# Patient Record
Sex: FEMALE | Race: WHITE | NOT HISPANIC OR LATINO | Employment: FULL TIME
[De-identification: names, ages, dates, MRNs, and addresses within clinical notes are randomized per-mention and may not be internally consistent; named-entity substitution may affect disease eponyms.]

---

## 2017-07-15 ENCOUNTER — HEALTH MAINTENANCE LETTER (OUTPATIENT)
Age: 37
End: 2017-07-15

## 2022-08-19 ENCOUNTER — HOSPITAL ENCOUNTER (INPATIENT)
Facility: CLINIC | Age: 42
LOS: 26 days | Discharge: HOME | End: 2022-09-14
Attending: INTERNAL MEDICINE | Admitting: STUDENT IN AN ORGANIZED HEALTH CARE EDUCATION/TRAINING PROGRAM
Payer: COMMERCIAL

## 2022-08-19 DIAGNOSIS — C91.00 ACUTE LYMPHOBLASTIC LEUKEMIA (ALL) NOT HAVING ACHIEVED REMISSION (H): Primary | ICD-10-CM

## 2022-08-19 PROBLEM — C95.00 ACUTE LEUKEMIA (H): Status: ACTIVE | Noted: 2022-08-19

## 2022-08-19 LAB
ALBUMIN SERPL BCG-MCNC: 4.2 G/DL (ref 3.5–5.2)
ALP SERPL-CCNC: 131 U/L (ref 35–104)
ALT SERPL W P-5'-P-CCNC: 25 U/L (ref 10–35)
ANION GAP SERPL CALCULATED.3IONS-SCNC: 13 MMOL/L (ref 7–15)
APTT PPP: 31 SECONDS (ref 22–38)
AST SERPL W P-5'-P-CCNC: 73 U/L (ref 10–35)
BILIRUB SERPL-MCNC: 0.4 MG/DL
BUN SERPL-MCNC: 10.9 MG/DL (ref 6–20)
CALCIUM SERPL-MCNC: 9.1 MG/DL (ref 8.6–10)
CHLORIDE SERPL-SCNC: 102 MMOL/L (ref 98–107)
CREAT SERPL-MCNC: 0.67 MG/DL (ref 0.51–0.95)
DEPRECATED HCO3 PLAS-SCNC: 22 MMOL/L (ref 22–29)
ERYTHROCYTE [DISTWIDTH] IN BLOOD BY AUTOMATED COUNT: 14.4 % (ref 10–15)
FIBRINOGEN PPP-MCNC: 486 MG/DL (ref 170–490)
GFR SERPL CREATININE-BSD FRML MDRD: >90 ML/MIN/1.73M2
GLUCOSE SERPL-MCNC: 106 MG/DL (ref 70–99)
HCT VFR BLD AUTO: 39.9 % (ref 35–47)
HGB BLD-MCNC: 13.2 G/DL (ref 11.7–15.7)
INR PPP: 1.19 (ref 0.85–1.15)
LDH SERPL L TO P-CCNC: >2500 U/L (ref 0–250)
MAGNESIUM SERPL-MCNC: 2.1 MG/DL (ref 1.7–2.3)
MCH RBC QN AUTO: 28.9 PG (ref 26.5–33)
MCHC RBC AUTO-ENTMCNC: 33.1 G/DL (ref 31.5–36.5)
MCV RBC AUTO: 88 FL (ref 78–100)
PHOSPHATE SERPL-MCNC: 3.9 MG/DL (ref 2.5–4.5)
PLATELET # BLD AUTO: 73 10E3/UL (ref 150–450)
POTASSIUM SERPL-SCNC: 3.7 MMOL/L (ref 3.4–5.3)
PROT SERPL-MCNC: 7.6 G/DL (ref 6.4–8.3)
RBC # BLD AUTO: 4.56 10E6/UL (ref 3.8–5.2)
SARS-COV-2 RNA RESP QL NAA+PROBE: NEGATIVE
SODIUM SERPL-SCNC: 137 MMOL/L (ref 136–145)
URATE SERPL-MCNC: 5.9 MG/DL (ref 2.4–5.7)
WBC # BLD AUTO: 109 10E3/UL (ref 4–11)

## 2022-08-19 PROCEDURE — 85007 BL SMEAR W/DIFF WBC COUNT: CPT | Performed by: PHYSICIAN ASSISTANT

## 2022-08-19 PROCEDURE — 81378 HLA I & II TYPING HR: CPT | Performed by: PHYSICIAN ASSISTANT

## 2022-08-19 PROCEDURE — 250N000011 HC RX IP 250 OP 636: Performed by: PHYSICIAN ASSISTANT

## 2022-08-19 PROCEDURE — 83735 ASSAY OF MAGNESIUM: CPT | Performed by: PHYSICIAN ASSISTANT

## 2022-08-19 PROCEDURE — 99223 1ST HOSP IP/OBS HIGH 75: CPT | Performed by: STUDENT IN AN ORGANIZED HEALTH CARE EDUCATION/TRAINING PROGRAM

## 2022-08-19 PROCEDURE — 85027 COMPLETE CBC AUTOMATED: CPT | Performed by: PHYSICIAN ASSISTANT

## 2022-08-19 PROCEDURE — 87340 HEPATITIS B SURFACE AG IA: CPT | Performed by: PHYSICIAN ASSISTANT

## 2022-08-19 PROCEDURE — 86706 HEP B SURFACE ANTIBODY: CPT | Performed by: PHYSICIAN ASSISTANT

## 2022-08-19 PROCEDURE — 86644 CMV ANTIBODY: CPT | Performed by: PHYSICIAN ASSISTANT

## 2022-08-19 PROCEDURE — 84100 ASSAY OF PHOSPHORUS: CPT | Performed by: PHYSICIAN ASSISTANT

## 2022-08-19 PROCEDURE — 80053 COMPREHEN METABOLIC PANEL: CPT | Performed by: PHYSICIAN ASSISTANT

## 2022-08-19 PROCEDURE — 86696 HERPES SIMPLEX TYPE 2 TEST: CPT | Performed by: PHYSICIAN ASSISTANT

## 2022-08-19 PROCEDURE — 250N000013 HC RX MED GY IP 250 OP 250 PS 637: Performed by: PHYSICIAN ASSISTANT

## 2022-08-19 PROCEDURE — 85610 PROTHROMBIN TIME: CPT | Performed by: PHYSICIAN ASSISTANT

## 2022-08-19 PROCEDURE — 86803 HEPATITIS C AB TEST: CPT | Performed by: PHYSICIAN ASSISTANT

## 2022-08-19 PROCEDURE — 87389 HIV-1 AG W/HIV-1&-2 AB AG IA: CPT | Performed by: PHYSICIAN ASSISTANT

## 2022-08-19 PROCEDURE — 120N000002 HC R&B MED SURG/OB UMMC

## 2022-08-19 PROCEDURE — 83615 LACTATE (LD) (LDH) ENZYME: CPT | Performed by: PHYSICIAN ASSISTANT

## 2022-08-19 PROCEDURE — 84550 ASSAY OF BLOOD/URIC ACID: CPT | Performed by: PHYSICIAN ASSISTANT

## 2022-08-19 PROCEDURE — 258N000003 HC RX IP 258 OP 636: Performed by: STUDENT IN AN ORGANIZED HEALTH CARE EDUCATION/TRAINING PROGRAM

## 2022-08-19 PROCEDURE — 36415 COLL VENOUS BLD VENIPUNCTURE: CPT | Performed by: PHYSICIAN ASSISTANT

## 2022-08-19 PROCEDURE — 86704 HEP B CORE ANTIBODY TOTAL: CPT | Performed by: PHYSICIAN ASSISTANT

## 2022-08-19 PROCEDURE — 85730 THROMBOPLASTIN TIME PARTIAL: CPT | Performed by: PHYSICIAN ASSISTANT

## 2022-08-19 PROCEDURE — 86665 EPSTEIN-BARR CAPSID VCA: CPT | Performed by: PHYSICIAN ASSISTANT

## 2022-08-19 PROCEDURE — U0005 INFEC AGEN DETEC AMPLI PROBE: HCPCS | Performed by: STUDENT IN AN ORGANIZED HEALTH CARE EDUCATION/TRAINING PROGRAM

## 2022-08-19 PROCEDURE — 85384 FIBRINOGEN ACTIVITY: CPT | Performed by: PHYSICIAN ASSISTANT

## 2022-08-19 RX ORDER — ACETAMINOPHEN 325 MG/1
650 TABLET ORAL EVERY 4 HOURS PRN
Status: DISCONTINUED | OUTPATIENT
Start: 2022-08-19 | End: 2022-08-25

## 2022-08-19 RX ORDER — SODIUM CHLORIDE 9 MG/ML
INJECTION, SOLUTION INTRAVENOUS CONTINUOUS
Status: DISCONTINUED | OUTPATIENT
Start: 2022-08-19 | End: 2022-08-25

## 2022-08-19 RX ORDER — COVID-19 ANTIGEN TEST
220 KIT MISCELLANEOUS 2 TIMES DAILY WITH MEALS
Status: ON HOLD | COMMUNITY
End: 2022-09-13

## 2022-08-19 RX ORDER — ALLOPURINOL 300 MG/1
300 TABLET ORAL DAILY
Status: DISCONTINUED | OUTPATIENT
Start: 2022-08-19 | End: 2022-09-05

## 2022-08-19 RX ORDER — LANOLIN ALCOHOL/MO/W.PET/CERES
3-6 CREAM (GRAM) TOPICAL
Status: DISCONTINUED | OUTPATIENT
Start: 2022-08-19 | End: 2022-09-14 | Stop reason: HOSPADM

## 2022-08-19 RX ORDER — NALOXONE HYDROCHLORIDE 0.4 MG/ML
0.2 INJECTION, SOLUTION INTRAMUSCULAR; INTRAVENOUS; SUBCUTANEOUS
Status: DISCONTINUED | OUTPATIENT
Start: 2022-08-19 | End: 2022-09-14 | Stop reason: HOSPADM

## 2022-08-19 RX ORDER — NALOXONE HYDROCHLORIDE 0.4 MG/ML
0.4 INJECTION, SOLUTION INTRAMUSCULAR; INTRAVENOUS; SUBCUTANEOUS
Status: DISCONTINUED | OUTPATIENT
Start: 2022-08-19 | End: 2022-09-14 | Stop reason: HOSPADM

## 2022-08-19 RX ORDER — ONDANSETRON 4 MG/1
4-8 TABLET, FILM COATED ORAL EVERY 8 HOURS PRN
Status: DISCONTINUED | OUTPATIENT
Start: 2022-08-19 | End: 2022-09-14 | Stop reason: HOSPADM

## 2022-08-19 RX ORDER — ONDANSETRON 2 MG/ML
4-8 INJECTION INTRAMUSCULAR; INTRAVENOUS EVERY 8 HOURS PRN
Status: DISCONTINUED | OUTPATIENT
Start: 2022-08-19 | End: 2022-09-14 | Stop reason: HOSPADM

## 2022-08-19 RX ORDER — ONDANSETRON 4 MG/1
4-8 TABLET, ORALLY DISINTEGRATING ORAL EVERY 8 HOURS PRN
Status: DISCONTINUED | OUTPATIENT
Start: 2022-08-19 | End: 2022-09-14 | Stop reason: HOSPADM

## 2022-08-19 RX ORDER — OXYCODONE HYDROCHLORIDE 5 MG/1
5-10 TABLET ORAL EVERY 4 HOURS PRN
Status: DISCONTINUED | OUTPATIENT
Start: 2022-08-19 | End: 2022-09-14 | Stop reason: HOSPADM

## 2022-08-19 RX ORDER — HYDROXYZINE HYDROCHLORIDE 25 MG/1
50 TABLET, FILM COATED ORAL EVERY 6 HOURS PRN
Status: DISCONTINUED | OUTPATIENT
Start: 2022-08-19 | End: 2022-09-14 | Stop reason: HOSPADM

## 2022-08-19 RX ORDER — POLYETHYLENE GLYCOL 3350 17 G/17G
17 POWDER, FOR SOLUTION ORAL DAILY PRN
Status: DISCONTINUED | OUTPATIENT
Start: 2022-08-19 | End: 2022-09-14 | Stop reason: HOSPADM

## 2022-08-19 RX ORDER — AMOXICILLIN 250 MG
1-2 CAPSULE ORAL 2 TIMES DAILY PRN
Status: DISCONTINUED | OUTPATIENT
Start: 2022-08-19 | End: 2022-08-24

## 2022-08-19 RX ORDER — DIPHENHYDRAMINE HCL 25 MG
25-50 CAPSULE ORAL EVERY 6 HOURS PRN
Status: DISCONTINUED | OUTPATIENT
Start: 2022-08-19 | End: 2022-08-25

## 2022-08-19 RX ORDER — HYDROXYZINE HYDROCHLORIDE 25 MG/1
25 TABLET, FILM COATED ORAL EVERY 6 HOURS PRN
Status: DISCONTINUED | OUTPATIENT
Start: 2022-08-19 | End: 2022-09-14 | Stop reason: HOSPADM

## 2022-08-19 RX ORDER — PROCHLORPERAZINE MALEATE 5 MG
5-10 TABLET ORAL EVERY 6 HOURS PRN
Status: DISCONTINUED | OUTPATIENT
Start: 2022-08-19 | End: 2022-09-14 | Stop reason: HOSPADM

## 2022-08-19 RX ORDER — ACYCLOVIR 400 MG/1
400 TABLET ORAL 2 TIMES DAILY
Status: DISCONTINUED | OUTPATIENT
Start: 2022-08-19 | End: 2022-09-14 | Stop reason: HOSPADM

## 2022-08-19 RX ADMIN — ACETAMINOPHEN 650 MG: 325 TABLET, FILM COATED ORAL at 19:00

## 2022-08-19 RX ADMIN — ONDANSETRON 4 MG: 4 TABLET, ORALLY DISINTEGRATING ORAL at 17:56

## 2022-08-19 RX ADMIN — HYDROXYZINE HYDROCHLORIDE 25 MG: 25 TABLET ORAL at 21:07

## 2022-08-19 RX ADMIN — SODIUM CHLORIDE, PRESERVATIVE FREE: 5 INJECTION INTRAVENOUS at 21:04

## 2022-08-19 RX ADMIN — ALLOPURINOL 300 MG: 300 TABLET ORAL at 17:55

## 2022-08-19 RX ADMIN — ACYCLOVIR 400 MG: 400 TABLET ORAL at 19:14

## 2022-08-19 ASSESSMENT — ACTIVITIES OF DAILY LIVING (ADL)
ADLS_ACUITY_SCORE: 22
WEAR_GLASSES_OR_BLIND: YES
CHANGE_IN_FUNCTIONAL_STATUS_SINCE_ONSET_OF_CURRENT_ILLNESS/INJURY: NO
WALKING_OR_CLIMBING_STAIRS_DIFFICULTY: NO
DOING_ERRANDS_INDEPENDENTLY_DIFFICULTY: YES
DRESSING/BATHING_DIFFICULTY: NO
VISION_MANAGEMENT: CONTACTS
ADLS_ACUITY_SCORE: 22
FALL_HISTORY_WITHIN_LAST_SIX_MONTHS: NO
ADLS_ACUITY_SCORE: 35
CONCENTRATING,_REMEMBERING_OR_MAKING_DECISIONS_DIFFICULTY: NO
TOILETING_ISSUES: NO
DIFFICULTY_COMMUNICATING: NO
DIFFICULTY_EATING/SWALLOWING: NO
ADLS_ACUITY_SCORE: 22

## 2022-08-19 NOTE — H&P
[facility]  Internal Medicine/Pediatrics  History and Physical    Date of service: 2022   Patient: Vashti Villegas      : 1980      MRN: [id number]            Assessment/Plan:   Vashti Villegas is a 42 year old female with no significant past medical history who presented from outside hospital with right upper quadrant pain and headache, subsequently found to have a markedly elevated white blood cell count concerning for acute leukemia.    #Concern for Acute Leukemia  #Hyperuricemia   #Elevated alkaline phosphatase, INR  Patient presented to outside hospital with right upper quadrant pain and debilitating headache, and was subsequently found to have a markedly elevated white blood cell count, concerning for acute leukemia.  Labs upon presentation showed a uric acid level of 5.9, alkaline phosphatase of 131, AST 73, INR 1.19. Elevated uric acid may be secondary to tumor lysis, while elevated liver enzymes may be secondary to hepatomegaly.   -- Admit to malignant hematology service  -- Acyclovir 400 mg twice daily for herpes simplex prophylaxis  -- Allopurinol 300 mg p.o. daily  -- P.o. Tylenol for pain, Zofran for nausea/vomiting  -- No NSAIDs or IM injections  -- Bone marrow biopsy and FISH ordered  -- Acute lymphoblastic leukemia NGS panel  -- BCR ABL1 Major and Minor labs  -- Blood cultures in the setting of fever  -- CBC, CMP, fibrinogen, uric acid, INR daily  -- LDH, phosphorus in a.m.  -- Transfusion goals: Platelets 10,000 or less, hemoglobin less than 7  -- Echocardiogram and EKG in anticipation of potential chemotherapy  -- Electrolyte replacement protocols ordered  -- Normal saline given elevated uric acid level    F/E/N:  - IV Fluids: normal saline  - Electrolytes: Replete electrolytes as needed per protocol  - Diet: NPO for procedure    VTE ppx: SCDs  Lines/Devices/Access: IV   Code Status: FULL   Disposition: pending further work-up and care       Macario Cook MD  Internal  Medicine-Pediatrics Hospitalist Team  [facility]            Chief complaint:   RUQ abdominal pain, headache          History of Present Illness:   Vashti Villegas is a 42 year old female with and presents with RUQ abdominal pain and headache     3 days prior to admission, patient noted that she started having chills.  If these chills are resolved.  She started to also have fatigue at this time.  The fatigue persisted for the next few days.  On the day prior to admission, the patient noted a headache that was overpowering.  She also noted some right upper quadrant pain that was unrelenting.  At that time, she presented to [facility].  Basic lab work was significant for a markedly elevated white blood cell count.  Given this, there was concern for new diagnosis of acute leukemia.  She was transferred to the [facility] for further work-up and care.    She has had a gradual weight loss over the past 1.5 years of about 50 pounds, however this has been intentional.  The patient has been playing tennis and working out multiple days per week.  She describes a unilateral headache occurring once per month in the morning on her right side, requiring her to call out of work.  She has been waking up with arm numbness for the past few months, however, this goes away throughout the day.  She does note some vague complaints over the past few months, leg pain in her wrist or swollen glands, however these tended to be isolated and would go away, so she did not seek medical care for these instances.    She denies vision changes, hearing loss, heart palpitations, difficulty breathing, diarrhea, constipation, bloody stool, urinary frequency, urinary urgency, issues with balance, issues with strength.            Review of Systems:   10-point ROS reviewed & found negative w/ exceptions noted in the HPI.         Allergies:   None          Medications:   None          Past Medical  History:   None          Past Surgical History:   2 prior  sections  Tubal ligation         Family History:   Father has a history of COPD and congestive heart failure secondary to lifestyle  Maternal cousin has a history of leukemia  Maternal grandmother has a history of breast cancer          Social History:   Patient lives with her 3 teenage children and her .  Everyone at home is healthy, however her  does take medication for gastroesophageal reflux.  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          Physical Exam:   BP (!) 166/95 (BP Location: Left arm)   Pulse 75   Temp 97.4  F (36.3  C) (Temporal)   Resp 20   Wt 113.5 kg (250 lb 4.8 oz)   SpO2 97%   No data recorded.    Wt Readings from Last 2 Encounters:   22 113.5 kg (250 lb 4.8 oz)     General Appearance: Well developed, well nourished, in no acute distress.   Skin: No rashes, ulcerations, or petechiae.  HEENT: PERRLA, EOMI intact, anicteric sclera, clear conjunctiva, normal external ear anatomy, normal nose anatomy, no lesions, scars, or masses. Normal mucosa lips, teeth, and gums.   Neck: Supple and symmetric, no thyromegaly, no tenderness, no masses.  Cardiovascular: RRR, no murmurs, rubs, or gallops.  Lungs: clear to ascultation bilaterally, no rales, wheezes or rhonchi.   Abdomen: TTP in RUQ and LUQ, non-distended abdomen, normal bowel sounds. Non-palpable spleen. No inguinal or umbilical hernias, no ascites.  Musculoskeletal: Muscle strength and tone were normal.  Extremities: No cyanosis or clubbing  Neurologic: Alert and oriented x 3. Normal affect. CX II-XII grossly intact. Normal sensation.           Data:     Results for orders placed or performed during the hospital encounter of 22 (from the past 24 hour(s))   CBC with platelets differential    Narrative    The following orders were created for panel order CBC with platelets differential.  Procedure                                Abnormality         Status                     ---------                               -----------         ------                     CBC with platelets and d...[[id number]]                      In process                   Please view results for these tests on the individual orders.   Comprehensive metabolic panel   Result Value Ref Range    Sodium 137 136 - 145 mmol/L    Potassium 3.7 3.4 - 5.3 mmol/L    Creatinine 0.67 0.51 - 0.95 mg/dL    Urea Nitrogen 10.9 6.0 - 20.0 mg/dL    Chloride 102 98 - 107 mmol/L    Carbon Dioxide (CO2) 22 22 - 29 mmol/L    Anion Gap 13 7 - 15 mmol/L    Glucose 106 (H) 70 - 99 mg/dL    Calcium 9.1 8.6 - 10.0 mg/dL    Protein Total 7.6 6.4 - 8.3 g/dL    Albumin 4.2 3.5 - 5.2 g/dL    Bilirubin Total 0.4 <=1.2 mg/dL    Alkaline Phosphatase 131 (H) 35 - 104 U/L    AST 73 (H) 10 - 35 U/L    ALT 25 10 - 35 U/L    GFR Estimate >90 >60 mL/min/1.73m2   Magnesium   Result Value Ref Range    Magnesium 2.1 1.7 - 2.3 mg/dL   Phosphorus   Result Value Ref Range    Phosphorus 3.9 2.5 - 4.5 mg/dL   Uric acid   Result Value Ref Range    Uric Acid 5.9 (H) 2.4 - 5.7 mg/dL   HLA Complete Typing BMT Recipient    Narrative    The following orders were created for panel order HLA Complete Typing BMT Recipient.  Procedure                               Abnormality         Status                     ---------                               -----------         ------                     HLA Complete Typing BMT ...[[id number]]                      In process                   Please view results for these tests on the individual orders.   INR   Result Value Ref Range    INR 1.19 (H) 0.85 - 1.15   Fibrinogen activity   Result Value Ref Range    Fibrinogen Activity 486 170 - 490 mg/dL   Partial thromboplastin time   Result Value Ref Range    aPTT 31 22 - 38 Seconds

## 2022-08-20 ENCOUNTER — APPOINTMENT (OUTPATIENT)
Dept: CARDIOLOGY | Facility: CLINIC | Age: 42
End: 2022-08-20
Attending: PHYSICIAN ASSISTANT
Payer: COMMERCIAL

## 2022-08-20 LAB
ALBUMIN SERPL BCG-MCNC: 3.9 G/DL (ref 3.5–5.2)
ALP SERPL-CCNC: 111 U/L (ref 35–104)
ALT SERPL W P-5'-P-CCNC: 15 U/L (ref 10–35)
ANION GAP SERPL CALCULATED.3IONS-SCNC: 10 MMOL/L (ref 7–15)
ANION GAP SERPL CALCULATED.3IONS-SCNC: 9 MMOL/L (ref 7–15)
ANION GAP SERPL CALCULATED.3IONS-SCNC: 9 MMOL/L (ref 7–15)
AST SERPL W P-5'-P-CCNC: 62 U/L (ref 10–35)
BILIRUB SERPL-MCNC: 0.5 MG/DL
BUN SERPL-MCNC: 10.5 MG/DL (ref 6–20)
BUN SERPL-MCNC: 11.6 MG/DL (ref 6–20)
BUN SERPL-MCNC: 8.1 MG/DL (ref 6–20)
CALCIUM SERPL-MCNC: 8.8 MG/DL (ref 8.6–10)
CALCIUM SERPL-MCNC: 8.8 MG/DL (ref 8.6–10)
CALCIUM SERPL-MCNC: 8.9 MG/DL (ref 8.6–10)
CHLORIDE SERPL-SCNC: 104 MMOL/L (ref 98–107)
CREAT SERPL-MCNC: 0.69 MG/DL (ref 0.51–0.95)
CREAT SERPL-MCNC: 0.72 MG/DL (ref 0.51–0.95)
CREAT SERPL-MCNC: 0.73 MG/DL (ref 0.51–0.95)
DEPRECATED HCO3 PLAS-SCNC: 24 MMOL/L (ref 22–29)
DEPRECATED HCO3 PLAS-SCNC: 26 MMOL/L (ref 22–29)
DEPRECATED HCO3 PLAS-SCNC: 26 MMOL/L (ref 22–29)
FIBRINOGEN PPP-MCNC: 490 MG/DL (ref 170–490)
FIBRINOGEN PPP-MCNC: 491 MG/DL (ref 170–490)
FIBRINOGEN PPP-MCNC: 529 MG/DL (ref 170–490)
GFR SERPL CREATININE-BSD FRML MDRD: >90 ML/MIN/1.73M2
GLUCOSE SERPL-MCNC: 106 MG/DL (ref 70–99)
GLUCOSE SERPL-MCNC: 90 MG/DL (ref 70–99)
GLUCOSE SERPL-MCNC: 92 MG/DL (ref 70–99)
HBV CORE AB SERPL QL IA: NONREACTIVE
HBV SURFACE AB SERPL IA-ACNC: 0 M[IU]/ML
HBV SURFACE AB SERPL IA-ACNC: NONREACTIVE M[IU]/ML
HBV SURFACE AG SERPL QL IA: NONREACTIVE
HCV AB SERPL QL IA: NONREACTIVE
HIV 1+2 AB+HIV1 P24 AG SERPL QL IA: NONREACTIVE
INR PPP: 1.24 (ref 0.85–1.15)
INR PPP: 1.26 (ref 0.85–1.15)
INR PPP: 1.27 (ref 0.85–1.15)
LDH SERPL L TO P-CCNC: 2219 U/L (ref 0–250)
LVEF ECHO: NORMAL
PATH REPORT.COMMENTS IMP SPEC: ABNORMAL
PATH REPORT.COMMENTS IMP SPEC: YES
PATH REPORT.FINAL DX SPEC: ABNORMAL
PATH REPORT.MICROSCOPIC SPEC OTHER STN: ABNORMAL
PATH REPORT.RELEVANT HX SPEC: ABNORMAL
PHOSPHATE SERPL-MCNC: 2.8 MG/DL (ref 2.5–4.5)
PHOSPHATE SERPL-MCNC: 3.4 MG/DL (ref 2.5–4.5)
PHOSPHATE SERPL-MCNC: 3.8 MG/DL (ref 2.5–4.5)
POTASSIUM SERPL-SCNC: 3.5 MMOL/L (ref 3.4–5.3)
POTASSIUM SERPL-SCNC: 3.5 MMOL/L (ref 3.4–5.3)
POTASSIUM SERPL-SCNC: 3.7 MMOL/L (ref 3.4–5.3)
POTASSIUM SERPL-SCNC: ABNORMAL MMOL/L
PROT SERPL-MCNC: 7.2 G/DL (ref 6.4–8.3)
SODIUM SERPL-SCNC: 138 MMOL/L (ref 136–145)
SODIUM SERPL-SCNC: 139 MMOL/L (ref 136–145)
SODIUM SERPL-SCNC: 139 MMOL/L (ref 136–145)
URATE SERPL-MCNC: 3.8 MG/DL (ref 2.4–5.7)
URATE SERPL-MCNC: 4 MG/DL (ref 2.4–5.7)
URATE SERPL-MCNC: 4.8 MG/DL (ref 2.4–5.7)

## 2022-08-20 PROCEDURE — 84550 ASSAY OF BLOOD/URIC ACID: CPT | Performed by: PHYSICIAN ASSISTANT

## 2022-08-20 PROCEDURE — 93306 TTE W/DOPPLER COMPLETE: CPT

## 2022-08-20 PROCEDURE — 85007 BL SMEAR W/DIFF WBC COUNT: CPT | Performed by: PHYSICIAN ASSISTANT

## 2022-08-20 PROCEDURE — 88342 IMHCHEM/IMCYTCHM 1ST ANTB: CPT | Mod: 26 | Performed by: PATHOLOGY

## 2022-08-20 PROCEDURE — 88305 TISSUE EXAM BY PATHOLOGIST: CPT | Mod: 26 | Performed by: PATHOLOGY

## 2022-08-20 PROCEDURE — 84450 TRANSFERASE (AST) (SGOT): CPT | Performed by: PHYSICIAN ASSISTANT

## 2022-08-20 PROCEDURE — 88188 FLOWCYTOMETRY/READ 9-15: CPT | Mod: GC | Performed by: STUDENT IN AN ORGANIZED HEALTH CARE EDUCATION/TRAINING PROGRAM

## 2022-08-20 PROCEDURE — 88189 FLOWCYTOMETRY/READ 16 & >: CPT | Mod: GC | Performed by: STUDENT IN AN ORGANIZED HEALTH CARE EDUCATION/TRAINING PROGRAM

## 2022-08-20 PROCEDURE — 272N000103 HC INTRODUCER MICRO SET

## 2022-08-20 PROCEDURE — 07DR3ZX EXTRACTION OF ILIAC BONE MARROW, PERCUTANEOUS APPROACH, DIAGNOSTIC: ICD-10-PCS | Performed by: PHYSICIAN ASSISTANT

## 2022-08-20 PROCEDURE — 258N000003 HC RX IP 258 OP 636: Performed by: PHYSICIAN ASSISTANT

## 2022-08-20 PROCEDURE — 85610 PROTHROMBIN TIME: CPT | Performed by: PHYSICIAN ASSISTANT

## 2022-08-20 PROCEDURE — 88275 CYTOGENETICS 100-300: CPT | Performed by: INTERNAL MEDICINE

## 2022-08-20 PROCEDURE — 38222 DX BONE MARROW BX & ASPIR: CPT | Performed by: STUDENT IN AN ORGANIZED HEALTH CARE EDUCATION/TRAINING PROGRAM

## 2022-08-20 PROCEDURE — 83615 LACTATE (LD) (LDH) ENZYME: CPT | Performed by: PHYSICIAN ASSISTANT

## 2022-08-20 PROCEDURE — 88264 CHROMOSOME ANALYSIS 20-25: CPT | Performed by: PHYSICIAN ASSISTANT

## 2022-08-20 PROCEDURE — 93005 ELECTROCARDIOGRAM TRACING: CPT

## 2022-08-20 PROCEDURE — 99223 1ST HOSP IP/OBS HIGH 75: CPT | Mod: FS | Performed by: PHYSICIAN ASSISTANT

## 2022-08-20 PROCEDURE — 272N000451 HC KIT SHRLOCK 5FR POWER PICC DOUBLE LUMEN

## 2022-08-20 PROCEDURE — 88185 FLOWCYTOMETRY/TC ADD-ON: CPT | Performed by: PHYSICIAN ASSISTANT

## 2022-08-20 PROCEDURE — 250N000013 HC RX MED GY IP 250 OP 250 PS 637: Performed by: PHYSICIAN ASSISTANT

## 2022-08-20 PROCEDURE — 120N000002 HC R&B MED SURG/OB UMMC

## 2022-08-20 PROCEDURE — 88341 IMHCHEM/IMCYTCHM EA ADD ANTB: CPT | Mod: 26 | Performed by: PATHOLOGY

## 2022-08-20 PROCEDURE — 80048 BASIC METABOLIC PNL TOTAL CA: CPT | Performed by: PHYSICIAN ASSISTANT

## 2022-08-20 PROCEDURE — 93010 ELECTROCARDIOGRAM REPORT: CPT | Performed by: INTERNAL MEDICINE

## 2022-08-20 PROCEDURE — 88184 FLOWCYTOMETRY/ TC 1 MARKER: CPT | Performed by: STUDENT IN AN ORGANIZED HEALTH CARE EDUCATION/TRAINING PROGRAM

## 2022-08-20 PROCEDURE — 88311 DECALCIFY TISSUE: CPT | Mod: 26 | Performed by: PATHOLOGY

## 2022-08-20 PROCEDURE — 250N000011 HC RX IP 250 OP 636: Performed by: PHYSICIAN ASSISTANT

## 2022-08-20 PROCEDURE — 85384 FIBRINOGEN ACTIVITY: CPT | Performed by: PHYSICIAN ASSISTANT

## 2022-08-20 PROCEDURE — 36415 COLL VENOUS BLD VENIPUNCTURE: CPT | Performed by: PHYSICIAN ASSISTANT

## 2022-08-20 PROCEDURE — 93306 TTE W/DOPPLER COMPLETE: CPT | Mod: 26 | Performed by: STUDENT IN AN ORGANIZED HEALTH CARE EDUCATION/TRAINING PROGRAM

## 2022-08-20 PROCEDURE — 84100 ASSAY OF PHOSPHORUS: CPT | Performed by: PHYSICIAN ASSISTANT

## 2022-08-20 PROCEDURE — 88291 CYTO/MOLECULAR REPORT: CPT | Mod: XE | Performed by: MEDICAL GENETICS

## 2022-08-20 PROCEDURE — 88305 TISSUE EXAM BY PATHOLOGIST: CPT | Mod: TC | Performed by: PHYSICIAN ASSISTANT

## 2022-08-20 PROCEDURE — 88313 SPECIAL STAINS GROUP 2: CPT | Mod: 26 | Performed by: PATHOLOGY

## 2022-08-20 PROCEDURE — 85027 COMPLETE CBC AUTOMATED: CPT | Performed by: PHYSICIAN ASSISTANT

## 2022-08-20 PROCEDURE — G0452 MOLECULAR PATHOLOGY INTERPR: HCPCS | Performed by: MEDICAL GENETICS

## 2022-08-20 PROCEDURE — 250N000013 HC RX MED GY IP 250 OP 250 PS 637: Performed by: STUDENT IN AN ORGANIZED HEALTH CARE EDUCATION/TRAINING PROGRAM

## 2022-08-20 PROCEDURE — 84132 ASSAY OF SERUM POTASSIUM: CPT | Performed by: INTERNAL MEDICINE

## 2022-08-20 PROCEDURE — 88368 INSITU HYBRIDIZATION MANUAL: CPT | Mod: 26 | Performed by: MEDICAL GENETICS

## 2022-08-20 PROCEDURE — 81277 CYTOGENOMIC NEO MICRORA ALYS: CPT | Performed by: INTERNAL MEDICINE

## 2022-08-20 PROCEDURE — 85060 BLOOD SMEAR INTERPRETATION: CPT | Mod: GC | Performed by: PATHOLOGY

## 2022-08-20 PROCEDURE — 36592 COLLECT BLOOD FROM PICC: CPT | Performed by: PHYSICIAN ASSISTANT

## 2022-08-20 PROCEDURE — 82310 ASSAY OF CALCIUM: CPT | Performed by: PHYSICIAN ASSISTANT

## 2022-08-20 PROCEDURE — 272N000019 HC KIT OPEN ENDED DOUBLE LUMEN

## 2022-08-20 PROCEDURE — 88184 FLOWCYTOMETRY/ TC 1 MARKER: CPT | Performed by: PHYSICIAN ASSISTANT

## 2022-08-20 PROCEDURE — 36569 INSJ PICC 5 YR+ W/O IMAGING: CPT

## 2022-08-20 PROCEDURE — 88311 DECALCIFY TISSUE: CPT | Mod: TC | Performed by: PHYSICIAN ASSISTANT

## 2022-08-20 RX ORDER — PANTOPRAZOLE SODIUM 40 MG/1
40 TABLET, DELAYED RELEASE ORAL
Status: DISCONTINUED | OUTPATIENT
Start: 2022-08-21 | End: 2022-09-14 | Stop reason: HOSPADM

## 2022-08-20 RX ORDER — HYDROXYUREA 500 MG/1
1000 CAPSULE ORAL 3 TIMES DAILY
Status: DISCONTINUED | OUTPATIENT
Start: 2022-08-20 | End: 2022-08-23

## 2022-08-20 RX ORDER — HYDRALAZINE HYDROCHLORIDE 20 MG/ML
10-20 INJECTION INTRAMUSCULAR; INTRAVENOUS EVERY 6 HOURS PRN
Status: DISCONTINUED | OUTPATIENT
Start: 2022-08-20 | End: 2022-09-14 | Stop reason: HOSPADM

## 2022-08-20 RX ORDER — LIDOCAINE 40 MG/G
CREAM TOPICAL
Status: ACTIVE | OUTPATIENT
Start: 2022-08-20 | End: 2022-08-23

## 2022-08-20 RX ADMIN — ACYCLOVIR 400 MG: 400 TABLET ORAL at 07:47

## 2022-08-20 RX ADMIN — SODIUM CHLORIDE, PRESERVATIVE FREE: 5 INJECTION INTRAVENOUS at 09:26

## 2022-08-20 RX ADMIN — OXYCODONE HYDROCHLORIDE 5 MG: 5 TABLET ORAL at 07:47

## 2022-08-20 RX ADMIN — HYDROXYUREA 1000 MG: 500 CAPSULE ORAL at 10:53

## 2022-08-20 RX ADMIN — ACYCLOVIR 400 MG: 400 TABLET ORAL at 19:52

## 2022-08-20 RX ADMIN — HYDROXYUREA 1000 MG: 500 CAPSULE ORAL at 19:52

## 2022-08-20 RX ADMIN — ALLOPURINOL 300 MG: 300 TABLET ORAL at 07:47

## 2022-08-20 RX ADMIN — ONDANSETRON 8 MG: 4 TABLET, ORALLY DISINTEGRATING ORAL at 23:37

## 2022-08-20 RX ADMIN — HYDRALAZINE HYDROCHLORIDE 20 MG: 20 INJECTION, SOLUTION INTRAMUSCULAR; INTRAVENOUS at 22:59

## 2022-08-20 RX ADMIN — HYDROXYUREA 1000 MG: 500 CAPSULE ORAL at 14:06

## 2022-08-20 RX ADMIN — MIDAZOLAM HYDROCHLORIDE 2 MG: 1 INJECTION, SOLUTION INTRAMUSCULAR; INTRAVENOUS at 09:16

## 2022-08-20 RX ADMIN — SODIUM CHLORIDE, PRESERVATIVE FREE: 5 INJECTION INTRAVENOUS at 19:47

## 2022-08-20 RX ADMIN — OXYCODONE HYDROCHLORIDE 5 MG: 5 TABLET ORAL at 20:06

## 2022-08-20 ASSESSMENT — ACTIVITIES OF DAILY LIVING (ADL)
ADLS_ACUITY_SCORE: 22

## 2022-08-20 NOTE — PROVIDER NOTIFICATION
"Heme cross cover paged    \"Pt still has /88. Gave atarax for anxiety with little change in BP. Thanks!\"   "

## 2022-08-20 NOTE — PLAN OF CARE
Nursing Focus: Admission  D: Arrived at 7D from [place] via car. Patient accompanied by , Crescencio. Admitted for Leukemia workup. Complains of headache, fatigue, RUQ abdominal pain, and nausea..      I: Admission process began.  Patient oriented to room, enviroment, call light.  Md. notified of patients arrival on unit.     A: Vital signs stable, afebrile.  Patient stable at this time.  Complaining of headache, nausea, RUQ pain.      P: Implement plan of care when available. Continue to monitor patient. Nursing interventions as appropriate. Notify md with changes in pt status.

## 2022-08-20 NOTE — PROCEDURES
[facility]    Double Lumen PICC Placement    Date/Time: 8/20/2022 1:22 PM  Performed by: Aniket Macedo RN  Authorized by: Steve Hull MD   Indications: vascular access      UNIVERSAL PROTOCOL   Site Marked: Yes  Prior Images Obtained and Reviewed:  Yes  Required items: Required blood products, implants, devices and special equipment available    Patient identity confirmed:  Verbally with patient and arm band  NA - No sedation, light sedation, or local anesthesia  Confirmation Checklist:  Patient's identity using two indicators, relevant allergies, procedure was appropriate and matched the consent or emergent situation and correct equipment/implants were available  Time out: Immediately prior to the procedure a time out was called    Universal Protocol: the Joint Commission Universal Protocol was followed    Preparation: Patient was prepped and draped in usual sterile fashion       ANESTHESIA    Anesthesia: Local infiltration  Local Anesthetic:  Lidocaine 1% without epinephrine  Anesthetic Total (mL):  5      SEDATION    Patient Sedated: No        Preparation: skin prepped with ChloraPrep  Skin prep agent: skin prep agent completely dried prior to procedure  Sterile barriers: maximum sterile barriers were used: cap, mask, sterile gown, sterile gloves, and large sterile sheet  Hand hygiene: hand hygiene performed prior to central venous catheter insertion  Type of line used: PICC and Power PICC  Catheter type: double lumen  Lumen type: non-valved  Catheter size: 5 Fr  Brand: Bard  Lot number: [id number]  Placement method: MST, venipuncture, ultrasound and tip navigation system  Number of attempts: 1  Difficulty threading catheter: no  Successful placement: yes  Orientation: left    Location: cephalic vein (vd 0.61 cm)  Tip Location: superior vena cava  Arm circumference: adults 10 cm  Extremity circumference: 36  Visible catheter length: 2  Total  catheter length: 44  Dressing and securement: adhesive securement device, alcohol impregnated caps, blood cleaned with CHG, chlorhexidine disc applied, dressing applied, glue, line secured, securement device, site cleansed, sterile dressing applied, statlock, transparent securement dressing, transparent dressing and tissue adhesive  Post procedure assessment: blood return through all ports, placement verified by 3CG technology and free fluid flow  PROCEDURE   Patient Tolerance:  Patient tolerated the procedure well with no immediate complicationsDescribe Procedure: PICC ok to use

## 2022-08-20 NOTE — PROCEDURES
Bone Marrow Biopsy Procedure Note  Patient's Name:  April Bakari  Date of Procedure: 8/20/22     PROCEDURE:  Unilateral bone marrow biopsy and unilateral aspirate      INDICATION:  Concern for new acute leukemia       PERFORMED BY:  Clau Castro PA-C     CONSENT:  Informed consent was obtained from the patient. The risks and benefits of the procedure were explained. The patient agreed to undergo the procedure. The consent form was signed and placed in the paper chart.     Additionally, the patient signed the [facility] consent.     PREMEDICATION:  Pt had received Oxycodone 5mg (0747)  Premed Versed 2mg IV x once (0916)     PROCEDURE SUMMARY:  The patient's identification was positively verified by ID band. Patient was laid in the prone position. Premedication with Versed was administered. The  LEFT posterior iliac crest (LPIC) was prepped and draped in a sterile manner. The skin, deeper tissues, and periosteum of the LPIC were anesthetized with approximately 25 mL 1% lidocaine. Following this, a 3mm incision was made. The trephine needle was advanced into the LPIC bone cavity. This provider attempted to aspirate without any bone marrow aspirate return. Thus, the needle was advanced and a 17 mm core biopsy was obtained. The trephine needle was repositioned and advanced into the LPIC bone cavity. Again, collection of aspirate was attempted but not obtained due to dry tap. Thus, a second 13mm core biopsy was obtained.     Following the procedure, a sterile pressure dressing was applied to the bone marrow biopsy site.     Core samples were prioritized for morphology and flow. However, all additional tests were ordered on peripheral blood given that patient has a high WBC burden with high percentage of circulating blasts.      COMPLICATIONS:  None. The procedure took approximately 50 minutes due to extra time spent ensuring adequate lidocaine administration and attempts for aspirate without return (dry tap) necessitating  additional attempts to collect core biopsy which was made challenging by the patient's hard bones. However, the patient tolerated the procedure very well with mild discomfort (some intermittent pain with lidocaine administration but primarily muscle tightening/pain with collection of the cores).      RECOMMENDATIONS:  The patient was placed in the supine position to maintain pressure on the biopsy site.   The patient was instructed to lie flat for 60 minutes and not to remove the dressing or get it wet for 24 hours post-procedure.      TESTS ORDERED:  Morphology  Flow cytometry  Chromosomes  FISH   ALL NGS panel  BCR-abl major and minor   DNA Process and Hold     Clau Castro PA-C  Hematology/Oncology  Pager: [phone]

## 2022-08-20 NOTE — PLAN OF CARE
[phone]    Hypertensive, provider notified. OVSS on RA. Per patient she has no known hx of HTN. Suspect acute anxiety related to new diagnosis and admission playing a role, gave pt Atarax 25mg before bed. Zofran given x1 with relief. Pt had emesis on the way to the hospital. Little appetite. Tylenol given for headache and RUQ abdominal pain. Oxy available PRN if needed. Skin intact.  Crescencio at bedside, both patients and  tearful and anxious.     Pt afraid of needles and is worried about bmbx tomorrow. MIVF running in R PIV at 75 ml/hr. Continue with POC.

## 2022-08-20 NOTE — PLAN OF CARE
Goal Outcome Evaluation:    Hypertensive BP 140s/80s. OVSS. Denies N/V, pain and SOB. Bmbx completed today. ECHO completed. PICC placed. Starting hydrea and possibly decadron. Waiting for bx results for plan. Continue to monitor.

## 2022-08-20 NOTE — PLAN OF CARE
Alert and oriented X 4. Hypertensive. Denies headache, visual changes, dizziness, chest pain or palpitations. No s/sx of bleeding. Ambulating to and from bathroom independently. Voiding spontaneously. Urine cam-coloured. Sleeping in between cares.

## 2022-08-20 NOTE — PROGRESS NOTES
[facility]  Hematology / Oncology Progress Note    Date of Admission: 8/19/2022  Date of Service (when I saw the patient): 08/20/2022     Assessment & Plan   Vashti Villegas is a 42 year old female who was admitted on 8/19/2022 with 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     HEME  # Leukocytosis, Anemia, Thrombocytopenia  # Concern for acute leukemia  Patient presented to outside hospital with c/o Right upper quadrant pain and was subsequently found to have a markedly elevated white blood cell count concerning for acute leukemia. While at OSH, she had CT PE protocol as well as CT A/P. These identified no PE, no acute or localized intraabdominal inflammatory process, mild splenomegaly, and few inconspicous low-density structures in the liver. She was transferred to [facility] for further workup and management. Labs upon presentation showed WBC 109K (ANC 15.3, 85% other cells), Hgb 13.2, Plt 73.  A uric acid level of 5.9, alkaline phosphatase of 131, AST 73, INR 1.19. Elevated uric acid may be secondary to tumor lysis, while elevated liver enzymes may be secondary to leukemic involvement.    - BMBx today. 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 Pt reports testing postiive for COVID approx 1 month ago via home test. She never had it confirmed. COVID testing on admission was negative.     FEN  - IVFs with NS @ 100 ml/hr  - lyte repletion per protocol, monitor closely given risk for TLS  - regular diet  "as tolerated    PPx  - VTE: Defer due to TCP  - GI: PPI    Dispo: Anticipate >2 night LOS for workup of new acute leukemia. LOS will then be based on anticipated treatment/management.     Discussed and seen for portion of visit with Dr. Hull.     I spent 80 minutes in the care of this patient today, which included time necessary for review of interval events, obtaining history and physical exam, ordering medication(s)/test(s) as medically indicated, discussion with interdisciplinary/consult team(s), and documentation time. Over 50% of time was spent face-to-face and/or coordinating care.      Clau Castro PA-C  Heme/Onc  [phone]       Interval History   Admitted last night with concern for acute leukemia. This AM, she is handling the news ok and understands that we need to gather more information before we can have more detailed discussions about what exactly we're dealing with and how to treat it. Anxious about BMBx. She confirms recent history with things happening very acutely for her. She has been feeling more fatigued, particularly since Tuesday -- though up until then she was playing tennis 2-4 times per week as well as lifting weights 2 times per week. After tennis on Tuesday she felt very fatigued and that her legs were like \"jelly\". Then, Thursday she developed acute RUQ pain that would radiate up her back. This is what prompted her to present to the hospital for workup. Today, she can feel the RUQ pain but it's not bad and not particularly reproducible on exam. There was some report of HA also prompting her presentation but she does not endorse persistent/debilitating HA. She will occasionally get these once a month or so; they sit over her R eye/temple but they have not been persistent. She does not currently have a HA. Denies any sinus pain/pressure, feeling of swollen gums, nose or gum bleeding, mouth pain/sores, SOB, cough, nausea, or change in bowels. Denies extremity edema. Denies recent rash or " lesions on skin. Denies recent swollen lymph nodes. She endorsed that Tues-Wed last week, she did feel chilled but did not take her temperature assuming her body was just fighting something off. She then had drenching night sweats but they did not recur.       Physical Exam   Temp: (!) 95.5  F (35.3  C) Temp src: Oral BP: (!) 142/83 Pulse: 71   Resp: 20 SpO2: 96 % O2 Device: None (Room air)    Vitals:    08/19/22 1633 08/20/22 0802   Weight: 113.5 kg (250 lb 4.8 oz) 113.2 kg (249 lb 8 oz)     Vital Signs with Ranges  Temp:  [95.5  F (35.3  C)-98.6  F (37  C)] 95.5  F (35.3  C)  Pulse:  [57-77] 71  Resp:  [18-20] 20  BP: (142-181)/(67-95) 142/83  SpO2:  [95 %-97 %] 96 %  I/O last 3 completed shifts:  In: 743.75 [I.V.:743.75]  Out: 500 [Urine:500]  General Appearance: Well developed, well nourished, in no acute distress.   Skin: No rashes, ulcerations, or petechiae.  HEENT: NC/AT.  EOMI intact, anicteric sclera, clear conjunctiva, OP pink and moist, no lesions or thrush.   Neck: Supple and symmetric.  Cardiovascular: RRR, no murmur appreciated.  Lungs: clear to ascultation bilaterally, no rales, wheezes or rhonchi.   Abdomen: Normal BS. Abd soft, ND. Mildly TTP in RUQ. Difficult to appreciate any organomegaly due to body habitus.   Musculoskeletal: Muscle strength and tone were normal.  Extremities: Grossly normal in appearance. Able to move self in bed.   Neurologic: Alert and oriented x 3. Normal affect. CX II-XII grossly intact.       Medications     - MEDICATION INSTRUCTIONS -       sodium chloride 100 mL/hr at 08/20/22 1459       acyclovir  400 mg Oral BID     allopurinol  300 mg Oral Daily     hydroxyurea  1,000 mg Oral TID     [START ON 8/21/2022] pantoprazole  40 mg Oral QAM AC       Data   Results for orders placed or performed during the hospital encounter of 08/19/22 (from the past 24 hour(s))   CBC with platelets differential *Canceled*    Narrative    The following orders were created for panel order CBC  with platelets differential.  Procedure                               Abnormality         Status                     ---------                               -----------         ------                       Please view results for these tests on the individual orders.   CBC with platelets differential    Narrative    The following orders were created for panel order CBC with platelets differential.  Procedure                               Abnormality         Status                     ---------                               -----------         ------                     CBC with platelets and d...[[id number]]  Abnormal            Final result               Manual Differential[[id number]]          Abnormal            Preliminary result           Please view results for these tests on the individual orders.   Comprehensive metabolic panel   Result Value Ref Range    Sodium 137 136 - 145 mmol/L    Potassium 3.7 3.4 - 5.3 mmol/L    Creatinine 0.67 0.51 - 0.95 mg/dL    Urea Nitrogen 10.9 6.0 - 20.0 mg/dL    Chloride 102 98 - 107 mmol/L    Carbon Dioxide (CO2) 22 22 - 29 mmol/L    Anion Gap 13 7 - 15 mmol/L    Glucose 106 (H) 70 - 99 mg/dL    Calcium 9.1 8.6 - 10.0 mg/dL    Protein Total 7.6 6.4 - 8.3 g/dL    Albumin 4.2 3.5 - 5.2 g/dL    Bilirubin Total 0.4 <=1.2 mg/dL    Alkaline Phosphatase 131 (H) 35 - 104 U/L    AST 73 (H) 10 - 35 U/L    ALT 25 10 - 35 U/L    GFR Estimate >90 >60 mL/min/1.73m2   Magnesium   Result Value Ref Range    Magnesium 2.1 1.7 - 2.3 mg/dL   Phosphorus   Result Value Ref Range    Phosphorus 3.9 2.5 - 4.5 mg/dL   Uric acid   Result Value Ref Range    Uric Acid 5.9 (H) 2.4 - 5.7 mg/dL   Lactate Dehydrogenase   Result Value Ref Range    Lactate Dehydrogenase >2,500 (H) 0 - 250 U/L   HLA Complete Typing BMT Recipient    Narrative    The following orders were created for panel order HLA Complete Typing BMT Recipient.  Procedure                               Abnormality         Status                      ---------                               -----------         ------                     HLA Complete Typing BMT ...[[id number]]                      In process                   Please view results for these tests on the individual orders.   HIV Antigen Antibody Combo   Result Value Ref Range    HIV Antigen Antibody Combo Nonreactive Nonreactive   Hepatitis B Surface Antibody   Result Value Ref Range    Hepatitis B Surface Antibody Instrument Value 0.00 <8.00 m[IU]/mL    Hepatitis B Surface Antibody Nonreactive    Hepatitis B surface antigen   Result Value Ref Range    Hepatitis B Surface Antigen Nonreactive Nonreactive   Hepatitis B core antibody   Result Value Ref Range    Hepatitis B Core Antibody Total Nonreactive Nonreactive   Hepatitis C antibody   Result Value Ref Range    Hepatitis C Antibody Nonreactive Nonreactive    Narrative    Assay performance characteristics have not been established for newborns, infants, and children.   INR   Result Value Ref Range    INR 1.19 (H) 0.85 - 1.15   Fibrinogen activity   Result Value Ref Range    Fibrinogen Activity 486 170 - 490 mg/dL   Partial thromboplastin time   Result Value Ref Range    aPTT 31 22 - 38 Seconds   CBC with platelets and differential   Result Value Ref Range    WBC Count 109.0 (HH) 4.0 - 11.0 10e3/uL    RBC Count 4.56 3.80 - 5.20 10e6/uL    Hemoglobin 13.2 11.7 - 15.7 g/dL    Hematocrit 39.9 35.0 - 47.0 %    MCV 88 78 - 100 fL    MCH 28.9 26.5 - 33.0 pg    MCHC 33.1 31.5 - 36.5 g/dL    RDW 14.4 10.0 - 15.0 %    Platelet Count 73 (L) 150 - 450 10e3/uL   Manual Differential   Result Value Ref Range    % Neutrophils 14 %    % Lymphocytes 7 %    % Monocytes 1 %    % Eosinophils 0 %    % Basophils 0 %    % Other Cells 78 %    Absolute Neutrophils 15.3 (H) 1.6 - 8.3 10e3/uL    Absolute Lymphocytes 7.6 (H) 0.8 - 5.3 10e3/uL    Absolute Monocytes 1.1 0.0 - 1.3 10e3/uL    Absolute Eosinophils 0.0 0.0 - 0.7 10e3/uL    Absolute Basophils 0.0 0.0 - 0.2 10e3/uL     Absolute Other Cells 85.0 (H) <=0.0 10e3/uL    RBC Morphology Confirmed RBC Indices     Platelet Assessment  Automated Count Confirmed. Platelet morphology is normal.     Automated Count Confirmed. Platelet morphology is normal.    Polychromasia Slight (A) None Seen    Pathologist Review Comments      Narrative    Sent for Review by Pathologist. Review comments will be entered. Results will be updated after review as applicable.     Asymptomatic COVID-19 Virus (Coronavirus) by PCR Nasopharyngeal    Specimen: Nasopharyngeal; Swab   Result Value Ref Range    SARS CoV2 PCR Negative Negative    Narrative    Testing was performed using the Xpert Xpress SARS-CoV-2 Assay on the  Cepheid Gene-Xpert Instrument Systems. Additional information about  this Emergency Use Authorization (EUA) assay can be found via the Lab  Guide. This test should be ordered for the detection of SARS-CoV-2 in  individuals who meet SARS-CoV-2 clinical and/or epidemiological  criteria. Test performance is unknown in asymptomatic patients. This  test is for in vitro diagnostic use under the FDA EUA for  laboratories certified under CLIA to perform high complexity testing.  This test has not been FDA cleared or approved. A negative result  does not rule out the presence of PCR inhibitors in the specimen or  target RNA in concentration below the limit of detection for the  assay. The possibility of a false negative should be considered if  the patient's recent exposure or clinical presentation suggests  COVID-19. This test was validated by the [facility]. This laboratory is certified under  the Clinical Laboratory Improvement Amendments of 1988 (CLIA-88) as  qualified to perform high complexity laboratory testing.     CBC with platelets differential    Narrative    The following orders were created for panel order CBC with platelets differential.  Procedure                               Abnormality          Status                     ---------                               -----------         ------                     CBC with platelets and d...[[id number]]  Abnormal            Preliminary result           Please view results for these tests on the individual orders.   Comprehensive metabolic panel   Result Value Ref Range    Sodium 138 136 - 145 mmol/L    Potassium      Creatinine 0.69 0.51 - 0.95 mg/dL    Urea Nitrogen 8.1 6.0 - 20.0 mg/dL    Chloride 104 98 - 107 mmol/L    Carbon Dioxide (CO2) 24 22 - 29 mmol/L    Anion Gap 10 7 - 15 mmol/L    Glucose 90 70 - 99 mg/dL    Calcium 8.8 8.6 - 10.0 mg/dL    Protein Total 7.2 6.4 - 8.3 g/dL    Albumin 3.9 3.5 - 5.2 g/dL    Bilirubin Total 0.5 <=1.2 mg/dL    Alkaline Phosphatase 111 (H) 35 - 104 U/L    AST 62 (H) 10 - 35 U/L    ALT 15 10 - 35 U/L    GFR Estimate >90 >60 mL/min/1.73m2   INR   Result Value Ref Range    INR 1.24 (H) 0.85 - 1.15   Fibrinogen activity   Result Value Ref Range    Fibrinogen Activity 529 (H) 170 - 490 mg/dL   Uric acid   Result Value Ref Range    Uric Acid 4.8 2.4 - 5.7 mg/dL   Phosphorus   Result Value Ref Range    Phosphorus 3.4 2.5 - 4.5 mg/dL   Lactate Dehydrogenase   Result Value Ref Range    Lactate Dehydrogenase 2,219 (H) 0 - 250 U/L   CBC with platelets and differential   Result Value Ref Range    WBC Count 100.7 (HH) 4.0 - 11.0 10e3/uL    RBC Count 4.41 3.80 - 5.20 10e6/uL    Hemoglobin 12.7 11.7 - 15.7 g/dL    Hematocrit 38.4 35.0 - 47.0 %    MCV 87 78 - 100 fL    MCH 28.8 26.5 - 33.0 pg    MCHC 33.1 31.5 - 36.5 g/dL    RDW 14.5 10.0 - 15.0 %    Platelet Count 59 (L) 150 - 450 10e3/uL    NRBCs per 100 WBC 0 <1 /100    Absolute NRBCs 0.1 10e3/uL   EKG 12-lead, tracing only   Result Value Ref Range    Systolic Blood Pressure  mmHg    Diastolic Blood Pressure  mmHg    Ventricular Rate 70 BPM    Atrial Rate 70 BPM    TX Interval 158 ms    QRS Duration 84 ms     ms    QTc 440 ms    P Axis 41 degrees    R AXIS -22 degrees    T Axis  42 degrees    Interpretation ECG       Sinus rhythm  Normal ECG  No previous ECGs available     Echocardiogram Complete   Result Value Ref Range    LVEF  65-70%     Narrative    [id number]  [id number]  [id number]  [id number]^SHER^CHINO^RUIZ     [facility],[place]  Echocardiography Laboratory  [address] [place] [address]     Name: JESUS MARTINEZ  MRN: [id number]  : 1980  Study Date: 2022 10:34 AM  Age: 42 yrs  Gender: Female  Patient Location: [facility]  Reason For Study: Chemo  Ordering Physician: CHINO OLIVAREZ  Referring Physician: DINAH BATEMAN FELLOWS  Performed By: Rosa Ragsdale     BSA: 2.2 m2  Height: 64 in  Weight: 250 lb  ______________________________________________________________________________  Procedure  Complete Portable Echo Adult.  ______________________________________________________________________________  Interpretation Summary  Global and regional left ventricular function is hyperkinetic with an EF of  65-70%. Global peak LV longitudinal strain is averaged at -23%. This is within  reported normal limits (normal <-18%).  Global right ventricular function is normal. The right ventricle is normal  size.  No significant valvular abnormalities.  IVC diameter <2.1 cm collapsing >50% with sniff suggests a normal RA pressure  of 3 mmHg.  There is no prior study for direct comparison.  ______________________________________________________________________________  Left Ventricle  Global and regional left ventricular function is hyperkinetic with an EF of  65-70%. Left ventricular wall thickness is normal. Left ventricular size is  normal. Left ventricular diastolic function is indeterminate. Global peak LV  longitudinal strain is averaged at -23%. This is within reported normal limits  (normal <-18%).     Right Ventricle  Global right ventricular function is normal. The right ventricle is normal  size.     Atria  Both atria appear normal.     Mitral  Valve  The mitral valve is normal. Trace mitral insufficiency is present.     Aortic Valve  The aortic valve is tricuspid. On Doppler interrogation, there is no  significant stenosis or regurgitation.     Tricuspid Valve  The tricuspid valve is normal. Trace tricuspid insufficiency is present.  Pulmonary artery systolic pressure cannot be assessed.     Pulmonic Valve  The valve leaflets are not well visualized. Trace pulmonic insufficiency is  present.     Vessels  Sinuses of Valsalva 3.3 cm. Ascending aorta 3.4 cm. IVC diameter <2.1 cm  collapsing >50% with sniff suggests a normal RA pressure of 3 mmHg.     Pericardium  No pericardial effusion is present.     Compared to Previous Study  There is no prior study for direct comparison.  ______________________________________________________________________________  MMode/2D Measurements & Calculations  IVSd: 0.97 cm  LVIDd: 4.9 cm  LVIDs: 2.9 cm  LVPWd: 1.1 cm  FS: 42.1 %  LV mass(C)d: 181.1 grams  LV mass(C)dI: 84.2 grams/m2  Ao root diam: 3.3 cm  asc Aorta Diam: 3.4 cm  LVOT diam: 2.0 cm  LVOT area: 3.1 cm2  LA Volume (BP): 64.7 ml  LA Volume Index (BP): 30.1 ml/m2     RWT: 0.43     Doppler Measurements & Calculations  MV E max nubia: 83.9 cm/sec  MV A max nubia: 79.1 cm/sec  MV E/A: 1.1  MV dec slope: 406.0 cm/sec2  E/E' av.7  Lateral E/e': 12.1  Medial E/e': 15.4     ______________________________________________________________________________  Report approved by: Mp Rangel 2022 12:30 PM         Double Lumen PICC Placement    Narrative    Aniket Macedo RN     2022  1:23 PM  [facility]    Double Lumen PICC Placement    Date/Time: 2022 1:22 PM  Performed by: Aniket Macedo RN  Authorized by: Steve Hull MD   Indications: vascular access      UNIVERSAL PROTOCOL   Site Marked: Yes  Prior Images Obtained and Reviewed:  Yes  Required items: Required blood  products, implants, devices and special   equipment available    Patient identity confirmed:  Verbally with patient and arm band  NA - No sedation, light sedation, or local anesthesia  Confirmation Checklist:  Patient's identity using two indicators, relevant   allergies, procedure was appropriate and matched the consent or emergent   situation and correct equipment/implants were available  Time out: Immediately prior to the procedure a time out was called    Universal Protocol: the Joint Commission Universal Protocol was followed    Preparation: Patient was prepped and draped in usual sterile fashion       ANESTHESIA    Anesthesia: Local infiltration  Local Anesthetic:  Lidocaine 1% without epinephrine  Anesthetic Total (mL):  5      SEDATION    Patient Sedated: No        Preparation: skin prepped with ChloraPrep  Skin prep agent: skin prep agent completely dried prior to procedure  Sterile barriers: maximum sterile barriers were used: cap, mask, sterile   gown, sterile gloves, and large sterile sheet  Hand hygiene: hand hygiene performed prior to central venous catheter   insertion  Type of line used: PICC and Power PICC  Catheter type: double lumen  Lumen type: non-valved  Catheter size: 5 Fr  Brand: Bard  Lot number: [id number]  Placement method: MST, venipuncture, ultrasound and tip navigation system  Number of attempts: 1  Difficulty threading catheter: no  Successful placement: yes  Orientation: left    Location: cephalic vein (vd 0.61 cm)  Tip Location: superior vena cava  Arm circumference: adults 10 cm  Extremity circumference: 36  Visible catheter length: 2  Total catheter length: 44  Dressing and securement: adhesive securement device, alcohol impregnated   caps, blood cleaned with CHG, chlorhexidine disc applied, dressing   applied, glue, line secured, securement device, site cleansed, sterile   dressing applied, statlock, transparent securement dressing, transparent   dressing and tissue adhesive  Post  procedure assessment: blood return through all ports, placement   verified by 3CG technology and free fluid flow  PROCEDURE   Patient Tolerance:  Patient tolerated the procedure well with no immediate   complicationsDescribe Procedure: PICC ok to use

## 2022-08-20 NOTE — PROVIDER NOTIFICATION
08/20/22 0127   Vitals   BP (!) 175/82     Pt denies headache, visual changes, dizziness, chest pain or palpitations. Physician updated.

## 2022-08-21 ENCOUNTER — APPOINTMENT (OUTPATIENT)
Dept: MRI IMAGING | Facility: CLINIC | Age: 42
End: 2022-08-21
Attending: INTERNAL MEDICINE
Payer: COMMERCIAL

## 2022-08-21 LAB
ALBUMIN SERPL BCG-MCNC: 3.8 G/DL (ref 3.5–5.2)
ALP SERPL-CCNC: 101 U/L (ref 35–104)
ALT SERPL W P-5'-P-CCNC: 20 U/L (ref 10–35)
ANION GAP SERPL CALCULATED.3IONS-SCNC: 11 MMOL/L (ref 7–15)
ANION GAP SERPL CALCULATED.3IONS-SCNC: 11 MMOL/L (ref 7–15)
ANION GAP SERPL CALCULATED.3IONS-SCNC: 9 MMOL/L (ref 7–15)
AST SERPL W P-5'-P-CCNC: 55 U/L (ref 10–35)
BILIRUB DIRECT SERPL-MCNC: <0.2 MG/DL (ref 0–0.3)
BILIRUB SERPL-MCNC: 0.4 MG/DL
BUN SERPL-MCNC: 10.4 MG/DL (ref 6–20)
BUN SERPL-MCNC: 10.8 MG/DL (ref 6–20)
BUN SERPL-MCNC: 9.2 MG/DL (ref 6–20)
CALCIUM SERPL-MCNC: 8.5 MG/DL (ref 8.6–10)
CALCIUM SERPL-MCNC: 8.8 MG/DL (ref 8.6–10)
CALCIUM SERPL-MCNC: 9.2 MG/DL (ref 8.6–10)
CHLORIDE SERPL-SCNC: 105 MMOL/L (ref 98–107)
CREAT SERPL-MCNC: 0.59 MG/DL (ref 0.51–0.95)
CREAT SERPL-MCNC: 0.65 MG/DL (ref 0.51–0.95)
CREAT SERPL-MCNC: 0.67 MG/DL (ref 0.51–0.95)
DEPRECATED HCO3 PLAS-SCNC: 21 MMOL/L (ref 22–29)
DEPRECATED HCO3 PLAS-SCNC: 22 MMOL/L (ref 22–29)
DEPRECATED HCO3 PLAS-SCNC: 24 MMOL/L (ref 22–29)
ERYTHROCYTE [DISTWIDTH] IN BLOOD BY AUTOMATED COUNT: 14.5 % (ref 10–15)
ERYTHROCYTE [DISTWIDTH] IN BLOOD BY AUTOMATED COUNT: 14.5 % (ref 10–15)
ERYTHROCYTE [DISTWIDTH] IN BLOOD BY AUTOMATED COUNT: 14.6 % (ref 10–15)
FIBRINOGEN PPP-MCNC: 475 MG/DL (ref 170–490)
FIBRINOGEN PPP-MCNC: 480 MG/DL (ref 170–490)
FIBRINOGEN PPP-MCNC: 502 MG/DL (ref 170–490)
GFR SERPL CREATININE-BSD FRML MDRD: >90 ML/MIN/1.73M2
GLUCOSE SERPL-MCNC: 123 MG/DL (ref 70–99)
GLUCOSE SERPL-MCNC: 85 MG/DL (ref 70–99)
GLUCOSE SERPL-MCNC: 91 MG/DL (ref 70–99)
HCT VFR BLD AUTO: 36.3 % (ref 35–47)
HCT VFR BLD AUTO: 36.7 % (ref 35–47)
HCT VFR BLD AUTO: 39.2 % (ref 35–47)
HGB BLD-MCNC: 11.8 G/DL (ref 11.7–15.7)
HGB BLD-MCNC: 11.9 G/DL (ref 11.7–15.7)
HGB BLD-MCNC: 12.9 G/DL (ref 11.7–15.7)
INR PPP: 1.25 (ref 0.85–1.15)
INR PPP: 1.27 (ref 0.85–1.15)
INR PPP: 1.35 (ref 0.85–1.15)
LDH SERPL L TO P-CCNC: 1943 U/L (ref 0–250)
MAGNESIUM SERPL-MCNC: 1.9 MG/DL (ref 1.7–2.3)
MCH RBC QN AUTO: 28.4 PG (ref 26.5–33)
MCH RBC QN AUTO: 28.6 PG (ref 26.5–33)
MCH RBC QN AUTO: 28.9 PG (ref 26.5–33)
MCHC RBC AUTO-ENTMCNC: 32.2 G/DL (ref 31.5–36.5)
MCHC RBC AUTO-ENTMCNC: 32.8 G/DL (ref 31.5–36.5)
MCHC RBC AUTO-ENTMCNC: 32.9 G/DL (ref 31.5–36.5)
MCV RBC AUTO: 87 FL (ref 78–100)
MCV RBC AUTO: 88 FL (ref 78–100)
MCV RBC AUTO: 88 FL (ref 78–100)
PHOSPHATE SERPL-MCNC: 2.7 MG/DL (ref 2.5–4.5)
PHOSPHATE SERPL-MCNC: 3.3 MG/DL (ref 2.5–4.5)
PHOSPHATE SERPL-MCNC: 4.1 MG/DL (ref 2.5–4.5)
PLATELET # BLD AUTO: 53 10E3/UL (ref 150–450)
PLATELET # BLD AUTO: 54 10E3/UL (ref 150–450)
PLATELET # BLD AUTO: 57 10E3/UL (ref 150–450)
POTASSIUM SERPL-SCNC: 3.6 MMOL/L (ref 3.4–5.3)
POTASSIUM SERPL-SCNC: 3.7 MMOL/L (ref 3.4–5.3)
POTASSIUM SERPL-SCNC: 3.9 MMOL/L (ref 3.4–5.3)
PROT SERPL-MCNC: 6.5 G/DL (ref 6.4–8.3)
RBC # BLD AUTO: 4.16 10E6/UL (ref 3.8–5.2)
RBC # BLD AUTO: 4.16 10E6/UL (ref 3.8–5.2)
RBC # BLD AUTO: 4.47 10E6/UL (ref 3.8–5.2)
SODIUM SERPL-SCNC: 137 MMOL/L (ref 136–145)
SODIUM SERPL-SCNC: 138 MMOL/L (ref 136–145)
SODIUM SERPL-SCNC: 138 MMOL/L (ref 136–145)
URATE SERPL-MCNC: 2.8 MG/DL (ref 2.4–5.7)
URATE SERPL-MCNC: 3.1 MG/DL (ref 2.4–5.7)
URATE SERPL-MCNC: 3.7 MG/DL (ref 2.4–5.7)
WBC # BLD AUTO: 100.4 10E3/UL (ref 4–11)
WBC # BLD AUTO: 106.4 10E3/UL (ref 4–11)
WBC # BLD AUTO: 99.3 10E3/UL (ref 4–11)

## 2022-08-21 PROCEDURE — 70553 MRI BRAIN STEM W/O & W/DYE: CPT

## 2022-08-21 PROCEDURE — 70553 MRI BRAIN STEM W/O & W/DYE: CPT | Mod: 26 | Performed by: STUDENT IN AN ORGANIZED HEALTH CARE EDUCATION/TRAINING PROGRAM

## 2022-08-21 PROCEDURE — 36592 COLLECT BLOOD FROM PICC: CPT | Performed by: PHYSICIAN ASSISTANT

## 2022-08-21 PROCEDURE — 255N000002 HC RX 255 OP 636: Performed by: INTERNAL MEDICINE

## 2022-08-21 PROCEDURE — 85384 FIBRINOGEN ACTIVITY: CPT | Performed by: PHYSICIAN ASSISTANT

## 2022-08-21 PROCEDURE — 250N000013 HC RX MED GY IP 250 OP 250 PS 637: Performed by: PHYSICIAN ASSISTANT

## 2022-08-21 PROCEDURE — 85610 PROTHROMBIN TIME: CPT | Performed by: PHYSICIAN ASSISTANT

## 2022-08-21 PROCEDURE — 85007 BL SMEAR W/DIFF WBC COUNT: CPT | Performed by: PHYSICIAN ASSISTANT

## 2022-08-21 PROCEDURE — 84550 ASSAY OF BLOOD/URIC ACID: CPT | Performed by: PHYSICIAN ASSISTANT

## 2022-08-21 PROCEDURE — 82248 BILIRUBIN DIRECT: CPT | Performed by: PHYSICIAN ASSISTANT

## 2022-08-21 PROCEDURE — 85027 COMPLETE CBC AUTOMATED: CPT | Performed by: PHYSICIAN ASSISTANT

## 2022-08-21 PROCEDURE — 258N000003 HC RX IP 258 OP 636: Performed by: STUDENT IN AN ORGANIZED HEALTH CARE EDUCATION/TRAINING PROGRAM

## 2022-08-21 PROCEDURE — A9585 GADOBUTROL INJECTION: HCPCS | Performed by: INTERNAL MEDICINE

## 2022-08-21 PROCEDURE — 120N000002 HC R&B MED SURG/OB UMMC

## 2022-08-21 PROCEDURE — 84100 ASSAY OF PHOSPHORUS: CPT | Performed by: PHYSICIAN ASSISTANT

## 2022-08-21 PROCEDURE — 250N000013 HC RX MED GY IP 250 OP 250 PS 637: Performed by: STUDENT IN AN ORGANIZED HEALTH CARE EDUCATION/TRAINING PROGRAM

## 2022-08-21 PROCEDURE — 99233 SBSQ HOSP IP/OBS HIGH 50: CPT | Mod: GC | Performed by: INTERNAL MEDICINE

## 2022-08-21 PROCEDURE — 250N000011 HC RX IP 250 OP 636: Performed by: STUDENT IN AN ORGANIZED HEALTH CARE EDUCATION/TRAINING PROGRAM

## 2022-08-21 PROCEDURE — 83735 ASSAY OF MAGNESIUM: CPT | Performed by: INTERNAL MEDICINE

## 2022-08-21 PROCEDURE — 80048 BASIC METABOLIC PNL TOTAL CA: CPT | Performed by: PHYSICIAN ASSISTANT

## 2022-08-21 PROCEDURE — 83615 LACTATE (LD) (LDH) ENZYME: CPT | Performed by: PHYSICIAN ASSISTANT

## 2022-08-21 PROCEDURE — 258N000003 HC RX IP 258 OP 636: Performed by: PHYSICIAN ASSISTANT

## 2022-08-21 PROCEDURE — 250N000012 HC RX MED GY IP 250 OP 636 PS 637: Performed by: STUDENT IN AN ORGANIZED HEALTH CARE EDUCATION/TRAINING PROGRAM

## 2022-08-21 PROCEDURE — 80053 COMPREHEN METABOLIC PANEL: CPT | Performed by: PHYSICIAN ASSISTANT

## 2022-08-21 RX ORDER — LEVOFLOXACIN 250 MG/1
250 TABLET, FILM COATED ORAL EVERY 24 HOURS
Status: DISCONTINUED | OUTPATIENT
Start: 2022-08-21 | End: 2022-09-13

## 2022-08-21 RX ORDER — GADOBUTROL 604.72 MG/ML
0.1 INJECTION INTRAVENOUS ONCE
Status: COMPLETED | OUTPATIENT
Start: 2022-08-21 | End: 2022-08-21

## 2022-08-21 RX ORDER — AMLODIPINE BESYLATE 5 MG/1
5 TABLET ORAL DAILY
Status: DISCONTINUED | OUTPATIENT
Start: 2022-08-21 | End: 2022-08-23

## 2022-08-21 RX ADMIN — ACETAMINOPHEN 650 MG: 325 TABLET, FILM COATED ORAL at 23:32

## 2022-08-21 RX ADMIN — LEVOFLOXACIN 250 MG: 250 TABLET, FILM COATED ORAL at 11:53

## 2022-08-21 RX ADMIN — SENNOSIDES AND DOCUSATE SODIUM 1 TABLET: 50; 8.6 TABLET ORAL at 16:39

## 2022-08-21 RX ADMIN — SODIUM CHLORIDE, PRESERVATIVE FREE: 5 INJECTION INTRAVENOUS at 05:30

## 2022-08-21 RX ADMIN — SENNOSIDES AND DOCUSATE SODIUM 1 TABLET: 50; 8.6 TABLET ORAL at 12:27

## 2022-08-21 RX ADMIN — SODIUM CHLORIDE, PRESERVATIVE FREE: 5 INJECTION INTRAVENOUS at 16:39

## 2022-08-21 RX ADMIN — ACETAMINOPHEN 650 MG: 325 TABLET, FILM COATED ORAL at 09:25

## 2022-08-21 RX ADMIN — ACETAMINOPHEN 650 MG: 325 TABLET, FILM COATED ORAL at 05:52

## 2022-08-21 RX ADMIN — HYDROXYUREA 1000 MG: 500 CAPSULE ORAL at 19:44

## 2022-08-21 RX ADMIN — MICAFUNGIN 50 MG: 10 INJECTION, POWDER, LYOPHILIZED, FOR SOLUTION INTRAVENOUS at 12:27

## 2022-08-21 RX ADMIN — OXYCODONE HYDROCHLORIDE 5 MG: 5 TABLET ORAL at 02:24

## 2022-08-21 RX ADMIN — ALLOPURINOL 300 MG: 300 TABLET ORAL at 09:21

## 2022-08-21 RX ADMIN — PREDNISONE 60 MG: 50 TABLET ORAL at 12:27

## 2022-08-21 RX ADMIN — HYDROXYUREA 1000 MG: 500 CAPSULE ORAL at 09:20

## 2022-08-21 RX ADMIN — AMLODIPINE BESYLATE 5 MG: 5 TABLET ORAL at 12:27

## 2022-08-21 RX ADMIN — GADOBUTROL 10 ML: 604.72 INJECTION INTRAVENOUS at 14:23

## 2022-08-21 RX ADMIN — PANTOPRAZOLE SODIUM 40 MG: 40 TABLET, DELAYED RELEASE ORAL at 09:21

## 2022-08-21 RX ADMIN — HYDROXYUREA 1000 MG: 500 CAPSULE ORAL at 14:04

## 2022-08-21 RX ADMIN — ACYCLOVIR 400 MG: 400 TABLET ORAL at 09:20

## 2022-08-21 RX ADMIN — ACYCLOVIR 400 MG: 400 TABLET ORAL at 19:45

## 2022-08-21 ASSESSMENT — ACTIVITIES OF DAILY LIVING (ADL)
ADLS_ACUITY_SCORE: 22

## 2022-08-21 NOTE — PLAN OF CARE
BP 140s/70s. OVSS. Denies SOB. C/o pain in RLQ of abd and at BMBx site earlier during the night. Oxycodone given with relief. This morning she c/o occipital headache. Denies nausea, visual changes or dizziness. Acetaminophen given with relief. Voiding spontaneously. Adequate urine output. Sleeping in between cares.

## 2022-08-21 NOTE — PLAN OF CARE
"Goal Outcome Evaluation:    BP (!) 166/86 (BP Location: Right arm)   Pulse 72   Temp 96.8  F (36  C) (Oral)   Resp 18   Ht 1.626 m (5' 4\")   Wt 113.2 kg (249 lb 8 oz)   SpO2 96%   BMI 42.83 kg/m      [id number]    Patient hypertensive gave 1 x hydralizine, all other vitals within normal limits, on RA, A & O x 4, denies nausea, reported pain x 1 oxy given, independent in room, 100 mL NS    .Soheila Arauz, RN on 8/20/2022 at 9:44 PM                        "

## 2022-08-21 NOTE — PLAN OF CARE
Goal Outcome Evaluation:    A&Ox4. Mildly hypertensive BP 140s/80. Norvasc added. 650 mg Tylenol given x1 for headache. Slightly improved after intake with caffeine. No BM since 8/17. Senna given x1. Denies SOB. Head MRI ordered and completed. NS infusing 100mL/hr via PICC. Continue to monitor.

## 2022-08-21 NOTE — PROGRESS NOTES
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 WBC remains 100k, -160 systolic even while sleeping  Discussed with family ( and son) about diagnosis and possible treatment plans. 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 Risks discussed with patient including, infection, diarrhea, vomitting, nausea, and toxity of chemo iteself, they wish to proceed with treatment when diagnosis finalized.     Today's Plan  -Start prednisone 60mg po daily today  -Brain MRI with contrast as patient having headaches, r/o intracranial pathology or CNS involvement  -Will start Norvasc 5mg po daily as HTN may be contributing to headaches  -Await final path results  -Discussed with patient about LP sometime in upcomming week    HEME  # Leukocytosis, Anemia, Thrombocytopenia  # Concern for acute leukemia  Patient presented to outside hospital with c/o Right upper quadrant pain and was subsequently found to have a markedly elevated white blood cell count concerning for acute leukemia. While at OSH, she had CT PE protocol as well as CT A/P. These identified no PE, no acute or localized intraabdominal inflammatory process, mild splenomegaly, and few inconspicous low-density structures in the liver. She was transferred to [facility] for further workup and management. Labs upon presentation showed WBC 109K (ANC 15.3, 85% other cells), Hgb 13.2, Plt 73.  A uric acid level of 5.9, alkaline phosphatase of 131, AST 73, INR 1.19. Elevated uric acid may be secondary to tumor lysis, while elevated liver enzymes may be secondary to leukemic  involvement.    - BMBx yesterday. Dry tap, so will also send workup studies on peripheral blood given high WBC burden  - pt did sign HMTB so will collect PB for this on Monday   - continue Hydrea 1g TID  - CBC/TLS/DIC labs three times daily  - NS @ 100 ml/hr  - EKG shows nl sinus rhythm, QTc 440  - ECHO with EF 65-70%  - PICC line placed   - Brain MRI Pending    ID  # [id number]  - viral serologies sent  - ACV 400mg BID  - will start micafungin and levaquin today for proph    # COVID19   Not vaccinated (due to fear of needles per her report). Pt reports testing postiive for COVID approx 1 month ago via home test. She never had it confirmed. COVID testing on admission was negative.     FEN  - IVFs with NS @ 100 ml/hr  - lyte repletion per protocol, monitor closely given risk for TLS  - regular diet as tolerated    PPx  - VTE: Defer due to TCP  - GI: PPI    Dispo: Anticipate >2 night LOS for workup of new acute leukemia. LOS will then be based on anticipated treatment/management.     Discussed and seen for portion of visit with Dr. Hull.     I spent 80 minutes in the care of this patient today, which included time necessary for review of interval events, obtaining history and physical exam, ordering medication(s)/test(s) as medically indicated, discussion with interdisciplinary/consult team(s), and documentation time. Over 50% of time was spent face-to-face and/or coordinating care.      Kiel Jefferson MD  Hematology/Oncology/BMT Fellow  Pager: [phone]      Interval History   Admitted last night with concern for acute leukemia. This AM, she is handling the news ok and understands that we need to gather more information before we can have more detailed discussions about what exactly we're dealing with and how to treat it. Anxious about BMBx. She confirms recent history with things happening very acutely for her. She has been feeling more fatigued, particularly since Tuesday -- though up until then she was playing  "tennis 2-4 times per week as well as lifting weights 2 times per week. After tennis on Tuesday she felt very fatigued and that her legs were like \"jelly\". Then, Thursday she developed acute RUQ pain that would radiate up her back. This is what prompted her to present to the hospital for workup. Today, she can feel the RUQ pain but it's not bad and not particularly reproducible on exam. There was some report of HA also prompting her presentation but she does not endorse persistent/debilitating HA. She will occasionally get these once a month or so; they sit over her R eye/temple but they have not been persistent. She does not currently have a HA. Denies any sinus pain/pressure, feeling of swollen gums, nose or gum bleeding, mouth pain/sores, SOB, cough, nausea, or change in bowels. Denies extremity edema. Denies recent rash or lesions on skin. Denies recent swollen lymph nodes. She endorsed that Tues-Wed last week, she did feel chilled but did not take her temperature assuming her body was just fighting something off. She then had drenching night sweats but they did not recur.       Physical Exam   Temp: (!) 96.3  F (35.7  C) Temp src: Oral BP: (!) 161/87 Pulse: 85   Resp: 18 SpO2: 97 % O2 Device: None (Room air)    Vitals:    08/19/22 1633 08/20/22 0802 08/21/22 0905   Weight: 113.5 kg (250 lb 4.8 oz) 113.2 kg (249 lb 8 oz) 114.1 kg (251 lb 9.6 oz)     Vital Signs with Ranges  Temp:  [95.5  F (35.3  C)-99.3  F (37.4  C)] 96.3  F (35.7  C)  Pulse:  [57-92] 85  Resp:  [14-20] 18  BP: (112-166)/(58-87) 161/87  SpO2:  [95 %-97 %] 97 %  I/O last 3 completed shifts:  In: 4086 [P.O.:1720; I.V.:2366]  Out: 1950 [Urine:1950]  General Appearance: Well developed, well nourished, in no acute distress.   Skin: No rashes, ulcerations, or petechiae.  HEENT: NC/AT.  EOMI intact, anicteric sclera, clear conjunctiva, OP pink and moist, no lesions or thrush.   Neck: Supple and symmetric.  Cardiovascular: RRR, no murmur " appreciated.  Lungs: clear to ascultation bilaterally, no rales, wheezes or rhonchi.   Abdomen: Normal BS. Abd soft, ND. Mildly TTP in RUQ. Difficult to appreciate any organomegaly due to body habitus.   Musculoskeletal: Muscle strength and tone were normal.  Extremities: Grossly normal in appearance. Able to move self in bed.   Neurologic: Alert and oriented x 3. Normal affect. CX II-XII grossly intact.       Medications     - MEDICATION INSTRUCTIONS -       sodium chloride 100 mL/hr at 08/21/22 0530       acyclovir  400 mg Oral BID     allopurinol  300 mg Oral Daily     hydroxyurea  1,000 mg Oral TID     pantoprazole  40 mg Oral QAM AC       Data   Results for orders placed or performed during the hospital encounter of 08/19/22 (from the past 24 hour(s))   Flow Cytometry Blood    Specimen: Peripheral Blood   Result Value Ref Range    Case Report       Flow Cytometry Report                             Case: [id number]                                  Authorizing Provider:  Clau Castro           Collected:           08/20/2022 10:28 AM                                 GRANT Jackson                                                              Ordering Location:     [facility]     Received:            08/20/2022 10:46 AM                                 [address] [place] [address]                                                            Pathologist:           Cici Mcduffie MD                                                     Specimen:    Peripheral Blood                                                                           Flow Interpretation       A. Peripheral Blood:  - Abnormal B lymphoblast population (84%)  - See comment      Comment       These immunophenotypic findings are consistent with B-lymphoblastic leukemia/lymphoma. Please note a blast count by flow cytometry may differ from a morphologic blast count for various reasons including lysis of erythroid precursors, the presence of other  cells in the blast gate, and sampling differences.  For classification and clinical decision making purposes, the morphologic blast count should be used. Final interpretation requires correlation with results of other ancillary studies, morphologic, and clinical features.   The results were communicated with Dr. tSeve Hull over the phone at 4:08 PM.       Flow Phenotypic Data       Unless otherwise indicated, percentages reported below are based on the total number of CD45 positive viable leukocytes. If applicable, percentage of plasma cells is from total viable nucleated cells.    84% blasts with the following immunophenotype:  - Positive for CD10 (bright), CD19, CD20 (partial, dim, predominantly negative), CD22 (partial), CD34 (partial), CD38,  CD45 (dim), CD56 (partial, very dim), CD58 (partial, very dim), cytoplasmic CD79a, HLA-DR, nuclear terminal deoxynucleotidyl transferase (TdT).   - Negative for CD2, surface and cytoplasmic CD3, CD4, CD5, CD7, CD8,CD11b,  CD13, CD14, CD15, CD16, CD33, CD64, , cytoplasmic myeloperoxidase (MPO).     52% CD34 positive blasts    A resident/fellow in an ACGME accredited training program was involved in the initial review, preparation, and/or interpretation of this case.  I, as the senior physician, attest that I: (i) reviewed patient clinical records if indicated; (ii) reviewed relevant lab test results; (iii) examined the relevant preparation(s) for the specimen(s); and (iv) agree with the report, diagnosis(es), and interpretation as documented by the resident/fellow and edited/confirmed by me. Reporting resident/fellow: Dr. Sheryl Cary DO, MLS(ASCP)      Flow Processing Information       Multi-color flow analysis is performed for the following markers: CD2, surface and cytoplasmic CD3, CD4, CD5, CD7, CD8, CD10, CD11b, CD13, CD14, CD15, CD16, CD19, CD20, CD22, CD33, CD34, CD36, CD38, CD45, CD56, CD58, CD61, CD64, , glycophorin A, HLA-DR, cytoplasmic CD79a, cytoplasmic  myeloperoxidase, and nuclear terminal deoxynucleotidyl transferase (TdT). Cells are gated to isolate populations (CD45 versus side scatter and forward scatter versus side scatter), to exclude debris (forward scatter versus side scatter) and to exclude cell doublets (forward scatter height versus forward scatter width and side scatter height versus side scatter width). Forward scatter varies with cell size. Side scatter varies with the amount of cytoplasmic granules. Intensity for CD45 usually increases as hematolymphoid cells mature. The analytic sensitivity of this assay to detect abnormal B-lymphoblasts as rare flow events is 0.01%.      Clinical Information       42 yrs old female with increased WBC      FDA Disclaimer       This test was developed and its performance characteristics determined by the [facility]. It has not been cleared or approved by the US Food and Drug Administration.  FDA does not require this test to go through premarket FDA review. This test is used for clinical purposes and should not be regarded as investigational or for research. This laboratory is certified under the Clinical Laboratory Improvement Amendments (CLIA) as qualified to perform high complexity clinical laboratory testing.      MCRS Yes (A) N/A    Performing Labs       The technical component of this testing was completed at [facility]     Echocardiogram Complete   Result Value Ref Range    LVEF  65-70%     Narrative    [id number]  [id number]  [id number]  [id number]^SHER^CHINO^RUIZ     [facility]  Echocardiography Laboratory  [address] [place] [id number]     Name: JESUS MARTINEZ  MRN: [id number]  : 1980  Study Date: 2022 10:34 AM  Age: 42 yrs  Gender: Female  Patient Location: [facility]  Reason For Study: Chemo  Ordering Physician: CHINO OLIVAREZ  RUIZ  Referring Physician: DINAH BATEMAN FELLOWS  Performed By: Rosa Ragsdale     BSA: 2.2 m2  Height: 64 in  Weight: 250 lb  ______________________________________________________________________________  Procedure  Complete Portable Echo Adult.  ______________________________________________________________________________  Interpretation Summary  Global and regional left ventricular function is hyperkinetic with an EF of  65-70%. Global peak LV longitudinal strain is averaged at -23%. This is within  reported normal limits (normal <-18%).  Global right ventricular function is normal. The right ventricle is normal  size.  No significant valvular abnormalities.  IVC diameter <2.1 cm collapsing >50% with sniff suggests a normal RA pressure  of 3 mmHg.  There is no prior study for direct comparison.  ______________________________________________________________________________  Left Ventricle  Global and regional left ventricular function is hyperkinetic with an EF of  65-70%. Left ventricular wall thickness is normal. Left ventricular size is  normal. Left ventricular diastolic function is indeterminate. Global peak LV  longitudinal strain is averaged at -23%. This is within reported normal limits  (normal <-18%).     Right Ventricle  Global right ventricular function is normal. The right ventricle is normal  size.     Atria  Both atria appear normal.     Mitral Valve  The mitral valve is normal. Trace mitral insufficiency is present.     Aortic Valve  The aortic valve is tricuspid. On Doppler interrogation, there is no  significant stenosis or regurgitation.     Tricuspid Valve  The tricuspid valve is normal. Trace tricuspid insufficiency is present.  Pulmonary artery systolic pressure cannot be assessed.     Pulmonic Valve  The valve leaflets are not well visualized. Trace pulmonic insufficiency is  present.     Vessels  Sinuses of Valsalva 3.3 cm. Ascending aorta 3.4 cm. IVC diameter <2.1 cm  collapsing >50% with  sniff suggests a normal RA pressure of 3 mmHg.     Pericardium  No pericardial effusion is present.     Compared to Previous Study  There is no prior study for direct comparison.  ______________________________________________________________________________  MMode/2D Measurements & Calculations  IVSd: 0.97 cm  LVIDd: 4.9 cm  LVIDs: 2.9 cm  LVPWd: 1.1 cm  FS: 42.1 %  LV mass(C)d: 181.1 grams  LV mass(C)dI: 84.2 grams/m2  Ao root diam: 3.3 cm  asc Aorta Diam: 3.4 cm  LVOT diam: 2.0 cm  LVOT area: 3.1 cm2  LA Volume (BP): 64.7 ml  LA Volume Index (BP): 30.1 ml/m2     RWT: 0.43     Doppler Measurements & Calculations  MV E max nubia: 83.9 cm/sec  MV A max nubia: 79.1 cm/sec  MV E/A: 1.1  MV dec slope: 406.0 cm/sec2  E/E' av.7  Lateral E/e': 12.1  Medial E/e': 15.4     ______________________________________________________________________________  Report approved by: Mp Rangel 2022 12:30 PM         Double Lumen PICC Placement    Narrative    Aniket Macedo RN     2022  1:23 PM  [facility]    Double Lumen PICC Placement    Date/Time: 2022 1:22 PM  Performed by: Aniket Macedo RN  Authorized by: Steve Hull MD   Indications: vascular access      UNIVERSAL PROTOCOL   Site Marked: Yes  Prior Images Obtained and Reviewed:  Yes  Required items: Required blood products, implants, devices and special   equipment available    Patient identity confirmed:  Verbally with patient and arm band  NA - No sedation, light sedation, or local anesthesia  Confirmation Checklist:  Patient's identity using two indicators, relevant   allergies, procedure was appropriate and matched the consent or emergent   situation and correct equipment/implants were available  Time out: Immediately prior to the procedure a time out was called    Universal Protocol: the Joint Commission Universal Protocol was followed    Preparation: Patient was  prepped and draped in usual sterile fashion       ANESTHESIA    Anesthesia: Local infiltration  Local Anesthetic:  Lidocaine 1% without epinephrine  Anesthetic Total (mL):  5      SEDATION    Patient Sedated: No        Preparation: skin prepped with ChloraPrep  Skin prep agent: skin prep agent completely dried prior to procedure  Sterile barriers: maximum sterile barriers were used: cap, mask, sterile   gown, sterile gloves, and large sterile sheet  Hand hygiene: hand hygiene performed prior to central venous catheter   insertion  Type of line used: PICC and Power PICC  Catheter type: double lumen  Lumen type: non-valved  Catheter size: 5 Fr  Brand: Bard  Lot number: [id number]  Placement method: MST, venipuncture, ultrasound and tip navigation system  Number of attempts: 1  Difficulty threading catheter: no  Successful placement: yes  Orientation: left    Location: cephalic vein (vd 0.61 cm)  Tip Location: superior vena cava  Arm circumference: adults 10 cm  Extremity circumference: 36  Visible catheter length: 2  Total catheter length: 44  Dressing and securement: adhesive securement device, alcohol impregnated   caps, blood cleaned with CHG, chlorhexidine disc applied, dressing   applied, glue, line secured, securement device, site cleansed, sterile   dressing applied, statlock, transparent securement dressing, transparent   dressing and tissue adhesive  Post procedure assessment: blood return through all ports, placement   verified by 3CG technology and free fluid flow  PROCEDURE   Patient Tolerance:  Patient tolerated the procedure well with no immediate   complicationsDescribe Procedure: PICC ok to use   Potassium   Result Value Ref Range    Potassium 3.5 3.4 - 5.3 mmol/L   CBC with platelets differential    Narrative    The following orders were created for panel order CBC with platelets differential.  Procedure                               Abnormality         Status                     ---------                "                -----------         ------                     CBC with platelets and d...[[id number]]  Abnormal            Final result               Manual Differential[[id number]]          Abnormal            Preliminary result           Please view results for these tests on the individual orders.   Fibrinogen activity   Result Value Ref Range    Fibrinogen Activity 490 170 - 490 mg/dL   INR   Result Value Ref Range    INR 1.27 (H) 0.85 - 1.15   Phosphorus   Result Value Ref Range    Phosphorus 2.8 2.5 - 4.5 mg/dL   Uric acid   Result Value Ref Range    Uric Acid 4.0 2.4 - 5.7 mg/dL   Basic metabolic panel   Result Value Ref Range    Creatinine 0.72 0.51 - 0.95 mg/dL    Sodium 139 136 - 145 mmol/L    Potassium 3.5 3.4 - 5.3 mmol/L    Urea Nitrogen 10.5 6.0 - 20.0 mg/dL    Chloride 104 98 - 107 mmol/L    Carbon Dioxide (CO2) 26 22 - 29 mmol/L    Anion Gap 9 7 - 15 mmol/L    Glucose 106 (H) 70 - 99 mg/dL    GFR Estimate >90 >60 mL/min/1.73m2    Calcium 8.8 8.6 - 10.0 mg/dL   CBC with platelets and differential   Result Value Ref Range    WBC Count 99.3 (HH) 4.0 - 11.0 10e3/uL    RBC Count 4.16 3.80 - 5.20 10e6/uL    Hemoglobin 11.9 11.7 - 15.7 g/dL    Hematocrit 36.3 35.0 - 47.0 %    MCV 87 78 - 100 fL    MCH 28.6 26.5 - 33.0 pg    MCHC 32.8 31.5 - 36.5 g/dL    RDW 14.5 10.0 - 15.0 %    Platelet Count 54 (L) 150 - 450 10e3/uL    Narrative    Previously reported component [ NRBCs ] is no longer reported.\"  Previously reported component [ NRBCs Absolute ] is no longer reported.\"   Manual Differential   Result Value Ref Range    % Neutrophils 7 %    % Lymphocytes 12 %    % Monocytes 1 %    % Eosinophils 2 %    % Basophils 0 %    % Myelocytes 1 %    % Other Cells 77 %    Absolute Neutrophils 7.0 1.6 - 8.3 10e3/uL    Absolute Lymphocytes 11.9 (H) 0.8 - 5.3 10e3/uL    Absolute Monocytes 1.0 0.0 - 1.3 10e3/uL    Absolute Eosinophils 2.0 (H) 0.0 - 0.7 10e3/uL    Absolute Basophils 0.0 0.0 - 0.2 10e3/uL    Absolute " Myelocytes 1.0 (H) <=0.0 10e3/uL    Absolute Other Cells 76.5 (H) <=0.0 10e3/uL    RBC Morphology Confirmed RBC Indices     Platelet Assessment  Automated Count Confirmed. Platelet morphology is normal.     Automated Count Confirmed. Platelet morphology is normal.    Pathologist Review Comments     CBC with platelets differential    Narrative    The following orders were created for panel order CBC with platelets differential.  Procedure                               Abnormality         Status                     ---------                               -----------         ------                     CBC with platelets and d...[[id number]]  Abnormal            Final result               Manual Differential[[id number]]          Abnormal            Final result                 Please view results for these tests on the individual orders.   Fibrinogen activity   Result Value Ref Range    Fibrinogen Activity 491 (H) 170 - 490 mg/dL   INR   Result Value Ref Range    INR 1.26 (H) 0.85 - 1.15   Phosphorus   Result Value Ref Range    Phosphorus 3.8 2.5 - 4.5 mg/dL   Uric acid   Result Value Ref Range    Uric Acid 3.8 2.4 - 5.7 mg/dL   Basic metabolic panel   Result Value Ref Range    Creatinine 0.73 0.51 - 0.95 mg/dL    Sodium 139 136 - 145 mmol/L    Potassium 3.7 3.4 - 5.3 mmol/L    Urea Nitrogen 11.6 6.0 - 20.0 mg/dL    Chloride 104 98 - 107 mmol/L    Carbon Dioxide (CO2) 26 22 - 29 mmol/L    Anion Gap 9 7 - 15 mmol/L    Glucose 92 70 - 99 mg/dL    GFR Estimate >90 >60 mL/min/1.73m2    Calcium 8.9 8.6 - 10.0 mg/dL   CBC with platelets and differential   Result Value Ref Range    WBC Count 106.4 (HH) 4.0 - 11.0 10e3/uL    RBC Count 4.16 3.80 - 5.20 10e6/uL    Hemoglobin 11.8 11.7 - 15.7 g/dL    Hematocrit 36.7 35.0 - 47.0 %    MCV 88 78 - 100 fL    MCH 28.4 26.5 - 33.0 pg    MCHC 32.2 31.5 - 36.5 g/dL    RDW 14.5 10.0 - 15.0 %    Platelet Count 53 (L) 150 - 450 10e3/uL   Manual Differential   Result Value Ref Range    %  Neutrophils 9 %    % Lymphocytes 11 %    % Monocytes 1 %    % Eosinophils 0 %    % Basophils 0 %    % Other Cells 79 %    Absolute Neutrophils 9.6 (H) 1.6 - 8.3 10e3/uL    Absolute Lymphocytes 11.7 (H) 0.8 - 5.3 10e3/uL    Absolute Monocytes 1.1 0.0 - 1.3 10e3/uL    Absolute Eosinophils 0.0 0.0 - 0.7 10e3/uL    Absolute Basophils 0.0 0.0 - 0.2 10e3/uL    Absolute Other Cells 84.1 (H) <=0.0 10e3/uL    RBC Morphology Confirmed RBC Indices     Platelet Assessment  Automated Count Confirmed. Platelet morphology is normal.     Automated Count Confirmed. Platelet morphology is normal.    Pathologist Review Comments     CBC with platelets differential    Narrative    The following orders were created for panel order CBC with platelets differential.  Procedure                               Abnormality         Status                     ---------                               -----------         ------                     CBC with platelets and d...[[id number]]  Abnormal            Preliminary result           Please view results for these tests on the individual orders.   Lactate Dehydrogenase   Result Value Ref Range    Lactate Dehydrogenase 1,943 (H) 0 - 250 U/L   Magnesium   Result Value Ref Range    Magnesium 1.9 1.7 - 2.3 mg/dL   Fibrinogen activity   Result Value Ref Range    Fibrinogen Activity 480 170 - 490 mg/dL   INR   Result Value Ref Range    INR 1.25 (H) 0.85 - 1.15   Phosphorus   Result Value Ref Range    Phosphorus 4.1 2.5 - 4.5 mg/dL   Uric acid   Result Value Ref Range    Uric Acid 3.7 2.4 - 5.7 mg/dL   Basic metabolic panel   Result Value Ref Range    Creatinine 0.67 0.51 - 0.95 mg/dL    Sodium 138 136 - 145 mmol/L    Potassium 3.6 3.4 - 5.3 mmol/L    Urea Nitrogen 10.8 6.0 - 20.0 mg/dL    Chloride 105 98 - 107 mmol/L    Carbon Dioxide (CO2) 24 22 - 29 mmol/L    Anion Gap 9 7 - 15 mmol/L    Glucose 91 70 - 99 mg/dL    GFR Estimate >90 >60 mL/min/1.73m2    Calcium 8.5 (L) 8.6 - 10.0 mg/dL   Hepatic panel    Result Value Ref Range    Protein Total 6.5 6.4 - 8.3 g/dL    Albumin 3.8 3.5 - 5.2 g/dL    Bilirubin Total 0.4 <=1.2 mg/dL    Alkaline Phosphatase 101 35 - 104 U/L    AST 55 (H) 10 - 35 U/L    ALT 20 10 - 35 U/L    Bilirubin Direct <0.20 0.00 - 0.30 mg/dL   CBC with platelets and differential   Result Value Ref Range    WBC Count 104.5 (HH) 4.0 - 11.0 10e3/uL    RBC Count 4.08 3.80 - 5.20 10e6/uL    Hemoglobin 11.8 11.7 - 15.7 g/dL    Hematocrit 36.1 35.0 - 47.0 %    MCV 89 78 - 100 fL    MCH 28.9 26.5 - 33.0 pg    MCHC 32.7 31.5 - 36.5 g/dL    RDW 14.6 10.0 - 15.0 %    Platelet Count 65 (L) 150 - 450 10e3/uL    NRBCs per 100 WBC 0 <1 /100    Absolute NRBCs 0.1 10e3/uL

## 2022-08-22 ENCOUNTER — APPOINTMENT (OUTPATIENT)
Dept: CT IMAGING | Facility: CLINIC | Age: 42
End: 2022-08-22
Attending: PHYSICIAN ASSISTANT
Payer: COMMERCIAL

## 2022-08-22 LAB
ABO/RH(D): NORMAL
ALBUMIN SERPL BCG-MCNC: 3.9 G/DL (ref 3.5–5.2)
ALBUMIN UR-MCNC: NEGATIVE MG/DL
ALP SERPL-CCNC: 110 U/L (ref 35–104)
ALT SERPL W P-5'-P-CCNC: 25 U/L (ref 10–35)
ANION GAP SERPL CALCULATED.3IONS-SCNC: 10 MMOL/L (ref 7–15)
ANTIBODY SCREEN: NEGATIVE
APPEARANCE UR: CLEAR
AST SERPL W P-5'-P-CCNC: 78 U/L (ref 10–35)
ATRIAL RATE - MUSE: 70 BPM
BASOPHILS # BLD MANUAL: 0 10E3/UL (ref 0–0.2)
BASOPHILS # BLD MANUAL: 1 10E3/UL (ref 0–0.2)
BASOPHILS NFR BLD MANUAL: 0 %
BASOPHILS NFR BLD MANUAL: 1 %
BILIRUB DIRECT SERPL-MCNC: <0.2 MG/DL (ref 0–0.3)
BILIRUB SERPL-MCNC: 0.5 MG/DL
BILIRUB UR QL STRIP: NEGATIVE
BLASTS # BLD MANUAL: 35.3 10E3/UL
BLASTS # BLD MANUAL: 45.7 10E3/UL
BLASTS # BLD MANUAL: 64 10E3/UL
BLASTS # BLD MANUAL: 76.5 10E3/UL
BLASTS # BLD MANUAL: 85.6 10E3/UL
BLASTS # BLD MANUAL: 93 10E3/UL
BLASTS # BLD MANUAL: 94.1 10E3/UL
BLASTS NFR BLD MANUAL: 62 %
BLASTS NFR BLD MANUAL: 65 %
BLASTS NFR BLD MANUAL: 75 %
BLASTS NFR BLD MANUAL: 77 %
BLASTS NFR BLD MANUAL: 85 %
BLASTS NFR BLD MANUAL: 86 %
BLASTS NFR BLD MANUAL: 89 %
BUN SERPL-MCNC: 12.5 MG/DL (ref 6–20)
BUN SERPL-MCNC: 8.6 MG/DL (ref 6–20)
BUN SERPL-MCNC: 9.7 MG/DL (ref 6–20)
CALCIUM SERPL-MCNC: 8.8 MG/DL (ref 8.6–10)
CALCIUM SERPL-MCNC: 9 MG/DL (ref 8.6–10)
CALCIUM SERPL-MCNC: 9.1 MG/DL (ref 8.6–10)
CHLORIDE SERPL-SCNC: 103 MMOL/L (ref 98–107)
CHLORIDE SERPL-SCNC: 103 MMOL/L (ref 98–107)
CHLORIDE SERPL-SCNC: 106 MMOL/L (ref 98–107)
CMV IGG SERPL IA-ACNC: <0.2 U/ML
CMV IGG SERPL IA-ACNC: NORMAL
COLOR UR AUTO: NORMAL
CREAT SERPL-MCNC: 0.59 MG/DL (ref 0.51–0.95)
CREAT SERPL-MCNC: 0.59 MG/DL (ref 0.51–0.95)
CREAT SERPL-MCNC: 0.62 MG/DL (ref 0.51–0.95)
CULTURE HARVEST COMPLETE DATE: NORMAL
CULTURE HARVEST COMPLETE DATE: NORMAL
DEPRECATED HCO3 PLAS-SCNC: 24 MMOL/L (ref 22–29)
DEPRECATED HCO3 PLAS-SCNC: 25 MMOL/L (ref 22–29)
DEPRECATED HCO3 PLAS-SCNC: 25 MMOL/L (ref 22–29)
DIASTOLIC BLOOD PRESSURE - MUSE: NORMAL MMHG
EBV VCA IGG SER IA-ACNC: 649 U/ML
EBV VCA IGG SER IA-ACNC: POSITIVE
EOSINOPHIL # BLD MANUAL: 0 10E3/UL (ref 0–0.7)
EOSINOPHIL # BLD MANUAL: 0.9 10E3/UL (ref 0–0.7)
EOSINOPHIL # BLD MANUAL: 1 10E3/UL (ref 0–0.7)
EOSINOPHIL # BLD MANUAL: 1.1 10E3/UL (ref 0–0.7)
EOSINOPHIL # BLD MANUAL: 2 10E3/UL (ref 0–0.7)
EOSINOPHIL # BLD MANUAL: 2.2 10E3/UL (ref 0–0.7)
EOSINOPHIL NFR BLD MANUAL: 0 %
EOSINOPHIL NFR BLD MANUAL: 1 %
EOSINOPHIL NFR BLD MANUAL: 1 %
EOSINOPHIL NFR BLD MANUAL: 2 %
ERYTHROCYTE [DISTWIDTH] IN BLOOD BY AUTOMATED COUNT: 14.5 % (ref 10–15)
ERYTHROCYTE [DISTWIDTH] IN BLOOD BY AUTOMATED COUNT: 14.6 % (ref 10–15)
ERYTHROCYTE [DISTWIDTH] IN BLOOD BY AUTOMATED COUNT: 14.7 % (ref 10–15)
ERYTHROCYTE [DISTWIDTH] IN BLOOD BY AUTOMATED COUNT: 14.7 % (ref 10–15)
FIBRINOGEN PPP-MCNC: 437 MG/DL (ref 170–490)
FIBRINOGEN PPP-MCNC: 465 MG/DL (ref 170–490)
FIBRINOGEN PPP-MCNC: 465 MG/DL (ref 170–490)
GFR SERPL CREATININE-BSD FRML MDRD: >90 ML/MIN/1.73M2
GLUCOSE SERPL-MCNC: 187 MG/DL (ref 70–99)
GLUCOSE SERPL-MCNC: 190 MG/DL (ref 70–99)
GLUCOSE SERPL-MCNC: 88 MG/DL (ref 70–99)
GLUCOSE UR STRIP-MCNC: NEGATIVE MG/DL
HCT VFR BLD AUTO: 36.1 % (ref 35–47)
HCT VFR BLD AUTO: 37.5 % (ref 35–47)
HCT VFR BLD AUTO: 37.8 % (ref 35–47)
HCT VFR BLD AUTO: 38.4 % (ref 35–47)
HCT VFR BLD AUTO: 38.7 % (ref 35–47)
HCT VFR BLD AUTO: 38.9 % (ref 35–47)
HGB BLD-MCNC: 11.8 G/DL (ref 11.7–15.7)
HGB BLD-MCNC: 12.4 G/DL (ref 11.7–15.7)
HGB BLD-MCNC: 12.5 G/DL (ref 11.7–15.7)
HGB BLD-MCNC: 12.6 G/DL (ref 11.7–15.7)
HGB BLD-MCNC: 12.7 G/DL (ref 11.7–15.7)
HGB BLD-MCNC: 12.7 G/DL (ref 11.7–15.7)
HGB UR QL STRIP: NEGATIVE
HSV1 IGG SERPL QL IA: 15 INDEX
HSV1 IGG SERPL QL IA: ABNORMAL
HSV2 IGG SERPL QL IA: 0.11 INDEX
HSV2 IGG SERPL QL IA: ABNORMAL
INR PPP: 1.2 (ref 0.85–1.15)
INR PPP: 1.2 (ref 0.85–1.15)
INR PPP: 1.25 (ref 0.85–1.15)
INTERPRETATION ECG - MUSE: NORMAL
INTERPRETATION: NORMAL
KETONES UR STRIP-MCNC: NEGATIVE MG/DL
LACTATE SERPL-SCNC: 0.9 MMOL/L (ref 0.7–2)
LDH SERPL L TO P-CCNC: 2458 U/L (ref 0–250)
LEUKOCYTE ESTERASE UR QL STRIP: NEGATIVE
LYMPHOCYTES # BLD MANUAL: 10.3 10E3/UL (ref 0.8–5.3)
LYMPHOCYTES # BLD MANUAL: 11.7 10E3/UL (ref 0.8–5.3)
LYMPHOCYTES # BLD MANUAL: 11.9 10E3/UL (ref 0.8–5.3)
LYMPHOCYTES # BLD MANUAL: 15.5 10E3/UL (ref 0.8–5.3)
LYMPHOCYTES # BLD MANUAL: 3.3 10E3/UL (ref 0.8–5.3)
LYMPHOCYTES # BLD MANUAL: 4.2 10E3/UL (ref 0.8–5.3)
LYMPHOCYTES # BLD MANUAL: 4.3 10E3/UL (ref 0.8–5.3)
LYMPHOCYTES # BLD MANUAL: 5 10E3/UL (ref 0.8–5.3)
LYMPHOCYTES # BLD MANUAL: 7 10E3/UL (ref 0.8–5.3)
LYMPHOCYTES # BLD MANUAL: 7.6 10E3/UL (ref 0.8–5.3)
LYMPHOCYTES NFR BLD MANUAL: 11 %
LYMPHOCYTES NFR BLD MANUAL: 12 %
LYMPHOCYTES NFR BLD MANUAL: 19 %
LYMPHOCYTES NFR BLD MANUAL: 21 %
LYMPHOCYTES NFR BLD MANUAL: 3 %
LYMPHOCYTES NFR BLD MANUAL: 4 %
LYMPHOCYTES NFR BLD MANUAL: 5 %
LYMPHOCYTES NFR BLD MANUAL: 5 %
LYMPHOCYTES NFR BLD MANUAL: 7 %
LYMPHOCYTES NFR BLD MANUAL: 7 %
Lab: NORMAL
MAGNESIUM SERPL-MCNC: 1.9 MG/DL (ref 1.7–2.3)
MCH RBC QN AUTO: 28.6 PG (ref 26.5–33)
MCH RBC QN AUTO: 28.8 PG (ref 26.5–33)
MCH RBC QN AUTO: 28.8 PG (ref 26.5–33)
MCH RBC QN AUTO: 28.9 PG (ref 26.5–33)
MCH RBC QN AUTO: 28.9 PG (ref 26.5–33)
MCH RBC QN AUTO: 29.3 PG (ref 26.5–33)
MCHC RBC AUTO-ENTMCNC: 32.6 G/DL (ref 31.5–36.5)
MCHC RBC AUTO-ENTMCNC: 32.6 G/DL (ref 31.5–36.5)
MCHC RBC AUTO-ENTMCNC: 32.7 G/DL (ref 31.5–36.5)
MCHC RBC AUTO-ENTMCNC: 32.8 G/DL (ref 31.5–36.5)
MCHC RBC AUTO-ENTMCNC: 33.1 G/DL (ref 31.5–36.5)
MCHC RBC AUTO-ENTMCNC: 33.3 G/DL (ref 31.5–36.5)
MCV RBC AUTO: 87 FL (ref 78–100)
MCV RBC AUTO: 87 FL (ref 78–100)
MCV RBC AUTO: 88 FL (ref 78–100)
MCV RBC AUTO: 88 FL (ref 78–100)
MCV RBC AUTO: 89 FL (ref 78–100)
MCV RBC AUTO: 89 FL (ref 78–100)
METAMYELOCYTES # BLD MANUAL: 0.7 10E3/UL
METAMYELOCYTES NFR BLD MANUAL: 1 %
MONOCYTES # BLD MANUAL: 0 10E3/UL (ref 0–1.3)
MONOCYTES # BLD MANUAL: 1 10E3/UL (ref 0–1.3)
MONOCYTES # BLD MANUAL: 1.1 10E3/UL (ref 0–1.3)
MONOCYTES # BLD MANUAL: 2 10E3/UL (ref 0–1.3)
MONOCYTES # BLD MANUAL: 2.2 10E3/UL (ref 0–1.3)
MONOCYTES NFR BLD MANUAL: 0 %
MONOCYTES NFR BLD MANUAL: 1 %
MONOCYTES NFR BLD MANUAL: 2 %
MONOCYTES NFR BLD MANUAL: 2 %
MONOCYTES NFR BLD MANUAL: 3 %
MYELOCYTES # BLD MANUAL: 1 10E3/UL
MYELOCYTES NFR BLD MANUAL: 1 %
NEUTROPHILS # BLD MANUAL: 15.3 10E3/UL (ref 1.6–8.3)
NEUTROPHILS # BLD MANUAL: 16.2 10E3/UL (ref 1.6–8.3)
NEUTROPHILS # BLD MANUAL: 17.1 10E3/UL (ref 1.6–8.3)
NEUTROPHILS # BLD MANUAL: 6.3 10E3/UL (ref 1.6–8.3)
NEUTROPHILS # BLD MANUAL: 6.5 10E3/UL (ref 1.6–8.3)
NEUTROPHILS # BLD MANUAL: 7 10E3/UL (ref 1.6–8.3)
NEUTROPHILS # BLD MANUAL: 7 10E3/UL (ref 1.6–8.3)
NEUTROPHILS # BLD MANUAL: 8.8 10E3/UL (ref 1.6–8.3)
NEUTROPHILS # BLD MANUAL: 8.8 10E3/UL (ref 1.6–8.3)
NEUTROPHILS # BLD MANUAL: 9.6 10E3/UL (ref 1.6–8.3)
NEUTROPHILS NFR BLD MANUAL: 12 %
NEUTROPHILS NFR BLD MANUAL: 12 %
NEUTROPHILS NFR BLD MANUAL: 14 %
NEUTROPHILS NFR BLD MANUAL: 17 %
NEUTROPHILS NFR BLD MANUAL: 19 %
NEUTROPHILS NFR BLD MANUAL: 6 %
NEUTROPHILS NFR BLD MANUAL: 7 %
NEUTROPHILS NFR BLD MANUAL: 7 %
NEUTROPHILS NFR BLD MANUAL: 8 %
NEUTROPHILS NFR BLD MANUAL: 9 %
NITRATE UR QL: NEGATIVE
NRBC # BLD AUTO: 0.5 10E3/UL
NRBC BLD MANUAL-RTO: 1 %
OTHER CELLS # BLD MANUAL: 76.3 10E3/UL
OTHER CELLS # BLD MANUAL: 84.1 10E3/UL
OTHER CELLS # BLD MANUAL: 85 10E3/UL
OTHER CELLS # BLD MANUAL: ABNORMAL 10*3/UL
OTHER CELLS NFR BLD MANUAL: 76 %
OTHER CELLS NFR BLD MANUAL: 78 %
OTHER CELLS NFR BLD MANUAL: 79 %
OTHER CELLS NFR BLD MANUAL: ABNORMAL %
P AXIS - MUSE: 41 DEGREES
PATH REPORT.COMMENTS IMP SPEC: ABNORMAL
PATH REPORT.COMMENTS IMP SPEC: YES
PATH REPORT.FINAL DX SPEC: ABNORMAL
PATH REPORT.MICROSCOPIC SPEC OTHER STN: ABNORMAL
PATH REPORT.RELEVANT HX SPEC: ABNORMAL
PATH REV: ABNORMAL
PERFORMING LABORATORY: NORMAL
PH UR STRIP: 5.5 [PH] (ref 5–7)
PHOSPHATE SERPL-MCNC: 2.2 MG/DL (ref 2.5–4.5)
PHOSPHATE SERPL-MCNC: 3.2 MG/DL (ref 2.5–4.5)
PHOSPHATE SERPL-MCNC: 3.5 MG/DL (ref 2.5–4.5)
PLAT MORPH BLD: ABNORMAL
PLATELET # BLD AUTO: 40 10E3/UL (ref 150–450)
PLATELET # BLD AUTO: 45 10E3/UL (ref 150–450)
PLATELET # BLD AUTO: 47 10E3/UL (ref 150–450)
PLATELET # BLD AUTO: 54 10E3/UL (ref 150–450)
PLATELET # BLD AUTO: 59 10E3/UL (ref 150–450)
PLATELET # BLD AUTO: 65 10E3/UL (ref 150–450)
POLYCHROMASIA BLD QL SMEAR: SLIGHT
POLYCHROMASIA BLD QL SMEAR: SLIGHT
POTASSIUM SERPL-SCNC: 3.4 MMOL/L (ref 3.4–5.3)
POTASSIUM SERPL-SCNC: 3.6 MMOL/L (ref 3.4–5.3)
POTASSIUM SERPL-SCNC: 3.9 MMOL/L (ref 3.4–5.3)
PR INTERVAL - MUSE: 158 MS
PROMYELOCYTES # BLD MANUAL: 0.7 10E3/UL
PROMYELOCYTES NFR BLD MANUAL: 1 %
PROT SERPL-MCNC: 7.2 G/DL (ref 6.4–8.3)
QRS DURATION - MUSE: 84 MS
QT - MUSE: 408 MS
QTC - MUSE: 440 MS
R AXIS - MUSE: -22 DEGREES
RBC # BLD AUTO: 4.08 10E6/UL (ref 3.8–5.2)
RBC # BLD AUTO: 4.27 10E6/UL (ref 3.8–5.2)
RBC # BLD AUTO: 4.33 10E6/UL (ref 3.8–5.2)
RBC # BLD AUTO: 4.36 10E6/UL (ref 3.8–5.2)
RBC # BLD AUTO: 4.41 10E6/UL (ref 3.8–5.2)
RBC # BLD AUTO: 4.41 10E6/UL (ref 3.8–5.2)
RBC MORPH BLD: ABNORMAL
SODIUM SERPL-SCNC: 137 MMOL/L (ref 136–145)
SODIUM SERPL-SCNC: 138 MMOL/L (ref 136–145)
SODIUM SERPL-SCNC: 141 MMOL/L (ref 136–145)
SP GR UR STRIP: 1.02 (ref 1–1.03)
SPECIMEN EXPIRATION DATE: NORMAL
SPECIMEN STATUS: NORMAL
SYSTOLIC BLOOD PRESSURE - MUSE: NORMAL MMHG
T AXIS - MUSE: 42 DEGREES
TEST NAME: NORMAL
URATE SERPL-MCNC: 2.7 MG/DL (ref 2.4–5.7)
URATE SERPL-MCNC: 2.8 MG/DL (ref 2.4–5.7)
URATE SERPL-MCNC: 3.5 MG/DL (ref 2.4–5.7)
UROBILINOGEN UR STRIP-MCNC: NORMAL MG/DL
VENTRICULAR RATE- MUSE: 70 BPM
WBC # BLD AUTO: 100.7 10E3/UL (ref 4–11)
WBC # BLD AUTO: 104.5 10E3/UL (ref 4–11)
WBC # BLD AUTO: 109.4 10E3/UL (ref 4–11)
WBC # BLD AUTO: 54.3 10E3/UL (ref 4–11)
WBC # BLD AUTO: 73.7 10E3/UL (ref 4–11)
WBC # BLD AUTO: 85.3 10E3/UL (ref 4–11)

## 2022-08-22 PROCEDURE — 84550 ASSAY OF BLOOD/URIC ACID: CPT | Performed by: PHYSICIAN ASSISTANT

## 2022-08-22 PROCEDURE — 250N000013 HC RX MED GY IP 250 OP 250 PS 637: Performed by: STUDENT IN AN ORGANIZED HEALTH CARE EDUCATION/TRAINING PROGRAM

## 2022-08-22 PROCEDURE — 258N000003 HC RX IP 258 OP 636: Performed by: PHYSICIAN ASSISTANT

## 2022-08-22 PROCEDURE — 86850 RBC ANTIBODY SCREEN: CPT | Performed by: PHYSICIAN ASSISTANT

## 2022-08-22 PROCEDURE — 85610 PROTHROMBIN TIME: CPT | Performed by: PHYSICIAN ASSISTANT

## 2022-08-22 PROCEDURE — 250N000011 HC RX IP 250 OP 636: Performed by: INTERNAL MEDICINE

## 2022-08-22 PROCEDURE — 85007 BL SMEAR W/DIFF WBC COUNT: CPT | Performed by: PHYSICIAN ASSISTANT

## 2022-08-22 PROCEDURE — 83605 ASSAY OF LACTIC ACID: CPT | Performed by: INTERNAL MEDICINE

## 2022-08-22 PROCEDURE — 250N000013 HC RX MED GY IP 250 OP 250 PS 637: Performed by: PHYSICIAN ASSISTANT

## 2022-08-22 PROCEDURE — 36592 COLLECT BLOOD FROM PICC: CPT | Performed by: PHYSICIAN ASSISTANT

## 2022-08-22 PROCEDURE — 83735 ASSAY OF MAGNESIUM: CPT | Performed by: INTERNAL MEDICINE

## 2022-08-22 PROCEDURE — 84100 ASSAY OF PHOSPHORUS: CPT | Performed by: PHYSICIAN ASSISTANT

## 2022-08-22 PROCEDURE — 70491 CT SOFT TISSUE NECK W/DYE: CPT

## 2022-08-22 PROCEDURE — 86901 BLOOD TYPING SEROLOGIC RH(D): CPT | Performed by: PHYSICIAN ASSISTANT

## 2022-08-22 PROCEDURE — 36592 COLLECT BLOOD FROM PICC: CPT | Performed by: INTERNAL MEDICINE

## 2022-08-22 PROCEDURE — 83615 LACTATE (LD) (LDH) ENZYME: CPT | Performed by: PHYSICIAN ASSISTANT

## 2022-08-22 PROCEDURE — 85384 FIBRINOGEN ACTIVITY: CPT | Performed by: PHYSICIAN ASSISTANT

## 2022-08-22 PROCEDURE — 81003 URINALYSIS AUTO W/O SCOPE: CPT | Performed by: PHYSICIAN ASSISTANT

## 2022-08-22 PROCEDURE — 99356 PR PROLONGED SERV,INPATIENT,1ST HR: CPT | Mod: FS | Performed by: PHYSICIAN ASSISTANT

## 2022-08-22 PROCEDURE — 85027 COMPLETE CBC AUTOMATED: CPT | Performed by: PHYSICIAN ASSISTANT

## 2022-08-22 PROCEDURE — 99233 SBSQ HOSP IP/OBS HIGH 50: CPT | Mod: FS | Performed by: PHYSICIAN ASSISTANT

## 2022-08-22 PROCEDURE — 120N000002 HC R&B MED SURG/OB UMMC

## 2022-08-22 PROCEDURE — 99357 PR PROLONGED SERV,INPATIENT,EA ADDL 30 MIN: CPT | Mod: FS | Performed by: PHYSICIAN ASSISTANT

## 2022-08-22 PROCEDURE — 70491 CT SOFT TISSUE NECK W/DYE: CPT | Mod: 26 | Performed by: RADIOLOGY

## 2022-08-22 PROCEDURE — 82248 BILIRUBIN DIRECT: CPT | Performed by: PHYSICIAN ASSISTANT

## 2022-08-22 PROCEDURE — 250N000012 HC RX MED GY IP 250 OP 636 PS 637: Performed by: PHYSICIAN ASSISTANT

## 2022-08-22 PROCEDURE — 258N000003 HC RX IP 258 OP 636: Performed by: STUDENT IN AN ORGANIZED HEALTH CARE EDUCATION/TRAINING PROGRAM

## 2022-08-22 PROCEDURE — 250N000011 HC RX IP 250 OP 636: Performed by: STUDENT IN AN ORGANIZED HEALTH CARE EDUCATION/TRAINING PROGRAM

## 2022-08-22 PROCEDURE — 80048 BASIC METABOLIC PNL TOTAL CA: CPT | Performed by: PHYSICIAN ASSISTANT

## 2022-08-22 RX ORDER — IOPAMIDOL 755 MG/ML
100 INJECTION, SOLUTION INTRAVASCULAR ONCE
Status: COMPLETED | OUTPATIENT
Start: 2022-08-22 | End: 2022-08-22

## 2022-08-22 RX ORDER — HYDRALAZINE HYDROCHLORIDE 20 MG/ML
10 INJECTION INTRAMUSCULAR; INTRAVENOUS EVERY 6 HOURS PRN
Status: DISCONTINUED | OUTPATIENT
Start: 2022-08-22 | End: 2022-08-22

## 2022-08-22 RX ADMIN — POLYETHYLENE GLYCOL 3350 17 G: 17 POWDER, FOR SOLUTION ORAL at 08:23

## 2022-08-22 RX ADMIN — SODIUM CHLORIDE, PRESERVATIVE FREE 100 ML: 5 INJECTION INTRAVENOUS at 10:26

## 2022-08-22 RX ADMIN — IOPAMIDOL 100 ML: 755 INJECTION, SOLUTION INTRAVENOUS at 10:20

## 2022-08-22 RX ADMIN — HYDROXYUREA 1000 MG: 500 CAPSULE ORAL at 08:23

## 2022-08-22 RX ADMIN — ALLOPURINOL 300 MG: 300 TABLET ORAL at 08:23

## 2022-08-22 RX ADMIN — AMLODIPINE BESYLATE 5 MG: 5 TABLET ORAL at 08:23

## 2022-08-22 RX ADMIN — HYDROXYUREA 1000 MG: 500 CAPSULE ORAL at 13:50

## 2022-08-22 RX ADMIN — SODIUM CHLORIDE, PRESERVATIVE FREE: 5 INJECTION INTRAVENOUS at 13:56

## 2022-08-22 RX ADMIN — ACYCLOVIR 400 MG: 400 TABLET ORAL at 08:23

## 2022-08-22 RX ADMIN — SODIUM CHLORIDE, PRESERVATIVE FREE: 5 INJECTION INTRAVENOUS at 03:01

## 2022-08-22 RX ADMIN — HYDROXYUREA 1000 MG: 500 CAPSULE ORAL at 21:20

## 2022-08-22 RX ADMIN — PREDNISONE 140 MG: 20 TABLET ORAL at 11:49

## 2022-08-22 RX ADMIN — MICAFUNGIN 50 MG: 10 INJECTION, POWDER, LYOPHILIZED, FOR SOLUTION INTRAVENOUS at 11:49

## 2022-08-22 RX ADMIN — PANTOPRAZOLE SODIUM 40 MG: 40 TABLET, DELAYED RELEASE ORAL at 08:23

## 2022-08-22 RX ADMIN — LEVOFLOXACIN 250 MG: 250 TABLET, FILM COATED ORAL at 11:49

## 2022-08-22 RX ADMIN — ACYCLOVIR 400 MG: 400 TABLET ORAL at 21:20

## 2022-08-22 ASSESSMENT — ACTIVITIES OF DAILY LIVING (ADL)
ADLS_ACUITY_SCORE: 22

## 2022-08-22 NOTE — PLAN OF CARE
Time 6465-2822    Vitals: HTN, OVSS on RA  Activity: Up ad stephanie  Pain: Denies  Neuro: A&OX4, Denies N/T    Cardiac:  WDL  Respiratory:  Denies cough/SOB  GI/: Voids spontaneously, LBM 8/18  Diet: Regular  Lines: R PIV SL, L DL PICC SL/infusing NS @ 100ml/hr  Skin/Wounds: BMBx site, otherwise CDI  Labs/imaging: Reviewed, Lactic Acid 0.9        New changes this shift:  Triggered Sepsis BPA - Lactic Acid 0.9, Stable overnight    Plan: Continue IV fluids and monitor WBC. Awaiting final pathology results.     Continue to monitor and follow POC     Goal Outcome Evaluation:    Plan of Care Reviewed With: patient     Overall Patient Progress: no change    Outcome Evaluation: Continue IV Fluids, Triggered Sepsis BPA

## 2022-08-22 NOTE — PHARMACY-ADMISSION MEDICATION HISTORY
Admission Medication History Completed by Pharmacy    See [facility] Admission Navigator for allergy information, preferred outpatient pharmacy, prior to admission medications and immunization status.     Medication History Sources:     Patient    Surescripts    Changes made to PTA medication list (reason):    Added: None    Deleted: None    Changed: None    Additional Information:    Patient confirmed her only PTA medication was naproxen, taken PRN a couple times per week.     Prior to Admission medications    Medication Sig Last Dose Taking? Auth Provider Long Term End Date   naproxen sodium 220 MG capsule Take 220 mg by mouth 2 times daily (with meals) Past Week at Unknown time Yes Reported, Patient         Date completed: 08/22/22    Medication history completed by: Emmett Garcia, PharmD

## 2022-08-22 NOTE — PLAN OF CARE
"Goal Outcome Evaluation:    BP (!) 168/97 (BP Location: Right arm)   Pulse 84   Temp (!) 96.7  F (35.9  C) (Oral)   Resp 18   Ht 1.626 m (5' 4\")   Wt 114.1 kg (251 lb 9.6 oz)   SpO2 94%   BMI 43.19 kg/m      [id number]    Patient hypertensive all other vitals within normal limits, A & O x 4, denies pain, denies nausea, no BM x 1 senna given, independent in room, 100 mL/hr NS.    .Soheila Arauz, RN on 8/21/2022 at 10:22 PM                        "

## 2022-08-22 NOTE — PLAN OF CARE
9428-9871 hours: Afebrile, vital signs stable. On room air with adequate oxygenation. Patient reporting right sided flank pain this morning and does have some dark colored sediment in urine this afternoon. Sri Poole PA-C notified. CT soft tissue neck obtained this morning-results pending. TLS/DIC labs q8h. No replacements at this time. Eating and drinking a fair amount. Ambulating in room and hallway independently.

## 2022-08-23 ENCOUNTER — APPOINTMENT (OUTPATIENT)
Dept: PHYSICAL THERAPY | Facility: CLINIC | Age: 42
End: 2022-08-23
Attending: PHYSICIAN ASSISTANT
Payer: COMMERCIAL

## 2022-08-23 PROBLEM — C91.00 ACUTE LYMPHOBLASTIC LEUKEMIA (ALL) (H): Status: ACTIVE | Noted: 2022-08-23

## 2022-08-23 LAB
ALBUMIN SERPL BCG-MCNC: 3.9 G/DL (ref 3.5–5.2)
ALP SERPL-CCNC: 89 U/L (ref 35–104)
ALT SERPL W P-5'-P-CCNC: 26 U/L (ref 10–35)
ANION GAP SERPL CALCULATED.3IONS-SCNC: 10 MMOL/L (ref 7–15)
ANION GAP SERPL CALCULATED.3IONS-SCNC: 8 MMOL/L (ref 7–15)
ANION GAP SERPL CALCULATED.3IONS-SCNC: 9 MMOL/L (ref 7–15)
AST SERPL W P-5'-P-CCNC: 61 U/L (ref 10–35)
BASOPHILS # BLD MANUAL: 0 10E3/UL (ref 0–0.2)
BASOPHILS # BLD MANUAL: 0.3 10E3/UL (ref 0–0.2)
BASOPHILS # BLD MANUAL: 0.6 10E3/UL (ref 0–0.2)
BASOPHILS NFR BLD MANUAL: 0 %
BASOPHILS NFR BLD MANUAL: 1 %
BASOPHILS NFR BLD MANUAL: 1 %
BILIRUB DIRECT SERPL-MCNC: <0.2 MG/DL (ref 0–0.3)
BILIRUB SERPL-MCNC: 0.4 MG/DL
BLASTS # BLD MANUAL: 12.1 10E3/UL
BLASTS # BLD MANUAL: 17.2 10E3/UL
BLASTS # BLD MANUAL: 41.2 10E3/UL
BLASTS NFR BLD MANUAL: 47 %
BLASTS NFR BLD MANUAL: 60 %
BLASTS NFR BLD MANUAL: 73 %
BUN SERPL-MCNC: 11.6 MG/DL (ref 6–20)
BUN SERPL-MCNC: 13.3 MG/DL (ref 6–20)
BUN SERPL-MCNC: 14.8 MG/DL (ref 6–20)
BURR CELLS BLD QL SMEAR: SLIGHT
CALCIUM SERPL-MCNC: 8.8 MG/DL (ref 8.6–10)
CALCIUM SERPL-MCNC: 8.9 MG/DL (ref 8.6–10)
CALCIUM SERPL-MCNC: 9 MG/DL (ref 8.6–10)
CHLORIDE SERPL-SCNC: 102 MMOL/L (ref 98–107)
CHLORIDE SERPL-SCNC: 103 MMOL/L (ref 98–107)
CHLORIDE SERPL-SCNC: 105 MMOL/L (ref 98–107)
CREAT SERPL-MCNC: 0.56 MG/DL (ref 0.51–0.95)
CREAT SERPL-MCNC: 0.68 MG/DL (ref 0.51–0.95)
CREAT SERPL-MCNC: 0.71 MG/DL (ref 0.51–0.95)
DEPRECATED HCO3 PLAS-SCNC: 24 MMOL/L (ref 22–29)
DEPRECATED HCO3 PLAS-SCNC: 25 MMOL/L (ref 22–29)
DEPRECATED HCO3 PLAS-SCNC: 27 MMOL/L (ref 22–29)
EOSINOPHIL # BLD MANUAL: 0 10E3/UL (ref 0–0.7)
EOSINOPHIL # BLD MANUAL: 0 10E3/UL (ref 0–0.7)
EOSINOPHIL # BLD MANUAL: 0.8 10E3/UL (ref 0–0.7)
EOSINOPHIL NFR BLD MANUAL: 0 %
EOSINOPHIL NFR BLD MANUAL: 0 %
EOSINOPHIL NFR BLD MANUAL: 3 %
ERYTHROCYTE [DISTWIDTH] IN BLOOD BY AUTOMATED COUNT: 14.5 % (ref 10–15)
ERYTHROCYTE [DISTWIDTH] IN BLOOD BY AUTOMATED COUNT: 14.6 % (ref 10–15)
ERYTHROCYTE [DISTWIDTH] IN BLOOD BY AUTOMATED COUNT: 14.6 % (ref 10–15)
FIBRINOGEN PPP-MCNC: 372 MG/DL (ref 170–490)
FIBRINOGEN PPP-MCNC: 402 MG/DL (ref 170–490)
FIBRINOGEN PPP-MCNC: 406 MG/DL (ref 170–490)
GFR SERPL CREATININE-BSD FRML MDRD: >90 ML/MIN/1.73M2
GLUCOSE SERPL-MCNC: 182 MG/DL (ref 70–99)
GLUCOSE SERPL-MCNC: 213 MG/DL (ref 70–99)
GLUCOSE SERPL-MCNC: 91 MG/DL (ref 70–99)
HCT VFR BLD AUTO: 36.8 % (ref 35–47)
HCT VFR BLD AUTO: 38.7 % (ref 35–47)
HCT VFR BLD AUTO: 39.6 % (ref 35–47)
HGB BLD-MCNC: 12.1 G/DL (ref 11.7–15.7)
HGB BLD-MCNC: 12.9 G/DL (ref 11.7–15.7)
HGB BLD-MCNC: 13.3 G/DL (ref 11.7–15.7)
INR PPP: 1.08 (ref 0.85–1.15)
INR PPP: 1.14 (ref 0.85–1.15)
INR PPP: 1.17 (ref 0.85–1.15)
LDH SERPL L TO P-CCNC: 2007 U/L (ref 0–250)
LYMPHOCYTES # BLD MANUAL: 2 10E3/UL (ref 0.8–5.3)
LYMPHOCYTES # BLD MANUAL: 5.9 10E3/UL (ref 0.8–5.3)
LYMPHOCYTES # BLD MANUAL: 7.9 10E3/UL (ref 0.8–5.3)
LYMPHOCYTES NFR BLD MANUAL: 14 %
LYMPHOCYTES NFR BLD MANUAL: 23 %
LYMPHOCYTES NFR BLD MANUAL: 7 %
MCH RBC QN AUTO: 28.6 PG (ref 26.5–33)
MCH RBC QN AUTO: 29 PG (ref 26.5–33)
MCH RBC QN AUTO: 29.3 PG (ref 26.5–33)
MCHC RBC AUTO-ENTMCNC: 32.9 G/DL (ref 31.5–36.5)
MCHC RBC AUTO-ENTMCNC: 33.3 G/DL (ref 31.5–36.5)
MCHC RBC AUTO-ENTMCNC: 33.6 G/DL (ref 31.5–36.5)
MCV RBC AUTO: 87 FL (ref 78–100)
MONOCYTES # BLD MANUAL: 0.5 10E3/UL (ref 0–1.3)
MONOCYTES # BLD MANUAL: 0.9 10E3/UL (ref 0–1.3)
MONOCYTES # BLD MANUAL: 1.1 10E3/UL (ref 0–1.3)
MONOCYTES NFR BLD MANUAL: 2 %
MONOCYTES NFR BLD MANUAL: 2 %
MONOCYTES NFR BLD MANUAL: 3 %
NEUTROPHILS # BLD MANUAL: 5.6 10E3/UL (ref 1.6–8.3)
NEUTROPHILS # BLD MANUAL: 6.5 10E3/UL (ref 1.6–8.3)
NEUTROPHILS # BLD MANUAL: 8.3 10E3/UL (ref 1.6–8.3)
NEUTROPHILS NFR BLD MANUAL: 10 %
NEUTROPHILS NFR BLD MANUAL: 25 %
NEUTROPHILS NFR BLD MANUAL: 29 %
NRBC # BLD AUTO: 0.3 10E3/UL
NRBC BLD MANUAL-RTO: 1 %
PATH REPORT.COMMENTS IMP SPEC: ABNORMAL
PATH REPORT.COMMENTS IMP SPEC: YES
PATH REPORT.FINAL DX SPEC: ABNORMAL
PATH REPORT.GROSS SPEC: ABNORMAL
PATH REPORT.MICROSCOPIC SPEC OTHER STN: ABNORMAL
PATH REPORT.MICROSCOPIC SPEC OTHER STN: ABNORMAL
PATH REPORT.RELEVANT HX SPEC: ABNORMAL
PHOSPHATE SERPL-MCNC: 3.2 MG/DL (ref 2.5–4.5)
PHOSPHATE SERPL-MCNC: 3.8 MG/DL (ref 2.5–4.5)
PHOSPHATE SERPL-MCNC: 4.2 MG/DL (ref 2.5–4.5)
PLAT MORPH BLD: ABNORMAL
PLATELET # BLD AUTO: 36 10E3/UL (ref 150–450)
PLATELET # BLD AUTO: 40 10E3/UL (ref 150–450)
PLATELET # BLD AUTO: 44 10E3/UL (ref 150–450)
POTASSIUM SERPL-SCNC: 3.9 MMOL/L (ref 3.4–5.3)
POTASSIUM SERPL-SCNC: 4 MMOL/L (ref 3.4–5.3)
POTASSIUM SERPL-SCNC: 4.1 MMOL/L (ref 3.4–5.3)
PROT SERPL-MCNC: 6.9 G/DL (ref 6.4–8.3)
RBC # BLD AUTO: 4.23 10E6/UL (ref 3.8–5.2)
RBC # BLD AUTO: 4.45 10E6/UL (ref 3.8–5.2)
RBC # BLD AUTO: 4.54 10E6/UL (ref 3.8–5.2)
RBC MORPH BLD: ABNORMAL
SODIUM SERPL-SCNC: 135 MMOL/L (ref 136–145)
SODIUM SERPL-SCNC: 138 MMOL/L (ref 136–145)
SODIUM SERPL-SCNC: 140 MMOL/L (ref 136–145)
URATE SERPL-MCNC: 2.5 MG/DL (ref 2.4–5.7)
URATE SERPL-MCNC: 2.6 MG/DL (ref 2.4–5.7)
URATE SERPL-MCNC: 3.2 MG/DL (ref 2.4–5.7)
WBC # BLD AUTO: 25.8 10E3/UL (ref 4–11)
WBC # BLD AUTO: 28.7 10E3/UL (ref 4–11)
WBC # BLD AUTO: 56.4 10E3/UL (ref 4–11)

## 2022-08-23 PROCEDURE — 83615 LACTATE (LD) (LDH) ENZYME: CPT | Performed by: PHYSICIAN ASSISTANT

## 2022-08-23 PROCEDURE — 250N000013 HC RX MED GY IP 250 OP 250 PS 637: Performed by: PHYSICIAN ASSISTANT

## 2022-08-23 PROCEDURE — 258N000003 HC RX IP 258 OP 636: Performed by: PHYSICIAN ASSISTANT

## 2022-08-23 PROCEDURE — 80048 BASIC METABOLIC PNL TOTAL CA: CPT | Performed by: PHYSICIAN ASSISTANT

## 2022-08-23 PROCEDURE — 97161 PT EVAL LOW COMPLEX 20 MIN: CPT | Mod: GP

## 2022-08-23 PROCEDURE — 36592 COLLECT BLOOD FROM PICC: CPT | Performed by: PHYSICIAN ASSISTANT

## 2022-08-23 PROCEDURE — 85610 PROTHROMBIN TIME: CPT | Performed by: PHYSICIAN ASSISTANT

## 2022-08-23 PROCEDURE — 84550 ASSAY OF BLOOD/URIC ACID: CPT | Performed by: PHYSICIAN ASSISTANT

## 2022-08-23 PROCEDURE — 84100 ASSAY OF PHOSPHORUS: CPT | Performed by: PHYSICIAN ASSISTANT

## 2022-08-23 PROCEDURE — 120N000002 HC R&B MED SURG/OB UMMC

## 2022-08-23 PROCEDURE — 250N000011 HC RX IP 250 OP 636: Performed by: STUDENT IN AN ORGANIZED HEALTH CARE EDUCATION/TRAINING PROGRAM

## 2022-08-23 PROCEDURE — 250N000013 HC RX MED GY IP 250 OP 250 PS 637: Performed by: STUDENT IN AN ORGANIZED HEALTH CARE EDUCATION/TRAINING PROGRAM

## 2022-08-23 PROCEDURE — 99233 SBSQ HOSP IP/OBS HIGH 50: CPT | Mod: FS | Performed by: PHYSICIAN ASSISTANT

## 2022-08-23 PROCEDURE — 97530 THERAPEUTIC ACTIVITIES: CPT | Mod: GP

## 2022-08-23 PROCEDURE — 85384 FIBRINOGEN ACTIVITY: CPT | Performed by: PHYSICIAN ASSISTANT

## 2022-08-23 PROCEDURE — 85007 BL SMEAR W/DIFF WBC COUNT: CPT | Performed by: PHYSICIAN ASSISTANT

## 2022-08-23 PROCEDURE — 258N000003 HC RX IP 258 OP 636: Performed by: STUDENT IN AN ORGANIZED HEALTH CARE EDUCATION/TRAINING PROGRAM

## 2022-08-23 PROCEDURE — 97116 GAIT TRAINING THERAPY: CPT | Mod: GP

## 2022-08-23 PROCEDURE — 250N000012 HC RX MED GY IP 250 OP 636 PS 637: Performed by: PHYSICIAN ASSISTANT

## 2022-08-23 PROCEDURE — 85027 COMPLETE CBC AUTOMATED: CPT | Performed by: PHYSICIAN ASSISTANT

## 2022-08-23 PROCEDURE — 80053 COMPREHEN METABOLIC PANEL: CPT | Performed by: PHYSICIAN ASSISTANT

## 2022-08-23 PROCEDURE — 82248 BILIRUBIN DIRECT: CPT | Performed by: PHYSICIAN ASSISTANT

## 2022-08-23 RX ORDER — HEPARIN SODIUM,PORCINE 10 UNIT/ML
5 VIAL (ML) INTRAVENOUS
Status: CANCELLED | OUTPATIENT
Start: 2022-09-07

## 2022-08-23 RX ORDER — AMLODIPINE BESYLATE 10 MG/1
10 TABLET ORAL DAILY
Status: DISCONTINUED | OUTPATIENT
Start: 2022-08-23 | End: 2022-09-14 | Stop reason: HOSPADM

## 2022-08-23 RX ORDER — HEPARIN SODIUM,PORCINE 10 UNIT/ML
5 VIAL (ML) INTRAVENOUS
Status: CANCELLED | OUTPATIENT
Start: 2022-08-24

## 2022-08-23 RX ORDER — HEPARIN SODIUM,PORCINE 10 UNIT/ML
5 VIAL (ML) INTRAVENOUS
Status: CANCELLED | OUTPATIENT
Start: 2022-09-14

## 2022-08-23 RX ORDER — HEPARIN SODIUM (PORCINE) LOCK FLUSH IV SOLN 100 UNIT/ML 100 UNIT/ML
5 SOLUTION INTRAVENOUS
Status: CANCELLED | OUTPATIENT
Start: 2022-09-07

## 2022-08-23 RX ORDER — HEPARIN SODIUM (PORCINE) LOCK FLUSH IV SOLN 100 UNIT/ML 100 UNIT/ML
5 SOLUTION INTRAVENOUS
Status: CANCELLED | OUTPATIENT
Start: 2022-09-14

## 2022-08-23 RX ORDER — HEPARIN SODIUM (PORCINE) LOCK FLUSH IV SOLN 100 UNIT/ML 100 UNIT/ML
5 SOLUTION INTRAVENOUS
Status: CANCELLED | OUTPATIENT
Start: 2022-08-24

## 2022-08-23 RX ADMIN — Medication 6 MG: at 22:40

## 2022-08-23 RX ADMIN — ALLOPURINOL 300 MG: 300 TABLET ORAL at 08:23

## 2022-08-23 RX ADMIN — ACYCLOVIR 400 MG: 400 TABLET ORAL at 08:23

## 2022-08-23 RX ADMIN — SODIUM CHLORIDE, PRESERVATIVE FREE: 5 INJECTION INTRAVENOUS at 18:20

## 2022-08-23 RX ADMIN — LEVOFLOXACIN 250 MG: 250 TABLET, FILM COATED ORAL at 11:48

## 2022-08-23 RX ADMIN — AMLODIPINE BESYLATE 10 MG: 10 TABLET ORAL at 08:23

## 2022-08-23 RX ADMIN — MICAFUNGIN 50 MG: 10 INJECTION, POWDER, LYOPHILIZED, FOR SOLUTION INTRAVENOUS at 11:48

## 2022-08-23 RX ADMIN — HYDROXYUREA 1000 MG: 500 CAPSULE ORAL at 08:24

## 2022-08-23 RX ADMIN — ACYCLOVIR 400 MG: 400 TABLET ORAL at 19:52

## 2022-08-23 RX ADMIN — PREDNISONE 140 MG: 20 TABLET ORAL at 08:24

## 2022-08-23 RX ADMIN — PANTOPRAZOLE SODIUM 40 MG: 40 TABLET, DELAYED RELEASE ORAL at 08:23

## 2022-08-23 RX ADMIN — SODIUM CHLORIDE, PRESERVATIVE FREE: 5 INJECTION INTRAVENOUS at 00:04

## 2022-08-23 ASSESSMENT — ACTIVITIES OF DAILY LIVING (ADL)
ADLS_ACUITY_SCORE: 22

## 2022-08-23 NOTE — PLAN OF CARE
[id number]:  Goal Outcome Evaluation:  Pt A&Ox4, BPMAX 160/89, no PRN hydralazine given x1, OVSS on RA. 100mL/hr NS continued with PICC. Sleep/rest promoted overnight. Good UOP with sediment noted. Continue to monitor and with POC.

## 2022-08-23 NOTE — PROGRESS NOTES
"   08/23/22 1331   Quick Adds   Type of Visit Initial PT Evaluation   Living Environment   People in Home spouse  (teenage kids, 3)   Current Living Arrangements house   Transportation Anticipated family or friend will provide   Living Environment Comments Split level home, office upstairs   Self-Care   Usual Activity Tolerance good   Current Activity Tolerance good   Regular Exercise Yes   Activity/Exercise Type strength training;team sports   Exercise Amount/Frequency 3-5 times/wk   Equipment Currently Used at Home none   Fall history within last six months no   Activity/Exercise/Self-Care Comment Plays tennis 3x/wk, strength training 2x/wk, IND with all mobility and ADLs   General Information   Onset of Illness/Injury or Date of Surgery 08/19/22   Referring Physician Sri Poole PA-C   Patient/Family Therapy Goals Statement (PT) Maintain strength   Pertinent History of Current Problem (include personal factors and/or comorbidities that impact the POC) Per EMR: \" admitted on 8/19/2022 with hyperleukocytosis (WBC 109K), abdominal pain and 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 Pre-phase steroids initiated x8/21 and WBC is trending down slowly\"   Existing Precautions/Restrictions immunosuppressed   Cognition   Orientation Status (Cognition) oriented x 4   Cognitive Status Comments Follows all commands appropriately   Pain Assessment   Patient Currently in Pain No   Integumentary/Edema   Integumentary/Edema no deficits were identifed   Posture    Posture Protracted shoulders   Range of Motion (ROM)   Range of Motion ROM is WFL   Strength (Manual Muscle Testing)   Strength (Manual Muscle Testing) strength is WFL   Transfers   Transfers no deficits identified   Gait/Stairs (Locomotion)   [place] Level (Gait) independent   Assistive Device (Gait)   (IV pole)   Distance in Feet (Required for LE Total Joints) 10   Balance   Balance no deficits were identified "   Sensory Examination   Sensory Perception patient reports no sensory changes   Clinical Impression   Criteria for Skilled Therapeutic Intervention Yes, treatment indicated   PT Diagnosis (PT) Risk of decline in function in context of prolonged hosptialzation and chemotherapy treatment   Clinical Presentation (PT Evaluation Complexity) Stable/Uncomplicated   Clinical Presentation Rationale clinical judgement   Clinical Decision Making (Complexity) low complexity   Planned Therapy Interventions (PT) strengthening;stair training;gait training   Risk & Benefits of therapy have been explained evaluation/treatment results reviewed;care plan/treatment goals reviewed;risks/benefits reviewed;current/potential barriers reviewed;participants voiced agreement with care plan;participants included;patient   PT Discharge Planning   PT Discharge Recommendation (DC Rec) home   PT Rationale for DC Rec Pt IND with mobility, at functional baseline   PT Brief overview of current status IND   Plan of Care Review   Plan of Care Reviewed With patient   Total Evaluation Time   Total Evaluation Time (Minutes) 8   Physical Therapy Goals   PT Frequency 2x/week   PT Predicted Duration/Target Date for Goal Attainment 09/30/22   PT Goals Stairs;Aerobic Activity;PT Goal 1   PT: Stairs Independent   PT: Perform aerobic activity with stable cardiovascular response continuous activity;20 minutes   PT: Goal 1 Pt will demonstrate independence in HEP

## 2022-08-23 NOTE — PROGRESS NOTES
[facility]    Hematology / Oncology Progress Note    Date of Service (when I saw the patient): 08/23/2022     Assessment & Plan   Vashti Villegas is a 42 year old female who was admitted on 8/19/2022 with hyperleukocytosis (WBC 109K), abdominal pain and 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 Pre-phase steroids initiated x8/21 and WBC is trending down slowly.     TODAY:   - WBC trending down slowly (85KK ?46K); discontinue Hydrea today  - Continue daily pre-phase Prednisone to 60 mg/m2 (140 mg)  - BMBx (8/20) confirms B-ALL with 85% blasts. 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   - TLS/DIC labs q8h; continue IVF and Allopurinol   - Increase Amlodipine to 10 mg daily for HTN, prn Hydralazine parameters in place  - Collect peripheral blood for Clonoseq ID testing  - Message sent to BMT intake team   - Plan to discuss case at Heme malignancy conference tonight. Likely to start treatment tomorrow.       HEME  # Ph+ B-ALL  # Leukocytosis  Patient presented to outside hospital with c/o Right upper quadrant pain and was subsequently found to have a markedly elevated white blood cell count concerning for acute leukemia. While at OSH, she had CT PE protocol as well as CT A/P. These identified no PE, no acute or localized intraabdominal inflammatory process, mild splenomegaly, and few inconspicous low-density structures in the liver. She was transferred to [facility] for further workup and management. Labs upon presentation showed WBC 109K (ANC 15.3, 85% other cells), Hgb 13.2, Plt 73.  A uric acid level of 5.9, alkaline phosphatase of 131, AST 73, INR 1.19. Elevated uric acid may be secondary to tumor lysis, while elevated liver enzymes may be secondary to leukemic involvement.    - Hydrea initiated 1g TID (8/21-8/23); now discontinued - WBC trending down slowly (100K ?85K ? 56K) as of 8/23.   - EKG shows nl sinus rhythm, QTc 440. ECHO with EF 65-70%.   - PICC  line placed, plan to remove at discharge  - CT CAP completed at OSH. CT neck 8/22 showed multiple, b/l suspicious-appearing LN (R level Ia, L level III, R level II-III) - not enlarged though they exhibit abnormal shapes.   - MR brain (8/21) - no acute IC pathology, no e/o intracranial CNS involvement.  - BMBx completed 8/20 - demonstrates B-ALL with 85% blasts and hypercellular marrow. IHC shows CD10+, CD34+. 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 Chromosomes - pending.   - Pt consented to HMTB; BMBx at dx was dry tap d/t WBC burden. Will send peripheral blood samples on 8/22.   - CBC/TLS/DIC labs three times daily  - Initiated pre-phase steroids with Prednisone 60 mg x8/21; increased to 60 mg/m2 x8/22 (140 mg)  - Message sent to BMT intake team on 8/23.   - Plan to arrange for Clonoseq ID testing to help estimate MRD in future assessments. Collect clonal testing on 8/23. Would reflexively send for MRD testing onto post-therapy BMBx if MRD+ at that time.   - Plan to discuss case at Heme malignancy conference tonight. Likely to start treatment tomorrow. Considering GRAALL-2005 + Imatinib vs clinical trial.    - Plan for LP with IT chemo later this week (Th,F) following further decrease in WBC.     # Risk of TLS/DIC  Uric acid was mildly elevated to 5.9 and LDH 1,943 on admission. Otherwise TLS labs wnl including Cr ~0.6-0.7. INR mildly elevated to 1.2, Fibrinogen 400-500.   - Started Allopurinol 300 mg daily x8/19  - IVF at 100 ml/h (8/21-8/23) ? decrease to 50 ml/h x8/23, can increase as appropriate once chemo is initiated   - Monitor TLS/DIC labs q8h    # Pancytopenia  2/2 to underlying disease  - Transfuse to maintain Hgb >7, Plt >10K     ID  # PPx  - Viral serologies sent - HepB/C, HIV, CMV, HSV2 negative. HSV1+, EBV IgG+ (649).   - ACV 400mg BID  - Started Levaquin 250 mg daily x8/21  - Started Maude 50 mg daily x8/21 in anticipation of upcoming chemo; will need to look  into coverage for Posa/Vori in coming days.      # H/o COVID19   Not vaccinated (due to fear of needles per her report). Pt reports testing postiive for COVID approx 1 month ago via home test. She never had it confirmed.   - COVID testing on admission was negative.     CV  # Hypertension  Fairly persistent elevated BP since admission to 150-160/80-90s. She was not on anti-hypertensive medication PTA.   - Start Amlodipine 5 mg daily (8/21-8/23); increased to 10 mg daily x8/23  - prn Hydralazine 10-20 mg available q6h for SBP>160     NEURO  # Headaches  Pt endorses intermittent HA since admission. Denies vision changes or other neurological sx. HTN as above.   - MRI brain completed 8/21 was negative for IC pathology or tumor involvement.   - Tylenol prn   - Consider LP/ppx IT chemo end-of week as WBC trends down      MISC  # Intermittent hypophosphatemia  - Replete prn standing protocol     FEN  - IVFs with NS @ 75 ml/hr  - lyte repletion per protocol, monitor closely given risk for TLS  - regular diet as tolerated     Lines/Drains: PICC placed, remove on discharge  DVT ppx: defer d/t TCP  GI ppx: PPI  Consults: PT  CODE: FULL  DISPO: Anticipate prolonged 4-6 week hospitalization for management of new leukemia.   Follow-up/Referrals: TBD. Dr. Hull was on service when pt was admitted.   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 Hull.    Sri Poole PA-C  Hematology/Oncology  Pager: [phone]    >60 minutes spent on the date of the encounter. Over 50% of time was spent counseling the patient and/or coordinating care.     Interval History   Continues with HTN overnight, received prn Hydralazine x1. Denies HA or other sx. TLS/DIC labs stable. WBC trending down slowly.     This morning  April reports feeling well and denies specific complaints including HA or abdominal pain. Friend is present with her at bedside. She is anxious to hear about next steps. We discussed plan for meeting this afternoon to discuss treatment further. We additionally discussed plan for LP later this week which she is nervous about though ultimately agreeable. Denies any urinary complaints, UA yesterday was normal. Continues with steroids and IVF. All questions answered.     A comprehensive review of systems was obtained and is negative other than noted here or in the HPI.     Physical Exam   Temp: 98  F (36.7  C) Temp src: Oral BP: (!) 158/80 Pulse: 65   Resp: 18 SpO2: 98 % O2 Device: None (Room air)    Vitals:    08/21/22 0905 08/22/22 0837 08/23/22 0822   Weight: 114.1 kg (251 lb 9.6 oz) 114 kg (251 lb 4.8 oz) 113 kg (249 lb 3.2 oz)     Vital Signs with Ranges  Temp:  [97.3  F (36.3  C)-99  F (37.2  C)] 98  F (36.7  C)  Pulse:  [65-82] 65  Resp:  [16-20] 18  BP: (142-169)/(70-94) 158/80  SpO2:  [95 %-98 %] 98 %  I/O last 3 completed shifts:  In: 2400 [I.V.:2400]  Out: 4450 [Urine:4450]    General: Awake and interactive. No acute distress. Pleasant female seen sitting upright comfortably in bed.    Skin: Warm, dry, and intact without rashes or lesions.   Head: Normocephalic and atraumatic.   HEENT: PERRLA. Sclera non-icteric. EOM intact. Oral mucosa is pink and moist with no lesions, thrush, or exudates.   Lymph: Neck supple.   Cardiac: Regular rate and rhythm. Normal S1 and S2. No murmurs, rubs, or gallops.   Respiratory: No signs of respiratory distress or accessory muscle use. Lungs CTAB. No wheezes or crackles.   Abd: Soft, symmetric, and non-tender without distension. BS present.   Extremities: Trace b/l LE edema.   Neuro: A&Ox3 with normal speech. Memory and thought process preserved. No focal deficits. Moving extremities equally in bed, good ROM.   Psych: Appropriate mood and affect.     Medications     -  MEDICATION INSTRUCTIONS -       sodium chloride 100 mL/hr at 08/23/22 0004       acyclovir  400 mg Oral BID     allopurinol  300 mg Oral Daily     amLODIPine  10 mg Oral Daily     hydroxyurea  1,000 mg Oral TID     levofloxacin  250 mg Oral Q24H     micafungin  50 mg Intravenous Q24H     pantoprazole  40 mg Oral QAM AC     predniSONE  60 mg/m2 Oral Daily     Data   Results for orders placed or performed during the hospital encounter of 08/19/22 (from the past 24 hour(s))   ABO/Rh type and screen    Narrative    The following orders were created for panel order ABO/Rh type and screen.  Procedure                               Abnormality         Status                     ---------                               -----------         ------                     Adult Type and Screen[[id number]]                            Final result                 Please view results for these tests on the individual orders.   CBC with platelets differential    Narrative    The following orders were created for panel order CBC with platelets differential.  Procedure                               Abnormality         Status                     ---------                               -----------         ------                     CBC with platelets and d...[[id number]]  Abnormal            Final result               Manual Differential[[id number]]          Abnormal            Final result                 Please view results for these tests on the individual orders.   Lactate Dehydrogenase   Result Value Ref Range    Lactate Dehydrogenase 2,458 (H) 0 - 250 U/L   Fibrinogen activity   Result Value Ref Range    Fibrinogen Activity 465 170 - 490 mg/dL   INR   Result Value Ref Range    INR 1.20 (H) 0.85 - 1.15   Phosphorus   Result Value Ref Range    Phosphorus 3.2 2.5 - 4.5 mg/dL   Uric acid   Result Value Ref Range    Uric Acid 3.5 2.4 - 5.7 mg/dL   Basic metabolic panel   Result Value Ref Range    Creatinine 0.62 0.51 - 0.95 mg/dL    Sodium 141  "136 - 145 mmol/L    Potassium 3.4 3.4 - 5.3 mmol/L    Urea Nitrogen 9.7 6.0 - 20.0 mg/dL    Chloride 106 98 - 107 mmol/L    Carbon Dioxide (CO2) 25 22 - 29 mmol/L    Anion Gap 10 7 - 15 mmol/L    Glucose 88 70 - 99 mg/dL    GFR Estimate >90 >60 mL/min/1.73m2    Calcium 9.0 8.6 - 10.0 mg/dL   Hepatic panel   Result Value Ref Range    Protein Total 7.2 6.4 - 8.3 g/dL    Albumin 3.9 3.5 - 5.2 g/dL    Bilirubin Total 0.5 <=1.2 mg/dL    Alkaline Phosphatase 110 (H) 35 - 104 U/L    AST 78 (H) 10 - 35 U/L    ALT 25 10 - 35 U/L    Bilirubin Direct <0.20 0.00 - 0.30 mg/dL   Magnesium   Result Value Ref Range    Magnesium 1.9 1.7 - 2.3 mg/dL   Other Laboratory; TTL; Please draw 5mL in each of 3 green sodium heparin tubes for Hematologic Malignancy [facility]. (Laboratory Miscellaneous Order)   Result Value Ref Range    See Scanned Result See Scanned Result     Performing Laboratory TTL     Test Name       Please draw 5mL in each of 3 green sodium heparin tubes for Hematologic Malignancy [facility].    Test Code TTL    Adult Type and Screen   Result Value Ref Range    ABO/RH(D) O POS     Antibody Screen Negative Negative    SPECIMEN EXPIRATION DATE [id number]    CBC with platelets and differential   Result Value Ref Range    WBC Count 85.3 (HH) 4.0 - 11.0 10e3/uL    RBC Count 4.33 3.80 - 5.20 10e6/uL    Hemoglobin 12.4 11.7 - 15.7 g/dL    Hematocrit 37.8 35.0 - 47.0 %    MCV 87 78 - 100 fL    MCH 28.6 26.5 - 33.0 pg    MCHC 32.8 31.5 - 36.5 g/dL    RDW 14.7 10.0 - 15.0 %    Platelet Count 47 (LL) 150 - 450 10e3/uL    Narrative    Previously reported component [ NRBCs ] is no longer reported.\"  Previously reported component [ NRBCs Absolute ] is no longer reported.\"   Manual Differential   Result Value Ref Range    % Neutrophils 19 %    % Lymphocytes 5 %    % Monocytes 0 %    % Eosinophils 1 %    % Basophils 0 %    % Blasts 75 %    Absolute Neutrophils 16.2 (H) 1.6 - 8.3 10e3/uL    Absolute Lymphocytes 4.3 0.8 - 5.3 10e3/uL "    Absolute Monocytes 0.0 0.0 - 1.3 10e3/uL    Absolute Eosinophils 0.9 (H) 0.0 - 0.7 10e3/uL    Absolute Basophils 0.0 0.0 - 0.2 10e3/uL    Absolute Blasts 64.0 (H) <=0.0 10e3/uL    RBC Morphology Confirmed RBC Indices     Platelet Assessment  Automated Count Confirmed. Platelet morphology is normal.     Automated Count Confirmed. Platelet morphology is normal.   CT Soft Tissue Neck w Contrast    Narrative    EXAM: CT SOFT TISSUE NECK W CONTRAST  8/22/2022 10:31 AM     HISTORY:  Pt with newly diagnosed B-ALL, lymphadenopathy seen on MRI  brain. Eval involvement.         COMPARISON:  MR brain, dated 8/21/2022.     TECHNIQUE: Following intravenous administration of nonionic iodinated  contrast medium, thin section helical CT images were obtained from the  skull base down to the level of the aortic arch.  Axial, coronal and  sagittal reformations were performed with 2-3 mm slice thickness  reconstruction. Images were reviewed in soft tissue, lung and bone  windows.    CONTRAST: iopamidol (ISOVUE-370) solution 100 mL    FINDINGS:     No nasopharyngeal, oropharyngeal, hypopharyngeal, or glottic mucosal  space abnormality. Normal tongue base. Salivary glands are within  normal limits.    Multiple atypical appearing lymph nodes throughout the bilateral  cervical chain, with loss of normal architecture and fatty hilum. Most  prominent of these are a mildly homogeneously enhancing rounded left  level III lymph node measuring 9 mm in short axis (image 49 series 3);  two right-sided rounded, level Ia lymph nodes (both visible on image  28 series 3) the larger measuring 9 mm in short axis; and 4-5 rounded,  right level II-III lymph nodes, with the largest measuring 7 mm in  short axis. The right jugulodigastric node asymmetrically larger than  left, but normal architecture appears preserved.     Normal thyroid gland.    Patent cervical vasculature; no high grade arterial stenosis.    No suspicious osseus lesion. Grossly patent  cervical spinal canal.  Stable straightening of the normal cervical lordosis. Multilevel mild  to moderate disc space height loss and facet arthropathy.    Isolated mucosal thickening of the inferior portion of the right  maxillary sinus, likely mucous retention cyst. No periapical dental  lucencies. The imaged skull base, intracranial and orbital structures  are within normal limits.    No suspicious finding in the visualized superior mediastinum/thorax.  Left approach PICC line in place, with tip terminating off the  field-of-view. Clear lung apices.      Impression    IMPRESSION:  1. Multiple, bilateral suspicious appearing lymph nodes, most  prominent at right level Ia, left level III, and right level II-III.  While several of these nodes are not enlarged, they do exhibit  abnormal shapes.  2. Multilevel degenerative change throughout the cervical spine with  disc space height loss and facet arthropathy.    I have personally reviewed the examination and initial interpretation  and I agree with the findings.    BRANDIE SMILEY MD         SYSTEM ID:  [id number]   CBC with platelets differential    Narrative    The following orders were created for panel order CBC with platelets differential.  Procedure                               Abnormality         Status                     ---------                               -----------         ------                     CBC with platelets and d...[[id number]]  Abnormal            Final result               Manual Differential[[id number]]          Abnormal            Final result                 Please view results for these tests on the individual orders.   Fibrinogen activity   Result Value Ref Range    Fibrinogen Activity 465 170 - 490 mg/dL   INR   Result Value Ref Range    INR 1.25 (H) 0.85 - 1.15   Phosphorus   Result Value Ref Range    Phosphorus 2.2 (L) 2.5 - 4.5 mg/dL   Uric acid   Result Value Ref Range    Uric Acid 2.8 2.4 - 5.7 mg/dL   Basic metabolic panel  "  Result Value Ref Range    Creatinine 0.59 0.51 - 0.95 mg/dL    Sodium 138 136 - 145 mmol/L    Potassium 3.6 3.4 - 5.3 mmol/L    Urea Nitrogen 8.6 6.0 - 20.0 mg/dL    Chloride 103 98 - 107 mmol/L    Carbon Dioxide (CO2) 25 22 - 29 mmol/L    Anion Gap 10 7 - 15 mmol/L    Glucose 187 (H) 70 - 99 mg/dL    GFR Estimate >90 >60 mL/min/1.73m2    Calcium 8.8 8.6 - 10.0 mg/dL   CBC with platelets and differential   Result Value Ref Range    WBC Count 73.7 (HH) 4.0 - 11.0 10e3/uL    RBC Count 4.36 3.80 - 5.20 10e6/uL    Hemoglobin 12.6 11.7 - 15.7 g/dL    Hematocrit 38.7 35.0 - 47.0 %    MCV 89 78 - 100 fL    MCH 28.9 26.5 - 33.0 pg    MCHC 32.6 31.5 - 36.5 g/dL    RDW 14.6 10.0 - 15.0 %    Platelet Count 40 (LL) 150 - 450 10e3/uL    Narrative    Previously reported component [ NRBCs ] is no longer reported.\"  Previously reported component [ NRBCs Absolute ] is no longer reported.\"   Manual Differential   Result Value Ref Range    % Neutrophils 12 %    % Lymphocytes 21 %    % Monocytes 3 %    % Eosinophils 0 %    % Basophils 0 %    % Metamyelocytes 1 %    % Promyelocytes 1 %    % Blasts 62 %    Absolute Neutrophils 8.8 (H) 1.6 - 8.3 10e3/uL    Absolute Lymphocytes 15.5 (H) 0.8 - 5.3 10e3/uL    Absolute Monocytes 2.2 (H) 0.0 - 1.3 10e3/uL    Absolute Eosinophils 0.0 0.0 - 0.7 10e3/uL    Absolute Basophils 0.0 0.0 - 0.2 10e3/uL    Absolute Metamyelocytes 0.7 (H) <=0.0 10e3/uL    Absolute Promyelocytes 0.7 (H) <=0.0 10e3/uL    Absolute Blasts 45.7 (H) <=0.0 10e3/uL    RBC Morphology Confirmed RBC Indices     Platelet Assessment  Automated Count Confirmed. Platelet morphology is normal.     Automated Count Confirmed. Platelet morphology is normal.   UA reflex to Microscopic and Culture    Specimen: Urine, Midstream   Result Value Ref Range    Color Urine Straw Colorless, Straw, Light Yellow, Yellow    Appearance Urine Clear Clear    Glucose Urine Negative Negative mg/dL    Bilirubin Urine Negative Negative    Ketones Urine " Negative Negative mg/dL    Specific Gravity Urine 1.019 1.003 - 1.035    Blood Urine Negative Negative    pH Urine 5.5 5.0 - 7.0    Protein Albumin Urine Negative Negative mg/dL    Urobilinogen Urine Normal Normal, 2.0 mg/dL    Nitrite Urine Negative Negative    Leukocyte Esterase Urine Negative Negative    Narrative    Microscopic not indicated   CBC with platelets differential    Narrative    The following orders were created for panel order CBC with platelets differential.  Procedure                               Abnormality         Status                     ---------                               -----------         ------                     CBC with platelets and d...[[id number]]  Abnormal            Final result               Manual Differential[[id number]]          Abnormal            Final result                 Please view results for these tests on the individual orders.   Fibrinogen activity   Result Value Ref Range    Fibrinogen Activity 437 170 - 490 mg/dL   INR   Result Value Ref Range    INR 1.20 (H) 0.85 - 1.15   Phosphorus   Result Value Ref Range    Phosphorus 3.5 2.5 - 4.5 mg/dL   Uric acid   Result Value Ref Range    Uric Acid 2.7 2.4 - 5.7 mg/dL   Basic metabolic panel   Result Value Ref Range    Creatinine 0.59 0.51 - 0.95 mg/dL    Sodium 137 136 - 145 mmol/L    Potassium 3.9 3.4 - 5.3 mmol/L    Urea Nitrogen 12.5 6.0 - 20.0 mg/dL    Chloride 103 98 - 107 mmol/L    Carbon Dioxide (CO2) 24 22 - 29 mmol/L    Anion Gap 10 7 - 15 mmol/L    Glucose 190 (H) 70 - 99 mg/dL    GFR Estimate >90 >60 mL/min/1.73m2    Calcium 9.1 8.6 - 10.0 mg/dL   CBC with platelets and differential   Result Value Ref Range    WBC Count 54.3 (HH) 4.0 - 11.0 10e3/uL    RBC Count 4.27 3.80 - 5.20 10e6/uL    Hemoglobin 12.5 11.7 - 15.7 g/dL    Hematocrit 37.5 35.0 - 47.0 %    MCV 88 78 - 100 fL    MCH 29.3 26.5 - 33.0 pg    MCHC 33.3 31.5 - 36.5 g/dL    RDW 14.7 10.0 - 15.0 %    Platelet Count 45 (LL) 150 - 450 10e3/uL     "Narrative    Previously reported component [ NRBCs ] is no longer reported.\"  Previously reported component [ NRBCs Absolute ] is no longer reported.\"   Manual Differential   Result Value Ref Range    % Neutrophils 12 %    % Lymphocytes 19 %    % Monocytes 2 %    % Eosinophils 2 %    % Basophils 0 %    % Blasts 65 %    NRBCs per 100 WBC 1 (H) <=0 %    Absolute Neutrophils 6.5 1.6 - 8.3 10e3/uL    Absolute Lymphocytes 10.3 (H) 0.8 - 5.3 10e3/uL    Absolute Monocytes 1.1 0.0 - 1.3 10e3/uL    Absolute Eosinophils 1.1 (H) 0.0 - 0.7 10e3/uL    Absolute Basophils 0.0 0.0 - 0.2 10e3/uL    Absolute Blasts 35.3 (H) <=0.0 10e3/uL    Absolute NRBCs 0.5 (H) <=0.0 10e3/uL    RBC Morphology Confirmed RBC Indices     Platelet Assessment  Automated Count Confirmed. Platelet morphology is normal.     Automated Count Confirmed. Platelet morphology is normal.   CBC with platelets differential    Narrative    The following orders were created for panel order CBC with platelets differential.  Procedure                               Abnormality         Status                     ---------                               -----------         ------                     CBC with platelets and d...[[id number]]  Abnormal            Final result               Manual Differential[[id number]]          Abnormal            Final result                 Please view results for these tests on the individual orders.   Lactate Dehydrogenase   Result Value Ref Range    Lactate Dehydrogenase 2,007 (H) 0 - 250 U/L   Fibrinogen activity   Result Value Ref Range    Fibrinogen Activity 406 170 - 490 mg/dL   INR   Result Value Ref Range    INR 1.14 0.85 - 1.15   Phosphorus   Result Value Ref Range    Phosphorus 4.2 2.5 - 4.5 mg/dL   Uric acid   Result Value Ref Range    Uric Acid 3.2 2.4 - 5.7 mg/dL   Basic metabolic panel   Result Value Ref Range    Creatinine 0.56 0.51 - 0.95 mg/dL    Sodium 140 136 - 145 mmol/L    Potassium 3.9 3.4 - 5.3 mmol/L    Urea Nitrogen " "11.6 6.0 - 20.0 mg/dL    Chloride 105 98 - 107 mmol/L    Carbon Dioxide (CO2) 27 22 - 29 mmol/L    Anion Gap 8 7 - 15 mmol/L    Glucose 91 70 - 99 mg/dL    GFR Estimate >90 >60 mL/min/1.73m2    Calcium 8.9 8.6 - 10.0 mg/dL   Hepatic panel   Result Value Ref Range    Protein Total 6.9 6.4 - 8.3 g/dL    Albumin 3.9 3.5 - 5.2 g/dL    Bilirubin Total 0.4 <=1.2 mg/dL    Alkaline Phosphatase 89 35 - 104 U/L    AST 61 (H) 10 - 35 U/L    ALT 26 10 - 35 U/L    Bilirubin Direct <0.20 0.00 - 0.30 mg/dL   CBC with platelets and differential   Result Value Ref Range    WBC Count 56.4 (HH) 4.0 - 11.0 10e3/uL    RBC Count 4.23 3.80 - 5.20 10e6/uL    Hemoglobin 12.1 11.7 - 15.7 g/dL    Hematocrit 36.8 35.0 - 47.0 %    MCV 87 78 - 100 fL    MCH 28.6 26.5 - 33.0 pg    MCHC 32.9 31.5 - 36.5 g/dL    RDW 14.6 10.0 - 15.0 %    Platelet Count 44 (LL) 150 - 450 10e3/uL    Narrative    Previously reported component [ NRBCs ] is no longer reported.\"  Previously reported component [ NRBCs Absolute ] is no longer reported.\"   Manual Differential   Result Value Ref Range    % Neutrophils 10 %    % Lymphocytes 14 %    % Monocytes 2 %    % Eosinophils 0 %    % Basophils 1 %    % Blasts 73 %    Absolute Neutrophils 5.6 1.6 - 8.3 10e3/uL    Absolute Lymphocytes 7.9 (H) 0.8 - 5.3 10e3/uL    Absolute Monocytes 1.1 0.0 - 1.3 10e3/uL    Absolute Eosinophils 0.0 0.0 - 0.7 10e3/uL    Absolute Basophils 0.6 (H) 0.0 - 0.2 10e3/uL    Absolute Blasts 41.2 (H) <=0.0 10e3/uL    RBC Morphology Confirmed RBC Indices     Platelet Assessment  Automated Count Confirmed. Platelet morphology is normal.     Automated Count Confirmed. Platelet morphology is normal.     "

## 2022-08-23 NOTE — PLAN OF CARE
Goal Outcome Evaluation:      Patient feeling well today; no complaints of pain or nausea.  Patient has a fair appetite.  Good family & friend support.  Hydrea discontinued; WBC 56.4K today.  Patient to be discussed at Heme Malignancy Conference later today to discuss chemo plan (GRAAL+ Imatinib vs clinical trial).  Chemo likely tomorrow.

## 2022-08-24 ENCOUNTER — TELEPHONE (OUTPATIENT)
Dept: TRANSPLANT | Facility: CLINIC | Age: 42
End: 2022-08-24

## 2022-08-24 DIAGNOSIS — C91.00 ACUTE LYMPHOBLASTIC LEUKEMIA (ALL) NOT HAVING ACHIEVED REMISSION (H): Primary | ICD-10-CM

## 2022-08-24 LAB
ALBUMIN SERPL BCG-MCNC: 3.8 G/DL (ref 3.5–5.2)
ALP SERPL-CCNC: 81 U/L (ref 35–104)
ALT SERPL W P-5'-P-CCNC: 36 U/L (ref 10–35)
ANION GAP SERPL CALCULATED.3IONS-SCNC: 12 MMOL/L (ref 7–15)
ANION GAP SERPL CALCULATED.3IONS-SCNC: 9 MMOL/L (ref 7–15)
ANION GAP SERPL CALCULATED.3IONS-SCNC: 9 MMOL/L (ref 7–15)
AST SERPL W P-5'-P-CCNC: 43 U/L (ref 10–35)
BASOPHILS # BLD MANUAL: 0 10E3/UL (ref 0–0.2)
BASOPHILS NFR BLD MANUAL: 0 %
BILIRUB DIRECT SERPL-MCNC: <0.2 MG/DL (ref 0–0.3)
BILIRUB SERPL-MCNC: 0.4 MG/DL
BLASTS # BLD MANUAL: 20.5 10E3/UL
BLASTS NFR BLD MANUAL: 66 %
BLD PROD TYP BPU: NORMAL
BLOOD COMPONENT TYPE: NORMAL
BUN SERPL-MCNC: 16.4 MG/DL (ref 6–20)
BUN SERPL-MCNC: 16.5 MG/DL (ref 6–20)
BUN SERPL-MCNC: 16.7 MG/DL (ref 6–20)
CALCIUM SERPL-MCNC: 8.7 MG/DL (ref 8.6–10)
CALCIUM SERPL-MCNC: 8.9 MG/DL (ref 8.6–10)
CALCIUM SERPL-MCNC: 9 MG/DL (ref 8.6–10)
CHLORIDE SERPL-SCNC: 102 MMOL/L (ref 98–107)
CHLORIDE SERPL-SCNC: 103 MMOL/L (ref 98–107)
CHLORIDE SERPL-SCNC: 103 MMOL/L (ref 98–107)
CODING SYSTEM: NORMAL
CREAT SERPL-MCNC: 0.62 MG/DL (ref 0.51–0.95)
CREAT SERPL-MCNC: 0.63 MG/DL (ref 0.51–0.95)
CREAT SERPL-MCNC: 0.67 MG/DL (ref 0.51–0.95)
DEPRECATED HCO3 PLAS-SCNC: 23 MMOL/L (ref 22–29)
DEPRECATED HCO3 PLAS-SCNC: 25 MMOL/L (ref 22–29)
DEPRECATED HCO3 PLAS-SCNC: 26 MMOL/L (ref 22–29)
EOSINOPHIL # BLD MANUAL: 0.3 10E3/UL (ref 0–0.7)
EOSINOPHIL NFR BLD MANUAL: 1 %
ERYTHROCYTE [DISTWIDTH] IN BLOOD BY AUTOMATED COUNT: 14.4 % (ref 10–15)
ERYTHROCYTE [DISTWIDTH] IN BLOOD BY AUTOMATED COUNT: 14.6 % (ref 10–15)
FIBRINOGEN PPP-MCNC: 356 MG/DL (ref 170–490)
FIBRINOGEN PPP-MCNC: 356 MG/DL (ref 170–490)
FIBRINOGEN PPP-MCNC: 405 MG/DL (ref 170–490)
GFR SERPL CREATININE-BSD FRML MDRD: >90 ML/MIN/1.73M2
GLUCOSE SERPL-MCNC: 128 MG/DL (ref 70–99)
GLUCOSE SERPL-MCNC: 162 MG/DL (ref 70–99)
GLUCOSE SERPL-MCNC: 94 MG/DL (ref 70–99)
HCT VFR BLD AUTO: 36.7 % (ref 35–47)
HCT VFR BLD AUTO: 40.6 % (ref 35–47)
HGB BLD-MCNC: 12 G/DL (ref 11.7–15.7)
HGB BLD-MCNC: 13.5 G/DL (ref 11.7–15.7)
INR PPP: 1.07 (ref 0.85–1.15)
INR PPP: 1.13 (ref 0.85–1.15)
INR PPP: 1.13 (ref 0.85–1.15)
ISSUE DATE AND TIME: NORMAL
LDH SERPL L TO P-CCNC: 1439 U/L (ref 0–250)
LYMPHOCYTES # BLD MANUAL: 5.6 10E3/UL (ref 0.8–5.3)
LYMPHOCYTES NFR BLD MANUAL: 18 %
MCH RBC QN AUTO: 28.6 PG (ref 26.5–33)
MCH RBC QN AUTO: 28.8 PG (ref 26.5–33)
MCHC RBC AUTO-ENTMCNC: 32.7 G/DL (ref 31.5–36.5)
MCHC RBC AUTO-ENTMCNC: 33.3 G/DL (ref 31.5–36.5)
MCV RBC AUTO: 87 FL (ref 78–100)
MCV RBC AUTO: 87 FL (ref 78–100)
MONOCYTES # BLD MANUAL: 0 10E3/UL (ref 0–1.3)
MONOCYTES NFR BLD MANUAL: 0 %
NEUTROPHILS # BLD MANUAL: 4.7 10E3/UL (ref 1.6–8.3)
NEUTROPHILS NFR BLD MANUAL: 15 %
NRBC # BLD AUTO: 0.1 10E3/UL
NRBC # BLD AUTO: 0.1 10E3/UL
NRBC BLD AUTO-RTO: 0 /100
NRBC BLD AUTO-RTO: 0 /100
PHOSPHATE SERPL-MCNC: 3.5 MG/DL (ref 2.5–4.5)
PHOSPHATE SERPL-MCNC: 4.1 MG/DL (ref 2.5–4.5)
PHOSPHATE SERPL-MCNC: 4.2 MG/DL (ref 2.5–4.5)
PLAT MORPH BLD: ABNORMAL
PLAT MORPH BLD: NORMAL
PLATELET # BLD AUTO: 31 10E3/UL (ref 150–450)
PLATELET # BLD AUTO: 38 10E3/UL (ref 150–450)
POTASSIUM SERPL-SCNC: 3.9 MMOL/L (ref 3.4–5.3)
POTASSIUM SERPL-SCNC: 4.2 MMOL/L (ref 3.4–5.3)
POTASSIUM SERPL-SCNC: 4.2 MMOL/L (ref 3.4–5.3)
PROT SERPL-MCNC: 6.7 G/DL (ref 6.4–8.3)
RBC # BLD AUTO: 4.2 10E6/UL (ref 3.8–5.2)
RBC # BLD AUTO: 4.68 10E6/UL (ref 3.8–5.2)
RBC MORPH BLD: ABNORMAL
RBC MORPH BLD: NORMAL
SODIUM SERPL-SCNC: 137 MMOL/L (ref 136–145)
SODIUM SERPL-SCNC: 137 MMOL/L (ref 136–145)
SODIUM SERPL-SCNC: 138 MMOL/L (ref 136–145)
UNIT ABO/RH: NORMAL
UNIT NUMBER: NORMAL
UNIT STATUS: NORMAL
UNIT TYPE ISBT: 5100
URATE SERPL-MCNC: 2.6 MG/DL (ref 2.4–5.7)
URATE SERPL-MCNC: 2.6 MG/DL (ref 2.4–5.7)
URATE SERPL-MCNC: 3.2 MG/DL (ref 2.4–5.7)
WBC # BLD AUTO: 15.1 10E3/UL (ref 4–11)
WBC # BLD AUTO: 31.1 10E3/UL (ref 4–11)

## 2022-08-24 PROCEDURE — 99233 SBSQ HOSP IP/OBS HIGH 50: CPT | Mod: FS | Performed by: STUDENT IN AN ORGANIZED HEALTH CARE EDUCATION/TRAINING PROGRAM

## 2022-08-24 PROCEDURE — 258N000003 HC RX IP 258 OP 636: Performed by: PHYSICIAN ASSISTANT

## 2022-08-24 PROCEDURE — P9037 PLATE PHERES LEUKOREDU IRRAD: HCPCS | Performed by: PHYSICIAN ASSISTANT

## 2022-08-24 PROCEDURE — 250N000013 HC RX MED GY IP 250 OP 250 PS 637: Performed by: PHYSICIAN ASSISTANT

## 2022-08-24 PROCEDURE — 258N000003 HC RX IP 258 OP 636: Performed by: STUDENT IN AN ORGANIZED HEALTH CARE EDUCATION/TRAINING PROGRAM

## 2022-08-24 PROCEDURE — 250N000013 HC RX MED GY IP 250 OP 250 PS 637: Performed by: INTERNAL MEDICINE

## 2022-08-24 PROCEDURE — 83615 LACTATE (LD) (LDH) ENZYME: CPT | Performed by: PHYSICIAN ASSISTANT

## 2022-08-24 PROCEDURE — 84550 ASSAY OF BLOOD/URIC ACID: CPT | Performed by: PHYSICIAN ASSISTANT

## 2022-08-24 PROCEDURE — 80053 COMPREHEN METABOLIC PANEL: CPT | Performed by: PHYSICIAN ASSISTANT

## 2022-08-24 PROCEDURE — 250N000011 HC RX IP 250 OP 636: Performed by: STUDENT IN AN ORGANIZED HEALTH CARE EDUCATION/TRAINING PROGRAM

## 2022-08-24 PROCEDURE — 80048 BASIC METABOLIC PNL TOTAL CA: CPT | Performed by: PHYSICIAN ASSISTANT

## 2022-08-24 PROCEDURE — 85027 COMPLETE CBC AUTOMATED: CPT | Performed by: PHYSICIAN ASSISTANT

## 2022-08-24 PROCEDURE — 82248 BILIRUBIN DIRECT: CPT | Performed by: PHYSICIAN ASSISTANT

## 2022-08-24 PROCEDURE — 250N000013 HC RX MED GY IP 250 OP 250 PS 637: Performed by: STUDENT IN AN ORGANIZED HEALTH CARE EDUCATION/TRAINING PROGRAM

## 2022-08-24 PROCEDURE — 85384 FIBRINOGEN ACTIVITY: CPT | Performed by: PHYSICIAN ASSISTANT

## 2022-08-24 PROCEDURE — 250N000011 HC RX IP 250 OP 636: Performed by: INTERNAL MEDICINE

## 2022-08-24 PROCEDURE — 84100 ASSAY OF PHOSPHORUS: CPT | Performed by: PHYSICIAN ASSISTANT

## 2022-08-24 PROCEDURE — 85610 PROTHROMBIN TIME: CPT | Performed by: PHYSICIAN ASSISTANT

## 2022-08-24 PROCEDURE — 99356 PR PROLONGED SERV,INPATIENT,1ST HR: CPT | Mod: FS | Performed by: STUDENT IN AN ORGANIZED HEALTH CARE EDUCATION/TRAINING PROGRAM

## 2022-08-24 PROCEDURE — 85007 BL SMEAR W/DIFF WBC COUNT: CPT | Performed by: PHYSICIAN ASSISTANT

## 2022-08-24 PROCEDURE — 120N000002 HC R&B MED SURG/OB UMMC

## 2022-08-24 PROCEDURE — 36592 COLLECT BLOOD FROM PICC: CPT | Performed by: PHYSICIAN ASSISTANT

## 2022-08-24 PROCEDURE — 258N000003 HC RX IP 258 OP 636: Performed by: INTERNAL MEDICINE

## 2022-08-24 PROCEDURE — 250N000012 HC RX MED GY IP 250 OP 636 PS 637: Performed by: PHYSICIAN ASSISTANT

## 2022-08-24 RX ORDER — IMATINIB MESYLATE 400 MG/1
400 TABLET, FILM COATED ORAL 2 TIMES DAILY
Status: DISCONTINUED | OUTPATIENT
Start: 2022-08-24 | End: 2022-09-14 | Stop reason: HOSPADM

## 2022-08-24 RX ORDER — METHYLPREDNISOLONE SODIUM SUCCINATE 125 MG/2ML
125 INJECTION, POWDER, LYOPHILIZED, FOR SOLUTION INTRAMUSCULAR; INTRAVENOUS
Status: DISCONTINUED | OUTPATIENT
Start: 2022-08-24 | End: 2022-09-12 | Stop reason: CLARIF

## 2022-08-24 RX ORDER — LORAZEPAM 0.5 MG/1
.5-1 TABLET ORAL EVERY 6 HOURS PRN
Status: DISCONTINUED | OUTPATIENT
Start: 2022-08-24 | End: 2022-08-26

## 2022-08-24 RX ORDER — HEPARIN SODIUM,PORCINE 10 UNIT/ML
5-20 VIAL (ML) INTRAVENOUS EVERY 24 HOURS
Status: DISCONTINUED | OUTPATIENT
Start: 2022-08-24 | End: 2022-09-10 | Stop reason: CLARIF

## 2022-08-24 RX ORDER — ALBUTEROL SULFATE 90 UG/1
1-2 AEROSOL, METERED RESPIRATORY (INHALATION)
Status: DISCONTINUED | OUTPATIENT
Start: 2022-08-24 | End: 2022-09-12 | Stop reason: CLARIF

## 2022-08-24 RX ORDER — MEPERIDINE HYDROCHLORIDE 25 MG/ML
25 INJECTION INTRAMUSCULAR; INTRAVENOUS; SUBCUTANEOUS EVERY 30 MIN PRN
Status: DISCONTINUED | OUTPATIENT
Start: 2022-08-24 | End: 2022-08-31

## 2022-08-24 RX ORDER — LORAZEPAM 2 MG/ML
.5-1 INJECTION INTRAMUSCULAR EVERY 6 HOURS PRN
Status: DISCONTINUED | OUTPATIENT
Start: 2022-08-24 | End: 2022-08-24 | Stop reason: RX

## 2022-08-24 RX ORDER — EPINEPHRINE 1 MG/ML
0.3 INJECTION, SOLUTION, CONCENTRATE INTRAVENOUS EVERY 5 MIN PRN
Status: DISCONTINUED | OUTPATIENT
Start: 2022-08-24 | End: 2022-09-12 | Stop reason: CLARIF

## 2022-08-24 RX ORDER — DIPHENHYDRAMINE HYDROCHLORIDE 50 MG/ML
50 INJECTION INTRAMUSCULAR; INTRAVENOUS
Status: DISCONTINUED | OUTPATIENT
Start: 2022-08-24 | End: 2022-08-25

## 2022-08-24 RX ORDER — DEXAMETHASONE 4 MG/1
40 TABLET ORAL DAILY
Status: DISPENSED | OUTPATIENT
Start: 2022-08-24 | End: 2022-08-26

## 2022-08-24 RX ORDER — ALBUTEROL SULFATE 0.83 MG/ML
2.5 SOLUTION RESPIRATORY (INHALATION)
Status: DISCONTINUED | OUTPATIENT
Start: 2022-08-24 | End: 2022-09-12 | Stop reason: CLARIF

## 2022-08-24 RX ORDER — HEPARIN SODIUM,PORCINE 10 UNIT/ML
5-20 VIAL (ML) INTRAVENOUS
Status: DISCONTINUED | OUTPATIENT
Start: 2022-08-24 | End: 2022-09-14 | Stop reason: HOSPADM

## 2022-08-24 RX ORDER — AMOXICILLIN 250 MG
1-2 CAPSULE ORAL 2 TIMES DAILY
Status: DISCONTINUED | OUTPATIENT
Start: 2022-08-24 | End: 2022-09-14 | Stop reason: HOSPADM

## 2022-08-24 RX ORDER — PROCHLORPERAZINE MALEATE 10 MG
10 TABLET ORAL EVERY 6 HOURS PRN
Status: DISCONTINUED | OUTPATIENT
Start: 2022-08-24 | End: 2022-08-24

## 2022-08-24 RX ADMIN — DIPHENHYDRAMINE HYDROCHLORIDE 50 MG: 25 CAPSULE ORAL at 23:46

## 2022-08-24 RX ADMIN — LEVOFLOXACIN 250 MG: 250 TABLET, FILM COATED ORAL at 11:51

## 2022-08-24 RX ADMIN — SENNOSIDES AND DOCUSATE SODIUM 2 TABLET: 8.6; 5 TABLET ORAL at 21:05

## 2022-08-24 RX ADMIN — VINCRISTINE SULFATE 2 MG: 1 INJECTION, SOLUTION INTRAVENOUS at 16:58

## 2022-08-24 RX ADMIN — ALLOPURINOL 300 MG: 300 TABLET ORAL at 07:53

## 2022-08-24 RX ADMIN — MICAFUNGIN 50 MG: 10 INJECTION, POWDER, LYOPHILIZED, FOR SOLUTION INTRAVENOUS at 11:52

## 2022-08-24 RX ADMIN — IMATINIB MESYLATE 400 MG: 400 TABLET, FILM COATED ORAL at 21:05

## 2022-08-24 RX ADMIN — Medication 5 ML: at 11:55

## 2022-08-24 RX ADMIN — ACYCLOVIR 400 MG: 400 TABLET ORAL at 21:05

## 2022-08-24 RX ADMIN — SODIUM CHLORIDE, PRESERVATIVE FREE: 5 INJECTION INTRAVENOUS at 13:27

## 2022-08-24 RX ADMIN — AMLODIPINE BESYLATE 10 MG: 10 TABLET ORAL at 07:53

## 2022-08-24 RX ADMIN — PREDNISONE 140 MG: 20 TABLET ORAL at 07:53

## 2022-08-24 RX ADMIN — PANTOPRAZOLE SODIUM 40 MG: 40 TABLET, DELAYED RELEASE ORAL at 07:53

## 2022-08-24 RX ADMIN — ACYCLOVIR 400 MG: 400 TABLET ORAL at 07:53

## 2022-08-24 ASSESSMENT — ACTIVITIES OF DAILY LIVING (ADL)
ADLS_ACUITY_SCORE: 22

## 2022-08-24 NOTE — PROGRESS NOTES
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 Pre-phase steroids initiated (8/21-8/24) and WBC is trending down slowly. Plan to initiate GRAALL + Imatinib on 8/24.     TODAY:   - Start GRAALL + Imatinib today   - s/p final dose of pre-phase Prednisone 140 mg daily. Start protocol-directed Dex tomorrow.   - WBC trending down slowly (56K ?31K); steroids and chemo as above  - TLS/DIC labs q8h; continue IVF and Allopurinol   - Plan for LP with IT chemo tomorrow 8/25. Will consult CAPS team.   - Recheck platelet count tonight and in AM, increase Plt threshold to maintain Plt >50K.   - BM yesterday though increase Senna to 1-2 tabs BID with starting chemotherapy       HEME  # Ph+ B-ALL  # Leukocytosis  Patient presented to outside hospital with c/o Right upper quadrant pain and was subsequently found to have a markedly elevated white blood cell count concerning for acute leukemia. While at OSH, she had CT PE protocol as well as CT A/P. These identified no PE, no acute or localized intraabdominal inflammatory process, mild splenomegaly, and few inconspicous low-density structures in the liver. She was transferred to [facility] for further workup and management. Labs upon presentation showed WBC 109K (ANC 15.3, 85% other cells), Hgb 13.2, Plt 73.  A uric acid level of 5.9, alkaline phosphatase of 131, AST 73, INR 1.19. Elevated uric acid may be secondary to tumor lysis, while elevated liver enzymes may be secondary to leukemic involvement.    - Hydrea initiated 1g TID (8/21-8/23); now discontinued - WBC trending down slowly (100K ?85K ? 56K ? 31K) as of 8/24.   - EKG shows nl sinus rhythm, QTc 440.  ECHO with EF 65-70%.   - PICC line placed, plan to remove at discharge  - CT CAP completed at OSH. CT neck 8/22 showed multiple, b/l suspicious-appearing LN (R level Ia, L level III, R level II-III) - not enlarged though they exhibit abnormal shapes.   - MR brain (8/21) - no acute IC pathology, no e/o intracranial CNS involvement.  - BMBx completed 8/20 - demonstrates B-ALL with 85% blasts and hypercellular marrow. IHC shows CD10+, CD34+. 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 Chromosomes - pending.   - Pt consented to HMTB; BMBx at dx was dry tap d/t WBC burden. Will send peripheral blood samples on 8/22.   - CBC/TLS/DIC labs three times daily  - Initiated pre-phase steroids with Prednisone 60 mg x8/21; increased to 60 mg/m2 (140 mg) 8/22-8/24. Transition to protocol-directed Dex on 8/25.   - Message sent to BMT intake team on 8/23.   - Plan to arrange for Clonoseq ID testing to help estimate MRD in future assessments. Clonal testing collected and sent on 8/23. Plan to order Clonoseq MRD testing with post-induction BMBx.   - Case discussed at Heme malignancy conference on 8/23, per consensus plan is to start GRAALL + Imatinib on 8/24. No clear indication for addition of Rituximab.    - Plan for LP with IT chemo later this week (Th,F). Of note, if CNS is detected, Dr. Hull recommends starting Dasatinib 140 mg daily in place of Imatinib.   - Plan to consult CAPS team  - Pt expresses anxiety surrounding procedures - would recommend pre-medication with Ativan +/- Atarax prior.  - Will check Plt evening prior to procedure and recheck in AM; transfuse to maintain Plt >50K starting evening prior to procedure.     Treatment Plan: GRAALL + Imatinib (D1=8/24/22)  - Imatinib 400 mg BID - D1-28  - Vincristine 2 mg IV x1 - D1, 8, 15, 22  - Dexamethasone 40 mg daily - D2 (holding D1, instead given pre-phase Pred as above), 8-9, 15-16, 22-23  - Triple IT chemo (Cytarabine, Solu-Cortef,  MTX); D1, 8, 15  - Neupogen - D15    # Risk of TLS/DIC  Uric acid was mildly elevated to 5.9 and LDH 1,943 on admission. Otherwise TLS labs wnl including Cr ~0.6-0.7. INR mildly elevated to 1.2, Fibrinogen 400-500.   - Started Allopurinol 300 mg daily x8/19  - IVF at 100 ml/h (8/21-8/23) ? decreased to 50 ml/h x8/23, can increase as appropriate once chemo is initiated   - Monitor TLS/DIC labs q8h    # Pancytopenia  2/2 to underlying disease  - Transfuse to maintain Hgb >7, Plt >50K prior to LP procedure     ID  # PPx  - Viral serologies sent - HepB/C, HIV, CMV, HSV2 negative. HSV1+, EBV IgG+ (649).   - ACV 400mg BID  - Started Levaquin 250 mg daily x8/21  - Started Maude 50 mg daily x8/21 in anticipation of upcoming chemo; will need to look into coverage for Posa/Vori in coming days.      # H/o COVID19   Not vaccinated (due to fear of needles per her report). Pt reports testing postiive for COVID approx 1 month ago via home test. She never had it confirmed.   - COVID testing on admission was negative.     CV  # Hypertension  Fairly persistent elevated BP since admission to 150-160/80-90s. She was not on anti-hypertensive medication PTA.   - Start Amlodipine 5 mg daily (8/21-8/23); increased to 10 mg daily x8/23  - prn Hydralazine 10-20 mg available q6h for SBP>160     GI  # Intermittent constipation  - Senna 1-2 tabs BID, Miralax prn     NEURO  # Headaches  Pt endorses intermittent HA since admission. Denies vision changes or other neurological sx. HTN as above.   - MRI brain completed 8/21 was negative for IC pathology or tumor involvement.   - Tylenol prn   - LP per protocol as above     MISC  # Intermittent hypophosphatemia  - Replete prn standing protocol     FEN  - IVFs with NS @ 50 ml/hr  - lyte repletion per protocol, monitor closely given risk for TLS  - regular diet as tolerated     Lines/Drains: PICC placed, remove on discharge  DVT ppx: defer d/t TCP  GI ppx: PPI  Consults: PT  CODE: FULL  DISPO:  Anticipate prolonged 4-6 week hospitalization for management of new leukemia.   Follow-up/Referrals: TBD. Dr. Hull was on service when pt was admitted.   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 Hull.    Sri Poole PA-C  Hematology/Oncology  Pager: [phone]    >50 minutes spent on the date of the encounter. Over 50% of time was spent counseling the patient and/or coordinating care.     Interval History   AVSS. WBC continues trending down slowly. Remains mildly hypertensive overnight though this is improving slowly since the addition of Amlodipine. BM yesterday.     April reports feeling tired this morning otherwise denies specific complaints. No SOB, abdominal pain or swelling. She does notice some petechiae on her ankles. No other bleeding noted. Discussed plan to start chemo today and complete LP procedure either tomorrow or Friday. She is hoping  can be present for that. Has been a little on the constipated side since admission though did have a BM yesterday. Agreeable to escalating Senna today. Fair PO intake.     A comprehensive review of systems was obtained and is negative other than noted here or in the HPI.     Physical Exam   Temp: 96.8  F (36  C) Temp src: Oral BP: (!) 142/82 Pulse: 68   Resp: 18 SpO2: 98 % O2 Device: None (Room air)    Vitals:    08/22/22 0837 08/23/22 0822 08/24/22 0848   Weight: 114 kg (251 lb 4.8 oz) 113 kg (249 lb 3.2 oz) 112.9 kg (248 lb 14.4 oz)     Vital Signs with Ranges  Temp:  [96.6  F (35.9  C)-98  F (36.7  C)] 96.8  F (36  C)  Pulse:  [68-80] 68  Resp:  [16-19] 18  BP: (137-164)/(72-87) 142/82  SpO2:  [95 %-98 %] 98 %  I/O last 3 completed shifts:  In: 1191.67 [I.V.:1191.67]  Out: 1700 [Urine:1700]    General: Awake and interactive.  No acute distress. Pleasant female seen sitting upright comfortably in bed.    Skin: Petechiae to b/l ankles. Skin is otherwise warm, dry, and intact without rashes or lesions.   Head: Normocephalic and atraumatic.   HEENT: PERRLA. Sclera non-icteric. EOM intact. Oral mucosa is pink and moist with no lesions, thrush, or exudates.   Lymph: Neck supple.   Cardiac: Regular rate and rhythm. Normal S1 and S2. No murmurs, rubs, or gallops.   Respiratory: No signs of respiratory distress or accessory muscle use. Lungs CTAB. No wheezes or crackles.   Abd: Soft, symmetric, and non-tender without distension. BS present.   Extremities: Trace b/l LE edema.   Neuro: A&Ox3 with normal speech. Memory and thought process preserved. No focal deficits. Moving extremities equally in bed, good ROM.   Psych: Appropriate mood and affect.     Medications     - MEDICATION INSTRUCTIONS -       sodium chloride 50 mL/hr at 08/23/22 1820       acyclovir  400 mg Oral BID     allopurinol  300 mg Oral Daily     amLODIPine  10 mg Oral Daily     levofloxacin  250 mg Oral Q24H     micafungin  50 mg Intravenous Q24H     pantoprazole  40 mg Oral QAM AC     predniSONE  60 mg/m2 Oral Daily     Data   Results for orders placed or performed during the hospital encounter of 08/19/22 (from the past 24 hour(s))   CBC with platelets differential    Narrative    The following orders were created for panel order CBC with platelets differential.  Procedure                               Abnormality         Status                     ---------                               -----------         ------                     CBC with platelets and d...[[id number]]  Abnormal            Final result               Manual Differential[[id number]]          Abnormal            Final result                 Please view results for these tests on the individual orders.   Fibrinogen activity   Result Value Ref Range    Fibrinogen Activity 402 170 - 490 mg/dL   INR   Result Value  "Ref Range    INR 1.08 0.85 - 1.15   Phosphorus   Result Value Ref Range    Phosphorus 3.2 2.5 - 4.5 mg/dL   Uric acid   Result Value Ref Range    Uric Acid 2.5 2.4 - 5.7 mg/dL   Basic metabolic panel   Result Value Ref Range    Creatinine 0.71 0.51 - 0.95 mg/dL    Sodium 138 136 - 145 mmol/L    Potassium 4.0 3.4 - 5.3 mmol/L    Urea Nitrogen 13.3 6.0 - 20.0 mg/dL    Chloride 103 98 - 107 mmol/L    Carbon Dioxide (CO2) 25 22 - 29 mmol/L    Anion Gap 10 7 - 15 mmol/L    Glucose 213 (H) 70 - 99 mg/dL    GFR Estimate >90 >60 mL/min/1.73m2    Calcium 9.0 8.6 - 10.0 mg/dL   CBC with platelets and differential   Result Value Ref Range    WBC Count 25.8 (H) 4.0 - 11.0 10e3/uL    RBC Count 4.54 3.80 - 5.20 10e6/uL    Hemoglobin 13.3 11.7 - 15.7 g/dL    Hematocrit 39.6 35.0 - 47.0 %    MCV 87 78 - 100 fL    MCH 29.3 26.5 - 33.0 pg    MCHC 33.6 31.5 - 36.5 g/dL    RDW 14.6 10.0 - 15.0 %    Platelet Count 36 (LL) 150 - 450 10e3/uL    Narrative    Previously reported component [ NRBCs ] is no longer reported.\"  Previously reported component [ NRBCs Absolute ] is no longer reported.\"   Manual Differential   Result Value Ref Range    % Neutrophils 25 %    % Lymphocytes 23 %    % Monocytes 2 %    % Eosinophils 3 %    % Basophils 0 %    % Blasts 47 %    Absolute Neutrophils 6.5 1.6 - 8.3 10e3/uL    Absolute Lymphocytes 5.9 (H) 0.8 - 5.3 10e3/uL    Absolute Monocytes 0.5 0.0 - 1.3 10e3/uL    Absolute Eosinophils 0.8 (H) 0.0 - 0.7 10e3/uL    Absolute Basophils 0.0 0.0 - 0.2 10e3/uL    Absolute Blasts 12.1 (H) <=0.0 10e3/uL    RBC Morphology Confirmed RBC Indices     Platelet Assessment  Automated Count Confirmed. Platelet morphology is normal.     Automated Count Confirmed. Platelet morphology is normal.   CBC with platelets differential    Narrative    The following orders were created for panel order CBC with platelets differential.  Procedure                               Abnormality         Status                     ---------     " "                          -----------         ------                     CBC with platelets and d...[[id number]]  Abnormal            Final result               Manual Differential[[id number]]          Abnormal            Final result                 Please view results for these tests on the individual orders.   Fibrinogen activity   Result Value Ref Range    Fibrinogen Activity 372 170 - 490 mg/dL   INR   Result Value Ref Range    INR 1.17 (H) 0.85 - 1.15   Phosphorus   Result Value Ref Range    Phosphorus 3.8 2.5 - 4.5 mg/dL   Uric acid   Result Value Ref Range    Uric Acid 2.6 2.4 - 5.7 mg/dL   Basic metabolic panel   Result Value Ref Range    Creatinine 0.68 0.51 - 0.95 mg/dL    Sodium 135 (L) 136 - 145 mmol/L    Potassium 4.1 3.4 - 5.3 mmol/L    Urea Nitrogen 14.8 6.0 - 20.0 mg/dL    Chloride 102 98 - 107 mmol/L    Carbon Dioxide (CO2) 24 22 - 29 mmol/L    Anion Gap 9 7 - 15 mmol/L    Glucose 182 (H) 70 - 99 mg/dL    GFR Estimate >90 >60 mL/min/1.73m2    Calcium 8.8 8.6 - 10.0 mg/dL   CBC with platelets and differential   Result Value Ref Range    WBC Count 28.7 (H) 4.0 - 11.0 10e3/uL    RBC Count 4.45 3.80 - 5.20 10e6/uL    Hemoglobin 12.9 11.7 - 15.7 g/dL    Hematocrit 38.7 35.0 - 47.0 %    MCV 87 78 - 100 fL    MCH 29.0 26.5 - 33.0 pg    MCHC 33.3 31.5 - 36.5 g/dL    RDW 14.5 10.0 - 15.0 %    Platelet Count 40 (LL) 150 - 450 10e3/uL    Narrative    Previously reported component [ NRBCs ] is no longer reported.\"  Previously reported component [ NRBCs Absolute ] is no longer reported.\"   Manual Differential   Result Value Ref Range    % Neutrophils 29 %    % Lymphocytes 7 %    % Monocytes 3 %    % Eosinophils 0 %    % Basophils 1 %    % Blasts 60 %    NRBCs per 100 WBC 1 (H) <=0 %    Absolute Neutrophils 8.3 1.6 - 8.3 10e3/uL    Absolute Lymphocytes 2.0 0.8 - 5.3 10e3/uL    Absolute Monocytes 0.9 0.0 - 1.3 10e3/uL    Absolute Eosinophils 0.0 0.0 - 0.7 10e3/uL    Absolute Basophils 0.3 (H) 0.0 - 0.2 10e3/uL    " Absolute Blasts 17.2 (H) <=0.0 10e3/uL    Absolute NRBCs 0.3 (H) <=0.0 10e3/uL    RBC Morphology Confirmed RBC Indices     Platelet Assessment  Automated Count Confirmed. Platelet morphology is normal.     Automated Count Confirmed. Platelet morphology is normal.    Bk Cells Slight (A) None Seen   CBC with platelets differential    Narrative    The following orders were created for panel order CBC with platelets differential.  Procedure                               Abnormality         Status                     ---------                               -----------         ------                     CBC with platelets and d...[[id number]]  Abnormal            Preliminary result           Please view results for these tests on the individual orders.   Lactate Dehydrogenase   Result Value Ref Range    Lactate Dehydrogenase 1,439 (H) 0 - 250 U/L   Fibrinogen activity   Result Value Ref Range    Fibrinogen Activity 356 170 - 490 mg/dL   INR   Result Value Ref Range    INR 1.13 0.85 - 1.15   Phosphorus   Result Value Ref Range    Phosphorus 4.1 2.5 - 4.5 mg/dL   Uric acid   Result Value Ref Range    Uric Acid 3.2 2.4 - 5.7 mg/dL   Basic metabolic panel   Result Value Ref Range    Creatinine 0.62 0.51 - 0.95 mg/dL    Sodium 137 136 - 145 mmol/L    Potassium 3.9 3.4 - 5.3 mmol/L    Urea Nitrogen 16.4 6.0 - 20.0 mg/dL    Chloride 103 98 - 107 mmol/L    Carbon Dioxide (CO2) 25 22 - 29 mmol/L    Anion Gap 9 7 - 15 mmol/L    Glucose 94 70 - 99 mg/dL    GFR Estimate >90 >60 mL/min/1.73m2    Calcium 8.7 8.6 - 10.0 mg/dL   Hepatic panel   Result Value Ref Range    Protein Total 6.7 6.4 - 8.3 g/dL    Albumin 3.8 3.5 - 5.2 g/dL    Bilirubin Total 0.4 <=1.2 mg/dL    Alkaline Phosphatase 81 35 - 104 U/L    AST 43 (H) 10 - 35 U/L    ALT 36 (H) 10 - 35 U/L    Bilirubin Direct <0.20 0.00 - 0.30 mg/dL   CBC with platelets and differential   Result Value Ref Range    WBC Count 31.1 (H) 4.0 - 11.0 10e3/uL    RBC Count 4.20 3.80 - 5.20  10e6/uL    Hemoglobin 12.0 11.7 - 15.7 g/dL    Hematocrit 36.7 35.0 - 47.0 %    MCV 87 78 - 100 fL    MCH 28.6 26.5 - 33.0 pg    MCHC 32.7 31.5 - 36.5 g/dL    RDW 14.6 10.0 - 15.0 %    Platelet Count 38 (LL) 150 - 450 10e3/uL    NRBCs per 100 WBC 0 <1 /100    Absolute NRBCs 0.1 10e3/uL

## 2022-08-24 NOTE — PLAN OF CARE
Goal Outcome Evaluation:  April has been afe and other vvs.  + Petrohit GRIFFIN with plt count of 38K  Plan for IT Chemo tomorrow and Sri REZA will add plt transfusion orders for this evening to bump plt count to 50K.  Plan to start D1 C1 GRAALL this evening.  MD have started chemo teaching and provided pt with written material.  No s/s of TLS with Prednisone and labs checked every 8 hr  PICC site CDI.

## 2022-08-24 NOTE — PLAN OF CARE
Pt A&Ox4, calls appropriately. Pt is on RA, no PRNs given overnight, pt denies pain, N/V/D. Independent in room overnight.    Problem: Plan of Care - These are the overarching goals to be used throughout the patient stay.    Goal: Plan of Care Review/Shift Note  Description: The Plan of Care Review/Shift note should be completed every shift.  The Outcome Evaluation is a brief statement about your assessment that the patient is improving, declining, or no change.  This information will be displayed automatically on your shift note.  Outcome: Ongoing, Progressing  Flowsheets (Taken 8/24/2022 0205)  Plan of Care Reviewed With: patient  Outcome Evaluation: Pt denies pain overnight, able to rest comfortably.  Overall Patient Progress: no change   Goal Outcome Evaluation:    Plan of Care Reviewed With: patient     Overall Patient Progress: no change    Outcome Evaluation: Pt denies pain overnight, able to rest comfortably.

## 2022-08-24 NOTE — PROGRESS NOTES
[facility] consult for IT chemo LP; spoke to Shanti slater for 11am Thurs; please note that CAPS team recommends the following goals for Lumbar puncture:  -- INR <1.5  -- Platelets >50k  -- stop prophylactic dose enoxaparin / heparin for 8 to 12 hrs     Please page CAPS or vocera if any questions    Josef Graham MD

## 2022-08-24 NOTE — PLAN OF CARE
[id number]:    Mildly hypertensive within parameters. OVSS on RA. Independent. Denies nausea, SOB and pain. Pt explains how she is doing ok and today was a lot to take in. DL PICC, purple infusing NS @ 50ml/hr and red is heparin locked. BMBx dressing fell off in shower per pt, site is CDI. Voiding spontaneously, LBM 8/20, pt would like to try senna and/or miralax in the morning. PRN melatonin given. Continue with poc.

## 2022-08-25 LAB
A*: NORMAL
A*LOCUS SEROLOGIC EQUIVALENT: 2
A*LOCUS: NORMAL
A*SEROLOGIC EQUIVALENT: 32
ABTEST METHOD: NORMAL
ALBUMIN SERPL BCG-MCNC: 3.8 G/DL (ref 3.5–5.2)
ALP SERPL-CCNC: 71 U/L (ref 35–104)
ALT SERPL W P-5'-P-CCNC: 56 U/L (ref 10–35)
ANION GAP SERPL CALCULATED.3IONS-SCNC: 15 MMOL/L (ref 7–15)
ANION GAP SERPL CALCULATED.3IONS-SCNC: 9 MMOL/L (ref 7–15)
AST SERPL W P-5'-P-CCNC: 46 U/L (ref 10–35)
B*: NORMAL
B*LOCUS SEROLOGIC EQUIVALENT: 62
B*LOCUS: NORMAL
B*SEROLOGIC EQUIVALENT: 3901
BASOPHILS # BLD MANUAL: 0 10E3/UL (ref 0–0.2)
BASOPHILS # BLD MANUAL: 0.2 10E3/UL (ref 0–0.2)
BASOPHILS NFR BLD MANUAL: 0 %
BASOPHILS NFR BLD MANUAL: 1 %
BILIRUB DIRECT SERPL-MCNC: <0.2 MG/DL (ref 0–0.3)
BILIRUB SERPL-MCNC: 0.5 MG/DL
BLASTS # BLD MANUAL: 6.1 10E3/UL
BLASTS # BLD MANUAL: 8 10E3/UL
BLASTS NFR BLD MANUAL: 48 %
BLASTS NFR BLD MANUAL: 50 %
BLD PROD TYP BPU: NORMAL
BLD PROD TYP BPU: NORMAL
BLOOD COMPONENT TYPE: NORMAL
BLOOD COMPONENT TYPE: NORMAL
BUN SERPL-MCNC: 17 MG/DL (ref 6–20)
BUN SERPL-MCNC: 19.1 MG/DL (ref 6–20)
BW-1: NORMAL
C*: NORMAL
C*LOCUS SEROLOGIC EQUIVALENT: 9
C*LOCUS: NORMAL
C*SEROLOGIC EQUIVALENT: 7
CALCIUM SERPL-MCNC: 8.5 MG/DL (ref 8.6–10)
CALCIUM SERPL-MCNC: 9.1 MG/DL (ref 8.6–10)
CHLORIDE SERPL-SCNC: 101 MMOL/L (ref 98–107)
CHLORIDE SERPL-SCNC: 96 MMOL/L (ref 98–107)
CODING SYSTEM: NORMAL
CODING SYSTEM: NORMAL
CREAT SERPL-MCNC: 0.63 MG/DL (ref 0.51–0.95)
CREAT SERPL-MCNC: 0.77 MG/DL (ref 0.51–0.95)
CULTURE HARVEST COMPLETE DATE: NORMAL
DEPRECATED HCO3 PLAS-SCNC: 23 MMOL/L (ref 22–29)
DEPRECATED HCO3 PLAS-SCNC: 25 MMOL/L (ref 22–29)
DPA1*: NORMAL
DPB1*: NORMAL
DQA1*: NORMAL
DQA1*LOCUS: NORMAL
DQB1*: NORMAL
DQB1*LOCUS SEROLOGIC EQUIVALENT: 7
DQB1*LOCUS: NORMAL
DQB1*SEROLOGIC EQUIVALENT: 4
DRB1*: NORMAL
DRB1*LOCUS SEROLOGIC EQUIVALENT: 8
DRB1*LOCUS: NORMAL
DRB1*SEROLOGIC EQUIVALENT: 11
DRB3*LOCUS SEROLOGIC EQUIVALENT: 52
DRB3*LOCUS: NORMAL
DRSSO TEST METHOD: NORMAL
EOSINOPHIL # BLD MANUAL: 0 10E3/UL (ref 0–0.7)
EOSINOPHIL # BLD MANUAL: 0 10E3/UL (ref 0–0.7)
EOSINOPHIL NFR BLD MANUAL: 0 %
EOSINOPHIL NFR BLD MANUAL: 0 %
ERYTHROCYTE [DISTWIDTH] IN BLOOD BY AUTOMATED COUNT: 14.1 % (ref 10–15)
ERYTHROCYTE [DISTWIDTH] IN BLOOD BY AUTOMATED COUNT: 14.1 % (ref 10–15)
FIBRINOGEN PPP-MCNC: 323 MG/DL (ref 170–490)
FIBRINOGEN PPP-MCNC: 354 MG/DL (ref 170–490)
GFR SERPL CREATININE-BSD FRML MDRD: >90 ML/MIN/1.73M2
GFR SERPL CREATININE-BSD FRML MDRD: >90 ML/MIN/1.73M2
GLUCOSE SERPL-MCNC: 308 MG/DL (ref 70–99)
GLUCOSE SERPL-MCNC: 87 MG/DL (ref 70–99)
HCT VFR BLD AUTO: 37.8 % (ref 35–47)
HCT VFR BLD AUTO: 38.4 % (ref 35–47)
HGB BLD-MCNC: 12.5 G/DL (ref 11.7–15.7)
HGB BLD-MCNC: 13 G/DL (ref 11.7–15.7)
INR PPP: 1.07 (ref 0.85–1.15)
INR PPP: 1.09 (ref 0.85–1.15)
ISSUE DATE AND TIME: NORMAL
ISSUE DATE AND TIME: NORMAL
LDH SERPL L TO P-CCNC: 997 U/L (ref 0–250)
LYMPHOCYTES # BLD MANUAL: 3.8 10E3/UL (ref 0.8–5.3)
LYMPHOCYTES # BLD MANUAL: 4.1 10E3/UL (ref 0.8–5.3)
LYMPHOCYTES NFR BLD MANUAL: 26 %
LYMPHOCYTES NFR BLD MANUAL: 30 %
MCH RBC QN AUTO: 28.5 PG (ref 26.5–33)
MCH RBC QN AUTO: 29.1 PG (ref 26.5–33)
MCHC RBC AUTO-ENTMCNC: 33.1 G/DL (ref 31.5–36.5)
MCHC RBC AUTO-ENTMCNC: 33.9 G/DL (ref 31.5–36.5)
MCV RBC AUTO: 86 FL (ref 78–100)
MCV RBC AUTO: 86 FL (ref 78–100)
METAMYELOCYTES # BLD MANUAL: 0.1 10E3/UL
METAMYELOCYTES # BLD MANUAL: 0.2 10E3/UL
METAMYELOCYTES NFR BLD MANUAL: 1 %
METAMYELOCYTES NFR BLD MANUAL: 1 %
MONOCYTES # BLD MANUAL: 0.1 10E3/UL (ref 0–1.3)
MONOCYTES # BLD MANUAL: 0.2 10E3/UL (ref 0–1.3)
MONOCYTES NFR BLD MANUAL: 1 %
MONOCYTES NFR BLD MANUAL: 1 %
NEUTROPHILS # BLD MANUAL: 2.6 10E3/UL (ref 1.6–8.3)
NEUTROPHILS # BLD MANUAL: 3.3 10E3/UL (ref 1.6–8.3)
NEUTROPHILS NFR BLD MANUAL: 20 %
NEUTROPHILS NFR BLD MANUAL: 21 %
PHOSPHATE SERPL-MCNC: 5 MG/DL (ref 2.5–4.5)
PHOSPHATE SERPL-MCNC: 5 MG/DL (ref 2.5–4.5)
PLAT MORPH BLD: ABNORMAL
PLAT MORPH BLD: ABNORMAL
PLATELET # BLD AUTO: 36 10E3/UL (ref 150–450)
PLATELET # BLD AUTO: 40 10E3/UL (ref 150–450)
PLATELET # BLD AUTO: 44 10E3/UL (ref 150–450)
PLATELET # BLD AUTO: 61 10E3/UL (ref 150–450)
POTASSIUM SERPL-SCNC: 4.1 MMOL/L (ref 3.4–5.3)
POTASSIUM SERPL-SCNC: 4.3 MMOL/L (ref 3.4–5.3)
PROT SERPL-MCNC: 6.7 G/DL (ref 6.4–8.3)
RBC # BLD AUTO: 4.38 10E6/UL (ref 3.8–5.2)
RBC # BLD AUTO: 4.46 10E6/UL (ref 3.8–5.2)
RBC MORPH BLD: ABNORMAL
RBC MORPH BLD: ABNORMAL
SODIUM SERPL-SCNC: 134 MMOL/L (ref 136–145)
SODIUM SERPL-SCNC: 135 MMOL/L (ref 136–145)
UNIT ABO/RH: NORMAL
UNIT ABO/RH: NORMAL
UNIT NUMBER: NORMAL
UNIT NUMBER: NORMAL
UNIT STATUS: NORMAL
UNIT STATUS: NORMAL
UNIT TYPE ISBT: 5100
UNIT TYPE ISBT: 7300
URATE SERPL-MCNC: 3.4 MG/DL (ref 2.4–5.7)
URATE SERPL-MCNC: 3.4 MG/DL (ref 2.4–5.7)
WBC # BLD AUTO: 12.8 10E3/UL (ref 4–11)
WBC # BLD AUTO: 15.9 10E3/UL (ref 4–11)

## 2022-08-25 PROCEDURE — 82248 BILIRUBIN DIRECT: CPT | Performed by: PHYSICIAN ASSISTANT

## 2022-08-25 PROCEDURE — 82310 ASSAY OF CALCIUM: CPT | Performed by: PHYSICIAN ASSISTANT

## 2022-08-25 PROCEDURE — 85384 FIBRINOGEN ACTIVITY: CPT | Performed by: PHYSICIAN ASSISTANT

## 2022-08-25 PROCEDURE — 00JU3ZZ INSPECTION OF SPINAL CANAL, PERCUTANEOUS APPROACH: ICD-10-PCS | Performed by: PEDIATRICS

## 2022-08-25 PROCEDURE — 84550 ASSAY OF BLOOD/URIC ACID: CPT | Performed by: PHYSICIAN ASSISTANT

## 2022-08-25 PROCEDURE — 84100 ASSAY OF PHOSPHORUS: CPT | Performed by: PHYSICIAN ASSISTANT

## 2022-08-25 PROCEDURE — 85049 AUTOMATED PLATELET COUNT: CPT | Performed by: STUDENT IN AN ORGANIZED HEALTH CARE EDUCATION/TRAINING PROGRAM

## 2022-08-25 PROCEDURE — 258N000003 HC RX IP 258 OP 636: Performed by: STUDENT IN AN ORGANIZED HEALTH CARE EDUCATION/TRAINING PROGRAM

## 2022-08-25 PROCEDURE — 85610 PROTHROMBIN TIME: CPT | Performed by: PHYSICIAN ASSISTANT

## 2022-08-25 PROCEDURE — 99356 PR PROLONGED SERV,INPATIENT,1ST HR: CPT | Mod: FS | Performed by: STUDENT IN AN ORGANIZED HEALTH CARE EDUCATION/TRAINING PROGRAM

## 2022-08-25 PROCEDURE — 120N000002 HC R&B MED SURG/OB UMMC

## 2022-08-25 PROCEDURE — 85007 BL SMEAR W/DIFF WBC COUNT: CPT | Performed by: PHYSICIAN ASSISTANT

## 2022-08-25 PROCEDURE — 250N000013 HC RX MED GY IP 250 OP 250 PS 637: Performed by: STUDENT IN AN ORGANIZED HEALTH CARE EDUCATION/TRAINING PROGRAM

## 2022-08-25 PROCEDURE — 250N000013 HC RX MED GY IP 250 OP 250 PS 637: Performed by: PHYSICIAN ASSISTANT

## 2022-08-25 PROCEDURE — 36592 COLLECT BLOOD FROM PICC: CPT | Performed by: PHYSICIAN ASSISTANT

## 2022-08-25 PROCEDURE — 85027 COMPLETE CBC AUTOMATED: CPT | Performed by: PHYSICIAN ASSISTANT

## 2022-08-25 PROCEDURE — 80053 COMPREHEN METABOLIC PANEL: CPT | Performed by: PHYSICIAN ASSISTANT

## 2022-08-25 PROCEDURE — 250N000011 HC RX IP 250 OP 636: Performed by: PHYSICIAN ASSISTANT

## 2022-08-25 PROCEDURE — 250N000011 HC RX IP 250 OP 636: Performed by: STUDENT IN AN ORGANIZED HEALTH CARE EDUCATION/TRAINING PROGRAM

## 2022-08-25 PROCEDURE — 250N000013 HC RX MED GY IP 250 OP 250 PS 637: Performed by: INTERNAL MEDICINE

## 2022-08-25 PROCEDURE — 250N000011 HC RX IP 250 OP 636: Performed by: INTERNAL MEDICINE

## 2022-08-25 PROCEDURE — 83615 LACTATE (LD) (LDH) ENZYME: CPT | Performed by: PHYSICIAN ASSISTANT

## 2022-08-25 PROCEDURE — 99233 SBSQ HOSP IP/OBS HIGH 50: CPT | Mod: FS | Performed by: STUDENT IN AN ORGANIZED HEALTH CARE EDUCATION/TRAINING PROGRAM

## 2022-08-25 PROCEDURE — 86078 PHYS BLOOD BANK SERV REACTJ: CPT | Performed by: PATHOLOGY

## 2022-08-25 PROCEDURE — 36592 COLLECT BLOOD FROM PICC: CPT | Performed by: STUDENT IN AN ORGANIZED HEALTH CARE EDUCATION/TRAINING PROGRAM

## 2022-08-25 PROCEDURE — P9037 PLATE PHERES LEUKOREDU IRRAD: HCPCS | Performed by: PHYSICIAN ASSISTANT

## 2022-08-25 RX ORDER — DIPHENHYDRAMINE HCL 25 MG
25-50 CAPSULE ORAL EVERY 4 HOURS PRN
Status: DISCONTINUED | OUTPATIENT
Start: 2022-08-25 | End: 2022-09-14 | Stop reason: HOSPADM

## 2022-08-25 RX ORDER — LIDOCAINE HYDROCHLORIDE 10 MG/ML
10 INJECTION, SOLUTION EPIDURAL; INFILTRATION; INTRACAUDAL; PERINEURAL ONCE
Status: DISCONTINUED | OUTPATIENT
Start: 2022-08-25 | End: 2022-09-12 | Stop reason: CLARIF

## 2022-08-25 RX ORDER — LORAZEPAM 1 MG/1
1 TABLET ORAL
Status: COMPLETED | OUTPATIENT
Start: 2022-08-25 | End: 2022-08-26

## 2022-08-25 RX ORDER — FUROSEMIDE 10 MG/ML
20 INJECTION INTRAMUSCULAR; INTRAVENOUS ONCE
Status: COMPLETED | OUTPATIENT
Start: 2022-08-25 | End: 2022-08-25

## 2022-08-25 RX ORDER — ACETAMINOPHEN 325 MG/1
975 TABLET ORAL EVERY 6 HOURS PRN
Status: DISCONTINUED | OUTPATIENT
Start: 2022-08-25 | End: 2022-09-14 | Stop reason: HOSPADM

## 2022-08-25 RX ORDER — DIPHENHYDRAMINE HCL 25 MG
25-50 CAPSULE ORAL EVERY 6 HOURS PRN
Status: DISCONTINUED | OUTPATIENT
Start: 2022-08-25 | End: 2022-08-25

## 2022-08-25 RX ORDER — HYDROXYZINE HYDROCHLORIDE 25 MG/1
25-50 TABLET, FILM COATED ORAL
Status: DISCONTINUED | OUTPATIENT
Start: 2022-08-25 | End: 2022-09-14 | Stop reason: HOSPADM

## 2022-08-25 RX ORDER — DIPHENHYDRAMINE HYDROCHLORIDE 50 MG/ML
50 INJECTION INTRAMUSCULAR; INTRAVENOUS
Status: DISCONTINUED | OUTPATIENT
Start: 2022-08-25 | End: 2022-09-12 | Stop reason: CLARIF

## 2022-08-25 RX ADMIN — ACETAMINOPHEN 975 MG: 325 TABLET, FILM COATED ORAL at 12:45

## 2022-08-25 RX ADMIN — SENNOSIDES AND DOCUSATE SODIUM 1 TABLET: 8.6; 5 TABLET ORAL at 10:53

## 2022-08-25 RX ADMIN — DEXAMETHASONE 40 MG: 4 TABLET ORAL at 10:53

## 2022-08-25 RX ADMIN — LEVOFLOXACIN 250 MG: 250 TABLET, FILM COATED ORAL at 11:49

## 2022-08-25 RX ADMIN — Medication 5 ML: at 13:48

## 2022-08-25 RX ADMIN — ACYCLOVIR 400 MG: 400 TABLET ORAL at 20:59

## 2022-08-25 RX ADMIN — IMATINIB MESYLATE 400 MG: 400 TABLET, FILM COATED ORAL at 10:54

## 2022-08-25 RX ADMIN — FUROSEMIDE 20 MG: 10 INJECTION, SOLUTION INTRAVENOUS at 17:19

## 2022-08-25 RX ADMIN — SENNOSIDES AND DOCUSATE SODIUM 2 TABLET: 8.6; 5 TABLET ORAL at 20:58

## 2022-08-25 RX ADMIN — ALLOPURINOL 300 MG: 300 TABLET ORAL at 07:43

## 2022-08-25 RX ADMIN — ACYCLOVIR 400 MG: 400 TABLET ORAL at 07:43

## 2022-08-25 RX ADMIN — HYDROXYZINE HYDROCHLORIDE 50 MG: 25 TABLET ORAL at 00:16

## 2022-08-25 RX ADMIN — AMLODIPINE BESYLATE 10 MG: 10 TABLET ORAL at 07:43

## 2022-08-25 RX ADMIN — Medication 5 ML: at 17:20

## 2022-08-25 RX ADMIN — DIPHENHYDRAMINE HYDROCHLORIDE 50 MG: 25 CAPSULE ORAL at 07:10

## 2022-08-25 RX ADMIN — MICAFUNGIN 50 MG: 10 INJECTION, POWDER, LYOPHILIZED, FOR SOLUTION INTRAVENOUS at 11:48

## 2022-08-25 RX ADMIN — PANTOPRAZOLE SODIUM 40 MG: 40 TABLET, DELAYED RELEASE ORAL at 07:43

## 2022-08-25 RX ADMIN — ACETAMINOPHEN 650 MG: 325 TABLET, FILM COATED ORAL at 04:52

## 2022-08-25 RX ADMIN — IMATINIB MESYLATE 400 MG: 400 TABLET, FILM COATED ORAL at 20:59

## 2022-08-25 RX ADMIN — DIPHENHYDRAMINE HYDROCHLORIDE 50 MG: 25 CAPSULE ORAL at 12:07

## 2022-08-25 ASSESSMENT — ACTIVITIES OF DAILY LIVING (ADL)
ADLS_ACUITY_SCORE: 22

## 2022-08-25 NOTE — PROGRESS NOTES
[facility]    Hematology / Oncology Progress Note    Date of Service (when I saw the patient): 2022     Assessment & Plan   Vashti Villegas is a 42 year old female who was admitted on 2022 with hyperleukocytosis (WBC 109K), abdominal pain and 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 Peripheral flow cytometry, BMBx () c/w Ph+ B-ALL. Pre-phase steroids initiated (-) and WBC is trending down slowly. Plan to initiate GRAALL + Imatinib on .     TODAY:   - D2 GRAALL + Imatinib, Dex 40 mg   - Following attempt with CAPS team, unable to perform LP with IT chemo today at bedside. XR consult placed for procedure  as chemo unfortunately  prior to being able to add-on today (d/t need for multiple Plt tx prior).   - 2u Plt prior to procedure, tolerated well with pre-meds  - prn Ativan/Atarax available prior as pre-meds  - Repeat Plt count overnight - ordered for 0200; transfuse to maintain Plt >50K  - Lasix 20 mg IV x1 d/t multiple blood products today and new peripheral edema to hands/feet  - Discontinue continuous IVF, bolus prn   - Phos trending up (5.0) otherwise TLS labs stable, follow BID.Continue IVF. Start Phos if trending up further.  - Will complete insurance paperwork per pt request      HEME  # Ph+ B-ALL  # Leukocytosis, improving  Patient presented to outside hospital with c/o Right upper quadrant pain and was subsequently found to have a markedly elevated white blood cell count concerning for acute leukemia. While at OSH, she had CT PE protocol as well as CT A/P. These identified no PE, no acute or localized intraabdominal inflammatory process, mild splenomegaly, and few inconspicous low-density structures in the liver. She was transferred to [facility] for further workup and management. Labs upon presentation showed WBC 109K (ANC 15.3, 85% other cells), Hgb 13.2, Plt 73.  A uric acid level of 5.9, alkaline phosphatase of 131, AST 73, INR 1.19.  Elevated uric acid may be secondary to tumor lysis, while elevated liver enzymes may be secondary to leukemic involvement.    - s/p Hydrea 1g TID (8/21-8/23) with improvement in WBC (100K ? 12K)  - EKG shows nl sinus rhythm, QTc 440. ECHO with EF 65-70%.   - PICC line placed, plan to remove at discharge  - CT CAP completed at OSH. CT neck 8/22 showed multiple, b/l suspicious-appearing LN (R level Ia, L level III, R level II-III) - not enlarged though they exhibit abnormal shapes.   - MR brain (8/21) - no acute IC pathology, no e/o intracranial CNS involvement.  - BMBx completed 8/20 - demonstrates B-ALL with 85% blasts and hypercellular marrow. IHC shows CD10+, CD34+. 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 Chromosomes - pending.   - Pt consented to HMTB; BMBx at dx was dry tap d/t WBC burden. Will send peripheral blood samples on 8/22.   - CBC/TLS/DIC labs three times daily  - Initiated pre-phase steroids with Prednisone 60 mg x8/21; increased to 60 mg/m2 (140 mg) 8/22-8/24. Transition to protocol-directed Dex 40 mg on 8/25.   - Message sent to BMT intake team on 8/23.   - Plan to arrange for Clonoseq ID testing to help estimate MRD in future assessments. Clonal testing collected and sent on 8/23. Plan to order Clonoseq MRD testing with post-induction BMBx.   - Case discussed at Heme malignancy conference on 8/23, per consensus plan is to start GRAALL + Imatinib on 8/24. No clear indication for addition of Rituximab.    - Unsuccessful attempt at LP with IT chemo on 8/25 with CAPS team at bedside. XR consult placed for 8/26. Will need future LPs scheduled in XR. \  - Of note, if CNS is detected, Dr. Hull recommends starting Dasatinib 140 mg daily in place of Imatinib.   - Pt expresses anxiety surrounding procedures - pre-medication with Ativan +/- Atarax available prior prn.   - s/p 2u Plt prior to procedure     Treatment Plan: GRAALL + Imatinib (D1=8/24/22)  - Imatinib 400  mg BID - D1-28  - Vincristine 2 mg IV x1 - D1, 8, 15, 22  - Dexamethasone 40 mg daily - D2 (holding D1, instead given pre-phase Pred as above), 8-9, 15-16, 22-23  - Triple IT chemo (Cytarabine, Solu-Cortef, MTX); D1, 8, 15  - Neupogen - D15    # Risk of TLS/DIC  # Hyperphosphatemia  Uric acid was mildly elevated to 5.9 and LDH 1,943 on admission. Otherwise TLS labs wnl including Cr ~0.6-0.7. INR mildly elevated to 1.2, Fibrinogen 400-500. Phos mildly trending up after starting chemo ? 5.0 on 8/25.   - Started Allopurinol 300 mg daily x8/19  - IVF at 100 ml/h (8/21-8/23) ? decreased to 50 ml/h x8/23, can increase as appropriate once chemo is initiated   - Monitor TLS/DIC labs q12h   - Consider starting Phos-lo if Phos trends up further >5.0    # Pancytopenia  2/2 to underlying disease  - Transfuse to maintain Hgb >7, Plt >50K prior to LP procedure      ID  # PPx  - Viral serologies sent - HepB/C, HIV, CMV, HSV2 negative. HSV1+, EBV IgG+ (649).   - ACV 400mg BID  - Started Levaquin 250 mg daily x8/21  - Started Maude 50 mg daily x8/21 in anticipation of upcoming chemo; will need to look into coverage for Posa/Vori in coming days.      # H/o COVID19   Not vaccinated (due to fear of needles per her report). Pt reports testing postiive for COVID approx 1 month ago via home test. She never had it confirmed.   - COVID testing on admission was negative.     CV  # Hypertension  Fairly persistent elevated BP since admission to 150-160/80-90s. She was not on anti-hypertensive medication PTA.   - Start Amlodipine 5 mg daily (8/21-8/23); increased to 10 mg daily x8/23  - prn Hydralazine 10-20 mg available q6h for SBP>160     GI  # Intermittent constipation  - Senna 1-2 tabs BID, Miralax prn     NEURO  # Headaches, stable  Pt endorses intermittent HA since admission. Denies vision changes or other neurological sx. HTN as above.   - MRI brain completed 8/21 was negative for IC pathology or tumor involvement.   - Tylenol prn   - LP  per protocol as above     MISC  # Intermittent hypophosphatemia  - Replete prn standing protocol     FEN  - Continuous IVF now discontinued, bolus prn   - lyte repletion per protocol, monitor closely given risk for TLS  - regular diet as tolerated     Lines/Drains: PICC placed, remove on discharge  DVT ppx: defer d/t TCP  GI ppx: PPI  Consults: PT  CODE: FULL  DISPO: Anticipate prolonged 4-6 week hospitalization for management of new leukemia.   Follow-up/Referrals: TBD. Dr. Hull was on service when pt was admitted.   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 Hull.    Sri Poole PA-C  Hematology/Oncology  Pager: [phone]    >50 minutes spent on the date of the encounter. Over 50% of time was spent counseling the patient and/or coordinating care.     Interval History   Nursing notes reviewed. Pt received chemotherapy and tolerated well. Also received platelet transfusion in preparation for LP procedure today for which she developed skin reaction with hives. Transfusion was stopped and she was given Benadryl, Tylenol and Atarax which helped. Reaction work-up was sent.     This morning pt is due for additional Plt transfusion prior to LP. Pre-meds were given and transfusion rate was slowed. She is tolerating well without reaction. She reports feeling very drowsy this morning since getting pre-med Benadryl, otherwise denies specific complaints and is feeling well. Anxious for her LP though  is planning to come in and she is glad she is sleepy this morning for that. No new complaints.     A comprehensive review of systems was obtained and is negative other than noted here or in the HPI.     Physical Exam   Temp: 98  F (36.7  C) Temp src: Oral BP: 128/83 Pulse: 80   Resp: 16  SpO2: 98 % O2 Device: None (Room air)    Vitals:    08/22/22 0837 08/23/22 0822 08/24/22 0848   Weight: 114 kg (251 lb 4.8 oz) 113 kg (249 lb 3.2 oz) 112.9 kg (248 lb 14.4 oz)     Vital Signs with Ranges  Temp:  [95.9  F (35.5  C)-98  F (36.7  C)] 98  F (36.7  C)  Pulse:  [66-87] 80  Resp:  [16-18] 16  BP: (119-160)/(68-94) 128/83  SpO2:  [96 %-98 %] 98 %  I/O last 3 completed shifts:  In: 820 [P.O.:240; I.V.:410]  Out: 2800 [Urine:2800]    General: Awake and interactive though sleepier today after receiving transfusion pre-meds. No acute distress. Pleasant female seen resting comfortably in bed.    Skin: Petechiae to b/l ankles. Skin is otherwise warm, dry, and intact without rashes or lesions.   Head: Normocephalic and atraumatic.   HEENT: PERRLA. Sclera non-icteric. EOM intact. Oral mucosa is pink and moist with no lesions, thrush, or exudates.   Lymph: Neck supple.   Cardiac: Regular rate and rhythm. Normal S1 and S2. No murmurs, rubs, or gallops.   Respiratory: No signs of respiratory distress or accessory muscle use. Lungs CTAB. No wheezes or crackles.   Abd: Soft, symmetric, and non-tender without distension. BS present.   Extremities: Trace b/l LE edema.   Neuro: A&Ox3 with normal speech. Memory and thought process preserved. No focal deficits. Moving extremities equally in bed, good ROM.   Psych: Appropriate mood and affect.     Medications     - MEDICATION INSTRUCTIONS -       sodium chloride 50 mL/hr at 08/25/22 0100       acyclovir  400 mg Oral BID     allopurinol  300 mg Oral Daily     amLODIPine  10 mg Oral Daily     INTRATHECAL - Cytarabine and/or methotrexate and/or Hydrocortisone   Intrathecal Once     dexamethasone  40 mg Oral Daily     heparin lock flush  5-20 mL Intracatheter Q24H     imatinib  400 mg Oral BID     levofloxacin  250 mg Oral Q24H     micafungin  50 mg Intravenous Q24H     pantoprazole  40 mg Oral QAM AC     senna-docusate  1-2 tablet Oral BID     sodium chloride (PF)  10-40 mL  Intracatheter Q8H     Data   Results for orders placed or performed during the hospital encounter of 08/19/22 (from the past 24 hour(s))   CBC with platelets differential    Narrative    The following orders were created for panel order CBC with platelets differential.  Procedure                               Abnormality         Status                     ---------                               -----------         ------                     CBC with platelets and d...[[id number]]  Abnormal            Final result                 Please view results for these tests on the individual orders.   Fibrinogen activity   Result Value Ref Range    Fibrinogen Activity 405 170 - 490 mg/dL   INR   Result Value Ref Range    INR 1.13 0.85 - 1.15   Phosphorus   Result Value Ref Range    Phosphorus 3.5 2.5 - 4.5 mg/dL   Uric acid   Result Value Ref Range    Uric Acid 2.6 2.4 - 5.7 mg/dL   Basic metabolic panel   Result Value Ref Range    Creatinine 0.63 0.51 - 0.95 mg/dL    Sodium 137 136 - 145 mmol/L    Potassium 4.2 3.4 - 5.3 mmol/L    Urea Nitrogen 16.5 6.0 - 20.0 mg/dL    Chloride 102 98 - 107 mmol/L    Carbon Dioxide (CO2) 23 22 - 29 mmol/L    Anion Gap 12 7 - 15 mmol/L    Glucose 162 (H) 70 - 99 mg/dL    GFR Estimate >90 >60 mL/min/1.73m2    Calcium 9.0 8.6 - 10.0 mg/dL   CBC with platelets and differential   Result Value Ref Range    WBC Count 15.1 (H) 4.0 - 11.0 10e3/uL    RBC Count 4.68 3.80 - 5.20 10e6/uL    Hemoglobin 13.5 11.7 - 15.7 g/dL    Hematocrit 40.6 35.0 - 47.0 %    MCV 87 78 - 100 fL    MCH 28.8 26.5 - 33.0 pg    MCHC 33.3 31.5 - 36.5 g/dL    RDW 14.4 10.0 - 15.0 %    Platelet Count 31 (LL) 150 - 450 10e3/uL    NRBCs per 100 WBC 0 <1 /100    Absolute NRBCs 0.1 10e3/uL   CONDITIONAL Prepare pheresed platelets (unit)   Result Value Ref Range    Blood Component Type Platelets     Product Code T9027L56     Unit Status Transfused     Unit Number [id number]     CODING SYSTEM BNSM167     ISSUE DATE AND TIME  "[id number]     UNIT ABO/RH O+     UNIT TYPE ISBT 5100    CBC with platelets differential    Narrative    The following orders were created for panel order CBC with platelets differential.  Procedure                               Abnormality         Status                     ---------                               -----------         ------                     CBC with platelets and d...[[id number]]  Abnormal            Final result               Manual Differential[[id number]]          Abnormal            Final result                 Please view results for these tests on the individual orders.   Fibrinogen activity   Result Value Ref Range    Fibrinogen Activity 356 170 - 490 mg/dL   INR   Result Value Ref Range    INR 1.07 0.85 - 1.15   Phosphorus   Result Value Ref Range    Phosphorus 4.2 2.5 - 4.5 mg/dL   Uric acid   Result Value Ref Range    Uric Acid 2.6 2.4 - 5.7 mg/dL   Basic metabolic panel   Result Value Ref Range    Creatinine 0.67 0.51 - 0.95 mg/dL    Sodium 138 136 - 145 mmol/L    Potassium 4.2 3.4 - 5.3 mmol/L    Urea Nitrogen 16.7 6.0 - 20.0 mg/dL    Chloride 103 98 - 107 mmol/L    Carbon Dioxide (CO2) 26 22 - 29 mmol/L    Anion Gap 9 7 - 15 mmol/L    Glucose 128 (H) 70 - 99 mg/dL    GFR Estimate >90 >60 mL/min/1.73m2    Calcium 8.9 8.6 - 10.0 mg/dL   CBC with platelets and differential   Result Value Ref Range    WBC Count 15.9 (H) 4.0 - 11.0 10e3/uL    RBC Count 4.46 3.80 - 5.20 10e6/uL    Hemoglobin 13.0 11.7 - 15.7 g/dL    Hematocrit 38.4 35.0 - 47.0 %    MCV 86 78 - 100 fL    MCH 29.1 26.5 - 33.0 pg    MCHC 33.9 31.5 - 36.5 g/dL    RDW 14.1 10.0 - 15.0 %    Platelet Count 36 (LL) 150 - 450 10e3/uL    Narrative    Previously reported component [ NRBCs ] is no longer reported.\"  Previously reported component [ NRBCs Absolute ] is no longer reported.\"   Manual Differential   Result Value Ref Range    % Neutrophils 21 %    % Lymphocytes 26 %    % Monocytes 1 %    % Eosinophils 0 %    % Basophils 1 " %    % Metamyelocytes 1 %    % Blasts 50 %    Absolute Neutrophils 3.3 1.6 - 8.3 10e3/uL    Absolute Lymphocytes 4.1 0.8 - 5.3 10e3/uL    Absolute Monocytes 0.2 0.0 - 1.3 10e3/uL    Absolute Eosinophils 0.0 0.0 - 0.7 10e3/uL    Absolute Basophils 0.2 0.0 - 0.2 10e3/uL    Absolute Metamyelocytes 0.2 (H) <=0.0 10e3/uL    Absolute Blasts 8.0 (H) <=0.0 10e3/uL    RBC Morphology Confirmed RBC Indices     Platelet Assessment  Automated Count Confirmed. Platelet morphology is normal.     Automated Count Confirmed. Platelet morphology is normal.   Transfusion Reaction Investigation    Narrative    The following orders were created for panel order Transfusion Reaction Investigation.  Procedure                               Abnormality         Status                     ---------                               -----------         ------                     Transfusion reaction hives[[id number]]                       In process                   Please view results for these tests on the individual orders.   CONDITIONAL Prepare pheresed platelets (unit)   Result Value Ref Range    Blood Component Type Platelets     Product Code H2585P77     Unit Status Transfused     Unit Number [id number]     CODING SYSTEM [id number]     ISSUE DATE AND TIME [id number]     UNIT ABO/RH O+     UNIT TYPE ISBT 5100    CBC with platelets differential    Narrative    The following orders were created for panel order CBC with platelets differential.  Procedure                               Abnormality         Status                     ---------                               -----------         ------                     CBC with platelets and d...[[id number]]  Abnormal            Final result               Manual Differential[[id number]]          Abnormal            Final result                 Please view results for these tests on the individual orders.   Lactate Dehydrogenase   Result Value Ref Range    Lactate Dehydrogenase 997 (H) 0 - 250 U/L  "  Fibrinogen activity   Result Value Ref Range    Fibrinogen Activity 323 170 - 490 mg/dL   INR   Result Value Ref Range    INR 1.07 0.85 - 1.15   Phosphorus   Result Value Ref Range    Phosphorus 5.0 (H) 2.5 - 4.5 mg/dL   Uric acid   Result Value Ref Range    Uric Acid 3.4 2.4 - 5.7 mg/dL   Basic metabolic panel   Result Value Ref Range    Creatinine 0.63 0.51 - 0.95 mg/dL    Sodium 135 (L) 136 - 145 mmol/L    Potassium 4.1 3.4 - 5.3 mmol/L    Urea Nitrogen 17.0 6.0 - 20.0 mg/dL    Chloride 101 98 - 107 mmol/L    Carbon Dioxide (CO2) 25 22 - 29 mmol/L    Anion Gap 9 7 - 15 mmol/L    Glucose 87 70 - 99 mg/dL    GFR Estimate >90 >60 mL/min/1.73m2    Calcium 8.5 (L) 8.6 - 10.0 mg/dL   Hepatic panel   Result Value Ref Range    Protein Total 6.7 6.4 - 8.3 g/dL    Albumin 3.8 3.5 - 5.2 g/dL    Bilirubin Total 0.5 <=1.2 mg/dL    Alkaline Phosphatase 71 35 - 104 U/L    AST 46 (H) 10 - 35 U/L    ALT 56 (H) 10 - 35 U/L    Bilirubin Direct <0.20 0.00 - 0.30 mg/dL   CBC with platelets and differential   Result Value Ref Range    WBC Count 12.8 (H) 4.0 - 11.0 10e3/uL    RBC Count 4.38 3.80 - 5.20 10e6/uL    Hemoglobin 12.5 11.7 - 15.7 g/dL    Hematocrit 37.8 35.0 - 47.0 %    MCV 86 78 - 100 fL    MCH 28.5 26.5 - 33.0 pg    MCHC 33.1 31.5 - 36.5 g/dL    RDW 14.1 10.0 - 15.0 %    Platelet Count 40 (LL) 150 - 450 10e3/uL    Narrative    Previously reported component [ NRBCs ] is no longer reported.\"  Previously reported component [ NRBCs Absolute ] is no longer reported.\"   Manual Differential   Result Value Ref Range    % Neutrophils 20 %    % Lymphocytes 30 %    % Monocytes 1 %    % Eosinophils 0 %    % Basophils 0 %    % Metamyelocytes 1 %    % Blasts 48 %    Absolute Neutrophils 2.6 1.6 - 8.3 10e3/uL    Absolute Lymphocytes 3.8 0.8 - 5.3 10e3/uL    Absolute Monocytes 0.1 0.0 - 1.3 10e3/uL    Absolute Eosinophils 0.0 0.0 - 0.7 10e3/uL    Absolute Basophils 0.0 0.0 - 0.2 10e3/uL    Absolute Metamyelocytes 0.1 (H) <=0.0 10e3/uL "    Absolute Blasts 6.1 (H) <=0.0 10e3/uL    RBC Morphology Confirmed RBC Indices     Platelet Assessment  Automated Count Confirmed. Platelet morphology is normal.     Automated Count Confirmed. Platelet morphology is normal.

## 2022-08-25 NOTE — PROVIDER NOTIFICATION
"Web-based messaging to     \"7D - 9114 - A.K.  Can we add \"blood product pre-meds\" indications to PRN tylenol and benadryl per transfusion reaction earlier?   Josie, [phone]\"  "

## 2022-08-25 NOTE — PLAN OF CARE
Goal Outcome Evaluation:      Patient received 1 unit of platelets; recheck was 44K (up from 40K).  Another platelet order placed & Benadryl order changed to every 4 hrs.  Tmax 99.5 Temporal; Tylenol given & Shanti Poole PA-C notified.  No complaints of pain or nausea today.  Day 2 GRAAL today for B Cell ALL.  Excellent urine output.  Patient to receive 20mg IV Lasix this evening due to excess fluids today (hands feel puffy).

## 2022-08-25 NOTE — PROGRESS NOTES
"BP (!) 146/88   Pulse 67   Temp (!) 96.2  F (35.7  C) (Oral)   Resp 16   Ht 1.626 m (5' 4\")   Wt 112.9 kg (248 lb 14.4 oz)   SpO2 97%   BMI 42.72 kg/m      [id number]  AVSS,Hypertensive, as per pt, not her baseline but rather the stress with  the new dx. Petmatthewia ELIAS with plt count of 36K. IT chemo scheduled for tomorrow at 1100. 1U of plts transfused tonight with a goal of 50 K. Chemo Plan to start GRAAL-2005 chemotherapy including imatinib in the next 24-48 hours. Vincristine given and Imatinib po started  tonight. Prednisone d/cd and labs checked every 8 hrs. PICC site CDI. Infusing w/Platelets and IVFs. BmBx site from 8/20 CDI.Continue w/POC.    "

## 2022-08-25 NOTE — PROCEDURES
[facility]  CAPS PROCEDURE NOTE  Date of Admission:  8/19/2022  Consult Requested by: Heme/onc  Reason for Consult: lumbar puncture for IT chemo    Indication/HPI: IT CHEMO    Pre-Procedure Diagnosis: lymphoma    Post-Procedure Diagnosis: lymphoma    Risk Assessment: Average risk, Technically straightforward; patient's anticoagulation has been held according to guidelines based on the agent and platelets and coags are within guidelines    [facility]    Lumbar puncture    Date/Time: 8/25/2022 4:12 PM  Performed by: Josef Graham MD  Authorized by: Josef Graham MD   Indications: Intrathecal Chemotherapy.  Preparation: Patient was prepped and draped in the usual sterile fashion.      UNIVERSAL PROTOCOL   Site Marked: Yes  Prior Images Obtained and Reviewed:  Yes  Required items: Required blood products, implants, devices and special equipment available    Patient identity confirmed:  Verbally with patient, hospital-assigned identification number and provided demographic data  NA - No sedation, light sedation, or local anesthesia  Confirmation Checklist:  Patient's identity using two indicators, relevant allergies, procedure was appropriate and matched the consent or emergent situation and correct equipment/implants were available  Time out: Immediately prior to the procedure a time out was called    Universal Protocol: the Joint Commission Universal Protocol was followed    Preparation: Patient was prepped and draped in usual sterile fashion    ESBL (mL):  0     ANESTHESIA    Anesthesia: Local infiltration  Local Anesthetic:  Lidocaine 1% without epinephrine  Anesthetic Total (mL):  5      SEDATION    Patient Sedated: No      PROCEDURE DETAILS  Patient's position: left lateral decubitus  Needle gauge: 22  Needle type: spinal needle - Quincke tip  Needle length: 5.0 in  Number of attempts: 5 or more  Fluid appearance:  clear  Tubes of fluid: 4  Total volume: 6 ml  Post-procedure: adhesive bandage applied      PROCEDURE  Describe Procedure: Consult and Procedure Service Note    The risks and benefits of the procedure were explained to patient who expressed understanding and opted to proceed. Consent was obtained and placed in the chart.   A time out was performed.    The patient was placed in the Left lateral decubitus and the L5-S1 innerspace identified, palpated, and marked. Susequently 5 ml of 1% lidocaine was instilled at this site.  A 5 inch 22Gneedle was then inserted and bony resistance met, no fluid obtained on the 1st attempt. POCUS re-assessed and same 5 inch 22Gneedle was then inserted and bony resistance met, no fluid obtained on the 2nd attempt.   Same 5 inch 22Gneedle was then inserted and bony resistance met, no fluid obtained on the 3rd attempt. POCUS re-assessed and   Same 5 inch 22Gneedle was then inserted and bony resistance met, no fluid obtained on the 4th attempt. POCUS re-assessed and   Same 5 inch 22Gneedle was then inserted and bony resistance met, no fluid obtained on the 5th attempt. POCUS re-assessed and   Same 5 inch 22Gneedle was then inserted and bony resistance met, no fluid obtained on the 6th attempt. POCUS re-assessed and   Same 5 inch 22Gneedle was then inserted and bony resistance met, no fluid obtained on the 7th attempt. POCUS re-assessed and     Opening pressure was not performed.   After last attempt, the stylet was replaced and the needle was removed.    A total of 0 ml was removed and a total of 7 attempts were made.       Patient Tolerance:  Patient tolerated the procedure well with no immediate complications  Length of time physician/provider present for 1:1 monitoring during sedation: 0   Thank you for consulting the CAPS team. Please contact the Consult and Procedure Service if any concerns or complications arise.        Comments: Thank you for consulting the CAPS team. Please contact  the Consult and Procedure Service if any concerns or complications arise.                  No results found for this or any previous visit from the past 1 day.         Josef Graham MD  [facility]  Securely message with the Vocera Web Console (learn more here)  Text page via [facility] Paging/Directory   Please see signed in provider for up to date coverage information

## 2022-08-25 NOTE — PROVIDER NOTIFICATION
"Platelet transfusion stopped, patient c/o generalized itchiness, new red bumps scattered.     Thanks,   Josie #[id number]    -----------------------------------------------------------------  8/24/2022  11:45 PM    Cross-cover / Event Note  Internal Medicine - Hospitalist Service    Vashti Villegas  MRI#:  [id number]  Date of admission:  8/19/2022     Subjective:    Communication:  CAROL Galindo @ ~ 11:45 PM  Issue:  Platelet(s) transfusion reaction    Chart reviewed for pertinent details as below:  Past Medical History / Problem Lists and Recent labs    Exam:  BP (!) 160/80   Pulse 76   Temp 97  F (36.1  C) (Oral)   Resp 16   Ht 1.626 m (5' 4\")   Wt 112.9 kg (248 lb 14.4 oz)   SpO2 97%   BMI 42.72 kg/m    Not otherwise examined      Assessment:    Platelet(s) transfusion reaction(s)     Plan:    Transfusion(s) stopped, diphenhydramine (Benadryl) PRN and check platelet(s) with morning labs.    Discussed with CAROL Galindo by phone.    Blood transfusion(s) reaction(s) work up ordered.     Rajendra Chavez MD  Internal Medicine Hospitalist  Pager: [phone] / mobile [phone]  Please see sign-in for further contact information   "

## 2022-08-25 NOTE — PLAN OF CARE
"[id number]    BP (!) 154/85 (BP Location: Right arm)   Pulse 72   Temp (!) 95.9  F (35.5  C) (Oral)   Resp 16   Ht 1.626 m (5' 4\")   Wt 112.9 kg (248 lb 14.4 oz)   SpO2 97%   BMI 42.72 kg/m      Reason for admission: AML  Activity: UAL  Pain: Denies pain  Neuro: A&Ox4, neuros intact, pleasant  Cardiac: Afebrile, denies chest pain, denies dizziness  Respiratory: Denies SOB, denies airway closure, room air  GI/: Voids spontaneously without dfficulty  Diet: Regular, good appetitie  Lines: PICC infusing maintenance fluids and blood products.   Wounds: Developed some hives on arms and legs during platelet transfusion, resolved with Benadryl.  Labs/imaging: Reviewed. See chart.       New changes this shift: Patient was given first bag of blood products ever yesterday. During shift change pt called out and said she was feeling itchy during platelet transfusion, writer and off-going nurse went in and witnessed hives developing. Disconnected from platelets, gave Benadryl, gave atarax, patient reported feeling better. Transfusion reaction workup ordered. Blood bank approved moving forward with platelets.   Attempted to start another bag of platelets, was delayed due to pending approval from blood bank provider.   PRN PO 50mg benadryl given x1 and PRN PO 50mg Atarax given x1 for platelet transfusion reaction.     Plan: LP at 11am, need platelets to be >50K      Continue to follow POC   "

## 2022-08-26 ENCOUNTER — APPOINTMENT (OUTPATIENT)
Dept: GENERAL RADIOLOGY | Facility: CLINIC | Age: 42
End: 2022-08-26
Attending: PHYSICIAN ASSISTANT
Payer: COMMERCIAL

## 2022-08-26 LAB
ALBUMIN SERPL BCG-MCNC: 4.3 G/DL (ref 3.5–5.2)
ALP SERPL-CCNC: 80 U/L (ref 35–104)
ALT SERPL W P-5'-P-CCNC: 49 U/L (ref 10–35)
ANION GAP SERPL CALCULATED.3IONS-SCNC: 12 MMOL/L (ref 7–15)
ANION GAP SERPL CALCULATED.3IONS-SCNC: 9 MMOL/L (ref 7–15)
AST SERPL W P-5'-P-CCNC: 25 U/L (ref 10–35)
BASOPHILS # BLD AUTO: 0 10E3/UL (ref 0–0.2)
BASOPHILS NFR BLD AUTO: 0 %
BILIRUB DIRECT SERPL-MCNC: <0.2 MG/DL (ref 0–0.3)
BILIRUB SERPL-MCNC: 0.6 MG/DL
BLD PROD TYP BPU: NORMAL
BLOOD COMPONENT TYPE: NORMAL
BUN SERPL-MCNC: 25.9 MG/DL (ref 6–20)
BUN SERPL-MCNC: 25.9 MG/DL (ref 6–20)
CALCIUM SERPL-MCNC: 8.8 MG/DL (ref 8.6–10)
CALCIUM SERPL-MCNC: 9.1 MG/DL (ref 8.6–10)
CHLORIDE SERPL-SCNC: 101 MMOL/L (ref 98–107)
CHLORIDE SERPL-SCNC: 103 MMOL/L (ref 98–107)
CODING SYSTEM: NORMAL
CREAT SERPL-MCNC: 0.78 MG/DL (ref 0.51–0.95)
CREAT SERPL-MCNC: 0.85 MG/DL (ref 0.51–0.95)
DEPRECATED HCO3 PLAS-SCNC: 23 MMOL/L (ref 22–29)
DEPRECATED HCO3 PLAS-SCNC: 25 MMOL/L (ref 22–29)
EOSINOPHIL # BLD AUTO: 0 10E3/UL (ref 0–0.7)
EOSINOPHIL NFR BLD AUTO: 0 %
ERYTHROCYTE [DISTWIDTH] IN BLOOD BY AUTOMATED COUNT: 13.7 % (ref 10–15)
FIBRINOGEN PPP-MCNC: 285 MG/DL (ref 170–490)
FIBRINOGEN PPP-MCNC: 325 MG/DL (ref 170–490)
GFR SERPL CREATININE-BSD FRML MDRD: 87 ML/MIN/1.73M2
GFR SERPL CREATININE-BSD FRML MDRD: >90 ML/MIN/1.73M2
GLUCOSE CSF-MCNC: 106 MG/DL (ref 40–70)
GLUCOSE SERPL-MCNC: 151 MG/DL (ref 70–99)
GLUCOSE SERPL-MCNC: 178 MG/DL (ref 70–99)
HCT VFR BLD AUTO: 38.7 % (ref 35–47)
HGB BLD-MCNC: 13 G/DL (ref 11.7–15.7)
HOLD SPECIMEN: NORMAL
HOLD SPECIMEN: NORMAL
IMM GRANULOCYTES # BLD: 0 10E3/UL
IMM GRANULOCYTES NFR BLD: 0 %
INR PPP: 1.14 (ref 0.85–1.15)
INR PPP: 1.16 (ref 0.85–1.15)
INTERPRETATION: NORMAL
ISCN: NORMAL
ISSUE DATE AND TIME: NORMAL
LDH SERPL L TO P-CCNC: 680 U/L (ref 0–250)
LYMPHOCYTES # BLD AUTO: 0.3 10E3/UL (ref 0.8–5.3)
LYMPHOCYTES NFR BLD AUTO: 14 %
MAGNESIUM SERPL-MCNC: 2.7 MG/DL (ref 1.7–2.3)
MCH RBC QN AUTO: 28.7 PG (ref 26.5–33)
MCHC RBC AUTO-ENTMCNC: 33.6 G/DL (ref 31.5–36.5)
MCV RBC AUTO: 85 FL (ref 78–100)
METHODS: NORMAL
MONOCYTES # BLD AUTO: 0.1 10E3/UL (ref 0–1.3)
MONOCYTES NFR BLD AUTO: 3 %
NEUTROPHILS # BLD AUTO: 1.9 10E3/UL (ref 1.6–8.3)
NEUTROPHILS NFR BLD AUTO: 83 %
NRBC # BLD AUTO: 0 10E3/UL
NRBC BLD AUTO-RTO: 0 /100
PHOSPHATE SERPL-MCNC: 5.5 MG/DL (ref 2.5–4.5)
PHOSPHATE SERPL-MCNC: 6.1 MG/DL (ref 2.5–4.5)
PLATELET # BLD AUTO: 40 10E3/UL (ref 150–450)
PLATELET # BLD AUTO: 43 10E3/UL (ref 150–450)
PLATELET # BLD AUTO: 62 10E3/UL (ref 150–450)
POTASSIUM SERPL-SCNC: 4.5 MMOL/L (ref 3.4–5.3)
POTASSIUM SERPL-SCNC: 4.6 MMOL/L (ref 3.4–5.3)
PROT CSF-MCNC: 46.8 MG/DL (ref 15–45)
PROT SERPL-MCNC: 7.4 G/DL (ref 6.4–8.3)
RBC # BLD AUTO: 4.53 10E6/UL (ref 3.8–5.2)
SODIUM SERPL-SCNC: 136 MMOL/L (ref 136–145)
SODIUM SERPL-SCNC: 137 MMOL/L (ref 136–145)
TRANSFUSION REACTION REPORT?: YES
TRANSFUSION REACTION-HIVES: NORMAL
UNIT ABO/RH: NORMAL
UNIT NUMBER: NORMAL
UNIT STATUS: NORMAL
UNIT TYPE ISBT: 6200
UNIT TYPE ISBT: 7300
UNIT TYPE ISBT: 7300
URATE SERPL-MCNC: 4 MG/DL (ref 2.4–5.7)
URATE SERPL-MCNC: 5.1 MG/DL (ref 2.4–5.7)
WBC # BLD AUTO: 2.3 10E3/UL (ref 4–11)

## 2022-08-26 PROCEDURE — P9037 PLATE PHERES LEUKOREDU IRRAD: HCPCS | Performed by: PHYSICIAN ASSISTANT

## 2022-08-26 PROCEDURE — 85384 FIBRINOGEN ACTIVITY: CPT | Performed by: PHYSICIAN ASSISTANT

## 2022-08-26 PROCEDURE — 250N000013 HC RX MED GY IP 250 OP 250 PS 637: Performed by: INTERNAL MEDICINE

## 2022-08-26 PROCEDURE — 84550 ASSAY OF BLOOD/URIC ACID: CPT | Performed by: PHYSICIAN ASSISTANT

## 2022-08-26 PROCEDURE — 99233 SBSQ HOSP IP/OBS HIGH 50: CPT | Mod: FS | Performed by: STUDENT IN AN ORGANIZED HEALTH CARE EDUCATION/TRAINING PROGRAM

## 2022-08-26 PROCEDURE — 250N000009 HC RX 250: Performed by: STUDENT IN AN ORGANIZED HEALTH CARE EDUCATION/TRAINING PROGRAM

## 2022-08-26 PROCEDURE — 85610 PROTHROMBIN TIME: CPT | Performed by: PHYSICIAN ASSISTANT

## 2022-08-26 PROCEDURE — 96450 CHEMOTHERAPY INTO CNS: CPT

## 2022-08-26 PROCEDURE — 36592 COLLECT BLOOD FROM PICC: CPT | Performed by: PHYSICIAN ASSISTANT

## 2022-08-26 PROCEDURE — 250N000013 HC RX MED GY IP 250 OP 250 PS 637: Performed by: PHYSICIAN ASSISTANT

## 2022-08-26 PROCEDURE — 84157 ASSAY OF PROTEIN OTHER: CPT | Performed by: PHYSICIAN ASSISTANT

## 2022-08-26 PROCEDURE — 84100 ASSAY OF PHOSPHORUS: CPT | Performed by: PHYSICIAN ASSISTANT

## 2022-08-26 PROCEDURE — 89050 BODY FLUID CELL COUNT: CPT | Performed by: PHYSICIAN ASSISTANT

## 2022-08-26 PROCEDURE — 83615 LACTATE (LD) (LDH) ENZYME: CPT | Performed by: PHYSICIAN ASSISTANT

## 2022-08-26 PROCEDURE — 99207 PR NO BILLABLE SERVICE THIS VISIT: CPT | Performed by: PHYSICIAN ASSISTANT

## 2022-08-26 PROCEDURE — 82310 ASSAY OF CALCIUM: CPT | Performed by: PHYSICIAN ASSISTANT

## 2022-08-26 PROCEDURE — 88185 FLOWCYTOMETRY/TC ADD-ON: CPT | Performed by: PHYSICIAN ASSISTANT

## 2022-08-26 PROCEDURE — 87070 CULTURE OTHR SPECIMN AEROBIC: CPT | Performed by: PHYSICIAN ASSISTANT

## 2022-08-26 PROCEDURE — 120N000002 HC R&B MED SURG/OB UMMC

## 2022-08-26 PROCEDURE — 250N000011 HC RX IP 250 OP 636: Performed by: STUDENT IN AN ORGANIZED HEALTH CARE EDUCATION/TRAINING PROGRAM

## 2022-08-26 PROCEDURE — 3E0R305 INTRODUCTION OF OTHER ANTINEOPLASTIC INTO SPINAL CANAL, PERCUTANEOUS APPROACH: ICD-10-PCS | Performed by: STUDENT IN AN ORGANIZED HEALTH CARE EDUCATION/TRAINING PROGRAM

## 2022-08-26 PROCEDURE — 82945 GLUCOSE OTHER FLUID: CPT | Performed by: PHYSICIAN ASSISTANT

## 2022-08-26 PROCEDURE — 85049 AUTOMATED PLATELET COUNT: CPT | Performed by: PHYSICIAN ASSISTANT

## 2022-08-26 PROCEDURE — 99356 PR PROLONGED SERV,INPATIENT,1ST HR: CPT | Mod: FS | Performed by: STUDENT IN AN ORGANIZED HEALTH CARE EDUCATION/TRAINING PROGRAM

## 2022-08-26 PROCEDURE — 258N000003 HC RX IP 258 OP 636: Performed by: STUDENT IN AN ORGANIZED HEALTH CARE EDUCATION/TRAINING PROGRAM

## 2022-08-26 PROCEDURE — 88188 FLOWCYTOMETRY/READ 9-15: CPT | Mod: GC | Performed by: PATHOLOGY

## 2022-08-26 PROCEDURE — 83735 ASSAY OF MAGNESIUM: CPT | Performed by: PHYSICIAN ASSISTANT

## 2022-08-26 PROCEDURE — 87205 SMEAR GRAM STAIN: CPT | Performed by: PHYSICIAN ASSISTANT

## 2022-08-26 PROCEDURE — 77003 FLUOROGUIDE FOR SPINE INJECT: CPT | Mod: 26 | Performed by: RADIOLOGY

## 2022-08-26 PROCEDURE — 82248 BILIRUBIN DIRECT: CPT | Performed by: PHYSICIAN ASSISTANT

## 2022-08-26 PROCEDURE — 250N000013 HC RX MED GY IP 250 OP 250 PS 637: Performed by: STUDENT IN AN ORGANIZED HEALTH CARE EDUCATION/TRAINING PROGRAM

## 2022-08-26 PROCEDURE — 250N000011 HC RX IP 250 OP 636: Performed by: INTERNAL MEDICINE

## 2022-08-26 PROCEDURE — 96450 CHEMOTHERAPY INTO CNS: CPT | Mod: GC | Performed by: RADIOLOGY

## 2022-08-26 PROCEDURE — 88184 FLOWCYTOMETRY/ TC 1 MARKER: CPT | Performed by: PHYSICIAN ASSISTANT

## 2022-08-26 RX ORDER — CALCIUM ACETATE 667 MG/1
1334 CAPSULE ORAL
Status: DISCONTINUED | OUTPATIENT
Start: 2022-08-26 | End: 2022-08-31

## 2022-08-26 RX ORDER — ACETAMINOPHEN 325 MG/1
650 TABLET ORAL
Status: COMPLETED | OUTPATIENT
Start: 2022-08-26 | End: 2022-08-26

## 2022-08-26 RX ORDER — LORAZEPAM 0.5 MG/1
.5-1 TABLET ORAL EVERY 6 HOURS PRN
Status: DISCONTINUED | OUTPATIENT
Start: 2022-08-26 | End: 2022-09-14 | Stop reason: HOSPADM

## 2022-08-26 RX ADMIN — PANTOPRAZOLE SODIUM 40 MG: 40 TABLET, DELAYED RELEASE ORAL at 09:00

## 2022-08-26 RX ADMIN — CALCIUM ACETATE 1334 MG: 667 CAPSULE ORAL at 11:15

## 2022-08-26 RX ADMIN — AMLODIPINE BESYLATE 10 MG: 10 TABLET ORAL at 09:01

## 2022-08-26 RX ADMIN — IMATINIB MESYLATE 400 MG: 400 TABLET, FILM COATED ORAL at 11:15

## 2022-08-26 RX ADMIN — Medication 5 ML: at 14:09

## 2022-08-26 RX ADMIN — ACETAMINOPHEN 650 MG: 325 TABLET ORAL at 09:00

## 2022-08-26 RX ADMIN — ACYCLOVIR 400 MG: 400 TABLET ORAL at 21:04

## 2022-08-26 RX ADMIN — LEVOFLOXACIN 250 MG: 250 TABLET, FILM COATED ORAL at 13:24

## 2022-08-26 RX ADMIN — IMATINIB MESYLATE 400 MG: 400 TABLET, FILM COATED ORAL at 21:04

## 2022-08-26 RX ADMIN — ALLOPURINOL 300 MG: 300 TABLET ORAL at 09:00

## 2022-08-26 RX ADMIN — CYTARABINE: 100 INJECTION INTRATHECAL; INTRAVENOUS; SUBCUTANEOUS at 15:20

## 2022-08-26 RX ADMIN — HYDROXYZINE HYDROCHLORIDE 25 MG: 25 TABLET, FILM COATED ORAL at 04:12

## 2022-08-26 RX ADMIN — ACYCLOVIR 400 MG: 400 TABLET ORAL at 09:00

## 2022-08-26 RX ADMIN — SENNOSIDES AND DOCUSATE SODIUM 1 TABLET: 8.6; 5 TABLET ORAL at 09:01

## 2022-08-26 RX ADMIN — Medication 5 ML: at 17:04

## 2022-08-26 RX ADMIN — LORAZEPAM 1 MG: 1 TABLET ORAL at 13:30

## 2022-08-26 RX ADMIN — ACETAMINOPHEN 975 MG: 325 TABLET, FILM COATED ORAL at 21:04

## 2022-08-26 RX ADMIN — DIPHENHYDRAMINE HYDROCHLORIDE 50 MG: 25 CAPSULE ORAL at 09:00

## 2022-08-26 RX ADMIN — Medication 5 ML: at 12:40

## 2022-08-26 RX ADMIN — ACETAMINOPHEN 975 MG: 325 TABLET, FILM COATED ORAL at 04:11

## 2022-08-26 RX ADMIN — Medication 5 ML: at 17:20

## 2022-08-26 RX ADMIN — CALCIUM ACETATE 1334 MG: 667 CAPSULE ORAL at 17:44

## 2022-08-26 RX ADMIN — MICAFUNGIN 50 MG: 10 INJECTION, POWDER, LYOPHILIZED, FOR SOLUTION INTRAVENOUS at 12:37

## 2022-08-26 RX ADMIN — DIPHENHYDRAMINE HYDROCHLORIDE 50 MG: 25 CAPSULE ORAL at 04:11

## 2022-08-26 RX ADMIN — LIDOCAINE HYDROCHLORIDE 10 ML: 10 INJECTION, SOLUTION EPIDURAL; INFILTRATION; INTRACAUDAL; PERINEURAL at 15:09

## 2022-08-26 ASSESSMENT — ACTIVITIES OF DAILY LIVING (ADL)
ADLS_ACUITY_SCORE: 22

## 2022-08-26 NOTE — PROGRESS NOTES
"/76 (BP Location: Right arm)   Pulse 94   Temp 97.6  F (36.4  C) (Oral)   Resp 14   Ht 1.626 m (5' 4\")   Wt 111.2 kg (245 lb 2.4 oz)   SpO2 96%   BMI 42.08 kg/m      [id number]  AVSS. UAL. Denies pain. A&Ox4,pleasantly quiet. Denies SOB, on RA.Voiding in bathroom, not saving, Lasix given tonight. IVFs d/cd. Regular good appetite. PICC HL. LP to be repeated tomorrow 08/26 TBD.Plt at 63K. Recheck scheduled for Platelet count ordered for 2 am on 8/26 prior to LP procedure at Xray. Will need transfusion premeds following sensitivity reaction from 08/24. Blood glucose 308 at 1804, provider notified laura.Continue w/POC.  "

## 2022-08-26 NOTE — PROCEDURES
DIAGNOSIS: New Ph+ B-ALL  PROCEDURE: Intrathecal chemo administration   LOCATION: Radiology  INDICATION: New Ph+ B-ALL  Lumbar puncture performed and CSF samples collected by the General Radiology team under fluoroscopy.    Chemotherapy was double-checked by this writer and Radiology RN. This writer then administered cytarabine (PF) (CYTOSAR) 40 mg, hydrocortisone sodium succinate PF (solu-CORTEF) 40 mg, methotrexate (PF) 15 mg in sodium chloride (PF) 0.9% PF 6 mL intrathecal inj (PF) [495579]  without apparent complication.  Complications: None immediately.   Chemo instillation performed by: GRANT Graham PA-C  Pager: [phone]

## 2022-08-26 NOTE — PLAN OF CARE
"[id number]    /70 (BP Location: Right arm)   Pulse 90   Temp 97.6  F (36.4  C) (Oral)   Resp 16   Ht 1.626 m (5' 4\")   Wt 111.2 kg (245 lb 2.4 oz)   SpO2 96%   BMI 42.08 kg/m      Reason for admission: Acute leukemia - chemo  Activity: UAL  Pain: Denies pain  Neuro: A&Ox4, neuros intact, pleasant  Cardiac: Afebrile, denies chest pain, denies dizziness  Respiratory: Room air, denies SOB  GI/: Voided 500mL overnight. Last BM 8/25. Denies nausea.  Diet: PICC - red lumen cap changed after receiving platelets. Both lumens heparin locked.  Lines: PICC HL  Labs/imaging: Reviewed. See chart.       New changes this shift: Received 1 unit plts, goal >50K for LP this AM. No hives or itchiness. No transfusion reaction.     Plan: LP and IT chemo in XR at 8/26      Continue to follow POC     "

## 2022-08-26 NOTE — PLAN OF CARE
Goal Outcome Evaluation:    Plan of Care Reviewed With: patient     Overall Patient Progress: no change    [id number]:  A&O x 4.  Afebrile.  Hypertensive BP (140s/60-70s), did not meet parameters for PRN hydralazine.  OVSS on room air. Denies pain, nausea, vomiting, chest pain and SOB.  April reports intermittent L hand numbness/tingling, not new per pt.  Phosphorus 6.1 started on phoslo.  Plts 40K.  Given pre-meds and transfused one unit plts for LP, plt recheck count 62K, parameters met.  AUO.  BM x2, denies diarrhea.  Up ad stephanie.  Given one time dose 1 mg PO Ativan prior LP procedure.  Currently in XR for LP with IT chemo.  Continue with POC.

## 2022-08-26 NOTE — PROGRESS NOTES
CLINICAL NUTRITION SERVICES    Reviewed nutrition risk factors due to LOS. Pt is tolerating regular diet, eating well per nursing documentation, % of meals. No nutrition issues identified at this time. Pt is at risk for malnutrition with new dx of ALL and starting chemo. RD will follow via rounds at this time, unless consulted.    Cherie Hutchinson RD, LD  7D pager [phone]

## 2022-08-26 NOTE — PROGRESS NOTES
[facility]    Hematology / Oncology Progress Note    Date of Service (when I saw the patient): 08/26/2022     Assessment & Plan   Vashti Villegas is a 42 year old female who was admitted on 8/19/2022 with hyperleukocytosis (WBC 109K), abdominal pain and 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 Pre-phase steroids initiated (8/21-8/24) and WBC is trending down slowly. Plan to initiate GRAALL + Imatinib on 8/24.     TODAY:   - D3 GRAALL + Imatinib  - LP with IT chemo in XR, follow-up flow. 2u Plt given prior. Atarax available prn as pre-med.   - Phos trending up (6.1), start Phoslo TID. Otherwise TLS labs stable. Follow q12h. Bolus prn.   - Continue with best supportive cares.       HEME  # Ph+ B-ALL  # Leukocytosis, improving  Patient presented to outside hospital with c/o Right upper quadrant pain and was subsequently found to have a markedly elevated white blood cell count concerning for acute leukemia. While at OSH, she had CT PE protocol as well as CT A/P. These identified no PE, no acute or localized intraabdominal inflammatory process, mild splenomegaly, and few inconspicous low-density structures in the liver. She was transferred to [facility] for further workup and management. Labs upon presentation showed WBC 109K (ANC 15.3, 85% other cells), Hgb 13.2, Plt 73.  A uric acid level of 5.9, alkaline phosphatase of 131, AST 73, INR 1.19. Elevated uric acid may be secondary to tumor lysis, while elevated liver enzymes may be secondary to leukemic involvement.    - s/p Hydrea 1g TID (8/21-8/23) with improvement in WBC (100K ? 12K)  - EKG shows nl sinus rhythm, QTc 440. ECHO with EF 65-70%.   - PICC line placed, plan to remove at discharge  - CT CAP completed at OSH. CT neck 8/22 showed multiple, b/l suspicious-appearing LN (R level Ia, L level III, R level II-III) - not enlarged though they exhibit abnormal shapes.   - MR brain (8/21) -  no acute IC pathology, no e/o intracranial CNS involvement.  - BMBx completed 8/20 - demonstrates B-ALL with 85% blasts and hypercellular marrow. IHC shows CD10+, CD34+. 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 Chromosomes - pending.   - Pt consented to HMTB; BMBx at dx was dry tap d/t WBC burden. Will send peripheral blood samples on 8/22.   - CBC/TLS/DIC labs three times daily  - Initiated pre-phase steroids with Prednisone 60 mg x8/21; increased to 60 mg/m2 (140 mg) 8/22-8/24. Transition to protocol-directed Dex 40 mg on 8/25.   - Message sent to BMT intake team on 8/23.   - Plan to arrange for Clonoseq ID testing to help estimate MRD in future assessments. Clonal testing collected and sent on 8/23. Plan to order Clonoseq MRD testing with post-induction BMBx.   - Case discussed at Heme malignancy conference on 8/23, per consensus plan is to start GRAALL + Imatinib on 8/24. No clear indication for addition of Rituximab.    - Unsuccessful attempt at LP with IT chemo on 8/25 with CAPS team at bedside. Completed in XR on 8/26 - flow pending. Will need future LPs scheduled in XR.   - Of note, if CNS is detected, Dr. Hull recommends starting Dasatinib 140 mg daily in place of Imatinib.   - Pt expresses anxiety surrounding procedures - pre-medication with Ativan +/- Atarax available prior prn.   - s/p 2u Plt prior to procedure    Treatment Plan: GRAALL + Imatinib (D1=8/24/22)  - Imatinib 400 mg BID - D1-28  - Vincristine 2 mg IV x1 - D1, 8, 15, 22  - Dexamethasone 40 mg daily - D2 (holding D1, instead given pre-phase Pred as above), 8-9, 15-16, 22-23  - Triple IT chemo (Cytarabine, Solu-Cortef, MTX); D1, 8, 15  - Neupogen - D15    # Risk of TLS/DIC  # Hyperphosphatemia  Uric acid was mildly elevated to 5.9 and LDH 1,943 on admission. Otherwise TLS labs wnl including Cr ~0.6-0.7. INR mildly elevated to 1.2, Fibrinogen 400-500. Phos mildly trending up after starting chemo ? 5.0 on  8/25.   - Started Allopurinol 300 mg daily x8/19  - IVF at 100 ml/h (8/21-8/23) ? decreased to 50 ml/h x8/23, can increase as appropriate once chemo is initiated   - Monitor TLS/DIC labs q12h, consider de-escalating in coming days  - Consider starting Phos-lo if Phos trends up further >5.0  - Phos trending up (6.1) on 8/26, start Phoslo TID    # Pancytopenia  2/2 to underlying disease  - Transfuse to maintain Hgb >7, Plt >10K     ID  # PPx  - Viral serologies sent - HepB/C, HIV, CMV, HSV2 negative. HSV1+, EBV IgG+ (649).   - ACV 400mg BID  - Start Levaquin 250 mg daily x8/21  - Start Maude 50 mg daily x8/21 in anticipation of upcoming chemo; will need to look into coverage for Posa/Vori in coming days.      # H/o COVID19   Not vaccinated (due to fear of needles per her report). Pt reports testing postiive for COVID approx 1 month ago via home test. She never had it confirmed.   - COVID testing on admission was negative.     CV  # Hypertension  Fairly persistent elevated BP since admission to 150-160/80-90s. She was not on anti-hypertensive medication PTA.   - Start Amlodipine 5 mg daily (8/21-8/23); increased to 10 mg daily x8/23  - prn Hydralazine 10-20 mg available q6h for SBP>160     GI  # Intermittent constipation  - Senna 1-2 tabs BID, Miralax prn     NEURO  # Headaches, stable  Pt endorses intermittent HA since admission. Denies vision changes or other neurological sx. HTN as above.   - MRI brain completed 8/21 was negative for IC pathology or tumor involvement.   - Tylenol prn   - LP per protocol as above     MISC  # Intermittent hypophosphatemia  - Replete prn standing protocol     FEN  - Continuous IVF now discontinued, bolus prn   - lyte repletion per protocol, monitor closely given risk for TLS  - regular diet as tolerated     Lines/Drains: PICC placed, remove on discharge  DVT ppx: defer d/t TCP  GI ppx: PPI  Consults: PT  CODE: FULL  DISPO: Anticipate prolonged 4-6 week hospitalization for management of  new leukemia.   Follow-up/Referrals: TBD. Dr. Hull was on service when pt was admitted.   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 Hull.    Sri Poole PA-C  Hematology/Oncology  Pager: [phone]    >45 minutes spent on the date of the encounter. Over 50% of time was spent counseling the patient and/or coordinating care.     Interval History   Nursing notes reviewed. AVSS. 1u Plt received overnight for count <50K prior to procedure today. This morning April reports feeling tired as she did not sleep well last night due to being sleepy much of the day prior with pre-meds. She denies specific complaints. Received Lasix yesterday and had good response with that. Reports improvement to swollen hands/feet. Plan is for repeat attempt at LP with IT chemo in XR today. Will get additional unit of Plt and Ativan as pre-med prior. All questions answered.     A comprehensive review of systems was obtained and is negative other than noted here or in the HPI.     Physical Exam   Temp: 98.3  F (36.8  C) Temp src: Temporal BP: (!) 146/74 Pulse: 83   Resp: 16 SpO2: 98 % O2 Device: None (Room air)    Vitals:    08/24/22 0848 08/25/22 1351 08/26/22 0915   Weight: 112.9 kg (248 lb 14.4 oz) 111.2 kg (245 lb 2.4 oz) 110.1 kg (242 lb 11.2 oz)     Vital Signs with Ranges  Temp:  [96.3  F (35.7  C)-98.5  F (36.9  C)] 98.3  F (36.8  C)  Pulse:  [83-98] 83  Resp:  [14-18] 16  BP: (107-146)/(69-85) 146/74  SpO2:  [95 %-98 %] 98 %  I/O last 3 completed shifts:  In: 365 [I.V.:145]  Out: 2500 [Urine:2500]    General: Awake and interactive though sleepy due to tx pre-med. No acute distress. Pleasant female seen resting comfortably in bed.    Skin: Petechiae to b/l ankles. Skin is otherwise warm, dry, and intact  without rashes or lesions.   Head: Normocephalic and atraumatic.   HEENT: PERRLA. Sclera non-icteric. EOM intact. Oral mucosa is pink and moist with no lesions, thrush, or exudates.   Lymph: Neck supple.   Cardiac: Regular rate and rhythm. Normal S1 and S2. No murmurs, rubs, or gallops.   Respiratory: No signs of respiratory distress or accessory muscle use. Lungs CTAB. No wheezes or crackles.   Abd: Soft, symmetric, and non-tender without distension. BS present.   Extremities: Trace b/l LE edema.   Neuro: A&Ox3 with normal speech. Memory and thought process preserved. No focal deficits. Moving extremities equally in bed, good ROM.   Psych: Appropriate mood and affect.     Medications     - MEDICATION INSTRUCTIONS -         acyclovir  400 mg Oral BID     allopurinol  300 mg Oral Daily     amLODIPine  10 mg Oral Daily     calcium acetate  1,334 mg Oral TID w/meals     heparin lock flush  5-20 mL Intracatheter Q24H     imatinib  400 mg Oral BID     levofloxacin  250 mg Oral Q24H     lidocaine (PF)  10 mL Subcutaneous Once     micafungin  50 mg Intravenous Q24H     pantoprazole  40 mg Oral QAM AC     senna-docusate  1-2 tablet Oral BID     sodium chloride (PF)  10-40 mL Intracatheter Q8H     Data   Results for orders placed or performed during the hospital encounter of 08/19/22 (from the past 24 hour(s))   Lumbar puncture    Narrative    Josef Graham MD     8/25/2022  4:20 PM  [facility]    Lumbar puncture    Date/Time: 8/25/2022 4:12 PM  Performed by: Josef Graham MD  Authorized by: Josef Graham MD   Indications: Intrathecal Chemotherapy.  Preparation: Patient was prepped and draped in the usual sterile fashion.      UNIVERSAL PROTOCOL   Site Marked: Yes  Prior Images Obtained and Reviewed:  Yes  Required items: Required blood products, implants, devices and special   equipment available    Patient identity confirmed:  Verbally with patient, hospital-assigned    identification number and provided demographic data  NA - No sedation, light sedation, or local anesthesia  Confirmation Checklist:  Patient's identity using two indicators, relevant   allergies, procedure was appropriate and matched the consent or emergent   situation and correct equipment/implants were available  Time out: Immediately prior to the procedure a time out was called    Universal Protocol: the Joint Commission Universal Protocol was followed    Preparation: Patient was prepped and draped in usual sterile fashion    ESBL (mL):  0     ANESTHESIA    Anesthesia: Local infiltration  Local Anesthetic:  Lidocaine 1% without epinephrine  Anesthetic Total (mL):  5      SEDATION    Patient Sedated: No      PROCEDURE DETAILS  Patient's position: left lateral decubitus  Needle gauge: 22  Needle type: spinal needle - Quincke tip  Needle length: 5.0 in  Number of attempts: 5 or more  Fluid appearance: clear  Tubes of fluid: 4  Total volume: 6 ml  Post-procedure: adhesive bandage applied      PROCEDURE  Describe Procedure: Consult and Procedure Service Note    The risks and benefits of the procedure were explained to patient who   expressed understanding and opted to proceed. Consent was obtained and   placed in the chart.   A time out was performed.    The patient was placed in the Left lateral decubitus and the L5-S1   innerspace identified, palpated, and marked. Susequently 5 ml of 1%   lidocaine was instilled at this site.  A 5 inch 22Gneedle was then inserted and bony resistance met, no fluid   obtained on the 1st attempt. POCUS re-assessed and same 5 inch 22Gneedle   was then inserted and bony resistance met, no fluid obtained on the 2nd   attempt.   Same 5 inch 22Gneedle was then inserted and bony resistance met, no fluid   obtained on the 3rd attempt. POCUS re-assessed and   Same 5 inch 22Gneedle was then inserted and bony resistance met, no fluid   obtained on the 4th attempt. POCUS re-assessed and   Same  5 inch 22Gneedle was then inserted and bony resistance met, no fluid   obtained on the 5th attempt. POCUS re-assessed and   Same 5 inch 22Gneedle was then inserted and bony resistance met, no fluid   obtained on the 6th attempt. POCUS re-assessed and   Same 5 inch 22Gneedle was then inserted and bony resistance met, no fluid   obtained on the 7th attempt. POCUS re-assessed and     Opening pressure was not performed.   After last attempt, the stylet was replaced and the needle was removed.    A total of 0 ml was removed and a total of 7 attempts were made.       Patient Tolerance:  Patient tolerated the procedure well with no immediate   complications  Length of time physician/provider present for 1:1 monitoring during   sedation: 0   Thank you for consulting the CAPS team. Please contact the Consult and   Procedure Service if any concerns or complications arise.        Comments: Thank you for consulting the CAPS team. Please contact the   Consult and Procedure Service if any concerns or complications arise.             Basic metabolic panel   Result Value Ref Range    Creatinine 0.77 0.51 - 0.95 mg/dL    Sodium 134 (L) 136 - 145 mmol/L    Potassium 4.3 3.4 - 5.3 mmol/L    Urea Nitrogen 19.1 6.0 - 20.0 mg/dL    Chloride 96 (L) 98 - 107 mmol/L    Carbon Dioxide (CO2) 23 22 - 29 mmol/L    Anion Gap 15 7 - 15 mmol/L    Glucose 308 (H) 70 - 99 mg/dL    GFR Estimate >90 >60 mL/min/1.73m2    Calcium 9.1 8.6 - 10.0 mg/dL   Fibrinogen activity   Result Value Ref Range    Fibrinogen Activity 354 170 - 490 mg/dL   INR   Result Value Ref Range    INR 1.09 0.85 - 1.15   Phosphorus   Result Value Ref Range    Phosphorus 5.0 (H) 2.5 - 4.5 mg/dL   Uric acid   Result Value Ref Range    Uric Acid 3.4 2.4 - 5.7 mg/dL   Platelet count   Result Value Ref Range    Platelet Count 43 (LL) 150 - 450 10e3/uL   CONDITIONAL Prepare pheresed platelets (unit)   Result Value Ref Range    Blood Component Type Platelets     Product Code T4333S96      Unit Status Transfused     Unit Number [id number]     CODING SYSTEM JFXU052     ISSUE DATE AND TIME 20476627083900     UNIT ABO/RH A+     UNIT TYPE ISBT 6200    CBC with platelets differential    Narrative    The following orders were created for panel order CBC with platelets differential.  Procedure                               Abnormality         Status                     ---------                               -----------         ------                     CBC with platelets and d...[[id number]]  Abnormal            Final result                 Please view results for these tests on the individual orders.   Lactate Dehydrogenase   Result Value Ref Range    Lactate Dehydrogenase 680 (H) 0 - 250 U/L   Hepatic panel   Result Value Ref Range    Protein Total 7.4 6.4 - 8.3 g/dL    Albumin 4.3 3.5 - 5.2 g/dL    Bilirubin Total 0.6 <=1.2 mg/dL    Alkaline Phosphatase 80 35 - 104 U/L    AST 25 10 - 35 U/L    ALT 49 (H) 10 - 35 U/L    Bilirubin Direct <0.20 0.00 - 0.30 mg/dL   Basic metabolic panel   Result Value Ref Range    Creatinine 0.78 0.51 - 0.95 mg/dL    Sodium 136 136 - 145 mmol/L    Potassium 4.5 3.4 - 5.3 mmol/L    Urea Nitrogen 25.9 (H) 6.0 - 20.0 mg/dL    Chloride 101 98 - 107 mmol/L    Carbon Dioxide (CO2) 23 22 - 29 mmol/L    Anion Gap 12 7 - 15 mmol/L    Glucose 178 (H) 70 - 99 mg/dL    GFR Estimate >90 >60 mL/min/1.73m2    Calcium 8.8 8.6 - 10.0 mg/dL   Fibrinogen activity   Result Value Ref Range    Fibrinogen Activity 325 170 - 490 mg/dL   INR   Result Value Ref Range    INR 1.14 0.85 - 1.15   Phosphorus   Result Value Ref Range    Phosphorus 6.1 (H) 2.5 - 4.5 mg/dL   Uric acid   Result Value Ref Range    Uric Acid 5.1 2.4 - 5.7 mg/dL   Magnesium   Result Value Ref Range    Magnesium 2.7 (H) 1.7 - 2.3 mg/dL   CBC with platelets and differential   Result Value Ref Range    WBC Count 2.3 (L) 4.0 - 11.0 10e3/uL    RBC Count 4.53 3.80 - 5.20 10e6/uL    Hemoglobin 13.0 11.7 - 15.7 g/dL    Hematocrit  38.7 35.0 - 47.0 %    MCV 85 78 - 100 fL    MCH 28.7 26.5 - 33.0 pg    MCHC 33.6 31.5 - 36.5 g/dL    RDW 13.7 10.0 - 15.0 %    Platelet Count 40 (LL) 150 - 450 10e3/uL    % Neutrophils 83 %    % Lymphocytes 14 %    % Monocytes 3 %    % Eosinophils 0 %    % Basophils 0 %    % Immature Granulocytes 0 %    NRBCs per 100 WBC 0 <1 /100    Absolute Neutrophils 1.9 1.6 - 8.3 10e3/uL    Absolute Lymphocytes 0.3 (L) 0.8 - 5.3 10e3/uL    Absolute Monocytes 0.1 0.0 - 1.3 10e3/uL    Absolute Eosinophils 0.0 0.0 - 0.7 10e3/uL    Absolute Basophils 0.0 0.0 - 0.2 10e3/uL    Absolute Immature Granulocytes 0.0 <=0.4 10e3/uL    Absolute NRBCs 0.0 10e3/uL   Prepare pheresed platelets (unit)   Result Value Ref Range    Blood Component Type Platelets     Product Code E3188W87     Unit Status Issued     Unit Number [id number]     CODING SYSTEM YPKY357     ISSUE DATE AND TIME 20220826102200     UNIT ABO/RH B+     UNIT TYPE ISBT 7300    Prepare pheresed platelets (unit)   Result Value Ref Range    ISSUE DATE AND TIME [id number]     Blood Component Type Platelets     Product Code P3137M00     Unit Status Transfused     Unit Number [id number]     UNIT ABO/RH B+     CODING SYSTEM BKPZ468     UNIT TYPE ISBT 7300    Platelet count   Result Value Ref Range    Platelet Count 62 (L) 150 - 450 10e3/uL   Extra Tube    Narrative    The following orders were created for panel order Extra Tube.  Procedure                               Abnormality         Status                     ---------                               -----------         ------                     Extra Green Top (Lithium...[[id number]]                      Final result               Extra Purple Top Tube[[id number]]                            Final result                 Please view results for these tests on the individual orders.   Extra Green Top (Lithium Heparin) Tube   Result Value Ref Range    Hold Specimen [facility]    Extra Purple Top Tube   Result Value Ref Range    Hold  Specimen [facility]    CSF Cell Count with Differential:    Narrative    The following orders were created for panel order CSF Cell Count with Differential:.  Procedure                               Abnormality         Status                     ---------                               -----------         ------                     Cell Count CSF[[id number]]                                   In process                   Please view results for these tests on the individual orders.   XR Lumbar Punc Intrathecal Chemo Admin    Narrative    PROCEDURE: Lumbar Puncture using Fluoroscopy, 8/26/2022 3:37 PM    HISTORY:  ALL    COMPARISON: none    STAFF NEURORADIOLOGIST: Dr. Duane Gonazles    FELLOW PHYSICIAN: Dr. Brent Gardner    TECHNIQUE: Verbal and written consent for lumbar puncture was obtained  from the patient, and benefits and risk of the procedure were  explained, including but not limited to worsening headache,  hemorrhage, infection, lower extremity pain, or nerve root injury. The  patient was sterilely prepped and draped with the patient in the prone  position, over the lower back. Under fluoroscopic guidance, the  interlaminar spaces were noted. 1% lidocaine was administered for  local anesthetic over the right L3-4 interlaminar space, and a 22  gauge 5 inch needle was advanced into the thecal sac under  fluoroscopic guidance.      There was initial show of clear CSF. Approximately 10 cc of CSF were  collected.    Hematology/Oncology then administered intrathecal chemotherapy, dose  and rate as determined by Heme/Onc.    The needle was removed with the stylet in place. There was no  immediate complication associated with the procedure. Samples were  sent for the requested laboratory testing.      FLUORO TIME: 22 seconds low-dose pulsed    DOSE: 6.8 mGy      Impression    IMPRESSION: Successful lumbar puncture and intrathecal chemotherapy  administration without immediate complication.    PLAN: Patient discharged to  inpatient unit in stable condition for  monitoring. Orders placed for 1 hour of bed rest, with patient laying  on the back and the head of the bed flat.

## 2022-08-26 NOTE — PROVIDER NOTIFICATION
Goldie Song [phone]    AK#[id number]    Blood glucose at  1804 was 308.  JOSE    Thanks  Francisca #[id number]

## 2022-08-27 LAB
ALBUMIN SERPL BCG-MCNC: 4.1 G/DL (ref 3.5–5.2)
ALP SERPL-CCNC: 66 U/L (ref 35–104)
ALT SERPL W P-5'-P-CCNC: 38 U/L (ref 10–35)
ANION GAP SERPL CALCULATED.3IONS-SCNC: 8 MMOL/L (ref 7–15)
AST SERPL W P-5'-P-CCNC: 16 U/L (ref 10–35)
BASOPHILS # BLD AUTO: 0 10E3/UL (ref 0–0.2)
BASOPHILS NFR BLD AUTO: 0 %
BILIRUB DIRECT SERPL-MCNC: <0.2 MG/DL (ref 0–0.3)
BILIRUB SERPL-MCNC: 0.8 MG/DL
BUN SERPL-MCNC: 24.5 MG/DL (ref 6–20)
CALCIUM SERPL-MCNC: 8.8 MG/DL (ref 8.6–10)
CHLORIDE SERPL-SCNC: 106 MMOL/L (ref 98–107)
CREAT SERPL-MCNC: 0.81 MG/DL (ref 0.51–0.95)
DEPRECATED HCO3 PLAS-SCNC: 27 MMOL/L (ref 22–29)
EOSINOPHIL # BLD AUTO: 0 10E3/UL (ref 0–0.7)
EOSINOPHIL NFR BLD AUTO: 0 %
ERYTHROCYTE [DISTWIDTH] IN BLOOD BY AUTOMATED COUNT: 13.7 % (ref 10–15)
FIBRINOGEN PPP-MCNC: 242 MG/DL (ref 170–490)
GFR SERPL CREATININE-BSD FRML MDRD: >90 ML/MIN/1.73M2
GLUCOSE SERPL-MCNC: 143 MG/DL (ref 70–99)
HCT VFR BLD AUTO: 37.4 % (ref 35–47)
HGB BLD-MCNC: 12.6 G/DL (ref 11.7–15.7)
IMM GRANULOCYTES # BLD: 0 10E3/UL
IMM GRANULOCYTES NFR BLD: 1 %
INR PPP: 1.17 (ref 0.85–1.15)
LACTATE SERPL-SCNC: 1.5 MMOL/L (ref 0.7–2)
LDH SERPL L TO P-CCNC: 475 U/L (ref 0–250)
LYMPHOCYTES # BLD AUTO: 0.4 10E3/UL (ref 0.8–5.3)
LYMPHOCYTES NFR BLD AUTO: 19 %
MAGNESIUM SERPL-MCNC: 2.7 MG/DL (ref 1.7–2.3)
MCH RBC QN AUTO: 28.8 PG (ref 26.5–33)
MCHC RBC AUTO-ENTMCNC: 33.7 G/DL (ref 31.5–36.5)
MCV RBC AUTO: 86 FL (ref 78–100)
MONOCYTES # BLD AUTO: 0.1 10E3/UL (ref 0–1.3)
MONOCYTES NFR BLD AUTO: 5 %
NEUTROPHILS # BLD AUTO: 1.5 10E3/UL (ref 1.6–8.3)
NEUTROPHILS NFR BLD AUTO: 75 %
NRBC # BLD AUTO: 0 10E3/UL
NRBC BLD AUTO-RTO: 0 /100
PHOSPHATE SERPL-MCNC: 4.6 MG/DL (ref 2.5–4.5)
PLATELET # BLD AUTO: 28 10E3/UL (ref 150–450)
POTASSIUM SERPL-SCNC: 4.5 MMOL/L (ref 3.4–5.3)
PROT SERPL-MCNC: 6.9 G/DL (ref 6.4–8.3)
RBC # BLD AUTO: 4.37 10E6/UL (ref 3.8–5.2)
SODIUM SERPL-SCNC: 141 MMOL/L (ref 136–145)
URATE SERPL-MCNC: 4 MG/DL (ref 2.4–5.7)
WBC # BLD AUTO: 2 10E3/UL (ref 4–11)

## 2022-08-27 PROCEDURE — 36592 COLLECT BLOOD FROM PICC: CPT | Performed by: STUDENT IN AN ORGANIZED HEALTH CARE EDUCATION/TRAINING PROGRAM

## 2022-08-27 PROCEDURE — 85025 COMPLETE CBC W/AUTO DIFF WBC: CPT | Performed by: PHYSICIAN ASSISTANT

## 2022-08-27 PROCEDURE — 99233 SBSQ HOSP IP/OBS HIGH 50: CPT | Mod: FS | Performed by: STUDENT IN AN ORGANIZED HEALTH CARE EDUCATION/TRAINING PROGRAM

## 2022-08-27 PROCEDURE — 250N000011 HC RX IP 250 OP 636: Performed by: STUDENT IN AN ORGANIZED HEALTH CARE EDUCATION/TRAINING PROGRAM

## 2022-08-27 PROCEDURE — 250N000013 HC RX MED GY IP 250 OP 250 PS 637: Performed by: STUDENT IN AN ORGANIZED HEALTH CARE EDUCATION/TRAINING PROGRAM

## 2022-08-27 PROCEDURE — 83615 LACTATE (LD) (LDH) ENZYME: CPT | Performed by: PHYSICIAN ASSISTANT

## 2022-08-27 PROCEDURE — 84100 ASSAY OF PHOSPHORUS: CPT | Performed by: PHYSICIAN ASSISTANT

## 2022-08-27 PROCEDURE — 85384 FIBRINOGEN ACTIVITY: CPT | Performed by: PHYSICIAN ASSISTANT

## 2022-08-27 PROCEDURE — 250N000013 HC RX MED GY IP 250 OP 250 PS 637: Performed by: PHYSICIAN ASSISTANT

## 2022-08-27 PROCEDURE — 999N000044 HC STATISTIC CVC DRESSING CHANGE

## 2022-08-27 PROCEDURE — 83735 ASSAY OF MAGNESIUM: CPT | Performed by: PHYSICIAN ASSISTANT

## 2022-08-27 PROCEDURE — 99356 PR PROLONGED SERV,INPATIENT,1ST HR: CPT | Mod: FS | Performed by: STUDENT IN AN ORGANIZED HEALTH CARE EDUCATION/TRAINING PROGRAM

## 2022-08-27 PROCEDURE — 120N000002 HC R&B MED SURG/OB UMMC

## 2022-08-27 PROCEDURE — 85610 PROTHROMBIN TIME: CPT | Performed by: PHYSICIAN ASSISTANT

## 2022-08-27 PROCEDURE — 258N000003 HC RX IP 258 OP 636: Performed by: STUDENT IN AN ORGANIZED HEALTH CARE EDUCATION/TRAINING PROGRAM

## 2022-08-27 PROCEDURE — 82248 BILIRUBIN DIRECT: CPT | Performed by: PHYSICIAN ASSISTANT

## 2022-08-27 PROCEDURE — 84550 ASSAY OF BLOOD/URIC ACID: CPT | Performed by: PHYSICIAN ASSISTANT

## 2022-08-27 PROCEDURE — 250N000013 HC RX MED GY IP 250 OP 250 PS 637: Performed by: INTERNAL MEDICINE

## 2022-08-27 PROCEDURE — 36592 COLLECT BLOOD FROM PICC: CPT | Performed by: PHYSICIAN ASSISTANT

## 2022-08-27 PROCEDURE — 83605 ASSAY OF LACTIC ACID: CPT | Performed by: STUDENT IN AN ORGANIZED HEALTH CARE EDUCATION/TRAINING PROGRAM

## 2022-08-27 RX ADMIN — SENNOSIDES AND DOCUSATE SODIUM 1 TABLET: 8.6; 5 TABLET ORAL at 08:51

## 2022-08-27 RX ADMIN — IMATINIB MESYLATE 400 MG: 400 TABLET, FILM COATED ORAL at 19:22

## 2022-08-27 RX ADMIN — CALCIUM ACETATE 1334 MG: 667 CAPSULE ORAL at 08:51

## 2022-08-27 RX ADMIN — PANTOPRAZOLE SODIUM 40 MG: 40 TABLET, DELAYED RELEASE ORAL at 08:51

## 2022-08-27 RX ADMIN — Medication 5 ML: at 15:20

## 2022-08-27 RX ADMIN — ACYCLOVIR 400 MG: 400 TABLET ORAL at 19:23

## 2022-08-27 RX ADMIN — CALCIUM ACETATE 1334 MG: 667 CAPSULE ORAL at 19:23

## 2022-08-27 RX ADMIN — ALLOPURINOL 300 MG: 300 TABLET ORAL at 08:51

## 2022-08-27 RX ADMIN — MICAFUNGIN 50 MG: 10 INJECTION, POWDER, LYOPHILIZED, FOR SOLUTION INTRAVENOUS at 12:38

## 2022-08-27 RX ADMIN — Medication 5 ML: at 07:49

## 2022-08-27 RX ADMIN — IMATINIB MESYLATE 400 MG: 400 TABLET, FILM COATED ORAL at 10:29

## 2022-08-27 RX ADMIN — AMLODIPINE BESYLATE 10 MG: 10 TABLET ORAL at 08:51

## 2022-08-27 RX ADMIN — ACYCLOVIR 400 MG: 400 TABLET ORAL at 08:51

## 2022-08-27 RX ADMIN — CALCIUM ACETATE 1334 MG: 667 CAPSULE ORAL at 15:20

## 2022-08-27 RX ADMIN — LEVOFLOXACIN 250 MG: 250 TABLET, FILM COATED ORAL at 15:20

## 2022-08-27 RX ADMIN — Medication 5 ML: at 00:10

## 2022-08-27 ASSESSMENT — ACTIVITIES OF DAILY LIVING (ADL)
ADLS_ACUITY_SCORE: 22

## 2022-08-27 NOTE — PLAN OF CARE
7007-1198 hours: Afebrile, vital signs stable. On room air with adequate oxygenation. Denies pain or nausea. No blood or electrolytes required today. TLS/DIC labs were de-escalated to daily. Eating and drinking a fair amount. Voiding adequately. LBM 8/26. LP site bruised, open to air. No drainage.

## 2022-08-27 NOTE — PROGRESS NOTES
[facility]    Hematology / Oncology Progress Note    Date of Service (when I saw the patient): 08/27/2022     Assessment & Plan   Vashti Villegas is a 42 year old female who was admitted on 8/19/2022 with hyperleukocytosis (WBC 109K), abdominal pain and 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 Pre-phase steroids initiated (8/21-8/24) and WBC is trending down slowly. Initiated GRAALL + Imatinib on 8/24.     TODAY:   - D4 GRAALL + Imatinib. Chemo next due on D8 (8/31)  - Follow-up CSF flow from 8/26 - pending   - Phos trending down (6.1 ? 4.6); continue Phoslo for additional 2-3 days  - TLS labs otherwise stable, de-escalate to following daily   - Continue with best supportive cares.       HEME  # Ph+ B-ALL  # Leukocytosis, improving  Patient presented to outside hospital with c/o Right upper quadrant pain and was subsequently found to have a markedly elevated white blood cell count concerning for acute leukemia. While at OSH, she had CT PE protocol as well as CT A/P. These identified no PE, no acute or localized intraabdominal inflammatory process, mild splenomegaly, and few inconspicous low-density structures in the liver. She was transferred to [facility] for further workup and management. Labs upon presentation showed WBC 109K (ANC 15.3, 85% other cells), Hgb 13.2, Plt 73.  A uric acid level of 5.9, alkaline phosphatase of 131, AST 73, INR 1.19. Elevated uric acid may be secondary to tumor lysis, while elevated liver enzymes may be secondary to leukemic involvement.    - s/p Hydrea 1g TID (8/21-8/23) with improvement in WBC (100K ? 12K)  - EKG shows nl sinus rhythm, QTc 440. ECHO with EF 65-70%.   - PICC line placed, plan to remove at discharge  - CT CAP completed at OSH. CT neck 8/22 showed multiple, b/l suspicious-appearing LN (R level Ia, L level III, R level II-III) - not enlarged though they exhibit abnormal shapes.   - MR brain  (8/21) - no acute IC pathology, no e/o intracranial CNS involvement.  - BMBx completed 8/20 - demonstrates B-ALL with 85% blasts and hypercellular marrow. IHC shows CD10+, CD34+. 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 Chromosomes - pending.   - Pt consented to HMTB; BMBx at dx was dry tap d/t WBC burden. Will send peripheral blood samples on 8/22.   - CBC/TLS/DIC labs three times daily  - Initiated pre-phase steroids with Prednisone 60 mg x8/21; increased to 60 mg/m2 (140 mg) 8/22-8/24. Transition to protocol-directed Dex 40 mg on 8/25.   - Message sent to BMT intake team on 8/23.   - Plan to arrange for Clonoseq ID testing to help estimate MRD in future assessments. Clonal testing collected and sent on 8/23. Plan to order Clonoseq MRD testing with post-induction BMBx.   - Case discussed at Heme malignancy conference on 8/23, per consensus plan is to start GRAALL + Imatinib on 8/24. No clear indication for addition of Rituximab.    - Unsuccessful attempt at LP with IT chemo on 8/25 with CAPS team at bedside. Completed in XR on 8/26 - flow pending. Will need future LPs scheduled in XR.   - Of note, if CNS is detected, Dr. Hull recommends starting Dasatinib 140 mg daily in place of Imatinib.   - Pt expresses anxiety surrounding procedures - pre-medication with Ativan +/- Atarax available prior prn.   - Has required multiple units of Plt prior to procedure    Treatment Plan: GRAALL + Imatinib (D1=8/24/22)  - Imatinib 400 mg BID - D1-28  - Vincristine 2 mg IV x1 - D1, 8, 15, 22  - Dexamethasone 40 mg daily - D2 (holding D1, instead given pre-phase Pred as above), 8-9, 15-16, 22-23  - Triple IT chemo (Cytarabine, Solu-Cortef, MTX); D1, 8, 15  - Neupogen - D15    # Risk of TLS/DIC  # Hyperphosphatemia  Uric acid was mildly elevated to 5.9 and LDH 1,943 on admission. Otherwise TLS labs wnl including Cr ~0.6-0.7. INR mildly elevated to 1.2, Fibrinogen 400-500. Phos mildly trending  up after starting chemo ? 5.0 on 8/25.   - Started Allopurinol 300 mg daily x8/19  - IVF at 100 ml/h (8/21-8/23) ? decreased to 50 ml/h x8/23, can increase as appropriate once chemo is initiated   - TLS labs daily   - Consider starting Phos-lo if Phos trends up further >5.0  - Phoslo TID x8/26    # Pancytopenia  2/2 to underlying disease  - Transfuse to maintain Hgb >7, Plt >10K (>50K prior to LPs)     ID  # PPx  - Viral serologies sent - HepB/C, HIV, CMV, HSV2 negative. HSV1+, EBV IgG+ (649).   - ACV 400mg BID  - Start Levaquin 250 mg daily x8/21  - Start Maude 50 mg daily x8/21 in anticipation of upcoming chemo; will need to look into coverage for Posa/Vori in coming days.      # H/o COVID19   Not vaccinated (due to fear of needles per her report). Pt reports testing postiive for COVID approx 1 month ago via home test. She never had it confirmed.   - COVID testing on admission was negative.     CV  # Hypertension  Fairly persistent elevated BP since admission to 150-160/80-90s. She was not on anti-hypertensive medication PTA.   - Start Amlodipine 5 mg daily (8/21-8/23); increased to 10 mg daily x8/23  - prn Hydralazine 10-20 mg available q6h for SBP>160     GI  # Intermittent constipation  - Senna 1-2 tabs BID, Miralax prn     NEURO  # Headaches, stable  Pt endorses intermittent HA since admission. Denies vision changes or other neurological sx. HTN as above.   - MRI brain completed 8/21 was negative for IC pathology or tumor involvement.   - Tylenol prn   - LP per protocol as above     MISC  # Intermittent hypophosphatemia  - Replete prn standing protocol     FEN  - Continuous IVF now discontinued, bolus prn   - lyte repletion per protocol, monitor closely given risk for TLS  - regular diet as tolerated     Lines/Drains: PICC placed, remove on discharge  DVT ppx: defer d/t TCP  GI ppx: PPI  Consults: PT  CODE: FULL  DISPO: Anticipate prolonged 4-6 week hospitalization for management of new leukemia.    Follow-up/Referrals: TBD. Dr. Hull has agreed to continue to follow pt in clinic.    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 Recinos.    Sri Poole PA-C  Hematology/Oncology  Pager: [phone]    >40 minutes spent on the date of the encounter. Over 50% of time was spent counseling the patient and/or coordinating care.     Interval History   Nursing notes reviewed. JOSHUA HIDALGO. LP with IT chemo completed successfully in XR yesterday. Pt tolerated procedure well. She did endorses mild HA last night which resolved with Tylenol. No new complaints this morning. Denies SOB, abdominal complaints or swelling. No blood products needed. Next chemo due mid-week next week. All questions answered.     A comprehensive review of systems was obtained and is negative other than noted here or in the HPI.     Physical Exam   Temp: 98  F (36.7  C) Temp src: Oral BP: 122/65 Pulse: 71   Resp: 18 SpO2: 97 % O2 Device: None (Room air)    Vitals:    08/25/22 1351 08/26/22 0915 08/27/22 1041   Weight: 111.2 kg (245 lb 2.4 oz) 110.1 kg (242 lb 11.2 oz) 110.9 kg (244 lb 6.4 oz)     Vital Signs with Ranges  Temp:  [96.9  F (36.1  C)-98.3  F (36.8  C)] 98  F (36.7  C)  Pulse:  [] 71  Resp:  [14-20] 18  BP: (122-148)/(61-74) 122/65  SpO2:  [96 %-99 %] 97 %  I/O last 3 completed shifts:  In: 385 [I.V.:165]  Out: 2750 [Urine:2750]    General: Awake and interactive. No acute distress. Pleasant female seen sitting upright comfortably in bed.    Skin: Petechiae to b/l ankles. Skin is otherwise warm, dry, and intact without rashes or lesions.   Head: Normocephalic and atraumatic.   HEENT: PERRLA. Sclera non-icteric. EOM intact. Oral  mucosa is pink and moist with no lesions, thrush, or exudates.   Lymph: Neck supple.   Cardiac: Regular rate and rhythm. Normal S1 and S2. No murmurs, rubs, or gallops.   Respiratory: No signs of respiratory distress or accessory muscle use. Lungs CTAB. No wheezes or crackles.   Abd: Soft, symmetric, and non-tender without distension. BS present.   Extremities: Trace b/l LE edema.   Neuro: A&Ox3 with normal speech. Memory and thought process preserved. No focal deficits. Moving extremities equally in bed, good ROM.   Psych: Appropriate mood and affect.     Medications     - MEDICATION INSTRUCTIONS -         acyclovir  400 mg Oral BID     allopurinol  300 mg Oral Daily     amLODIPine  10 mg Oral Daily     calcium acetate  1,334 mg Oral TID w/meals     heparin lock flush  5-20 mL Intracatheter Q24H     imatinib  400 mg Oral BID     levofloxacin  250 mg Oral Q24H     lidocaine (PF)  10 mL Subcutaneous Once     micafungin  50 mg Intravenous Q24H     pantoprazole  40 mg Oral QAM AC     senna-docusate  1-2 tablet Oral BID     sodium chloride (PF)  10-40 mL Intracatheter Q8H     Data   Results for orders placed or performed during the hospital encounter of 08/19/22 (from the past 24 hour(s))   Glucose CSF:   Result Value Ref Range    Glucose  (H) 40 - 70 mg/dL    Narrative    CSF glucose concentrations are about 60 percent of normal plasma glucose.   Protein total CSF:   Result Value Ref Range    Protein total CSF 46.8 (H) 15.0 - 45.0 mg/dL   CSF Cell Count with Differential:    Narrative    The following orders were created for panel order CSF Cell Count with Differential:.  Procedure                               Abnormality         Status                     ---------                               -----------         ------                     Cell Count CSF[[id number]]                                   Preliminary result           Please view results for these tests on the individual orders.   Cerebrospinal fluid  Aerobic Bacterial Culture Routine with Gram Stain    Specimen: Spinal Cord; Cerebrospinal fluid   Result Value Ref Range    Culture No growth, less than 1 day     Gram Stain Result No organisms seen     Gram Stain Result 1+ WBC seen     Narrative    Gram Stain quantification of host cells and microbiological organisms was done on a cytocentrifuged preparation.   Cell Count CSF   Result Value Ref Range    Tube Number 4     Color Colorless Colorless    Clarity Clear Clear    Total Nucleated Cells 2 0 - 5 /uL    RBC Count 0 0 - 2 /uL    Narrative    Too few cells to do differential.   XR Lumbar Punc Intrathecal Chemo Admin    Narrative    PROCEDURE: Lumbar Puncture using Fluoroscopy, 8/26/2022 3:37 PM    HISTORY:  ALL    COMPARISON: none    STAFF NEURORADIOLOGIST: Dr. Alfredo Euceda I, ALFREDO EUCEDA MD,  attest that I was present for all critical portions of the procedure  and was immediately available to provide guidance and assistance  during the remainder of the procedure.     FELLOW PHYSICIAN: Dr. Brent Gardner    TECHNIQUE: Verbal and written consent for lumbar puncture was obtained  from the patient, and benefits and risk of the procedure were  explained, including but not limited to worsening headache,  hemorrhage, infection, lower extremity pain, or nerve root injury. The  patient was sterilely prepped and draped with the patient in the prone  position, over the lower back. Under fluoroscopic guidance, the  interlaminar spaces were noted. 1% lidocaine was administered for  local anesthetic over the right L3-4 interlaminar space, and a 22  gauge 5 inch needle was advanced into the thecal sac under  fluoroscopic guidance.      There was initial show of clear CSF. Approximately 10 cc of CSF were  collected.    Hematology/Oncology then administered intrathecal chemotherapy, dose  and rate as determined by Heme/Onc.    The needle was removed with the stylet in place. There was no  immediate complication  associated with the procedure. Samples were  sent for the requested laboratory testing.      FLUORO TIME: 22 seconds low-dose pulsed    DOSE: 6.8 mGy      Impression    IMPRESSION: Successful lumbar puncture and intrathecal chemotherapy  administration without immediate complication.    PLAN: Patient discharged to inpatient unit in stable condition for  monitoring. Orders placed for 1 hour of bed rest, with patient laying  on the back and the head of the bed flat.    I have personally reviewed the examination and initial interpretation  and I agree with the findings.    ALFREDO EUCEDA MD         SYSTEM ID:  [id number]   Basic metabolic panel   Result Value Ref Range    Creatinine 0.85 0.51 - 0.95 mg/dL    Sodium 137 136 - 145 mmol/L    Potassium 4.6 3.4 - 5.3 mmol/L    Urea Nitrogen 25.9 (H) 6.0 - 20.0 mg/dL    Chloride 103 98 - 107 mmol/L    Carbon Dioxide (CO2) 25 22 - 29 mmol/L    Anion Gap 9 7 - 15 mmol/L    Glucose 151 (H) 70 - 99 mg/dL    GFR Estimate 87 >60 mL/min/1.73m2    Calcium 9.1 8.6 - 10.0 mg/dL   Fibrinogen activity   Result Value Ref Range    Fibrinogen Activity 285 170 - 490 mg/dL   INR   Result Value Ref Range    INR 1.16 (H) 0.85 - 1.15   Phosphorus   Result Value Ref Range    Phosphorus 5.5 (H) 2.5 - 4.5 mg/dL   Uric acid   Result Value Ref Range    Uric Acid 4.0 2.4 - 5.7 mg/dL   Lactic Acid STAT   Result Value Ref Range    Lactic Acid 1.5 0.7 - 2.0 mmol/L   CBC with platelets differential    Narrative    The following orders were created for panel order CBC with platelets differential.  Procedure                               Abnormality         Status                     ---------                               -----------         ------                     CBC with platelets and d...[[id number]]  Abnormal            Final result                 Please view results for these tests on the individual orders.   Lactate Dehydrogenase   Result Value Ref Range    Lactate Dehydrogenase 475 (H) 0 - 250  U/L   Hepatic panel   Result Value Ref Range    Protein Total 6.9 6.4 - 8.3 g/dL    Albumin 4.1 3.5 - 5.2 g/dL    Bilirubin Total 0.8 <=1.2 mg/dL    Alkaline Phosphatase 66 35 - 104 U/L    AST 16 10 - 35 U/L    ALT 38 (H) 10 - 35 U/L    Bilirubin Direct <0.20 0.00 - 0.30 mg/dL   Basic metabolic panel   Result Value Ref Range    Creatinine 0.81 0.51 - 0.95 mg/dL    Sodium 141 136 - 145 mmol/L    Potassium 4.5 3.4 - 5.3 mmol/L    Urea Nitrogen 24.5 (H) 6.0 - 20.0 mg/dL    Chloride 106 98 - 107 mmol/L    Carbon Dioxide (CO2) 27 22 - 29 mmol/L    Anion Gap 8 7 - 15 mmol/L    Glucose 143 (H) 70 - 99 mg/dL    GFR Estimate >90 >60 mL/min/1.73m2    Calcium 8.8 8.6 - 10.0 mg/dL   Fibrinogen activity   Result Value Ref Range    Fibrinogen Activity 242 170 - 490 mg/dL   INR   Result Value Ref Range    INR 1.17 (H) 0.85 - 1.15   Phosphorus   Result Value Ref Range    Phosphorus 4.6 (H) 2.5 - 4.5 mg/dL   Uric acid   Result Value Ref Range    Uric Acid 4.0 2.4 - 5.7 mg/dL   Magnesium   Result Value Ref Range    Magnesium 2.7 (H) 1.7 - 2.3 mg/dL   CBC with platelets and differential   Result Value Ref Range    WBC Count 2.0 (L) 4.0 - 11.0 10e3/uL    RBC Count 4.37 3.80 - 5.20 10e6/uL    Hemoglobin 12.6 11.7 - 15.7 g/dL    Hematocrit 37.4 35.0 - 47.0 %    MCV 86 78 - 100 fL    MCH 28.8 26.5 - 33.0 pg    MCHC 33.7 31.5 - 36.5 g/dL    RDW 13.7 10.0 - 15.0 %    Platelet Count 28 (LL) 150 - 450 10e3/uL    % Neutrophils 75 %    % Lymphocytes 19 %    % Monocytes 5 %    % Eosinophils 0 %    % Basophils 0 %    % Immature Granulocytes 1 %    NRBCs per 100 WBC 0 <1 /100    Absolute Neutrophils 1.5 (L) 1.6 - 8.3 10e3/uL    Absolute Lymphocytes 0.4 (L) 0.8 - 5.3 10e3/uL    Absolute Monocytes 0.1 0.0 - 1.3 10e3/uL    Absolute Eosinophils 0.0 0.0 - 0.7 10e3/uL    Absolute Basophils 0.0 0.0 - 0.2 10e3/uL    Absolute Immature Granulocytes 0.0 <=0.4 10e3/uL    Absolute NRBCs 0.0 10e3/uL

## 2022-08-27 NOTE — PLAN OF CARE
[id number]    Goal Outcome Evaluation:    VSS on RA, denies pain nausea or shortness of breath. Triggered sepsis, lactic acid @ 1.5. LP site dressing c/d/I. Reports no stools overnight, voiding adequately. Up ad stephanie, sleeping between cares.

## 2022-08-28 LAB
ALBUMIN SERPL BCG-MCNC: 3.8 G/DL (ref 3.5–5.2)
ALP SERPL-CCNC: 60 U/L (ref 35–104)
ALT SERPL W P-5'-P-CCNC: 29 U/L (ref 10–35)
ANION GAP SERPL CALCULATED.3IONS-SCNC: 9 MMOL/L (ref 7–15)
AST SERPL W P-5'-P-CCNC: 17 U/L (ref 10–35)
BASOPHILS # BLD AUTO: 0 10E3/UL (ref 0–0.2)
BASOPHILS NFR BLD AUTO: 0 %
BILIRUB SERPL-MCNC: 1 MG/DL
BUN SERPL-MCNC: 17.5 MG/DL (ref 6–20)
CALCIUM SERPL-MCNC: 8.6 MG/DL (ref 8.6–10)
CHLORIDE SERPL-SCNC: 103 MMOL/L (ref 98–107)
CREAT SERPL-MCNC: 0.76 MG/DL (ref 0.51–0.95)
DEPRECATED HCO3 PLAS-SCNC: 26 MMOL/L (ref 22–29)
EOSINOPHIL # BLD AUTO: 0 10E3/UL (ref 0–0.7)
EOSINOPHIL NFR BLD AUTO: 0 %
ERYTHROCYTE [DISTWIDTH] IN BLOOD BY AUTOMATED COUNT: 13.8 % (ref 10–15)
FIBRINOGEN PPP-MCNC: 206 MG/DL (ref 170–490)
GFR SERPL CREATININE-BSD FRML MDRD: >90 ML/MIN/1.73M2
GLUCOSE SERPL-MCNC: 100 MG/DL (ref 70–99)
HCT VFR BLD AUTO: 35.5 % (ref 35–47)
HGB BLD-MCNC: 11.4 G/DL (ref 11.7–15.7)
IMM GRANULOCYTES # BLD: 0 10E3/UL
IMM GRANULOCYTES NFR BLD: 1 %
INR PPP: 1.1 (ref 0.85–1.15)
INTERPRETATION: NORMAL
LDH SERPL L TO P-CCNC: 377 U/L (ref 0–250)
LYMPHOCYTES # BLD AUTO: 1.3 10E3/UL (ref 0.8–5.3)
LYMPHOCYTES NFR BLD AUTO: 62 %
MAGNESIUM SERPL-MCNC: 2.1 MG/DL (ref 1.7–2.3)
MCH RBC QN AUTO: 28.4 PG (ref 26.5–33)
MCHC RBC AUTO-ENTMCNC: 32.1 G/DL (ref 31.5–36.5)
MCV RBC AUTO: 89 FL (ref 78–100)
MONOCYTES # BLD AUTO: 0.1 10E3/UL (ref 0–1.3)
MONOCYTES NFR BLD AUTO: 4 %
NEUTROPHILS # BLD AUTO: 0.7 10E3/UL (ref 1.6–8.3)
NEUTROPHILS NFR BLD AUTO: 33 %
NRBC # BLD AUTO: 0 10E3/UL
NRBC BLD AUTO-RTO: 0 /100
PHOSPHATE SERPL-MCNC: 3.5 MG/DL (ref 2.5–4.5)
PLATELET # BLD AUTO: 25 10E3/UL (ref 150–450)
POTASSIUM SERPL-SCNC: 4.2 MMOL/L (ref 3.4–5.3)
PROT SERPL-MCNC: 6.3 G/DL (ref 6.4–8.3)
RBC # BLD AUTO: 4.01 10E6/UL (ref 3.8–5.2)
SODIUM SERPL-SCNC: 138 MMOL/L (ref 136–145)
URATE SERPL-MCNC: 2.7 MG/DL (ref 2.4–5.7)
WBC # BLD AUTO: 2.1 10E3/UL (ref 4–11)

## 2022-08-28 PROCEDURE — 85610 PROTHROMBIN TIME: CPT | Performed by: PHYSICIAN ASSISTANT

## 2022-08-28 PROCEDURE — 83615 LACTATE (LD) (LDH) ENZYME: CPT | Performed by: PHYSICIAN ASSISTANT

## 2022-08-28 PROCEDURE — 250N000013 HC RX MED GY IP 250 OP 250 PS 637: Performed by: PHYSICIAN ASSISTANT

## 2022-08-28 PROCEDURE — 250N000011 HC RX IP 250 OP 636: Performed by: STUDENT IN AN ORGANIZED HEALTH CARE EDUCATION/TRAINING PROGRAM

## 2022-08-28 PROCEDURE — 84100 ASSAY OF PHOSPHORUS: CPT | Performed by: PHYSICIAN ASSISTANT

## 2022-08-28 PROCEDURE — 258N000003 HC RX IP 258 OP 636: Performed by: STUDENT IN AN ORGANIZED HEALTH CARE EDUCATION/TRAINING PROGRAM

## 2022-08-28 PROCEDURE — 83735 ASSAY OF MAGNESIUM: CPT | Performed by: PHYSICIAN ASSISTANT

## 2022-08-28 PROCEDURE — 85025 COMPLETE CBC W/AUTO DIFF WBC: CPT | Performed by: PHYSICIAN ASSISTANT

## 2022-08-28 PROCEDURE — 36592 COLLECT BLOOD FROM PICC: CPT | Performed by: PHYSICIAN ASSISTANT

## 2022-08-28 PROCEDURE — 84550 ASSAY OF BLOOD/URIC ACID: CPT | Performed by: PHYSICIAN ASSISTANT

## 2022-08-28 PROCEDURE — 80053 COMPREHEN METABOLIC PANEL: CPT | Performed by: PHYSICIAN ASSISTANT

## 2022-08-28 PROCEDURE — 120N000002 HC R&B MED SURG/OB UMMC

## 2022-08-28 PROCEDURE — 250N000013 HC RX MED GY IP 250 OP 250 PS 637: Performed by: STUDENT IN AN ORGANIZED HEALTH CARE EDUCATION/TRAINING PROGRAM

## 2022-08-28 PROCEDURE — 250N000011 HC RX IP 250 OP 636: Performed by: PHYSICIAN ASSISTANT

## 2022-08-28 PROCEDURE — 99233 SBSQ HOSP IP/OBS HIGH 50: CPT | Mod: FS | Performed by: STUDENT IN AN ORGANIZED HEALTH CARE EDUCATION/TRAINING PROGRAM

## 2022-08-28 PROCEDURE — 99356 PR PROLONGED SERV,INPATIENT,1ST HR: CPT | Mod: FS | Performed by: STUDENT IN AN ORGANIZED HEALTH CARE EDUCATION/TRAINING PROGRAM

## 2022-08-28 PROCEDURE — 85384 FIBRINOGEN ACTIVITY: CPT | Performed by: PHYSICIAN ASSISTANT

## 2022-08-28 PROCEDURE — 250N000013 HC RX MED GY IP 250 OP 250 PS 637: Performed by: INTERNAL MEDICINE

## 2022-08-28 RX ADMIN — AMLODIPINE BESYLATE 10 MG: 10 TABLET ORAL at 09:09

## 2022-08-28 RX ADMIN — ALTEPLASE 2 MG: 2.2 INJECTION, POWDER, LYOPHILIZED, FOR SOLUTION INTRAVENOUS at 14:34

## 2022-08-28 RX ADMIN — ACYCLOVIR 400 MG: 400 TABLET ORAL at 09:09

## 2022-08-28 RX ADMIN — MICAFUNGIN 50 MG: 10 INJECTION, POWDER, LYOPHILIZED, FOR SOLUTION INTRAVENOUS at 11:56

## 2022-08-28 RX ADMIN — Medication 5 ML: at 21:52

## 2022-08-28 RX ADMIN — LEVOFLOXACIN 250 MG: 250 TABLET, FILM COATED ORAL at 15:26

## 2022-08-28 RX ADMIN — ALLOPURINOL 300 MG: 300 TABLET ORAL at 09:10

## 2022-08-28 RX ADMIN — ACYCLOVIR 400 MG: 400 TABLET ORAL at 20:45

## 2022-08-28 RX ADMIN — PANTOPRAZOLE SODIUM 40 MG: 40 TABLET, DELAYED RELEASE ORAL at 09:10

## 2022-08-28 RX ADMIN — Medication 5 ML: at 06:48

## 2022-08-28 RX ADMIN — CALCIUM ACETATE 1334 MG: 667 CAPSULE ORAL at 13:20

## 2022-08-28 RX ADMIN — IMATINIB MESYLATE 400 MG: 400 TABLET, FILM COATED ORAL at 09:10

## 2022-08-28 RX ADMIN — SENNOSIDES AND DOCUSATE SODIUM 1 TABLET: 8.6; 5 TABLET ORAL at 20:45

## 2022-08-28 RX ADMIN — Medication 5 ML: at 13:12

## 2022-08-28 RX ADMIN — SENNOSIDES AND DOCUSATE SODIUM 1 TABLET: 8.6; 5 TABLET ORAL at 09:10

## 2022-08-28 RX ADMIN — CALCIUM ACETATE 1334 MG: 667 CAPSULE ORAL at 20:44

## 2022-08-28 RX ADMIN — IMATINIB MESYLATE 400 MG: 400 TABLET, FILM COATED ORAL at 20:42

## 2022-08-28 RX ADMIN — ONDANSETRON 4 MG: 2 INJECTION INTRAMUSCULAR; INTRAVENOUS at 21:48

## 2022-08-28 RX ADMIN — CALCIUM ACETATE 1334 MG: 667 CAPSULE ORAL at 09:10

## 2022-08-28 ASSESSMENT — ACTIVITIES OF DAILY LIVING (ADL)
ADLS_ACUITY_SCORE: 22

## 2022-08-28 NOTE — PLAN OF CARE
[id number] hours: Afebrile, vital signs stable. On room air with adequate oxygenation. Denies pain or nausea.  Eating and drinking a fair amount. Voiding adequately. LBM 8/26. LP site bruised, open to air. No drainage.

## 2022-08-28 NOTE — PLAN OF CARE
Goal Outcome Evaluation:  [id number]    VSS on RA, denies pain, nausea, and shortness of breath. No headache. Reports no stools overnight, voiding adequately. No acute events.

## 2022-08-28 NOTE — PROGRESS NOTES
[facility]    Hematology / Oncology Progress Note    Date of Service (when I saw the patient): 08/28/2022     Assessment & Plan   Vashti Villegas is a 42 year old female who was admitted on 8/19/2022 with hyperleukocytosis (WBC 109K), abdominal pain and 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 Pre-phase steroids initiated (8/21-8/24) and WBC is trending down slowly. Initiated GRAALL + Imatinib on 8/24.     TODAY:   - D5 GRAALL + Imatinib. Chemo next due on D8 (8/31)  - Follow-up CSF flow from 8/26 - pending   - TLS labs stable, following daily. Consider stopping Phoslo tomorrow.   - Bowel regimen: Senna BID, Miralax prn   - Continue with best supportive cares.       HEME  # Ph+ B-ALL  # Leukocytosis, improving  Patient presented to outside hospital with c/o Right upper quadrant pain and was subsequently found to have a markedly elevated white blood cell count concerning for acute leukemia. While at OSH, she had CT PE protocol as well as CT A/P. These identified no PE, no acute or localized intraabdominal inflammatory process, mild splenomegaly, and few inconspicous low-density structures in the liver. She was transferred to [facility] for further workup and management. Labs upon presentation showed WBC 109K (ANC 15.3, 85% other cells), Hgb 13.2, Plt 73.  A uric acid level of 5.9, alkaline phosphatase of 131, AST 73, INR 1.19. Elevated uric acid may be secondary to tumor lysis, while elevated liver enzymes may be secondary to leukemic involvement.    - s/p Hydrea 1g TID (8/21-8/23) with improvement in WBC (100K ? 12K)  - EKG shows nl sinus rhythm, QTc 440. ECHO with EF 65-70%.   - PICC line placed, plan to remove at discharge  - CT CAP completed at OSH. CT neck 8/22 showed multiple, b/l suspicious-appearing LN (R level Ia, L level III, R level II-III) - not enlarged though they exhibit abnormal shapes.   - MR brain (8/21) - no acute IC  pathology, no e/o intracranial CNS involvement.  - BMBx completed 8/20 - demonstrates B-ALL with 85% blasts and hypercellular marrow. IHC shows CD10+, CD34+. 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 Chromosomes - pending.   - Pt consented to HMTB; BMBx at dx was dry tap d/t WBC burden. Will send peripheral blood samples on 8/22.   - CBC/TLS/DIC labs three times daily  - Initiated pre-phase steroids with Prednisone 60 mg x8/21; increased to 60 mg/m2 (140 mg) 8/22-8/24. Transition to protocol-directed Dex 40 mg on 8/25.   - Message sent to BMT intake team on 8/23.   - Plan to arrange for Clonoseq ID testing to help estimate MRD in future assessments. Clonal testing collected and sent on 8/23. Plan to order Clonoseq MRD testing with post-induction BMBx.   - Case discussed at Heme malignancy conference on 8/23, per consensus plan is to start GRAALL + Imatinib on 8/24. No clear indication for addition of Rituximab.    - Unsuccessful attempt at LP with IT chemo on 8/25 with CAPS team at bedside. Completed in XR on 8/26 - flow pending. Will need future LPs scheduled in XR, next due on 8/31.   - Of note, if CNS is detected, Dr. Hull recommends starting Dasatinib 140 mg daily in place of Imatinib.   - Pt expresses anxiety surrounding procedures - pre-medication with Ativan +/- Atarax available prior prn.   - Has required multiple units of Plt prior to procedure    Treatment Plan: GRAALL + Imatinib (D1=8/24/22)  - Imatinib 400 mg BID - D1-28  - Vincristine 2 mg IV x1 - D1, 8, 15, 22  - Dexamethasone 40 mg daily - D2 (holding D1, instead given pre-phase Pred as above), 8-9, 15-16, 22-23  - Triple IT chemo (Cytarabine, Solu-Cortef, MTX); D1, 8, 15  - Neupogen - D15    # Risk of TLS/DIC  # Hyperphosphatemia, improving  Uric acid was mildly elevated to 5.9 and LDH 1,943 on admission. Otherwise TLS labs wnl including Cr ~0.6-0.7. INR mildly elevated to 1.2, Fibrinogen 400-500. Phos mildly  trending up after starting chemo ? 5.0 on 8/25.   - Started Allopurinol 300 mg daily x8/19  - IVF at 100 ml/h (8/21-8/23) ? decreased to 50 ml/h x8/23, can increase as appropriate once chemo is initiated   - TLS labs daily   - Phoslo TID x8/26, consider stopping 8/29 if Phos remains wnl    # Pancytopenia  2/2 to underlying disease  - Transfuse to maintain Hgb >7, Plt >10K (>50K prior to LPs)     ID  # PPx  - Viral serologies sent - HepB/C, HIV, CMV, HSV2 negative. HSV1+, EBV IgG+ (649).   - ACV 400mg BID  - Start Levaquin 250 mg daily x8/21  - Start Maude 50 mg daily x8/21 in anticipation of upcoming chemo; will need to look into coverage for Posa/Vori      # H/o COVID19   Not vaccinated (due to fear of needles per her report). Pt reports testing postiive for COVID approx 1 month ago via home test. She never had it confirmed.   - COVID testing on admission was negative.     CV  # Hypertension, improving  Fairly persistent elevated BP since admission to 150-160/80-90s. She was not on anti-hypertensive medication PTA.   - Start Amlodipine 5 mg daily (8/21-8/23); increased to 10 mg daily x8/23 - held parameters in place if SBP<110  - prn Hydralazine 10-20 mg available q6h for SBP>160     GI  # Intermittent constipation  - Senna 1-2 tabs BID, Miralax prn     NEURO  # Headaches, stable  Pt endorses intermittent HA since admission. Denies vision changes or other neurological sx. HTN as above.   - MRI brain completed 8/21 was negative for IC pathology or tumor involvement.   - Tylenol prn   - LP per protocol as above     MISC  # Intermittent hypophosphatemia  - Replete prn standing protocol     FEN  - Continuous IVF now discontinued, bolus prn   - lyte repletion per protocol, monitor closely given risk for TLS  - regular diet as tolerated     Lines/Drains: PICC placed, remove on discharge  DVT ppx: defer d/t TCP  GI ppx: PPI  Consults: PT  CODE: FULL  DISPO: Anticipate prolonged 4-6 week hospitalization for management of  new leukemia.   Follow-up/Referrals: TBD. Dr. Hull has agreed to continue to follow pt in clinic.    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 Recinos.    Sri Poole PA-C  Hematology/Oncology  Pager: [phone]    >45 minutes spent on the date of the encounter. Over 50% of time was spent counseling the patient and/or coordinating care.     Interval History   NAEO, no BM for several days. Flow from LP on Friday still pending. This morning April feels well and denies specific complaints. She is starting to get a little bored, discussed that next chemo and procedure will be due on Wednesday. Has been having 1 BM/day on average. Denies abdominal pain, SOB or swelling. Likely able to stop Phoslo in coming days. No blood products needed today. All questions answered.     A comprehensive review of systems was obtained and is negative other than noted here or in the HPI.     Physical Exam   Temp: 98.9  F (37.2  C) Temp src: Temporal BP: 99/63 Pulse: 90   Resp: 16 SpO2: 98 % O2 Device: None (Room air)    Vitals:    08/25/22 1351 08/26/22 0915 08/27/22 1041   Weight: 111.2 kg (245 lb 2.4 oz) 110.1 kg (242 lb 11.2 oz) 110.9 kg (244 lb 6.4 oz)     Vital Signs with Ranges  Temp:  [96.4  F (35.8  C)-98.9  F (37.2  C)] 98.9  F (37.2  C)  Pulse:  [70-93] 90  Resp:  [15-18] 16  BP: ()/(63-71) 99/63  SpO2:  [97 %-98 %] 98 %  I/O last 3 completed shifts:  In: 110 [I.V.:110]  Out: 1075 [Urine:1075]    General: Awake and interactive. No acute distress. Pleasant female seen sitting upright comfortably in bed.    Skin: Petechiae to b/l ankles. Skin is otherwise warm, dry, and intact without rashes or lesions.   Head:  Normocephalic and atraumatic.   HEENT: PERRLA. Sclera non-icteric. EOM intact. Oral mucosa is pink and moist with no lesions, thrush, or exudates.   Lymph: Neck supple.   Cardiac: Regular rate and rhythm. Normal S1 and S2. No murmurs, rubs, or gallops.   Respiratory: No signs of respiratory distress or accessory muscle use. Lungs CTAB. No wheezes or crackles.   Abd: Soft, symmetric, and non-tender without distension. BS present.   Extremities: Trace b/l LE edema.   Neuro: A&Ox3 with normal speech. Memory and thought process preserved. No focal deficits. Moving extremities equally in bed, good ROM.   Psych: Appropriate mood and affect.     Medications     - MEDICATION INSTRUCTIONS -         acyclovir  400 mg Oral BID     allopurinol  300 mg Oral Daily     amLODIPine  10 mg Oral Daily     calcium acetate  1,334 mg Oral TID w/meals     heparin lock flush  5-20 mL Intracatheter Q24H     imatinib  400 mg Oral BID     levofloxacin  250 mg Oral Q24H     lidocaine (PF)  10 mL Subcutaneous Once     micafungin  50 mg Intravenous Q24H     pantoprazole  40 mg Oral QAM AC     senna-docusate  1-2 tablet Oral BID     sodium chloride (PF)  10-40 mL Intracatheter Q8H     Data   Results for orders placed or performed during the hospital encounter of 08/19/22 (from the past 24 hour(s))   CBC with platelets differential    Narrative    The following orders were created for panel order CBC with platelets differential.  Procedure                               Abnormality         Status                     ---------                               -----------         ------                     CBC with platelets and d...[[id number]]                      In process                   Please view results for these tests on the individual orders.   Magnesium   Result Value Ref Range    Magnesium 2.1 1.7 - 2.3 mg/dL   Comprehensive metabolic panel   Result Value Ref Range    Sodium 138 136 - 145 mmol/L    Potassium 4.2 3.4 - 5.3 mmol/L     Creatinine 0.76 0.51 - 0.95 mg/dL    Urea Nitrogen 17.5 6.0 - 20.0 mg/dL    Chloride 103 98 - 107 mmol/L    Carbon Dioxide (CO2) 26 22 - 29 mmol/L    Anion Gap 9 7 - 15 mmol/L    Glucose 100 (H) 70 - 99 mg/dL    Calcium 8.6 8.6 - 10.0 mg/dL    Protein Total 6.3 (L) 6.4 - 8.3 g/dL    Albumin 3.8 3.5 - 5.2 g/dL    Bilirubin Total 1.0 <=1.2 mg/dL    Alkaline Phosphatase 60 35 - 104 U/L    AST 17 10 - 35 U/L    ALT 29 10 - 35 U/L    GFR Estimate >90 >60 mL/min/1.73m2   Fibrinogen activity   Result Value Ref Range    Fibrinogen Activity 206 170 - 490 mg/dL   INR   Result Value Ref Range    INR 1.10 0.85 - 1.15   Phosphorus   Result Value Ref Range    Phosphorus 3.5 2.5 - 4.5 mg/dL   Uric acid   Result Value Ref Range    Uric Acid 2.7 2.4 - 5.7 mg/dL

## 2022-08-28 NOTE — PLAN OF CARE
"3457-5851 hours: Afebrile, vital signs stable. On room air with adequate oxygenation. Murmur noted with auscultation this morning, patient was asymptomatic at the time and murmur noted earlier this week. Complaints of chest palpitation this afternoon, MD was rounding on patient at the time. Murmur that was noted earlier this morning was not noted at the time of symptoms. Symptoms resolved within 20 minutes. Eating a fair amount of food. Drinking fluids with encouragement only. Patient complained of a \"heavy head\" when walking this afternoon. Voiding adequate amounts of clear, cam urine. Last bowel movement was 8/27. Both lumens of PICC without blood return this afternoon. Alteplase administered with brisk blood return via both lumens.  "

## 2022-08-29 LAB
ALBUMIN SERPL BCG-MCNC: 3.7 G/DL (ref 3.5–5.2)
ALP SERPL-CCNC: 60 U/L (ref 35–104)
ALT SERPL W P-5'-P-CCNC: 29 U/L (ref 10–35)
ANION GAP SERPL CALCULATED.3IONS-SCNC: 8 MMOL/L (ref 7–15)
APPEARANCE CSF: CLEAR
AST SERPL W P-5'-P-CCNC: 20 U/L (ref 10–35)
BASOPHILS # BLD AUTO: 0 10E3/UL (ref 0–0.2)
BASOPHILS NFR BLD AUTO: 1 %
BILIRUB SERPL-MCNC: 1.1 MG/DL
BUN SERPL-MCNC: 16.2 MG/DL (ref 6–20)
CALCIUM SERPL-MCNC: 8.8 MG/DL (ref 8.6–10)
CHLORIDE SERPL-SCNC: 102 MMOL/L (ref 98–107)
COLOR CSF: COLORLESS
CREAT SERPL-MCNC: 0.82 MG/DL (ref 0.51–0.95)
DEPRECATED HCO3 PLAS-SCNC: 27 MMOL/L (ref 22–29)
EOSINOPHIL # BLD AUTO: 0 10E3/UL (ref 0–0.7)
EOSINOPHIL NFR BLD AUTO: 1 %
ERYTHROCYTE [DISTWIDTH] IN BLOOD BY AUTOMATED COUNT: 13.7 % (ref 10–15)
FIBRINOGEN PPP-MCNC: 205 MG/DL (ref 170–490)
GFR SERPL CREATININE-BSD FRML MDRD: >90 ML/MIN/1.73M2
GLUCOSE SERPL-MCNC: 106 MG/DL (ref 70–99)
HCT VFR BLD AUTO: 33.3 % (ref 35–47)
HGB BLD-MCNC: 11.1 G/DL (ref 11.7–15.7)
IMM GRANULOCYTES # BLD: 0 10E3/UL
IMM GRANULOCYTES NFR BLD: 1 %
INR PPP: 1.09 (ref 0.85–1.15)
LACTATE SERPL-SCNC: 1.6 MMOL/L (ref 0.7–2)
LDH SERPL L TO P-CCNC: 320 U/L (ref 0–250)
LYMPHOCYTES # BLD AUTO: 1.6 10E3/UL (ref 0.8–5.3)
LYMPHOCYTES NFR BLD AUTO: 70 %
MAGNESIUM SERPL-MCNC: 1.9 MG/DL (ref 1.7–2.3)
MCH RBC QN AUTO: 29.1 PG (ref 26.5–33)
MCHC RBC AUTO-ENTMCNC: 33.3 G/DL (ref 31.5–36.5)
MCV RBC AUTO: 87 FL (ref 78–100)
MONOCYTES # BLD AUTO: 0.1 10E3/UL (ref 0–1.3)
MONOCYTES NFR BLD AUTO: 3 %
NEUTROPHILS # BLD AUTO: 0.5 10E3/UL (ref 1.6–8.3)
NEUTROPHILS NFR BLD AUTO: 24 %
NRBC # BLD AUTO: 0 10E3/UL
NRBC BLD AUTO-RTO: 0 /100
PATH REPORT.COMMENTS IMP SPEC: NORMAL
PATH REPORT.FINAL DX SPEC: NORMAL
PATH REPORT.MICROSCOPIC SPEC OTHER STN: NORMAL
PATH REPORT.RELEVANT HX SPEC: NORMAL
PATH REV: NORMAL
PHOSPHATE SERPL-MCNC: 4.8 MG/DL (ref 2.5–4.5)
PLATELET # BLD AUTO: 12 10E3/UL (ref 150–450)
POTASSIUM SERPL-SCNC: 4.3 MMOL/L (ref 3.4–5.3)
PROT SERPL-MCNC: 6.1 G/DL (ref 6.4–8.3)
RBC # BLD AUTO: 3.81 10E6/UL (ref 3.8–5.2)
RBC # CSF MANUAL: 0 /UL (ref 0–2)
SODIUM SERPL-SCNC: 137 MMOL/L (ref 136–145)
TUBE # CSF: 4
URATE SERPL-MCNC: 2.5 MG/DL (ref 2.4–5.7)
WBC # BLD AUTO: 2.2 10E3/UL (ref 4–11)
WBC # CSF MANUAL: 2 /UL (ref 0–5)

## 2022-08-29 PROCEDURE — 84100 ASSAY OF PHOSPHORUS: CPT | Performed by: PHYSICIAN ASSISTANT

## 2022-08-29 PROCEDURE — 250N000011 HC RX IP 250 OP 636: Performed by: STUDENT IN AN ORGANIZED HEALTH CARE EDUCATION/TRAINING PROGRAM

## 2022-08-29 PROCEDURE — 250N000009 HC RX 250: Performed by: PHYSICIAN ASSISTANT

## 2022-08-29 PROCEDURE — 94640 AIRWAY INHALATION TREATMENT: CPT

## 2022-08-29 PROCEDURE — 99356 PR PROLONGED SERV,INPATIENT,1ST HR: CPT | Performed by: STUDENT IN AN ORGANIZED HEALTH CARE EDUCATION/TRAINING PROGRAM

## 2022-08-29 PROCEDURE — 250N000013 HC RX MED GY IP 250 OP 250 PS 637: Performed by: PHYSICIAN ASSISTANT

## 2022-08-29 PROCEDURE — 83735 ASSAY OF MAGNESIUM: CPT | Performed by: PHYSICIAN ASSISTANT

## 2022-08-29 PROCEDURE — 85384 FIBRINOGEN ACTIVITY: CPT | Performed by: PHYSICIAN ASSISTANT

## 2022-08-29 PROCEDURE — 250N000013 HC RX MED GY IP 250 OP 250 PS 637: Performed by: INTERNAL MEDICINE

## 2022-08-29 PROCEDURE — 99233 SBSQ HOSP IP/OBS HIGH 50: CPT | Mod: FS | Performed by: STUDENT IN AN ORGANIZED HEALTH CARE EDUCATION/TRAINING PROGRAM

## 2022-08-29 PROCEDURE — 999N000157 HC STATISTIC RCP TIME EA 10 MIN

## 2022-08-29 PROCEDURE — 85610 PROTHROMBIN TIME: CPT | Performed by: PHYSICIAN ASSISTANT

## 2022-08-29 PROCEDURE — 80053 COMPREHEN METABOLIC PANEL: CPT | Performed by: PHYSICIAN ASSISTANT

## 2022-08-29 PROCEDURE — 250N000013 HC RX MED GY IP 250 OP 250 PS 637: Performed by: STUDENT IN AN ORGANIZED HEALTH CARE EDUCATION/TRAINING PROGRAM

## 2022-08-29 PROCEDURE — 83605 ASSAY OF LACTIC ACID: CPT | Performed by: STUDENT IN AN ORGANIZED HEALTH CARE EDUCATION/TRAINING PROGRAM

## 2022-08-29 PROCEDURE — 85025 COMPLETE CBC W/AUTO DIFF WBC: CPT | Performed by: PHYSICIAN ASSISTANT

## 2022-08-29 PROCEDURE — 84550 ASSAY OF BLOOD/URIC ACID: CPT | Performed by: PHYSICIAN ASSISTANT

## 2022-08-29 PROCEDURE — 120N000002 HC R&B MED SURG/OB UMMC

## 2022-08-29 PROCEDURE — 258N000003 HC RX IP 258 OP 636: Performed by: STUDENT IN AN ORGANIZED HEALTH CARE EDUCATION/TRAINING PROGRAM

## 2022-08-29 PROCEDURE — 83615 LACTATE (LD) (LDH) ENZYME: CPT | Performed by: PHYSICIAN ASSISTANT

## 2022-08-29 PROCEDURE — 250N000011 HC RX IP 250 OP 636: Performed by: PHYSICIAN ASSISTANT

## 2022-08-29 PROCEDURE — 36592 COLLECT BLOOD FROM PICC: CPT | Performed by: PHYSICIAN ASSISTANT

## 2022-08-29 RX ORDER — PENTAMIDINE ISETHIONATE 300 MG/300MG
300 INHALANT RESPIRATORY (INHALATION)
Status: DISCONTINUED | OUTPATIENT
Start: 2022-08-29 | End: 2022-09-14 | Stop reason: HOSPADM

## 2022-08-29 RX ORDER — ALBUTEROL SULFATE 0.83 MG/ML
2.5 SOLUTION RESPIRATORY (INHALATION)
Status: DISCONTINUED | OUTPATIENT
Start: 2022-08-29 | End: 2022-09-14 | Stop reason: HOSPADM

## 2022-08-29 RX ORDER — FLUORIDE TOOTHPASTE
5-10 TOOTHPASTE DENTAL 4 TIMES DAILY
Status: DISCONTINUED | OUTPATIENT
Start: 2022-08-29 | End: 2022-08-31

## 2022-08-29 RX ADMIN — ACYCLOVIR 400 MG: 400 TABLET ORAL at 20:03

## 2022-08-29 RX ADMIN — MICAFUNGIN 50 MG: 10 INJECTION, POWDER, LYOPHILIZED, FOR SOLUTION INTRAVENOUS at 12:09

## 2022-08-29 RX ADMIN — PANTOPRAZOLE SODIUM 40 MG: 40 TABLET, DELAYED RELEASE ORAL at 09:05

## 2022-08-29 RX ADMIN — ACYCLOVIR 400 MG: 400 TABLET ORAL at 09:05

## 2022-08-29 RX ADMIN — IMATINIB MESYLATE 400 MG: 400 TABLET, FILM COATED ORAL at 20:02

## 2022-08-29 RX ADMIN — PENTAMIDINE ISETHIONATE 300 MG: 300 INHALANT RESPIRATORY (INHALATION) at 14:22

## 2022-08-29 RX ADMIN — ALBUTEROL SULFATE 2.5 MG: 2.5 SOLUTION RESPIRATORY (INHALATION) at 14:22

## 2022-08-29 RX ADMIN — Medication 10 ML: at 12:09

## 2022-08-29 RX ADMIN — IMATINIB MESYLATE 400 MG: 400 TABLET, FILM COATED ORAL at 09:06

## 2022-08-29 RX ADMIN — Medication 10 ML: at 21:26

## 2022-08-29 RX ADMIN — Medication 3 MG: at 02:19

## 2022-08-29 RX ADMIN — Medication 3 MG: at 21:26

## 2022-08-29 RX ADMIN — ALLOPURINOL 300 MG: 300 TABLET ORAL at 09:05

## 2022-08-29 RX ADMIN — LEVOFLOXACIN 250 MG: 250 TABLET, FILM COATED ORAL at 14:17

## 2022-08-29 RX ADMIN — AMLODIPINE BESYLATE 10 MG: 10 TABLET ORAL at 09:05

## 2022-08-29 RX ADMIN — ONDANSETRON 4 MG: 4 TABLET, ORALLY DISINTEGRATING ORAL at 02:19

## 2022-08-29 RX ADMIN — SENNOSIDES AND DOCUSATE SODIUM 2 TABLET: 8.6; 5 TABLET ORAL at 20:03

## 2022-08-29 RX ADMIN — CALCIUM ACETATE 1334 MG: 667 CAPSULE ORAL at 16:46

## 2022-08-29 RX ADMIN — Medication 5 ML: at 13:25

## 2022-08-29 RX ADMIN — CALCIUM ACETATE 1334 MG: 667 CAPSULE ORAL at 14:17

## 2022-08-29 RX ADMIN — SENNOSIDES AND DOCUSATE SODIUM 2 TABLET: 8.6; 5 TABLET ORAL at 09:05

## 2022-08-29 RX ADMIN — CALCIUM ACETATE 1334 MG: 667 CAPSULE ORAL at 09:05

## 2022-08-29 ASSESSMENT — ACTIVITIES OF DAILY LIVING (ADL)
ADLS_ACUITY_SCORE: 22

## 2022-08-29 NOTE — PLAN OF CARE
[id number] AVSS on RA. No c/o pain. C/o sudden onset nausea; had small emesis before writer could get her IV zofran; gave prn IV zofran 4mg. No c/o of head heaviness or chest pain

## 2022-08-29 NOTE — PLAN OF CARE
[id number]    Tachycardic, OVSS, afebrile and on RA. Denies nausea/SOB. No pain reported, however stated she has some irritation/discomfort in her ears, declined pain medications. No numbness in L arm reported. Reports that she is still feeling fatigued from shower earlier today. Fair PO intake this evening, family brought in outside food. Pt having adequate UOP. LBM 8/27, gave scheduled 2 tablets of senna, declined miralax stating she would prefer to try in the morning. Gave 3 mg melatonin at bedtime. Continue to monitor & w/ POC.

## 2022-08-29 NOTE — PLAN OF CARE
"Afebrile, vital signs stable. On room air with adequate oxygenation. Denies pain, numbness of her arm or nausea, but endorses feeling \"run down.\" Constipated. Senna increased to 2 tablets today. Making an effort to eat, but is not hungry or thirsty. Biotene started for a dry mouth today. Voiding spontaneously. No reported bowel movement today.   "

## 2022-08-29 NOTE — PLAN OF CARE
Goal Outcome Evaluation:    [id number]    VSS on RA. Denies pain, headache, dizziness, & SOB. 4mg zofran given for mild nausea. C/o poor sleep, PRN melatonin given. Pt triggered sepsis for . LA 1.6.

## 2022-08-29 NOTE — PROGRESS NOTES
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 Pre-phase steroids initiated (8/21-8/24) and WBC is trending down slowly. Initiated GRAALL + Imatinib on 8/24.     TODAY:   - Day 6 GRAALL + Imatinib. Chemo next due on D8 (8/31)  -- next IT chemo planned for 8/31 at 11 AM in XR. Will need Plts bumped, plan to start early overnight 8/30-8/31  - CSF flow from 8/26 - negative. Returned later afternoon. Attempted to call pt on in-room phone but no answer.   - continue PhosLo today given fluctuating phos  - will need to start ppx Pentamidine with first dose inpatient. Discussed with pt and ordered today (8/29)  - discussed increasing senna up to 2 tabs, has miralax PRN if this does not result in BM however she is cautious not to overdo it   - pt reported Left ear pain to MD team today. Dr. Montalvo examined Left ear with otoscope, no acute findings. Continue to monitor symptoms.      HEME  # Ph+ B-ALL  Patient presented to outside hospital with c/o Right upper quadrant pain and was subsequently found to have a markedly elevated white blood cell count concerning for acute leukemia. While at OSH, she had CT PE protocol as well as CT A/P. These identified no PE, no acute or localized intraabdominal inflammatory process, mild splenomegaly, and few inconspicous low-density structures in the liver. She was transferred to [facility] for further workup and management.  - s/p Hydrea 1g TID (8/21-8/23) with improvement in WBC (100K ? 12K)  - CT CAP completed at OSH. CT neck 8/22 showed multiple, b/l suspicious-appearing LN (R level Ia, L level III, R level II-III) - not enlarged  though they exhibit abnormal shapes.   - MR brain (8/21) - no acute IC pathology, no e/o intracranial CNS involvement.  - BMBx completed 8/20 - demonstrated B-ALL with 85% blasts and hypercellular marrow. IHC shows CD10+, CD34+. 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 Abnormal cytogenetics.  - Pt consented to HMTB; BMBx at dx was dry tap d/t WBC burden. Ordered peripheral blood samples on 8/22.   - Initiated pre-phase steroids with Prednisone 60 mg x8/21; increased to 60 mg/m2 (140 mg) 8/22-8/24. Transition to protocol-directed Dex 40 mg on 8/25.   - Message sent to BMT intake team on 8/23. If no consult arranged by mid-week, will reach out to their team again.  - Plan to arrange for Clonoseq ID testing to help estimate MRD in future assessments. Clonal testing collected and sent on 8/23. Plan to order Clonoseq MRD testing with post-induction BMBx.   - Case discussed at Heme malignancy conference on 8/23, per consensus plan is to start GRAALL + Imatinib on 8/24. No clear indication for addition of Rituximab.    - Unsuccessful attempt at LP with IT chemo on 8/25 with CAPS team at bedside. Completed in XR on 8/26 - flow pending. Will need future LPs scheduled in XR, next due on 8/31. Working to schedule, will need Plt support prior to procedure.  - Of note, if CNS is detected, Dr. Hull recommends starting Dasatinib 140 mg daily in place of Imatinib.   - Pt expresses anxiety surrounding procedures - pre-medication with Ativan +/- Atarax available prior prn.   - Has required multiple units of Plt prior to procedure    Treatment Plan: GRAALL + Imatinib (D1=8/24/22)  - Imatinib 400 mg BID - D1-28  - Vincristine 2 mg IV x1 - D1, 8, 15, 22  - Dexamethasone 40 mg daily - D2 (holding D1, instead given pre-phase Pred as above), 8-9, 15-16, 22-23  - Triple IT chemo (Cytarabine, Solu-Cortef, MTX); D1, 8, 15  - Neupogen - D15    # Risk of TLS/DIC  # Hyperphosphatemia, intermittent  Uric  acid was mildly elevated to 5.9 and LDH 1,943 on admission. Otherwise TLS labs wnl including Cr ~0.6-0.7. INR mildly elevated to 1.2, Fibrinogen 400-500. Phos mildly up-trended after starting chemo.  - Allopurinol 300 mg daily (x8/19)  - s/p IVFs  - TLS labs daily   - Phoslo TID x8/26    # Pancytopenia  2/2 to underlying disease  - Transfuse to maintain Hgb >7, Plt >10K (>50K prior to LPs)     ID  # PPx  - ACV 400mg BID  - Levaquin 250 mg daily   - Maude 50 mg daily  - keep on Micafungin while inpatient given intermittent chemotherapy. Will hold off on pursuing vori/posa at this time. Pending when pt is able to discharge/counts at that time and/or development of any infectious complications, may reconsider. However, hopeful that we could transition to Fluconazole alone if needed until count recovery.      # H/o COVID19   Not vaccinated (due to fear of needles per pt report). Pt reported testing postiive for COVID approx 1 month prior to admission via home test. She never had it confirmed.   - COVID testing on admission was negative.     CV  # Hypertension, improving  Fairly persistent elevated BP since admission to 150-160/80-90s. She was not on anti-hypertensive medication PTA.   - Start Amlodipine 5 mg daily (8/21-8/23); increased to 10 mg daily (x8/23) - held parameters in place if SBP<110  - prn Hydralazine 10-20 mg available q6h for SBP>160     GI  # Intermittent constipation  - Senna 1-2 tabs BID, Miralax prn     NEURO  # Headaches, stable-improved  Pt endorsed intermittent HA since admission. Denies vision changes or other neurological sx. HTN as above.   - MRI brain completed 8/21 was negative for IC pathology or tumor involvement.   - Tylenol prn   - LP per protocol as above    # Intermittent L hand numbness  Pt endorsed to Dr. Recinos on 8/28. Reported as intermittent and not impacting motor skills.   - continue to monitor in context of Vincristine per trt plan     FEN  - bolus PRN  - lyte repletion per  protocol  - regular diet as tolerated     Lines/Drains: PICC in place, remove on discharge  DVT ppx: defer d/t TCP  GI ppx: PPI  Consults: PT  CODE: FULL  DISPO: Anticipate prolonged 4-6 week hospitalization for management of new leukemia.   Follow-up/Referrals: TBD. Dr. Hull has agreed to continue to follow pt in clinic.    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 Recinos.    Clau Castro PA-C  Hematology/Oncology  Pager: [phone]    I spent 40 minutes in the care of this patient today, which included time necessary for review of interval events, obtaining history and physical exam, ordering medication(s)/test(s) as medically indicated, discussion with interdisciplinary/consult team(s), and documentation time. Over 50% of time was spent face-to-face and/or coordinating care.      Interval History   No acute events overnight, afebrile. April is doing ok today. States she is starting to get a little uncomfortable through the hips and she thinks it is due to positioning in bed. No current HA or chest pain. Had spontaneous nausea with small emesis yesterday but this is really the first time she'd experienced this. None this AM. No BM yesterday. We discussed plan to increase her Senna from 1 tab to 2 tabs before adding in other bowel meds; she is cautious about going the other extreme. Denies mouth pain/sores but does endorse some dry mouth. Denies SOB or feeling of edema. Didn't do too much out of bed activity yesterday but states PT has been checking in with her. Appetite is not great but she is eating because she knows she needs to.       Physical Exam   Temp: (!) 95.8  F (35.4  C) (ice chips) Temp src: Oral  BP: 114/63 Pulse: 100   Resp: 20 SpO2: 94 % O2 Device: None (Room air)    Vitals:    08/27/22 1041 08/28/22 0921 08/29/22 0856   Weight: 110.9 kg (244 lb 6.4 oz) 110.5 kg (243 lb 9.7 oz) 109.6 kg (241 lb 9.6 oz)     Vital Signs with Ranges  Temp:  [95.8  F (35.4  C)-99.5  F (37.5  C)] 95.8  F (35.4  C)  Pulse:  [] 100  Resp:  [18-20] 20  BP: (114-127)/(54-68) 114/63  SpO2:  [94 %-99 %] 94 %  I/O last 3 completed shifts:  In: 100 [I.V.:100]  Out: 200 [Urine:200]    General: Awake and interactive. No acute distress. Pleasant female seen sitting upright comfortably in bed.    HEENT: NC/AT. Sclerae non-icteric. EOM intact. MMM.  Cardiac: Regular rate and rhythm. No murmur appreciated today.   Respiratory: No signs of respiratory distress or accessory muscle use. Lungs CTAB. No wheezes or crackles.   Abd: Normal BS. Abd soft, NT/ND.   Extremities: Trace b/l LE edema.   Neuro: A&Ox3 with normal speech. Memory and thought process preserved. No focal deficits. Moving extremities equally in bed.   Psych: Appropriate mood and affect.         Medications     - MEDICATION INSTRUCTIONS -         acyclovir  400 mg Oral BID     allopurinol  300 mg Oral Daily     amLODIPine  10 mg Oral Daily     calcium acetate  1,334 mg Oral TID w/meals     heparin lock flush  5-20 mL Intracatheter Q24H     imatinib  400 mg Oral BID     levofloxacin  250 mg Oral Q24H     lidocaine (PF)  10 mL Subcutaneous Once     micafungin  50 mg Intravenous Q24H     pantoprazole  40 mg Oral QAM AC     senna-docusate  1-2 tablet Oral BID     sodium chloride (PF)  10-40 mL Intracatheter Q8H     Data   Results for orders placed or performed during the hospital encounter of 08/19/22 (from the past 24 hour(s))   CBC with platelets differential    Narrative    The following orders were created for panel order CBC with platelets differential.  Procedure                               Abnormality         Status                     ---------                                -----------         ------                     CBC with platelets and d...[[id number]]  Abnormal            Final result                 Please view results for these tests on the individual orders.   Lactate Dehydrogenase   Result Value Ref Range    Lactate Dehydrogenase 320 (H) 0 - 250 U/L   Magnesium   Result Value Ref Range    Magnesium 1.9 1.7 - 2.3 mg/dL   Comprehensive metabolic panel   Result Value Ref Range    Sodium 137 136 - 145 mmol/L    Potassium 4.3 3.4 - 5.3 mmol/L    Creatinine 0.82 0.51 - 0.95 mg/dL    Urea Nitrogen 16.2 6.0 - 20.0 mg/dL    Chloride 102 98 - 107 mmol/L    Carbon Dioxide (CO2) 27 22 - 29 mmol/L    Anion Gap 8 7 - 15 mmol/L    Glucose 106 (H) 70 - 99 mg/dL    Calcium 8.8 8.6 - 10.0 mg/dL    Protein Total 6.1 (L) 6.4 - 8.3 g/dL    Albumin 3.7 3.5 - 5.2 g/dL    Bilirubin Total 1.1 <=1.2 mg/dL    Alkaline Phosphatase 60 35 - 104 U/L    AST 20 10 - 35 U/L    ALT 29 10 - 35 U/L    GFR Estimate >90 >60 mL/min/1.73m2   Fibrinogen activity   Result Value Ref Range    Fibrinogen Activity 205 170 - 490 mg/dL   INR   Result Value Ref Range    INR 1.09 0.85 - 1.15   Phosphorus   Result Value Ref Range    Phosphorus 4.8 (H) 2.5 - 4.5 mg/dL   Uric acid   Result Value Ref Range    Uric Acid 2.5 2.4 - 5.7 mg/dL   Lactic acid whole blood   Result Value Ref Range    Lactic Acid 1.6 0.7 - 2.0 mmol/L   CBC with platelets and differential   Result Value Ref Range    WBC Count 2.2 (L) 4.0 - 11.0 10e3/uL    RBC Count 3.81 3.80 - 5.20 10e6/uL    Hemoglobin 11.1 (L) 11.7 - 15.7 g/dL    Hematocrit 33.3 (L) 35.0 - 47.0 %    MCV 87 78 - 100 fL    MCH 29.1 26.5 - 33.0 pg    MCHC 33.3 31.5 - 36.5 g/dL    RDW 13.7 10.0 - 15.0 %    Platelet Count 12 (LL) 150 - 450 10e3/uL    % Neutrophils 24 %    % Lymphocytes 70 %    % Monocytes 3 %    % Eosinophils 1 %    % Basophils 1 %    % Immature Granulocytes 1 %    NRBCs per 100 WBC 0 <1 /100    Absolute Neutrophils 0.5 (L) 1.6 - 8.3 10e3/uL    Absolute Lymphocytes 1.6  0.8 - 5.3 10e3/uL    Absolute Monocytes 0.1 0.0 - 1.3 10e3/uL    Absolute Eosinophils 0.0 0.0 - 0.7 10e3/uL    Absolute Basophils 0.0 0.0 - 0.2 10e3/uL    Absolute Immature Granulocytes 0.0 <=0.4 10e3/uL    Absolute NRBCs 0.0 10e3/uL

## 2022-08-30 ENCOUNTER — APPOINTMENT (OUTPATIENT)
Dept: PHYSICAL THERAPY | Facility: CLINIC | Age: 42
End: 2022-08-30
Attending: INTERNAL MEDICINE
Payer: COMMERCIAL

## 2022-08-30 LAB
ANION GAP SERPL CALCULATED.3IONS-SCNC: 9 MMOL/L (ref 7–15)
BASOPHILS # BLD AUTO: 0 10E3/UL (ref 0–0.2)
BASOPHILS NFR BLD AUTO: 0 %
BLD PROD TYP BPU: NORMAL
BLD PROD TYP BPU: NORMAL
BLOOD COMPONENT TYPE: NORMAL
BLOOD COMPONENT TYPE: NORMAL
BUN SERPL-MCNC: 16.7 MG/DL (ref 6–20)
CALCIUM SERPL-MCNC: 8.6 MG/DL (ref 8.6–10)
CHLORIDE SERPL-SCNC: 101 MMOL/L (ref 98–107)
CODING SYSTEM: NORMAL
CODING SYSTEM: NORMAL
CREAT SERPL-MCNC: 0.76 MG/DL (ref 0.51–0.95)
DEPRECATED HCO3 PLAS-SCNC: 26 MMOL/L (ref 22–29)
EOSINOPHIL # BLD AUTO: 0 10E3/UL (ref 0–0.7)
EOSINOPHIL NFR BLD AUTO: 1 %
ERYTHROCYTE [DISTWIDTH] IN BLOOD BY AUTOMATED COUNT: 13.6 % (ref 10–15)
GFR SERPL CREATININE-BSD FRML MDRD: >90 ML/MIN/1.73M2
GLUCOSE SERPL-MCNC: 99 MG/DL (ref 70–99)
HCT VFR BLD AUTO: 31.3 % (ref 35–47)
HGB BLD-MCNC: 10.3 G/DL (ref 11.7–15.7)
IMM GRANULOCYTES # BLD: 0 10E3/UL
IMM GRANULOCYTES NFR BLD: 0 %
ISSUE DATE AND TIME: NORMAL
ISSUE DATE AND TIME: NORMAL
LYMPHOCYTES # BLD AUTO: 1.6 10E3/UL (ref 0.8–5.3)
LYMPHOCYTES NFR BLD AUTO: 68 %
MAGNESIUM SERPL-MCNC: 2.1 MG/DL (ref 1.7–2.3)
MCH RBC QN AUTO: 29 PG (ref 26.5–33)
MCHC RBC AUTO-ENTMCNC: 32.9 G/DL (ref 31.5–36.5)
MCV RBC AUTO: 88 FL (ref 78–100)
MONOCYTES # BLD AUTO: 0.1 10E3/UL (ref 0–1.3)
MONOCYTES NFR BLD AUTO: 3 %
NEUTROPHILS # BLD AUTO: 0.7 10E3/UL (ref 1.6–8.3)
NEUTROPHILS NFR BLD AUTO: 28 %
NRBC # BLD AUTO: 0 10E3/UL
NRBC BLD AUTO-RTO: 0 /100
PHOSPHATE SERPL-MCNC: 3.8 MG/DL (ref 2.5–4.5)
PLATELET # BLD AUTO: 15 10E3/UL (ref 150–450)
POTASSIUM SERPL-SCNC: 4.1 MMOL/L (ref 3.4–5.3)
RBC # BLD AUTO: 3.55 10E6/UL (ref 3.8–5.2)
SODIUM SERPL-SCNC: 136 MMOL/L (ref 136–145)
UNIT ABO/RH: NORMAL
UNIT ABO/RH: NORMAL
UNIT NUMBER: NORMAL
UNIT NUMBER: NORMAL
UNIT STATUS: NORMAL
UNIT STATUS: NORMAL
UNIT TYPE ISBT: 5100
UNIT TYPE ISBT: 6200
WBC # BLD AUTO: 2.4 10E3/UL (ref 4–11)

## 2022-08-30 PROCEDURE — 85025 COMPLETE CBC W/AUTO DIFF WBC: CPT | Performed by: PHYSICIAN ASSISTANT

## 2022-08-30 PROCEDURE — 250N000013 HC RX MED GY IP 250 OP 250 PS 637: Performed by: PHYSICIAN ASSISTANT

## 2022-08-30 PROCEDURE — 250N000013 HC RX MED GY IP 250 OP 250 PS 637: Performed by: STUDENT IN AN ORGANIZED HEALTH CARE EDUCATION/TRAINING PROGRAM

## 2022-08-30 PROCEDURE — 84100 ASSAY OF PHOSPHORUS: CPT | Performed by: PHYSICIAN ASSISTANT

## 2022-08-30 PROCEDURE — 258N000003 HC RX IP 258 OP 636: Performed by: STUDENT IN AN ORGANIZED HEALTH CARE EDUCATION/TRAINING PROGRAM

## 2022-08-30 PROCEDURE — 250N000013 HC RX MED GY IP 250 OP 250 PS 637: Performed by: INTERNAL MEDICINE

## 2022-08-30 PROCEDURE — 250N000011 HC RX IP 250 OP 636: Performed by: STUDENT IN AN ORGANIZED HEALTH CARE EDUCATION/TRAINING PROGRAM

## 2022-08-30 PROCEDURE — 99356 PR PROLONGED SERV,INPATIENT,1ST HR: CPT | Mod: FS | Performed by: STUDENT IN AN ORGANIZED HEALTH CARE EDUCATION/TRAINING PROGRAM

## 2022-08-30 PROCEDURE — 36592 COLLECT BLOOD FROM PICC: CPT | Performed by: PHYSICIAN ASSISTANT

## 2022-08-30 PROCEDURE — 80048 BASIC METABOLIC PNL TOTAL CA: CPT | Performed by: PHYSICIAN ASSISTANT

## 2022-08-30 PROCEDURE — 250N000011 HC RX IP 250 OP 636: Performed by: PHYSICIAN ASSISTANT

## 2022-08-30 PROCEDURE — 120N000002 HC R&B MED SURG/OB UMMC

## 2022-08-30 PROCEDURE — 83735 ASSAY OF MAGNESIUM: CPT | Performed by: STUDENT IN AN ORGANIZED HEALTH CARE EDUCATION/TRAINING PROGRAM

## 2022-08-30 PROCEDURE — 99233 SBSQ HOSP IP/OBS HIGH 50: CPT | Mod: FS | Performed by: STUDENT IN AN ORGANIZED HEALTH CARE EDUCATION/TRAINING PROGRAM

## 2022-08-30 PROCEDURE — 97110 THERAPEUTIC EXERCISES: CPT | Mod: GP

## 2022-08-30 RX ADMIN — IMATINIB MESYLATE 400 MG: 400 TABLET, FILM COATED ORAL at 20:00

## 2022-08-30 RX ADMIN — ACYCLOVIR 400 MG: 400 TABLET ORAL at 08:25

## 2022-08-30 RX ADMIN — POLYETHYLENE GLYCOL 3350 17 G: 17 POWDER, FOR SOLUTION ORAL at 16:20

## 2022-08-30 RX ADMIN — MICAFUNGIN 50 MG: 10 INJECTION, POWDER, LYOPHILIZED, FOR SOLUTION INTRAVENOUS at 12:06

## 2022-08-30 RX ADMIN — AMLODIPINE BESYLATE 10 MG: 10 TABLET ORAL at 08:25

## 2022-08-30 RX ADMIN — CALCIUM ACETATE 1334 MG: 667 CAPSULE ORAL at 18:29

## 2022-08-30 RX ADMIN — ACETAMINOPHEN 975 MG: 325 TABLET, FILM COATED ORAL at 00:54

## 2022-08-30 RX ADMIN — CALCIUM ACETATE 1334 MG: 667 CAPSULE ORAL at 08:25

## 2022-08-30 RX ADMIN — Medication 3 MG: at 20:56

## 2022-08-30 RX ADMIN — CALCIUM ACETATE 1334 MG: 667 CAPSULE ORAL at 12:06

## 2022-08-30 RX ADMIN — Medication 5 ML: at 13:47

## 2022-08-30 RX ADMIN — SENNOSIDES AND DOCUSATE SODIUM 1 TABLET: 8.6; 5 TABLET ORAL at 20:00

## 2022-08-30 RX ADMIN — SENNOSIDES AND DOCUSATE SODIUM 2 TABLET: 8.6; 5 TABLET ORAL at 08:25

## 2022-08-30 RX ADMIN — ALLOPURINOL 300 MG: 300 TABLET ORAL at 08:25

## 2022-08-30 RX ADMIN — ONDANSETRON 8 MG: 4 TABLET, ORALLY DISINTEGRATING ORAL at 00:54

## 2022-08-30 RX ADMIN — IMATINIB MESYLATE 400 MG: 400 TABLET, FILM COATED ORAL at 08:30

## 2022-08-30 RX ADMIN — Medication 5 ML: at 12:06

## 2022-08-30 RX ADMIN — LEVOFLOXACIN 250 MG: 250 TABLET, FILM COATED ORAL at 13:47

## 2022-08-30 RX ADMIN — ACYCLOVIR 400 MG: 400 TABLET ORAL at 20:00

## 2022-08-30 RX ADMIN — Medication 10 ML: at 20:56

## 2022-08-30 RX ADMIN — Medication 5 ML: at 00:54

## 2022-08-30 RX ADMIN — PANTOPRAZOLE SODIUM 40 MG: 40 TABLET, DELAYED RELEASE ORAL at 08:25

## 2022-08-30 ASSESSMENT — ACTIVITIES OF DAILY LIVING (ADL)
ADLS_ACUITY_SCORE: 22

## 2022-08-30 NOTE — PLAN OF CARE
0190-1730: VSS on RA. PRN Tylenol given for 3/10 inner ear discomfort and PRN Zofran given for nausea. Both effective. LBM 8/28. Slept well.

## 2022-08-30 NOTE — PLAN OF CARE
Goal Outcome Evaluation:    Plan of Care Reviewed With: patient     Overall Patient Progress: no change    2302-5257:  A&O x 4.  Afebrile.  VSS on room.  Denies pain, nausea, numbness, tingling, vomiting, chest pain and SOB.  No need for electrolyte and blood products, order parameters not met.  Adequate urine output.  LMB 8/29.  Up ab stephanie. Pt plans to use CHG wipes later today.  No complaints and/or acute events.  LP with IT chemo scheduled tomorrow (8/31).  Begin platelet transfusion at 0200 on 08/31/2022 prior to LP at 1100.  Continue to monitor and with POC.

## 2022-08-30 NOTE — PROGRESS NOTES
[facility] @  [phone] OPTION 1    NEEDS TO APPROVE HOME PHYSICAL THERAPY -- 2 X A WEEK FOR 3 WEEKS [facility]  Hematology / Oncology Progress Note    Date of Admission: 8/19/2022    Date of Service (when I saw the patient): 08/30/2022     Assessment & Plan   Vashti Villegas is a 42 year old female who was admitted on 8/19/2022 with hyperleukocytosis (WBC 109K), abdominal pain and 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 Pre-phase steroids initiated (8/21-8/24) and WBC is trending down slowly. Initiated GRAALL + imatinib on 8/24.     TODAY:   - Day 7 GRAALL + imatinib. D8 chemotherapy due tomorrow, 8/31/22.  - Next IT chemo planned for 8/31 at 11 AM in XR. Required 2u platelets on 8/25 prior to last LP; ordered transfusion of 2 units again on 8/31 early AM (starting at 0200) prior to planned LP. Could give additional 1u in the AM prior to procedure, as needed.  - Continue bowel regimen; titrate senna/Miralax to desired effect. Can add additional agents PRN if ineffective.  - Continue ppx antimicrobials and best supportive cares.    HEME  # Ph+ B-ALL  Patient presented to outside hospital with c/o Right upper quadrant pain and was subsequently found to have a markedly elevated white blood cell count concerning for acute leukemia. While at OSH, she had CT PE protocol as well as CT A/P. These identified no PE, no acute or localized intraabdominal inflammatory process, mild splenomegaly, and few inconspicous low-density structures in the liver. She was transferred to [facility] for further workup and management.  - s/p Hydrea 1g TID (8/21-8/23) with improvement in WBC (100K ? 12K)  - CT CAP completed at OSH. CT neck 8/22 showed multiple, b/l suspicious-appearing LN (R level Ia, L level III, R level II-III) - not enlarged though they exhibit abnormal shapes.   - MR brain (8/21) - no acute IC pathology, no e/o intracranial CNS involvement.  - BMBx completed 8/20 - demonstrated B-ALL with 85% blasts and hypercellular marrow. IHC shows CD10+,  CD34+. 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 Abnormal cytogenetics.  - Pt consented to HMTB; BMBx at dx was dry tap d/t WBC burden. Ordered peripheral blood samples on 8/22.   - Initiated pre-phase steroids with Prednisone 60 mg x8/21; increased to 60 mg/m2 (140 mg) 8/22-8/24. Transitioned to protocol-directed Dex 40 mg on 8/25.   - Message sent to BMT intake team on 8/23. If no consult arranged by mid-week, will reach out to their team again.  - Plan to arrange for Clonoseq ID testing to help estimate MRD in future assessments. Clonal testing collected and sent on 8/23. Plan to order Clonoseq MRD testing with post-induction BMBx.   - Case discussed at Heme malignancy conference on 8/23, per consensus plan is to start GRAALL + Imatinib on 8/24. No clear indication for addition of Rituximab.    - Unsuccessful attempt at LP with IT chemo on 8/25 with CAPS team at bedside. Completed in XR on 8/26 - flow negative. Will require all subsequent LPs under fluoroscopic guidance; next scheduled in XR on 8/31/22 at 11 AM.  - Pt expresses anxiety surrounding procedures - pre-medication with Ativan +/- Atarax available prior PRN  - Has required multiple units of platelets prior to procedure (last received 2u of platelets on 8/25 prior to LP); 2 units ordered to be given prior to LP on 8/31    Treatment Plan: GRAALL + imatinib (D1=8/24/22)  - Imatinib 400 mg BID - D1-28  - Vincristine 2 mg IV x1 - D1, 8, 15, 22  - Dexamethasone 40 mg daily - D2 (held D1, instead given pre-phase pred as above), 8-9, 15-16, 22-23  - Triple IT chemo (Cytarabine, Solu-Cortef, MTX); D1, 8, 15  - Neupogen 5 mcg/kg/day - beginning D15, until count recovery    *if plan is to discharge patient with this, will need to look into a PA - further assess in coming days    # Risk of TLS/DIC  # Hyperphosphatemia, intermittent  Uric acid was mildly elevated to 5.9 and LDH 1,943 on admission. Otherwise TLS labs WNL  including Cr ~0.6-0.7. INR mildly elevated to 1.2, Fibrinogen 400-500. Phos mildly up-trended after starting chemo; however, in the absence of any other evidence of TLS, suspect mainly dietary in etiology.  - Allopurinol 300 mg daily (x8/19)  - S/p IVFs  - TLS labs daily   - Phoslo TID x8/26; can likely discontinue in coming days, pending phos trend    # Pancytopenia  2/2 to underlying disease  - Transfuse to maintain Hgb >7, Plt >10K (>50K prior to LPs)     ID  # PPx  -  mg BID  - Levaquin 250 mg daily   - Maude 50 mg daily  - Keep on micafungin while inpatient given intermittent chemotherapy. Will hold off on pursuing vori/posa at this time. Pending when pt is able to discharge/counts at that time and/or development of any infectious complications, may reconsider. However, hopeful that we could transition to fluconazole alone if needed until count recovery.      # H/o COVID-19 infection   Not vaccinated (due to fear of needles per pt report). Pt reported testing postiive for COVID approx 1 month prior to admission via home test. She never had it confirmed.   - COVID testing on admission was negative.     CV  # Hypertension, improving  Fairly persistent elevated BP since admission to 150-160/80-90s. She was not on anti-hypertensive medication PTA.   - Start Amlodipine 5 mg daily (8/21-8/23); increased to 10 mg daily (x8/23) - held parameters in place if SBP<110  - prn Hydralazine 10-20 mg available q6h for SBP>160     GI  # Intermittent constipation  - Senna 1-2 tabs BID, Miralax prn     NEURO  # Headaches, stable-improved  Pt endorsed intermittent HA since admission. Denies vision changes or other neurological sx. Has h/o HTN as above. CSF flow cytometry (8/25) negative for involvement by ALL.  - MRI brain completed 8/21 was negative for intracranial pathology or tumor involvement.   - Tylenol PRN  - Continue weekly LPs x3 per GRAAL protocol: 8/25 (flow negative), 8/31, due ~9/7 (not yet scheduled)    #  Intermittent left hand numbness  Pt endorsed to Dr. Recinos on 8/28. Reported as intermittent and not impacting motor skills.   - Continue to monitor in context of vincristine per treatment plan. Denied issues as of 8/30.    FEN  - No mIVF: bolus PRN  - Lyte repletion per standing protocol  - Regular diet as tolerated     MISC  - Lines/Drains: PICC in place, remove on discharge  - DVT ppx: no pharmacologic ppx given TCP  - GI ppx: PPI  - Consults: PT  - CODE: FULL  - DISPO: Anticipate prolonged 4-6 week hospitalization for management of new leukemia.   - Follow-up/Referrals: TBD. Dr. Hull has agreed to continue to follow pt in clinic. 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 Recinos.    Bev Haines PA-C  Hematology/Oncology  Pager: #[phone]    I spent 45 minutes in the care of this patient today, which included time necessary for review of interval events, obtaining history and physical exam, ordering medication(s)/test(s) as medically indicated, discussion with interdisciplinary/consult team(s), and documentation time. Over 50% of time was spent face-to-face and/or coordinating care.      Interval History   No acute events overnight, afebrile. Feeling well today overall. A bit tired, but otherwise no acute complaints. Endorsed some ear pain overnight that she states has improved; will let us know if this persists/worsens. No fevers, chills, cough, CP, SOB, abd pain, N/V. Ongoing constipation, small hard stool yesterday, non-bloody. Discussed various laxatives and their effects; encouraged ongoing use.   Discussed plan for LP tomorrow and need to transfuse platelets early tomorrow AM in  preparation. All questions answered.    A comprehensive ROS was performed and was negative except as detailed in the HPI above.    Physical Exam   Temp: 98.1  F (36.7  C) Temp src: Oral BP: 107/58 Pulse: 92   Resp: 16 SpO2: 99 % O2 Device: None (Room air)    Vitals:    08/28/22 0921 08/29/22 0856 08/30/22 0725   Weight: 110.5 kg (243 lb 9.7 oz) 109.6 kg (241 lb 9.6 oz) 110.1 kg (242 lb 12.8 oz)     Vital Signs with Ranges  Temp:  [96.4  F (35.8  C)-98.1  F (36.7  C)] 98.1  F (36.7  C)  Pulse:  [] 92  Resp:  [16] 16  BP: (107-134)/(58-70) 107/58  SpO2:  [97 %-99 %] 99 %  I/O last 3 completed shifts:  In: 340 [P.O.:240; I.V.:100]  Out: 1500 [Urine:1500]    General: Awake and interactive. No acute distress. Pleasant female seen sitting upright comfortably in bed.    HEENT: NC/AT. Sclerae non-icteric. EOM intact. MMM.  Cardiac: Regular rate and rhythm. No murmur appreciated today.   Respiratory: No signs of respiratory distress or accessory muscle use. Lungs CTAB. No wheezes or crackles.   Abd: Normal BS. Abd soft, NT/ND.   Extremities: Trace bilateral LE edema, no gross deformities.  Derm: No rashes or lesions.  Neuro: A&Ox3 with normal speech. Memory and thought process preserved. Moving extremities equally in bed.   Psych: Appropriate mood and affect.     Medications     - MEDICATION INSTRUCTIONS -         acyclovir  400 mg Oral BID     albuterol  2.5 mg Nebulization Q28 Days    And     pentamidine  300 mg Inhalation Q28 Days     allopurinol  300 mg Oral Daily     amLODIPine  10 mg Oral Daily     artificial saliva  5-10 mL Swish & Spit 4x Daily     calcium acetate (phos binder)  1,334 mg Oral TID w/meals     heparin lock flush  5-20 mL Intracatheter Q24H     imatinib  400 mg Oral BID     levofloxacin  250 mg Oral Q24H     lidocaine (PF)  10 mL Subcutaneous Once     micafungin  50 mg Intravenous Q24H     pantoprazole  40 mg Oral QAM AC     senna-docusate  1-2 tablet Oral BID     sodium chloride (PF)  10-40 mL  Intracatheter Q8H     Data   Results for orders placed or performed during the hospital encounter of 08/19/22 (from the past 24 hour(s))   CBC with platelets differential    Narrative    The following orders were created for panel order CBC with platelets differential.  Procedure                               Abnormality         Status                     ---------                               -----------         ------                     CBC with platelets and d...[[id number]]  Abnormal            Final result                 Please view results for these tests on the individual orders.   Phosphorus   Result Value Ref Range    Phosphorus 3.8 2.5 - 4.5 mg/dL   Magnesium   Result Value Ref Range    Magnesium 2.1 1.7 - 2.3 mg/dL   Basic metabolic panel   Result Value Ref Range    Creatinine 0.76 0.51 - 0.95 mg/dL    Sodium 136 136 - 145 mmol/L    Potassium 4.1 3.4 - 5.3 mmol/L    Urea Nitrogen 16.7 6.0 - 20.0 mg/dL    Chloride 101 98 - 107 mmol/L    Carbon Dioxide (CO2) 26 22 - 29 mmol/L    Anion Gap 9 7 - 15 mmol/L    Glucose 99 70 - 99 mg/dL    GFR Estimate >90 >60 mL/min/1.73m2    Calcium 8.6 8.6 - 10.0 mg/dL   CBC with platelets and differential   Result Value Ref Range    WBC Count 2.4 (L) 4.0 - 11.0 10e3/uL    RBC Count 3.55 (L) 3.80 - 5.20 10e6/uL    Hemoglobin 10.3 (L) 11.7 - 15.7 g/dL    Hematocrit 31.3 (L) 35.0 - 47.0 %    MCV 88 78 - 100 fL    MCH 29.0 26.5 - 33.0 pg    MCHC 32.9 31.5 - 36.5 g/dL    RDW 13.6 10.0 - 15.0 %    Platelet Count 15 (LL) 150 - 450 10e3/uL    % Neutrophils 28 %    % Lymphocytes 68 %    % Monocytes 3 %    % Eosinophils 1 %    % Basophils 0 %    % Immature Granulocytes 0 %    NRBCs per 100 WBC 0 <1 /100    Absolute Neutrophils 0.7 (L) 1.6 - 8.3 10e3/uL    Absolute Lymphocytes 1.6 0.8 - 5.3 10e3/uL    Absolute Monocytes 0.1 0.0 - 1.3 10e3/uL    Absolute Eosinophils 0.0 0.0 - 0.7 10e3/uL    Absolute Basophils 0.0 0.0 - 0.2 10e3/uL    Absolute Immature Granulocytes 0.0 <=0.4 10e3/uL     Absolute NRBCs 0.0 10e3/uL

## 2022-08-30 NOTE — PLAN OF CARE
[id number]    VSS, afebrile and on RA. A&Ox4. Denies nausea/SOB/pain. Continues to have some inner ear discomfort, pt believes possibly could be water in ear from shower yesterday. Endorses intermittent L hand tingling, occasionally sharp, but quickly gos away. Good appetite. Pt has good UOP, LBM 8/29. Gave miralax as pt reported BM was firm/hard. Will receive 2 units of plts at 0200 tomorrow morning in preparation for LP at 1100 tomorrow. Gave melatonin at 2100. Continue to monitor & w/ POC.

## 2022-08-31 ENCOUNTER — APPOINTMENT (OUTPATIENT)
Dept: GENERAL RADIOLOGY | Facility: CLINIC | Age: 42
End: 2022-08-31
Attending: PHYSICIAN ASSISTANT
Payer: COMMERCIAL

## 2022-08-31 LAB
ALBUMIN SERPL BCG-MCNC: 3.6 G/DL (ref 3.5–5.2)
ALP SERPL-CCNC: 71 U/L (ref 35–104)
ALT SERPL W P-5'-P-CCNC: 33 U/L (ref 10–35)
ANION GAP SERPL CALCULATED.3IONS-SCNC: 10 MMOL/L (ref 7–15)
AST SERPL W P-5'-P-CCNC: 24 U/L (ref 10–35)
BACTERIA CSF CULT: NO GROWTH
BASOPHILS # BLD AUTO: 0 10E3/UL (ref 0–0.2)
BASOPHILS NFR BLD AUTO: 0 %
BILIRUB DIRECT SERPL-MCNC: <0.2 MG/DL (ref 0–0.3)
BILIRUB SERPL-MCNC: 0.6 MG/DL
BLD PROD TYP BPU: NORMAL
BLOOD COMPONENT TYPE: NORMAL
BUN SERPL-MCNC: 20.1 MG/DL (ref 6–20)
CALCIUM SERPL-MCNC: 8.5 MG/DL (ref 8.6–10)
CHLORIDE SERPL-SCNC: 100 MMOL/L (ref 98–107)
CODING SYSTEM: NORMAL
CREAT SERPL-MCNC: 0.84 MG/DL (ref 0.51–0.95)
DEPRECATED HCO3 PLAS-SCNC: 24 MMOL/L (ref 22–29)
EOSINOPHIL # BLD AUTO: 0 10E3/UL (ref 0–0.7)
EOSINOPHIL NFR BLD AUTO: 2 %
ERYTHROCYTE [DISTWIDTH] IN BLOOD BY AUTOMATED COUNT: 13.6 % (ref 10–15)
FIBRINOGEN PPP-MCNC: 263 MG/DL (ref 170–490)
GFR SERPL CREATININE-BSD FRML MDRD: 88 ML/MIN/1.73M2
GLUCOSE CSF-MCNC: 68 MG/DL (ref 40–70)
GLUCOSE SERPL-MCNC: 105 MG/DL (ref 70–99)
GRAM STAIN RESULT: NORMAL
GRAM STAIN RESULT: NORMAL
HCT VFR BLD AUTO: 27.8 % (ref 35–47)
HGB BLD-MCNC: 8.8 G/DL (ref 11.7–15.7)
HOLD SPECIMEN: NORMAL
IMM GRANULOCYTES # BLD: 0 10E3/UL
IMM GRANULOCYTES NFR BLD: 2 %
INR PPP: 1.06 (ref 0.85–1.15)
INTERPRETATION: NORMAL
ISSUE DATE AND TIME: NORMAL
LAB DIRECTOR COMMENTS: NORMAL
LAB DIRECTOR DISCLAIMER: NORMAL
LAB DIRECTOR INTERPRETATION: NORMAL
LAB DIRECTOR METHODOLOGY: NORMAL
LAB DIRECTOR RESULTS: NORMAL
LACTATE SERPL-SCNC: 1.2 MMOL/L (ref 0.7–2)
LYMPHOCYTES # BLD AUTO: 0.5 10E3/UL (ref 0.8–5.3)
LYMPHOCYTES NFR BLD AUTO: 38 %
MAGNESIUM SERPL-MCNC: 1.9 MG/DL (ref 1.7–2.3)
MCH RBC QN AUTO: 28.9 PG (ref 26.5–33)
MCHC RBC AUTO-ENTMCNC: 31.7 G/DL (ref 31.5–36.5)
MCV RBC AUTO: 91 FL (ref 78–100)
MONOCYTES # BLD AUTO: 0 10E3/UL (ref 0–1.3)
MONOCYTES NFR BLD AUTO: 2 %
NEUTROPHILS # BLD AUTO: 0.7 10E3/UL (ref 1.6–8.3)
NEUTROPHILS NFR BLD AUTO: 56 %
NRBC # BLD AUTO: 0 10E3/UL
NRBC BLD AUTO-RTO: 0 /100
PHOSPHATE SERPL-MCNC: 3.3 MG/DL (ref 2.5–4.5)
PLATELET # BLD AUTO: 16 10E3/UL (ref 150–450)
PLATELET # BLD AUTO: 58 10E3/UL (ref 150–450)
POTASSIUM SERPL-SCNC: 4 MMOL/L (ref 3.4–5.3)
PROT CSF-MCNC: 38.2 MG/DL (ref 15–45)
PROT SERPL-MCNC: 5.8 G/DL (ref 6.4–8.3)
RBC # BLD AUTO: 3.05 10E6/UL (ref 3.8–5.2)
SIGNIFICANT RESULTS: NORMAL
SODIUM SERPL-SCNC: 134 MMOL/L (ref 136–145)
SPECIMEN DESCRIPTION: NORMAL
TEST DETAILS, MDL: NORMAL
UNIT ABO/RH: NORMAL
UNIT NUMBER: NORMAL
UNIT STATUS: NORMAL
UNIT TYPE ISBT: 5100
WBC # BLD AUTO: 1.3 10E3/UL (ref 4–11)

## 2022-08-31 PROCEDURE — 82945 GLUCOSE OTHER FLUID: CPT | Performed by: PHYSICIAN ASSISTANT

## 2022-08-31 PROCEDURE — 81206 BCR/ABL1 GENE MAJOR BP: CPT | Performed by: PHYSICIAN ASSISTANT

## 2022-08-31 PROCEDURE — P9037 PLATE PHERES LEUKOREDU IRRAD: HCPCS | Performed by: PHYSICIAN ASSISTANT

## 2022-08-31 PROCEDURE — 250N000011 HC RX IP 250 OP 636: Performed by: STUDENT IN AN ORGANIZED HEALTH CARE EDUCATION/TRAINING PROGRAM

## 2022-08-31 PROCEDURE — 258N000003 HC RX IP 258 OP 636: Performed by: INTERNAL MEDICINE

## 2022-08-31 PROCEDURE — 36592 COLLECT BLOOD FROM PICC: CPT | Performed by: STUDENT IN AN ORGANIZED HEALTH CARE EDUCATION/TRAINING PROGRAM

## 2022-08-31 PROCEDURE — 250N000013 HC RX MED GY IP 250 OP 250 PS 637: Performed by: PHYSICIAN ASSISTANT

## 2022-08-31 PROCEDURE — 88185 FLOWCYTOMETRY/TC ADD-ON: CPT | Performed by: PHYSICIAN ASSISTANT

## 2022-08-31 PROCEDURE — 120N000002 HC R&B MED SURG/OB UMMC

## 2022-08-31 PROCEDURE — 36592 COLLECT BLOOD FROM PICC: CPT | Performed by: PHYSICIAN ASSISTANT

## 2022-08-31 PROCEDURE — 87205 SMEAR GRAM STAIN: CPT | Performed by: PHYSICIAN ASSISTANT

## 2022-08-31 PROCEDURE — 83605 ASSAY OF LACTIC ACID: CPT | Performed by: STUDENT IN AN ORGANIZED HEALTH CARE EDUCATION/TRAINING PROGRAM

## 2022-08-31 PROCEDURE — 250N000009 HC RX 250: Performed by: RADIOLOGY

## 2022-08-31 PROCEDURE — 88184 FLOWCYTOMETRY/ TC 1 MARKER: CPT | Performed by: PHYSICIAN ASSISTANT

## 2022-08-31 PROCEDURE — 99207 PR NO BILLABLE SERVICE THIS VISIT: CPT | Performed by: PHYSICIAN ASSISTANT

## 2022-08-31 PROCEDURE — G0452 MOLECULAR PATHOLOGY INTERPR: HCPCS | Mod: 26 | Performed by: PATHOLOGY

## 2022-08-31 PROCEDURE — 84100 ASSAY OF PHOSPHORUS: CPT | Performed by: PHYSICIAN ASSISTANT

## 2022-08-31 PROCEDURE — 85610 PROTHROMBIN TIME: CPT | Performed by: PHYSICIAN ASSISTANT

## 2022-08-31 PROCEDURE — 258N000003 HC RX IP 258 OP 636: Performed by: STUDENT IN AN ORGANIZED HEALTH CARE EDUCATION/TRAINING PROGRAM

## 2022-08-31 PROCEDURE — 250N000013 HC RX MED GY IP 250 OP 250 PS 637: Performed by: STUDENT IN AN ORGANIZED HEALTH CARE EDUCATION/TRAINING PROGRAM

## 2022-08-31 PROCEDURE — G0452 MOLECULAR PATHOLOGY INTERPR: HCPCS | Mod: 26 | Performed by: STUDENT IN AN ORGANIZED HEALTH CARE EDUCATION/TRAINING PROGRAM

## 2022-08-31 PROCEDURE — 77003 FLUOROGUIDE FOR SPINE INJECT: CPT | Mod: 26 | Performed by: RADIOLOGY

## 2022-08-31 PROCEDURE — 85384 FIBRINOGEN ACTIVITY: CPT | Performed by: PHYSICIAN ASSISTANT

## 2022-08-31 PROCEDURE — 81207 BCR/ABL1 GENE MINOR BP: CPT | Performed by: PHYSICIAN ASSISTANT

## 2022-08-31 PROCEDURE — 89050 BODY FLUID CELL COUNT: CPT | Performed by: PHYSICIAN ASSISTANT

## 2022-08-31 PROCEDURE — 250N000013 HC RX MED GY IP 250 OP 250 PS 637: Performed by: INTERNAL MEDICINE

## 2022-08-31 PROCEDURE — 85049 AUTOMATED PLATELET COUNT: CPT | Performed by: STUDENT IN AN ORGANIZED HEALTH CARE EDUCATION/TRAINING PROGRAM

## 2022-08-31 PROCEDURE — 96450 CHEMOTHERAPY INTO CNS: CPT

## 2022-08-31 PROCEDURE — 81450 HL NEO GSAP 5-50DNA/DNA&RNA: CPT | Performed by: PHYSICIAN ASSISTANT

## 2022-08-31 PROCEDURE — 84157 ASSAY OF PROTEIN OTHER: CPT | Performed by: PHYSICIAN ASSISTANT

## 2022-08-31 PROCEDURE — 82248 BILIRUBIN DIRECT: CPT | Performed by: PHYSICIAN ASSISTANT

## 2022-08-31 PROCEDURE — 96450 CHEMOTHERAPY INTO CNS: CPT | Mod: GC | Performed by: RADIOLOGY

## 2022-08-31 PROCEDURE — 83735 ASSAY OF MAGNESIUM: CPT | Performed by: STUDENT IN AN ORGANIZED HEALTH CARE EDUCATION/TRAINING PROGRAM

## 2022-08-31 PROCEDURE — 99356 PR PROLONGED SERV,INPATIENT,1ST HR: CPT | Performed by: STUDENT IN AN ORGANIZED HEALTH CARE EDUCATION/TRAINING PROGRAM

## 2022-08-31 PROCEDURE — 80053 COMPREHEN METABOLIC PANEL: CPT | Performed by: PHYSICIAN ASSISTANT

## 2022-08-31 PROCEDURE — 250N000011 HC RX IP 250 OP 636: Performed by: INTERNAL MEDICINE

## 2022-08-31 PROCEDURE — 85014 HEMATOCRIT: CPT | Performed by: PHYSICIAN ASSISTANT

## 2022-08-31 PROCEDURE — 99233 SBSQ HOSP IP/OBS HIGH 50: CPT | Mod: FS | Performed by: STUDENT IN AN ORGANIZED HEALTH CARE EDUCATION/TRAINING PROGRAM

## 2022-08-31 PROCEDURE — 88188 FLOWCYTOMETRY/READ 9-15: CPT | Mod: GC | Performed by: PATHOLOGY

## 2022-08-31 RX ORDER — FLUORIDE TOOTHPASTE
5-10 TOOTHPASTE DENTAL 4 TIMES DAILY PRN
Status: DISCONTINUED | OUTPATIENT
Start: 2022-08-31 | End: 2022-09-14 | Stop reason: HOSPADM

## 2022-08-31 RX ORDER — LIDOCAINE HYDROCHLORIDE 10 MG/ML
5 INJECTION, SOLUTION EPIDURAL; INFILTRATION; INTRACAUDAL; PERINEURAL ONCE
Status: COMPLETED | OUTPATIENT
Start: 2022-08-31 | End: 2022-08-31

## 2022-08-31 RX ORDER — MEPERIDINE HYDROCHLORIDE 25 MG/ML
25 INJECTION INTRAMUSCULAR; INTRAVENOUS; SUBCUTANEOUS EVERY 30 MIN PRN
Status: DISCONTINUED | OUTPATIENT
Start: 2022-08-31 | End: 2022-09-12 | Stop reason: CLARIF

## 2022-08-31 RX ORDER — DEXAMETHASONE 4 MG/1
40 TABLET ORAL DAILY
Status: COMPLETED | OUTPATIENT
Start: 2022-08-31 | End: 2022-09-01

## 2022-08-31 RX ORDER — EPINEPHRINE 1 MG/ML
0.3 INJECTION, SOLUTION, CONCENTRATE INTRAVENOUS EVERY 5 MIN PRN
Status: DISCONTINUED | OUTPATIENT
Start: 2022-08-31 | End: 2022-09-12 | Stop reason: CLARIF

## 2022-08-31 RX ORDER — ALBUTEROL SULFATE 0.83 MG/ML
2.5 SOLUTION RESPIRATORY (INHALATION)
Status: DISCONTINUED | OUTPATIENT
Start: 2022-08-31 | End: 2022-09-12 | Stop reason: CLARIF

## 2022-08-31 RX ORDER — METHYLPREDNISOLONE SODIUM SUCCINATE 125 MG/2ML
125 INJECTION, POWDER, LYOPHILIZED, FOR SOLUTION INTRAMUSCULAR; INTRAVENOUS
Status: DISCONTINUED | OUTPATIENT
Start: 2022-08-31 | End: 2022-09-12 | Stop reason: CLARIF

## 2022-08-31 RX ORDER — HEPARIN SODIUM,PORCINE 10 UNIT/ML
5 VIAL (ML) INTRAVENOUS
Status: DISCONTINUED | OUTPATIENT
Start: 2022-08-31 | End: 2022-09-14 | Stop reason: HOSPADM

## 2022-08-31 RX ORDER — DIPHENHYDRAMINE HYDROCHLORIDE 50 MG/ML
50 INJECTION INTRAMUSCULAR; INTRAVENOUS
Status: DISCONTINUED | OUTPATIENT
Start: 2022-08-31 | End: 2022-09-12 | Stop reason: CLARIF

## 2022-08-31 RX ORDER — HEPARIN SODIUM (PORCINE) LOCK FLUSH IV SOLN 100 UNIT/ML 100 UNIT/ML
5 SOLUTION INTRAVENOUS
Status: DISCONTINUED | OUTPATIENT
Start: 2022-08-31 | End: 2022-09-14 | Stop reason: HOSPADM

## 2022-08-31 RX ORDER — ALBUTEROL SULFATE 90 UG/1
1-2 AEROSOL, METERED RESPIRATORY (INHALATION)
Status: DISCONTINUED | OUTPATIENT
Start: 2022-08-31 | End: 2022-09-12 | Stop reason: CLARIF

## 2022-08-31 RX ADMIN — DEXAMETHASONE 40 MG: 4 TABLET ORAL at 10:53

## 2022-08-31 RX ADMIN — AMLODIPINE BESYLATE 10 MG: 10 TABLET ORAL at 10:40

## 2022-08-31 RX ADMIN — CYTARABINE: 100 INJECTION INTRATHECAL; INTRAVENOUS; SUBCUTANEOUS at 15:58

## 2022-08-31 RX ADMIN — Medication 5 ML: at 05:42

## 2022-08-31 RX ADMIN — MICAFUNGIN 50 MG: 10 INJECTION, POWDER, LYOPHILIZED, FOR SOLUTION INTRAVENOUS at 12:31

## 2022-08-31 RX ADMIN — IMATINIB MESYLATE 400 MG: 400 TABLET, FILM COATED ORAL at 10:40

## 2022-08-31 RX ADMIN — PANTOPRAZOLE SODIUM 40 MG: 40 TABLET, DELAYED RELEASE ORAL at 10:40

## 2022-08-31 RX ADMIN — ACETAMINOPHEN 975 MG: 325 TABLET, FILM COATED ORAL at 01:43

## 2022-08-31 RX ADMIN — SENNOSIDES AND DOCUSATE SODIUM 2 TABLET: 8.6; 5 TABLET ORAL at 20:30

## 2022-08-31 RX ADMIN — LEVOFLOXACIN 250 MG: 250 TABLET, FILM COATED ORAL at 13:45

## 2022-08-31 RX ADMIN — LORAZEPAM 1 MG: 0.5 TABLET ORAL at 14:57

## 2022-08-31 RX ADMIN — ACYCLOVIR 400 MG: 400 TABLET ORAL at 20:30

## 2022-08-31 RX ADMIN — DIPHENHYDRAMINE HYDROCHLORIDE 50 MG: 25 CAPSULE ORAL at 01:43

## 2022-08-31 RX ADMIN — DIPHENHYDRAMINE HYDROCHLORIDE 50 MG: 25 CAPSULE ORAL at 08:51

## 2022-08-31 RX ADMIN — Medication 5 ML: at 04:04

## 2022-08-31 RX ADMIN — IMATINIB MESYLATE 400 MG: 400 TABLET, FILM COATED ORAL at 20:30

## 2022-08-31 RX ADMIN — SODIUM CHLORIDE 250 ML: 9 INJECTION, SOLUTION INTRAVENOUS at 16:59

## 2022-08-31 RX ADMIN — ACETAMINOPHEN 975 MG: 325 TABLET, FILM COATED ORAL at 08:51

## 2022-08-31 RX ADMIN — SENNOSIDES AND DOCUSATE SODIUM 2 TABLET: 8.6; 5 TABLET ORAL at 10:40

## 2022-08-31 RX ADMIN — VINCRISTINE SULFATE 2 MG: 1 INJECTION, SOLUTION INTRAVENOUS at 16:58

## 2022-08-31 RX ADMIN — POLYETHYLENE GLYCOL 3350 17 G: 17 POWDER, FOR SOLUTION ORAL at 17:29

## 2022-08-31 RX ADMIN — ACETAMINOPHEN 975 MG: 325 TABLET, FILM COATED ORAL at 21:02

## 2022-08-31 RX ADMIN — Medication 5 ML: at 07:36

## 2022-08-31 RX ADMIN — Medication 5 ML: at 11:29

## 2022-08-31 RX ADMIN — LIDOCAINE HYDROCHLORIDE 5 ML: 10 INJECTION, SOLUTION EPIDURAL; INFILTRATION; INTRACAUDAL; PERINEURAL at 16:20

## 2022-08-31 RX ADMIN — Medication 5 ML: at 13:45

## 2022-08-31 RX ADMIN — ALLOPURINOL 300 MG: 300 TABLET ORAL at 10:40

## 2022-08-31 RX ADMIN — ACYCLOVIR 400 MG: 400 TABLET ORAL at 10:40

## 2022-08-31 RX ADMIN — Medication 5 ML: at 20:30

## 2022-08-31 RX ADMIN — Medication 5 ML: at 10:53

## 2022-08-31 ASSESSMENT — ACTIVITIES OF DAILY LIVING (ADL)
ADLS_ACUITY_SCORE: 22

## 2022-08-31 NOTE — PROGRESS NOTES
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 Pre-phase steroids initiated (8/21-8/24), then initiated GRAALL + imatinib on 8/24.     TODAY:   - Day 8 GRAALL + imatinib. D8 chemotherapy due today.  - Next IT chemo planned for 8/31 at 11 AM in XR. No bump with 2U plts overnight. Will give additional 1U this AM and recheck post-plt count. May need another additional unit.   - Continue bowel regimen; titrate senna/Miralax to desired effect. Can add additional agents PRN if ineffective.  - Continue ppx antimicrobials and best supportive cares.  - discontinue phoslo today      HEME  # Ph+ B-ALL  Patient presented to outside hospital with c/o Right upper quadrant pain and was subsequently found to have a markedly elevated white blood cell count concerning for acute leukemia. While at OSH, she had CT PE protocol as well as CT A/P. These identified no PE, no acute or localized intraabdominal inflammatory process, mild splenomegaly, and few inconspicous low-density structures in the liver. She was transferred to [facility] for further workup and management.  - s/p Hydrea 1g TID (8/21-8/23) with improvement in WBC (100K ? 12K)  - CT CAP completed at OSH. CT neck 8/22 showed multiple, b/l suspicious-appearing LN (R level Ia, L level III, R level II-III) - not enlarged though they exhibit abnormal shapes.   - MR brain (8/21) - no acute IC pathology, no e/o intracranial CNS involvement.  - BMBx completed 8/20 - demonstrated B-ALL with 85% blasts and hypercellular marrow. IHC shows CD10+, CD34+. Dry tap, so additional studies sent on PB: FISH findings c/w Ph+ ALL with BCR-ABL1  fusion present in 93.5% of round nucelei. Abnormal cytogenetics. For unknown reason, molecular studies were cancelled. Have reordered on PB today.  - Pt consented to HMTB; BMBx at dx was dry tap d/t WBC burden. Ordered peripheral blood samples on 8/22.   - Initiated pre-phase steroids with Prednisone 60 mg x8/21; increased to 60 mg/m2 (140 mg) 8/22-8/24. Transitioned to protocol-directed Dex 40 mg on 8/25.   - Message sent to BMT intake team on 8/23. Will send message to team this week.   - Clonoseq ID testing to help estimate MRD in future assessments. Clonal testing collected and sent on 8/23. Plan to order Clonoseq MRD testing with post-induction BMBx.   - Case discussed at Heme malignancy conference on 8/23, per consensus plan was to start GRAALL + Imatinib on 8/24. No clear indication for addition of Rituximab.    - Unsuccessful attempt at LP with IT chemo on 8/25 with CAPS team at bedside. Completed in XR on 8/26 - flow negative. Will require all subsequent LPs under fluoroscopic guidance; next scheduled in XR on 8/31/22 at 11 AM.  - Pt expresses anxiety surrounding procedures - pre-medication with Ativan +/- Atarax available prior PRN  - Has required multiple units of platelets prior to procedure, working on bumping Plts today    Treatment Plan: GRAALL + imatinib (D1=8/24/22)  - Imatinib 400 mg BID - D1-28  - Vincristine 2 mg IV x1 - D1, 8, 15, 22  - Dexamethasone 40 mg daily - D2 (held D1, instead given pre-phase pred as above), 8-9, 15-16, 22-23  - Triple IT chemo (Cytarabine, Solu-Cortef, MTX); D1, 8, 15  - Neupogen 5 mcg/kg/day - beginning D15, until count recovery    *if plan is to discharge patient with this, will need to look into a PA - further assess in coming days    # Risk of TLS/DIC  # Hyperphosphatemia, intermittent  Uric acid was mildly elevated to 5.9 and LDH 1,943 on admission. Otherwise TLS labs WNL including Cr ~0.6-0.7. INR mildly elevated to 1.2, Fibrinogen 400-500. Phos mildly up-trended  after starting chemo; however, in the absence of any other evidence of TLS, suspect mainly dietary in etiology.  - Allopurinol 300 mg daily (x8/19)  - S/p IVFs  - TLS labs daily   - Phoslo TID x8/26; can discontinue today as phos remains WNL    # Pancytopenia  2/2 to underlying disease  - Transfuse to maintain Hgb >7, Plt >10K (>50K prior to LPs)     ID  # PPx  -  mg BID  - Levaquin 250 mg daily   - Maude 50 mg daily  - Keep on micafungin while inpatient given intermittent chemotherapy. Will hold off on pursuing vori/posa at this time. Pending when pt is able to discharge/counts at that time and/or development of any infectious complications, may reconsider. However, hopeful that we could transition to fluconazole alone if needed until count recovery.      # H/o COVID-19 infection   Not vaccinated (due to fear of needles per pt report). Pt reported testing postiive for COVID approx 1 month prior to admission via home test. She never had it confirmed.   - COVID testing on admission was negative.     CV  # Hypertension, improved  Fairly persistent elevated BP since admission to 150-160/80-90s. She was not on anti-hypertensive medication PTA.   - started Amlodipine 5 mg daily (8/21-8/23); increased to 10 mg daily (x8/23) - hold parameters in place if SBP<110  - prn Hydralazine 10-20 mg available q6h for SBP>160     GI  # Intermittent constipation  - Senna 1-2 tabs BID, Miralax prn   - escalate as needed    NEURO  # Headaches, improved  Pt endorsed intermittent HA since admission. Denies vision changes or other neurological sx. Has h/o HTN as above. CSF flow cytometry (8/25) negative for involvement by ALL.  - MRI brain completed 8/21 was negative for intracranial pathology or tumor involvement.   - Tylenol PRN  - Continue weekly LPs x3 per GRAAL protocol: 8/25 (flow negative), 8/31, due ~9/7 (not yet scheduled)    # Intermittent b/l hand numbness  Intermittent left hand numbness (x 8/28) and right hand numbness  (noted first on 8/30 evening). Not persistent, not affecting function at present.  - Continue to monitor in context of vincristine per treatment plan.     FEN  - No mIVF: bolus PRN  - Lyte repletion per standing protocol  - Regular diet as tolerated     MISC  - Lines/Drains: PICC in place, remove on discharge  - DVT ppx: no pharmacologic ppx given TCP  - GI ppx: PPI  - Consults: PT  - CODE: FULL  - DISPO: Anticipate prolonged 4-6 week hospitalization for management of new leukemia.   - Follow-up/Referrals: TBD. Dr. Hull has agreed to continue to follow pt in clinic. 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 Recinos.    Clau Castro PA-C  Hematology/Oncology  Pager: [phone]    I spent 55 minutes in the care of this patient today, which included time necessary for review of interval events, obtaining history and physical exam, ordering medication(s)/test(s) as medically indicated, discussion with interdisciplinary/consult team(s), and documentation time. Over 50% of time was spent face-to-face and/or coordinating care.      Interval History   No acute events overnight, afebrile. Feeling ok, tired as a result of her benadryl premed before plts. Denies HA. Denies SOB or difficulty breathing. Denies nausea. Still no BM so will try Miralax again today (in addition to the scheduled senna she has been taking). If no result with miralax again today, will escalate regimen further tomorrow. She is passing gas. Denies associated abd pain or cramping. Had some left and right hand numbness last night, now resulted. This has come/gone, not affecting function.     Discussed plan for LP with IT chemo and that we are  still working on getting Plt count up. She declined need for additional anxiolytic premed prior to procedure as she feels relaxed with the benadryl at present.       Physical Exam   Temp: 98  F (36.7  C) Temp src: Oral BP: 116/57 Pulse: 96   Resp: 16 SpO2: 98 % O2 Device: None (Room air)    Vitals:    08/28/22 0921 08/29/22 0856 08/30/22 0725   Weight: 110.5 kg (243 lb 9.7 oz) 109.6 kg (241 lb 9.6 oz) 110.1 kg (242 lb 12.8 oz)     Vital Signs with Ranges  Temp:  [96.4  F (35.8  C)-98.1  F (36.7  C)] 98  F (36.7  C)  Pulse:  [] 96  Resp:  [16-18] 16  BP: ()/(57-69) 116/57  Cuff Mean (mmHg):  [72-80] 72  SpO2:  [92 %-99 %] 98 %  I/O last 3 completed shifts:  In: 1409 [P.O.:800; I.V.:115]  Out: 1275 [Urine:1275]    General: Awake and interactive. No acute distress. Pleasant female seen resting in bed.    HEENT: NC/AT. Sclerae non-icteric. EOM intact. MMM.  Cardiac: RRRR  Respiratory: No signs of respiratory distress or accessory muscle use. No wheezes or cough.   Abd: Normal BS. Abd soft, NT/ND.   Extremities: Grossly normal in appearance.   Derm: No rashes or lesions.  Neuro: A&O, normal speech. Memory and thought process preserved. Moving extremities equally in bed.   Psych: Appropriate mood and affect.     Medications     - MEDICATION INSTRUCTIONS -         sodium chloride 0.9%  250 mL Intravenous Once     acyclovir  400 mg Oral BID     albuterol  2.5 mg Nebulization Q28 Days    And     pentamidine  300 mg Inhalation Q28 Days     allopurinol  300 mg Oral Daily     amLODIPine  10 mg Oral Daily     calcium acetate (phos binder)  1,334 mg Oral TID w/meals     INTRATHECAL - Cytarabine and/or methotrexate and/or Hydrocortisone   Intrathecal Once     dexamethasone  40 mg Oral Daily     heparin lock flush  5-20 mL Intracatheter Q24H     imatinib  400 mg Oral BID     levofloxacin  250 mg Oral Q24H     lidocaine (PF)  10 mL Subcutaneous Once     micafungin  50 mg Intravenous Q24H     pantoprazole  40 mg Oral QAM  AC     senna-docusate  1-2 tablet Oral BID     sodium chloride (PF)  10-40 mL Intracatheter Q8H     vinCRIStine (ONCOVIN) infusion  2 mg Intravenous Once     Data   Results for orders placed or performed during the hospital encounter of 08/19/22 (from the past 24 hour(s))   Prepare pheresed platelets (unit)   Result Value Ref Range    Blood Component Type Platelets     Product Code S2200T05     Unit Status Transfused     Unit Number [id number]     CODING SYSTEM NWLG796     ISSUE DATE AND TIME 78307125994416     UNIT ABO/RH O+     UNIT TYPE ISBT 5100    Prepare pheresed platelets (unit)   Result Value Ref Range    Blood Component Type Platelets     Product Code D6641I42     Unit Status Transfused     Unit Number [id number]     CODING SYSTEM [id number]     ISSUE DATE AND TIME [id number]     UNIT ABO/RH A+     UNIT TYPE ISBT 6200    Phosphorus   Result Value Ref Range    Phosphorus 3.3 2.5 - 4.5 mg/dL   Basic metabolic panel   Result Value Ref Range    Creatinine 0.84 0.51 - 0.95 mg/dL    Sodium 134 (L) 136 - 145 mmol/L    Potassium 4.0 3.4 - 5.3 mmol/L    Urea Nitrogen 20.1 (H) 6.0 - 20.0 mg/dL    Chloride 100 98 - 107 mmol/L    Carbon Dioxide (CO2) 24 22 - 29 mmol/L    Anion Gap 10 7 - 15 mmol/L    Glucose 105 (H) 70 - 99 mg/dL    GFR Estimate 88 >60 mL/min/1.73m2    Calcium 8.5 (L) 8.6 - 10.0 mg/dL   Hepatic panel   Result Value Ref Range    Protein Total 5.8 (L) 6.4 - 8.3 g/dL    Albumin 3.6 3.5 - 5.2 g/dL    Bilirubin Total 0.6 <=1.2 mg/dL    Alkaline Phosphatase 71 35 - 104 U/L    AST 24 10 - 35 U/L    ALT 33 10 - 35 U/L    Bilirubin Direct <0.20 0.00 - 0.30 mg/dL   Magnesium   Result Value Ref Range    Magnesium 1.9 1.7 - 2.3 mg/dL   Lactic Acid STAT   Result Value Ref Range    Lactic Acid 1.2 0.7 - 2.0 mmol/L   Extra Tube *Canceled*    Narrative    The following orders were created for panel order Extra Tube.  Procedure                               Abnormality         Status                      ---------                               -----------         ------                     Extra Purple Top Tube[[id number]]                                                         Please view results for these tests on the individual orders.   CBC with platelets differential *Canceled*    Narrative    The following orders were created for panel order CBC with platelets differential.  Procedure                               Abnormality         Status                     ---------                               -----------         ------                     CBC with platelets and d...[[id number]]                                                   Please view results for these tests on the individual orders.   Fibrinogen activity   Result Value Ref Range    Fibrinogen Activity 263 170 - 490 mg/dL   INR   Result Value Ref Range    INR 1.06 0.85 - 1.15   Extra Tube    Narrative    The following orders were created for panel order Extra Tube.  Procedure                               Abnormality         Status                     ---------                               -----------         ------                     Extra Green Top (Lithium...[[id number]]                      In process                   Please view results for these tests on the individual orders.   CBC with Platelets & Differential    Narrative    The following orders were created for panel order CBC with Platelets & Differential.  Procedure                               Abnormality         Status                     ---------                               -----------         ------                     CBC with platelets and d...[[id number]]  Abnormal            Preliminary result           Please view results for these tests on the individual orders.   CBC with platelets and differential   Result Value Ref Range    WBC Count 1.3 (L) 4.0 - 11.0 10e3/uL    RBC Count 3.05 (L) 3.80 - 5.20 10e6/uL    Hemoglobin 8.8 (L) 11.7 - 15.7 g/dL    Hematocrit 27.8 (L) 35.0 -  47.0 %    MCV 91 78 - 100 fL    MCH 28.9 26.5 - 33.0 pg    MCHC 31.7 31.5 - 36.5 g/dL    RDW 13.6 10.0 - 15.0 %    Platelet Count 16 (LL) 150 - 450 10e3/uL    % Neutrophils 56 %    % Lymphocytes 38 %    % Monocytes 2 %    % Eosinophils 2 %    % Basophils 0 %    % Immature Granulocytes 2 %    NRBCs per 100 WBC 0 <1 /100    Absolute Neutrophils 0.7 (L) 1.6 - 8.3 10e3/uL    Absolute Lymphocytes 0.5 (L) 0.8 - 5.3 10e3/uL    Absolute Monocytes 0.0 0.0 - 1.3 10e3/uL    Absolute Eosinophils 0.0 0.0 - 0.7 10e3/uL    Absolute Basophils 0.0 0.0 - 0.2 10e3/uL    Absolute Immature Granulocytes 0.0 <=0.4 10e3/uL    Absolute NRBCs 0.0 10e3/uL   Extra Tube *Canceled*    Narrative    The following orders were created for panel order Extra Tube.  Procedure                               Abnormality         Status                     ---------                               -----------         ------                     Extra Purple Top Tube[[id number]]                                                         Please view results for these tests on the individual orders.   Prepare pheresed platelets (unit)   Result Value Ref Range    Blood Component Type Platelets     Product Code S6630B08     Unit Status Transfused     Unit Number [id number]     CODING SYSTEM URQH828     ISSUE DATE AND TIME [id number]     UNIT ABO/RH O+     UNIT TYPE ISBT 5100

## 2022-08-31 NOTE — PLAN OF CARE
Goal Outcome Evaluation:    Plan of Care Reviewed With: patient     Overall Patient Progress: no change         [id number]:  A&O x 4.  Afebrile.  VSS on room air.  Denies pain, nausea, vomiting, inner ear pressure, chest pain and SOB.  Platelets 16K with AM labs.  Pre-med administered prior, transfused one unit of platelets without incident for LP.  Platelet recheck count 58K.  Good PO intake.  NUNEZ.  LBM 8/29, active BS and passing flatus.  Took scheduled senna, pt plans to take PRN Mirlax later today after LP.  LP with IT chemo now scheduled at 1500 in XR.  Vincristine scheduled to be given after LP.  Up ad stephanie, refused CHG bath/wipes.  Continue to monitor and with POC.      Addendum:  Given 1 mg PO Ativan x 1 for anxiety/pre-LP.

## 2022-08-31 NOTE — PROCEDURES
DIAGNOSIS: Ph positive B-ALL  PROCEDURE: Intrathecal chemo administration   LOCATION: Radiology  INDICATION:   Lumbar puncture performed and CSF samples collected by the  Radiology team under fluoroscopy.    Chemotherapy was double-checked by this writer and Kelsey Gonzalez PA-C. This writer then administered cytarabine PF 40mg, soluCortef PF 40mg, methotrexate PF 15mg in sodium chloride PF 0.9% 6mL intrathecal chemotherapy without apparent complication.  Complications: None immediately.   Chemo instillation performed by: Clau Castro PA-C  [phone]

## 2022-08-31 NOTE — PLAN OF CARE
"BP 99/60 (BP Location: Right arm)   Pulse 82   Temp 98  F (36.7  C) (Oral)   Resp 16   Ht 1.626 m (5' 4\")   Wt 110.1 kg (242 lb 12.8 oz)   SpO2 97%   BMI 41.68 kg/m      Goal Outcome Evaluation:    Plan of Care Reviewed With: patient     Overall Patient Progress: no change    Neuro: A&Ox4.   Cardiac: Afebrile, VSS. Intermittent tachycardia 100's   Respiratory: RA   GI/: Voiding spontaneously. No BM this shift.   Diet/appetite: Tolerating diet. Denies nausea   Activity: Up independent in room  Pain: . Denies   Skin: No new deficits noted.  Lines: Dbl lumen PICC- + blood return; hep locked bwn use  Transfusions: transfusion of 2 units of platelets completed, after pre meds given- no s/s of reaction.  Replacement: awaiting lab results. Replacement and transfusions protocols in place.    Rested btwn cares. Able to make needs known. Continue to monitor. Notify MD of changes/conditions.    Problem: Plan of Care - These are the overarching goals to be used throughout the patient stay.    Goal: Readiness for Transition of Care  Outcome: Ongoing, Not Progressing            "

## 2022-09-01 LAB
ANION GAP SERPL CALCULATED.3IONS-SCNC: 10 MMOL/L (ref 7–15)
APPEARANCE CSF: CLEAR
BASOPHILS # BLD MANUAL: 0 10E3/UL (ref 0–0.2)
BASOPHILS NFR BLD MANUAL: 0 %
BKR LAB AP TR RXN INTERPRETATION: NORMAL
BKR LAB AP TRAN RXN RECOMMENDATION: NORMAL
BKR LAB AP TRANS SIGNS AND SX: NORMAL
BKR LAB AP TRANSFUSION REACTION: NORMAL
BUN SERPL-MCNC: 13.7 MG/DL (ref 6–20)
CALCIUM SERPL-MCNC: 8.7 MG/DL (ref 8.6–10)
CHLORIDE SERPL-SCNC: 104 MMOL/L (ref 98–107)
COLOR CSF: COLORLESS
CREAT SERPL-MCNC: 0.58 MG/DL (ref 0.51–0.95)
DEPRECATED HCO3 PLAS-SCNC: 22 MMOL/L (ref 22–29)
EOSINOPHIL # BLD MANUAL: 0 10E3/UL (ref 0–0.7)
EOSINOPHIL NFR BLD MANUAL: 0 %
ERYTHROCYTE [DISTWIDTH] IN BLOOD BY AUTOMATED COUNT: 13 % (ref 10–15)
GFR SERPL CREATININE-BSD FRML MDRD: >90 ML/MIN/1.73M2
GLUCOSE SERPL-MCNC: 179 MG/DL (ref 70–99)
HCT VFR BLD AUTO: 27.6 % (ref 35–47)
HGB BLD-MCNC: 9.2 G/DL (ref 11.7–15.7)
LACTATE SERPL-SCNC: 2.8 MMOL/L (ref 0.7–2)
LACTATE SERPL-SCNC: 3 MMOL/L (ref 0.7–2)
LDH SERPL L TO P-CCNC: 260 U/L (ref 0–250)
LYMPHOCYTES # BLD MANUAL: 0.2 10E3/UL (ref 0.8–5.3)
LYMPHOCYTES NFR BLD MANUAL: 22 %
MAGNESIUM SERPL-MCNC: 2.1 MG/DL (ref 1.7–2.3)
MCH RBC QN AUTO: 28.6 PG (ref 26.5–33)
MCHC RBC AUTO-ENTMCNC: 33.3 G/DL (ref 31.5–36.5)
MCV RBC AUTO: 86 FL (ref 78–100)
MONOCYTES # BLD MANUAL: 0 10E3/UL (ref 0–1.3)
MONOCYTES NFR BLD MANUAL: 3 %
NEUTROPHILS # BLD MANUAL: 0.8 10E3/UL (ref 1.6–8.3)
NEUTROPHILS NFR BLD MANUAL: 75 %
PATH REPORT.COMMENTS IMP SPEC: NORMAL
PATH REPORT.COMMENTS IMP SPEC: NORMAL
PATH REV: ABNORMAL
PHOSPHATE SERPL-MCNC: 2.3 MG/DL (ref 2.5–4.5)
PLAT MORPH BLD: ABNORMAL
PLATELET # BLD AUTO: 24 10E3/UL (ref 150–450)
POTASSIUM SERPL-SCNC: 4 MMOL/L (ref 3.4–5.3)
RBC # BLD AUTO: 3.22 10E6/UL (ref 3.8–5.2)
RBC # CSF MANUAL: 9 /UL (ref 0–2)
RBC MORPH BLD: ABNORMAL
SODIUM SERPL-SCNC: 136 MMOL/L (ref 136–145)
TUBE # CSF: 4
URATE SERPL-MCNC: 1.7 MG/DL (ref 2.4–5.7)
WBC # BLD AUTO: 1 10E3/UL (ref 4–11)
WBC # CSF MANUAL: 0 /UL (ref 0–5)

## 2022-09-01 PROCEDURE — 36592 COLLECT BLOOD FROM PICC: CPT

## 2022-09-01 PROCEDURE — 83615 LACTATE (LD) (LDH) ENZYME: CPT | Performed by: PHYSICIAN ASSISTANT

## 2022-09-01 PROCEDURE — 250N000013 HC RX MED GY IP 250 OP 250 PS 637: Performed by: INTERNAL MEDICINE

## 2022-09-01 PROCEDURE — 258N000003 HC RX IP 258 OP 636: Performed by: PHYSICIAN ASSISTANT

## 2022-09-01 PROCEDURE — 84100 ASSAY OF PHOSPHORUS: CPT | Performed by: PHYSICIAN ASSISTANT

## 2022-09-01 PROCEDURE — 258N000003 HC RX IP 258 OP 636: Performed by: STUDENT IN AN ORGANIZED HEALTH CARE EDUCATION/TRAINING PROGRAM

## 2022-09-01 PROCEDURE — 36592 COLLECT BLOOD FROM PICC: CPT | Performed by: STUDENT IN AN ORGANIZED HEALTH CARE EDUCATION/TRAINING PROGRAM

## 2022-09-01 PROCEDURE — 250N000011 HC RX IP 250 OP 636: Performed by: INTERNAL MEDICINE

## 2022-09-01 PROCEDURE — 99233 SBSQ HOSP IP/OBS HIGH 50: CPT | Mod: FS | Performed by: STUDENT IN AN ORGANIZED HEALTH CARE EDUCATION/TRAINING PROGRAM

## 2022-09-01 PROCEDURE — 250N000009 HC RX 250: Performed by: STUDENT IN AN ORGANIZED HEALTH CARE EDUCATION/TRAINING PROGRAM

## 2022-09-01 PROCEDURE — 250N000013 HC RX MED GY IP 250 OP 250 PS 637: Performed by: STUDENT IN AN ORGANIZED HEALTH CARE EDUCATION/TRAINING PROGRAM

## 2022-09-01 PROCEDURE — 83735 ASSAY OF MAGNESIUM: CPT | Performed by: STUDENT IN AN ORGANIZED HEALTH CARE EDUCATION/TRAINING PROGRAM

## 2022-09-01 PROCEDURE — 99356 PR PROLONGED SERV,INPATIENT,1ST HR: CPT | Mod: FS | Performed by: STUDENT IN AN ORGANIZED HEALTH CARE EDUCATION/TRAINING PROGRAM

## 2022-09-01 PROCEDURE — 120N000002 HC R&B MED SURG/OB UMMC

## 2022-09-01 PROCEDURE — 250N000011 HC RX IP 250 OP 636: Performed by: STUDENT IN AN ORGANIZED HEALTH CARE EDUCATION/TRAINING PROGRAM

## 2022-09-01 PROCEDURE — 85027 COMPLETE CBC AUTOMATED: CPT | Performed by: PHYSICIAN ASSISTANT

## 2022-09-01 PROCEDURE — 250N000013 HC RX MED GY IP 250 OP 250 PS 637: Performed by: PHYSICIAN ASSISTANT

## 2022-09-01 PROCEDURE — 80048 BASIC METABOLIC PNL TOTAL CA: CPT | Performed by: PHYSICIAN ASSISTANT

## 2022-09-01 PROCEDURE — 83605 ASSAY OF LACTIC ACID: CPT

## 2022-09-01 PROCEDURE — 85007 BL SMEAR W/DIFF WBC COUNT: CPT | Performed by: PHYSICIAN ASSISTANT

## 2022-09-01 PROCEDURE — 250N000011 HC RX IP 250 OP 636: Performed by: PHYSICIAN ASSISTANT

## 2022-09-01 PROCEDURE — 84550 ASSAY OF BLOOD/URIC ACID: CPT | Performed by: PHYSICIAN ASSISTANT

## 2022-09-01 PROCEDURE — 83605 ASSAY OF LACTIC ACID: CPT | Performed by: STUDENT IN AN ORGANIZED HEALTH CARE EDUCATION/TRAINING PROGRAM

## 2022-09-01 RX ADMIN — SENNOSIDES AND DOCUSATE SODIUM 1 TABLET: 8.6; 5 TABLET ORAL at 20:17

## 2022-09-01 RX ADMIN — DEXAMETHASONE 40 MG: 4 TABLET ORAL at 08:13

## 2022-09-01 RX ADMIN — SODIUM CHLORIDE, POTASSIUM CHLORIDE, SODIUM LACTATE AND CALCIUM CHLORIDE 1000 ML: 600; 310; 30; 20 INJECTION, SOLUTION INTRAVENOUS at 08:14

## 2022-09-01 RX ADMIN — Medication 3 MG: at 20:25

## 2022-09-01 RX ADMIN — IMATINIB MESYLATE 400 MG: 400 TABLET, FILM COATED ORAL at 20:17

## 2022-09-01 RX ADMIN — ALLOPURINOL 300 MG: 300 TABLET ORAL at 08:12

## 2022-09-01 RX ADMIN — PANTOPRAZOLE SODIUM 40 MG: 40 TABLET, DELAYED RELEASE ORAL at 08:12

## 2022-09-01 RX ADMIN — MICAFUNGIN 50 MG: 10 INJECTION, POWDER, LYOPHILIZED, FOR SOLUTION INTRAVENOUS at 13:34

## 2022-09-01 RX ADMIN — LEVOFLOXACIN 250 MG: 250 TABLET, FILM COATED ORAL at 13:34

## 2022-09-01 RX ADMIN — Medication 5 ML: at 14:58

## 2022-09-01 RX ADMIN — POTASSIUM PHOSPHATE, MONOBASIC AND POTASSIUM PHOSPHATE, DIBASIC 15 MMOL: 224; 236 INJECTION, SOLUTION, CONCENTRATE INTRAVENOUS at 09:42

## 2022-09-01 RX ADMIN — SENNOSIDES AND DOCUSATE SODIUM 2 TABLET: 8.6; 5 TABLET ORAL at 08:12

## 2022-09-01 RX ADMIN — AMLODIPINE BESYLATE 10 MG: 10 TABLET ORAL at 08:13

## 2022-09-01 RX ADMIN — ACYCLOVIR 400 MG: 400 TABLET ORAL at 20:16

## 2022-09-01 RX ADMIN — IMATINIB MESYLATE 400 MG: 400 TABLET, FILM COATED ORAL at 08:23

## 2022-09-01 RX ADMIN — Medication 5 ML: at 05:42

## 2022-09-01 RX ADMIN — MAGNESIUM HYDROXIDE 30 ML: 400 SUSPENSION ORAL at 15:07

## 2022-09-01 RX ADMIN — ONDANSETRON 8 MG: 4 TABLET, ORALLY DISINTEGRATING ORAL at 09:09

## 2022-09-01 RX ADMIN — Medication 5 ML: at 14:59

## 2022-09-01 RX ADMIN — ACYCLOVIR 400 MG: 400 TABLET ORAL at 08:12

## 2022-09-01 RX ADMIN — Medication 6 MG: at 01:02

## 2022-09-01 ASSESSMENT — ACTIVITIES OF DAILY LIVING (ADL)
ADLS_ACUITY_SCORE: 22

## 2022-09-01 NOTE — PROGRESS NOTES
[facility]  Hematology / Oncology Progress Note    Date of Admission: 8/19/2022  Date of Service (when I saw the patient): 09/01/2022     Assessment & Plan   Vashti Villegas is a 42 year old female who was admitted on 8/19/2022 with hyperleukocytosis (WBC 109K), abdominal pain and 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 Pre-phase steroids initiated (8/21-8/24), then initiated GRAALL + imatinib on 8/24.     TODAY:   - Day 9 GRAALL + imatinib  - s/p IV and IT chemo on 8/31. Complete D9 Dex today. Follow-up CSF flow.  - Zofran ODT for mild queasiness this AM  - Continue bowel regimen; titrate senna/Miralax to desired effect. Can add additional agents PRN if ineffective.  - Continue ppx antimicrobials and best supportive cares.  - mild tachycardia and elevated lactic acid today. Likely in context of underlying ALL, recent chemotherapy, and less PO intake. Will give 1L LR bolus.      HEME  # Ph+ B-ALL  Patient presented to outside hospital with c/o Right upper quadrant pain and was subsequently found to have a markedly elevated white blood cell count concerning for acute leukemia. While at OSH, she had CT PE protocol as well as CT A/P. These identified no PE, no acute or localized intraabdominal inflammatory process, mild splenomegaly, and few inconspicous low-density structures in the liver. She was transferred to [facility] for further workup and management.  - s/p Hydrea 1g TID (8/21-8/23) with improvement in WBC (100K ? 12K)  - CT CAP completed at OSH. CT neck 8/22 showed multiple, b/l suspicious-appearing LN (R level Ia, L level III, R level II-III) - not enlarged though they exhibit abnormal shapes.   - MR brain (8/21) - no acute IC pathology, no e/o intracranial CNS involvement.  - BMBx completed 8/20 - demonstrated B-ALL with 85% blasts and hypercellular marrow. IHC shows CD10+, CD34+. Dry tap, so additional studies sent on PB: FISH  findings c/w Ph+ ALL with BCR-ABL1 fusion present in 93.5% of round nucelei. Abnormal cytogenetics. For unknown reason, molecular studies were cancelled. Have reordered on PB today.  - Pt consented to HMTB; BMBx at dx was dry tap d/t WBC burden. Ordered peripheral blood samples on 8/22.   - Initiated pre-phase steroids with Prednisone 60 mg x8/21; increased to 60 mg/m2 (140 mg) 8/22-8/24. Transitioned to protocol-directed Dex 40 mg on 8/25.   - Message sent to BMT intake team on 8/23. Will send message to team this week.   - Clonoseq ID testing to help estimate MRD in future assessments. Clonal testing collected and sent on 8/23. Plan to order Clonoseq MRD testing with post-induction BMBx.   - Case discussed at Heme malignancy conference on 8/23, per consensus plan was to start GRAALL + Imatinib on 8/24. No clear indication for addition of Rituximab.    - Unsuccessful attempt at LP with IT chemo on 8/25 with CAPS team at bedside. Completed in XR on 8/26. Will require all subsequent LPs under fluoroscopic guidance.   - Pt expresses anxiety surrounding procedures; pre-medication with Ativan +/- Atarax available prior PRN  - Has required multiple units of platelets prior to procedure. Took 2 overnight without bump 8/31 early AM, then 1 additional Plt unit on 8/31 AM prior to procedure with bump to >50K  - will need to schedule next LP with IT chemo in XR for 9/7    Treatment Plan: GRAALL + imatinib (D1=8/24/22)  - Imatinib 400 mg BID - D1-28  - Vincristine 2 mg IV x1 - D1, 8, 15, 22  - Dexamethasone 40 mg daily - D2 (held D1, instead given pre-phase pred as above), 8-9, 15-16, 22-23  - Triple IT chemo (Cytarabine, Solu-Cortef, MTX); D1, 8, 15  -8/26 CSF flow negative  -8/31 CSF flow pending  -next due 9/7  - Neupogen 5 mcg/kg/day - beginning D15, until count recovery    *if plan is to discharge patient with this, will need to look into a PA - further assess in coming days    # Risk of TLS/DIC  # Hyperphosphatemia,  intermittent  Uric acid was mildly elevated to 5.9 and LDH 1,943 on admission. Otherwise TLS labs WNL including Cr ~0.6-0.7. INR mildly elevated to 1.2, Fibrinogen 400-500. Phos mildly up-trended after starting chemo; however, in the absence of any other evidence of TLS, suspect mainly dietary in etiology.  - Allopurinol 300 mg daily (x8/19)  - S/p IVFs  - TLS labs daily   - s/p Phoslo TID (8/26-8/31)    # Pancytopenia  2/2 to underlying disease  - Transfuse to maintain Hgb >7, Plt >10K (>50K prior to LPs)     ID  # PPx  -  mg BID  - Levaquin 250 mg daily   - Maude 50 mg daily  - Keep on micafungin while inpatient given intermittent chemotherapy. Will hold off on pursuing vori/posa at this time. Pending when pt is able to discharge/counts at that time and/or development of any infectious complications, may reconsider. However, hopeful that we could transition to fluconazole alone if needed until count recovery.      # H/o COVID-19 infection   Not vaccinated (due to fear of needles per pt report). Pt reported testing postiive for COVID approx 1 month prior to admission via home test. She never had it confirmed.   - COVID testing on admission was negative.     CV  # Hypertension, improved  Fairly persistent elevated BP since admission to 150-160/80-90s. She was not on anti-hypertensive medication PTA.   - started Amlodipine 5 mg daily (8/21-8/23); increased to 10 mg daily (x8/23) - hold parameters in place if SBP<110  - prn Hydralazine 10-20 mg available q6h for SBP>160     GI  # Intermittent constipation  - Senna 1-2 tabs BID, Miralax prn   - escalate as needed: MoM PRN, lactulose PRN, bisacodyl PO are all potential options    NEURO  # Headaches, improved  Pt endorsed intermittent HA since admission. Denies vision changes or other neurological sx. Has h/o HTN as above. CSF flow cytometry (8/25) negative for involvement by ALL.  - MRI brain completed 8/21 was negative for intracranial pathology or tumor  involvement.   - Tylenol PRN  - Continue weekly LPs x3 per GRAAL protocol  - pt had slight HA on 8/31 evening, relieved with Tylenol    # Intermittent b/l hand numbness  Intermittent left hand numbness (x 8/28) and right hand numbness (noted first on 8/30 evening). Not persistent, not affecting function at present. Resolved as of 8/31. None currently in feet.  - Continue to monitor in context of vincristine per treatment plan.     HEENT  # Ear pain  Pt initially endorsed some left ear pain on 8/29. Otoscopic exam unrevealing. Has since described less inner ear pain but rather pain at the posterior base of her ears b/l (L>R), sometimes painful to the touch. No overlying edema/palpable node or erythema. Less tender to touch on 9/1. Pt not currently endorsing any mouth pain/sores.   - monitor     FEN  - No mIVF: bolus PRN. Will give 1L LR bolus today in context of mild tachycardia and elevated LA (2.8).   - Lyte repletion per standing protocol  - Regular diet as tolerated     MISC  - Lines/Drains: PICC in place, remove on discharge  - DVT ppx: no pharmacologic ppx given TCP  - GI ppx: PPI  - Consults: PT  - CODE: FULL  - DISPO: Anticipate prolonged 4-6 week hospitalization for management of new leukemia.   - Follow-up/Referrals: TBD. Dr. Hull has agreed to continue to follow pt in clinic. 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 Recinos.    ROBYN FleizC  Hematology/Oncology  Pager: [phone]    I spent 45 minutes in the care of this patient today, which included time necessary for review of interval events, obtaining history and physical exam, ordering medication(s)/test(s) as medically indicated,  discussion with interdisciplinary/consult team(s), and documentation time. Over 50% of time was spent face-to-face and/or coordinating care.      Interval History   No acute events overnight, afebrile. She tolerated LP with IT chemo well yesterday. Reports slight HA last night, relieved with Tylenol. Denies HA this AM. She endorses feeling a little queasy, wondering if this is due to taking pills on an empty stomach this AM. Going to try some shereen crackers to get something in her stomach and will try the Zofran ODT as well. Her son is turning 17 today and is going to come bring her [employer] for breakfast.     She otherwise endorses some pain on the back lower sides of her ears. Was more tender to the touch last night, better this AM. Denies feeling of mouth pain/sores. Small BM/smears of stool yesterday.  Denies current hand numbness, nothing in her feet thus far.        Physical Exam   Temp: (!) 96.4  F (35.8  C) Temp src: Oral BP: 134/64 Pulse: 102   Resp: 18 SpO2: 98 % O2 Device: None (Room air)    Vitals:    08/28/22 0921 08/29/22 0856 08/30/22 0725   Weight: 110.5 kg (243 lb 9.7 oz) 109.6 kg (241 lb 9.6 oz) 110.1 kg (242 lb 12.8 oz)     Vital Signs with Ranges  Temp:  [96.4  F (35.8  C)-99.8  F (37.7  C)] 96.4  F (35.8  C)  Pulse:  [] 102  Resp:  [16-20] 18  BP: (116-146)/(57-78) 134/64  Cuff Mean (mmHg):  [77] 77  SpO2:  [96 %-99 %] 98 %  I/O last 3 completed shifts:  In: 355 [P.O.:240; I.V.:115]  Out: 2550 [Urine:2550]    General: Awake and interactive. No acute distress. Pleasant female seen walking back from kitchenette, settles into bed.  HEENT: NC/AT. Sclerae non-icteric. EOM intact. MMM.  Cardiac: Mildly tachycardic. Regular rhythm. No edema.  Respiratory: No signs of respiratory distress or accessory muscle use. Lungs CTA b/l. No wheezes or cough.   Abd: Normal BS. Abd soft, NT/ND.   Extremities: Grossly normal in appearance.   Derm: No rashes or lesions.  Neuro: A&O, normal speech. Memory and  thought process preserved. Moving extremities equally in bed.   Psych: Appropriate mood and affect.       Medications     - MEDICATION INSTRUCTIONS -         acyclovir  400 mg Oral BID     albuterol  2.5 mg Nebulization Q28 Days    And     pentamidine  300 mg Inhalation Q28 Days     allopurinol  300 mg Oral Daily     amLODIPine  10 mg Oral Daily     dexamethasone  40 mg Oral Daily     heparin lock flush  5-20 mL Intracatheter Q24H     imatinib  400 mg Oral BID     lactated ringers  1,000 mL Intravenous Once     levofloxacin  250 mg Oral Q24H     lidocaine (PF)  10 mL Subcutaneous Once     micafungin  50 mg Intravenous Q24H     pantoprazole  40 mg Oral QAM AC     senna-docusate  1-2 tablet Oral BID     sodium chloride (PF)  10-40 mL Intracatheter Q8H     Data   Results for orders placed or performed during the hospital encounter of 08/19/22 (from the past 24 hour(s))   Extra Tube *Canceled*    Narrative    The following orders were created for panel order Extra Tube.  Procedure                               Abnormality         Status                     ---------                               -----------         ------                     Extra Purple Top Tube[[id number]]                                                         Please view results for these tests on the individual orders.   CBC with platelets differential *Canceled*    Narrative    The following orders were created for panel order CBC with platelets differential.  Procedure                               Abnormality         Status                     ---------                               -----------         ------                     CBC with platelets and d...[[id number]]                                                   Please view results for these tests on the individual orders.   Fibrinogen activity   Result Value Ref Range    Fibrinogen Activity 263 170 - 490 mg/dL   INR   Result Value Ref Range    INR 1.06 0.85 - 1.15   Extra Tube    Narrative     The following orders were created for panel order Extra Tube.  Procedure                               Abnormality         Status                     ---------                               -----------         ------                     Extra Green Top (Lithium...[[id number]]                      Final result                 Please view results for these tests on the individual orders.   Extra Green Top (Lithium Heparin) Tube   Result Value Ref Range    Hold Specimen [facility]    CBC with Platelets & Differential    Narrative    The following orders were created for panel order CBC with Platelets & Differential.  Procedure                               Abnormality         Status                     ---------                               -----------         ------                     CBC with platelets and d...[[id number]]  Abnormal            Final result                 Please view results for these tests on the individual orders.   CBC with platelets and differential   Result Value Ref Range    WBC Count 1.3 (L) 4.0 - 11.0 10e3/uL    RBC Count 3.05 (L) 3.80 - 5.20 10e6/uL    Hemoglobin 8.8 (L) 11.7 - 15.7 g/dL    Hematocrit 27.8 (L) 35.0 - 47.0 %    MCV 91 78 - 100 fL    MCH 28.9 26.5 - 33.0 pg    MCHC 31.7 31.5 - 36.5 g/dL    RDW 13.6 10.0 - 15.0 %    Platelet Count 16 (LL) 150 - 450 10e3/uL    % Neutrophils 56 %    % Lymphocytes 38 %    % Monocytes 2 %    % Eosinophils 2 %    % Basophils 0 %    % Immature Granulocytes 2 %    NRBCs per 100 WBC 0 <1 /100    Absolute Neutrophils 0.7 (L) 1.6 - 8.3 10e3/uL    Absolute Lymphocytes 0.5 (L) 0.8 - 5.3 10e3/uL    Absolute Monocytes 0.0 0.0 - 1.3 10e3/uL    Absolute Eosinophils 0.0 0.0 - 0.7 10e3/uL    Absolute Basophils 0.0 0.0 - 0.2 10e3/uL    Absolute Immature Granulocytes 0.0 <=0.4 10e3/uL    Absolute NRBCs 0.0 10e3/uL   Extra Tube *Canceled*    Narrative    The following orders were created for panel order Extra Tube.  Procedure                               Abnormality          Status                     ---------                               -----------         ------                     Extra Purple Top Tube[[id number]]                                                         Please view results for these tests on the individual orders.   Prepare pheresed platelets (unit)   Result Value Ref Range    Blood Component Type Platelets     Product Code [id number]     Unit Status Transfused     Unit Number [id number]     CODING SYSTEM GXCG258     ISSUE DATE AND TIME [id number]     UNIT ABO/RH O+     UNIT TYPE ISBT 5100    Platelet count   Result Value Ref Range    Platelet Count 58 (L) 150 - 450 10e3/uL   CSF Cell Count with Differential:    Narrative    The following orders were created for panel order CSF Cell Count with Differential:.  Procedure                               Abnormality         Status                     ---------                               -----------         ------                     Cell Count CSF[[id number]]               Abnormal            Preliminary result           Please view results for these tests on the individual orders.   Glucose CSF:   Result Value Ref Range    Glucose CSF 68 40 - 70 mg/dL    Narrative    CSF glucose concentrations are about 60 percent of normal plasma glucose.   Cerebrospinal fluid Aerobic Bacterial Culture Routine    Specimen: Lumbar Puncture; Cerebrospinal fluid   Result Value Ref Range    Gram Stain Result No organisms seen     Gram Stain Result No white blood cells seen     Narrative    Gram Stain quantification of host cells and microbiological organisms was done on a cytocentrifuged preparation.     Protein total CSF:   Result Value Ref Range    Protein total CSF 38.2 15.0 - 45.0 mg/dL   Cell Count CSF   Result Value Ref Range    Tube Number 4     Color Colorless Colorless    Clarity Clear Clear    Total Nucleated Cells 0 0 - 5 /uL    RBC Count 9 (H) 0 - 2 /uL    Narrative    Too few cells to do differential.   XR Lumbar  [facility] Intrathecal Chemo Admin    Narrative    PROCEDURE: Lumbar Puncture using Fluoroscopy, 8/31/2022 4:24 PM    HISTORY:  ALL    COMPARISON: 8/26/2022    STAFF NEURORADIOLOGIST: Dr. Alfredo Euceda I, ALFREDO EUCEDA MD,  attest that I was present for all critical portions of the procedure  and was immediately available to provide guidance and assistance  during the remainder of the procedure.    FELLOW PHYSICIAN: Dr. Brent Gardner    TECHNIQUE: Verbal and written consent for lumbar puncture was obtained  from the patient, and benefits and risk of the procedure were  explained, including but not limited to worsening headache,  hemorrhage, infection, lower extremity pain, or nerve root injury. The  patient was sterilely prepped and draped with the patient in the prone  position, over the lower back. Under fluoroscopic guidance, the  interlaminar spaces were noted. 1% lidocaine was administered for  local anesthetic over the right L3-4 interlaminar space, and a 22  gauge 5 inch needle was advanced into the thecal sac under  fluoroscopic guidance.      There was initial show of clear CSF. Approximately 8 cc of CSF were  collected.    Hematology/Oncology then administered intrathecal chemotherapy, dose  and rate as determined by Heme/Onc.    The needle was removed with the stylet in place. There was no  immediate complication associated with the procedure. Samples were  sent for the requested laboratory testing.      FLUORO TIME: 21 seconds low-dose pulsed    DOSE: 8.3 mGy      Impression    IMPRESSION: Successful lumbar puncture without immediate complication.    PLAN: Patient discharged to patient care unit in stable condition for  monitoring. Orders placed for 1 hour of bed rest, with patient laying  on the back and the head of the bed flat.    I have personally reviewed the examination and initial interpretation  and I agree with the findings.    ALFREDO EUCEDA MD         SYSTEM ID:  [id number]   CBC with platelets  differential    Narrative    The following orders were created for panel order CBC with platelets differential.  Procedure                               Abnormality         Status                     ---------                               -----------         ------                     CBC with platelets and d...[[id number]]  Abnormal            Preliminary result         Manual Differential[[id number]]          Abnormal            Final result                 Please view results for these tests on the individual orders.   Phosphorus   Result Value Ref Range    Phosphorus 2.3 (L) 2.5 - 4.5 mg/dL   Uric acid   Result Value Ref Range    Uric Acid 1.7 (L) 2.4 - 5.7 mg/dL   Lactate Dehydrogenase   Result Value Ref Range    Lactate Dehydrogenase 260 (H) 0 - 250 U/L   Basic metabolic panel   Result Value Ref Range    Creatinine 0.58 0.51 - 0.95 mg/dL    Sodium 136 136 - 145 mmol/L    Potassium 4.0 3.4 - 5.3 mmol/L    Urea Nitrogen 13.7 6.0 - 20.0 mg/dL    Chloride 104 98 - 107 mmol/L    Carbon Dioxide (CO2) 22 22 - 29 mmol/L    Anion Gap 10 7 - 15 mmol/L    Glucose 179 (H) 70 - 99 mg/dL    GFR Estimate >90 >60 mL/min/1.73m2    Calcium 8.7 8.6 - 10.0 mg/dL   Lactic Acid STAT   Result Value Ref Range    Lactic Acid 2.8 (H) 0.7 - 2.0 mmol/L   CBC with platelets and differential   Result Value Ref Range    WBC Count 1.0 (L) 4.0 - 11.0 10e3/uL    RBC Count 3.22 (L) 3.80 - 5.20 10e6/uL    Hemoglobin 9.2 (L) 11.7 - 15.7 g/dL    Hematocrit 27.6 (L) 35.0 - 47.0 %    MCV 86 78 - 100 fL    MCH 28.6 26.5 - 33.0 pg    MCHC 33.3 31.5 - 36.5 g/dL    RDW 13.0 10.0 - 15.0 %    Platelet Count 24 (LL) 150 - 450 10e3/uL    NRBCs per 100 WBC 0 <1 /100    Absolute NRBCs 0.0 10e3/uL   Manual Differential   Result Value Ref Range    % Neutrophils 75 %    % Lymphocytes 22 %    % Monocytes 3 %    % Eosinophils 0 %    % Basophils 0 %    Absolute Neutrophils 0.8 (L) 1.6 - 8.3 10e3/uL    Absolute Lymphocytes 0.2 (L) 0.8 - 5.3 10e3/uL    Absolute  Monocytes 0.0 0.0 - 1.3 10e3/uL    Absolute Eosinophils 0.0 0.0 - 0.7 10e3/uL    Absolute Basophils 0.0 0.0 - 0.2 10e3/uL    RBC Morphology Confirmed RBC Indices     Platelet Assessment  Automated Count Confirmed. Platelet morphology is normal.     Automated Count Confirmed. Platelet morphology is normal.

## 2022-09-01 NOTE — PLAN OF CARE
[id number]    AxOx4. VSS on RA. Denies nausea, SOB, and pain besides some minor ear discomfort and a slight headache. PRN tylenol given. Vincristine infused, pt tolerated well. LP and IT chemo done today as well.

## 2022-09-01 NOTE — PLAN OF CARE
Goal Outcome Evaluation:    Plan of Care Reviewed With: patient     Overall Patient Progress: no change       [id number]:  A&O x 4.  Afebrile.  VSS on room air.  Denies pain, nausea, vomiting, chest pain and SOB.  No acute events and complaints.  Phos 2.2, replaced, recheck with AM (9/2) labs.  1L LR bolus administered over 4 hours, lactic acid recheck during infusion 3.0 (not intentional), PAAngellaC aware.  AUO.  LBM 8/29, passing gas and given PRN Milk of Magnesia for constipation.  CHG not done.  Up ad stephanie, ambulated hallways and went outside.  Spouse and son visited.  Continue to monitor and with POC.

## 2022-09-01 NOTE — CODE/RAPID RESPONSE
Rapid Response Team Note    Assessment   In assessment a rapid response was called on April Bakari due to lactic acidosis. Triggered LA came back 2.8 which is a new elevation. Patient denies new developing symptoms concerning for septic process like fever, new chest pain, cough, dyspnea, abd pain, diarrhea or urinary symptoms. Vitals at baseline with absence of fever, or hypotension but has low baseline tachycardia. OSat stable on RA. Physical exam without change from baseline.     Plan   -  Less suspicion for sepsis at this time. Likely related to underlying malignancy, received chemo yesterday vs volume related hypoperfusion. Seems euvolemic on exam. Planning to monitor without intervention and repeat LA in 3 hours.   -  The Internal Medicine primary team was able to be reached and they are in agreement with the above plan.  -  Disposition: The patient will remain on the current unit. We will continue to monitor this patient closely.  -  Reassessment and plan follow-up will be performed by the primary team      Marianna Ambrocio PA-C  [facility] Job Code Contact #[phone]  [facility] Paging/Directory    Hospital Course   Brief Summary of events leading to rapid response:   Vashti Villegas is a 42 year old female who was admitted on 2022 with hyperleukocytosis (WBC 109K), abdominal pain and 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 Peripheral flow cytometry, BMBx () c/w Ph+ B-ALL. Pre-phase steroids initiated (-), then initiated GRAALL + imatinib on .     Admission Diagnosis:   Acute leukemia (H) [C95.00]    Physical Exam   Temp: (!) 96.4  F (35.8  C) Temp  Min: 96.4  F (35.8  C)  Max: 99.8  F (37.7  C)  Resp: 18 Resp  Min: 16  Max: 20  SpO2: 98 % SpO2  Min: 96 %  Max: 99 %  Pulse: 102 Pulse  Min: 92  Max: 117    No data recorded  BP: 134/64 Systolic (24hrs), Av , Min:116 , Max:146   Diastolic (24hrs), Av, Min:57, Max:78     I/Os: I/O last 3 completed shifts:  In: 849 [P.O.:240; I.V.:115]  Out:  1850 [Urine:1850]     Exam:   General: in no acute distress  Mental Status: AAOx4.  HEENT- Moist mucous membranes  Respiratory- Clear to auscultation bilaterally anteriorly, no crackles or wheezing.  Cardiovascular- Regular rate and rhythm, normal S1 and S2, and no murmur noted.  GI: Soft, non-distended, non-tender, normal bowel sounds.      Significant Results and Procedures   Lactic Acid:   Recent Labs   Lab Test 09/01/22  0548 08/31/22  0405 08/29/22  0330   LACT 2.8* 1.2 1.6     CBC:   Recent Labs   Lab Test 09/01/22  0548 08/31/22  1134 08/31/22  0753 08/30/22  0622   WBC 1.0*  --  1.3* 2.4*   HGB 9.2*  --  8.8* 10.3*   HCT 27.6*  --  27.8* 31.3*   PLT 24* 58* 16* 15*        Sepsis Evaluation   The patient is not known to have an infection.  NO EVIDENCE OF SEPSIS at this time.  Vital sign, physical exam, and lab findings are due to likely type B.

## 2022-09-01 NOTE — PROVIDER NOTIFICATION
09/01/22 0600   Call Information   Date of Call 09/01/22   Time of Call 0606   Name of person requesting the team Fabian   Title of person requesting team RN   RRT Arrival time 0608   Time RRT ended 0627   Reason for call   Type of RRT Adult   Primary reason for call Sepsis suspected   Sepsis Suspected Elevated Lactate level;Heart Rate > 100;WBC <4 or >12   Was patient transferred from the ED, ICU, or PACU within last 24 hours prior to RRT call? No   SBAR   Situation LA = 2.8   Background a 42 year old female with no significant past medical history who presented from outside hospital with right upper quadrant pain and headache, subsequently found to have a markedly elevated white blood cell count concerning for acute leukemia.  per H/P admission note   Notable History/Conditions Cancer   Assessment AOx4, AVSS except slightly tachycardic(not new), denies SOB or pain, feels fine, had chemo yesterday and a LP   Interventions No interventions;Other (describe)  (recheck lactic acid later, encourage drinking fluids)   Patient Outcome   Patient Outcome Stabilized on unit   RRT Team   Attending/Primary/Covering Physician Luis Roblero   Physician(s) LIBIA Cabello   Lead RN Lillie Guerrero   RT na   Post RRT Intervention Assessment   Post RRT Assessment Stable/Improved   Date Follow Up Done 09/01/22   Time Follow Up Done 0830   Comments Remains afebrile with vss.  Easy respirations in RA with 02 sats upper 90's.  Continue to monitor closely for s/s sepsis.

## 2022-09-01 NOTE — PROVIDER NOTIFICATION
Web base messaging to Dr. Brewer    Pt. lactic acid 2.8, code sepsis called.     Daniel RN, #[id number]

## 2022-09-01 NOTE — PLAN OF CARE
Time: 3829-6283    Tmax 99.8, decreased without intervention. Max HR of 117bpm, OVSS on RA. Denies pain/nausea/SOB. Melatonin given per pt request. PICC site with some unchanged bruising. Bruising at LP site, on posterior right thigh, and medial right calf. UAL. Adequate UO. LBM was 8/29, continue with scheduled senna and PRN miralax. Endorses passing gas. Poor appetite, but did finish [employer] for dinner. Triggered sepsis BPA, lactic level of 2.8. Provider paged and code sepsis called. Pt deemed not septic and lactic recheck ordered. Oldest's son's birthday today and he will be visiting for breakfast.

## 2022-09-01 NOTE — PROGRESS NOTES
Nursing Focus: Chemotherapy    D:  Patient received Day 8 vincristine.     I:   Chemotherapy double checked per protocol by two chemotherapy competent RN's prior to administration.  No premeds.  Brisk blood return observed from PICC prior to administration.  Insertion site is clean/dry/intact, dressing intact with no complaints of pain. Vincristine administered to gravity per protocol. Reviewed pt teaching on chemotherapy side effects.    A:  Patient tolerating chemo well thus far. Denies nausea.  Patient denies any further need for teaching at this time.     P: Continue to monitor patient for chemo side effects and intervene as needed. Continue to screen for symptoms of toxicity.  Notify MD with any concerns or changes in patient status.

## 2022-09-01 NOTE — PROGRESS NOTES
"/65 (BP Location: Right arm)   Pulse 108   Temp 97  F (36.1  C) (Oral)   Resp 16   Ht 1.626 m (5' 4\")   Wt 110.1 kg (242 lb 12.8 oz)   SpO2 98%   BMI 41.68 kg/m      Hours of Care: 1500 - 1930    Vitals:  stable   Activity:  Independent  Pain:  denies  Neuro:  A+Ox4    Respiratory:  Room air, no c/o SOB  GI/:  Constipation, voiding well.  Diet:  Regular, good appetite    Lines:  PICC  Skin/Wounds:  Intact   Plan:  TBD      Continue to monitor and follow POC    Les Nash RN on 9/1/2022 at 6:27 PM        "

## 2022-09-02 LAB
ALBUMIN SERPL BCG-MCNC: 3.8 G/DL (ref 3.5–5.2)
ALP SERPL-CCNC: 72 U/L (ref 35–104)
ALT SERPL W P-5'-P-CCNC: 32 U/L (ref 10–35)
ANION GAP SERPL CALCULATED.3IONS-SCNC: 10 MMOL/L (ref 7–15)
AST SERPL W P-5'-P-CCNC: 19 U/L (ref 10–35)
BASOPHILS # BLD MANUAL: 0 10E3/UL (ref 0–0.2)
BASOPHILS NFR BLD MANUAL: 0 %
BILIRUB DIRECT SERPL-MCNC: <0.2 MG/DL (ref 0–0.3)
BILIRUB SERPL-MCNC: 0.8 MG/DL
BUN SERPL-MCNC: 15.2 MG/DL (ref 6–20)
CALCIUM SERPL-MCNC: 8.1 MG/DL (ref 8.6–10)
CHLORIDE SERPL-SCNC: 106 MMOL/L (ref 98–107)
CREAT SERPL-MCNC: 0.59 MG/DL (ref 0.51–0.95)
CREAT SERPL-MCNC: 0.61 MG/DL (ref 0.51–0.95)
DEPRECATED HCO3 PLAS-SCNC: 24 MMOL/L (ref 22–29)
EOSINOPHIL # BLD MANUAL: 0 10E3/UL (ref 0–0.7)
EOSINOPHIL NFR BLD MANUAL: 0 %
ERYTHROCYTE [DISTWIDTH] IN BLOOD BY AUTOMATED COUNT: 13.2 % (ref 10–15)
FIBRINOGEN PPP-MCNC: 222 MG/DL (ref 170–490)
GFR SERPL CREATININE-BSD FRML MDRD: >90 ML/MIN/1.73M2
GFR SERPL CREATININE-BSD FRML MDRD: >90 ML/MIN/1.73M2
GLUCOSE SERPL-MCNC: 206 MG/DL (ref 70–99)
HCT VFR BLD AUTO: 25.1 % (ref 35–47)
HGB BLD-MCNC: 8.4 G/DL (ref 11.7–15.7)
INR PPP: 1.14 (ref 0.85–1.15)
LACTATE SERPL-SCNC: 2.6 MMOL/L (ref 0.7–2)
LACTATE SERPL-SCNC: 3.4 MMOL/L (ref 0.7–2)
LYMPHOCYTES # BLD MANUAL: 0.2 10E3/UL (ref 0.8–5.3)
LYMPHOCYTES NFR BLD MANUAL: 14 %
MAGNESIUM SERPL-MCNC: 2.4 MG/DL (ref 1.7–2.3)
MCH RBC QN AUTO: 29.2 PG (ref 26.5–33)
MCHC RBC AUTO-ENTMCNC: 33.5 G/DL (ref 31.5–36.5)
MCV RBC AUTO: 87 FL (ref 78–100)
MONOCYTES # BLD MANUAL: 0 10E3/UL (ref 0–1.3)
MONOCYTES NFR BLD MANUAL: 2 %
NEUTROPHILS # BLD MANUAL: 0.9 10E3/UL (ref 1.6–8.3)
NEUTROPHILS NFR BLD MANUAL: 84 %
PATH REPORT.COMMENTS IMP SPEC: NORMAL
PATH REPORT.FINAL DX SPEC: NORMAL
PATH REPORT.MICROSCOPIC SPEC OTHER STN: NORMAL
PATH REPORT.RELEVANT HX SPEC: NORMAL
PHOSPHATE SERPL-MCNC: 2.3 MG/DL (ref 2.5–4.5)
PLAT MORPH BLD: ABNORMAL
PLATELET # BLD AUTO: 18 10E3/UL (ref 150–450)
POTASSIUM SERPL-SCNC: 3.5 MMOL/L (ref 3.4–5.3)
PROT SERPL-MCNC: 6 G/DL (ref 6.4–8.3)
RBC # BLD AUTO: 2.88 10E6/UL (ref 3.8–5.2)
RBC MORPH BLD: ABNORMAL
SODIUM SERPL-SCNC: 140 MMOL/L (ref 136–145)
WBC # BLD AUTO: 1.1 10E3/UL (ref 4–11)

## 2022-09-02 PROCEDURE — 36415 COLL VENOUS BLD VENIPUNCTURE: CPT | Performed by: PHYSICIAN ASSISTANT

## 2022-09-02 PROCEDURE — 250N000011 HC RX IP 250 OP 636: Performed by: INTERNAL MEDICINE

## 2022-09-02 PROCEDURE — 87040 BLOOD CULTURE FOR BACTERIA: CPT | Performed by: PHYSICIAN ASSISTANT

## 2022-09-02 PROCEDURE — 250N000013 HC RX MED GY IP 250 OP 250 PS 637: Performed by: PHYSICIAN ASSISTANT

## 2022-09-02 PROCEDURE — 250N000013 HC RX MED GY IP 250 OP 250 PS 637: Performed by: INTERNAL MEDICINE

## 2022-09-02 PROCEDURE — 82565 ASSAY OF CREATININE: CPT | Performed by: PHYSICIAN ASSISTANT

## 2022-09-02 PROCEDURE — 83605 ASSAY OF LACTIC ACID: CPT | Performed by: PHYSICIAN ASSISTANT

## 2022-09-02 PROCEDURE — 250N000009 HC RX 250: Performed by: STUDENT IN AN ORGANIZED HEALTH CARE EDUCATION/TRAINING PROGRAM

## 2022-09-02 PROCEDURE — 250N000011 HC RX IP 250 OP 636: Performed by: STUDENT IN AN ORGANIZED HEALTH CARE EDUCATION/TRAINING PROGRAM

## 2022-09-02 PROCEDURE — 99231 SBSQ HOSP IP/OBS SF/LOW 25: CPT | Performed by: PHYSICIAN ASSISTANT

## 2022-09-02 PROCEDURE — 85384 FIBRINOGEN ACTIVITY: CPT | Performed by: PHYSICIAN ASSISTANT

## 2022-09-02 PROCEDURE — 36592 COLLECT BLOOD FROM PICC: CPT | Performed by: PHYSICIAN ASSISTANT

## 2022-09-02 PROCEDURE — 250N000013 HC RX MED GY IP 250 OP 250 PS 637: Performed by: STUDENT IN AN ORGANIZED HEALTH CARE EDUCATION/TRAINING PROGRAM

## 2022-09-02 PROCEDURE — 80053 COMPREHEN METABOLIC PANEL: CPT | Performed by: PHYSICIAN ASSISTANT

## 2022-09-02 PROCEDURE — 85007 BL SMEAR W/DIFF WBC COUNT: CPT | Performed by: PHYSICIAN ASSISTANT

## 2022-09-02 PROCEDURE — 85610 PROTHROMBIN TIME: CPT | Performed by: PHYSICIAN ASSISTANT

## 2022-09-02 PROCEDURE — 85027 COMPLETE CBC AUTOMATED: CPT | Performed by: PHYSICIAN ASSISTANT

## 2022-09-02 PROCEDURE — 83735 ASSAY OF MAGNESIUM: CPT | Performed by: STUDENT IN AN ORGANIZED HEALTH CARE EDUCATION/TRAINING PROGRAM

## 2022-09-02 PROCEDURE — 120N000002 HC R&B MED SURG/OB UMMC

## 2022-09-02 PROCEDURE — 84100 ASSAY OF PHOSPHORUS: CPT | Performed by: PHYSICIAN ASSISTANT

## 2022-09-02 PROCEDURE — 258N000003 HC RX IP 258 OP 636: Performed by: STUDENT IN AN ORGANIZED HEALTH CARE EDUCATION/TRAINING PROGRAM

## 2022-09-02 PROCEDURE — 258N000003 HC RX IP 258 OP 636: Performed by: PHYSICIAN ASSISTANT

## 2022-09-02 PROCEDURE — 99356 PR PROLONGED SERV,INPATIENT,1ST HR: CPT | Mod: FS | Performed by: STUDENT IN AN ORGANIZED HEALTH CARE EDUCATION/TRAINING PROGRAM

## 2022-09-02 PROCEDURE — 99233 SBSQ HOSP IP/OBS HIGH 50: CPT | Mod: FS | Performed by: STUDENT IN AN ORGANIZED HEALTH CARE EDUCATION/TRAINING PROGRAM

## 2022-09-02 PROCEDURE — 82248 BILIRUBIN DIRECT: CPT | Performed by: PHYSICIAN ASSISTANT

## 2022-09-02 PROCEDURE — 83605 ASSAY OF LACTIC ACID: CPT | Performed by: STUDENT IN AN ORGANIZED HEALTH CARE EDUCATION/TRAINING PROGRAM

## 2022-09-02 RX ORDER — SODIUM CHLORIDE 9 MG/ML
INJECTION, SOLUTION INTRAVENOUS CONTINUOUS
Status: DISCONTINUED | OUTPATIENT
Start: 2022-09-02 | End: 2022-09-02

## 2022-09-02 RX ADMIN — ALLOPURINOL 300 MG: 300 TABLET ORAL at 08:25

## 2022-09-02 RX ADMIN — Medication 3 MG: at 22:00

## 2022-09-02 RX ADMIN — PANTOPRAZOLE SODIUM 40 MG: 40 TABLET, DELAYED RELEASE ORAL at 08:25

## 2022-09-02 RX ADMIN — LEVOFLOXACIN 250 MG: 250 TABLET, FILM COATED ORAL at 13:19

## 2022-09-02 RX ADMIN — Medication 5 ML: at 20:16

## 2022-09-02 RX ADMIN — POTASSIUM PHOSPHATE, MONOBASIC AND POTASSIUM PHOSPHATE, DIBASIC 15 MMOL: 224; 236 INJECTION, SOLUTION, CONCENTRATE INTRAVENOUS at 10:34

## 2022-09-02 RX ADMIN — IMATINIB MESYLATE 400 MG: 400 TABLET, FILM COATED ORAL at 08:25

## 2022-09-02 RX ADMIN — ACYCLOVIR 400 MG: 400 TABLET ORAL at 08:25

## 2022-09-02 RX ADMIN — ACYCLOVIR 400 MG: 400 TABLET ORAL at 19:47

## 2022-09-02 RX ADMIN — SENNOSIDES AND DOCUSATE SODIUM 2 TABLET: 8.6; 5 TABLET ORAL at 19:47

## 2022-09-02 RX ADMIN — AMLODIPINE BESYLATE 10 MG: 10 TABLET ORAL at 08:25

## 2022-09-02 RX ADMIN — SODIUM CHLORIDE 1000 ML: 9 INJECTION, SOLUTION INTRAVENOUS at 10:33

## 2022-09-02 RX ADMIN — MAGNESIUM HYDROXIDE 30 ML: 400 SUSPENSION ORAL at 08:25

## 2022-09-02 RX ADMIN — Medication 5 ML: at 15:56

## 2022-09-02 RX ADMIN — SENNOSIDES AND DOCUSATE SODIUM 2 TABLET: 8.6; 5 TABLET ORAL at 08:25

## 2022-09-02 RX ADMIN — IMATINIB MESYLATE 400 MG: 400 TABLET, FILM COATED ORAL at 19:47

## 2022-09-02 RX ADMIN — MICAFUNGIN 50 MG: 10 INJECTION, POWDER, LYOPHILIZED, FOR SOLUTION INTRAVENOUS at 13:20

## 2022-09-02 RX ADMIN — Medication 5 ML: at 07:37

## 2022-09-02 ASSESSMENT — ACTIVITIES OF DAILY LIVING (ADL)
ADLS_ACUITY_SCORE: 22

## 2022-09-02 NOTE — PLAN OF CARE
Goal Outcome Evaluation:    A&Ox4. Day 10 GRAAL + imatinib regimen. Baseline tachycardic. HR ranging from 100-110. Sepsis protocol triggered. Lactic 2.4. 1L NS bolus ordered. Lactic recheck 2.6. Providers aware. OVSS. Denies N/V and SOB. Abdominal discomfort and cramping present due to constipation. No BM since 8/28. Milk of Mag given x1. Phos 2.3 and replaced IV. Recheck ordered for tomorrow morning.  Up independently, voiding adequately.

## 2022-09-02 NOTE — PROGRESS NOTES
[facility]  Hematology / Oncology Progress Note    Date of Admission: 8/19/2022  Date of Service (when I saw the patient): 09/02/2022     Assessment & Plan   Vashti Villegas is a 42 year old female who was admitted on 8/19/2022 with hyperleukocytosis (WBC 109K), abdominal pain and 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 Pre-phase steroids initiated (8/21-8/24), then initiated GRAALL + imatinib on 8/24.     TODAY:   - Day 10 GRAALL + imatinib  - 8/31 CSF flow negative  - next IV/IT chemo due 9/7  - had BM today after milk of mg today  - lactic acid uptrended today. Low suspicion for infection at this time. Appreciate Sepsis Response team.   - surveillance cultures  - repeat fluid bolus today and repeat LA       HEME  # Ph+ B-ALL  Patient presented to outside hospital with c/o Right upper quadrant pain and was subsequently found to have a markedly elevated white blood cell count concerning for acute leukemia. While at OSH, she had CT PE protocol as well as CT A/P. These identified no PE, no acute or localized intraabdominal inflammatory process, mild splenomegaly, and few inconspicous low-density structures in the liver. She was transferred to [facility] for further workup and management.  - s/p Hydrea 1g TID (8/21-8/23) with improvement in WBC (100K ? 12K)  - CT CAP completed at OSH. CT neck 8/22 showed multiple, b/l suspicious-appearing LN (R level Ia, L level III, R level II-III) - not enlarged though they exhibit abnormal shapes.   - MR brain (8/21) - no acute IC pathology, no e/o intracranial CNS involvement.  - BMBx completed 8/20 - demonstrated B-ALL with 85% blasts and hypercellular marrow. IHC shows CD10+, CD34+. 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 Abnormal cytogenetics. For unknown reason, molecular studies were cancelled. Have reordered on  PB today.  - Pt consented to HMTB; BMBx at dx was dry tap d/t WBC burden. Ordered peripheral blood samples on 8/22.   - Initiated pre-phase steroids with Prednisone 60 mg x8/21; increased to 60 mg/m2 (140 mg) 8/22-8/24. Transitioned to protocol-directed Dex 40 mg on 8/25.   - Message sent to BMT intake team on 8/23. Sent follow-up message on 9/2.  - Clonoseq ID testing to help estimate MRD in future assessments. Clonal testing collected and sent on 8/23. Plan to order Clonoseq MRD testing with post-induction BMBx.   - Case discussed at Heme malignancy conference on 8/23, per consensus plan was to start GRAALL + Imatinib on 8/24. No clear indication for addition of Rituximab.    - Unsuccessful attempt at LP with IT chemo on 8/25 with CAPS team at bedside. Completed in XR on 8/26. Will require all subsequent LPs under fluoroscopic guidance.   - Pt expresses anxiety surrounding procedures; pre-medication with Ativan +/- Atarax available prior PRN  - Has required multiple units of platelets prior to procedure. Took 2 overnight without bump 8/31 early AM, then 1 additional Plt unit on 8/31 AM prior to procedure with bump to >50K  - next LP with IT chemo in XR for 9/7 @ 11 am    Treatment Plan: GRAALL + imatinib (D1=8/24/22)  - Imatinib 400 mg BID - D1-28  - Vincristine 2 mg IV x1 - D1, 8, 15, 22  - Dexamethasone 40 mg daily - D2 (held D1, instead given pre-phase pred as above), 8-9, 15-16, 22-23  - Triple IT chemo (Cytarabine, Solu-Cortef, MTX); D1, 8, 15  -8/26 CSF flow negative  -8/31 CSF flow negative  -next due 9/7  - Neupogen 5 mcg/kg/day - beginning D15, until count recovery    *if plan is to discharge patient with this, will need to look into a PA - further assess in coming days    # Risk of TLS/DIC  # Hyperphosphatemia, intermittent  Uric acid was mildly elevated to 5.9 and LDH 1,943 on admission. Otherwise TLS labs WNL including Cr ~0.6-0.7. INR mildly elevated to 1.2, Fibrinogen 400-500. Phos mildly up-trended  after starting chemo; however, in the absence of any other evidence of TLS, suspect mainly dietary in etiology.  - Allopurinol 300 mg daily (x8/19)  - S/p IVFs  - TLS labs daily   - s/p Phoslo TID (8/26-8/31)    # Pancytopenia  2/2 to underlying disease  - Transfuse to maintain Hgb >7, Plt >10K (>50K prior to LPs)     ID  # PPx  -  mg BID  - Levaquin 250 mg daily   - Maude 50 mg daily  - Keep on micafungin while inpatient given intermittent chemotherapy. Will hold off on pursuing vori/posa at this time. Pending when pt is able to discharge/counts at that time and/or development of any infectious complications, may reconsider. However, hopeful that we could transition to fluconazole alone if needed until count recovery.      # H/o COVID-19 infection   Not vaccinated (due to fear of needles per pt report). Pt reported testing postiive for COVID approx 1 month prior to admission via home test. She never had it confirmed.   - COVID testing on admission was negative.     CV  # Hypertension, improved  Fairly persistent elevated BP since admission to 150-160/80-90s. She was not on anti-hypertensive medication PTA.   - started Amlodipine 5 mg daily (8/21-8/23); increased to 10 mg daily (x8/23) - hold parameters in place if SBP<110  - prn Hydralazine 10-20 mg available q6h for SBP>160     GI  # Intermittent constipation, currently resolved  - Senna 1-2 tabs BID, Miralax prn   - escalate as needed: MoM PRN, lactulose PRN, bisacodyl PO are all potential options    NEURO  # Headaches, improved  Pt endorsed intermittent HA since admission. Denies vision changes or other neurological sx. Has h/o HTN as above. CSF flow cytometry (8/25) negative for involvement by ALL.  - MRI brain completed 8/21 was negative for intracranial pathology or tumor involvement.   - Tylenol PRN  - Continue weekly LPs x3 per GRAAL protocol  - pt had slight HA on 8/31 evening, relieved with Tylenol    # Intermittent b/l hand numbness  Intermittent  left hand numbness (x 8/28) and right hand numbness (noted first on 8/30 evening). Not persistent, not affecting function at present. Resolved as of 8/31. None currently in feet.  - Continue to monitor in context of vincristine per treatment plan.     HEENT  # Ear pain, improved  Pt initially endorsed some left ear pain on 8/29. Otoscopic exam unrevealing. Has since described less inner ear pain but rather pain at the posterior base of her ears b/l (L>R), sometimes painful to the touch. No overlying edema/palpable node or erythema. Less tender to touch on 9/1. Pt not currently endorsing any mouth pain/sores. Further improved on 9/2.  - monitor     FEN  - No mIVF: bolus PRN. S/p 1L NS bolus on 9/1 in context of recent chemo and lactic acidosis. Repeat today given uptrending lactate.   - Lyte repletion per standing protocol  - Regular diet as tolerated     MISC  - Lines/Drains: PICC in place, remove on discharge  - DVT ppx: no pharmacologic ppx given TCP  - GI ppx: PPI  - Consults: PT  - CODE: FULL  - DISPO: Anticipate prolonged 4-6 week hospitalization for management of new leukemia.   - Follow-up/Referrals: TBD. Dr. Hull has agreed to continue to follow pt in clinic. 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 Reviewed by pt and this provider faxed the paperwork on 9/2.     Patient and plan of care was discussed with attending physician Dr. Recinos.    Clau Castro PA-C  Hematology/Oncology  Pager: [phone]    I spent 45 minutes in the care of this patient today, which included time necessary for review of interval events, obtaining history and physical exam, ordering medication(s)/test(s) as medically indicated, discussion with interdisciplinary/consult team(s), and  documentation time. Over 50% of time was spent face-to-face and/or coordinating care.      Interval History   No acute events overnight, afebrile. Doing well today. No further nausea after yesterday AM. Had large, formed BM today after milk of mg. Some residual abdominal crampy feeling but she is unsure if this in relation to the milk of mag and recent BM; will monitor today. Denies HA, mouth pain/sores, SOB, cough, or feeling of extremity edema. States pain behind ears is better today.         Physical Exam   Temp: 97.9  F (36.6  C) Temp src: Oral BP: 115/53 Pulse: 95   Resp: 18 SpO2: 98 % O2 Device: None (Room air)    Vitals:    08/29/22 0856 08/30/22 0725 09/02/22 0739   Weight: 109.6 kg (241 lb 9.6 oz) 110.1 kg (242 lb 12.8 oz) 111.5 kg (245 lb 14.4 oz)     Vital Signs with Ranges  Temp:  [96.5  F (35.8  C)-99.6  F (37.6  C)] 97.9  F (36.6  C)  Pulse:  [] 95  Resp:  [16-18] 18  BP: (104-137)/(53-68) 115/53  SpO2:  [96 %-98 %] 98 %  I/O last 3 completed shifts:  In: 115 [I.V.:115]  Out: 900 [Urine:900]    General: Awake and interactive. No acute distress.   HEENT: NC/AT. Sclerae non-icteric. EOM intact. MMM.  Cardiac: RRR. 2/6 murmur best appreciated at upper sternal border (L>R). No edema.  Respiratory: No signs of respiratory distress or accessory muscle use. Lungs CTA b/l. No wheezes or cough.   Abd: Normal BS. Abd soft, NT/ND.   Extremities: Grossly normal in appearance.   Derm: No rashes or lesions.  Neuro: A&O, normal speech. Memory and thought process preserved. Moving extremities equally in bed.   Psych: Appropriate mood and affect.       Medications     - MEDICATION INSTRUCTIONS -         acyclovir  400 mg Oral BID     albuterol  2.5 mg Nebulization Q28 Days    And     pentamidine  300 mg Inhalation Q28 Days     allopurinol  300 mg Oral Daily     amLODIPine  10 mg Oral Daily     heparin lock flush  5-20 mL Intracatheter Q24H     imatinib  400 mg Oral BID     levofloxacin  250 mg Oral Q24H      lidocaine (PF)  10 mL Subcutaneous Once     micafungin  50 mg Intravenous Q24H     pantoprazole  40 mg Oral QAM AC     sodium phosphate  15 mmol Intravenous Once     senna-docusate  1-2 tablet Oral BID     sodium chloride (PF)  10-40 mL Intracatheter Q8H     Data   Results for orders placed or performed during the hospital encounter of 08/19/22 (from the past 24 hour(s))   CBC with platelets differential    Narrative    The following orders were created for panel order CBC with platelets differential.  Procedure                               Abnormality         Status                     ---------                               -----------         ------                     CBC with platelets and d...[[id number]]  Abnormal            Final result               Manual Differential[[id number]]          Abnormal            Final result                 Please view results for these tests on the individual orders.   Phosphorus   Result Value Ref Range    Phosphorus 2.3 (L) 2.5 - 4.5 mg/dL   Fibrinogen activity   Result Value Ref Range    Fibrinogen Activity 222 170 - 490 mg/dL   INR   Result Value Ref Range    INR 1.14 0.85 - 1.15   Basic metabolic panel   Result Value Ref Range    Creatinine 0.59 0.51 - 0.95 mg/dL    Sodium 140 136 - 145 mmol/L    Potassium 3.5 3.4 - 5.3 mmol/L    Urea Nitrogen 15.2 6.0 - 20.0 mg/dL    Chloride 106 98 - 107 mmol/L    Carbon Dioxide (CO2) 24 22 - 29 mmol/L    Anion Gap 10 7 - 15 mmol/L    Glucose 206 (H) 70 - 99 mg/dL    GFR Estimate >90 >60 mL/min/1.73m2    Calcium 8.1 (L) 8.6 - 10.0 mg/dL   Hepatic panel   Result Value Ref Range    Protein Total 6.0 (L) 6.4 - 8.3 g/dL    Albumin 3.8 3.5 - 5.2 g/dL    Bilirubin Total 0.8 <=1.2 mg/dL    Alkaline Phosphatase 72 35 - 104 U/L    AST 19 10 - 35 U/L    ALT 32 10 - 35 U/L    Bilirubin Direct <0.20 0.00 - 0.30 mg/dL   Magnesium   Result Value Ref Range    Magnesium 2.4 (H) 1.7 - 2.3 mg/dL   Lactic Acid STAT   Result Value Ref Range    Lactic Acid  "3.4 (H) 0.7 - 2.0 mmol/L   CBC with platelets and differential   Result Value Ref Range    WBC Count 1.1 (L) 4.0 - 11.0 10e3/uL    RBC Count 2.88 (L) 3.80 - 5.20 10e6/uL    Hemoglobin 8.4 (L) 11.7 - 15.7 g/dL    Hematocrit 25.1 (L) 35.0 - 47.0 %    MCV 87 78 - 100 fL    MCH 29.2 26.5 - 33.0 pg    MCHC 33.5 31.5 - 36.5 g/dL    RDW 13.2 10.0 - 15.0 %    Platelet Count 18 (LL) 150 - 450 10e3/uL    Narrative    Previously reported component [ NRBCs ] is no longer reported.\"  Previously reported component [ NRBCs Absolute ] is no longer reported.\"   Manual Differential   Result Value Ref Range    % Neutrophils 84 %    % Lymphocytes 14 %    % Monocytes 2 %    % Eosinophils 0 %    % Basophils 0 %    Absolute Neutrophils 0.9 (L) 1.6 - 8.3 10e3/uL    Absolute Lymphocytes 0.2 (L) 0.8 - 5.3 10e3/uL    Absolute Monocytes 0.0 0.0 - 1.3 10e3/uL    Absolute Eosinophils 0.0 0.0 - 0.7 10e3/uL    Absolute Basophils 0.0 0.0 - 0.2 10e3/uL    RBC Morphology Confirmed RBC Indices     Platelet Assessment  Automated Count Confirmed. Platelet morphology is normal.     Automated Count Confirmed. Platelet morphology is normal.     "

## 2022-09-02 NOTE — PROGRESS NOTES
09/02/22 0800   Call Information   Date of Call 09/02/22   Time of Call 0811   Name of person requesting the team Yris   Title of person requesting team RN   RRT Arrival time 0815   Time RRT ended 0833   Reason for call   Type of RRT Adult   Primary reason for call Sepsis suspected   Sepsis Suspected Elevated Lactate level;Heart Rate > 100;BMT/Oncology patient with WBC<0.5 or >12 or ANC <500   Was patient transferred from the ED, ICU, or PACU within last 24 hours prior to RRT call? No   SBAR   Situation LA 3.4   Background per note...a 42 year old female with no significant past medical history who presented from outside hospital with right upper quadrant pain and headache, subsequently found to have a markedly elevated white blood cell count concerning for acute leukemia.  per H/P admission note   Notable History/Conditions Cancer   Assessment pt is alert and oriented.. .  OVSS.   Interventions Fluid bolus;Labs   Patient Outcome   Patient Outcome Stabilized on unit   RRT Team   Attending/Primary/Covering Physician Heme Malignancy   Physician(s) Adriana Severing PA   Lead RN Candace Magana   Post RRT Intervention Assessment   Post RRT Assessment Stable/Improved   Date Follow Up Done 09/02/22   Time Follow Up Done 1400   Comments LA recheck was 2.7.  VSS

## 2022-09-02 NOTE — CODE/RAPID RESPONSE
Rapid Response Team Note    Assessment   In assessment a rapid response was called on  due to SIRS/Sepsis trigger and lactic acidosis. This presentation is likely due to malignancy and chemotherapy.  42 year old female who was admitted on 2022 with hyperleukocytosis (WBC 109K), abdominal pain and 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 Peripheral flow cytometry, BMBx () c/w Ph+ B-ALL. Pre-phase steroids initiated (-), then initiated GRAALL + imatinib on .     Plan   -  Low suspicion of infection/sepsis.  -  Obtain surveillance blood cultures x 2 sets peripheral given uptrend of lactic acidosis.  -  NS 1L bolus over 2 hours.  -  Recheck lactic acid in 4 hours.  -  The Hematology/Oncology primary team was able to be reached and they are in agreement with the above plan.  -  Disposition: The patient will remain on the current unit. We will continue to monitor this patient closely.  -  Reassessment and plan follow-up will be performed by the primary team      VIRGINIA GLEZ PA  [facility] RRT [facility] Job Code Contact #[phone]  [facility] Paging/Directory    Hospital Course   Brief Summary of events leading to rapid response:   RRT called for lactic 3.4, triggered by baseline tachycardia (105) and leukopenia (1.1) in the setting of acute leukemia now day 10 of chemotherapy. Vitals have remained stable. She is feeling well other than some ongoing constipation issues. She has remained afebrile. She has persistent lactic acidosis over the past 2 days; was 2.8 yesterday morning, increased to 3.0 after fluids. Now 3.4 this morning.    Admission Diagnosis:   Acute leukemia (H) [C95.00]    Physical Exam   Temp: 98.1  F (36.7  C) Temp  Min: 96.5  F (35.8  C)  Max: 99.6  F (37.6  C)  Resp: 18 Resp  Min: 16  Max: 18  SpO2: 97 % SpO2  Min: 96 %  Max: 98 %  Pulse: 95 Pulse  Min: 95  Max: 108    No data recorded  BP: 114/58 Systolic (24hrs), Av , Min:104 , Max:137   Diastolic (24hrs), Av, Min:55,  Max:70     I/Os: I/O last 3 completed shifts:  In: 115 [I.V.:115]  Out: 900 [Urine:900]     Exam:   General: in no acute distress  Mental Status: AAOx4.  Resp: No increased WOB noted, breathing comfortably on room air  GI: Soft, nondistended. Mild tenderness with deep palpation. No guarding or rigidity.    Significant Results and Procedures   Lactic Acid:   Recent Labs   Lab Test 09/02/22  0745 09/01/22  1056 09/01/22  0548   LACT 3.4* 3.0* 2.8*     CBC:   Recent Labs   Lab Test 09/02/22  0745 09/01/22  0548 08/31/22  1134 08/31/22  0753   WBC 1.1* 1.0*  --  1.3*   HGB 8.4* 9.2*  --  8.8*   HCT 25.1* 27.6*  --  27.8*   PLT 18* 24* 58* 16*        Sepsis Evaluation   The patient is not known to have an infection.  NO EVIDENCE OF SEPSIS at this time.  Vital sign, physical exam, and lab findings are due to malignancy and chemotherapy.

## 2022-09-02 NOTE — PROGRESS NOTES
"/59 (BP Location: Right arm)   Pulse 105   Temp (!) 96.6  F (35.9  C) (Oral)   Resp 18   Ht 1.626 m (5' 4\")   Wt 110.1 kg (242 lb 12.8 oz)   SpO2 98%   BMI 41.68 kg/m      [id number]     Vitals:  stable   Activity:  Independent  Pain:  C/o abdominal cramping this AM. Stated r/t lack of bowel movement for last \"2-3 days\". Milk of Mag requested from Pharmacy this AM   Neuro:  A+Ox4 , CMS intact   Respiratory:  WDL on RA   GI/:  Constipation, last BM \"2-3 days ago\". Small amts of flatus. Voiding adequately, not saving   Diet:  Regular, good appetite    Lines:  PICC  Skin/Wounds:  No new skin deficits noted    Plan:  TBD      Continue to monitor and follow POC    Jude Alexandre RN on 9/1/2022 at 6:27 PM        "

## 2022-09-03 LAB
ANION GAP SERPL CALCULATED.3IONS-SCNC: 8 MMOL/L (ref 7–15)
BASOPHILS # BLD MANUAL: 0 10E3/UL (ref 0–0.2)
BASOPHILS NFR BLD MANUAL: 0 %
BUN SERPL-MCNC: 13.6 MG/DL (ref 6–20)
CALCIUM SERPL-MCNC: 7.9 MG/DL (ref 8.6–10)
CHLORIDE SERPL-SCNC: 106 MMOL/L (ref 98–107)
CREAT SERPL-MCNC: 0.58 MG/DL (ref 0.51–0.95)
CREAT SERPL-MCNC: 0.58 MG/DL (ref 0.51–0.95)
DEPRECATED HCO3 PLAS-SCNC: 24 MMOL/L (ref 22–29)
EOSINOPHIL # BLD MANUAL: 0 10E3/UL (ref 0–0.7)
EOSINOPHIL NFR BLD MANUAL: 0 %
ERYTHROCYTE [DISTWIDTH] IN BLOOD BY AUTOMATED COUNT: 13.4 % (ref 10–15)
GFR SERPL CREATININE-BSD FRML MDRD: >90 ML/MIN/1.73M2
GFR SERPL CREATININE-BSD FRML MDRD: >90 ML/MIN/1.73M2
GLUCOSE SERPL-MCNC: 117 MG/DL (ref 70–99)
HCT VFR BLD AUTO: 23 % (ref 35–47)
HGB BLD-MCNC: 7.6 G/DL (ref 11.7–15.7)
LYMPHOCYTES # BLD MANUAL: 0.4 10E3/UL (ref 0.8–5.3)
LYMPHOCYTES NFR BLD MANUAL: 44 %
MAGNESIUM SERPL-MCNC: 2.4 MG/DL (ref 1.7–2.3)
MCH RBC QN AUTO: 29.2 PG (ref 26.5–33)
MCHC RBC AUTO-ENTMCNC: 33 G/DL (ref 31.5–36.5)
MCV RBC AUTO: 89 FL (ref 78–100)
MONOCYTES # BLD MANUAL: 0 10E3/UL (ref 0–1.3)
MONOCYTES NFR BLD MANUAL: 1 %
NEUTROPHILS # BLD MANUAL: 0.4 10E3/UL (ref 1.6–8.3)
NEUTROPHILS NFR BLD MANUAL: 55 %
NRBC # BLD AUTO: 0 10E3/UL
NRBC BLD MANUAL-RTO: 1 %
PHOSPHATE SERPL-MCNC: 2.4 MG/DL (ref 2.5–4.5)
PLAT MORPH BLD: ABNORMAL
PLATELET # BLD AUTO: 26 10E3/UL (ref 150–450)
POTASSIUM SERPL-SCNC: 3.9 MMOL/L (ref 3.4–5.3)
RBC # BLD AUTO: 2.6 10E6/UL (ref 3.8–5.2)
RBC MORPH BLD: ABNORMAL
SODIUM SERPL-SCNC: 138 MMOL/L (ref 136–145)
WBC # BLD AUTO: 0.8 10E3/UL (ref 4–11)

## 2022-09-03 PROCEDURE — 250N000011 HC RX IP 250 OP 636: Performed by: PHYSICIAN ASSISTANT

## 2022-09-03 PROCEDURE — 80048 BASIC METABOLIC PNL TOTAL CA: CPT | Performed by: PHYSICIAN ASSISTANT

## 2022-09-03 PROCEDURE — 258N000003 HC RX IP 258 OP 636: Performed by: STUDENT IN AN ORGANIZED HEALTH CARE EDUCATION/TRAINING PROGRAM

## 2022-09-03 PROCEDURE — 250N000011 HC RX IP 250 OP 636: Performed by: INTERNAL MEDICINE

## 2022-09-03 PROCEDURE — 120N000002 HC R&B MED SURG/OB UMMC

## 2022-09-03 PROCEDURE — 250N000009 HC RX 250: Performed by: STUDENT IN AN ORGANIZED HEALTH CARE EDUCATION/TRAINING PROGRAM

## 2022-09-03 PROCEDURE — 84100 ASSAY OF PHOSPHORUS: CPT | Performed by: PHYSICIAN ASSISTANT

## 2022-09-03 PROCEDURE — 36592 COLLECT BLOOD FROM PICC: CPT | Performed by: PHYSICIAN ASSISTANT

## 2022-09-03 PROCEDURE — 85007 BL SMEAR W/DIFF WBC COUNT: CPT | Performed by: PHYSICIAN ASSISTANT

## 2022-09-03 PROCEDURE — 99233 SBSQ HOSP IP/OBS HIGH 50: CPT | Mod: FS | Performed by: PHYSICIAN ASSISTANT

## 2022-09-03 PROCEDURE — 250N000013 HC RX MED GY IP 250 OP 250 PS 637: Performed by: PHYSICIAN ASSISTANT

## 2022-09-03 PROCEDURE — 99356 PR PROLONGED SERV,INPATIENT,1ST HR: CPT | Mod: FS | Performed by: PHYSICIAN ASSISTANT

## 2022-09-03 PROCEDURE — 250N000011 HC RX IP 250 OP 636: Performed by: STUDENT IN AN ORGANIZED HEALTH CARE EDUCATION/TRAINING PROGRAM

## 2022-09-03 PROCEDURE — 82565 ASSAY OF CREATININE: CPT | Performed by: PHYSICIAN ASSISTANT

## 2022-09-03 PROCEDURE — 999N000044 HC STATISTIC CVC DRESSING CHANGE

## 2022-09-03 PROCEDURE — 250N000013 HC RX MED GY IP 250 OP 250 PS 637: Performed by: INTERNAL MEDICINE

## 2022-09-03 PROCEDURE — 85027 COMPLETE CBC AUTOMATED: CPT | Performed by: PHYSICIAN ASSISTANT

## 2022-09-03 PROCEDURE — 83735 ASSAY OF MAGNESIUM: CPT | Performed by: STUDENT IN AN ORGANIZED HEALTH CARE EDUCATION/TRAINING PROGRAM

## 2022-09-03 PROCEDURE — 250N000013 HC RX MED GY IP 250 OP 250 PS 637: Performed by: STUDENT IN AN ORGANIZED HEALTH CARE EDUCATION/TRAINING PROGRAM

## 2022-09-03 RX ADMIN — Medication 5 ML: at 06:47

## 2022-09-03 RX ADMIN — SENNOSIDES AND DOCUSATE SODIUM 2 TABLET: 8.6; 5 TABLET ORAL at 19:50

## 2022-09-03 RX ADMIN — Medication 3 MG: at 22:27

## 2022-09-03 RX ADMIN — PANTOPRAZOLE SODIUM 40 MG: 40 TABLET, DELAYED RELEASE ORAL at 09:45

## 2022-09-03 RX ADMIN — ONDANSETRON 8 MG: 4 TABLET, ORALLY DISINTEGRATING ORAL at 10:15

## 2022-09-03 RX ADMIN — ACYCLOVIR 400 MG: 400 TABLET ORAL at 09:45

## 2022-09-03 RX ADMIN — ALLOPURINOL 300 MG: 300 TABLET ORAL at 09:45

## 2022-09-03 RX ADMIN — MICAFUNGIN 50 MG: 10 INJECTION, POWDER, LYOPHILIZED, FOR SOLUTION INTRAVENOUS at 12:55

## 2022-09-03 RX ADMIN — SENNOSIDES AND DOCUSATE SODIUM 1 TABLET: 8.6; 5 TABLET ORAL at 09:51

## 2022-09-03 RX ADMIN — AMLODIPINE BESYLATE 10 MG: 10 TABLET ORAL at 09:45

## 2022-09-03 RX ADMIN — LEVOFLOXACIN 250 MG: 250 TABLET, FILM COATED ORAL at 13:58

## 2022-09-03 RX ADMIN — ACYCLOVIR 400 MG: 400 TABLET ORAL at 19:50

## 2022-09-03 RX ADMIN — IMATINIB MESYLATE 400 MG: 400 TABLET, FILM COATED ORAL at 19:51

## 2022-09-03 RX ADMIN — IMATINIB MESYLATE 400 MG: 400 TABLET, FILM COATED ORAL at 09:46

## 2022-09-03 RX ADMIN — POTASSIUM PHOSPHATE, MONOBASIC POTASSIUM PHOSPHATE, DIBASIC 15 MMOL: 224; 236 INJECTION, SOLUTION, CONCENTRATE INTRAVENOUS at 13:58

## 2022-09-03 ASSESSMENT — ACTIVITIES OF DAILY LIVING (ADL)
ADLS_ACUITY_SCORE: 22
ADLS_ACUITY_SCORE: 23
ADLS_ACUITY_SCORE: 22

## 2022-09-03 NOTE — PLAN OF CARE
Goal Outcome Evaluation:  April remains afe this am.  C/O x1 - relief with po Zofran.  Phos 2.4 and replaced IV due to nausea - recheck in am.  + BM after MOM yesterday.

## 2022-09-03 NOTE — PROGRESS NOTES
[facility]  Hematology / Oncology Progress Note    Date of Admission: 8/19/2022  Date of Service (when I saw the patient): 09/03/2022     Assessment & Plan   Vashti Villegas is a 42 year old female who was admitted on 8/19/2022 with hyperleukocytosis (WBC 109K), abdominal pain and 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 Pre-phase steroids initiated (8/21-8/24), then initiated GRAALL + imatinib on 8/24.     TODAY:   - Day 11 GRAALL + imatinib   - Tolerating well, has some nausea but well controlled with anti-emetics  - 8/31 CSF flow negative  - Next IV/IT chemo due 9/7  - Last BM was yesterday morning. Constipation relieved with milk of mag on 9/2  - Lactic acid now down-trending after bolus of IVF yesterday    HEME  # Ph+ B-ALL  Patient presented to outside hospital with c/o Right upper quadrant pain and was subsequently found to have a markedly elevated white blood cell count concerning for acute leukemia. While at OSH, she had CT PE protocol as well as CT A/P. These identified no PE, no acute or localized intraabdominal inflammatory process, mild splenomegaly, and few inconspicous low-density structures in the liver. She was transferred to [facility] for further workup and management.  - s/p Hydrea 1g TID (8/21-8/23) with improvement in WBC (100K ? 12K)  - CT CAP completed at OSH. CT neck 8/22 showed multiple, b/l suspicious-appearing LN (R level Ia, L level III, R level II-III) - not enlarged though they exhibit abnormal shapes.   - MR brain (8/21) - no acute IC pathology, no e/o intracranial CNS involvement.  - BMBx completed 8/20 - demonstrated B-ALL with 85% blasts and hypercellular marrow. IHC shows CD10+, CD34+. 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 Abnormal cytogenetics. For unknown reason, molecular studies were cancelled. Have reordered on  PB today.  - Pt consented to HMTB; BMBx at dx was dry tap d/t WBC burden. Ordered peripheral blood samples on 8/22.   - Initiated pre-phase steroids with Prednisone 60 mg x8/21; increased to 60 mg/m2 (140 mg) 8/22-8/24. Transitioned to protocol-directed Dex 40 mg on 8/25.   - Message sent to BMT intake team on 8/23. Sent follow-up message on 9/2.  - Clonoseq ID testing to help estimate MRD in future assessments. Clonal testing collected and sent on 8/23. Plan to order Clonoseq MRD testing with post-induction BMBx.   - Case discussed at Heme malignancy conference on 8/23, per consensus plan was to start GRAALL + Imatinib on 8/24. No clear indication for addition of Rituximab.    - Unsuccessful attempt at LP with IT chemo on 8/25 with CAPS team at bedside. Completed in XR on 8/26. Will require all subsequent LPs under fluoroscopic guidance.   - Pt expresses anxiety surrounding procedures; pre-medication with Ativan +/- Atarax available prior PRN  - Has required multiple units of platelets prior to procedure. Took 2 overnight without bump 8/31 early AM, then 1 additional Plt unit on 8/31 AM prior to procedure with bump to >50K  - next LP with IT chemo in XR for 9/7 @ 11 am    Treatment Plan: GRAALL + imatinib (D1=8/24/22)  - Imatinib 400 mg BID - D1-28  - Vincristine 2 mg IV x1 - D1, 8, 15, 22  - Dexamethasone 40 mg daily - D2 (held D1, instead given pre-phase pred as above), 8-9, 15-16, 22-23  - Triple IT chemo (Cytarabine, Solu-Cortef, MTX); D1, 8, 15  -8/26 CSF flow negative  -8/31 CSF flow negative  -next due 9/7  - Neupogen 5 mcg/kg/day - beginning D15, until count recovery    *if plan is to discharge patient with this, will need to look into a PA - further assess in coming days    # Risk of TLS/DIC  # Hyperphosphatemia, intermittent  Uric acid was mildly elevated to 5.9 and LDH 1,943 on admission. Otherwise TLS labs WNL including Cr ~0.6-0.7. INR mildly elevated to 1.2, Fibrinogen 400-500. Phos mildly up-trended  after starting chemo; however, in the absence of any other evidence of TLS, suspect mainly dietary in etiology.  - Allopurinol 300 mg daily (x8/19)  - S/p IVFs  - TLS labs daily   - s/p Phoslo TID (8/26-8/31)    # Pancytopenia  2/2 to underlying disease  - Transfuse to maintain Hgb >7, Plt >10K (>50K prior to LPs)     ID  # PPx  -  mg BID  - Levaquin 250 mg daily   - Maude 50 mg daily  - Keep on micafungin while inpatient given intermittent chemotherapy. Will hold off on pursuing vori/posa at this time. Pending when pt is able to discharge/counts at that time and/or development of any infectious complications, may reconsider. However, hopeful that we could transition to fluconazole alone if needed until count recovery.      # H/o COVID-19 infection   Not vaccinated (due to fear of needles per pt report). Pt reported testing postiive for COVID approx 1 month prior to admission via home test. She never had it confirmed.   - COVID testing on admission was negative.     CV  # Hypertension, improved  Fairly persistent elevated BP since admission to 150-160/80-90s. She was not on anti-hypertensive medication PTA.   - started Amlodipine 5 mg daily (8/21-8/23); increased to 10 mg daily (x8/23) - hold parameters in place if SBP<110  - prn Hydralazine 10-20 mg available q6h for SBP>160     GI  # Intermittent constipation, currently resolved  - Senna 1-2 tabs BID, Miralax prn   - escalate as needed: MoM PRN, lactulose PRN, bisacodyl PO are all potential options    NEURO  # Headaches, improved  Pt endorsed intermittent HA since admission. Denies vision changes or other neurological sx. Has h/o HTN as above. CSF flow cytometry (8/25) negative for involvement by ALL.  - MRI brain completed 8/21 was negative for intracranial pathology or tumor involvement.   - Tylenol PRN  - Continue weekly LPs x3 per GRAAL protocol  - pt had slight HA on 8/31 evening, relieved with Tylenol    # Intermittent b/l hand numbness  Intermittent  left hand numbness (x 8/28) and right hand numbness (noted first on 8/30 evening). Not persistent, not affecting function at present. Resolved as of 8/31. None currently in feet.  - Continue to monitor in context of vincristine per treatment plan.     HEENT  # Ear pain, improved  Pt initially endorsed some left ear pain on 8/29. Otoscopic exam unrevealing. Has since described less inner ear pain but rather pain at the posterior base of her ears b/l (L>R), sometimes painful to the touch. No overlying edema/palpable node or erythema. Less tender to touch on 9/1. Pt not currently endorsing any mouth pain/sores. Further improved on 9/2.  - monitor     FEN  - No mIVF: bolus PRN. Lactic acid downtrending  - Lyte repletion per standing protocol  - Regular diet as tolerated     MISC  - Lines/Drains: PICC in place, remove on discharge  - DVT ppx: no pharmacologic ppx given TCP  - GI ppx: PPI  - Consults: PT  - CODE: FULL  - DISPO: Anticipate prolonged 4-6 week hospitalization for management of new leukemia.   - Follow-up/Referrals: TBD. Dr. Hull has agreed to continue to follow pt in clinic. 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 Reviewed by pt and this provider faxed the paperwork on 9/2.     Patient and plan of care was discussed with attending physician Dr. Recinos.    Kelsey Gonzalez PA-C  Hematology/Oncology  Pager: [phone]    I spent 35 minutes in the care of this patient today, which included time necessary for review of interval events, obtaining history and physical exam, ordering medication(s)/test(s) as medically indicated, discussion with interdisciplinary/consult team(s), and documentation time. Over 50% of time was spent face-to-face and/or coordinating  care.      Interval History   No acute events overnight, afebrile.   April is doing well today. Last BM was yesterday morning. Nausea this am but improved with antiemetics. Appetite low but ok. Tries to eat breakfast and then forces herself to eat a couple more times. Discussed trying nutritional supplements with protein shakes/gelatein/magic cup.   Denies fever, chills, mouth sores, SOB, cough, abdominal pain, diarrhea, constipation, vomiting, dysuria, hematuria, numbness, tingling, swelling    Physical Exam   Temp: 97.1  F (36.2  C) Temp src: Temporal BP: 117/57 Pulse: 85   Resp: 17 SpO2: 98 % O2 Device: None (Room air)    Vitals:    08/30/22 0725 09/02/22 0739 09/03/22 0917   Weight: 110.1 kg (242 lb 12.8 oz) 111.5 kg (245 lb 14.4 oz) 112 kg (246 lb 14.6 oz)     Vital Signs with Ranges  Temp:  [96.3  F (35.7  C)-98.8  F (37.1  C)] 97.1  F (36.2  C)  Pulse:  [79-98] 85  Resp:  [17-18] 17  BP: (102-124)/(54-64) 117/57  SpO2:  [96 %-98 %] 98 %  I/O last 3 completed shifts:  In: 2240 [P.O.:720; I.V.:520; IV Piggyback:1000]  Out: 2400 [Urine:2400]    Constitutional: Awake and conversational. Non- toxic appearing. No acute distress.   HEENT: Normocephalic, atraumatic . Sclerae anicteric. EOM intact. Moist mucus membranes   Respiratory: Breathing comfortable with no increased work on room air. No signs of respiratory distress or accessory muscle use. Lungs clear to auscultation bilaterally, no crackles or wheezing noted.  Cardiovascular: Regular rate and rhythm. Normal S1 and S2.   GI: Abdomen is soft, non-distended, non-tender and without distension. Bowel sounds present and normoactive.   Skin: Skin is clean, dry, intact. No jaundice appreciated.   Musculoskeletal/ Extremities: No redness, warmth, or swelling of the joints appreciated. Distal pulses palpable. Nailbeds pink and without cyanosis or clubbing.   Neurologic: Alert and oriented. Speech normal. Grossly nonfocal. Memory and thought process  preserved.  Neuropsychiatric: Calm, affect congruent to situation. Appropriate mood and affect. Good judgment and insight. No visual/auditory hallucinations.       Medications     - MEDICATION INSTRUCTIONS -         acyclovir  400 mg Oral BID     albuterol  2.5 mg Nebulization Q28 Days    And     pentamidine  300 mg Inhalation Q28 Days     allopurinol  300 mg Oral Daily     amLODIPine  10 mg Oral Daily     heparin lock flush  5-20 mL Intracatheter Q24H     imatinib  400 mg Oral BID     levofloxacin  250 mg Oral Q24H     lidocaine (PF)  10 mL Subcutaneous Once     micafungin  50 mg Intravenous Q24H     pantoprazole  40 mg Oral QAM AC     sodium phosphate  15 mmol Intravenous Once     senna-docusate  1-2 tablet Oral BID     sodium chloride (PF)  10-40 mL Intracatheter Q8H     Data   Results for orders placed or performed during the hospital encounter of 08/19/22 (from the past 24 hour(s))   Creatinine   Result Value Ref Range    Creatinine 0.61 0.51 - 0.95 mg/dL    GFR Estimate >90 >60 mL/min/1.73m2   CBC with platelets differential    Narrative    The following orders were created for panel order CBC with platelets differential.  Procedure                               Abnormality         Status                     ---------                               -----------         ------                     CBC with platelets and d...[[id number]]  Abnormal            Final result               Manual Differential[[id number]]          Abnormal            Final result                 Please view results for these tests on the individual orders.   Phosphorus   Result Value Ref Range    Phosphorus 2.4 (L) 2.5 - 4.5 mg/dL   Basic metabolic panel   Result Value Ref Range    Creatinine 0.58 0.51 - 0.95 mg/dL    Sodium 138 136 - 145 mmol/L    Potassium 3.9 3.4 - 5.3 mmol/L    Urea Nitrogen 13.6 6.0 - 20.0 mg/dL    Chloride 106 98 - 107 mmol/L    Carbon Dioxide (CO2) 24 22 - 29 mmol/L    Anion Gap 8 7 - 15 mmol/L    Glucose 117 (H) 70  "- 99 mg/dL    GFR Estimate >90 >60 mL/min/1.73m2    Calcium 7.9 (L) 8.6 - 10.0 mg/dL   Magnesium   Result Value Ref Range    Magnesium 2.4 (H) 1.7 - 2.3 mg/dL   Creatinine   Result Value Ref Range    Creatinine 0.58 0.51 - 0.95 mg/dL    GFR Estimate >90 >60 mL/min/1.73m2   CBC with platelets and differential   Result Value Ref Range    WBC Count 0.8 (LL) 4.0 - 11.0 10e3/uL    RBC Count 2.60 (L) 3.80 - 5.20 10e6/uL    Hemoglobin 7.6 (L) 11.7 - 15.7 g/dL    Hematocrit 23.0 (L) 35.0 - 47.0 %    MCV 89 78 - 100 fL    MCH 29.2 26.5 - 33.0 pg    MCHC 33.0 31.5 - 36.5 g/dL    RDW 13.4 10.0 - 15.0 %    Platelet Count 26 (LL) 150 - 450 10e3/uL    Narrative    Previously reported component [ NRBCs ] is no longer reported.\"  Previously reported component [ NRBCs Absolute ] is no longer reported.\"   Manual Differential   Result Value Ref Range    % Neutrophils 55 %    % Lymphocytes 44 %    % Monocytes 1 %    % Eosinophils 0 %    % Basophils 0 %    NRBCs per 100 WBC 1 (H) <=0 %    Absolute Neutrophils 0.4 (LL) 1.6 - 8.3 10e3/uL    Absolute Lymphocytes 0.4 (L) 0.8 - 5.3 10e3/uL    Absolute Monocytes 0.0 0.0 - 1.3 10e3/uL    Absolute Eosinophils 0.0 0.0 - 0.7 10e3/uL    Absolute Basophils 0.0 0.0 - 0.2 10e3/uL    Absolute NRBCs 0.0 <=0.0 10e3/uL    RBC Morphology Confirmed RBC Indices     Platelet Assessment  Automated Count Confirmed. Platelet morphology is normal.     Automated Count Confirmed. Platelet morphology is normal.     "

## 2022-09-03 NOTE — PLAN OF CARE
[id number]    Temp: 98.8  F (37.1  C) Temp src: Oral BP: 102/63 Pulse: 98   Resp: 18 SpO2: 98 % O2 Device: None (Room air)       Reason for admission: New leuk dx  Activity: UAL  Pain: denies  Neuro: A&Ox4, makes needs known, uses call light appropriately  Cardiac: Denies chest pain/tightness  Respiratory: Denies SOB, sating 98% on RA, no cough present  GI/: Denies n/v, voids spontaneously, no abd discomfort  Diet: Regular  Lines: PICC  Hep locked  Labs/imaging: Reviewed.       New changes this shift: No acute events      Continue with POC

## 2022-09-03 NOTE — PLAN OF CARE
[id number]  AVSS, alert and oriented x 4, denies nausea/sob. Pain at back, refuse pain meds. Phos replaced this day shift, recheck at AM lab. Given melatonin for bedtime. Up independent to bathroom, voiding adequately, LBM on 8/26 given schedule senna.  Continue to monitor care.

## 2022-09-04 LAB
ANION GAP SERPL CALCULATED.3IONS-SCNC: 7 MMOL/L (ref 7–15)
BASOPHILS # BLD MANUAL: 0 10E3/UL (ref 0–0.2)
BASOPHILS NFR BLD MANUAL: 0 %
BUN SERPL-MCNC: 15.9 MG/DL (ref 6–20)
CALCIUM SERPL-MCNC: 8 MG/DL (ref 8.6–10)
CHLORIDE SERPL-SCNC: 105 MMOL/L (ref 98–107)
CREAT SERPL-MCNC: 0.68 MG/DL (ref 0.51–0.95)
DEPRECATED HCO3 PLAS-SCNC: 26 MMOL/L (ref 22–29)
EOSINOPHIL # BLD MANUAL: 0 10E3/UL (ref 0–0.7)
EOSINOPHIL NFR BLD MANUAL: 0 %
ERYTHROCYTE [DISTWIDTH] IN BLOOD BY AUTOMATED COUNT: 13.4 % (ref 10–15)
GFR SERPL CREATININE-BSD FRML MDRD: >90 ML/MIN/1.73M2
GLUCOSE SERPL-MCNC: 90 MG/DL (ref 70–99)
HCT VFR BLD AUTO: 24.4 % (ref 35–47)
HGB BLD-MCNC: 8 G/DL (ref 11.7–15.7)
LACTATE SERPL-SCNC: 1.8 MMOL/L (ref 0.7–2)
LYMPHOCYTES # BLD MANUAL: 1.2 10E3/UL (ref 0.8–5.3)
LYMPHOCYTES NFR BLD MANUAL: 85 %
MAGNESIUM SERPL-MCNC: 2.2 MG/DL (ref 1.7–2.3)
MCH RBC QN AUTO: 29.1 PG (ref 26.5–33)
MCHC RBC AUTO-ENTMCNC: 32.8 G/DL (ref 31.5–36.5)
MCV RBC AUTO: 89 FL (ref 78–100)
MONOCYTES # BLD MANUAL: 0 10E3/UL (ref 0–1.3)
MONOCYTES NFR BLD MANUAL: 0 %
NEUTROPHILS # BLD MANUAL: 0.2 10E3/UL (ref 1.6–8.3)
NEUTROPHILS NFR BLD MANUAL: 15 %
PHOSPHATE SERPL-MCNC: 2.6 MG/DL (ref 2.5–4.5)
PLAT MORPH BLD: ABNORMAL
PLATELET # BLD AUTO: 35 10E3/UL (ref 150–450)
POTASSIUM SERPL-SCNC: 4 MMOL/L (ref 3.4–5.3)
RBC # BLD AUTO: 2.75 10E6/UL (ref 3.8–5.2)
RBC MORPH BLD: ABNORMAL
SODIUM SERPL-SCNC: 138 MMOL/L (ref 136–145)
WBC # BLD AUTO: 1.4 10E3/UL (ref 4–11)

## 2022-09-04 PROCEDURE — 250N000013 HC RX MED GY IP 250 OP 250 PS 637: Performed by: PHYSICIAN ASSISTANT

## 2022-09-04 PROCEDURE — 84100 ASSAY OF PHOSPHORUS: CPT | Performed by: PHYSICIAN ASSISTANT

## 2022-09-04 PROCEDURE — 250N000011 HC RX IP 250 OP 636: Performed by: STUDENT IN AN ORGANIZED HEALTH CARE EDUCATION/TRAINING PROGRAM

## 2022-09-04 PROCEDURE — 250N000013 HC RX MED GY IP 250 OP 250 PS 637: Performed by: STUDENT IN AN ORGANIZED HEALTH CARE EDUCATION/TRAINING PROGRAM

## 2022-09-04 PROCEDURE — 85027 COMPLETE CBC AUTOMATED: CPT | Performed by: PHYSICIAN ASSISTANT

## 2022-09-04 PROCEDURE — 83605 ASSAY OF LACTIC ACID: CPT | Performed by: STUDENT IN AN ORGANIZED HEALTH CARE EDUCATION/TRAINING PROGRAM

## 2022-09-04 PROCEDURE — 99356 PR PROLONGED SERV,INPATIENT,1ST HR: CPT | Mod: FS | Performed by: PHYSICIAN ASSISTANT

## 2022-09-04 PROCEDURE — 250N000013 HC RX MED GY IP 250 OP 250 PS 637: Performed by: INTERNAL MEDICINE

## 2022-09-04 PROCEDURE — 80048 BASIC METABOLIC PNL TOTAL CA: CPT | Performed by: PHYSICIAN ASSISTANT

## 2022-09-04 PROCEDURE — 85007 BL SMEAR W/DIFF WBC COUNT: CPT | Performed by: PHYSICIAN ASSISTANT

## 2022-09-04 PROCEDURE — 258N000003 HC RX IP 258 OP 636: Performed by: STUDENT IN AN ORGANIZED HEALTH CARE EDUCATION/TRAINING PROGRAM

## 2022-09-04 PROCEDURE — 36592 COLLECT BLOOD FROM PICC: CPT | Performed by: STUDENT IN AN ORGANIZED HEALTH CARE EDUCATION/TRAINING PROGRAM

## 2022-09-04 PROCEDURE — 83735 ASSAY OF MAGNESIUM: CPT | Performed by: STUDENT IN AN ORGANIZED HEALTH CARE EDUCATION/TRAINING PROGRAM

## 2022-09-04 PROCEDURE — 36592 COLLECT BLOOD FROM PICC: CPT | Performed by: PHYSICIAN ASSISTANT

## 2022-09-04 PROCEDURE — 250N000011 HC RX IP 250 OP 636: Performed by: INTERNAL MEDICINE

## 2022-09-04 PROCEDURE — 99233 SBSQ HOSP IP/OBS HIGH 50: CPT | Mod: FS | Performed by: PHYSICIAN ASSISTANT

## 2022-09-04 PROCEDURE — 120N000002 HC R&B MED SURG/OB UMMC

## 2022-09-04 RX ORDER — CALCIUM CARBONATE 500 MG/1
500 TABLET, CHEWABLE ORAL DAILY PRN
Status: DISCONTINUED | OUTPATIENT
Start: 2022-09-04 | End: 2022-09-14 | Stop reason: HOSPADM

## 2022-09-04 RX ORDER — MAGNESIUM HYDROXIDE/ALUMINUM HYDROXICE/SIMETHICONE 120; 1200; 1200 MG/30ML; MG/30ML; MG/30ML
30 SUSPENSION ORAL EVERY 4 HOURS PRN
Status: DISCONTINUED | OUTPATIENT
Start: 2022-09-04 | End: 2022-09-14 | Stop reason: HOSPADM

## 2022-09-04 RX ADMIN — ALUMINUM HYDROXIDE, MAGNESIUM HYDROXIDE, AND SIMETHICONE 30 ML: 200; 200; 20 SUSPENSION ORAL at 19:40

## 2022-09-04 RX ADMIN — IMATINIB MESYLATE 400 MG: 400 TABLET, FILM COATED ORAL at 19:40

## 2022-09-04 RX ADMIN — ALLOPURINOL 300 MG: 300 TABLET ORAL at 09:08

## 2022-09-04 RX ADMIN — ACYCLOVIR 400 MG: 400 TABLET ORAL at 19:40

## 2022-09-04 RX ADMIN — LEVOFLOXACIN 250 MG: 250 TABLET, FILM COATED ORAL at 13:05

## 2022-09-04 RX ADMIN — AMLODIPINE BESYLATE 10 MG: 10 TABLET ORAL at 09:08

## 2022-09-04 RX ADMIN — IMATINIB MESYLATE 400 MG: 400 TABLET, FILM COATED ORAL at 09:08

## 2022-09-04 RX ADMIN — ACYCLOVIR 400 MG: 400 TABLET ORAL at 09:08

## 2022-09-04 RX ADMIN — SENNOSIDES AND DOCUSATE SODIUM 2 TABLET: 8.6; 5 TABLET ORAL at 09:08

## 2022-09-04 RX ADMIN — PANTOPRAZOLE SODIUM 40 MG: 40 TABLET, DELAYED RELEASE ORAL at 09:08

## 2022-09-04 RX ADMIN — Medication 10 ML: at 19:40

## 2022-09-04 RX ADMIN — Medication 5 ML: at 13:10

## 2022-09-04 RX ADMIN — Medication 5 ML: at 06:55

## 2022-09-04 RX ADMIN — MICAFUNGIN 50 MG: 10 INJECTION, POWDER, LYOPHILIZED, FOR SOLUTION INTRAVENOUS at 12:08

## 2022-09-04 RX ADMIN — SENNOSIDES AND DOCUSATE SODIUM 2 TABLET: 8.6; 5 TABLET ORAL at 19:40

## 2022-09-04 ASSESSMENT — ACTIVITIES OF DAILY LIVING (ADL)
ADLS_ACUITY_SCORE: 22
ADLS_ACUITY_SCORE: 23
ADLS_ACUITY_SCORE: 23
ADLS_ACUITY_SCORE: 22
ADLS_ACUITY_SCORE: 23
ADLS_ACUITY_SCORE: 22
ADLS_ACUITY_SCORE: 22
ADLS_ACUITY_SCORE: 23
ADLS_ACUITY_SCORE: 22
ADLS_ACUITY_SCORE: 23

## 2022-09-04 NOTE — PLAN OF CARE
[id number]    Temp: 98.5  F (36.9  C) Temp src: Oral BP: 112/51 Pulse: 90   Resp: 18 SpO2: 98 % O2 Device: None (Room air)       Reason for admission: ALL  Activity: UAL  Pain: 2/10, declined interventions  Neuro: A&Ox4, makes needs known, uses call light appropriately  Cardiac: Denies chest pain/tightness  Respiratory: Denies SOB, sating 98% on RA, no cough present  GI/: Denies n/v, voids spontaneously, no abd discomfort  Diet: Regular  Lines: PICC  Hep locked  Labs/imaging: Reviewed.       New changes this shift: No acute events      Continue with POC

## 2022-09-04 NOTE — PROGRESS NOTES
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 Pre-phase steroids initiated (8/21-8/24), then initiated GRAALL + imatinib on 8/24.     TODAY:   - Day 12 GRAALL + imatinib   - Tolerating well, has some nausea but well controlled with anti-emetics  - 8/31 CSF flow negative  - Next IV/IT chemo due 9/7  - New epigastric/central chest discomfort. Comes and goes. April is trying to watch for a pattern in regards to eating vs empty stomach.    - Appears to be related to indigestion,  Trial TUMs PRN, Maalox PRN.    - Will consider EKG and further work up if pain persents, or quality of pain changes  - Last BM was 9/2 morning. Constipation relieved with milk of mag on 9/2. Will trial MOM again today.     HEME  # Ph+ B-ALL  Patient presented to outside hospital with c/o Right upper quadrant pain and was subsequently found to have a markedly elevated white blood cell count concerning for acute leukemia. While at OSH, she had CT PE protocol as well as CT A/P. These identified no PE, no acute or localized intraabdominal inflammatory process, mild splenomegaly, and few inconspicous low-density structures in the liver. She was transferred to [facility] for further workup and management.  - s/p Hydrea 1g TID (8/21-8/23) with improvement in WBC (100K ? 12K)  - CT CAP completed at OSH. CT neck 8/22 showed multiple, b/l suspicious-appearing LN (R level Ia, L level III, R level II-III) - not enlarged though they exhibit abnormal shapes.   - MR brain (8/21) - no acute IC pathology, no e/o intracranial CNS involvement.  - BMBx completed 8/20 - demonstrated B-ALL with 85% blasts and  hypercellular marrow. IHC shows CD10+, CD34+. 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 Abnormal cytogenetics. For unknown reason, molecular studies were cancelled. Have reordered on PB today.  - Pt consented to HMTB; BMBx at dx was dry tap d/t WBC burden. Ordered peripheral blood samples on 8/22.   - Initiated pre-phase steroids with Prednisone 60 mg x8/21; increased to 60 mg/m2 (140 mg) 8/22-8/24. Transitioned to protocol-directed Dex 40 mg on 8/25.   - Message sent to BMT intake team on 8/23. Sent follow-up message on 9/2.  - Clonoseq ID testing to help estimate MRD in future assessments. Clonal testing collected and sent on 8/23. Plan to order Clonoseq MRD testing with post-induction BMBx.   - Case discussed at Heme malignancy conference on 8/23, per consensus plan was to start GRAALL + Imatinib on 8/24. No clear indication for addition of Rituximab.    - Unsuccessful attempt at LP with IT chemo on 8/25 with CAPS team at bedside. Completed in XR on 8/26. Will require all subsequent LPs under fluoroscopic guidance.   - Pt expresses anxiety surrounding procedures; pre-medication with Ativan +/- Atarax available prior PRN  - Has required multiple units of platelets prior to procedure. Took 2 overnight without bump 8/31 early AM, then 1 additional Plt unit on 8/31 AM prior to procedure with bump to >50K  - next LP with IT chemo in XR for 9/7 @ 11 am    Treatment Plan: GRAALL + imatinib (D1=8/24/22)  - Imatinib 400 mg BID - D1-28  - Vincristine 2 mg IV x1 - D1, 8, 15, 22  - Dexamethasone 40 mg daily - D2 (held D1, instead given pre-phase pred as above), 8-9, 15-16, 22-23  - Triple IT chemo (Cytarabine, Solu-Cortef, MTX); D1, 8, 15  -8/26 CSF flow negative  -8/31 CSF flow negative  -next due 9/7  - Neupogen 5 mcg/kg/day - beginning D15, until count recovery    *if plan is to discharge patient with this, will need to look into a PA -  further assess in coming days    # Risk of TLS/DIC  # Hyperphosphatemia, intermittent  Uric acid was mildly elevated to 5.9 and LDH 1,943 on admission. Otherwise TLS labs WNL including Cr ~0.6-0.7. INR mildly elevated to 1.2, Fibrinogen 400-500. Phos mildly up-trended after starting chemo; however, in the absence of any other evidence of TLS, suspect mainly dietary in etiology.  - Allopurinol 300 mg daily (x8/19)  - S/p IVFs  - TLS labs daily   - s/p Phoslo TID (8/26-8/31)    # Pancytopenia  2/2 to underlying disease  - Transfuse to maintain Hgb >7, Plt >10K (>50K prior to LPs)     ID  # PPx  -  mg BID  - Levaquin 250 mg daily   - Maude 50 mg daily  - Keep on micafungin while inpatient given intermittent chemotherapy. Will hold off on pursuing vori/posa at this time. Pending when pt is able to discharge/counts at that time and/or development of any infectious complications, may reconsider. However, hopeful that we could transition to fluconazole alone if needed until count recovery.      # H/o COVID-19 infection   Not vaccinated (due to fear of needles per pt report). Pt reported testing postiive for COVID approx 1 month prior to admission via home test. She never had it confirmed.   - COVID testing on admission was negative.     CV  # Hypertension, improved  Fairly persistent elevated BP since admission to 150-160/80-90s. She was not on anti-hypertensive medication PTA.   - started Amlodipine 5 mg daily (8/21-8/23); increased to 10 mg daily (x8/23) - hold parameters in place if SBP<110  - prn Hydralazine 10-20 mg available q6h for SBP>160     GI  # Intermittent constipation, currently resolved  - Senna 1-2 tabs BID, Miralax prn   - escalate as needed: MoM PRN, lactulose PRN, bisacodyl PO are all potential options    # Concern for Indigestion  - New epigastric/central chest discomfort. Comes and goes. Patient is trying to watch for a pattern in regards to eating vs empty stomach. Very transient in nature. Does  not persist for long. Appears to be related to indigestion,   - Trial TUMs PRN, Maalox PRN.   - Will consider EKG and further work up if pain persents, or quality of pain changes    NEURO  # Headaches, improved  Pt endorsed intermittent HA since admission. Denies vision changes or other neurological sx. Has h/o HTN as above. CSF flow cytometry (8/25) negative for involvement by ALL.  - MRI brain completed 8/21 was negative for intracranial pathology or tumor involvement.   - Tylenol PRN  - Continue weekly LPs x3 per GRAAL protocol  - pt had slight HA on 8/31 evening, relieved with Tylenol    # Intermittent b/l hand numbness  Intermittent left hand numbness (x 8/28) and right hand numbness (noted first on 8/30 evening). Not persistent, not affecting function at present. Resolved as of 8/31. None currently in feet.  Resumed on 9/4 again, bilateral and equal on fingertips and fingers.  - Continue to monitor in context of vincristine per treatment plan.     HEENT  # Ear pain, improved  Pt initially endorsed some left ear pain on 8/29. Otoscopic exam unrevealing. Has since described less inner ear pain but rather pain at the posterior base of her ears b/l (L>R), sometimes painful to the touch. No overlying edema/palpable node or erythema. Less tender to touch on 9/1. Pt not currently endorsing any mouth pain/sores. Further improved on 9/2.  - monitor     FEN  - No mIVF: bolus PRN. Lactic acid downtrending  - Lyte repletion per standing protocol  - Regular diet as tolerated     MISC  - Lines/Drains: PICC in place, remove on discharge  - DVT ppx: no pharmacologic ppx given TCP  - GI ppx: PPI  - Consults: PT  - CODE: FULL  - DISPO: Anticipate prolonged 4-6 week hospitalization for management of new leukemia.   - Follow-up/Referrals: TBD. Dr. Hull has agreed to continue to follow pt in clinic. 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 Reviewed by pt and this provider faxed the paperwork on 9/2.     Patient and plan of care was discussed with attending physician Dr. Recinos.    Kelsey Gonzalez PA-C  Hematology/Oncology  Pager: [phone]    I spent 35 minutes in the care of this patient today, which included time necessary for review of interval events, obtaining history and physical exam, ordering medication(s)/test(s) as medically indicated, discussion with interdisciplinary/consult team(s), and documentation time. Over 50% of time was spent face-to-face and/or coordinating care.      Interval History   No acute events overnight, afebrile.   April continues to feel well. Again noted bilateral fingertip/hand tingling and numbness. Equal. Continue to monitor.   Nausea ok today. Did note new central chest/epigastric pain that was very transient. Possibly associated with eating vs empty stomach. Ok to trial Maalox or TUMs. Will let us know if it returns. No pain currently.  Appetite low but ok. Still eating at least a couple meals a day. Encouraged trying nutritional supplements with protein shakes/gelatein/magic cup if she finds herself unable to eat a meal.  Denies fever, chills, mouth sores, SOB, cough, abdominal pain, diarrhea, constipation, vomiting, dysuria, hematuria, numbness, tingling, swelling    Physical Exam   Temp: 97.9  F (36.6  C) Temp src: Oral BP: 119/54 Pulse: 87   Resp: 18 SpO2: 98 % O2 Device: None (Room air)    Vitals:    09/02/22 0739 09/03/22 0917 09/04/22 1057   Weight: 111.5 kg (245 lb 14.4 oz) 112 kg (246 lb 14.6 oz) 111.5 kg (245 lb 13 oz)     Vital Signs with Ranges  Temp:  [96.5  F (35.8  C)-98.5  F (36.9  C)] 97.9  F (36.6  C)  Pulse:  [81-90] 87  Resp:  [17-18] 18  BP: (100-120)/(51-61) 119/54  SpO2:  [97 %-98 %] 98 %  I/O last 3 completed shifts:  In: 350  [I.V.:350]  Out: 1900 [Urine:1900]    Constitutional: Awake and conversational. Non- toxic appearing. No acute distress.   HEENT: Normocephalic, atraumatic . Sclerae anicteric. EOM intact. Moist mucus membranes   Respiratory: Breathing comfortable with no increased work on room air. No signs of respiratory distress or accessory muscle use. Lungs clear to auscultation bilaterally, no crackles or wheezing noted.  Cardiovascular: Regular rate and rhythm. Normal S1 and S2.   GI: Abdomen is soft, non-distended, non-tender and without distension. Bowel sounds present and normoactive.   Skin: Skin is clean, dry, intact. No jaundice appreciated.   Musculoskeletal/ Extremities: No redness, warmth, or swelling of the joints appreciated. Distal pulses palpable. Nailbeds pink and without cyanosis or clubbing.   Neurologic: Alert and oriented. Speech normal. Grossly nonfocal. Memory and thought process preserved.  Neuropsychiatric: Calm, affect congruent to situation. Appropriate mood and affect. Good judgment and insight. No visual/auditory hallucinations.       Medications     - MEDICATION INSTRUCTIONS -         acyclovir  400 mg Oral BID     albuterol  2.5 mg Nebulization Q28 Days    And     pentamidine  300 mg Inhalation Q28 Days     allopurinol  300 mg Oral Daily     amLODIPine  10 mg Oral Daily     heparin lock flush  5-20 mL Intracatheter Q24H     imatinib  400 mg Oral BID     levofloxacin  250 mg Oral Q24H     lidocaine (PF)  10 mL Subcutaneous Once     micafungin  50 mg Intravenous Q24H     pantoprazole  40 mg Oral QAM AC     senna-docusate  1-2 tablet Oral BID     sodium chloride (PF)  10-40 mL Intracatheter Q8H     Data   Results for orders placed or performed during the hospital encounter of 08/19/22 (from the past 24 hour(s))   CBC with platelets differential    Narrative    The following orders were created for panel order CBC with platelets differential.  Procedure                               Abnormality          "Status                     ---------                               -----------         ------                     CBC with platelets and d...[[id number]]  Abnormal            Final result               Manual Differential[[id number]]          Abnormal            Final result                 Please view results for these tests on the individual orders.   Phosphorus   Result Value Ref Range    Phosphorus 2.6 2.5 - 4.5 mg/dL   Basic metabolic panel   Result Value Ref Range    Creatinine 0.68 0.51 - 0.95 mg/dL    Sodium 138 136 - 145 mmol/L    Potassium 4.0 3.4 - 5.3 mmol/L    Urea Nitrogen 15.9 6.0 - 20.0 mg/dL    Chloride 105 98 - 107 mmol/L    Carbon Dioxide (CO2) 26 22 - 29 mmol/L    Anion Gap 7 7 - 15 mmol/L    Glucose 90 70 - 99 mg/dL    GFR Estimate >90 >60 mL/min/1.73m2    Calcium 8.0 (L) 8.6 - 10.0 mg/dL   Magnesium   Result Value Ref Range    Magnesium 2.2 1.7 - 2.3 mg/dL   CBC with platelets and differential   Result Value Ref Range    WBC Count 1.4 (L) 4.0 - 11.0 10e3/uL    RBC Count 2.75 (L) 3.80 - 5.20 10e6/uL    Hemoglobin 8.0 (L) 11.7 - 15.7 g/dL    Hematocrit 24.4 (L) 35.0 - 47.0 %    MCV 89 78 - 100 fL    MCH 29.1 26.5 - 33.0 pg    MCHC 32.8 31.5 - 36.5 g/dL    RDW 13.4 10.0 - 15.0 %    Platelet Count 35 (LL) 150 - 450 10e3/uL    Narrative    Previously reported component [ NRBCs ] is no longer reported.\"  Previously reported component [ NRBCs Absolute ] is no longer reported.\"   Manual Differential   Result Value Ref Range    % Neutrophils 15 %    % Lymphocytes 85 %    % Monocytes 0 %    % Eosinophils 0 %    % Basophils 0 %    Absolute Neutrophils 0.2 (LL) 1.6 - 8.3 10e3/uL    Absolute Lymphocytes 1.2 0.8 - 5.3 10e3/uL    Absolute Monocytes 0.0 0.0 - 1.3 10e3/uL    Absolute Eosinophils 0.0 0.0 - 0.7 10e3/uL    Absolute Basophils 0.0 0.0 - 0.2 10e3/uL    RBC Morphology Confirmed RBC Indices     Platelet Assessment  Automated Count Confirmed. Platelet morphology is normal.     Automated Count Confirmed. " Platelet morphology is normal.

## 2022-09-04 NOTE — PLAN OF CARE
[id number]  AVSS, alert and oriented x 4, denies nausea/sob. Have little pain upper epigastric towards the chest 2/10, refuse pain meds. Will keep monitoring.  Phos is replaced, recheck in the AM lab. Given melatonin for the bed time.  Pt took shower this evening, ambulate in the [address] couple times for the shift, voiding adequately, LBM yesterday.  Continue to monitor care.

## 2022-09-04 NOTE — PLAN OF CARE
Goal Outcome Evaluation:  April is afe this am.  WBC went from 800 yesterday to 1400 today with 1200 lymphocytes. C/O tingling in finger tips this am - Kelsey REZA aware of this and will monitor for now.  Denies nausea and eating fair.  Meds ordered for GI upset. No BM for 2 days - will take MOM later this afternoon.  here and brought her fast food.

## 2022-09-05 LAB
ALBUMIN SERPL BCG-MCNC: 3.3 G/DL (ref 3.5–5.2)
ALP SERPL-CCNC: 62 U/L (ref 35–104)
ALT SERPL W P-5'-P-CCNC: 30 U/L (ref 10–35)
ANION GAP SERPL CALCULATED.3IONS-SCNC: 8 MMOL/L (ref 7–15)
AST SERPL W P-5'-P-CCNC: 17 U/L (ref 10–35)
BACTERIA CSF CULT: NO GROWTH
BASOPHILS # BLD MANUAL: 0 10E3/UL (ref 0–0.2)
BASOPHILS NFR BLD MANUAL: 0 %
BILIRUB DIRECT SERPL-MCNC: <0.2 MG/DL (ref 0–0.3)
BILIRUB SERPL-MCNC: 0.7 MG/DL
BUN SERPL-MCNC: 14.3 MG/DL (ref 6–20)
CALCIUM SERPL-MCNC: 8.1 MG/DL (ref 8.6–10)
CHLORIDE SERPL-SCNC: 105 MMOL/L (ref 98–107)
CREAT SERPL-MCNC: 0.72 MG/DL (ref 0.51–0.95)
DEPRECATED HCO3 PLAS-SCNC: 25 MMOL/L (ref 22–29)
EOSINOPHIL # BLD MANUAL: 0.1 10E3/UL (ref 0–0.7)
EOSINOPHIL NFR BLD MANUAL: 3 %
ERYTHROCYTE [DISTWIDTH] IN BLOOD BY AUTOMATED COUNT: 13.4 % (ref 10–15)
FIBRINOGEN PPP-MCNC: 193 MG/DL (ref 170–490)
GFR SERPL CREATININE-BSD FRML MDRD: >90 ML/MIN/1.73M2
GLUCOSE SERPL-MCNC: 83 MG/DL (ref 70–99)
GRAM STAIN RESULT: NORMAL
GRAM STAIN RESULT: NORMAL
HCT VFR BLD AUTO: 24.6 % (ref 35–47)
HGB BLD-MCNC: 8.2 G/DL (ref 11.7–15.7)
INR PPP: 1.03 (ref 0.85–1.15)
LDH SERPL L TO P-CCNC: 189 U/L (ref 0–250)
LYMPHOCYTES # BLD MANUAL: 1.7 10E3/UL (ref 0.8–5.3)
LYMPHOCYTES NFR BLD MANUAL: 83 %
MAGNESIUM SERPL-MCNC: 2 MG/DL (ref 1.7–2.3)
MCH RBC QN AUTO: 29.1 PG (ref 26.5–33)
MCHC RBC AUTO-ENTMCNC: 33.3 G/DL (ref 31.5–36.5)
MCV RBC AUTO: 87 FL (ref 78–100)
METAMYELOCYTES # BLD MANUAL: 0 10E3/UL
METAMYELOCYTES NFR BLD MANUAL: 1 %
MONOCYTES # BLD MANUAL: 0 10E3/UL (ref 0–1.3)
MONOCYTES NFR BLD MANUAL: 1 %
NEUTROPHILS # BLD MANUAL: 0.2 10E3/UL (ref 1.6–8.3)
NEUTROPHILS NFR BLD MANUAL: 12 %
PHOSPHATE SERPL-MCNC: 2.8 MG/DL (ref 2.5–4.5)
PLAT MORPH BLD: ABNORMAL
PLATELET # BLD AUTO: 47 10E3/UL (ref 150–450)
POTASSIUM SERPL-SCNC: 4 MMOL/L (ref 3.4–5.3)
PROT SERPL-MCNC: 5.3 G/DL (ref 6.4–8.3)
RBC # BLD AUTO: 2.82 10E6/UL (ref 3.8–5.2)
RBC MORPH BLD: ABNORMAL
SODIUM SERPL-SCNC: 138 MMOL/L (ref 136–145)
URATE SERPL-MCNC: 1.4 MG/DL (ref 2.4–5.7)
VARIANT LYMPHS BLD QL SMEAR: PRESENT
WBC # BLD AUTO: 2 10E3/UL (ref 4–11)

## 2022-09-05 PROCEDURE — 250N000011 HC RX IP 250 OP 636: Performed by: STUDENT IN AN ORGANIZED HEALTH CARE EDUCATION/TRAINING PROGRAM

## 2022-09-05 PROCEDURE — 250N000013 HC RX MED GY IP 250 OP 250 PS 637: Performed by: INTERNAL MEDICINE

## 2022-09-05 PROCEDURE — 250N000013 HC RX MED GY IP 250 OP 250 PS 637: Performed by: PHYSICIAN ASSISTANT

## 2022-09-05 PROCEDURE — 83615 LACTATE (LD) (LDH) ENZYME: CPT | Performed by: PHYSICIAN ASSISTANT

## 2022-09-05 PROCEDURE — 999N000044 HC STATISTIC CVC DRESSING CHANGE

## 2022-09-05 PROCEDURE — 85384 FIBRINOGEN ACTIVITY: CPT | Performed by: PHYSICIAN ASSISTANT

## 2022-09-05 PROCEDURE — 99233 SBSQ HOSP IP/OBS HIGH 50: CPT | Mod: FS | Performed by: PHYSICIAN ASSISTANT

## 2022-09-05 PROCEDURE — 250N000013 HC RX MED GY IP 250 OP 250 PS 637: Performed by: STUDENT IN AN ORGANIZED HEALTH CARE EDUCATION/TRAINING PROGRAM

## 2022-09-05 PROCEDURE — 82248 BILIRUBIN DIRECT: CPT | Performed by: PHYSICIAN ASSISTANT

## 2022-09-05 PROCEDURE — 85610 PROTHROMBIN TIME: CPT | Performed by: PHYSICIAN ASSISTANT

## 2022-09-05 PROCEDURE — 84550 ASSAY OF BLOOD/URIC ACID: CPT | Performed by: PHYSICIAN ASSISTANT

## 2022-09-05 PROCEDURE — 36592 COLLECT BLOOD FROM PICC: CPT | Performed by: PHYSICIAN ASSISTANT

## 2022-09-05 PROCEDURE — 250N000011 HC RX IP 250 OP 636: Performed by: PHYSICIAN ASSISTANT

## 2022-09-05 PROCEDURE — 84100 ASSAY OF PHOSPHORUS: CPT | Performed by: PHYSICIAN ASSISTANT

## 2022-09-05 PROCEDURE — 120N000002 HC R&B MED SURG/OB UMMC

## 2022-09-05 PROCEDURE — 258N000003 HC RX IP 258 OP 636: Performed by: STUDENT IN AN ORGANIZED HEALTH CARE EDUCATION/TRAINING PROGRAM

## 2022-09-05 PROCEDURE — 99356 PR PROLONGED SERV,INPATIENT,1ST HR: CPT | Mod: FS | Performed by: PHYSICIAN ASSISTANT

## 2022-09-05 PROCEDURE — 85027 COMPLETE CBC AUTOMATED: CPT | Performed by: PHYSICIAN ASSISTANT

## 2022-09-05 PROCEDURE — 80053 COMPREHEN METABOLIC PANEL: CPT | Performed by: PHYSICIAN ASSISTANT

## 2022-09-05 PROCEDURE — 85007 BL SMEAR W/DIFF WBC COUNT: CPT | Performed by: PHYSICIAN ASSISTANT

## 2022-09-05 PROCEDURE — 83735 ASSAY OF MAGNESIUM: CPT | Performed by: STUDENT IN AN ORGANIZED HEALTH CARE EDUCATION/TRAINING PROGRAM

## 2022-09-05 RX ADMIN — PANTOPRAZOLE SODIUM 40 MG: 40 TABLET, DELAYED RELEASE ORAL at 08:18

## 2022-09-05 RX ADMIN — ACYCLOVIR 400 MG: 400 TABLET ORAL at 08:18

## 2022-09-05 RX ADMIN — LEVOFLOXACIN 250 MG: 250 TABLET, FILM COATED ORAL at 13:11

## 2022-09-05 RX ADMIN — ONDANSETRON 8 MG: 4 TABLET, ORALLY DISINTEGRATING ORAL at 22:15

## 2022-09-05 RX ADMIN — ACYCLOVIR 400 MG: 400 TABLET ORAL at 19:23

## 2022-09-05 RX ADMIN — AMLODIPINE BESYLATE 10 MG: 10 TABLET ORAL at 08:18

## 2022-09-05 RX ADMIN — Medication 5 ML: at 11:26

## 2022-09-05 RX ADMIN — MICAFUNGIN 50 MG: 10 INJECTION, POWDER, LYOPHILIZED, FOR SOLUTION INTRAVENOUS at 11:26

## 2022-09-05 RX ADMIN — IMATINIB MESYLATE 400 MG: 400 TABLET, FILM COATED ORAL at 08:18

## 2022-09-05 RX ADMIN — Medication 6 MG: at 22:15

## 2022-09-05 RX ADMIN — Medication 5 ML: at 07:44

## 2022-09-05 RX ADMIN — IMATINIB MESYLATE 400 MG: 400 TABLET, FILM COATED ORAL at 19:24

## 2022-09-05 RX ADMIN — SENNOSIDES AND DOCUSATE SODIUM 2 TABLET: 8.6; 5 TABLET ORAL at 19:23

## 2022-09-05 ASSESSMENT — ACTIVITIES OF DAILY LIVING (ADL)
ADLS_ACUITY_SCORE: 22

## 2022-09-05 NOTE — PLAN OF CARE
"/54 (BP Location: Right arm)   Pulse 101   Temp 98.3  F (36.8  C) (Oral)   Resp 18   Ht 1.626 m (5' 4\")   Wt 111.8 kg (246 lb 6.4 oz)   SpO2 98%   BMI 42.29 kg/m    Pt denies nausea, dyspnea or pain. Reported some new numbness and tingling in fingertips, LIBIA Avelar aware. Due for IV/IT chemo 9/7, plan for IT chemo in XR at 1100. Will need platelets before to reach >50K. Dgt &  at bedside. Having good appetite, continue POC.   "

## 2022-09-05 NOTE — PROGRESS NOTES
"CLINICAL NUTRITION SERVICES - ASSESSMENT NOTE     Nutrition Prescription    Malnutrition Status:    - Unable to determine at this time    Recommendations already ordered by Registered Dietitian (RD):  - None at this time    Future/Additional Recommendations:  - Encourage good po intakes has tolerated. If decreased appetite noted, recommend small frequent meals throughout the day.  - Monitor po tolerance/adequacy of intakes, wt trends and need for calorie count monitoring if po or wt status declines.     REASON FOR ASSESSMENT  Vashti Villegas is a/an 42 year old female assessed by the dietitian for Admission Nutrition Risk Screen for LOS      CURRENT NUTRITION ORDERS  Diet: Regular with ONS (Magic Cup, Ensure Gelatein) betw meals per pt's request.  Intake/Tolerance: Pt continues to eat well with consuming >75% of her meals over the past week per RN/flowsheets. Noted that pt has food brought in from the outside (by family).  - Per chart review, nausea has been well controlled by antiemetics, but now with new epigastric/central chest discomfort, but unclear if related to eating vs empty stomach vs indigestion.    LABS  Labs reviewed    MEDICATIONS  Medications reviewed    ANTHROPOMETRICS  Height: 162.6 cm (5' 4\")  Most Recent Weight: 111.8 kg (today's), 110.1 kg (8/26), 113.5 kg (8/19 - admit);   IBW: 54.5 kg  BMI: Obesity Grade III BMI >40  Weight History: Per chart review, reported that pt has had an intentional wt loss of 50 lbs over the past 1.5 yrs. Since admit, pt's wt has been relatively stable.    Dosing Weight: 69 kg (adjusted wt based on admit wt and current wt)    ASSESSED NUTRITION NEEDS  Estimated Energy Needs:7883-4820 kcals/day (20 - 25 kcals/kg)  Justification: Maintenance and Obese  Estimated Protein Needs:  grams protein/day (1.2 - 1.5 grams of pro/kg)  Justification: Increased needs with chemo  Estimated Fluid Needs:1 mL/kcal)   Justification: Maintenance    PHYSICAL FINDINGS  See malnutrition " section below.    MALNUTRITION  % Intake: Decreased intake does not meet criteria  % Weight Loss: None noted since admit  Subcutaneous Fat Loss: Unable to assess  Muscle Loss: Unable to assess  Fluid Accumulation/Edema: Unable to assess  Malnutrition Diagnosis: Unable to determine due to unable to complete all parameters of nutritional assessment at this time.    NUTRITION DIAGNOSIS  Predicted inadequate nutrient intake (calorie-protein) related to has leslie tolerating po with good intakes, but at risk for po to falter d/t intermittent nausea and recent onset of new epigastric/central chest discomfort that may hinder po tolerance/adequaqcy of intakes.      INTERVENTIONS  Implementation  None at this time    Goals  Patient to consume % of nutritionally adequate meal trays TID, or the equivalent with supplements/snacks.     Monitoring/Evaluation  Progress toward goals will be monitored and evaluated per protocol.    Clari Gil RD,LD  7D pager [phone]

## 2022-09-05 NOTE — PLAN OF CARE
[id number]:  VSS on RA, afebrile. Denies pain, N/V, SOB. Up independently, voiding spontaneously. Slept between cares. Pt able to make needs know. Tentative plan for BMBx Tuesday. Continue with POC.

## 2022-09-05 NOTE — PLAN OF CARE
"[id number]    /58 (BP Location: Right arm)   Pulse 97   Temp 98.4  F (36.9  C) (Oral)   Resp 18   Ht 1.626 m (5' 4\")   Wt 111.8 kg (246 lb 6.4 oz)   SpO2 98%   BMI 42.29 kg/m      Reason for admission: GRAALL + imatinib tx, ALL  Activity: UAL  Pain: Denies  Neuro: A&Ox4, new neuropathy present in finger tips, provider aware, not causing pain  Cardiac: Afebrile, denies chest pain, denies dizziness, denies headache  Respiratory: Room air, denies SOB  GI/: Voiding spontaneously, up to bathroom. Intermittently nauseas.  Last BM 9/5.   Diet: Regular diet, good appetite, ate 100% of dinner  Lines: PICC, heparin locked  Skin: Bruising around PICC  Labs/imaging: Reviewed, see chart.      New changes this shift: Received PRN ODT Zofran x1 at bedtime, reported she felt queasy. Received melatonin at bedtime. No acute events.     Plan: Due for IV/IT chemo 9/7, plan for IT chemo in XR at 1100      Continue to follow POC     "

## 2022-09-05 NOTE — PLAN OF CARE
[id number]  AVSS, alert and oriented x 4, chace pain/nausea/sob. Pt is constipated, given PRN ilk of magnesium and schedule senna given. LBM on Friday, voiding adequately. PICC is intact, cap changed, and hep locked.  Continue to monitor care.

## 2022-09-05 NOTE — PROGRESS NOTES
[facility]  Hematology / Oncology Progress Note    Date of Admission: 8/19/2022  Date of Service (when I saw the patient): 09/05/2022     Assessment & Plan   Vashti Villegas is a 42 year old female who was admitted on 8/19/2022 with hyperleukocytosis (WBC 109K), abdominal pain and 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 Pre-phase steroids initiated (8/21-8/24), then initiated GRAALL + imatinib on 8/24.     TODAY:   - Day 13 GRAALL + imatinib   - Tolerating well, has some nausea but well controlled with anti-emetics  - 8/31 CSF flow negative  - Next IV/IT chemo due 9/7, scheduled  - New grade 1 neuropathy of her fingertips. Not present in her feet, nor is it affecting her dexterity or causing pain. Would recommend adjusting the dose of Day 15 Vincristine (treatment plan not yet adjusted, will re-discuss 9/6. Consider 1.5mg)  - No e/o TLS, allopurinol discontinued     HEME  # Ph+ B-ALL  Patient presented to outside hospital with c/o Right upper quadrant pain and was subsequently found to have a markedly elevated white blood cell count concerning for acute leukemia. While at OSH, she had CT PE protocol as well as CT A/P. These identified no PE, no acute or localized intraabdominal inflammatory process, mild splenomegaly, and few inconspicous low-density structures in the liver. She was transferred to [facility] for further workup and management.  - s/p Hydrea 1g TID (8/21-8/23) with improvement in WBC (100K ? 12K)  - CT CAP completed at OSH. CT neck 8/22 showed multiple, b/l suspicious-appearing LN (R level Ia, L level III, R level II-III) - not enlarged though they exhibit abnormal shapes.   - MR brain (8/21) - no acute IC pathology, no e/o intracranial CNS involvement.  - BMBx completed 8/20 - demonstrated B-ALL with 85% blasts and hypercellular marrow. IHC shows CD10+, CD34+. Dry tap, so additional studies sent on PB: FISH findings c/w  Ph+ ALL with BCR-ABL1 fusion present in 93.5% of round nucelei. Abnormal cytogenetics. For unknown reason, molecular studies were cancelled. Have reordered on PB today.  - Pt consented to HMTB; BMBx at dx was dry tap d/t WBC burden. Ordered peripheral blood samples on 8/22.   - Initiated pre-phase steroids with Prednisone 60 mg x8/21; increased to 60 mg/m2 (140 mg) 8/22-8/24. Transitioned to protocol-directed Dex 40 mg on 8/25.   - Message sent to BMT intake team on 8/23. Sent follow-up message on 9/2.  - Clonoseq ID testing to help estimate MRD in future assessments. Clonal testing collected and sent on 8/23. Plan to order Clonoseq MRD testing with post-induction BMBx.   - No e/o TLS, allopurinol discontinued x9/5  - Case discussed at Heme malignancy conference on 8/23, per consensus plan was to start GRAALL + Imatinib on 8/24. No clear indication for addition of Rituximab.      Treatment Plan: GRAALL + imatinib (D1=8/24/22)  - Imatinib 400 mg BID - D1-28  - Vincristine 2 mg IV x1 - D1, 8, 15, 22 *will discuss decreasing this dose to 1.5mg on D15 and 22 due to mild neuropathy  - Dexamethasone 40 mg daily - D2 (held D1, instead given pre-phase pred as above), 8-9, 15-16, 22-23  - Neupogen 5 mcg/kg/day - beginning D15, until count recovery    *if plan is to discharge patient with this, will need to look into a PA - further assess in coming days      # CNS prophylaxis  Per the GRAALL protocol, and risk for ALL entering the CNS, patient requires weekly LP w/ triple IT chemo as detailed below. Due to unsuccessful attempt at bedside with the CAPS team on 8/25, will require all subsequent LPs under fluoroscopic guidance.   - Triple IT chemo (Cytarabine, Solu-Cortef, MTX); D1, 8, 15  -8/26 CSF flow negative  -8/31 CSF flow negative  - next LP with IT chemo scheduled in XR on 9/7 @ 11 am  - Pt expresses anxiety surrounding procedures; pre-medication with Ativan +/- Atarax available prior PRN  - Has required multiple units  of platelets (3u total) prior to procedure to reach goal >50k.     # Pancytopenia  2/2 to underlying disease  - Transfuse to maintain Hgb >7, Plt >10K (>50K prior to LPs)     ID  # PPx  -  mg BID  - Nebulized Pentamidine q28d for PJP ppx, last dose given 8/29. Next due ~9/27  - Levaquin 250 mg daily   - Maude 50 mg daily  - Continuing Micafungin while inpatient through completion of scheduled chemotherapy. If ANC <1000 and pt discharging home then plan would be to transition to fluconazole.      # H/o COVID-19 infection   Not vaccinated (due to fear of needles per pt report). Pt reported testing postiive for COVID approx 1 month prior to admission via home test. She never had it confirmed.   - COVID testing on admission was negative.     # Lactic acidosis  LA up to 3.4 on 9/2. No other clinical evidence of infection, BCx remain no growth to date.   - Suspect related to ALL process and perhaps dehydration. LA is trending down.     CV  # Hypertension  Fairly persistent elevated BP since admission to 150-160/80-90s. She was not on anti-hypertensive medication PTA.   - started Amlodipine 5 mg daily (8/21-8/23); increased to 10 mg daily (x8/23) - hold parameters in place if SBP<110  - prn Hydralazine 10-20 mg available q6h for SBP>160     GI  # Indigestion/GERD  Patient is having intermittent transient epigastric discomfort. Otherwise HD stable.   - Continue protonix 40mg once daily    - Trial TUMs PRN, Maalox PRN.   - Will consider EKG and further work up if pain persents, or quality of pain changes    NEURO  # Headaches, improved  Pt endorsed intermittent HA since admission. Denies vision changes or other neurological sx. Has h/o HTN as above. CSF flow cytometry (8/25) negative for involvement by ALL.  - MRI brain completed 8/21 was negative for intracranial pathology or tumor involvement.   - Tylenol PRN  - Continue weekly LPs x3 per GRAALL protocol    # Chemotherapy-induced Grade 1 neuropathy  (fingertips)  Patient was experiencing intermittent left hand numbness (x 8/28) and right hand numbness (noted first on 8/30 evening) but the symptoms were not persistent and resolved on 8/31. On 9/4 she began experiencing numbness/tingling in her fingertips on both of her hands. This is not painful, nor has it affected her dexterity. Her feet have been spared.   - Will consider dose-adjusting the vincristine with Day 15 chemotherapy protocol      FEN  - No mIVF: bolus PRN.   - Lyte repletion per standing protocol  - Regular diet as tolerated     MISC  - Lines/Drains: PICC in place, remove on discharge  - DVT ppx: no pharmacologic ppx given TCP  - GI ppx: PPI, TUMs PRN, Maalox PRN.   - Consults: PT  - CODE: FULL  - DISPO: Anticipate prolonged 4-6 week hospitalization for management of new leukemia.   - Follow-up/Referrals: TBD. Dr. Hull has agreed to continue to follow pt in clinic. 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 Reviewed by pt and this provider faxed the paperwork on 9/2.     Patient and plan of care was discussed with attending physician Dr. Recinos.    Rosio Rivers PA-C  Hematology/Oncology  Pager #[phone] or available on [employer]    I spent 35 minutes in the care of this patient today, which included time necessary for review of interval events, obtaining history and physical exam, ordering medication(s)/test(s) as medically indicated, discussion with interdisciplinary/consult team(s), and documentation time. Over 50% of time was spent face-to-face and/or coordinating care.      Interval History   No acute events overnight, afebrile.   April continues to feel well. Again noted bilateral fingertip/hand tingling and numbness. No pain or tingling in her toes.  This does not affect her dexterity. Denies nausea or abdominal discomfort. Appetite is low but still OK. Having regular BMs.   A comprehensive review of systems was reviewed with the patient and the pertinent positives are listed in the HPI above.      Physical Exam   Temp: 98.3  F (36.8  C) Temp src: Oral BP: 117/54 Pulse: 101   Resp: 18 SpO2: 98 % O2 Device: None (Room air)    Vitals:    09/03/22 0917 09/04/22 1057 09/05/22 0900   Weight: 112 kg (246 lb 14.6 oz) 111.5 kg (245 lb 13 oz) 111.8 kg (246 lb 6.4 oz)     Vital Signs with Ranges  Temp:  [95.6  F (35.3  C)-99.1  F (37.3  C)] 98.3  F (36.8  C)  Pulse:  [] 101  Resp:  [17-20] 18  BP: (108-119)/(50-59) 117/54  SpO2:  [98 %-100 %] 98 %  I/O last 3 completed shifts:  In: 580 [P.O.:480; I.V.:100]  Out: 1600 [Urine:1600]    Constitutional: Awake and conversational. Non- toxic appearing. No acute distress.   HEENT: Normocephalic, atraumatic . Sclerae anicteric. EOM intact. Moist mucus membranes   Respiratory: Breathing comfortable with no increased work on room air. No signs of respiratory distress or accessory muscle use. Lungs clear to auscultation bilaterally, no crackles or wheezing noted.  Cardiovascular: Regular rate and rhythm. Normal S1 and S2.   GI: Abdomen is soft, non-distended, non-tender and without distension. Bowel sounds present and normoactive.   Skin: Skin is clean, dry, intact. No jaundice appreciated.   Musculoskeletal/ Extremities: No redness, warmth, or swelling of the joints appreciated.   Neurologic: Alert and oriented. Speech normal. Grossly nonfocal. Memory and thought process preserved.  Neuropsychiatric: Calm, affect congruent to situation. Appropriate mood and affect. Good judgment and insight. No visual/auditory hallucinations.       Medications     - MEDICATION INSTRUCTIONS -         acyclovir  400 mg Oral BID     albuterol  2.5 mg Nebulization Q28 Days    And     pentamidine  300 mg Inhalation Q28 Days     amLODIPine  10 mg  Oral Daily     heparin lock flush  5-20 mL Intracatheter Q24H     imatinib  400 mg Oral BID     levofloxacin  250 mg Oral Q24H     lidocaine (PF)  10 mL Subcutaneous Once     micafungin  50 mg Intravenous Q24H     pantoprazole  40 mg Oral QAM AC     senna-docusate  1-2 tablet Oral BID     sodium chloride (PF)  10-40 mL Intracatheter Q8H     Data   Results for orders placed or performed during the hospital encounter of 08/19/22 (from the past 24 hour(s))   Lactic Acid STAT   Result Value Ref Range    Lactic Acid 1.8 0.7 - 2.0 mmol/L   CBC with platelets differential    Narrative    The following orders were created for panel order CBC with platelets differential.  Procedure                               Abnormality         Status                     ---------                               -----------         ------                     CBC with platelets and d...[[id number]]  Abnormal            Final result               Manual Differential[[id number]]          Abnormal            Final result                 Please view results for these tests on the individual orders.   Phosphorus   Result Value Ref Range    Phosphorus 2.8 2.5 - 4.5 mg/dL   Fibrinogen activity   Result Value Ref Range    Fibrinogen Activity 193 170 - 490 mg/dL   INR   Result Value Ref Range    INR 1.03 0.85 - 1.15   Uric acid   Result Value Ref Range    Uric Acid 1.4 (L) 2.4 - 5.7 mg/dL   Lactate Dehydrogenase   Result Value Ref Range    Lactate Dehydrogenase 189 0 - 250 U/L   Basic metabolic panel   Result Value Ref Range    Creatinine 0.72 0.51 - 0.95 mg/dL    Sodium 138 136 - 145 mmol/L    Potassium 4.0 3.4 - 5.3 mmol/L    Urea Nitrogen 14.3 6.0 - 20.0 mg/dL    Chloride 105 98 - 107 mmol/L    Carbon Dioxide (CO2) 25 22 - 29 mmol/L    Anion Gap 8 7 - 15 mmol/L    Glucose 83 70 - 99 mg/dL    GFR Estimate >90 >60 mL/min/1.73m2    Calcium 8.1 (L) 8.6 - 10.0 mg/dL   Hepatic panel   Result Value Ref Range    Protein Total 5.3 (L) 6.4 - 8.3 g/dL     "Albumin 3.3 (L) 3.5 - 5.2 g/dL    Bilirubin Total 0.7 <=1.2 mg/dL    Alkaline Phosphatase 62 35 - 104 U/L    AST 17 10 - 35 U/L    ALT 30 10 - 35 U/L    Bilirubin Direct <0.20 0.00 - 0.30 mg/dL   Magnesium   Result Value Ref Range    Magnesium 2.0 1.7 - 2.3 mg/dL   CBC with platelets and differential   Result Value Ref Range    WBC Count 2.0 (L) 4.0 - 11.0 10e3/uL    RBC Count 2.82 (L) 3.80 - 5.20 10e6/uL    Hemoglobin 8.2 (L) 11.7 - 15.7 g/dL    Hematocrit 24.6 (L) 35.0 - 47.0 %    MCV 87 78 - 100 fL    MCH 29.1 26.5 - 33.0 pg    MCHC 33.3 31.5 - 36.5 g/dL    RDW 13.4 10.0 - 15.0 %    Platelet Count 47 (LL) 150 - 450 10e3/uL    Narrative    Previously reported component [ NRBCs ] is no longer reported.\"  Previously reported component [ NRBCs Absolute ] is no longer reported.\"   Manual Differential   Result Value Ref Range    % Neutrophils 12 %    % Lymphocytes 83 %    % Monocytes 1 %    % Eosinophils 3 %    % Basophils 0 %    % Metamyelocytes 1 %    Absolute Neutrophils 0.2 (LL) 1.6 - 8.3 10e3/uL    Absolute Lymphocytes 1.7 0.8 - 5.3 10e3/uL    Absolute Monocytes 0.0 0.0 - 1.3 10e3/uL    Absolute Eosinophils 0.1 0.0 - 0.7 10e3/uL    Absolute Basophils 0.0 0.0 - 0.2 10e3/uL    Absolute Metamyelocytes 0.0 <=0.0 10e3/uL    RBC Morphology Confirmed RBC Indices     Platelet Assessment  Automated Count Confirmed. Platelet morphology is normal.     Automated Count Confirmed. Platelet morphology is normal.    Reactive Lymphocytes Present (A) None Seen     "

## 2022-09-06 LAB
ANION GAP SERPL CALCULATED.3IONS-SCNC: 6 MMOL/L (ref 7–15)
BASOPHILS # BLD AUTO: 0 10E3/UL (ref 0–0.2)
BASOPHILS NFR BLD AUTO: 0 %
BUN SERPL-MCNC: 13.2 MG/DL (ref 6–20)
CALCIUM SERPL-MCNC: 8 MG/DL (ref 8.6–10)
CHLORIDE SERPL-SCNC: 107 MMOL/L (ref 98–107)
CREAT SERPL-MCNC: 0.67 MG/DL (ref 0.51–0.95)
DEPRECATED HCO3 PLAS-SCNC: 25 MMOL/L (ref 22–29)
EOSINOPHIL # BLD AUTO: 0 10E3/UL (ref 0–0.7)
EOSINOPHIL NFR BLD AUTO: 2 %
ERYTHROCYTE [DISTWIDTH] IN BLOOD BY AUTOMATED COUNT: 13.5 % (ref 10–15)
GFR SERPL CREATININE-BSD FRML MDRD: >90 ML/MIN/1.73M2
GLUCOSE SERPL-MCNC: 101 MG/DL (ref 70–99)
HCT VFR BLD AUTO: 24.4 % (ref 35–47)
HGB BLD-MCNC: 8.1 G/DL (ref 11.7–15.7)
IMM GRANULOCYTES # BLD: 0 10E3/UL
IMM GRANULOCYTES NFR BLD: 0 %
LACTATE SERPL-SCNC: 1 MMOL/L (ref 0.7–2)
LYMPHOCYTES # BLD AUTO: 1.3 10E3/UL (ref 0.8–5.3)
LYMPHOCYTES NFR BLD AUTO: 77 %
MCH RBC QN AUTO: 29.6 PG (ref 26.5–33)
MCHC RBC AUTO-ENTMCNC: 33.2 G/DL (ref 31.5–36.5)
MCV RBC AUTO: 89 FL (ref 78–100)
MONOCYTES # BLD AUTO: 0.1 10E3/UL (ref 0–1.3)
MONOCYTES NFR BLD AUTO: 3 %
NEUTROPHILS # BLD AUTO: 0.3 10E3/UL (ref 1.6–8.3)
NEUTROPHILS NFR BLD AUTO: 18 %
NRBC # BLD AUTO: 0 10E3/UL
NRBC BLD AUTO-RTO: 0 /100
PHOSPHATE SERPL-MCNC: 3 MG/DL (ref 2.5–4.5)
PLAT MORPH BLD: NORMAL
PLATELET # BLD AUTO: 60 10E3/UL (ref 150–450)
POTASSIUM SERPL-SCNC: 3.3 MMOL/L (ref 3.4–5.3)
POTASSIUM SERPL-SCNC: 3.4 MMOL/L (ref 3.4–5.3)
RBC # BLD AUTO: 2.74 10E6/UL (ref 3.8–5.2)
RBC MORPH BLD: NORMAL
SODIUM SERPL-SCNC: 138 MMOL/L (ref 136–145)
WBC # BLD AUTO: 1.7 10E3/UL (ref 4–11)

## 2022-09-06 PROCEDURE — 99233 SBSQ HOSP IP/OBS HIGH 50: CPT | Mod: FS | Performed by: PHYSICIAN ASSISTANT

## 2022-09-06 PROCEDURE — 250N000011 HC RX IP 250 OP 636: Performed by: STUDENT IN AN ORGANIZED HEALTH CARE EDUCATION/TRAINING PROGRAM

## 2022-09-06 PROCEDURE — 250N000013 HC RX MED GY IP 250 OP 250 PS 637: Performed by: PHYSICIAN ASSISTANT

## 2022-09-06 PROCEDURE — 83605 ASSAY OF LACTIC ACID: CPT | Performed by: STUDENT IN AN ORGANIZED HEALTH CARE EDUCATION/TRAINING PROGRAM

## 2022-09-06 PROCEDURE — 84132 ASSAY OF SERUM POTASSIUM: CPT | Performed by: STUDENT IN AN ORGANIZED HEALTH CARE EDUCATION/TRAINING PROGRAM

## 2022-09-06 PROCEDURE — 84100 ASSAY OF PHOSPHORUS: CPT | Performed by: PHYSICIAN ASSISTANT

## 2022-09-06 PROCEDURE — 258N000003 HC RX IP 258 OP 636: Performed by: STUDENT IN AN ORGANIZED HEALTH CARE EDUCATION/TRAINING PROGRAM

## 2022-09-06 PROCEDURE — 250N000011 HC RX IP 250 OP 636: Performed by: INTERNAL MEDICINE

## 2022-09-06 PROCEDURE — 36592 COLLECT BLOOD FROM PICC: CPT | Performed by: PHYSICIAN ASSISTANT

## 2022-09-06 PROCEDURE — 36592 COLLECT BLOOD FROM PICC: CPT | Performed by: STUDENT IN AN ORGANIZED HEALTH CARE EDUCATION/TRAINING PROGRAM

## 2022-09-06 PROCEDURE — 85025 COMPLETE CBC W/AUTO DIFF WBC: CPT | Performed by: PHYSICIAN ASSISTANT

## 2022-09-06 PROCEDURE — 250N000013 HC RX MED GY IP 250 OP 250 PS 637: Performed by: INTERNAL MEDICINE

## 2022-09-06 PROCEDURE — 99356 PR PROLONGED SERV,INPATIENT,1ST HR: CPT | Mod: FS | Performed by: PHYSICIAN ASSISTANT

## 2022-09-06 PROCEDURE — 250N000013 HC RX MED GY IP 250 OP 250 PS 637: Performed by: STUDENT IN AN ORGANIZED HEALTH CARE EDUCATION/TRAINING PROGRAM

## 2022-09-06 PROCEDURE — 120N000002 HC R&B MED SURG/OB UMMC

## 2022-09-06 PROCEDURE — 80048 BASIC METABOLIC PNL TOTAL CA: CPT | Performed by: PHYSICIAN ASSISTANT

## 2022-09-06 RX ORDER — POTASSIUM CHLORIDE 750 MG/1
40 TABLET, EXTENDED RELEASE ORAL ONCE
Status: COMPLETED | OUTPATIENT
Start: 2022-09-06 | End: 2022-09-06

## 2022-09-06 RX ADMIN — Medication 5 ML: at 20:03

## 2022-09-06 RX ADMIN — SENNOSIDES AND DOCUSATE SODIUM 2 TABLET: 8.6; 5 TABLET ORAL at 08:52

## 2022-09-06 RX ADMIN — SENNOSIDES AND DOCUSATE SODIUM 2 TABLET: 8.6; 5 TABLET ORAL at 19:36

## 2022-09-06 RX ADMIN — AMLODIPINE BESYLATE 10 MG: 10 TABLET ORAL at 08:52

## 2022-09-06 RX ADMIN — IMATINIB MESYLATE 400 MG: 400 TABLET, FILM COATED ORAL at 19:36

## 2022-09-06 RX ADMIN — ACYCLOVIR 400 MG: 400 TABLET ORAL at 08:52

## 2022-09-06 RX ADMIN — POTASSIUM CHLORIDE 40 MEQ: 750 TABLET, EXTENDED RELEASE ORAL at 22:13

## 2022-09-06 RX ADMIN — PANTOPRAZOLE SODIUM 40 MG: 40 TABLET, DELAYED RELEASE ORAL at 08:52

## 2022-09-06 RX ADMIN — Medication 3 MG: at 22:13

## 2022-09-06 RX ADMIN — POTASSIUM CHLORIDE 40 MEQ: 750 TABLET, EXTENDED RELEASE ORAL at 15:16

## 2022-09-06 RX ADMIN — LEVOFLOXACIN 250 MG: 250 TABLET, FILM COATED ORAL at 19:35

## 2022-09-06 RX ADMIN — IMATINIB MESYLATE 400 MG: 400 TABLET, FILM COATED ORAL at 08:52

## 2022-09-06 RX ADMIN — ACYCLOVIR 400 MG: 400 TABLET ORAL at 19:36

## 2022-09-06 RX ADMIN — Medication 10 ML: at 13:34

## 2022-09-06 RX ADMIN — MICAFUNGIN 50 MG: 10 INJECTION, POWDER, LYOPHILIZED, FOR SOLUTION INTRAVENOUS at 12:03

## 2022-09-06 ASSESSMENT — ACTIVITIES OF DAILY LIVING (ADL)
DEPENDENT_IADLS:: INDEPENDENT
ADLS_ACUITY_SCORE: 22

## 2022-09-06 NOTE — CONSULTS
Care Management Initial Consult    General Information  Assessment completed with: Care Team Member, chart review  Type of CM/SW Visit: Initial Assessment  Primary Care Provider verified and updated as needed: Yes   Readmission within the last 30 days: no previous admission in last 30 days   Reason for Consult: other - Elevated Readmission Risk Score  Advance Care Planning Reviewed: no concerns identified        Communication Assessment  Patient's communication style: spoken language (English or Bilingual)    Hearing Difficulty or Deaf: no   Wear Glasses or Blind: yes    Cognitive  Cognitive/Neuro/Behavioral: WDL                      Living Environment:   People in home: spouse     Current living Arrangements: house      Able to return to prior arrangements: yes     Family/Social Support:  Care provided by: self, spouse/significant other  Provides care for: no one  Marital Status:   Support System: , Parent(s)          Description of Support System: Supportive, Involved    Support Assessment: Adequate family and caregiver support, Adequate social supports    Current Resources:   Patient receiving home care services: No  Community Resources: None  Equipment currently used at home: none  Supplies currently used at home: None    Employment/Financial:  Employment Status: employed full-time     Financial Concerns: No concerns identified   Referral to Financial Worker: No       Lifestyle & Psychosocial Needs:  Social Determinants of Health     Tobacco Use: Not on file   Alcohol Use: Not on file   Financial Resource Strain: Not on file   Food Insecurity: Not on file   Transportation Needs: Not on file   Physical Activity: Not on file   Stress: Not on file   Social Connections: Not on file   Intimate Partner Violence: Not on file   Depression: Not on file   Housing Stability: Not on file       Functional Status:  Prior to admission patient needed assistance:   Dependent ADLs:: Independent  Dependent IADLs::  Independent  Assesssment of Functional Status: At functional baseline    Mental Health Status:  Mental Health Status: No Current Concerns       Chemical Dependency Status:  Chemical Dependency Status: No Current Concerns           Values/Beliefs:  Spiritual, Cultural Beliefs, Worship Practices, Values that affect care: no               Additional Information:    Patient is a 42 year old female who was admitted on 8/19/2022 with hyperleukocytosis (WBC 109K), abdominal pain and 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 Pre-phase steroids initiated (8/21-8/24), then initiated GRAALL + imatinib on 8/24.    Care Management/Social Work Consult placed due to patient's elevated readmission risk score.    Patient is independent at baseline and not currently open to any in-home supports or services.     [facility]/ will continue to follow and assist with any discharge planning needs as they arise.    SONJA Malhotra  7D/5C Hematology/Oncology Social Worker  Phone: [phone]  Pager: [phone]  [email or web address]

## 2022-09-06 NOTE — PLAN OF CARE
"Goal Outcome Evaluation:    /55 (BP Location: Right arm)   Pulse 92   Temp 97.4  F (36.3  C) (Oral)   Resp 18   Ht 1.626 m (5' 4\")   Wt 111.8 kg (246 lb 6.4 oz)   SpO2 99%   BMI 42.29 kg/m      [id number]:    Vitals: VS on RA, afebrile.  Neuro:A&Ox4. 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 Denies dizziness. Denies headache.  Cardiac: Denies chest pain.  Respiratory: Denies shortness of breath.  GI/: No concerns with urination. No BM this shift. Denies nausea and vomiting.  Pain: Denies pain.  Diet: Regular.    Due for IV/IT chemo 9/7, plan for IT chemo in XR at 1100.    Continue to monitor and with plan of care.    "

## 2022-09-06 NOTE — PROGRESS NOTES
[facility]  Hematology / Oncology Progress Note    Date of Admission: 8/19/2022  Date of Service (when I saw the patient): 09/06/2022     Assessment & Plan   Vashti Villegas is a 42 year old female who was admitted on 8/19/2022 with hyperleukocytosis (WBC 109K), abdominal pain and 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 Pre-phase steroids initiated (8/21-8/24), then initiated GRAALL + imatinib on 8/24.     TODAY:   - Day 14 GRAALL + imatinib   - Tolerating well, has some nausea but well controlled with anti-emetics  - 8/31 CSF flow negative  - Next IV/IT chemo due 9/7, scheduled at 11am in XR  - New grade 1 neuropathy of her fingertips. Not present in her feet, nor is it affecting her dexterity or causing pain. Would recommend adjusting the dose of Day 15 Vincristine (treatment plan not yet adjusted, will re-discuss 9/7. Consider 1.5mg)  - No e/o TLS, allopurinol discontinued     HEME  # Ph+ B-ALL  Patient presented to outside hospital with c/o Right upper quadrant pain and was subsequently found to have a markedly elevated white blood cell count concerning for acute leukemia. While at OSH, she had CT PE protocol as well as CT A/P. These identified no PE, no acute or localized intraabdominal inflammatory process, mild splenomegaly, and few inconspicous low-density structures in the liver. She was transferred to [facility] for further workup and management.  - s/p Hydrea 1g TID (8/21-8/23) with improvement in WBC (100K ? 12K)  - CT CAP completed at OSH. CT neck 8/22 showed multiple, b/l suspicious-appearing LN (R level Ia, L level III, R level II-III) - not enlarged though they exhibit abnormal shapes.   - MR brain (8/21) - no acute IC pathology, no e/o intracranial CNS involvement.  - BMBx completed 8/20 - demonstrated B-ALL with 85% blasts and hypercellular marrow. IHC shows CD10+, CD34+. Dry tap, so additional studies sent on PB: FISH  findings c/w Ph+ ALL with BCR-ABL1 fusion present in 93.5% of round nucelei. Abnormal cytogenetics. For unknown reason, molecular studies were cancelled. Have reordered on PB today.  - Pt consented to HMTB; BMBx at dx was dry tap d/t WBC burden. Ordered peripheral blood samples on 8/22.   - Initiated pre-phase steroids with Prednisone 60 mg x8/21; increased to 60 mg/m2 (140 mg) 8/22-8/24. Transitioned to protocol-directed Dex 40 mg on 8/25.   - Message sent to BMT intake team on 8/23. Sent follow-up message on 9/2.  - Clonoseq ID testing to help estimate MRD in future assessments. Clonal testing collected and sent on 8/23. Plan to order Clonoseq MRD testing with post-induction BMBx.   - No e/o TLS, allopurinol discontinued x9/5  - Case discussed at Heme malignancy conference on 8/23, per consensus plan was to start GRAALL + Imatinib on 8/24. No clear indication for addition of Rituximab.      Treatment Plan: GRAALL + imatinib (D1=8/24/22)  - Imatinib 400 mg BID - D1-28  - Vincristine 2 mg IV x1 - D1, 8, 15, 22 *will discuss decreasing this dose to 1.5mg on D15 and 22 due to mild neuropathy  - Dexamethasone 40 mg daily - D2 (held D1, instead given pre-phase pred as above), 8-9, 15-16, 22-23  - Neupogen 5 mcg/kg/day - beginning D15, until count recovery    *if plan is to discharge patient with this, will need to look into a PA - further assess in coming days      # CNS prophylaxis  Per the GRAALL protocol, and risk for ALL entering the CNS, patient requires weekly LP w/ triple IT chemo as detailed below. Due to unsuccessful attempt at bedside with the CAPS team on 8/25, will require all subsequent LPs under fluoroscopic guidance. Had required multiple units of platelets (3u total) prior to procedure to reach goal >50k.   - Triple IT chemo (Cytarabine, Solu-Cortef, MTX); D1, 8, 15.   -8/26 CSF flow negative  -8/31 CSF flow negative  -next LP with IT chemo scheduled in XR on 9/7 @ 11 am  - Pt expresses anxiety  surrounding procedures; pre-medication with Ativan +/- Atarax available prior PRN    # Pancytopenia  2/2 to underlying disease  - Transfuse to maintain Hgb >7, Plt >10K (>50K prior to LPs)     ID  # PPx  -  mg BID  - Nebulized Pentamidine q28d for PJP ppx, last dose given 8/29. Next due ~9/27  - Levaquin 250 mg daily   - Maude 50 mg daily  - Continuing Micafungin while inpatient through completion of scheduled chemotherapy. If ANC <1000 and pt discharging home then plan would be to transition to fluconazole.      # H/o COVID-19 infection   Not vaccinated (due to fear of needles per pt report). Pt reported testing postiive for COVID approx 1 month prior to admission via home test. She never had it confirmed.   - COVID testing on admission was negative.     CV  # Hypertension  Fairly persistent elevated BP since admission to 150-160/80-90s. She was not on anti-hypertensive medication PTA.   - started Amlodipine 5 mg daily (8/21-8/23); increased to 10 mg daily (x8/23) - hold parameters in place if SBP<110  - prn Hydralazine 10-20 mg available q6h for SBP>160     GI  # Indigestion/GERD  Patient is having intermittent transient epigastric discomfort. Otherwise HD stable.   - Continue protonix 40mg once daily    - Trial TUMs PRN, Maalox PRN.   - Will consider EKG and further work up if pain persents, or quality of pain changes    NEURO  # Headaches, improved  Pt endorsed intermittent HA since admission. Denies vision changes or other neurological sx. Has h/o HTN as above. CSF flow cytometry (8/25) negative for involvement by ALL.  - MRI brain completed 8/21 was negative for intracranial pathology or tumor involvement.   - Tylenol PRN  - Continue weekly LPs x3 per GRAALL protocol    # Chemotherapy-induced Grade 1 neuropathy (fingertips)  Patient was experiencing intermittent left hand numbness (x 8/28) and right hand numbness (noted first on 8/30 evening) but the symptoms were not persistent and resolved on 8/31. On  9/4 she began experiencing numbness/tingling in her fingertips on both of her hands. This is not painful, nor has it affected her dexterity. Her feet have been spared.   - Will dose-adjust the vincristine with Day 15 chemotherapy protocol      FEN  - No mIVF: bolus PRN.   - Lyte repletion per standing protocol  - Regular diet as tolerated     MISC  - Lines/Drains: PICC in place, remove on discharge  - DVT ppx: no pharmacologic ppx given TCP  - GI ppx: PPI, TUMs PRN, Maalox PRN.   - Consults: PT  - CODE: FULL  - DISPO: Anticipate prolonged 4-6 week hospitalization for management of new leukemia.   - Follow-up/Referrals: TBD. Dr. Hull has agreed to continue to follow pt in clinic. 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 Reviewed by pt and this provider faxed the paperwork on 9/2.     Patient and plan of care was discussed with attending physician Dr. Recinos.    Rosio Rivers PA-C  Hematology/Oncology  Pager #[phone] or available on [employer]    I spent 35 minutes in the care of this patient today, which included time necessary for review of interval events, obtaining history and physical exam, ordering medication(s)/test(s) as medically indicated, discussion with interdisciplinary/consult team(s), and documentation time. Over 50% of time was spent face-to-face and/or coordinating care.      Interval History   No acute events overnight, afebrile.   April continues to feel well. Noting persistent bilateral fingertip/hand tingling and numbness. No pain or tingling in her toes. This does not affect her dexterity. Denies nausea or abdominal discomfort. Appetite is still OK. Having regular BMs. Her  and daughter visited yesterday.   A comprehensive review of systems was  reviewed with the patient and the pertinent positives are listed in the HPI above.      Physical Exam   Temp: 98.5  F (36.9  C) Temp src: Oral BP: (!) 122/102 Pulse: 103   Resp: 18 SpO2: 98 % O2 Device: None (Room air)    Vitals:    09/04/22 1057 09/05/22 0900 09/06/22 1018   Weight: 111.5 kg (245 lb 13 oz) 111.8 kg (246 lb 6.4 oz) 112.7 kg (248 lb 6.4 oz)     Vital Signs with Ranges  Temp:  [96  F (35.6  C)-98.5  F (36.9  C)] 98.5  F (36.9  C)  Pulse:  [] 103  Resp:  [16-18] 18  BP: (100-125)/() 122/102  SpO2:  [97 %-99 %] 98 %  I/O last 3 completed shifts:  In: 720 [P.O.:720]  Out: 950 [Urine:950]    Constitutional: Awake and conversational. Non- toxic appearing. No acute distress.   HEENT: Normocephalic, atraumatic . Sclerae anicteric. EOM intact. Moist mucus membranes   Respiratory: Breathing comfortable with no increased work on room air. No signs of respiratory distress or accessory muscle use. Lungs clear to auscultation bilaterally, no crackles or wheezing noted.  Cardiovascular: Regular rate and rhythm. Normal S1 and S2.   GI: Abdomen is soft, non-distended, non-tender and without distension. Bowel sounds present and normoactive.   Skin: Skin is clean, dry, intact. No jaundice appreciated.   Musculoskeletal/ Extremities: No redness, warmth, or swelling of the joints appreciated.   Neurologic: Alert and oriented. Speech normal. Grossly nonfocal. Memory and thought process preserved.  Neuropsychiatric: Calm, affect congruent to situation. Appropriate mood and affect. Good judgment and insight. No visual/auditory hallucinations.       Medications     - MEDICATION INSTRUCTIONS -         acyclovir  400 mg Oral BID     albuterol  2.5 mg Nebulization Q28 Days    And     pentamidine  300 mg Inhalation Q28 Days     amLODIPine  10 mg Oral Daily     heparin lock flush  5-20 mL Intracatheter Q24H     imatinib  400 mg Oral BID     levofloxacin  250 mg Oral Q24H     lidocaine (PF)  10 mL Subcutaneous Once      micafungin  50 mg Intravenous Q24H     pantoprazole  40 mg Oral QAM AC     senna-docusate  1-2 tablet Oral BID     sodium chloride (PF)  10-40 mL Intracatheter Q8H     Data   Results for orders placed or performed during the hospital encounter of 08/19/22 (from the past 24 hour(s))   CBC with platelets differential    Narrative    The following orders were created for panel order CBC with platelets differential.  Procedure                               Abnormality         Status                     ---------                               -----------         ------                     CBC with platelets and d...[[id number]]  Abnormal            Final result               RBC and Platelet Morphology[[id number]]                      Final result                 Please view results for these tests on the individual orders.   Phosphorus   Result Value Ref Range    Phosphorus 3.0 2.5 - 4.5 mg/dL   Basic metabolic panel   Result Value Ref Range    Creatinine 0.67 0.51 - 0.95 mg/dL    Sodium 138 136 - 145 mmol/L    Potassium 3.4 3.4 - 5.3 mmol/L    Urea Nitrogen 13.2 6.0 - 20.0 mg/dL    Chloride 107 98 - 107 mmol/L    Carbon Dioxide (CO2) 25 22 - 29 mmol/L    Anion Gap 6 (L) 7 - 15 mmol/L    Glucose 101 (H) 70 - 99 mg/dL    GFR Estimate >90 >60 mL/min/1.73m2    Calcium 8.0 (L) 8.6 - 10.0 mg/dL   CBC with platelets and differential   Result Value Ref Range    WBC Count 1.7 (L) 4.0 - 11.0 10e3/uL    RBC Count 2.74 (L) 3.80 - 5.20 10e6/uL    Hemoglobin 8.1 (L) 11.7 - 15.7 g/dL    Hematocrit 24.4 (L) 35.0 - 47.0 %    MCV 89 78 - 100 fL    MCH 29.6 26.5 - 33.0 pg    MCHC 33.2 31.5 - 36.5 g/dL    RDW 13.5 10.0 - 15.0 %    Platelet Count 60 (L) 150 - 450 10e3/uL    % Neutrophils 18 %    % Lymphocytes 77 %    % Monocytes 3 %    % Eosinophils 2 %    % Basophils 0 %    % Immature Granulocytes 0 %    NRBCs per 100 WBC 0 <1 /100    Absolute Neutrophils 0.3 (LL) 1.6 - 8.3 10e3/uL    Absolute Lymphocytes 1.3 0.8 - 5.3 10e3/uL    Absolute  Monocytes 0.1 0.0 - 1.3 10e3/uL    Absolute Eosinophils 0.0 0.0 - 0.7 10e3/uL    Absolute Basophils 0.0 0.0 - 0.2 10e3/uL    Absolute Immature Granulocytes 0.0 <=0.4 10e3/uL    Absolute NRBCs 0.0 10e3/uL   RBC and Platelet Morphology   Result Value Ref Range    Platelet Assessment  Automated Count Confirmed. Platelet morphology is normal.     Automated Count Confirmed. Platelet morphology is normal.    RBC Morphology Confirmed RBC Indices    Lactic Acid STAT   Result Value Ref Range    Lactic Acid 1.0 0.7 - 2.0 mmol/L

## 2022-09-07 ENCOUNTER — APPOINTMENT (OUTPATIENT)
Dept: GENERAL RADIOLOGY | Facility: CLINIC | Age: 42
End: 2022-09-07
Attending: PHYSICIAN ASSISTANT
Payer: COMMERCIAL

## 2022-09-07 LAB
ALBUMIN SERPL BCG-MCNC: 3.1 G/DL (ref 3.5–5.2)
ALP SERPL-CCNC: 70 U/L (ref 35–104)
ALT SERPL W P-5'-P-CCNC: 24 U/L (ref 10–35)
ANION GAP SERPL CALCULATED.3IONS-SCNC: 9 MMOL/L (ref 7–15)
AST SERPL W P-5'-P-CCNC: 17 U/L (ref 10–35)
BACTERIA BLD CULT: NO GROWTH
BACTERIA BLD CULT: NO GROWTH
BASOPHILS # BLD AUTO: 0 10E3/UL (ref 0–0.2)
BASOPHILS NFR BLD AUTO: 0 %
BILIRUB DIRECT SERPL-MCNC: <0.2 MG/DL (ref 0–0.3)
BILIRUB SERPL-MCNC: 0.3 MG/DL
BUN SERPL-MCNC: 11.6 MG/DL (ref 6–20)
CALCIUM SERPL-MCNC: 8.1 MG/DL (ref 8.6–10)
CHLORIDE SERPL-SCNC: 106 MMOL/L (ref 98–107)
CREAT SERPL-MCNC: 0.69 MG/DL (ref 0.51–0.95)
DEPRECATED HCO3 PLAS-SCNC: 23 MMOL/L (ref 22–29)
EOSINOPHIL # BLD AUTO: 0.1 10E3/UL (ref 0–0.7)
EOSINOPHIL NFR BLD AUTO: 3 %
ERYTHROCYTE [DISTWIDTH] IN BLOOD BY AUTOMATED COUNT: 13.5 % (ref 10–15)
FIBRINOGEN PPP-MCNC: 244 MG/DL (ref 170–490)
GFR SERPL CREATININE-BSD FRML MDRD: >90 ML/MIN/1.73M2
GLUCOSE CSF-MCNC: 57 MG/DL (ref 40–70)
GLUCOSE SERPL-MCNC: 87 MG/DL (ref 70–99)
HCT VFR BLD AUTO: 22.4 % (ref 35–47)
HGB BLD-MCNC: 7.3 G/DL (ref 11.7–15.7)
HOLD SPECIMEN: NORMAL
IMM GRANULOCYTES # BLD: 0 10E3/UL
IMM GRANULOCYTES NFR BLD: 0 %
INR PPP: 1.04 (ref 0.85–1.15)
LACTATE SERPL-SCNC: 2.7 MMOL/L (ref 0.7–2)
LYMPHOCYTES # BLD AUTO: 1.2 10E3/UL (ref 0.8–5.3)
LYMPHOCYTES NFR BLD AUTO: 71 %
MCH RBC QN AUTO: 29.6 PG (ref 26.5–33)
MCHC RBC AUTO-ENTMCNC: 32.6 G/DL (ref 31.5–36.5)
MCV RBC AUTO: 91 FL (ref 78–100)
MONOCYTES # BLD AUTO: 0.1 10E3/UL (ref 0–1.3)
MONOCYTES NFR BLD AUTO: 5 %
NEUTROPHILS # BLD AUTO: 0.4 10E3/UL (ref 1.6–8.3)
NEUTROPHILS NFR BLD AUTO: 21 %
NRBC # BLD AUTO: 0 10E3/UL
NRBC BLD AUTO-RTO: 0 /100
PHOSPHATE SERPL-MCNC: 3.3 MG/DL (ref 2.5–4.5)
PLAT MORPH BLD: NORMAL
PLATELET # BLD AUTO: 82 10E3/UL (ref 150–450)
POTASSIUM SERPL-SCNC: 4.1 MMOL/L (ref 3.4–5.3)
POTASSIUM SERPL-SCNC: 4.1 MMOL/L (ref 3.4–5.3)
PROT CSF-MCNC: 24.9 MG/DL (ref 15–45)
PROT SERPL-MCNC: 5.2 G/DL (ref 6.4–8.3)
RBC # BLD AUTO: 2.47 10E6/UL (ref 3.8–5.2)
RBC MORPH BLD: NORMAL
SODIUM SERPL-SCNC: 138 MMOL/L (ref 136–145)
WBC # BLD AUTO: 1.7 10E3/UL (ref 4–11)

## 2022-09-07 PROCEDURE — 84157 ASSAY OF PROTEIN OTHER: CPT | Performed by: PHYSICIAN ASSISTANT

## 2022-09-07 PROCEDURE — 250N000013 HC RX MED GY IP 250 OP 250 PS 637: Performed by: INTERNAL MEDICINE

## 2022-09-07 PROCEDURE — 250N000011 HC RX IP 250 OP 636: Performed by: STUDENT IN AN ORGANIZED HEALTH CARE EDUCATION/TRAINING PROGRAM

## 2022-09-07 PROCEDURE — 82248 BILIRUBIN DIRECT: CPT | Performed by: PHYSICIAN ASSISTANT

## 2022-09-07 PROCEDURE — 96450 CHEMOTHERAPY INTO CNS: CPT | Mod: GC | Performed by: STUDENT IN AN ORGANIZED HEALTH CARE EDUCATION/TRAINING PROGRAM

## 2022-09-07 PROCEDURE — 99233 SBSQ HOSP IP/OBS HIGH 50: CPT | Mod: FS | Performed by: PHYSICIAN ASSISTANT

## 2022-09-07 PROCEDURE — 88187 FLOWCYTOMETRY/READ 2-8: CPT | Mod: GC | Performed by: PATHOLOGY

## 2022-09-07 PROCEDURE — 84132 ASSAY OF SERUM POTASSIUM: CPT | Performed by: PHYSICIAN ASSISTANT

## 2022-09-07 PROCEDURE — 85004 AUTOMATED DIFF WBC COUNT: CPT | Performed by: PHYSICIAN ASSISTANT

## 2022-09-07 PROCEDURE — 85610 PROTHROMBIN TIME: CPT | Performed by: PHYSICIAN ASSISTANT

## 2022-09-07 PROCEDURE — 84100 ASSAY OF PHOSPHORUS: CPT | Performed by: PHYSICIAN ASSISTANT

## 2022-09-07 PROCEDURE — 99207 PR NO BILLABLE SERVICE THIS VISIT: CPT | Performed by: PHYSICIAN ASSISTANT

## 2022-09-07 PROCEDURE — 89050 BODY FLUID CELL COUNT: CPT | Performed by: PHYSICIAN ASSISTANT

## 2022-09-07 PROCEDURE — 258N000003 HC RX IP 258 OP 636: Performed by: STUDENT IN AN ORGANIZED HEALTH CARE EDUCATION/TRAINING PROGRAM

## 2022-09-07 PROCEDURE — 250N000013 HC RX MED GY IP 250 OP 250 PS 637: Performed by: PHYSICIAN ASSISTANT

## 2022-09-07 PROCEDURE — 250N000013 HC RX MED GY IP 250 OP 250 PS 637: Performed by: STUDENT IN AN ORGANIZED HEALTH CARE EDUCATION/TRAINING PROGRAM

## 2022-09-07 PROCEDURE — 85384 FIBRINOGEN ACTIVITY: CPT | Performed by: PHYSICIAN ASSISTANT

## 2022-09-07 PROCEDURE — 83605 ASSAY OF LACTIC ACID: CPT | Performed by: INTERNAL MEDICINE

## 2022-09-07 PROCEDURE — 96450 CHEMOTHERAPY INTO CNS: CPT

## 2022-09-07 PROCEDURE — 250N000009 HC RX 250: Performed by: STUDENT IN AN ORGANIZED HEALTH CARE EDUCATION/TRAINING PROGRAM

## 2022-09-07 PROCEDURE — 120N000002 HC R&B MED SURG/OB UMMC

## 2022-09-07 PROCEDURE — 36592 COLLECT BLOOD FROM PICC: CPT | Performed by: PHYSICIAN ASSISTANT

## 2022-09-07 PROCEDURE — 36592 COLLECT BLOOD FROM PICC: CPT | Performed by: STUDENT IN AN ORGANIZED HEALTH CARE EDUCATION/TRAINING PROGRAM

## 2022-09-07 PROCEDURE — 82945 GLUCOSE OTHER FLUID: CPT | Performed by: PHYSICIAN ASSISTANT

## 2022-09-07 PROCEDURE — 87070 CULTURE OTHR SPECIMN AEROBIC: CPT | Performed by: PHYSICIAN ASSISTANT

## 2022-09-07 PROCEDURE — 250N000011 HC RX IP 250 OP 636: Performed by: PHYSICIAN ASSISTANT

## 2022-09-07 PROCEDURE — 88185 FLOWCYTOMETRY/TC ADD-ON: CPT | Performed by: PHYSICIAN ASSISTANT

## 2022-09-07 PROCEDURE — 84132 ASSAY OF SERUM POTASSIUM: CPT | Performed by: STUDENT IN AN ORGANIZED HEALTH CARE EDUCATION/TRAINING PROGRAM

## 2022-09-07 PROCEDURE — 36592 COLLECT BLOOD FROM PICC: CPT | Performed by: INTERNAL MEDICINE

## 2022-09-07 PROCEDURE — 250N000011 HC RX IP 250 OP 636: Performed by: INTERNAL MEDICINE

## 2022-09-07 PROCEDURE — 258N000003 HC RX IP 258 OP 636: Performed by: INTERNAL MEDICINE

## 2022-09-07 PROCEDURE — 77003 FLUOROGUIDE FOR SPINE INJECT: CPT | Mod: 26 | Performed by: STUDENT IN AN ORGANIZED HEALTH CARE EDUCATION/TRAINING PROGRAM

## 2022-09-07 PROCEDURE — 99356 PR PROLONGED SERV,INPATIENT,1ST HR: CPT | Mod: FS | Performed by: PHYSICIAN ASSISTANT

## 2022-09-07 RX ORDER — LORAZEPAM 0.5 MG/1
0.5 TABLET ORAL ONCE
Status: DISCONTINUED | OUTPATIENT
Start: 2022-09-07 | End: 2022-09-12 | Stop reason: CLARIF

## 2022-09-07 RX ORDER — MEPERIDINE HYDROCHLORIDE 25 MG/ML
25 INJECTION INTRAMUSCULAR; INTRAVENOUS; SUBCUTANEOUS EVERY 30 MIN PRN
Status: DISCONTINUED | OUTPATIENT
Start: 2022-09-07 | End: 2022-09-14 | Stop reason: HOSPADM

## 2022-09-07 RX ORDER — ALBUTEROL SULFATE 0.83 MG/ML
2.5 SOLUTION RESPIRATORY (INHALATION)
Status: DISCONTINUED | OUTPATIENT
Start: 2022-09-07 | End: 2022-09-14 | Stop reason: HOSPADM

## 2022-09-07 RX ORDER — EPINEPHRINE 1 MG/ML
0.3 INJECTION, SOLUTION, CONCENTRATE INTRAVENOUS EVERY 5 MIN PRN
Status: DISCONTINUED | OUTPATIENT
Start: 2022-09-07 | End: 2022-09-14 | Stop reason: HOSPADM

## 2022-09-07 RX ORDER — LIDOCAINE HYDROCHLORIDE 10 MG/ML
10 INJECTION, SOLUTION EPIDURAL; INFILTRATION; INTRACAUDAL; PERINEURAL ONCE
Status: COMPLETED | OUTPATIENT
Start: 2022-09-07 | End: 2022-09-07

## 2022-09-07 RX ORDER — DEXAMETHASONE 4 MG/1
40 TABLET ORAL DAILY
Status: COMPLETED | OUTPATIENT
Start: 2022-09-07 | End: 2022-09-08

## 2022-09-07 RX ORDER — DIPHENHYDRAMINE HYDROCHLORIDE 50 MG/ML
50 INJECTION INTRAMUSCULAR; INTRAVENOUS
Status: DISCONTINUED | OUTPATIENT
Start: 2022-09-07 | End: 2022-09-14 | Stop reason: HOSPADM

## 2022-09-07 RX ORDER — METHYLPREDNISOLONE SODIUM SUCCINATE 125 MG/2ML
125 INJECTION, POWDER, LYOPHILIZED, FOR SOLUTION INTRAMUSCULAR; INTRAVENOUS
Status: DISCONTINUED | OUTPATIENT
Start: 2022-09-07 | End: 2022-09-14 | Stop reason: HOSPADM

## 2022-09-07 RX ORDER — ALBUTEROL SULFATE 90 UG/1
1-2 AEROSOL, METERED RESPIRATORY (INHALATION)
Status: DISCONTINUED | OUTPATIENT
Start: 2022-09-07 | End: 2022-09-14 | Stop reason: HOSPADM

## 2022-09-07 RX ADMIN — ONDANSETRON HYDROCHLORIDE 8 MG: 4 TABLET, FILM COATED ORAL at 17:32

## 2022-09-07 RX ADMIN — ACYCLOVIR 400 MG: 400 TABLET ORAL at 19:17

## 2022-09-07 RX ADMIN — ACETAMINOPHEN 975 MG: 325 TABLET, FILM COATED ORAL at 17:33

## 2022-09-07 RX ADMIN — LEVOFLOXACIN 250 MG: 250 TABLET, FILM COATED ORAL at 14:28

## 2022-09-07 RX ADMIN — MICAFUNGIN 50 MG: 10 INJECTION, POWDER, LYOPHILIZED, FOR SOLUTION INTRAVENOUS at 12:28

## 2022-09-07 RX ADMIN — VINCRISTINE SULFATE 2 MG: 1 INJECTION, SOLUTION INTRAVENOUS at 14:31

## 2022-09-07 RX ADMIN — IMATINIB MESYLATE 400 MG: 400 TABLET, FILM COATED ORAL at 08:14

## 2022-09-07 RX ADMIN — Medication 5 ML: at 14:17

## 2022-09-07 RX ADMIN — LIDOCAINE HYDROCHLORIDE 10 ML: 10 INJECTION, SOLUTION EPIDURAL; INFILTRATION; INTRACAUDAL; PERINEURAL at 11:02

## 2022-09-07 RX ADMIN — IMATINIB MESYLATE 400 MG: 400 TABLET, FILM COATED ORAL at 19:34

## 2022-09-07 RX ADMIN — FILGRASTIM 480 MCG: 480 INJECTION, SOLUTION INTRAVENOUS; SUBCUTANEOUS at 19:17

## 2022-09-07 RX ADMIN — ACYCLOVIR 400 MG: 400 TABLET ORAL at 08:14

## 2022-09-07 RX ADMIN — LORAZEPAM 1 MG: 0.5 TABLET ORAL at 10:15

## 2022-09-07 RX ADMIN — Medication 5 ML: at 05:46

## 2022-09-07 RX ADMIN — Medication 6 MG: at 22:15

## 2022-09-07 RX ADMIN — CYTARABINE: 100 INJECTION INTRATHECAL; INTRAVENOUS; SUBCUTANEOUS at 11:31

## 2022-09-07 RX ADMIN — AMLODIPINE BESYLATE 10 MG: 10 TABLET ORAL at 08:14

## 2022-09-07 RX ADMIN — DEXAMETHASONE 40 MG: 4 TABLET ORAL at 14:29

## 2022-09-07 RX ADMIN — PANTOPRAZOLE SODIUM 40 MG: 40 TABLET, DELAYED RELEASE ORAL at 08:14

## 2022-09-07 ASSESSMENT — ACTIVITIES OF DAILY LIVING (ADL)
ADLS_ACUITY_SCORE: 22

## 2022-09-07 NOTE — PROGRESS NOTES
D/I:  Patient received Day 15 Vincristine by gravity via PICC with brisk blood return checked prior to infusion.  Premedications given as ordered.  Chemotherapy double checked by 2 chemo competent RNs.  Patient denies the need for further instruction.    A:  Patient experiencing numbness/tingling in hands (not new).      P:  Monitor patient for side effects of chemotherapy.  Continue with current plan of care.  Notify MD with any changes in patient's status.

## 2022-09-07 NOTE — PLAN OF CARE
[id number]:  Goal Outcome Evaluation:  Pt A&Ox4, VSS on RA. Denies pain/N/V. PICC heparin locked. AM plts 82, LP with IT chemo scheduled 1100. Finger numbness and tingling unchanged. Self reports good UOP. Sleep/rest promoted overnight. Continue to monitor and with POC.

## 2022-09-07 NOTE — PLAN OF CARE
[id number]  AVSS, alert and oriented x 4, denies pain/nausea.sob.  Potassium replaced, rechecked is 3.3 Given another po potasium pill and recheck in the AM lab.  Up independent to bathroom, voiding adequately, LBM yesterday.  Due for IV/IT chemo 9/7, plan for IT chemo in XR at 1100.  Continue to monitor care.

## 2022-09-07 NOTE — PROCEDURES
DIAGNOSIS: Ph+ B-ALL  PROCEDURE: Intrathecal chemo administration   LOCATION: Radiology  INDICATION:   Lumbar puncture performed and CSF samples collected by the General Radiology team under fluoroscopy.    Chemotherapy was double-checked by this writer and Radiology RN. This writer then administered cytarabine (PF) (CYTOSAR) 40 mg, hydrocortisone sodium succinate PF (solu-CORTEF) 40 mg, methotrexate (PF) 15 mg in sodium chloride (PF) 0.9% PF 6 mL intrathecal inj (PF) without apparent complication.  Labs ordered: flow, cell count, culture, glucose, protein  Complications: None immediately.   Chemo instillation performed by: GRANT Ren PA-C  Hematology/Oncology  Pager # [phone]

## 2022-09-07 NOTE — PLAN OF CARE
"Goal Outcome Evaluation:    Plan of Care Reviewed With: patient     Overall Patient Progress: no change       /71   Pulse 95   Temp 97.2  F (36.2  C) (Oral)   Resp 18   Ht 1.626 m (5' 4\")   Wt 113.1 kg (249 lb 6.4 oz)   SpO2 98%   BMI 42.81 kg/m      Pt day 15-see chemo note. Numbness and tingling continued in hands. Loose BM this morning, secondary to laxatives past few days. Senna held per pt request. Lumbar puncture performed at 1100 today. Ativan 1 mg given for LP premed. Vitals stable. Continue POC.     "

## 2022-09-07 NOTE — PLAN OF CARE
"Goal Outcome Evaluation:    Plan of Care Reviewed With: patient     Overall Patient Progress: improving    Time 3652-4271    /68 (BP Location: Right arm)   Pulse 103   Temp (!) 96.6  F (35.9  C) (Oral)   Resp 18   Ht 1.626 m (5' 4\")   Wt 113.1 kg (249 lb 6.4 oz)   SpO2 97%   BMI 42.81 kg/m      Reason for admission: Day 15 GRAALL + imatinib. LP w/ IT chemo done today at 11am, D15 vincristine  Activity: Independent   Pain: Tylenol for HA w/relief  Neuro: Numbness/tingling in fingers, MD aware  Cardiac: wnl  Respiratory: wnl  GI/: Urine output adequate, last BM 9/7  Diet: regular  Lines: PICC hep-locked   Wounds: LP chemo site bruised   Labs/imaging: Lactic acid 2.7      New changes this shift: Triggered sepsis-lactic 2.7 asymptomatic, recheck in the am     Plan:  Anticipate prolonged 4-6 week hospitalization for management of new leukemia      Continue to monitor and follow POC   "

## 2022-09-07 NOTE — CODE/RAPID RESPONSE
Rapid Response Team Note    Assessment   In assessment a rapid response was called on  due to SIRS/Sepsis trigger and lactic acidosis. This presentation is likely due to fluid volume deficit, chemotherapy, leukemia.    Plan   -  Encouraging PO fluid intake  -  Patient knows to update team with any new signs/symptoms of infection  -  Recheck lactic acid with AM labs    -  The Internal Medicine primary team was notified by RN.  -  Disposition: The patient will remain on the current unit. We will continue to monitor this patient closely.  -  Reassessment and plan follow-up will be performed by the primary team    LOAN Madrigal CNP  [facility] [place] [facility] Job Code Contact #[phone]  [facility] Paging/Directory    Hospital Course   Brief Summary of events leading to rapid response:   Patient admitted with ALL on chemotherapy s/p IT chemo today. SIRS protocol triggered by HR (102) and WBC (1.7) with resulting lactic acid of 2.7. Suspect secondary to multiple etiologies including leukemia, chemotherapy, fluid volume deficit.    Admission Diagnosis:   Acute leukemia (H) [C95.00]    Physical Exam   Temp: (!) 96.5  F (35.8  C) Temp  Min: 96.5  F (35.8  C)  Max: 98.6  F (37  C)  Resp: 18 Resp  Min: 18  Max: 20  SpO2: 97 % SpO2  Min: 97 %  Max: 99 %  Pulse: 102 Pulse  Min: 91  Max: 103    No data recorded  BP: 120/55 Systolic (24hrs), Av , Min:110 , Max:133   Diastolic (24hrs), Av, Min:55, Max:71     I/Os: I/O last 3 completed shifts:  In: 270 [P.O.:240; IV Piggyback:30]  Out: 850 [Urine:850]     Physical Exam  Vitals reviewed.   Constitutional:       General: She is not in acute distress.     Appearance: Normal appearance. She is not ill-appearing.   Cardiovascular:      Rate and Rhythm: Regular rhythm. Tachycardia present.      Heart sounds: No murmur heard.  Pulmonary:      Effort: Pulmonary effort is normal.      Breath sounds: Normal breath sounds.   Abdominal:      Palpations: Abdomen is soft.    Musculoskeletal:         General: No swelling.   Skin:     General: Skin is warm and dry.   Neurological:      Mental Status: She is alert.       Significant Results and Procedures   Lactic Acid:   Recent Labs   Lab Test 09/07/22  1812 09/06/22  1205 09/04/22  2018   LACT 2.7* 1.0 1.8     CBC:   Recent Labs   Lab Test 09/07/22  0559 09/06/22  0835 09/05/22  0749   WBC 1.7* 1.7* 2.0*   HGB 7.3* 8.1* 8.2*   HCT 22.4* 24.4* 24.6*   PLT 82* 60* 47*      Sepsis Evaluation   The patient is not known to have an infection.  NO EVIDENCE OF SEPSIS at this time.  Vital sign, physical exam, and lab findings are due to leukemia, fluid volume deficit, chemotherapy..

## 2022-09-07 NOTE — PROGRESS NOTES
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 Pre-phase steroids initiated (8/21-8/24), then initiated GRAALL + imatinib on 8/24.     TODAY:   - Day 15 GRAALL + imatinib. LP w/ IT chemo done today at 11am. Also given D15 vincristine, and will start neupogen daily today per the treatment protocol.   - continue full dose of vincristine, monitor for worsening neuropathy    HEME  # Ph+ B-ALL  Patient presented to outside hospital with c/o Right upper quadrant pain and was subsequently found to have a markedly elevated white blood cell count concerning for acute leukemia. While at OSH, she had CT PE protocol as well as CT A/P. These identified no PE, no acute or localized intraabdominal inflammatory process, mild splenomegaly, and few inconspicous low-density structures in the liver. She was transferred to [facility] for further workup and management.  - s/p Hydrea 1g TID (8/21-8/23) with improvement in WBC (100K ? 12K)  - CT CAP completed at OSH. CT neck 8/22 showed multiple, b/l suspicious-appearing LN (R level Ia, L level III, R level II-III) - not enlarged though they exhibit abnormal shapes.   - MR brain (8/21) - no acute IC pathology, no e/o intracranial CNS involvement.  - BMBx completed 8/20 - demonstrated B-ALL with 85% blasts and hypercellular marrow. IHC shows CD10+, CD34+. 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 Abnormal cytogenetics. For unknown reason, molecular studies were cancelled. Have reordered on PB today.  - Pt consented to HMTB; BMBx at dx was dry tap d/t WBC  burden. Ordered peripheral blood samples on 8/22.   - Initiated pre-phase steroids with Prednisone 60 mg x8/21; increased to 60 mg/m2 (140 mg) 8/22-8/24. Transitioned to protocol-directed Dex 40 mg on 8/25.   - Clonoseq ID testing to help estimate MRD in future assessments. Clonal testing collected and sent on 8/23. Plan to order Clonoseq MRD testing with post-induction BMBx.   - Case discussed at Heme malignancy conference on 8/23, per consensus plan was to start GRAALL + Imatinib on 8/24. No clear indication for addition of Rituximab.    - No e/o TLS, allopurinol discontinued x9/5  - BMT consult scheduled 9/12 @ 9am with Dr. Walsh    Treatment Plan: GRAALL + imatinib (D1=8/24/22)  - Imatinib 400 mg BID - D1-28  - Vincristine 2 mg IV x1 - D1, 8, 15, 22   - Dexamethasone 40 mg daily - D2 (held D1, instead given pre-phase pred as above), 8-9, 15-16, 22-23  - Neupogen 5 mcg/kg/day - beginning D15, until count recovery    *if plan is to discharge patient with this, will need to look into a PA - further assess in coming days      # CNS prophylaxis  Per the GRAALL protocol, and risk for ALL entering the CNS, patient requires weekly LP w/ triple IT chemo as detailed below. Due to unsuccessful attempt at bedside with the CAPS team on 8/25, will require all subsequent LPs under fluoroscopic guidance. Had required multiple units of platelets (3u total) prior to procedure to reach goal >50k.   - Triple IT chemo (Cytarabine, Solu-Cortef, MTX); D1, 8, 15.   -8/26 CSF flow negative  -8/31 CSF flow negative  -9/7 flow pending  - Pt expresses anxiety surrounding procedures; pre-medication with 0.5mg PO Ativan +/- Atarax available prior PRN    # Pancytopenia  2/2 to underlying disease  - Transfuse to maintain Hgb >7, Plt >10K (>50K prior to LPs)    # Chemotherapy-induced Grade 1 neuropathy (fingertips)  Patient was experiencing intermittent left hand numbness (x 8/28) and right hand numbness (noted first on 8/30 evening) but the  symptoms were not persistent and resolved on 8/31. On 9/4 she began experiencing numbness/tingling in her fingertips on both of her hands. This is not painful, nor has it affected her dexterity. Her feet have been spared.   - Given overall mild symptoms and goal to achieve CR for transplant, will continue full dose vincristine with Day 15, but if worsening will reassess with next dose on Day 22    ID  # PPx  -  mg BID  - Nebulized Pentamidine q28d for PJP ppx, last dose given 8/29. Next due ~9/27  - Levaquin 250 mg daily   - Maude 50 mg daily  - Continuing Micafungin while inpatient through completion of scheduled chemotherapy. If ANC <1000 and pt discharging home then plan would be to transition to fluconazole.      # H/o COVID-19 infection   Not vaccinated (due to fear of needles per pt report). Pt reported testing postiive for COVID approx 1 month prior to admission via home test. She never had it confirmed.   - COVID testing on admission was negative.     CV  # Hypertension  Fairly persistent elevated BP since admission to 150-160/80-90s. She was not on anti-hypertensive medication PTA.   - started Amlodipine 5 mg daily (8/21-8/23); increased to 10 mg daily (x8/23) - hold parameters in place if SBP<110  - prn Hydralazine 10-20 mg available q6h for SBP>160     GI  # Indigestion/GERD  - Continue protonix 40mg once daily    - TUMs PRN, Maalox PRN.     NEURO  # Headaches, improved  Pt endorsed intermittent HA since admission. Denies vision changes or other neurological sx. Has h/o HTN as above. CSF flow cytometry (8/25) negative for involvement by ALL.  - MRI brain completed 8/21 was negative for intracranial pathology or tumor involvement.   - Tylenol PRN  - Continue weekly LPs x3 per GRAALL protocol      FEN  - No mIVF: bolus PRN.   - Lyte repletion per standing protocol  - Regular diet as tolerated     MISC  - Lines/Drains: PICC in place, remove on discharge  - DVT ppx: no pharmacologic ppx given TCP  - GI  ppx: PPI, TUMs PRN, Maalox PRN.   - Consults: PT  - CODE: FULL  - DISPO: Anticipate prolonged 4-6 week hospitalization for management of new leukemia.   - Follow-up/Referrals: TBD. Dr. Hull has agreed to continue to follow pt in clinic. 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 Reviewed by pt and this provider faxed the paperwork on 9/2.     Patient and plan of care was discussed with attending physician Dr. Alverto Rivers PA-C  Hematology/Oncology  Pager #[phone] or available on [employer]    I spent 40 minutes in the care of this patient today, which included time necessary for review of interval events, obtaining history and physical exam, ordering medication(s)/test(s) as medically indicated, discussion with interdisciplinary/consult team(s), and documentation time. Over 50% of time was spent face-to-face and/or coordinating care.      Interval History   No acute events overnight, afebrile.   April continues to feel well. Noting persistent bilateral fingertip/hand tingling and numbness. No pain or tingling in her toes. This does not affect her dexterity. Denies nausea or abdominal discomfort. Appetite is still OK. Having regular BMs. Walking in the hallways.   A comprehensive review of systems was reviewed with the patient and the pertinent positives are listed in the HPI above.      Physical Exam   Temp: 98  F (36.7  C) Temp src: Oral BP: 133/60 Pulse: 96   Resp: 18 SpO2: 99 % O2 Device: None (Room air)    Vitals:    09/04/22 1057 09/05/22 0900 09/06/22 1018   Weight: 111.5 kg (245 lb 13 oz) 111.8 kg (246 lb 6.4 oz) 112.7 kg (248 lb 6.4 oz)     Vital Signs with Ranges  Temp:  [96  F (35.6  C)-98.6  F (37  C)] 98  F (36.7  C)  Pulse:  [] 96  Resp:   [18-20] 18  BP: (111-133)/() 133/60  SpO2:  [97 %-99 %] 99 %  I/O last 3 completed shifts:  In: 240 [P.O.:240]  Out: 1200 [Urine:1200]    Constitutional: Awake and conversational. Non- toxic appearing. No acute distress.   HEENT: Normocephalic, atraumatic . Sclerae anicteric. EOM intact. Moist mucus membranes   Respiratory: Breathing comfortable with no increased work on room air. No signs of respiratory distress or accessory muscle use. Lungs clear to auscultation bilaterally, no crackles or wheezing noted.  Cardiovascular: Regular rate and rhythm. Normal S1 and S2.   GI: Abdomen is soft, non-distended, non-tender and without distension. Bowel sounds present and normoactive.   Skin: Skin is clean, dry, intact. No jaundice appreciated.   Musculoskeletal/ Extremities: No redness, warmth, or swelling of the joints appreciated.   Neurologic: Alert and oriented. Speech normal. Grossly nonfocal. Memory and thought process preserved.  Neuropsychiatric: Calm, affect congruent to situation. Appropriate mood and affect. Good judgment and insight. No visual/auditory hallucinations.       Medications     - MEDICATION INSTRUCTIONS -         acyclovir  400 mg Oral BID     albuterol  2.5 mg Nebulization Q28 Days    And     pentamidine  300 mg Inhalation Q28 Days     amLODIPine  10 mg Oral Daily     heparin lock flush  5-20 mL Intracatheter Q24H     imatinib  400 mg Oral BID     levofloxacin  250 mg Oral Q24H     lidocaine (PF)  10 mL Subcutaneous Once     micafungin  50 mg Intravenous Q24H     pantoprazole  40 mg Oral QAM AC     senna-docusate  1-2 tablet Oral BID     sodium chloride (PF)  10-40 mL Intracatheter Q8H     Data   Results for orders placed or performed during the hospital encounter of 08/19/22 (from the past 24 hour(s))   CBC with platelets differential    Narrative    The following orders were created for panel order CBC with platelets differential.  Procedure                               Abnormality          Status                     ---------                               -----------         ------                     CBC with platelets and d...[[id number]]  Abnormal            Final result               RBC and Platelet Morphology[[id number]]                      Final result                 Please view results for these tests on the individual orders.   Phosphorus   Result Value Ref Range    Phosphorus 3.0 2.5 - 4.5 mg/dL   Basic metabolic panel   Result Value Ref Range    Creatinine 0.67 0.51 - 0.95 mg/dL    Sodium 138 136 - 145 mmol/L    Potassium 3.4 3.4 - 5.3 mmol/L    Urea Nitrogen 13.2 6.0 - 20.0 mg/dL    Chloride 107 98 - 107 mmol/L    Carbon Dioxide (CO2) 25 22 - 29 mmol/L    Anion Gap 6 (L) 7 - 15 mmol/L    Glucose 101 (H) 70 - 99 mg/dL    GFR Estimate >90 >60 mL/min/1.73m2    Calcium 8.0 (L) 8.6 - 10.0 mg/dL   CBC with platelets and differential   Result Value Ref Range    WBC Count 1.7 (L) 4.0 - 11.0 10e3/uL    RBC Count 2.74 (L) 3.80 - 5.20 10e6/uL    Hemoglobin 8.1 (L) 11.7 - 15.7 g/dL    Hematocrit 24.4 (L) 35.0 - 47.0 %    MCV 89 78 - 100 fL    MCH 29.6 26.5 - 33.0 pg    MCHC 33.2 31.5 - 36.5 g/dL    RDW 13.5 10.0 - 15.0 %    Platelet Count 60 (L) 150 - 450 10e3/uL    % Neutrophils 18 %    % Lymphocytes 77 %    % Monocytes 3 %    % Eosinophils 2 %    % Basophils 0 %    % Immature Granulocytes 0 %    NRBCs per 100 WBC 0 <1 /100    Absolute Neutrophils 0.3 (LL) 1.6 - 8.3 10e3/uL    Absolute Lymphocytes 1.3 0.8 - 5.3 10e3/uL    Absolute Monocytes 0.1 0.0 - 1.3 10e3/uL    Absolute Eosinophils 0.0 0.0 - 0.7 10e3/uL    Absolute Basophils 0.0 0.0 - 0.2 10e3/uL    Absolute Immature Granulocytes 0.0 <=0.4 10e3/uL    Absolute NRBCs 0.0 10e3/uL   RBC and Platelet Morphology   Result Value Ref Range    Platelet Assessment  Automated Count Confirmed. Platelet morphology is normal.     Automated Count Confirmed. Platelet morphology is normal.    RBC Morphology Confirmed RBC Indices    Lactic Acid STAT   Result Value  Ref Range    Lactic Acid 1.0 0.7 - 2.0 mmol/L   Potassium   Result Value Ref Range    Potassium 3.3 (L) 3.4 - 5.3 mmol/L   Potassium   Result Value Ref Range    Potassium 4.1 3.4 - 5.3 mmol/L   CBC with platelets differential    Narrative    The following orders were created for panel order CBC with platelets differential.  Procedure                               Abnormality         Status                     ---------                               -----------         ------                     CBC with platelets and d...[[id number]]  Abnormal            Final result               RBC and Platelet Morphology[[id number]]                      Final result                 Please view results for these tests on the individual orders.   Fibrinogen activity   Result Value Ref Range    Fibrinogen Activity 244 170 - 490 mg/dL   INR   Result Value Ref Range    INR 1.04 0.85 - 1.15   CBC with platelets and differential   Result Value Ref Range    WBC Count 1.7 (L) 4.0 - 11.0 10e3/uL    RBC Count 2.47 (L) 3.80 - 5.20 10e6/uL    Hemoglobin 7.3 (L) 11.7 - 15.7 g/dL    Hematocrit 22.4 (L) 35.0 - 47.0 %    MCV 91 78 - 100 fL    MCH 29.6 26.5 - 33.0 pg    MCHC 32.6 31.5 - 36.5 g/dL    RDW 13.5 10.0 - 15.0 %    Platelet Count 82 (L) 150 - 450 10e3/uL    % Neutrophils 21 %    % Lymphocytes 71 %    % Monocytes 5 %    % Eosinophils 3 %    % Basophils 0 %    % Immature Granulocytes 0 %    NRBCs per 100 WBC 0 <1 /100    Absolute Neutrophils 0.4 (LL) 1.6 - 8.3 10e3/uL    Absolute Lymphocytes 1.2 0.8 - 5.3 10e3/uL    Absolute Monocytes 0.1 0.0 - 1.3 10e3/uL    Absolute Eosinophils 0.1 0.0 - 0.7 10e3/uL    Absolute Basophils 0.0 0.0 - 0.2 10e3/uL    Absolute Immature Granulocytes 0.0 <=0.4 10e3/uL    Absolute NRBCs 0.0 10e3/uL   RBC and Platelet Morphology   Result Value Ref Range    Platelet Assessment  Automated Count Confirmed. Platelet morphology is normal.     Automated Count Confirmed. Platelet morphology is normal.    RBC Morphology  Confirmed RBC Indices

## 2022-09-08 LAB
ANION GAP SERPL CALCULATED.3IONS-SCNC: 11 MMOL/L (ref 7–15)
APPEARANCE CSF: CLEAR
BASOPHILS # BLD AUTO: 0 10E3/UL (ref 0–0.2)
BASOPHILS NFR BLD AUTO: 0 %
BUN SERPL-MCNC: 14.1 MG/DL (ref 6–20)
CALCIUM SERPL-MCNC: 8.7 MG/DL (ref 8.6–10)
CHLORIDE SERPL-SCNC: 104 MMOL/L (ref 98–107)
COLOR CSF: COLORLESS
CREAT SERPL-MCNC: 0.6 MG/DL (ref 0.51–0.95)
DEPRECATED HCO3 PLAS-SCNC: 22 MMOL/L (ref 22–29)
EOSINOPHIL # BLD AUTO: 0 10E3/UL (ref 0–0.7)
EOSINOPHIL NFR BLD AUTO: 0 %
ERYTHROCYTE [DISTWIDTH] IN BLOOD BY AUTOMATED COUNT: 13.4 % (ref 10–15)
GFR SERPL CREATININE-BSD FRML MDRD: >90 ML/MIN/1.73M2
GLUCOSE SERPL-MCNC: 205 MG/DL (ref 70–99)
HCT VFR BLD AUTO: 25.1 % (ref 35–47)
HGB BLD-MCNC: 8.4 G/DL (ref 11.7–15.7)
IMM GRANULOCYTES # BLD: 0 10E3/UL
IMM GRANULOCYTES NFR BLD: 0 %
LACTATE SERPL-SCNC: 3.2 MMOL/L (ref 0.7–2)
LDH SERPL L TO P-CCNC: 219 U/L (ref 0–250)
LYMPHOCYTES # BLD AUTO: 0.2 10E3/UL (ref 0.8–5.3)
LYMPHOCYTES NFR BLD AUTO: 8 %
MAGNESIUM SERPL-MCNC: 1.8 MG/DL (ref 1.7–2.3)
MCH RBC QN AUTO: 29.6 PG (ref 26.5–33)
MCHC RBC AUTO-ENTMCNC: 33.5 G/DL (ref 31.5–36.5)
MCV RBC AUTO: 88 FL (ref 78–100)
MONOCYTES # BLD AUTO: 0.1 10E3/UL (ref 0–1.3)
MONOCYTES NFR BLD AUTO: 2 %
NEUTROPHILS # BLD AUTO: 2.2 10E3/UL (ref 1.6–8.3)
NEUTROPHILS NFR BLD AUTO: 90 %
NRBC # BLD AUTO: 0 10E3/UL
NRBC BLD AUTO-RTO: 0 /100
PATH REPORT.COMMENTS IMP SPEC: NORMAL
PATH REPORT.FINAL DX SPEC: NORMAL
PATH REPORT.MICROSCOPIC SPEC OTHER STN: NORMAL
PATH REPORT.RELEVANT HX SPEC: NORMAL
PATH REV: ABNORMAL
PHOSPHATE SERPL-MCNC: 3.4 MG/DL (ref 2.5–4.5)
PLAT MORPH BLD: ABNORMAL
PLATELET # BLD AUTO: 143 10E3/UL (ref 150–450)
POLYCHROMASIA BLD QL SMEAR: SLIGHT
POTASSIUM SERPL-SCNC: 3.9 MMOL/L (ref 3.4–5.3)
RBC # BLD AUTO: 2.84 10E6/UL (ref 3.8–5.2)
RBC # CSF MANUAL: 15 /UL (ref 0–2)
RBC MORPH BLD: ABNORMAL
SCANNED LAB RESULT: NORMAL
SODIUM SERPL-SCNC: 137 MMOL/L (ref 136–145)
TUBE # CSF: 4
URATE SERPL-MCNC: 2.1 MG/DL (ref 2.4–5.7)
WBC # BLD AUTO: 2.5 10E3/UL (ref 4–11)
WBC # CSF MANUAL: 0 /UL (ref 0–5)

## 2022-09-08 PROCEDURE — 84100 ASSAY OF PHOSPHORUS: CPT | Performed by: PHYSICIAN ASSISTANT

## 2022-09-08 PROCEDURE — 82310 ASSAY OF CALCIUM: CPT | Performed by: PHYSICIAN ASSISTANT

## 2022-09-08 PROCEDURE — 83605 ASSAY OF LACTIC ACID: CPT | Performed by: NURSE PRACTITIONER

## 2022-09-08 PROCEDURE — 83735 ASSAY OF MAGNESIUM: CPT | Performed by: INTERNAL MEDICINE

## 2022-09-08 PROCEDURE — 36592 COLLECT BLOOD FROM PICC: CPT | Performed by: NURSE PRACTITIONER

## 2022-09-08 PROCEDURE — 83615 LACTATE (LD) (LDH) ENZYME: CPT | Performed by: PHYSICIAN ASSISTANT

## 2022-09-08 PROCEDURE — 99233 SBSQ HOSP IP/OBS HIGH 50: CPT | Mod: FS | Performed by: PHYSICIAN ASSISTANT

## 2022-09-08 PROCEDURE — 258N000003 HC RX IP 258 OP 636: Performed by: PHYSICIAN ASSISTANT

## 2022-09-08 PROCEDURE — 250N000013 HC RX MED GY IP 250 OP 250 PS 637: Performed by: PHYSICIAN ASSISTANT

## 2022-09-08 PROCEDURE — 250N000011 HC RX IP 250 OP 636: Performed by: STUDENT IN AN ORGANIZED HEALTH CARE EDUCATION/TRAINING PROGRAM

## 2022-09-08 PROCEDURE — 84550 ASSAY OF BLOOD/URIC ACID: CPT | Performed by: PHYSICIAN ASSISTANT

## 2022-09-08 PROCEDURE — 250N000013 HC RX MED GY IP 250 OP 250 PS 637: Performed by: STUDENT IN AN ORGANIZED HEALTH CARE EDUCATION/TRAINING PROGRAM

## 2022-09-08 PROCEDURE — 250N000011 HC RX IP 250 OP 636: Performed by: INTERNAL MEDICINE

## 2022-09-08 PROCEDURE — 99356 PR PROLONGED SERV,INPATIENT,1ST HR: CPT | Mod: FS | Performed by: PHYSICIAN ASSISTANT

## 2022-09-08 PROCEDURE — 85025 COMPLETE CBC W/AUTO DIFF WBC: CPT | Performed by: PHYSICIAN ASSISTANT

## 2022-09-08 PROCEDURE — 250N000013 HC RX MED GY IP 250 OP 250 PS 637: Performed by: INTERNAL MEDICINE

## 2022-09-08 PROCEDURE — 120N000002 HC R&B MED SURG/OB UMMC

## 2022-09-08 PROCEDURE — 250N000011 HC RX IP 250 OP 636: Performed by: PHYSICIAN ASSISTANT

## 2022-09-08 PROCEDURE — 258N000003 HC RX IP 258 OP 636: Performed by: STUDENT IN AN ORGANIZED HEALTH CARE EDUCATION/TRAINING PROGRAM

## 2022-09-08 RX ORDER — DEXTROSE MONOHYDRATE 50 MG/ML
10-20 INJECTION, SOLUTION INTRAVENOUS
Status: DISCONTINUED | OUTPATIENT
Start: 2022-09-08 | End: 2022-09-08

## 2022-09-08 RX ORDER — DEXTROSE MONOHYDRATE 50 MG/ML
10-20 INJECTION, SOLUTION INTRAVENOUS DAILY PRN
Status: DISCONTINUED | OUTPATIENT
Start: 2022-09-08 | End: 2022-09-14 | Stop reason: HOSPADM

## 2022-09-08 RX ADMIN — AMLODIPINE BESYLATE 10 MG: 10 TABLET ORAL at 08:09

## 2022-09-08 RX ADMIN — PANTOPRAZOLE SODIUM 40 MG: 40 TABLET, DELAYED RELEASE ORAL at 08:09

## 2022-09-08 RX ADMIN — IMATINIB MESYLATE 400 MG: 400 TABLET, FILM COATED ORAL at 08:10

## 2022-09-08 RX ADMIN — Medication 3 MG: at 21:33

## 2022-09-08 RX ADMIN — MICAFUNGIN 50 MG: 10 INJECTION, POWDER, LYOPHILIZED, FOR SOLUTION INTRAVENOUS at 12:32

## 2022-09-08 RX ADMIN — LEVOFLOXACIN 250 MG: 250 TABLET, FILM COATED ORAL at 13:52

## 2022-09-08 RX ADMIN — SODIUM CHLORIDE 500 ML: 9 INJECTION, SOLUTION INTRAVENOUS at 10:28

## 2022-09-08 RX ADMIN — IMATINIB MESYLATE 400 MG: 400 TABLET, FILM COATED ORAL at 20:19

## 2022-09-08 RX ADMIN — ACYCLOVIR 400 MG: 400 TABLET ORAL at 20:19

## 2022-09-08 RX ADMIN — Medication 5 ML: at 08:23

## 2022-09-08 RX ADMIN — ACYCLOVIR 400 MG: 400 TABLET ORAL at 08:09

## 2022-09-08 RX ADMIN — Medication 5 ML: at 13:52

## 2022-09-08 RX ADMIN — SENNOSIDES AND DOCUSATE SODIUM 1 TABLET: 8.6; 5 TABLET ORAL at 20:21

## 2022-09-08 RX ADMIN — DEXAMETHASONE 40 MG: 4 TABLET ORAL at 08:09

## 2022-09-08 ASSESSMENT — ACTIVITIES OF DAILY LIVING (ADL)
ADLS_ACUITY_SCORE: 22

## 2022-09-08 NOTE — PROVIDER NOTIFICATION
09/07/22 1800   Call Information   Date of Call 09/07/22   Time of Call 1836   Name of person requesting the team Cherie   Title of person requesting team RN   RRT Arrival time 1838   Time RRT ended 1854   Reason for call   Type of RRT Adult   Primary reason for call Sepsis suspected   Sepsis Suspected Elevated Lactate level   Was patient transferred from the ED, ICU, or PACU within last 24 hours prior to RRT call? No   SBAR   Situation LA 2.7   Background Diagnosed this hospital stay with acute leukemia, ALL, being treated for ALL. Was admitted for RUQ pain and headache.   Notable History/Conditions Cancer   Assessment VSS. HR was elevated with abnormal WBC's.   Interventions No interventions   Patient Outcome   Patient Outcome Stabilized on unit   RRT Team   Attending/Primary/Covering Physician Heme Malignancy   Date Attending Physician notified 09/07/22   Time Attending Physician notified 1836   Physician(s) Candace Franklin NP   Lead RN Bailey Gonzalez   RT NA   Post RRT Intervention Assessment   Post RRT Assessment Stable/Improved   Date Follow Up Done 09/07/22   Time Follow Up Done 2100   Comments Will continue to monitor for morning lactic acid result

## 2022-09-08 NOTE — PLAN OF CARE
Goal Outcome Evaluation:    Plan of Care Reviewed With: patient     Overall Patient Progress: no change     [id number]  Status: admitted 8/19 with new ALL.   Vitals: VSS. Tachy up to 110s at times. Sating 100% on RA  Neuros: A&Ox4. Tingling in fingers.   IV: L DL PICC HL'd between meds.   Labs/Electrolytes: electrolytes WNL. Lactic acid last night was 2.7, recheck this am was 3.2, 500ml NS bolus. ANC 2.2, order for neupogen if ANC below 1.2.   Diet: tolerating regular diet  Bowel status: LBM yesterday  : voiding. Has menses. Instructed pt to use pads and notify staff if bleeding is more than her usual or if she uses more than 1 pad per hour  Skin: LP site on back.   Pain: denies  Activity: independent  Social:  visited today  Plan: next LP with IT chemo scheduled 9/14

## 2022-09-08 NOTE — PROGRESS NOTES
[facility]  Hematology / Oncology Progress Note    Date of Admission: 8/19/2022  Date of Service (when I saw the patient): 09/08/2022     Assessment & Plan   Vashti Villegas is a 42 year old female who was admitted on 8/19/2022 with hyperleukocytosis (WBC 109K), abdominal pain and 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 Pre-phase steroids initiated (8/21-8/24), then initiated GRAALL + imatinib on 8/24.     TODAY:   - Day 16 GRAALL + imatinib. Continue neupogen per protocol daily PRN if ANC ? 1.2  - 500NS ordered for LA 3.2. No e/o infection otherwise, monitor off of antibiotics. If febrile will obtain BCx, UA/UCx, CXR and start broad-spectrum antibiotics.   - flow pending from LP done 9/7  - Plt are recovered, 143 today. Plan to start ppx lovenox 9/9    HEME  # Ph+ B-ALL  Patient presented to outside hospital with c/o Right upper quadrant pain and was subsequently found to have a markedly elevated white blood cell count concerning for acute leukemia. While at OSH, she had CT PE protocol as well as CT A/P. These identified no PE, no acute or localized intraabdominal inflammatory process, mild splenomegaly, and few inconspicous low-density structures in the liver. She was transferred to [facility] for further workup and management.  - s/p Hydrea 1g TID (8/21-8/23) with improvement in WBC (100K ? 12K)  - CT CAP completed at OSH. CT neck 8/22 showed multiple, b/l suspicious-appearing LN (R level Ia, L level III, R level II-III) - not enlarged though they exhibit abnormal shapes.   - MR brain (8/21) - no acute IC pathology, no e/o intracranial CNS involvement.  - BMBx completed 8/20 - demonstrated B-ALL with 85% blasts and hypercellular marrow. IHC shows CD10+, CD34+. 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 Abnormal cytogenetics. For unknown reason,  molecular studies were cancelled. Have reordered on PB today.  - Pt consented to HMTB; BMBx at dx was dry tap d/t WBC burden. Ordered peripheral blood samples on 8/22.   - Initiated pre-phase steroids with Prednisone 60 mg x8/21; increased to 60 mg/m2 (140 mg) 8/22-8/24. Transitioned to protocol-directed Dex 40 mg on 8/25.   - Clonoseq ID testing to help estimate MRD in future assessments. Clonal testing collected and sent on 8/23. Plan to order Clonoseq MRD testing with post-induction BMBx.   - Case discussed at Heme malignancy conference on 8/23, per consensus plan was to start GRAALL + Imatinib on 8/24. No clear indication for addition of Rituximab.    - No e/o TLS, allopurinol discontinued x9/5  - BMT consult scheduled 9/12 @ 9am with Dr. Walsh    Treatment Plan: GRAALL + imatinib (D1=8/24/22)  - Imatinib 400 mg BID - D1-28  - Vincristine 2 mg IV x1 - D1, 8, 15, 22   - Dexamethasone 40 mg daily - D2 (held D1, instead given pre-phase pred as above), 8-9, 15-16, 22-23  - Neupogen 5 mcg/kg/day - beginning D15, daily PRN if ANC ? 1.2    *if plan is to discharge patient with this, will need to look into a PA - further assess in coming days      # CNS prophylaxis  Per the GRAALL protocol, and risk for ALL entering the CNS, patient requires weekly LP w/ triple IT chemo as detailed below. Due to unsuccessful attempt at bedside with the CAPS team on 8/25, will require all subsequent LPs under fluoroscopic guidance. Had required multiple units of platelets (3u total) prior to procedure to reach goal >50k.   - Triple IT chemo (Cytarabine, Solu-Cortef, MTX); D1, 8, 15.   -8/26 CSF flow negative  -8/31 CSF flow negative  -9/7 flow pending  -9/14 LP scheduled in XR at 11am (will need orders for labs and IT chemo prior)  - Pt expresses anxiety surrounding procedures; pre-medication with 0.5mg PO Ativan +/- Atarax available prior PRN    # Pancytopenia  2/2 to underlying disease  - Transfuse to maintain Hgb >7, Plt >10K (>50K  prior to LPs)    # Chemotherapy-induced Grade 1 neuropathy (fingertips)  Patient was experiencing intermittent left hand numbness (x 8/28) and right hand numbness (noted first on 8/30 evening) but the symptoms were not persistent and resolved on 8/31. On 9/4 she began experiencing numbness/tingling in her fingertips on both of her hands. This is not painful, nor has it affected her dexterity. Her feet have been spared.   - Given overall mild symptoms and goal to achieve CR for transplant, will continue full dose vincristine with Day 15, but if worsening will reassess with next dose on Day 22    ID  # Lactic acidosis  Pt triggering lactic acid protocol for tachycardia intermittently. Afebrile, no infectious symptoms. She is drinking about 1-2 pitchers of water daily. Suspect related to leukemia though could have a component of dehydration.   - Encouraged PO fluids and will bolus IVF PRN  - LA 3.2 (9/8), ordered 500cc NS    # PPx  -  mg BID  - Nebulized Pentamidine q28d for PJP ppx, last dose given 8/29. Next due ~9/27  - Levaquin 250 mg daily   - Maude 50 mg daily  - Continuing Micafungin while inpatient through completion of scheduled chemotherapy. If ANC <1000 and pt discharging home then plan would be to transition to fluconazole.      # H/o COVID-19 infection   Not vaccinated (due to fear of needles per pt report). Pt reported testing postiive for COVID approx 1 month prior to admission via home test. She never had it confirmed.   - COVID testing on admission was negative.     CV  # Hypertension  Fairly persistent elevated BP since admission to 150-160/80-90s. She was not on anti-hypertensive medication PTA.   - started Amlodipine 5 mg daily (8/21-8/23); increased to 10 mg daily (x8/23) - hold parameters in place if SBP<110  - prn Hydralazine 10-20 mg available q6h for SBP>160     GI  # Indigestion/GERD  - Continue protonix 40mg once daily    - TUMs PRN, Maalox PRN.     NEURO  # Headaches, improved  Pt  endorsed intermittent HA since admission. Denies vision changes or other neurological sx. Has h/o HTN as above. CSF flow cytometry (8/25) negative for involvement by ALL.  - MRI brain completed 8/21 was negative for intracranial pathology or tumor involvement.   - Tylenol PRN  - Continue weekly LPs x3 per GRAALL protocol  - patient had a mild headache after D15 LP, quickly resolved with tylenol. Encouraged fluids and will continue to monitor. If worsening, will manage conservatively with IVF, caffeine/tylenol, and supine position but if persistent could consider a blood patch.     GYN  # Menstruation  Pt actively menstruating as of 9/8. She reports that the amount of bleeding is normal for her and her hgb remains stable. Suppressing menses is typically indicated in patients undergoing induction chemotherapy given risk of thrombocytopenia and associated bleeding, however her platelets are recovered/near normal at 143 which makes her risk of bleeding less.   - consider low-dose progresterone pill if bleeding worsens or platelets are dropping  - do not use tampons given neutropenia, sanitary pads only    FEN  - No mIVF: bolus PRN.   - Lyte repletion per standing protocol  - Regular diet as tolerated     MISC  - Lines/Drains: PICC in place, remove on discharge  - DVT ppx: no pharmacologic ppx given TCP  - GI ppx: PPI, TUMs PRN, Maalox PRN.   - Consults: PT  - CODE: FULL  - DISPO: Anticipate prolonged 4-6 week hospitalization for management of new leukemia.   - Follow-up/Referrals: TBD. Dr. Hull has agreed to continue to follow pt in clinic. 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 Reviewed by pt and this provider faxed the paperwork on 9/2.     Patient and plan of care  was discussed with attending physician Dr. Alverto Rivers PA-C  Hematology/Oncology  Pager #[phone] or available on [employer]    I spent 40 minutes in the care of this patient today, which included time necessary for review of interval events, obtaining history and physical exam, ordering medication(s)/test(s) as medically indicated, discussion with interdisciplinary/consult team(s), and documentation time. Over 50% of time was spent face-to-face and/or coordinating care.      Interval History   No acute events overnight, afebrile. April continues to feel well. Noting persistent bilateral fingertip/hand tingling and numbness. The symptoms seem to be spreading up to her MCP. No pain or tingling in her toes. This does not affect her dexterity. Denies nausea or abdominal discomfort. Appetite is still OK. Having regular BMs. Walking in the hallways. Menstrual cycle started yesterday, amount of bleed is normal. She had a little headache last night, resolved with tylenol x1. A comprehensive review of systems was reviewed with the patient and the pertinent positives are listed in the HPI above.      Physical Exam   Temp: 98  F (36.7  C) Temp src: Oral BP: 123/65 Pulse: 99   Resp: 16 SpO2: 97 % O2 Device: None (Room air)    Vitals:    09/06/22 1018 09/07/22 0811 09/08/22 0811   Weight: 112.7 kg (248 lb 6.4 oz) 113.1 kg (249 lb 6.4 oz) 111.1 kg (244 lb 14.9 oz)     Vital Signs with Ranges  Temp:  [96.1  F (35.6  C)-98  F (36.7  C)] 98  F (36.7  C)  Pulse:  [] 99  Resp:  [16-18] 16  BP: (111-132)/(55-71) 123/65  SpO2:  [96 %-100 %] 97 %  I/O last 3 completed shifts:  In: 30 [IV Piggyback:30]  Out: 1300 [Urine:1300]    Constitutional: Awake and conversational. Non- toxic appearing. No acute distress.   HEENT: Normocephalic, atraumatic . Sclerae anicteric. EOM intact. Moist mucus membranes   Respiratory: Breathing comfortable with no increased work on room air. No signs of respiratory distress or accessory muscle  use. Lungs clear to auscultation bilaterally, no crackles or wheezing noted.  Cardiovascular: Regular rate and rhythm. Normal S1 and S2.   GI: Abdomen is soft, non-distended, non-tender and without distension. Bowel sounds present and normoactive.   Skin: Skin is clean, dry, intact. No jaundice appreciated.   Musculoskeletal/ Extremities: No redness, warmth, or swelling of the joints appreciated.   Neurologic: Alert and oriented. Speech normal. Grossly nonfocal. Memory and thought process preserved.  Neuropsychiatric: Calm, affect congruent to situation. Appropriate mood and affect. Good judgment and insight. No visual/auditory hallucinations.       Medications     - MEDICATION INSTRUCTIONS -         sodium chloride 0.9%  500 mL Intravenous Once     acyclovir  400 mg Oral BID     albuterol  2.5 mg Nebulization Q28 Days    And     pentamidine  300 mg Inhalation Q28 Days     amLODIPine  10 mg Oral Daily     dextrose 5% water  10-20 mL Intravenous Daily at 8 pm    And     filgrastim  5 mcg/kg Intravenous Daily at 8 pm    And     dextrose 5% water  10-20 mL Intravenous Daily at 8 pm     heparin lock flush  5-20 mL Intracatheter Q24H     imatinib  400 mg Oral BID     levofloxacin  250 mg Oral Q24H     lidocaine (PF)  10 mL Subcutaneous Once     LORazepam  0.5 mg Oral Once     micafungin  50 mg Intravenous Q24H     pantoprazole  40 mg Oral QAM AC     senna-docusate  1-2 tablet Oral BID     sodium chloride (PF)  10-40 mL Intracatheter Q8H     Data   Results for orders placed or performed during the hospital encounter of 08/19/22 (from the past 24 hour(s))   Glucose CSF:   Result Value Ref Range    Glucose CSF 57 40 - 70 mg/dL    Narrative    CSF glucose concentrations are about 60 percent of normal plasma glucose.   Protein total CSF:   Result Value Ref Range    Protein total CSF 24.9 15.0 - 45.0 mg/dL   Cerebrospinal fluid Aerobic Bacterial Culture Routine with Gram Stain    Specimen: Lumbar Puncture; Cerebrospinal fluid    Result Value Ref Range    Culture No growth, less than 1 day     Gram Stain Result No organisms seen     Gram Stain Result 1+ WBC seen     Narrative    Gram Stain quantification of host cells and microbiological organisms was done on a cytocentrifuged preparation.     CSF Cell Count with Differential:    Narrative    The following orders were created for panel order CSF Cell Count with Differential:.  Procedure                               Abnormality         Status                     ---------                               -----------         ------                     Cell Count CSF[[id number]]               Abnormal            Preliminary result           Please view results for these tests on the individual orders.   Cell Count CSF   Result Value Ref Range    Tube Number 4     Color Colorless Colorless    Clarity Clear Clear    Total Nucleated Cells 0 0 - 5 /uL    RBC Count 15 (H) 0 - 2 /uL    Narrative    Too few cells to do differential.  Sent for review by pathologist. Report   will be updated after review.   XR Lumbar Punc Intrathecal Chemo Admin    Narrative    PROCEDURE: Lumbar Puncture using Fluoroscopy, 9/7/2022 12:06 PM    HISTORY:  Lumbar puncture with intrathecal chemotherapy    COMPARISON: 8/31/2022    STAFF NEURORADIOLOGIST: Dr. Jarrell uRst    FELLOW PHYSICIAN: Dr. Tru Gonzales    TECHNIQUE: Verbal and written consent for lumbar puncture was obtained  from the patient, and benefits and risk of the procedure were  explained, including but not limited to worsening headache,  hemorrhage, infection, lower extremity pain, or nerve root injury. The  patient was sterilely prepped and draped with the patient in the prone  position, over the lower back. Under fluoroscopic guidance, the  interlaminar spaces were noted. 1% lidocaine was administered for  local anesthetic over the L3-4 interlaminar space, and a 22 gauge 5  inch needle was advanced into the thecal sac under fluoroscopic  guidance.      There was  initial show of clear CSF. Approximately 14 cc of CSF were  collected.    Hematology/Oncology then administered intrathecal chemotherapy, dose  and rate as determined by Heme/Onc.    The needle was removed with the stylet in place. There was no  immediate complication associated with the procedure. Samples were  sent for the requested laboratory testing.      FLUORO TIME: 21 seconds low-dose pulsed.    DOSE: 8.31 Gym2      Impression    IMPRESSION: Successful lumbar puncture and intrathecal chemotherapy  administration without immediate complication.    PLAN: Patient discharged to patient care unit in stable condition for  monitoring. Orders placed for 1 hour of bed rest with patient laying  on the back and the head of the bed flat.     I, MACO GARZON MD, attest that I was immediately available to  provide guidance and assistance during the entirety of the procedure.    I have personally reviewed the examination and initial interpretation  and I agree with the findings.    MACO GARZON MD         SYSTEM ID:  [id number]   Lactic Acid STAT   Result Value Ref Range    Lactic Acid 2.7 (H) 0.7 - 2.0 mmol/L   Extra Tube    Narrative    The following orders were created for panel order Extra Tube.  Procedure                               Abnormality         Status                     ---------                               -----------         ------                     Extra Purple Top Tube[[id number]]                            Final result                 Please view results for these tests on the individual orders.   Extra Purple Top Tube   Result Value Ref Range    Hold Specimen JI    CBC with platelets differential    Narrative    The following orders were created for panel order CBC with platelets differential.  Procedure                               Abnormality         Status                     ---------                               -----------         ------                     CBC with platelets and  d...[[id number]]                      In process                   Please view results for these tests on the individual orders.   Lactic acid whole blood   Result Value Ref Range    Lactic Acid 3.2 (H) 0.7 - 2.0 mmol/L

## 2022-09-08 NOTE — PLAN OF CARE
A&O times 4. Pt denies pain, SOB, nausea, and dizziness. Pt states that they are doing fine. Pt has no further needs and is safe with call light in reach.     Plan of Care Reviewed With: patient     Overall Patient Progress: improving

## 2022-09-09 ENCOUNTER — TELEPHONE (OUTPATIENT)
Dept: TRANSPLANT | Facility: CLINIC | Age: 42
End: 2022-09-09

## 2022-09-09 ENCOUNTER — APPOINTMENT (OUTPATIENT)
Dept: PHYSICAL THERAPY | Facility: CLINIC | Age: 42
End: 2022-09-09
Attending: INTERNAL MEDICINE
Payer: COMMERCIAL

## 2022-09-09 LAB
ALBUMIN SERPL BCG-MCNC: 3.4 G/DL (ref 3.5–5.2)
ALP SERPL-CCNC: 73 U/L (ref 35–104)
ALT SERPL W P-5'-P-CCNC: 34 U/L (ref 10–35)
ANION GAP SERPL CALCULATED.3IONS-SCNC: 10 MMOL/L (ref 7–15)
AST SERPL W P-5'-P-CCNC: 19 U/L (ref 10–35)
BASOPHILS # BLD MANUAL: 0 10E3/UL (ref 0–0.2)
BASOPHILS NFR BLD MANUAL: 0 %
BILIRUB DIRECT SERPL-MCNC: <0.2 MG/DL (ref 0–0.3)
BILIRUB SERPL-MCNC: 0.6 MG/DL
BUN SERPL-MCNC: 17.9 MG/DL (ref 6–20)
CALCIUM SERPL-MCNC: 8.3 MG/DL (ref 8.6–10)
CHLORIDE SERPL-SCNC: 103 MMOL/L (ref 98–107)
CREAT SERPL-MCNC: 0.64 MG/DL (ref 0.51–0.95)
DEPRECATED HCO3 PLAS-SCNC: 23 MMOL/L (ref 22–29)
EOSINOPHIL # BLD MANUAL: 0 10E3/UL (ref 0–0.7)
EOSINOPHIL NFR BLD MANUAL: 0 %
ERYTHROCYTE [DISTWIDTH] IN BLOOD BY AUTOMATED COUNT: 14.8 % (ref 10–15)
FIBRINOGEN PPP-MCNC: 243 MG/DL (ref 170–490)
GFR SERPL CREATININE-BSD FRML MDRD: >90 ML/MIN/1.73M2
GLUCOSE SERPL-MCNC: 158 MG/DL (ref 70–99)
HCT VFR BLD AUTO: 22.6 % (ref 35–47)
HGB BLD-MCNC: 7.5 G/DL (ref 11.7–15.7)
INR PPP: 1.15 (ref 0.85–1.15)
LACTATE SERPL-SCNC: 2.8 MMOL/L (ref 0.7–2)
LACTATE SERPL-SCNC: 3.4 MMOL/L (ref 0.7–2)
LYMPHOCYTES # BLD MANUAL: 0.2 10E3/UL (ref 0.8–5.3)
LYMPHOCYTES NFR BLD MANUAL: 8 %
MAGNESIUM SERPL-MCNC: 2.1 MG/DL (ref 1.7–2.3)
MCH RBC QN AUTO: 29.3 PG (ref 26.5–33)
MCHC RBC AUTO-ENTMCNC: 33.2 G/DL (ref 31.5–36.5)
MCV RBC AUTO: 88 FL (ref 78–100)
MONOCYTES # BLD MANUAL: 0 10E3/UL (ref 0–1.3)
MONOCYTES NFR BLD MANUAL: 1 %
NEUTROPHILS # BLD MANUAL: 2.5 10E3/UL (ref 1.6–8.3)
NEUTROPHILS NFR BLD MANUAL: 91 %
NRBC # BLD AUTO: 0.1 10E3/UL
NRBC BLD MANUAL-RTO: 2 %
PHOSPHATE SERPL-MCNC: 3.4 MG/DL (ref 2.5–4.5)
PLAT MORPH BLD: ABNORMAL
PLATELET # BLD AUTO: 169 10E3/UL (ref 150–450)
POLYCHROMASIA BLD QL SMEAR: SLIGHT
POTASSIUM SERPL-SCNC: 3.7 MMOL/L (ref 3.4–5.3)
PROT SERPL-MCNC: 5.5 G/DL (ref 6.4–8.3)
RBC # BLD AUTO: 2.56 10E6/UL (ref 3.8–5.2)
RBC MORPH BLD: ABNORMAL
SODIUM SERPL-SCNC: 136 MMOL/L (ref 136–145)
WBC # BLD AUTO: 2.7 10E3/UL (ref 4–11)

## 2022-09-09 PROCEDURE — 250N000011 HC RX IP 250 OP 636: Performed by: INTERNAL MEDICINE

## 2022-09-09 PROCEDURE — 250N000013 HC RX MED GY IP 250 OP 250 PS 637: Performed by: PHYSICIAN ASSISTANT

## 2022-09-09 PROCEDURE — 99356 PR PROLONGED SERV,INPATIENT,1ST HR: CPT | Mod: FS | Performed by: PHYSICIAN ASSISTANT

## 2022-09-09 PROCEDURE — 250N000011 HC RX IP 250 OP 636: Performed by: PHYSICIAN ASSISTANT

## 2022-09-09 PROCEDURE — 99233 SBSQ HOSP IP/OBS HIGH 50: CPT | Mod: FS | Performed by: PHYSICIAN ASSISTANT

## 2022-09-09 PROCEDURE — 85027 COMPLETE CBC AUTOMATED: CPT | Performed by: PHYSICIAN ASSISTANT

## 2022-09-09 PROCEDURE — 250N000013 HC RX MED GY IP 250 OP 250 PS 637: Performed by: INTERNAL MEDICINE

## 2022-09-09 PROCEDURE — 258N000003 HC RX IP 258 OP 636: Performed by: STUDENT IN AN ORGANIZED HEALTH CARE EDUCATION/TRAINING PROGRAM

## 2022-09-09 PROCEDURE — 83605 ASSAY OF LACTIC ACID: CPT | Performed by: NURSE PRACTITIONER

## 2022-09-09 PROCEDURE — 82248 BILIRUBIN DIRECT: CPT | Performed by: PHYSICIAN ASSISTANT

## 2022-09-09 PROCEDURE — 36592 COLLECT BLOOD FROM PICC: CPT | Performed by: INTERNAL MEDICINE

## 2022-09-09 PROCEDURE — 85610 PROTHROMBIN TIME: CPT | Performed by: PHYSICIAN ASSISTANT

## 2022-09-09 PROCEDURE — 250N000013 HC RX MED GY IP 250 OP 250 PS 637: Performed by: STUDENT IN AN ORGANIZED HEALTH CARE EDUCATION/TRAINING PROGRAM

## 2022-09-09 PROCEDURE — 97110 THERAPEUTIC EXERCISES: CPT | Mod: GP

## 2022-09-09 PROCEDURE — 83605 ASSAY OF LACTIC ACID: CPT | Performed by: INTERNAL MEDICINE

## 2022-09-09 PROCEDURE — 250N000011 HC RX IP 250 OP 636: Performed by: STUDENT IN AN ORGANIZED HEALTH CARE EDUCATION/TRAINING PROGRAM

## 2022-09-09 PROCEDURE — 85384 FIBRINOGEN ACTIVITY: CPT | Performed by: PHYSICIAN ASSISTANT

## 2022-09-09 PROCEDURE — 120N000002 HC R&B MED SURG/OB UMMC

## 2022-09-09 PROCEDURE — 83735 ASSAY OF MAGNESIUM: CPT | Performed by: INTERNAL MEDICINE

## 2022-09-09 PROCEDURE — 84100 ASSAY OF PHOSPHORUS: CPT | Performed by: PHYSICIAN ASSISTANT

## 2022-09-09 PROCEDURE — 36592 COLLECT BLOOD FROM PICC: CPT | Performed by: NURSE PRACTITIONER

## 2022-09-09 PROCEDURE — 85007 BL SMEAR W/DIFF WBC COUNT: CPT | Performed by: PHYSICIAN ASSISTANT

## 2022-09-09 RX ORDER — ENOXAPARIN SODIUM 100 MG/ML
40 INJECTION SUBCUTANEOUS EVERY 24 HOURS
Status: DISCONTINUED | OUTPATIENT
Start: 2022-09-09 | End: 2022-09-09

## 2022-09-09 RX ORDER — CARBOXYMETHYLCELLULOSE SODIUM 5 MG/ML
1 SOLUTION/ DROPS OPHTHALMIC
Status: DISCONTINUED | OUTPATIENT
Start: 2022-09-09 | End: 2022-09-14 | Stop reason: HOSPADM

## 2022-09-09 RX ORDER — ENOXAPARIN SODIUM 100 MG/ML
40 INJECTION SUBCUTANEOUS EVERY 24 HOURS
Status: DISCONTINUED | OUTPATIENT
Start: 2022-09-09 | End: 2022-09-14 | Stop reason: HOSPADM

## 2022-09-09 RX ADMIN — SENNOSIDES AND DOCUSATE SODIUM 1 TABLET: 8.6; 5 TABLET ORAL at 08:28

## 2022-09-09 RX ADMIN — AMLODIPINE BESYLATE 10 MG: 10 TABLET ORAL at 08:28

## 2022-09-09 RX ADMIN — PANTOPRAZOLE SODIUM 40 MG: 40 TABLET, DELAYED RELEASE ORAL at 08:28

## 2022-09-09 RX ADMIN — MICAFUNGIN 50 MG: 10 INJECTION, POWDER, LYOPHILIZED, FOR SOLUTION INTRAVENOUS at 11:42

## 2022-09-09 RX ADMIN — LEVOFLOXACIN 250 MG: 250 TABLET, FILM COATED ORAL at 13:43

## 2022-09-09 RX ADMIN — SENNOSIDES AND DOCUSATE SODIUM 2 TABLET: 8.6; 5 TABLET ORAL at 19:48

## 2022-09-09 RX ADMIN — ENOXAPARIN SODIUM 40 MG: 40 INJECTION SUBCUTANEOUS at 17:08

## 2022-09-09 RX ADMIN — ACYCLOVIR 400 MG: 400 TABLET ORAL at 19:48

## 2022-09-09 RX ADMIN — ACYCLOVIR 400 MG: 400 TABLET ORAL at 08:28

## 2022-09-09 RX ADMIN — Medication 5 ML: at 06:11

## 2022-09-09 RX ADMIN — IMATINIB MESYLATE 400 MG: 400 TABLET, FILM COATED ORAL at 08:28

## 2022-09-09 RX ADMIN — ACETAMINOPHEN 975 MG: 325 TABLET, FILM COATED ORAL at 00:51

## 2022-09-09 RX ADMIN — Medication 10 ML: at 13:45

## 2022-09-09 RX ADMIN — ONDANSETRON 4 MG: 4 TABLET, ORALLY DISINTEGRATING ORAL at 00:51

## 2022-09-09 RX ADMIN — IMATINIB MESYLATE 400 MG: 400 TABLET, FILM COATED ORAL at 19:43

## 2022-09-09 ASSESSMENT — ACTIVITIES OF DAILY LIVING (ADL)
ADLS_ACUITY_SCORE: 22

## 2022-09-09 NOTE — PLAN OF CARE
Goal Outcome Evaluation:  [id number]  A&OX4, afebrile, tachy in the 100s, OVSS on RA. Complained of mild headache relieved with prn Tylenol. Intermittent nausea, Zofran administered. Pt triggered sepsis, Lactic acid resulted at 2.8. Per raid response, encourage oral fluids, recheck scheduled with Am labs. LP site , CDI, BRAEDEN with some minor bruising. Continue with POC.

## 2022-09-09 NOTE — CODE/RAPID RESPONSE
Rapid Response Team Note    Assessment   In assessment a rapid response was called on  due to SIRS/Sepsis trigger and lactic acidosis. This presentation is likely due to leukemia and chemotherapy. Denies new symptoms. Afebrile. Patient is aware of plan of care. If febrile or develops new concerning symptoms, will do infectious work-up.    Plan   -  Encourage PO fluid intake  - recheck lactic acid with AM labs    -  The Internal Medicine primary team was able to be reached and they are in agreement with the above plan.  -  Disposition: The patient will remain on the current unit. We will continue to monitor this patient closely.  -  Reassessment and plan follow-up will be performed by the primary team      LOAN Ozuna CNP  [facility] RRT [facility] Job Code Contact #[phone]  [facility] Paging/Directory    Hospital Course   Brief Summary of events leading to rapid response:   BPA for sepsis eval due to leukopenia and tachycardia    Admission Diagnosis:   Acute leukemia (H) [C95.00]    Physical Exam   Temp: 98.1  F (36.7  C) Temp  Min: 96.4  F (35.8  C)  Max: 98.6  F (37  C)  Resp: 16 Resp  Min: 16  Max: 16  SpO2: 96 % SpO2  Min: 96 %  Max: 100 %  Pulse: 92 Pulse  Min: 92  Max: 117    No data recorded  BP: 116/56 Systolic (24hrs), Av , Min:112 , Max:129   Diastolic (24hrs), Av, Min:56, Max:72     I/Os: I/O last 3 completed shifts:  In: 240 [P.O.:240]  Out: 950 [Urine:950]     Exam:   General: in no acute distress  Mental Status: AAOx4.      Significant Results and Procedures   Lactic Acid:   Recent Labs   Lab Test 22  0117 22  0831 22  1812   LACT 2.8* 3.2* 2.7*     CBC:   Recent Labs   Lab Test 22  0831 22  0559 22  0835   WBC 2.5* 1.7* 1.7*   HGB 8.4* 7.3* 8.1*   HCT 25.1* 22.4* 24.4*   * 82* 60*        Sepsis Evaluation   The patient is not known to have an infection.  NO EVIDENCE OF SEPSIS at this time.  Vital sign, physical exam, and lab  findings are due to malignancy and chemotherapy .

## 2022-09-09 NOTE — PROGRESS NOTES
Sepsis code was called in the morning for lactic acid of 3.4 and when they came up here one of the team members stated that since it was a result of a recheck, that they will not be able to do anything and that the code need to be cancelled which was done.  Client has been doing fine, denies pain, independent in the room, had shower, good appetite, no nausea, vitals stable, alert and oriented.  Voiding in BR, menses on., LP site intact, left arm PICC line double lumen is hep locked.   No replacement of electrolytes today. Slight numbness and tingling on hands. Continue with POC.

## 2022-09-09 NOTE — PROGRESS NOTES
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 Pre-phase steroids initiated (8/21-8/24), then initiated GRAALL + imatinib on 8/24.     TODAY:   - Day 17 GRAALL + imatinib. Plan for Neupogen daily PRN if ANC ? 1.2  - Continues to trigger sepsis protocol and with lactic acidosis. Pt is euvolemic, hold IVF and encourage PO fluid intake. No e/o infection otherwise, monitor off of antibiotics. Suspect lactic acidosis is secondary to leukemia. If febrile will obtain BCx, UA/UCx, CXR and start broad-spectrum antibiotics.   - Plt are recovered, recommend ppx lovenox though pt is contemplating declining and accepting the risks due to underlying needle phobia.     HEME  # Ph+ B-ALL  Patient presented to outside hospital with c/o Right upper quadrant pain and was subsequently found to have a markedly elevated white blood cell count concerning for acute leukemia. While at OSH, she had CT PE protocol as well as CT A/P. These identified no PE, no acute or localized intraabdominal inflammatory process, mild splenomegaly, and few inconspicous low-density structures in the liver. She was transferred to [facility] for further workup and management.  - s/p Hydrea 1g TID (8/21-8/23) with improvement in WBC (100K ? 12K)  - CT CAP completed at OSH. CT neck 8/22 showed multiple, b/l suspicious-appearing LN (R level Ia, L level III, R level II-III) - not enlarged though they exhibit abnormal shapes.   - MR brain (8/21) - no acute IC pathology, no e/o intracranial CNS involvement.  - BMBx completed 8/20 - demonstrated B-ALL with 85% blasts and hypercellular marrow. IHC shows CD10+, CD34+.  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 Abnormal cytogenetics. For unknown reason, molecular studies were cancelled. Have reordered on PB today.  - Pt consented to HMTB; BMBx at dx was dry tap d/t WBC burden. Ordered peripheral blood samples on 8/22.   - Initiated pre-phase steroids with Prednisone 60 mg x8/21; increased to 60 mg/m2 (140 mg) 8/22-8/24. Transitioned to protocol-directed Dex 40 mg on 8/25.   - Clonoseq ID testing to help estimate MRD in future assessments. Clonal testing collected and sent on 8/23. Plan to order Clonoseq MRD testing with post-induction BMBx.   - Case discussed at Heme malignancy conference on 8/23, per consensus plan was to start GRAALL + Imatinib on 8/24. No clear indication for addition of Rituximab.    - No e/o TLS, allopurinol discontinued x9/5  - BMT consult scheduled 9/12 @ 9am with Dr. Walsh    Treatment Plan: GRAALL + imatinib (D1=8/24/22)  - Imatinib 400 mg BID - D1-28  - Vincristine 2 mg IV x1 - D1, 8, 15, 22   - Dexamethasone 40 mg daily - D2 (held D1, instead given pre-phase pred as above), 8-9, 15-16, 22-23  - Neupogen 5 mcg/kg/day - beginning D15, daily PRN if ANC ? 1.2    *if plan is to discharge patient with this, will need to look into a PA - further assess in coming days      # CNS prophylaxis  Per the GRAALL protocol, and risk for ALL entering the CNS, patient requires weekly LP w/ triple IT chemo as detailed below. Due to unsuccessful attempt at bedside with the CAPS team on 8/25, will require all subsequent LPs under fluoroscopic guidance. Had required multiple units of platelets (3u total) prior to procedure to reach goal >50k.   - Triple IT chemo (Cytarabine, Solu-Cortef, MTX); D1, 8, 15.   -8/26 CSF flow negative  -8/31 CSF flow negative  -9/7 CSF flow negative  -9/14 LP scheduled in XR at 11am (will need orders for labs and IT chemo prior)  - Pt expresses anxiety surrounding  procedures; pre-medication with 0.5mg PO Ativan +/- Atarax available prior PRN    # Pancytopenia  2/2 to underlying disease  - Transfuse to maintain Hgb >7, Plt >10K (>50K prior to LPs)    # Chemotherapy-induced Grade 1 neuropathy (fingertips)  Patient was experiencing intermittent left hand numbness (x 8/28) and right hand numbness (noted first on 8/30 evening) but the symptoms were not persistent and resolved on 8/31. On 9/4 she began experiencing numbness/tingling in her fingertips on both of her hands. This is not painful, nor has it affected her dexterity. Her feet have been spared.   - Given overall mild symptoms and goal to achieve CR for transplant, will continue full dose vincristine with Day 15, but if worsening will reassess with next dose on Day 22    ID  # Lactic acidosis  Pt triggering lactic acid protocol for tachycardia intermittently. Afebrile, no infectious symptoms. She is drinking about 1-2 pitchers of water daily. Suspect related to leukemia though could have a component of dehydration.   - Encouraged PO fluids and will bolus IVF PRN if hypovolemic  - LA 3.2 (9/8), ordered 500cc NS  - Continues to trigger sepsis protocol and with lactic acidosis, though is now drinking more fluids and clinically euvolemic. Hold off on additional IVF unless hypovolemic.     # PPx  -  mg BID  - Nebulized Pentamidine q28d for PJP ppx, last dose given 8/29. Next due ~9/27  - Levaquin 250 mg daily - Continuing while inpatient through completion of scheduled chemotherapy  - Maude 50 mg daily  - Continuing Micafungin while inpatient through completion of scheduled chemotherapy. If ANC <1000 and pt discharging home then plan would be to transition to fluconazole.      # H/o COVID-19 infection   Not vaccinated (due to fear of needles per pt report). Pt reported testing postiive for COVID approx 1 month prior to admission via home test. She never had it confirmed.   - COVID testing on admission was negative.      CV  # Hypertension  Fairly persistent elevated BP since admission to 150-160/80-90s. She was not on anti-hypertensive medication PTA.   - started Amlodipine 5 mg daily (8/21-8/23); increased to 10 mg daily (x8/23) - hold parameters in place if SBP<110  - prn Hydralazine 10-20 mg available q6h for SBP>160     GI  # Indigestion/GERD  - Continue protonix 40mg once daily    - TUMs PRN, Maalox PRN.     NEURO  # Headaches, improved  Pt endorsed intermittent HA since admission. Denies vision changes or other neurological sx. Has h/o HTN as above. CSF flow cytometry (8/25) negative for involvement by ALL.  - MRI brain completed 8/21 was negative for intracranial pathology or tumor involvement.   - Tylenol PRN  - Continue weekly LPs x3 per GRAALL protocol  - patient had a mild headache after D15 LP, quickly resolved with tylenol. Encouraged fluids and will continue to monitor. If worsening, will manage conservatively with IVF, caffeine/tylenol, and supine position but if persistent could consider a blood patch.     GYN  # Menstruation  Pt actively menstruating as of 9/8. She reports that the amount of bleeding is normal for her and her hgb remains stable. Suppressing menses is typically indicated in patients undergoing induction chemotherapy given risk of thrombocytopenia and associated bleeding, however her platelets are recovered/near normal at 143 which makes her risk of bleeding less.   - consider low-dose progresterone pill if bleeding worsens or platelets are dropping  - do not use tampons given neutropenia, sanitary pads only    FEN  - No mIVF: bolus PRN.   - Lyte repletion per standing protocol  - Regular diet as tolerated     MISC  - Lines/Drains: PICC in place, remove on discharge  - DVT ppx: Plt are recovered, recommend ppx lovenox 40mg daily though pt is contemplating declining and accepting the risks due to underlying needle phobia.   - GI ppx: PPI, TUMs PRN, Maalox PRN.   - Consults: PT  - CODE: FULL  -  DISPO: Anticipate prolonged 4-6 week hospitalization for management of new leukemia.   - Follow-up/Referrals: Dr. Hull has agreed to continue to follow pt in clinic. Requested the following ([id number]), not yet sheduled;     - labs/poss pRBC 2x weekly in [facility] 9/19-10/7   - AVELINA f/up (video visit OK) the week of 9/19   - BMBx ~9/[facility] at [facility]   - Follow up with Dr. 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 Reviewed by pt and this provider faxed the paperwork on 9/2.     Patient and plan of care was discussed with attending physician Dr. Alverto Rivers, GRANT  Hematology/Oncology  Pager #[phone] or available on [employer]    I spent 40 minutes in the care of this patient today, which included time necessary for review of interval events, obtaining history and physical exam, ordering medication(s)/test(s) as medically indicated, discussion with interdisciplinary/consult team(s), and documentation time. Over 50% of time was spent face-to-face and/or coordinating care.      Interval History   No acute events overnight, afebrile. April continues to feel well. Noting persistent bilateral fingertip/hand tingling and numbness. The symptoms seem to have spread up to her MCP. No pain or tingling in her toes. This does not affect her dexterity. Denies nausea or abdominal discomfort. Appetite is still OK. Having regular BMs. Walking in the hallways. Menstrual cycle started 2 days ago, amount of bleed is normal for her. No headaches. A comprehensive review of systems was reviewed with the patient and the pertinent positives are listed in the HPI above.      We discussed the results of her LP which is negative. Also reviewed her CBC. Given her plt recovery, we are recommending lovenox  ppx. Reviewed the rationale and risks of clots. She became anxious and tearful, felt panicky when talking about a needle injecting into her stomach. She would like some time to consider starting this or not.     Physical Exam   Temp: 98  F (36.7  C) Temp src: Oral BP: 117/64 Pulse: 91   Resp: 18 SpO2: 97 % O2 Device: None (Room air)    Vitals:    09/07/22 0811 09/08/22 0811 09/09/22 0951   Weight: 113.1 kg (249 lb 6.4 oz) 111.1 kg (244 lb 14.9 oz) 111.2 kg (245 lb 2.8 oz)     Vital Signs with Ranges  Temp:  [96.4  F (35.8  C)-98.6  F (37  C)] 98  F (36.7  C)  Pulse:  [] 91  Resp:  [16-18] 18  BP: (106-129)/(51-68) 117/64  SpO2:  [96 %-98 %] 97 %  I/O last 3 completed shifts:  In: 240 [P.O.:240]  Out: 1300 [Urine:1300]    Constitutional: Awake and conversational. Non- toxic appearing. No acute distress.   HEENT: Normocephalic, atraumatic . Sclerae anicteric. EOM intact. Moist mucus membranes   Respiratory: Breathing comfortable with no increased work on room air. No signs of respiratory distress or accessory muscle use. Lungs clear to auscultation bilaterally, no crackles or wheezing noted.  Cardiovascular: Regular rate and rhythm. Normal S1 and S2.   GI: Abdomen is soft, non-distended, non-tender and without distension. Bowel sounds present and normoactive.   Skin: Skin is clean, dry, intact. No jaundice appreciated.   Musculoskeletal/ Extremities: No redness, warmth, or swelling of the joints appreciated.   Neurologic: Alert and oriented. Speech normal. Grossly nonfocal. Memory and thought process preserved.  Neuropsychiatric: Calm, affect congruent to situation. Appropriate mood and affect. Good judgment and insight. No visual/auditory hallucinations.  LUE PIC C/D/I       Medications     - MEDICATION INSTRUCTIONS -         acyclovir  400 mg Oral BID     albuterol  2.5 mg Nebulization Q28 Days    And     pentamidine  300 mg Inhalation Q28 Days     amLODIPine  10 mg Oral Daily     enoxaparin ANTICOAGULANT  40  mg Subcutaneous Q24H     heparin lock flush  5-20 mL Intracatheter Q24H     imatinib  400 mg Oral BID     levofloxacin  250 mg Oral Q24H     lidocaine (PF)  10 mL Subcutaneous Once     LORazepam  0.5 mg Oral Once     micafungin  50 mg Intravenous Q24H     pantoprazole  40 mg Oral QAM AC     senna-docusate  1-2 tablet Oral BID     sodium chloride (PF)  10-40 mL Intracatheter Q8H     Data   Results for orders placed or performed during the hospital encounter of 08/19/22 (from the past 24 hour(s))   Lactic Acid STAT   Result Value Ref Range    Lactic Acid 2.8 (H) 0.7 - 2.0 mmol/L   CBC with platelets differential    Narrative    The following orders were created for panel order CBC with platelets differential.  Procedure                               Abnormality         Status                     ---------                               -----------         ------                     CBC with platelets and d...[[id number]]  Abnormal            Final result               Manual Differential[[id number]]          Abnormal            Final result                 Please view results for these tests on the individual orders.   Phosphorus   Result Value Ref Range    Phosphorus 3.4 2.5 - 4.5 mg/dL   Fibrinogen activity   Result Value Ref Range    Fibrinogen Activity 243 170 - 490 mg/dL   INR   Result Value Ref Range    INR 1.15 0.85 - 1.15   Basic metabolic panel   Result Value Ref Range    Creatinine 0.64 0.51 - 0.95 mg/dL    Sodium 136 136 - 145 mmol/L    Potassium 3.7 3.4 - 5.3 mmol/L    Urea Nitrogen 17.9 6.0 - 20.0 mg/dL    Chloride 103 98 - 107 mmol/L    Carbon Dioxide (CO2) 23 22 - 29 mmol/L    Anion Gap 10 7 - 15 mmol/L    Glucose 158 (H) 70 - 99 mg/dL    GFR Estimate >90 >60 mL/min/1.73m2    Calcium 8.3 (L) 8.6 - 10.0 mg/dL   Hepatic panel   Result Value Ref Range    Protein Total 5.5 (L) 6.4 - 8.3 g/dL    Albumin 3.4 (L) 3.5 - 5.2 g/dL    Bilirubin Total 0.6 <=1.2 mg/dL    Alkaline Phosphatase 73 35 - 104 U/L    AST 19  10 - 35 U/L    ALT 34 10 - 35 U/L    Bilirubin Direct <0.20 0.00 - 0.30 mg/dL   Magnesium   Result Value Ref Range    Magnesium 2.1 1.7 - 2.3 mg/dL   Lactic acid whole blood   Result Value Ref Range    Lactic Acid 3.4 (H) 0.7 - 2.0 mmol/L   CBC with platelets and differential   Result Value Ref Range    WBC Count 2.7 (L) 4.0 - 11.0 10e3/uL    RBC Count 2.56 (L) 3.80 - 5.20 10e6/uL    Hemoglobin 7.5 (L) 11.7 - 15.7 g/dL    Hematocrit 22.6 (L) 35.0 - 47.0 %    MCV 88 78 - 100 fL    MCH 29.3 26.5 - 33.0 pg    MCHC 33.2 31.5 - 36.5 g/dL    RDW 14.8 10.0 - 15.0 %    Platelet Count 169 150 - 450 10e3/uL   Manual Differential   Result Value Ref Range    % Neutrophils 91 %    % Lymphocytes 8 %    % Monocytes 1 %    % Eosinophils 0 %    % Basophils 0 %    NRBCs per 100 WBC 2 (H) <=0 %    Absolute Neutrophils 2.5 1.6 - 8.3 10e3/uL    Absolute Lymphocytes 0.2 (L) 0.8 - 5.3 10e3/uL    Absolute Monocytes 0.0 0.0 - 1.3 10e3/uL    Absolute Eosinophils 0.0 0.0 - 0.7 10e3/uL    Absolute Basophils 0.0 0.0 - 0.2 10e3/uL    Absolute NRBCs 0.1 (H) <=0.0 10e3/uL    RBC Morphology Confirmed RBC Indices     Platelet Assessment  Automated Count Confirmed. Platelet morphology is normal.     Automated Count Confirmed. Platelet morphology is normal.    Polychromasia Slight (A) None Seen

## 2022-09-09 NOTE — PROVIDER NOTIFICATION
09/09/22 0100   Call Information   Date of Call 09/09/22   Time of Call 0141   Name of person requesting the team Jory   Title of person requesting team RN   RRT Arrival time 0144   Time RRT ended 0200   Reason for call   Type of RRT Adult   Primary reason for call Sepsis suspected   Sepsis Suspected Elevated Lactate level;WBC <4 or >12   Was patient transferred from the ED, ICU, or PACU within last 24 hours prior to RRT call? No   SBAR   Situation LA 2.8   Background Diagnosed this hospital stay with acute leukemia, ALL, being treated for ALL. Was admitted for RUQ pain and headache.   Notable History/Conditions Cancer   Assessment A+Ox3, VSS   Interventions No interventions   Patient Outcome   Patient Outcome Stabilized on unit   RRT Team   Attending/Primary/Covering Physician Heme Malignancy   Date Attending Physician notified 09/09/22   Time Attending Physician notified 0141   Physician(s) Opal Finley NP   Lead RN Ziggy Corley   RT n/a   Post RRT Intervention Assessment   Post RRT Assessment Stable/Improved   Date Follow Up Done 09/09/22   Time Follow Up Done 0458   Comments pt. sleeping.

## 2022-09-09 NOTE — TELEPHONE ENCOUNTER
Called Pt to confirm her NT appointment on 9/12/22 at 9 AM. Pt is currently admitted to the hospital. NT will take place in the Hospital.

## 2022-09-10 LAB
ANION GAP SERPL CALCULATED.3IONS-SCNC: 9 MMOL/L (ref 7–15)
BASOPHILS # BLD MANUAL: 0 10E3/UL (ref 0–0.2)
BASOPHILS NFR BLD MANUAL: 0 %
BUN SERPL-MCNC: 18.4 MG/DL (ref 6–20)
CALCIUM SERPL-MCNC: 8.1 MG/DL (ref 8.6–10)
CHLORIDE SERPL-SCNC: 107 MMOL/L (ref 98–107)
CREAT SERPL-MCNC: 0.64 MG/DL (ref 0.51–0.95)
DEPRECATED HCO3 PLAS-SCNC: 26 MMOL/L (ref 22–29)
EOSINOPHIL # BLD MANUAL: 0 10E3/UL (ref 0–0.7)
EOSINOPHIL NFR BLD MANUAL: 0 %
ERYTHROCYTE [DISTWIDTH] IN BLOOD BY AUTOMATED COUNT: 15.7 % (ref 10–15)
GFR SERPL CREATININE-BSD FRML MDRD: >90 ML/MIN/1.73M2
GLUCOSE SERPL-MCNC: 118 MG/DL (ref 70–99)
HCT VFR BLD AUTO: 22.4 % (ref 35–47)
HGB BLD-MCNC: 7.3 G/DL (ref 11.7–15.7)
LYMPHOCYTES # BLD MANUAL: 0.1 10E3/UL (ref 0.8–5.3)
LYMPHOCYTES NFR BLD MANUAL: 5 %
MAGNESIUM SERPL-MCNC: 2.1 MG/DL (ref 1.7–2.3)
MCH RBC QN AUTO: 29.2 PG (ref 26.5–33)
MCHC RBC AUTO-ENTMCNC: 32.6 G/DL (ref 31.5–36.5)
MCV RBC AUTO: 90 FL (ref 78–100)
MONOCYTES # BLD MANUAL: 0 10E3/UL (ref 0–1.3)
MONOCYTES NFR BLD MANUAL: 2 %
NEUTROPHILS # BLD MANUAL: 2 10E3/UL (ref 1.6–8.3)
NEUTROPHILS NFR BLD MANUAL: 93 %
NRBC # BLD AUTO: 0 10E3/UL
NRBC BLD MANUAL-RTO: 1 %
PHOSPHATE SERPL-MCNC: 3 MG/DL (ref 2.5–4.5)
PLAT MORPH BLD: ABNORMAL
PLATELET # BLD AUTO: 198 10E3/UL (ref 150–450)
POTASSIUM SERPL-SCNC: 3.6 MMOL/L (ref 3.4–5.3)
RBC # BLD AUTO: 2.5 10E6/UL (ref 3.8–5.2)
RBC MORPH BLD: ABNORMAL
SODIUM SERPL-SCNC: 142 MMOL/L (ref 136–145)
WBC # BLD AUTO: 2.2 10E3/UL (ref 4–11)

## 2022-09-10 PROCEDURE — 250N000011 HC RX IP 250 OP 636: Performed by: STUDENT IN AN ORGANIZED HEALTH CARE EDUCATION/TRAINING PROGRAM

## 2022-09-10 PROCEDURE — 250N000013 HC RX MED GY IP 250 OP 250 PS 637: Performed by: PHYSICIAN ASSISTANT

## 2022-09-10 PROCEDURE — 250N000011 HC RX IP 250 OP 636: Performed by: PHYSICIAN ASSISTANT

## 2022-09-10 PROCEDURE — 99233 SBSQ HOSP IP/OBS HIGH 50: CPT | Mod: FS | Performed by: PHYSICIAN ASSISTANT

## 2022-09-10 PROCEDURE — 250N000013 HC RX MED GY IP 250 OP 250 PS 637: Performed by: STUDENT IN AN ORGANIZED HEALTH CARE EDUCATION/TRAINING PROGRAM

## 2022-09-10 PROCEDURE — 82310 ASSAY OF CALCIUM: CPT | Performed by: PHYSICIAN ASSISTANT

## 2022-09-10 PROCEDURE — 250N000013 HC RX MED GY IP 250 OP 250 PS 637: Performed by: INTERNAL MEDICINE

## 2022-09-10 PROCEDURE — 99356 PR PROLONGED SERV,INPATIENT,1ST HR: CPT | Mod: FS | Performed by: PHYSICIAN ASSISTANT

## 2022-09-10 PROCEDURE — 85007 BL SMEAR W/DIFF WBC COUNT: CPT | Performed by: PHYSICIAN ASSISTANT

## 2022-09-10 PROCEDURE — 36592 COLLECT BLOOD FROM PICC: CPT | Performed by: PHYSICIAN ASSISTANT

## 2022-09-10 PROCEDURE — 258N000003 HC RX IP 258 OP 636: Performed by: STUDENT IN AN ORGANIZED HEALTH CARE EDUCATION/TRAINING PROGRAM

## 2022-09-10 PROCEDURE — 85027 COMPLETE CBC AUTOMATED: CPT | Performed by: PHYSICIAN ASSISTANT

## 2022-09-10 PROCEDURE — 999N000044 HC STATISTIC CVC DRESSING CHANGE

## 2022-09-10 PROCEDURE — 250N000011 HC RX IP 250 OP 636: Performed by: INTERNAL MEDICINE

## 2022-09-10 PROCEDURE — 120N000002 HC R&B MED SURG/OB UMMC

## 2022-09-10 PROCEDURE — 83735 ASSAY OF MAGNESIUM: CPT | Performed by: PEDIATRICS

## 2022-09-10 PROCEDURE — 84100 ASSAY OF PHOSPHORUS: CPT | Performed by: PHYSICIAN ASSISTANT

## 2022-09-10 RX ADMIN — AMLODIPINE BESYLATE 10 MG: 10 TABLET ORAL at 09:11

## 2022-09-10 RX ADMIN — PANTOPRAZOLE SODIUM 40 MG: 40 TABLET, DELAYED RELEASE ORAL at 09:11

## 2022-09-10 RX ADMIN — IMATINIB MESYLATE 400 MG: 400 TABLET, FILM COATED ORAL at 09:12

## 2022-09-10 RX ADMIN — IMATINIB MESYLATE 400 MG: 400 TABLET, FILM COATED ORAL at 19:21

## 2022-09-10 RX ADMIN — Medication 6 MG: at 20:56

## 2022-09-10 RX ADMIN — MICAFUNGIN 50 MG: 10 INJECTION, POWDER, LYOPHILIZED, FOR SOLUTION INTRAVENOUS at 10:12

## 2022-09-10 RX ADMIN — Medication 5 ML: at 11:37

## 2022-09-10 RX ADMIN — MAGNESIUM HYDROXIDE 30 ML: 400 SUSPENSION ORAL at 16:29

## 2022-09-10 RX ADMIN — ACYCLOVIR 400 MG: 400 TABLET ORAL at 09:11

## 2022-09-10 RX ADMIN — Medication 5 ML: at 06:42

## 2022-09-10 RX ADMIN — SENNOSIDES AND DOCUSATE SODIUM 2 TABLET: 8.6; 5 TABLET ORAL at 19:21

## 2022-09-10 RX ADMIN — ACYCLOVIR 400 MG: 400 TABLET ORAL at 19:21

## 2022-09-10 RX ADMIN — LEVOFLOXACIN 250 MG: 250 TABLET, FILM COATED ORAL at 14:32

## 2022-09-10 RX ADMIN — SENNOSIDES AND DOCUSATE SODIUM 2 TABLET: 8.6; 5 TABLET ORAL at 09:11

## 2022-09-10 RX ADMIN — POLYETHYLENE GLYCOL 3350 17 G: 17 POWDER, FOR SOLUTION ORAL at 09:20

## 2022-09-10 RX ADMIN — ENOXAPARIN SODIUM 40 MG: 40 INJECTION SUBCUTANEOUS at 17:45

## 2022-09-10 ASSESSMENT — ACTIVITIES OF DAILY LIVING (ADL)
ADLS_ACUITY_SCORE: 22

## 2022-09-10 NOTE — PROGRESS NOTES
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 Pre-phase steroids initiated (8/21-8/24), then initiated GRAALL + imatinib on 8/24.     TODAY:   - Day 18 GRAALL + imatinib. Plan for Neupogen daily PRN if ANC ? 1.2  - Continues to trigger sepsis protocol and with lactic acidosis. Pt is euvolemic, hold IVF and encourage PO fluid intake. No e/o infection otherwise, monitor off of antibiotics. Suspect lactic acidosis is secondary to leukemia. If febrile will obtain BCx, UA/UCx, CXR and start broad-spectrum antibiotics.   - D22 LP w/ IT chemo scheduled 9/14 at 11am    HEME  # Ph+ B-ALL  Patient presented to outside hospital with c/o Right upper quadrant pain and was subsequently found to have a markedly elevated white blood cell count concerning for acute leukemia. While at OSH, she had CT PE protocol as well as CT A/P. These identified no PE, no acute or localized intraabdominal inflammatory process, mild splenomegaly, and few inconspicous low-density structures in the liver. She was transferred to [facility] for further workup and management.  - s/p Hydrea 1g TID (8/21-8/23) with improvement in WBC (100K ? 12K)  - CT CAP completed at OSH. CT neck 8/22 showed multiple, b/l suspicious-appearing LN (R level Ia, L level III, R level II-III) - not enlarged though they exhibit abnormal shapes.   - MR brain (8/21) - no acute IC pathology, no e/o intracranial CNS involvement.  - BMBx completed 8/20 - demonstrated B-ALL with 85% blasts and hypercellular marrow. IHC shows CD10+, CD34+. 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 Abnormal cytogenetics. For unknown reason, molecular studies were cancelled. Have reordered on PB today.  - Pt consented to HMTB; BMBx at dx was dry tap d/t WBC burden. Ordered peripheral blood samples on 8/22.   - Initiated pre-phase steroids with Prednisone 60 mg x8/21; increased to 60 mg/m2 (140 mg) 8/22-8/24. Transitioned to protocol-directed Dex 40 mg on 8/25.   - Clonoseq ID testing to help estimate MRD in future assessments. Clonal testing collected and sent on 8/23. Plan to order Clonoseq MRD testing with post-induction BMBx.   - Case discussed at Heme malignancy conference on 8/23, per consensus plan was to start GRAALL + Imatinib on 8/24. No clear indication for addition of Rituximab.    - No e/o TLS, allopurinol discontinued x9/5  - BMT consult scheduled 9/12 @ 9am with Dr. Walsh    Treatment Plan: GRAALL + imatinib (D1=8/24/22)  - Imatinib 400 mg BID - D1-28  - Vincristine 2 mg IV x1 - D1, 8, 15, 22   - Dexamethasone 40 mg daily - D2 (held D1, instead given pre-phase pred as above), 8-9, 15-16, 22-23  - Neupogen 5 mcg/kg/day - beginning D15, daily PRN if ANC ? 1.2    *if plan is to discharge patient with this, will need to look into a PA - further assess in coming days      # CNS prophylaxis  Per the GRAALL protocol, and risk for ALL entering the CNS, patient requires weekly LP w/ triple IT chemo as detailed below. Due to unsuccessful attempt at bedside with the CAPS team on 8/25, will require all subsequent LPs under fluoroscopic guidance. Had required multiple units of platelets (3u total) prior to procedure to reach goal >50k.   - Triple IT chemo (Cytarabine, Solu-Cortef, MTX); D1, 8, 15.   -8/26 CSF flow negative  -8/31 CSF flow negative  -9/7 CSF flow negative  -9/14 LP scheduled in XR at 11am (will need orders for labs and IT chemo prior)  - Pt expresses anxiety surrounding procedures; pre-medication with 0.5mg PO Ativan +/- Atarax available prior PRN    #  Pancytopenia  2/2 to underlying disease  - Transfuse to maintain Hgb >7, Plt >10K (>50K prior to LPs)    # Chemotherapy-induced Grade 1 neuropathy (fingertips)  Patient was experiencing intermittent left hand numbness (x 8/28) and right hand numbness (noted first on 8/30 evening) but the symptoms were not persistent and resolved on 8/31. On 9/4 she began experiencing numbness/tingling in her fingertips on both of her hands. This is not painful, nor has it affected her dexterity. Her feet have been spared.   - Given overall mild symptoms and goal to achieve CR for transplant, will continue full dose vincristine with Day 15, but if worsening will reassess with next dose on Day 22    ID  # Lactic acidosis  Pt triggering lactic acid protocol for tachycardia intermittently. Afebrile, no infectious symptoms. She is drinking about 1-2 pitchers of water daily. Suspect related to leukemia though could have a component of dehydration.   - Encouraged PO fluids and will bolus IVF PRN if hypovolemic  - LA 3.2 (9/8), ordered 500cc NS  - Continues to trigger sepsis protocol and with lactic acidosis, though is now drinking more fluids and clinically euvolemic. Hold off on additional IVF unless hypovolemic.     # PPx  -  mg BID  - Nebulized Pentamidine q28d for PJP ppx, last dose given 8/29. Next due ~9/27  - Levaquin 250 mg daily - Continuing while inpatient through completion of scheduled chemotherapy  - Maude 50 mg daily  - Continuing Micafungin while inpatient through completion of scheduled chemotherapy. If ANC <1000 and pt discharging home then plan would be to transition to fluconazole.      # H/o COVID-19 infection   Not vaccinated (due to fear of needles per pt report). Pt reported testing postiive for COVID approx 1 month prior to admission via home test. She never had it confirmed.   - COVID testing on admission was negative.     CV  # Hypertension  Fairly persistent elevated BP since admission to 150-160/80-90s.  She was not on anti-hypertensive medication PTA.   - started Amlodipine 5 mg daily (8/21-8/23); increased to 10 mg daily (x8/23) - hold parameters in place if SBP<110  - prn Hydralazine 10-20 mg available q6h for SBP>160     GI  # Indigestion/GERD  - Continue protonix 40mg once daily    - TUMs PRN, Maalox PRN.     NEURO  # Headaches, improved  Pt endorsed intermittent HA since admission. Denies vision changes or other neurological sx. Has h/o HTN as above. CSF flow cytometry (8/25) negative for involvement by ALL.  - MRI brain completed 8/21 was negative for intracranial pathology or tumor involvement.   - Tylenol PRN  - Continue weekly LPs x3 per GRAALL protocol  - patient had a mild headache after D15 LP, quickly resolved with tylenol. Encouraged fluids and will continue to monitor. If worsening, will manage conservatively with IVF, caffeine/tylenol, and supine position but if persistent could consider a blood patch.     GYN  # Menstruation  Pt actively menstruating as of 9/8. She reports that the amount of bleeding is normal for her and her hgb remains stable. Suppressing menses is typically indicated in patients undergoing induction chemotherapy given risk of thrombocytopenia and associated bleeding, however her platelets are recovered/near normal at 143 which makes her risk of bleeding less.   - consider low-dose progresterone pill if bleeding worsens or platelets are dropping  - do not use tampons given neutropenia, sanitary pads only    FEN  - No mIVF: bolus PRN.   - Lyte repletion per standing protocol  - Regular diet as tolerated     MISC  - Lines/Drains: PICC in place, remove on discharge  - DVT ppx: ppx lovenox 40mg once daily (9/9-x).   - GI ppx: PPI, TUMs PRN, Maalox PRN.   - Consults: PT  - CODE: FULL  - DISPO: Anticipate prolonged 4-6 week hospitalization for management of new leukemia.   - Follow-up/Referrals: Dr. Hull has agreed to continue to follow pt in clinic. Requested the following (APPT18),  not yet sheduled;     - labs/poss pRBC 2x weekly in [facility] 9/19-10/7   - AVELINA f/up (video visit OK) the week of 9/19   - BMBx ~9/21 at [facility]   - Follow up with Dr. 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 Reviewed by pt and this provider faxed the paperwork on 9/2.     Patient and plan of care was discussed with attending physician Dr. Alverto Rivers, PAAngellaC  Hematology/Oncology  Pager #[phone] or available on [place]    I spent 40 minutes in the care of this patient today, which included time necessary for review of interval events, obtaining history and physical exam, ordering medication(s)/test(s) as medically indicated, discussion with interdisciplinary/consult team(s), and documentation time. Over 50% of time was spent face-to-face and/or coordinating care.      Interval History   No acute events overnight, afebrile. April continues to feel well. Noting persistent bilateral fingertip/hand tingling and numbness, might be spreading down a little bit to her MCP. No pain or tingling in her toes. This does not affect her dexterity. Denies nausea or abdominal discomfort. Appetite is still OK. Has some LLQ discomfort and feels constipated. Is passing gas, will try miralax and senna today. Walking in the hallways. Menstrual cycle started 3 days ago, amount of bleed is normal for her. No headaches. A comprehensive review of systems was reviewed with the patient and the pertinent positives are listed in the HPI above.        Physical Exam   Temp: 97.9  F (36.6  C) Temp src: Oral BP: 108/60 Pulse: 86   Resp: 16 SpO2: 96 % O2 Device: None (Room air)    Vitals:    09/08/22 0811 09/09/22 0951 09/10/22 0742   Weight: 111.1 kg (244 lb 14.9 oz) 111.2 kg (245 lb 2.8 oz) 112.4 kg  (247 lb 12.8 oz)     Vital Signs with Ranges  Temp:  [97.3  F (36.3  C)-98.2  F (36.8  C)] 97.9  F (36.6  C)  Pulse:  [85-95] 86  Resp:  [16-18] 16  BP: (102-126)/(58-64) 108/60  SpO2:  [96 %-98 %] 96 %  I/O last 3 completed shifts:  In: 20 [I.V.:20]  Out: 1200 [Urine:1200]    Constitutional: Awake and conversational. Non- toxic appearing. No acute distress.   HEENT: Normocephalic, atraumatic . Sclerae anicteric. EOM intact. Moist mucus membranes   Respiratory: Breathing comfortable with no increased work on room air. No signs of respiratory distress or accessory muscle use. Lungs clear to auscultation bilaterally, no crackles or wheezing noted.  Cardiovascular: Regular rate and rhythm. Normal S1 and S2.   GI: Abdomen is soft, non-distended, non-tender and without distension. Bowel sounds present and normoactive.   Skin: Skin is clean, dry, intact. No jaundice appreciated.   Musculoskeletal/ Extremities: No redness, warmth, or swelling of the joints appreciated.   Neurologic: Alert and oriented. Speech normal. Grossly nonfocal. Memory and thought process preserved.  Neuropsychiatric: Calm, affect congruent to situation. Appropriate mood and affect. Good judgment and insight. No visual/auditory hallucinations.  LUE PICC C/D/I       Medications     - MEDICATION INSTRUCTIONS -         acyclovir  400 mg Oral BID     albuterol  2.5 mg Nebulization Q28 Days    And     pentamidine  300 mg Inhalation Q28 Days     amLODIPine  10 mg Oral Daily     enoxaparin ANTICOAGULANT  40 mg Subcutaneous Q24H     imatinib  400 mg Oral BID     levofloxacin  250 mg Oral Q24H     lidocaine (PF)  10 mL Subcutaneous Once     LORazepam  0.5 mg Oral Once     micafungin  50 mg Intravenous Q24H     pantoprazole  40 mg Oral QAM AC     senna-docusate  1-2 tablet Oral BID     Data   Results for orders placed or performed during the hospital encounter of 08/19/22 (from the past 24 hour(s))   CBC with platelets differential    Narrative    The  following orders were created for panel order CBC with platelets differential.  Procedure                               Abnormality         Status                     ---------                               -----------         ------                     CBC with platelets and d...[[id number]]  Abnormal            Final result               Manual Differential[[id number]]          Abnormal            Final result                 Please view results for these tests on the individual orders.   Phosphorus   Result Value Ref Range    Phosphorus 3.0 2.5 - 4.5 mg/dL   Basic metabolic panel   Result Value Ref Range    Creatinine 0.64 0.51 - 0.95 mg/dL    Sodium 142 136 - 145 mmol/L    Potassium 3.6 3.4 - 5.3 mmol/L    Urea Nitrogen 18.4 6.0 - 20.0 mg/dL    Chloride 107 98 - 107 mmol/L    Carbon Dioxide (CO2) 26 22 - 29 mmol/L    Anion Gap 9 7 - 15 mmol/L    Glucose 118 (H) 70 - 99 mg/dL    GFR Estimate >90 >60 mL/min/1.73m2    Calcium 8.1 (L) 8.6 - 10.0 mg/dL   Magnesium   Result Value Ref Range    Magnesium 2.1 1.7 - 2.3 mg/dL   CBC with platelets and differential   Result Value Ref Range    WBC Count 2.2 (L) 4.0 - 11.0 10e3/uL    RBC Count 2.50 (L) 3.80 - 5.20 10e6/uL    Hemoglobin 7.3 (L) 11.7 - 15.7 g/dL    Hematocrit 22.4 (L) 35.0 - 47.0 %    MCV 90 78 - 100 fL    MCH 29.2 26.5 - 33.0 pg    MCHC 32.6 31.5 - 36.5 g/dL    RDW 15.7 (H) 10.0 - 15.0 %    Platelet Count 198 150 - 450 10e3/uL   Manual Differential   Result Value Ref Range    % Neutrophils 93 %    % Lymphocytes 5 %    % Monocytes 2 %    % Eosinophils 0 %    % Basophils 0 %    NRBCs per 100 WBC 1 (H) <=0 %    Absolute Neutrophils 2.0 1.6 - 8.3 10e3/uL    Absolute Lymphocytes 0.1 (L) 0.8 - 5.3 10e3/uL    Absolute Monocytes 0.0 0.0 - 1.3 10e3/uL    Absolute Eosinophils 0.0 0.0 - 0.7 10e3/uL    Absolute Basophils 0.0 0.0 - 0.2 10e3/uL    Absolute NRBCs 0.0 <=0.0 10e3/uL    RBC Morphology Confirmed RBC Indices     Platelet Assessment  Automated Count Confirmed.  Platelet morphology is normal.     Automated Count Confirmed. Platelet morphology is normal.

## 2022-09-10 NOTE — PLAN OF CARE
"Goal Outcome Evaluation:    Plan of Care Reviewed With: patient     Overall Patient Progress: improving    Time 6156-0878    /56 (BP Location: Right arm)   Pulse 99   Temp 98.5  F (36.9  C) (Oral)   Resp 20   Ht 1.626 m (5' 4\")   Wt 112.4 kg (247 lb 12.8 oz)   SpO2 98%   BMI 42.53 kg/m      Reason for admission: Day 18 GRAALL + imatinib  Activity: Independent  Pain: Denies  Neuro: A&O x 4  Cardiac: wnl  Respiratory: wnl  GI/: voidng adequate, last BM 9/7 took MOM  Diet: Regular   Lines: PICC   Labs/imaging: Reviewed       New changes this shift: took MOM & 2 senna, passing gas but no BM. Melatonin HS, can refuse lactic acid per      Plan: BMT consult on 9/12      Continue to monitor and follow POC   "

## 2022-09-10 NOTE — CARE PLAN
"Pt denies VS check and assessment at this time. Denies any concerns. She states, \"I just want to sleep\".  "

## 2022-09-10 NOTE — PLAN OF CARE
"Goal Outcome Evaluation:    Plan of Care Reviewed With: patient     Overall Patient Progress: improving    Time 1624-1575    /59 (BP Location: Right arm)   Pulse 95   Temp 97.3  F (36.3  C) (Temporal)   Resp 18   Ht 1.626 m (5' 4\")   Wt 111.2 kg (245 lb 2.8 oz)   SpO2 98%   BMI 42.08 kg/m      Reason for admission: day #17 GRALL + imatinib   Activity: Independent  Pain: Denies  Neuro: wnl  Cardiac: wnl  Respiratory: wnl  GI/: Voiding adequate, last BM 9/7 gave senna  Diet: Regular  Lines: PICC  Labs/imaging: Reviewed      New changes this shift: Tolerated lovenox shot well, BMT consult 9/12     Plan: Anticipate prolonged 4-6 week hospitalization for management of new leukemia.      Continue to monitor and follow POC   "

## 2022-09-10 NOTE — PLAN OF CARE
Goal Outcome Evaluation:  Afebrile. Denied pain or nausea. Blood counts improving. Up independently.

## 2022-09-11 LAB
ANION GAP SERPL CALCULATED.3IONS-SCNC: 11 MMOL/L (ref 7–15)
BASOPHILS # BLD AUTO: 0 10E3/UL (ref 0–0.2)
BASOPHILS NFR BLD AUTO: 0 %
BUN SERPL-MCNC: 14 MG/DL (ref 6–20)
CALCIUM SERPL-MCNC: 8.2 MG/DL (ref 8.6–10)
CHLORIDE SERPL-SCNC: 103 MMOL/L (ref 98–107)
CREAT SERPL-MCNC: 0.66 MG/DL (ref 0.51–0.95)
DEPRECATED HCO3 PLAS-SCNC: 25 MMOL/L (ref 22–29)
EOSINOPHIL # BLD AUTO: 0 10E3/UL (ref 0–0.7)
EOSINOPHIL NFR BLD AUTO: 0 %
ERYTHROCYTE [DISTWIDTH] IN BLOOD BY AUTOMATED COUNT: 16 % (ref 10–15)
GFR SERPL CREATININE-BSD FRML MDRD: >90 ML/MIN/1.73M2
GLUCOSE SERPL-MCNC: 95 MG/DL (ref 70–99)
HCT VFR BLD AUTO: 25.8 % (ref 35–47)
HGB BLD-MCNC: 8.3 G/DL (ref 11.7–15.7)
IMM GRANULOCYTES # BLD: 0 10E3/UL
IMM GRANULOCYTES NFR BLD: 1 %
LYMPHOCYTES # BLD AUTO: 1.7 10E3/UL (ref 0.8–5.3)
LYMPHOCYTES NFR BLD AUTO: 58 %
MCH RBC QN AUTO: 29.9 PG (ref 26.5–33)
MCHC RBC AUTO-ENTMCNC: 32.2 G/DL (ref 31.5–36.5)
MCV RBC AUTO: 93 FL (ref 78–100)
MONOCYTES # BLD AUTO: 0.2 10E3/UL (ref 0–1.3)
MONOCYTES NFR BLD AUTO: 7 %
NEUTROPHILS # BLD AUTO: 1 10E3/UL (ref 1.6–8.3)
NEUTROPHILS NFR BLD AUTO: 34 %
NRBC # BLD AUTO: 0 10E3/UL
NRBC BLD AUTO-RTO: 1 /100
PHOSPHATE SERPL-MCNC: 2.4 MG/DL (ref 2.5–4.5)
PLATELET # BLD AUTO: 271 10E3/UL (ref 150–450)
POTASSIUM SERPL-SCNC: 3.3 MMOL/L (ref 3.4–5.3)
POTASSIUM SERPL-SCNC: 3.9 MMOL/L (ref 3.4–5.3)
RBC # BLD AUTO: 2.78 10E6/UL (ref 3.8–5.2)
SODIUM SERPL-SCNC: 139 MMOL/L (ref 136–145)
WBC # BLD AUTO: 3 10E3/UL (ref 4–11)

## 2022-09-11 PROCEDURE — 258N000003 HC RX IP 258 OP 636: Performed by: PHYSICIAN ASSISTANT

## 2022-09-11 PROCEDURE — 250N000013 HC RX MED GY IP 250 OP 250 PS 637: Performed by: PHYSICIAN ASSISTANT

## 2022-09-11 PROCEDURE — 84132 ASSAY OF SERUM POTASSIUM: CPT | Performed by: INTERNAL MEDICINE

## 2022-09-11 PROCEDURE — 250N000011 HC RX IP 250 OP 636: Performed by: PHYSICIAN ASSISTANT

## 2022-09-11 PROCEDURE — 250N000013 HC RX MED GY IP 250 OP 250 PS 637: Performed by: INTERNAL MEDICINE

## 2022-09-11 PROCEDURE — 120N000002 HC R&B MED SURG/OB UMMC

## 2022-09-11 PROCEDURE — 85025 COMPLETE CBC W/AUTO DIFF WBC: CPT | Performed by: PHYSICIAN ASSISTANT

## 2022-09-11 PROCEDURE — 258N000003 HC RX IP 258 OP 636: Performed by: STUDENT IN AN ORGANIZED HEALTH CARE EDUCATION/TRAINING PROGRAM

## 2022-09-11 PROCEDURE — 250N000011 HC RX IP 250 OP 636: Performed by: STUDENT IN AN ORGANIZED HEALTH CARE EDUCATION/TRAINING PROGRAM

## 2022-09-11 PROCEDURE — 36592 COLLECT BLOOD FROM PICC: CPT | Performed by: INTERNAL MEDICINE

## 2022-09-11 PROCEDURE — 99233 SBSQ HOSP IP/OBS HIGH 50: CPT | Mod: FS | Performed by: PHYSICIAN ASSISTANT

## 2022-09-11 PROCEDURE — 250N000013 HC RX MED GY IP 250 OP 250 PS 637: Performed by: STUDENT IN AN ORGANIZED HEALTH CARE EDUCATION/TRAINING PROGRAM

## 2022-09-11 PROCEDURE — 80048 BASIC METABOLIC PNL TOTAL CA: CPT | Performed by: PHYSICIAN ASSISTANT

## 2022-09-11 PROCEDURE — 84100 ASSAY OF PHOSPHORUS: CPT | Performed by: PHYSICIAN ASSISTANT

## 2022-09-11 PROCEDURE — 36592 COLLECT BLOOD FROM PICC: CPT | Performed by: PHYSICIAN ASSISTANT

## 2022-09-11 PROCEDURE — 99356 PR PROLONGED SERV,INPATIENT,1ST HR: CPT | Mod: FS | Performed by: PHYSICIAN ASSISTANT

## 2022-09-11 RX ORDER — POTASSIUM CHLORIDE 750 MG/1
40 TABLET, EXTENDED RELEASE ORAL ONCE
Status: COMPLETED | OUTPATIENT
Start: 2022-09-11 | End: 2022-09-11

## 2022-09-11 RX ADMIN — SENNOSIDES AND DOCUSATE SODIUM 2 TABLET: 8.6; 5 TABLET ORAL at 09:05

## 2022-09-11 RX ADMIN — ONDANSETRON 4 MG: 4 TABLET, ORALLY DISINTEGRATING ORAL at 09:59

## 2022-09-11 RX ADMIN — MICAFUNGIN 50 MG: 10 INJECTION, POWDER, LYOPHILIZED, FOR SOLUTION INTRAVENOUS at 09:05

## 2022-09-11 RX ADMIN — LEVOFLOXACIN 250 MG: 250 TABLET, FILM COATED ORAL at 09:05

## 2022-09-11 RX ADMIN — DEXTROSE MONOHYDRATE 20 ML: 50 INJECTION, SOLUTION INTRAVENOUS at 14:50

## 2022-09-11 RX ADMIN — ACYCLOVIR 400 MG: 400 TABLET ORAL at 20:42

## 2022-09-11 RX ADMIN — POTASSIUM & SODIUM PHOSPHATES POWDER PACK 280-160-250 MG 1 PACKET: 280-160-250 PACK at 15:28

## 2022-09-11 RX ADMIN — FILGRASTIM 480 MCG: 480 INJECTION, SOLUTION INTRAVENOUS; SUBCUTANEOUS at 14:53

## 2022-09-11 RX ADMIN — IMATINIB MESYLATE 400 MG: 400 TABLET, FILM COATED ORAL at 09:05

## 2022-09-11 RX ADMIN — Medication 5 ML: at 10:35

## 2022-09-11 RX ADMIN — IMATINIB MESYLATE 400 MG: 400 TABLET, FILM COATED ORAL at 20:42

## 2022-09-11 RX ADMIN — AMLODIPINE BESYLATE 10 MG: 10 TABLET ORAL at 09:05

## 2022-09-11 RX ADMIN — POTASSIUM & SODIUM PHOSPHATES POWDER PACK 280-160-250 MG 1 PACKET: 280-160-250 PACK at 20:42

## 2022-09-11 RX ADMIN — SENNOSIDES AND DOCUSATE SODIUM 2 TABLET: 8.6; 5 TABLET ORAL at 20:42

## 2022-09-11 RX ADMIN — ALUMINUM HYDROXIDE, MAGNESIUM HYDROXIDE, AND SIMETHICONE 30 ML: 200; 200; 20 SUSPENSION ORAL at 09:05

## 2022-09-11 RX ADMIN — ACYCLOVIR 400 MG: 400 TABLET ORAL at 09:05

## 2022-09-11 RX ADMIN — POLYETHYLENE GLYCOL 3350 17 G: 17 POWDER, FOR SOLUTION ORAL at 09:04

## 2022-09-11 RX ADMIN — Medication 5 ML: at 16:21

## 2022-09-11 RX ADMIN — DEXTROSE MONOHYDRATE 20 ML: 50 INJECTION, SOLUTION INTRAVENOUS at 15:31

## 2022-09-11 RX ADMIN — Medication 6 MG: at 22:15

## 2022-09-11 RX ADMIN — PANTOPRAZOLE SODIUM 40 MG: 40 TABLET, DELAYED RELEASE ORAL at 09:04

## 2022-09-11 RX ADMIN — POTASSIUM CHLORIDE 40 MEQ: 750 TABLET, EXTENDED RELEASE ORAL at 11:40

## 2022-09-11 RX ADMIN — POTASSIUM & SODIUM PHOSPHATES POWDER PACK 280-160-250 MG 1 PACKET: 280-160-250 PACK at 11:40

## 2022-09-11 RX ADMIN — ENOXAPARIN SODIUM 40 MG: 40 INJECTION SUBCUTANEOUS at 20:41

## 2022-09-11 ASSESSMENT — ACTIVITIES OF DAILY LIVING (ADL)
ADLS_ACUITY_SCORE: 22

## 2022-09-11 NOTE — PLAN OF CARE
Goal Outcome Evaluation:    Plan of Care Reviewed With: patient     Overall Patient Progress: improving  Neuro: A&Ox4.   Cardiac: SR. VSS.   Respiratory: Sating 99% on RA.  GI/: Adequate urine output. BM X0 Patient is complaining of constipation and has been receiving stool softeners daily   Diet/appetite: Tolerating regular diet. Eating well.  Activity:  Independent , up to chair and in halls.  Pain: At acceptable level on current regimen.   Skin: No new deficits noted.  LDA's:    Plan: Continue with POC. Notify primary team with changes.

## 2022-09-11 NOTE — PLAN OF CARE
Goal Outcome Evaluation:  Denied pain. Potassium and phosphorus replaced and rechecks ordered. Given Zofran for nausea with relief. Given Neupogen for A.N.C. of 1000. Afebrile. Hopes to discharge midweek. Up independently. She had a formed stool.

## 2022-09-11 NOTE — PROGRESS NOTES
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 Pre-phase steroids initiated (8/21-8/24), then initiated GRAALL + imatinib on 8/24.     TODAY:   - Day 19 GRAALL + imatinib. Plan for Neupogen daily PRN if ANC ? 1.2 (ANC 1.0 today, give neupogen)  - Continues to have intermittent lactic acidosis. Pt is euvolemic, hold IVF and encourage PO fluid intake. No e/o infection otherwise, monitor off of antibiotics. Suspect lactic acidosis is secondary to leukemia. If febrile will obtain BCx, UA/UCx, CXR and start broad-spectrum antibiotics.   - D22 LP w/ IT chemo scheduled 9/14 at 11am    HEME  # Ph+ B-ALL  Patient presented to outside hospital with c/o Right upper quadrant pain and was subsequently found to have a markedly elevated white blood cell count concerning for acute leukemia. While at OSH, she had CT PE protocol as well as CT A/P. These identified no PE, no acute or localized intraabdominal inflammatory process, mild splenomegaly, and few inconspicous low-density structures in the liver. She was transferred to [facility] for further workup and management.  - s/p Hydrea 1g TID (8/21-8/23) with improvement in WBC (100K ? 12K)  - CT CAP completed at OSH. CT neck 8/22 showed multiple, b/l suspicious-appearing LN (R level Ia, L level III, R level II-III) - not enlarged though they exhibit abnormal shapes.   - MR brain (8/21) - no acute IC pathology, no e/o intracranial CNS involvement.  - BMBx completed 8/20 - demonstrated B-ALL with 85% blasts and hypercellular marrow. IHC shows CD10+, CD34+. Dry tap, so additional studies sent on PB: FISH findings c/w Ph+ ALL with  BCR-ABL1 fusion present in 93.5% of round nucelei. Abnormal cytogenetics. For unknown reason, molecular studies were cancelled. Have reordered on PB today.  - Pt consented to HMTB; BMBx at dx was dry tap d/t WBC burden. Ordered peripheral blood samples on 8/22.   - Initiated pre-phase steroids with Prednisone 60 mg x8/21; increased to 60 mg/m2 (140 mg) 8/22-8/24. Transitioned to protocol-directed Dex 40 mg on 8/25.   - Clonoseq ID testing to help estimate MRD in future assessments. Clonal testing collected and sent on 8/23. Plan to order Clonoseq MRD testing with post-induction BMBx.   - Case discussed at Heme malignancy conference on 8/23, per consensus plan was to start GRAALL + Imatinib on 8/24. No clear indication for addition of Rituximab.    - No e/o TLS, allopurinol discontinued x9/5  - BMT consult scheduled 9/12 @ 9am with Dr. Walsh    Treatment Plan: GRAALL + imatinib (D1=8/24/22)  - Imatinib 400 mg BID - D1-28  - Vincristine 2 mg IV x1 - D1, 8, 15, 22   - Dexamethasone 40 mg daily - D2 (held D1, instead given pre-phase pred as above), 8-9, 15-16, 22-23  - Neupogen 5 mcg/kg/day - beginning D15, daily PRN if ANC ? 1.2    *if plan is to discharge patient with this, will need to look into a PA - further assess in coming days      # CNS prophylaxis  Per the GRAALL protocol, and risk for ALL entering the CNS, patient requires weekly LP w/ triple IT chemo as detailed below. Due to unsuccessful attempt at bedside with the CAPS team on 8/25, will require all subsequent LPs under fluoroscopic guidance. Had required multiple units of platelets (3u total) prior to procedure to reach goal >50k.   - Triple IT chemo (Cytarabine, Solu-Cortef, MTX); D1, 8, 15.   -8/26 CSF flow negative  -8/31 CSF flow negative  -9/7 CSF flow negative  -9/14 LP scheduled in XR at 11am (will need orders for labs and IT chemo prior)  - Pt expresses anxiety surrounding procedures; pre-medication with 0.5mg PO Ativan +/- Atarax available  prior PRN    # Pancytopenia  2/2 to underlying disease  - Transfuse to maintain Hgb >7, Plt >10K (>50K prior to LPs)    # Chemotherapy-induced Grade 1 neuropathy (fingertips)  Patient was experiencing intermittent left hand numbness (x 8/28) and right hand numbness (noted first on 8/30 evening) but the symptoms were not persistent and resolved on 8/31. On 9/4 she began experiencing numbness/tingling in her fingertips on both of her hands. This is not painful, nor has it affected her dexterity. Her feet have been spared. After D18 Vincristine she has had intermittent tingling down to her MCP, but the numbness remains in her fingertips.   - Given overall mild symptoms and goal to achieve CR for transplant, will continue full dose vincristine with Day 15, but if worsening will reassess with next dose on Day 22    ID  # Lactic acidosis  Pt triggering lactic acid protocol for tachycardia intermittently. Afebrile, no infectious symptoms. She is drinking about 1-2 pitchers of water daily. Suspect related to leukemia though could have a component of dehydration.   - Encouraged PO fluids and will bolus IVF PRN if hypovolemic  - LA 3.2 (9/8), ordered 500cc NS  - Intermittently triggering sepsis protocol and with lactic acidosis, though is now drinking more fluids and clinically euvolemic. Hold off on additional IVF unless hypovolemic.     # PPx  -  mg BID  - Nebulized Pentamidine q28d for PJP ppx, last dose given 8/29. Next due ~9/27  - Levaquin 250 mg daily - Continuing while inpatient through completion of scheduled chemotherapy  - Maude 50 mg daily  - Continuing Micafungin while inpatient through completion of scheduled chemotherapy. If ANC <1000 and pt discharging home then plan would be to transition to fluconazole.      # H/o COVID-19 infection   Not vaccinated (due to fear of needles per pt report). Pt reported testing postiive for COVID approx 1 month prior to admission via home test. She never had it confirmed.    - COVID testing on admission was negative.     CV  # Hypertension  Fairly persistent elevated BP since admission to 150-160/80-90s. She was not on anti-hypertensive medication PTA.   - started Amlodipine 5 mg daily (8/21-8/23); increased to 10 mg daily (x8/23) - hold parameters in place if SBP<110  - prn Hydralazine 10-20 mg available q6h for SBP>160     GI  # Indigestion/GERD  - Continue protonix 40mg once daily    - TUMs PRN, Maalox PRN.     NEURO  # Headaches, improved  Pt endorsed intermittent HA since admission. Denies vision changes or other neurological sx. Has h/o HTN as above. CSF flow cytometry (8/25) negative for involvement by ALL.  - MRI brain completed 8/21 was negative for intracranial pathology or tumor involvement.   - Tylenol PRN  - Continue weekly LPs x3 per GRAALL protocol  - patient had a mild headache after D15 LP, quickly resolved with tylenol. Encouraged fluids and will continue to monitor. If worsening, will manage conservatively with IVF, caffeine/tylenol, and supine position but if persistent could consider a blood patch.     GYN  # Menstruation  Pt actively menstruating as of 9/8. She reports that the amount of bleeding is normal for her and her hgb remains stable. Suppressing menses is typically indicated in patients undergoing induction chemotherapy given risk of thrombocytopenia and associated bleeding, however her platelets are recovered/near normal at 143 which makes her risk of bleeding less.   - consider low-dose progresterone pill if bleeding worsens or platelets are dropping  - do not use tampons given neutropenia, sanitary pads only    FEN  # Hypokalemia, Hypophosphatemia- replace per standing protocol    - No mIVF: bolus PRN.   - Lyte repletion per standing protocol  - Regular diet as tolerated       MISC  - Lines/Drains: PICC in place, remove on discharge  - DVT ppx: ppx lovenox 40mg once daily (9/9-x).   - GI ppx: PPI, TUMs PRN, Maalox PRN.   - Consults: PT  - CODE: FULL  -  DISPO: Anticipate prolonged 4-6 week hospitalization for management of new leukemia.   - Follow-up/Referrals: Dr. Hull has agreed to continue to follow pt in clinic. Requested the following (APPT18), not yet sheduled;     - labs/poss pRBC 2x weekly in [facility] 9/19-10/7   - AVELINA f/up (video visit OK) the week of 9/19   - BMBx ~9/21 at [facility]   - Follow up with Dr. 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 Reviewed by pt and this provider faxed the paperwork on 9/2.     Patient and plan of care was discussed with attending physician Dr. Alverto Rivers, PAIMANI  Hematology/Oncology  Pager #[phone] or available on [employer]    I spent 40 minutes in the care of this patient today, which included time necessary for review of interval events, obtaining history and physical exam, ordering medication(s)/test(s) as medically indicated, discussion with interdisciplinary/consult team(s), and documentation time. Over 50% of time was spent face-to-face and/or coordinating care.      Interval History   No acute events overnight, afebrile. She had a BM this morning. LLQ cramping is present but improved. Overall feels well. Noting persistent bilateral fingertip/hand tingling and numbness, might be spreading down a little bit to her MCP. No pain or tingling in her toes. This does not affect her dexterity. Denies nausea or abdominal discomfort. Appetite is still OK.Walking in the hallways. Menstrual cycle started ~4 days ago, quantity of bleed is normal for her. No headaches. A comprehensive review of systems was reviewed with the patient and the pertinent positives are listed in the HPI above.        Physical Exam   Temp: 98  F (36.7  C) Temp src: Oral BP: 117/59 Pulse: 89   Resp: 16 SpO2:  98 % O2 Device: None (Room air)    Vitals:    09/09/22 0951 09/10/22 0742 09/11/22 1030   Weight: 111.2 kg (245 lb 2.8 oz) 112.4 kg (247 lb 12.8 oz) 113.2 kg (249 lb 9 oz)     Vital Signs with Ranges  Temp:  [97.9  F (36.6  C)-98.6  F (37  C)] 98  F (36.7  C)  Pulse:  [88-99] 89  Resp:  [16-20] 16  BP: (105-117)/(56-83) 117/59  SpO2:  [97 %-98 %] 98 %  I/O last 3 completed shifts:  In: -   Out: 800 [Urine:800]    Constitutional: Awake and conversational. Non- toxic appearing. No acute distress.   HEENT: Normocephalic, atraumatic . Sclerae anicteric. EOM intact. Moist mucus membranes   Respiratory: Breathing comfortable with no increased work on room air. No signs of respiratory distress or accessory muscle use. Lungs clear to auscultation bilaterally, no crackles or wheezing noted.  Cardiovascular: Regular rate and rhythm. Normal S1 and S2.   GI: Abdomen is soft, non-distended, non-tender and without distension. Bowel sounds present and normoactive.   Skin: Skin is clean, dry, intact. No jaundice appreciated. Fading eechymoses from prior LPs   Musculoskeletal/ Extremities: No redness, warmth, or swelling of the joints appreciated.   Neurologic: Alert and oriented. Speech normal. Grossly nonfocal. Memory and thought process preserved.  Neuropsychiatric: Calm, affect congruent to situation. Appropriate mood and affect. Good judgment and insight. No visual/auditory hallucinations.  E PIC C/D/I       Medications     - MEDICATION INSTRUCTIONS -         acyclovir  400 mg Oral BID     albuterol  2.5 mg Nebulization Q28 Days    And     pentamidine  300 mg Inhalation Q28 Days     amLODIPine  10 mg Oral Daily     enoxaparin ANTICOAGULANT  40 mg Subcutaneous Q24H     imatinib  400 mg Oral BID     levofloxacin  250 mg Oral Q24H     lidocaine (PF)  10 mL Subcutaneous Once     LORazepam  0.5 mg Oral Once     micafungin  50 mg Intravenous Q24H     pantoprazole  40 mg Oral QAM AC     potassium & sodium phosphates  1 packet Oral  or Feeding Tube Q4H     senna-docusate  1-2 tablet Oral BID     Data   Results for orders placed or performed during the hospital encounter of 08/19/22 (from the past 24 hour(s))   CBC with platelets differential    Narrative    The following orders were created for panel order CBC with platelets differential.  Procedure                               Abnormality         Status                     ---------                               -----------         ------                     CBC with platelets and d...[[id number]]  Abnormal            Final result                 Please view results for these tests on the individual orders.   Phosphorus   Result Value Ref Range    Phosphorus 2.4 (L) 2.5 - 4.5 mg/dL   Basic metabolic panel   Result Value Ref Range    Creatinine 0.66 0.51 - 0.95 mg/dL    Sodium 139 136 - 145 mmol/L    Potassium 3.3 (L) 3.4 - 5.3 mmol/L    Urea Nitrogen 14.0 6.0 - 20.0 mg/dL    Chloride 103 98 - 107 mmol/L    Carbon Dioxide (CO2) 25 22 - 29 mmol/L    Anion Gap 11 7 - 15 mmol/L    Glucose 95 70 - 99 mg/dL    GFR Estimate >90 >60 mL/min/1.73m2    Calcium 8.2 (L) 8.6 - 10.0 mg/dL   CBC with platelets and differential   Result Value Ref Range    WBC Count 3.0 (L) 4.0 - 11.0 10e3/uL    RBC Count 2.78 (L) 3.80 - 5.20 10e6/uL    Hemoglobin 8.3 (L) 11.7 - 15.7 g/dL    Hematocrit 25.8 (L) 35.0 - 47.0 %    MCV 93 78 - 100 fL    MCH 29.9 26.5 - 33.0 pg    MCHC 32.2 31.5 - 36.5 g/dL    RDW 16.0 (H) 10.0 - 15.0 %    Platelet Count 271 150 - 450 10e3/uL    % Neutrophils 34 %    % Lymphocytes 58 %    % Monocytes 7 %    % Eosinophils 0 %    % Basophils 0 %    % Immature Granulocytes 1 %    NRBCs per 100 WBC 1 (H) <1 /100    Absolute Neutrophils 1.0 (L) 1.6 - 8.3 10e3/uL    Absolute Lymphocytes 1.7 0.8 - 5.3 10e3/uL    Absolute Monocytes 0.2 0.0 - 1.3 10e3/uL    Absolute Eosinophils 0.0 0.0 - 0.7 10e3/uL    Absolute Basophils 0.0 0.0 - 0.2 10e3/uL    Absolute Immature Granulocytes 0.0 <=0.4 10e3/uL    Absolute  NRBCs 0.0 10e3/uL

## 2022-09-12 ENCOUNTER — ALLIED HEALTH/NURSE VISIT (OUTPATIENT)
Dept: TRANSPLANT | Facility: CLINIC | Age: 42
End: 2022-09-12
Payer: COMMERCIAL

## 2022-09-12 DIAGNOSIS — Z71.9 VISIT FOR COUNSELING: Primary | ICD-10-CM

## 2022-09-12 LAB
ALBUMIN SERPL BCG-MCNC: 3.5 G/DL (ref 3.5–5.2)
ALP SERPL-CCNC: 77 U/L (ref 35–104)
ALT SERPL W P-5'-P-CCNC: 28 U/L (ref 10–35)
ANION GAP SERPL CALCULATED.3IONS-SCNC: 6 MMOL/L (ref 7–15)
AST SERPL W P-5'-P-CCNC: 16 U/L (ref 10–35)
BACTERIA CSF CULT: NO GROWTH
BASOPHILS # BLD MANUAL: 0 10E3/UL (ref 0–0.2)
BASOPHILS NFR BLD MANUAL: 0 %
BILIRUB DIRECT SERPL-MCNC: <0.2 MG/DL (ref 0–0.3)
BILIRUB SERPL-MCNC: 0.6 MG/DL
BUN SERPL-MCNC: 13.9 MG/DL (ref 6–20)
CALCIUM SERPL-MCNC: 8.2 MG/DL (ref 8.6–10)
CHLORIDE SERPL-SCNC: 105 MMOL/L (ref 98–107)
CREAT SERPL-MCNC: 0.79 MG/DL (ref 0.51–0.95)
DEPRECATED HCO3 PLAS-SCNC: 27 MMOL/L (ref 22–29)
EOSINOPHIL # BLD MANUAL: 0.1 10E3/UL (ref 0–0.7)
EOSINOPHIL NFR BLD MANUAL: 1 %
ERYTHROCYTE [DISTWIDTH] IN BLOOD BY AUTOMATED COUNT: 16.3 % (ref 10–15)
FIBRINOGEN PPP-MCNC: 227 MG/DL (ref 170–490)
GFR SERPL CREATININE-BSD FRML MDRD: >90 ML/MIN/1.73M2
GLUCOSE SERPL-MCNC: 106 MG/DL (ref 70–99)
GRAM STAIN RESULT: NORMAL
GRAM STAIN RESULT: NORMAL
HCT VFR BLD AUTO: 24.8 % (ref 35–47)
HGB BLD-MCNC: 8 G/DL (ref 11.7–15.7)
INR PPP: 1.02 (ref 0.85–1.15)
LYMPHOCYTES # BLD MANUAL: 2.5 10E3/UL (ref 0.8–5.3)
LYMPHOCYTES NFR BLD MANUAL: 42 %
MCH RBC QN AUTO: 30.2 PG (ref 26.5–33)
MCHC RBC AUTO-ENTMCNC: 32.3 G/DL (ref 31.5–36.5)
MCV RBC AUTO: 94 FL (ref 78–100)
MONOCYTES # BLD MANUAL: 0.3 10E3/UL (ref 0–1.3)
MONOCYTES NFR BLD MANUAL: 5 %
MYELOCYTES # BLD MANUAL: 0.1 10E3/UL
MYELOCYTES NFR BLD MANUAL: 1 %
NEUTROPHILS # BLD MANUAL: 3.1 10E3/UL (ref 1.6–8.3)
NEUTROPHILS NFR BLD MANUAL: 51 %
NRBC # BLD AUTO: 0.1 10E3/UL
NRBC BLD MANUAL-RTO: 2 %
PHOSPHATE SERPL-MCNC: 2.5 MG/DL (ref 2.5–4.5)
PLAT MORPH BLD: ABNORMAL
PLATELET # BLD AUTO: 328 10E3/UL (ref 150–450)
POLYCHROMASIA BLD QL SMEAR: SLIGHT
POTASSIUM SERPL-SCNC: 3.4 MMOL/L (ref 3.4–5.3)
POTASSIUM SERPL-SCNC: 3.8 MMOL/L (ref 3.4–5.3)
PROT SERPL-MCNC: 5.5 G/DL (ref 6.4–8.3)
RBC # BLD AUTO: 2.65 10E6/UL (ref 3.8–5.2)
RBC MORPH BLD: ABNORMAL
SODIUM SERPL-SCNC: 138 MMOL/L (ref 136–145)
TOXIC GRANULES BLD QL SMEAR: PRESENT
WBC # BLD AUTO: 6 10E3/UL (ref 4–11)

## 2022-09-12 PROCEDURE — 99356 PR PROLONGED SERV,INPATIENT,1ST HR: CPT | Mod: FS | Performed by: PHYSICIAN ASSISTANT

## 2022-09-12 PROCEDURE — 85610 PROTHROMBIN TIME: CPT | Performed by: PHYSICIAN ASSISTANT

## 2022-09-12 PROCEDURE — 250N000013 HC RX MED GY IP 250 OP 250 PS 637

## 2022-09-12 PROCEDURE — 250N000013 HC RX MED GY IP 250 OP 250 PS 637: Performed by: PHYSICIAN ASSISTANT

## 2022-09-12 PROCEDURE — 85027 COMPLETE CBC AUTOMATED: CPT | Performed by: PHYSICIAN ASSISTANT

## 2022-09-12 PROCEDURE — 258N000003 HC RX IP 258 OP 636: Performed by: STUDENT IN AN ORGANIZED HEALTH CARE EDUCATION/TRAINING PROGRAM

## 2022-09-12 PROCEDURE — 120N000002 HC R&B MED SURG/OB UMMC

## 2022-09-12 PROCEDURE — 250N000013 HC RX MED GY IP 250 OP 250 PS 637: Performed by: INTERNAL MEDICINE

## 2022-09-12 PROCEDURE — 250N000011 HC RX IP 250 OP 636: Performed by: PHYSICIAN ASSISTANT

## 2022-09-12 PROCEDURE — 250N000011 HC RX IP 250 OP 636: Performed by: STUDENT IN AN ORGANIZED HEALTH CARE EDUCATION/TRAINING PROGRAM

## 2022-09-12 PROCEDURE — 99233 SBSQ HOSP IP/OBS HIGH 50: CPT | Mod: FS | Performed by: PHYSICIAN ASSISTANT

## 2022-09-12 PROCEDURE — 84100 ASSAY OF PHOSPHORUS: CPT | Performed by: PHYSICIAN ASSISTANT

## 2022-09-12 PROCEDURE — 82248 BILIRUBIN DIRECT: CPT | Performed by: PHYSICIAN ASSISTANT

## 2022-09-12 PROCEDURE — 36592 COLLECT BLOOD FROM PICC: CPT

## 2022-09-12 PROCEDURE — 36592 COLLECT BLOOD FROM PICC: CPT | Performed by: PHYSICIAN ASSISTANT

## 2022-09-12 PROCEDURE — 85007 BL SMEAR W/DIFF WBC COUNT: CPT | Performed by: PHYSICIAN ASSISTANT

## 2022-09-12 PROCEDURE — 85384 FIBRINOGEN ACTIVITY: CPT | Performed by: PHYSICIAN ASSISTANT

## 2022-09-12 PROCEDURE — 250N000013 HC RX MED GY IP 250 OP 250 PS 637: Performed by: STUDENT IN AN ORGANIZED HEALTH CARE EDUCATION/TRAINING PROGRAM

## 2022-09-12 PROCEDURE — 99253 IP/OBS CNSLTJ NEW/EST LOW 45: CPT | Performed by: STUDENT IN AN ORGANIZED HEALTH CARE EDUCATION/TRAINING PROGRAM

## 2022-09-12 PROCEDURE — 84132 ASSAY OF SERUM POTASSIUM: CPT

## 2022-09-12 RX ORDER — POTASSIUM CHLORIDE 750 MG/1
40 TABLET, EXTENDED RELEASE ORAL ONCE
Status: COMPLETED | OUTPATIENT
Start: 2022-09-12 | End: 2022-09-12

## 2022-09-12 RX ADMIN — SENNOSIDES AND DOCUSATE SODIUM 2 TABLET: 8.6; 5 TABLET ORAL at 20:31

## 2022-09-12 RX ADMIN — MICAFUNGIN 50 MG: 10 INJECTION, POWDER, LYOPHILIZED, FOR SOLUTION INTRAVENOUS at 08:20

## 2022-09-12 RX ADMIN — IMATINIB MESYLATE 400 MG: 400 TABLET, FILM COATED ORAL at 20:31

## 2022-09-12 RX ADMIN — SENNOSIDES AND DOCUSATE SODIUM 2 TABLET: 8.6; 5 TABLET ORAL at 08:20

## 2022-09-12 RX ADMIN — Medication 6 MG: at 21:42

## 2022-09-12 RX ADMIN — ACYCLOVIR 400 MG: 400 TABLET ORAL at 08:20

## 2022-09-12 RX ADMIN — LEVOFLOXACIN 250 MG: 250 TABLET, FILM COATED ORAL at 08:20

## 2022-09-12 RX ADMIN — IMATINIB MESYLATE 400 MG: 400 TABLET, FILM COATED ORAL at 08:21

## 2022-09-12 RX ADMIN — ENOXAPARIN SODIUM 40 MG: 40 INJECTION SUBCUTANEOUS at 20:31

## 2022-09-12 RX ADMIN — PANTOPRAZOLE SODIUM 40 MG: 40 TABLET, DELAYED RELEASE ORAL at 08:20

## 2022-09-12 RX ADMIN — Medication 10 ML: at 09:31

## 2022-09-12 RX ADMIN — AMLODIPINE BESYLATE 10 MG: 10 TABLET ORAL at 08:20

## 2022-09-12 RX ADMIN — ACYCLOVIR 400 MG: 400 TABLET ORAL at 20:31

## 2022-09-12 RX ADMIN — POTASSIUM CHLORIDE 40 MEQ: 750 TABLET, EXTENDED RELEASE ORAL at 10:49

## 2022-09-12 ASSESSMENT — ACTIVITIES OF DAILY LIVING (ADL)
ADLS_ACUITY_SCORE: 22

## 2022-09-12 NOTE — PROGRESS NOTES
[facility]  Hematology / Oncology Progress Note    Date of Admission: 8/19/2022  Date of Service (when I saw the patient): 09/12/2022     Assessment & Plan   Vashti Villegas is a 42 year old female who was admitted on 8/19/2022 with hyperleukocytosis (WBC 109K), abdominal pain and 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 Pre-phase steroids initiated (8/21-8/24), then initiated GRAALL + imatinib on 8/24.       TODAY:   - Day 20 GRAALL + imatinib. Plan for Neupogen daily PRN if ANC ? 1.2 (ANC 3.1 today so no GCSF needed)  - BMT consultation with Dr Walsh this morning, appreciate assistance.   - Continues to have intermittent lactic acidosis. Pt is euvolemic, hold IVF and encourage PO fluid intake. No e/o infection otherwise, monitor off of antibiotics. Suspect lactic acidosis is secondary to leukemia. If febrile will obtain BCx, UA/UCx, CXR and start broad-spectrum antibiotics.   - D22 LP w/ IT chemo scheduled 9/14 at 11am      HEME  # Ph+ B-ALL  Patient presented to outside hospital with c/o Right upper quadrant pain and was subsequently found to have a markedly elevated white blood cell count concerning for acute leukemia. While at OSH, she had CT PE protocol as well as CT A/P. These identified no PE, no acute or localized intraabdominal inflammatory process, mild splenomegaly, and few inconspicous low-density structures in the liver. She was transferred to [facility] for further workup and management.  - s/p Hydrea 1g TID (8/21-8/23) with improvement in WBC (100K ? 12K)  - CT CAP completed at OSH. CT neck 8/22 showed multiple, b/l suspicious-appearing LN (R level Ia, L level III, R level II-III) - not enlarged though they exhibit abnormal shapes.   - MR brain (8/21) - no acute IC pathology, no e/o intracranial CNS involvement.  - BMBx completed 8/20 - demonstrated B-ALL with 85% blasts and hypercellular marrow. IHC shows CD10+, CD34+.  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 Abnormal cytogenetics. For unknown reason, molecular studies were cancelled. Have reordered on PB today.  - Pt consented to HMTB; BMBx at dx was dry tap d/t WBC burden. Ordered peripheral blood samples on 8/22.   - Initiated pre-phase steroids with Prednisone 60 mg x8/21; increased to 60 mg/m2 (140 mg) 8/22-8/24. Transitioned to protocol-directed Dex 40 mg on 8/25.   - Clonoseq ID testing to help estimate MRD in future assessments. Clonal testing collected and sent on 8/23. Plan to order Clonoseq MRD testing with post-induction BMBx.   - Case discussed at Heme malignancy conference on 8/23, per consensus plan was to start GRAALL + Imatinib on 8/24. No clear indication for addition of Rituximab.    - No e/o TLS, allopurinol discontinued x9/5  - s/p BMT consult 9/12 with Dr. Walsh    Treatment Plan: GRAALL + imatinib (D1=8/24/22)  - Imatinib 400 mg BID - D1-28   - Vincristine 2 mg IV x1 - D1, 8, 15, 22   - Dexamethasone 40 mg daily - D2 (held D1, instead given pre-phase pred as above), 8-9, 15-16, 22-23   - Neupogen 5 mcg/kg/day - beginning D15, daily PRN if ANC ? 1.2     *if plan is to discharge patient with this, will need to look into a PA - further assess in coming days    # CNS prophylaxis  Per the GRAALL protocol, and risk for ALL entering the CNS, patient requires weekly LP w/ triple IT chemo as detailed below. Due to unsuccessful attempt at bedside with the CAPS team on 8/25, will require all subsequent LPs under fluoroscopic guidance. Had required multiple units of platelets (3u total) prior to procedure to reach goal >50k.   - Triple IT chemo (Cytarabine, Solu-Cortef, MTX); D1, 8, 15.   - 8/26 CSF flow negative  - 8/31 CSF flow negative  - 9/7 CSF flow negative  - 9/14 LP scheduled in XR at 11am (will need orders for labs and IT chemo prior)  - Pt expresses anxiety surrounding  procedures; pre-medication with 0.5mg PO Ativan +/- Atarax available prior PRN    # Pancytopenia  2/2 to underlying disease  - Transfuse to maintain Hgb >7, Plt >10K (>50K prior to LPs)    # Chemotherapy-induced Grade 1 neuropathy (fingertips)  Patient was experiencing intermittent left hand numbness (x 8/28) and right hand numbness (noted first on 8/30 evening) but the symptoms were not persistent and resolved on 8/31. On 9/4 she began experiencing numbness/tingling in her fingertips on both of her hands. This is not painful, nor has it affected her dexterity. Her feet have been spared. After D18 Vincristine she has had intermittent tingling down to her MCP, but the numbness remains in her fingertips.   - Given overall mild symptoms and goal to achieve CR for transplant, will continue full dose vincristine with Day 15, but if worsening will reassess with next dose on Day 22    ID  # Lactic acidosis  Pt triggering lactic acid protocol for tachycardia intermittently. Afebrile, no infectious symptoms. She is drinking about 1-2 pitchers of water daily. Suspect related to leukemia though could have a component of dehydration.   - Encouraged PO fluids and will bolus IVF PRN if hypovolemic  - LA 3.2 (9/8), ordered 500cc NS  - Intermittently triggering sepsis protocol and with lactic acidosis, though is now drinking more fluids and clinically euvolemic. Hold off on additional IVF unless hypovolemic.     # PPx  -  mg BID  - Nebulized Pentamidine q28d for PJP ppx, last dose given 8/29. Next due ~9/27 outpatient  - Levaquin 250 mg daily - Continuing while inpatient through completion of scheduled chemotherapy  - Maude 50 mg daily  - Continuing Micafungin while inpatient through completion of scheduled chemotherapy. If ANC <1000 and pt discharging home then plan would be to transition to fluconazole.      # H/o COVID-19 infection   Not vaccinated (due to fear of needles per pt report). Pt reported testing postiive for  COVID approx 1 month prior to admission via home test. She never had it confirmed.   - COVID testing on admission was negative.     CV  # Hypertension  Fairly persistent elevated BP since admission to 150-160/80-90s. She was not on anti-hypertensive medication PTA.   - started Amlodipine 5 mg daily (8/21-8/23); increased to 10 mg daily (x8/23) - hold parameters in place if SBP<110  - prn Hydralazine 10-20 mg available q6h for SBP>160     GI  # Indigestion/GERD  - Continue protonix 40mg once daily    - TUMs PRN, Maalox PRN.     NEURO  # Headaches, improved  Pt endorsed intermittent HA since admission. Denies vision changes or other neurological sx. Has h/o HTN as above. CSF flow cytometry (8/25) negative for involvement by ALL.  - MRI brain completed 8/21 was negative for intracranial pathology or tumor involvement.   - Tylenol PRN  - Continue weekly LPs x3 per GRAALL protocol  - patient had a mild headache after D15 LP, quickly resolved with tylenol. Encouraged fluids and will continue to monitor. If worsening, will manage conservatively with IVF, caffeine/tylenol, and supine position but if persistent could consider a blood patch.     GYN  # Menstruation  Pt actively menstruating as of 9/8. She reports that the amount of bleeding is normal for her and her hgb remains stable. Suppressing menses is typically indicated in patients undergoing induction chemotherapy given risk of thrombocytopenia and associated bleeding, however her platelets are recovered/near normal at 143 which makes her risk of bleeding less.   - consider low-dose progresterone pill if bleeding worsens or platelets are dropping  - do not use tampons given neutropenia, sanitary pads only    FEN  # Hypokalemia, Hypophosphatemia- replace per standing protocol  - No mIVF: bolus PRN.   - Lyte repletion per standing protocol  - Regular diet as tolerated       MISC  - Lines/Drains: PICC in place, remove on discharge  - DVT ppx: ppx lovenox 40mg once daily  (9/9-x).   - GI ppx: PPI, TUMs PRN, Maalox PRN.   - Consults: PT  - CODE: FULL  - DISPO: Anticipate prolonged 4-6 week hospitalization for management of new leukemia.   - Follow-up/Referrals: Dr. Hull has agreed to continue to follow pt in clinic. Requested the following (APPT18), not yet sheduled;     - labs/poss pRBC 2x weekly in [facility] 9/19-10/7   - AVELINA f/up (video visit OK) the week of 9/19   - BMBx ~9/21 at [facility]   - Follow up with Dr. 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 Reviewed by pt and this provider faxed the paperwork on 9/2.     Patient and plan of care was discussed with attending physician Dr. Alverto Jorgensen ([facility]), PA-C    Hematology/Oncology   Pager: [phone]      I spent 40 minutes in the care of this patient today, which included time necessary for review of interval events, obtaining history and physical exam, ordering medication(s)/test(s) as medically indicated, discussion with interdisciplinary/consult team(s), and documentation time. Over 50% of time was spent face-to-face and/or coordinating care.    Interval History   No acute events overnight, afebrile. She had a BM yesterday. Overall feels well. Intermittent bilateral fingertip/hand tingling and numbness. Reports 2/10 R chest pain, localized and feeling similar to pain she presented with at diagnosis. Pain not better or worse with movement or deep breathing. Appetite is still OK. A comprehensive review of systems was reviewed with the patient and the pertinent positives are listed in the HPI above.      Physical Exam   Temp: (!) 96.4  F (35.8  C) Temp src: Oral BP: 111/58 Pulse: 97   Resp: 16 SpO2: 99 % O2 Device: None (Room air)    Vitals:    09/09/22 0951 09/10/22  0742 09/11/22 1030   Weight: 111.2 kg (245 lb 2.8 oz) 112.4 kg (247 lb 12.8 oz) 113.2 kg (249 lb 9 oz)     Vital Signs with Ranges  Temp:  [96.4  F (35.8  C)-98.8  F (37.1  C)] 96.4  F (35.8  C)  Pulse:  [78-97] 97  Resp:  [14-18] 16  BP: (100-116)/(44-68) 111/58  SpO2:  [96 %-99 %] 99 %  No intake/output data recorded.    Constitutional: Awake and conversational. Non- toxic appearing. No acute distress.   HEENT: Normocephalic, atraumatic . Sclerae anicteric. EOM intact. Moist mucus membranes   Respiratory: Breathing comfortable with no increased work on room air. No signs of respiratory distress or accessory muscle use. Lungs clear to auscultation bilaterally, no crackles or wheezing noted.  Cardiovascular: Regular rate and rhythm. Normal S1 and S2.   GI: Abdomen is soft, non-distended, non-tender and without distension. Bowel sounds present and normoactive.   Skin: Skin is clean, dry, intact. No jaundice appreciated. Fading eechymoses from prior LPs   Musculoskeletal/ Extremities: No redness, warmth, or swelling of the joints appreciated.   Neurologic: Alert and oriented. Speech normal. Grossly nonfocal. Memory and thought process preserved.  Neuropsychiatric: Calm, affect congruent to situation. Appropriate mood and affect. Good judgment and insight. No visual/auditory hallucinations.   LUE PICC C/D/I    Medications     - MEDICATION INSTRUCTIONS -         acyclovir  400 mg Oral BID     albuterol  2.5 mg Nebulization Q28 Days    And     pentamidine  300 mg Inhalation Q28 Days     amLODIPine  10 mg Oral Daily     enoxaparin ANTICOAGULANT  40 mg Subcutaneous Q24H     imatinib  400 mg Oral BID     levofloxacin  250 mg Oral Q24H     micafungin  50 mg Intravenous Q24H     pantoprazole  40 mg Oral QAM AC     potassium chloride  40 mEq Oral Once     senna-docusate  1-2 tablet Oral BID     Data   Results for orders placed or performed during the hospital encounter of 08/19/22 (from the past 24 hour(s))   Potassium    Result Value Ref Range    Potassium 3.9 3.4 - 5.3 mmol/L   CBC with platelets differential    Narrative    The following orders were created for panel order CBC with platelets differential.  Procedure                               Abnormality         Status                     ---------                               -----------         ------                     CBC with platelets and d...[[id number]]  Abnormal            Preliminary result           Please view results for these tests on the individual orders.   Phosphorus   Result Value Ref Range    Phosphorus 2.5 2.5 - 4.5 mg/dL   Fibrinogen activity   Result Value Ref Range    Fibrinogen Activity 227 170 - 490 mg/dL   INR   Result Value Ref Range    INR 1.02 0.85 - 1.15   Basic metabolic panel   Result Value Ref Range    Creatinine 0.79 0.51 - 0.95 mg/dL    Sodium 138 136 - 145 mmol/L    Potassium 3.4 3.4 - 5.3 mmol/L    Urea Nitrogen 13.9 6.0 - 20.0 mg/dL    Chloride 105 98 - 107 mmol/L    Carbon Dioxide (CO2) 27 22 - 29 mmol/L    Anion Gap 6 (L) 7 - 15 mmol/L    Glucose 106 (H) 70 - 99 mg/dL    GFR Estimate >90 >60 mL/min/1.73m2    Calcium 8.2 (L) 8.6 - 10.0 mg/dL   Hepatic panel   Result Value Ref Range    Protein Total 5.5 (L) 6.4 - 8.3 g/dL    Albumin 3.5 3.5 - 5.2 g/dL    Bilirubin Total 0.6 <=1.2 mg/dL    Alkaline Phosphatase 77 35 - 104 U/L    AST 16 10 - 35 U/L    ALT 28 10 - 35 U/L    Bilirubin Direct <0.20 0.00 - 0.30 mg/dL   CBC with platelets and differential   Result Value Ref Range    WBC Count 6.0 4.0 - 11.0 10e3/uL    RBC Count 2.65 (L) 3.80 - 5.20 10e6/uL    Hemoglobin 8.0 (L) 11.7 - 15.7 g/dL    Hematocrit 24.8 (L) 35.0 - 47.0 %    MCV 94 78 - 100 fL    MCH 30.2 26.5 - 33.0 pg    MCHC 32.3 31.5 - 36.5 g/dL    RDW 16.3 (H) 10.0 - 15.0 %    Platelet Count 328 150 - 450 10e3/uL

## 2022-09-12 NOTE — PROGRESS NOTES
BMT Clinical Social Work New Transplant Evaluation    Information for this evaluation on 2022  was collected via Pt and spouse report, consultation with medical team, and medical chart review.     Present:  Patient: Vashti Villegas  Spouse: Crescencio Villegas    Clinical  (CSW): MAURICIO Ding, [facility]    Medical Team:   Nurse Coordinator: Mary Denis RN  BMT Physician: Lm Walsh MD    Diagnosis: Acute Lymphoblastic Leukemia (ALL)  Diagnosis Date: 2022      Presenting Information:   Pt is a 42 year old female diagnosed with ALL who presents to [facility] for consultation regarding an allogeneic stem cell transplant. Pt was accompanied by her  Crescencio. Our visit took place IP on 7D.      Contact Information:  Pt Phone: [phone]  Pt's  Crescencio's Phone: [phone]    Special Needs:  No needs identified at this time. April lives in [place], [place] which is about 40 minutes away. We discussed our guidelines and options if she decided to relocate including the [facility] [place], local apts, and hotels. Pt is uncertain what she would want to do at this time.     Family Constellation:   Spouse: Crescencio Villegas  Children: Ezio, Scott and Corin  Parents: Father- Jaya Cabrales; mother is   Siblings: 1 1/2 sister Elisabeth who lives in [place]    Education/Employment:  April is currently a . She has both short and long term disability.  Crescencio works for a [employer] company- he believes he has [place]    Finances/Insurance:  No financial or insurance concerns identified at this time.     CSW provided information regarding the insurance authorization process and the role of the BMT financial case-mangers. CSW provided contact info for the BMT financial case-managers and referred pt to them for future insurance questions.     Caregiver:  CSW discussed with Pt the caregiver role and expectation at length. Pt is agreeable to having a full time caregiver for a minimum of  100 days until cleared by the BMT physician. Pt's identified caregivers are Crescencio and likely other family members. Caregiver education and information provided. No caregiver concerns identified.     Healthcare Directive:  No. CSW provided education and forms. CSW encouraged Pt to have discussions with their family regarding their health care wishes.     Resources Provided:  BMT Information Book   BMT Resources Packet:   Healthcare Directive:   Tour of Unit NO   Transplant unit description and information provided.     Identified Concerns:   No concerns identified at this time.     Summary:   Pt presents to [facility] for consultation regarding an allogeneic stem cell transplant. Pt/family asked good questions regarding psychosocial factors related to BMT; all of which were answered. Pt presented as friendly and open, but shared she feels very overwhelmed with all the information. Pt's affect was engaged. Family's affect was engaged.      Plan:   CSW provided contact information and encouraged Pt to contact CSW with questions, concerns, resources and for support. CSW will continue to follow Pt to provide supportive counseling and assistance with resources as needed.      MAURICIO Ding, [facility]  Phone [phone]  Pager [phone]

## 2022-09-12 NOTE — PROGRESS NOTES
BMT CLINICAL SOCIAL WORK NOTE:    Focus: NT    Data: Pt is a 42 year old female admitted to  for BMT NT visit.    Interventions: Clinical  (CSW) met with Pt and her  , please see BMT note for full details.    Assessment: Pt presented as friendly and open.  Pt appears to be coping appropriately at this time. Pt continues to be supported by her  and family.     Plan: CSW will continue to provide supportive counseling and assistance with resources as needed. CSW will continue to collaborate with multidisciplinary team regarding Pt's plan of care.     MAURICIO Ding, [facility] Care[facility]  [facility]  Pager: [phone]  Phone: [phone]

## 2022-09-12 NOTE — PLAN OF CARE
Goal Outcome Evaluation:  Doing well. Denied pain. Afebrile. Received a dose of Neupogen for an A.N.C. of 1000. Up independently. Hopes to discharge on Wednesday.

## 2022-09-12 NOTE — PLAN OF CARE
Goal Outcome Evaluation:    Plan of Care Reviewed With: patient     Overall Patient Progress: improving    Neuro: A&Ox4.   Cardiac: SR. VSS.   Respiratory: Sating 97% on RA.  GI/: Adequate urine output. BM X0  Diet/appetite: Tolerating regular diet. Eating well.  Activity:  Independent up to chair and in halls.  Pain: At acceptable level on current regimen.   Skin: No new deficits noted.  LDA's:    Plan: Continue with POC. Notify primary team with changes.    Possible bone marrow consult today

## 2022-09-12 NOTE — PLAN OF CARE
Status: Admitted 8/18 with abdominal pain and increased WBC. Work-up for acute leukemia.   Vitals: VSS on RA  Neuros: A&O x4  IV: L DL PICC HL w/ blood return  Labs/Electrolytes: Hgb 8. K+ replaced- redraw @ 1500. ANC 3.1.  Resp/trach: WNL  Diet: Regular diet  Bowel status: LBM 9/11  : Voiding spontaneously  Skin: Intact  Pain: Slight RUQ abdominal pain and HA but declined any intervention  Activity: Up ad stephanie. Walked halls w/ family.  Social:  at bedside  Plan: Continue to monitor and follow POC  Updates this shift: Bone marrow consult complete

## 2022-09-13 ENCOUNTER — DOCUMENTATION ONLY (OUTPATIENT)
Dept: MEDSURG UNIT | Facility: CLINIC | Age: 42
End: 2022-09-13

## 2022-09-13 LAB
ANION GAP SERPL CALCULATED.3IONS-SCNC: 6 MMOL/L (ref 7–15)
BASOPHILS # BLD AUTO: 0 10E3/UL (ref 0–0.2)
BASOPHILS NFR BLD AUTO: 0 %
BUN SERPL-MCNC: 13.4 MG/DL (ref 6–20)
CALCIUM SERPL-MCNC: 8.2 MG/DL (ref 8.6–10)
CHLORIDE SERPL-SCNC: 102 MMOL/L (ref 98–107)
CREAT SERPL-MCNC: 0.71 MG/DL (ref 0.51–0.95)
DEPRECATED HCO3 PLAS-SCNC: 27 MMOL/L (ref 22–29)
EOSINOPHIL # BLD AUTO: 0 10E3/UL (ref 0–0.7)
EOSINOPHIL NFR BLD AUTO: 1 %
ERYTHROCYTE [DISTWIDTH] IN BLOOD BY AUTOMATED COUNT: 17.2 % (ref 10–15)
GFR SERPL CREATININE-BSD FRML MDRD: >90 ML/MIN/1.73M2
GLUCOSE SERPL-MCNC: 86 MG/DL (ref 70–99)
HCT VFR BLD AUTO: 23.5 % (ref 35–47)
HGB BLD-MCNC: 7.6 G/DL (ref 11.7–15.7)
IMM GRANULOCYTES # BLD: 0.2 10E3/UL
IMM GRANULOCYTES NFR BLD: 2 %
LYMPHOCYTES # BLD AUTO: 2 10E3/UL (ref 0.8–5.3)
LYMPHOCYTES NFR BLD AUTO: 29 %
MCH RBC QN AUTO: 29.8 PG (ref 26.5–33)
MCHC RBC AUTO-ENTMCNC: 32.3 G/DL (ref 31.5–36.5)
MCV RBC AUTO: 92 FL (ref 78–100)
MONOCYTES # BLD AUTO: 0.5 10E3/UL (ref 0–1.3)
MONOCYTES NFR BLD AUTO: 8 %
NEUTROPHILS # BLD AUTO: 4.1 10E3/UL (ref 1.6–8.3)
NEUTROPHILS NFR BLD AUTO: 60 %
NRBC # BLD AUTO: 0 10E3/UL
NRBC BLD AUTO-RTO: 1 /100
PHOSPHATE SERPL-MCNC: 3.6 MG/DL (ref 2.5–4.5)
PLATELET # BLD AUTO: 347 10E3/UL (ref 150–450)
POTASSIUM SERPL-SCNC: 4 MMOL/L (ref 3.4–5.3)
RBC # BLD AUTO: 2.55 10E6/UL (ref 3.8–5.2)
SODIUM SERPL-SCNC: 135 MMOL/L (ref 136–145)
WBC # BLD AUTO: 6.8 10E3/UL (ref 4–11)

## 2022-09-13 PROCEDURE — 250N000011 HC RX IP 250 OP 636: Performed by: STUDENT IN AN ORGANIZED HEALTH CARE EDUCATION/TRAINING PROGRAM

## 2022-09-13 PROCEDURE — 99356 PR PROLONGED SERV,INPATIENT,1ST HR: CPT | Mod: FS | Performed by: PHYSICIAN ASSISTANT

## 2022-09-13 PROCEDURE — 84100 ASSAY OF PHOSPHORUS: CPT | Performed by: PHYSICIAN ASSISTANT

## 2022-09-13 PROCEDURE — 250N000013 HC RX MED GY IP 250 OP 250 PS 637: Performed by: PHYSICIAN ASSISTANT

## 2022-09-13 PROCEDURE — 85025 COMPLETE CBC W/AUTO DIFF WBC: CPT | Performed by: PHYSICIAN ASSISTANT

## 2022-09-13 PROCEDURE — 250N000011 HC RX IP 250 OP 636: Performed by: INTERNAL MEDICINE

## 2022-09-13 PROCEDURE — 250N000013 HC RX MED GY IP 250 OP 250 PS 637: Performed by: INTERNAL MEDICINE

## 2022-09-13 PROCEDURE — 250N000013 HC RX MED GY IP 250 OP 250 PS 637: Performed by: STUDENT IN AN ORGANIZED HEALTH CARE EDUCATION/TRAINING PROGRAM

## 2022-09-13 PROCEDURE — 120N000002 HC R&B MED SURG/OB UMMC

## 2022-09-13 PROCEDURE — 258N000003 HC RX IP 258 OP 636: Performed by: STUDENT IN AN ORGANIZED HEALTH CARE EDUCATION/TRAINING PROGRAM

## 2022-09-13 PROCEDURE — 80048 BASIC METABOLIC PNL TOTAL CA: CPT | Performed by: PHYSICIAN ASSISTANT

## 2022-09-13 PROCEDURE — 99233 SBSQ HOSP IP/OBS HIGH 50: CPT | Mod: FS | Performed by: PHYSICIAN ASSISTANT

## 2022-09-13 PROCEDURE — 36592 COLLECT BLOOD FROM PICC: CPT | Performed by: PHYSICIAN ASSISTANT

## 2022-09-13 RX ORDER — HEPARIN SODIUM,PORCINE 10 UNIT/ML
5 VIAL (ML) INTRAVENOUS
Status: CANCELLED | OUTPATIENT
Start: 2022-09-16

## 2022-09-13 RX ORDER — LORAZEPAM 0.5 MG/1
0.5 TABLET ORAL ONCE
Status: DISCONTINUED | OUTPATIENT
Start: 2022-09-14 | End: 2022-09-14 | Stop reason: HOSPADM

## 2022-09-13 RX ORDER — PENTAMIDINE ISETHIONATE 300 MG/300MG
300 INHALANT RESPIRATORY (INHALATION) ONCE
Status: CANCELLED
Start: 2022-09-27 | End: 2022-09-27

## 2022-09-13 RX ORDER — IMATINIB MESYLATE 400 MG/1
400 TABLET, FILM COATED ORAL 2 TIMES DAILY
Qty: 14 TABLET | Refills: 0 | Status: SHIPPED | OUTPATIENT
Start: 2022-09-13 | End: 2022-09-21

## 2022-09-13 RX ORDER — ALBUTEROL SULFATE 0.83 MG/ML
2.5 SOLUTION RESPIRATORY (INHALATION)
Status: CANCELLED | OUTPATIENT
Start: 2022-09-27

## 2022-09-13 RX ORDER — DIPHENHYDRAMINE HYDROCHLORIDE 50 MG/ML
50 INJECTION INTRAMUSCULAR; INTRAVENOUS
Status: CANCELLED
Start: 2022-09-27

## 2022-09-13 RX ORDER — ACYCLOVIR 400 MG/1
400 TABLET ORAL 2 TIMES DAILY
Qty: 120 TABLET | Refills: 0 | Status: ON HOLD | OUTPATIENT
Start: 2022-09-13 | End: 2022-10-13

## 2022-09-13 RX ORDER — ONDANSETRON 4 MG/1
4-8 TABLET, ORALLY DISINTEGRATING ORAL EVERY 8 HOURS PRN
Qty: 60 TABLET | Refills: 0 | Status: SHIPPED | OUTPATIENT
Start: 2022-09-13 | End: 2024-05-02

## 2022-09-13 RX ORDER — MEPERIDINE HYDROCHLORIDE 25 MG/ML
25 INJECTION INTRAMUSCULAR; INTRAVENOUS; SUBCUTANEOUS EVERY 30 MIN PRN
Status: CANCELLED | OUTPATIENT
Start: 2022-09-16

## 2022-09-13 RX ORDER — METHYLPREDNISOLONE SODIUM SUCCINATE 125 MG/2ML
125 INJECTION, POWDER, LYOPHILIZED, FOR SOLUTION INTRAMUSCULAR; INTRAVENOUS
Status: CANCELLED
Start: 2022-09-27

## 2022-09-13 RX ORDER — ALBUTEROL SULFATE 90 UG/1
1-2 AEROSOL, METERED RESPIRATORY (INHALATION)
Status: CANCELLED
Start: 2022-09-16

## 2022-09-13 RX ORDER — AMLODIPINE BESYLATE 10 MG/1
10 TABLET ORAL DAILY
Qty: 30 TABLET | Refills: 0 | Status: ON HOLD | OUTPATIENT
Start: 2022-09-14 | End: 2022-10-03

## 2022-09-13 RX ORDER — ALBUTEROL SULFATE 90 UG/1
1-2 AEROSOL, METERED RESPIRATORY (INHALATION)
Status: CANCELLED
Start: 2022-09-27

## 2022-09-13 RX ORDER — MEPERIDINE HYDROCHLORIDE 25 MG/ML
25 INJECTION INTRAMUSCULAR; INTRAVENOUS; SUBCUTANEOUS EVERY 30 MIN PRN
Status: CANCELLED | OUTPATIENT
Start: 2022-09-27

## 2022-09-13 RX ORDER — ALBUTEROL SULFATE 0.83 MG/ML
2.5 SOLUTION RESPIRATORY (INHALATION)
Status: CANCELLED | OUTPATIENT
Start: 2022-09-16

## 2022-09-13 RX ORDER — CALCIUM CARBONATE 500 MG/1
2 TABLET, CHEWABLE ORAL DAILY PRN
Status: ON HOLD | COMMUNITY
Start: 2022-09-13 | End: 2022-10-25

## 2022-09-13 RX ORDER — DIPHENHYDRAMINE HYDROCHLORIDE 50 MG/ML
50 INJECTION INTRAMUSCULAR; INTRAVENOUS
Status: CANCELLED
Start: 2022-09-16

## 2022-09-13 RX ORDER — METHYLPREDNISOLONE SODIUM SUCCINATE 125 MG/2ML
125 INJECTION, POWDER, LYOPHILIZED, FOR SOLUTION INTRAMUSCULAR; INTRAVENOUS
Status: CANCELLED
Start: 2022-09-16

## 2022-09-13 RX ORDER — HEPARIN SODIUM (PORCINE) LOCK FLUSH IV SOLN 100 UNIT/ML 100 UNIT/ML
5 SOLUTION INTRAVENOUS
Status: CANCELLED | OUTPATIENT
Start: 2022-09-16

## 2022-09-13 RX ORDER — EPINEPHRINE 1 MG/ML
0.3 INJECTION, SOLUTION, CONCENTRATE INTRAVENOUS EVERY 5 MIN PRN
Status: CANCELLED | OUTPATIENT
Start: 2022-09-27

## 2022-09-13 RX ORDER — EPINEPHRINE 1 MG/ML
0.3 INJECTION, SOLUTION, CONCENTRATE INTRAVENOUS EVERY 5 MIN PRN
Status: CANCELLED | OUTPATIENT
Start: 2022-09-16

## 2022-09-13 RX ORDER — LEVOFLOXACIN 250 MG/1
250 TABLET, FILM COATED ORAL DAILY PRN
Qty: 60 TABLET | Refills: 0 | Status: ON HOLD | OUTPATIENT
Start: 2022-09-13 | End: 2022-12-22

## 2022-09-13 RX ORDER — ACETAMINOPHEN 325 MG/1
975 TABLET ORAL EVERY 6 HOURS PRN
Status: ON HOLD | COMMUNITY
Start: 2022-09-13 | End: 2022-10-03

## 2022-09-13 RX ORDER — FLUCONAZOLE 200 MG/1
200 TABLET ORAL DAILY PRN
Qty: 60 TABLET | Refills: 0 | Status: ON HOLD | OUTPATIENT
Start: 2022-09-13 | End: 2022-12-22

## 2022-09-13 RX ORDER — ALBUTEROL SULFATE 0.83 MG/ML
2.5 SOLUTION RESPIRATORY (INHALATION) ONCE
Status: CANCELLED
Start: 2022-09-27 | End: 2022-09-27

## 2022-09-13 RX ADMIN — PANTOPRAZOLE SODIUM 40 MG: 40 TABLET, DELAYED RELEASE ORAL at 06:37

## 2022-09-13 RX ADMIN — Medication 6 MG: at 21:41

## 2022-09-13 RX ADMIN — AMLODIPINE BESYLATE 10 MG: 10 TABLET ORAL at 09:42

## 2022-09-13 RX ADMIN — IMATINIB MESYLATE 400 MG: 400 TABLET, FILM COATED ORAL at 19:49

## 2022-09-13 RX ADMIN — ACYCLOVIR 400 MG: 400 TABLET ORAL at 19:50

## 2022-09-13 RX ADMIN — LEVOFLOXACIN 250 MG: 250 TABLET, FILM COATED ORAL at 09:42

## 2022-09-13 RX ADMIN — Medication 5 ML: at 09:45

## 2022-09-13 RX ADMIN — ACYCLOVIR 400 MG: 400 TABLET ORAL at 09:42

## 2022-09-13 RX ADMIN — SENNOSIDES AND DOCUSATE SODIUM 2 TABLET: 8.6; 5 TABLET ORAL at 09:41

## 2022-09-13 RX ADMIN — Medication 5 ML: at 07:12

## 2022-09-13 RX ADMIN — MICAFUNGIN 50 MG: 10 INJECTION, POWDER, LYOPHILIZED, FOR SOLUTION INTRAVENOUS at 08:33

## 2022-09-13 RX ADMIN — SENNOSIDES AND DOCUSATE SODIUM 2 TABLET: 8.6; 5 TABLET ORAL at 19:50

## 2022-09-13 RX ADMIN — IMATINIB MESYLATE 400 MG: 400 TABLET, FILM COATED ORAL at 09:43

## 2022-09-13 ASSESSMENT — ACTIVITIES OF DAILY LIVING (ADL)
ADLS_ACUITY_SCORE: 22

## 2022-09-13 NOTE — PROGRESS NOTES
[facility]  Hematology / Oncology Progress Note    Date of Admission: 8/19/2022  Date of Service (when I saw the patient): 09/13/2022     Assessment & Plan   Vashti Villegas is a 42 year old female who was admitted on 8/19/2022 with hyperleukocytosis (WBC 109K), abdominal pain and 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 Pre-phase steroids initiated (8/21-8/24), then initiated GRAALL + imatinib on 8/24, overall well tolerated. BMT consultation completed with Dr Walsh inpatient (9/12).       TODAY:   - Day 21 GRAALL + imatinib.  - Plan for D22 LP w/ IT chemo scheduled 9/14 at 11am in XR. Plan to pre medicate LP with Ativan 0.5 mg (ordered)   - Tentative plan for patient to discharge home tomorrow after IT/IV chemotherapy completed.       HEME  # Ph+ B-ALL  Patient presented to outside hospital with c/o Right upper quadrant pain and was subsequently found to have a markedly elevated white blood cell count concerning for acute leukemia. While at OSH, she had CT PE protocol as well as CT A/P. These identified no PE, no acute or localized intraabdominal inflammatory process, mild splenomegaly, and few inconspicous low-density structures in the liver. She was transferred to [facility] for further workup and management.  - s/p Hydrea 1g TID (8/21-8/23) with improvement in WBC (100K ? 12K)  - CT CAP completed at OSH. CT neck 8/22 showed multiple, b/l suspicious-appearing LN (R level Ia, L level III, R level II-III) - not enlarged though they exhibit abnormal shapes.   - MR brain (8/21) - no acute IC pathology, no e/o intracranial CNS involvement.  - BMBx completed 8/20 - demonstrated B-ALL with 85% blasts and hypercellular marrow. IHC shows CD10+, CD34+. 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 Abnormal cytogenetics. For unknown reason, molecular studies were  cancelled. Have reordered on PB today.  - Pt consented to HMTB; BMBx at dx was dry tap d/t WBC burden. Ordered peripheral blood samples on 8/22.   - Initiated pre-phase steroids with Prednisone 60 mg x8/21; increased to 60 mg/m2 (140 mg) 8/22-8/24. Transitioned to protocol-directed Dex 40 mg on 8/25.   - Clonoseq ID testing to help estimate MRD in future assessments. Clonal testing collected and sent on 8/23. Plan to order Clonoseq MRD testing with post-induction BMBx.   - Case discussed at Heme malignancy conference on 8/23, per consensus plan was to start GRAALL + Imatinib on 8/24. No clear indication for addition of Rituximab.    - No e/o TLS, allopurinol discontinued x9/5  - s/p BMT consult 9/12 with Dr. Walsh    Treatment Plan: GRAALL + imatinib (D1=8/24/22)  - Imatinib 400 mg BID - D1-28   - Vincristine 2 mg IV x1 - D1, 8, 15, 22   - Dexamethasone 40 mg daily - D2 (held D1, instead given pre-phase pred as above), 8-9, 15-16, 22-23   - Neupogen 5 mcg/kg/day - beginning D15, daily PRN if ANC ? 1.2     *if plan is to discharge patient with this, will need to look into a PA - further assess in coming days    # CNS prophylaxis  Per the GRAALL protocol, and risk for ALL entering the CNS, patient requires weekly LP w/ triple IT chemo as detailed below. Due to unsuccessful attempt at bedside with the CAPS team on 8/25, will require all subsequent LPs under fluoroscopic guidance. Had required multiple units of platelets (3u total) prior to procedure to reach goal >50k.   - Triple IT chemo (Cytarabine, Solu-Cortef, MTX); D1, 8, 15.   - 8/26 CSF flow negative  - 8/31 CSF flow negative  - 9/7 CSF flow negative  - 9/14 LP scheduled in XR at 11am (Dr Del Toro to sign IT chemo orders prior)  - Pt expresses anxiety surrounding procedures; pre-medication with 0.5mg PO Ativan +/- Atarax available prior PRN    # Pancytopenia  2/2 to underlying disease  - Transfuse to maintain Hgb >7, Plt >10K (>50K prior to LPs)    #  Chemotherapy-induced Grade 1 neuropathy (fingertips)  Patient was experiencing intermittent left hand numbness (x 8/28) and right hand numbness (noted first on 8/30 evening) but the symptoms were not persistent and resolved on 8/31. On 9/4 she began experiencing numbness/tingling in her fingertips on both of her hands. This is not painful, nor has it affected her dexterity. Her feet have been spared. After D18 Vincristine she has had intermittent tingling down to her MCP, but the numbness remains in her fingertips.   - Given overall mild symptoms and goal to achieve CR for transplant, will continue full dose vincristine with Day 15, but if worsening will reassess with next dose on Day 22    ID  # Lactic acidosis  Pt triggering lactic acid protocol for tachycardia intermittently. Afebrile, no infectious symptoms. She is drinking about 1-2 pitchers of water daily. Suspect related to leukemia though could have a component of dehydration.   - Encouraged PO fluids and will bolus IVF PRN if hypovolemic  - LA 3.2 (9/8), ordered 500cc NS  - Intermittently triggering sepsis protocol and with lactic acidosis, though is now drinking more fluids and clinically euvolemic. Hold off on additional IVF unless hypovolemic.     # PPx  -  mg BID  - Nebulized Pentamidine q28d for PJP ppx, last dose given 8/29. Next due ~9/27 outpatient (requested)   - Levaquin 250 mg daily and Maude 50 mg daily while ANC <1.0; discontinued 9/13.      # H/o COVID-19 infection   Not vaccinated (due to fear of needles per pt report). Pt reported testing postiive for COVID approx 1 month prior to admission via home test. She never had it confirmed.   - COVID testing on admission was negative.     CV  # Hypertension  Fairly persistent elevated BP since admission to 150-160/80-90s. She was not on anti-hypertensive medication PTA.   - started Amlodipine 5 mg daily (8/21-8/23); increased to 10 mg daily (x8/23) - hold parameters in place if SBP<110  - prn  Hydralazine 10-20 mg available q6h for SBP>160     GI  # Indigestion/GERD  - Continue protonix 40mg once daily    - TUMs PRN, Maalox PRN.     NEURO  # Headaches, improved  Pt endorsed intermittent HA since admission. Denies vision changes or other neurological sx. Has h/o HTN as above. CSF flow cytometry (8/25) negative for involvement by ALL.  - MRI brain completed 8/21 was negative for intracranial pathology or tumor involvement.   - Tylenol PRN  - Continue weekly LPs x3 per GRAALL protocol  - patient had a mild headache after D15 LP, quickly resolved with tylenol. Encouraged fluids and will continue to monitor. If worsening, will manage conservatively with IVF, caffeine/tylenol, and supine position but if persistent could consider a blood patch.     GYN  # Menstruation  Pt actively menstruating as of 9/8. She reports that the amount of bleeding is normal for her and her hgb remains stable. Suppressing menses is typically indicated in patients undergoing induction chemotherapy given risk of thrombocytopenia and associated bleeding, however her platelets are recovered/near normal at 143 which makes her risk of bleeding less.   - consider low-dose progresterone pill if bleeding worsens or platelets are dropping  - do not use tampons given neutropenia, sanitary pads only    FEN  # Hypokalemia, Hypophosphatemia- replace per standing protocol  - No mIVF: bolus PRN.   - Lyte repletion per standing protocol  - Regular diet as tolerated     MISC  - Lines/Drains: PICC in place, remove on discharge  - DVT ppx: ppx lovenox 40mg once daily, HOLD with intermittent LPs  - CODE: FULL  - DISPO: Anticipate discharge tomorrow 9/14 after completion of chemotherapy.   - Follow-up/Referrals: Dr. Hull has agreed to continue to follow pt in clinic. Requested the following (APPT18):   - labs/poss pRBC 2x weekly in [facility] 9/19-10/7   - AVELINA f/up (video visit OK) the week of 9/19   - BMBx ~9/21 at [facility]   - Follow up with Dr. 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 Reviewed by pt and this provider faxed the paperwork on 9/2.     Patient and plan of care was discussed with attending physician Dr. Alverto Jorgensen ([facility] Center[facility]andrika, PAIMANI    Hematology/Oncology   Pager: [phone]      I spent 50 minutes in the care of this patient today, which included time necessary for review of interval events, obtaining history and physical exam, ordering medication(s)/test(s) as medically indicated, discussion with interdisciplinary/consult team(s), and documentation time. Over 50% of time was spent face-to-face and/or coordinating care.    Interval History   No acute events overnight, afebrile and HD stable. She had a BM yesterday and this AM. Overall feels well. Intermittent bilateral fingertip/hand tingling and numbness. Reports ongoing mild, central 2/10 R chest pain, localized and feeling similar to pain she presented with at diagnosis. Pain not better or worse with movement or deep breathing. Appetite is still OK. Anxious after BMT consult yesterday and plan to discharge home tomorrow as she has been in the hospital for so long. Reviewed outpatient follow up and medications to take at home. A comprehensive review of systems was reviewed with the patient and the pertinent positives are listed in the HPI above.      Physical Exam   Temp: 98.2  F (36.8  C) Temp src: Oral BP: 121/53 Pulse: 95   Resp: 16 SpO2: 98 % O2 Device: None (Room air)    Vitals:    09/09/22 0951 09/10/22 0742 09/11/22 1030   Weight: 111.2 kg (245 lb 2.8 oz) 112.4 kg (247 lb 12.8 oz) 113.2 kg (249 lb 9 oz)     Vital Signs with Ranges  Temp:  [96.4  F (35.8  C)-98.3  F (36.8  C)] 98.2  F (36.8  C)  Pulse:  [78-96] 95  Resp:  [16-18] 16  BP:  (113-129)/(53-65) 121/53  SpO2:  [98 %-100 %] 98 %  I/O last 3 completed shifts:  In: 840 [P.O.:840]  Out: -     Constitutional: Awake and conversational. Non- toxic appearing. No acute distress.   HEENT: Normocephalic, atraumatic . Sclerae anicteric. EOM intact. Moist mucus membranes   Respiratory: Breathing comfortable with no increased work on room air. No signs of respiratory distress or accessory muscle use. Lungs clear to auscultation bilaterally, no crackles or wheezing noted.  Cardiovascular: Regular rate and rhythm. Normal S1 and S2.   GI: Abdomen is soft, non-distended, non-tender and without distension. Bowel sounds present and normoactive.   Skin: Skin is clean, dry, intact. No jaundice appreciated. Fading eechymoses from prior LPs   Musculoskeletal/ Extremities: No redness, warmth, or swelling of the joints appreciated.   Neurologic: Alert and oriented. Speech normal. Grossly nonfocal. Memory and thought process preserved.  Neuropsychiatric: Calm, affect congruent to situation. Appropriate mood and affect. Good judgment and insight. No visual/auditory hallucinations.   LUE PICC C/D/I     Medications     - MEDICATION INSTRUCTIONS -         acyclovir  400 mg Oral BID     albuterol  2.5 mg Nebulization Q28 Days    And     pentamidine  300 mg Inhalation Q28 Days     amLODIPine  10 mg Oral Daily     [Held by provider] enoxaparin ANTICOAGULANT  40 mg Subcutaneous Q24H     imatinib  400 mg Oral BID     levofloxacin  250 mg Oral Q24H     micafungin  50 mg Intravenous Q24H     pantoprazole  40 mg Oral QAM AC     senna-docusate  1-2 tablet Oral BID     Data   Results for orders placed or performed during the hospital encounter of 08/19/22 (from the past 24 hour(s))   Potassium   Result Value Ref Range    Potassium 3.8 3.4 - 5.3 mmol/L   CBC with platelets differential    Narrative    The following orders were created for panel order CBC with platelets differential.  Procedure                                Abnormality         Status                     ---------                               -----------         ------                     CBC with platelets and d...[[id number]]  Abnormal            Final result                 Please view results for these tests on the individual orders.   Phosphorus   Result Value Ref Range    Phosphorus 3.6 2.5 - 4.5 mg/dL   Basic metabolic panel   Result Value Ref Range    Creatinine 0.71 0.51 - 0.95 mg/dL    Sodium 135 (L) 136 - 145 mmol/L    Potassium 4.0 3.4 - 5.3 mmol/L    Urea Nitrogen 13.4 6.0 - 20.0 mg/dL    Chloride 102 98 - 107 mmol/L    Carbon Dioxide (CO2) 27 22 - 29 mmol/L    Anion Gap 6 (L) 7 - 15 mmol/L    Glucose 86 70 - 99 mg/dL    GFR Estimate >90 >60 mL/min/1.73m2    Calcium 8.2 (L) 8.6 - 10.0 mg/dL   CBC with platelets and differential   Result Value Ref Range    WBC Count 6.8 4.0 - 11.0 10e3/uL    RBC Count 2.55 (L) 3.80 - 5.20 10e6/uL    Hemoglobin 7.6 (L) 11.7 - 15.7 g/dL    Hematocrit 23.5 (L) 35.0 - 47.0 %    MCV 92 78 - 100 fL    MCH 29.8 26.5 - 33.0 pg    MCHC 32.3 31.5 - 36.5 g/dL    RDW 17.2 (H) 10.0 - 15.0 %    Platelet Count 347 150 - 450 10e3/uL    % Neutrophils 60 %    % Lymphocytes 29 %    % Monocytes 8 %    % Eosinophils 1 %    % Basophils 0 %    % Immature Granulocytes 2 %    NRBCs per 100 WBC 1 (H) <1 /100    Absolute Neutrophils 4.1 1.6 - 8.3 10e3/uL    Absolute Lymphocytes 2.0 0.8 - 5.3 10e3/uL    Absolute Monocytes 0.5 0.0 - 1.3 10e3/uL    Absolute Eosinophils 0.0 0.0 - 0.7 10e3/uL    Absolute Basophils 0.0 0.0 - 0.2 10e3/uL    Absolute Immature Granulocytes 0.2 <=0.4 10e3/uL    Absolute NRBCs 0.0 10e3/uL

## 2022-09-13 NOTE — PLAN OF CARE
VS stable, afebrile. Pt denied pain/SOB/n/v. Slept comfortably throughout the night. LP with IT chemo scheduled 9/14. No significant events, continue POC.

## 2022-09-13 NOTE — PROGRESS NOTES
Spoke with April and Crescencio, patient's Spouse, following new transplant visit with Dr. Walsh. Reviewed plan of care per NT conversation for Stem Cell Transplant. Explained role of the Nurse Coordinator throughout the BMT process as well as general time line and expectations for transplant. Discussed necessity of caregiver and program's proximity requirements. All questions were answered.     Plan: Allogeneic Transplant, pending response to therapy.    Contact information provided for :  yes    HLA typing drawn: Yes    PRA typing drawn:  No - ordered to be drawn with AM labs on 9/14    CMV-IgG and ABO-Rh drawn or in record:  Yes    Contact information provided for :  yes    Financial Release for [facility] search obtained:  yes    [id number] Consent Signed: NA    EOC Reason updated: yes      Mary Denis, RN, BSN  RN Care Coordinator - BMT  Ph [phone]  Pg [phone]

## 2022-09-13 NOTE — PLAN OF CARE
Goal Outcome Evaluation:      Today is Day 21 of GRALL + Imatinib. Patient feeling well today; no complaints of pain or nausea.  Patient ambulated halls independently. No transfusions needed today. Patient to have LP with IT Chemo in x-ray tomorrow 9/14 at 11am.  Patient to discharge to home tomorrow afternoon.

## 2022-09-13 NOTE — PLAN OF CARE
[id number]  AVSS, alert and oriented x 4, denies pain/nausea/sob. Up independent, ambulate in [address] way couple times, voiding adequately, not saving LBM yesterday. Schedule for XR LP INTRATHECAL CHEMO on 9/14 @ 1100 AM. Melatonin given at bedtime.  Continue to moniotr care.

## 2022-09-13 NOTE — DISCHARGE SUMMARY
[facility]    Discharge Summary  Hematology / Oncology    Date of Admission:  8/19/2022  Date of Discharge:  9/14/2022  Discharging Provider: Kelsey Gonzalez PA-C  Date of Service (when I saw the patient): 09/14/22    Discharge Diagnoses   # Ph+ B-ALL  # Chemotherapy-induced Grade 1 neuropathy (fingertips)   # Hypertension   # GERD  History of Present Illness   April Bakari is a 42 year old female who was admitted on 8/19/2022 with hyperleukocytosis (WBC 109K), abdominal pain and 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 Pre-phase steroids initiated (8/21-8/24), then initiated GRAALL + imatinib on 8/24, overall well tolerated. BMT consultation completed with Dr Walsh inpatient (9/12). Close oncology follow up as scheduled below.     We reviewed signs & symptoms of concern that should prompt a call to clinic triage or a visit to the ED, and patient voiced understanding. All questions answered. On the day of discharge, patient was quite well-appearing, hemodynamically stable, and felt safe and comfortable with the plans for discharge and follow-up.     Follow up:    Twice weekly labs and PRN transfusions (blood/plts/Neupogen) requested at  (starting 9/20). Requested an appointment at the [facility] for 9/16 as  is at capacity that day.     9/21: [facility] follow up     9/23: BMBx scheduled     9/27: Pentamidine nebulizer requested outpatient     9/28: Dr Hull follow up     Medication Highlights:     Continue Imatinib 400 mg BID thru 9/20/22     ppx ACV continued outpatient     ppx Fluconazole and Levaquin when ANC <1.0      Norvasc 10 mg daily     Hospital Course   Vashti Villegas was admitted on 8/19/2022.  The following problems were addressed during her hospitalization:     HEME  # Ph+ B-ALL  Patient presented to outside hospital with c/o Right upper quadrant pain and was subsequently found to have a markedly elevated white blood cell  count concerning for acute leukemia. While at OSH, she had CT PE protocol as well as CT A/P. These identified no PE, no acute or localized intraabdominal inflammatory process, mild splenomegaly, and few inconspicous low-density structures in the liver. She was transferred to [facility] for further workup and management.  - s/p Hydrea 1g TID (8/21-8/23) with improvement in WBC (100K ? 12K)  - CT CAP completed at OSH. CT neck 8/22 showed multiple, b/l suspicious-appearing LN (R level Ia, L level III, R level II-III) - not enlarged though they exhibit abnormal shapes.   - MR brain (8/21) - no acute IC pathology, no e/o intracranial CNS involvement.  - BMBx completed 8/20 - demonstrated B-ALL with 85% blasts and hypercellular marrow. IHC shows CD10+, CD34+. 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 Abnormal cytogenetics. For unknown reason, molecular studies were cancelled. Have reordered on PB today.  - Pt consented to HMTB; BMBx at dx was dry tap d/t WBC burden. Ordered peripheral blood samples on 8/22.   - Initiated pre-phase steroids with Prednisone 60 mg x8/21; increased to 60 mg/m2 (140 mg) 8/22-8/24. Transitioned to protocol-directed Dex 40 mg on 8/25.   - Clonoseq ID testing to help estimate MRD in future assessments. Clonal testing collected and sent on 8/23. Plan to order Clonoseq MRD testing with post-induction BMBx.   - Case discussed at Heme malignancy conference on 8/23, per consensus plan was to start GRAALL + Imatinib on 8/24. No clear indication for addition of Rituximab.    - No e/o TLS, allopurinol discontinued x9/5  - s/p BMT consult 9/12 with Dr. Walsh     Treatment Plan: GRAALL + imatinib (D1=8/24/22)  - Imatinib 400 mg BID - D1-28   - Vincristine 2 mg IV x1 - D1, 8, 15, 22   - Dexamethasone 40 mg daily - D2 (held D1, instead given pre-phase pred as above), 8-9, 15-16, 22-23   - Neupogen 5 mcg/kg/day - beginning D15,  daily PRN if ANC ? 1.2. PRN Neupogen added to therapy plan outpatient.      # CNS prophylaxis  Per the GRAALL protocol, and risk for ALL entering the CNS, patient requires weekly LP w/ triple IT chemo as detailed below. Due to unsuccessful attempt at bedside with the CAPS team on 8/25, will require all subsequent LPs under fluoroscopic guidance. Had required multiple units of platelets (3u total) prior to procedure to reach goal >50k.   - Triple IT chemo (Cytarabine, Solu-Cortef, MTX); D1, 8, 15.   - 8/26 CSF flow negative  - 8/31 CSF flow negative  - 9/7 CSF flow negative  - 9/14 LP scheduled in XR at 11am (Dr Del Toro to sign IT chemo orders prior)  - Pt expresses anxiety surrounding procedures; pre-medication with 0.5mg PO Ativan +/- Atarax available prior PRN     # Pancytopenia  2/2 to underlying disease  - Transfuse to maintain Hgb >7, Plt >10K (>50K prior to LPs)     # Chemotherapy-induced Grade 1 neuropathy (fingertips)  Patient was experiencing intermittent left hand numbness (x 8/28) and right hand numbness (noted first on 8/30 evening) but the symptoms were not persistent and resolved on 8/31. On 9/4 she began experiencing numbness/tingling in her fingertips on both of her hands. This is not painful, nor has it affected her dexterity. Her feet have been spared. After D18 Vincristine she has had intermittent tingling down to her MCP, but the numbness remains in her fingertips.   - Given overall mild symptoms and goal to achieve CR for transplant, will continue full dose vincristine with Day 15, but if worsening will reassess with next dose on Day 22. Remained stable, will continue with vincristine at this dose.      ID  # Lactic acidosis  Pt triggering lactic acid protocol for tachycardia intermittently. Afebrile, no infectious symptoms. She is drinking about 1-2 pitchers of water daily. Suspect related to leukemia though could have a component of dehydration.   - Encouraged PO fluids and will bolus IVF PRN  if hypovolemic  - LA 3.2 (9/8), ordered 500cc NS  - Intermittently triggering sepsis protocol and with lactic acidosis, though is now drinking more fluids and clinically euvolemic. Hold off on additional IVF unless hypovolemic.      # PPx  -  mg BID  - Nebulized Pentamidine q28d for PJP ppx, last dose given 8/29. Next due ~9/27 outpatient (requested)   - Levaquin 250 mg daily and Maude 50 mg daily while ANC <1.0; discontinued 9/13.      # H/o COVID-19 infection   Not vaccinated (due to fear of needles per pt report). Pt reported testing postiive for COVID approx 1 month prior to admission via home test. She never had it confirmed.   - COVID testing on admission was negative.      CV  # Hypertension  Fairly persistent elevated BP since admission to 150-160/80-90s. She was not on anti-hypertensive medication PTA.   - started Amlodipine 5 mg daily (8/21-8/23); increased to 10 mg daily (x8/23) - hold parameters in place if SBP<110  - prn Hydralazine 10-20 mg available q6h for SBP>160      GI  # Indigestion/GERD  - Continue protonix 40mg once daily    - TUMs PRN, Maalox PRN.      NEURO  # Headaches, improved  Pt endorsed intermittent HA since admission. Denies vision changes or other neurological sx. Has h/o HTN as above. CSF flow cytometry (8/25) negative for involvement by ALL.  - MRI brain completed 8/21 was negative for intracranial pathology or tumor involvement.   - Tylenol PRN  - Continue weekly LPs x3 per GRAALL protocol  - patient had a mild headache after D15 LP, quickly resolved with tylenol. Encouraged fluids and will continue to monitor. If worsening, will manage conservatively with IVF, caffeine/tylenol, and supine position but if persistent could consider a blood patch.      GYN  # Menstruation  Pt actively menstruating as of 9/8. She reports that the amount of bleeding is normal for her and her hgb remains stable. Suppressing menses is typically indicated in patients undergoing induction  chemotherapy given risk of thrombocytopenia and associated bleeding, however her platelets are recovered/near normal at 143 which makes her risk of bleeding less.   - consider low-dose progresterone pill if bleeding worsens or platelets are dropping  - do not use tampons given neutropenia, sanitary pads only     FEN  # Hypokalemia, Hypophosphatemia- replace per standing protocol  - No mIVF: bolus PRN.   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 Reviewed by pt and this provider faxed the paperwork on 9/2.      Patient and plan of care was discussed with attending physician Dr. Laine Gonzalez PA-C      Significant Results and Procedures   Results for orders placed or performed during the hospital encounter of 08/19/22   MR Brain w/o & w Contrast    Narrative     MR BRAIN W/O & W CONTRAST 8/21/2022 3:02 PM    Provided History: New acute leukemia with head aches r/o intracranial  hemorrhage or CNS involvement.    Comparison: None.    Technique: Multiplanar T1-weighted, axial FLAIR, and susceptibility  images were obtained without intravenous contrast. Following  intravenous gadolinium-based contrast administration, axial  T2-weighted, diffusion, and T1-weighted images (in multiple planes)  were obtained.    Contrast: Gadavist 11.4 mL    Findings:  There is no mass effect, midline shift, or evidence of intracranial  hemorrhage. The ventricles are proportionate to the cerebral sulci.  Normal major vascular intracranial flow-voids.    Postcontrast images demonstrate no abnormal intracranial enhancement.    No abnormality of the skull marrow  signal. Mucosal thickening in the  right maxillary sinus. Trace fluid in the right mastoid air cells. The  orbits are grossly unremarkable.    Partially visualized prominent suboccipital, right level 5 and  bilateral upper cervical chain lymph nodes.      Impression    Impression:   1. No acute intracranial pathology. No evidence for intracranial tumor  or hemorrhage.  2. Partially visualized prominent suboccipital, right level 5 and  upper cervical chain lymph nodes.    I have personally reviewed the examination and initial interpretation  and I agree with the findings.    MACO GARZON MD         SYSTEM ID:  [id number]   CT Soft Tissue Neck w Contrast    Narrative    EXAM: CT SOFT TISSUE NECK W CONTRAST  8/22/2022 10:31 AM     HISTORY:  Pt with newly diagnosed B-ALL, lymphadenopathy seen on MRI  brain. Eval involvement.         COMPARISON:  MR brain, dated 8/21/2022.     TECHNIQUE: Following intravenous administration of nonionic iodinated  contrast medium, thin section helical CT images were obtained from the  skull base down to the level of the aortic arch.  Axial, coronal and  sagittal reformations were performed with 2-3 mm slice thickness  reconstruction. Images were reviewed in soft tissue, lung and bone  windows.    CONTRAST: iopamidol (ISOVUE-370) solution 100 mL    FINDINGS:     No nasopharyngeal, oropharyngeal, hypopharyngeal, or glottic mucosal  space abnormality. Normal tongue base. Salivary glands are within  normal limits.    Multiple atypical appearing lymph nodes throughout the bilateral  cervical chain, with loss of normal architecture and fatty hilum. Most  prominent of these are a mildly homogeneously enhancing rounded left  level III lymph node measuring 9 mm in short axis (image 49 series 3);  two right-sided rounded, level Ia lymph nodes (both visible on image  28 series 3) the larger measuring 9 mm in short axis; and 4-5 rounded,  right level II-III lymph nodes, with the largest measuring 7 mm  in  short axis. The right jugulodigastric node asymmetrically larger than  left, but normal architecture appears preserved.     Normal thyroid gland.    Patent cervical vasculature; no high grade arterial stenosis.    No suspicious osseus lesion. Grossly patent cervical spinal canal.  Stable straightening of the normal cervical lordosis. Multilevel mild  to moderate disc space height loss and facet arthropathy.    Isolated mucosal thickening of the inferior portion of the right  maxillary sinus, likely mucous retention cyst. No periapical dental  lucencies. The imaged skull base, intracranial and orbital structures  are within normal limits.    No suspicious finding in the visualized superior mediastinum/thorax.  Left approach PICC line in place, with tip terminating off the  field-of-view. Clear lung apices.      Impression    IMPRESSION:  1. Multiple, bilateral suspicious appearing lymph nodes, most  prominent at right level Ia, left level III, and right level II-III.  While several of these nodes are not enlarged, they do exhibit  abnormal shapes.  2. Multilevel degenerative change throughout the cervical spine with  disc space height loss and facet arthropathy.    I have personally reviewed the examination and initial interpretation  and I agree with the findings.    BRANDIE SMILEY MD         SYSTEM ID:  [id number]   XR Lumbar Punc Intrathecal Chemo Admin    Narrative    PROCEDURE: Lumbar Puncture using Fluoroscopy, 8/26/2022 3:37 PM    HISTORY:  ALL    COMPARISON: none    STAFF NEURORADIOLOGIST: Dr. Alfredo Euceda I, ALFREDO EUCEDA MD,  attest that I was present for all critical portions of the procedure  and was immediately available to provide guidance and assistance  during the remainder of the procedure.     FELLOW PHYSICIAN: Dr. Brent Gardner    TECHNIQUE: Verbal and written consent for lumbar puncture was obtained  from the patient, and benefits and risk of the procedure were  explained, including but not  limited to worsening headache,  hemorrhage, infection, lower extremity pain, or nerve root injury. The  patient was sterilely prepped and draped with the patient in the prone  position, over the lower back. Under fluoroscopic guidance, the  interlaminar spaces were noted. 1% lidocaine was administered for  local anesthetic over the right L3-4 interlaminar space, and a 22  gauge 5 inch needle was advanced into the thecal sac under  fluoroscopic guidance.      There was initial show of clear CSF. Approximately 10 cc of CSF were  collected.    Hematology/Oncology then administered intrathecal chemotherapy, dose  and rate as determined by Heme/Onc.    The needle was removed with the stylet in place. There was no  immediate complication associated with the procedure. Samples were  sent for the requested laboratory testing.      FLUORO TIME: 22 seconds low-dose pulsed    DOSE: 6.8 mGy      Impression    IMPRESSION: Successful lumbar puncture and intrathecal chemotherapy  administration without immediate complication.    PLAN: Patient discharged to inpatient unit in stable condition for  monitoring. Orders placed for 1 hour of bed rest, with patient laying  on the back and the head of the bed flat.    I have personally reviewed the examination and initial interpretation  and I agree with the findings.    ALFREDO EUCEDA MD         SYSTEM ID:  [id number]   XR Lumbar Punc Intrathecal Chemo Admin    Narrative    PROCEDURE: Lumbar Puncture using Fluoroscopy, 8/31/2022 4:24 PM    HISTORY:  ALL    COMPARISON: 8/26/2022    STAFF NEURORADIOLOGIST: Dr. Alfredo Euceda I, ALFREDO EUCEDA MD,  attest that I was present for all critical portions of the procedure  and was immediately available to provide guidance and assistance  during the remainder of the procedure.    FELLOW PHYSICIAN: Dr. Brent Gardner    TECHNIQUE: Verbal and written consent for lumbar puncture was obtained  from the patient, and benefits and risk of the procedure  were  explained, including but not limited to worsening headache,  hemorrhage, infection, lower extremity pain, or nerve root injury. The  patient was sterilely prepped and draped with the patient in the prone  position, over the lower back. Under fluoroscopic guidance, the  interlaminar spaces were noted. 1% lidocaine was administered for  local anesthetic over the right L3-4 interlaminar space, and a 22  gauge 5 inch needle was advanced into the thecal sac under  fluoroscopic guidance.      There was initial show of clear CSF. Approximately 8 cc of CSF were  collected.    Hematology/Oncology then administered intrathecal chemotherapy, dose  and rate as determined by Heme/Onc.    The needle was removed with the stylet in place. There was no  immediate complication associated with the procedure. Samples were  sent for the requested laboratory testing.      FLUORO TIME: 21 seconds low-dose pulsed    DOSE: 8.3 mGy      Impression    IMPRESSION: Successful lumbar puncture without immediate complication.    PLAN: Patient discharged to patient care unit in stable condition for  monitoring. Orders placed for 1 hour of bed rest, with patient laying  on the back and the head of the bed flat.    I have personally reviewed the examination and initial interpretation  and I agree with the findings.    ALFREDO EUCEDA MD         SYSTEM ID:  [id number]   XR Lumbar Punc Intrathecal Chemo Admin    Narrative    PROCEDURE: Lumbar Puncture using Fluoroscopy, 9/7/2022 12:06 PM    HISTORY:  Lumbar puncture with intrathecal chemotherapy    COMPARISON: 8/31/2022    STAFF NEURORADIOLOGIST: Dr. Jarrell Rust    FELLOW PHYSICIAN: Dr. Tur Gonzales    TECHNIQUE: Verbal and written consent for lumbar puncture was obtained  from the patient, and benefits and risk of the procedure were  explained, including but not limited to worsening headache,  hemorrhage, infection, lower extremity pain, or nerve root injury. The  patient was sterilely prepped and  draped with the patient in the prone  position, over the lower back. Under fluoroscopic guidance, the  interlaminar spaces were noted. 1% lidocaine was administered for  local anesthetic over the L3-4 interlaminar space, and a 22 gauge 5  inch needle was advanced into the thecal sac under fluoroscopic  guidance.      There was initial show of clear CSF. Approximately 14 cc of CSF were  collected.    Hematology/Oncology then administered intrathecal chemotherapy, dose  and rate as determined by Heme/Onc.    The needle was removed with the stylet in place. There was no  immediate complication associated with the procedure. Samples were  sent for the requested laboratory testing.      FLUORO TIME: 21 seconds low-dose pulsed.    DOSE: 8.31 Gym2      Impression    IMPRESSION: Successful lumbar puncture and intrathecal chemotherapy  administration without immediate complication.    PLAN: Patient discharged to patient care unit in stable condition for  monitoring. Orders placed for 1 hour of bed rest with patient laying  on the back and the head of the bed flat.     I, MACO GARZON MD, attest that I was immediately available to  provide guidance and assistance during the entirety of the procedure.    I have personally reviewed the examination and initial interpretation  and I agree with the findings.    MACO GARZON MD         SYSTEM ID:  [id number]   XR Lumbar Punc Intrathecal Chemo Admin    Impression    RESIDENT PRELIMINARY INTERPRETATION  IMPRESSION: Successful lumbar puncture and intrathecal chemotherapy  administration without immediate complication.    PLAN: Patient discharged to patient care unit in stable condition for  monitoring. Orders placed for 1 hour of bed rest with patient laying  on the back and the head of the bed flat.   Echocardiogram Complete     Value    LVEF  65-70%    Narrative    [id number]  [id number]  [id number]  [id number]^SHER^CHINO^RUIZ     [facility]  [place]  Echocardiography Laboratory  [address] [place] [id number]     Name: MICHELLE APRIL  MRN: [id number]  : 1980  Study Date: 2022 10:34 AM  Age: 42 yrs  Gender: Female  Patient Location: [facility]  Reason For Study: Chemo  Ordering Physician: CHINO OLIVAREZ  Referring Physician: DINAH BATEMAN  Performed By: Rosa Ragsdale     BSA: 2.2 m2  Height: 64 in  Weight: 250 lb  ______________________________________________________________________________  Procedure  Complete Portable Echo Adult.  ______________________________________________________________________________  Interpretation Summary  Global and regional left ventricular function is hyperkinetic with an EF of  65-70%. Global peak LV longitudinal strain is averaged at -23%. This is within  reported normal limits (normal <-18%).  Global right ventricular function is normal. The right ventricle is normal  size.  No significant valvular abnormalities.  IVC diameter <2.1 cm collapsing >50% with sniff suggests a normal RA pressure  of 3 mmHg.  There is no prior study for direct comparison.  ______________________________________________________________________________  Left Ventricle  Global and regional left ventricular function is hyperkinetic with an EF of  65-70%. Left ventricular wall thickness is normal. Left ventricular size is  normal. Left ventricular diastolic function is indeterminate. Global peak LV  longitudinal strain is averaged at -23%. This is within reported normal limits  (normal <-18%).     Right Ventricle  Global right ventricular function is normal. The right ventricle is normal  size.     Atria  Both atria appear normal.     Mitral Valve  The mitral valve is normal. Trace mitral insufficiency is present.     Aortic Valve  The aortic valve is tricuspid. On Doppler interrogation, there is no  significant stenosis or regurgitation.     Tricuspid Valve  The tricuspid valve is normal. Trace tricuspid  insufficiency is present.  Pulmonary artery systolic pressure cannot be assessed.     Pulmonic Valve  The valve leaflets are not well visualized. Trace pulmonic insufficiency is  present.     Vessels  Sinuses of Valsalva 3.3 cm. Ascending aorta 3.4 cm. IVC diameter <2.1 cm  collapsing >50% with sniff suggests a normal RA pressure of 3 mmHg.     Pericardium  No pericardial effusion is present.     Compared to Previous Study  There is no prior study for direct comparison.  ______________________________________________________________________________  MMode/2D Measurements & Calculations  IVSd: 0.97 cm  LVIDd: 4.9 cm  LVIDs: 2.9 cm  LVPWd: 1.1 cm  FS: 42.1 %  LV mass(C)d: 181.1 grams  LV mass(C)dI: 84.2 grams/m2  Ao root diam: 3.3 cm  asc Aorta Diam: 3.4 cm  LVOT diam: 2.0 cm  LVOT area: 3.1 cm2  LA Volume (BP): 64.7 ml  LA Volume Index (BP): 30.1 ml/m2     RWT: 0.43     Doppler Measurements & Calculations  MV E max nubia: 83.9 cm/sec  MV A max nubia: 79.1 cm/sec  MV E/A: 1.1  MV dec slope: 406.0 cm/sec2  E/E' av.7  Lateral E/e': 12.1  Medial E/e': 15.4     ______________________________________________________________________________  Report approved by: Mp Rangel 2022 12:30 PM              Pending Results   These results will be followed up by [facility]  Unresulted Labs Ordered in the Past 30 Days of this Admission     Date and Time Order Name Status Description    2022 11:30 AM Cell Count CSF In process     2022 11:53 AM Flow Cytometry Cerebrospinal fluid In process     2022  8:00 AM Prepare pheresed platelets (unit) Preliminary           Code Status   Full Code    Primary Care Physician   [facility]    Physical Exam   Temp: 97.5  F (36.4  C) Temp src: Oral BP: 123/65 Pulse: 101   Resp: 16 SpO2: 98 % O2 Device: None (Room air)    Vitals:    09/10/22 0742 22 1030 22 0911   Weight: 112.4 kg (247 lb 12.8 oz) 113.2 kg (249 lb 9 oz) 111 kg (244 lb 12.8 oz)      Vital Signs with Ranges  Temp:  [97  F (36.1  C)-98.3  F (36.8  C)] 97.5  F (36.4  C)  Pulse:  [] 101  Resp:  [16] 16  BP: (103-127)/(59-65) 123/65  SpO2:  [96 %-99 %] 98 %  I/O last 3 completed shifts:  In: 20 [I.V.:20]  Out: -        Constitutional: Awake and conversational. Non- toxic appearing. No acute distress.   HEENT: Normocephalic, atraumatic . Sclerae anicteric. EOM intact. Moist mucus membranes   Respiratory: Breathing comfortable with no increased work on room air. No signs of respiratory distress or accessory muscle use. Lungs clear to auscultation bilaterally, no crackles or wheezing noted.  Cardiovascular: Regular rate and rhythm. Normal S1 and S2.   GI: Abdomen is soft, non-distended, non-tender and without distension. Bowel sounds present and normoactive.   Skin: Skin is clean, dry, intact. No jaundice appreciated. Fading eechymoses from prior LPs   Musculoskeletal/ Extremities: No redness, warmth, or swelling of the joints appreciated.   Neurologic: Alert and oriented. Speech normal. Grossly nonfocal. Memory and thought process preserved.  Neuropsychiatric: Calm, affect congruent to situation. Appropriate mood and affect. Good judgment and insight. No visual/auditory hallucinations.   LUE PICC C/D/I      Time Spent on this Encounter   Kelsey JACKSON PA-C, personally saw the patient today and spent greater than 30 minutes discharging this patient.    Discharge Disposition   Discharged to home  Condition at discharge: Stable    Consultations This Hospital Stay   VASCULAR ACCESS FOR PICC PLACEMENT ADULT IP CONSULT  PHYSICAL THERAPY ADULT IP CONSULT  INTERNAL MEDICINE PROCEDURE TEAM ADULT IP CONSULT [facility] - LUMBAR PUNCTURE  CARE MANAGEMENT / SOCIAL WORK IP CONSULT    Discharge Orders      MD Instruction for Protocol 5    MD - Below are two outpatient Filgrastim options. You must DELETE the order that you do not want. Choose the syringe if the ordered dose matches a syringe size.  Choose the vial if the dose will be less than 250 mcg.     Reason for your hospital stay    Acute lymphoblastic Lymphoma     Activity    Your activity upon discharge: activity as tolerated     When to contact your care team    [facility] triage line (open daily 8am-4pm) at [phone] for temp greater than or equal to 100.4, uncontrolled nausea/vomiting/diarrhea/constipation, unrelieved pain, bleeding not relieved with pressure, dizziness, chest pain, shortness of breath, loss of consciousness, and any new or concerning symptoms. If symptoms occur after triage hours please report to local Emergency Department for evaluation.     Discharge Instructions    Follow up:    Twice weekly labs and PRN transfusions (blood/plts/Neupogen) requested at [facility]    9/21: PA follow up     9/23: Bone marrow biopsy scheduled     9/27: Pentamidine nebulizer requested outpatient as fungal infection prevention     9/28: Dr Hull follow up      Medication Highlights:     Continue Imatinib 400 mg BID thru 9/20/22 (chemo pill)     Acyclovir continued outpatient to prevent viral infection     Take Fluconazole (anti fungal) and Levaquin (antibiotic) when ANC <1000 (neutropenic, high risk for infections)    Norvasc 10 mg daily for high blood pressure     Follow Up ([facility])    Follow up as scheduled below    Appointments on [place] and/or [facility] (with [facility] or [facility] provider or service). Call [phone] if you haven't heard regarding these appointments within 7 days of discharge.     Brief Discharge Instructions    Please take the dexamethasone 40mg on 9/15/22 in the morning     Full Code     Diet    Follow this diet upon discharge: Orders Placed This Encounter      Snacks/Supplements Adult: Magic Cup; Between Meals      Regular Diet Adult     Infusion Appointment Request     Infusion Appointment Request     Check Out Appointment Request    [facility]- please arrange labs, BMBx (ideally 9/21, 9/22, or 9/23), [facility]  follow up (video OK) the week of 9/19, and an appt with Dr. Hull about 3 days after the BMBx to review the results.     [facility]- please arrange 2x weekly labs, poss pRBC 9/19-10/7     MODIFIED Check Out Appointment Request    Add on PRN Neupogen for twice weekly labs and infusions appointments at [facility]. 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)      acyclovir (ZOVIRAX) 400 MG tablet Take 1 tablet (400 mg) by mouth 2 times daily for 60 days  Qty: 120 tablet, Refills: 0    Associated Diagnoses: 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)      amLODIPine (NORVASC) 10 MG tablet Take 1 tablet (10 mg) by mouth daily for 30 days  Qty: 30 tablet, Refills: 0    Associated Diagnoses: 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)      calcium carbonate (TUMS) 500 MG chewable tablet Take 2 tablets (1,000 mg) by mouth daily as needed for heartburn    Associated Diagnoses: 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)      dexamethasone 20 MG TABS Take 40 mg by mouth once for 1 dose  Qty: 2 tablet, Refills: 0    Associated Diagnoses: 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)      fluconazole (DIFLUCAN) 200 MG tablet Take 1 tablet (200 mg) by mouth daily as needed (Take when ANC <1000)  Qty: 60 tablet, Refills: 0     Associated Diagnoses: 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)      imatinib (GLEEVEC) 400 MG tablet Take 1 tablet (400 mg) by mouth 2 times daily for 7 days  Qty: 14 tablet, Refills: 0    Associated Diagnoses: 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)      levofloxacin (LEVAQUIN) 250 MG tablet Take 1 tablet (250 mg) by mouth daily as needed (ANC <1000)  Qty: 60 tablet, Refills: 0    Associated Diagnoses: 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 Please administer platelets with tylenol and benadryl premedication.      Data   Most Recent 3 CBC's:  Recent Labs   Lab Test 09/14/22  0704 09/13/22  0716 09/12/22  0936   WBC 6.8 6.8 6.0   HGB 7.5* 7.6* 8.0*   MCV 95 92 94    347 328      Most Recent 3 BMP's:  Recent Labs   Lab Test 09/14/22  0704 09/13/22  0716 09/12/22  1623 09/12/22  0936    135*  --  138   POTASSIUM 4.1 4.0 3.8 3.4   CHLORIDE 106 102  --  105   CO2 27 27  --  27   BUN 15.7 13.4  --  13.9   CR 0.74 0.71  --  0.79   ANIONGAP 6* 6*  --  6*   SOLEDAD 8.4* 8.2*  --  8.2*   GLC 87 86  --  106*     Most Recent 2 LFT's:  Recent Labs   Lab Test 09/14/22  0704 09/12/22  0936   AST 16 16   ALT 27 28   ALKPHOS 77 77   BILITOTAL 0.3 0.6     Most Recent INR's and Anticoagulation Dosing History:  Anticoagulation Dose History     Recent Dosing and Labs Latest Ref Rng & Units 8/29/2022 8/31/2022 9/2/2022 9/5/2022  9/7/2022 9/9/2022 9/12/2022    INR 0.85 - 1.15 1.09 1.06 1.14 1.03 1.04 1.15 1.02        Most Recent 3 Troponin's:No lab results found.  Most Recent Cholesterol Panel:No lab results found.  Most Recent 6 Bacteria Isolates From Any Culture (See EPIC Reports for Culture Details):No lab results found.  Most Recent TSH, T4 and A1c Labs:No lab results found.      I have seen, interviewed, and examined the patient independently.  I have reviewed the vital signs and labs.  This note reflects my assessment and plan.    We have spent greater than 30 minutes organizing outpatient care, follow up appointments, and medications.      Vashti Villegas was seen and evaluated today as part of a shared APRN/PA visit. I reviewed today s vital signs, labs, imaging, notes, and other studies.   My key exam findings include: doing well, eager for discharge.  Outpatient plans and monitoring well established  Patient knows to return/go to ED/call if new symptoms or fever  Key management decisions made by me and carried out under my direction include:  Safe for diacharge  I spent >40 minutes face-to-face and/or coordinating care. Over 50% of our time on the unit was spent counseling the patient and/or coordinating care regarding:  Yrn Jnag MD/PhD

## 2022-09-14 ENCOUNTER — DOCUMENTATION ONLY (OUTPATIENT)
Dept: PHARMACY | Facility: CLINIC | Age: 42
End: 2022-09-14

## 2022-09-14 ENCOUNTER — APPOINTMENT (OUTPATIENT)
Dept: GENERAL RADIOLOGY | Facility: CLINIC | Age: 42
End: 2022-09-14
Attending: PHYSICIAN ASSISTANT
Payer: COMMERCIAL

## 2022-09-14 VITALS
OXYGEN SATURATION: 98 % | BODY MASS INDEX: 41.79 KG/M2 | WEIGHT: 244.8 LBS | RESPIRATION RATE: 16 BRPM | HEIGHT: 64 IN | TEMPERATURE: 97.5 F | SYSTOLIC BLOOD PRESSURE: 123 MMHG | DIASTOLIC BLOOD PRESSURE: 65 MMHG | HEART RATE: 101 BPM

## 2022-09-14 LAB
ABO/RH(D): NORMAL
ALBUMIN SERPL BCG-MCNC: 3.4 G/DL (ref 3.5–5.2)
ALP SERPL-CCNC: 77 U/L (ref 35–104)
ALT SERPL W P-5'-P-CCNC: 27 U/L (ref 10–35)
ANION GAP SERPL CALCULATED.3IONS-SCNC: 6 MMOL/L (ref 7–15)
ANTIBODY SCREEN: NEGATIVE
AST SERPL W P-5'-P-CCNC: 16 U/L (ref 10–35)
BASOPHILS # BLD AUTO: 0 10E3/UL (ref 0–0.2)
BASOPHILS NFR BLD AUTO: 0 %
BILIRUB DIRECT SERPL-MCNC: <0.2 MG/DL (ref 0–0.3)
BILIRUB SERPL-MCNC: 0.3 MG/DL
BUN SERPL-MCNC: 15.7 MG/DL (ref 6–20)
CALCIUM SERPL-MCNC: 8.4 MG/DL (ref 8.6–10)
CHLORIDE SERPL-SCNC: 106 MMOL/L (ref 98–107)
CREAT SERPL-MCNC: 0.74 MG/DL (ref 0.51–0.95)
DEPRECATED HCO3 PLAS-SCNC: 27 MMOL/L (ref 22–29)
EOSINOPHIL # BLD AUTO: 0 10E3/UL (ref 0–0.7)
EOSINOPHIL NFR BLD AUTO: 0 %
ERYTHROCYTE [DISTWIDTH] IN BLOOD BY AUTOMATED COUNT: 17.8 % (ref 10–15)
FIBRINOGEN PPP-MCNC: 271 MG/DL (ref 170–490)
GFR SERPL CREATININE-BSD FRML MDRD: >90 ML/MIN/1.73M2
GLUCOSE SERPL-MCNC: 87 MG/DL (ref 70–99)
HCT VFR BLD AUTO: 24 % (ref 35–47)
HGB BLD-MCNC: 7.5 G/DL (ref 11.7–15.7)
IMM GRANULOCYTES # BLD: 0.3 10E3/UL
IMM GRANULOCYTES NFR BLD: 4 %
INR PPP: 0.99 (ref 0.85–1.15)
LYMPHOCYTES # BLD AUTO: 1.8 10E3/UL (ref 0.8–5.3)
LYMPHOCYTES NFR BLD AUTO: 26 %
MCH RBC QN AUTO: 29.5 PG (ref 26.5–33)
MCHC RBC AUTO-ENTMCNC: 31.3 G/DL (ref 31.5–36.5)
MCV RBC AUTO: 95 FL (ref 78–100)
MONOCYTES # BLD AUTO: 0.6 10E3/UL (ref 0–1.3)
MONOCYTES NFR BLD AUTO: 9 %
NEUTROPHILS # BLD AUTO: 4.1 10E3/UL (ref 1.6–8.3)
NEUTROPHILS NFR BLD AUTO: 61 %
NRBC # BLD AUTO: 0.1 10E3/UL
NRBC BLD AUTO-RTO: 1 /100
PHOSPHATE SERPL-MCNC: 4.4 MG/DL (ref 2.5–4.5)
PLATELET # BLD AUTO: 386 10E3/UL (ref 150–450)
POTASSIUM SERPL-SCNC: 4.1 MMOL/L (ref 3.4–5.3)
PROT SERPL-MCNC: 5.2 G/DL (ref 6.4–8.3)
RBC # BLD AUTO: 2.54 10E6/UL (ref 3.8–5.2)
SODIUM SERPL-SCNC: 139 MMOL/L (ref 136–145)
SPECIMEN EXPIRATION DATE: NORMAL
WBC # BLD AUTO: 6.8 10E3/UL (ref 4–11)

## 2022-09-14 PROCEDURE — 77003 FLUOROGUIDE FOR SPINE INJECT: CPT | Mod: 26 | Performed by: RADIOLOGY

## 2022-09-14 PROCEDURE — 250N000013 HC RX MED GY IP 250 OP 250 PS 637: Performed by: PHYSICIAN ASSISTANT

## 2022-09-14 PROCEDURE — 96450 CHEMOTHERAPY INTO CNS: CPT

## 2022-09-14 PROCEDURE — 86901 BLOOD TYPING SEROLOGIC RH(D): CPT | Performed by: STUDENT IN AN ORGANIZED HEALTH CARE EDUCATION/TRAINING PROGRAM

## 2022-09-14 PROCEDURE — 86833 HLA CLASS II HIGH DEFIN QUAL: CPT | Performed by: STUDENT IN AN ORGANIZED HEALTH CARE EDUCATION/TRAINING PROGRAM

## 2022-09-14 PROCEDURE — 96450 CHEMOTHERAPY INTO CNS: CPT | Mod: GC | Performed by: RADIOLOGY

## 2022-09-14 PROCEDURE — 250N000011 HC RX IP 250 OP 636: Performed by: INTERNAL MEDICINE

## 2022-09-14 PROCEDURE — 85025 COMPLETE CBC W/AUTO DIFF WBC: CPT | Performed by: PHYSICIAN ASSISTANT

## 2022-09-14 PROCEDURE — 85384 FIBRINOGEN ACTIVITY: CPT | Performed by: PHYSICIAN ASSISTANT

## 2022-09-14 PROCEDURE — 250N000013 HC RX MED GY IP 250 OP 250 PS 637: Performed by: INTERNAL MEDICINE

## 2022-09-14 PROCEDURE — 82248 BILIRUBIN DIRECT: CPT | Performed by: PHYSICIAN ASSISTANT

## 2022-09-14 PROCEDURE — 88188 FLOWCYTOMETRY/READ 9-15: CPT | Mod: GC | Performed by: STUDENT IN AN ORGANIZED HEALTH CARE EDUCATION/TRAINING PROGRAM

## 2022-09-14 PROCEDURE — 258N000003 HC RX IP 258 OP 636: Performed by: INTERNAL MEDICINE

## 2022-09-14 PROCEDURE — 85610 PROTHROMBIN TIME: CPT | Performed by: PHYSICIAN ASSISTANT

## 2022-09-14 PROCEDURE — 82310 ASSAY OF CALCIUM: CPT | Performed by: PHYSICIAN ASSISTANT

## 2022-09-14 PROCEDURE — 250N000011 HC RX IP 250 OP 636: Performed by: PHYSICIAN ASSISTANT

## 2022-09-14 PROCEDURE — 99239 HOSP IP/OBS DSCHRG MGMT >30: CPT | Mod: FS | Performed by: PHYSICIAN ASSISTANT

## 2022-09-14 PROCEDURE — 99207 PR NO BILLABLE SERVICE THIS VISIT: CPT | Performed by: PHYSICIAN ASSISTANT

## 2022-09-14 PROCEDURE — 86828 HLA CLASS I&II ANTIBODY QUAL: CPT | Performed by: STUDENT IN AN ORGANIZED HEALTH CARE EDUCATION/TRAINING PROGRAM

## 2022-09-14 PROCEDURE — 999N001098 HLA ANTIBODY (PRA) CLASS II SCREEN: Performed by: STUDENT IN AN ORGANIZED HEALTH CARE EDUCATION/TRAINING PROGRAM

## 2022-09-14 PROCEDURE — 250N000009 HC RX 250: Performed by: RADIOLOGY

## 2022-09-14 PROCEDURE — 86832 HLA CLASS I HIGH DEFIN QUAL: CPT | Performed by: STUDENT IN AN ORGANIZED HEALTH CARE EDUCATION/TRAINING PROGRAM

## 2022-09-14 PROCEDURE — 89050 BODY FLUID CELL COUNT: CPT | Performed by: PHYSICIAN ASSISTANT

## 2022-09-14 PROCEDURE — 88185 FLOWCYTOMETRY/TC ADD-ON: CPT | Performed by: PHYSICIAN ASSISTANT

## 2022-09-14 PROCEDURE — 84100 ASSAY OF PHOSPHORUS: CPT | Performed by: PHYSICIAN ASSISTANT

## 2022-09-14 PROCEDURE — 36592 COLLECT BLOOD FROM PICC: CPT | Performed by: STUDENT IN AN ORGANIZED HEALTH CARE EDUCATION/TRAINING PROGRAM

## 2022-09-14 RX ORDER — DEXAMETHASONE 4 MG/1
40 TABLET ORAL DAILY
Status: DISCONTINUED | OUTPATIENT
Start: 2022-09-14 | End: 2022-09-14 | Stop reason: HOSPADM

## 2022-09-14 RX ORDER — LOPERAMIDE HCL 2 MG
2-4 CAPSULE ORAL 4 TIMES DAILY PRN
Status: DISCONTINUED | OUTPATIENT
Start: 2022-09-14 | End: 2022-09-14 | Stop reason: HOSPADM

## 2022-09-14 RX ORDER — LIDOCAINE HYDROCHLORIDE 10 MG/ML
10 INJECTION, SOLUTION EPIDURAL; INFILTRATION; INTRACAUDAL; PERINEURAL ONCE
Status: COMPLETED | OUTPATIENT
Start: 2022-09-14 | End: 2022-09-14

## 2022-09-14 RX ORDER — ALBUTEROL SULFATE 0.83 MG/ML
2.5 SOLUTION RESPIRATORY (INHALATION)
Status: DISCONTINUED | OUTPATIENT
Start: 2022-09-14 | End: 2022-09-14

## 2022-09-14 RX ORDER — EPINEPHRINE 1 MG/ML
0.3 INJECTION, SOLUTION, CONCENTRATE INTRAVENOUS EVERY 5 MIN PRN
Status: DISCONTINUED | OUTPATIENT
Start: 2022-09-14 | End: 2022-09-14

## 2022-09-14 RX ORDER — DIPHENHYDRAMINE HYDROCHLORIDE 50 MG/ML
50 INJECTION INTRAMUSCULAR; INTRAVENOUS
Status: DISCONTINUED | OUTPATIENT
Start: 2022-09-14 | End: 2022-09-14

## 2022-09-14 RX ORDER — ALBUTEROL SULFATE 90 UG/1
1-2 AEROSOL, METERED RESPIRATORY (INHALATION)
Status: DISCONTINUED | OUTPATIENT
Start: 2022-09-14 | End: 2022-09-14

## 2022-09-14 RX ORDER — MEPERIDINE HYDROCHLORIDE 25 MG/ML
25 INJECTION INTRAMUSCULAR; INTRAVENOUS; SUBCUTANEOUS EVERY 30 MIN PRN
Status: DISCONTINUED | OUTPATIENT
Start: 2022-09-14 | End: 2022-09-14

## 2022-09-14 RX ORDER — METHYLPREDNISOLONE SODIUM SUCCINATE 125 MG/2ML
125 INJECTION, POWDER, LYOPHILIZED, FOR SOLUTION INTRAMUSCULAR; INTRAVENOUS
Status: DISCONTINUED | OUTPATIENT
Start: 2022-09-14 | End: 2022-09-14

## 2022-09-14 RX ADMIN — Medication 5 ML: at 06:53

## 2022-09-14 RX ADMIN — AMLODIPINE BESYLATE 10 MG: 10 TABLET ORAL at 08:29

## 2022-09-14 RX ADMIN — PANTOPRAZOLE SODIUM 40 MG: 40 TABLET, DELAYED RELEASE ORAL at 08:29

## 2022-09-14 RX ADMIN — ACETAMINOPHEN 975 MG: 325 TABLET, FILM COATED ORAL at 14:46

## 2022-09-14 RX ADMIN — METHOTREXATE: 25 INJECTION, SOLUTION INTRA-ARTERIAL; INTRAMUSCULAR; INTRATHECAL; INTRAVENOUS at 11:38

## 2022-09-14 RX ADMIN — LIDOCAINE HYDROCHLORIDE 10 ML: 10 INJECTION, SOLUTION EPIDURAL; INFILTRATION; INTRACAUDAL; PERINEURAL at 10:40

## 2022-09-14 RX ADMIN — ACETAMINOPHEN 975 MG: 325 TABLET, FILM COATED ORAL at 08:29

## 2022-09-14 RX ADMIN — LOPERAMIDE HYDROCHLORIDE 4 MG: 2 CAPSULE ORAL at 10:22

## 2022-09-14 RX ADMIN — LORAZEPAM 1 MG: 0.5 TABLET ORAL at 10:22

## 2022-09-14 RX ADMIN — ACYCLOVIR 400 MG: 400 TABLET ORAL at 08:29

## 2022-09-14 RX ADMIN — IMATINIB MESYLATE 400 MG: 400 TABLET, FILM COATED ORAL at 08:29

## 2022-09-14 RX ADMIN — DEXAMETHASONE 40 MG: 4 TABLET ORAL at 13:06

## 2022-09-14 RX ADMIN — VINCRISTINE SULFATE 2 MG: 1 INJECTION, SOLUTION INTRAVENOUS at 13:08

## 2022-09-14 ASSESSMENT — ACTIVITIES OF DAILY LIVING (ADL)
ADLS_ACUITY_SCORE: 22

## 2022-09-14 NOTE — PROGRESS NOTES
D/I:  Patient received Day 22 Vincristine via PICC with brisk blood return checked prior to infusion.  Chemotherapy double checked by 2 chemo competent RNs.  Patient denies the need for further instruction.    A:  Patient appears to have tolerated chemotherapy well.      P: Monitor for side effects of chemotherapy.  Continue with current plan of care.  Notify MD with any changes in patient's status.

## 2022-09-14 NOTE — PLAN OF CARE
"Goal Outcome Evaluation:    /61 (BP Location: Right arm)   Pulse 85   Temp 97  F (36.1  C) (Temporal)   Resp 16   Ht 1.626 m (5' 4\")   Wt 113.2 kg (249 lb 9 oz)   SpO2 98%   BMI 42.84 kg/m      [id number]:     Vitals: VS on RA, afebrile.  Neuro:A&Ox4. 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 Denies dizziness. Denies headache.  Cardiac: Denies chest pain. Tachy.  Respiratory: Denies shortness of breath.  GI/: No concerns with urination. No BM this shift. Last BM 9/13. Denies nausea and vomiting.  Pain: Denies pain.  Diet: Regular.    LP with IT chemo in x-ray at 1100 a.m. on 9/14/2022. Plan tentative to discharge home after IT/IV chemo.      Continue to monitor and with plan of care.           "

## 2022-09-14 NOTE — PLAN OF CARE
[id number]  AVSS, alert and oriented x 4, denies pain/nausea/sob. Up independent, walk in tena ways couple times this robb shift, voiding adequately.  Pt will have LP with IT Chemo in x-ray tomorrow 9/14 @ 11 AM and will be discharging home tomorrow afternoon. Pt took shower this evening.  Continue to monitor care.

## 2022-09-14 NOTE — PLAN OF CARE
Goal Outcome Evaluation:      D/I:  Patient has completed chemotherapy & is adequate for discharge.  PICC removed without incident.  RN reviewed discharge paperwork with patient; April denies the need for further instruction.  All of patient's personal belongings taken with her upon discharge.    A:  Patient appears to be ready to discharge to home.    P:  Patient discharged to home with

## 2022-09-14 NOTE — PROCEDURES
DIAGNOSIS: ALL  PROCEDURE: Intrathecal chemo administration   LOCATION: Radiology  INDICATION: Diagnostic and Therapeutic   PRE-MEDS: Ativan 1 mg PO (30 min prior)     Lumbar puncture performed and CSF samples collected by the General Radiology team under fluoroscopy.    Chemotherapy was double-checked by this writer and Eileen Crain PA-C. This writer then administered Methotrexate (PF) 15 mg in sodium chloride (PF) 0.9% PF 6 mL intrathecal inj (PF) without apparent complication.  Complications: None immediately.   Chemo instillation performed by: Gail Kendrick PA-C ([employer])  Hematology/Oncology  Pager #[phone]

## 2022-09-14 NOTE — PROGRESS NOTES
Prior Authorization Approval    Imatinib 400mg tabs  Date Initiated: 9/13/2022  Date Completed: 9/13/2022  Prior Auth Type: Clinical                Status: Approved    Effective Date: 09/13/2022 - 09/13/2023  Copay: 0.00     Filling Pharmacy: [facility] - [place], [place] - [address] [place] [address]    Insurance: [employer] - Phone [phone] Fax [phone]  ID: [id number]  Case Number: [id number] / [id number]   Submitted Via: Garry Eason  [facility] Liaison  Ph: [phone] Pager: [phone]

## 2022-09-14 NOTE — PLAN OF CARE
Physical Therapy Discharge Summary    Reason for therapy discharge:    Discharged to home.    Progress towards therapy goal(s). See goals on Care Plan in [facility] electronic health record for goal details.  Goals partially met.  Barriers to achieving goals:   discharge from facility.    Therapy recommendation(s):    No further therapy is recommended.

## 2022-09-14 NOTE — PLAN OF CARE
Goal Outcome Evaluation:      Patient had LP with IT chemo in x-ray this morning after having 1mg PO Ativan premed.  Patient has been taking laxatives BID & now experiencing diarrhea; Imodium given with relief.  Tylenol given for mild headache this morning with relief.  Patient received Day 22 Vincristine (see chemo note).  Patient to discharge this afternoon after chemo completed.  Order to remove PICC at discharge placed.

## 2022-09-15 LAB
APPEARANCE CSF: CLEAR
COLOR CSF: COLORLESS
PATH REV: ABNORMAL
RBC # CSF MANUAL: 9 /UL (ref 0–2)
TUBE # CSF: 2
WBC # CSF MANUAL: 0 /UL (ref 0–5)

## 2022-09-16 LAB
PATH REPORT.COMMENTS IMP SPEC: NORMAL
PATH REPORT.FINAL DX SPEC: NORMAL
PATH REPORT.MICROSCOPIC SPEC OTHER STN: NORMAL
PATH REPORT.RELEVANT HX SPEC: NORMAL
SA 1 CELL: NORMAL
SA 1 TEST METHOD: NORMAL
SA 2 CELL: NORMAL
SA 2 TEST METHOD: NORMAL
SA1 HI RISK ABY: NORMAL
SA1 MOD RISK ABY: NORMAL
SA2 HI RISK ABY: NORMAL
SA2 MOD RISK ABY: NORMAL
SCR 1 TEST METHOD: NORMAL
SCR1 CELL: NORMAL
SCR1 RESULT: NORMAL
SCR2 CELL: NORMAL
SCR2 RESULT: NORMAL
SCR2 TEST METHOD: NORMAL
ZZZSA 1  COMMENTS: NORMAL
ZZZSA 2 COMMENTS: NORMAL
ZZZSCR1 COMMENTS: NORMAL
ZZZSCR2 COMMENTS: NORMAL

## 2022-09-16 NOTE — PROGRESS NOTES
Prior Authorization Approval    Ondansetron 4mg ODT  Date Initiated: 9/14/2022  Date Completed: 9/14/2022  Prior Auth Type: Quantity Limit                Status: Approved    Effective Date: 09/14/2022 - 09/14/2023  Copay: 0.00     Filling Pharmacy: [facility] - [place], [place] - [address] [place] [address]    Insurance: [employer] - Phone [phone] Fax [phone]  ID: [id number]  Case Number: [id number] / [id number]   Submitted Via: Garry Eason  [facility] Pharm[facility] Liaison  Ph: [phone] Pager: [phone]

## 2022-09-17 NOTE — PROGRESS NOTES
BMT / Cell Therapy Consultation      Vashti Villegas is a 42 year old female referred for acute lymphoblastic leukemia.      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  At diagnosis: .7, 85% peripheral blasts, 85% bone marrow blasts, t(9;22) by FISH.  8/26/22: CSF flow negative  8/31/22: BCR:ABL1 p210 undetectable, BCR:ABL1 p190 undetectable    Date Treatment Name Response Side Effects / Toxicities   8/21/22 Hydrea, pre-phase steroids     8/24/22 GRAALL + imatinib            HPI:  Please see my entry above for hematologic history.  Vashti Villegas is a 42 year old woman who presented on 8/19/22 with RUQ pain and a significantly elevated WBC as above.  She underwent a CT PE and CT A/P which showed only mild splenomegaly.  She was transferred to the [facility] fr further evaluation and was found to have Ph+ B-cell ALL as above.  April is seen today to discuss the potential role of allogeneic stem cell transplant.    April is accompanied by her  Crescencio and reports she is generally doing well.  She endorses mild fatigue and nausea with her chemotherapy but denies any other concerns.  She thinks overall she has tolerated treatment better than she expected.  She has been staying active while on the floor, getting up and moving around regularly.  She notes prior to her hospitalization she was in good health and denies any chronic issues.      ASSESSMENT AND PLAN:  Today I discussed the above diagnosis with Vashti Villegas. We discussed the natural history of the disease and treatment to date. We reviewed all the available diagnostic information. We talked about general indications of transplant that include patient, disease and donor factors.     We also reviewed again the role of allogeneic stem cell transplantation in this disease. I described the process of work up of a potential recipient to confirm good organ function and reserve, reassess disease and  decide on risks/benefit. We also discussed in great detail the process of the actual transplant itself, with discussion of different donor stem cell options. We discussed the role of the preparative regimen to clear any residual disease and to ablate the bone marrow, followed by infusion of the HSC graft. We discussed the expected toxicities and side effects, including organ toxicity, expected pancytopenia and need for transfusion support, risk of infection or bleeding, possibility of delayed or non-engraftment, and the risk of treatment related mortality (10-20%). We also discussed the risk of acute or chronic GVHD (overall ~50%, 10-20% for severe GVH) and the need for immunosuppressionn within the first 100 days and perhaps beyond, depending on GVHD status then. I also mentioned the possibility of relapse.  We discussed supportive care, needing a line with associated complications, and the critical need for a caregiver and proximity to [facility].    Next steps will depend on reassessment of MRD after induction therapy.  I discussed with April that if she is MRD negative consolidation therapy is indicated in virtually all cases.  Although I would favor allogeneic transplant vs chemotherapy in this setting, she is understandably hesitant after discussion of the transplant process.  Consolidation therapy + TKI can be considered in this setting and this can be a further conversation between myself, April and her oncologist once MRD reassessment is complete.  In the event of relapsed/refractory disease further treatment followed by transplant consideration is recommended.    She has one half-sister and no other siblings.  We will start an unrelated donor search now to avoid delays if and when we do move to transplant.    Summary: Start MUD search, followed up MRD reassessment following completion of induction therapy    I spent 60 minutes in the care of this patient today, which included time necessary for preparation for  the visit, obtaining history, ordering medications/tests/procedures as medically indicated, review of pertinent medical literature, counseling of the patient, communication of recommendations to the care team, and documentation time.    dAams Walsh MD      ROS:    10 point ROS neg other than the symptoms noted above in the HPI.      Medical history: Patient denies any significant past medical history.    Surgical history:  section x2, tubal ligation.    Family history: Maternal grandmother with history of breast cancer.  No other known first-degree relatives with malignancy.    Social history: Lives at home with  and 3 children (teenagers).  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    Allergies   Allergen Reactions     Blood Transfusion Related (Informational Only) Hives     Hive reaction with platelet transfusion on 2022. Please administer platelets with tylenol and benadryl premedication.         Current Outpatient Medications   Medication Sig Dispense Refill     acetaminophen (TYLENOL) 325 MG tablet Take 3 tablets (975 mg) by mouth every 6 hours as needed for mild pain, other or fever (blood product administration premedication)       acyclovir (ZOVIRAX) 400 MG tablet Take 1 tablet (400 mg) by mouth 2 times daily for 60 days 120 tablet 0     amLODIPine (NORVASC) 10 MG tablet Take 1 tablet (10 mg) by mouth daily for 30 days 30 tablet 0     calcium carbonate (TUMS) 500 MG chewable tablet Take 2 tablets (1,000 mg) by mouth daily as needed for heartburn       dexamethasone 20 MG TABS Take 40 mg by mouth once for 1 dose 2 tablet 0     fluconazole (DIFLUCAN) 200 MG tablet Take 1 tablet (200 mg) by mouth daily as needed (Take when ANC <1000) 60 tablet 0     imatinib (GLEEVEC) 400 MG tablet Take 1 tablet (400 mg) by mouth 2 times daily for 7 days 14 tablet 0     levofloxacin (LEVAQUIN) 250 MG tablet Take 1 tablet (250 mg) by mouth daily as needed (ANC <1000) 60 tablet 0     ondansetron  "(ZOFRAN ODT) 4 MG ODT tab Take 1-2 tablets (4-8 mg) by mouth every 8 hours as needed for nausea or vomiting 60 tablet 0         Physical Exam:     Vital Signs: /65 (BP Location: Right arm)   Pulse 101   Temp 97.5  F (36.4  C) (Oral)   Resp 16   Ht 1.626 m (5' 4\")   Wt 111 kg (244 lb 12.8 oz)   SpO2 98%   BMI 42.02 kg/m      KPS:  90  General Appearance: alert and no distress  Eyes: PERRL, conjunctiva and lids normal, sclera nonicteric  Ears/Nose/M/Throat: Oral mucosa and posterior oropharynx normal, moist mucous membranes  Neck supple, non-tender, free range of motion, no adenopathy  Cardio/Vascular:regular rate and rhythm, normal S1 and S2, no murmur  Resp Effort And Auscultation: Normal - Clear to auscultation without rales, rhonchi, or wheezing.  GI: soft, nontender, bowel sounds present in all four quadrants, no hepatosplenomegaly  Lymphatics:no significant enlargement of lymph nodes globally   Musculoskeletal: Musculoskeletal normal  Edema: none  Skin: Skin color, texture, turgor normal. No rashes or lesions.  Neurologic: Gait normal. Sensation grossly WNL.  Psych/Affect: Mood and affect are appropriate.      Blood Counts       Recent Labs   Lab Test 09/14/22  0704 09/13/22  0716 09/12/22  0936 09/11/22  0941 08/25/22  1041 08/25/22  0519 08/24/22  2142 08/24/22  1353 08/24/22  0527   HGB 7.5* 7.6* 8.0* 8.3*   < > 12.5 13.0   < > 12.0   HCT 24.0* 23.5* 24.8* 25.8*   < > 37.8 38.4   < > 36.7   WBC 6.8 6.8 6.0 3.0*   < > 12.8* 15.9*   < > 31.1*   ANEUTAUTO 4.1 4.1  --  1.0*   < >  --   --   --   --    ALYMPAUTO 1.8 2.0  --  1.7   < >  --   --   --   --    AMONOAUTO 0.6 0.5  --  0.2   < >  --   --   --   --    AEOSAUTO 0.0 0.0  --  0.0   < >  --   --   --   --    ABSBASO 0.0 0.0  --  0.0   < >  --   --   --   --    NRBCMAN 0.1 0.0  --  0.0   < >  --   --    < > 0.1   ABLA  --   --   --   --   --  6.1* 8.0*  --  20.5*    347 153 271   < > 40* 36*   < > 38*    < > = values in this interval not " displayed.       ABO/RH    Recent Labs   Lab Test 09/14/22  0705   ABORH O POS         Chemistries     Basic Panel  Recent Labs   Lab Test 09/14/22  0704 09/13/22  0716 09/12/22  1623 09/12/22  0936    135*  --  138   POTASSIUM 4.1 4.0 3.8 3.4   CHLORIDE 106 102  --  105   CO2 27 27  --  27   BUN 15.7 13.4  --  13.9   CR 0.74 0.71  --  0.79   GLC 87 86  --  106*        Calcium, Magnesium, Phosphorus  Recent Labs   Lab Test 09/14/22  0704 09/13/22  0716 09/12/22  0936 09/11/22  0941 09/10/22  0647 09/09/22  0618 09/08/22  0831   SOLEDAD 8.4* 8.2* 8.2*   < > 8.1* 8.3* 8.7   MAG  --   --   --   --  2.1 2.1 1.8   PHOS 4.4 3.6 2.5   < > 3.0 3.4 3.4    < > = values in this interval not displayed.        LFTs  Recent Labs   Lab Test 09/14/22  0704 09/12/22  0936 09/09/22  0618   BILITOTAL 0.3 0.6 0.6   ALKPHOS 77 77 73   AST 16 16 19   ALT 27 28 34   ALBUMIN 3.4* 3.5 3.4*       LDH  Recent Labs   Lab Test 09/08/22  0831 09/05/22  0749 09/01/22  0548    189 260*       Bone Marrow Biopsy       Morphology    Results for orders placed or performed during the hospital encounter of 08/19/22 (from the past 8760 hour(s))   Bone marrow biopsy   Result Value    Final Diagnosis      Bone marrow, posterior iliac crest, decalcified trephine biopsy and touch imprint; and peripheral blood smear:     Acute B lymphoblastic leukemia with the following features:  - Hypercellular marrow (cellularity estimated at 90%) with diminished trilineage hematopoietic maturation and 85% blasts.  - Peripheral blood showing marked leukocytosis with 79% circulating blasts and moderate thrombocytopenia.   - See comment        Comment      Peripheral blood flow cytometry ([id number]) showed an abnormal B lymphoblast population (84%). Concurrent flow cytometry on this bone marrow specimen ([id number]) showed an abnormal B lymphoblast population (86%). Correlation with all ancillary blood and bone marrow studies, and clinical findings is recommended.           Clinical Information      Per [facility] records, patient is a 42-year-old female who presented to an outside hospital with right upper quadrant pain. She was subsequently found to have a >100K white blood cell count, with concern for acute leukemia.       Peripheral Hematologic Data      CBC WITH MANUAL DIFFERENTIAL (08/20/2022 05:49 PM CDT):     RESULT VALUE REF. RANGE UNITS    WBC Count   Hemoglobin  Hematocrit   Platelet Count   RBC Count  MCV  MCH  MCHC  RDW  99.3  ( HH )    11.9 (NORMAL)     36.3 (NORMAL)   54  ( L )  4.16 (NORMAL)       87 (NORMAL)     28.6 (NORMAL)     32.8 (NORMAL)     14.5 (NORMAL) 4.0-11.0  11.7-15.7  35.0-47.0  150-450  3.80-5.20    26.5-33.0  31.5-36.5  10.0-15.0 10e3/uL  g/dL  %  10e3/uL  10e6/uL  fL  pg  g/dL  %   % Neutrophils  % Lymphocytes  % Monocytes  % Eosinophils  % Basophils  % Metamyelocytes  % Myelocytes  % Promyelocytes  % Blasts  % Plasma Cells  % Other Cells   Absolute Neutrophils    Absolute Lymphocytes   Absolute Monocytes  Absolute Eosinophils  Absolute Basophils  Absolute Metamyelocytes  Absolute Myelocytes  Absolute Promyelocytes  Absolute Blasts  Absolute Plasma Cells   Absolute Other Cells   NRBCs per 100 WBC  Absolute NRBCs 9  11  1  0  0            79    9.6  ( H )      11.7  ( H )     1.1 (NORMAL)     0.0 (NORMAL)     0.0 (NORMAL)   ()   ()   ()       ()   ()   84.1  ( H )   ()      () N/A  N/A  N/A  N/A  N/A  N/A  N/A  N/A  N/A  N/A  N/A  1.6-8.3  0.8-5.3  0.0-1.3  0.0-0.7  0.0-0.2  <=0.0  <=0.0  <=0.0  <=0.0  <=0.0  <=0.0  <=0  <=0.0 %  %  %  %  %  %  %  %  %  %  %  10e3/uL  10e3/uL  10e3/uL  10e3/uL  10e3/uL  10e3/uL  10e3/uL  10e3/uL  10e3/uL  10e3/uL  10e3/uL  %  10e3/uL         Disclaimer      Analyte Specific Reagents (ASRs) are used in many laboratory tests necessary for standard medical care and generally do not require FDA approval. This test was developed and its performance characteristics determined by [facility]. It has not been cleared or approved by the U.S. Food and Drug Administration.  [facility] are certified for the performance of high-complexity clinical testing under the Clinical Laboratory Improvement Amendments of 1988 (CLIA), and in keeping with the certification requirements, the laboratory has verified this test's accuracy, precision and/or validity of the method.        Microscopic Description      PERIPHERAL BLOOD SMEAR MORPHOLOGY:  The red blood cells appear normochromic. Poikilocytosis includes rare to occasional bite cells.  Polychromasia is present, but not increased. Rouleaux formation is not increased. The morphology of the platelets is normal. Lymphocyte morphology is polymorphous, with predominantly small and mature forms. Neutrophils displays predominantly normal cytoplasmic granularity and unremarkable nuclear morphology.    Numerous large blasts with increased nuclear:cytoplasmic ratio, round to ovoid nuclei with a subset showing indented to deeply cleaved nuclei, fine chromatin, one or more prominent nucleoli, scant to moderate deeply basophilic cytoplasm, and rare cytoplasmic vacuoles.     Bone marrow aspirates and bone marrow trephine core biopsy touch imprints are reviewed.    BONE MARROW DIFFERENTIAL (500 cells on bone marrow biopsy touch imprints )  Percent Cell (reference range)  85.2     Blasts (0 - 1)  0.0       Neutrophil promyelocytes (2 - 4)  6.6       Neutrophils and precursors (54 - 63)  7.2       Erythroid precursors (18 - 24)  0.4       Monocytes (1 - 1.5)  0.2       Eosinophils (1 - 3)  0.0       Basophils (0 - 1)  0.4       Lymphocytes (8 - 12)  0.0       Plasma cells (0 - 1.5)    No aspirate was collected, therefore the above differential is performed on the bone marrow trephine core biopsy touch imprints.    Granulocytes are markedly decreased in number, but with progressive and complete maturation; no overt dysplasia seen.  Erythrocytes are markedly decreased in number, but with progressive and complete maturation; no overt dysplasia seen. Very rare megakaryocytes with unremarkable morphology are present.     IRON STAINS:  Dacie iron stain on touch imprint smears:  Iron stains show 8% sideroblasts. No ringed sideroblasts seen on scanning.    TREPHINE SECTIONS:  Hematoxylin and eosin stains are reviewed. The quality of the trephine core biopsy is adequate, with slight crush artifact. The marrow cellularity is estimated at 80-90%. The cellular composition reflects the the marrow touch imprint differential. Marrow space is essentially replaced by monotonous infiltrate of small medium sized blasts with ovoid to indented nuclei, open chromatin, and scant cytoplasm.  Rare megakaryocytes are present.    IMMUNOHISTOCHEMISTRY:  Immunohistochemical stains are performed on the paraffin-embedded trephine core with appropriate controls.    The neoplastic cells are strongly positive for CD10, CD34 (highlighting most of the blasts, PAX5 (weak nuclear staining), as well as nuclear Tdt (weak).  The CD19 stain is very weak to negative in the neoplastic cells, however, this stain quality and intensity is often less than optimal on decalcified AZF fixed bone marrow cores in our laboratory.       Note: These immunohistochemical stains are deemed medically necessary. Some of the antigens may also be evaluated by flow cytometry. Concurrent evaluation by immunohistochemistry on clot and/or trephine sections is indicated in this case in order to correlate immunophenotype with cell morphology and determine extent of involvement, spatial pattern, and focality of potential disease distribution.    A resident/fellow in an ACGME accredited training program was involved in the initial review, preparation, and/or interpretation of this case.  I, as the senior physician, attest that I: (i) reviewed patient clinical records if indicated; (ii) reviewed relevant lab test  results; (iii) examined the relevant preparation(s) for the specimen(s); and (iv) agree with the report, diagnosis(es), and interpretation as documented by the resident/fellow and edited/confirmed by me. Reporting resident/fellow: Sheryl Cary DO, MLS([facility])      Gross Description      Procedure/Gross Description   Aspirate(s) and trephine(s) procured by LIBIA Feliz    Specimen sent for Special Studies:         Flow Cytometry: left core    Biopsy aspiration site: Not obtained    Trephine biopsy site: left posterior iliac crest    Designated left posterior iliac crest is 1 cylinder of gritty tissue, labeled with the patient's name and hospital number, obtained with 11 gauge needle and a length of 17 mm; entirely submitted in 1 cassette; acetic zinc formalin fixed, decalcified, processed, and stained for hematoxylin and eosin per laboratory protocol.          MCRS Yes (A)    Performing Labs      The technical component of this testing was completed at [facility]         Echocardiogram     Results for orders placed during the hospital encounter of 22    ECHOCARDIOGRAM COMPLETE    Status: Normal 2022    Narrative  [id number]  [id number]  [id number]  [id number]^SHER^CHINO^RUIZ    [facility]  Echocardiography Laboratory  [address] [place] [address]    Name: MICHELLE, APRIL  MRN: [id number]  : 1980  Study Date: 2022 10:34 AM  Age: 42 yrs  Gender: Female  Patient Location: [facility]  Reason For Study: Chemo  Ordering Physician: CHINO OLIVAREZ  Referring Physician: DINAH BATEMAN FELLOWS  Performed By: Rosa Ragsdale    BSA: 2.2 m2  Height: 64 in  Weight: 250 lb  ______________________________________________________________________________  Procedure  Complete Portable Echo Adult.  ______________________________________________________________________________  Interpretation  Summary  Global and regional left ventricular function is hyperkinetic with an EF of  65-70%. Global peak LV longitudinal strain is averaged at -23%. This is within  reported normal limits (normal <-18%).  Global right ventricular function is normal. The right ventricle is normal  size.  No significant valvular abnormalities.  IVC diameter <2.1 cm collapsing >50% with sniff suggests a normal RA pressure  of 3 mmHg.  There is no prior study for direct comparison.  ______________________________________________________________________________  Left Ventricle  Global and regional left ventricular function is hyperkinetic with an EF of  65-70%. Left ventricular wall thickness is normal. Left ventricular size is  normal. Left ventricular diastolic function is indeterminate. Global peak LV  longitudinal strain is averaged at -23%. This is within reported normal limits  (normal <-18%).    Right Ventricle  Global right ventricular function is normal. The right ventricle is normal  size.    Atria  Both atria appear normal.    Mitral Valve  The mitral valve is normal. Trace mitral insufficiency is present.    Aortic Valve  The aortic valve is tricuspid. On Doppler interrogation, there is no  significant stenosis or regurgitation.    Tricuspid Valve  The tricuspid valve is normal. Trace tricuspid insufficiency is present.  Pulmonary artery systolic pressure cannot be assessed.    Pulmonic Valve  The valve leaflets are not well visualized. Trace pulmonic insufficiency is  present.    Vessels  Sinuses of Valsalva 3.3 cm. Ascending aorta 3.4 cm. IVC diameter <2.1 cm  collapsing >50% with sniff suggests a normal RA pressure of 3 mmHg.    Pericardium  No pericardial effusion is present.    Compared to Previous Study  There is no prior study for direct comparison.  ______________________________________________________________________________  MMode/2D Measurements & Calculations  IVSd: 0.97 cm  LVIDd: 4.9 cm  LVIDs: 2.9 cm  LVPWd:  1.1 cm  FS: 42.1 %  LV mass(C)d: 181.1 grams  LV mass(C)dI: 84.2 grams/m2  Ao root diam: 3.3 cm  asc Aorta Diam: 3.4 cm  LVOT diam: 2.0 cm  LVOT area: 3.1 cm2  LA Volume (BP): 64.7 ml  LA Volume Index (BP): 30.1 ml/m2    RWT: 0.43    Doppler Measurements & Calculations  MV E max nubia: 83.9 cm/sec  MV A max nubia: 79.1 cm/sec  MV E/A: 1.1  MV dec slope: 406.0 cm/sec2  E/E' av.7  Lateral E/e': 12.1  Medial E/e': 15.4    ______________________________________________________________________________  Report approved by: Mp Rangel 2022 12:30 PM      MRI Brain       Results for orders placed during the hospital encounter of 22    MR BRAIN W/O & W CONTRAST    Status: Normal 2022    Narrative  MR BRAIN W/O & W CONTRAST 2022 3:02 PM    Provided History: New acute leukemia with head aches r/o intracranial  hemorrhage or CNS involvement.    Comparison: None.    Technique: Multiplanar T1-weighted, axial FLAIR, and susceptibility  images were obtained without intravenous contrast. Following  intravenous gadolinium-based contrast administration, axial  T2-weighted, diffusion, and T1-weighted images (in multiple planes)  were obtained.    Contrast: Gadavist 11.4 mL    Findings:  There is no mass effect, midline shift, or evidence of intracranial  hemorrhage. The ventricles are proportionate to the cerebral sulci.  Normal major vascular intracranial flow-voids.    Postcontrast images demonstrate no abnormal intracranial enhancement.    No abnormality of the skull marrow signal. Mucosal thickening in the  right maxillary sinus. Trace fluid in the right mastoid air cells. The  orbits are grossly unremarkable.    Partially visualized prominent suboccipital, right level 5 and  bilateral upper cervical chain lymph nodes.    Impression  Impression:  1. No acute intracranial pathology. No evidence for intracranial tumor  or hemorrhage.  2. Partially visualized prominent suboccipital, right level 5  and  upper cervical chain lymph nodes.    I have personally reviewed the examination and initial interpretation  and I agree with the findings.    MACO GARZON MD      SYSTEM ID:  [id number]         CSF Studies       Recent Labs   Lab Test 09/14/22  1131   CCOL Colorless   CAPP Clear   CWBC 0   CRBC 9*   CCOM Negative for blasts. Please correlate with concurrent flow cytometry case [id number].    Kuldeep Zuñiga MD on 9/15/2022 at 1:50 PM  Reviewed by ANGIE CHIRINOS, Hematopathology Fellow       Recent Labs   Lab Test 09/07/22  1124   CGLU 57       Recent Labs   Lab Test 09/07/22  1124   CTP 24.9       April understood the above assessment and recommendations.  Multiple questions answered.  No barriers to learning identified.    Known issues that I take into account for medical decisions, with salient changes to the plan considering these complexities noted above.    Patient Active Problem List   Diagnosis     Acute leukemia (H)     Acute lymphoblastic leukemia (ALL) (H)       ------------------------------------------------------------------------------------------------------------------------------------------------    Patient Care Team       Relationship Specialty Notifications Start End    [facility] PCP - General Family Practice  8/18/22     Phone: [phone] Fax: [phone] [address] [facility] [address]

## 2022-09-19 ENCOUNTER — TELEPHONE (OUTPATIENT)
Dept: ONCOLOGY | Facility: CLINIC | Age: 42
End: 2022-09-19

## 2022-09-19 NOTE — ORAL ONC MGMT
"Oral Chemotherapy Monitoring Program    Subjective/Objective:  Vashti Villegas is a 42 year old female contacted by phone for a follow-up visit for oral chemotherapy. Since she was inpatient when she started imatinib, we did take some time to review the oral chemotherapy monitoring program and common side effects of imatinib. Overall, she is tolerating treatment well. She reports that she had constipation, but this is managed with Senna S daily. Her last bowel movement was yesterday. She has had minimal nausea with this treatment and has only take ondansetron ODT twice. She had a headache today but took 2 tylenol for this. She has been having heartburn but attributes this to reintroducing foods since she was discharged.    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 Alverto Del Toro   [facility]   Drug Name Gleevec (imatinib) Gleevec (imatinib)   Dose 400 mg 400 mg   Current Schedule BID BID   Cycle Details (No Data) (No Data)   Start Date of Last Cycle - 8/24/2022   Doses missed in last 2 weeks - 0   Adherence Assessment - Adherent   Adverse Effects - Headache;Fatigue   Headache - Resolved due to intervention   Fatigue - Grade 1   Any new drug interactions? - No   Is the dose as ordered appropriate for the patient? - Yes       Last PHQ-2 Score on record: No flowsheet data found.    Vitals:  BP:   BP Readings from Last 1 Encounters:   09/14/22 123/65     Wt Readings from Last 1 Encounters:   09/14/22 111 kg (244 lb 12.8 oz)     Estimated body surface area is 2.24 meters squared as calculated from the following:    Height as of 8/20/22: 1.626 m (5' 4\").    Weight as of 9/14/22: 111 kg (244 lb 12.8 oz).    Labs:  _  Result Component Current Result Ref Range   Sodium 139 (9/14/2022) 136 - 145 mmol/L     _  Result Component Current Result " Ref Range   Potassium 4.1 (9/14/2022) 3.4 - 5.3 mmol/L     _  Result Component Current Result Ref Range   Calcium 8.4 (L) (9/14/2022) 8.6 - 10.0 mg/dL     _  Result Component Current Result Ref Range   Magnesium 2.1 (9/10/2022) 1.7 - 2.3 mg/dL     _  Result Component Current Result Ref Range   Phosphorus 4.4 (9/14/2022) 2.5 - 4.5 mg/dL     _  Result Component Current Result Ref Range   Albumin 3.4 (L) (9/14/2022) 3.5 - 5.2 g/dL     _  Result Component Current Result Ref Range   Urea Nitrogen 15.7 (9/14/2022) 6.0 - 20.0 mg/dL     _  Result Component Current Result Ref Range   Creatinine 0.74 (9/14/2022) 0.51 - 0.95 mg/dL     _  Result Component Current Result Ref Range   AST 16 (9/14/2022) 10 - 35 U/L     _  Result Component Current Result Ref Range   ALT 27 (9/14/2022) 10 - 35 U/L     _  Result Component Current Result Ref Range   Bilirubin Total 0.3 (9/14/2022) <=1.2 mg/dL     _  Result Component Current Result Ref Range   WBC Count 6.8 (9/14/2022) 4.0 - 11.0 10e3/uL     _  Result Component Current Result Ref Range   Hemoglobin 7.5 (L) (9/14/2022) 11.7 - 15.7 g/dL     _  Result Component Current Result Ref Range   Platelet Count 386 (9/14/2022) 150 - 450 10e3/uL     _  Result Component Current Result Ref Range   Absolute Neutrophils 3.1 (9/12/2022) 1.6 - 8.3 10e3/uL     _  Result Component Current Result Ref Range   Absolute Neutrophils 4.1 (9/14/2022) 1.6 - 8.3 10e3/uL     Assessment/Plan:  Ms. Villegas is tolerating treatment well. No interventions needed at this time.    Follow-Up:  9/20/22 labs and 9/21 office visit with Roya Quarles    Refill Due:  Pending start of cycle 2. Imatinib prescription to be released at discharge.    Tabitha Blackwood, PharmD, BCOP  Oral Chemotherapy Monitoring Program  [facility]  [phone]

## 2022-09-20 ENCOUNTER — INFUSION THERAPY VISIT (OUTPATIENT)
Dept: INFUSION THERAPY | Facility: CLINIC | Age: 42
End: 2022-09-20
Payer: COMMERCIAL

## 2022-09-20 ENCOUNTER — LAB (OUTPATIENT)
Dept: ONCOLOGY | Facility: CLINIC | Age: 42
End: 2022-09-20
Payer: COMMERCIAL

## 2022-09-20 VITALS
BODY MASS INDEX: 43.07 KG/M2 | SYSTOLIC BLOOD PRESSURE: 97 MMHG | HEART RATE: 94 BPM | WEIGHT: 250.9 LBS | TEMPERATURE: 98.2 F | DIASTOLIC BLOOD PRESSURE: 65 MMHG | OXYGEN SATURATION: 99 % | RESPIRATION RATE: 16 BRPM

## 2022-09-20 DIAGNOSIS — C91.00 ACUTE LYMPHOBLASTIC LEUKEMIA (ALL) NOT HAVING ACHIEVED REMISSION (H): ICD-10-CM

## 2022-09-20 LAB
BASOPHILS # BLD AUTO: 0 10E3/UL (ref 0–0.2)
BASOPHILS NFR BLD AUTO: 0 %
EOSINOPHIL # BLD AUTO: 0 10E3/UL (ref 0–0.7)
EOSINOPHIL NFR BLD AUTO: 0 %
ERYTHROCYTE [DISTWIDTH] IN BLOOD BY AUTOMATED COUNT: 21.1 % (ref 10–15)
HCT VFR BLD AUTO: 27.7 % (ref 35–47)
HGB BLD-MCNC: 8.6 G/DL (ref 11.7–15.7)
IMM GRANULOCYTES # BLD: 0.3 10E3/UL
IMM GRANULOCYTES NFR BLD: 2 %
LYMPHOCYTES # BLD AUTO: 2.5 10E3/UL (ref 0.8–5.3)
LYMPHOCYTES NFR BLD AUTO: 23 %
MCH RBC QN AUTO: 30.7 PG (ref 26.5–33)
MCHC RBC AUTO-ENTMCNC: 31 G/DL (ref 31.5–36.5)
MCV RBC AUTO: 99 FL (ref 78–100)
MONOCYTES # BLD AUTO: 0.6 10E3/UL (ref 0–1.3)
MONOCYTES NFR BLD AUTO: 5 %
NEUTROPHILS # BLD AUTO: 7.5 10E3/UL (ref 1.6–8.3)
NEUTROPHILS NFR BLD AUTO: 70 %
NRBC # BLD AUTO: 0 10E3/UL
NRBC BLD AUTO-RTO: 0 /100
PLATELET # BLD AUTO: 354 10E3/UL (ref 150–450)
RBC # BLD AUTO: 2.8 10E6/UL (ref 3.8–5.2)
WBC # BLD AUTO: 10.9 10E3/UL (ref 4–11)

## 2022-09-20 PROCEDURE — 85025 COMPLETE CBC W/AUTO DIFF WBC: CPT | Performed by: INTERNAL MEDICINE

## 2022-09-20 PROCEDURE — 36415 COLL VENOUS BLD VENIPUNCTURE: CPT | Performed by: INTERNAL MEDICINE

## 2022-09-20 PROCEDURE — 99207 PR NO BILLABLE SERVICE THIS VISIT: CPT

## 2022-09-20 NOTE — PROGRESS NOTES
Infusion Nursing Note:  Vashti Villegas presents today for possible blood product transfusion and possible Neupogen-NOT NEEDED.    Patient seen by provider today: No   present during visit today: Not Applicable.    Note: Patient new to  Infusion Center, oriented to facility. Reviewed labs with patient-no need for products today.    Intravenous Access:  No Intravenous access/labs at this visit.    Treatment Conditions:  Lab Results   Component Value Date    HGB 8.6 (L) 09/20/2022    WBC 10.9 09/20/2022    ANEU 3.1 09/12/2022    ANEUTAUTO 7.5 09/20/2022     09/20/2022      Results reviewed, labs did NOT meet transfusion parameters.    Post Infusion Assessment:  N/A.     Discharge Plan:   Patient will return 9/27/2022 for next appointment.      Stephanie Marcial RN

## 2022-09-20 NOTE — PROGRESS NOTES
Pt here for labs to be drawn off PICC. PICC line was previously removed. Labs drawn by .    Aminah Samson RN

## 2022-09-21 ENCOUNTER — VIRTUAL VISIT (OUTPATIENT)
Dept: ONCOLOGY | Facility: CLINIC | Age: 42
End: 2022-09-21
Attending: NURSE PRACTITIONER
Payer: COMMERCIAL

## 2022-09-21 DIAGNOSIS — C91.00 ACUTE LYMPHOBLASTIC LEUKEMIA (ALL) NOT HAVING ACHIEVED REMISSION (H): Primary | ICD-10-CM

## 2022-09-21 PROCEDURE — 99215 OFFICE O/P EST HI 40 MIN: CPT | Mod: 95 | Performed by: NURSE PRACTITIONER

## 2022-09-21 PROCEDURE — G0463 HOSPITAL OUTPT CLINIC VISIT: HCPCS | Mod: PN,RTG | Performed by: NURSE PRACTITIONER

## 2022-09-21 NOTE — LETTER
9/21/2022         RE: Vashti Villegas  [address] [place] [address]        Dear Colleague,    Thank you for referring your patient, Vashti Villegas, to the [facility]. Please see a copy of my visit note below.    April is a 42 year old who is being evaluated via a billable video visit.      Pt has joint pain and heartburn.    How would you like to obtain your AVS? MyChart  If the video visit is dropped, the invitation should be resent by: Text to cell phone: [phone]  Will anyone else be joining your video visit? No      Monica GUERRERO    Video-Visit Details    Video Start Time: 1420    Type of service:  Video Visit    Video End Time:1450    Originating Location (pt. Location): Home    Distant Location (provider location):  [facility]     Platform used for Video Visit: [employer]      [facility]  Date of visit: 09/21/22      Reason for Visit: Ph+ B-ALL, hospital discharge follow up      Oncology HPI:   Patient presented to outside hospital with c/o Right upper quadrant pain and was subsequently found to have a markedly elevated white blood cell count concerning for acute leukemia. While at OSH, she had CT PE protocol as well as CT A/P. These identified no PE, no acute or localized intraabdominal inflammatory process, mild splenomegaly, and few inconspicous low-density structures in the liver. She was transferred to [facility] for further workup and management.  - s/p Hydrea 1g TID (8/21-8/23) with improvement in WBC (100K ? 12K)  - CT CAP completed at OSH. CT neck 8/22 showed multiple, b/l suspicious-appearing LN (R level Ia, L level III, R level II-III) - not enlarged though they exhibit abnormal shapes.   - MR brain (8/21) - no acute IC pathology, no e/o intracranial CNS involvement.  - BMBx completed 8/20 - demonstrated B-ALL with 85% blasts and hypercellular marrow. IHC shows CD10+, CD34+. 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 Abnormal cytogenetics. For unknown reason, molecular studies were cancelled.  - Pt consented to HMTB; BMBx at dx was dry tap d/t WBC burden. Ordered peripheral blood samples on 8/22.   - Initiated pre-phase steroids with Prednisone 60 mg x8/21; increased to 60 mg/m2 (140 mg) 8/22-8/24.   - Clonoseq ID testing to help estimate MRD in future assessments. Clonal testing collected and sent on 8/23. Plan to order Clonoseq MRD testing with post-induction BMBx.   - Case discussed at Heme malignancy conference on 8/23, per consensus plan was to start GRAALL + Imatinib on 8/24.   - s/p BMT consult 9/12 with Dr. Walhs  - Initiated on GRAALL + imatinib (D1=8/24/22)  - Imatinib 400 mg BID - D1-28         Interval history:   April is here for discharge follow up  She is doing well since leaving hospital last week. Energy is good, she is getting things done but does take breaks in between tasks.   She feels her heartburn has become near constant. No shortness of breath, this improves with tums. She is taking tums nightly  Slight worsening numbness in her fingers-- more sensitive to hot and cold. Mostly in fingertips. Nothing in feet.   No fevers or chills.   Appetite is recovering, hungrier in the morning.   No nausea or diarrhea-- is taking senna again. Has miralax if needed.   Both kids have colds-- she thought she might have a sore throat this morning. This has gone away as day has gone on.         Current Outpatient Medications   Medication Sig Dispense Refill     acetaminophen (TYLENOL) 325 MG tablet Take 3 tablets (975 mg) by mouth every 6 hours as needed for mild pain, other or fever (blood product administration premedication)       acyclovir (ZOVIRAX) 400 MG tablet Take 1 tablet (400 mg) by mouth 2 times daily for 60 days 120 tablet 0     amLODIPine (NORVASC) 10 MG tablet Take 1 tablet (10 mg) by mouth daily for 30  days 30 tablet 0     calcium carbonate (TUMS) 500 MG chewable tablet Take 2 tablets (1,000 mg) by mouth daily as needed for heartburn       ORTHO TRI-CYCLEN LO OR TABS 1 TABLET DAILY 28 0     fluconazole (DIFLUCAN) 200 MG tablet Take 1 tablet (200 mg) by mouth daily as needed (Take when ANC <1000) (Patient not taking: No sig reported) 60 tablet 0     levofloxacin (LEVAQUIN) 250 MG tablet Take 1 tablet (250 mg) by mouth daily as needed (ANC <1000) (Patient not taking: No sig reported) 60 tablet 0     ondansetron (ZOFRAN ODT) 4 MG ODT tab Take 1-2 tablets (4-8 mg) by mouth every 8 hours as needed for nausea or vomiting (Patient not taking: No sig reported) 60 tablet 0       Allergies   Allergen Reactions     Blood Transfusion Related (Informational Only) Hives     Hive reaction with platelet transfusion on 8/26/2022. Please administer platelets with tylenol and benadryl premedication.          Video physical exam  General: Patient appears well in no acute distress.   Skin: No visualized rash or lesions on visualized skin  Eyes: EOMI, no erythema, sclera icterus or discharge noted  Resp: Appears to be breathing comfortably without accessory muscle usage, speaking in full sentences, no cough  MSK: Appears to have normal range of motion based on visualized movements  Neurologic: No apparent tremors, facial movements symmetric  Psych: affect pleasant, alert and oriented        Labs:     I personally reviewed the following labs:    Most Recent 3 CBC's:  Recent Labs   Lab Test 09/20/22  1332 09/14/22  0704 09/13/22  0716   WBC 10.9 6.8 6.8   HGB 8.6* 7.5* 7.6*   MCV 99 95 92    386 347     Most Recent 3 BMP's:  Recent Labs   Lab Test 09/14/22  0704 09/13/22  0716 09/12/22  1623 09/12/22  0936    135*  --  138   POTASSIUM 4.1 4.0 3.8 3.4   CHLORIDE 106 102  --  105   CO2 27 27  --  27   BUN 15.7 13.4  --  13.9   CR 0.74 0.71  --  0.79   ANIONGAP 6* 6*  --  6*   SOLEDAD 8.4* 8.2*  --  8.2*   GLC 87 86  --  106*      Most Recent 2 LFT's:  Recent Labs   Lab Test 09/14/22  0704 09/12/22  0936   AST 16 16   ALT 27 28   ALKPHOS 77 77   BILITOTAL 0.3 0.6           Imaging: n/a      Impression/plan:     # Ph+ B-ALL  - BMBx completed 8/20 - demonstrated B-ALL with 85% blasts and hypercellular marrow. IHC shows CD10+, CD34+. 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 Abnormal cytogenetics.  - Initiated pre-phase steroids with Prednisone 60 mg x8/21; increased to 60 mg/m2 (140 mg) 8/22-8/24. Transitioned to protocol-directed Dex 40 mg on 8/25.   - s/p BMT consult 9/12 with Dr. Walsh  - Initiated on GRAALL + imatinib 400 mg daily (D1=8/24/22)  - Plan for restaging Bmbx on 9/23 -- Clonoseq MRD testing with post-induction BMBx-- we are trying to adjust marrow to following Monday / Tuesday as this is less than ideal to have a send out test on Friday afternoon--   - Dr Hull to review on 9/28     # CNS prophylaxis  Per the GRAALL protocol, and risk for ALL entering the CNS, patient requires weekly LP w/ triple IT chemo as detailed below. Due to unsuccessful attempt at bedside with the CAPS team on 8/25, will require all subsequent LPs under fluoroscopic guidance. Had required multiple units of platelets (3u total) prior to procedure to reach goal >50k.   - Triple IT chemo (Cytarabine, Solu-Cortef, MTX); D1, 8, 15. CSF neg to date  - Pt expresses anxiety surrounding procedures; pre-medication with 0.5mg PO Ativan +/- Atarax available prior PRN  - Will request versed prior to bmbx     # Pancytopenia  - Transfuse to maintain Hgb >7, Plt >10K (>50K prior to LPs)     # Chemotherapy-induced Grade 1 neuropathy (fingertips)  Reports numbness /tingling in fingertips on both of her hands. This is not painful, nor has it affected her dexterity. Her feet have been spared. After D18 Vincristine she has had intermittent tingling down to her MCP, but the numbness remains  in her fingertips.   - Continued VCR at full dose given goal for MRD neg and allo BMT        Ppx  -  mg BID  - Nebulized Pentamidine q28d for PJP ppx, last dose given 8/29. Next due ~9/27        # H/o COVID-19 infection   Not vaccinated (due to fear of needles per pt report). Pt reported testing postive for COVID approx 1 month prior to admission via home test (mid July)       # Hypertension  Fairly persistent elevated BP during hospital stay to 150-160/80-90s. She was not on anti-hypertensive medication previously.   - Pressures have improved at home/ in infusion center-- 90-130s/60s  - Will decreased amlodipine to 5 mg daily (April able to break tablet in half)       # Indigestion/ GERD  - TUMs PRN, Maalox PRN  - We discussed daily protonix however she declines at this time       # Headaches, improved  Pt endorsed intermittent HA while in the hospital. Denies vision changes or other neurological sx. Has h/o HTN as above. CSF flow cytometry negative for involvement by ALL.  - MRI brain completed 8/21 was negative for intracranial pathology or tumor involvement.   - Tylenol PRN         Transition Care Management Services  No data recorded  No data recorded  No data recorded  Interactive contact date:   Face-to-face visit date: 9/20/2022        Medical complexity decision making: High complexity ([id number])          Roya Quarles [facility]    45 minutes spent on the date of the encounter doing chart review, review of test results, interpretation of tests, patient visit, documentation and discussion with other provider(s)

## 2022-09-21 NOTE — PROGRESS NOTES
April is a 42 year old who is being evaluated via a billable video visit.      Pt has joint pain and heartburn.    How would you like to obtain your AVS? MyChart  If the video visit is dropped, the invitation should be resent by: Text to cell phone: [phone]  Will anyone else be joining your video visit? No      Monicariana Garcia CESAR    Video-Visit Details    Video Start Time: 1420    Type of service:  Video Visit    Video End Time:1450    Originating Location (pt. Location): Home    Distant Location (provider location):  [facility]     Platform used for Video Visit: [employer]      [facility]  Date of visit: 09/21/22      Reason for Visit: Ph+ B-ALL, hospital discharge follow up      Oncology HPI:   Patient presented to outside hospital with c/o Right upper quadrant pain and was subsequently found to have a markedly elevated white blood cell count concerning for acute leukemia. While at OSH, she had CT PE protocol as well as CT A/P. These identified no PE, no acute or localized intraabdominal inflammatory process, mild splenomegaly, and few inconspicous low-density structures in the liver. She was transferred to [facility] for further workup and management.  - s/p Hydrea 1g TID (8/21-8/23) with improvement in WBC (100K ? 12K)  - CT CAP completed at OSH. CT neck 8/22 showed multiple, b/l suspicious-appearing LN (R level Ia, L level III, R level II-III) - not enlarged though they exhibit abnormal shapes.   - MR brain (8/21) - no acute IC pathology, no e/o intracranial CNS involvement.  - BMBx completed 8/20 - demonstrated B-ALL with 85% blasts and hypercellular marrow. IHC shows CD10+, CD34+. 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 Abnormal cytogenetics. For unknown reason, molecular studies were cancelled.  - Pt consented to HMTB; BMBx at dx was dry tap d/t WBC burden.  Ordered peripheral blood samples on 8/22.   - Initiated pre-phase steroids with Prednisone 60 mg x8/21; increased to 60 mg/m2 (140 mg) 8/22-8/24.   - Clonoseq ID testing to help estimate MRD in future assessments. Clonal testing collected and sent on 8/23. Plan to order Clonoseq MRD testing with post-induction BMBx.   - Case discussed at Heme malignancy conference on 8/23, per consensus plan was to start GRAALL + Imatinib on 8/24.   - s/p BMT consult 9/12 with Dr. Walsh  - Initiated on GRAALL + imatinib (D1=8/24/22)  - Imatinib 400 mg BID - D1-28         Interval history:   April is here for discharge follow up  She is doing well since leaving hospital last week. Energy is good, she is getting things done but does take breaks in between tasks.   She feels her heartburn has become near constant. No shortness of breath, this improves with tums. She is taking tums nightly  Slight worsening numbness in her fingers-- more sensitive to hot and cold. Mostly in fingertips. Nothing in feet.   No fevers or chills.   Appetite is recovering, hungrier in the morning.   No nausea or diarrhea-- is taking senna again. Has miralax if needed.   Both kids have colds-- she thought she might have a sore throat this morning. This has gone away as day has gone on.         Current Outpatient Medications   Medication Sig Dispense Refill     acetaminophen (TYLENOL) 325 MG tablet Take 3 tablets (975 mg) by mouth every 6 hours as needed for mild pain, other or fever (blood product administration premedication)       acyclovir (ZOVIRAX) 400 MG tablet Take 1 tablet (400 mg) by mouth 2 times daily for 60 days 120 tablet 0     amLODIPine (NORVASC) 10 MG tablet Take 1 tablet (10 mg) by mouth daily for 30 days 30 tablet 0     calcium carbonate (TUMS) 500 MG chewable tablet Take 2 tablets (1,000 mg) by mouth daily as needed for heartburn       ORTHO TRI-CYCLEN LO OR TABS 1 TABLET DAILY 28 0     fluconazole (DIFLUCAN) 200 MG tablet Take 1 tablet  (200 mg) by mouth daily as needed (Take when ANC <1000) (Patient not taking: No sig reported) 60 tablet 0     levofloxacin (LEVAQUIN) 250 MG tablet Take 1 tablet (250 mg) by mouth daily as needed (ANC <1000) (Patient not taking: No sig reported) 60 tablet 0     ondansetron (ZOFRAN ODT) 4 MG ODT tab Take 1-2 tablets (4-8 mg) by mouth every 8 hours as needed for nausea or vomiting (Patient not taking: No sig reported) 60 tablet 0       Allergies   Allergen Reactions     Blood Transfusion Related (Informational Only) Hives     Hive reaction with platelet transfusion on 8/26/2022. Please administer platelets with tylenol and benadryl premedication.          Video physical exam  General: Patient appears well in no acute distress.   Skin: No visualized rash or lesions on visualized skin  Eyes: EOMI, no erythema, sclera icterus or discharge noted  Resp: Appears to be breathing comfortably without accessory muscle usage, speaking in full sentences, no cough  MSK: Appears to have normal range of motion based on visualized movements  Neurologic: No apparent tremors, facial movements symmetric  Psych: affect pleasant, alert and oriented        Labs:     I personally reviewed the following labs:    Most Recent 3 CBC's:  Recent Labs   Lab Test 09/20/22  1332 09/14/22  0704 09/13/22  0716   WBC 10.9 6.8 6.8   HGB 8.6* 7.5* 7.6*   MCV 99 95 92    386 347     Most Recent 3 BMP's:  Recent Labs   Lab Test 09/14/22  0704 09/13/22  0716 09/12/22  1623 09/12/22  0936    135*  --  138   POTASSIUM 4.1 4.0 3.8 3.4   CHLORIDE 106 102  --  105   CO2 27 27  --  27   BUN 15.7 13.4  --  13.9   CR 0.74 0.71  --  0.79   ANIONGAP 6* 6*  --  6*   SOLEDAD 8.4* 8.2*  --  8.2*   GLC 87 86  --  106*     Most Recent 2 LFT's:  Recent Labs   Lab Test 09/14/22  0704 09/12/22  0936   AST 16 16   ALT 27 28   ALKPHOS 77 77   BILITOTAL 0.3 0.6           Imaging: n/a      Impression/plan:     # Ph+ B-ALL  - BMBx completed 8/20 - demonstrated B-ALL with  85% blasts and hypercellular marrow. IHC shows CD10+, CD34+. 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 Abnormal cytogenetics.  - Initiated pre-phase steroids with Prednisone 60 mg x8/21; increased to 60 mg/m2 (140 mg) 8/22-8/24. Transitioned to protocol-directed Dex 40 mg on 8/25.   - s/p BMT consult 9/12 with Dr. Walsh  - Initiated on GRAALL + imatinib 400 mg daily (D1=8/24/22)  - Plan for restaging Bmbx on 9/23 -- Clonoseq MRD testing with post-induction BMBx-- we are trying to adjust marrow to following Monday / Tuesday as this is less than ideal to have a send out test on Friday afternoon--   - Dr Hull to review on 9/28     # CNS prophylaxis  Per the GRAALL protocol, and risk for ALL entering the CNS, patient requires weekly LP w/ triple IT chemo as detailed below. Due to unsuccessful attempt at bedside with the CAPS team on 8/25, will require all subsequent LPs under fluoroscopic guidance. Had required multiple units of platelets (3u total) prior to procedure to reach goal >50k.   - Triple IT chemo (Cytarabine, Solu-Cortef, MTX); D1, 8, 15. CSF neg to date  - Pt expresses anxiety surrounding procedures; pre-medication with 0.5mg PO Ativan +/- Atarax available prior PRN  - Will request versed prior to bmbx     # Pancytopenia  - Transfuse to maintain Hgb >7, Plt >10K (>50K prior to LPs)     # Chemotherapy-induced Grade 1 neuropathy (fingertips)  Reports numbness /tingling in fingertips on both of her hands. This is not painful, nor has it affected her dexterity. Her feet have been spared. After D18 Vincristine she has had intermittent tingling down to her MCP, but the numbness remains in her fingertips.   - Continued VCR at full dose given goal for MRD neg and allo BMT        Ppx  -  mg BID  - Nebulized Pentamidine q28d for PJP ppx, last dose given 8/29. Next due ~9/27        # H/o COVID-19 infection   Not vaccinated (due  to fear of needles per pt report). Pt reported testing postive for COVID approx 1 month prior to admission via home test (mid July)       # Hypertension  Fairly persistent elevated BP during hospital stay to 150-160/80-90s. She was not on anti-hypertensive medication previously.   - Pressures have improved at home/ in infusion center-- 90-130s/60s  - Will decreased amlodipine to 5 mg daily (April able to break tablet in half)       # Indigestion/ GERD  - TUMs PRN, Maalox PRN  - We discussed daily protonix however she declines at this time       # Headaches, improved  Pt endorsed intermittent HA while in the hospital. Denies vision changes or other neurological sx. Has h/o HTN as above. CSF flow cytometry negative for involvement by ALL.  - MRI brain completed 8/21 was negative for intracranial pathology or tumor involvement.   - Tylenol PRN         Transition Care Management Services  No data recorded  No data recorded  No data recorded  Interactive contact date:   Face-to-face visit date: 9/20/2022        Medical complexity decision making: High complexity ([id number])          [facility]    45 minutes spent on the date of the encounter doing chart review, review of test results, interpretation of tests, patient visit, documentation and discussion with other provider(s)

## 2022-09-25 LAB
ABO/RH(D): NORMAL
ANTIBODY SCREEN: NEGATIVE
SPECIMEN EXPIRATION DATE: NORMAL

## 2022-09-26 ENCOUNTER — OFFICE VISIT (OUTPATIENT)
Dept: ONCOLOGY | Facility: CLINIC | Age: 42
End: 2022-09-26
Attending: REGISTERED NURSE
Payer: COMMERCIAL

## 2022-09-26 ENCOUNTER — APPOINTMENT (OUTPATIENT)
Dept: LAB | Facility: CLINIC | Age: 42
End: 2022-09-26
Attending: REGISTERED NURSE
Payer: COMMERCIAL

## 2022-09-26 VITALS
DIASTOLIC BLOOD PRESSURE: 83 MMHG | HEART RATE: 99 BPM | RESPIRATION RATE: 16 BRPM | WEIGHT: 254.1 LBS | OXYGEN SATURATION: 97 % | BODY MASS INDEX: 43.62 KG/M2 | TEMPERATURE: 98.3 F | SYSTOLIC BLOOD PRESSURE: 122 MMHG

## 2022-09-26 DIAGNOSIS — C91.00 ACUTE LYMPHOBLASTIC LEUKEMIA (ALL) NOT HAVING ACHIEVED REMISSION (H): ICD-10-CM

## 2022-09-26 LAB
ALBUMIN SERPL BCG-MCNC: 3.8 G/DL (ref 3.5–5.2)
ALP SERPL-CCNC: 83 U/L (ref 35–104)
ALT SERPL W P-5'-P-CCNC: 18 U/L (ref 10–35)
ANION GAP SERPL CALCULATED.3IONS-SCNC: 10 MMOL/L (ref 7–15)
AST SERPL W P-5'-P-CCNC: 17 U/L (ref 10–35)
BASOPHILS # BLD AUTO: 0 10E3/UL (ref 0–0.2)
BASOPHILS NFR BLD AUTO: 0 %
BILIRUB SERPL-MCNC: 0.3 MG/DL
BUN SERPL-MCNC: 13.2 MG/DL (ref 6–20)
CALCIUM SERPL-MCNC: 8.8 MG/DL (ref 8.6–10)
CHLORIDE SERPL-SCNC: 108 MMOL/L (ref 98–107)
CREAT SERPL-MCNC: 0.55 MG/DL (ref 0.51–0.95)
DEPRECATED HCO3 PLAS-SCNC: 21 MMOL/L (ref 22–29)
EOSINOPHIL # BLD AUTO: 0 10E3/UL (ref 0–0.7)
EOSINOPHIL NFR BLD AUTO: 1 %
ERYTHROCYTE [DISTWIDTH] IN BLOOD BY AUTOMATED COUNT: 22.9 % (ref 10–15)
GFR SERPL CREATININE-BSD FRML MDRD: >90 ML/MIN/1.73M2
GLUCOSE SERPL-MCNC: 120 MG/DL (ref 70–99)
HCT VFR BLD AUTO: 26.1 % (ref 35–47)
HGB BLD-MCNC: 8.1 G/DL (ref 11.7–15.7)
IMM GRANULOCYTES # BLD: 0 10E3/UL
IMM GRANULOCYTES NFR BLD: 1 %
LAB DIRECTOR COMMENTS: NORMAL
LAB DIRECTOR COMMENTS: NORMAL
LAB DIRECTOR DISCLAIMER: NORMAL
LAB DIRECTOR DISCLAIMER: NORMAL
LAB DIRECTOR INTERPRETATION: NORMAL
LAB DIRECTOR INTERPRETATION: NORMAL
LAB DIRECTOR METHODOLOGY: NORMAL
LAB DIRECTOR METHODOLOGY: NORMAL
LAB DIRECTOR RESULTS: NORMAL
LAB DIRECTOR RESULTS: NORMAL
LYMPHOCYTES # BLD AUTO: 1 10E3/UL (ref 0.8–5.3)
LYMPHOCYTES NFR BLD AUTO: 15 %
MCH RBC QN AUTO: 30.8 PG (ref 26.5–33)
MCHC RBC AUTO-ENTMCNC: 31 G/DL (ref 31.5–36.5)
MCV RBC AUTO: 99 FL (ref 78–100)
MONOCYTES # BLD AUTO: 0.5 10E3/UL (ref 0–1.3)
MONOCYTES NFR BLD AUTO: 7 %
NEUTROPHILS # BLD AUTO: 5.3 10E3/UL (ref 1.6–8.3)
NEUTROPHILS NFR BLD AUTO: 76 %
NRBC # BLD AUTO: 0 10E3/UL
NRBC BLD AUTO-RTO: 0 /100
PLATELET # BLD AUTO: 282 10E3/UL (ref 150–450)
POTASSIUM SERPL-SCNC: 4 MMOL/L (ref 3.4–5.3)
PROT SERPL-MCNC: 6.2 G/DL (ref 6.4–8.3)
RBC # BLD AUTO: 2.63 10E6/UL (ref 3.8–5.2)
SODIUM SERPL-SCNC: 139 MMOL/L (ref 136–145)
SPECIMEN DESCRIPTION: NORMAL
SPECIMEN DESCRIPTION: NORMAL
WBC # BLD AUTO: 6.8 10E3/UL (ref 4–11)

## 2022-09-26 PROCEDURE — 88342 IMHCHEM/IMCYTCHM 1ST ANTB: CPT | Mod: 26 | Performed by: PATHOLOGY

## 2022-09-26 PROCEDURE — 85097 BONE MARROW INTERPRETATION: CPT | Performed by: PATHOLOGY

## 2022-09-26 PROCEDURE — 38222 DX BONE MARROW BX & ASPIR: CPT | Performed by: REGISTERED NURSE

## 2022-09-26 PROCEDURE — 85060 BLOOD SMEAR INTERPRETATION: CPT | Performed by: PATHOLOGY

## 2022-09-26 PROCEDURE — 88237 TISSUE CULTURE BONE MARROW: CPT | Performed by: REGISTERED NURSE

## 2022-09-26 PROCEDURE — 88369 M/PHMTRC ALYSISHQUANT/SEMIQ: CPT | Mod: 26 | Performed by: MEDICAL GENETICS

## 2022-09-26 PROCEDURE — 88311 DECALCIFY TISSUE: CPT | Mod: TC | Performed by: REGISTERED NURSE

## 2022-09-26 PROCEDURE — 88188 FLOWCYTOMETRY/READ 9-15: CPT | Performed by: PATHOLOGY

## 2022-09-26 PROCEDURE — G0452 MOLECULAR PATHOLOGY INTERPR: HCPCS | Mod: 26 | Performed by: STUDENT IN AN ORGANIZED HEALTH CARE EDUCATION/TRAINING PROGRAM

## 2022-09-26 PROCEDURE — 80053 COMPREHEN METABOLIC PANEL: CPT | Performed by: REGISTERED NURSE

## 2022-09-26 PROCEDURE — 88305 TISSUE EXAM BY PATHOLOGIST: CPT | Mod: 26 | Performed by: PATHOLOGY

## 2022-09-26 PROCEDURE — 88185 FLOWCYTOMETRY/TC ADD-ON: CPT | Performed by: REGISTERED NURSE

## 2022-09-26 PROCEDURE — 88305 TISSUE EXAM BY PATHOLOGIST: CPT | Mod: TC | Performed by: REGISTERED NURSE

## 2022-09-26 PROCEDURE — 88368 INSITU HYBRIDIZATION MANUAL: CPT | Mod: 26 | Performed by: MEDICAL GENETICS

## 2022-09-26 PROCEDURE — 36415 COLL VENOUS BLD VENIPUNCTURE: CPT | Performed by: REGISTERED NURSE

## 2022-09-26 PROCEDURE — 88341 IMHCHEM/IMCYTCHM EA ADD ANTB: CPT | Mod: 26 | Performed by: PATHOLOGY

## 2022-09-26 PROCEDURE — 88271 CYTOGENETICS DNA PROBE: CPT | Performed by: REGISTERED NURSE

## 2022-09-26 PROCEDURE — 88311 DECALCIFY TISSUE: CPT | Mod: 26 | Performed by: PATHOLOGY

## 2022-09-26 PROCEDURE — 81207 BCR/ABL1 GENE MINOR BP: CPT | Performed by: REGISTERED NURSE

## 2022-09-26 PROCEDURE — 86923 COMPATIBILITY TEST ELECTRIC: CPT | Performed by: PHYSICIAN ASSISTANT

## 2022-09-26 PROCEDURE — 250N000013 HC RX MED GY IP 250 OP 250 PS 637: Performed by: REGISTERED NURSE

## 2022-09-26 PROCEDURE — 88184 FLOWCYTOMETRY/ TC 1 MARKER: CPT | Performed by: REGISTERED NURSE

## 2022-09-26 PROCEDURE — 81206 BCR/ABL1 GENE MAJOR BP: CPT | Performed by: REGISTERED NURSE

## 2022-09-26 PROCEDURE — 85025 COMPLETE CBC W/AUTO DIFF WBC: CPT | Performed by: REGISTERED NURSE

## 2022-09-26 PROCEDURE — 86901 BLOOD TYPING SEROLOGIC RH(D): CPT | Performed by: REGISTERED NURSE

## 2022-09-26 RX ORDER — LORAZEPAM 0.5 MG/1
2 TABLET ORAL EVERY 4 HOURS PRN
Status: DISCONTINUED | OUTPATIENT
Start: 2022-09-26 | End: 2022-09-26 | Stop reason: HOSPADM

## 2022-09-26 RX ORDER — ACETAMINOPHEN 325 MG/1
975 TABLET ORAL EVERY 4 HOURS PRN
Status: DISCONTINUED | OUTPATIENT
Start: 2022-09-26 | End: 2022-09-26 | Stop reason: HOSPADM

## 2022-09-26 RX ADMIN — LORAZEPAM 2 MG: 0.5 TABLET ORAL at 14:06

## 2022-09-26 RX ADMIN — ACETAMINOPHEN 975 MG: 325 TABLET ORAL at 16:16

## 2022-09-26 ASSESSMENT — PAIN SCALES - GENERAL: PAINLEVEL: NO PAIN (0)

## 2022-09-26 NOTE — NURSING NOTE
"Oncology Rooming Note    September 26, 2022 2:09 PM   April SHARI Villegas is a 42 year old female who presents for:    Chief Complaint   Patient presents with     Bone Marrow Biopsy     Pt here for bmbx.  History of ALL.     Initial Vitals: There were no vitals taken for this visit. Estimated body mass index is 43.07 kg/m  as calculated from the following:    Height as of 8/20/22: 1.626 m (5' 4\").    Weight as of 9/20/22: 113.8 kg (250 lb 14.4 oz). There is no height or weight on file to calculate BSA.  Data Unavailable Comment: Data Unavailable   No LMP recorded.  Allergies reviewed: Yes  Medications reviewed: Yes    Medications: Medication refills not needed today.  Pharm[facility] into [id number] [place] [facility] [address]    Clinical concerns: none       Kristie Pop RN              "

## 2022-09-26 NOTE — NURSING NOTE
Clinic RN administered 975 mg Tylenol PO per orders from [employer] AVELINA. Patient supine for 30 minutes following biopsy. After 30 minutes, dressing clean, dry and intact. Vital signs stable. See DOC flow sheets for details. Left ambulatory with family member.    Lubna Munoz RN

## 2022-09-26 NOTE — NURSING NOTE
Pt arrives ambulatory, alert and oriented x 3.   here with patient and is her  today.  Pt states would like some oral ativan, does not want IV versed because she doesn't want an IV started.  Sheila Hernandez AVELINA notified; ativan ordered. Labs drawn by venipuncture by rn in lab.     BMT Teaching Flowsheet   Teaching Topic: post biopsy instructions    Person(s) involved in teaching: Patient  Motivation Level  Asks Questions: Yes  Eager to Learn: Yes  Cooperative: Yes  Receptive (willing/able to accept information): Yes    Patient demonstrates understanding of the following:   - Reason for the appointment, diagnosis and treatment plan: Yes  - Knowledge of proper use of medications and conditions for which they are ordered (with special attention to potential side effects or drug interactions): Yes  - Which situations necessitate calling provider and whom to contact: Yes    Teaching concerns addressed: reviewed activity restrictions if received premeds, potential for bleeding and actions to take if develops any of the issues below    Proper use and care of (medical equipment, care aids, etc.) Yes  Pain management techniques: Yes  Patient instructed on hand hygiene: Yes  How and/when to access community resources: Yes    Infection Control:  Patient demonstrates understanding of the following:   Surgical procedure site care taught NA  Signs and symptoms of infection taught Yes  Wound care taught Yes  Central venous catheter care taught NA    Instructional Materials Used/Given: verbal, print out of post biopsy instructions.     Time spent with patient: 0 minutes.    Specific Concerns: NA

## 2022-09-26 NOTE — LETTER
9/26/2022         RE: Vashti Villegas  [address] [place] [address]        Dear Colleague,    Thank you for referring your patient, Vashti Villegas, to the [facility]. Please see a copy of my visit note below.    BMT ONC Adult Bone Marrow Biopsy Procedure Note  September 23, 2022  /78 (BP Location: Right arm, Patient Position: Sitting, Cuff Size: Adult Regular)   Pulse 110   Temp 98.3  F (36.8  C) (Oral)   Resp 16   Wt 115.3 kg (254 lb 1.6 oz)   SpO2 97%   BMI 43.62 kg/m       Learning needs assessment complete within 12 months? 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 Informed consent was obtained. Following the administration of lorazepam 2 mg orally (per patient request) as pre-medication, patient was placed in the prone position and prepped and draped in a sterile manner.  Approximately 10 cc of 1% Lidocaine was used over the RIGHT posterior iliac spine, however, the iliac spine was not able to be found with the tip of the lidocaine needle despite two different practitioners attempting to landmark the patient and exploring with a spinal needle.  Approximately 25 cc of 1% Lidocaine was used over the LEFT posterior iliac spine.Following this a 3 mm incision was made. Trephine bone marrow core(s) was (were) obtained from the IC. Bone marrow aspirates were obtained from the LPIC. Aspirates were sent for morphology, immunophenotyping, cytogenetics, molecular diagnostics. A total of approximately 19 ml of marrow was aspirated. Following this procedure a sterile dressing was applied to the bone marrow biopsy site(s). The patient was placed in the supine position to maintain pressure on the biopsy site. Post-procedure wound  care instructions were given.     Complications: YES - this was an extremely technically challenging bone marrow. The patient is very difficult to landmark and she seems to have less than optimal effect from the lidocaine. She tolerated the procedure, but did so with significant discomfort. Her previous marrow was also challenging. I strongly recommend that any future bone marrows be considered under sedation and/or with image guidance.     Post-procedural pain assessment: 4 out of 10 on the numeric pain rating scale.     Interventions: YES - tylenol and ice    Length of procedure: more than 1 hour    Procedure performed by:   LOAN Hernández CNP  [facility] [place] [address]  [phone]          Again, thank you for allowing me to participate in the care of your patient.      Sincerely,    LOAN Huertas CNP

## 2022-09-27 LAB
PATH REPORT.COMMENTS IMP SPEC: NORMAL
PATH REPORT.FINAL DX SPEC: NORMAL
PATH REPORT.MICROSCOPIC SPEC OTHER STN: NORMAL
PATH REPORT.RELEVANT HX SPEC: NORMAL

## 2022-09-28 ENCOUNTER — VIRTUAL VISIT (OUTPATIENT)
Dept: ONCOLOGY | Facility: CLINIC | Age: 42
End: 2022-09-28
Attending: NURSE PRACTITIONER
Payer: COMMERCIAL

## 2022-09-28 DIAGNOSIS — C95.01 ACUTE LEUKEMIA IN REMISSION (H): Primary | ICD-10-CM

## 2022-09-28 DIAGNOSIS — C91.00 ACUTE LYMPHOBLASTIC LEUKEMIA (ALL) NOT HAVING ACHIEVED REMISSION (H): Primary | ICD-10-CM

## 2022-09-28 LAB
PATH REPORT.COMMENTS IMP SPEC: NORMAL
PATH REPORT.COMMENTS IMP SPEC: NORMAL
PATH REPORT.FINAL DX SPEC: NORMAL
PATH REPORT.GROSS SPEC: NORMAL
PATH REPORT.MICROSCOPIC SPEC OTHER STN: NORMAL
PATH REPORT.MICROSCOPIC SPEC OTHER STN: NORMAL
PATH REPORT.RELEVANT HX SPEC: NORMAL

## 2022-09-28 PROCEDURE — 99214 OFFICE O/P EST MOD 30 MIN: CPT | Mod: 95 | Performed by: INTERNAL MEDICINE

## 2022-09-28 RX ORDER — LORAZEPAM 0.5 MG/1
.5-1 TABLET ORAL EVERY 6 HOURS PRN
Status: CANCELLED
Start: 2022-09-28

## 2022-09-28 RX ORDER — ONDANSETRON 8 MG/1
8 TABLET, FILM COATED ORAL EVERY 12 HOURS
Status: CANCELLED
Start: 2022-09-29

## 2022-09-28 RX ORDER — ONDANSETRON 8 MG/1
16 TABLET, FILM COATED ORAL ONCE
Status: CANCELLED | OUTPATIENT
Start: 2022-09-28

## 2022-09-28 RX ORDER — ALBUTEROL SULFATE 0.83 MG/ML
2.5 SOLUTION RESPIRATORY (INHALATION)
Status: CANCELLED | OUTPATIENT
Start: 2022-09-28

## 2022-09-28 RX ORDER — MEPERIDINE HYDROCHLORIDE 25 MG/ML
25 INJECTION INTRAMUSCULAR; INTRAVENOUS; SUBCUTANEOUS EVERY 30 MIN PRN
Status: CANCELLED | OUTPATIENT
Start: 2022-09-28

## 2022-09-28 RX ORDER — LORAZEPAM 2 MG/ML
.5-1 INJECTION INTRAMUSCULAR EVERY 6 HOURS PRN
Status: CANCELLED | OUTPATIENT
Start: 2022-09-28

## 2022-09-28 RX ORDER — LEUCOVORIN CALCIUM 350 MG/17.5ML
50 INJECTION, POWDER, LYOPHILIZED, FOR SOLUTION INTRAMUSCULAR; INTRAVENOUS ONCE
Status: CANCELLED | OUTPATIENT
Start: 2022-09-29

## 2022-09-28 RX ORDER — HEPARIN SODIUM (PORCINE) LOCK FLUSH IV SOLN 100 UNIT/ML 100 UNIT/ML
5 SOLUTION INTRAVENOUS
Status: CANCELLED | OUTPATIENT
Start: 2022-09-28

## 2022-09-28 RX ORDER — SODIUM BICARBONATE 84 MG/ML
50 INJECTION, SOLUTION INTRAVENOUS
Status: CANCELLED | OUTPATIENT
Start: 2022-09-28

## 2022-09-28 RX ORDER — PREDNISOLONE ACETATE 10 MG/ML
2 SUSPENSION/ DROPS OPHTHALMIC 4 TIMES DAILY
Status: CANCELLED | OUTPATIENT
Start: 2022-09-29

## 2022-09-28 RX ORDER — EPINEPHRINE 1 MG/ML
0.3 INJECTION, SOLUTION INTRAMUSCULAR; SUBCUTANEOUS EVERY 5 MIN PRN
Status: CANCELLED | OUTPATIENT
Start: 2022-09-28

## 2022-09-28 RX ORDER — HEPARIN SODIUM,PORCINE 10 UNIT/ML
5 VIAL (ML) INTRAVENOUS
Status: CANCELLED | OUTPATIENT
Start: 2022-09-28

## 2022-09-28 RX ORDER — DEXAMETHASONE 4 MG/1
12 TABLET ORAL EVERY 24 HOURS
Status: CANCELLED
Start: 2022-09-28

## 2022-09-28 RX ORDER — DIPHENHYDRAMINE HYDROCHLORIDE 50 MG/ML
50 INJECTION INTRAMUSCULAR; INTRAVENOUS
Status: CANCELLED
Start: 2022-09-28

## 2022-09-28 RX ORDER — IMATINIB MESYLATE 400 MG/1
400 TABLET, FILM COATED ORAL 2 TIMES DAILY
Status: CANCELLED | OUTPATIENT
Start: 2022-09-28

## 2022-09-28 RX ORDER — DEXAMETHASONE 4 MG/1
8 TABLET ORAL EVERY MORNING
Status: CANCELLED
Start: 2022-10-01

## 2022-09-28 RX ORDER — METHYLPREDNISOLONE SODIUM SUCCINATE 125 MG/2ML
125 INJECTION, POWDER, LYOPHILIZED, FOR SOLUTION INTRAMUSCULAR; INTRAVENOUS
Status: CANCELLED
Start: 2022-09-28

## 2022-09-28 RX ORDER — LEUCOVORIN CALCIUM 350 MG/17.5ML
15 INJECTION, POWDER, LYOPHILIZED, FOR SOLUTION INTRAMUSCULAR; INTRAVENOUS EVERY 6 HOURS
Status: CANCELLED | OUTPATIENT
Start: 2022-09-29

## 2022-09-28 RX ORDER — ALBUTEROL SULFATE 90 UG/1
1-2 AEROSOL, METERED RESPIRATORY (INHALATION)
Status: CANCELLED
Start: 2022-09-28

## 2022-09-28 RX ORDER — PROCHLORPERAZINE MALEATE 10 MG
10 TABLET ORAL EVERY 6 HOURS PRN
Status: CANCELLED
Start: 2022-09-28

## 2022-09-28 NOTE — LETTER
9/28/2022         RE: Vashti Villegas  [address] [place] [address]        Dear Colleague,    Thank you for referring your patient, Vashti Villegas, to the [facility]. Please see a copy of my visit note below.    April is a 42 year old who is being evaluated via a billable video visit.      How would you like to obtain your AVS? MyChart  If the video visit is dropped, the invitation should be resent by: Text to cell phone: [phone]  Will anyone else be joining your video visit? Dian Mclaughlin       New patient history and physical  September 27, 2022    Hem/onc History:     --Patient presented to outside hospital with c/o right upper quadrant pain and was subsequently found to have a markedly elevated white blood cell count concerning for acute leukemia. While at OSH, she had CT PE protocol as well as CT A/P. These identified no PE, no acute or localized intraabdominal inflammatory process, mild splenomegaly, and few inconspicous low-density structures in the liver. She was transferred to [facility] for further workup and management. S/p Hydrea 1g TID (8/21-8/23) with improvement in WBC (100K ? 12K).   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 IHC shows CD10+, CD34+. 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 Abnormal cytogenetics. Initiated pre-phase steroids with Prednisone 60 mg x8/21; increased to 60 mg/m2 (140 mg) 8/22-8/24. She initiated GRAALL + imatinib (C1D1 = 8/24/22).   --S/p BMT consult 9/12 with Dr. Walsh. Induction was relatively uncomplicated. Post-induction BMBx done 9/26. Morphology inconclusive due to inadequate core but flow negative. Awaiting FISH/chromosomes. BCR/abl major and minor undetectable (as were baseline tests). She was admitted for C2 GRAALL.    --BMBx 9/26/22 MRD-negative by BCR-ABL PCR and by Clonoseq Assay.       Treatment Plan: GRAALL + imatinib (C2D1=9/30/22)  - Imatinib 400 mg BID - D1-14 (9/29-10/12)  - IVFs per chemo protocol, bicarb support  - Methotrexate 1 g/m2 IV x once on D1. Administered over 24 hours  - Leucovorin calcium q6hr starting 12 hr after END of methotrexate infusion. Continue until methotrexate level <0.05  - cytarabine 3 g/m2 IV q12 hr x 4 doses on Days 2-3  - Premeds: Zofran 16mg D1, 8mg q12hr x 4 doses (D2-3);  Dex 12mg D1-3, 8mg D4-5  - Supportive Care: Pred Forte eye drops  - Neulasta ~D6. This has been added on to 10/6 appts  - IT chemo on D9. Will need under imaging guidance. As D9 falls on 10/8 (weekend), requested procedure for 10/7. She will have labs on 10/6 (already scheduled) to determine platelet count in anticipation of this procedure as well. Awaiting scheduling.     # CNS prophylaxis  Per the GRAALL protocol and given risk for CNS involvement of ALL. Will require LPs under fluoroscopic guidance.   - s/p weekly triple IT chemo (Cytarabine, Solu-Cortef, MTX) on Days 1, 8, and 15 of C1 induction. She additionally had repeat LP on 9/14. CSF flow has remained negative.   - Pt expresses anxiety surrounding procedures; pre-medication with 0.5mg PO Ativan +/- Atarax available prior PRN    History of Present Illness:   April Bakari is a 42 year old female with Ph+ B-ALL. She is to be admitted today for Cycle 2 GRAAL chemotherapy + high-dose imatinib. Currently doing well, 12-point ROS negative.     Past Medical History:     Past Medical History:   Diagnosis Date     Other acne         Past Surgical History:    This patient has no significant past surgical history       Social History:     Social History     Tobacco Use     Smoking status: Never Smoker     Smokeless tobacco: Never Used   Substance Use Topics     Alcohol use: Not on file        Family History:   No family history on file.  Family history reviewed and updated in EPIC and reviewed     Medications:       Current Outpatient  Medications   Medication Sig     acyclovir (ZOVIRAX) 400 MG tablet Take 1 tablet (400 mg) by mouth 2 times daily for 60 days     pantoprazole (PROTONIX) 20 MG EC tablet Take 1 tablet (20 mg) by mouth daily     acetaminophen (TYLENOL) 325 MG tablet Take 2 tablets (650 mg) by mouth every 6 hours as needed for mild pain, other or fever (blood product administration premedication)     amLODIPine (NORVASC) 5 MG tablet Take 1 tablet (5 mg) by mouth daily     calcium carbonate (TUMS) 500 MG chewable tablet Take 2 tablets (1,000 mg) by mouth daily as needed for heartburn     dexamethasone (DECADRON) 4 MG tablet Take 2 tablets (8 mg) by mouth every morning Take 2 tabs (8 mg) once on 10/4, then stop     fluconazole (DIFLUCAN) 200 MG tablet Take 1 tablet (200 mg) by mouth daily as needed (Take when ANC <1000)     heparin lock flush 10 UNIT/ML SOLN injection 10 mLs by Intracatheter route daily Flush 5 mL into each PICC lumen daily.     imatinib (GLEEVEC) 400 MG tablet Take 1 tablet (400 mg) by mouth 2 times daily Through 10/12. Stop on 10/13.     levofloxacin (LEVAQUIN) 250 MG tablet Take 1 tablet (250 mg) by mouth daily as needed (ANC <1000) (Patient not taking: Reported on 10/2/2022)     ondansetron (ZOFRAN ODT) 4 MG ODT tab Take 1-2 tablets (4-8 mg) by mouth every 8 hours as needed for nausea or vomiting (Patient not taking: Reported on 10/2/2022)     prednisoLONE acetate (PRED FORTE) 1 % ophthalmic suspension Place 2 drops into both eyes 4 times daily Use through 10/5, stop 10/6     prochlorperazine (COMPAZINE) 10 MG tablet Take 1 tablet (10 mg) by mouth every 6 hours as needed (Breakthrough Nausea/Vomiting)     sodium chloride, PF, (SALINE FLUSH) 0.9% PF flush 20 mLs by Intracatheter route daily Flush PICC with 20 mL daily, 10 mL per lumen     No current facility-administered medications for this visit.        Physical Exam:   There were no vitals taken for this visit.    ECOG 1    Physical Exam conducted via video  conference:  General: Appears well, no acute distress.  Seated during interview. Able to ambulate without obvious deficit.  Skin: No lesions or rashes on face.  Head and Neck:  No visible JVP. No masses.  ROM at neck without deficit.   EENT: EOMI. No visible nystagmus. No lid lesions.  Anicteric.  Voice without hoarseness. No gross hearing deficits.   Pulmonary: Respiratory rate ~16.  No audible wheezing or stridor. No cough.  Normal work of breathing.   Heart: Acyanotic.  No visible lower extremity edema.   Abdomen: Nondistended. No visible abnormal veinous patterns.   MS: ROM without deficit in upper and lower extremities  Psych:  Affect is appropriate.  No psychomotor agitation  Data:     I personally reviewed the following studies:    Recent Labs   Lab Test 09/26/22  1358 09/20/22  1332 09/14/22  0704 09/13/22  0716 09/12/22  0936   WBC 6.8 10.9 6.8 6.8 6.0   NEUTROPHIL 76 70 61 60 51   HGB 8.1* 8.6* 7.5* 7.6* 8.0*    354 386 347 328     Recent Labs   Lab Test 09/26/22  1358 09/14/22  0704 09/13/22  0716 09/12/22  1623 09/12/22  0936 09/11/22  1708 09/11/22  0941    139 135*  --  138  --  139   POTASSIUM 4.0 4.1 4.0 3.8 3.4   < > 3.3*   CHLORIDE 108* 106 102  --  105  --  103   CO2 21* 27 27  --  27  --  25   ANIONGAP 10 6* 6*  --  6*  --  11   BUN 13.2 15.7 13.4  --  13.9  --  14.0   CR 0.55 0.74 0.71  --  0.79  --  0.66   SOLEDAD 8.8 8.4* 8.2*  --  8.2*  --  8.2*    < > = values in this interval not displayed.     Recent Labs   Lab Test 09/14/22  0704 09/13/22  0716 09/12/22  0936 09/11/22  0941 09/10/22  0647 09/09/22  0618 09/08/22  0831 09/06/22  0835 09/05/22  0749 09/02/22  0745 09/01/22  0548   MAG  --   --   --   --  2.1 2.1 1.8  --  2.0   < > 2.1   PHOS 4.4 3.6 2.5   < > 3.0 3.4 3.4   < > 2.8   < > 2.3*   LDH  --   --   --   --   --   --  219  --  189  --  260*   URIC  --   --   --   --   --   --  2.1*  --  1.4*  --  1.7*    < > = values in this interval not displayed.     Recent Labs   Lab  Test 09/26/22  1358 09/14/22  0704 09/12/22  0936 09/09/22  0618 09/08/22  0831 09/07/22  0559 09/05/22  0749 09/02/22  0745 09/01/22  0548   BILITOTAL 0.3 0.3 0.6   < >  --    < > 0.7   < >  --    ALKPHOS 83 77 77   < >  --    < > 62   < >  --    ALT 18 27 28   < >  --    < > 30   < >  --    AST 17 16 16   < >  --    < > 17   < >  --    ALBUMIN 3.8 3.4* 3.5   < >  --    < > 3.3*   < >  --    LDH  --   --   --   --  219  --  189  --  260*    < > = values in this interval not displayed.       No results found for this or any previous visit (from the past 24 hour(s)).    Assessment and Plan:   Ms. Villegas is a 42 year old woman with recent diagnosis of Ph+ ALL. Now in MRD- CR by BCR-ABL PCR as well as Clonoseq assay after cycle 1 of attenuated-dose chemotherapy.  Plan for Cycle #2 today, then full restaging, with results used to inform decision to pursue BMT versus chemotherapy.  With no matched related donors, and excellent early/deep MRD-CR, chemotherapy is currently favored over BMT. Prefer continued imatinib whenever possible, as tolerated.     PLAN:  --Admit for Cycle 2 GRAAL + HD imatinib chemotherapy  --Plan for BMBx (sedated) at end of cycle 2 with MRD testing. Will discuss BMT versus further chemotherapy based on results.     HEME  # Ph+ B-ALL     Treatment Plan: GRAALL + imatinib (C2D1=9/30/22)  - Imatinib 400 mg BID - D1-14 (9/29-10/12)  - IVFs per chemo protocol, bicarb support  - Methotrexate 1 g/m2 IV x once on D1. Administered over 24 hours  - Leucovorin calcium q6hr starting 12 hr after END of methotrexate infusion. Continue until methotrexate level <0.05  - cytarabine 3 g/m2 IV q12 hr x 4 doses on Days 2-3  - Premeds: Zofran 16mg D1, 8mg q12hr x 4 doses (D2-3);  Dex 12mg D1-3, 8mg D4-5  - Supportive Care: Pred Forte eye drops  - Neulasta ~D6. This has been added on to 10/6 appts  - IT chemo on D9. Will need under imaging guidance. As D9 falls on 10/8 (weekend), requested procedure for 10/7. She will have  labs on 10/6 (already scheduled) to determine platelet count in anticipation of this procedure as well. Awaiting scheduling.     # CNS prophylaxis Will require all subsequent LPs under fluoroscopic guidance.   # Chemotherapy-induced Grade 1 neuropathy --will follow  # PPx  -  mg BID  - Nebulized Pentamidine q28d for PJP ppx.              - Pentamidine given 10/2   - ppx Levaquin and Fluconazole on hold as ANC >1.0.      # H/o COVID-19 infection   Not vaccinated (due to fear of needles per pt report). Pt reported testing postiive for COVID approx 1 month prior to admission for new leukemia diagnosis via home test. She never had it confirmed.   - COVID testing on 9/29 (PTA) was negative.   # Hypertension: Amlodipine; Lasix as needed  # Indigestion/GERD            Again, thank you for allowing me to participate in the care of your patient.      Sincerely,    Steve Hull MD

## 2022-09-29 ENCOUNTER — LAB (OUTPATIENT)
Dept: LAB | Facility: CLINIC | Age: 42
End: 2022-09-29
Attending: INTERNAL MEDICINE
Payer: COMMERCIAL

## 2022-09-29 DIAGNOSIS — C91.00 ACUTE LYMPHOBLASTIC LEUKEMIA (ALL) NOT HAVING ACHIEVED REMISSION (H): Primary | ICD-10-CM

## 2022-09-29 DIAGNOSIS — C95.01 ACUTE LEUKEMIA IN REMISSION (H): ICD-10-CM

## 2022-09-29 DIAGNOSIS — C91.00 ACUTE LYMPHOBLASTIC LEUKEMIA (ALL) NOT HAVING ACHIEVED REMISSION (H): ICD-10-CM

## 2022-09-29 LAB — SARS-COV-2 RNA RESP QL NAA+PROBE: NEGATIVE

## 2022-09-29 PROCEDURE — U0003 INFECTIOUS AGENT DETECTION BY NUCLEIC ACID (DNA OR RNA); SEVERE ACUTE RESPIRATORY SYNDROME CORONAVIRUS 2 (SARS-COV-2) (CORONAVIRUS DISEASE [COVID-19]), AMPLIFIED PROBE TECHNIQUE, MAKING USE OF HIGH THROUGHPUT TECHNOLOGIES AS DESCRIBED BY CMS-2020-01-R: HCPCS

## 2022-09-29 PROCEDURE — U0005 INFEC AGEN DETEC AMPLI PROBE: HCPCS

## 2022-09-29 RX ORDER — IMATINIB MESYLATE 400 MG/1
400 TABLET, FILM COATED ORAL 2 TIMES DAILY
Qty: 6 TABLET | Refills: 0 | Status: ON HOLD | OUTPATIENT
Start: 2022-09-29 | End: 2022-10-03

## 2022-09-30 ENCOUNTER — HOSPITAL ENCOUNTER (INPATIENT)
Facility: CLINIC | Age: 42
LOS: 3 days | Discharge: HOME | End: 2022-10-03
Attending: INTERNAL MEDICINE | Admitting: INTERNAL MEDICINE
Payer: COMMERCIAL

## 2022-09-30 DIAGNOSIS — C91.00 ACUTE LYMPHOBLASTIC LEUKEMIA (ALL) NOT HAVING ACHIEVED REMISSION (H): Primary | ICD-10-CM

## 2022-09-30 DIAGNOSIS — Z45.2 PICC (PERIPHERALLY INSERTED CENTRAL CATHETER) IN PLACE: ICD-10-CM

## 2022-09-30 DIAGNOSIS — I10 HYPERTENSION, UNSPECIFIED TYPE: ICD-10-CM

## 2022-09-30 LAB
ALBUMIN SERPL BCG-MCNC: 4 G/DL (ref 3.5–5.2)
ALP SERPL-CCNC: 83 U/L (ref 35–104)
ALT SERPL W P-5'-P-CCNC: 24 U/L (ref 10–35)
ANION GAP SERPL CALCULATED.3IONS-SCNC: 10 MMOL/L (ref 7–15)
APTT PPP: 27 SECONDS (ref 22–38)
AST SERPL W P-5'-P-CCNC: 17 U/L (ref 10–35)
BASOPHILS # BLD AUTO: 0 10E3/UL (ref 0–0.2)
BASOPHILS NFR BLD AUTO: 1 %
BILIRUB SERPL-MCNC: 0.3 MG/DL
BUN SERPL-MCNC: 16.1 MG/DL (ref 6–20)
CALCIUM SERPL-MCNC: 9 MG/DL (ref 8.6–10)
CHLORIDE SERPL-SCNC: 106 MMOL/L (ref 98–107)
CREAT SERPL-MCNC: 0.73 MG/DL (ref 0.51–0.95)
DEPRECATED HCO3 PLAS-SCNC: 25 MMOL/L (ref 22–29)
EOSINOPHIL # BLD AUTO: 0.1 10E3/UL (ref 0–0.7)
EOSINOPHIL NFR BLD AUTO: 1 %
ERYTHROCYTE [DISTWIDTH] IN BLOOD BY AUTOMATED COUNT: 22.7 % (ref 10–15)
FIBRINOGEN PPP-MCNC: 503 MG/DL (ref 170–490)
GFR SERPL CREATININE-BSD FRML MDRD: >90 ML/MIN/1.73M2
GLUCOSE SERPL-MCNC: 98 MG/DL (ref 70–99)
HCT VFR BLD AUTO: 26.7 % (ref 35–47)
HGB BLD-MCNC: 8.4 G/DL (ref 11.7–15.7)
IMM GRANULOCYTES # BLD: 0 10E3/UL
IMM GRANULOCYTES NFR BLD: 0 %
INR PPP: 1 (ref 0.85–1.15)
LDH SERPL L TO P-CCNC: 245 U/L (ref 0–250)
LYMPHOCYTES # BLD AUTO: 0.9 10E3/UL (ref 0.8–5.3)
LYMPHOCYTES NFR BLD AUTO: 16 %
MAGNESIUM SERPL-MCNC: 1.9 MG/DL (ref 1.7–2.3)
MCH RBC QN AUTO: 31.5 PG (ref 26.5–33)
MCHC RBC AUTO-ENTMCNC: 31.5 G/DL (ref 31.5–36.5)
MCV RBC AUTO: 100 FL (ref 78–100)
MONOCYTES # BLD AUTO: 0.5 10E3/UL (ref 0–1.3)
MONOCYTES NFR BLD AUTO: 9 %
NEUTROPHILS # BLD AUTO: 4.2 10E3/UL (ref 1.6–8.3)
NEUTROPHILS NFR BLD AUTO: 73 %
NRBC # BLD AUTO: 0 10E3/UL
NRBC BLD AUTO-RTO: 0 /100
PHOSPHATE SERPL-MCNC: 3.8 MG/DL (ref 2.5–4.5)
PLATELET # BLD AUTO: 251 10E3/UL (ref 150–450)
POTASSIUM SERPL-SCNC: 3.8 MMOL/L (ref 3.4–5.3)
PROT SERPL-MCNC: 6.6 G/DL (ref 6.4–8.3)
RBC # BLD AUTO: 2.67 10E6/UL (ref 3.8–5.2)
SODIUM SERPL-SCNC: 141 MMOL/L (ref 136–145)
URATE SERPL-MCNC: 3.8 MG/DL (ref 2.4–5.7)
WBC # BLD AUTO: 5.8 10E3/UL (ref 4–11)

## 2022-09-30 PROCEDURE — 250N000009 HC RX 250: Performed by: INTERNAL MEDICINE

## 2022-09-30 PROCEDURE — 250N000011 HC RX IP 250 OP 636: Performed by: INTERNAL MEDICINE

## 2022-09-30 PROCEDURE — 85730 THROMBOPLASTIN TIME PARTIAL: CPT | Performed by: PHYSICIAN ASSISTANT

## 2022-09-30 PROCEDURE — 250N000013 HC RX MED GY IP 250 OP 250 PS 637: Performed by: INTERNAL MEDICINE

## 2022-09-30 PROCEDURE — 36592 COLLECT BLOOD FROM PICC: CPT | Performed by: INTERNAL MEDICINE

## 2022-09-30 PROCEDURE — 85610 PROTHROMBIN TIME: CPT | Performed by: PHYSICIAN ASSISTANT

## 2022-09-30 PROCEDURE — 258N000002 HC RX IP 258 OP 250: Performed by: INTERNAL MEDICINE

## 2022-09-30 PROCEDURE — 85025 COMPLETE CBC W/AUTO DIFF WBC: CPT | Performed by: INTERNAL MEDICINE

## 2022-09-30 PROCEDURE — 83615 LACTATE (LD) (LDH) ENZYME: CPT | Performed by: PHYSICIAN ASSISTANT

## 2022-09-30 PROCEDURE — 120N000002 HC R&B MED SURG/OB UMMC

## 2022-09-30 PROCEDURE — 80053 COMPREHEN METABOLIC PANEL: CPT | Performed by: INTERNAL MEDICINE

## 2022-09-30 PROCEDURE — 84100 ASSAY OF PHOSPHORUS: CPT | Performed by: PHYSICIAN ASSISTANT

## 2022-09-30 PROCEDURE — 250N000009 HC RX 250: Performed by: PHYSICIAN ASSISTANT

## 2022-09-30 PROCEDURE — 250N000013 HC RX MED GY IP 250 OP 250 PS 637: Performed by: PHYSICIAN ASSISTANT

## 2022-09-30 PROCEDURE — 83735 ASSAY OF MAGNESIUM: CPT | Performed by: PHYSICIAN ASSISTANT

## 2022-09-30 PROCEDURE — 84550 ASSAY OF BLOOD/URIC ACID: CPT | Performed by: PHYSICIAN ASSISTANT

## 2022-09-30 PROCEDURE — 258N000003 HC RX IP 258 OP 636: Performed by: INTERNAL MEDICINE

## 2022-09-30 PROCEDURE — 85384 FIBRINOGEN ACTIVITY: CPT | Performed by: PHYSICIAN ASSISTANT

## 2022-09-30 PROCEDURE — 250N000011 HC RX IP 250 OP 636: Performed by: PHYSICIAN ASSISTANT

## 2022-09-30 PROCEDURE — 36569 INSJ PICC 5 YR+ W/O IMAGING: CPT

## 2022-09-30 RX ORDER — CALCIUM CARBONATE 500 MG/1
1000 TABLET, CHEWABLE ORAL DAILY PRN
Status: DISCONTINUED | OUTPATIENT
Start: 2022-09-30 | End: 2022-10-03 | Stop reason: HOSPADM

## 2022-09-30 RX ORDER — AMLODIPINE BESYLATE 10 MG/1
10 TABLET ORAL DAILY
Status: DISCONTINUED | OUTPATIENT
Start: 2022-10-01 | End: 2022-10-03 | Stop reason: HOSPADM

## 2022-09-30 RX ORDER — ACYCLOVIR 400 MG/1
400 TABLET ORAL 2 TIMES DAILY
Status: DISCONTINUED | OUTPATIENT
Start: 2022-09-30 | End: 2022-10-03 | Stop reason: HOSPADM

## 2022-09-30 RX ORDER — PREDNISOLONE ACETATE 10 MG/ML
2 SUSPENSION/ DROPS OPHTHALMIC 4 TIMES DAILY
Status: DISCONTINUED | OUTPATIENT
Start: 2022-10-01 | End: 2022-10-03 | Stop reason: HOSPADM

## 2022-09-30 RX ORDER — LORAZEPAM 0.5 MG/1
.5-1 TABLET ORAL EVERY 6 HOURS PRN
Status: DISCONTINUED | OUTPATIENT
Start: 2022-09-30 | End: 2022-10-03 | Stop reason: HOSPADM

## 2022-09-30 RX ORDER — ENOXAPARIN SODIUM 100 MG/ML
40 INJECTION SUBCUTANEOUS EVERY 24 HOURS
Status: DISCONTINUED | OUTPATIENT
Start: 2022-09-30 | End: 2022-10-03 | Stop reason: HOSPADM

## 2022-09-30 RX ORDER — LEUCOVORIN CALCIUM 350 MG/17.5ML
15 INJECTION, POWDER, LYOPHILIZED, FOR SOLUTION INTRAMUSCULAR; INTRAVENOUS EVERY 6 HOURS
Status: DISCONTINUED | OUTPATIENT
Start: 2022-10-02 | End: 2022-10-03

## 2022-09-30 RX ORDER — NALOXONE HYDROCHLORIDE 0.4 MG/ML
0.2 INJECTION, SOLUTION INTRAMUSCULAR; INTRAVENOUS; SUBCUTANEOUS
Status: DISCONTINUED | OUTPATIENT
Start: 2022-09-30 | End: 2022-10-03 | Stop reason: HOSPADM

## 2022-09-30 RX ORDER — PROCHLORPERAZINE MALEATE 10 MG
10 TABLET ORAL EVERY 6 HOURS PRN
Status: DISCONTINUED | OUTPATIENT
Start: 2022-09-30 | End: 2022-10-03 | Stop reason: HOSPADM

## 2022-09-30 RX ORDER — MEPERIDINE HYDROCHLORIDE 25 MG/ML
25 INJECTION INTRAMUSCULAR; INTRAVENOUS; SUBCUTANEOUS EVERY 30 MIN PRN
Status: DISCONTINUED | OUTPATIENT
Start: 2022-09-30 | End: 2022-10-03 | Stop reason: HOSPADM

## 2022-09-30 RX ORDER — AMLODIPINE BESYLATE 5 MG/1
5 TABLET ORAL DAILY
Status: DISCONTINUED | OUTPATIENT
Start: 2022-10-01 | End: 2022-09-30

## 2022-09-30 RX ORDER — IMATINIB MESYLATE 400 MG/1
400 TABLET, FILM COATED ORAL 2 TIMES DAILY
Status: DISCONTINUED | OUTPATIENT
Start: 2022-09-30 | End: 2022-10-03 | Stop reason: HOSPADM

## 2022-09-30 RX ORDER — LIDOCAINE 40 MG/G
CREAM TOPICAL
Status: ACTIVE | OUTPATIENT
Start: 2022-09-30 | End: 2022-10-03

## 2022-09-30 RX ORDER — ALBUTEROL SULFATE 90 UG/1
1-2 AEROSOL, METERED RESPIRATORY (INHALATION)
Status: DISCONTINUED | OUTPATIENT
Start: 2022-09-30 | End: 2022-10-03 | Stop reason: HOSPADM

## 2022-09-30 RX ORDER — SODIUM BICARBONATE 84 MG/ML
50 INJECTION, SOLUTION INTRAVENOUS
Status: DISCONTINUED | OUTPATIENT
Start: 2022-09-30 | End: 2022-10-03 | Stop reason: HOSPADM

## 2022-09-30 RX ORDER — ACETAMINOPHEN 325 MG/1
975 TABLET ORAL EVERY 6 HOURS PRN
Status: DISCONTINUED | OUTPATIENT
Start: 2022-09-30 | End: 2022-10-03 | Stop reason: HOSPADM

## 2022-09-30 RX ORDER — LORAZEPAM 2 MG/ML
.5-1 INJECTION INTRAMUSCULAR EVERY 6 HOURS PRN
Status: DISCONTINUED | OUTPATIENT
Start: 2022-09-30 | End: 2022-09-30

## 2022-09-30 RX ORDER — HEPARIN SODIUM,PORCINE 10 UNIT/ML
5-20 VIAL (ML) INTRAVENOUS EVERY 24 HOURS
Status: DISCONTINUED | OUTPATIENT
Start: 2022-09-30 | End: 2022-10-03 | Stop reason: HOSPADM

## 2022-09-30 RX ORDER — DIPHENHYDRAMINE HYDROCHLORIDE 50 MG/ML
50 INJECTION INTRAMUSCULAR; INTRAVENOUS
Status: DISCONTINUED | OUTPATIENT
Start: 2022-09-30 | End: 2022-10-03 | Stop reason: HOSPADM

## 2022-09-30 RX ORDER — NALOXONE HYDROCHLORIDE 0.4 MG/ML
0.4 INJECTION, SOLUTION INTRAMUSCULAR; INTRAVENOUS; SUBCUTANEOUS
Status: DISCONTINUED | OUTPATIENT
Start: 2022-09-30 | End: 2022-10-03 | Stop reason: HOSPADM

## 2022-09-30 RX ORDER — DEXAMETHASONE 4 MG/1
8 TABLET ORAL EVERY MORNING
Status: DISCONTINUED | OUTPATIENT
Start: 2022-10-03 | End: 2022-10-03 | Stop reason: HOSPADM

## 2022-09-30 RX ORDER — HEPARIN SODIUM,PORCINE 10 UNIT/ML
5 VIAL (ML) INTRAVENOUS
Status: DISCONTINUED | OUTPATIENT
Start: 2022-09-30 | End: 2022-10-03 | Stop reason: HOSPADM

## 2022-09-30 RX ORDER — ACETAMINOPHEN 325 MG/1
650 TABLET ORAL EVERY 4 HOURS PRN
Status: DISCONTINUED | OUTPATIENT
Start: 2022-09-30 | End: 2022-09-30

## 2022-09-30 RX ORDER — POLYETHYLENE GLYCOL 3350 17 G/17G
17 POWDER, FOR SOLUTION ORAL DAILY PRN
Status: DISCONTINUED | OUTPATIENT
Start: 2022-09-30 | End: 2022-10-03 | Stop reason: HOSPADM

## 2022-09-30 RX ORDER — LEUCOVORIN CALCIUM 350 MG/17.5ML
50 INJECTION, POWDER, LYOPHILIZED, FOR SOLUTION INTRAMUSCULAR; INTRAVENOUS ONCE
Status: COMPLETED | OUTPATIENT
Start: 2022-10-02 | End: 2022-10-02

## 2022-09-30 RX ORDER — ALBUTEROL SULFATE 0.83 MG/ML
2.5 SOLUTION RESPIRATORY (INHALATION)
Status: DISCONTINUED | OUTPATIENT
Start: 2022-09-30 | End: 2022-10-03 | Stop reason: HOSPADM

## 2022-09-30 RX ORDER — AMOXICILLIN 250 MG
1 CAPSULE ORAL 2 TIMES DAILY PRN
Status: DISCONTINUED | OUTPATIENT
Start: 2022-09-30 | End: 2022-10-03 | Stop reason: HOSPADM

## 2022-09-30 RX ORDER — ONDANSETRON 8 MG/1
16 TABLET, FILM COATED ORAL ONCE
Status: COMPLETED | OUTPATIENT
Start: 2022-09-30 | End: 2022-09-30

## 2022-09-30 RX ORDER — AMOXICILLIN 250 MG
2 CAPSULE ORAL 2 TIMES DAILY PRN
Status: DISCONTINUED | OUTPATIENT
Start: 2022-09-30 | End: 2022-10-03 | Stop reason: HOSPADM

## 2022-09-30 RX ORDER — METHYLPREDNISOLONE SODIUM SUCCINATE 125 MG/2ML
125 INJECTION, POWDER, LYOPHILIZED, FOR SOLUTION INTRAMUSCULAR; INTRAVENOUS
Status: DISCONTINUED | OUTPATIENT
Start: 2022-09-30 | End: 2022-10-03 | Stop reason: HOSPADM

## 2022-09-30 RX ORDER — FAMOTIDINE 10 MG
10 TABLET ORAL 2 TIMES DAILY
Status: DISCONTINUED | OUTPATIENT
Start: 2022-09-30 | End: 2022-10-03 | Stop reason: HOSPADM

## 2022-09-30 RX ORDER — ONDANSETRON 8 MG/1
8 TABLET, FILM COATED ORAL EVERY 12 HOURS
Status: DISPENSED | OUTPATIENT
Start: 2022-10-01 | End: 2022-10-03

## 2022-09-30 RX ORDER — HEPARIN SODIUM,PORCINE 10 UNIT/ML
5-20 VIAL (ML) INTRAVENOUS
Status: DISCONTINUED | OUTPATIENT
Start: 2022-09-30 | End: 2022-10-03 | Stop reason: HOSPADM

## 2022-09-30 RX ORDER — HEPARIN SODIUM (PORCINE) LOCK FLUSH IV SOLN 100 UNIT/ML 100 UNIT/ML
5 SOLUTION INTRAVENOUS
Status: DISCONTINUED | OUTPATIENT
Start: 2022-09-30 | End: 2022-10-03 | Stop reason: HOSPADM

## 2022-09-30 RX ORDER — EPINEPHRINE 1 MG/ML
0.3 INJECTION, SOLUTION, CONCENTRATE INTRAVENOUS EVERY 5 MIN PRN
Status: DISCONTINUED | OUTPATIENT
Start: 2022-09-30 | End: 2022-10-03 | Stop reason: HOSPADM

## 2022-09-30 RX ORDER — DEXAMETHASONE 4 MG/1
12 TABLET ORAL EVERY 24 HOURS
Status: COMPLETED | OUTPATIENT
Start: 2022-09-30 | End: 2022-10-02

## 2022-09-30 RX ADMIN — LIDOCAINE HYDROCHLORIDE 2 ML: 10 INJECTION, SOLUTION EPIDURAL; INFILTRATION; INTRACAUDAL; PERINEURAL at 15:06

## 2022-09-30 RX ADMIN — ONDANSETRON HYDROCHLORIDE 16 MG: 8 TABLET, FILM COATED ORAL at 18:19

## 2022-09-30 RX ADMIN — IMATINIB 400 MG: 400 TABLET, FILM COATED ORAL at 19:46

## 2022-09-30 RX ADMIN — SODIUM BICARBONATE: 84 INJECTION, SOLUTION INTRAVENOUS at 21:12

## 2022-09-30 RX ADMIN — Medication 5 ML: at 15:57

## 2022-09-30 RX ADMIN — LORAZEPAM 0.5 MG: 0.5 TABLET ORAL at 14:23

## 2022-09-30 RX ADMIN — DEXAMETHASONE 12 MG: 4 TABLET ORAL at 18:19

## 2022-09-30 RX ADMIN — ENOXAPARIN SODIUM 40 MG: 40 INJECTION SUBCUTANEOUS at 19:45

## 2022-09-30 RX ADMIN — SODIUM BICARBONATE: 84 INJECTION, SOLUTION INTRAVENOUS at 15:29

## 2022-09-30 RX ADMIN — FAMOTIDINE 10 MG: 10 TABLET, FILM COATED ORAL at 19:45

## 2022-09-30 RX ADMIN — ACYCLOVIR 400 MG: 400 TABLET ORAL at 19:45

## 2022-09-30 RX ADMIN — METHOTREXATE 2.26 G: 25 INJECTION, SOLUTION INTRA-ARTERIAL; INTRAMUSCULAR; INTRATHECAL; INTRAVENOUS at 19:46

## 2022-09-30 ASSESSMENT — ACTIVITIES OF DAILY LIVING (ADL)
ADLS_ACUITY_SCORE: 35
ADLS_ACUITY_SCORE: 18
ADLS_ACUITY_SCORE: 18
ADLS_ACUITY_SCORE: 35
ADLS_ACUITY_SCORE: 18

## 2022-09-30 NOTE — PROCEDURES
[facility]    Double Lumen PICC Placement    Date/Time: 9/30/2022 3:10 PM  Performed by: CAROL Pitts  Authorized by: Clau Castro PA-C   Indications: vascular access      UNIVERSAL PROTOCOL   Site Marked: Yes  Prior Images Obtained and Reviewed:  Yes  Required items: Required blood products, implants, devices and special equipment available    Patient identity confirmed:  Verbally with patient and arm band  NA - No sedation, light sedation, or local anesthesia  Confirmation Checklist:  Patient's identity using two indicators, relevant allergies, procedure was appropriate and matched the consent or emergent situation and correct equipment/implants were available  Time out: Immediately prior to the procedure a time out was called    Universal Protocol: the Joint Commission Universal Protocol was followed    Preparation: Patient was prepped and draped in usual sterile fashion       ANESTHESIA    Anesthesia: See MAR for details  Local Anesthetic:  Lidocaine 1% without epinephrine        Preparation: skin prepped with ChloraPrep  Skin prep agent: skin prep agent completely dried prior to procedure  Sterile barriers: maximum sterile barriers were used: cap, mask, sterile gown, sterile gloves, and large sterile sheet  Hand hygiene: hand hygiene performed prior to central venous catheter insertion  Type of line used: Power PICC  Catheter type: double lumen  Lumen type: non-valved  Catheter size: 5 Fr  Brand: Bard  Placement method: venipuncture, MST, ultrasound and tip navigation system  Number of attempts: 1  Difficulty threading catheter: no  Successful placement: yes  Orientation: left    Location: basilic vein  Arm circumference: adults 10 cm  Extremity circumference: 39  Visible catheter length: 1  Total catheter length: 47  Dressing and securement: chlorhexidine disc applied, dressing applied, line secured, statlock, sterile dressing applied and  transparent dressing  Post procedure assessment: blood return through all ports and placement verified by 3CG technology  PROCEDURE   Patient Tolerance:  Patient tolerated the procedure well with no immediate complicationsDescribe Procedure: Picc ok to use

## 2022-09-30 NOTE — H&P
[facility]    History and Physical  Hematology / Oncology     Date of Admission:  9/30/2022  Date of Service (when I saw the patient): 09/30/22    Assessment & Plan   Vashti Villegas is a 42 year old female who was admitted on 8/19/2022 with hyperleukocytosis (WBC 109K). 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 Pre-phase steroids initiated (8/21-8/24), then initiated GRAALL + imatinib on 8/24, overall well tolerated. BMT consultation completed with Dr Walsh inpatient (9/12). She has been doing well at home most recently and now returns for Cycle 2 GRAALL.     HEME  # Ph+ B-ALL  Patient presented to outside hospital with c/o right upper quadrant pain and was subsequently found to have a markedly elevated white blood cell count concerning for acute leukemia. While at OSH, she had CT PE protocol as well as CT A/P. These identified no PE, no acute or localized intraabdominal inflammatory process, mild splenomegaly, and few inconspicous low-density structures in the liver. She was transferred to [facility] for further workup and management. S/p Hydrea 1g TID (8/21-8/23) with improvement in WBC (100K ? 12K). BMBx completed 8/20 - demonstrated B-ALL with 85% blasts and hypercellular marrow. IHC shows CD10+, CD34+. 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 Abnormal cytogenetics. Initiated pre-phase steroids with Prednisone 60 mg x8/21; increased to 60 mg/m2 (140 mg) 8/22-8/24. She initiated GRAALL + imatinib (C1D1 = 8/24/22). S/p BMT consult 9/12 with Dr. Walsh. Induction was relatively uncomplicated. Post-induction BMBx done 9/26. Morphology inconclusive due to inadequate core but flow negative. Awaiting FISH/chromosomes. BCRabl major and minor undetectable (as were baseline tests). She returns for C2 GRAALL.   - place PICC on admission  - per pt (after her visit with Dr. Hull on  "9/28), plan is for repeat BMBx after this cycle (C2). Outpatient is planning to set her up for a sedated BMBx under imaging given the difficulty of her last marrow.  - pt states she had an \"emergency\" delivery of imatinib to her house, so she took 2 doses on 9/29 and 1 dose 9/30 AM. Will continue imatinib per inpatient protocol.     Treatment Plan: GRAALL + imatinib (C2D1=9/30/22)  - Imatinib 400 mg BID - D1-14   - IVFs per chemo protocol, bicarb support  - Methotrexate 1 g/m2 IV x once on D1. Administer over 24 hours  - Leucovorin calcium q6hr starting 12 hr after END of methotrexate infusion. Continue until methotrexate level <0.05  - cytarabine 3 g/m2 IV q12 hr x 4 doses on Days 2-3  - Premeds: Zofran 16mg D1, 8mg q12hr x 4 doses (D2-3);  Dex 12mg D1-3, 8mg D4-5  - Supportive Care: Pred Forte eye drops  - Neulasta ~D6. Requested this be added on to 10/6 appts  - IT chemo on D9. Will need under imaging guidance. As D9 falls on 10/8 (weekend), requested procedure for 10/7. She will have labs on 10/6 (already scheduled) to determine platelet count in anticipation of this procedure as well.     # CNS prophylaxis  Per the GRAALL protocol and given risk for CNS involvement of ALL. Due to unsuccessful attempt at bedside with the CAPS team on 8/25, will require all subsequent LPs under fluoroscopic guidance. When counts were low, she had required multiple units of platelets (3u total) prior to procedure to reach goal >50k.   - s/p weekly triple IT chemo (Cytarabine, Solu-Cortef, MTX) on Days 1, 8, and 15 of C1 induction. She additionally had repeat LP on 9/14. CSF flow has remained negative.   - plan for IT chemo on D9 outpatient with Cycle 2 - will need to ensure this is scheduled  - Pt expresses anxiety surrounding procedures; pre-medication with 0.5mg PO Ativan +/- Atarax available prior PRN     # Anemia  2/2 chemotherapy. Other counts have recovered.  - Transfuse to maintain Hgb >7, Plt >10K (>50K prior to LPs)     # " Chemotherapy-induced Grade 1 neuropathy   Pt reports persistent mild neuropathy of b/l fingertips, this is unchanged and not affecting function at present. She does intermittently notice some mild tingling/numbness in her toes, mainly at night or when she is wearing shoes. Not affecting gait/function. No reports of neuropathic pain at present.   - vincristine is not part of the chemo plan in C2  - continue to monitor      ID  # PPx  -  mg BID  - Nebulized Pentamidine q28d for PJP ppx, last dose given inpatient 8/29. Due for next dose which we will give inpatient. Will order for 10/1.   - ppx Levaquin and Fluconazole on hold as ANC >1.0.      # H/o COVID-19 infection   Not vaccinated (due to fear of needles per pt report). Pt reported testing postiive for COVID approx 1 month prior to admission for new leukemia diagnosis via home test. She never had it confirmed.   - COVID testing on 9/29 (PTA) was negative.      CV  # Hypertension  - continue PTA amlodipine. There was concern for softer BPs outpatient and amlodipine was advised to be reduced from 10mg to 5mg. However, April reports BPs have been normal-preHTN at home so she continued the 10mg dose.   - will continue amlodipine 10mg daily here as I anticipate that she may have higher BPs with high rate IVFs and steroid premeds. Hold parameters in place. Monitor and adjust as needed.      GI  # Indigestion/GERD  - improved with protonix 40mg outpatient. Given plan for Methotrexate, have to hold Protonix inpatient. Will use Pepcid.   - TUMs PRN.      FEN  - high rate IVFs per protocol. Monitor I/Os and daily weights closely. Low threshold to use Lasix for fluid weight gain.   - lyte repletion per unit protocol  - regular diet as tolerated    PPx  - VTE: Lovenox 40mg subcutaneous daily, hold if Plt <50K  - GI: Pepcid as above  - pt ok to defer PT at this time. She wants to walk on her own.     Lines/Drains: PICC placed on admission. Pt will think about if she  would like to keep this at discharge pending plan for frequency of labs/possible transfusions (I advised this would be twice weekly at least to start).     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   - has twice weekly labs/transfusions starting 10/6 and through end of October  - will need to ensure Neulasta and D9 LP for IT chemo are scheduled appropriately (request sent via APPT18 request)     Discussed with attending physician, Dr. Everett.     I spent 70 minutes in the care of this patient today, which included time necessary for review of interval events, obtaining history and physical exam, ordering medication(s)/test(s) as medically indicated, discussion with interdisciplinary/consult team(s), and documentation time. Over 50% of time was spent face-to-face and/or coordinating care.    Clau Castro PA-C   Heme/Onc  [phone] (pager)        Chief Complaint   Scheduled admission for C2 chemotherapy    History is obtained from the patient and review of chart.     History of Present Illness   Vashti Villegas is a 42 year old female with Ph+ B-ALL who presents for C2 GRAALL. In brief, pt was admitted on 8/19/2022 with hyperleukocytosis (WBC 109K), abdominal pain and 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 Pre-phase steroids initiated (8/21-8/24), then initiated GRAALL + imatinib on 8/24, overall well tolerated. BMT consultation completed with Dr Walsh inpatient (9/12). She has been doing well at home most recently and now returns for Cycle 2 GRAALL.     On admission, April reports feeling well. It has been nice to be at home. She denies HA, upper respiratory symptoms, mouth pain/sores, SOB, nausea, or change in bowels. Sometimes notes that she  tends more towards sockline edema but does not feel she is retaining fluid from a weight perspective and she is urinating well and often. Has stable mild neuropathy in her fingertips that remains from last admission. Reported as numbness/tingling and apart from noticing some minor difficulty clasping her bra, it is not affecting function. She also has noticed some mild, intermittent numbness in her toes b/l as well. This occurs mainly at night or when she has shoes on. This is not affecting her gait. Denies pain with her neuropathy. Menstrual cycle (occurred during prior admission) currently complete. Denies menstrual bleeding.         Past Medical History    Past Medical History:   Diagnosis Date     Other acne        Past Surgical History   Past Surgical History:   Procedure Laterality Date     PICC DOUBLE LUMEN PLACEMENT Left 2022    left cephalic 5 fr dl picc 44 cm     ZZC NONSPECIFIC PROCEDURE      benign mole removal       Prior to Admission Medications   Prior to Admission Medications   Prescriptions Last Dose Informant Patient Reported? Taking?   ORTHO TRI-CYCLEN LO OR TABS   No No   Si TABLET DAILY   acetaminophen (TYLENOL) 325 MG tablet   No No   Sig: Take 3 tablets (975 mg) by mouth every 6 hours as needed for mild pain, other or fever (blood product administration premedication)   acyclovir (ZOVIRAX) 400 MG tablet   No No   Sig: Take 1 tablet (400 mg) by mouth 2 times daily for 60 days   amLODIPine (NORVASC) 10 MG tablet   No No   Sig: Take 1 tablet (10 mg) by mouth daily for 30 days   calcium carbonate (TUMS) 500 MG chewable tablet   No No   Sig: Take 2 tablets (1,000 mg) by mouth daily as needed for heartburn   Patient not taking: Reported on 2022   fluconazole (DIFLUCAN) 200 MG tablet   No No   Sig: Take 1 tablet (200 mg) by mouth daily as needed (Take when ANC <1000)   Patient not taking: No sig reported   imatinib (GLEEVEC) 400 MG tablet   No No   Sig: Take 1 tablet (400 mg) by  "mouth 2 times daily   levofloxacin (LEVAQUIN) 250 MG tablet   No No   Sig: Take 1 tablet (250 mg) by mouth daily as needed (ANC <1000)   Patient not taking: No sig reported   ondansetron (ZOFRAN ODT) 4 MG ODT tab   No No   Sig: Take 1-2 tablets (4-8 mg) by mouth every 8 hours as needed for nausea or vomiting   Patient not taking: No sig reported   pantoprazole (PROTONIX) 20 MG EC tablet   No No   Sig: Take 1 tablet (20 mg) by mouth daily      Facility-Administered Medications: None     Allergies   Allergies   Allergen Reactions     Blood Transfusion Related (Informational Only) Hives     Hive reaction with platelet transfusion on 8/26/2022. Please administer platelets with tylenol and benadryl premedication.        Social History   Social History     Socioeconomic History     Marital status:      Spouse name: Not on file     Number of children: Not on file     Years of education: Not on file     Highest education level: Not on file   Occupational History     Not on file   Tobacco Use     Smoking status: Never Smoker     Smokeless tobacco: Never Used   Substance and Sexual Activity     Alcohol use: Not on file     Drug use: Not on file     Sexual activity: Not on file   Other Topics Concern     Not on file   Social History Narrative    ** Merged History Encounter **          Social Determinants of Health     Financial Resource Strain: Not on file   Food Insecurity: Not on file   Transportation Needs: Not on file   Physical Activity: Not on file   Stress: Not on file   Social Connections: Not on file   Intimate Partner Violence: Not on file   Housing Stability: Not on file       Family History   Non-contributory.    Review of Systems   A comprehensive Review of Systems is negative other than noted in the HPI or here.    Physical Exam   Ht 1.626 m (5' 4\")   Wt 114.4 kg (252 lb 3.2 oz)   BMI 43.29 kg/m     Limited as pt sitting at end of tena prior to inpatient bed availability.  Constitutional: Seen sitting up " in chair, playing cards with her . Awake and conversational. Non-toxic appearing. No acute distress.   HEENT: Normocephalic, atraumatic. Sclerae anicteric. Mask in place.   Respiratory: Breathing comfortable with no increased work on room air. Lungs clear to auscultation bilaterally, no crackles or wheezing noted.  Cardiovascular: Regular rate and rhythm. No murmur appreciated. Mild b/l sockline edema.  GI: Normoactive BS. Abdomen is soft, non-distended, non-tender.  Skin: Skin is clean, dry, intact. No jaundice appreciated. Fading eechymoses from prior PICC and recent ecchymosis in right AC space secondary to blood draw.   Musculoskeletal/ Extremities: No redness, warmth, or swelling of the joints appreciated.   Neurologic: Alert and oriented. Speech normal. Grossly nonfocal. Memory and thought process preserved.  Neuropsychiatric: Calm, affect congruent to situation.        Data   9/26 labs reviewed. Will obtain 9/30 labs after PICC placement.   Recent BMBx from 9/26 results reviewed.

## 2022-10-01 LAB
ANION GAP SERPL CALCULATED.3IONS-SCNC: 10 MMOL/L (ref 7–15)
BASOPHILS # BLD AUTO: 0 10E3/UL (ref 0–0.2)
BASOPHILS NFR BLD AUTO: 0 %
BUN SERPL-MCNC: 9.8 MG/DL (ref 6–20)
CALCIUM SERPL-MCNC: 8.4 MG/DL (ref 8.6–10)
CHLORIDE SERPL-SCNC: 106 MMOL/L (ref 98–107)
CREAT SERPL-MCNC: 0.6 MG/DL (ref 0.51–0.95)
DEPRECATED HCO3 PLAS-SCNC: 23 MMOL/L (ref 22–29)
EOSINOPHIL # BLD AUTO: 0 10E3/UL (ref 0–0.7)
EOSINOPHIL NFR BLD AUTO: 0 %
ERYTHROCYTE [DISTWIDTH] IN BLOOD BY AUTOMATED COUNT: 21.7 % (ref 10–15)
GFR SERPL CREATININE-BSD FRML MDRD: >90 ML/MIN/1.73M2
GLUCOSE SERPL-MCNC: 137 MG/DL (ref 70–99)
HCT VFR BLD AUTO: 27.1 % (ref 35–47)
HGB BLD-MCNC: 8.4 G/DL (ref 11.7–15.7)
IMM GRANULOCYTES # BLD: 0 10E3/UL
IMM GRANULOCYTES NFR BLD: 1 %
LYMPHOCYTES # BLD AUTO: 0.4 10E3/UL (ref 0.8–5.3)
LYMPHOCYTES NFR BLD AUTO: 6 %
MAGNESIUM SERPL-MCNC: 2 MG/DL (ref 1.7–2.3)
MCH RBC QN AUTO: 30.9 PG (ref 26.5–33)
MCHC RBC AUTO-ENTMCNC: 31 G/DL (ref 31.5–36.5)
MCV RBC AUTO: 100 FL (ref 78–100)
MONOCYTES # BLD AUTO: 0.1 10E3/UL (ref 0–1.3)
MONOCYTES NFR BLD AUTO: 1 %
MTX SERPL-SCNC: 0.13 UMOL/L
MTX SERPL-SCNC: 0.98 UMOL/L
NEUTROPHILS # BLD AUTO: 5.5 10E3/UL (ref 1.6–8.3)
NEUTROPHILS NFR BLD AUTO: 92 %
NRBC # BLD AUTO: 0 10E3/UL
NRBC BLD AUTO-RTO: 0 /100
PH UR STRIP: 7 PH (ref 5–7)
PH UR STRIP: 8 PH (ref 5–7)
PHOSPHATE SERPL-MCNC: 2.5 MG/DL (ref 2.5–4.5)
PLATELET # BLD AUTO: 244 10E3/UL (ref 150–450)
POTASSIUM SERPL-SCNC: 3.8 MMOL/L (ref 3.4–5.3)
RBC # BLD AUTO: 2.72 10E6/UL (ref 3.8–5.2)
SODIUM SERPL-SCNC: 139 MMOL/L (ref 136–145)
WBC # BLD AUTO: 6 10E3/UL (ref 4–11)

## 2022-10-01 PROCEDURE — 83735 ASSAY OF MAGNESIUM: CPT | Performed by: INTERNAL MEDICINE

## 2022-10-01 PROCEDURE — 250N000009 HC RX 250: Performed by: INTERNAL MEDICINE

## 2022-10-01 PROCEDURE — 258N000003 HC RX IP 258 OP 636: Performed by: INTERNAL MEDICINE

## 2022-10-01 PROCEDURE — 81003 URINALYSIS AUTO W/O SCOPE: CPT | Performed by: INTERNAL MEDICINE

## 2022-10-01 PROCEDURE — 120N000002 HC R&B MED SURG/OB UMMC

## 2022-10-01 PROCEDURE — 250N000013 HC RX MED GY IP 250 OP 250 PS 637: Performed by: STUDENT IN AN ORGANIZED HEALTH CARE EDUCATION/TRAINING PROGRAM

## 2022-10-01 PROCEDURE — 80204 DRUG ASSAY METHOTREXATE: CPT | Performed by: INTERNAL MEDICINE

## 2022-10-01 PROCEDURE — 250N000011 HC RX IP 250 OP 636: Performed by: INTERNAL MEDICINE

## 2022-10-01 PROCEDURE — 36592 COLLECT BLOOD FROM PICC: CPT | Performed by: INTERNAL MEDICINE

## 2022-10-01 PROCEDURE — 84100 ASSAY OF PHOSPHORUS: CPT | Performed by: INTERNAL MEDICINE

## 2022-10-01 PROCEDURE — 85025 COMPLETE CBC W/AUTO DIFF WBC: CPT | Performed by: PHYSICIAN ASSISTANT

## 2022-10-01 PROCEDURE — 258N000002 HC RX IP 258 OP 250: Performed by: INTERNAL MEDICINE

## 2022-10-01 PROCEDURE — 250N000013 HC RX MED GY IP 250 OP 250 PS 637: Performed by: PHYSICIAN ASSISTANT

## 2022-10-01 PROCEDURE — 80048 BASIC METABOLIC PNL TOTAL CA: CPT | Performed by: PHYSICIAN ASSISTANT

## 2022-10-01 PROCEDURE — 36592 COLLECT BLOOD FROM PICC: CPT | Performed by: PHYSICIAN ASSISTANT

## 2022-10-01 PROCEDURE — 250N000011 HC RX IP 250 OP 636: Performed by: PHYSICIAN ASSISTANT

## 2022-10-01 PROCEDURE — 250N000013 HC RX MED GY IP 250 OP 250 PS 637: Performed by: INTERNAL MEDICINE

## 2022-10-01 RX ORDER — LANOLIN ALCOHOL/MO/W.PET/CERES
3 CREAM (GRAM) TOPICAL AT BEDTIME
Status: DISCONTINUED | OUTPATIENT
Start: 2022-10-01 | End: 2022-10-03 | Stop reason: HOSPADM

## 2022-10-01 RX ADMIN — SODIUM BICARBONATE: 84 INJECTION, SOLUTION INTRAVENOUS at 14:07

## 2022-10-01 RX ADMIN — PREDNISOLONE ACETATE 2 DROP: 10 SUSPENSION/ DROPS OPHTHALMIC at 21:49

## 2022-10-01 RX ADMIN — FAMOTIDINE 10 MG: 10 TABLET, FILM COATED ORAL at 07:52

## 2022-10-01 RX ADMIN — Medication 3 MG: at 21:58

## 2022-10-01 RX ADMIN — ENOXAPARIN SODIUM 40 MG: 40 INJECTION SUBCUTANEOUS at 21:58

## 2022-10-01 RX ADMIN — ACETAMINOPHEN 975 MG: 325 TABLET, FILM COATED ORAL at 07:49

## 2022-10-01 RX ADMIN — SODIUM BICARBONATE: 84 INJECTION, SOLUTION INTRAVENOUS at 22:55

## 2022-10-01 RX ADMIN — IMATINIB 400 MG: 400 TABLET, FILM COATED ORAL at 21:54

## 2022-10-01 RX ADMIN — DEXAMETHASONE 12 MG: 4 TABLET ORAL at 17:14

## 2022-10-01 RX ADMIN — AMLODIPINE BESYLATE 10 MG: 10 TABLET ORAL at 07:52

## 2022-10-01 RX ADMIN — IMATINIB 400 MG: 400 TABLET, FILM COATED ORAL at 07:53

## 2022-10-01 RX ADMIN — SODIUM BICARBONATE: 84 INJECTION, SOLUTION INTRAVENOUS at 03:52

## 2022-10-01 RX ADMIN — CYTARABINE 6780 MG: 100 INJECTION, SOLUTION INTRATHECAL; INTRAVENOUS; SUBCUTANEOUS at 21:51

## 2022-10-01 RX ADMIN — FAMOTIDINE 10 MG: 10 TABLET, FILM COATED ORAL at 21:58

## 2022-10-01 RX ADMIN — Medication 5 ML: at 07:34

## 2022-10-01 RX ADMIN — ACYCLOVIR 400 MG: 400 TABLET ORAL at 07:52

## 2022-10-01 RX ADMIN — ACYCLOVIR 400 MG: 400 TABLET ORAL at 21:58

## 2022-10-01 RX ADMIN — ONDANSETRON HYDROCHLORIDE 8 MG: 8 TABLET, FILM COATED ORAL at 17:13

## 2022-10-01 ASSESSMENT — ACTIVITIES OF DAILY LIVING (ADL)
ADLS_ACUITY_SCORE: 18

## 2022-10-01 NOTE — PLAN OF CARE
Goal Outcome Evaluation:    Plan of Care Reviewed With: spouse, patient     Pt admitted for cycle 2 of GRAALL. PICC placed upon admission. Pt states it is tender- warm packs applied. Pt oriented to the unit and plan of care. We discussed the chemotherapy plan. Bolus started at 1530 over 4 hours. Checking urine PH now. Premedications administered.

## 2022-10-01 NOTE — PROGRESS NOTES
Nursing Focus: Chemotherapy  D: Positive blood return via PICC. Insertion site is clean/dry/intact, dressing intact with no complaints of pain.  Urine output is recorded in intake in Doc Flowsheet.    I: Premedications given per order (see electronic medical administration record). Dose #1 of Methotrexate started to infuse over 24 hours. Reviewed pt teaching on chemotherapy side effects.  Pt denies need for further teaching. Chemotherapy double checked per protocol by two chemotherapy competent RN's.   A: Tolerating procedure well. Denies nausea and or pain.   P: Continue to monitor urine output and symptoms of nausea. Screen for symptoms of toxicity.

## 2022-10-01 NOTE — PROGRESS NOTES
[facility]    Hematology / Oncology Progress Note    Date of Service (when I saw the patient): 10/01/2022     Assessment & Plan   Vashti Villegas is a 42 year old female who was admitted on 8/19/2022 with hyperleukocytosis (WBC 109K). 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 Pre-phase steroids initiated (8/21-8/24), then initiated GRAALL + imatinib on 8/24, overall well tolerated. BMT consultation completed with Dr Walsh inpatient (9/12). She has been doing well at home most recently and now returns for Cycle 2 GRAALL.      Today: Day 2 of GRAALL  - Tolerating well  - Continue best supportive cares and monitoring    HEME  # Ph+ B-ALL  Patient presented to outside hospital with c/o right upper quadrant pain and was subsequently found to have a markedly elevated white blood cell count concerning for acute leukemia. While at OSH, she had CT PE protocol as well as CT A/P. These identified no PE, no acute or localized intraabdominal inflammatory process, mild splenomegaly, and few inconspicous low-density structures in the liver. She was transferred to [facility] for further workup and management. S/p Hydrea 1g TID (8/21-8/23) with improvement in WBC (100K ? 12K). BMBx completed 8/20 - demonstrated B-ALL with 85% blasts and hypercellular marrow. IHC shows CD10+, CD34+. 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 Abnormal cytogenetics. Initiated pre-phase steroids with Prednisone 60 mg x8/21; increased to 60 mg/m2 (140 mg) 8/22-8/24. She initiated GRAALL + imatinib (C1D1 = 8/24/22). S/p BMT consult 9/12 with Dr. Walsh. Induction was relatively uncomplicated. Post-induction BMBx done 9/26. Morphology inconclusive due to inadequate core but flow negative. Awaiting FISH/chromosomes. BCRabl major and minor undetectable (as were baseline tests). She returns for C2 GRAALL.   - place PICC  "on admission  - per pt (after her visit with Dr. Hull on 9/28), plan is for repeat BMBx after this cycle (C2). Outpatient is planning to set her up for a sedated BMBx under imaging given the difficulty of her last marrow.  - pt states she had an \"emergency\" delivery of imatinib to her house, so she took 2 doses on 9/29 and 1 dose 9/30 AM. Will continue imatinib per inpatient protocol.      Treatment Plan: GRAALL + imatinib (C2D1=9/30/22)  - Imatinib 400 mg BID - D1-14   - IVFs per chemo protocol, bicarb support  - Methotrexate 1 g/m2 IV x once on D1. Administer over 24 hours  - Leucovorin calcium q6hr starting 12 hr after END of methotrexate infusion. Continue until methotrexate level <0.05  - cytarabine 3 g/m2 IV q12 hr x 4 doses on Days 2-3  - Premeds: Zofran 16mg D1, 8mg q12hr x 4 doses (D2-3);  Dex 12mg D1-3, 8mg D4-5  - Supportive Care: Pred Forte eye drops  - Neulasta ~D6. Requested this be added on to 10/6 appts  - IT chemo on D9. Will need under imaging guidance. As D9 falls on 10/8 (weekend), requested procedure for 10/7. She will have labs on 10/6 (already scheduled) to determine platelet count in anticipation of this procedure as well.      # CNS prophylaxis  Per the GRAALL protocol and given risk for CNS involvement of ALL. Due to unsuccessful attempt at bedside with the CAPS team on 8/25, will require all subsequent LPs under fluoroscopic guidance. When counts were low, she had required multiple units of platelets (3u total) prior to procedure to reach goal >50k.   - s/p weekly triple IT chemo (Cytarabine, Solu-Cortef, MTX) on Days 1, 8, and 15 of C1 induction. She additionally had repeat LP on 9/14. CSF flow has remained negative.   - plan for IT chemo on D9 outpatient with Cycle 2 - will need to ensure this is scheduled  - Pt expresses anxiety surrounding procedures; pre-medication with 0.5mg PO Ativan +/- Atarax available prior PRN     # Anemia  2/2 chemotherapy. Other counts have recovered.  - " Transfuse to maintain Hgb >7, Plt >10K (>50K prior to LPs)     # Chemotherapy-induced Grade 1 neuropathy   Pt reports persistent mild neuropathy of b/l fingertips, this is unchanged and not affecting function at present. She does intermittently notice some mild tingling/numbness in her toes, mainly at night or when she is wearing shoes. Not affecting gait/function. No reports of neuropathic pain at present.   - vincristine is not part of the chemo plan in C2  - continue to monitor      ID  # PPx  -  mg BID  - Nebulized Pentamidine q28d for PJP ppx, last dose given inpatient 8/29. Due for next dose which we will give inpatient. Will order for 10/1.   - ppx Levaquin and Fluconazole on hold as ANC >1.0.      # H/o COVID-19 infection   Not vaccinated (due to fear of needles per pt report). Pt reported testing postiive for COVID approx 1 month prior to admission for new leukemia diagnosis via home test. She never had it confirmed.   - COVID testing on 9/29 (PTA) was negative.      CV  # Hypertension  - continue PTA amlodipine. There was concern for softer BPs outpatient and amlodipine was advised to be reduced from 10mg to 5mg. However, April reports BPs have been normal-preHTN at home so she continued the 10mg dose.   - will continue amlodipine 10mg daily here as I anticipate that she may have higher BPs with high rate IVFs and steroid premeds. Hold parameters in place. Monitor and adjust as needed.      GI  # Indigestion/GERD  - improved with protonix 40mg outpatient. Given plan for Methotrexate, have to hold Protonix inpatient. Will use Pepcid.   - TUMs PRN.      FEN  - high rate IVFs per protocol. Monitor I/Os and daily weights closely. Low threshold to use Lasix for fluid weight gain.   - lyte repletion per unit protocol  - regular diet as tolerated     PPx  - VTE: Lovenox 40mg subcutaneous daily, hold if Plt <50K  - GI: Pepcid as above  - pt ok to defer PT at this time. She wants to walk on her  own.     Lines/Drains: PICC placed on admission. Pt will think about if she would like to keep this at discharge pending plan for frequency of labs/possible transfusions (I advised this would be twice weekly at least to start).      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   - has twice weekly labs/transfusions starting 10/6 and through end of October  - will need to ensure Neulasta and D9 LP for IT chemo are scheduled appropriately (request sent via APPT18 request)     Discussed with attending physician, Dr. Everett.     I spent >35 minutes face-to-face and/or coordinating or discussing care plan. Over 50% of our time on the unit was spent counseling the patient and/or coordinating care      Kelsey REZA ([place])   Hematology/Oncology  Pager: [phone]    Interval History   NO acute events overnight.  April is feeling well this morning overall. Has been urinating frequently but has not noticed any other symptoms. No swelling in legs noted  Appetite intact. Energy level good. Walking the halls.  Denies fever, chills, mouth sores, SOB, cough, abdominal pain, diarrhea, constipation, nausea, vomiting, dysuria, hematuria, numbness, tingling, swelling    Complete and Comprehensive review of systems review and negative other than noted here or in the HPI.     Physical Exam   Temp: 97.6  F (36.4  C) Temp src: Oral BP: 130/57 Pulse: 95   Resp: 18 SpO2: 96 % O2 Device: None (Room air)    Vitals:    09/30/22 1300 10/01/22 1100   Weight: 114.4 kg (252 lb 3.2 oz) 114.3 kg (251 lb 14.4 oz)     Vital Signs with Ranges  Temp:  [96.8  F (36  C)-98.2  F (36.8  C)] 97.6  F (36.4  C)  Pulse:  [] 95  Resp:  [16-18] 18  BP: (103-130)/(57-70) 130/57  SpO2:  [95 %-98 %] 96 %  I/O last 3 completed shifts:  In: 4164.59  [P.O.:650; I.V.:3134.59; IV Piggyback:380]  Out: 5600 [Urine:5600]    Constitutional: Pleasant female lying in bed. Seen walking the halls as well. Awake and conversational. Non- toxic appearing. No acute distress.   HEENT: Normocephalic, atraumatic. Sclerae anicteric. EOM intact. Moist mucus membranes   Respiratory: Breathing comfortable with no increased work on room air. No signs of respiratory distress or accessory muscle use. Lungs clear to auscultation bilaterally, no crackles or wheezing noted.  Cardiovascular: Regular rate and rhythm. Normal S1 and S2. No murmurs, rubs, or gallops. Minimal peripheral edema in BLE  GI: Abdomen is soft, non-distended, non-tender and without distension. Bowel sounds present and normoactive.  Skin: Skin is clean, dry, intact. No jaundice appreciated.   Musculoskeletal/ Extremities: No redness, warmth, or swelling of the joints appreciated. Distal pulses palpable. Nailbeds pink and without cyanosis or clubbing.   Neurologic: Alert and oriented. Speech normal. Grossly nonfocal. Memory and thought process preserved. Motor function grossly normal. Sensation intact bilaterally. CN II-XII intact.   Neuropsychiatric: Calm, affect congruent to situation. Appropriate mood and affect. Good judgment and insight. No visual/auditory hallucinations.     Medications     IV infusion builder WITH additives 125 mL/hr at 10/01/22 1407     - MEDICATION INSTRUCTIONS -         acyclovir  400 mg Oral BID     amLODIPine  10 mg Oral Daily     cytarabine (CYTOSAR) infusion  3 g/m2 (Treatment Plan Recorded) Intravenous Q12H     dexamethasone  12 mg Oral Q24H     [START ON 10/3/2022] dexamethasone  8 mg Oral QAM     enoxaparin ANTICOAGULANT  40 mg Subcutaneous Q24H     famotidine  10 mg Oral BID     heparin lock flush  5-20 mL Intracatheter Q24H     imatinib  400 mg Oral BID     [START ON 10/2/2022] leucovorin calcium  15 mg Intravenous Q6H     [START ON 10/2/2022] leucovorin calcium  50 mg Intravenous  Once     methotrexate infusion  1 g/m2 (Treatment Plan Recorded) Intravenous Once     ondansetron  8 mg Oral Q12H     prednisoLONE acetate  2 drop Both Eyes 4x Daily     sodium chloride (PF)  10-40 mL Intracatheter Q8H       Data   Results for orders placed or performed during the hospital encounter of 09/30/22 (from the past 24 hour(s))   Magnesium   Result Value Ref Range    Magnesium 1.9 1.7 - 2.3 mg/dL   Phosphorus   Result Value Ref Range    Phosphorus 3.8 2.5 - 4.5 mg/dL   Uric acid   Result Value Ref Range    Uric Acid 3.8 2.4 - 5.7 mg/dL   Lactate Dehydrogenase   Result Value Ref Range    Lactate Dehydrogenase 245 0 - 250 U/L   Fibrinogen activity   Result Value Ref Range    Fibrinogen Activity 503 (H) 170 - 490 mg/dL   INR   Result Value Ref Range    INR 1.00 0.85 - 1.15   Partial thromboplastin time   Result Value Ref Range    aPTT 27 22 - 38 Seconds   Comprehensive metabolic panel   Result Value Ref Range    Sodium 141 136 - 145 mmol/L    Potassium 3.8 3.4 - 5.3 mmol/L    Chloride 106 98 - 107 mmol/L    Carbon Dioxide (CO2) 25 22 - 29 mmol/L    Anion Gap 10 7 - 15 mmol/L    Urea Nitrogen 16.1 6.0 - 20.0 mg/dL    Creatinine 0.73 0.51 - 0.95 mg/dL    Calcium 9.0 8.6 - 10.0 mg/dL    Glucose 98 70 - 99 mg/dL    Alkaline Phosphatase 83 35 - 104 U/L    AST 17 10 - 35 U/L    ALT 24 10 - 35 U/L    Protein Total 6.6 6.4 - 8.3 g/dL    Albumin 4.0 3.5 - 5.2 g/dL    Bilirubin Total 0.3 <=1.2 mg/dL    GFR Estimate >90 >60 mL/min/1.73m2   CBC with platelets differential    Narrative    The following orders were created for panel order CBC with platelets differential.  Procedure                               Abnormality         Status                     ---------                               -----------         ------                     CBC with platelets and d...[[id number]]  Abnormal            Final result                 Please view results for these tests on the individual orders.   CBC with platelets and  differential   Result Value Ref Range    WBC Count 5.8 4.0 - 11.0 10e3/uL    RBC Count 2.67 (L) 3.80 - 5.20 10e6/uL    Hemoglobin 8.4 (L) 11.7 - 15.7 g/dL    Hematocrit 26.7 (L) 35.0 - 47.0 %     78 - 100 fL    MCH 31.5 26.5 - 33.0 pg    MCHC 31.5 31.5 - 36.5 g/dL    RDW 22.7 (H) 10.0 - 15.0 %    Platelet Count 251 150 - 450 10e3/uL    % Neutrophils 73 %    % Lymphocytes 16 %    % Monocytes 9 %    % Eosinophils 1 %    % Basophils 1 %    % Immature Granulocytes 0 %    NRBCs per 100 WBC 0 <1 /100    Absolute Neutrophils 4.2 1.6 - 8.3 10e3/uL    Absolute Lymphocytes 0.9 0.8 - 5.3 10e3/uL    Absolute Monocytes 0.5 0.0 - 1.3 10e3/uL    Absolute Eosinophils 0.1 0.0 - 0.7 10e3/uL    Absolute Basophils 0.0 0.0 - 0.2 10e3/uL    Absolute Immature Granulocytes 0.0 <=0.4 10e3/uL    Absolute NRBCs 0.0 10e3/uL   CBC with platelets differential    Narrative    The following orders were created for panel order CBC with platelets differential.  Procedure                               Abnormality         Status                     ---------                               -----------         ------                     CBC with platelets and d...[[id number]]  Abnormal            Final result                 Please view results for these tests on the individual orders.   Methotrexate level   Result Value Ref Range    Methotrexate 0.13 umol/L   Basic metabolic panel   Result Value Ref Range    Sodium 139 136 - 145 mmol/L    Potassium 3.8 3.4 - 5.3 mmol/L    Chloride 106 98 - 107 mmol/L    Carbon Dioxide (CO2) 23 22 - 29 mmol/L    Anion Gap 10 7 - 15 mmol/L    Urea Nitrogen 9.8 6.0 - 20.0 mg/dL    Creatinine 0.60 0.51 - 0.95 mg/dL    Calcium 8.4 (L) 8.6 - 10.0 mg/dL    Glucose 137 (H) 70 - 99 mg/dL    GFR Estimate >90 >60 mL/min/1.73m2   Magnesium   Result Value Ref Range    Magnesium 2.0 1.7 - 2.3 mg/dL   Phosphorus   Result Value Ref Range    Phosphorus 2.5 2.5 - 4.5 mg/dL   CBC with platelets and differential   Result Value Ref  Range    WBC Count 6.0 4.0 - 11.0 10e3/uL    RBC Count 2.72 (L) 3.80 - 5.20 10e6/uL    Hemoglobin 8.4 (L) 11.7 - 15.7 g/dL    Hematocrit 27.1 (L) 35.0 - 47.0 %     78 - 100 fL    MCH 30.9 26.5 - 33.0 pg    MCHC 31.0 (L) 31.5 - 36.5 g/dL    RDW 21.7 (H) 10.0 - 15.0 %    Platelet Count 244 150 - 450 10e3/uL    % Neutrophils 92 %    % Lymphocytes 6 %    % Monocytes 1 %    % Eosinophils 0 %    % Basophils 0 %    % Immature Granulocytes 1 %    NRBCs per 100 WBC 0 <1 /100    Absolute Neutrophils 5.5 1.6 - 8.3 10e3/uL    Absolute Lymphocytes 0.4 (L) 0.8 - 5.3 10e3/uL    Absolute Monocytes 0.1 0.0 - 1.3 10e3/uL    Absolute Eosinophils 0.0 0.0 - 0.7 10e3/uL    Absolute Basophils 0.0 0.0 - 0.2 10e3/uL    Absolute Immature Granulocytes 0.0 <=0.4 10e3/uL    Absolute NRBCs 0.0 10e3/uL   Methotrexate level   Result Value Ref Range    Methotrexate 0.98 umol/L   Ph Urine Poct   Result Value Ref Range    pH Urine 8.0 5.0 - 7.0 pH

## 2022-10-01 NOTE — PLAN OF CARE
Goal Outcome Evaluation:    Plan of Care Reviewed With: patient      [id number]:     Denies pain or nausea. Weight actually down from yesterday. MIVF at 125 ml/hr. Methotrexate running CIVI until 1945 tonight and then will start Cytarabine this evening. Urine PH 8.0. Ambulated in the halls and IS started. Eating well.     8023-4830:       April feels she may have the start of a mouth sore in her right mouth. Using saline rinses. Baseline cerebellar exam performed prior to 1st dose of Cytarabine. All WNL except tandem walk is unsteady. Methotrexate infusing CIVI via PICC with good blood return.

## 2022-10-01 NOTE — PLAN OF CARE
Time: 0824-7812    Afebrile with VSS on RA. Denies pain/nausea/SOB. UAL. A/Ox4 and able to make needs known. PICC WDL and infusing chemo with positive blood return. Sodium bicarb infusing. Frequent and substantial urination with fluids infusing. Urine pH were 8.0, 7.5, 8.0. LBM 9/30. Beds exchanged per pt request. Watching child's football game on mobile device. Continue with POC.

## 2022-10-02 LAB
ANION GAP SERPL CALCULATED.3IONS-SCNC: 9 MMOL/L (ref 7–15)
BASOPHILS # BLD AUTO: 0 10E3/UL (ref 0–0.2)
BASOPHILS NFR BLD AUTO: 0 %
BUN SERPL-MCNC: 12.5 MG/DL (ref 6–20)
CALCIUM SERPL-MCNC: 8.3 MG/DL (ref 8.6–10)
CHLORIDE SERPL-SCNC: 107 MMOL/L (ref 98–107)
CREAT SERPL-MCNC: 0.59 MG/DL (ref 0.51–0.95)
DEPRECATED HCO3 PLAS-SCNC: 24 MMOL/L (ref 22–29)
EOSINOPHIL # BLD AUTO: 0 10E3/UL (ref 0–0.7)
EOSINOPHIL NFR BLD AUTO: 0 %
ERYTHROCYTE [DISTWIDTH] IN BLOOD BY AUTOMATED COUNT: 22.3 % (ref 10–15)
GFR SERPL CREATININE-BSD FRML MDRD: >90 ML/MIN/1.73M2
GLUCOSE SERPL-MCNC: 134 MG/DL (ref 70–99)
HCT VFR BLD AUTO: 25.9 % (ref 35–47)
HGB BLD-MCNC: 8 G/DL (ref 11.7–15.7)
IMM GRANULOCYTES # BLD: 0 10E3/UL
IMM GRANULOCYTES NFR BLD: 1 %
LYMPHOCYTES # BLD AUTO: 0.3 10E3/UL (ref 0.8–5.3)
LYMPHOCYTES NFR BLD AUTO: 4 %
MAGNESIUM SERPL-MCNC: 2 MG/DL (ref 1.7–2.3)
MCH RBC QN AUTO: 31 PG (ref 26.5–33)
MCHC RBC AUTO-ENTMCNC: 30.9 G/DL (ref 31.5–36.5)
MCV RBC AUTO: 100 FL (ref 78–100)
MONOCYTES # BLD AUTO: 0.2 10E3/UL (ref 0–1.3)
MONOCYTES NFR BLD AUTO: 3 %
MTX SERPL-SCNC: 0.37 UMOL/L
NEUTROPHILS # BLD AUTO: 5.4 10E3/UL (ref 1.6–8.3)
NEUTROPHILS NFR BLD AUTO: 92 %
NRBC # BLD AUTO: 0 10E3/UL
NRBC BLD AUTO-RTO: 0 /100
PH UR STRIP: 7 PH (ref 5–7)
PH UR STRIP: 7.5 PH (ref 5–7)
PH UR STRIP: 8 PH (ref 5–7)
PHOSPHATE SERPL-MCNC: 3.1 MG/DL (ref 2.5–4.5)
PLATELET # BLD AUTO: 225 10E3/UL (ref 150–450)
POTASSIUM SERPL-SCNC: 3.6 MMOL/L (ref 3.4–5.3)
RBC # BLD AUTO: 2.58 10E6/UL (ref 3.8–5.2)
SODIUM SERPL-SCNC: 140 MMOL/L (ref 136–145)
WBC # BLD AUTO: 5.9 10E3/UL (ref 4–11)

## 2022-10-02 PROCEDURE — 250N000013 HC RX MED GY IP 250 OP 250 PS 637: Performed by: INTERNAL MEDICINE

## 2022-10-02 PROCEDURE — 250N000009 HC RX 250: Performed by: INTERNAL MEDICINE

## 2022-10-02 PROCEDURE — 84100 ASSAY OF PHOSPHORUS: CPT | Performed by: INTERNAL MEDICINE

## 2022-10-02 PROCEDURE — 80204 DRUG ASSAY METHOTREXATE: CPT | Performed by: INTERNAL MEDICINE

## 2022-10-02 PROCEDURE — 85025 COMPLETE CBC W/AUTO DIFF WBC: CPT | Performed by: PHYSICIAN ASSISTANT

## 2022-10-02 PROCEDURE — 83735 ASSAY OF MAGNESIUM: CPT | Performed by: INTERNAL MEDICINE

## 2022-10-02 PROCEDURE — 250N000011 HC RX IP 250 OP 636: Performed by: INTERNAL MEDICINE

## 2022-10-02 PROCEDURE — 999N000007 HC SITE CHECK

## 2022-10-02 PROCEDURE — 258N000002 HC RX IP 258 OP 250: Performed by: INTERNAL MEDICINE

## 2022-10-02 PROCEDURE — 81003 URINALYSIS AUTO W/O SCOPE: CPT | Performed by: INTERNAL MEDICINE

## 2022-10-02 PROCEDURE — 250N000013 HC RX MED GY IP 250 OP 250 PS 637: Performed by: STUDENT IN AN ORGANIZED HEALTH CARE EDUCATION/TRAINING PROGRAM

## 2022-10-02 PROCEDURE — 250N000011 HC RX IP 250 OP 636: Performed by: PHYSICIAN ASSISTANT

## 2022-10-02 PROCEDURE — 999N000157 HC STATISTIC RCP TIME EA 10 MIN

## 2022-10-02 PROCEDURE — 250N000013 HC RX MED GY IP 250 OP 250 PS 637: Performed by: PHYSICIAN ASSISTANT

## 2022-10-02 PROCEDURE — 120N000002 HC R&B MED SURG/OB UMMC

## 2022-10-02 PROCEDURE — 94642 AEROSOL INHALATION TREATMENT: CPT

## 2022-10-02 PROCEDURE — 3E04305 INTRODUCTION OF OTHER ANTINEOPLASTIC INTO CENTRAL VEIN, PERCUTANEOUS APPROACH: ICD-10-PCS | Performed by: PHYSICIAN ASSISTANT

## 2022-10-02 PROCEDURE — 80048 BASIC METABOLIC PNL TOTAL CA: CPT | Performed by: PHYSICIAN ASSISTANT

## 2022-10-02 PROCEDURE — 94640 AIRWAY INHALATION TREATMENT: CPT

## 2022-10-02 PROCEDURE — 36592 COLLECT BLOOD FROM PICC: CPT | Performed by: PHYSICIAN ASSISTANT

## 2022-10-02 PROCEDURE — 258N000003 HC RX IP 258 OP 636: Performed by: INTERNAL MEDICINE

## 2022-10-02 RX ORDER — ALBUTEROL SULFATE 0.83 MG/ML
2.5 SOLUTION RESPIRATORY (INHALATION)
Status: COMPLETED | OUTPATIENT
Start: 2022-10-02 | End: 2022-10-02

## 2022-10-02 RX ORDER — DEXAMETHASONE 4 MG/1
4 TABLET ORAL 2 TIMES DAILY WITH MEALS
Status: ON HOLD | COMMUNITY
End: 2022-10-03

## 2022-10-02 RX ORDER — PENTAMIDINE ISETHIONATE 300 MG/300MG
300 INHALANT RESPIRATORY (INHALATION)
Status: COMPLETED | OUTPATIENT
Start: 2022-10-02 | End: 2022-10-02

## 2022-10-02 RX ADMIN — ONDANSETRON HYDROCHLORIDE 8 MG: 8 TABLET, FILM COATED ORAL at 20:16

## 2022-10-02 RX ADMIN — PREDNISOLONE ACETATE 2 DROP: 10 SUSPENSION/ DROPS OPHTHALMIC at 12:24

## 2022-10-02 RX ADMIN — CYTARABINE 6780 MG: 100 INJECTION, SOLUTION INTRATHECAL; INTRAVENOUS; SUBCUTANEOUS at 09:26

## 2022-10-02 RX ADMIN — SODIUM BICARBONATE: 84 INJECTION, SOLUTION INTRAVENOUS at 17:19

## 2022-10-02 RX ADMIN — FAMOTIDINE 10 MG: 10 TABLET, FILM COATED ORAL at 09:33

## 2022-10-02 RX ADMIN — ACYCLOVIR 400 MG: 400 TABLET ORAL at 20:02

## 2022-10-02 RX ADMIN — AMLODIPINE BESYLATE 10 MG: 10 TABLET ORAL at 09:33

## 2022-10-02 RX ADMIN — IMATINIB 400 MG: 400 TABLET, FILM COATED ORAL at 20:04

## 2022-10-02 RX ADMIN — ONDANSETRON HYDROCHLORIDE 8 MG: 8 TABLET, FILM COATED ORAL at 09:13

## 2022-10-02 RX ADMIN — LEUCOVORIN CALCIUM 50 MG: 350 INJECTION, POWDER, LYOPHILIZED, FOR SOLUTION INTRAMUSCULAR; INTRAVENOUS at 09:16

## 2022-10-02 RX ADMIN — DEXAMETHASONE 12 MG: 4 TABLET ORAL at 15:37

## 2022-10-02 RX ADMIN — PREDNISOLONE ACETATE 2 DROP: 10 SUSPENSION/ DROPS OPHTHALMIC at 15:31

## 2022-10-02 RX ADMIN — LEUCOVORIN CALCIUM 15 MG: 350 INJECTION, POWDER, LYOPHILIZED, FOR SOLUTION INTRAMUSCULAR; INTRAVENOUS at 21:24

## 2022-10-02 RX ADMIN — ENOXAPARIN SODIUM 40 MG: 40 INJECTION SUBCUTANEOUS at 20:03

## 2022-10-02 RX ADMIN — PREDNISOLONE ACETATE 2 DROP: 10 SUSPENSION/ DROPS OPHTHALMIC at 09:13

## 2022-10-02 RX ADMIN — PREDNISOLONE ACETATE 2 DROP: 10 SUSPENSION/ DROPS OPHTHALMIC at 20:03

## 2022-10-02 RX ADMIN — FAMOTIDINE 10 MG: 10 TABLET, FILM COATED ORAL at 20:02

## 2022-10-02 RX ADMIN — ACYCLOVIR 400 MG: 400 TABLET ORAL at 09:32

## 2022-10-02 RX ADMIN — CYTARABINE 6780 MG: 100 INJECTION, SOLUTION INTRATHECAL; INTRAVENOUS; SUBCUTANEOUS at 20:50

## 2022-10-02 RX ADMIN — PENTAMIDINE ISETHIONATE 300 MG: 300 INHALANT RESPIRATORY (INHALATION) at 10:33

## 2022-10-02 RX ADMIN — ALBUTEROL SULFATE 2.5 MG: 2.5 SOLUTION RESPIRATORY (INHALATION) at 10:32

## 2022-10-02 RX ADMIN — IMATINIB 400 MG: 400 TABLET, FILM COATED ORAL at 09:29

## 2022-10-02 RX ADMIN — Medication 3 MG: at 21:23

## 2022-10-02 RX ADMIN — LEUCOVORIN CALCIUM 15 MG: 350 INJECTION, POWDER, LYOPHILIZED, FOR SOLUTION INTRAMUSCULAR; INTRAVENOUS at 15:25

## 2022-10-02 ASSESSMENT — ACTIVITIES OF DAILY LIVING (ADL)
ADLS_ACUITY_SCORE: 18

## 2022-10-02 NOTE — PROGRESS NOTES
[facility]    Hematology / Oncology Progress Note    Date of Service (when I saw the patient): 10/02/2022     Assessment & Plan   Vashti Villegas is a 42 year old female who was admitted on 8/19/2022 with hyperleukocytosis (WBC 109K). 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 Pre-phase steroids initiated (8/21-8/24), then initiated GRAALL + imatinib on 8/24, overall well tolerated. BMT consultation completed with Dr Walsh inpatient (9/12). She has been doing well at home most recently and now returns for Cycle 2 GRAALL.      Today: Day 3 of GRAALL  - Tolerating well. Continue best supportive cares and monitoring  - Patient has a needle phobia. Will plan to discharge with PICC. PLC consult placed and contacted RNCC that she will need teaching preferably on 10/3 (but could be 10/4)   - Will follow up on Monday.   - PICC to facilitate venous access given needle phobia. No IV medications at this time.  - Plan for discharge pending completion of Cytarabine and clearance of methotrexate. Possible discharge tomorrow, 10/3.    HEME  # Ph+ B-ALL  Patient presented to outside hospital with c/o right upper quadrant pain and was subsequently found to have a markedly elevated white blood cell count concerning for acute leukemia. While at OSH, she had CT PE protocol as well as CT A/P. These identified no PE, no acute or localized intraabdominal inflammatory process, mild splenomegaly, and few inconspicous low-density structures in the liver. She was transferred to [facility] for further workup and management. S/p Hydrea 1g TID (8/21-8/23) with improvement in WBC (100K ? 12K). BMBx completed 8/20 - demonstrated B-ALL with 85% blasts and hypercellular marrow. IHC shows CD10+, CD34+. 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 Abnormal cytogenetics. Initiated pre-phase steroids with Prednisone  "60 mg x8/21; increased to 60 mg/m2 (140 mg) 8/22-8/24. She initiated GRAALL + imatinib (C1D1 = 8/24/22). S/p BMT consult 9/12 with Dr. Walsh. Induction was relatively uncomplicated. Post-induction BMBx done 9/26. Morphology inconclusive due to inadequate core but flow negative. Awaiting FISH/chromosomes. BCRabl major and minor undetectable (as were baseline tests). She returns for C2 GRAALL.   - place PICC on admission, plan to keep on discharge  - per pt (after her visit with Dr. Hull on 9/28), plan is for repeat BMBx after this cycle (C2). Outpatient is planning to set her up for a sedated BMBx under imaging given the difficulty of her last marrow.  - pt states she had an \"emergency\" delivery of imatinib to her house, so she took 2 doses on 9/29 and 1 dose 9/30 AM. Will continue imatinib per inpatient protocol.      Treatment Plan: GRAALL + imatinib (C2D1=9/30/22)  - Imatinib 400 mg BID - D1-14   - IVFs per chemo protocol, bicarb support  - Methotrexate 1 g/m2 IV x once on D1. Administer over 24 hours  - Leucovorin calcium q6hr starting 12 hr after END of methotrexate infusion. Continue until methotrexate level <0.05  - cytarabine 3 g/m2 IV q12 hr x 4 doses on Days 2-3  - Premeds: Zofran 16mg D1, 8mg q12hr x 4 doses (D2-3);  Dex 12mg D1-3, 8mg D4-5  - Supportive Care: Pred Forte eye drops  - Neulasta ~D6. This has be added on to 10/6 appts  - IT chemo on D9. Will need under imaging guidance. As D9 falls on 10/8 (weekend), requested procedure for 10/7. She will have labs on 10/6 (already scheduled) to determine platelet count in anticipation of this procedure as well. Awaiting scheduling     # CNS prophylaxis  Per the GRAALL protocol and given risk for CNS involvement of ALL. Due to unsuccessful attempt at bedside with the CAPS team on 8/25, will require all subsequent LPs under fluoroscopic guidance. When counts were low, she had required multiple units of platelets (3u total) prior to procedure to reach goal " >50k.   - s/p weekly triple IT chemo (Cytarabine, Solu-Cortef, MTX) on Days 1, 8, and 15 of C1 induction. She additionally had repeat LP on 9/14. CSF flow has remained negative.   - plan for IT chemo on D9 outpatient with Cycle 2 (10/7) - will need to ensure this is scheduled  - Pt expresses anxiety surrounding procedures; pre-medication with 0.5mg PO Ativan +/- Atarax available prior PRN     # Anemia  2/2 chemotherapy. Other counts have recovered.  - Transfuse to maintain Hgb >7, Plt >10K (>50K prior to LPs)     # Chemotherapy-induced Grade 1 neuropathy   Pt reports persistent mild neuropathy of b/l fingertips, this is unchanged and not affecting function at present. She does intermittently notice some mild tingling/numbness in her toes, mainly at night or when she is wearing shoes. Not affecting gait/function. No reports of neuropathic pain at present.   - vincristine is not part of the chemo plan in C2  - continue to monitor      ID  # PPx  -  mg BID  - Nebulized Pentamidine q28d for PJP ppx.   - Pentamidine given 10/2   - ppx Levaquin and Fluconazole on hold as ANC >1.0.      # H/o COVID-19 infection   Not vaccinated (due to fear of needles per pt report). Pt reported testing postiive for COVID approx 1 month prior to admission for new leukemia diagnosis via home test. She never had it confirmed.   - COVID testing on 9/29 (PTA) was negative.      CV  # Hypertension  - continue PTA amlodipine. There was concern for softer BPs outpatient and amlodipine was advised to be reduced from 10mg to 5mg. However, April reports BPs have been normal-preHTN at home so she continued the 10mg dose.   - will continue amlodipine 10mg daily here as I anticipate that she may have higher BPs with high rate IVFs and steroid premeds. Hold parameters in place. Monitor and adjust as needed.   - Plan to decrease to 5mg at discharge     GI  # Indigestion/GERD  - improved with protonix 40mg outpatient. Given plan for Methotrexate,  have to hold Protonix inpatient. Will use Pepcid.   - TUMs PRN.      FEN  - high rate IVFs per protocol. Monitor I/Os and daily weights closely. Low threshold to use Lasix for fluid weight gain.   - lyte repletion per unit protocol  - regular diet as tolerated     PPx  - VTE: Lovenox 40mg subcutaneous daily, hold if Plt <50K  - GI: Pepcid as above  - pt ok to defer PT at this time. She wants to walk on her own.     Lines/Drains: PICC placed on admission. Pt will think about if she would like to keep this at discharge pending plan for frequency of labs/possible transfusions. Patient would like to keep the PICC.   - Plan for PLC. 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   - has twice weekly labs/transfusions starting 10/6 and through end of October  - Neulasta added to visit on 10/6  - Requested D9 LP for IT chemo, please ensure this is scheduled appropriately (request sent via APPT18 request)   - order in place.      Discussed with attending physician, Dr. Everett.     I spent >35 minutes face-to-face and/or coordinating or discussing care plan. Over 50% of our time on the unit was spent counseling the patient and/or coordinating care      Kelsey Knott) PA   Hematology/Oncology  Pager: [phone]    Interval History   NO acute events overnight.  April feels ok today. Reports her chest felt tight after the pentamidine but it is improving. Otherwise has not noticed any other new symptoms or concerns. Does not feel that her legs are more swollen. Planning to walk frequently today.   Appetite intact. Energy level good.   Denies fever, chills, mouth sores, SOB, cough, abdominal pain, diarrhea, constipation, nausea, vomiting, dysuria, hematuria, numbness, tingling,  swelling    Complete and Comprehensive review of systems review and negative other than noted here or in the HPI.     Physical Exam   Temp: 98.3  F (36.8  C) Temp src: Oral BP: 123/68 Pulse: 94   Resp: 16 SpO2: 96 % O2 Device: None (Room air)    Vitals:    09/30/22 1300 10/01/22 1100 10/02/22 0917   Weight: 114.4 kg (252 lb 3.2 oz) 114.3 kg (251 lb 14.4 oz) 116.3 kg (256 lb 8 oz)     Vital Signs with Ranges  Temp:  [97.6  F (36.4  C)-98.3  F (36.8  C)] 98.3  F (36.8  C)  Pulse:  [77-95] 94  Resp:  [16-18] 16  BP: (107-130)/(50-68) 123/68  SpO2:  [92 %-96 %] 96 %  I/O last 3 completed shifts:  In: 4984.17 [P.O.:1250; I.V.:3164.17; IV Piggyback:570]  Out: 4150 [Urine:4150]    Constitutional: Pleasant female lying in bed. Seen walking the halls as well. Awake and conversational. Non- toxic appearing. No acute distress.   HEENT: Normocephalic, atraumatic. Sclerae anicteric. EOM intact. Moist mucus membranes   Respiratory: Breathing comfortable with no increased work on room air. No signs of respiratory distress or accessory muscle use. Lungs clear to auscultation bilaterally, no crackles or wheezing noted.  Cardiovascular: Regular rate and rhythm. Normal S1 and S2. No murmurs, rubs, or gallops. Minimal peripheral edema in BLE  GI: Abdomen is soft, non-distended, non-tender and without distension. Bowel sounds present and normoactive.  Skin: Skin is clean, dry, intact. No jaundice appreciated.   Musculoskeletal/ Extremities: No redness, warmth, or swelling of the joints appreciated. Distal pulses palpable. Nailbeds pink and without cyanosis or clubbing.   Neurologic: Alert and oriented. Speech normal. Grossly nonfocal. Memory and thought process preserved. Motor function grossly normal. Sensation intact bilaterally. CN II-XII intact.   Neuropsychiatric: Calm, affect congruent to situation. Appropriate mood and affect. Good judgment and insight. No visual/auditory hallucinations.     Medications     IV infusion builder  WITH additives 125 mL/hr at 10/01/22 2255     - MEDICATION INSTRUCTIONS -         acyclovir  400 mg Oral BID     amLODIPine  10 mg Oral Daily     cytarabine (CYTOSAR) infusion  3 g/m2 (Treatment Plan Recorded) Intravenous Q12H     dexamethasone  12 mg Oral Q24H     [START ON 10/3/2022] dexamethasone  8 mg Oral QAM     enoxaparin ANTICOAGULANT  40 mg Subcutaneous Q24H     famotidine  10 mg Oral BID     heparin lock flush  5-20 mL Intracatheter Q24H     imatinib  400 mg Oral BID     leucovorin calcium  15 mg Intravenous Q6H     melatonin  3 mg Oral At Bedtime     ondansetron  8 mg Oral Q12H     prednisoLONE acetate  2 drop Both Eyes 4x Daily     sodium chloride (PF)  10-40 mL Intracatheter Q8H       Data   Results for orders placed or performed during the hospital encounter of 09/30/22 (from the past 24 hour(s))   Ph Urine Poct   Result Value Ref Range    pH Urine 7.0 5.0 - 7.0 pH   Ph Urine Poct   Result Value Ref Range    pH Urine 7.5 5.0 - 7.0 pH   CBC with platelets differential    Narrative    The following orders were created for panel order CBC with platelets differential.  Procedure                               Abnormality         Status                     ---------                               -----------         ------                     CBC with platelets and d...[[id number]]  Abnormal            Final result                 Please view results for these tests on the individual orders.   Methotrexate level   Result Value Ref Range    Methotrexate 0.37 umol/L   Basic metabolic panel   Result Value Ref Range    Sodium 140 136 - 145 mmol/L    Potassium 3.6 3.4 - 5.3 mmol/L    Chloride 107 98 - 107 mmol/L    Carbon Dioxide (CO2) 24 22 - 29 mmol/L    Anion Gap 9 7 - 15 mmol/L    Urea Nitrogen 12.5 6.0 - 20.0 mg/dL    Creatinine 0.59 0.51 - 0.95 mg/dL    Calcium 8.3 (L) 8.6 - 10.0 mg/dL    Glucose 134 (H) 70 - 99 mg/dL    GFR Estimate >90 >60 mL/min/1.73m2   Magnesium   Result Value Ref Range    Magnesium 2.0  1.7 - 2.3 mg/dL   Phosphorus   Result Value Ref Range    Phosphorus 3.1 2.5 - 4.5 mg/dL   CBC with platelets and differential   Result Value Ref Range    WBC Count 5.9 4.0 - 11.0 10e3/uL    RBC Count 2.58 (L) 3.80 - 5.20 10e6/uL    Hemoglobin 8.0 (L) 11.7 - 15.7 g/dL    Hematocrit 25.9 (L) 35.0 - 47.0 %     78 - 100 fL    MCH 31.0 26.5 - 33.0 pg    MCHC 30.9 (L) 31.5 - 36.5 g/dL    RDW 22.3 (H) 10.0 - 15.0 %    Platelet Count 225 150 - 450 10e3/uL    % Neutrophils 92 %    % Lymphocytes 4 %    % Monocytes 3 %    % Eosinophils 0 %    % Basophils 0 %    % Immature Granulocytes 1 %    NRBCs per 100 WBC 0 <1 /100    Absolute Neutrophils 5.4 1.6 - 8.3 10e3/uL    Absolute Lymphocytes 0.3 (L) 0.8 - 5.3 10e3/uL    Absolute Monocytes 0.2 0.0 - 1.3 10e3/uL    Absolute Eosinophils 0.0 0.0 - 0.7 10e3/uL    Absolute Basophils 0.0 0.0 - 0.2 10e3/uL    Absolute Immature Granulocytes 0.0 <=0.4 10e3/uL    Absolute NRBCs 0.0 10e3/uL   Ph Urine Poct   Result Value Ref Range    pH Urine 7.0 5.0 - 7.0 pH

## 2022-10-02 NOTE — PROVIDER NOTIFICATION
Web-based messaging to Dr. Griffin    Pt requesting melatonin. Could we get 3-5mg?   Cindy, [id number]    Outcome: bedtime 3mg melatonin ordered

## 2022-10-02 NOTE — PHARMACY-ADMISSION MEDICATION HISTORY
Admission Medication History Completed by Pharmacy    See [facility] Admission Navigator for allergy information, preferred outpatient pharmacy, prior to admission medications and immunization status.     Medication History Sources:     Dispense report    Patient report    Changes made to PTA medication list (reason):    Added: dexamethasone    Deleted: None    Changed:   o APAP: 975 mg PRN --> 650 mg PRN    Additional Information:    Dexamethasone: Patient reported only used 2 doses of this medication after being discharged from previous hospitalization, which was in the past 2 weeks.    Tums: Patient reported not using this medication frequently. The last dose was about 1.5 weeks ago.    Fluconazole: Patient reported not using this medication frequently. The last dose was in the past 2 weeks after she was discharged from previous hospitalization.     Levofloxacin, ondansetron: Patient has not needed these medications since previous  discharge.    Patient reported the only medications that she currently takes regularly are: acyclovir, amlodipine, imatinib, and pantoprazole.    Prior to Admission medications    Medication Sig Last Dose Taking? Auth Provider Long Term End Date   acetaminophen (TYLENOL) 325 MG tablet Take 3 tablets (975 mg) by mouth every 6 hours as needed for mild pain, other or fever (blood product administration premedication)  Patient taking differently: No sig reported Past Week at -- Yes Maura Jorgensen PA-C     acyclovir (ZOVIRAX) 400 MG tablet Take 1 tablet (400 mg) by mouth 2 times daily for 60 days 9/30/2022 at -- Yes Maura Jorgensen PA-C Yes 11/12/22   amLODIPine (NORVASC) 10 MG tablet Take 1 tablet (10 mg) by mouth daily for 30 days 9/30/2022 at -- Yes Maura Jorgensen PA-C Yes 10/14/22   calcium carbonate (TUMS) 500 MG chewable tablet Take 2 tablets (1,000 mg) by mouth daily as needed for heartburn Past Month at -- Yes Maura Jorgensen PA-C     dexamethasone (DECADRON) 4 MG  tablet Take 4 mg by mouth 2 times daily (with meals) Past Month at -- Yes Unknown, Entered By History No    fluconazole (DIFLUCAN) 200 MG tablet Take 1 tablet (200 mg) by mouth daily as needed (Take when ANC <1000) Past Month at -- Yes Maura Jorgensen PA-C     imatinib (GLEEVEC) 400 MG tablet Take 1 tablet (400 mg) by mouth 2 times daily 9/29/2022 at -- Yes Steve Hull MD     pantoprazole (PROTONIX) 20 MG EC tablet Take 1 tablet (20 mg) by mouth daily 9/29/2022 at 2200 Yes Sheila Hernandez APRN CNP     levofloxacin (LEVAQUIN) 250 MG tablet Take 1 tablet (250 mg) by mouth daily as needed (ANC <1000)  Patient not taking: Reported on 10/2/2022 Not Taking at --  Maura Jorgensen PA-C     ondansetron (ZOFRAN ODT) 4 MG ODT tab Take 1-2 tablets (4-8 mg) by mouth every 8 hours as needed for nausea or vomiting  Patient not taking: Reported on 10/2/2022 Not Taking at --  Maura Jorgensen PA-C         Date completed: 10/02/22    Medication history completed by: Helen Villarreal, Pharmacy Intern

## 2022-10-02 NOTE — PLAN OF CARE
Goal Outcome Evaluation:    Plan of Care Reviewed With: patient        0700- 1530:     Dose #2 of Cytarabine infused via PICC with great blood return. Cerebellar exam intact except for unsteady tandem walk which is her baseline. Denies nausea or pain.     Reports not getting a lot of sleep last night. Hoping to nap later today.     Leucovorin started. Methotrexate level 0.37. Urine PH 7.0. Pentamadine given by RT. April reports she had some chest tightness after administration that did resolve on it's own.     PLC for PICC cares (as she is going home with PICC) scheduled for 10/5 at 0900- earliest appointment available. She is on the wait list if something opens up earlier.     0139-0734:     Took a nap this afternoon. Written information given on flushing PICC. Not reviewed. Will need follow up if unable to attend PLC.

## 2022-10-02 NOTE — PLAN OF CARE
Time: 0079-8740    Afebrile with VSS on RA. Endorses mild headache, resolved without intervention. Denies nausea/SOB. Had a BM today, denies constipation. Methotrexate finished late, leucovorin retimed. Dose #1 HD cytarabine given at 2145 without complication. Pre-meds re-timed. Urine pH at 0300 was 7.5. Next urine pH at 1100. Talking with friends and family on phone. Good appetite, ate 100% of dinner. Melatonin given at HOS. Continue to monitor.

## 2022-10-02 NOTE — PROGRESS NOTES
Nursing Focus: Chemotherapy    D: Positive blood return via red lumen of PICC. Insertion site is clean/dry/intact, dressing intact with no complaints of pain.  Urine output is recorded in intake in Doc Flowsheet.      I: Premedications given per order (see electronic medical administration record). Dose #1 of high dose cytarabine started to infuse over 2 hours. Reviewed pt teaching on chemotherapy side effects. Pt denies need for further teaching. Chemotherapy double checked per protocol by two chemotherapy competent RN's.     A: Tolerating infusion well. Denies nausea and or pain.     P: Continue to monitor urine output and symptoms of nausea. Screen for symptoms of toxicity.

## 2022-10-03 VITALS
TEMPERATURE: 98.5 F | BODY MASS INDEX: 44.75 KG/M2 | WEIGHT: 262.1 LBS | HEIGHT: 64 IN | DIASTOLIC BLOOD PRESSURE: 60 MMHG | RESPIRATION RATE: 16 BRPM | SYSTOLIC BLOOD PRESSURE: 137 MMHG | OXYGEN SATURATION: 96 % | HEART RATE: 82 BPM

## 2022-10-03 DIAGNOSIS — C91.00 ACUTE LYMPHOBLASTIC LEUKEMIA (ALL) NOT HAVING ACHIEVED REMISSION (H): Primary | ICD-10-CM

## 2022-10-03 LAB
ANION GAP SERPL CALCULATED.3IONS-SCNC: 10 MMOL/L (ref 7–15)
BASOPHILS # BLD AUTO: 0 10E3/UL (ref 0–0.2)
BASOPHILS NFR BLD AUTO: 0 %
BUN SERPL-MCNC: 14.2 MG/DL (ref 6–20)
CALCIUM SERPL-MCNC: 8.2 MG/DL (ref 8.6–10)
CHLORIDE SERPL-SCNC: 106 MMOL/L (ref 98–107)
CREAT SERPL-MCNC: 0.63 MG/DL (ref 0.51–0.95)
DEPRECATED HCO3 PLAS-SCNC: 25 MMOL/L (ref 22–29)
EOSINOPHIL # BLD AUTO: 0 10E3/UL (ref 0–0.7)
EOSINOPHIL NFR BLD AUTO: 0 %
ERYTHROCYTE [DISTWIDTH] IN BLOOD BY AUTOMATED COUNT: 22.1 % (ref 10–15)
GFR SERPL CREATININE-BSD FRML MDRD: >90 ML/MIN/1.73M2
GLUCOSE SERPL-MCNC: 135 MG/DL (ref 70–99)
HCT VFR BLD AUTO: 27 % (ref 35–47)
HGB BLD-MCNC: 8.3 G/DL (ref 11.7–15.7)
IMM GRANULOCYTES # BLD: 0 10E3/UL
IMM GRANULOCYTES NFR BLD: 1 %
LYMPHOCYTES # BLD AUTO: 0.1 10E3/UL (ref 0.8–5.3)
LYMPHOCYTES NFR BLD AUTO: 2 %
MAGNESIUM SERPL-MCNC: 2.2 MG/DL (ref 1.7–2.3)
MCH RBC QN AUTO: 31.3 PG (ref 26.5–33)
MCHC RBC AUTO-ENTMCNC: 30.7 G/DL (ref 31.5–36.5)
MCV RBC AUTO: 102 FL (ref 78–100)
MONOCYTES # BLD AUTO: 0 10E3/UL (ref 0–1.3)
MONOCYTES NFR BLD AUTO: 0 %
MTX SERPL-SCNC: <0.04 UMOL/L
NEUTROPHILS # BLD AUTO: 4.5 10E3/UL (ref 1.6–8.3)
NEUTROPHILS NFR BLD AUTO: 97 %
NRBC # BLD AUTO: 0 10E3/UL
NRBC BLD AUTO-RTO: 0 /100
PH UR STRIP: 7 PH (ref 5–7)
PHOSPHATE SERPL-MCNC: 3 MG/DL (ref 2.5–4.5)
PLATELET # BLD AUTO: 210 10E3/UL (ref 150–450)
POTASSIUM SERPL-SCNC: 3.6 MMOL/L (ref 3.4–5.3)
RBC # BLD AUTO: 2.65 10E6/UL (ref 3.8–5.2)
SODIUM SERPL-SCNC: 141 MMOL/L (ref 136–145)
WBC # BLD AUTO: 4.6 10E3/UL (ref 4–11)

## 2022-10-03 PROCEDURE — 86923 COMPATIBILITY TEST ELECTRIC: CPT | Performed by: PHYSICIAN ASSISTANT

## 2022-10-03 PROCEDURE — 83735 ASSAY OF MAGNESIUM: CPT | Performed by: PHYSICIAN ASSISTANT

## 2022-10-03 PROCEDURE — 250N000011 HC RX IP 250 OP 636: Performed by: INTERNAL MEDICINE

## 2022-10-03 PROCEDURE — 250N000013 HC RX MED GY IP 250 OP 250 PS 637: Performed by: INTERNAL MEDICINE

## 2022-10-03 PROCEDURE — 258N000002 HC RX IP 258 OP 250: Performed by: INTERNAL MEDICINE

## 2022-10-03 PROCEDURE — 80048 BASIC METABOLIC PNL TOTAL CA: CPT | Performed by: PHYSICIAN ASSISTANT

## 2022-10-03 PROCEDURE — 36592 COLLECT BLOOD FROM PICC: CPT | Performed by: PHYSICIAN ASSISTANT

## 2022-10-03 PROCEDURE — 250N000009 HC RX 250: Performed by: INTERNAL MEDICINE

## 2022-10-03 PROCEDURE — 84100 ASSAY OF PHOSPHORUS: CPT | Performed by: PHYSICIAN ASSISTANT

## 2022-10-03 PROCEDURE — 80204 DRUG ASSAY METHOTREXATE: CPT | Performed by: INTERNAL MEDICINE

## 2022-10-03 PROCEDURE — 86901 BLOOD TYPING SEROLOGIC RH(D): CPT | Performed by: INTERNAL MEDICINE

## 2022-10-03 PROCEDURE — 250N000013 HC RX MED GY IP 250 OP 250 PS 637: Performed by: PHYSICIAN ASSISTANT

## 2022-10-03 PROCEDURE — 250N000011 HC RX IP 250 OP 636: Performed by: PHYSICIAN ASSISTANT

## 2022-10-03 PROCEDURE — 258N000003 HC RX IP 258 OP 636: Performed by: INTERNAL MEDICINE

## 2022-10-03 PROCEDURE — 85025 COMPLETE CBC W/AUTO DIFF WBC: CPT | Performed by: PHYSICIAN ASSISTANT

## 2022-10-03 RX ORDER — ACETAMINOPHEN 325 MG/1
650 TABLET ORAL EVERY 6 HOURS PRN
COMMUNITY
Start: 2022-10-03 | End: 2024-05-02

## 2022-10-03 RX ORDER — DEXAMETHASONE 4 MG/1
8 TABLET ORAL EVERY MORNING
Qty: 2 TABLET | Refills: 0 | Status: ON HOLD | OUTPATIENT
Start: 2022-10-04 | End: 2022-10-13

## 2022-10-03 RX ORDER — AMLODIPINE BESYLATE 5 MG/1
5 TABLET ORAL DAILY
Qty: 30 TABLET | Refills: 0 | Status: SHIPPED | OUTPATIENT
Start: 2022-10-03 | End: 2022-11-16

## 2022-10-03 RX ORDER — HEPARIN SODIUM (PORCINE) LOCK FLUSH IV SOLN 100 UNIT/ML 100 UNIT/ML
5 SOLUTION INTRAVENOUS
Status: CANCELLED | OUTPATIENT
Start: 2022-10-06

## 2022-10-03 RX ORDER — HEPARIN SODIUM,PORCINE 10 UNIT/ML
10 VIAL (ML) INTRAVENOUS DAILY
Qty: 300 ML | Refills: 0 | Status: ON HOLD | OUTPATIENT
Start: 2022-10-03 | End: 2022-11-26

## 2022-10-03 RX ORDER — IMATINIB MESYLATE 400 MG/1
400 TABLET, FILM COATED ORAL 2 TIMES DAILY
Qty: 19 TABLET | Refills: 0 | Status: ON HOLD | OUTPATIENT
Start: 2022-10-03 | End: 2022-10-13

## 2022-10-03 RX ORDER — PREDNISOLONE ACETATE 10 MG/ML
2 SUSPENSION/ DROPS OPHTHALMIC 4 TIMES DAILY
Qty: 5 ML | Refills: 0 | Status: ON HOLD | OUTPATIENT
Start: 2022-10-03 | End: 2022-10-13

## 2022-10-03 RX ORDER — PROCHLORPERAZINE MALEATE 10 MG
10 TABLET ORAL EVERY 6 HOURS PRN
Qty: 20 TABLET | Refills: 0 | Status: SHIPPED | OUTPATIENT
Start: 2022-10-03 | End: 2023-08-23

## 2022-10-03 RX ORDER — HEPARIN SODIUM,PORCINE 10 UNIT/ML
5 VIAL (ML) INTRAVENOUS
Status: CANCELLED | OUTPATIENT
Start: 2022-10-06

## 2022-10-03 RX ORDER — FUROSEMIDE 10 MG/ML
20 INJECTION INTRAMUSCULAR; INTRAVENOUS ONCE
Status: COMPLETED | OUTPATIENT
Start: 2022-10-03 | End: 2022-10-03

## 2022-10-03 RX ADMIN — CYTARABINE 6780 MG: 100 INJECTION, SOLUTION INTRATHECAL; INTRAVENOUS; SUBCUTANEOUS at 09:24

## 2022-10-03 RX ADMIN — IMATINIB 400 MG: 400 TABLET, FILM COATED ORAL at 07:52

## 2022-10-03 RX ADMIN — DEXAMETHASONE 8 MG: 4 TABLET ORAL at 07:49

## 2022-10-03 RX ADMIN — ACYCLOVIR 400 MG: 400 TABLET ORAL at 07:49

## 2022-10-03 RX ADMIN — ACETAMINOPHEN 975 MG: 325 TABLET, FILM COATED ORAL at 08:01

## 2022-10-03 RX ADMIN — FAMOTIDINE 10 MG: 10 TABLET, FILM COATED ORAL at 07:49

## 2022-10-03 RX ADMIN — AMLODIPINE BESYLATE 10 MG: 10 TABLET ORAL at 07:49

## 2022-10-03 RX ADMIN — PREDNISOLONE ACETATE 2 DROP: 10 SUSPENSION/ DROPS OPHTHALMIC at 13:08

## 2022-10-03 RX ADMIN — LEUCOVORIN CALCIUM 15 MG: 350 INJECTION, POWDER, LYOPHILIZED, FOR SOLUTION INTRAMUSCULAR; INTRAVENOUS at 09:11

## 2022-10-03 RX ADMIN — SENNOSIDES AND DOCUSATE SODIUM 2 TABLET: 50; 8.6 TABLET ORAL at 07:48

## 2022-10-03 RX ADMIN — PREDNISOLONE ACETATE 2 DROP: 10 SUSPENSION/ DROPS OPHTHALMIC at 07:49

## 2022-10-03 RX ADMIN — FUROSEMIDE 20 MG: 10 INJECTION, SOLUTION INTRAMUSCULAR; INTRAVENOUS at 09:11

## 2022-10-03 RX ADMIN — SODIUM BICARBONATE: 84 INJECTION, SOLUTION INTRAVENOUS at 01:12

## 2022-10-03 RX ADMIN — SODIUM BICARBONATE: 84 INJECTION, SOLUTION INTRAVENOUS at 08:01

## 2022-10-03 RX ADMIN — Medication 10 ML: at 13:08

## 2022-10-03 RX ADMIN — LEUCOVORIN CALCIUM 15 MG: 350 INJECTION, POWDER, LYOPHILIZED, FOR SOLUTION INTRAMUSCULAR; INTRAVENOUS at 03:29

## 2022-10-03 ASSESSMENT — ACTIVITIES OF DAILY LIVING (ADL)
ADLS_ACUITY_SCORE: 18

## 2022-10-03 NOTE — PROGRESS NOTES
Nursing Focus: Chemotherapy  D: Positive blood return via PICC. Insertion site is clean/dry/intact, dressing intact with no complaints of pain.  Urine output is recorded in intake in Doc Flowsheet. No signs of Cerebellar toxicity.   I: Premedications given per order (see electronic medical administration record). Dose #3 of Cytarabine started to infuse over 2 hours. Reviewed pt teaching on chemotherapy side effects.  Pt denies need for further teaching. Chemotherapy double checked per protocol by two chemotherapy competent RN's.   A: Tolerating procedure well. Denies nausea and or pain.   P: Continue to monitor urine output and symptoms of nausea. Screen for symptoms of toxicity.

## 2022-10-03 NOTE — DISCHARGE SUMMARY
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 Chemo was well-tolerated other than 10 lb weight gain 2/2 high-rate IVF. No peripheral edema but pt did note abdominal distension/mild discomfort on day of discharge, so Lasix 20 mg IV was given. Otherwise no acute events during admission. Due to needle phobia, pt was discharged home with PICC line after bedside education with RN. Pt was discharged to home in good condition with close outpatient follow up as below.     Discharge Medications  - PredForte eye drops through 10/5  - Decadron 8 mg on 10/4  - Continue imatinib through 10/12 (to complete 14 days 9/29-10/12)  - Decrease amlodipine dose to 5 mg  - Resume PPI  - Zofran and Compazine for antiemetic PRNs  - Levaquin and fluconazole when ANC >1000  - Heparin and saline flushes for PICC line    Follow Up  - Twice weekly labs and PRN transfusions  - Neulasta 10/6  - LP with IT chemo requested for 10/7. Sent message on day of discharge to follow up with scheduling.   - Messaged outpatient RNCC regarding next steps i.e. restaging BMBx    Hospital Course  April F Bakari was admitted on 9/30/2022. The following problems were addressed during her hospitalization:     HEME  # Ph+ B-ALL  Patient presented to outside hospital with c/o right upper quadrant pain and was subsequently found to have a markedly elevated white blood cell count concerning for acute leukemia. While at OSH, she had CT PE protocol as well as CT A/P. These identified no PE, no acute or localized intraabdominal inflammatory process, mild splenomegaly,  "and few inconspicous low-density structures in the liver. She was transferred to [facility] for further workup and management. S/p Hydrea 1g TID (8/21-8/23) with improvement in WBC (100K ? 12K). BMBx completed 8/20 - demonstrated B-ALL with 85% blasts and hypercellular marrow. IHC shows CD10+, CD34+. 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 Abnormal cytogenetics. Initiated pre-phase steroids with Prednisone 60 mg x8/21; increased to 60 mg/m2 (140 mg) 8/22-8/24. She initiated GRAALL + imatinib (C1D1 = 8/24/22). S/p BMT consult 9/12 with Dr. Walsh. Induction was relatively uncomplicated. Post-induction BMBx done 9/26. Morphology inconclusive due to inadequate core but flow negative. Awaiting FISH/chromosomes. BCR/abl major and minor undetectable (as were baseline tests). She was admitted for C2 GRAALL.   - Pt discharged with PICC.   - per pt (after her visit with Dr. Hull on 9/28), plan is for repeat BMBx after this cycle (C2). Outpatient is planning to set her up for a sedated BMBx under imaging given the difficulty of her last marrow.  - pt states she had an \"emergency\" delivery of imatinib to her house, so she took 2 doses on 9/29 and 1 dose 9/30 AM prior to admission     Treatment Plan: GRAALL + imatinib (C2D1=9/30/22)  - Imatinib 400 mg BID - D1-14 (9/29-10/12)  - IVFs per chemo protocol, bicarb support  - Methotrexate 1 g/m2 IV x once on D1. Administered over 24 hours  - Leucovorin calcium q6hr starting 12 hr after END of methotrexate infusion. Continue until methotrexate level <0.05  - cytarabine 3 g/m2 IV q12 hr x 4 doses on Days 2-3  - Premeds: Zofran 16mg D1, 8mg q12hr x 4 doses (D2-3);  Dex 12mg D1-3, 8mg D4-5  - Supportive Care: Pred Forte eye drops  - Neulasta ~D6. This has been added on to 10/6 appts  - IT chemo on D9. Will need under imaging guidance. As D9 falls on 10/8 (weekend), requested procedure for 10/7. She will " have labs on 10/6 (already scheduled) to determine platelet count in anticipation of this procedure as well. Awaiting scheduling.     # CNS prophylaxis  Per the GRAALL protocol and given risk for CNS involvement of ALL. Due to unsuccessful attempt at bedside with the CAPS team on 8/25, will require all subsequent LPs under fluoroscopic guidance. When counts were low, she had required multiple units of platelets (3u total) prior to procedure to reach goal >50k.   - s/p weekly triple IT chemo (Cytarabine, Solu-Cortef, MTX) on Days 1, 8, and 15 of C1 induction. She additionally had repeat LP on 9/14. CSF flow has remained negative.   - plan for IT chemo on D9 outpatient with Cycle 2 (10/7)   - Pt expresses anxiety surrounding procedures; pre-medication with 0.5mg PO Ativan +/- Atarax available prior PRN     # Anemia  2/2 chemotherapy. Other counts have recovered.  - Transfuse to maintain Hgb >7, Plt >10K (>50K prior to LPs)      # Chemotherapy-induced Grade 1 neuropathy   Pt reports persistent mild neuropathy of b/l fingertips, this is unchanged and not affecting function at present. She does intermittently notice some mild tingling/numbness in her toes, mainly at night or when she is wearing shoes. Not affecting gait/function. No reports of neuropathic pain at present.   - vincristine is not part of the chemo plan in C2  - continue to monitor      ID  # PPx  -  mg BID  - Nebulized Pentamidine q28d for PJP ppx.              - Pentamidine given 10/2   - ppx Levaquin and Fluconazole on hold as ANC >1.0.      # H/o COVID-19 infection   Not vaccinated (due to fear of needles per pt report). Pt reported testing postiive for COVID approx 1 month prior to admission for new leukemia diagnosis via home test. She never had it confirmed.   - COVID testing on 9/29 (PTA) was negative.      CV  # Hypertension  - continue PTA amlodipine. There was concern for softer BPs outpatient and amlodipine was advised to be reduced from  "10mg to 5mg. However, April reports BPs have been normal-preHTN at home so she continued the 10mg dose.   - Continued amlodipine 10mg daily inpt in anticipation of higher BPs with high rate IVFs and steroid premeds.   - Decreased dose to 5 mg at discharge    # Hypervolemia  10 lb weight gain during admission presumed 2/2 high-rate IVF. No bothersome or peripheral edema. Pt does note some abdominal fullness and mild epigastric discomfort.   - Lasix 20 mg IV x1 on 10/3     GI  # Indigestion/GERD  - Pepcid with MTX. Resume PTA Protonix at discharge.   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 Patient is feeling well today. Slept a little better. Sleep limited by cares and more frequent urination with IVF. LBM 10/1, took Senna this AM. Denies peripheral edema but does endorse more abdominal fullness and maybe some epigastric discomfort that she attributes to fluid. Talia nausea, mucositis, chest pain.     Vitals  Blood pressure 137/60, pulse 82, temperature 98.5  F (36.9  C), temperature source Temporal, resp. rate 16, height 1.626 m (5' 4\"), weight 118.9 kg (262 lb 1.6 oz), SpO2 96 %.    Physical Exam  General: Sitting up in bed, alert, NAD. Pleasant and conversational.  Skin: No concerning lesions, rash, jaundice, cyanosis, erythema, or ecchymoses on exposed surfaces.   HEENT: NCAT. Anicteric sclera. MMM.   Respiratory: Non-labored breathing, good air exchange, lungs clear to auscultation bilaterally.  Cardiovascular: RRR. No murmur or rub.   Gastrointestinal: Normoactive BS. Abdomen soft, ND, NT. No palpable masses.  Extremities: No LE edema.   Neurologic: A&O x 3, speech normal, no deficits grossly.    Patient was seen and plan of care was discussed with attending physician Dr. Cook.    I spent 70 minutes in the " care of this patient today, which included time necessary for review of interval events, obtaining history and physical exam, ordering medications/tests/procedures as medically indicated, review of pertinent medical literature, counseling of the patient, coordination of care, and documentation time. Over 50% of time was spent counseling the patient and/or coordinating care.    Kajal Barajas PA-C   Hematology/Oncology   Pager: [phone]  Desk phone: [phone]    Code Status   Full Code    Time Spent on this Encounter   I, Kajal Barajas PA-C, personally saw the patient today and spent greater than 30 minutes discharging this patient.    Discharge Disposition   Discharged to home  Condition at discharge: Stable    Consultations This Hospital Stay   VASCULAR ACCESS FOR PICC PLACEMENT ADULT IP CONSULT  PATIENT LEARNING CENTER IP CONSULT    Discharge Orders      XR Lumbar Puncture Intrathecal Chemo Admin     IR Referral    Needs to be done 10/7     Glucose CSF:     Protein total CSF:     Reason for your hospital stay    You were hospitalized for cycle 2 GRAALL chemotherapy.     Activity    Your activity upon discharge: activity as tolerated     Adult [facility] Follow-up and recommended labs and tests    - Twice weekly lab checks and blood or platelet transfusions if needed  - Neulasta (to boost white blood cells and help prevent infection) on 10/6  - We are working on arranging a lumbar puncture with IT chemo on 10/7. Watch MyChart for this being scheduled.  - We will be setting you up for a bone marrow biopsy in about 3 weeks to re-evaluate how the chemo is working     When to contact your care team    [facility] cancer clinic triage line at [phone] for temp > or = 100.4, uncontrolled nausea/vomiting/diarrhea/constipation, unrelieved pain, bleeding not relieved with pressure, dizziness, chest pain, shortness of breath, loss of consciousness, and any new or concerning symptoms.     IV access    **Ordering  Provider MUST call/page Care Coordinator/ to discuss arranging this service**    You are going home with the following vascular access device: PICC.     Discharge Instructions    To complete chemo plan:   - Take PredForte eyedrops 4 times daily through 10/5. Stop 10/6. This protects your eyes from the toxic effects of the cytarabine chemotherapy.   - Take one more dose of dexamethasone (steroid) on 10/4  - Continue imatinib through 10/12, then stop on 10/13    Other medications of note:   - Take amlodipine 5 mg daily for blood pressure  - Do not take fluconazole and levofloxacin now. Start taking when absolute neutrophil count (ANC) <1000 or when instructed by outpatient team.   - Ok to resume pantoprazole. This was held during admission due to interactions with methotrexate chemo.   - For nausea: Take Zofran first. If that doesn't work, try prochlorperazine (Compazine).     You are going home with a PICC line. Flush daily as instructed by nurse.     Full Code     Flow Cytometry Cerebrospinal fluid     Gram Stain     Cerebrospinal fluid Aerobic Bacterial Culture Routine with Gram Stain     Diet    Follow this diet upon discharge: Regular Diet Adult     Check Out Appointment Request    1. Please add Neulasta to 10/6 appointments    2. Please schedule Lumbar puncture with IT chemotherapy under imaging guidance on 10/7.     CSF Cell Count with Differential:     Discharge Medications   Current Discharge Medication List      START taking these medications    Details   heparin lock flush 10 UNIT/ML SOLN injection 10 mLs by Intracatheter route daily Flush 5 mL into each PICC lumen daily.  Qty: 300 mL, Refills: 0    Comments: Prefer to fill in 10 mL (or 5 mL) syringes  Associated Diagnoses: PICC (peripherally inserted central catheter) in place      prednisoLONE acetate (PRED FORTE) 1 % ophthalmic suspension Place 2 drops into both eyes 4 times daily Use through 10/5, stop 10/6  Qty: 5 mL, Refills: 0     Associated Diagnoses: 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)      prochlorperazine (COMPAZINE) 10 MG tablet Take 1 tablet (10 mg) by mouth every 6 hours as needed (Breakthrough Nausea/Vomiting)  Qty: 20 tablet, Refills: 0    Associated Diagnoses: 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)      sodium chloride, PF, (SALINE FLUSH) 0.9% PF flush 20 mLs by Intracatheter route daily Flush PICC with 20 mL daily, 10 mL per lumen  Qty: 600 mL, Refills: 0    Comments: Fill in 10 or 20 mL syringes  Associated Diagnoses: PICC (peripherally inserted central catheter) in place         CONTINUE these medications which have CHANGED    Details   acetaminophen (TYLENOL) 325 MG tablet Take 2 tablets (650 mg) by mouth every 6 hours as needed for mild pain, other or fever (blood product administration premedication)    Associated Diagnoses: 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)      amLODIPine (NORVASC) 5 MG tablet Take 1 tablet (5 mg) by mouth daily  Qty: 30 tablet, Refills: 0    Associated Diagnoses: Hypertension, unspecified type      dexamethasone (DECADRON) 4 MG tablet Take 2 tablets (8 mg) by mouth every morning Take 2 tabs (8 mg) once on 10/4, then stop  Qty: 2 tablet, Refills: 0    Associated Diagnoses: 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)      imatinib (GLEEVEC) 400 MG tablet Take 1 tablet (400 mg) by mouth 2 times daily Through 10/12. Stop on 10/13.  Qty: 19 tablet, Refills: 0    Associated Diagnoses: 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)         CONTINUE these medications which have NOT CHANGED    Details   acyclovir (ZOVIRAX) 400 MG tablet Take 1 tablet (400 mg) by mouth 2 times daily for 60 days  Qty: 120 tablet, Refills: 0    Associated Diagnoses: 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)      calcium carbonate (TUMS) 500 MG chewable tablet Take 2  tablets (1,000 mg) by mouth daily as needed for heartburn    Associated Diagnoses: 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)      fluconazole (DIFLUCAN) 200 MG tablet Take 1 tablet (200 mg) by mouth daily as needed (Take when ANC <1000)  Qty: 60 tablet, Refills: 0    Associated Diagnoses: 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)      pantoprazole (PROTONIX) 20 MG EC tablet Take 1 tablet (20 mg) by mouth daily  Qty: 90 tablet, Refills: 1    Associated Diagnoses: Gastroesophageal reflux disease, unspecified whether esophagitis present      levofloxacin (LEVAQUIN) 250 MG tablet Take 1 tablet (250 mg) by mouth daily as needed (ANC <1000)  Qty: 60 tablet, Refills: 0    Associated Diagnoses: 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)      ondansetron (ZOFRAN ODT) 4 MG ODT tab Take 1-2 tablets (4-8 mg) by mouth every 8 hours as needed for nausea or vomiting  Qty: 60 tablet, Refills: 0    Associated Diagnoses: 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)           Allergies   Allergies   Allergen Reactions     Blood Transfusion Related (Informational Only) Hives     Hive reaction with platelet transfusion on 8/26/2022. Please administer platelets with tylenol and benadryl premedication.      Data   Most Recent 3 CBC's:  Recent Labs   Lab Test 10/03/22  0856 10/02/22  0829 10/01/22  0735   WBC 4.6 5.9 6.0   HGB 8.3* 8.0* 8.4*   * 100 100    225 244      Most Recent 3 BMP's:  Recent Labs   Lab Test 10/03/22  0857 10/02/22  0829 10/01/22  0735    140 139   POTASSIUM 3.6 3.6 3.8   CHLORIDE 106 107 106   CO2 25 24 23   BUN 14.2 12.5 9.8   CR 0.63 0.59 0.60   ANIONGAP 10 9 10   SOLEDAD 8.2* 8.3* 8.4*   * 134* 137*     Most Recent 2 LFT's:  Recent Labs   Lab Test 09/30/22  1605 09/26/22  1358   AST 17 17   ALT 24 18   ALKPHOS 83 83   BILITOTAL 0.3 0.3     Most Recent INR's and Anticoagulation Dosing  History:  Anticoagulation Dose History     Recent Dosing and Labs Latest Ref Rng & Units 9/2/2022 9/5/2022 9/7/2022 9/9/2022 9/12/2022 9/14/2022 9/30/2022    INR 0.85 - 1.15 1.14 1.03 1.04 1.15 1.02 0.99 1.00        Most Recent 3 Troponin's:No lab results found.  Most Recent Cholesterol Panel:No lab results found.  Most Recent 6 Bacteria Isolates From Any Culture (See EPIC Reports for Culture Details):No lab results found.  Most Recent TSH, T4 and A1c Labs:No lab results found.    Results for orders placed or performed during the hospital encounter of 08/19/22   MR Brain w/o & w Contrast    Narrative     MR BRAIN W/O & W CONTRAST 8/21/2022 3:02 PM    Provided History: New acute leukemia with head aches r/o intracranial  hemorrhage or CNS involvement.    Comparison: None.    Technique: Multiplanar T1-weighted, axial FLAIR, and susceptibility  images were obtained without intravenous contrast. Following  intravenous gadolinium-based contrast administration, axial  T2-weighted, diffusion, and T1-weighted images (in multiple planes)  were obtained.    Contrast: Gadavist 11.4 mL    Findings:  There is no mass effect, midline shift, or evidence of intracranial  hemorrhage. The ventricles are proportionate to the cerebral sulci.  Normal major vascular intracranial flow-voids.    Postcontrast images demonstrate no abnormal intracranial enhancement.    No abnormality of the skull marrow signal. Mucosal thickening in the  right maxillary sinus. Trace fluid in the right mastoid air cells. The  orbits are grossly unremarkable.    Partially visualized prominent suboccipital, right level 5 and  bilateral upper cervical chain lymph nodes.      Impression    Impression:   1. No acute intracranial pathology. No evidence for intracranial tumor  or hemorrhage.  2. Partially visualized prominent suboccipital, right level 5 and  upper cervical chain lymph nodes.    I have personally reviewed the examination and initial  interpretation  and I agree with the findings.    MACO GARZON MD         SYSTEM ID:  [id number]   CT Soft Tissue Neck w Contrast    Narrative    EXAM: CT SOFT TISSUE NECK W CONTRAST  8/22/2022 10:31 AM     HISTORY:  Pt with newly diagnosed B-ALL, lymphadenopathy seen on MRI  brain. Eval involvement.         COMPARISON:  MR brain, dated 8/21/2022.     TECHNIQUE: Following intravenous administration of nonionic iodinated  contrast medium, thin section helical CT images were obtained from the  skull base down to the level of the aortic arch.  Axial, coronal and  sagittal reformations were performed with 2-3 mm slice thickness  reconstruction. Images were reviewed in soft tissue, lung and bone  windows.    CONTRAST: iopamidol (ISOVUE-370) solution 100 mL    FINDINGS:     No nasopharyngeal, oropharyngeal, hypopharyngeal, or glottic mucosal  space abnormality. Normal tongue base. Salivary glands are within  normal limits.    Multiple atypical appearing lymph nodes throughout the bilateral  cervical chain, with loss of normal architecture and fatty hilum. Most  prominent of these are a mildly homogeneously enhancing rounded left  level III lymph node measuring 9 mm in short axis (image 49 series 3);  two right-sided rounded, level Ia lymph nodes (both visible on image  28 series 3) the larger measuring 9 mm in short axis; and 4-5 rounded,  right level II-III lymph nodes, with the largest measuring 7 mm in  short axis. The right jugulodigastric node asymmetrically larger than  left, but normal architecture appears preserved.     Normal thyroid gland.    Patent cervical vasculature; no high grade arterial stenosis.    No suspicious osseus lesion. Grossly patent cervical spinal canal.  Stable straightening of the normal cervical lordosis. Multilevel mild  to moderate disc space height loss and facet arthropathy.    Isolated mucosal thickening of the inferior portion of the right  maxillary sinus, likely mucous retention cyst.  No periapical dental  lucencies. The imaged skull base, intracranial and orbital structures  are within normal limits.    No suspicious finding in the visualized superior mediastinum/thorax.  Left approach PICC line in place, with tip terminating off the  field-of-view. Clear lung apices.      Impression    IMPRESSION:  1. Multiple, bilateral suspicious appearing lymph nodes, most  prominent at right level Ia, left level III, and right level II-III.  While several of these nodes are not enlarged, they do exhibit  abnormal shapes.  2. Multilevel degenerative change throughout the cervical spine with  disc space height loss and facet arthropathy.    I have personally reviewed the examination and initial interpretation  and I agree with the findings.    BRANDIE SMILEY MD         SYSTEM ID:  [id number]   XR Lumbar Punc Intrathecal Chemo Admin    Narrative    PROCEDURE: Lumbar Puncture using Fluoroscopy, 8/26/2022 3:37 PM    HISTORY:  ALL    COMPARISON: none    STAFF NEURORADIOLOGIST: Dr. Alfredo Euceda I, ALFREDO EUCEDA MD,  attest that I was present for all critical portions of the procedure  and was immediately available to provide guidance and assistance  during the remainder of the procedure.     FELLOW PHYSICIAN: Dr. Brent Gardner    TECHNIQUE: Verbal and written consent for lumbar puncture was obtained  from the patient, and benefits and risk of the procedure were  explained, including but not limited to worsening headache,  hemorrhage, infection, lower extremity pain, or nerve root injury. The  patient was sterilely prepped and draped with the patient in the prone  position, over the lower back. Under fluoroscopic guidance, the  interlaminar spaces were noted. 1% lidocaine was administered for  local anesthetic over the right L3-4 interlaminar space, and a 22  gauge 5 inch needle was advanced into the thecal sac under  fluoroscopic guidance.      There was initial show of clear CSF. Approximately 10 cc of CSF  were  collected.    Hematology/Oncology then administered intrathecal chemotherapy, dose  and rate as determined by Heme/Onc.    The needle was removed with the stylet in place. There was no  immediate complication associated with the procedure. Samples were  sent for the requested laboratory testing.      FLUORO TIME: 22 seconds low-dose pulsed    DOSE: 6.8 mGy      Impression    IMPRESSION: Successful lumbar puncture and intrathecal chemotherapy  administration without immediate complication.    PLAN: Patient discharged to inpatient unit in stable condition for  monitoring. Orders placed for 1 hour of bed rest, with patient laying  on the back and the head of the bed flat.    I have personally reviewed the examination and initial interpretation  and I agree with the findings.    ALFREDO EUCEDA MD         SYSTEM ID:  [id number]   XR Lumbar Punc Intrathecal Chemo Admin    Narrative    PROCEDURE: Lumbar Puncture using Fluoroscopy, 8/31/2022 4:24 PM    HISTORY:  ALL    COMPARISON: 8/26/2022    STAFF NEURORADIOLOGIST: Dr. Alfredo Euceda I, ALFREDO EUCEDA MD,  attest that I was present for all critical portions of the procedure  and was immediately available to provide guidance and assistance  during the remainder of the procedure.    FELLOW PHYSICIAN: Dr. Brent Gardner    TECHNIQUE: Verbal and written consent for lumbar puncture was obtained  from the patient, and benefits and risk of the procedure were  explained, including but not limited to worsening headache,  hemorrhage, infection, lower extremity pain, or nerve root injury. The  patient was sterilely prepped and draped with the patient in the prone  position, over the lower back. Under fluoroscopic guidance, the  interlaminar spaces were noted. 1% lidocaine was administered for  local anesthetic over the right L3-4 interlaminar space, and a 22  gauge 5 inch needle was advanced into the thecal sac under  fluoroscopic guidance.      There was initial show of  clear CSF. Approximately 8 cc of CSF were  collected.    Hematology/Oncology then administered intrathecal chemotherapy, dose  and rate as determined by Heme/Onc.    The needle was removed with the stylet in place. There was no  immediate complication associated with the procedure. Samples were  sent for the requested laboratory testing.      FLUORO TIME: 21 seconds low-dose pulsed    DOSE: 8.3 mGy      Impression    IMPRESSION: Successful lumbar puncture without immediate complication.    PLAN: Patient discharged to patient care unit in stable condition for  monitoring. Orders placed for 1 hour of bed rest, with patient laying  on the back and the head of the bed flat.    I have personally reviewed the examination and initial interpretation  and I agree with the findings.    ALFREDO EUCEDA MD         SYSTEM ID:  [id number]   XR Lumbar Punc Intrathecal Chemo Admin    Narrative    PROCEDURE: Lumbar Puncture using Fluoroscopy, 9/7/2022 12:06 PM    HISTORY:  Lumbar puncture with intrathecal chemotherapy    COMPARISON: 8/31/2022    STAFF NEURORADIOLOGIST: Dr. Jarrell Rust    FELLOW PHYSICIAN: Dr. Tru Gonzales    TECHNIQUE: Verbal and written consent for lumbar puncture was obtained  from the patient, and benefits and risk of the procedure were  explained, including but not limited to worsening headache,  hemorrhage, infection, lower extremity pain, or nerve root injury. The  patient was sterilely prepped and draped with the patient in the prone  position, over the lower back. Under fluoroscopic guidance, the  interlaminar spaces were noted. 1% lidocaine was administered for  local anesthetic over the L3-4 interlaminar space, and a 22 gauge 5  inch needle was advanced into the thecal sac under fluoroscopic  guidance.      There was initial show of clear CSF. Approximately 14 cc of CSF were  collected.    Hematology/Oncology then administered intrathecal chemotherapy, dose  and rate as determined by Heme/Onc.    The needle  was removed with the stylet in place. There was no  immediate complication associated with the procedure. Samples were  sent for the requested laboratory testing.      FLUORO TIME: 21 seconds low-dose pulsed.    DOSE: 8.31 Gym2      Impression    IMPRESSION: Successful lumbar puncture and intrathecal chemotherapy  administration without immediate complication.    PLAN: Patient discharged to patient care unit in stable condition for  monitoring. Orders placed for 1 hour of bed rest with patient laying  on the back and the head of the bed flat.     I, MACO GARZON MD, attest that I was immediately available to  provide guidance and assistance during the entirety of the procedure.    I have personally reviewed the examination and initial interpretation  and I agree with the findings.    MACO GARZON MD         SYSTEM ID:  [id number]   XR Lumbar Punc Intrathecal Chemo Admin    Narrative    PROCEDURE: Lumbar Puncture using Fluoroscopy, 9/14/2022 12:14 PM    HISTORY:  B-ALL, receiving weekly LP w/ IT chemo. Next due 9/14    COMPARISON: 9/7/2022    STAFF NEURORADIOLOGIST: Dr. Duane Gonzales    FELLOW PHYSICIAN: Dr. Tru Gonzales    TECHNIQUE: Verbal and written consent for lumbar puncture was obtained  from the patient, and benefits and risk of the procedure were  explained, including but not limited to worsening headache,  hemorrhage, infection, lower extremity pain, or nerve root injury. The  patient was sterilely prepped and draped with the patient in the prone  position, over the lower back. Under fluoroscopic guidance, the  interlaminar spaces were noted. 1% lidocaine was administered for  local anesthetic over the L2-3 interlaminar space, and a 22 gauge 5  inch needle was advanced into the thecal sac under fluoroscopic  guidance.      There was initial show of clear CSF. Approximately 6 cc of CSF were  collected.    Hematology/Oncology then administered intrathecal chemotherapy, dose  and rate as determined by  Heme/Onc.    The needle was removed with the stylet in place. There was no  immediate complication associated with the procedure. Samples were  sent for the requested laboratory testing.      FLUORO TIME: .97 low-dose pulsed.    DOSE: 23 mGym2      Impression    IMPRESSION: Successful lumbar puncture and intrathecal chemotherapy  administration without immediate complication.    PLAN: Patient discharged to patient care unit in stable condition for  monitoring. Orders placed for 1 hour of bed rest with patient laying  on the back and the head of the bed flat.    I, ALFREDO EUCEDA MD, attest that I was present for all critical  portions of the procedure and was immediately available to provide  guidance and assistance during the remainder of the procedure.    I have personally reviewed the examination and initial interpretation  and I agree with the findings.    ALFREDO EUCEDA MD         SYSTEM ID:  [id number]   Echocardiogram Complete     Value    LVEF  65-70%    Narrative    [id number]  [id number]  [id number]  [id number]^SHER^CHINO^RUIZ     [facility]  Echocardiography [facility]  [address] [place] [id number]     Name: MICHELLE, APRIL  MRN: [id number]  : 1980  Study Date: 2022 10:34 AM  Age: 42 yrs  Gender: Female  Patient Location: [facility]  Reason For Study: Chemo  Ordering Physician: CHINO OLIVAREZ  Referring Physician: DINAH BATEMAN FELLOWS  Performed By: Rosa Ragsdale     BSA: 2.2 m2  Height: 64 in  Weight: 250 lb  ______________________________________________________________________________  Procedure  Complete Portable Echo Adult.  ______________________________________________________________________________  Interpretation Summary  Global and regional left ventricular function is hyperkinetic with an EF of  65-70%. Global peak LV longitudinal strain is averaged at -23%. This is within  reported normal limits (normal <-18%).  Global right ventricular  function is normal. The right ventricle is normal  size.  No significant valvular abnormalities.  IVC diameter <2.1 cm collapsing >50% with sniff suggests a normal RA pressure  of 3 mmHg.  There is no prior study for direct comparison.  ______________________________________________________________________________  Left Ventricle  Global and regional left ventricular function is hyperkinetic with an EF of  65-70%. Left ventricular wall thickness is normal. Left ventricular size is  normal. Left ventricular diastolic function is indeterminate. Global peak LV  longitudinal strain is averaged at -23%. This is within reported normal limits  (normal <-18%).     Right Ventricle  Global right ventricular function is normal. The right ventricle is normal  size.     Atria  Both atria appear normal.     Mitral Valve  The mitral valve is normal. Trace mitral insufficiency is present.     Aortic Valve  The aortic valve is tricuspid. On Doppler interrogation, there is no  significant stenosis or regurgitation.     Tricuspid Valve  The tricuspid valve is normal. Trace tricuspid insufficiency is present.  Pulmonary artery systolic pressure cannot be assessed.     Pulmonic Valve  The valve leaflets are not well visualized. Trace pulmonic insufficiency is  present.     Vessels  Sinuses of Valsalva 3.3 cm. Ascending aorta 3.4 cm. IVC diameter <2.1 cm  collapsing >50% with sniff suggests a normal RA pressure of 3 mmHg.     Pericardium  No pericardial effusion is present.     Compared to Previous Study  There is no prior study for direct comparison.  ______________________________________________________________________________  MMode/2D Measurements & Calculations  IVSd: 0.97 cm  LVIDd: 4.9 cm  LVIDs: 2.9 cm  LVPWd: 1.1 cm  FS: 42.1 %  LV mass(C)d: 181.1 grams  LV mass(C)dI: 84.2 grams/m2  Ao root diam: 3.3 cm  asc Aorta Diam: 3.4 cm  LVOT diam: 2.0 cm  LVOT area: 3.1 cm2  LA Volume (BP): 64.7 ml  LA Volume Index (BP): 30.1 ml/m2      RWT: 0.43     Doppler Measurements & Calculations  MV E max nubia: 83.9 cm/sec  MV A max nubia: 79.1 cm/sec  MV E/A: 1.1  MV dec slope: 406.0 cm/sec2  E/E' av.7  Lateral E/e': 12.1  Medial E/e': 15.4     ______________________________________________________________________________  Report approved by: Mp Rangel 2022 12:30 PM

## 2022-10-03 NOTE — PROGRESS NOTES
Nursing Focus: Chemotherapy  D: Positive blood return via PICC. Insertion site is clean/dry/intact, dressing intact with no complaints of pain. Urine output is recorded in intake/output section in Doc Flowsheets.    I: Premedications given per order (see electronic medical administration record). Dose # 4 of HD Cytarabine started to infuse over 2 hours. Reviewed pt teaching on chemotherapy side effects. Pt denies need for further teaching. Chemotherapy double checked per protocol by two chemotherapy competent RN's.   A: Tolerating procedure well. Denies nausea and or pain.   P: Continue to monitor urine output and symptoms of nausea. Screen for symptoms of toxicity. Notify MD with any concerns and or changes in pt's condition. Continue with plan of care.

## 2022-10-03 NOTE — PLAN OF CARE
Goal Outcome Evaluation:    Discharge to Home:    D: Orders for discharge and outpatient medications written. PICC line teaching completed with pt and her  with accurate teach back (proper hand hygiene, flushing lines with saline and heparin and re-enforcing dressing if needed, using blue emergency caps ect...). Two blue safety clamps given to pt in case of emergency.  I: Home medications and return to clinic schedule reviewed with patient and spouse. Discharge instructions and parameters for calling Health Care Provider reviewed. Patient ambulated off of the unit at 1345 accompanied by her .   A: Patient/spouse verbalized understanding of all PICC line teaching, discharge medications and discharge paperwork and was ready for discharge.   P: Patient instructed to  medications at the discharge pharmacy prior to leaving the hospital. [place] [facility] [place].                   137

## 2022-10-03 NOTE — CONSULTS
Message sent to Kajal Barajas PA-C with directions to place XR lumbar puncture order for LP with chemotherapy. IR referral not needed.    Nel Michel DNP, APRN  Interventional Radiology   IR on-call pager: [phone]

## 2022-10-03 NOTE — PLAN OF CARE
Time: 6040-8704    Afebrile with VSS on RA. Endorses mild right-chest discomfort, declined intervention. Voiding spontaneously. LBM was 10/1, interested in taking stool softener this AM with morning meds. PICC infusing bicarb. Urine pH was 8.0 and 7.0. Next urine pH at 1100. Melatonin given at HOS. Reports improvement in mild mucositis, continues with saline rinses. Dose #3 of cytarabine given without complication. Possible discharge today pending methotrexate levels and last dose of cytarabine at 0900. [facility] is scheduled for PICC learning (to discharge home with PICC) on Wednesday, on waitlist to be sooner.

## 2022-10-04 NOTE — PROGRESS NOTES
"    Outpatient IR Referral  10/04/22     Referring Provider: Steve Hull MD  Procedure Requested:  Bone Marrow Biopsy With sedation   Procedure Approved:  CT guided bone marrow biopsy with sedation  Case and imaging was reviewed with Dr. Gu from IR     Patient is a 42 year old with history of ALL s/p BMT ONC bone marrow biopsy (left posterior iliac spine;  Sheila Hernandez,NP) with 2 mg oral lorazepam on 9/23/2022 now requested for image guided bone marrow biopsy with sedation.      Path demonstrated inadequate core biopsy.   Patient BMI is 45.      Pertinent Medications Reviewed:  Patient is not on anticoagulation or NSAIDs    Procedure order and surgical pathology order placed.    If requesting team would like sample sent for anything else please use the IR order set \"IR RAD Biopsy or Fluid Aspirate Specimens\" to select your necessary diagnostic labs and pend for admission.     If there are labs you desire that are not found in this order set or you have questions regarding specific diagnostic labs please call the associated lab personnel. IR will not enter diagnostic labs on the day of the procedure.     Primary team Steve Hull MD made aware of IR recommendations.    Cami Lucero PA-C  Interventional Radiology   IR on-call pager: [phone]                     "

## 2022-10-05 DIAGNOSIS — Z76.89 PROPHYLAXIS FOR CHEMOTHERAPY-INDUCED NEUTROPENIA: ICD-10-CM

## 2022-10-05 LAB
ABO/RH(D): NORMAL
ABO/RH(D): NORMAL
ANTIBODY SCREEN: NEGATIVE
ANTIBODY SCREEN: NEGATIVE
BLD PROD TYP BPU: NORMAL
BLOOD COMPONENT TYPE: NORMAL
CODING SYSTEM: NORMAL
CROSSMATCH: NORMAL
CULTURE HARVEST COMPLETE DATE: NORMAL
INTERPRETATION: NORMAL
INTERPRETATION: NORMAL
SPECIMEN EXPIRATION DATE: NORMAL
SPECIMEN EXPIRATION DATE: NORMAL
UNIT ABO/RH: NORMAL
UNIT ABO/RH: NORMAL
UNIT NUMBER: NORMAL
UNIT STATUS: NORMAL
UNIT TYPE ISBT: 5100
UNIT TYPE ISBT: 5100

## 2022-10-05 RX ORDER — HEPARIN SODIUM (PORCINE) LOCK FLUSH IV SOLN 100 UNIT/ML 100 UNIT/ML
5 SOLUTION INTRAVENOUS
Status: CANCELLED | OUTPATIENT
Start: 2022-10-05

## 2022-10-05 RX ORDER — HEPARIN SODIUM,PORCINE 10 UNIT/ML
5 VIAL (ML) INTRAVENOUS
Status: CANCELLED | OUTPATIENT
Start: 2022-10-05

## 2022-10-06 ENCOUNTER — INFUSION THERAPY VISIT (OUTPATIENT)
Dept: ONCOLOGY | Facility: CLINIC | Age: 42
End: 2022-10-06
Attending: INTERNAL MEDICINE
Payer: COMMERCIAL

## 2022-10-06 ENCOUNTER — APPOINTMENT (OUTPATIENT)
Dept: LAB | Facility: CLINIC | Age: 42
End: 2022-10-06
Attending: INTERNAL MEDICINE
Payer: COMMERCIAL

## 2022-10-06 ENCOUNTER — HOME INFUSION (PRE-WILLOW HOME INFUSION) (OUTPATIENT)
Dept: PHARMACY | Facility: CLINIC | Age: 42
End: 2022-10-06

## 2022-10-06 VITALS
OXYGEN SATURATION: 99 % | TEMPERATURE: 97.8 F | BODY MASS INDEX: 44.22 KG/M2 | DIASTOLIC BLOOD PRESSURE: 68 MMHG | HEART RATE: 103 BPM | RESPIRATION RATE: 16 BRPM | SYSTOLIC BLOOD PRESSURE: 114 MMHG | WEIGHT: 257.6 LBS

## 2022-10-06 DIAGNOSIS — Z76.89 PROPHYLAXIS FOR CHEMOTHERAPY-INDUCED NEUTROPENIA: ICD-10-CM

## 2022-10-06 DIAGNOSIS — C91.00 ACUTE LYMPHOBLASTIC LEUKEMIA (ALL) NOT HAVING ACHIEVED REMISSION (H): Primary | ICD-10-CM

## 2022-10-06 LAB
ALBUMIN SERPL BCG-MCNC: 3.6 G/DL (ref 3.5–5.2)
ALP SERPL-CCNC: 62 U/L (ref 35–104)
ALT SERPL W P-5'-P-CCNC: 36 U/L (ref 10–35)
ANION GAP SERPL CALCULATED.3IONS-SCNC: 8 MMOL/L (ref 7–15)
AST SERPL W P-5'-P-CCNC: 20 U/L (ref 10–35)
BASOPHILS # BLD AUTO: 0 10E3/UL (ref 0–0.2)
BASOPHILS NFR BLD AUTO: 0 %
BILIRUB SERPL-MCNC: 1.1 MG/DL
BUN SERPL-MCNC: 15.7 MG/DL (ref 6–20)
CALCIUM SERPL-MCNC: 8.4 MG/DL (ref 8.6–10)
CHLORIDE SERPL-SCNC: 104 MMOL/L (ref 98–107)
CREAT SERPL-MCNC: 0.66 MG/DL (ref 0.51–0.95)
DEPRECATED HCO3 PLAS-SCNC: 25 MMOL/L (ref 22–29)
EOSINOPHIL # BLD AUTO: 0 10E3/UL (ref 0–0.7)
EOSINOPHIL NFR BLD AUTO: 0 %
ERYTHROCYTE [DISTWIDTH] IN BLOOD BY AUTOMATED COUNT: 20.1 % (ref 10–15)
GFR SERPL CREATININE-BSD FRML MDRD: >90 ML/MIN/1.73M2
GLUCOSE SERPL-MCNC: 105 MG/DL (ref 70–99)
HCT VFR BLD AUTO: 25.9 % (ref 35–47)
HGB BLD-MCNC: 8.3 G/DL (ref 11.7–15.7)
IMM GRANULOCYTES # BLD: 0 10E3/UL
IMM GRANULOCYTES NFR BLD: 1 %
LYMPHOCYTES # BLD AUTO: 0.5 10E3/UL (ref 0.8–5.3)
LYMPHOCYTES NFR BLD AUTO: 19 %
MCH RBC QN AUTO: 31.8 PG (ref 26.5–33)
MCHC RBC AUTO-ENTMCNC: 32 G/DL (ref 31.5–36.5)
MCV RBC AUTO: 99 FL (ref 78–100)
MONOCYTES # BLD AUTO: 0 10E3/UL (ref 0–1.3)
MONOCYTES NFR BLD AUTO: 0 %
NEUTROPHILS # BLD AUTO: 1.9 10E3/UL (ref 1.6–8.3)
NEUTROPHILS NFR BLD AUTO: 80 %
NRBC # BLD AUTO: 0 10E3/UL
NRBC BLD AUTO-RTO: 0 /100
PLATELET # BLD AUTO: 157 10E3/UL (ref 150–450)
POTASSIUM SERPL-SCNC: 3.7 MMOL/L (ref 3.4–5.3)
PROT SERPL-MCNC: 5.7 G/DL (ref 6.4–8.3)
RBC # BLD AUTO: 2.61 10E6/UL (ref 3.8–5.2)
SCANNED LAB RESULT: NORMAL
SODIUM SERPL-SCNC: 137 MMOL/L (ref 136–145)
WBC # BLD AUTO: 2.4 10E3/UL (ref 4–11)

## 2022-10-06 PROCEDURE — 36592 COLLECT BLOOD FROM PICC: CPT

## 2022-10-06 PROCEDURE — 250N000011 HC RX IP 250 OP 636: Performed by: INTERNAL MEDICINE

## 2022-10-06 PROCEDURE — 96372 THER/PROPH/DIAG INJ SC/IM: CPT | Performed by: INTERNAL MEDICINE

## 2022-10-06 PROCEDURE — 85004 AUTOMATED DIFF WBC COUNT: CPT

## 2022-10-06 PROCEDURE — 80053 COMPREHEN METABOLIC PANEL: CPT

## 2022-10-06 PROCEDURE — 86901 BLOOD TYPING SEROLOGIC RH(D): CPT

## 2022-10-06 RX ORDER — HEPARIN SODIUM,PORCINE 10 UNIT/ML
5 VIAL (ML) INTRAVENOUS ONCE
Status: COMPLETED | OUTPATIENT
Start: 2022-10-06 | End: 2022-10-06

## 2022-10-06 RX ADMIN — HEPARIN, PORCINE (PF) 10 UNIT/ML INTRAVENOUS SYRINGE 5 ML: at 10:37

## 2022-10-06 RX ADMIN — PEGFILGRASTIM 6 MG: 6 INJECTION SUBCUTANEOUS at 10:55

## 2022-10-06 ASSESSMENT — PAIN SCALES - GENERAL: PAINLEVEL: MODERATE PAIN (4)

## 2022-10-06 NOTE — NURSING NOTE
Chief Complaint   Patient presents with     Blood Draw     Labs drawn via PICC by RN in lab.  VS taken        Labs collected from PICC by RN, line flushed with saline and heparin.  Vitals taken. Pt checked in for appointment(s).    Ashia Mclaughlin RN

## 2022-10-06 NOTE — PROGRESS NOTES
Infusion Nursing Note:  Vashti Villegas presents today for Poss PRBC/plt transfusion (not needed) and Neulasta.    Patient seen by provider today: No   present during visit today: Not Applicable.    Note: Patient arrives feeling well. She reported having mild chest pain last night but pain improved once she repositioned to sleep reclined. She this was heartburn related. Denies S/S anemia or thrombocytopenia. Reviewed side effects of Neulasta including bone pain. Pt states she has Claritin at home and will take. Has received Neupogen previously.     Intravenous Access:  PICC. Dressing and end caps changed during lab visit.    Treatment Conditions:  Lab Results   Component Value Date    HGB 8.3 (L) 10/06/2022    WBC 2.4 (L) 10/06/2022    ANEU 3.1 09/12/2022    ANEUTAUTO 1.9 10/06/2022     10/06/2022      Lab Results   Component Value Date     10/03/2022    POTASSIUM 3.6 10/03/2022    MAG 2.2 10/03/2022    CR 0.63 10/03/2022    SOLEDAD 8.2 (L) 10/03/2022    BILITOTAL 0.3 09/30/2022    ALBUMIN 4.0 09/30/2022    ALT 24 09/30/2022    AST 17 09/30/2022     Results reviewed, labs did NOT meet treatment parameters: hgb >7 and plts >10.    Post Infusion Assessment:  Patient tolerated injection without incident into posterior LEFT arm.     Discharge Plan:   Patient declined prescription refills.  Discharge instructions reviewed with: Patient.  Patient and/or family verbalized understanding of discharge instructions and all questions answered.  AVS to patient via [employer].  Patient will return 10/10 for LP/IT chemo.  Patient discharged in stable condition accompanied by: self.  Departure Mode: Ambulatory.      Yazmin Michael RN                    '

## 2022-10-07 ENCOUNTER — MYC MEDICAL ADVICE (OUTPATIENT)
Dept: INTERVENTIONAL RADIOLOGY/VASCULAR | Facility: CLINIC | Age: 42
End: 2022-10-07

## 2022-10-07 ENCOUNTER — TELEPHONE (OUTPATIENT)
Dept: ONCOLOGY | Facility: CLINIC | Age: 42
End: 2022-10-07

## 2022-10-07 RX ORDER — IMATINIB MESYLATE 100 MG/1
400 TABLET, FILM COATED ORAL 2 TIMES DAILY
Status: CANCELLED | OUTPATIENT
Start: 2022-10-14

## 2022-10-07 NOTE — TELEPHONE ENCOUNTER
I spoke with Ms. Villegas. Diarrhea is improving with Immodium.  No abdominal pain.     We discussed the risks and benefits of continuous imatinib and agreed to continue this medication throughout cycle 2  without interruption if she is tolerating it well.     She will undergo a BMBx 10/13. If this remains MRD-negative, it is reasonable to pursue chemotherapy with her GRAAL regimen. I will ask our proceduralist to send this for Clonoseq as well. If she has detectable MRD, we can further discuss BMT versus chemotherapy.      Steve Hull MD, PhD  Division of Hematology, Oncology, and Transplantation  [facility]

## 2022-10-07 NOTE — TELEPHONE ENCOUNTER
Calls in with diarrhea started this AM. Since 7am.   Has had 6 watery stools. Small amounts.   Has not taken anything for the diarrhea.   No blood stool, it is her menstrual cycle but she does not think there was any blood in stool.   Fluids taking in she thinks quite a large amount is still urinating without difficulty.   No abdominal pain.     Advised: Imodium 2 tablets after first stool. Then 1 tablet after each subsequent loose stool up to 8 tablets in a day.     Advised that if imodium does not control diarrhea or if develops abdominal pain or fever. And if stools increase then we would want you to go in to be seen. Call back to triage line if diarrhea worsens or if you develop abdominal pain or fever or if imodium is not working to control your loose stools or go to ER. Keep fluid intake up.

## 2022-10-08 ENCOUNTER — NURSE TRIAGE (OUTPATIENT)
Dept: NURSING | Facility: CLINIC | Age: 42
End: 2022-10-08

## 2022-10-08 NOTE — TELEPHONE ENCOUNTER
"S: Sensitive skin on scalp    B: patient calling-states that she is being treated for ALL and feels that she is having \"random symptoms\". States that she is feeling sensitivity on her scalp but denies a headache and denies a rash in that area. Denies fever, denies redness.     A: skin sensitivity on scalp    R: Patient will monitor her symptoms. She will also have her  look closer at her scalp when he gets home and will call back if new symptoms develop. Patient verbalized understanding and agreement with the plan of care.     NELLIE TREVIZO RN    Reason for Disposition    Mild localized rash or itching present < 7 days    Additional Information    Negative: [1] Sudden onset of rash (within last 2 hours) AND [2] difficulty breathing or swallowing    Negative: Shock suspected (e.g., cold/pale/clammy skin, too weak to stand, low BP, rapid pulse)    Negative: Difficult to awaken or acting confused (e.g., disoriented, slurred speech)    Negative: Sounds like a life-threatening emergency to the triager    Negative: [1] Chickenpox suspected AND [2] known exposure to chickenpox in past 3 weeks    Negative: [1] Drug rash suspected AND [2] started taking new NON-CANCER medicine within last 2 weeks  (Exception: antihistamine, eye drops, ear drops, decongestant or other OTC cough/cold medicines)    Negative: Athlete's Foot suspected (i.e., itchy rash between the toes)    Negative: Hives suspected    Negative: Insect bites suspected    Negative: Jock Itch suspected (i.e., itchy rash on inner thighs near genital area)    Negative: [1] Measles suspected AND [2] known exposure to measles in past 3 weeks    Negative: Poison ivy, oak, or sumac contact suspected    Negative: Shingles suspected (i.e., painful rash, multiple small blisters grouped together in one area of body; dermatomal distribution)    Negative: Sunburn suspected from excessive sun exposure    Negative: Small spot, skin growth, or mole    Negative: Wound " "infection suspected (i.e., pain, spreading redness, or pus; in a cut, puncture, scrape or sutured wound)    Negative: Fever > 103 F (39.4 C)    Negative: [1] Bright red, sunburn-like rash AND [2] current tampon use or nasal packing    Negative: [1] Bright red, sunburn-like rash AND [3] wound infection or recent surgery    Negative: [1] Purple or blood-colored rash (spots or dots) AND [2] fever    Negative: [1] Neutropenia known or suspected (e.g., recent cancer chemotherapy) AND [2] fever > 100.4 F (38.0 C)    Negative: [1] Bright red skin AND [2] peels off in sheets    Negative: [1] Widespread rash AND [2] fever > 100.4 F (38.0 C)    Negative: [1] Severe chills (i.e., feeling extremely cold WITH shaking chills) AND [2] fever > 100.4 F (38.0 C)    Negative: Patient sounds very sick or weak to the triager    Negative: [1] Looks infected (e.g., spreading area redness, red streak, or pus) AND [2] fever > 100.4 F (38.0 C)    Negative: [1] Rash is painful to touch AND [2] fever > 100.4 F (38.0 C)    Negative: Large or small blisters on skin (i.e., fluid filled bubbles or sacs)    Negative: Bloody crusts on lips or sores in mouth    Negative: [1] Purple or blood-colored rash (spots or dots) AND [2] no fever AND [3] sounds well to triager    Negative: [1] Caller has URGENT question AND [2] triager unable to answer question    Negative: SEVERE rash (new or worsening)    Negative: [1] Looks infected (spreading redness, red streak, pus) AND [2] no fever    Negative: Ring-like appearance of rash (or ask: does it look like a  \"target\" or \"bulls- eye\")    Negative: Fever > 100.4 F (38.0 C)    Negative: New or worsening widespread rash    Negative: [1] Caller has NON-URGENT question AND [2] triager unable to answer question    Negative: Localized rash present > 7 days    Negative: Localized itching present > 7 days    Negative: SEVERE itching (i.e., interferes with sleep, normal activities or school)    Protocols used: CANCER - " SKIN SYMPTOMS AND PIVYSVMOA-D-MD

## 2022-10-09 LAB
ABO/RH(D): NORMAL
ANTIBODY SCREEN: NEGATIVE
SPECIMEN EXPIRATION DATE: NORMAL

## 2022-10-10 ENCOUNTER — INFUSION THERAPY VISIT (OUTPATIENT)
Dept: ONCOLOGY | Facility: CLINIC | Age: 42
End: 2022-10-10
Attending: NURSE PRACTITIONER
Payer: COMMERCIAL

## 2022-10-10 ENCOUNTER — HOSPITAL ENCOUNTER (INPATIENT)
Facility: CLINIC | Age: 42
LOS: 4 days | Discharge: HOME HEALTH | End: 2022-10-14
Attending: INTERNAL MEDICINE | Admitting: INTERNAL MEDICINE
Payer: COMMERCIAL

## 2022-10-10 ENCOUNTER — APPOINTMENT (OUTPATIENT)
Dept: LAB | Facility: CLINIC | Age: 42
End: 2022-10-10
Attending: NURSE PRACTITIONER
Payer: COMMERCIAL

## 2022-10-10 VITALS
BODY MASS INDEX: 42.76 KG/M2 | TEMPERATURE: 98.6 F | WEIGHT: 249.1 LBS | SYSTOLIC BLOOD PRESSURE: 107 MMHG | DIASTOLIC BLOOD PRESSURE: 68 MMHG | OXYGEN SATURATION: 100 % | HEART RATE: 107 BPM | RESPIRATION RATE: 16 BRPM

## 2022-10-10 DIAGNOSIS — E66.01 MORBID OBESITY (H): ICD-10-CM

## 2022-10-10 DIAGNOSIS — B02.7 DISSEMINATED HERPES ZOSTER: ICD-10-CM

## 2022-10-10 DIAGNOSIS — C91.01 ACUTE LYMPHOBLASTIC LEUKEMIA (ALL) IN REMISSION (H): ICD-10-CM

## 2022-10-10 DIAGNOSIS — C91.00 ACUTE LYMPHOBLASTIC LEUKEMIA (ALL) NOT HAVING ACHIEVED REMISSION (H): Primary | ICD-10-CM

## 2022-10-10 DIAGNOSIS — B02.7 DISSEMINATED ZOSTER: Primary | ICD-10-CM

## 2022-10-10 DIAGNOSIS — A49.8 INFECTION DUE TO DIPHTHEROID BACTERIA: ICD-10-CM

## 2022-10-10 DIAGNOSIS — C91.00 ACUTE LYMPHOBLASTIC LEUKEMIA (ALL) NOT HAVING ACHIEVED REMISSION (H): ICD-10-CM

## 2022-10-10 LAB
ALBUMIN SERPL BCG-MCNC: 3.7 G/DL (ref 3.5–5.2)
ALBUMIN SERPL BCG-MCNC: 3.7 G/DL (ref 3.5–5.2)
ALP SERPL-CCNC: 61 U/L (ref 35–104)
ALP SERPL-CCNC: 62 U/L (ref 35–104)
ALT SERPL W P-5'-P-CCNC: 24 U/L (ref 10–35)
ALT SERPL W P-5'-P-CCNC: 24 U/L (ref 10–35)
ANION GAP SERPL CALCULATED.3IONS-SCNC: 9 MMOL/L (ref 7–15)
ANION GAP SERPL CALCULATED.3IONS-SCNC: 9 MMOL/L (ref 7–15)
APTT PPP: 35 SECONDS (ref 22–38)
AST SERPL W P-5'-P-CCNC: 12 U/L (ref 10–35)
AST SERPL W P-5'-P-CCNC: 15 U/L (ref 10–35)
BASOPHILS # BLD MANUAL: 0 10E3/UL (ref 0–0.2)
BASOPHILS NFR BLD MANUAL: 2 %
BILIRUB SERPL-MCNC: 0.6 MG/DL
BILIRUB SERPL-MCNC: 0.7 MG/DL
BLD PROD TYP BPU: NORMAL
BLOOD COMPONENT TYPE: NORMAL
BUN SERPL-MCNC: 13.3 MG/DL (ref 6–20)
BUN SERPL-MCNC: 14.9 MG/DL (ref 6–20)
CALCIUM SERPL-MCNC: 8.5 MG/DL (ref 8.6–10)
CALCIUM SERPL-MCNC: 8.7 MG/DL (ref 8.6–10)
CHLORIDE SERPL-SCNC: 105 MMOL/L (ref 98–107)
CHLORIDE SERPL-SCNC: 106 MMOL/L (ref 98–107)
CODING SYSTEM: NORMAL
CREAT SERPL-MCNC: 0.68 MG/DL (ref 0.51–0.95)
CREAT SERPL-MCNC: 0.7 MG/DL (ref 0.51–0.95)
CROSSMATCH: NORMAL
DEPRECATED HCO3 PLAS-SCNC: 24 MMOL/L (ref 22–29)
DEPRECATED HCO3 PLAS-SCNC: 24 MMOL/L (ref 22–29)
EOSINOPHIL # BLD MANUAL: 0 10E3/UL (ref 0–0.7)
EOSINOPHIL NFR BLD MANUAL: 2 %
ERYTHROCYTE [DISTWIDTH] IN BLOOD BY AUTOMATED COUNT: 18.3 % (ref 10–15)
ERYTHROCYTE [DISTWIDTH] IN BLOOD BY AUTOMATED COUNT: 18.5 % (ref 10–15)
FIBRINOGEN PPP-MCNC: 509 MG/DL (ref 170–490)
GFR SERPL CREATININE-BSD FRML MDRD: >90 ML/MIN/1.73M2
GFR SERPL CREATININE-BSD FRML MDRD: >90 ML/MIN/1.73M2
GLUCOSE SERPL-MCNC: 124 MG/DL (ref 70–99)
GLUCOSE SERPL-MCNC: 95 MG/DL (ref 70–99)
HCT VFR BLD AUTO: 20.3 % (ref 35–47)
HCT VFR BLD AUTO: 20.7 % (ref 35–47)
HGB BLD-MCNC: 6.6 G/DL (ref 11.7–15.7)
HGB BLD-MCNC: 6.6 G/DL (ref 11.7–15.7)
INR PPP: 1.08 (ref 0.85–1.15)
ISSUE DATE AND TIME: NORMAL
ISSUE DATE AND TIME: NORMAL
LACTATE SERPL-SCNC: 0.9 MMOL/L (ref 0.7–2)
LDH SERPL L TO P-CCNC: 201 U/L (ref 0–250)
LYMPHOCYTES # BLD MANUAL: 0.5 10E3/UL (ref 0.8–5.3)
LYMPHOCYTES NFR BLD MANUAL: 96 %
MAGNESIUM SERPL-MCNC: 1.9 MG/DL (ref 1.7–2.3)
MCH RBC QN AUTO: 31.4 PG (ref 26.5–33)
MCH RBC QN AUTO: 31.6 PG (ref 26.5–33)
MCHC RBC AUTO-ENTMCNC: 31.9 G/DL (ref 31.5–36.5)
MCHC RBC AUTO-ENTMCNC: 32.5 G/DL (ref 31.5–36.5)
MCV RBC AUTO: 97 FL (ref 78–100)
MCV RBC AUTO: 99 FL (ref 78–100)
MONOCYTES # BLD MANUAL: 0 10E3/UL (ref 0–1.3)
MONOCYTES NFR BLD MANUAL: 0 %
NEUTROPHILS # BLD MANUAL: 0 10E3/UL (ref 1.6–8.3)
NEUTROPHILS NFR BLD MANUAL: 0 %
PHOSPHATE SERPL-MCNC: 2.8 MG/DL (ref 2.5–4.5)
PLAT MORPH BLD: ABNORMAL
PLATELET # BLD AUTO: 13 10E3/UL (ref 150–450)
PLATELET # BLD AUTO: 19 10E3/UL (ref 150–450)
POTASSIUM SERPL-SCNC: 3.7 MMOL/L (ref 3.4–5.3)
POTASSIUM SERPL-SCNC: 3.8 MMOL/L (ref 3.4–5.3)
PROT SERPL-MCNC: 5.9 G/DL (ref 6.4–8.3)
PROT SERPL-MCNC: 6.1 G/DL (ref 6.4–8.3)
RBC # BLD AUTO: 2.09 10E6/UL (ref 3.8–5.2)
RBC # BLD AUTO: 2.1 10E6/UL (ref 3.8–5.2)
RBC MORPH BLD: ABNORMAL
SODIUM SERPL-SCNC: 138 MMOL/L (ref 136–145)
SODIUM SERPL-SCNC: 139 MMOL/L (ref 136–145)
UNIT ABO/RH: NORMAL
UNIT NUMBER: NORMAL
UNIT STATUS: NORMAL
UNIT TYPE ISBT: 5100
UNIT TYPE ISBT: 5100
UNIT TYPE ISBT: 9500
URATE SERPL-MCNC: 2.2 MG/DL (ref 2.4–5.7)
WBC # BLD AUTO: 0.4 10E3/UL (ref 4–11)
WBC # BLD AUTO: 0.5 10E3/UL (ref 4–11)

## 2022-10-10 PROCEDURE — 80053 COMPREHEN METABOLIC PANEL: CPT | Performed by: NURSE PRACTITIONER

## 2022-10-10 PROCEDURE — 85730 THROMBOPLASTIN TIME PARTIAL: CPT | Performed by: PHYSICIAN ASSISTANT

## 2022-10-10 PROCEDURE — 86923 COMPATIBILITY TEST ELECTRIC: CPT | Performed by: PHYSICIAN ASSISTANT

## 2022-10-10 PROCEDURE — 83605 ASSAY OF LACTIC ACID: CPT | Performed by: STUDENT IN AN ORGANIZED HEALTH CARE EDUCATION/TRAINING PROGRAM

## 2022-10-10 PROCEDURE — 258N000003 HC RX IP 258 OP 636: Performed by: PHYSICIAN ASSISTANT

## 2022-10-10 PROCEDURE — 99215 OFFICE O/P EST HI 40 MIN: CPT | Performed by: NURSE PRACTITIONER

## 2022-10-10 PROCEDURE — 120N000002 HC R&B MED SURG/OB UMMC

## 2022-10-10 PROCEDURE — 83615 LACTATE (LD) (LDH) ENZYME: CPT | Performed by: PHYSICIAN ASSISTANT

## 2022-10-10 PROCEDURE — 85384 FIBRINOGEN ACTIVITY: CPT | Performed by: PHYSICIAN ASSISTANT

## 2022-10-10 PROCEDURE — 250N000013 HC RX MED GY IP 250 OP 250 PS 637: Performed by: STUDENT IN AN ORGANIZED HEALTH CARE EDUCATION/TRAINING PROGRAM

## 2022-10-10 PROCEDURE — 84100 ASSAY OF PHOSPHORUS: CPT | Performed by: PHYSICIAN ASSISTANT

## 2022-10-10 PROCEDURE — 84450 TRANSFERASE (AST) (SGOT): CPT | Performed by: PHYSICIAN ASSISTANT

## 2022-10-10 PROCEDURE — 85027 COMPLETE CBC AUTOMATED: CPT | Performed by: PHYSICIAN ASSISTANT

## 2022-10-10 PROCEDURE — 87529 HSV DNA AMP PROBE: CPT

## 2022-10-10 PROCEDURE — 250N000013 HC RX MED GY IP 250 OP 250 PS 637: Performed by: NURSE PRACTITIONER

## 2022-10-10 PROCEDURE — 99222 1ST HOSP IP/OBS MODERATE 55: CPT | Mod: AI | Performed by: STUDENT IN AN ORGANIZED HEALTH CARE EDUCATION/TRAINING PROGRAM

## 2022-10-10 PROCEDURE — 85007 BL SMEAR W/DIFF WBC COUNT: CPT | Performed by: PHYSICIAN ASSISTANT

## 2022-10-10 PROCEDURE — 36592 COLLECT BLOOD FROM PICC: CPT | Performed by: NURSE PRACTITIONER

## 2022-10-10 PROCEDURE — 83735 ASSAY OF MAGNESIUM: CPT | Performed by: PHYSICIAN ASSISTANT

## 2022-10-10 PROCEDURE — P9040 RBC LEUKOREDUCED IRRADIATED: HCPCS | Performed by: PHYSICIAN ASSISTANT

## 2022-10-10 PROCEDURE — 87798 DETECT AGENT NOS DNA AMP: CPT | Performed by: STUDENT IN AN ORGANIZED HEALTH CARE EDUCATION/TRAINING PROGRAM

## 2022-10-10 PROCEDURE — 36592 COLLECT BLOOD FROM PICC: CPT | Performed by: PHYSICIAN ASSISTANT

## 2022-10-10 PROCEDURE — 36592 COLLECT BLOOD FROM PICC: CPT | Performed by: STUDENT IN AN ORGANIZED HEALTH CARE EDUCATION/TRAINING PROGRAM

## 2022-10-10 PROCEDURE — 84550 ASSAY OF BLOOD/URIC ACID: CPT | Performed by: PHYSICIAN ASSISTANT

## 2022-10-10 PROCEDURE — 250N000011 HC RX IP 250 OP 636: Performed by: NURSE PRACTITIONER

## 2022-10-10 PROCEDURE — 250N000011 HC RX IP 250 OP 636: Performed by: PHYSICIAN ASSISTANT

## 2022-10-10 PROCEDURE — 86901 BLOOD TYPING SEROLOGIC RH(D): CPT | Performed by: NURSE PRACTITIONER

## 2022-10-10 PROCEDURE — 85610 PROTHROMBIN TIME: CPT | Performed by: PHYSICIAN ASSISTANT

## 2022-10-10 PROCEDURE — G0463 HOSPITAL OUTPT CLINIC VISIT: HCPCS

## 2022-10-10 PROCEDURE — 250N000013 HC RX MED GY IP 250 OP 250 PS 637: Performed by: PHYSICIAN ASSISTANT

## 2022-10-10 PROCEDURE — 85027 COMPLETE CBC AUTOMATED: CPT | Performed by: NURSE PRACTITIONER

## 2022-10-10 RX ORDER — IMATINIB MESYLATE 400 MG/1
400 TABLET, FILM COATED ORAL 2 TIMES DAILY
Status: DISCONTINUED | OUTPATIENT
Start: 2022-10-10 | End: 2022-10-14 | Stop reason: HOSPADM

## 2022-10-10 RX ORDER — DIPHENHYDRAMINE HCL 25 MG
25 CAPSULE ORAL ONCE
Status: CANCELLED
Start: 2022-10-10 | End: 2022-10-10

## 2022-10-10 RX ORDER — PROCHLORPERAZINE MALEATE 10 MG
10 TABLET ORAL EVERY 6 HOURS PRN
Status: DISCONTINUED | OUTPATIENT
Start: 2022-10-10 | End: 2022-10-14 | Stop reason: HOSPADM

## 2022-10-10 RX ORDER — HEPARIN SODIUM,PORCINE 10 UNIT/ML
5-20 VIAL (ML) INTRAVENOUS EVERY 24 HOURS
Status: DISCONTINUED | OUTPATIENT
Start: 2022-10-10 | End: 2022-10-14 | Stop reason: HOSPADM

## 2022-10-10 RX ORDER — HEPARIN SODIUM,PORCINE 10 UNIT/ML
5-20 VIAL (ML) INTRAVENOUS
Status: DISCONTINUED | OUTPATIENT
Start: 2022-10-10 | End: 2022-10-14 | Stop reason: HOSPADM

## 2022-10-10 RX ORDER — AMOXICILLIN 250 MG
2 CAPSULE ORAL 2 TIMES DAILY PRN
Status: DISCONTINUED | OUTPATIENT
Start: 2022-10-10 | End: 2022-10-14 | Stop reason: HOSPADM

## 2022-10-10 RX ORDER — PANTOPRAZOLE SODIUM 20 MG/1
20 TABLET, DELAYED RELEASE ORAL DAILY
Status: DISCONTINUED | OUTPATIENT
Start: 2022-10-11 | End: 2022-10-14 | Stop reason: HOSPADM

## 2022-10-10 RX ORDER — DIPHENHYDRAMINE HCL 25 MG
25 CAPSULE ORAL
Status: COMPLETED | OUTPATIENT
Start: 2022-10-10 | End: 2022-10-10

## 2022-10-10 RX ORDER — CALCIUM CARBONATE 500 MG/1
1000 TABLET, CHEWABLE ORAL DAILY PRN
Status: DISCONTINUED | OUTPATIENT
Start: 2022-10-10 | End: 2022-10-14 | Stop reason: HOSPADM

## 2022-10-10 RX ORDER — ONDANSETRON 8 MG/1
8 TABLET, FILM COATED ORAL EVERY 8 HOURS PRN
Status: DISCONTINUED | OUTPATIENT
Start: 2022-10-10 | End: 2022-10-14 | Stop reason: HOSPADM

## 2022-10-10 RX ORDER — ACETAMINOPHEN 325 MG/1
650 TABLET ORAL EVERY 6 HOURS PRN
Status: DISCONTINUED | OUTPATIENT
Start: 2022-10-10 | End: 2022-10-14 | Stop reason: HOSPADM

## 2022-10-10 RX ORDER — ACETAMINOPHEN 325 MG/1
325 TABLET ORAL ONCE
Status: CANCELLED
Start: 2022-10-10 | End: 2022-10-10

## 2022-10-10 RX ORDER — ONDANSETRON 2 MG/ML
8 INJECTION INTRAMUSCULAR; INTRAVENOUS EVERY 8 HOURS PRN
Status: DISCONTINUED | OUTPATIENT
Start: 2022-10-10 | End: 2022-10-14 | Stop reason: HOSPADM

## 2022-10-10 RX ORDER — HEPARIN SODIUM (PORCINE) LOCK FLUSH IV SOLN 100 UNIT/ML 100 UNIT/ML
5 SOLUTION INTRAVENOUS
Status: CANCELLED | OUTPATIENT
Start: 2022-10-10

## 2022-10-10 RX ORDER — ONDANSETRON 8 MG/1
8 TABLET, ORALLY DISINTEGRATING ORAL EVERY 8 HOURS PRN
Status: DISCONTINUED | OUTPATIENT
Start: 2022-10-10 | End: 2022-10-14 | Stop reason: HOSPADM

## 2022-10-10 RX ORDER — LEVOFLOXACIN 250 MG/1
250 TABLET, FILM COATED ORAL DAILY
Status: DISCONTINUED | OUTPATIENT
Start: 2022-10-10 | End: 2022-10-14 | Stop reason: HOSPADM

## 2022-10-10 RX ORDER — AMLODIPINE BESYLATE 5 MG/1
5 TABLET ORAL DAILY
Status: DISCONTINUED | OUTPATIENT
Start: 2022-10-11 | End: 2022-10-14 | Stop reason: HOSPADM

## 2022-10-10 RX ORDER — HEPARIN SODIUM,PORCINE 10 UNIT/ML
5 VIAL (ML) INTRAVENOUS
Status: DISCONTINUED | OUTPATIENT
Start: 2022-10-10 | End: 2022-10-10 | Stop reason: HOSPADM

## 2022-10-10 RX ORDER — LORAZEPAM 0.5 MG/1
.5-1 TABLET ORAL EVERY 6 HOURS PRN
Status: DISCONTINUED | OUTPATIENT
Start: 2022-10-10 | End: 2022-10-14 | Stop reason: HOSPADM

## 2022-10-10 RX ORDER — HEPARIN SODIUM,PORCINE 10 UNIT/ML
5 VIAL (ML) INTRAVENOUS
Status: CANCELLED | OUTPATIENT
Start: 2022-10-10

## 2022-10-10 RX ORDER — ACETAMINOPHEN 325 MG/1
325 TABLET ORAL ONCE
Status: COMPLETED | OUTPATIENT
Start: 2022-10-10 | End: 2022-10-10

## 2022-10-10 RX ORDER — LORAZEPAM 2 MG/ML
.5-1 INJECTION INTRAMUSCULAR EVERY 6 HOURS PRN
Status: DISCONTINUED | OUTPATIENT
Start: 2022-10-10 | End: 2022-10-14 | Stop reason: HOSPADM

## 2022-10-10 RX ORDER — FLUCONAZOLE 200 MG/1
200 TABLET ORAL DAILY
Status: DISCONTINUED | OUTPATIENT
Start: 2022-10-10 | End: 2022-10-14 | Stop reason: HOSPADM

## 2022-10-10 RX ORDER — DIPHENHYDRAMINE HCL 25 MG
25 CAPSULE ORAL ONCE
Status: COMPLETED | OUTPATIENT
Start: 2022-10-10 | End: 2022-10-10

## 2022-10-10 RX ORDER — POLYETHYLENE GLYCOL 3350 17 G/17G
17 POWDER, FOR SOLUTION ORAL DAILY PRN
Status: DISCONTINUED | OUTPATIENT
Start: 2022-10-10 | End: 2022-10-14 | Stop reason: HOSPADM

## 2022-10-10 RX ORDER — AMOXICILLIN 250 MG
1 CAPSULE ORAL 2 TIMES DAILY PRN
Status: DISCONTINUED | OUTPATIENT
Start: 2022-10-10 | End: 2022-10-14 | Stop reason: HOSPADM

## 2022-10-10 RX ADMIN — ACETAMINOPHEN 650 MG: 325 TABLET, FILM COATED ORAL at 21:01

## 2022-10-10 RX ADMIN — Medication 5 ML: at 12:29

## 2022-10-10 RX ADMIN — LEVOFLOXACIN 250 MG: 250 TABLET, FILM COATED ORAL at 18:38

## 2022-10-10 RX ADMIN — DIPHENHYDRAMINE HYDROCHLORIDE 25 MG: 25 CAPSULE ORAL at 15:46

## 2022-10-10 RX ADMIN — DIPHENHYDRAMINE HYDROCHLORIDE 25 MG: 25 CAPSULE ORAL at 21:01

## 2022-10-10 RX ADMIN — ACYCLOVIR SODIUM 800 MG: 1000 INJECTION, SOLUTION INTRAVENOUS at 19:35

## 2022-10-10 RX ADMIN — Medication 5 ML: at 12:28

## 2022-10-10 RX ADMIN — ACETAMINOPHEN 325 MG: 325 TABLET ORAL at 15:46

## 2022-10-10 RX ADMIN — FLUCONAZOLE 200 MG: 200 TABLET ORAL at 18:38

## 2022-10-10 ASSESSMENT — PAIN SCALES - GENERAL: PAINLEVEL: NO PAIN (0)

## 2022-10-10 ASSESSMENT — ACTIVITIES OF DAILY LIVING (ADL)
ADLS_ACUITY_SCORE: 20
FALL_HISTORY_WITHIN_LAST_SIX_MONTHS: NO
CHANGE_IN_FUNCTIONAL_STATUS_SINCE_ONSET_OF_CURRENT_ILLNESS/INJURY: NO

## 2022-10-10 NOTE — NURSING NOTE
"Oncology Rooming Note    October 10, 2022 1:29 PM   April F Bakari is a 42 year old female who presents for:    Chief Complaint   Patient presents with     Blood Draw     Labs drawn from picc by rn.  VS taken.     Oncology Clinic Visit       Acute Lymphoblastic Leukemia     Initial Vitals: /68 (BP Location: Right arm, Patient Position: Sitting, Cuff Size: Adult Large)   Pulse 107   Temp 98.6  F (37  C) (Oral)   Resp 16   Wt 113 kg (249 lb 1.6 oz)   SpO2 100%   BMI 42.76 kg/m   Estimated body mass index is 42.76 kg/m  as calculated from the following:    Height as of 9/30/22: 1.626 m (5' 4\").    Weight as of this encounter: 113 kg (249 lb 1.6 oz). Body surface area is 2.26 meters squared.  No Pain (0) Comment: Data Unavailable   No LMP recorded.  Allergies reviewed: Yes  Medications reviewed: Yes    Medications: Medication refills not needed today.  Pharmacy name entered into [facility]:    [id number] [address]  [facility] - [place], [place] - [address]    Clinical concerns:       Clari Joseph LPN  10/10/2022                "

## 2022-10-10 NOTE — LETTER
10/10/2022         RE: Vashti Villegas  [address] [place] [address]        Dear Colleague,    Thank you for referring your patient, Vashti Villegas, to the [facility]. Please see a copy of my visit note below.       Reason for Visit: add-on visit, evaluation of pancytopenia    Oncology HPI:   --Patient presented to outside hospital with c/o right upper quadrant pain and was subsequently found to have a markedly elevated white blood cell count concerning for acute leukemia. While at OSH, she had CT PE protocol as well as CT A/P. These identified no PE, no acute or localized intraabdominal inflammatory process, mild splenomegaly, and few inconspicous low-density structures in the liver. She was transferred to [facility] for further workup and management. S/p Hydrea 1g TID (8/21-8/23) with improvement in WBC (100K ? 12K).   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 IHC shows CD10+, CD34+. 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 Abnormal cytogenetics. Initiated pre-phase steroids with Prednisone 60 mg x8/21; increased to 60 mg/m2 (140 mg) 8/22-8/24. She initiated GRAALL + imatinib (C1D1 = 8/24/22).   --S/p BMT consult 9/12 with Dr. Walsh. Induction was relatively uncomplicated. Post-induction BMBx done 9/26. Morphology inconclusive due to inadequate core but flow negative. Awaiting FISH/chromosomes. BCR/abl major and minor undetectable (as were baseline tests). She was admitted for C2 GRAALL.     --BMBx 9/26/22 MRD-negative by BCR-ABL PCR and by Clonoseq Assay.      Treatment Plan: GRAALL + imatinib (C2D1=9/30/22)  - Imatinib 400 mg BID - D1-14 (9/29-10/12)  - IVFs per chemo protocol, bicarb support  - Methotrexate 1 g/m2 IV x once on D1. Administered over 24 hours  - Leucovorin calcium q6hr starting 12 hr after END of methotrexate infusion. Continue until  methotrexate level <0.05  - cytarabine 3 g/m2 IV q12 hr x 4 doses on Days 2-3  - Premeds: Zofran 16mg D1, 8mg q12hr x 4 doses (D2-3);  Dex 12mg D1-3, 8mg D4-5  - Supportive Care: Pred Forte eye drops  - Neulasta ~D6. This has been added on to 10/6 appts  - IT chemo on D9. Will need under imaging guidance. As D9 falls on 10/8 (weekend), requested procedure for 10/7. She will have labs on 10/6 (already scheduled) to determine platelet count in anticipation of this procedure as well. Awaiting scheduling.     # CNS prophylaxis  Per the GRAALL protocol and given risk for CNS involvement of ALL. Will require LPs under fluoroscopic guidance.   - s/p weekly triple IT chemo (Cytarabine, Solu-Cortef, MTX) on Days 1, 8, and 15 of C1 induction. She additionally had repeat LP on 9/14. CSF flow has remained negative.   - Pt expresses anxiety surrounding procedures; pre-medication with 0.5mg PO Ativan +/- Atarax available prior PRN    Interval history:   April was to be seen for day 9 IT chemo with cytarabine, solu-Cortef and methotrexate. Her labs prior to the procedure revealed pancytopenia. We are cancelling the procedure and seeing her for evaluation.    April has generally been feeling well. Did start to have scalp pain and low grade fevers over the weekend.  has noted a new rash on her scalp. She has also noted a rash on the upper lip. She thought it was a few pimples initially as she had her period, but they have turned into more tender sores. No other skin lesions.   -no vision changes or eye pain. No ear pain. No oral lesions  -stamina is lower than baseline, no chest pain. Has mild dypsnea with exertion  -bowel/bladder function is wnl.    Current Outpatient Medications   Medication Sig Dispense Refill     acetaminophen (TYLENOL) 325 MG tablet Take 2 tablets (650 mg) by mouth every 6 hours as needed for mild pain, other or fever (blood product administration premedication)       acyclovir (ZOVIRAX) 400 MG tablet  Take 1 tablet (400 mg) by mouth 2 times daily for 60 days 120 tablet 0     amLODIPine (NORVASC) 5 MG tablet Take 1 tablet (5 mg) by mouth daily 30 tablet 0     calcium carbonate (TUMS) 500 MG chewable tablet Take 2 tablets (1,000 mg) by mouth daily as needed for heartburn       dexamethasone (DECADRON) 4 MG tablet Take 2 tablets (8 mg) by mouth every morning Take 2 tabs (8 mg) once on 10/4, then stop 2 tablet 0     fluconazole (DIFLUCAN) 200 MG tablet Take 1 tablet (200 mg) by mouth daily as needed (Take when ANC <1000) 60 tablet 0     heparin lock flush 10 UNIT/ML SOLN injection 10 mLs by Intracatheter route daily Flush 5 mL into each PICC lumen daily. 300 mL 0     imatinib (GLEEVEC) 400 MG tablet Take 1 tablet (400 mg) by mouth 2 times daily Through 10/12. Stop on 10/13. 19 tablet 0     levofloxacin (LEVAQUIN) 250 MG tablet Take 1 tablet (250 mg) by mouth daily as needed (ANC <1000) (Patient not taking: Reported on 10/2/2022) 60 tablet 0     ondansetron (ZOFRAN ODT) 4 MG ODT tab Take 1-2 tablets (4-8 mg) by mouth every 8 hours as needed for nausea or vomiting (Patient not taking: Reported on 10/2/2022) 60 tablet 0     pantoprazole (PROTONIX) 20 MG EC tablet Take 1 tablet (20 mg) by mouth daily 90 tablet 1     prednisoLONE acetate (PRED FORTE) 1 % ophthalmic suspension Place 2 drops into both eyes 4 times daily Use through 10/5, stop 10/6 5 mL 0     prochlorperazine (COMPAZINE) 10 MG tablet Take 1 tablet (10 mg) by mouth every 6 hours as needed (Breakthrough Nausea/Vomiting) 20 tablet 0     sodium chloride, PF, (SALINE FLUSH) 0.9% PF flush 20 mLs by Intracatheter route daily Flush PICC with 20 mL daily, 10 mL per lumen 600 mL 0          Allergies   Allergen Reactions     Blood Transfusion Related (Informational Only) Hives     Hive reaction with platelet transfusion on 8/26/2022. Please administer platelets with tylenol and benadryl premedication.          Exam: alert, in NAD.  Blood pressure 107/68, pulse 107,  temperature 98.6  F (37  C), temperature source Oral, resp. rate 16, weight 113 kg (249 lb 1.6 oz), SpO2 100 %.  Wt Readings from Last 4 Encounters:   10/10/22 113 kg (249 lb 1.6 oz)   10/06/22 116.8 kg (257 lb 9.6 oz)   10/03/22 118.9 kg (262 lb 1.6 oz)   09/26/22 115.3 kg (254 lb 1.6 oz)     Oropharynx is moist and without lesion. Neck supple and without adenopathy. Lungs:CTA. Heart: RRR, no murmur or rub. Abdomen: soft, nontender, BS active, no masses or organomegaly.  Extremities: warm, no edema. Speech is clear. CN wnl. Gait/station wnl. Skin: bruising over the lower abdomen at prior injection sites. Erythematous, hemorrhagic papular rash on the scalp with an increased cluster in the medial scalp, but extending on the right and left. Similar, but smaller pustules that are scabbed over on the upper lip. Similar lesion on the right 5th digit. No lesions on the back, legs or arms. Abdomen has one small erythematous macule in the left mid abdomen.       Labs:    Latest Reference Range & Units 10/10/22 12:37   Sodium 136 - 145 mmol/L 138   Potassium 3.4 - 5.3 mmol/L 3.8   Chloride 98 - 107 mmol/L 105   Carbon Dioxide (CO2) 22 - 29 mmol/L 24   Urea Nitrogen 6.0 - 20.0 mg/dL 14.9   Creatinine 0.51 - 0.95 mg/dL 0.70   GFR Estimate >60 mL/min/1.73m2 >90   Calcium 8.6 - 10.0 mg/dL 8.7   Anion Gap 7 - 15 mmol/L 9   Albumin 3.5 - 5.2 g/dL 3.7   Protein Total 6.4 - 8.3 g/dL 6.1 (L)   Alkaline Phosphatase 35 - 104 U/L 62   ALT 10 - 35 U/L 24   AST 10 - 35 U/L 12   Bilirubin Total <=1.2 mg/dL 0.7   Glucose 70 - 99 mg/dL 95   WBC 4.0 - 11.0 10e3/uL 0.4 (LL)   Hemoglobin 11.7 - 15.7 g/dL 6.6 (LL)   Hematocrit 35.0 - 47.0 % 20.3 (L)   Platelet Count 150 - 450 10e3/uL 19 (LL)   RBC Count 3.80 - 5.20 10e6/uL 2.09 (L)   MCV 78 - 100 fL 97   MCH 26.5 - 33.0 pg 31.6   MCHC 31.5 - 36.5 g/dL 32.5   RDW 10.0 - 15.0 % 18.3 (H)   (LL): Data is critically low  (L): Data is abnormally low  (H): Data is abnormally high    Imaging:  n/a    Impression/plan:   42 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  -s/p cycle 2 CRAAL + imatinib, day 10 today. Did get neulasta on day 6.  -was due for delayed day 9 IT chemotherapy, but was found to be pancytopenic.    -I saw her in clinic. She has new scalp pain and rash on the scalp and face concerning for disseminated zoster. Has been taking prophy  mg bid  -will admit for treatment of disseminated zoster, HSV PCR from skin lesion, derm consult and IV antivirals    Pancytopenia post cycle 2 GRALL  -transfuse 1 unit(s) prbc today. HOLD LP, would need platelets >50K  -prior hive reactions with blood products. Premed with tylenol 650 mg, benadryl 25 mg  -defer BM Bx until count recovery  -hold imatinib with pancytopenia    -discussed the above plan with Dr. Hull today. He communicated with the admitting MD, Dr. Jang who has agreed to admit and manage the patient    ID: PPX  -pnetamidine every 28 days for PJP ppx, last given 10/2  -start ppx levaquin and fluxonazole with ANC <1.0  -had been on  mg bid, will be on high dose treatment if confirmed for zoster        Again, thank you for allowing me to participate in the care of your patient.      Sincerely,    LOAN Campos CNP

## 2022-10-10 NOTE — H&P
[facility]    History and Physical - Hospitalist Service       Date of Admission:  10/10/22      Assessment & Plan      Vashti Villegas is a 42 year old female admitted on 10/10/22. She has a PMH of Ph+ B-ALL with BCR-abl fusion, s/p GRAALL +imatinib. Admitted today with lesions 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    Crusting lesions on scalp and face  Concern for disseminated VZV  Ruptured and crusted lesions along part line and around left side of mouth with one vesicular lesion seen on scalp 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 Low likelihood to be bug bites due to presence along face as well as scalp, although linear placement along scalp would be consistent with spider bites. Prior to presentation of lesions, had experienced extreme tenderness of scalp to touch, but no burning or neuropathic sensations. Photos taken and in chart.   - Dermatology consulted, appreciate assistance.   - IV acyclovir initiated   - HSV and VZV PCR pending   - ID consult pending results from cultures   - Holding PTA acyclovir ppx   - Airborne precautions    Ph+ B-ALL  Pancytopenia, absolute neutropenia  s/p cycle 2 CRAAL + imatinib, day 10 today. Did get neulasta on day 6. Was due for delayed day 9 IT chemotherapy.   - RBCs for hgb< 7, will need premedication with benadryl and tylenol   - Plts for <25K   - Holding imantinib   - Admitted to hematology malignancy service   - Fluconazole and levofloxacin for ppx given neutropenia   - PJP ppx: pentamidine, last given 10/2   - Continue PTA pantoprazole    HTN  Continue PTA amlodipine       Diet: Regular Diet Adult  DVT Prophylaxis: Pneumatic Compression Devices  Herrera Catheter: Not present  Central Lines: PRESENT  PICC Double Lumen 09/30/22 Left Basilic-Site Assessment: WDL  Cardiac Monitoring: None  Code Status: Full Code    Clinically Significant Risk Factors Present on Admission               # Coagulation Defect: home medication list includes an  "anticoagulant medication  # Thrombocytopenia: Plts = 19 10e3/uL (Ref range: 150 - 450 10e3/uL) on admission, will monitor for bleeding     # Severe Obesity: Estimated body mass index is 42.71 kg/m  as calculated from the following:    Height as of this encounter: 1.626 m (5' 4\").    Weight as of this encounter: 112.9 kg (248 lb 12.8 oz).        Disposition Plan      Expected Discharge Date: 10/14/2022                The patient's care was discussed with the Bedside Nurse and Patient.    Carmen Alvarez MD  Hospitalist Service  [facility]  Securely message with the Vocera Web Console (learn more here)  Text page via [facility] Paging/Directory         ______________________________________________________________________    Chief Complaint   Rash on scalp and face    History is obtained from the patient and electronic health record    History of Present Illness   April F Bakari is a 42 year old female who has a PMH of Ph+ B-ALL with BCR-abl fusion, s/p GRAALL +imatinib. She was at home doing well for the last few weeks. 3 days ago she had some diarrhea so she called the oncology clinic who prescribed imodium, which worked. 2 days ago she started having increased sensitivity to touch on her scalp - no burning or tingly, but more sensitive to pressure when touched. The next day she noticed some lesions around the left side of her mouth but thought they were pimples - they were not painful or irritating. This AM she awoke with blood on her pillow and then had her daughter look at her scalp and found quite a few lesions along the part line of her hair. She had not noticed any lesions there prior.     She did have chicken pox as a child, as did her . Her children are vaccinated against chicken pox. She has had no fevers at home, highest temp was 99.8. No fatigue, no abdominal pain, chest pain or SOB. No gum bleeding or blood in her urine or stools.     Review of Systems  "   The 10 point Review of Systems is negative other than noted in the HPI or here.     Past Medical History    I have reviewed this patient's medical history and updated it with pertinent information if needed.   Past Medical History:   Diagnosis Date     Other acne     - B-ALL   - HTN    Past Surgical History   I have reviewed this patient's surgical history and updated it with pertinent information if needed.  Past Surgical History:   Procedure Laterality Date     PICC Left 09/30/2022    Picc ok to use     PICC DOUBLE LUMEN PLACEMENT Left 08/20/2022    left cephalic 5 fr dl picc 44 cm     ZZC NONSPECIFIC PROCEDURE  01/01/1999    benign mole removal       Social History   I have reviewed this patient's social history and updated it with pertinent information if needed.  Social History     Tobacco Use     Smoking status: Never     Smokeless tobacco: Never       Family History     Maternal grandmother with breast cancer  Female cousin also with leukemia around the same age as Apri    Prior to Admission Medications   Prior to Admission Medications   Prescriptions Last Dose Informant Patient Reported? Taking?   acetaminophen (TYLENOL) 325 MG tablet   Yes No   Sig: Take 2 tablets (650 mg) by mouth every 6 hours as needed for mild pain, other or fever (blood product administration premedication)   acyclovir (ZOVIRAX) 400 MG tablet   No No   Sig: Take 1 tablet (400 mg) by mouth 2 times daily for 60 days   amLODIPine (NORVASC) 5 MG tablet   No No   Sig: Take 1 tablet (5 mg) by mouth daily   calcium carbonate (TUMS) 500 MG chewable tablet   No No   Sig: Take 2 tablets (1,000 mg) by mouth daily as needed for heartburn   dexamethasone (DECADRON) 4 MG tablet   No No   Sig: Take 2 tablets (8 mg) by mouth every morning Take 2 tabs (8 mg) once on 10/4, then stop   fluconazole (DIFLUCAN) 200 MG tablet   No No   Sig: Take 1 tablet (200 mg) by mouth daily as needed (Take when ANC <1000)   heparin lock flush 10 UNIT/ML SOLN injection   " No No   Sig: 10 mLs by Intracatheter route daily Flush 5 mL into each PICC lumen daily.   imatinib (GLEEVEC) 400 MG tablet   No No   Sig: Take 1 tablet (400 mg) by mouth 2 times daily Through 10/12. Stop on 10/13.   levofloxacin (LEVAQUIN) 250 MG tablet   No No   Sig: Take 1 tablet (250 mg) by mouth daily as needed (ANC <1000)   ondansetron (ZOFRAN ODT) 4 MG ODT tab   No No   Sig: Take 1-2 tablets (4-8 mg) by mouth every 8 hours as needed for nausea or vomiting   pantoprazole (PROTONIX) 20 MG EC tablet   No No   Sig: Take 1 tablet (20 mg) by mouth daily   prednisoLONE acetate (PRED FORTE) 1 % ophthalmic suspension   No No   Sig: Place 2 drops into both eyes 4 times daily Use through 10/5, stop 10/6   prochlorperazine (COMPAZINE) 10 MG tablet   No No   Sig: Take 1 tablet (10 mg) by mouth every 6 hours as needed (Breakthrough Nausea/Vomiting)   sodium chloride, PF, (SALINE FLUSH) 0.9% PF flush   No No   Si mLs by Intracatheter route daily Flush PICC with 20 mL daily, 10 mL per lumen      Facility-Administered Medications: None     Allergies   Allergies   Allergen Reactions     Blood Transfusion Related (Informational Only) Hives     Hive reaction with platelet transfusion on 2022. Please administer platelets with tylenol and benadryl premedication.        Physical Exam   Vital Signs: Temp: 98.4  F (36.9  C) Temp src: Oral BP: 133/58 Pulse: 98   Resp: 16 SpO2: 99 % O2 Device: None (Room air)    Weight: 248 lbs 12.8 oz    /58 (BP Location: Right arm)   Pulse 98   Temp 98.4  F (36.9  C) (Oral)   Resp 16   Ht 1.626 m (5' 4\")   Wt 112.9 kg (248 lb 12.8 oz)   SpO2 99%   BMI 42.71 kg/m       Gen: NAD, alert, pleasant, cooperative, non-toxic  HEENT: EOMI, no conjunctival icterus, tracking appropriately, no sores appreciated within mouth or on lips  Resp: CTAB, no crackles or wheezes, no increased WOB  Cardiac: RRR, no S3/S4, no M/R/G appreciated  GI: soft, non-tender, non-distended  Ext: WWP, no edema, " spontaneous movement in all 4  Neuro: AOx3, CN 2-12 grossly intact, appropriate mentation  Skin: crusting 3mm lesions along part line of hair but not deviating from the part line of her hair, one vesicular lesion appreciated. 3-4 crusted lesions around left side of mouth about 1 inch away from lip angle.       Data   Data reviewed today: I reviewed all medications, new labs and imaging results over the last 24 hours. Labs and Imaging reviewed in Epic, pertinent discussed in A&P.

## 2022-10-10 NOTE — NURSING NOTE
Chief Complaint   Patient presents with     Blood Draw     Labs drawn from picc by rn.  VS taken.     Labs drawn from PICC by rn.  Good blood return noted in both lumens.  Both lumens flushed with NS and heparin.  Pt tolerated well.  VS taken.  Pt checked in for next appt.    Nithya Fung RN

## 2022-10-10 NOTE — PATIENT INSTRUCTIONS
Contact Numbers  [facility]: [phone] (for symptom and scheduling needs)    Please call the [facility] Triage line if you experience a temperature greater than or equal to 100.4, shaking chills, have uncontrolled nausea, vomiting and/or diarrhea, dizziness, shortness of breath, chest pain, bleeding, unexplained bruising, or if you have any other new/concerning symptoms, questions or concerns.     If you are having any concerning symptoms or wish to speak to a provider before your next infusion visit, please call your care coordinator or triage to notify them so we can adequately serve you.     If you need a refill on a narcotic prescription or other medication, please call triage before your infusion appointment.           October 2022 Sunday Monday Tuesday Wednesday Thursday Friday Saturday                                 1       2     3     4     5     6    LAB PERIPHERAL  10:00 AM   (15 min.)   [facility] LAB DRAW   [facility]    ONC INFUSION 4 HR (240 MIN)  10:30 AM   (240 min.)    ONC INFUSION NURSE   [facility] 7     8       9     10    LAB CENTRAL  12:15 PM   (15 min.)   [facility] LAB DRAW   [facility]    LUMBAR PUNCTURE  12:45 PM   (45 min.)   Gala Lo APRN CNP   [facility]    ONC INFUSION 4 HR (240 MIN)   2:00 PM   (240 min.)    ONC INFUSION NURSE   [facility]     11     12     13    CT BONE BIOPSY DEEP   9:30 AM   (90 min.)   UUCT2   [facility]    PROCEDURE - 4.5 HR   9:30 AM   (270 min.)   U2A ROOM 3   [facility] [address] [place]    BONE MARROW BIOPSY TECH  11:00 AM   (90 min.)   UU BONE MARROW BIOPSY [facility] Laboratory    LAB   1:45 PM   (15 min.)   LAB ONC [facility] Laboratory    INFUSION 3.5 HR (210 MIN)   2:00 PM    (210 min.)   BAY 10 INFUSION   [facility] 14     15       16     17    LAB   1:30 PM   (15 min.)   LAB ONC [facility]    INFUSION 3.5 HR (210 MIN)   2:00 PM   (210 min.)   Truman 3 INFUSION   [facility] 18     19     20    LAB   1:30 PM   (15 min.)   LAB ONC [facility]    INFUSION 3.5 HR (210 MIN)   2:00 PM   (210 min.)   Truman 3 INFUSION   [facility] 21     22       23     24    LAB   1:30 PM   (15 min.)   LAB ONC [facility]    INFUSION 3.5 HR (210 MIN)   2:00 PM   (210 min.)   [place]uman 6 INFUSION   [facility] 25     26     27    LAB   1:15 PM   (15 min.)   LAB ONC [facility] Laboratory    LAB CENTRAL   1:30 PM   (15 min.)   NURSE ONLY CANCER [facility]    INFUSION 3.5 HR (210 MIN)   2:00 PM   (210 min.)   Truman 8 INFUSION   [facility] 28     29       30     31 November 2022 Sunday Monday Tuesday Wednesday Thursday Friday Saturday             1     2     3     4     5       6     7     8     9     10     11     12       13     14     15     16     17     18     19       20     21     22     23     24     25     26       27     28     29     30                                      Lab Results:  Recent Results (from the past 12 hour(s))   Comprehensive metabolic panel    Collection Time: 10/10/22 12:37 PM   Result Value Ref Range    Sodium 138 136 - 145 mmol/L    Potassium 3.8 3.4 - 5.3 mmol/L    Chloride 105 98 - 107 mmol/L    Carbon Dioxide (CO2) 24 22 - 29 mmol/L    Anion Gap 9 7 - 15 mmol/L    Urea Nitrogen 14.9 6.0 - 20.0 mg/dL    Creatinine 0.70 0.51 - 0.95 mg/dL    Calcium 8.7 8.6 - 10.0 mg/dL    Glucose 95 70  - 99 mg/dL    Alkaline Phosphatase 62 35 - 104 U/L    AST 12 10 - 35 U/L    ALT 24 10 - 35 U/L    Protein Total 6.1 (L) 6.4 - 8.3 g/dL    Albumin 3.7 3.5 - 5.2 g/dL    Bilirubin Total 0.7 <=1.2 mg/dL    GFR Estimate >90 >60 mL/min/1.73m2   Adult Type and Screen    Collection Time: 10/10/22 12:37 PM   Result Value Ref Range    ABO/RH(D) O POS     Antibody Screen Negative Negative    SPECIMEN EXPIRATION DATE [id number]    CBC with platelets and differential    Collection Time: 10/10/22 12:37 PM   Result Value Ref Range    WBC Count 0.4 (LL) 4.0 - 11.0 10e3/uL    RBC Count 2.09 (L) 3.80 - 5.20 10e6/uL    Hemoglobin 6.6 (LL) 11.7 - 15.7 g/dL    Hematocrit 20.3 (L) 35.0 - 47.0 %    MCV 97 78 - 100 fL    MCH 31.6 26.5 - 33.0 pg    MCHC 32.5 31.5 - 36.5 g/dL    RDW 18.3 (H) 10.0 - 15.0 %    Platelet Count 19 (LL) 150 - 450 10e3/uL   Prepare red blood cells (unit)    Collection Time: 10/10/22  2:15 PM   Result Value Ref Range    Blood Component Type Red Blood Cells     Product Code [id number]     Unit Status Ready for issue     Unit Number [id number]     CROSSMATCH Compatible     CODING SYSTEM [id number]

## 2022-10-10 NOTE — PROGRESS NOTES
Infusion Nursing Note:  April SHARI Villegas presents today for 1 unit RBC's (Not Given).    Patient seen by provider today: Yes: Gala Lo NP   present during visit today: Not Applicable.    Note: Patient presents to infusion today doing okay. Rash on scalp that patient states is tender to the touch. HSV swab of scalp completed per orders. Will be getting admitted to the hospital today per Gala Lo NP for further management.    Tylenol and Benadryl given as premedications to RBC's. However, unable to give RBC's in infusion today as bed is ready over at the hospital and RBC's still not in the building. RN called over to 7D and gave updated report to Charge Nurse.    Intravenous Access:  PICC.    Treatment Conditions:  Lab Results   Component Value Date    HGB 6.6 (LL) 10/10/2022    WBC 0.4 (LL) 10/10/2022    ANEU 3.1 09/12/2022    ANEUTAUTO 1.9 10/06/2022    PLT 19 (LL) 10/10/2022      Lab Results   Component Value Date     10/10/2022    POTASSIUM 3.8 10/10/2022    MAG 2.2 10/03/2022    CR 0.70 10/10/2022    SOLEDAD 8.7 10/10/2022    BILITOTAL 0.7 10/10/2022    ALBUMIN 3.7 10/10/2022    ALT 24 10/10/2022    AST 12 10/10/2022     Results reviewed, labs did NOT meet treatment parameters: Hgb <7, 1 unit RBC's needed. Platelets > 10, no platelets needed today.  Blood transfusion consent signed TODAY 10/10/2022.    Discharge Plan:   Patient discharged in stable condition to 7D at the [facility] accompanied by: [employer].  Departure Mode: Cart.      Kelly Devries RN

## 2022-10-10 NOTE — PROGRESS NOTES
Reason for Visit: add-on visit, evaluation of pancytopenia    Oncology HPI:   --Patient presented to outside hospital with c/o right upper quadrant pain and was subsequently found to have a markedly elevated white blood cell count concerning for acute leukemia. While at OSH, she had CT PE protocol as well as CT A/P. These identified no PE, no acute or localized intraabdominal inflammatory process, mild splenomegaly, and few inconspicous low-density structures in the liver. She was transferred to [facility] for further workup and management. S/p Hydrea 1g TID (8/21-8/23) with improvement in WBC (100K ? 12K).   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 IHC shows CD10+, CD34+. 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 Abnormal cytogenetics. Initiated pre-phase steroids with Prednisone 60 mg x8/21; increased to 60 mg/m2 (140 mg) 8/22-8/24. She initiated GRAALL + imatinib (C1D1 = 8/24/22).   --S/p BMT consult 9/12 with Dr. Walsh. Induction was relatively uncomplicated. Post-induction BMBx done 9/26. Morphology inconclusive due to inadequate core but flow negative. Awaiting FISH/chromosomes. BCR/abl major and minor undetectable (as were baseline tests). She was admitted for C2 GRAALL.     --BMBx 9/26/22 MRD-negative by BCR-ABL PCR and by Clonoseq Assay.      Treatment Plan: GRAALL + imatinib (C2D1=9/30/22)  - Imatinib 400 mg BID - D1-14 (9/29-10/12)  - IVFs per chemo protocol, bicarb support  - Methotrexate 1 g/m2 IV x once on D1. Administered over 24 hours  - Leucovorin calcium q6hr starting 12 hr after END of methotrexate infusion. Continue until methotrexate level <0.05  - cytarabine 3 g/m2 IV q12 hr x 4 doses on Days 2-3  - Premeds: Zofran 16mg D1, 8mg q12hr x 4 doses (D2-3);  Dex 12mg D1-3, 8mg D4-5  - Supportive Care: Pred Forte eye drops  - Neulasta ~D6. This has been added on to 10/6  appts  - IT chemo on D9. Will need under imaging guidance. As D9 falls on 10/8 (weekend), requested procedure for 10/7. She will have labs on 10/6 (already scheduled) to determine platelet count in anticipation of this procedure as well. Awaiting scheduling.     # CNS prophylaxis  Per the GRAALL protocol and given risk for CNS involvement of ALL. Will require LPs under fluoroscopic guidance.   - s/p weekly triple IT chemo (Cytarabine, Solu-Cortef, MTX) on Days 1, 8, and 15 of C1 induction. She additionally had repeat LP on 9/14. CSF flow has remained negative.   - Pt expresses anxiety surrounding procedures; pre-medication with 0.5mg PO Ativan +/- Atarax available prior PRN    Interval history:   April was to be seen for day 9 IT chemo with cytarabine, solu-Cortef and methotrexate. Her labs prior to the procedure revealed pancytopenia. We are cancelling the procedure and seeing her for evaluation.    April has generally been feeling well. Did start to have scalp pain and low grade fevers over the weekend.  has noted a new rash on her scalp. She has also noted a rash on the upper lip. She thought it was a few pimples initially as she had her period, but they have turned into more tender sores. No other skin lesions.   -no vision changes or eye pain. No ear pain. No oral lesions  -stamina is lower than baseline, no chest pain. Has mild dypsnea with exertion  -bowel/bladder function is wnl.    Current Outpatient Medications   Medication Sig Dispense Refill     acetaminophen (TYLENOL) 325 MG tablet Take 2 tablets (650 mg) by mouth every 6 hours as needed for mild pain, other or fever (blood product administration premedication)       acyclovir (ZOVIRAX) 400 MG tablet Take 1 tablet (400 mg) by mouth 2 times daily for 60 days 120 tablet 0     amLODIPine (NORVASC) 5 MG tablet Take 1 tablet (5 mg) by mouth daily 30 tablet 0     calcium carbonate (TUMS) 500 MG chewable tablet Take 2 tablets (1,000 mg) by mouth daily  as needed for heartburn       dexamethasone (DECADRON) 4 MG tablet Take 2 tablets (8 mg) by mouth every morning Take 2 tabs (8 mg) once on 10/4, then stop 2 tablet 0     fluconazole (DIFLUCAN) 200 MG tablet Take 1 tablet (200 mg) by mouth daily as needed (Take when ANC <1000) 60 tablet 0     heparin lock flush 10 UNIT/ML SOLN injection 10 mLs by Intracatheter route daily Flush 5 mL into each PICC lumen daily. 300 mL 0     imatinib (GLEEVEC) 400 MG tablet Take 1 tablet (400 mg) by mouth 2 times daily Through 10/12. Stop on 10/13. 19 tablet 0     levofloxacin (LEVAQUIN) 250 MG tablet Take 1 tablet (250 mg) by mouth daily as needed (ANC <1000) (Patient not taking: Reported on 10/2/2022) 60 tablet 0     ondansetron (ZOFRAN ODT) 4 MG ODT tab Take 1-2 tablets (4-8 mg) by mouth every 8 hours as needed for nausea or vomiting (Patient not taking: Reported on 10/2/2022) 60 tablet 0     pantoprazole (PROTONIX) 20 MG EC tablet Take 1 tablet (20 mg) by mouth daily 90 tablet 1     prednisoLONE acetate (PRED FORTE) 1 % ophthalmic suspension Place 2 drops into both eyes 4 times daily Use through 10/5, stop 10/6 5 mL 0     prochlorperazine (COMPAZINE) 10 MG tablet Take 1 tablet (10 mg) by mouth every 6 hours as needed (Breakthrough Nausea/Vomiting) 20 tablet 0     sodium chloride, PF, (SALINE FLUSH) 0.9% PF flush 20 mLs by Intracatheter route daily Flush PICC with 20 mL daily, 10 mL per lumen 600 mL 0          Allergies   Allergen Reactions     Blood Transfusion Related (Informational Only) Hives     Hive reaction with platelet transfusion on 8/26/2022. Please administer platelets with tylenol and benadryl premedication.          Exam: alert, in NAD.  Blood pressure 107/68, pulse 107, temperature 98.6  F (37  C), temperature source Oral, resp. rate 16, weight 113 kg (249 lb 1.6 oz), SpO2 100 %.  Wt Readings from Last 4 Encounters:   10/10/22 113 kg (249 lb 1.6 oz)   10/06/22 116.8 kg (257 lb 9.6 oz)   10/03/22 118.9 kg (262 lb 1.6  oz)   09/26/22 115.3 kg (254 lb 1.6 oz)     Oropharynx is moist and without lesion. Neck supple and without adenopathy. Lungs:CTA. Heart: RRR, no murmur or rub. Abdomen: soft, nontender, BS active, no masses or organomegaly.  Extremities: warm, no edema. Speech is clear. CN wnl. Gait/station wnl. Skin: bruising over the lower abdomen at prior injection sites. Erythematous, hemorrhagic papular rash on the scalp with an increased cluster in the medial scalp, but extending on the right and left. Similar, but smaller pustules that are scabbed over on the upper lip. Similar lesion on the right 5th digit. No lesions on the back, legs or arms. Abdomen has one small erythematous macule in the left mid abdomen.       Labs:    Latest Reference Range & Units 10/10/22 12:37   Sodium 136 - 145 mmol/L 138   Potassium 3.4 - 5.3 mmol/L 3.8   Chloride 98 - 107 mmol/L 105   Carbon Dioxide (CO2) 22 - 29 mmol/L 24   Urea Nitrogen 6.0 - 20.0 mg/dL 14.9   Creatinine 0.51 - 0.95 mg/dL 0.70   GFR Estimate >60 mL/min/1.73m2 >90   Calcium 8.6 - 10.0 mg/dL 8.7   Anion Gap 7 - 15 mmol/L 9   Albumin 3.5 - 5.2 g/dL 3.7   Protein Total 6.4 - 8.3 g/dL 6.1 (L)   Alkaline Phosphatase 35 - 104 U/L 62   ALT 10 - 35 U/L 24   AST 10 - 35 U/L 12   Bilirubin Total <=1.2 mg/dL 0.7   Glucose 70 - 99 mg/dL 95   WBC 4.0 - 11.0 10e3/uL 0.4 (LL)   Hemoglobin 11.7 - 15.7 g/dL 6.6 (LL)   Hematocrit 35.0 - 47.0 % 20.3 (L)   Platelet Count 150 - 450 10e3/uL 19 (LL)   RBC Count 3.80 - 5.20 10e6/uL 2.09 (L)   MCV 78 - 100 fL 97   MCH 26.5 - 33.0 pg 31.6   MCHC 31.5 - 36.5 g/dL 32.5   RDW 10.0 - 15.0 % 18.3 (H)   (LL): Data is critically low  (L): Data is abnormally low  (H): Data is abnormally high    Imaging: n/a    Impression/plan:   42 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  -s/p cycle 2 CRAAL + imatinib, day 10 today. Did get neulasta on day 6.  -was due for delayed day 9 IT  chemotherapy, but was found to be pancytopenic.    -I saw her in clinic. She has new scalp pain and rash on the scalp and face concerning for disseminated zoster. Has been taking prophy  mg bid  -will admit for treatment of disseminated zoster, HSV PCR from skin lesion, derm consult and IV antivirals    Pancytopenia post cycle 2 GRALL  -transfuse 1 unit(s) prbc today. HOLD LP, would need platelets >50K  -prior hive reactions with blood products. Premed with tylenol 650 mg, benadryl 25 mg  -defer BM Bx until count recovery  -hold imatinib with pancytopenia    -discussed the above plan with Dr. Hull today. He communicated with the admitting MD, Dr. Jang who has agreed to admit and manage the patient    ID: PPX  -pnetamidine every 28 days for PJP ppx, last given 10/2  -start ppx levaquin and fluxonazole with ANC <1.0  -had been on  mg bid, will be on high dose treatment if confirmed for zoster

## 2022-10-10 NOTE — LETTER
Transition Communication Hand-off for Care Transitions to Next Level of Care Provider    Name: Vashti Villegas  : 1980  MRN #: [id number]  Primary Care Provider: [facility]     Primary Clinic: [address] [place] [address] [address]  [facility] [address]     Reason for Hospitalization:  Disseminated zoster [B02.7]  Admit Date/Time: 10/10/2022  5:10 PM  Discharge Date: 10/14/2022  Payor Source: Payor: BCBS / Plan: [place] / Product Type: Indemnity /     Readmission Assessment Measure (JENNIFER) Risk Score/category: 24%  Plan of Care Goals/Milestone Events:   Discharging with home IV Abx for 14 days         Reason for Communication Hand-off Referral: Multiple providers/specialties  Discharge Plan: Home with IV ABX  Concern for non-adherence with plan of care:   No  Discharge Needs Assessment:  Needs    Flowsheet Row Most Recent Value   Equipment Currently Used at Home none        Follow-up specialty is recommended: Yes    Follow-up plan:    Future Appointments   Date Time Provider Department Center   10/17/2022  1:45 PM [facility] [place]   10/17/2022  2:00 PM [facility]AY [facility]   10/18/2022 11:45 AM  [facility]   10/18/2022 12:15 PM Scheierl, Amber J, APRN CNP [facility]   10/20/2022  1:30 PM NURSE ONLY [facility]   10/20/2022  1:30 PM LAB [facility] [place]   10/20/2022  2:00 PM BAY 3 INFUSION [facility] [place]   10/24/2022  1:30 PM [facility] [place]   10/24/2022  2:00 PM BAY 6 INFUSION [facility] [place]   10/27/2022  1:30 PM NURSE ONLY [facility]   10/27/2022  2:00 PM BAY 8 INFUSION [facility] [place]   2023  4:30 PM Chuck Diaz MD [facility]       Any outstanding tests or procedures:        Radiology & Cardiology Orders     Future Labs/Procedures Complete By Expires    XR Lumbar Puncture Intrathecal Chemo Admin  10/12/2022 (Approximate)  10/12/2023        Referrals     Future Labs/Procedures    Home Infusion Referral             Estela Youngblood RN    AVS/Discharge Summary is the source of truth; this is a helpful guide for improved communication of patient story

## 2022-10-10 NOTE — PLAN OF CARE
"Goal Outcome Evaluation:    /67 (BP Location: Right arm)   Pulse 103   Temp 98.6  F (37  C) (Oral)   Resp 16   Ht 1.626 m (5' 4\")   Wt 112.9 kg (248 lb 12.8 oz)   SpO2 99%   BMI 42.71 kg/m              5528-0784:     Admitted:  10/10/18006 at 1700. Low grade fever over the weekend. New scalp pain, rash on scalp and face concerning for disseminated zoster. Pancytopenia post cycle 2 GRALL.    Vitals: VS on room air, afebrile.  Neuro: A&Ox4. Baseline numbness and tingling in hands and feet. Provider aware from last admission.  Cardiac: Denies chest pain.  Respiratory: Denies shortness of breath.  GI/: Voiding spontaneously adequate output. No BM this shift.  Pain: Denies pain.  Labs: Hemoglobin 6.6, ordered blood per protocol. Platelets 13, ordered platelets per provider. Triggered sepsis- lactic acid 0.9.  Sent Varicella zoster swab to lab per provider order.    Continue to monitor and with plan of care.               "

## 2022-10-11 LAB
ALBUMIN SERPL BCG-MCNC: 3.3 G/DL (ref 3.5–5.2)
ALP SERPL-CCNC: 58 U/L (ref 35–104)
ALT SERPL W P-5'-P-CCNC: 20 U/L (ref 10–35)
ANION GAP SERPL CALCULATED.3IONS-SCNC: 7 MMOL/L (ref 7–15)
AST SERPL W P-5'-P-CCNC: 13 U/L (ref 10–35)
BILIRUB SERPL-MCNC: 0.6 MG/DL
BLD PROD TYP BPU: NORMAL
BLD PROD TYP BPU: NORMAL
BLOOD COMPONENT TYPE: NORMAL
BLOOD COMPONENT TYPE: NORMAL
BUN SERPL-MCNC: 12.9 MG/DL (ref 6–20)
CALCIUM SERPL-MCNC: 8.1 MG/DL (ref 8.6–10)
CHLORIDE SERPL-SCNC: 106 MMOL/L (ref 98–107)
CODING SYSTEM: NORMAL
CODING SYSTEM: NORMAL
CREAT SERPL-MCNC: 0.63 MG/DL (ref 0.51–0.95)
CROSSMATCH: NORMAL
DEPRECATED HCO3 PLAS-SCNC: 23 MMOL/L (ref 22–29)
ERYTHROCYTE [DISTWIDTH] IN BLOOD BY AUTOMATED COUNT: 18.2 % (ref 10–15)
FIBRINOGEN PPP-MCNC: 498 MG/DL (ref 170–490)
GFR SERPL CREATININE-BSD FRML MDRD: >90 ML/MIN/1.73M2
GLUCOSE SERPL-MCNC: 92 MG/DL (ref 70–99)
HCT VFR BLD AUTO: 19.7 % (ref 35–47)
HGB BLD-MCNC: 6.3 G/DL (ref 11.7–15.7)
HSV1 DNA SPEC QL NAA+PROBE: NOT DETECTED
HSV2 DNA SPEC QL NAA+PROBE: NOT DETECTED
INR PPP: 1.1 (ref 0.85–1.15)
ISSUE DATE AND TIME: NORMAL
ISSUE DATE AND TIME: NORMAL
MAGNESIUM SERPL-MCNC: 1.9 MG/DL (ref 1.7–2.3)
MCH RBC QN AUTO: 31.7 PG (ref 26.5–33)
MCHC RBC AUTO-ENTMCNC: 32 G/DL (ref 31.5–36.5)
MCV RBC AUTO: 99 FL (ref 78–100)
PHOSPHATE SERPL-MCNC: 3.2 MG/DL (ref 2.5–4.5)
PLAT MORPH BLD: NORMAL
PLATELET # BLD AUTO: 15 10E3/UL (ref 150–450)
POTASSIUM SERPL-SCNC: 4 MMOL/L (ref 3.4–5.3)
PROT SERPL-MCNC: 5.6 G/DL (ref 6.4–8.3)
RBC # BLD AUTO: 1.99 10E6/UL (ref 3.8–5.2)
RBC MORPH BLD: NORMAL
SARS-COV-2 RNA RESP QL NAA+PROBE: NEGATIVE
SODIUM SERPL-SCNC: 136 MMOL/L (ref 136–145)
UNIT ABO/RH: NORMAL
UNIT ABO/RH: NORMAL
UNIT NUMBER: NORMAL
UNIT NUMBER: NORMAL
UNIT STATUS: NORMAL
UNIT STATUS: NORMAL
UNIT TYPE ISBT: 5100
UNIT TYPE ISBT: 5100
URATE SERPL-MCNC: 2 MG/DL (ref 2.4–5.7)
WBC # BLD AUTO: 0.3 10E3/UL (ref 4–11)

## 2022-10-11 PROCEDURE — 87205 SMEAR GRAM STAIN: CPT | Performed by: STUDENT IN AN ORGANIZED HEALTH CARE EDUCATION/TRAINING PROGRAM

## 2022-10-11 PROCEDURE — 99221 1ST HOSP IP/OBS SF/LOW 40: CPT | Performed by: OPTOMETRIST

## 2022-10-11 PROCEDURE — 250N000013 HC RX MED GY IP 250 OP 250 PS 637: Performed by: HOSPITALIST

## 2022-10-11 PROCEDURE — 250N000013 HC RX MED GY IP 250 OP 250 PS 637: Performed by: PHYSICIAN ASSISTANT

## 2022-10-11 PROCEDURE — 85384 FIBRINOGEN ACTIVITY: CPT | Performed by: PHYSICIAN ASSISTANT

## 2022-10-11 PROCEDURE — 36592 COLLECT BLOOD FROM PICC: CPT | Performed by: PHYSICIAN ASSISTANT

## 2022-10-11 PROCEDURE — 84550 ASSAY OF BLOOD/URIC ACID: CPT | Performed by: PHYSICIAN ASSISTANT

## 2022-10-11 PROCEDURE — P9037 PLATE PHERES LEUKOREDU IRRAD: HCPCS | Performed by: HOSPITALIST

## 2022-10-11 PROCEDURE — 99223 1ST HOSP IP/OBS HIGH 75: CPT | Performed by: INTERNAL MEDICINE

## 2022-10-11 PROCEDURE — 84100 ASSAY OF PHOSPHORUS: CPT | Performed by: PHYSICIAN ASSISTANT

## 2022-10-11 PROCEDURE — 87798 DETECT AGENT NOS DNA AMP: CPT | Performed by: STUDENT IN AN ORGANIZED HEALTH CARE EDUCATION/TRAINING PROGRAM

## 2022-10-11 PROCEDURE — 99221 1ST HOSP IP/OBS SF/LOW 40: CPT | Mod: GC | Performed by: DERMATOLOGY

## 2022-10-11 PROCEDURE — 80053 COMPREHEN METABOLIC PANEL: CPT | Performed by: PHYSICIAN ASSISTANT

## 2022-10-11 PROCEDURE — 999N000007 HC SITE CHECK

## 2022-10-11 PROCEDURE — 85610 PROTHROMBIN TIME: CPT | Performed by: PHYSICIAN ASSISTANT

## 2022-10-11 PROCEDURE — 85027 COMPLETE CBC AUTOMATED: CPT | Performed by: PHYSICIAN ASSISTANT

## 2022-10-11 PROCEDURE — U0005 INFEC AGEN DETEC AMPLI PROBE: HCPCS | Performed by: PHYSICIAN ASSISTANT

## 2022-10-11 PROCEDURE — 250N000011 HC RX IP 250 OP 636: Performed by: PHYSICIAN ASSISTANT

## 2022-10-11 PROCEDURE — 99233 SBSQ HOSP IP/OBS HIGH 50: CPT | Performed by: INTERNAL MEDICINE

## 2022-10-11 PROCEDURE — 258N000003 HC RX IP 258 OP 636: Performed by: PHYSICIAN ASSISTANT

## 2022-10-11 PROCEDURE — 83735 ASSAY OF MAGNESIUM: CPT | Performed by: PHYSICIAN ASSISTANT

## 2022-10-11 PROCEDURE — 99207 PR SC NO CHARGE VISIT: CPT | Performed by: INTERNAL MEDICINE

## 2022-10-11 PROCEDURE — P9040 RBC LEUKOREDUCED IRRADIATED: HCPCS | Performed by: PHYSICIAN ASSISTANT

## 2022-10-11 PROCEDURE — 120N000002 HC R&B MED SURG/OB UMMC

## 2022-10-11 PROCEDURE — 99356 PR PROLONGED SERV,INPATIENT,1ST HR: CPT | Performed by: INTERNAL MEDICINE

## 2022-10-11 RX ORDER — DIPHENHYDRAMINE HCL 25 MG
25 CAPSULE ORAL
Status: COMPLETED | OUTPATIENT
Start: 2022-10-11 | End: 2022-10-11

## 2022-10-11 RX ORDER — DIPHENHYDRAMINE HCL 25 MG
25 CAPSULE ORAL EVERY 6 HOURS PRN
Status: DISCONTINUED | OUTPATIENT
Start: 2022-10-11 | End: 2022-10-14 | Stop reason: HOSPADM

## 2022-10-11 RX ORDER — ACETAMINOPHEN 325 MG/1
650 TABLET ORAL
Status: COMPLETED | OUTPATIENT
Start: 2022-10-11 | End: 2022-10-11

## 2022-10-11 RX ADMIN — ACETAMINOPHEN 650 MG: 325 TABLET, FILM COATED ORAL at 22:09

## 2022-10-11 RX ADMIN — AMLODIPINE BESYLATE 5 MG: 5 TABLET ORAL at 09:39

## 2022-10-11 RX ADMIN — FLUCONAZOLE 200 MG: 200 TABLET ORAL at 09:39

## 2022-10-11 RX ADMIN — ACYCLOVIR SODIUM 800 MG: 1000 INJECTION, SOLUTION INTRAVENOUS at 09:49

## 2022-10-11 RX ADMIN — ACETAMINOPHEN 650 MG: 325 TABLET, FILM COATED ORAL at 04:52

## 2022-10-11 RX ADMIN — PANTOPRAZOLE SODIUM 20 MG: 20 TABLET, DELAYED RELEASE ORAL at 09:39

## 2022-10-11 RX ADMIN — DIPHENHYDRAMINE HYDROCHLORIDE 25 MG: 25 CAPSULE ORAL at 09:39

## 2022-10-11 RX ADMIN — Medication 5 ML: at 13:00

## 2022-10-11 RX ADMIN — Medication 5 ML: at 04:37

## 2022-10-11 RX ADMIN — DIPHENHYDRAMINE HYDROCHLORIDE 25 MG: 25 CAPSULE ORAL at 00:27

## 2022-10-11 RX ADMIN — ACETAMINOPHEN 650 MG: 325 TABLET, FILM COATED ORAL at 09:38

## 2022-10-11 RX ADMIN — ACYCLOVIR SODIUM 800 MG: 1000 INJECTION, SOLUTION INTRAVENOUS at 18:07

## 2022-10-11 RX ADMIN — Medication 5 ML: at 18:13

## 2022-10-11 RX ADMIN — ACYCLOVIR SODIUM 800 MG: 1000 INJECTION, SOLUTION INTRAVENOUS at 02:49

## 2022-10-11 RX ADMIN — ACETAMINOPHEN 650 MG: 325 TABLET, FILM COATED ORAL at 00:27

## 2022-10-11 RX ADMIN — Medication 5 ML: at 06:59

## 2022-10-11 RX ADMIN — Medication 5 ML: at 12:29

## 2022-10-11 RX ADMIN — LEVOFLOXACIN 250 MG: 250 TABLET, FILM COATED ORAL at 09:39

## 2022-10-11 ASSESSMENT — ACTIVITIES OF DAILY LIVING (ADL)
ADLS_ACUITY_SCORE: 20

## 2022-10-11 NOTE — PROGRESS NOTES
[facility]  Transplant Infectious New Patient Consult Note     Patient:  Vashti Villegas, Date of birth 1980, Medical record number [id number]  Date of Visit:  10/11/2022  Consult requested for evaluation of new papulovesicular rash          Assessment and Recommendations:   Recommendations:  1) Agree with IV Acyclovir 10mg/kg IV Q8H   2) VZV PCR of scalp lesion pending   3) No ocular zoster involvement per Opthalmology   4) Continue ppx Fluconazole and levofloxacin   5) No evidence at this time of bacterial superinfection of the lesions     Assessment: Vashti Villegas is a 42 year old female with a history of Ph(+) B-ALL s/p induction chemotherapy with the GRAALL regimen + imatinib (D1=8/24/22) and subsequently in an MRD- CR1, more recently s/p treatment with Cycle 2 of GRAALL + imatinib (D1=9/30/22).    Infectious Disease issues include:  - Crusted, hemorrhagic lesions noted on the scalp and face - in the setting of leukopenia and lymphopenia and while on ppx Acyclovir.   HSV1/2 PCR from a swab of the lesion of the scalp - negative   VZV PCR from a swab of the scalp is pending   Erosive lesions noted in the distribution of V1, V3, and C2 dermatomes.   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   Would continue IV acyclovir at this time and will reassess if any new lesions arise    Other issues:  - PCP prophylaxis: Nebulized pentamidine last given 10/2; next due ~10/31  - Fungal prophylaxis: fluconazole 200 mg   - Bacterial prophylaxis: Levofloxacin 250 mg daily   - Serostatus: CMV Neg, EBV Positive, HSV1+/HSV2- PTA acyclovir 400 mg BID held   - Gamma globulin status: ?  - Isolation status:  Good hand hygiene. Airborne precautions     [facility]   Transplant Infectious Diseases   [id number]         History of the Infectious Disease lllness:     Vashti Villegas is a 42 year old female with a history of Ph(+) B-ALL s/p induction chemotherapy with the GRAALL regimen + imatinib (D1=8/24/22)  and subsequently in an MRD- CR1, more recently s/p treatment with Cycle 2 of GRAALL + imatinib (D1=9/30/22). She was discharge on 10/03 and had tolerated chemotherapy well with no significant event during hospitalization. She was seen in Heme/Onc outpatient visit on 10/10 and was found to be pancytopenic. She also stated that over the weekend (10/08-10/09) she started having scalp pain and low grade fevers. On Monday - she noted a new rash on her scalp and upper lip, chin, and tip of the nose. At this time she was admitted to the hospital for workup for possible disseminated VZV in the setting of pancytopenia 2/2 to recent chemotherapy.     She denies any headaches, neck stiffness, photophobia, blurry vision, loss of vision. No oral lesions, no difficulty or pain with swallowing. No chest pain, cough, shortness of breath. No abdominal pain, nausea, or vomiting.     She has only noted lesion on the scalp and chin, upper lip area. Has not seen any new lesions crop up. Denies any drainage from the lesions. No Pain or pruritis associated with the rash.     She has has no recently travel. No hiking, camping, swimming in the lake. Has not noted any ticks. No new animal exposure. Has three dogs.     Review of Systems:  CONSTITUTIONAL:  Mild fevers, no chills. No night sweats.  EYES: negative for icterus  ENT:  negative for hearing loss, tinnitus or sore throat  RESPIRATORY:  negative for cough, sputum, dyspnea  CARDIOVASCULAR:  negative for chest pain, palpitations  GASTROINTESTINAL:  negative for nausea, vomiting, diarrhea or constipation  GENITOURINARY:  negative for dysuria  HEME:  No easy bruising  INTEGUMENT:  Positive for rash   NEURO:  Negative for headache    Past Medical History:   Diagnosis Date     Other acne        Past Surgical History:   Procedure Laterality Date     PICC Left 09/30/2022    Picc ok to use     PICC DOUBLE LUMEN PLACEMENT Left 08/20/2022    left cephalic 5 fr dl picc 44 cm     ZZC NONSPECIFIC  PROCEDURE  1999    benign mole removal     Fmail Medical History:   No relevant family history   Medical History Relation Name Comments   Diabetes Father        Hypertension Father        Unknown Maternal Grandfather        Cancer Maternal Grandmother    breast   Heart Disease Mother    CHF   Other Mother        Unknown Paternal Grandfather        Good Health Paternal Grandmother        Good Health Sister 2         Relation Name Status Comments   Father    Alive     Maternal Grandfather     (Age 50)     Maternal Grandmother     (Age 65)     Mother         Paternal Grandfather     (Age 52)     Paternal Grandmother    Alive     Sister 1   Alive     Sister 2            Social History     Tobacco Use     Smoking status: Never     Smokeless tobacco: Never       Immunization History   Administered Date(s) Administered     TD (ADULT, 7+) 1996       Patient Active Problem List   Diagnosis     Acute leukemia (H)     Acute lymphoblastic leukemia (ALL) (H)     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)     Prophylaxis for chemotherapy-induced neutropenia     Morbid obesity (H)     Disseminated zoster       Current Facility-Administered Medications   Medication     acetaminophen (TYLENOL) tablet 650 mg     acyclovir (ZOVIRAX) 800 mg in sodium chloride 0.9 % 250 mL intermittent infusion     amLODIPine (NORVASC) tablet 5 mg     calcium carbonate (TUMS) chewable tablet 1,000 mg     diphenhydrAMINE (BENADRYL) capsule 25 mg     fluconazole (DIFLUCAN) tablet 200 mg     heparin lock flush 10 UNIT/ML injection 5-20 mL     heparin lock flush 10 UNIT/ML injection 5-20 mL     [Held by provider] imatinib (GLEEVEC) tablet 400 mg     levofloxacin (LEVAQUIN) tablet 250 mg     LORazepam (ATIVAN) tablet 0.5-1 mg    Or     LORazepam (ATIVAN) injection 0.5-1 mg     No rectal suppositories if WBC less than 1000/microliters or platelets less than 50,000/ L     ondansetron  "(ZOFRAN) injection 8 mg    Or     ondansetron (ZOFRAN ODT) ODT tab 8 mg    Or     ondansetron (ZOFRAN) tablet 8 mg     pantoprazole (PROTONIX) EC tablet 20 mg     polyethylene glycol (MIRALAX) Packet 17 g     prochlorperazine (COMPAZINE) tablet 10 mg    Or     prochlorperazine (COMPAZINE) injection 10 mg     senna-docusate (SENOKOT-S/PERICOLACE) 8.6-50 MG per tablet 1 tablet    Or     senna-docusate (SENOKOT-S/PERICOLACE) 8.6-50 MG per tablet 2 tablet     sodium chloride (PF) 0.9% PF flush 10-20 mL     sodium chloride (PF) 0.9% PF flush 10-40 mL       Allergies   Allergen Reactions     Blood Transfusion Related (Informational Only) Hives     Hive reaction with platelet transfusion on 8/26/2022. Please administer platelets with tylenol and benadryl premedication.             Physical Exam:   Vitals were reviewed.  All vitals stable  /62 (BP Location: Right arm)   Pulse 96   Temp 98.3  F (36.8  C) (Oral)   Resp 18   Ht 1.626 m (5' 4\")   Wt 112.9 kg (248 lb 12.8 oz)   SpO2 98%   BMI 42.71 kg/m      Exam:  GENERAL:  well-developed, well-nourished, alert, oriented, in no acute distress.  HEENT:  Head is normocephalic, atraumatic. A cluster of crusted lesions noted on the midline of the scalp. No purulence noted.   A few crusted papules noted on the chin and upper lip. One crusted lesion on the tip of the nose.   EYES:  Eyes have anicteric sclerae.    ENT:  Oropharynx is moist without exudates or ulcers.  NECK:  Supple.  LUNGS:  Clear to auscultation.  CARDIOVASCULAR:  Regular rate and rhythm with no murmurs, gallops or rubs.  ABDOMEN:  Normal bowel sounds, soft, nontender.  SKIN:  No acute rashes.  Line is in place without any surrounding erythema.  NEUROLOGIC:  Grossly nonfocal.         Laboratory Data:     Metabolic Studies     Recent Labs   Lab Test 10/11/22  0703 10/10/22  1817 10/10/22  1237 10/06/22  1031 10/03/22  0857 10/02/22  0829    139 138 137 141 140   POTASSIUM 4.0 3.7 3.8 3.7 3.6 3.6 "   CHLORIDE 106 106 105 104 106 107   CO2 23 24 24 25 25 24   ANIONGAP 7 9 9 8 10 9   BUN 12.9 13.3 14.9 15.7 14.2 12.5   CR 0.63 0.68 0.70 0.66 0.63 0.59   GFRESTIMATED >90 >90 >90 >90 >90 >90   GLC 92 124* 95 105* 135* 134*   SOLEDAD 8.1* 8.5* 8.7 8.4* 8.2* 8.3*   PHOS 3.2 2.8  --   --  3.0 3.1   MAG 1.9 1.9  --   --  2.2 2.0     Hepatic Studies    Recent Labs   Lab Test 10/11/22  0703 10/10/22  1817 10/10/22  1237 10/06/22  1031 09/30/22  1605 09/26/22  1358   BILITOTAL 0.6 0.6 0.7 1.1 0.3 0.3   ALKPHOS 58 61 62 62 83 83   ALBUMIN 3.3* 3.7 3.7 3.6 4.0 3.8   AST 13 15 12 20 17 17   ALT 20 24 24 36* 24 18   LDH  --  201  --   --  245  --      Gout Labs      Recent Labs   Lab Test 10/11/22  0703 10/10/22  1817 09/30/22  1605 09/08/22  0831 09/05/22  0749   URIC 2.0* 2.2* 3.8 2.1* 1.4*       Hematology Studies     Recent Labs   Lab Test 10/11/22  0703 10/10/22  1817 10/10/22  1237 10/06/22  1031 10/03/22  0856 10/02/22  0829 09/13/22  0716 09/12/22  0936 09/11/22  0941 09/10/22  0647 09/09/22  0618 09/06/22  0835 09/05/22  0749 09/04/22  0655   WBC 0.3* 0.5* 0.4* 2.4* 4.6 5.9   < > 6.0   < > 2.2* 2.7*   < > 2.0* 1.4*   ANEU  --  0.0*  --   --   --   --   --  3.1  --  2.0 2.5  --  0.2* 0.2*   ALYM  --  0.5*  --   --   --   --   --  2.5  --  0.1* 0.2*  --  1.7 1.2   COLIN  --  0.0  --   --   --   --   --  0.3  --  0.0 0.0  --  0.0 0.0   AEOS  --  0.0  --   --   --   --   --  0.1  --  0.0 0.0  --  0.1 0.0   HGB 6.3* 6.6* 6.6* 8.3* 8.3* 8.0*   < > 8.0*   < > 7.3* 7.5*   < > 8.2* 8.0*   HCT 19.7* 20.7* 20.3* 25.9* 27.0* 25.9*   < > 24.8*   < > 22.4* 22.6*   < > 24.6* 24.4*   PLT 15* 13* 19* 157 210 225   < > 328   < > 198 169   < > 47* 35*    < > = values in this interval not displayed.       Clotting Studies    Recent Labs   Lab Test 10/11/22  0703 10/10/22  1817 09/30/22  1605 09/14/22  0704 08/20/22  0624 08/19/22  1705   INR 1.10 1.08 1.00 0.99   < > 1.19*   PTT  --  35 27  --   --  31    < > = values in this interval not  displayed.     Urine Studies    Recent Labs   Lab Test 10/03/22  0350 10/02/22  1931 10/02/22  1222 10/02/22  0254 10/01/22  1903 10/01/22  1100 08/22/22  1515   URINEPH 7.0 8.0 7.0 7.5 7.0   < > 5.5   NITRITE  --   --   --   --   --   --  Negative   LEUKEST  --   --   --   --   --   --  Negative    < > = values in this interval not displayed.     CSF testing     Recent Labs   Lab Test 09/14/22  1131 09/07/22  1124 08/31/22  1617 08/26/22  1517   CWBC 0 0 0 2   CRBC 9* 15* 9* 0   CGLU  --  57 68 106*   CTP  --  24.9 38.2 46.8*       Microbiology:  Last 6 Culture results with specimen source  Culture Micro   Date Value Ref Range Status   09/25/2003   Final    >100,000 colonies/mL Multiple organisms present, probable contamination   01/21/2003 No Beta Streptococcus isolated  Final   01/04/2003 No Beta Streptococcus isolated  Final    Specimen Description   Date Value Ref Range Status   09/25/2003 Midstream Urine  Final   09/22/2003 Vagina  Final   01/21/2003 Throat  Final   01/21/2003 Throat  Final   01/04/2003 Throat  Final   01/04/2003 Throat  Final        Hepatitis B Testing     Recent Labs   Lab Test 08/19/22  1705   HBCAB Nonreactive   HEPBANG Nonreactive       Hepatitis C Antibody   Date Value Ref Range Status   08/19/2022 Nonreactive Nonreactive Final     Respiratory Virus Testing    No results found for: RMIX, RS, FLUAG No results found for: RESPEC     CMV Antibody IgG   Date Value Ref Range Status   08/19/2022 No detectable antibody. No detectable antibody.  Final     Imaging:  Results for orders placed or performed during the hospital encounter of 09/30/22   IR Referral    Narrative    Nel Michel APRN CNP     10/3/2022  2:45 PM  Message sent to Kajal Barajas PA-C with directions to place   XR lumbar puncture order for LP with chemotherapy. IR referral   not needed.    Nel Michel DNP, APRN  Interventional Radiology   IR on-call pager: [phone]

## 2022-10-11 NOTE — PROVIDER NOTIFICATION
2 orders to transfuse platelets.  One for 10K or less and the other order 25K or less.  Order clarified and updated by NAOMY Haines PA-C to transfuse if platelets are 10K or less.

## 2022-10-11 NOTE — PLAN OF CARE
[id number]    A&Ox4. Tachy, OVSS on RA. C/o headache, given prn tylenol x1 with some relief. Denies nausea and SOB. Hgb 6.6, received 1 unit RBCs without incident. Plts 13, received 1 unit without incident. Pre medicated with tylenol and benadryl prior to blood products. Up independently. Continue with plan of care.

## 2022-10-11 NOTE — PROVIDER NOTIFICATION
"Provider Notification   Ronen Joseph (#[id number]) paged at [phone]:  \"Hi, can pt have PRN order for benadryl? Pt needs to be pre medicated for blood products. Thanks.\"  "

## 2022-10-11 NOTE — PROVIDER NOTIFICATION
"Provider Notification   Jake Ronengeovanna Snowden (#[id number]) paged at [phone]:  \"MD Mohsen's note says to transfuse for plts <25k. Conditional orders are to transfuse for plts <10k. Pt's plts are currently 13. Do we want to transfuse overnight? Thanks.\"     Response: Ok not to give plts overnight. Conditional orders changed to transfuse for plts <25k.\"        Will continue to monitor.   "

## 2022-10-11 NOTE — CONSULTS
OPHTHALMOLOGY CONSULT NOTE  10/11/22    Patient: Vashti Villegas      HISTORY OF PRESENTING ILLNESS:     Vashti Villegas is a 42 year old female who presents with scalp and facial hemorrhagic erosions. Dermatology has seen the patient and viral swabs was done for HSV and VZV on 10/11/2022. Medical history is notable for leukemia and associated pancytopenia.       10+ review of systems were otherwise negative except for that which has been stated above.  ASSESSMENT/PLAN:     #Scalp and facial hemorrhagic erosions.  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  - Vashti Villegas is a 42 year old female who presents with scalp and facial hemorrhagic erosions concerning for disseminated zoster in the setting of immunosuppresion - swabs of scalp lesions were obtained. Vision is 20/20 in both eyes with over the counter readers. There is no afferent pupillary defect. Peripheral vision is full to finger counting in both eyes. Motilities are full. Anterior segment exam is notable for a small paracentral cornea scar in the right eye (history of eye infection and contact lens use). Fundus exam is notable for pigmentary changes inferiorly of the left eye. There is no evidence of ocular zoster involvement - no pseudodendrites, no acute retinal necrosis, and no progressive outer retinal necrosis.     Plan:  - Given possible disseminated zoster, recommend repeat dilation outpatient in 2-4 weeks as a precaution. Patient prefers to follow an ophthalmologist closer to home.   - Ophthalmology will sign off.         It is our pleasure to participate in this patient's care and treatment. Please contact us with any further questions or concerns.      Erika Obrien OD (Yen)  Ophthalmology  Adjunct   Pager: [phone]        OCULAR/MEDICAL/SURGICAL HISTORIES:     Past Ocular History:  Eye infections    Family Ocular History:  None    Social History:  None    Past Medical History:   Diagnosis Date     Other acne        Past Surgical History:    Procedure Laterality Date     PICC Left 09/30/2022    Picc ok to use     PICC DOUBLE LUMEN PLACEMENT Left 08/20/2022    left cephalic 5 fr dl picc 44 cm     ZZC NONSPECIFIC PROCEDURE  01/01/1999    benign mole removal       EXAMINATION:     Base Eye Exam     Visual Acuity (Snellen - Linear)       Right Left    Near cc 20/20 20/20   OTC readers           Tonometry (Tonopen, 12:33 PM)       Right Left    Pressure 20 15          Pupils       Pupils APD    Right PERRL     Left PERRL no apd          Visual Fields       Left Right     Full Full          Extraocular Movement       Right Left     Full Full          Neuro/Psych     Oriented x3: Yes    Mood/Affect: Normal          Dilation     Both eyes: 1.0% Mydriacyl, 2.5% Luis Synephrine @ 12:33 PM            Slit Lamp and Fundus Exam     External Exam       Right Left    External Normal Normal          Portable Slit Lamp Exam       Right Left    Lids/Lashes Normal Normal    Conjunctiva/Sclera White and quiet White and quiet    Cornea Paracentral scar, no pseudodendrites, no staining Clear, no pseudodendrites, no staining    Anterior Chamber Deep and quiet Deep and quiet    Iris Round and reactive Round and reactive    Lens Clear Clear          Fundus Exam       Right Left    Vitreous Normal, media clear Normal, media clear    Disc Normal Normal    C/D Ratio Vertical 0.2 0.2    C/D Ratio Horizontal 0.2 0.2    Macula Normal Normal    Vessels Normal Normal    Periphery Normal, no ARN, no PORN Pigmentary changes inferior, no ARN, no PORN

## 2022-10-11 NOTE — PLAN OF CARE
"Goal Outcome Evaluation:    /67 (BP Location: Right arm)   Pulse 92   Temp 98.7  F (37.1  C) (Oral)   Resp 18   Ht 1.626 m (5' 4\")   Wt 112.9 kg (248 lb 12.8 oz)   SpO2 96%   BMI 42.71 kg/m           [id number]:    Admitted:  10/10/71517 at 1700. Low grade fever over the weekend. New scalp pain, rash on scalp and face concerning for disseminated zoster. Pancytopenia post cycle 2 GRALL.     Vitals: VS on room air, afebrile.  Neuro: A&Ox4. Baseline numbness and tingling in hands and feet. Provider aware from last admission.Denies any other concerns.  Cardiac: Denies chest pain.  Respiratory: Denies shortness of breath.  GI/: Voiding spontaneously adequate output. No BM this shift.  Pain: 2/10 head pain, PRN Tylenol given with relief.  Labs: 2 new swabs to scalp performed. Results pending.     Continue to monitor and with plan of care.             "

## 2022-10-11 NOTE — CONSULTS
Please see Consult note on 10/11 at 10:48 AM inaccurately labeled progress note.   Kika Guillen   Infectious Diseases   [id number]

## 2022-10-11 NOTE — CONSULTS
[facility] Inpatient Consult Dermatology Note    Impression/Plan:  1. Scalp and facial painful, hemorrhagic crusted erosions   - Agree that main differential for cause of lesions and pain would be viral such as VZV since HSV is negative. Lesions do not appear impetiginized but would add on bacterial swab to rule out as cause. Per history, most consistent with VZV but lesion morphology not entirely diagnostic likely due to immunosuppression, will await results from swabs taken today. May be zoster folliculitis. Patient already on IV acyclovir. Would recommend ophthalmology consult due to lesion on nose but fortunately no ocular symptoms. No mucosal lesions or lesions elsewhere. No new drugs started asides from her chemotherapy regimen. Imatinib is reported to rarely cause pustular rash but this would be low on the differential and only considered if viral causes were excluded. Methotrexate more with mucositis. Cytarabine can cause ulcers but morphology not c/w. Favor viral etiology, will defer further work-up and recommendations pending results of swabs and if results inconclusive may need to proceed with biopsy to rule out other infectious etiologies.    Recommendations:  - Agree with ID and ophthalmology consults  - Swabs for bacterial and VZV pending  - Antivirals per primary team  - If lesions were to return positive for VZV, could start gabapentin 300 mg TID and increase as needed to decrease symptoms and risk for post-herpetic neuralgia  - If swabs negative, will discuss potential for biopsy tomorrow  - EBV and CMV peripheral blood PCR if not done recently       Thank you for the dermatology consultation. Please do not hesitate to contact the dermatology resident/faculty on call for any additional questions or concerns. We will continue to follow peripherally.     Attending,  staffed the patient.    Phi Longoria MD PGY-3  Dermatology Resident     I have seen and examined this patient  and agree with the assessment and plan as documented in the resident's note.    Adams Dwyer MD  Dermatology Attending      Dermatology Problem List:  1. Scalp and facial erosions  - viral and bacterial swabs 10/11/22  2. B-ALL    Date of Admission: Oct 10, 2022   Encounter Date: 10/10/2022    Reason for Consultation:   Erosions of face and scalp    History of Present Illness:  Ms. 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 Dermatology was consulted for scalp and face erosions.    Has been having some scalp pain since the last few days with low grade fevers (99.8). She also has some lesions on her face she thought were acne but have become more tender sores. Also had diffuse scalp pain over the weekend when no lesions were present. Lesions then appeared yesterday. No reported vision changes or eye pain, no oral ulcers, no ear pain. States looked like little pus bumps. Has not gotten any new lesions since admission. Never had cold sores or shingles before. Has had chicken pox. No recent sick contacts. Overall feeling healthy with no lesions anywhere else on body besides face and scalp. No mucosal erosions. No joint pains, belly pains. No new medications. States lesions are just itchy now.    Also noted pancytopenia on labs 10/10 presumed due to chemotherpay. Started on acyclovir.      Past Medical History:   Reviewed in epic, pertinent findings summarized as above    Social History:  Patient reports that she has never smoked. She has never used smokeless tobacco.    Family History:  No family history on file.    Medications:  Reviewed in epic, pertinent findings summarized as above     Allergies   Allergen Reactions     Blood Transfusion Related (Informational Only) Hives     Hive reaction with platelet transfusion on 8/26/2022. Please  "administer platelets with tylenol and benadryl premedication.        Review of Systems:  As per HPI.     Physical exam:  Vitals: /58 (BP Location: Right arm)   Pulse 98   Temp 98.4  F (36.9  C) (Oral)   Resp 16   Ht 1.626 m (5' 4\")   Wt 112.9 kg (248 lb 12.8 oz)   SpO2 99%   BMI 42.71 kg/m    GEN: This is a well developed, well-nourished female in no acute distress, in a pleasant mood.    SKIN: Focused examination of the scalp, face, arms, abomen was performed.  -Nunez skin type: II  - On the face and tip of nose there are few hemorrhagic crusted papules with surrounding erythema  - On the scalp clustered of midline and left parietal scalp there are many hemorrhagic crusted erosions and papules with erythema, few with yellow crust  - No lesions elsewhere  - No conjunctival erythema appreciated or oral erosions  -No other lesions of concern on areas examined.     Laboratory:  Reviewed in epic, pertinent findings summarized as above    Staff Involved:  Resident/Staff          "

## 2022-10-11 NOTE — PLAN OF CARE
Goal Outcome Evaluation:      Plan of Care Reviewed With: patient    Overall Patient Progress: no changeOverall Patient Progress: no change       [id number]:  A&O x 4.  Neutropenic.  Afebrile.  Tachycardic HR (100s).  OVSS on room air.  Denies pain, nausea, vomiting, chest pain and SOB.  Hgb 6.3, pre-meds PRN Tylenol and Bendadryl given and pt tolerated one unit PRBCs transfusion without incident.  COVID-19 sample collected, negative.  Varicella zoster virus by PCR scalp swab collected, results pending.  Voiding spontaneously, not saving urine.  [facility] 10/10.  Seen by Ophthalmology MD, eyes dialated at 11:55 am and will be dilated for the next 6 hours.  Seen by Dermatology.   visiting, supportive.  Up ad stephanie, plans to do CHG wipes/shower later.  Continue to monitor and with POC.

## 2022-10-11 NOTE — PROVIDER NOTIFICATION
"Provider Notification   Ronen Joseph (#[id number]) paged at [phone]:  \"Hi, pt was given blood product premeds too early. Can you order 1x dose of tylenol and benadryl? Thank you.\"    Response: 1x dose of tylenol and benadryl ordered.   "

## 2022-10-11 NOTE — PROGRESS NOTES
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 However, no skin lesions initially noted. Patient did develop a few scattered papulovesicular lesions to her chin and nose, which she initially thought were acneiform in nature. 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 HSV and VZV swabs of a scalp lesion were obtained, and she was directly admitted to [facility] for further work-up and management. Empiric IV acyclovir was started (x10/10).   - Dermatology consulted, appreciate input. Agree with presumptive diagnosis and initial work-up and management, including consultation from other specialty services.  - ID consulted, await recommendations  - Ophtho consulted, await recommendations. No eye irritation, pain, or redness noted preliminarily.  - Continue empiric IV acyclovir 10 mg/kg Q8H (x10/10)    # ID PPx  - Hold PTA ACV given high-dose IV ACV as above  - Continue PTA fluconazole 200 mg daily and levofloxacin 250 mg daily given ANC <1.0  - Nebulized pentamidine last given 10/2; next due  ~10/31    HEME  # Ph(+) B-ALL  Followed by Dr. Hull. Patient presented to outside hospital with c/o right upper quadrant pain and was subsequently found to have a markedly elevated white blood cell count concerning for acute leukemia. While at OSH, she had CTA chest as well as CT A/P. These identified no PE, no acute or localized intraabdominal inflammatory process, mild splenomegaly, and few inconspicous low-density structures in the liver. She was transferred to [facility] for further workup and management. S/p Hydrea 1g TID (8/21-8/23) with improvement in WBC (100K ? 12K). Diagnostic BMBx completed 8/20/22, which demonstrated B-ALL with 85% blasts and hypercellular marrow. IHC shows CD10+, CD34+. 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 ALL NGS negative. BCR/ABL1 major/minor (sent from peripheral blood) both negative. Initiated pre-phase steroids with Prednisone 60 mg x8/21; increased to 60 mg/m2 (140 mg) 8/22-8/24. She initiated GRAALL + imatinib (C1D1 = 8/24/22). S/p BMT consult 9/12 with Dr. Walsh. Induction was relatively uncomplicated. Post-induction BMBx done 9/26/22 and revealed MRD- CR1, with negative BCR/ABL1 and Clonoseq testing. She was admitted for C2 GRAALL on 9/30/22.  - Today is Day +12 from C2 GRAALL (imatinib + HD methotrexate). Patient was due for ppx triple IT therapy on 10/10 (requires imaging-guided procedures), but this was deferred due to her admission, as above. Will need to reschedule in fluoro in the coming days, pending infectious course and count recovery.   - Imatinib held on admission  (10/10-10/11); will await ID input, prior to resumption - hopefully in coming day or two  - Given no identified matched related donor, and in the setting of early/deep MRD- CR, current plan is for ongoing consolidation chemo as opposed to BMT in CR1.     # Pancytopenia  Due to recent chemotherapy.  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 Anticipate discharge to home pending determination of appropriate treatment plan and ongoing clinical stability, likely 2-3 more days.    Follow up: TBD    This plan of care has been discussed with the staff physician, Dr. Jang.    Bev Haines PA-C  Hematology/Oncology  Pager: #[phone]    I spent 55 minutes in the care of this patient today, which included time necessary for review of interval events, obtaining history and physical exam, ordering medication(s)/test(s) as medically indicated, discussion with interdisciplinary/consult team(s), and documentation time. Over 50% of time was spent face-to-face and/or coordinating care.    Interval History   No acute overnight events. Patient reiterates the history of scalp pain/sensitivity developing this weekend, followed by the development of facial and scalp rash. No other  "skin rashes or lesions. Reports some gum \"sensitivity\", but no discrete gingival lesions or other mucosal sores. No eye pain, irritation, or redness. Does wear contacts.  No fevers at home, though Tmax up to the upper 99s over the weekend. No rigors or chills. No nausea, vomiting, constipation. Had an episode of diarrhea over the weekend, which resolved with imodium. No recurrence since. No other new complaints or concerns. Discussed likely diagnosis and management. Also discussed temporary holding of imatinib and delay in LP; patient voices understanding. All questions answered.    A comprehensive review of symptoms was performed and was negative except as detailed in the interval history above.    Physical Exam   Vital Signs with Ranges  Temp:  [98.2  F (36.8  C)-99.1  F (37.3  C)] 98.8  F (37.1  C)  Pulse:  [] 90  Resp:  [16-18] 18  BP: ()/(57-72) 124/59  SpO2:  [96 %-100 %] 98 %    I/O last 3 completed shifts:  In: 1633 [P.O.:800; I.V.:260]  Out: -     Vitals:    10/10/22 1712   Weight: 112.9 kg (248 lb 12.8 oz)     Constitutional: Pleasant and cooperative female. Awake, alert, NAD. Smiling and conversational.  HEENT: NC/AT, EOMI, sclera clear, conjunctiva normal, OP with MMM, mild gingival irritation without discrete intraoral lesion/mucositis  Respiratory: No increased work of breathing, CTAB, no crackles or wheezing.  Cardiovascular: RRR, no murmur noted. No peripheral edema.  GI: Normal bowel sounds, soft, non-distended and non-tender.  MSK: No large joint effusions or gross deformities.  Skin: Crusted hemorrhagic erosions noted in clusters to the scalp somewhat diffusely. Few other scattered lesions noted to the chin, crossing the midline, as well as a lesion near the tip of the nose. No intact vesicles identified.   Neurologic: A&O. Answers questions appropriately. Moves all extremities spontaneously.  Psych: Calm, appropriate affect  Vascular access:  PICC on LUE CDI, non-tender, no " surrounding erythema.    Medications     - MEDICATION INSTRUCTIONS -           acyclovir  10 mg/kg (Adjusted) Intravenous Q8H     amLODIPine  5 mg Oral Daily     fluconazole  200 mg Oral Daily     heparin lock flush  5-20 mL Intracatheter Q24H     [Held by provider] imatinib  400 mg Oral BID     levofloxacin  250 mg Oral Daily     pantoprazole  20 mg Oral Daily     sodium chloride (PF)  10-40 mL Intracatheter Q8H       Antiinfectives  Anti-infectives (From now, onward)    Start     Dose/Rate Route Frequency Ordered Stop    10/10/22 1830  acyclovir (ZOVIRAX) 800 mg in sodium chloride 0.9 % 250 mL intermittent infusion         10 mg/kg × 78 kg (Adjusted)  250 mL/hr over 1 Hours Intravenous EVERY 8 HOURS 10/10/22 1732      10/10/22 1800  fluconazole (DIFLUCAN) tablet 200 mg        Note to Pharmacy: PTA Sig:Take 1 tablet (200 mg) by mouth daily as needed (Take when ANC <1000)      200 mg Oral DAILY 10/10/22 1732      10/10/22 1800  levofloxacin (LEVAQUIN) tablet 250 mg        Note to Pharmacy: PTA Sig:Take 1 tablet (250 mg) by mouth daily as needed (ANC <1000)      250 mg Oral DAILY 10/10/22 1732            Data   CBC   Recent Labs   Lab 10/11/22  0703 10/10/22  1817 10/10/22  1237 10/06/22  1031   WBC 0.3* 0.5* 0.4* 2.4*   RBC 1.99* 2.10* 2.09* 2.61*   HGB 6.3* 6.6* 6.6* 8.3*   HCT 19.7* 20.7* 20.3* 25.9*   MCV 99 99 97 99   MCH 31.7 31.4 31.6 31.8   MCHC 32.0 31.9 32.5 32.0   RDW 18.2* 18.5* 18.3* 20.1*   PLT 15* 13* 19* 157       CMP   Recent Labs   Lab 10/11/22  0703 10/10/22  1817 10/10/22  1237 10/06/22  1031    139 138 137   POTASSIUM 4.0 3.7 3.8 3.7   CHLORIDE 106 106 105 104   CO2 23 24 24 25   ANIONGAP 7 9 9 8   GLC 92 124* 95 105*   BUN 12.9 13.3 14.9 15.7   CR 0.63 0.68 0.70 0.66   GFRESTIMATED >90 >90 >90 >90   SOLEDAD 8.1* 8.5* 8.7 8.4*   MAG 1.9 1.9  --   --    PHOS 3.2 2.8  --   --    PROTTOTAL 5.6* 5.9* 6.1* 5.7*   ALBUMIN 3.3* 3.7 3.7 3.6   BILITOTAL 0.6 0.6 0.7 1.1   ALKPHOS 58 61 62 62   AST 13 15  12 20   ALT 20 24 24 36*       LFTs   Recent Labs   Lab 10/11/22  0703   PROTTOTAL 5.6*   ALBUMIN 3.3*   BILITOTAL 0.6   ALKPHOS 58   AST 13   ALT 20       Coagulation Studies   Recent Labs   Lab 10/11/22  0703 10/10/22  1817   INR 1.10 1.08   PTT  --  35

## 2022-10-11 NOTE — TELEPHONE ENCOUNTER
Message received from RNCC:  Unfortunately April is being admitted today with pancytopenia.  Her BMBX will need to be rescheduled     I will review with her team and message you as soon as possible with a new date for this procedure     Thanks   Lucy     Appointment cancelled.    Maura Dubois, RN  Interventional Radiology  [phone]

## 2022-10-12 LAB
ALBUMIN SERPL BCG-MCNC: 3.3 G/DL (ref 3.5–5.2)
ALP SERPL-CCNC: 60 U/L (ref 35–104)
ALT SERPL W P-5'-P-CCNC: 16 U/L (ref 10–35)
ANION GAP SERPL CALCULATED.3IONS-SCNC: 10 MMOL/L (ref 7–15)
AST SERPL W P-5'-P-CCNC: 12 U/L (ref 10–35)
BASOPHILS # BLD MANUAL: 0 10E3/UL (ref 0–0.2)
BASOPHILS NFR BLD MANUAL: 0 %
BILIRUB SERPL-MCNC: 0.5 MG/DL
BLD PROD TYP BPU: NORMAL
BLOOD COMPONENT TYPE: NORMAL
BUN SERPL-MCNC: 7.5 MG/DL (ref 6–20)
CALCIUM SERPL-MCNC: 8.3 MG/DL (ref 8.6–10)
CHLORIDE SERPL-SCNC: 102 MMOL/L (ref 98–107)
CODING SYSTEM: NORMAL
CREAT SERPL-MCNC: 0.63 MG/DL (ref 0.51–0.95)
DEPRECATED HCO3 PLAS-SCNC: 22 MMOL/L (ref 22–29)
EOSINOPHIL # BLD MANUAL: 0 10E3/UL (ref 0–0.7)
EOSINOPHIL NFR BLD MANUAL: 2 %
ERYTHROCYTE [DISTWIDTH] IN BLOOD BY AUTOMATED COUNT: 19.1 % (ref 10–15)
FIBRINOGEN PPP-MCNC: 615 MG/DL (ref 170–490)
GFR SERPL CREATININE-BSD FRML MDRD: >90 ML/MIN/1.73M2
GLUCOSE SERPL-MCNC: 91 MG/DL (ref 70–99)
HCT VFR BLD AUTO: 22.3 % (ref 35–47)
HGB BLD-MCNC: 7.2 G/DL (ref 11.7–15.7)
INR PPP: 1.12 (ref 0.85–1.15)
ISSUE DATE AND TIME: NORMAL
LACTATE SERPL-SCNC: 0.7 MMOL/L (ref 0.7–2)
LACTATE SERPL-SCNC: 1 MMOL/L (ref 0.7–2)
LYMPHOCYTES # BLD MANUAL: 0.4 10E3/UL (ref 0.8–5.3)
LYMPHOCYTES NFR BLD MANUAL: 89 %
MAGNESIUM SERPL-MCNC: 1.7 MG/DL (ref 1.7–2.3)
MCH RBC QN AUTO: 30.4 PG (ref 26.5–33)
MCHC RBC AUTO-ENTMCNC: 32.3 G/DL (ref 31.5–36.5)
MCV RBC AUTO: 94 FL (ref 78–100)
MONOCYTES # BLD MANUAL: 0 10E3/UL (ref 0–1.3)
MONOCYTES NFR BLD MANUAL: 9 %
NEUTROPHILS # BLD MANUAL: 0 10E3/UL (ref 1.6–8.3)
NEUTROPHILS NFR BLD MANUAL: 0 %
PHOSPHATE SERPL-MCNC: 3 MG/DL (ref 2.5–4.5)
PLAT MORPH BLD: ABNORMAL
PLATELET # BLD AUTO: 5 10E3/UL (ref 150–450)
POTASSIUM SERPL-SCNC: 3.7 MMOL/L (ref 3.4–5.3)
PROT SERPL-MCNC: 5.9 G/DL (ref 6.4–8.3)
RBC # BLD AUTO: 2.37 10E6/UL (ref 3.8–5.2)
RBC MORPH BLD: ABNORMAL
SODIUM SERPL-SCNC: 134 MMOL/L (ref 136–145)
UNIT ABO/RH: NORMAL
UNIT NUMBER: NORMAL
UNIT STATUS: NORMAL
UNIT TYPE ISBT: 5100
URATE SERPL-MCNC: 2 MG/DL (ref 2.4–5.7)
VZV DNA SPEC QL NAA+PROBE: NOT DETECTED
WBC # BLD AUTO: 0.5 10E3/UL (ref 4–11)

## 2022-10-12 PROCEDURE — 84100 ASSAY OF PHOSPHORUS: CPT | Performed by: PHYSICIAN ASSISTANT

## 2022-10-12 PROCEDURE — 93005 ELECTROCARDIOGRAM TRACING: CPT

## 2022-10-12 PROCEDURE — 36592 COLLECT BLOOD FROM PICC: CPT | Performed by: INTERNAL MEDICINE

## 2022-10-12 PROCEDURE — 250N000011 HC RX IP 250 OP 636: Performed by: PHYSICIAN ASSISTANT

## 2022-10-12 PROCEDURE — 85610 PROTHROMBIN TIME: CPT | Performed by: PHYSICIAN ASSISTANT

## 2022-10-12 PROCEDURE — 120N000002 HC R&B MED SURG/OB UMMC

## 2022-10-12 PROCEDURE — 36592 COLLECT BLOOD FROM PICC: CPT | Performed by: PHYSICIAN ASSISTANT

## 2022-10-12 PROCEDURE — 99231 SBSQ HOSP IP/OBS SF/LOW 25: CPT | Performed by: INTERNAL MEDICINE

## 2022-10-12 PROCEDURE — 99207 PR SC NO CHARGE VISIT: CPT | Performed by: STUDENT IN AN ORGANIZED HEALTH CARE EDUCATION/TRAINING PROGRAM

## 2022-10-12 PROCEDURE — 85027 COMPLETE CBC AUTOMATED: CPT | Performed by: PHYSICIAN ASSISTANT

## 2022-10-12 PROCEDURE — 83605 ASSAY OF LACTIC ACID: CPT | Performed by: INTERNAL MEDICINE

## 2022-10-12 PROCEDURE — 80053 COMPREHEN METABOLIC PANEL: CPT | Performed by: PHYSICIAN ASSISTANT

## 2022-10-12 PROCEDURE — 85384 FIBRINOGEN ACTIVITY: CPT | Performed by: PHYSICIAN ASSISTANT

## 2022-10-12 PROCEDURE — 99356 PR PROLONGED SERV,INPATIENT,1ST HR: CPT | Performed by: STUDENT IN AN ORGANIZED HEALTH CARE EDUCATION/TRAINING PROGRAM

## 2022-10-12 PROCEDURE — 250N000013 HC RX MED GY IP 250 OP 250 PS 637: Performed by: PHYSICIAN ASSISTANT

## 2022-10-12 PROCEDURE — P9037 PLATE PHERES LEUKOREDU IRRAD: HCPCS | Performed by: PHYSICIAN ASSISTANT

## 2022-10-12 PROCEDURE — 84550 ASSAY OF BLOOD/URIC ACID: CPT | Performed by: PHYSICIAN ASSISTANT

## 2022-10-12 PROCEDURE — 85007 BL SMEAR W/DIFF WBC COUNT: CPT | Performed by: PHYSICIAN ASSISTANT

## 2022-10-12 PROCEDURE — 87799 DETECT AGENT NOS DNA QUANT: CPT | Performed by: PHYSICIAN ASSISTANT

## 2022-10-12 PROCEDURE — 99233 SBSQ HOSP IP/OBS HIGH 50: CPT | Performed by: STUDENT IN AN ORGANIZED HEALTH CARE EDUCATION/TRAINING PROGRAM

## 2022-10-12 PROCEDURE — 258N000003 HC RX IP 258 OP 636: Performed by: PHYSICIAN ASSISTANT

## 2022-10-12 PROCEDURE — 250N000013 HC RX MED GY IP 250 OP 250 PS 637: Performed by: HOSPITALIST

## 2022-10-12 PROCEDURE — 83735 ASSAY OF MAGNESIUM: CPT | Performed by: PHYSICIAN ASSISTANT

## 2022-10-12 PROCEDURE — 93010 ELECTROCARDIOGRAM REPORT: CPT | Performed by: INTERNAL MEDICINE

## 2022-10-12 PROCEDURE — 99233 SBSQ HOSP IP/OBS HIGH 50: CPT | Mod: GC | Performed by: DERMATOLOGY

## 2022-10-12 RX ORDER — LIDOCAINE HYDROCHLORIDE AND EPINEPHRINE 10; 10 MG/ML; UG/ML
3 INJECTION, SOLUTION INFILTRATION; PERINEURAL ONCE
Status: DISCONTINUED | OUTPATIENT
Start: 2022-10-12 | End: 2022-10-14 | Stop reason: HOSPADM

## 2022-10-12 RX ORDER — VALACYCLOVIR HYDROCHLORIDE 1 G/1
1000 TABLET, FILM COATED ORAL 3 TIMES DAILY
Status: DISCONTINUED | OUTPATIENT
Start: 2022-10-12 | End: 2022-10-12

## 2022-10-12 RX ORDER — CLINDAMYCIN PHOSPHATE 10 UG/ML
LOTION TOPICAL 2 TIMES DAILY
Status: DISCONTINUED | OUTPATIENT
Start: 2022-10-12 | End: 2022-10-14 | Stop reason: HOSPADM

## 2022-10-12 RX ADMIN — DIPHENHYDRAMINE HYDROCHLORIDE 25 MG: 25 CAPSULE ORAL at 08:28

## 2022-10-12 RX ADMIN — AMLODIPINE BESYLATE 5 MG: 5 TABLET ORAL at 08:29

## 2022-10-12 RX ADMIN — ACYCLOVIR SODIUM 800 MG: 1000 INJECTION, SOLUTION INTRAVENOUS at 10:58

## 2022-10-12 RX ADMIN — CLINDAMYCIN PHOSPHATE: 10 LOTION TOPICAL at 20:39

## 2022-10-12 RX ADMIN — Medication 5 ML: at 11:27

## 2022-10-12 RX ADMIN — ACYCLOVIR SODIUM 800 MG: 1000 INJECTION, SOLUTION INTRAVENOUS at 02:41

## 2022-10-12 RX ADMIN — PANTOPRAZOLE SODIUM 20 MG: 20 TABLET, DELAYED RELEASE ORAL at 08:28

## 2022-10-12 RX ADMIN — LEVOFLOXACIN 250 MG: 250 TABLET, FILM COATED ORAL at 08:30

## 2022-10-12 RX ADMIN — ACETAMINOPHEN 650 MG: 325 TABLET, FILM COATED ORAL at 17:56

## 2022-10-12 RX ADMIN — Medication 5 ML: at 06:40

## 2022-10-12 RX ADMIN — FLUCONAZOLE 200 MG: 200 TABLET ORAL at 08:28

## 2022-10-12 RX ADMIN — ACETAMINOPHEN 650 MG: 325 TABLET, FILM COATED ORAL at 08:29

## 2022-10-12 RX ADMIN — ACYCLOVIR SODIUM 800 MG: 1000 INJECTION, SOLUTION INTRAVENOUS at 18:01

## 2022-10-12 RX ADMIN — Medication 5 ML: at 13:18

## 2022-10-12 ASSESSMENT — ACTIVITIES OF DAILY LIVING (ADL)
ADLS_ACUITY_SCORE: 20
ADLS_ACUITY_SCORE: 20
DEPENDENT_IADLS:: INDEPENDENT
ADLS_ACUITY_SCORE: 20

## 2022-10-12 NOTE — CONSULTS
Care Management Initial Consult    General Information  Assessment completed with: Care Team Member, SATHISH-chart review,      Primary Care Provider verified and updated as needed:     Readmission within the last 30 days:     Reason for Consult:  (elevated risk score)  Advance Care Planning:      Communication Assessment  Patient's communication style: spoken language (English or Bilingual)    Hearing Difficulty or Deaf: no   Wear Glasses or Blind: other (see comments) (wears contacts)    Cognitive  Cognitive/Neuro/Behavioral: WDL                    Living Environment:   People in home: child(yasemin), dependent, spouse     Current living Arrangements: house      Able to return to prior arrangements:  yes     Family/Social Support:  Care provided by: self, spouse/significant other  Provides care for:    Marital Status:   Description of Support System:  Supportive, involved       Current Resources:   Patient receiving home care services: No  Community Resources: None  Equipment currently used at home: none  Supplies currently used at home: None    Employment/Financial:  Employment Status: employed full-time     Financial Concerns:  No concerns identified     Functional Status:  Prior to admission patient needed assistance:   Dependent ADLs:: Independent  Dependent IADLs:: Independent     Mental Health Status:  Mental Health Status: No Current Concerns       Chemical Dependency Status:  Chemical Dependency Status: No Current Concerns           Values/Beliefs:  Spiritual, Cultural Beliefs, Anabaptist Practices, Values that affect care: no               Additional Information:  Vashti Villegas is a 42 year old female admitted on 10/10/22. She has a PMH of Ph+ B-ALL with BCR-abl fusion, s/p GRAALL +imatinib. Admitted with lesions 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    Care management consult placed for elevated risk score. CMA done via chart review and care team.      Patient is independent at baseline and does not have any in  home services.     Writer placed a benefit check for home IV abx with [facility], still pending.    Estela Youngblood RN, BSN  Care Coordinator 7D  Office: [phone]  Pager: [phone]    To contact the weekend CC  [facility] (0800 - 1630) Saturday and Sunday    Units: 4A, 4C, 4E, 5A and 5B- Pager 1: [phone]    Units: 6A, 6B, 6C, 6D- Pager 2: [phone]    Units: 7A, 7B, 7C, 7D, and 5C-Pager 3: [phone]

## 2022-10-12 NOTE — PROGRESS NOTES
"Care Management Follow Up    Writer sent benefit check for home IV Abx, if needed at discharge.     Per [facility] - \"Pt has 100% coverage for IV abx with [employer], family ded $6000 has been met in full\".    Estela Youngblood RN, BSN  Care Coordinator 7D  Office: [phone]  Pager: [phone]    To contact the weekend [facility] (0800 - 1630) Saturday and Sunday    Units: 4A, 4C, 4E, 5A and 5B- Pager 1: [phone]    Units: 6A, 6B, 6C, 6D- Pager 2: [phone]    Units: 7A, 7B, 7C, 7D, and 5C-Pager 3: [phone]       "

## 2022-10-12 NOTE — PLAN OF CARE
Goal Outcome Evaluation:             [id number]:  A&O x 4.  Afebrile. VSS on room air.  Given PRN Tylenol x 1 for HA and blood product administration with a decrease in pain.  Platelets 5K, transfused one unit of platelets without incident (given pre meds).  Up ad stephanie, plans to do CHG shower later.  April felt frustrated and overwhelmed lack of clear POC, wondering when she can go home.  POC clarified and discussed with her by GRANT.  Continue to monitor and POC.

## 2022-10-12 NOTE — PROVIDER NOTIFICATION
"Raji Abreu MD paged     \"Pt reporting chest tightness that is throbbing since 1630. Turns into a pressure when she lays, only relieved with standing. Vitally stable, denies dizzyness, or shortness of breath. STAT EKG ordered. \"  "

## 2022-10-12 NOTE — PROGRESS NOTES
[facility]  Transplant Infectious New Patient Progress Note     Patient:  Vashti Villegas, Date of birth 1980, Medical record number [id number]  Date of Visit:  10/12/2022  Consult requested for evaluation of new papulovesicular rash          Assessment and Recommendations:   Recommendations:  1) No new lesions noted, all lesions appear to be crusted over. No other organ involvement noted. No ocular involvement. No issues with GI absorption noted. Would be okay to transition form IV acyclovir 10 mg/kg IV Q8H to oral valacyclovir 1000 mg PO TID on discharge to finish a 14 day course of therapy.    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    Infectious Disease issues include:  - Crusted, hemorrhagic lesions noted on the scalp and face - in the setting of leukopenia and lymphopenia and while on ppx Acyclovir.   HSV1/2 PCR from a swab of the lesion of the scalp - negative   VZV PCR from a swab of the scalp - Negative   Erosive lesions noted in the distribution of V1 (Tip of the nose), V2 and V3 (Upper lip and chin, and C2 (scalp) dermatomes.   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   There have not been any new lesions since starting IV acyclovir. All lesions appear crusted over.   There is no other organ involvement, including ocular involvement.   Patient stable with lesions having crusted over. Would be okay to transition to oral valacyclovir 1000 mg PO TID to finish a 14 day course of therapy.     Other issues:  - PCP prophylaxis: Nebulized pentamidine last given 10/2; next due ~10/31  - Fungal prophylaxis: fluconazole 200 mg   - Bacterial  prophylaxis: Levofloxacin 250 mg daily   - Serostatus: CMV Neg, EBV Positive, HSV1+/HSV2- PTA acyclovir 400 mg BID held   - Gamma globulin status: ?  - Vaccination - There are non on file.   - Isolation status:  Good hand hygiene. Airborne precautions     [facility]   Transplant Infectious Diseases   9922        Interval History   No acute events overnight.   Afebrile. VSS.   Denies any headaches, vision changes. No oral ulcers.   No chest pain, cough, shortness of breath.   No abdominal pain. No nausea vomiting diarrhea.   All other complete ROS negative.          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 She was discharge on 10/03 and had tolerated chemotherapy well with no significant event during hospitalization. She was seen in Heme/Onc outpatient visit on 10/10 and was found to be pancytopenic. She also stated that over the weekend (10/08-10/09) she started having scalp pain and low grade fevers. On Monday - she noted a new rash on her scalp and upper lip, chin, and tip of the nose. At this time she was admitted to the hospital for workup for possible disseminated VZV in the setting of pancytopenia 2/2 to recent chemotherapy.     She denies any headaches, neck stiffness, photophobia, blurry vision, loss of vision. No oral lesions, no difficulty or pain with swallowing. No chest pain, cough, shortness of breath. No abdominal pain, nausea, or vomiting.     She has only noted lesion on the scalp and chin, upper lip area. Has not seen any new lesions crop up. Denies any drainage from the lesions. No Pain or pruritis associated with the rash.     She has has no recently travel. No hiking, camping, swimming in the lake. Has not noted any ticks. No new animal  "exposure. Has three dogs.     Current Facility-Administered Medications   Medication     acetaminophen (TYLENOL) tablet 650 mg     amLODIPine (NORVASC) tablet 5 mg     calcium carbonate (TUMS) chewable tablet 1,000 mg     diphenhydrAMINE (BENADRYL) capsule 25 mg     fluconazole (DIFLUCAN) tablet 200 mg     heparin lock flush 10 UNIT/ML injection 5-20 mL     heparin lock flush 10 UNIT/ML injection 5-20 mL     [Held by provider] imatinib (GLEEVEC) tablet 400 mg     levofloxacin (LEVAQUIN) tablet 250 mg     lidocaine 1% with EPINEPHrine 1:100,000 injection 3 mL     LORazepam (ATIVAN) tablet 0.5-1 mg    Or     LORazepam (ATIVAN) injection 0.5-1 mg     No rectal suppositories if WBC less than 1000/microliters or platelets less than 50,000/ L     ondansetron (ZOFRAN) injection 8 mg    Or     ondansetron (ZOFRAN ODT) ODT tab 8 mg    Or     ondansetron (ZOFRAN) tablet 8 mg     pantoprazole (PROTONIX) EC tablet 20 mg     polyethylene glycol (MIRALAX) Packet 17 g     prochlorperazine (COMPAZINE) tablet 10 mg    Or     prochlorperazine (COMPAZINE) injection 10 mg     senna-docusate (SENOKOT-S/PERICOLACE) 8.6-50 MG per tablet 1 tablet    Or     senna-docusate (SENOKOT-S/PERICOLACE) 8.6-50 MG per tablet 2 tablet     sodium chloride (PF) 0.9% PF flush 10-20 mL     sodium chloride (PF) 0.9% PF flush 10-40 mL     valACYclovir (VALTREX) tablet 1,000 mg       Allergies   Allergen Reactions     Blood Transfusion Related (Informational Only) Hives     Hive reaction with platelet transfusion on 8/26/2022. Please administer platelets with tylenol and benadryl premedication.             Physical Exam:   Vitals were reviewed.  All vitals stable  /64 (BP Location: Right arm, Cuff Size: Adult Regular)   Pulse 86   Temp 98.2  F (36.8  C) (Oral)   Resp 18   Ht 1.626 m (5' 4\")   Wt 112.9 kg (248 lb 12.8 oz)   SpO2 96%   BMI 42.71 kg/m      Exam:  GENERAL:  well-developed, well-nourished, alert, oriented, in no acute " distress.  HEENT:  Head is normocephalic, atraumatic. A cluster of crusted lesions noted on the midline of the scalp. No purulence noted.   A few crusted papules noted on the chin and upper lip. One crusted lesion on the tip of the nose.   No new lesions noted   EYES:  Eyes have anicteric sclerae.    ENT:  Oropharynx is moist without exudates or ulcers.  NECK:  Supple.  LUNGS:  Clear to auscultation.  CARDIOVASCULAR:  Regular rate and rhythm with no murmurs, gallops or rubs.  ABDOMEN:  Normal bowel sounds, soft, nontender.  SKIN:  No acute rashes.  Line is in place without any surrounding erythema.  NEUROLOGIC:  Grossly nonfocal.         Laboratory Data:     Metabolic Studies     Recent Labs   Lab Test 10/12/22  0642 10/11/22  0703 10/10/22  1817 10/10/22  1237 10/06/22  1031 10/03/22  0857   * 136 139 138 137 141   POTASSIUM 3.7 4.0 3.7 3.8 3.7 3.6   CHLORIDE 102 106 106 105 104 106   CO2 22 23 24 24 25 25   ANIONGAP 10 7 9 9 8 10   BUN 7.5 12.9 13.3 14.9 15.7 14.2   CR 0.63 0.63 0.68 0.70 0.66 0.63   GFRESTIMATED >90 >90 >90 >90 >90 >90   GLC 91 92 124* 95 105* 135*   SOLEDAD 8.3* 8.1* 8.5* 8.7 8.4* 8.2*   PHOS 3.0 3.2 2.8  --   --  3.0   MAG 1.7 1.9 1.9  --   --  2.2     Hepatic Studies    Recent Labs   Lab Test 10/12/22  0642 10/11/22  0703 10/10/22  1817 10/10/22  1237 10/06/22  1031 09/30/22  1605   BILITOTAL 0.5 0.6 0.6 0.7 1.1 0.3   ALKPHOS 60 58 61 62 62 83   ALBUMIN 3.3* 3.3* 3.7 3.7 3.6 4.0   AST 12 13 15 12 20 17   ALT 16 20 24 24 36* 24   LDH  --   --  201  --   --  245     Gout Labs      Recent Labs   Lab Test 10/12/22  0642 10/11/22  0703 10/10/22  1817 09/30/22  1605 09/08/22  0831   URIC 2.0* 2.0* 2.2* 3.8 2.1*       Hematology Studies     Recent Labs   Lab Test 10/12/22  0642 10/11/22  0703 10/10/22  1817 10/10/22  1237 10/06/22  1031 10/03/22  0856 09/13/22  0716 09/12/22  0936 09/11/22  0941 09/10/22  0647 09/09/22  0618 09/06/22  0835 09/05/22  0749 09/04/22  0655   WBC 0.5* 0.3* 0.5* 0.4*  2.4* 4.6   < > 6.0   < > 2.2* 2.7*   < > 2.0* 1.4*   ANEU  --   --  0.0*  --   --   --   --  3.1  --  2.0 2.5  --  0.2* 0.2*   ALYM  --   --  0.5*  --   --   --   --  2.5  --  0.1* 0.2*  --  1.7 1.2   COLIN  --   --  0.0  --   --   --   --  0.3  --  0.0 0.0  --  0.0 0.0   AEOS  --   --  0.0  --   --   --   --  0.1  --  0.0 0.0  --  0.1 0.0   HGB 7.2* 6.3* 6.6* 6.6* 8.3* 8.3*   < > 8.0*   < > 7.3* 7.5*   < > 8.2* 8.0*   HCT 22.3* 19.7* 20.7* 20.3* 25.9* 27.0*   < > 24.8*   < > 22.4* 22.6*   < > 24.6* 24.4*   PLT 5* 15* 13* 19* 157 210   < > 328   < > 198 169   < > 47* 35*    < > = values in this interval not displayed.       Clotting Studies    Recent Labs   Lab Test 10/12/22  0642 10/11/22  0703 10/10/22  1817 09/30/22  1605 08/20/22  0624 08/19/22  1705   INR 1.12 1.10 1.08 1.00   < > 1.19*   PTT  --   --  35 27  --  31    < > = values in this interval not displayed.     Urine Studies    Recent Labs   Lab Test 10/03/22  0350 10/02/22  1931 10/02/22  1222 10/02/22  0254 10/01/22  1903 10/01/22  1100 08/22/22  1515   URINEPH 7.0 8.0 7.0 7.5 7.0   < > 5.5   NITRITE  --   --   --   --   --   --  Negative   LEUKEST  --   --   --   --   --   --  Negative    < > = values in this interval not displayed.     CSF testing     Recent Labs   Lab Test 09/14/22  1131 09/07/22  1124 08/31/22  1617 08/26/22  1517   CWBC 0 0 0 2   CRBC 9* 15* 9* 0   CGLU  --  57 68 106*   CTP  --  24.9 38.2 46.8*       Microbiology:  Last 6 Culture results with specimen source  Culture Micro   Date Value Ref Range Status   09/25/2003   Final    >100,000 colonies/mL Multiple organisms present, probable contamination   01/21/2003 No Beta Streptococcus isolated  Final   01/04/2003 No Beta Streptococcus isolated  Final    Specimen Description   Date Value Ref Range Status   09/25/2003 Midstream Urine  Final   09/22/2003 Vagina  Final   01/21/2003 Throat  Final   01/21/2003 Throat  Final   01/04/2003 Throat  Final   01/04/2003 Throat  Final         Hepatitis B Testing     Recent Labs   Lab Test 08/19/22  1705   HBCAB Nonreactive   HEPBANG Nonreactive       Hepatitis C Antibody   Date Value Ref Range Status   08/19/2022 Nonreactive Nonreactive Final     Respiratory Virus Testing    No results found for: RMIX, RS, FLUAG No results found for: RESPEC     CMV Antibody IgG   Date Value Ref Range Status   08/19/2022 No detectable antibody. No detectable antibody.  Final     Imaging:  Results for orders placed or performed during the hospital encounter of 09/30/22   IR Referral    Nel Escobar APRN CNP     10/3/2022  2:45 PM  Message sent to Kajal Barajas PA-C with directions to place   XR lumbar puncture order for LP with chemotherapy. IR referral   not needed.    Nel Michel DNP, APRN  Interventional Radiology   IR on-call pager: [phone]

## 2022-10-12 NOTE — PROGRESS NOTES
[facility] Progress Note     Impression/Plan:  1. Scalp and facial painful, hemorrhagic crusted erosions   -  Per history, most consistent with VZV but lesion morphology not entirely diagnostic likely due to immunosuppression, and so far 1 negative VZV swab, awaiting results from other. Fortunately patient is improving daily with no new lesions appearing and no pain. Agree that main differential for cause of lesions and pain would be viral such as VZV since HSV was negative on admission. Lesions do not appear impetiginized but did grow 1+ diphtheroids on bacterial swab, likely contaminant but recommend topical clindamycin lotion for treatment. 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 Discussed with patient and hesitant about biopsy today. Would prefer to do biopsy only if were not improving since she is feeling much better on the antiviral medications. Discussed with team who states they are feeling she is doing much better as well and hopefully will discharge tomorrow. They have no concerns for eye or CNS involvement. Discussed this with patient and it is reasonable to defer biopsy today. However, if were to start develop new lesions or worsening symptoms, would need to proceed with biopsy for definitive diagnosis. In the meantime, continue IV acyclovir per primary.       Recommendations:  - Agree with ID consult  - Antivirals per primary team  - Follow VZV swabs as 1 still pending  - If lesions were to return positive for VZV, could start gabapentin 300 mg TID and increase as needed to decrease symptoms and risk for post-herpetic neuralgia  - If symptoms were to worsen or develop new lesions, will discuss biopsy tomorrow  - EBV and CMV PCR pending  - For diphtheroids on swab, can start topical clindamycin 1% lotion BID for 2 weeks to lesions      Thank you for the  dermatology consultation. Please do not hesitate to contact the dermatology resident/faculty on call for any additional questions or concerns. We will continue to follow peripherally.      Attending,  staffed the patient.     Phi Longoria MD PGY-3  Dermatology Resident      I have seen and examined this patient and agree with the assessment and plan as documented in the resident's note.    Adams Dwyer MD  Dermatology Attending      Dermatology Problem List:  1. Scalp and facial erosions  - HSV negative, VZV pending; diphtheroids on bacterial culture -> clindamycin lotion BID x2 weeks  2. B-ALL      S:  - overall feeling better  - no new lesions appearing  - some itch but better  - no pain  - no oral lesions  - no fevers, joint pains  - hesitant about biopsy    O:  SKIN: Focused examination of the scalp, face was performed.  - Nunez skin type: II  - On the face and tip of nose there are few hemorrhagic crusted papules with surrounding erythema  - On the scalp clustered of midline and left parietal scalp there are many hemorrhagic crusted erosions and papules with erythema, few with yellow crust  - No vesicles or pustules identified

## 2022-10-12 NOTE — PROGRESS NOTES
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    Today:  - Continue IV acyclovir 10 mg/kg Q8H while inpatient. On discharge, plan to change to PO valacyclovir 1g TID for 7-14 days, until all lesions have crusted. Appreciate ID input.  - Start clindamycin 1% lotion to lesions (given possible diphtheroid infection versus contamination seen on aerobic bacterial cx swab) BID x14 days, per dermatology. Appreciate recommendations.  - Hold Gleevec until further count recovery; discussed with Dr. Hull.  - Follow-up serum IgG, EBV PCR, and CMV PCR.  - Anticipate discharge to home as soon as tomorrow, 10/13, pending ongoing clinical stability.    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 However, no skin lesions initially noted. Patient did develop a few scattered papulovesicular lesions to her chin and nose, which she initially thought were acneiform in nature. 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 HSV and VZV swabs of a scalp lesion were obtained, and she was directly admitted to  [facility] for further work-up and management. Empiric IV acyclovir was started (x10/10).   - Dermatology consulted, appreciate input. Agree with presumptive diagnosis and initial work-up and management. Discussed possible biopsy, but patient preferred to hold off, and it would be unlikely to change current management. Could reconsider in the future if lesions fail to improve as expected.  - ID consulted, appreciate input. Recommend continuing IV acyclovir while patient is hospitalized, then changing to PO valacyclovir 1g TID on discharge to complete a 7-14 day course. Patient will need close outpatient follow-up to ensure lesions have all crusted over prior to cessation of VACV.  - Ophtho consulted (10/11), no evidence of ocular involvement. Recommend repeat dilated eye exam in 4-6 weeks, out of an abundance of caution.   - HSV PCR (scalp) and VZV PCR (scalp) both negative. Aerobic bacterial swab with 1+ gram positive bacilli resembling diphtheroids. Start clindamycin 1% lotion BID x14 days, per dermatology.  - EBV/CMV PCR sent and pending  - Send serum IgG  - Continue empiric IV acyclovir 10 mg/kg Q8H (x10/10-X)    # ID PPx  - Hold PTA ACV given high-dose IV ACV as above  - Continue PTA fluconazole 200 mg daily and levofloxacin 250 mg daily given ANC <1.0  - Nebulized pentamidine last given 10/2; next due ~10/31    HEME  # Ph(+) B-ALL  Followed by Dr. Hull. Patient presented to outside hospital with c/o right upper quadrant pain and was subsequently found to have a markedly elevated white blood cell count concerning for acute leukemia. While at OSH, she had CTA chest as well as CT A/P. These identified no PE, no acute or localized intraabdominal inflammatory process, mild splenomegaly, and few inconspicous low-density structures in the liver. She was transferred to [facility] for further workup and management. S/p Hydrea 1g TID (8/21-8/23) with improvement in WBC (100K ? 12K). Diagnostic BMBx completed 8/20/22, which  demonstrated B-ALL with 85% blasts and hypercellular marrow. IHC shows CD10+, CD34+. 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 ALL NGS negative. BCR/ABL1 major/minor (sent from peripheral blood) both negative. Initiated pre-phase steroids with Prednisone 60 mg x8/21; increased to 60 mg/m2 (140 mg) 8/22-8/24. She initiated GRAALL + imatinib (C1D1 = 8/24/22). S/p BMT consult 9/12 with Dr. Walsh. Induction was relatively uncomplicated. Post-induction BMBx done 9/26/22 and revealed MRD- CR1, with negative BCR/ABL1 and Clonoseq testing. She was admitted for C2 GRAALL on 9/30/22.  - Today is Day +13 from C2 GRAALL (imatinib + HD methotrexate). Patient was due for ppx triple IT therapy on 10/10 (requires imaging-guided procedures), but this was deferred due to her admission, as above. Will need to reschedule in fluoro in the coming days, pending infectious course and count recovery. Working with outpatient team to arrange this; unfortunately, extremely limited availability in XR. Will arrange for next available, with transfusion support beforehand.  - Imatinib held on admission given profound cytopenias and active infection. Discussed with Dr. Hull (10/12); will plan to hold imatinib on discharge until count recovery.  - Outpatient team working to reschedule BMBx, previously planned in IR under sedation on 10/13 and now cancelled due to admission. Will reschedule at next available.  - Given no identified matched related donor, and in the setting of early/deep MRD- CR, current plan is for ongoing consolidation chemo as  opposed to BMT in CR1.     # Pancytopenia  Due to recent chemotherapy.  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 Anticipate discharge to home pending determination of appropriate treatment plan and ongoing clinical stability, likely 2-3 more days.    Follow up: TBD    This plan of care has been discussed with the staff physician, Dr. Dewitt.    Bev Haines PA-C  Hematology/Oncology  Pager: #[phone]    I spent 65 minutes in the care of this patient today, which included time necessary for review of interval events, obtaining history and physical exam, ordering medication(s)/test(s) as medically indicated, discussion with interdisciplinary/consult team(s), and documentation time. Over 50% of time was spent face-to-face and/or coordinating care.    Interval History   No acute overnight events. Patient continues to feel well. No pain to the scalp or face, just a bit of pruritus. No drainage from lesions. No other new symptoms/complaints. Did feel a bit chilled overnight and had some sweats, but no fever. No cough, CP, SOB, abdominal pain, N/V, diarrhea, constipation. No new lesions. Wonders when she can go home. All questions answered on two encounters (visit  in-person, then phone call later with additional updates/to answer questions).     A comprehensive review of symptoms was performed and was negative except as detailed in the interval history above.    Physical Exam   Vital Signs with Ranges  Temp:  [98.2  F (36.8  C)-98.7  F (37.1  C)] 98.2  F (36.8  C)  Pulse:  [] 86  Resp:  [16-18] 18  BP: (109-132)/(54-77) 126/64  SpO2:  [96 %-98 %] 96 %    I/O last 3 completed shifts:  In: 1850 [P.O.:1040; I.V.:510]  Out: -     Vitals:    10/10/22 1712   Weight: 112.9 kg (248 lb 12.8 oz)     Constitutional: Pleasant and cooperative female. Awake, alert, NAD. Smiling and conversational.  HEENT: NC/AT, EOMI, sclera clear, conjunctiva normal, OP with MMM, mild gingival irritation without discrete intraoral lesion/mucositis.  Respiratory: No increased work of breathing, CTAB, no crackles or wheezing.  Cardiovascular: RRR, no murmur noted. No peripheral edema.  GI: Normal bowel sounds, soft, non-distended and non-tender.  MSK: No large joint effusions or gross deformities.  Skin: Crusted hemorrhagic erosions noted in clusters to the scalp somewhat diffusely. Few other scattered lesions noted to the chin, crossing the midline, as well as a lesion near the tip of the nose and another to the left of the nasal bridge. No intact vesicles identified.  Neurologic: A&O. Answers questions appropriately. Moves all extremities spontaneously.  Psych: Calm, appropriate affect.  Vascular access:  PICC on LUE CDI, non-tender, no surrounding erythema.    Medications     - MEDICATION INSTRUCTIONS -           acyclovir  10 mg/kg (Adjusted) Intravenous Q8H     amLODIPine  5 mg Oral Daily     clindamycin   Topical BID     fluconazole  200 mg Oral Daily     heparin lock flush  5-20 mL Intracatheter Q24H     [Held by provider] imatinib  400 mg Oral BID     levofloxacin  250 mg Oral Daily     lidocaine 1% with EPINEPHrine 1:100,000  3 mL Intradermal Once     pantoprazole  20 mg Oral Daily     sodium  chloride (PF)  10-40 mL Intracatheter Q8H       Antiinfectives  Anti-infectives (From now, onward)    Start     Dose/Rate Route Frequency Ordered Stop    10/12/22 1800  acyclovir (ZOVIRAX) 800 mg in sodium chloride 0.9 % 250 mL intermittent infusion         10 mg/kg × 78 kg (Adjusted)  250 mL/hr over 1 Hours Intravenous EVERY 8 HOURS 10/12/22 1150      10/10/22 1800  fluconazole (DIFLUCAN) tablet 200 mg        Note to Pharmacy: PTA Sig:Take 1 tablet (200 mg) by mouth daily as needed (Take when ANC <1000)      200 mg Oral DAILY 10/10/22 1732      10/10/22 1800  levofloxacin (LEVAQUIN) tablet 250 mg        Note to Pharmacy: PTA Sig:Take 1 tablet (250 mg) by mouth daily as needed (ANC <1000)      250 mg Oral DAILY 10/10/22 1732            Data   CBC   Recent Labs   Lab 10/12/22  0642 10/11/22  0703 10/10/22  1817 10/10/22  1237   WBC 0.5* 0.3* 0.5* 0.4*   RBC 2.37* 1.99* 2.10* 2.09*   HGB 7.2* 6.3* 6.6* 6.6*   HCT 22.3* 19.7* 20.7* 20.3*   MCV 94 99 99 97   MCH 30.4 31.7 31.4 31.6   MCHC 32.3 32.0 31.9 32.5   RDW 19.1* 18.2* 18.5* 18.3*   PLT 5* 15* 13* 19*       CMP   Recent Labs   Lab 10/12/22  0642 10/11/22  0703 10/10/22  1817 10/10/22  1237   * 136 139 138   POTASSIUM 3.7 4.0 3.7 3.8   CHLORIDE 102 106 106 105   CO2 22 23 24 24   ANIONGAP 10 7 9 9   GLC 91 92 124* 95   BUN 7.5 12.9 13.3 14.9   CR 0.63 0.63 0.68 0.70   GFRESTIMATED >90 >90 >90 >90   SOLEDAD 8.3* 8.1* 8.5* 8.7   MAG 1.7 1.9 1.9  --    PHOS 3.0 3.2 2.8  --    PROTTOTAL 5.9* 5.6* 5.9* 6.1*   ALBUMIN 3.3* 3.3* 3.7 3.7   BILITOTAL 0.5 0.6 0.6 0.7   ALKPHOS 60 58 61 62   AST 12 13 15 12   ALT 16 20 24 24       LFTs   Recent Labs   Lab 10/12/22  0642   PROTTOTAL 5.9*   ALBUMIN 3.3*   BILITOTAL 0.5   ALKPHOS 60   AST 12   ALT 16       Coagulation Studies   Recent Labs   Lab 10/12/22  0642 10/11/22  0703 10/10/22  1817   INR 1.12   < > 1.08   PTT  --   --  35    < > = values in this interval not displayed.

## 2022-10-12 NOTE — PROGRESS NOTES
This is a recent snapshot of the patient's [facility] medical record.  For current drug dose and complete information and questions, call [phone] or In Basket pool, [facility] home infusion ([id number])  CSN Number:  [id number]

## 2022-10-12 NOTE — PLAN OF CARE
[id number]    A&Ox4. VSS on RA. Triggered sepsis, lactic acid pending. Denies pain, nausea, and SOB. Up independently in the room. Pt slept between cares overnight. Continue with plan of care.

## 2022-10-13 LAB
ABO/RH(D): NORMAL
ALBUMIN SERPL BCG-MCNC: 3.6 G/DL (ref 3.5–5.2)
ALP SERPL-CCNC: 64 U/L (ref 35–104)
ALT SERPL W P-5'-P-CCNC: 17 U/L (ref 10–35)
ANION GAP SERPL CALCULATED.3IONS-SCNC: 9 MMOL/L (ref 7–15)
ANTIBODY SCREEN: NEGATIVE
AST SERPL W P-5'-P-CCNC: 12 U/L (ref 10–35)
BACTERIA SPEC CULT: ABNORMAL
BILIRUB SERPL-MCNC: 0.4 MG/DL
BLD PROD TYP BPU: NORMAL
BLOOD COMPONENT TYPE: NORMAL
BUN SERPL-MCNC: 8.2 MG/DL (ref 6–20)
CALCIUM SERPL-MCNC: 8.7 MG/DL (ref 8.6–10)
CHLORIDE SERPL-SCNC: 104 MMOL/L (ref 98–107)
CMV DNA SPEC NAA+PROBE-ACNC: NOT DETECTED IU/ML
CODING SYSTEM: NORMAL
CREAT SERPL-MCNC: 0.61 MG/DL (ref 0.51–0.95)
CROSSMATCH: NORMAL
DEPRECATED HCO3 PLAS-SCNC: 23 MMOL/L (ref 22–29)
EBV DNA # SPEC NAA+PROBE: NOT DETECTED COPIES/ML
FIBRINOGEN PPP-MCNC: 623 MG/DL (ref 170–490)
GFR SERPL CREATININE-BSD FRML MDRD: >90 ML/MIN/1.73M2
GLUCOSE SERPL-MCNC: 91 MG/DL (ref 70–99)
GRAM STAIN RESULT: ABNORMAL
GRAM STAIN RESULT: ABNORMAL
IGG SERPL-MCNC: 688 MG/DL (ref 610–1616)
INR PPP: 1.18 (ref 0.85–1.15)
MAGNESIUM SERPL-MCNC: 1.8 MG/DL (ref 1.7–2.3)
PHOSPHATE SERPL-MCNC: 3.2 MG/DL (ref 2.5–4.5)
POTASSIUM SERPL-SCNC: 3.6 MMOL/L (ref 3.4–5.3)
PROT SERPL-MCNC: 6.2 G/DL (ref 6.4–8.3)
SODIUM SERPL-SCNC: 136 MMOL/L (ref 136–145)
SPECIMEN EXPIRATION DATE: NORMAL
UNIT ABO/RH: NORMAL
UNIT NUMBER: NORMAL
UNIT STATUS: NORMAL
UNIT TYPE ISBT: 9500
URATE SERPL-MCNC: 2.2 MG/DL (ref 2.4–5.7)

## 2022-10-13 PROCEDURE — 84550 ASSAY OF BLOOD/URIC ACID: CPT | Performed by: PHYSICIAN ASSISTANT

## 2022-10-13 PROCEDURE — 88341 IMHCHEM/IMCYTCHM EA ADD ANTB: CPT | Mod: TC | Performed by: STUDENT IN AN ORGANIZED HEALTH CARE EDUCATION/TRAINING PROGRAM

## 2022-10-13 PROCEDURE — 250N000009 HC RX 250: Performed by: STUDENT IN AN ORGANIZED HEALTH CARE EDUCATION/TRAINING PROGRAM

## 2022-10-13 PROCEDURE — 88312 SPECIAL STAINS GROUP 1: CPT | Mod: 26 | Performed by: DERMATOLOGY

## 2022-10-13 PROCEDURE — 86901 BLOOD TYPING SEROLOGIC RH(D): CPT | Performed by: PHYSICIAN ASSISTANT

## 2022-10-13 PROCEDURE — 84100 ASSAY OF PHOSPHORUS: CPT | Performed by: PHYSICIAN ASSISTANT

## 2022-10-13 PROCEDURE — 250N000013 HC RX MED GY IP 250 OP 250 PS 637: Performed by: PHYSICIAN ASSISTANT

## 2022-10-13 PROCEDURE — 99233 SBSQ HOSP IP/OBS HIGH 50: CPT | Performed by: STUDENT IN AN ORGANIZED HEALTH CARE EDUCATION/TRAINING PROGRAM

## 2022-10-13 PROCEDURE — 99232 SBSQ HOSP IP/OBS MODERATE 35: CPT | Mod: 25 | Performed by: INTERNAL MEDICINE

## 2022-10-13 PROCEDURE — 88342 IMHCHEM/IMCYTCHM 1ST ANTB: CPT | Mod: 26 | Performed by: DERMATOLOGY

## 2022-10-13 PROCEDURE — 86850 RBC ANTIBODY SCREEN: CPT | Performed by: PHYSICIAN ASSISTANT

## 2022-10-13 PROCEDURE — 99356 PR PROLONGED SERV,INPATIENT,1ST HR: CPT | Performed by: STUDENT IN AN ORGANIZED HEALTH CARE EDUCATION/TRAINING PROGRAM

## 2022-10-13 PROCEDURE — 11104 PUNCH BX SKIN SINGLE LESION: CPT | Mod: GC | Performed by: DERMATOLOGY

## 2022-10-13 PROCEDURE — 258N000003 HC RX IP 258 OP 636: Performed by: PHYSICIAN ASSISTANT

## 2022-10-13 PROCEDURE — 36592 COLLECT BLOOD FROM PICC: CPT | Performed by: PHYSICIAN ASSISTANT

## 2022-10-13 PROCEDURE — 85610 PROTHROMBIN TIME: CPT | Performed by: PHYSICIAN ASSISTANT

## 2022-10-13 PROCEDURE — 80053 COMPREHEN METABOLIC PANEL: CPT | Performed by: PHYSICIAN ASSISTANT

## 2022-10-13 PROCEDURE — 88341 IMHCHEM/IMCYTCHM EA ADD ANTB: CPT | Mod: 26 | Performed by: DERMATOLOGY

## 2022-10-13 PROCEDURE — 82784 ASSAY IGA/IGD/IGG/IGM EACH: CPT | Performed by: PHYSICIAN ASSISTANT

## 2022-10-13 PROCEDURE — 250N000009 HC RX 250

## 2022-10-13 PROCEDURE — 85384 FIBRINOGEN ACTIVITY: CPT | Performed by: PHYSICIAN ASSISTANT

## 2022-10-13 PROCEDURE — 120N000002 HC R&B MED SURG/OB UMMC

## 2022-10-13 PROCEDURE — 250N000011 HC RX IP 250 OP 636: Performed by: PHYSICIAN ASSISTANT

## 2022-10-13 PROCEDURE — 999N000044 HC STATISTIC CVC DRESSING CHANGE

## 2022-10-13 PROCEDURE — 88305 TISSUE EXAM BY PATHOLOGIST: CPT | Mod: 26 | Performed by: DERMATOLOGY

## 2022-10-13 PROCEDURE — 85027 COMPLETE CBC AUTOMATED: CPT | Performed by: PHYSICIAN ASSISTANT

## 2022-10-13 PROCEDURE — 83735 ASSAY OF MAGNESIUM: CPT | Performed by: PHYSICIAN ASSISTANT

## 2022-10-13 PROCEDURE — 85007 BL SMEAR W/DIFF WBC COUNT: CPT | Performed by: PHYSICIAN ASSISTANT

## 2022-10-13 RX ORDER — LIDOCAINE HYDROCHLORIDE AND EPINEPHRINE 10; 10 MG/ML; UG/ML
3 INJECTION, SOLUTION INFILTRATION; PERINEURAL ONCE
Status: COMPLETED | OUTPATIENT
Start: 2022-10-13 | End: 2022-10-13

## 2022-10-13 RX ORDER — CLINDAMYCIN PHOSPHATE 10 UG/ML
LOTION TOPICAL 2 TIMES DAILY
Qty: 60 ML | Refills: 0 | Status: ON HOLD | OUTPATIENT
Start: 2022-10-13 | End: 2022-11-04

## 2022-10-13 RX ORDER — IMATINIB MESYLATE 400 MG/1
400 TABLET, FILM COATED ORAL 2 TIMES DAILY
Qty: 19 TABLET | Refills: 0 | Status: ON HOLD | COMMUNITY
Start: 2022-10-13 | End: 2022-11-03

## 2022-10-13 RX ORDER — VALACYCLOVIR HYDROCHLORIDE 1 G/1
1000 TABLET, FILM COATED ORAL 3 TIMES DAILY
Qty: 42 TABLET | Refills: 0 | Status: SHIPPED | OUTPATIENT
Start: 2022-10-13 | End: 2022-10-14

## 2022-10-13 RX ORDER — ACETAMINOPHEN 500 MG
500 TABLET ORAL ONCE
Status: DISCONTINUED | OUTPATIENT
Start: 2022-10-13 | End: 2022-10-14 | Stop reason: HOSPADM

## 2022-10-13 RX ORDER — LIDOCAINE 40 MG/G
CREAM TOPICAL
Status: DISCONTINUED
Start: 2022-10-13 | End: 2022-10-13 | Stop reason: WASHOUT

## 2022-10-13 RX ORDER — LIDOCAINE HYDROCHLORIDE 10 MG/ML
INJECTION, SOLUTION EPIDURAL; INFILTRATION; INTRACAUDAL; PERINEURAL
Status: COMPLETED
Start: 2022-10-13 | End: 2022-10-13

## 2022-10-13 RX ADMIN — FLUCONAZOLE 200 MG: 200 TABLET ORAL at 09:27

## 2022-10-13 RX ADMIN — LIDOCAINE HYDROCHLORIDE: 10 INJECTION, SOLUTION EPIDURAL; INFILTRATION; INTRACAUDAL; PERINEURAL at 20:21

## 2022-10-13 RX ADMIN — CALCIUM CARBONATE (ANTACID) CHEW TAB 500 MG 1000 MG: 500 CHEW TAB at 00:32

## 2022-10-13 RX ADMIN — LEVOFLOXACIN 250 MG: 250 TABLET, FILM COATED ORAL at 09:27

## 2022-10-13 RX ADMIN — ACETAMINOPHEN 650 MG: 325 TABLET, FILM COATED ORAL at 06:33

## 2022-10-13 RX ADMIN — ACYCLOVIR SODIUM 800 MG: 1000 INJECTION, SOLUTION INTRAVENOUS at 02:26

## 2022-10-13 RX ADMIN — PANTOPRAZOLE SODIUM 20 MG: 20 TABLET, DELAYED RELEASE ORAL at 09:27

## 2022-10-13 RX ADMIN — ACETAMINOPHEN 650 MG: 325 TABLET, FILM COATED ORAL at 00:27

## 2022-10-13 RX ADMIN — Medication 5 ML: at 10:52

## 2022-10-13 RX ADMIN — CLINDAMYCIN PHOSPHATE: 10 LOTION TOPICAL at 20:09

## 2022-10-13 RX ADMIN — ACYCLOVIR SODIUM 800 MG: 1000 INJECTION, SOLUTION INTRAVENOUS at 17:51

## 2022-10-13 RX ADMIN — Medication 5 ML: at 17:56

## 2022-10-13 RX ADMIN — Medication 5 ML: at 07:38

## 2022-10-13 RX ADMIN — ACYCLOVIR SODIUM 800 MG: 1000 INJECTION, SOLUTION INTRAVENOUS at 09:27

## 2022-10-13 RX ADMIN — LIDOCAINE HYDROCHLORIDE AND EPINEPHRINE 3 ML: 10; 10 INJECTION, SOLUTION INFILTRATION; PERINEURAL at 20:21

## 2022-10-13 RX ADMIN — ACETAMINOPHEN 650 MG: 325 TABLET, FILM COATED ORAL at 17:59

## 2022-10-13 RX ADMIN — CLINDAMYCIN PHOSPHATE: 10 LOTION TOPICAL at 10:52

## 2022-10-13 ASSESSMENT — ACTIVITIES OF DAILY LIVING (ADL)
ADLS_ACUITY_SCORE: 20

## 2022-10-13 NOTE — PROGRESS NOTES
Care Management Follow Up    Per provider, patient will discharge tomorrow with IV Abx needed for 14 days. Writer [facility] liaisonDerek that patient will discharge to home tomorrow before 11am and need IV acyclovir for 14 days.  Patient has PICC line and has cared for prior to this admission.    Estela Youngblood RN, BSN  Care Coordinator 7D  Office: [phone]  Pager: [phone]    To contact the weekend [facility] (0800 - 1630) Saturday and Sunday    Units: 4A, 4C, 4E, 5A and 5B- Pager 1: [phone]    Units: 6A, 6B, 6C, 6D- Pager 2: [phone]    Units: 7A, 7B, 7C, 7D, and 5C-Pager 3: [phone]

## 2022-10-13 NOTE — PROGRESS NOTES
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    Today:  - Few (1-2) new pustulovesicular lesions noted to the left occipital scalp. Discussed at length with ID and dermatology. Given patient's underlying immunosuppression with pancytopenia, coupled with the fact that she is still rather early in the treatment course, will plan to keep patient inpatient today. Pending ongoing clinical stability with no major worsening of rash, anticipate discharging patient tomorrow, 10/14, on home IV acyclovir 10 mg/kg Q8H to complete a 14-day course (through 10/24). ID kindly arranged follow-up for her in clinic on 10/25 to ensure all lesions are crusted over and determine whether or not this course needs to be extended further.  - Appreciate RNCC/HI assistance with coordinating home infusion.   - Continue clindamycin 1% lotion to lesions to treat possible bacterial superinfection (diphtheroids seen on aerobic bacterial swab) versus contamination.  - Hold Gleevec until further count recovery; discussed with Dr. Hull.  - Follow-up serum EBV PCR and CMV PCR.    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 However, no skin lesions initially noted. Patient did develop a few scattered papulovesicular  lesions to her chin and nose, which she initially thought were acneiform in nature. 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 HSV and VZV swabs of a scalp lesion were obtained, and she was directly admitted to [facility] for further work-up and management. Empiric IV acyclovir was started (x10/10).   - Dermatology consulted, appreciate input. Agree with presumptive diagnosis and initial work-up and management. Discussed possible biopsy, but patient preferred to hold off, and it would be unlikely to change current management. Could reconsider in the future if lesions fail to improve as expected.  - ID consulted, appreciate input. On 10/13, exam notable for possible few (1-2) new intact pustulovesicular lesions to the left occipital scalp. Discussed at length with both ID and dermatology; out of an abundance of caution, will plan to continue IV acyclovir (rather than switching to Valtrex, as previously discussed) to complete at least a 14-day course of treatment (through 10/24/22). ID follow-up arranged for 10/25; patient to be seen in clinic to ensure all lesions are crusted over prior to discontinuation of IV acyclovir.   - Ophtho consulted (10/11), no evidence of ocular involvement. Recommend repeat dilated eye exam in 4-6 weeks, out of an abundance of caution.   - HSV PCR (scalp) and VZV PCR (scalp) both negative. Aerobic bacterial swab with 1+ gram positive bacilli resembling diphtheroids, likely contaminant versus infection. Will treat with clindamycin 1% lotion BID x14 days (10/12-10/25), per dermatology.  - EBV/CMV PCR sent and pending  - Serum IgG 688, no current indication for IVIG  - Continue empiric IV acyclovir 10 mg/kg Q8H (x10/10-X). Plan for an upfront 14-day course (through 10/24), with ID follow-up on  10/25 to determine whether this needs to be extended further. All lesions need to be crusted over prior to IV acyclovir discontinuation.   - Home IV acyclovir arranged through [facility]; appreciate RNCC assistance.    # ID PPx  - Hold PTA ACV given high-dose IV ACV as above  - Continue PTA fluconazole 200 mg daily and levofloxacin 250 mg daily given ANC <1.0  - Nebulized pentamidine last given 10/2; next due ~10/31    HEME  # Ph(+) B-ALL  Followed by Dr. Hull. Patient presented to outside hospital with c/o right upper quadrant pain and was subsequently found to have a markedly elevated white blood cell count concerning for acute leukemia. While at OSH, she had CTA chest as well as CT A/P. These identified no PE, no acute or localized intraabdominal inflammatory process, mild splenomegaly, and few inconspicous low-density structures in the liver. She was transferred to [facility] for further workup and management. S/p Hydrea 1g TID (8/21-8/23) with improvement in WBC (100K ? 12K). Diagnostic BMBx completed 8/20/22, which demonstrated B-ALL with 85% blasts and hypercellular marrow. IHC shows CD10+, CD34+. 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 ALL NGS negative. BCR/ABL1 major/minor (sent from peripheral blood) both negative. Initiated pre-phase steroids with Prednisone 60 mg x8/21; increased to 60 mg/m2 (140 mg) 8/22-8/24. She initiated GRAALL + imatinib (C1D1 = 8/24/22). S/p BMT consult 9/12 with Dr. Walsh. Induction was relatively uncomplicated. Post-induction BMBx done 9/26/22 and revealed MRD- CR1, with negative BCR/ABL1  and Clonoseq testing. She was admitted for C2 GRAALL on 9/30/22.  - Today is Day +13 from C2 GRAALL (imatinib + HD methotrexate). Patient was due for ppx triple IT therapy on 10/10 (requires imaging-guided procedures), but this was deferred due to her admission, as above. Will need to reschedule in fluoro in the coming days, pending infectious course and count recovery. Working with outpatient team to arrange this; unfortunately, extremely limited availability in XR. Will arrange for next available, with transfusion support beforehand.  - Imatinib held on admission given profound cytopenias and active infection. Discussed with Dr. Hull (10/12); will plan to hold imatinib on discharge until count recovery.  - Outpatient team working to reschedule BMBx, previously planned in IR under sedation on 10/13 and now cancelled due to admission. Will reschedule at next available.  - Given no identified matched related donor, and in the setting of early/deep MRD- CR, current plan is for ongoing consolidation chemo as opposed to BMT in CR1.     # Pancytopenia  Due to recent chemotherapy.  - Transfuse to keep Hgb >7, plt >10K  - Requires irradiated blood products given possible upcoming BMT  - Requires pre-medication with Benadryl/APAP given history of urticarial transfusion reactions    MSK/CV  # Chest tightness, resolved  Noted to the left anterior chest wall overnight. EKG done and showed sinus tachycardia without ischemia or dysrhythmia. No associated symptoms. Chest wall mildly TTP in the area of discomfort, preliminarily suggestive of MSK etiology for symptoms. No pleuritic pain, SOB, hypoxia, etc. immediately suggestive of PE, though this remains on the differential in the setting of hospitalization and underlying malignancy. No cough, fever, or other symptoms to suggest VZV pneumonia or other infectious process. Transient nature of symptoms and overall clinical well-appearance makes aortic dissection, esophageal rupture,  and other emergent processes much less likely.  - Monitor clinically  - Consider further work-up (imaging, etc.) with any symptom recurrence/worsening or development of fever, hypoxia, SOB, etc.    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 Anticipate discharge to home pending determination of appropriate treatment plan and ongoing clinical stability, likely 2-3 more days.    Follow up: TBD    This plan of care has been discussed with the staff physician, Dr. Dewitt.    Bev Haines PAAngellaC  Hematology/Oncology  Pager: #[phone]    I spent 65 minutes in the care of this patient today, which included time necessary for review of interval events, obtaining history and physical exam, ordering medication(s)/test(s) as medically indicated, discussion with interdisciplinary/consult team(s), and documentation time. Over 50% of time was spent face-to-face and/or coordinating care.    Interval History   No acute overnight events. Did complain of some transient chest tightness overnight, which has subsequently resolved. Denies discrete chest pain, pleuritic pain, cough, SOB, fever, N/V, or other associated symptoms. No symptoms currently. Notes that her left anterior chest wall is somewhat tender when palpated. No leg swelling/calf pain. Also notes her low back is a bit sore; worse when she's sitting in bed, better when she's up and around. No LE N/T/W, no bowel/bladder  dysfunction, no saddle anesthesia. Overall she's feeling well. Super bummed she isn't discharging today, but voices understanding. Amenable to doing home IV acyclovir. All questions and concerns addressed at bedside.    A comprehensive review of symptoms was performed and was negative except as detailed in the interval history above.    Physical Exam   Vital Signs with Ranges  Temp:  [98.7  F (37.1  C)-100.4  F (38  C)] 98.7  F (37.1  C)  Pulse:  [] 87  Resp:  [18-20] 20  BP: (109-140)/(52-69) 126/60  SpO2:  [95 %-98 %] 98 %    I/O last 3 completed shifts:  In: 780 [I.V.:510]  Out: -     Vitals:    10/10/22 1712   Weight: 112.9 kg (248 lb 12.8 oz)     Constitutional: Pleasant and cooperative female. Awake, alert, NAD. Smiling and conversational.  HEENT: NC/AT, EOMI, sclera clear, conjunctiva normal, OP with MMM, mild gingival irritation without discrete intraoral lesion/mucositis.  Respiratory: No increased work of breathing, CTAB, no crackles or wheezing.  Cardiovascular: RRR, no murmur noted. No peripheral edema.  GI: Normal bowel sounds, soft, non-distended and non-tender.  MSK: No large joint effusions or gross deformities. Mild TTP of the left anterior chest in the nipple line.  Skin: Crusted hemorrhagic erosions noted in clusters to the scalp somewhat diffusely. Few other scattered lesions noted to the chin, crossing the midline, as well as a lesion near the tip of the nose and another to the left of the nasal bridge. 1-2 new/not previously noted intact pustulovesicular lesions to the left occipital scalp. Small erythematous papule to the left breast at the 12 o'clock position.  Neurologic: A&O. Answers questions appropriately. Moves all extremities spontaneously.  Psych: Calm, appropriate affect.  Vascular access:  PICC on LUE CDI, non-tender, no surrounding erythema.    Medications     - MEDICATION INSTRUCTIONS -           acetaminophen  500 mg Oral Once     acyclovir  10 mg/kg (Adjusted) Intravenous  Q8H     amLODIPine  5 mg Oral Daily     clindamycin   Topical BID     fluconazole  200 mg Oral Daily     heparin lock flush  5-20 mL Intracatheter Q24H     [Held by provider] imatinib  400 mg Oral BID     levofloxacin  250 mg Oral Daily     lidocaine 1% with EPINEPHrine 1:100,000  3 mL Intradermal Once     pantoprazole  20 mg Oral Daily     sodium chloride (PF)  10-40 mL Intracatheter Q8H       Antiinfectives  Anti-infectives (From now, onward)    Start     Dose/Rate Route Frequency Ordered Stop    10/13/22 0000  valACYclovir (VALTREX) 1000 mg tablet         1,000 mg Oral 3 TIMES DAILY 10/13/22 0805 10/27/22 2359    10/12/22 1800  acyclovir (ZOVIRAX) 800 mg in sodium chloride 0.9 % 250 mL intermittent infusion         10 mg/kg × 78 kg (Adjusted)  250 mL/hr over 1 Hours Intravenous EVERY 8 HOURS 10/12/22 1150      10/10/22 1800  fluconazole (DIFLUCAN) tablet 200 mg        Note to Pharmacy: PTA Sig:Take 1 tablet (200 mg) by mouth daily as needed (Take when ANC <1000)      200 mg Oral DAILY 10/10/22 1732      10/10/22 1800  levofloxacin (LEVAQUIN) tablet 250 mg        Note to Pharmacy: PTA Sig:Take 1 tablet (250 mg) by mouth daily as needed (ANC <1000)      250 mg Oral DAILY 10/10/22 1732            Data   CBC   Recent Labs   Lab 10/13/22  0744 10/12/22  0642 10/11/22  0703 10/10/22  1817   WBC 1.5* 0.5* 0.3* 0.5*   RBC 2.44* 2.37* 1.99* 2.10*   HGB 7.3* 7.2* 6.3* 6.6*   HCT 22.9* 22.3* 19.7* 20.7*   MCV 94 94 99 99   MCH 29.9 30.4 31.7 31.4   MCHC 31.9 32.3 32.0 31.9   RDW 18.8* 19.1* 18.2* 18.5*   PLT 52* 5* 15* 13*       CMP   Recent Labs   Lab 10/13/22  0744 10/12/22  0642 10/11/22  0703 10/10/22  1817    134* 136 139   POTASSIUM 3.6 3.7 4.0 3.7   CHLORIDE 104 102 106 106   CO2 23 22 23 24   ANIONGAP 9 10 7 9   GLC 91 91 92 124*   BUN 8.2 7.5 12.9 13.3   CR 0.61 0.63 0.63 0.68   GFRESTIMATED >90 >90 >90 >90   SOLEDAD 8.7 8.3* 8.1* 8.5*   MAG 1.8 1.7 1.9 1.9   PHOS 3.2 3.0 3.2 2.8   PROTTOTAL 6.2* 5.9* 5.6* 5.9*    ALBUMIN 3.6 3.3* 3.3* 3.7   BILITOTAL 0.4 0.5 0.6 0.6   ALKPHOS 64 60 58 61   AST 12 12 13 15   ALT 17 16 20 24       LFTs   Recent Labs   Lab 10/13/22  0744   PROTTOTAL 6.2*   ALBUMIN 3.6   BILITOTAL 0.4   ALKPHOS 64   AST 12   ALT 17       Coagulation Studies   Recent Labs   Lab 10/13/22  0744 10/11/22  0703 10/10/22  1817   INR 1.18*   < > 1.08   PTT  --   --  35    < > = values in this interval not displayed.

## 2022-10-13 NOTE — PLAN OF CARE
"Goal Outcome Evaluation:    BP (!) 140/52 (BP Location: Right arm)   Pulse 101   Temp 99.8  F (37.7  C) (Oral)   Resp 18   Ht 1.626 m (5' 4\")   Wt 112.9 kg (248 lb 12.8 oz)   SpO2 96%   BMI 42.71 kg/m              3240-2672:     Admitted:  10/10/11398 at 1700. Low grade fever over the weekend. New scalp pain, rash on scalp and face concerning for disseminated zoster. Pancytopenia post cycle 2 GRALL.     Vitals: VS on room air, afebrile.  Neuro: A&Ox4. Baseline numbness and tingling in hands and feet. Provider aware from last admission.Denies any other concerns.  Cardiac: Reporting chest tightness, throbbing. STAT EKG was done. MD paged. (see provider notification note) Result of sinus tachycardia.   Respiratory: Denies shortness of breath.  GI/: Voiding spontaneously adequate output. No BM this shift.  Pain: 6/10 back, chest pain, PRN Tylenol given with relief.  Labs: PLT- 5, repleaced during day shift, re-draw in the morning.     Continue to monitor and with plan of care.          "

## 2022-10-13 NOTE — PLAN OF CARE
[id number]     A&Ox4. Tachy, OVSS on RA. Triggered sepsis, lactic acid 0.7. Continues to have intermittent back, chest, and head pain, managed with prn tylenol x2. C/o acid upset/reflux, given prn tums x1 with relief. Denies nausea and SOB. Reports adequate output, not saving. Up independently. Pt slept between cares. Continue with plan of care.

## 2022-10-13 NOTE — PROGRESS NOTES
"[facility]  Transplant Infectious Progress Note -SIGN OFF NOTE     Patient:  Vashti Villegas, Date of birth 1980, Medical record number [id number]  Date of Visit:  10/13/2022  Consult requested for evaluation of new papulovesicular rash          Assessment and Recommendations:   Recommendations:  1) Continue IV acyclovir 10 mg/kg IV Q8H to complete an anticipated 14 day course - duration is contingent until resolution of all lesions (with crusting) has been achieved.    -Underwent a biopsy of an intact pustule on the scalp on 10/13 by dermatology - for a definitive diagnosis given that the VZV PCR swab was negative.   2) Continue ppx Fluconazole and levofloxacin  3) Outpatient ID appointment has been scheduled with Dr. Lima on Tuesday Octorber 25th at 4:30 pm --> tentatively the last day of antiviral therapy    Transplant Infectious Diseases will sign off at this time.     Prolonged Parenteral/Oral Antibiotic Recommendations and ID Follow up  This template provides final ID recommendations as of this date. If there are clinical changes or questions please call the ID team.     Infectious Diseases Indication: Disseminated cutaneous VZV    Antibiotic Information  Name of Antibiotic Dose of Antibiotic1 Pharmacy to assist with dosing Y/N Anticipated duration Effective start date2 End date   Acyclovir  10 mg/kg N 14 days total 10/10 10/25                   1.Dose of antibiotic will need to be renally adjusted if creatinine clearance changes  2.Effective start date is the date of therapy with appropriate spectrum    Method of antibiotic delivery:PICC line. At the end of therapy should the line be removed? No. Selecting \"yes\" will function as written order to remove PICC line at the end of therapy.    Tentative plans for disposition: Home    Weekly labs required: CBC with diff and BMP. Dr. Guillen will follow labs at discharge until ID follow up. Please have labs faxed to ID clinic.    Are " the cultures final: Yes    Appointment to be scheduled: October 25 th at 4:30pm. Type of ID Clinic Appointment In-Person visit. Appointment will be scheduled with: Janie. Routine hospital follow up appointments are 30 minutes. If 60 minutes is necessary due to complexity of case indicate that a 60 min appointment is required. Appointment time Appointment Time: 30 minutes. If the patient remains in the hospital at this date please re-consult ID.     Imaging for ID follow up: ID Imaging: Follow up imaging for ID purposes not recommended at the time of this documentation.     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 Presented on 10/10 with an eruption of vesiculopapular lesion on the scalp, tip of nose, and upper lip/chin.      Infectious Disease issues include:    1) Crusted, hemorrhagic lesions noted on the scalp and face - in the setting of leukopenia and lymphopenia and while on ppx Acyclovir 400 mg po bid.   HSV1/2 PCR from a swab of the lesion of the scalp - negative   VZV PCR from a swab of the scalp - Negative   Erosive lesions noted in the distribution of V1 (Tip of the nose), V2 and V3 (Upper lip and chin, and C2 (scalp) dermatomes.   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   On 10/13 noted new erythematous papules and on pustular lesion.    Recommend continuing IV Acyclovir until all lesions have crusted over.     Other issues:  -  ms on 10/12  - PCP prophylaxis:  Nebulized pentamidine last given 10/2; next due ~10/31  - Fungal prophylaxis: fluconazole 200 mg   - Bacterial prophylaxis: Levofloxacin 250 mg daily   - Serostatus: CMV Neg, EBV Positive, HSV1+/HSV2- PTA acyclovir 400 mg BID held   - Gamma globulin status: 688 on 10/13  - Vaccination - There are non on file and she states that she has not received any vaccinations since childhood.   This will need to be addressed at the appropriate time   - Isolation status:  Good hand hygiene. Airborne precautions     [facility]   Transplant Infectious Diseases   630        Interval History   No acute events overnight.   Tmax 100.4. VSS.   Denies any headaches, vision changes. No oral ulcers.   No chest pain, cough, shortness of breath.   No abdominal pain. No nausea vomiting diarrhea.   Noted to have pain on the left side of the scalp - which is relatively new. New pustular lesion noted on the scalp, but not anywhere else on the body  All other complete ROS negative.          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 She was discharge on 10/03 and had tolerated chemotherapy well with no significant event during hospitalization. She was seen in Heme/Onc outpatient visit on 10/10 and was found to be pancytopenic. She also stated that over the weekend (10/08-10/09) she started having scalp pain and low grade fevers. On Monday - she noted a new rash on her scalp and upper lip, chin, and tip of the nose. At this time she was admitted to the hospital for workup for possible disseminated VZV in the setting of pancytopenia 2/2 to recent chemotherapy.     She denies any headaches, neck stiffness, photophobia, blurry vision, loss of vision. No oral lesions, no difficulty or pain with  swallowing. No chest pain, cough, shortness of breath. No abdominal pain, nausea, or vomiting.     She has only noted lesion on the scalp and chin, upper lip area. Has not seen any new lesions crop up. Denies any drainage from the lesions. No Pain or pruritis associated with the rash.     She has has no recently travel. No hiking, camping, swimming in the lake. Has not noted any ticks. No new animal exposure. Has three dogs.     Current Facility-Administered Medications   Medication     acetaminophen (TYLENOL) tablet 500 mg     acetaminophen (TYLENOL) tablet 650 mg     acyclovir (ZOVIRAX) 800 mg in sodium chloride 0.9 % 250 mL intermittent infusion     amLODIPine (NORVASC) tablet 5 mg     calcium carbonate (TUMS) chewable tablet 1,000 mg     clindamycin (CLEOCIN T) 1 % lotion     diphenhydrAMINE (BENADRYL) capsule 25 mg     fluconazole (DIFLUCAN) tablet 200 mg     heparin lock flush 10 UNIT/ML injection 5-20 mL     heparin lock flush 10 UNIT/ML injection 5-20 mL     [Held by provider] imatinib (GLEEVEC) tablet 400 mg     levofloxacin (LEVAQUIN) tablet 250 mg     lidocaine 1% with EPINEPHrine 1:100,000 injection 3 mL     LORazepam (ATIVAN) tablet 0.5-1 mg    Or     LORazepam (ATIVAN) injection 0.5-1 mg     No rectal suppositories if WBC less than 1000/microliters or platelets less than 50,000/ L     ondansetron (ZOFRAN) injection 8 mg    Or     ondansetron (ZOFRAN ODT) ODT tab 8 mg    Or     ondansetron (ZOFRAN) tablet 8 mg     pantoprazole (PROTONIX) EC tablet 20 mg     polyethylene glycol (MIRALAX) Packet 17 g     prochlorperazine (COMPAZINE) tablet 10 mg    Or     prochlorperazine (COMPAZINE) injection 10 mg     senna-docusate (SENOKOT-S/PERICOLACE) 8.6-50 MG per tablet 1 tablet    Or     senna-docusate (SENOKOT-S/PERICOLACE) 8.6-50 MG per tablet 2 tablet     sodium chloride (PF) 0.9% PF flush 10-20 mL     sodium chloride (PF) 0.9% PF flush 10-40 mL       Allergies   Allergen Reactions     Blood Transfusion  "Related (Informational Only) Hives     Hive reaction with platelet transfusion on 8/26/2022. Please administer platelets with tylenol and benadryl premedication.             Physical Exam:   Vitals were reviewed.  /60 (BP Location: Right arm)   Pulse 87   Temp 98.7  F (37.1  C) (Oral)   Resp 20   Ht 1.626 m (5' 4\")   Wt 112.9 kg (248 lb 12.8 oz)   SpO2 98%   BMI 42.71 kg/m      Exam:  GENERAL:  well-developed, well-nourished, alert, oriented, in no acute distress.  HEENT:  Head is normocephalic, atraumatic  Crusted lesions noted on the upper lip - stable.   Crusted lesion on the upper scalp stable. The left occiput lesion - noted new pustular lesion   EYES:  Eyes have anicteric sclerae.    ENT:  Oropharynx is moist without exudates or ulcers.  NECK:  Supple.  LUNGS:  Clear to auscultation.  CARDIOVASCULAR:  Regular rate and rhythm with no murmurs, gallops or rubs.  ABDOMEN:  Normal bowel sounds, soft, nontender.  SKIN:  No acute rashes. Left PICC Line is in place without any surrounding erythema.  NEUROLOGIC:  Grossly nonfocal.         Laboratory Data:     Metabolic Studies     Recent Labs   Lab Test 10/13/22  0744 10/12/22  0642 10/11/22  0703 10/10/22  1817 10/10/22  1237 10/06/22  1031    134* 136 139 138 137   POTASSIUM 3.6 3.7 4.0 3.7 3.8 3.7   CHLORIDE 104 102 106 106 105 104   CO2 23 22 23 24 24 25   ANIONGAP 9 10 7 9 9 8   BUN 8.2 7.5 12.9 13.3 14.9 15.7   CR 0.61 0.63 0.63 0.68 0.70 0.66   GFRESTIMATED >90 >90 >90 >90 >90 >90   GLC 91 91 92 124* 95 105*   SOLEDAD 8.7 8.3* 8.1* 8.5* 8.7 8.4*   PHOS 3.2 3.0 3.2 2.8  --   --    MAG 1.8 1.7 1.9 1.9  --   --      Hepatic Studies    Recent Labs   Lab Test 10/13/22  0744 10/12/22  0642 10/11/22  0703 10/10/22  1817 10/10/22  1237 10/06/22  1031 09/30/22  1605   BILITOTAL 0.4 0.5 0.6 0.6 0.7 1.1 0.3   ALKPHOS 64 60 58 61 62 62 83   ALBUMIN 3.6 3.3* 3.3* 3.7 3.7 3.6 4.0   AST 12 12 13 15 12 20 17   ALT 17 16 20 24 24 36* 24   LDH  --   --   --  201  " --   --  245     Gout Labs      Recent Labs   Lab Test 10/13/22  0744 10/12/22  0642 10/11/22  0703 10/10/22  1817 09/30/22  1605   URIC 2.2* 2.0* 2.0* 2.2* 3.8       Hematology Studies     Recent Labs   Lab Test 10/13/22  0744 10/12/22  0642 10/11/22  0703 10/10/22  1817 10/10/22  1237 10/06/22  1031 09/13/22  0716 09/12/22  0936 09/11/22  0941 09/10/22  0647 09/09/22  0618   WBC 1.5* 0.5* 0.3* 0.5* 0.4* 2.4*   < > 6.0   < > 2.2* 2.7*   ANEU 0.3* 0.0*  --  0.0*  --   --   --  3.1  --  2.0 2.5   ALYM 0.8 0.4*  --  0.5*  --   --   --  2.5  --  0.1* 0.2*   COLIN 0.1 0.0  --  0.0  --   --   --  0.3  --  0.0 0.0   AEOS 0.0 0.0  --  0.0  --   --   --  0.1  --  0.0 0.0   HGB 7.3* 7.2* 6.3* 6.6* 6.6* 8.3*   < > 8.0*   < > 7.3* 7.5*   HCT 22.9* 22.3* 19.7* 20.7* 20.3* 25.9*   < > 24.8*   < > 22.4* 22.6*   PLT 52* 5* 15* 13* 19* 157   < > 328   < > 198 169    < > = values in this interval not displayed.       Clotting Studies    Recent Labs   Lab Test 10/13/22  0744 10/12/22  0642 10/11/22  0703 10/10/22  1817 09/30/22  1605 08/20/22  0624 08/19/22  1705   INR 1.18* 1.12 1.10 1.08 1.00   < > 1.19*   PTT  --   --   --  35 27  --  31    < > = values in this interval not displayed.     Urine Studies    Recent Labs   Lab Test 10/03/22  0350 10/02/22  1931 10/02/22  1222 10/02/22  0254 10/01/22  1903 10/01/22  1100 08/22/22  1515   URINEPH 7.0 8.0 7.0 7.5 7.0   < > 5.5   NITRITE  --   --   --   --   --   --  Negative   LEUKEST  --   --   --   --   --   --  Negative    < > = values in this interval not displayed.     CSF testing     Recent Labs   Lab Test 09/14/22  1131 09/07/22  1124 08/31/22  1617 08/26/22  1517   CWBC 0 0 0 2   CRBC 9* 15* 9* 0   CGLU  --  57 68 106*   CTP  --  24.9 38.2 46.8*       Microbiology:  Last 6 Culture results with specimen source  Culture Micro   Date Value Ref Range Status   09/25/2003   Final    >100,000 colonies/mL Multiple organisms present, probable contamination   01/21/2003 No Beta  Streptococcus isolated  Final   01/04/2003 No Beta Streptococcus isolated  Final    Specimen Description   Date Value Ref Range Status   09/25/2003 Midstream Urine  Final   09/22/2003 Vagina  Final   01/21/2003 Throat  Final   01/21/2003 Throat  Final   01/04/2003 Throat  Final   01/04/2003 Throat  Final        Hepatitis B Testing     Recent Labs   Lab Test 08/19/22  1705   HBCAB Nonreactive   HEPBANG Nonreactive       Hepatitis C Antibody   Date Value Ref Range Status   08/19/2022 Nonreactive Nonreactive Final     Respiratory Virus Testing    No results found for: RMIX, RS, FLUAG No results found for: RESPEC     CMV Antibody IgG   Date Value Ref Range Status   08/19/2022 No detectable antibody. No detectable antibody.  Final     Imaging:  Results for orders placed or performed during the hospital encounter of 09/30/22   IR Referral    Nel Escobar APRN CNP     10/3/2022  2:45 PM  Message sent to Kajal Barajas PA-C with directions to place   XR lumbar puncture order for LP with chemotherapy. IR referral   not needed.    Nel Michel DNP, APRN  Interventional Radiology   IR on-call pager: [phone]

## 2022-10-13 NOTE — PLAN OF CARE
Goal Outcome Evaluation:         [id number]:  A&O x 4.  Afebrile. VSS on room air. Denies pain, nausea, vomiting, chest pain and SOB.  No need for electrolyte replacements and blood products, order parameters not met.  Continues on IV Acyclovir.  Voiding spontaneously, not saving urine and had a BM this AM.  Up ad stephanie, plans to shower/CHG wipes later today. No complaints and acute events.  Continue to monitor and with POC.

## 2022-10-13 NOTE — PROGRESS NOTES
Impression/Plan:  1. Scalp and facial painful, hemorrhagic crusted erosions   -  Per history, most consistent with VZV but lesion morphology not entirely diagnostic likely due to immunosuppression, and so far negative VZV swab and DNA PCR. Fortunately patient is continuing to improve, however has an intact pustule of scalp today of unknown duration. Will proceed with biopsy of lesion for definitive diagnosis. Agree that main differential for cause of lesions and pain would be viral such as VZV since HSV was negative on admission. Likely cause is zoster folliculitis which is why swabs not able to diagnose vs other etiology. Discussed with patient and okay with proceeding today. Discussed with team who states they are feeling she is doing much better as well and hopefully will discharge tomorrow but will be on IV acyclovir for full 14 day course.      Recommendations:  - Agree with ID consult  - Antivirals per primary team  - For diphtheroids on swab, can continue topical clindamycin 1% lotion BID for 2 weeks to lesions  - punch biopsy of scalp pustule today   - PUNCH BIOPSY PROCEDURE NOTE:  After written informed consent was obtained, a time out was taken to confirm the patient's identity and the correct site for biopsy. The lesion on the scalp was cleansed with a 70% isopropyl alcohol wipe and then injected with 3cc of lidocaine 1% with epinephrine [id number]. Once anesthesia was ensured, a 4mm punch biopsy tool was used to obtain a sample of lesional skin. The specimen was placed in a labeled formalin container and was delivered to the pathology lab for processing. Hemostasis was obtained with pressure and a single 4-0 Prolene suture placed in a figure of eight pattern. The wound was then dressed with petrolatum and an adhesive bandage. The patient tolerated the procedure well and received instruction regarding care of this site. Suture removal should occur in 14 days.       Post Biopsy Site Care for Punch Biopsy  -  Keep the bandaid on for 24 hours and keep the area clean and dry.  - Every day: gently wash with soap and water until sutures come out. Rinse well and pat dry. Apply Vaseline and cover with clean bandaid.    - DO NOT soak in tub, pool or hot tub until sutures are removed.  - Inform team if you notice any signs or symptoms of infection, such as: warmth or redness at the site, fever over 100 degrees F, yellow/creamy or foul-smelling drainage, or pain at the site.   - It is normal to have some soreness and swelling at the biopsy site and you make take Tylenol every 4 hours for this as needed.  - If bleeding occurs apply firm pressure with a washcloth for 2 15 minute cycles. If it continues to bleed after that alert the team  - Biopsy results typically come back in about 1-2 weeks.If you have not heard from us within 2 weeks call the clinic.  - OK to take sutures out at home in 2 weeks        Thank you for the dermatology consultation. Please do not hesitate to contact the dermatology resident/faculty on call for any additional questions or concerns. We will follow up on pathology results. OK to discharge tomorrow from dermatology perspective.     Attending,  staffed the patient.     Phi Longoria MD PGY-3  Dermatology Resident     I have seen and examined this patient and agree with the assessment and plan as documented in the resident's note, and was present for the key elements of all procedures.    Adams Dwyer MD  Dermatology Attending       Dermatology Problem List:  1. Scalp and facial erosions  - HSV negative, VZV swab and DNA PCR negative; diphtheroids on bacterial culture -> clindamycin lotion BID x2 weeks  - punch bx 10/13/22  2. B-ALL        S:  - overall continues to feel better  - wondering if new lesions appearing  - some itch but better  - only pain on left side of scalp  - no oral lesions  - no fevers, joint pains       O:  SKIN: Focused examination of the scalp, face and left chest was  performed.  - Nunez skin type: II  - On the face and tip of nose there are few hemorrhagic crusted papules with surrounding erythema  - On the scalp clustered of midline and left parietal scalp there are many hemorrhagic crusted erosions and papules with erythema, few with yellow crust  - 1 vesicle identified of left occipital scalp  - Inflammatory papule of left chest

## 2022-10-13 NOTE — PROGRESS NOTES
[facility]     10/13/22:  Received referral from Estela Youngblood [facility] for IV acyclovir.  Benefits verified.  Pt has 100% coverage for IV acyclovir with [employer] plan, family deductible $6000 has been met in full.  Spoke with patient to review home infusion services, review benefits and offer choice of providers.  Patient would like to use [facility] for home infusion.  April is expected to dc as early as tomorrow and will be going home on IV Acyclovir q8hrs.  She has experience flushing her PICC line but has not done any IV medication administration so will need teaching.   Spoke with patient and provided her with information about [facility] services.  Explained about administration method.  Patient will need to draw up medication from vial and instill into elastomeric pump prior to administering at home.  Explained that a home nurse will provide additional teaching needed for self-administration.  Informed her about supplies and delivery of supplies, dosing times, plan for SNV and 24/7 availability of I staff while on IV therapy.     April verbalized understanding of all information given.  She is willing and able to learn and manage home IV therapy.  Questions answered.  Pending final discharge orders, [facility] will plan to deliver medication and supplies to home address at discharge.  [facility] to provide home SNVs.  [facility] Liaison will follow along to assist with discharge home with infusion needs.      Thank you for the referral.     10/14/22:  Patient discharging today.  [facility] will deliver medication and supplies to patient's home address by 3pm.  [facility] RN will see at home for training with this afternoon's dose.  Patient updated on plans for delivery and RN visit.  Questions answered.  Patient is ready for discharge from [facility] perspective.     CAROL Oreilly  [facility] Nurse Liaison   [email or web address]  Cell: [phone] M-F  [facility] Main: [phone]

## 2022-10-14 VITALS
WEIGHT: 246.4 LBS | TEMPERATURE: 97.6 F | RESPIRATION RATE: 18 BRPM | HEART RATE: 105 BPM | HEIGHT: 64 IN | OXYGEN SATURATION: 97 % | SYSTOLIC BLOOD PRESSURE: 137 MMHG | BODY MASS INDEX: 42.07 KG/M2 | DIASTOLIC BLOOD PRESSURE: 68 MMHG

## 2022-10-14 LAB
ALBUMIN SERPL BCG-MCNC: 3.3 G/DL (ref 3.5–5.2)
ALP SERPL-CCNC: 75 U/L (ref 35–104)
ALT SERPL W P-5'-P-CCNC: 10 U/L (ref 10–35)
ANION GAP SERPL CALCULATED.3IONS-SCNC: 10 MMOL/L (ref 7–15)
AST SERPL W P-5'-P-CCNC: 20 U/L (ref 10–35)
ATRIAL RATE - MUSE: 105 BPM
BASOPHILS # BLD MANUAL: 0 10E3/UL (ref 0–0.2)
BASOPHILS # BLD MANUAL: 0 10E3/UL (ref 0–0.2)
BASOPHILS NFR BLD MANUAL: 0 %
BASOPHILS NFR BLD MANUAL: 0 %
BILIRUB SERPL-MCNC: <0.2 MG/DL
BLASTS # BLD MANUAL: 0.6 10E3/UL
BLASTS NFR BLD MANUAL: 7 %
BUN SERPL-MCNC: 9.8 MG/DL (ref 6–20)
CALCIUM SERPL-MCNC: 8.9 MG/DL (ref 8.6–10)
CHLORIDE SERPL-SCNC: 107 MMOL/L (ref 98–107)
CREAT SERPL-MCNC: 0.67 MG/DL (ref 0.51–0.95)
DEPRECATED HCO3 PLAS-SCNC: 22 MMOL/L (ref 22–29)
DIASTOLIC BLOOD PRESSURE - MUSE: NORMAL MMHG
EOSINOPHIL # BLD MANUAL: 0 10E3/UL (ref 0–0.7)
EOSINOPHIL # BLD MANUAL: 0 10E3/UL (ref 0–0.7)
EOSINOPHIL NFR BLD MANUAL: 0 %
EOSINOPHIL NFR BLD MANUAL: 0 %
ERYTHROCYTE [DISTWIDTH] IN BLOOD BY AUTOMATED COUNT: 18.6 % (ref 10–15)
ERYTHROCYTE [DISTWIDTH] IN BLOOD BY AUTOMATED COUNT: 18.8 % (ref 10–15)
FIBRINOGEN PPP-MCNC: 567 MG/DL (ref 170–490)
GFR SERPL CREATININE-BSD FRML MDRD: >90 ML/MIN/1.73M2
GLUCOSE SERPL-MCNC: 103 MG/DL (ref 70–99)
HCT VFR BLD AUTO: 22.9 % (ref 35–47)
HCT VFR BLD AUTO: 23.4 % (ref 35–47)
HGB BLD-MCNC: 7.3 G/DL (ref 11.7–15.7)
HGB BLD-MCNC: 7.5 G/DL (ref 11.7–15.7)
HOLD SPECIMEN: NORMAL
INR PPP: 1.16 (ref 0.85–1.15)
INTERPRETATION ECG - MUSE: NORMAL
LYMPHOCYTES # BLD MANUAL: 0.8 10E3/UL (ref 0.8–5.3)
LYMPHOCYTES # BLD MANUAL: 1.7 10E3/UL (ref 0.8–5.3)
LYMPHOCYTES NFR BLD MANUAL: 20 %
LYMPHOCYTES NFR BLD MANUAL: 50 %
MAGNESIUM SERPL-MCNC: 1.7 MG/DL (ref 1.7–2.3)
MCH RBC QN AUTO: 29.9 PG (ref 26.5–33)
MCH RBC QN AUTO: 30.1 PG (ref 26.5–33)
MCHC RBC AUTO-ENTMCNC: 31.9 G/DL (ref 31.5–36.5)
MCHC RBC AUTO-ENTMCNC: 32.1 G/DL (ref 31.5–36.5)
MCV RBC AUTO: 94 FL (ref 78–100)
MCV RBC AUTO: 94 FL (ref 78–100)
METAMYELOCYTES # BLD MANUAL: 0.1 10E3/UL
METAMYELOCYTES # BLD MANUAL: 0.3 10E3/UL
METAMYELOCYTES NFR BLD MANUAL: 4 %
METAMYELOCYTES NFR BLD MANUAL: 5 %
MONOCYTES # BLD MANUAL: 0.1 10E3/UL (ref 0–1.3)
MONOCYTES # BLD MANUAL: 1.3 10E3/UL (ref 0–1.3)
MONOCYTES NFR BLD MANUAL: 15 %
MONOCYTES NFR BLD MANUAL: 9 %
MYELOCYTES # BLD MANUAL: 0.1 10E3/UL
MYELOCYTES # BLD MANUAL: 0.3 10E3/UL
MYELOCYTES NFR BLD MANUAL: 3 %
MYELOCYTES NFR BLD MANUAL: 4 %
NEUTROPHILS # BLD MANUAL: 0.3 10E3/UL (ref 1.6–8.3)
NEUTROPHILS # BLD MANUAL: 4.1 10E3/UL (ref 1.6–8.3)
NEUTROPHILS NFR BLD MANUAL: 21 %
NEUTROPHILS NFR BLD MANUAL: 48 %
OTHER CELLS # BLD MANUAL: 0.2 10E3/UL
OTHER CELLS NFR BLD MANUAL: 11 %
P AXIS - MUSE: 44 DEGREES
PATH REV: ABNORMAL
PHOSPHATE SERPL-MCNC: 3.5 MG/DL (ref 2.5–4.5)
PLAT MORPH BLD: ABNORMAL
PLAT MORPH BLD: ABNORMAL
PLATELET # BLD AUTO: 127 10E3/UL (ref 150–450)
PLATELET # BLD AUTO: 52 10E3/UL (ref 150–450)
POTASSIUM SERPL-SCNC: 3.6 MMOL/L (ref 3.4–5.3)
PR INTERVAL - MUSE: 130 MS
PROMYELOCYTES # BLD MANUAL: 0.3 10E3/UL
PROMYELOCYTES NFR BLD MANUAL: 3 %
PROT SERPL-MCNC: 5.9 G/DL (ref 6.4–8.3)
QRS DURATION - MUSE: 78 MS
QT - MUSE: 312 MS
QTC - MUSE: 412 MS
R AXIS - MUSE: -18 DEGREES
RBC # BLD AUTO: 2.44 10E6/UL (ref 3.8–5.2)
RBC # BLD AUTO: 2.49 10E6/UL (ref 3.8–5.2)
RBC MORPH BLD: ABNORMAL
RBC MORPH BLD: ABNORMAL
SODIUM SERPL-SCNC: 139 MMOL/L (ref 136–145)
SYSTOLIC BLOOD PRESSURE - MUSE: NORMAL MMHG
T AXIS - MUSE: 34 DEGREES
TOXIC GRANULES BLD QL SMEAR: PRESENT
URATE SERPL-MCNC: 3 MG/DL (ref 2.4–5.7)
VENTRICULAR RATE- MUSE: 105 BPM
WBC # BLD AUTO: 1.5 10E3/UL (ref 4–11)
WBC # BLD AUTO: 8.5 10E3/UL (ref 4–11)

## 2022-10-14 PROCEDURE — 84100 ASSAY OF PHOSPHORUS: CPT | Performed by: PHYSICIAN ASSISTANT

## 2022-10-14 PROCEDURE — 85027 COMPLETE CBC AUTOMATED: CPT | Performed by: PHYSICIAN ASSISTANT

## 2022-10-14 PROCEDURE — 36592 COLLECT BLOOD FROM PICC: CPT | Mod: TC | Performed by: PHYSICIAN ASSISTANT

## 2022-10-14 PROCEDURE — 88185 FLOWCYTOMETRY/TC ADD-ON: CPT | Performed by: PHYSICIAN ASSISTANT

## 2022-10-14 PROCEDURE — 258N000003 HC RX IP 258 OP 636: Performed by: PHYSICIAN ASSISTANT

## 2022-10-14 PROCEDURE — 85610 PROTHROMBIN TIME: CPT | Performed by: PHYSICIAN ASSISTANT

## 2022-10-14 PROCEDURE — 250N000011 HC RX IP 250 OP 636: Performed by: PHYSICIAN ASSISTANT

## 2022-10-14 PROCEDURE — 84550 ASSAY OF BLOOD/URIC ACID: CPT | Performed by: PHYSICIAN ASSISTANT

## 2022-10-14 PROCEDURE — 88189 FLOWCYTOMETRY/READ 16 & >: CPT | Mod: GC | Performed by: STUDENT IN AN ORGANIZED HEALTH CARE EDUCATION/TRAINING PROGRAM

## 2022-10-14 PROCEDURE — 85007 BL SMEAR W/DIFF WBC COUNT: CPT | Performed by: PHYSICIAN ASSISTANT

## 2022-10-14 PROCEDURE — 250N000013 HC RX MED GY IP 250 OP 250 PS 637: Performed by: PHYSICIAN ASSISTANT

## 2022-10-14 PROCEDURE — 82040 ASSAY OF SERUM ALBUMIN: CPT | Performed by: PHYSICIAN ASSISTANT

## 2022-10-14 PROCEDURE — 85384 FIBRINOGEN ACTIVITY: CPT | Performed by: PHYSICIAN ASSISTANT

## 2022-10-14 PROCEDURE — 80053 COMPREHEN METABOLIC PANEL: CPT | Performed by: PHYSICIAN ASSISTANT

## 2022-10-14 PROCEDURE — 88184 FLOWCYTOMETRY/ TC 1 MARKER: CPT | Performed by: PHYSICIAN ASSISTANT

## 2022-10-14 PROCEDURE — 83735 ASSAY OF MAGNESIUM: CPT | Performed by: PHYSICIAN ASSISTANT

## 2022-10-14 RX ORDER — LORAZEPAM 0.5 MG/1
.5-1 TABLET ORAL DAILY PRN
Qty: 10 TABLET | Refills: 0 | Status: SHIPPED | OUTPATIENT
Start: 2022-10-14 | End: 2023-01-09

## 2022-10-14 RX ORDER — ACYCLOVIR SODIUM 500 MG/10ML
10 INJECTION, SOLUTION INTRAVENOUS EVERY 8 HOURS
Qty: 561.6 ML | Refills: 0 | OUTPATIENT
Start: 2022-10-14 | End: 2022-11-02

## 2022-10-14 RX ADMIN — AMLODIPINE BESYLATE 5 MG: 5 TABLET ORAL at 08:49

## 2022-10-14 RX ADMIN — PANTOPRAZOLE SODIUM 20 MG: 20 TABLET, DELAYED RELEASE ORAL at 08:49

## 2022-10-14 RX ADMIN — ACETAMINOPHEN 650 MG: 325 TABLET, FILM COATED ORAL at 00:51

## 2022-10-14 RX ADMIN — FLUCONAZOLE 200 MG: 200 TABLET ORAL at 08:49

## 2022-10-14 RX ADMIN — Medication 5 ML: at 08:43

## 2022-10-14 RX ADMIN — ACETAMINOPHEN 650 MG: 325 TABLET, FILM COATED ORAL at 06:55

## 2022-10-14 RX ADMIN — LEVOFLOXACIN 250 MG: 250 TABLET, FILM COATED ORAL at 08:49

## 2022-10-14 RX ADMIN — Medication 10 ML: at 11:08

## 2022-10-14 RX ADMIN — ACYCLOVIR SODIUM 800 MG: 1000 INJECTION, SOLUTION INTRAVENOUS at 02:14

## 2022-10-14 RX ADMIN — ACYCLOVIR SODIUM 800 MG: 1000 INJECTION, SOLUTION INTRAVENOUS at 09:29

## 2022-10-14 RX ADMIN — CLINDAMYCIN PHOSPHATE: 10 LOTION TOPICAL at 08:49

## 2022-10-14 ASSESSMENT — ACTIVITIES OF DAILY LIVING (ADL)
ADLS_ACUITY_SCORE: 20

## 2022-10-14 NOTE — DISCHARGE SUMMARY
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 She was started on empiric treatment with IV acyclovir 10 mg/kg Q8H, and ID and dermatology were consulted. Lesion swabs for HSV and VZV resulted negative, though a bacterial swab did grow 1+ diphtheroids, thought due to contamination versus infection. This was conservatively treated with topical clindamycin 1% lotion. A punch biopsy of one of the lesions was subsequently done (10/13); results still pending at time of discharge. Patient was monitored inpatient several days and was noted to have generally stable rash, with perhaps a few scattered new pustules noted. Given these active lesions, coupled with patient's underlying immunosuppression and recent chemotherapy, decision was made to treat patient outpatient IV acyclovir to complete a 14-day course (through 10/25). She will have ID follow-up on 10/25 to ensure full resolution of active lesions and determine whether or not the treatment course needs to be extended. Of note, pancytopenia was also addressed during patient's admission. On presentation, she was profoundly pancytopenic; counts subsequently recovered quite briskly with holding Gleevec and as patient moved through her judy. However, on 10/13,  patient was noted to have 11% circulating blasts. In the setting of brisk count recovery, including platelet recovery, suspect that these are likely count recovery blasts; however, relapsed ALL is not entirely excluded. Peripheral blood flow cytometry sent 10/14 to evaluate further and still pending on the day of discharge.    I reviewed with the patient her plan of care, including new discharge medications and follow-up plans. Home IV acyclovir was arranged through [facility], and patient had medication administration teaching prior to discharge. Supplies and meds will be delivered to her house. She understands reasons to call clinic triage or present to the ED; clinic number provided on follow-up paperwork. I specifically discussed with patient the need to call if she develops worsening/spreading rash, fevers, or any other concerning s/sx. She will have close clinic follow-up with both oncology and ID as detailed below. She had an opportunity to ask questions, all of which were answered to her stated satisfaction. On the day of discharge, patient was well-appearing, hemodynamically stable, and felt safe and comfortable with the plans for discharge and follow-up as described.    Outpatient follow-up issues/tests:  - Follow-up pending peripheral blood flow cytometry (10/14); result pending at time of discharge.  - Monitor WBC/ANC. If count recovery persists (and no evidence of relapse), consider discontinuing ppx fluconazole/levofloxacin.  - Will defer decision regarding Gleevec resumption to outpatient visit on 10/18; will hold on discharge in the setting of active infection/to allow for more definitive count recovery.  - Follow-up final pathology from scalp punch biopsy (10/13), pending on day of discharge. Sutures to be removed on 10/26.    New discharge medications:  - IV acyclovir 10 mg/kg Q8H - course to continue through 10/25. ID visit 10/25 to consider whether this needs to be extended further.  - Clindamycin 1%  lotion - apply topically to skin lesions BID through 10/25  - Lorazepam 0.5-1 mg PRN (for anxiety prior to procedures)    Next follow-up:  - 10/17: Labs + PRN transfusions  - 10/18: AVELINA follow-up  - 10/20: Labs + PRN transfusions  - 10/24: Labs + PRN transfusions  - 10/25: ID follow-up  - 10/27: Labs + PRN transfusions  *Note: LP (in fluoro) and BMBx (in IR with sedation) awaiting scheduling; scheduling team(s) aware and working on these appts    Hospital Course   April F Bakari was admitted on 10/10/2022.  The following problems were addressed during her hospitalization:    ID  # Hemorrhagic erosions of the face and scalp  # Concern for disseminated VZV  Patient reported 1-2 days of prodromal, exquisite sensitivity of the scalp to even the lightest touch, beginning roughly 10/7. However, no skin lesions initially noted. Patient did develop a few scattered papulovesicular lesions to her chin and nose, which she initially thought were acneiform in nature. 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 HSV and VZV swabs of a scalp lesion were obtained, and she was directly admitted to [facility] for further work-up and management. Empiric IV acyclovir was started (x10/10).   - Dermatology consulted, appreciate input. Punch biopsy performed at bedside 10/13; follow-up final dermatopathology.  - ID consulted, appreciate input. On 10/13, exam notable for possible few (1-2) new intact pustulovesicular lesions to the left occipital scalp. Discussed at length with both ID and dermatology; out of an abundance of caution, will plan to continue IV acyclovir to complete at least a 14-day course of treatment (through 10/25/22). ID follow-up arranged for 10/25; patient to be seen in clinic to ensure all lesions are crusted over prior to  discontinuation of IV acyclovir.   - Ophtho consulted (10/11), no evidence of ocular involvement. Recommend repeat dilated eye exam in 4-6 weeks, out of an abundance of caution. Patient to follow-up with her outpatient optometrist, per her preference. Recommended contact lens holiday while active lesions present to avoid contamination of the eyes.  - HSV PCR (scalp) and VZV PCR (scalp) both negative. Aerobic bacterial swab with 1+ gram positive bacilli resembling diphtheroids, likely contaminant versus infection. Will treat with clindamycin 1% lotion BID x14 days (10/12-10/25), per dermatology.  - EBV/CMV PCR both negative  - Serum IgG 688, no current indication for IVIG  - Home IV acyclovir arranged through [facility]; appreciate RNCC assistance.     # ID PPx  - Hold PTA PO ACV given high-dose IV ACV as above  - Continue PTA fluconazole 200 mg daily and levofloxacin 250 mg daily on discharge.    - Note: ANC on day of discharge up to 4.1, but circulating peripheral blasts noted. I suspect these are count recovery blasts, as discussed below; however, until active ALL is excluded, would cautiously continue levo/fluc ppx for now. Can discontinue if ANC remains >1.0 and flow negative on follow-up appt on 10/18.  - Nebulized pentamidine last given 10/2; next due ~10/31     HEME  # Ph(+) B-ALL  Followed by Dr. Hull. Patient presented to outside hospital with c/o right upper quadrant pain and was subsequently found to have a markedly elevated white blood cell count concerning for acute leukemia. While at OSH, she had CTA chest as well as CT A/P. These identified no PE, no acute or localized intraabdominal inflammatory process, mild splenomegaly, and few inconspicous low-density structures in the liver. She was transferred to [facility] for further workup and management. S/p Hydrea 1g TID (8/21-8/23) with improvement in WBC (100K ? 12K). Diagnostic BMBx completed 8/20/22, which demonstrated B-ALL with 85% blasts and hypercellular  marrow. IHC shows CD10+, CD34+. 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 ALL NGS negative. BCR/ABL1 major/minor (sent from peripheral blood) both negative. Initiated pre-phase steroids with Prednisone 60 mg x8/21; increased to 60 mg/m2 (140 mg) 8/22-8/24. She initiated GRAALL + imatinib (C1D1 = 8/24/22). S/p BMT consult 9/12 with Dr. Walsh. Induction was relatively uncomplicated. Post-induction BMBx done 9/26/22 and revealed MRD- CR1, with negative BCR/ABL1 and Clonoseq testing. [facility]  - Today is Day +13 from C2 GRAALL (imatinib + HD methotrexate). Patient was due for ppx triple IT therapy on 10/10 (requires imaging-guided procedures), but this was deferred due to her admission, as above. Schedulers working to reschedule this in the outpatient setting at next available; appreciate efforts.  - Imatinib held on admission given profound cytopenias and active infection. Discussed with Dr. Hull (10/12); will plan to hold imatinib on discharge, as risks of persistent cytopenias in the context of disseminated VZV thought to outweigh benefits at this time. However, counts recovering on day of discharge; may be able to resume this shortly. Patient has clinic follow-up on 10/18; will defer decision regarding resumption to outpatient team.  - Schedulers working to reschedule BMBx, previously planned in IR under sedation on 10/13 and now cancelled due to admission. Will reschedule at next available. Appreciate effeorts.  - Given no identified matched related donor, and in the setting of  early/deep MRD- CR, current plan is for ongoing consolidation chemo as opposed to BMT in CR1.     # Pancytopenia  Due to recent chemotherapy.  - Transfuse to keep Hgb >7, plt >10K  - Requires irradiated blood products given possible upcoming BMT  - Requires pre-medication with Benadryl/APAP given history of urticarial transfusion reactions     MSK/CV  # Chest tightness, resolved  Noted to the left anterior chest wall overnight on 10/12-10/13. EKG done and showed sinus tachycardia without ischemia or dysrhythmia. No associated symptoms. Chest wall mildly TTP in the area of discomfort, preliminarily suggestive of MSK etiology for symptoms. No pleuritic pain, SOB, hypoxia, etc. immediately suggestive of PE, though this remains on the differential in the setting of hospitalization and underlying malignancy. No cough, fever, or other symptoms to suggest VZV pneumonia or other infectious process. Transient nature of symptoms and overall clinical well-appearance makes aortic dissection, esophageal rupture, and other emergent processes much less likely. Symptoms entirely resolved on day of discharge.  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 Dewitt.    Bev Haines PA-C  Hematology/Oncology  Pager: #[phone]    I spent 100 minutes in the care of this patient today, which included time necessary for review of interval events, obtaining history and physical exam, ordering medication(s)/test(s) as medically indicated, discussion with interdisciplinary/consult team(s), and documentation time. Over 50% of time was spent face-to-face and/or coordinating care.    Code Status   Full Code    Primary Care Physician   [facility]    Physical  Exam   Vital Signs with Ranges  Temp:  [97.7  F (36.5  C)-98.7  F (37.1  C)] 97.7  F (36.5  C)  Pulse:  [] 90  Resp:  [18] 18  BP: (113-131)/(60-82) 126/82  SpO2:  [94 %-98 %] 96 %  248 lbs 12.8 oz    Constitutional: Pleasant and cooperative female. Awake, alert, NAD. Smiling and conversational.  HEENT: NC/AT, EOMI, sclera clear, conjunctiva normal without injection, OP with MMM, mild gingival irritation without discrete intraoral lesion/mucositis.  Respiratory: No increased work of breathing, CTAB, no crackles or wheezing.  Cardiovascular: RRR, no murmur noted. No peripheral edema.  GI: Normal bowel sounds, soft, non-distended and non-tender.  MSK: No large joint effusions or gross deformities. Mild TTP of the left anterior chest in the nipple line.  Skin: Crusted hemorrhagic erosions noted in clusters to the scalp somewhat diffusely. Few other scattered lesions noted to the chin, crossing the midline, as well as a lesion near the tip of the nose and another to the left of the nasal bridge. 1-2 new/not previously noted intact pustulovesicular lesions to the right temporal scalp. Small erythematous papule to the left breast at the 12 o'clock position, stable.  Neurologic: A&O. Answers questions appropriately. Moves all extremities spontaneously.  Psych: Calm, appropriate affect.  Vascular access:  PICC on LUE CDI, non-tender, no surrounding erythema.    Discharge Disposition   Discharged to home  Condition at discharge: Stable    Consultations This Hospital Stay   DERMATOLOGY IP CONSULT  OPHTHALMOLOGY IP CONSULT  INFECTIOUS DISEASE TRANSPLANT HSCT/ HEME MALIG ADULT IP CONSULT  CARE MANAGEMENT / SOCIAL WORK IP CONSULT  NURSING TO CONSULT FOR VASCULAR ACCESS CARE IP CONSULT    Discharge Orders      XR Lumbar Puncture Intrathecal Chemo Admin     Glucose CSF:     Protein total CSF:     Home Infusion Referral      Reason for your hospital stay    Disseminated herpes zoster virus (shingles)     Activity    Your  activity upon discharge: activity as tolerated     When to contact your care team    Please call the [facility] at [phone] if you develop temperature above 100.4, shortness of breath, chest pain, headaches, vision changes, bleeding, uncontrolled nausea, vomiting, diarrhea, pain, or any other signs or symptoms of concern. If you are concerned that your symptoms are life-threatening, don't hesitate to call 911 or go to the nearest Emergency Room.     Follow Up and recommended labs and tests    An appointment for hospital follow up was requested for you. If it is not scheduled by the time you discharge you will be contacted with the date and time. You may call clinic to makes changes to this appointment if needed.    Already scheduled appointments are listed below.     Discharge Instructions    Continue IV acyclovir 3x daily until instructed to stop by infectious disease. You will have an appointment with them in a couple of weeks; please watch [employer] to see when this is scheduled.  Use the clindamycin lotion two times daily for another 13 days (through October 25th), per dermatology.  HOLD your Gleevec on discharge, per Dr. Hull, to ensure adequate count recovery. Can reassess at your appt on Oct. 18th whether or not to resume this.  Rescheduling of your lumbar puncture and bone marrow biopsy is in process and will be done at the next available appts. The clinic team will communicate with you when these appointments are scheduled.    Other general discharge precautions:  - Your white blood cells are low. This puts you at risk for infections. You have been prescribed antibiotics to try to prevent infection, but these can't prevent all infections. Make sure to take precautions such as washing your hands, wearing a mask, and limiting your risk of exposure whenever possible. Call the clinic at [phone] and ask to speak to the triage nurse if you have an fevers =  100.4 F or if you develop chills, cough, shortness of breath, vomiting, or any other signs or symptoms of concern.  - Your platelets are low. This puts you at risk for bleeding. Call 911 and/or the clinic triage line if you have a sudden, severe headache, or go to the nearest Emergency Room if you have any major bleeding.     Flow Cytometry Cerebrospinal fluid     Cerebrospinal fluid Aerobic Bacterial Culture Routine with Gram Stain     Gram stain     Diet    Follow this diet upon discharge: Regular     Check Out Appointment Request    [facility] [place] schedule:  - Labs + PRN transfusion (1u of pRBCs or platelets) Saturday or Sunday, 10/15 or 10/16  - In person AVELINA visit early next week (preferably Tuesday, 10/18) to reassess rash  - Reschedule lumbar puncture with IT chemo IN X-RAY at next available. Will need AVELINA visit, labs, and platelet transfusion (2 units) prior.     CSF Cell Count with Differential:     Discharge Medications   Current Discharge Medication List      START taking these medications    Details   acyclovir (ZOVIRAX) 50 MG/ML injection Inject 15.6 mLs (780 mg) into the vein every 8 hours for 12 days  Qty: 561.6 mL, Refills: 0    Associated Diagnoses: Disseminated zoster      clindamycin (CLEOCIN T) 1 % external lotion Apply topically 2 times daily to skin lesions for 13 more days to complete a 2-week course (10/12-10/25).  Qty: 60 mL, Refills: 0    Associated Diagnoses: Disseminated zoster; Infection due to diphtheroid bacteria         CONTINUE these medications which have CHANGED    Details   imatinib (GLEEVEC) 400 MG tablet Take 1 tablet (400 mg) by mouth 2 times daily . HOLD ON DISCHARGE until count recovery.  Qty: 19 tablet, Refills: 0    Associated Diagnoses: 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)         CONTINUE these medications which have NOT CHANGED    Details   acetaminophen (TYLENOL) 325 MG tablet Take 2 tablets (650 mg) by mouth every 6 hours as needed for mild  pain, other or fever (blood product administration premedication)    Associated Diagnoses: 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)      amLODIPine (NORVASC) 5 MG tablet Take 1 tablet (5 mg) by mouth daily  Qty: 30 tablet, Refills: 0    Associated Diagnoses: Hypertension, unspecified type      calcium carbonate (TUMS) 500 MG chewable tablet Take 2 tablets (1,000 mg) by mouth daily as needed for heartburn    Associated Diagnoses: 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)      fluconazole (DIFLUCAN) 200 MG tablet Take 1 tablet (200 mg) by mouth daily as needed (Take when ANC <1000)  Qty: 60 tablet, Refills: 0    Associated Diagnoses: 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)      heparin lock flush 10 UNIT/ML SOLN injection 10 mLs by Intracatheter route daily Flush 5 mL into each PICC lumen daily.  Qty: 300 mL, Refills: 0    Comments: Prefer to fill in 10 mL (or 5 mL) syringes  Associated Diagnoses: PICC (peripherally inserted central catheter) in place      levofloxacin (LEVAQUIN) 250 MG tablet Take 1 tablet (250 mg) by mouth daily as needed (ANC <1000)  Qty: 60 tablet, Refills: 0    Associated Diagnoses: 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)      ondansetron (ZOFRAN ODT) 4 MG ODT tab Take 1-2 tablets (4-8 mg) by mouth every 8 hours as needed for nausea or vomiting  Qty: 60 tablet, Refills: 0    Associated Diagnoses: 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)      pantoprazole (PROTONIX) 20 MG EC tablet Take 1 tablet (20 mg) by mouth daily  Qty: 90 tablet, Refills: 1    Associated Diagnoses: Gastroesophageal reflux disease, unspecified whether esophagitis present      prochlorperazine (COMPAZINE) 10 MG tablet Take 1 tablet (10 mg) by mouth every 6 hours as needed (Breakthrough Nausea/Vomiting)  Qty: 20 tablet, Refills: 0    Associated Diagnoses: Acute lymphoblastic leukemia (ALL)  not having achieved remission (H)      sodium chloride, PF, (SALINE FLUSH) 0.9% PF flush 20 mLs by Intracatheter route daily Flush PICC with 20 mL daily, 10 mL per lumen  Qty: 600 mL, Refills: 0    Comments: Fill in 10 or 20 mL syringes  Associated Diagnoses: PICC (peripherally inserted central catheter) in place         STOP taking these medications       acyclovir (ZOVIRAX) 400 MG tablet Comments:   Reason for Stopping:         dexamethasone (DECADRON) 4 MG tablet Comments:   Reason for Stopping:         prednisoLONE acetate (PRED FORTE) 1 % ophthalmic suspension Comments:   Reason for Stopping:             Allergies   Allergies   Allergen Reactions     Blood Transfusion Related (Informational Only) Hives     Hive reaction with platelet transfusion on 8/26/2022. Please administer platelets with tylenol and benadryl premedication.        Data   Most Recent 3 CBC's:Recent Labs   Lab Test 10/14/22  0648 10/13/22  0744 10/12/22  0642   WBC 8.5 1.5* 0.5*   HGB 7.5* 7.3* 7.2*   MCV 94 94 94   * 52* 5*      Most Recent 3 BMP's:  Recent Labs   Lab Test 10/14/22  0648 10/13/22  0744 10/12/22  0642    136 134*   POTASSIUM 3.6 3.6 3.7   CHLORIDE 107 104 102   CO2 22 23 22   BUN 9.8 8.2 7.5   CR 0.67 0.61 0.63   ANIONGAP 10 9 10   SOLEDAD 8.9 8.7 8.3*   * 91 91     Most Recent 2 LFT's:  Recent Labs   Lab Test 10/14/22  0648 10/13/22  0744   AST 20 12   ALT 10 17   ALKPHOS 75 64   BILITOTAL <0.2 0.4     Most Recent INR's and Anticoagulation Dosing History:  Anticoagulation Dose History     Recent Dosing and Labs Latest Ref Rng & Units 9/14/2022 9/30/2022 10/10/2022 10/11/2022 10/12/2022 10/13/2022 10/14/2022    INR 0.85 - 1.15 0.99 1.00 1.08 1.10 1.12 1.18(H) 1.16(H)        Most Recent 3 Troponin's:No lab results found.  Most Recent Cholesterol Panel:No lab results found.  Most Recent 6 Bacteria Isolates From Any Culture (See EPIC Reports for Culture Details):No lab results found.  Most Recent TSH, T4 and A1c  Labs:No lab results found.  Results for orders placed or performed during the hospital encounter of 09/30/22   IR Referral    Narrative    Nel Michel APRN CNP     10/3/2022  2:45 PM  Message sent to Kajal Barajas PA-C with directions to place   XR lumbar puncture order for LP with chemotherapy. IR referral   not needed.    Nel Michel DNP, LOAN  Interventional Radiology   IR on-call pager: [phone]

## 2022-10-14 NOTE — PLAN OF CARE
Goal Outcome Evaluation:    A&Ox4, on RA, denies N/V. Complains of head pain, and scheduled tylenol was given. Due to be discharged today. Continue with plan of care.

## 2022-10-14 NOTE — PHARMACY
[facility], [facility]  Parenteral ANtibiotic Review at Departure from Acute Care [facility] Note     Antimicrobial Stewardship Program - A joint venture between [facility] and  Physicians to optimize antibiotic management.  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 She initially was seen at an outpatient visit where she was found to be pancytopenic. She also stated that over the weekend (10/8-10/9), she started having scalp pain and low grade fevers. On 10/10, she noted a new rash on her scalp and upper lip, chin, and tip of nose. She was admitted for possible disseminated VZV in the setting of pancytopenia due to recent chemotherapy. She denied any headaches, neck stiffness, photophobia, blurry vision, or loss of vision. HSV 1 & 2 PCR and VZV PCR from scalp resulted negative. 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 Patient was started on IV acyclovir with plans in place to complete course as an outpatient.     Antimicrobial Dose/Route/Frequency Duration/Indication Start Date End Date   Acyclovir 800 mg (~10 mg/kg AdjBW)/IV/every 8 hours 14 days/ (Disseminated cutaneous VZV) 10/10/2022 10/25/2022     Laboratory Tests: CBC with Diff, BMP   Lab  Monitoring Frequency: 1x week   Therapeutic Drug Monitoring:  none   Therapeutic Drug Monitoring Frequency:  none   Miscellaneous Drug Monitoring:  none     Line Type: PICC (Double lumen, placed 9/30/2022)    Reassess Line/Pull Line Date: N/A    First Dose Received in Controlled Setting: Yes    Designated Provider: Dr. Kika Guillen will follow labs at discharge until ID follow-up    Follow-up: Patient does  have outpatient ID follow-up. Appointment date/time: 10/25/2022 @ 4:30 PM with Dr. Lima.    Recommendations/Additional Information:   Please fax laboratory results to [facility] ([phone]), attn: Cely Harris, PharmD, BCIDP  Pager: [phone]    Vital Signs/Clinical Features:  Vitals         10/12 0700  10/13 0659 10/13 0700  10/14 0659 10/14 0700  10/14 1556   Most Recent      Temp ( F) 98.2 -  100.4    97.7 -  98.7      97.6     97.6 (36.4) 10/14 1042    Pulse 86 -  118    87 -  110      105     105 10/14 1042    Resp 16 -  18    18 -  20      18     18 10/14 1042    /58 -  140/52    109/66 -  131/65    126/82 -  137/68     137/68 10/14 1042    SpO2 (%) 95 -  98    94 -  98      97     97 10/14 1042            Labs  Estimated Creatinine Clearance: 132.5 mL/min (based on SCr of 0.67 mg/dL).  Recent Labs   Lab Test 10/10/22  1237 10/10/22  1817 10/11/22  0703 10/12/22  0642 10/13/22  0744 10/14/22  0648   CR 0.70 0.68 0.63 0.63 0.61 0.67       Recent Labs   Lab Test 09/10/22  0647 09/11/22  0941 09/12/22  0936 09/13/22  0716 10/10/22  1237 10/10/22  1817 10/11/22  0703 10/12/22  0642 10/13/22  0744 10/14/22  0648   WBC 2.2*   < > 6.0   < > 0.4* 0.5* 0.3* 0.5* 1.5* 8.5   ANEU 2.0  --  3.1  --   --  0.0*  --  0.0* 0.3* 4.1   ALYM 0.1*  --  2.5  --   --  0.5*  --  0.4* 0.8 1.7   COLIN 0.0  --  0.3  --   --  0.0  --  0.0 0.1 1.3   AEOS 0.0  --  0.1  --   --  0.0  --  0.0 0.0 0.0   HGB 7.3*   < > 8.0*   < > 6.6* 6.6* 6.3* 7.2* 7.3* 7.5*   HCT 22.4*   < > 24.8*   < > 20.3* 20.7*  19.7* 22.3* 22.9* 23.4*   MCV 90   < > 94   < > 97 99 99 94 94 94      < > 328   < > 19* 13* 15* 5* 52* 127*    < > = values in this interval not displayed.       Recent Labs   Lab Test 10/10/22  1237 10/10/22  1817 10/11/22  0703 10/12/22  0642 10/13/22  0744 10/14/22  0648   BILITOTAL 0.7 0.6 0.6 0.5 0.4 <0.2   ALKPHOS 62 61 58 60 64 75   ALBUMIN 3.7 3.7 3.3* 3.3* 3.6 3.3*   AST 12 15 13 12 12 20   ALT 24 24 20 16 17 10       Recent Labs   Lab Test 09/08/22  0831 09/09/22  0117 09/09/22  0618 10/10/22  2214 10/12/22  0642 10/12/22  2350   LACT 3.2* 2.8* 3.4* 0.9 1.0 0.7             Culture Results:  7-Day Micro Results       Procedure Component Value Units Date/Time    CMV Quantitative, PCR [[id number]]  (Normal) Collected: 10/13/22 0744    Order Status: Completed Lab Status: Final result Updated: 10/13/22 2249    Specimen: Blood from Line, Other      CMV DNA IU/mL Not Detected IU/mL     Narrative:      Mutations within the highly conserved regions of the viral genome covered by the DOTTY AmpliPrep/DOTTY TaqMan test primers and/or probes have been identified and may result in under-quantitation of or failure to detect the virus. Supplemental testing methods should be used for testing when this is suspected. The DOTTY AmpliPrep/DOTTY TaqMan CMV Test is a FDA-approved, in vitro, nucleic acid amplification test for the quantitation of cytomegalovirus DNA in human plasma (EDTA plasma) using the DOTTY AmpliPrep instrument for automated viral nucleic acid extraction and the DOTTY TaqMan for automated real-time amplification and detection of the viral nucleic acid target. Titer results are reported in International Units/mL (IU/mL) using 1st WHO International standard for Human Cytomegalovirus for Nucleic Acid Amplification based assays. The conversion factor between CMV DNA copies/mL (as defined by the Roche DOTTY TaqMan CMV test) and International Units is the CMV DNA concentration in IU/mL x 1.1 copies/IU =  CMV DNA in copies/mL. This assay has received FDA approval for the testing of human plasma only. The Infectious [facility] at [facility] has validated the performance characteristics of the Roche CMV assay for plasma, bronchial alveolar lavage/wash and urine.        Cerebrospinal fluid Aerobic Bacterial Culture Routine with Gram Stain     Order Status: No result Lab Status: No result     Specimen: Cerebrospinal fluid from Spinal Cord     Gram stain     Order Status: No result Lab Status: No result     Specimen: Cerebrospinal fluid     Varicella zoster virus by PCR [[id number]]  (Normal) Collected: 10/11/22 1623    Order Status: Completed Lab Status: Final result Updated: 10/12/22 0857    Specimen: Swab from Scalp      Varicella Zoster DNA by PCR Not Detected     Comment: The Vizsafe Molecular Simplexa VZV Direct assay on the [employer] instrument is an FDA approved, real-time PCR test for the qualitative detection of VZV DNA from patients with signs and symptoms of infection.       Swab Aerobic Bacterial Culture Routine with Gram Stain [[id number]]  (Abnormal) Collected: 10/11/22 1623    Order Status: Completed Lab Status: Final result Updated: 10/13/22 1131    Specimen: Swab from Scalp      Culture 1+ Normal sia     Gram Stain Result 1+ Gram positive bacilli resembling diphtheroids      No white blood cells seen    Varicella zoster virus by PCR [[id number]] Collected: 10/11/22 0941    Order Status: Canceled Lab Status: No result Updated: 10/11/22 1031    Specimen: Swab from Scalp     Herpes Simplex Virus 1&2 by PCR [[id number]]  (Normal) Collected: 10/10/22 1447    Order Status: Completed Lab Status: Final result Updated: 10/11/22 1148    Specimen: Swab from Scalp      Herpes Simplex Virus 1 DNA Not Detected     Comment: Herpes simplex virus type 1 DNA not detected, presumed negative for HSV-1. A negative result does not rule out the presence of PCR inhibitors or HSV DNA in  concentrations below the limit of detection.        Herpes Simplex Virus 2 DNA Not Detected     Comment: Herpes simplex virus type 2 DNA not detected, presumed negative for HSV-2. A negative result does not rule out the presence of PCR inhibitors or HSV DNA in concentrations below the limit of detection.       Narrative:      The ActBlue Molecular Simplexa HSV 1 & 2 Direct assay on the [employer] instrument is a FDA-approved, real-time PCR test for the qualitative detection and differentiation of Herpes Simplex virus Type 1 & 2 DNA from patients with signs and symptoms of HSV-1 or 2 infection.            Recent Labs   Lab Test 08/22/22  1515 10/01/22  1100 10/01/22  1903 10/02/22  0254 10/02/22  1222 10/02/22  1931 10/03/22  0350   URINEPH 5.5   < > 7.0 7.5 7.0 8.0 7.0   NITRITE Negative  --   --   --   --   --   --    LEUKEST Negative  --   --   --   --   --   --     < > = values in this interval not displayed.       Recent Labs   Lab Test 08/26/22  1517 08/31/22  1617 09/07/22  1124 09/14/22  1131   CWBC 2 0 0 0   CRBC 0 9* 15* 9*   CGLU 106* 68 57  --    CTP 46.8* 38.2 24.9  --                    Imaging: No results found.

## 2022-10-14 NOTE — PLAN OF CARE
"Goal Outcome Evaluation:    /69 (BP Location: Right arm)   Pulse 110   Temp 97.8  F (36.6  C) (Oral)   Resp 18   Ht 1.626 m (5' 4\")   Wt 112.9 kg (248 lb 12.8 oz)   SpO2 97%   BMI 42.71 kg/m      9734-9336:         Admitted:  10/10/97782 at 1700. Low grade fever over the weekend. New scalp pain, rash on scalp and face concerning for disseminated zoster. Pancytopenia post cycle 2 GRALL.     Vitals: VS on room air, afebrile.  Neuro: A&Ox4. Baseline numbness and tingling in hands and feet. Provider aware from last admission.Denies any other concerns.  Cardiac: Denies chest pain.  Respiratory: Denies shortness of breath.  GI/: Voiding spontaneously adequate output. No BM this shift. Denies nausea or vomiting.  Pain: 3/10 neck/head pain,  PRN Tylenol given with relief.  Lab: Biopsy done of lesion on head this evening.     Plan: Discharge 10/14/2022.    Continue to monitor and with plan of care.               "

## 2022-10-14 NOTE — PROGRESS NOTES
Care Management Discharge Note    Discharge Date: 10/14/2022       Discharge Disposition: Home  Discharge Services:  [facility]  Discharge DME:  Home infusion supplies  Discharge Transportation: family or friend will provide  Private pay costs discussed: Not applicable  PAS Confirmation Code:  n/a  Patient/family educated on Medicare website which has current facility and service quality ratings:  n/a  Education Provided on the Discharge Plan:  yes  Persons Notified of Discharge Plans: Provider, bedside nurse, patient, RNCC, [place] liaison  Patient/Family in Agreement with the Plan:  yes  Handoff Referral Completed: Yes    Additional Information:  Per team, patient will be discharging this morning.    Writer updated [facility] liaison of discharge.   Patient will ride home with her .     Home infusion orders in.     External hand off completed.     Estela Youngblood RN, BSN  Care Coordinator 7D  Office: [phone]  Pager: [phone]    To contact the weekend RNCC  [place] (0800 - 1630) Saturday and Sunday    Units: 4A, 4C, 4E, 5A and 5B- Pager 1: [phone]    Units: 6A, 6B, 6C, 6D- Pager 2: [phone]    Units: 7A, 7B, 7C, 7D, and 5C-Pager 3: [phone]               Estela Youngblood RN

## 2022-10-14 NOTE — CARE PLAN
Pt. discharged at 1200 to home, was accompanied by , and left with personal belongings. Pt. received complete discharge paperwork and medications as filled by discharge pharmacy. Pt. was given times of last dose for all discharge medications in writing on discharge medication sheets. Discharge teaching included medication, pain management, activity restrictions, dressing changes, and signs and symptoms of infection. Pt. had no further questions at the time of discharge and no unmet needs were identified. PICC line in place at time of discharge for use of home infusion.

## 2022-10-15 LAB — VZV DNA SPEC QL NAA+PROBE: NOT DETECTED

## 2022-10-17 ENCOUNTER — INFUSION THERAPY VISIT (OUTPATIENT)
Dept: INFUSION THERAPY | Facility: CLINIC | Age: 42
End: 2022-10-17
Payer: COMMERCIAL

## 2022-10-17 ENCOUNTER — LAB (OUTPATIENT)
Dept: ONCOLOGY | Facility: CLINIC | Age: 42
End: 2022-10-17
Payer: COMMERCIAL

## 2022-10-17 VITALS
SYSTOLIC BLOOD PRESSURE: 158 MMHG | TEMPERATURE: 98 F | HEART RATE: 89 BPM | OXYGEN SATURATION: 98 % | DIASTOLIC BLOOD PRESSURE: 87 MMHG

## 2022-10-17 DIAGNOSIS — C91.00 ACUTE LYMPHOBLASTIC LEUKEMIA (ALL) NOT HAVING ACHIEVED REMISSION (H): Primary | ICD-10-CM

## 2022-10-17 LAB
ABO/RH(D): NORMAL
ANTIBODY SCREEN: NEGATIVE
BASOPHILS # BLD MANUAL: 0 10E3/UL (ref 0–0.2)
BASOPHILS NFR BLD MANUAL: 0 %
EOSINOPHIL # BLD MANUAL: 0 10E3/UL (ref 0–0.7)
EOSINOPHIL NFR BLD MANUAL: 0 %
ERYTHROCYTE [DISTWIDTH] IN BLOOD BY AUTOMATED COUNT: 19.2 % (ref 10–15)
HCT VFR BLD AUTO: 22.2 % (ref 35–47)
HGB BLD-MCNC: 7.3 G/DL (ref 11.7–15.7)
LYMPHOCYTES # BLD MANUAL: 1.2 10E3/UL (ref 0.8–5.3)
LYMPHOCYTES NFR BLD MANUAL: 4 %
MCH RBC QN AUTO: 31.3 PG (ref 26.5–33)
MCHC RBC AUTO-ENTMCNC: 32.9 G/DL (ref 31.5–36.5)
MCV RBC AUTO: 95 FL (ref 78–100)
METAMYELOCYTES # BLD MANUAL: 2.4 10E3/UL
METAMYELOCYTES NFR BLD MANUAL: 8 %
MONOCYTES # BLD MANUAL: 2.1 10E3/UL (ref 0–1.3)
MONOCYTES NFR BLD MANUAL: 7 %
MYELOCYTES # BLD MANUAL: 4 10E3/UL
MYELOCYTES NFR BLD MANUAL: 13 %
NEUTROPHILS # BLD MANUAL: 20.1 10E3/UL (ref 1.6–8.3)
NEUTROPHILS NFR BLD MANUAL: 66 %
NRBC # BLD AUTO: 0.3 10E3/UL
NRBC BLD MANUAL-RTO: 1 %
PATH REPORT.COMMENTS IMP SPEC: NORMAL
PATH REPORT.FINAL DX SPEC: NORMAL
PATH REPORT.FINAL DX SPEC: NORMAL
PATH REPORT.GROSS SPEC: NORMAL
PATH REPORT.MICROSCOPIC SPEC OTHER STN: NORMAL
PATH REPORT.MICROSCOPIC SPEC OTHER STN: NORMAL
PATH REPORT.RELEVANT HX SPEC: NORMAL
PATH REPORT.RELEVANT HX SPEC: NORMAL
PLAT MORPH BLD: ABNORMAL
PLATELET # BLD AUTO: 230 10E3/UL (ref 150–450)
PROMYELOCYTES # BLD MANUAL: 0.6 10E3/UL
PROMYELOCYTES NFR BLD MANUAL: 2 %
RBC # BLD AUTO: 2.33 10E6/UL (ref 3.8–5.2)
RBC MORPH BLD: ABNORMAL
SPECIMEN EXPIRATION DATE: NORMAL
WBC # BLD AUTO: 30.4 10E3/UL (ref 4–11)

## 2022-10-17 PROCEDURE — 85027 COMPLETE CBC AUTOMATED: CPT | Performed by: NURSE PRACTITIONER

## 2022-10-17 PROCEDURE — 85007 BL SMEAR W/DIFF WBC COUNT: CPT | Performed by: NURSE PRACTITIONER

## 2022-10-17 PROCEDURE — 36592 COLLECT BLOOD FROM PICC: CPT

## 2022-10-17 PROCEDURE — 99207 PR NO BILLABLE SERVICE THIS VISIT: CPT | Performed by: NURSE PRACTITIONER

## 2022-10-17 RX ORDER — HEPARIN SODIUM (PORCINE) LOCK FLUSH IV SOLN 100 UNIT/ML 100 UNIT/ML
500 SOLUTION INTRAVENOUS EVERY 8 HOURS
Status: DISCONTINUED | OUTPATIENT
Start: 2022-10-17 | End: 2022-10-17 | Stop reason: HOSPADM

## 2022-10-17 RX ORDER — ACETAMINOPHEN 325 MG/1
325 TABLET ORAL ONCE
Status: CANCELLED
Start: 2022-10-17 | End: 2022-10-17

## 2022-10-17 RX ORDER — HEPARIN SODIUM (PORCINE) LOCK FLUSH IV SOLN 100 UNIT/ML 100 UNIT/ML
5 SOLUTION INTRAVENOUS
Status: CANCELLED | OUTPATIENT
Start: 2022-10-17

## 2022-10-17 RX ORDER — HEPARIN SODIUM,PORCINE 10 UNIT/ML
5 VIAL (ML) INTRAVENOUS
Status: CANCELLED | OUTPATIENT
Start: 2022-10-17

## 2022-10-17 RX ORDER — DIPHENHYDRAMINE HCL 25 MG
25 CAPSULE ORAL ONCE
Status: CANCELLED
Start: 2022-10-17 | End: 2022-10-17

## 2022-10-17 RX ADMIN — HEPARIN SODIUM (PORCINE) LOCK FLUSH IV SOLN 100 UNIT/ML 500 UNITS: 100 SOLUTION at 14:09

## 2022-10-17 NOTE — PROGRESS NOTES
Patient's name and  were verified.  See Doc Flowsheet - IV assess for details.  PICC blood draw: Red lumen  blood return positive: YES  Site without redness, tenderness or swelling: YES  flushed with 20cc NS and 5cc 100u/ml heparin  Comments: Labs drawn.  Patient tolerated procedure without incident.    Aminah Salguero RN, BSN, OCN

## 2022-10-17 NOTE — PROGRESS NOTES
Infusion Nursing Note:  Vashti Villegas presents today for Possible RBC/PLT NOT NEEDED .    Patient seen by provider today: No   present during visit today: Not Applicable.    Note: Pt states she feels great, denies any medical concerns, see flow sheet for assessment.    Intravenous Access:  No Intravenous access at this visit.    Treatment Conditions:  Lab Results   Component Value Date    HGB 7.3 (LL) 10/17/2022    WBC 30.4 (HH) 10/17/2022    ANEU 20.1 (H) 10/17/2022    ANEUTAUTO 1.9 10/06/2022     10/17/2022      Results reviewed, labs did NOT meet treatment parameters: hgb 7.3, plt 230    Post Infusion Assessment:  N/A.     Discharge Plan:   Patient discharged in stable condition accompanied by: self.  Departure Mode: Ambulatory.  PT will RTC 10/18/22 for lab appt and provider appt.      Eric Ochoa RN                       sacrum

## 2022-10-18 ENCOUNTER — APPOINTMENT (OUTPATIENT)
Dept: LAB | Facility: CLINIC | Age: 42
End: 2022-10-18
Attending: PHYSICIAN ASSISTANT
Payer: COMMERCIAL

## 2022-10-18 ENCOUNTER — DOCUMENTATION ONLY (OUTPATIENT)
Dept: DERMATOLOGY | Facility: CLINIC | Age: 42
End: 2022-10-18

## 2022-10-18 ENCOUNTER — MYC MEDICAL ADVICE (OUTPATIENT)
Dept: INTERVENTIONAL RADIOLOGY/VASCULAR | Facility: CLINIC | Age: 42
End: 2022-10-18

## 2022-10-18 ENCOUNTER — ONCOLOGY VISIT (OUTPATIENT)
Dept: ONCOLOGY | Facility: CLINIC | Age: 42
End: 2022-10-18
Attending: STUDENT IN AN ORGANIZED HEALTH CARE EDUCATION/TRAINING PROGRAM
Payer: COMMERCIAL

## 2022-10-18 VITALS
DIASTOLIC BLOOD PRESSURE: 79 MMHG | RESPIRATION RATE: 16 BRPM | BODY MASS INDEX: 42.62 KG/M2 | HEART RATE: 109 BPM | OXYGEN SATURATION: 98 % | SYSTOLIC BLOOD PRESSURE: 138 MMHG | WEIGHT: 248.3 LBS

## 2022-10-18 DIAGNOSIS — C91.00 ACUTE LYMPHOBLASTIC LEUKEMIA (ALL) NOT HAVING ACHIEVED REMISSION (H): ICD-10-CM

## 2022-10-18 LAB
ALBUMIN SERPL BCG-MCNC: 4 G/DL (ref 3.5–5.2)
ALP SERPL-CCNC: 152 U/L (ref 35–104)
ALT SERPL W P-5'-P-CCNC: 30 U/L (ref 10–35)
ANION GAP SERPL CALCULATED.3IONS-SCNC: 10 MMOL/L (ref 7–15)
AST SERPL W P-5'-P-CCNC: 36 U/L (ref 10–35)
BASOPHILS # BLD MANUAL: 0 10E3/UL (ref 0–0.2)
BASOPHILS NFR BLD MANUAL: 0 %
BILIRUB SERPL-MCNC: 0.3 MG/DL
BLASTS # BLD MANUAL: 0.6 10E3/UL
BLASTS NFR BLD MANUAL: 2 %
BUN SERPL-MCNC: 8.9 MG/DL (ref 6–20)
CALCIUM SERPL-MCNC: 9.3 MG/DL (ref 8.6–10)
CHLORIDE SERPL-SCNC: 105 MMOL/L (ref 98–107)
CREAT SERPL-MCNC: 0.62 MG/DL (ref 0.51–0.95)
DEPRECATED HCO3 PLAS-SCNC: 26 MMOL/L (ref 22–29)
EOSINOPHIL # BLD MANUAL: 0 10E3/UL (ref 0–0.7)
EOSINOPHIL NFR BLD MANUAL: 0 %
ERYTHROCYTE [DISTWIDTH] IN BLOOD BY AUTOMATED COUNT: 18.9 % (ref 10–15)
GFR SERPL CREATININE-BSD FRML MDRD: >90 ML/MIN/1.73M2
GLUCOSE SERPL-MCNC: 101 MG/DL (ref 70–99)
HCT VFR BLD AUTO: 23.5 % (ref 35–47)
HGB BLD-MCNC: 7.6 G/DL (ref 11.7–15.7)
LYMPHOCYTES # BLD MANUAL: 1.8 10E3/UL (ref 0.8–5.3)
LYMPHOCYTES NFR BLD MANUAL: 6 %
MCH RBC QN AUTO: 30.6 PG (ref 26.5–33)
MCHC RBC AUTO-ENTMCNC: 32.3 G/DL (ref 31.5–36.5)
MCV RBC AUTO: 95 FL (ref 78–100)
METAMYELOCYTES # BLD MANUAL: 0.6 10E3/UL
METAMYELOCYTES NFR BLD MANUAL: 2 %
MONOCYTES # BLD MANUAL: 2.5 10E3/UL (ref 0–1.3)
MONOCYTES NFR BLD MANUAL: 8 %
MYELOCYTES # BLD MANUAL: 0.3 10E3/UL
MYELOCYTES NFR BLD MANUAL: 1 %
NEUTROPHILS # BLD MANUAL: 24.3 10E3/UL (ref 1.6–8.3)
NEUTROPHILS NFR BLD MANUAL: 79 %
NRBC # BLD AUTO: 0.3 10E3/UL
NRBC BLD MANUAL-RTO: 1 %
PLAT MORPH BLD: ABNORMAL
PLATELET # BLD AUTO: 238 10E3/UL (ref 150–450)
POLYCHROMASIA BLD QL SMEAR: SLIGHT
POTASSIUM SERPL-SCNC: 3.5 MMOL/L (ref 3.4–5.3)
PROMYELOCYTES # BLD MANUAL: 0.6 10E3/UL
PROMYELOCYTES NFR BLD MANUAL: 2 %
PROT SERPL-MCNC: 6.9 G/DL (ref 6.4–8.3)
RBC # BLD AUTO: 2.48 10E6/UL (ref 3.8–5.2)
RBC MORPH BLD: ABNORMAL
SODIUM SERPL-SCNC: 141 MMOL/L (ref 136–145)
WBC # BLD AUTO: 30.7 10E3/UL (ref 4–11)

## 2022-10-18 PROCEDURE — 99417 PROLNG OP E/M EACH 15 MIN: CPT | Performed by: STUDENT IN AN ORGANIZED HEALTH CARE EDUCATION/TRAINING PROGRAM

## 2022-10-18 PROCEDURE — 85027 COMPLETE CBC AUTOMATED: CPT | Performed by: STUDENT IN AN ORGANIZED HEALTH CARE EDUCATION/TRAINING PROGRAM

## 2022-10-18 PROCEDURE — 36592 COLLECT BLOOD FROM PICC: CPT | Performed by: STUDENT IN AN ORGANIZED HEALTH CARE EDUCATION/TRAINING PROGRAM

## 2022-10-18 PROCEDURE — G0463 HOSPITAL OUTPT CLINIC VISIT: HCPCS

## 2022-10-18 PROCEDURE — 86901 BLOOD TYPING SEROLOGIC RH(D): CPT | Performed by: STUDENT IN AN ORGANIZED HEALTH CARE EDUCATION/TRAINING PROGRAM

## 2022-10-18 PROCEDURE — 250N000011 HC RX IP 250 OP 636: Performed by: STUDENT IN AN ORGANIZED HEALTH CARE EDUCATION/TRAINING PROGRAM

## 2022-10-18 PROCEDURE — 99215 OFFICE O/P EST HI 40 MIN: CPT | Performed by: STUDENT IN AN ORGANIZED HEALTH CARE EDUCATION/TRAINING PROGRAM

## 2022-10-18 PROCEDURE — 85007 BL SMEAR W/DIFF WBC COUNT: CPT | Performed by: STUDENT IN AN ORGANIZED HEALTH CARE EDUCATION/TRAINING PROGRAM

## 2022-10-18 PROCEDURE — 80053 COMPREHEN METABOLIC PANEL: CPT | Performed by: STUDENT IN AN ORGANIZED HEALTH CARE EDUCATION/TRAINING PROGRAM

## 2022-10-18 RX ORDER — HEPARIN SODIUM,PORCINE 10 UNIT/ML
5 VIAL (ML) INTRAVENOUS ONCE
Status: COMPLETED | OUTPATIENT
Start: 2022-10-18 | End: 2022-10-18

## 2022-10-18 RX ADMIN — Medication 5 ML: at 12:11

## 2022-10-18 ASSESSMENT — PAIN SCALES - GENERAL: PAINLEVEL: NO PAIN (0)

## 2022-10-18 NOTE — PROGRESS NOTES
Oncology/Hematology Visit Note  Oct 18, 2022    Reason for Visit: post-hospital follow up for suspected disseminated zoster    History of Present Illness: April F Bakari is a 42 year old female with Ph (+) B-ALL. She is currently undergoing treatment with GRAALL. Please see previous notes for further details on the patient's oncologic history. She comes in today for follow up after hospitalization for suspected disseminated zoster.     Brief Oncologic history:  --Patient presented to outside hospital with c/o right upper quadrant pain and was subsequently found to have a markedly elevated white blood cell count concerning for acute leukemia. While at OSH, she had CT PE protocol as well as CT A/P. These identified no PE, no acute or localized intraabdominal inflammatory process, mild splenomegaly, and few inconspicous low-density structures in the liver. She was transferred to [facility] for further workup and management. S/p Hydrea 1g TID (8/21-8/23) with improvement in WBC (100K ? 12K).   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 IHC shows CD10+, CD34+. 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 Abnormal cytogenetics. Initiated pre-phase steroids with Prednisone 60 mg x8/21; increased to 60 mg/m2 (140 mg) 8/22-8/24. She initiated GRAALL + imatinib (C1D1 = 8/24/22).   --S/p BMT consult 9/12 with Dr. Walsh. Induction was relatively uncomplicated. Post-induction BMBx done 9/26. Morphology inconclusive due to inadequate core but flow negative. Awaiting FISH/chromosomes. BCR/abl major and minor undetectable (as were baseline tests). She was admitted for C2 GRAALL.     --BMBx 9/26/22 MRD-negative by BCR-ABL PCR and by Clonoseq Assay.      Treatment Plan: GRAALL + imatinib (C2D1=9/30/22)  - Imatinib 400 mg BID - D1-28.. On hold since 10/10/22 due to hospitalization for suspected disseminated zoster.  -  IVFs per chemo protocol, bicarb support  - Methotrexate 1 g/m2 IV x once on D1. Administered over 24 hours  - Leucovorin calcium q6hr starting 12 hr after END of methotrexate infusion. Continue until methotrexate level <0.05  - cytarabine 3 g/m2 IV q12 hr x 4 doses on Days 2-3  - Premeds: Zofran 16mg D1, 8mg q12hr x 4 doses (D2-3);  Dex 12mg D1-3, 8mg D4-5  - Supportive Care: Pred Forte eye drops  - Neulasta ~D6. Completed on 10/6  - IT chemo on D9. Will need under imaging guidance. Delayed s/t to pancytopenia and concern for disseminated zoster. Tentatively rescheduled for 10/21.     # CNS prophylaxis  Per the GRAALL protocol and given risk for CNS involvement of ALL. Will require LPs under fluoroscopic guidance.   - s/p weekly triple IT chemo (Cytarabine, Solu-Cortef, MTX) on Days 1, 8, and 15 of C1 induction. She additionally had repeat LP on 9/14. CSF flow has remained negative. Due for Cycle 2 D 9 IT Chemo as above; scheduled for 10/21.  - Pt expresses anxiety surrounding procedures; pre-medication with 0.5mg PO Ativan +/- Atarax available prior PRN    Interval History:  -April is feeling pretty well today. She was discharged from the hospital on 10/14/22.  -Scalp still somewhat itchy but no new lesions per 's assessment. Lesions are crusted. Lesions to face are resolving. Has never noticed any draining from the lesions. Denies any fever. No vision changes. No numbness/tingling of scalp. Some focal tenderness on back of scalp. No mouth lesions. Administering acyclovir through the IV at home. Has also been applying topical gel as prescribed.   -Gleevec on hold since 10/10/22.  -Has more energy now.  -Noting some hair loss.   -Appetite is good. No nausea.  -Legs feel a bit heavy/achy but no swelling.  -Numbness/tingling to fingers and toes is stable.     Review of Systems:  Patient denies any of the following except if noted above: fevers, chills, difficulty with energy, vision or hearing changes, chest  pain, dyspnea, abdominal pain, nausea, vomiting, diarrhea, constipation, urinary concerns, headaches, numbness, tingling, issues with sleep or mood. He/She also denies lumps, bumps, rashes or skin lesions, bleeding or bruising issues.  Physical Examination:  General: The patient is a pleasant female in no acute distress.  /79   Pulse 109   Resp 16   Wt 112.6 kg (248 lb 4.8 oz)   SpO2 98%   BMI 42.62 kg/m    Wt Readings from Last 10 Encounters:   10/18/22 112.6 kg (248 lb 4.8 oz)   10/14/22 111.8 kg (246 lb 6.4 oz)   10/10/22 113 kg (249 lb 1.6 oz)   10/06/22 116.8 kg (257 lb 9.6 oz)   10/03/22 118.9 kg (262 lb 1.6 oz)   09/26/22 115.3 kg (254 lb 1.6 oz)   09/20/22 113.8 kg (250 lb 14.4 oz)   09/14/22 111 kg (244 lb 12.8 oz)     General: Well-appearing female in no acute distress.  Eyes: EOMI, PERRL. No scleral icterus.  ENT: Oral mucosa is moist without lesions or thrush.   Lymphatic: Neck is supple without cervical or supraclavicular lymphadenopathy.   Cardiovascular: RRR No murmurs, gallops, or rubs. No peripheral edema.  Respiratory: CTA bilaterally. No wheezes or crackles.  Gastrointestinal: BS +. Abdomen soft, non-tender. No palpable hepatosplenomegaly or masses.   Neurologic: Cranial nerves II through XII are grossly intact.  Skin: scatter crusted hemorraghic/vescicular lesions/eruptions modestly to moderately populated throughout scalp but largely sparing the posterior inferior aspect of the scalp. A couple of healing slightly raised pink lesions near mouth without fluid or crusting.     Laboratory Data:  Most Recent 3 CBC's:  Recent Labs   Lab Test 10/18/22  1215 10/17/22  1401 10/14/22  0648 10/10/22  1237 10/06/22  1031 10/03/22  0856 10/02/22  0829   WBC 30.7* 30.4* 8.5   < > 2.4* 4.6 5.9   HGB 7.6* 7.3* 7.5*   < > 8.3* 8.3* 8.0*   MCV 95 95 94   < > 99 102* 100    230 127*   < > 157 210 225   ANEUTAUTO  --   --   --   --  1.9 4.5 5.4    < > = values in this interval not displayed.     Most Recent 3 BMP's:  Recent Labs   Lab Test 10/18/22  1215 10/14/22  0648 10/13/22  0744    139 136   POTASSIUM 3.5 3.6 3.6   CHLORIDE 105 107 104   CO2 26 22 23   BUN 8.9 9.8 8.2   CR 0.62 0.67 0.61   ANIONGAP 10 10 9   SOLEDAD 9.3 8.9 8.7   * 103* 91   PROTTOTAL 6.9 5.9* 6.2*   ALBUMIN 4.0 3.3* 3.6    Most Recent 2 LFT's:  Recent Labs   Lab Test 10/18/22  1215 10/14/22  0648   AST 36* 20   ALT 30 10   ALKPHOS 152* 75   BILITOTAL 0.3 <0.2    Most Recent TSH and T4:No lab results found.  I reviewed the above labs today.    Imaging:  No new imaging.     I reviewed the above imaging today.    Assessment and Plan:    April is a 42 year old female with PH + ALL. 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 The focus of this visit was evaluating present status of the rash. Please refer to previous oncology note for further details regarding management of Ph(+) ALL.     #Ph (+) B-ALL  42 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  -started cycle 2 GRALL on 9/30/22. Imatinib on hold since 10/10/22 d/t pancytopenia. Did get neulasta on day 6.  -day 9 IT chemotherapy was was also delayed s/t pancytopenia.   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 Gradually improving as detailed below.  -Plan for IT chemo with LP on 10/21 (make up for day 9 C2)  -Continue to hold imatinib, will clarify with Dr. Hull's team on when this can be restarted.  -Flow cytometry on 10/14/22 somewhat inconclusive. Appears more consistent with count recovery rather than disease but IB message sent to Dr. Hull's team to further assess.  -BMB scheduled for 10/25 (request sent to schedule follow up with Dr. Della after this)  -Discussed plan with other [facility]INA who also follows this patient    ID: [id number]  -pentamidine  every 28 days for PJP ppx, last given 10/2. Due next on 10/31. IB message sent to request with scheduling.  -confirms she is on levaquin and fluxonazole. 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 Lesions are scabbing. Subjectively reports improvement in face lesions and no new lesions to scalp. Some mild focal scalp tenderness on posterior scalp. No paresthesias. No vision changes.  -Punch scalp biopsy somewhat inconclusive. Will plan to continue IV acyclovir for now given that appearance seems consistent with VZV --due to be completed on 10/25/22. IB message sent to derm to confirm if they agree with this plan in light of biopsy. Continue topical clindamycin as recommended by derm.   -Even though no ocular symptoms or vision changes, recommended eye doctor follow up in next few weeks to to be cautious    #Leukoctyosis  -Suspect s/t Neulasta in absence of fever or worsening rash. Will recheck on 10/21 and 10/24 to monitor trend.  -BMB scheduled for 10/25    Not discussed today:  #Hx Transfusion reactions  -prior hive reactions with blood products. Requires Premed with tylenol 650 mg, benadryl 25 mg      Jo Ann Scheierl, CNP    120 minutes spent on the date of the encounter doing review of outside records, review of test results, interpretation of tests, patient visit, documentation and discussion with other provider(s)

## 2022-10-18 NOTE — LETTER
10/18/2022         RE: Vashti Villegas  [address] [place] [address]        Dear Colleague,    Thank you for referring your patient, Vashti Villegas, to the [facility]. Please see a copy of my visit note below.    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 She is currently undergoing treatment with GRAALL. Please see previous notes for further details on the patient's oncologic history. She comes in today for follow up after hospitalization for suspected disseminated zoster.     Brief Oncologic history:  --Patient presented to outside hospital with c/o right upper quadrant pain and was subsequently found to have a markedly elevated white blood cell count concerning for acute leukemia. While at OSH, she had CT PE protocol as well as CT A/P. These identified no PE, no acute or localized intraabdominal inflammatory process, mild splenomegaly, and few inconspicous low-density structures in the liver. She was transferred to [facility] for further workup and management. S/p Hydrea 1g TID (8/21-8/23) with improvement in WBC (100K ? 12K).   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 IHC shows CD10+, CD34+. 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 Abnormal cytogenetics. Initiated pre-phase steroids with Prednisone 60 mg x8/21; increased to 60 mg/m2 (140 mg) 8/22-8/24. She initiated GRAALL + imatinib (C1D1 = 8/24/22).   --S/p BMT consult 9/12 with Dr. Walsh. Induction was relatively uncomplicated. Post-induction BMBx done 9/26. Morphology inconclusive due to inadequate core but flow negative. Awaiting FISH/chromosomes. BCR/abl major and minor undetectable (as were baseline tests). She was admitted for  C2 [facility].     --BMBx 9/26/22 MRD-negative by BCR-ABL PCR and by Clonoseq Assay.      Treatment Plan: TRACIE + imatinib (C2D1=9/30/22)  - Imatinib 400 mg BID - D1-28.. On hold since 10/10/22 due to hospitalization for suspected disseminated zoster.  - IVFs per chemo protocol, bicarb support  - Methotrexate 1 g/m2 IV x once on D1. Administered over 24 hours  - Leucovorin calcium q6hr starting 12 hr after END of methotrexate infusion. Continue until methotrexate level <0.05  - cytarabine 3 g/m2 IV q12 hr x 4 doses on Days 2-3  - Premeds: Zofran 16mg D1, 8mg q12hr x 4 doses (D2-3);  Dex 12mg D1-3, 8mg D4-5  - Supportive Care: Pred Forte eye drops  - Neulasta ~D6. Completed on 10/6  - IT chemo on D9. Will need under imaging guidance. Delayed s/t to pancytopenia and concern for disseminated zoster. Tentatively rescheduled for 10/21.     # CNS prophylaxis  Per the [facility] protocol and given risk for CNS involvement of ALL. Will require LPs under fluoroscopic guidance.   - s/p weekly triple IT chemo (Cytarabine, Solu-Cortef, MTX) on Days 1, 8, and 15 of C1 induction. She additionally had repeat LP on 9/14. CSF flow has remained negative. Due for Cycle 2 D 9 IT Chemo as above; scheduled for 10/21.  - Pt expresses anxiety surrounding procedures; pre-medication with 0.5mg PO Ativan +/- Atarax available prior PRN    Interval History:  -April is feeling pretty well today. She was discharged from the hospital on 10/14/22.  -Scalp still somewhat itchy but no new lesions per 's assessment. Lesions are crusted. Lesions to face are resolving. Has never noticed any draining from the lesions. Denies any fever. No vision changes. No numbness/tingling of scalp. Some focal tenderness on back of scalp. No mouth lesions. Administering acyclovir through the IV at home. Has also been applying topical gel as prescribed.   -Gleevec on hold since 10/10/22.  -Has more energy now.  -Noting some hair loss.   -Appetite is good. No  nausea.  -Legs feel a bit heavy/achy but no swelling.  -Numbness/tingling to fingers and toes is stable.     Review of Systems:  Patient denies any of the following except if noted above: fevers, chills, difficulty with energy, vision or hearing changes, chest pain, dyspnea, abdominal pain, nausea, vomiting, diarrhea, constipation, urinary concerns, headaches, numbness, tingling, issues with sleep or mood. He/She also denies lumps, bumps, rashes or skin lesions, bleeding or bruising issues.  Physical Examination:  General: The patient is a pleasant female in no acute distress.  /79   Pulse 109   Resp 16   Wt 112.6 kg (248 lb 4.8 oz)   SpO2 98%   BMI 42.62 kg/m    Wt Readings from Last 10 Encounters:   10/18/22 112.6 kg (248 lb 4.8 oz)   10/14/22 111.8 kg (246 lb 6.4 oz)   10/10/22 113 kg (249 lb 1.6 oz)   10/06/22 116.8 kg (257 lb 9.6 oz)   10/03/22 118.9 kg (262 lb 1.6 oz)   09/26/22 115.3 kg (254 lb 1.6 oz)   09/20/22 113.8 kg (250 lb 14.4 oz)   09/14/22 111 kg (244 lb 12.8 oz)     General: Well-appearing female in no acute distress.  Eyes: EOMI, PERRL. No scleral icterus.  ENT: Oral mucosa is moist without lesions or thrush.   Lymphatic: Neck is supple without cervical or supraclavicular lymphadenopathy.   Cardiovascular: RRR No murmurs, gallops, or rubs. No peripheral edema.  Respiratory: CTA bilaterally. No wheezes or crackles.  Gastrointestinal: BS +. Abdomen soft, non-tender. No palpable hepatosplenomegaly or masses.   Neurologic: Cranial nerves II through XII are grossly intact.  Skin: scatter crusted hemorraghic/vescicular lesions/eruptions modestly to moderately populated throughout scalp but largely sparing the posterior inferior aspect of the scalp. A couple of healing slightly raised pink lesions near mouth without fluid or crusting.     Laboratory Data:  Most Recent 3 CBC's:  Recent Labs   Lab Test 10/18/22  1215 10/17/22  1401 10/14/22  0648 10/10/22  1237 10/06/22  1031 10/03/22  0856  10/02/22  0829   WBC 30.7* 30.4* 8.5   < > 2.4* 4.6 5.9   HGB 7.6* 7.3* 7.5*   < > 8.3* 8.3* 8.0*   MCV 95 95 94   < > 99 102* 100    230 127*   < > 157 210 225   ANEUTAUTO  --   --   --   --  1.9 4.5 5.4    < > = values in this interval not displayed.    Most Recent 3 BMP's:  Recent Labs   Lab Test 10/18/22  1215 10/14/22  0648 10/13/22  0744    139 136   POTASSIUM 3.5 3.6 3.6   CHLORIDE 105 107 104   CO2 26 22 23   BUN 8.9 9.8 8.2   CR 0.62 0.67 0.61   ANIONGAP 10 10 9   SOLEDAD 9.3 8.9 8.7   * 103* 91   PROTTOTAL 6.9 5.9* 6.2*   ALBUMIN 4.0 3.3* 3.6    Most Recent 2 LFT's:  Recent Labs   Lab Test 10/18/22  1215 10/14/22  0648   AST 36* 20   ALT 30 10   ALKPHOS 152* 75   BILITOTAL 0.3 <0.2    Most Recent TSH and T4:No lab results found.  I reviewed the above labs today.    Imaging:  No new imaging.     I reviewed the above imaging today.    Assessment and Plan:    April is a 42 year old female with PH + ALL. 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 The focus of this visit was evaluating present status of the rash. Please refer to previous oncology note for further details regarding management of Ph(+) ALL.     #Ph (+) B-ALL  42 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  -started cycle 2 GRALL on 9/30/22. Imatinib on hold since 10/10/22 d/t pancytopenia. Did get neulasta on day 6.  -day 9 IT chemotherapy was was also delayed s/t pancytopenia.   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 Gradually improving as detailed below.  -Plan for IT chemo with LP on 10/21 (make up for day 9 C2)  -Continue to hold imatinib, will clarify with Dr. Hull's team on when this can be restarted.  -Flow cytometry on 10/14/22 somewhat inconclusive. Appears more consistent with count  recovery rather than disease but IB message sent to Dr. Hull's team to further assess.  -BMB scheduled for 10/25 (request sent to schedule follow up with Dr. Hull after this)  -Discussed plan with other AVELINA who also follows this patient    ID: PPX  -pentamidine every 28 days for PJP ppx, last given 10/2. Due next on 10/31. IB message sent to request with scheduling.  -confirms she is on levaquin and fluxonazole. 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 Lesions are scabbing. Subjectively reports improvement in face lesions and no new lesions to scalp. Some mild focal scalp tenderness on posterior scalp. No paresthesias. No vision changes.  -Punch scalp biopsy somewhat inconclusive. Will plan to continue IV acyclovir for now given that appearance seems consistent with VZV --due to be completed on 10/25/22. IB message sent to derm to confirm if they agree with this plan in light of biopsy. Continue topical clindamycin as recommended by derm.   -Even though no ocular symptoms or vision changes, recommended eye doctor follow up in next few weeks to to be cautious    #Leukoctyosis  -Suspect s/t Neulasta in absence of fever or worsening rash. Will recheck on 10/21 and 10/24 to monitor trend.  -BMB scheduled for 10/25    Not discussed today:  #Hx Transfusion reactions  -prior hive reactions with blood products. Requires Premed with tylenol 650 mg, benadryl 25 mg    120 minutes spent on the date of the encounter doing review of outside records, review of test results, interpretation of tests, patient visit, documentation and discussion with other provider(s)           Again, thank you for allowing me to participate in the care of your patient.      Sincerely,    Amber J. Scheierl, LOAN CNP

## 2022-10-18 NOTE — TELEPHONE ENCOUNTER
I have contacted April regarding her two procedure scheduled in IR.  She was driving when I contacted her.  I encouraged her to review instructions when able and contact IR with questions or concerns.      I also confirmed that I will follow-up with April on Wednesday or Thursday to confirm she has received information and understands education for both scheduled procedures.    Abby OLIVA  Interventional Radiology RN   [phone]

## 2022-10-18 NOTE — PROGRESS NOTES
Contacted patient regarding biopsy results from her inpatient stay. Final diagnosis of ruptured folliculitis. No viral elements seen but since had been treated with IV acyclovir prior likely due to treated VZV folliculitis. Relayed results to patient and understands. Has ID follow up 10/25. States overall doing better with lesions on face completely cleared and all crusted on scalp. Still on IV acyclovir home infusions. Will contact derm clinic after ID appointment for an appointment if have any future concerns.    Danny Longoria MD

## 2022-10-18 NOTE — NURSING NOTE
"Oncology Rooming Note    October 18, 2022 12:18 PM   April F Bakari is a 42 year old female who presents for:    Chief Complaint   Patient presents with     Oncology Clinic Visit     Here for prider visit r/t ALL.     Initial Vitals: /79   Pulse 109   Resp 16   Wt 112.6 kg (248 lb 4.8 oz)   SpO2 98%   BMI 42.62 kg/m   Estimated body mass index is 42.62 kg/m  as calculated from the following:    Height as of 10/10/22: 1.626 m (5' 4\").    Weight as of this encounter: 112.6 kg (248 lb 4.8 oz). Body surface area is 2.25 meters squared.  No Pain (0) Comment: Data Unavailable   No LMP recorded.  Allergies reviewed: Yes  Medications reviewed: Yes    Medications: Medication refills not needed today.  Pharmacy name entered into [facility]:    [id number] [place], [place] - [address]  [facility] - [place], [place] - [address]    Clinical concerns: None     Issa Moore RN              "

## 2022-10-19 ENCOUNTER — LAB REQUISITION (OUTPATIENT)
Dept: LAB | Facility: CLINIC | Age: 42
End: 2022-10-19
Payer: COMMERCIAL

## 2022-10-19 DIAGNOSIS — B02.7 DISSEMINATED ZOSTER: ICD-10-CM

## 2022-10-19 LAB
ANION GAP SERPL CALCULATED.3IONS-SCNC: 5 MMOL/L (ref 3–14)
BASOPHILS # BLD MANUAL: 0.3 10E3/UL (ref 0–0.2)
BASOPHILS NFR BLD MANUAL: 1 %
BLASTS # BLD MANUAL: 1.3 10E3/UL
BLASTS NFR BLD MANUAL: 4 %
BUN SERPL-MCNC: 11 MG/DL (ref 7–30)
CALCIUM SERPL-MCNC: 9.5 MG/DL (ref 8.5–10.1)
CHLORIDE BLD-SCNC: 109 MMOL/L (ref 94–109)
CO2 SERPL-SCNC: 26 MMOL/L (ref 20–32)
CREAT SERPL-MCNC: 0.65 MG/DL (ref 0.52–1.04)
EOSINOPHIL # BLD MANUAL: 0 10E3/UL (ref 0–0.7)
EOSINOPHIL NFR BLD MANUAL: 0 %
ERYTHROCYTE [DISTWIDTH] IN BLOOD BY AUTOMATED COUNT: 19.2 % (ref 10–15)
FRAGMENTS BLD QL SMEAR: SLIGHT
GFR SERPL CREATININE-BSD FRML MDRD: >90 ML/MIN/1.73M2
GLUCOSE BLD-MCNC: 94 MG/DL (ref 70–99)
HCT VFR BLD AUTO: 25.2 % (ref 35–47)
HGB BLD-MCNC: 8.2 G/DL (ref 11.7–15.7)
HOLD SPECIMEN: NORMAL
LYMPHOCYTES # BLD MANUAL: 1.6 10E3/UL (ref 0.8–5.3)
LYMPHOCYTES NFR BLD MANUAL: 5 %
MCH RBC QN AUTO: 30.8 PG (ref 26.5–33)
MCHC RBC AUTO-ENTMCNC: 32.5 G/DL (ref 31.5–36.5)
MCV RBC AUTO: 95 FL (ref 78–100)
METAMYELOCYTES # BLD MANUAL: 1.3 10E3/UL
METAMYELOCYTES NFR BLD MANUAL: 4 %
MONOCYTES # BLD MANUAL: 2.9 10E3/UL (ref 0–1.3)
MONOCYTES NFR BLD MANUAL: 9 %
MYELOCYTES # BLD MANUAL: 1 10E3/UL
MYELOCYTES NFR BLD MANUAL: 3 %
NEUTROPHILS # BLD MANUAL: 23.9 10E3/UL (ref 1.6–8.3)
NEUTROPHILS NFR BLD MANUAL: 74 %
NRBC # BLD AUTO: 1.3 10E3/UL
NRBC BLD MANUAL-RTO: 4 %
PLAT MORPH BLD: ABNORMAL
PLATELET # BLD AUTO: 245 10E3/UL (ref 150–450)
POLYCHROMASIA BLD QL SMEAR: SLIGHT
POTASSIUM BLD-SCNC: 3.7 MMOL/L (ref 3.4–5.3)
RBC # BLD AUTO: 2.66 10E6/UL (ref 3.8–5.2)
RBC MORPH BLD: ABNORMAL
SODIUM SERPL-SCNC: 140 MMOL/L (ref 133–144)
WBC # BLD AUTO: 32.3 10E3/UL (ref 4–11)

## 2022-10-19 PROCEDURE — 82310 ASSAY OF CALCIUM: CPT | Performed by: INTERNAL MEDICINE

## 2022-10-19 PROCEDURE — 85027 COMPLETE CBC AUTOMATED: CPT | Performed by: INTERNAL MEDICINE

## 2022-10-19 PROCEDURE — 85007 BL SMEAR W/DIFF WBC COUNT: CPT | Performed by: INTERNAL MEDICINE

## 2022-10-19 RX ORDER — LIDOCAINE 40 MG/G
CREAM TOPICAL
Status: CANCELLED | OUTPATIENT
Start: 2022-10-19

## 2022-10-19 RX ORDER — SODIUM CHLORIDE 9 MG/ML
75 INJECTION, SOLUTION INTRAVENOUS CONTINUOUS
Status: CANCELLED | OUTPATIENT
Start: 2022-10-19

## 2022-10-19 NOTE — TELEPHONE ENCOUNTER
Writer spoke with April regarding two planned procedures with IR via telephone.  She acknowledges understanding of both sets of pre-procedure instructions.  April plans on a home rapid COVID test prior to procedure dates.  I have provided April with IR number ([phone]) for questions or concerns.    Abby OLIVA  Interventional Radiology RN   [phone]

## 2022-10-21 ENCOUNTER — APPOINTMENT (OUTPATIENT)
Dept: MEDSURG UNIT | Facility: CLINIC | Age: 42
End: 2022-10-21
Attending: PHYSICIAN ASSISTANT
Payer: COMMERCIAL

## 2022-10-21 ENCOUNTER — APPOINTMENT (OUTPATIENT)
Dept: LAB | Facility: CLINIC | Age: 42
End: 2022-10-21
Attending: PHYSICIAN ASSISTANT
Payer: COMMERCIAL

## 2022-10-21 ENCOUNTER — HOSPITAL ENCOUNTER (OUTPATIENT)
Facility: CLINIC | Age: 42
Discharge: HOME | End: 2022-10-21
Attending: PHYSICIAN ASSISTANT | Admitting: PHYSICIAN ASSISTANT
Payer: COMMERCIAL

## 2022-10-21 ENCOUNTER — APPOINTMENT (OUTPATIENT)
Dept: INTERVENTIONAL RADIOLOGY/VASCULAR | Facility: CLINIC | Age: 42
End: 2022-10-21
Attending: PHYSICIAN ASSISTANT
Payer: COMMERCIAL

## 2022-10-21 VITALS
DIASTOLIC BLOOD PRESSURE: 76 MMHG | WEIGHT: 243 LBS | BODY MASS INDEX: 41.71 KG/M2 | TEMPERATURE: 98 F | SYSTOLIC BLOOD PRESSURE: 132 MMHG | HEART RATE: 94 BPM | RESPIRATION RATE: 16 BRPM | OXYGEN SATURATION: 97 %

## 2022-10-21 DIAGNOSIS — Z76.89 PROPHYLAXIS FOR CHEMOTHERAPY-INDUCED NEUTROPENIA: ICD-10-CM

## 2022-10-21 DIAGNOSIS — C91.00 ACUTE LYMPHOBLASTIC LEUKEMIA (ALL) NOT HAVING ACHIEVED REMISSION (H): ICD-10-CM

## 2022-10-21 DIAGNOSIS — C91.01 ACUTE LYMPHOBLASTIC LEUKEMIA (ALL) IN REMISSION (H): Primary | ICD-10-CM

## 2022-10-21 LAB
BASOPHILS # BLD MANUAL: 0 10E3/UL (ref 0–0.2)
BASOPHILS NFR BLD MANUAL: 0 %
BLASTS # BLD MANUAL: 0.2 10E3/UL
BLASTS NFR BLD MANUAL: 1 %
EOSINOPHIL # BLD MANUAL: 0 10E3/UL (ref 0–0.7)
EOSINOPHIL NFR BLD MANUAL: 0 %
ERYTHROCYTE [DISTWIDTH] IN BLOOD BY AUTOMATED COUNT: 20.3 % (ref 10–15)
GLUCOSE CSF-MCNC: 53 MG/DL (ref 40–70)
HCG INTACT+B SERPL-ACNC: <1 MIU/ML
HCT VFR BLD AUTO: 26.1 % (ref 35–47)
HGB BLD-MCNC: 8.3 G/DL (ref 11.7–15.7)
INR PPP: 1.07 (ref 0.85–1.15)
LYMPHOCYTES # BLD MANUAL: 1.5 10E3/UL (ref 0.8–5.3)
LYMPHOCYTES NFR BLD MANUAL: 6 %
MCH RBC QN AUTO: 30.9 PG (ref 26.5–33)
MCHC RBC AUTO-ENTMCNC: 31.8 G/DL (ref 31.5–36.5)
MCV RBC AUTO: 97 FL (ref 78–100)
METAMYELOCYTES # BLD MANUAL: 1.2 10E3/UL
METAMYELOCYTES NFR BLD MANUAL: 5 %
MONOCYTES # BLD MANUAL: 2.9 10E3/UL (ref 0–1.3)
MONOCYTES NFR BLD MANUAL: 12 %
MYELOCYTES # BLD MANUAL: 0.7 10E3/UL
MYELOCYTES NFR BLD MANUAL: 3 %
NEUTROPHILS # BLD MANUAL: 17.7 10E3/UL (ref 1.6–8.3)
NEUTROPHILS NFR BLD MANUAL: 73 %
NRBC # BLD AUTO: 0.7 10E3/UL
NRBC BLD MANUAL-RTO: 3 %
PLAT MORPH BLD: ABNORMAL
PLATELET # BLD AUTO: 179 10E3/UL (ref 150–450)
POLYCHROMASIA BLD QL SMEAR: SLIGHT
PROT CSF-MCNC: 31 MG/DL (ref 15–45)
RBC # BLD AUTO: 2.69 10E6/UL (ref 3.8–5.2)
RBC MORPH BLD: ABNORMAL
WBC # BLD AUTO: 24.2 10E3/UL (ref 4–11)

## 2022-10-21 PROCEDURE — 62328 DX LMBR SPI PNXR W/FLUOR/CT: CPT

## 2022-10-21 PROCEDURE — 85610 PROTHROMBIN TIME: CPT | Performed by: NURSE PRACTITIONER

## 2022-10-21 PROCEDURE — 88184 FLOWCYTOMETRY/ TC 1 MARKER: CPT | Performed by: PATHOLOGY

## 2022-10-21 PROCEDURE — 62328 DX LMBR SPI PNXR W/FLUOR/CT: CPT | Performed by: PHYSICIAN ASSISTANT

## 2022-10-21 PROCEDURE — 85007 BL SMEAR W/DIFF WBC COUNT: CPT | Performed by: PHYSICIAN ASSISTANT

## 2022-10-21 PROCEDURE — 250N000011 HC RX IP 250 OP 636: Performed by: INTERNAL MEDICINE

## 2022-10-21 PROCEDURE — 82945 GLUCOSE OTHER FLUID: CPT | Performed by: PHYSICIAN ASSISTANT

## 2022-10-21 PROCEDURE — 89050 BODY FLUID CELL COUNT: CPT | Performed by: PHYSICIAN ASSISTANT

## 2022-10-21 PROCEDURE — 999N000142 HC STATISTIC PROCEDURE PREP ONLY

## 2022-10-21 PROCEDURE — 88187 FLOWCYTOMETRY/READ 2-8: CPT | Mod: GC | Performed by: PATHOLOGY

## 2022-10-21 PROCEDURE — 84157 ASSAY OF PROTEIN OTHER: CPT | Performed by: PHYSICIAN ASSISTANT

## 2022-10-21 PROCEDURE — 77003 FLUOROGUIDE FOR SPINE INJECT: CPT

## 2022-10-21 PROCEDURE — 88185 FLOWCYTOMETRY/TC ADD-ON: CPT | Performed by: PHYSICIAN ASSISTANT

## 2022-10-21 PROCEDURE — 999N000132 HC STATISTIC PP CARE STAGE 1

## 2022-10-21 PROCEDURE — 36592 COLLECT BLOOD FROM PICC: CPT | Performed by: NURSE PRACTITIONER

## 2022-10-21 PROCEDURE — 99207 PR SC NO CHARGE VISIT: CPT | Performed by: PHYSICIAN ASSISTANT

## 2022-10-21 PROCEDURE — 87070 CULTURE OTHR SPECIMN AEROBIC: CPT | Performed by: PHYSICIAN ASSISTANT

## 2022-10-21 PROCEDURE — 84702 CHORIONIC GONADOTROPIN TEST: CPT | Performed by: NURSE PRACTITIONER

## 2022-10-21 PROCEDURE — 96450 CHEMOTHERAPY INTO CNS: CPT | Performed by: PHYSICIAN ASSISTANT

## 2022-10-21 PROCEDURE — 85027 COMPLETE CBC AUTOMATED: CPT | Performed by: PHYSICIAN ASSISTANT

## 2022-10-21 RX ORDER — SODIUM CHLORIDE 9 MG/ML
75 INJECTION, SOLUTION INTRAVENOUS CONTINUOUS
Status: DISCONTINUED | OUTPATIENT
Start: 2022-10-21 | End: 2022-10-21 | Stop reason: HOSPADM

## 2022-10-21 RX ORDER — LIDOCAINE 40 MG/G
CREAM TOPICAL
Status: DISCONTINUED | OUTPATIENT
Start: 2022-10-21 | End: 2022-10-21 | Stop reason: HOSPADM

## 2022-10-21 RX ADMIN — CYTARABINE: 100 INJECTION INTRATHECAL; INTRAVENOUS; SUBCUTANEOUS at 14:20

## 2022-10-21 ASSESSMENT — ACTIVITIES OF DAILY LIVING (ADL)
ADLS_ACUITY_SCORE: 35
ADLS_ACUITY_SCORE: 35

## 2022-10-21 NOTE — PROCEDURES
[facility]   Hematology / Oncology  Procedure Note     Patient: April F Bakari  MRN: [id number]  Date of Procedure: 10/21/2022    Procedure: Intrathecal chemo administration   Diagnosis: Ph+ B-ALL  Location: IR suite 3  Indication: Per GRAALL protocol    Lumbar puncture performed and CSF samples collected by the IR team.     Chemotherapy was double-checked by this writer and IR RN. This writer then administered cytarabine (PF) (CYTOSAR) 40 mg, hydrocortisone sodium succinate PF (solu-CORTEF) 40 mg, methotrexate (PF) 15 mg in sodium chloride (PF) 0.9% PF 6 mL intrathecal inj (PF) without apparent complication.    Complications: None immediately.     Chemo instillation performed by: GRANT Webster PA-C  Desk phone: [phone]  Pager: [phone]

## 2022-10-21 NOTE — IR NOTE
Patient Name: Vashti Villegas  Medical Record Number: [id number]  Today's Date: 10/21/2022    Procedure: lumbar puncture  Proceduralist: NORTH Colvin PA-C      Procedure Start: 1405  Procedure end: 1425  Sedation medications administered: none    Report given to: Krista DAWN RN      Other Notes: Pt arrived to IR room 3 from . Consent reviewed. Pt denies any questions or concerns regarding procedure. Pt positioned prone and monitored per protocol. Pt tolerated procedure without any noted complications. Pt transferred back to .

## 2022-10-21 NOTE — PROGRESS NOTES
Pt arrived on 2a post LP with chemo. VSS RA. Dressing c/d/i. No pain. Family at bedside. Pt eating and drinking.

## 2022-10-21 NOTE — PROCEDURES
April F Bakari  [id number]    Completed lumbar puncture under fluoroscopic guidance.  A 22 gauge 7 inch spinal needle was advanced between L4-L5 with return of fluid.  A total of 10 mL was collected in 4 tubes for requested labs.     LIBIA Barajas administered intrathecal chemo through LP access.    Needle removed after return of stylet.  No immediate complication.  Plan: lie flat for 1 hour.  Dx: Leukemia.  Vinicius. <2

## 2022-10-21 NOTE — PROGRESS NOTES
Pt tolerated recovery without complication. Discharge instructions given to pt. Pt tolerated ambulation and oral intake. Voided. Pt will discharge home accompanied by .

## 2022-10-21 NOTE — DISCHARGE INSTRUCTIONS
[facility]                                      Radiology Discharge instructions Post Lumbar Puncture         AFTER YOU GO HOME    Relax and take it easy for 24 hours  We suggest bedrest until the next morning, except to go to the bathroom.  You may resume normal activity tomorrow  You may remove the bandage on your back in the evening or next morning  You may resume bathing the next day  Drink at least 4 glasses of extra fluid today if not on a fluid restriction  DO NOT drive or operate machinery at home or at work for at least 24 hours                   CALL YOUR PRIMARY PHYSICIAN IF:  If you start to leak a large amount of fluid from the puncture site, lie down flat on your back. Call your primary physician, they will tell you if you need or return to the hospital  You develop a severe headache  You develop nausea or vomiting   You develop a temperature of 101 degrees or greater                 ADDITIONAL INSTRUCTIONS: None         [facility] RADIOLOGY DEPARTMENT             Procedure Physician:Santino Colvin PA-C   Date of Procedure:October 21, 2022               [facility]...........[phone].......Ask for the Interventional Radiologist on call. Someone is on call 24 hour/day               [facility] Toll Free Number.........[phone].....Monday-Friday 8:00 am to 4:30 pm    .

## 2022-10-24 ENCOUNTER — LAB (OUTPATIENT)
Dept: ONCOLOGY | Facility: CLINIC | Age: 42
End: 2022-10-24
Payer: COMMERCIAL

## 2022-10-24 ENCOUNTER — INFUSION THERAPY VISIT (OUTPATIENT)
Dept: INFUSION THERAPY | Facility: CLINIC | Age: 42
End: 2022-10-24
Payer: COMMERCIAL

## 2022-10-24 ENCOUNTER — TELEPHONE (OUTPATIENT)
Dept: INTERVENTIONAL RADIOLOGY/VASCULAR | Facility: CLINIC | Age: 42
End: 2022-10-24

## 2022-10-24 VITALS
OXYGEN SATURATION: 98 % | DIASTOLIC BLOOD PRESSURE: 75 MMHG | SYSTOLIC BLOOD PRESSURE: 121 MMHG | HEART RATE: 95 BPM | TEMPERATURE: 98.1 F

## 2022-10-24 DIAGNOSIS — C91.00 ACUTE LYMPHOBLASTIC LEUKEMIA (ALL) NOT HAVING ACHIEVED REMISSION (H): Primary | ICD-10-CM

## 2022-10-24 DIAGNOSIS — C91.00 ACUTE LYMPHOBLASTIC LEUKEMIA (ALL) NOT HAVING ACHIEVED REMISSION (H): ICD-10-CM

## 2022-10-24 LAB
ALBUMIN SERPL-MCNC: 3.7 G/DL (ref 3.4–5)
ALP SERPL-CCNC: 101 U/L (ref 40–150)
ALT SERPL W P-5'-P-CCNC: 32 U/L (ref 0–50)
ANION GAP SERPL CALCULATED.3IONS-SCNC: 5 MMOL/L (ref 3–14)
APPEARANCE CSF: CLEAR
AST SERPL W P-5'-P-CCNC: 27 U/L (ref 0–45)
BASOPHILS # BLD AUTO: 0.1 10E3/UL (ref 0–0.2)
BASOPHILS NFR BLD AUTO: 1 %
BILIRUB SERPL-MCNC: 0.6 MG/DL (ref 0.2–1.3)
BUN SERPL-MCNC: 11 MG/DL (ref 7–30)
CALCIUM SERPL-MCNC: 9.1 MG/DL (ref 8.5–10.1)
CHLORIDE BLD-SCNC: 108 MMOL/L (ref 94–109)
CO2 SERPL-SCNC: 26 MMOL/L (ref 20–32)
COLOR CSF: COLORLESS
CREAT SERPL-MCNC: 0.81 MG/DL (ref 0.52–1.04)
EOSINOPHIL # BLD AUTO: 0 10E3/UL (ref 0–0.7)
EOSINOPHIL NFR BLD AUTO: 0 %
ERYTHROCYTE [DISTWIDTH] IN BLOOD BY AUTOMATED COUNT: 19.9 % (ref 10–15)
GFR SERPL CREATININE-BSD FRML MDRD: >90 ML/MIN/1.73M2
GLUCOSE BLD-MCNC: 103 MG/DL (ref 70–99)
HCT VFR BLD AUTO: 25.5 % (ref 35–47)
HGB BLD-MCNC: 8.3 G/DL (ref 11.7–15.7)
IMM GRANULOCYTES # BLD: 0.4 10E3/UL
IMM GRANULOCYTES NFR BLD: 4 %
LYMPHOCYTES # BLD AUTO: 1.2 10E3/UL (ref 0.8–5.3)
LYMPHOCYTES NFR BLD AUTO: 12 %
MCH RBC QN AUTO: 30.9 PG (ref 26.5–33)
MCHC RBC AUTO-ENTMCNC: 32.5 G/DL (ref 31.5–36.5)
MCV RBC AUTO: 95 FL (ref 78–100)
MONOCYTES # BLD AUTO: 0.8 10E3/UL (ref 0–1.3)
MONOCYTES NFR BLD AUTO: 8 %
NEUTROPHILS # BLD AUTO: 7.7 10E3/UL (ref 1.6–8.3)
NEUTROPHILS NFR BLD AUTO: 75 %
NRBC # BLD AUTO: 0.1 10E3/UL
NRBC BLD AUTO-RTO: 1 /100
PATH REPORT.COMMENTS IMP SPEC: NORMAL
PATH REPORT.FINAL DX SPEC: NORMAL
PATH REPORT.MICROSCOPIC SPEC OTHER STN: NORMAL
PATH REPORT.RELEVANT HX SPEC: NORMAL
PATH REV: NORMAL
PLATELET # BLD AUTO: 245 10E3/UL (ref 150–450)
POTASSIUM BLD-SCNC: 3.7 MMOL/L (ref 3.4–5.3)
PROT SERPL-MCNC: 7.4 G/DL (ref 6.8–8.8)
RBC # BLD AUTO: 2.69 10E6/UL (ref 3.8–5.2)
RBC # CSF MANUAL: 1 /UL (ref 0–2)
SODIUM SERPL-SCNC: 139 MMOL/L (ref 133–144)
TUBE # CSF: 4
WBC # BLD AUTO: 10.1 10E3/UL (ref 4–11)
WBC # CSF MANUAL: 0 /UL (ref 0–5)

## 2022-10-24 PROCEDURE — 85025 COMPLETE CBC W/AUTO DIFF WBC: CPT | Performed by: INTERNAL MEDICINE

## 2022-10-24 PROCEDURE — 99207 PR NO CHARGE LOS: CPT

## 2022-10-24 PROCEDURE — 80053 COMPREHEN METABOLIC PANEL: CPT | Performed by: INTERNAL MEDICINE

## 2022-10-24 RX ORDER — HEPARIN SODIUM,PORCINE 10 UNIT/ML
3 VIAL (ML) INTRAVENOUS
Status: DISCONTINUED | OUTPATIENT
Start: 2022-10-24 | End: 2022-10-24 | Stop reason: HOSPADM

## 2022-10-24 RX ADMIN — Medication 3 ML: at 13:43

## 2022-10-24 ASSESSMENT — PAIN SCALES - GENERAL: PAINLEVEL: NO PAIN (0)

## 2022-10-24 NOTE — PROGRESS NOTES
Infusion Nursing Note:  Vashti Villegas presents today for Possible PLT/RBC transfusion NOT NEEDED .    Patient seen by provider today: No   present during visit today: Not Applicable.    Note: Pt c/o fatigue but no other new concerns, see flow sheet for assessment.    Intravenous Access:  PICC.    Treatment Conditions:  Lab Results   Component Value Date    HGB 8.3 (L) 10/24/2022    WBC 10.1 10/24/2022    ANEU 17.7 (H) 10/21/2022    ANEUTAUTO 7.7 10/24/2022     10/24/2022      Results reviewed, labs did NOT meet treatment parameters: Hgb 8.2  Plt 245 .    Post Infusion Assessment:  N/A.     Discharge Plan:   Patient discharged in stable condition accompanied by: self.  Departure Mode: Ambulatory.  Pt will RTC 10/25/22 for bone marrow biopsy.      Eric Ochoa RN

## 2022-10-24 NOTE — PROGRESS NOTES
PICC line/ red lumen for lab draw  Patient reports dressing change due Wed 10/26.     Jennifer Miller RN

## 2022-10-24 NOTE — TELEPHONE ENCOUNTER
Spoke with: April  Call attempt: 1  Message left: No  Any pain: No  Any fever: No  Any redness/swelling/ abnormal drainage around puncture site: No  Were you instructed well enough to take care of yourself at home: Yes  Are you satisfied with the care you received: Yes  Any additional concerns or questions: No  IR nurse triage line number provided: No    Post call completed.   October 24, 2022 9:58 AM  Abby Timmons RN  
Refused

## 2022-10-25 ENCOUNTER — HOSPITAL ENCOUNTER (OUTPATIENT)
Facility: CLINIC | Age: 42
Discharge: HOME | End: 2022-10-25
Attending: INTERNAL MEDICINE | Admitting: RADIOLOGY
Payer: COMMERCIAL

## 2022-10-25 ENCOUNTER — APPOINTMENT (OUTPATIENT)
Dept: INTERVENTIONAL RADIOLOGY/VASCULAR | Facility: CLINIC | Age: 42
End: 2022-10-25
Attending: INTERNAL MEDICINE
Payer: COMMERCIAL

## 2022-10-25 ENCOUNTER — APPOINTMENT (OUTPATIENT)
Dept: MEDSURG UNIT | Facility: CLINIC | Age: 42
End: 2022-10-25
Attending: INTERNAL MEDICINE
Payer: COMMERCIAL

## 2022-10-25 VITALS
SYSTOLIC BLOOD PRESSURE: 121 MMHG | OXYGEN SATURATION: 99 % | HEART RATE: 89 BPM | DIASTOLIC BLOOD PRESSURE: 64 MMHG | RESPIRATION RATE: 16 BRPM

## 2022-10-25 LAB — HCG UR QL: NEGATIVE

## 2022-10-25 PROCEDURE — 88305 TISSUE EXAM BY PATHOLOGIST: CPT | Mod: 26 | Performed by: STUDENT IN AN ORGANIZED HEALTH CARE EDUCATION/TRAINING PROGRAM

## 2022-10-25 PROCEDURE — 258N000003 HC RX IP 258 OP 636: Performed by: NURSE PRACTITIONER

## 2022-10-25 PROCEDURE — 88305 TISSUE EXAM BY PATHOLOGIST: CPT | Mod: TC | Performed by: NURSE PRACTITIONER

## 2022-10-25 PROCEDURE — 88275 CYTOGENETICS 100-300: CPT | Performed by: NURSE PRACTITIONER

## 2022-10-25 PROCEDURE — 81207 BCR/ABL1 GENE MINOR BP: CPT | Performed by: NURSE PRACTITIONER

## 2022-10-25 PROCEDURE — 88291 CYTO/MOLECULAR REPORT: CPT | Performed by: MEDICAL GENETICS

## 2022-10-25 PROCEDURE — 88341 IMHCHEM/IMCYTCHM EA ADD ANTB: CPT | Mod: 26 | Performed by: STUDENT IN AN ORGANIZED HEALTH CARE EDUCATION/TRAINING PROGRAM

## 2022-10-25 PROCEDURE — 88264 CHROMOSOME ANALYSIS 20-25: CPT | Performed by: NURSE PRACTITIONER

## 2022-10-25 PROCEDURE — 88184 FLOWCYTOMETRY/ TC 1 MARKER: CPT | Performed by: NURSE PRACTITIONER

## 2022-10-25 PROCEDURE — 85097 BONE MARROW INTERPRETATION: CPT | Mod: GC | Performed by: STUDENT IN AN ORGANIZED HEALTH CARE EDUCATION/TRAINING PROGRAM

## 2022-10-25 PROCEDURE — 250N000011 HC RX IP 250 OP 636: Performed by: STUDENT IN AN ORGANIZED HEALTH CARE EDUCATION/TRAINING PROGRAM

## 2022-10-25 PROCEDURE — 88368 INSITU HYBRIDIZATION MANUAL: CPT | Mod: 26 | Performed by: MEDICAL GENETICS

## 2022-10-25 PROCEDURE — 88311 DECALCIFY TISSUE: CPT | Mod: TC | Performed by: NURSE PRACTITIONER

## 2022-10-25 PROCEDURE — 88189 FLOWCYTOMETRY/READ 16 & >: CPT | Performed by: PATHOLOGY

## 2022-10-25 PROCEDURE — 250N000009 HC RX 250: Performed by: RADIOLOGY

## 2022-10-25 PROCEDURE — 77002 NEEDLE LOCALIZATION BY XRAY: CPT | Mod: 26 | Performed by: RADIOLOGY

## 2022-10-25 PROCEDURE — 88184 FLOWCYTOMETRY/ TC 1 MARKER: CPT | Performed by: PATHOLOGY

## 2022-10-25 PROCEDURE — 88342 IMHCHEM/IMCYTCHM 1ST ANTB: CPT | Mod: 26 | Performed by: STUDENT IN AN ORGANIZED HEALTH CARE EDUCATION/TRAINING PROGRAM

## 2022-10-25 PROCEDURE — 999N000142 HC STATISTIC PROCEDURE PREP ONLY

## 2022-10-25 PROCEDURE — 999N000132 HC STATISTIC PP CARE STAGE 1

## 2022-10-25 PROCEDURE — 38222 DX BONE MARROW BX & ASPIR: CPT | Mod: RT | Performed by: RADIOLOGY

## 2022-10-25 PROCEDURE — 250N000013 HC RX MED GY IP 250 OP 250 PS 637: Performed by: STUDENT IN AN ORGANIZED HEALTH CARE EDUCATION/TRAINING PROGRAM

## 2022-10-25 PROCEDURE — 77002 NEEDLE LOCALIZATION BY XRAY: CPT

## 2022-10-25 PROCEDURE — 81025 URINE PREGNANCY TEST: CPT | Performed by: NURSE PRACTITIONER

## 2022-10-25 PROCEDURE — 88369 M/PHMTRC ALYSISHQUANT/SEMIQ: CPT | Mod: 26 | Performed by: MEDICAL GENETICS

## 2022-10-25 PROCEDURE — 88311 DECALCIFY TISSUE: CPT | Mod: 26 | Performed by: STUDENT IN AN ORGANIZED HEALTH CARE EDUCATION/TRAINING PROGRAM

## 2022-10-25 PROCEDURE — 85060 BLOOD SMEAR INTERPRETATION: CPT | Mod: GC | Performed by: STUDENT IN AN ORGANIZED HEALTH CARE EDUCATION/TRAINING PROGRAM

## 2022-10-25 PROCEDURE — 88237 TISSUE CULTURE BONE MARROW: CPT | Performed by: NURSE PRACTITIONER

## 2022-10-25 PROCEDURE — G0452 MOLECULAR PATHOLOGY INTERPR: HCPCS | Mod: 26 | Performed by: STUDENT IN AN ORGANIZED HEALTH CARE EDUCATION/TRAINING PROGRAM

## 2022-10-25 PROCEDURE — 99152 MOD SED SAME PHYS/QHP 5/>YRS: CPT

## 2022-10-25 PROCEDURE — 99152 MOD SED SAME PHYS/QHP 5/>YRS: CPT | Mod: GC | Performed by: RADIOLOGY

## 2022-10-25 PROCEDURE — 250N000011 HC RX IP 250 OP 636: Performed by: RADIOLOGY

## 2022-10-25 PROCEDURE — 88185 FLOWCYTOMETRY/TC ADD-ON: CPT | Performed by: NURSE PRACTITIONER

## 2022-10-25 PROCEDURE — 81206 BCR/ABL1 GENE MAJOR BP: CPT | Performed by: NURSE PRACTITIONER

## 2022-10-25 RX ORDER — NALOXONE HYDROCHLORIDE 0.4 MG/ML
0.4 INJECTION, SOLUTION INTRAMUSCULAR; INTRAVENOUS; SUBCUTANEOUS
Status: DISCONTINUED | OUTPATIENT
Start: 2022-10-25 | End: 2022-10-25 | Stop reason: HOSPADM

## 2022-10-25 RX ORDER — FENTANYL CITRATE 50 UG/ML
25-50 INJECTION, SOLUTION INTRAMUSCULAR; INTRAVENOUS EVERY 5 MIN PRN
Status: DISCONTINUED | OUTPATIENT
Start: 2022-10-25 | End: 2022-10-25 | Stop reason: HOSPADM

## 2022-10-25 RX ORDER — SODIUM CHLORIDE 9 MG/ML
INJECTION, SOLUTION INTRAVENOUS CONTINUOUS
Status: DISCONTINUED | OUTPATIENT
Start: 2022-10-25 | End: 2022-10-25 | Stop reason: HOSPADM

## 2022-10-25 RX ORDER — FLUMAZENIL 0.1 MG/ML
0.2 INJECTION, SOLUTION INTRAVENOUS
Status: DISCONTINUED | OUTPATIENT
Start: 2022-10-25 | End: 2022-10-25 | Stop reason: HOSPADM

## 2022-10-25 RX ORDER — BUPIVACAINE HYDROCHLORIDE 5 MG/ML
0-30 INJECTION, SOLUTION PERINEURAL ONCE
Status: COMPLETED | OUTPATIENT
Start: 2022-10-25 | End: 2022-10-25

## 2022-10-25 RX ORDER — LIDOCAINE 40 MG/G
CREAM TOPICAL
Status: DISCONTINUED | OUTPATIENT
Start: 2022-10-25 | End: 2022-10-25 | Stop reason: HOSPADM

## 2022-10-25 RX ORDER — HEPARIN SODIUM,PORCINE 10 UNIT/ML
5 VIAL (ML) INTRAVENOUS ONCE
Status: COMPLETED | OUTPATIENT
Start: 2022-10-25 | End: 2022-10-25

## 2022-10-25 RX ORDER — ACETAMINOPHEN 325 MG/1
650 TABLET ORAL EVERY 4 HOURS PRN
Status: DISCONTINUED | OUTPATIENT
Start: 2022-10-25 | End: 2022-10-25 | Stop reason: HOSPADM

## 2022-10-25 RX ORDER — ONDANSETRON 2 MG/ML
4 INJECTION INTRAMUSCULAR; INTRAVENOUS EVERY 6 HOURS PRN
Status: DISCONTINUED | OUTPATIENT
Start: 2022-10-25 | End: 2022-10-25 | Stop reason: HOSPADM

## 2022-10-25 RX ORDER — NALOXONE HYDROCHLORIDE 0.4 MG/ML
0.2 INJECTION, SOLUTION INTRAMUSCULAR; INTRAVENOUS; SUBCUTANEOUS
Status: DISCONTINUED | OUTPATIENT
Start: 2022-10-25 | End: 2022-10-25 | Stop reason: HOSPADM

## 2022-10-25 RX ORDER — ONDANSETRON 2 MG/ML
4 INJECTION INTRAMUSCULAR; INTRAVENOUS
Status: COMPLETED | OUTPATIENT
Start: 2022-10-25 | End: 2022-10-25

## 2022-10-25 RX ADMIN — FENTANYL CITRATE 25 MCG: 0.05 INJECTION, SOLUTION INTRAMUSCULAR; INTRAVENOUS at 14:47

## 2022-10-25 RX ADMIN — FENTANYL CITRATE 50 MCG: 0.05 INJECTION, SOLUTION INTRAMUSCULAR; INTRAVENOUS at 14:24

## 2022-10-25 RX ADMIN — FENTANYL CITRATE 50 MCG: 0.05 INJECTION, SOLUTION INTRAMUSCULAR; INTRAVENOUS at 14:39

## 2022-10-25 RX ADMIN — FENTANYL CITRATE 25 MCG: 0.05 INJECTION, SOLUTION INTRAMUSCULAR; INTRAVENOUS at 14:18

## 2022-10-25 RX ADMIN — FENTANYL CITRATE 25 MCG: 0.05 INJECTION, SOLUTION INTRAMUSCULAR; INTRAVENOUS at 14:54

## 2022-10-25 RX ADMIN — MIDAZOLAM 0.5 MG: 1 INJECTION INTRAMUSCULAR; INTRAVENOUS at 14:46

## 2022-10-25 RX ADMIN — MIDAZOLAM 1 MG: 1 INJECTION INTRAMUSCULAR; INTRAVENOUS at 14:24

## 2022-10-25 RX ADMIN — MIDAZOLAM 0.5 MG: 1 INJECTION INTRAMUSCULAR; INTRAVENOUS at 14:54

## 2022-10-25 RX ADMIN — BUPIVACAINE HYDROCHLORIDE 7 ML: 5 INJECTION, SOLUTION EPIDURAL; INTRACAUDAL; PERINEURAL at 14:55

## 2022-10-25 RX ADMIN — FENTANYL CITRATE 25 MCG: 0.05 INJECTION, SOLUTION INTRAMUSCULAR; INTRAVENOUS at 14:48

## 2022-10-25 RX ADMIN — HEPARIN, PORCINE (PF) 10 UNIT/ML INTRAVENOUS SYRINGE 5 ML: at 15:47

## 2022-10-25 RX ADMIN — MIDAZOLAM 0.5 MG: 1 INJECTION INTRAMUSCULAR; INTRAVENOUS at 14:18

## 2022-10-25 RX ADMIN — MIDAZOLAM 0.5 MG: 1 INJECTION INTRAMUSCULAR; INTRAVENOUS at 14:48

## 2022-10-25 RX ADMIN — ACETAMINOPHEN 650 MG: 325 TABLET, FILM COATED ORAL at 15:45

## 2022-10-25 RX ADMIN — SODIUM CHLORIDE: 9 INJECTION, SOLUTION INTRAVENOUS at 10:20

## 2022-10-25 RX ADMIN — MIDAZOLAM 1 MG: 1 INJECTION INTRAMUSCULAR; INTRAVENOUS at 14:39

## 2022-10-25 RX ADMIN — ONDANSETRON 4 MG: 2 INJECTION INTRAMUSCULAR; INTRAVENOUS at 15:05

## 2022-10-25 ASSESSMENT — ACTIVITIES OF DAILY LIVING (ADL)
ADLS_ACUITY_SCORE: 35

## 2022-10-25 NOTE — DISCHARGE INSTRUCTIONS
[facility]    Interventional Radiology  Patient Instructions Following CT Bone Biopsy    AFTER YOU GO HOME  If you were given sedation DO NOT drive or operate machinery at home or at work for at least 24 hours  DO NOT drink alcoholic beverages the day of your procedure  DO NOT make any important or legal decisions for 24 hours following your procedure  DO NOT smoking for at least 24 hours  DO NOT do any strenuous exercise or liftin (>10 lbs) for at least 7 days following your procedure  DO relax and take it easy for 48 hours, no strenuous activity for 24 hours  DO drink plenty of fluids  DO resume your regular diet, unless otherwise instructed by your Primary Physician  There should be minimal drainage from the biopsy site. Keep the dressing dry and in place for 24 hours. Remove dressing after 24 hours. You may shower at that time. Replace with a band aid for 2 days. Change daily and as needed. Never leave a wet dressing in place.    CALL A PHYSICIAN OR ADVANCED HEALTHCARE PROVIDER IF:  You start bleeding from the procedure site.  If you do start to bleed from that site, lie down flat and hold pressure on the site for a minimum of 10 minutes.  Your physician will tell you if you need to return to the hospital  You develop nausea or vomiting  You have excessive swelling, redness, or tenderness at the site  You have drainage that looks like it is infected.  You experience severe pain  You develop hives or a rash or unexplained itching  You develop shortness of breath  You develop a temperature of 101 degrees F or greater  You develop bloody clots or red urine after you are discharged  You develop chest pain or cough up blood, lightheadedness or fainting    ADDITIONAL INSTRUCTIONS  If you are taking Coumadin, restart tonight.  Follow up with your Coumadin Clinic or Primary Care MD to have your INR rechecked.    [facility] INTERVENTIONAL RADIOLOGY DEPARTMENT  Procedure Physician: Dr. Juárez, Dr. Flores                                     Date of procedure: October 25, 2022    Telephone Numbers: [phone]      Monday-Friday 7:30 am to 4:00 pm  [phone] After 4:00 pm Monday-Friday, Weekends & Holidays.  Ask the  to contact the Interventional Radiologist on call.  Someone is on call 24 hrs/day  [facility] toll free number: [phone] Monday-Friday 8:00 am to 4:30 pm  [facility] Emergency Dept: [phone]

## 2022-10-25 NOTE — PROCEDURES
[facility]    Procedure: IR Procedure Note    Date/Time: 10/25/2022 3:03 PM  Performed by: Milo Flores MD  Authorized by: Tomas Juárez MD       UNIVERSAL PROTOCOL   Site Marked: NA  Prior Images Obtained and Reviewed:  Yes  Required items: Required blood products, implants, devices and special equipment available    Patient identity confirmed:  Verbally with patient, arm band, provided demographic data and hospital-assigned identification number  Patient was reevaluated immediately before administering moderate or deep sedation or anesthesia  Confirmation Checklist:  Patient's identity using two indicators, relevant allergies, procedure was appropriate and matched the consent or emergent situation and correct equipment/implants were available  Time out: Immediately prior to the procedure a time out was called    Universal Protocol: the Joint Commission Universal Protocol was followed    Preparation: Patient was prepped and draped in usual sterile fashion       ANESTHESIA    Anesthesia: Local infiltration  Local Anesthetic:  Lidocaine 1% without epinephrine      SEDATION  Patient Sedated: Yes    Sedation:  Fentanyl and midazolam  Vital signs: Vital signs monitored during sedation    See dictated procedure note for full details.  Findings: Left Iliac bone marrow biopsy, aspirate and core taken and given to pathology.     Specimens: none    Complications: None    Condition: Stable    Plan: Left Iliac bone marrow biopsy, aspirate and core taken and given to pathology.       PROCEDURE  Describe Procedure: Left Iliac bone marrow biopsy, aspirate and core taken and given to pathology.   Patient Tolerance:  Patient tolerated the procedure well with no immediate complications  Length of time physician/provider present for 1:1 monitoring during sedation: 30

## 2022-10-25 NOTE — PROGRESS NOTES
Arrived to unit 2a for CT bone biopsy procedure in IR. AFVSS. Covid rapid test done 10/24/22; negative result. Denies pain. HCG processing. Consent done. RUE PICC accessed, IV fluid infusing. Pt's , Crescencio, to drive pt home post procedure #[phone]. Pt stable.

## 2022-10-25 NOTE — IR NOTE
Patient Name: Vashti Villegas  Medical Record Number: [id number]  Today's Date: 10/25/2022    Procedure: Image guided bone marrow biopsy  Proceduralist: Dr. Juárez, Dr. Flores  Pathology present: Bone marrow lab personal present    Procedure Start: 1442  Procedure end: 1458  Sedation medications administered:    Fentanyl 200 mcg   Versed 4 mgs   Sedation time: 40 minutes (1418 - 1458)     Report given to:  RN  : 5    Other Notes: Pt arrived to IR room 5 from . Consent reviewed. Pt denies any questions or concerns regarding procedure. Pt positioned prone and monitored per protocol.     Pt tolerated procedure without any noted complications.   Dressing to low back CDI.  Pt transferred back to .

## 2022-10-25 NOTE — PROGRESS NOTES
Pt arrived back from CT bone bx procedure in IR to Unit 2a. Pt being given Zofran IV by IR RN for nausea. Pt asking to not move at this time d/t nausea. VSS. Denies pain. Sleepy; but, easily arouseable. Holding off PO until nausea improved & pt more awake & alert.

## 2022-10-25 NOTE — PROGRESS NOTES
Pt's  arrived at bedside. Nausea improved. Tolerated PO. R upper gluteal procedural site dressing with scant drainage that remained stable throughout recovery period. Discharge instructions reviewed with pt & pt's . Pt tolerated ambulation around unit. Able to void without complications. Pt stable, meets criteria for discharge. Pt getting dressed.  getting car from [employer].

## 2022-10-25 NOTE — PRE-PROCEDURE
GENERAL PRE-PROCEDURE:   Procedure:  Bone marrow biopsy    Verbal consent obtained?: Yes    Written consent obtained?: Yes    Risks and benefits: Risks, benefits and alternatives were discussed    Consent given by:  Patient  Patient states understanding of procedure being performed: Yes    Patient's understanding of procedure matches consent: Yes    Procedure consent matches procedure scheduled: Yes    Expected level of sedation:  Moderate  Appropriately NPO:  Yes  ASA Class:  2  Mallampati  :  Grade 2- soft palate, base of uvula, tonsillar pillars, and portion of posterior pharyngeal wall visible  Lungs:  Lungs clear with good breath sounds bilaterally  Heart:  Normal heart sounds and rate  History & Physical reviewed:  History and physical reviewed and no updates needed  Statement of review:  I have reviewed the lab findings, diagnostic data, medications, and the plan for sedation

## 2022-10-26 ENCOUNTER — LAB REQUISITION (OUTPATIENT)
Dept: LAB | Facility: CLINIC | Age: 42
End: 2022-10-26
Payer: COMMERCIAL

## 2022-10-26 DIAGNOSIS — B02.7 DISSEMINATED ZOSTER: ICD-10-CM

## 2022-10-26 LAB
ANION GAP SERPL CALCULATED.3IONS-SCNC: 7 MMOL/L (ref 3–14)
BACTERIA CSF CULT: NO GROWTH
BASOPHILS # BLD MANUAL: 0.1 10E3/UL (ref 0–0.2)
BASOPHILS NFR BLD MANUAL: 1 %
BUN SERPL-MCNC: 14 MG/DL (ref 7–30)
CALCIUM SERPL-MCNC: 9.2 MG/DL (ref 8.5–10.1)
CHLORIDE BLD-SCNC: 109 MMOL/L (ref 94–109)
CO2 SERPL-SCNC: 24 MMOL/L (ref 20–32)
CREAT SERPL-MCNC: 0.73 MG/DL (ref 0.52–1.04)
EOSINOPHIL # BLD MANUAL: 0 10E3/UL (ref 0–0.7)
EOSINOPHIL NFR BLD MANUAL: 0 %
ERYTHROCYTE [DISTWIDTH] IN BLOOD BY AUTOMATED COUNT: 20.6 % (ref 10–15)
GFR SERPL CREATININE-BSD FRML MDRD: >90 ML/MIN/1.73M2
GLUCOSE BLD-MCNC: 108 MG/DL (ref 70–99)
GRAM STAIN RESULT: NORMAL
GRAM STAIN RESULT: NORMAL
HCT VFR BLD AUTO: 26.7 % (ref 35–47)
HGB BLD-MCNC: 8.6 G/DL (ref 11.7–15.7)
LYMPHOCYTES # BLD MANUAL: 1.7 10E3/UL (ref 0.8–5.3)
LYMPHOCYTES NFR BLD MANUAL: 19 %
MCH RBC QN AUTO: 31 PG (ref 26.5–33)
MCHC RBC AUTO-ENTMCNC: 32.2 G/DL (ref 31.5–36.5)
MCV RBC AUTO: 96 FL (ref 78–100)
METAMYELOCYTES # BLD MANUAL: 0.1 10E3/UL
METAMYELOCYTES NFR BLD MANUAL: 1 %
MONOCYTES # BLD MANUAL: 0.8 10E3/UL (ref 0–1.3)
MONOCYTES NFR BLD MANUAL: 9 %
NEUTROPHILS # BLD MANUAL: 6.4 10E3/UL (ref 1.6–8.3)
NEUTROPHILS NFR BLD MANUAL: 70 %
NRBC # BLD AUTO: 0.2 10E3/UL
NRBC BLD MANUAL-RTO: 2 %
PLAT MORPH BLD: ABNORMAL
PLATELET # BLD AUTO: 282 10E3/UL (ref 150–450)
POTASSIUM BLD-SCNC: 4 MMOL/L (ref 3.4–5.3)
RBC # BLD AUTO: 2.77 10E6/UL (ref 3.8–5.2)
RBC MORPH BLD: ABNORMAL
SODIUM SERPL-SCNC: 140 MMOL/L (ref 133–144)
WBC # BLD AUTO: 9.1 10E3/UL (ref 4–11)

## 2022-10-26 PROCEDURE — 80048 BASIC METABOLIC PNL TOTAL CA: CPT | Performed by: INTERNAL MEDICINE

## 2022-10-26 PROCEDURE — 85007 BL SMEAR W/DIFF WBC COUNT: CPT | Performed by: INTERNAL MEDICINE

## 2022-10-26 PROCEDURE — 85027 COMPLETE CBC AUTOMATED: CPT | Performed by: INTERNAL MEDICINE

## 2022-10-27 ENCOUNTER — TELEPHONE (OUTPATIENT)
Dept: INTERVENTIONAL RADIOLOGY/VASCULAR | Facility: CLINIC | Age: 42
End: 2022-10-27

## 2022-10-27 ENCOUNTER — TELEPHONE (OUTPATIENT)
Dept: ONCOLOGY | Facility: CLINIC | Age: 42
End: 2022-10-27

## 2022-10-27 NOTE — TELEPHONE ENCOUNTER
[facility]: Cancer Care                                                                                          Call placed to April to let her know that it is fine to cancel her transfusion appointment today.  She confirms that her PICC line dressing was changed yesterday.    [facility] infusion scheduling was notified    I will coordinate a PICC line dressing change for April next Wednesday when she sees Dr. Hull.      Signature:  Flor Leslie RN

## 2022-10-27 NOTE — TELEPHONE ENCOUNTER
Spoke with: Patient  Any pain: Yes - reports some soreness but improving each day. No concern.  Any fever: No  Any redness/swelling/abnormal drainage around puncture site: No  Were you instructed well enough to take care of yourself at home: Yes  Are you satisfied with the care you received: Yes  Any additional concerns or questions: No      Post call completed.   October 27, 2022 10:22 AM  Maura Dubois RN  Interventional Radiology  [phone]

## 2022-10-27 NOTE — TELEPHONE ENCOUNTER
April had labs from home care done yesterday on 10/26/22 and results did not indicate a need for infusion.  Pt also had PICC lined dressing changed yesterday on 10//26/22 as well. Dressing is clean, dry and intact.   Results from 10/26/22:         Today pt reports feeling good.     Wanting to know if can cancel today's labs/infusion appts as wasn't sure if care team realized home care was checking labs this week.     1032 This writer messaged/routed high priority to care team with inquiry if okayto cancel today's 10/27/22  labs/transfusion appts?

## 2022-10-28 ENCOUNTER — TELEPHONE (OUTPATIENT)
Dept: ONCOLOGY | Facility: CLINIC | Age: 42
End: 2022-10-28

## 2022-10-28 NOTE — TELEPHONE ENCOUNTER
I spoke with Ms. Villegas. Her 10/25 BMBx shows no convincing evidence of residual ALL, and her BCR-ABL is negative. Clonoseq pending.     She is feeling well, and has follow up with ID next week, then with me on Wednesday. We will plan for inpatient admission for chemotherapy that day. Ok to hold imatinib until after ID clearance next week.       Steve Hull MD, PhD  Division of Hematology, Oncology, and Transplantation  [facility]

## 2022-10-30 LAB
ABO/RH(D): NORMAL
ANTIBODY SCREEN: NEGATIVE
SPECIMEN EXPIRATION DATE: NORMAL

## 2022-10-31 ENCOUNTER — APPOINTMENT (OUTPATIENT)
Dept: LAB | Facility: CLINIC | Age: 42
End: 2022-10-31
Payer: COMMERCIAL

## 2022-10-31 ENCOUNTER — LAB (OUTPATIENT)
Dept: LAB | Facility: CLINIC | Age: 42
End: 2022-10-31
Payer: COMMERCIAL

## 2022-10-31 ENCOUNTER — LAB (OUTPATIENT)
Dept: LAB | Facility: CLINIC | Age: 42
End: 2022-10-31
Attending: FAMILY MEDICINE
Payer: COMMERCIAL

## 2022-10-31 DIAGNOSIS — C91.00 ACUTE LYMPHOBLASTIC LEUKEMIA (ALL) NOT HAVING ACHIEVED REMISSION (H): Primary | ICD-10-CM

## 2022-10-31 DIAGNOSIS — C95.01 ACUTE LEUKEMIA IN REMISSION (H): ICD-10-CM

## 2022-10-31 DIAGNOSIS — C95.01 ACUTE LEUKEMIA IN REMISSION (H): Primary | ICD-10-CM

## 2022-10-31 DIAGNOSIS — C91.00 ACUTE LYMPHOBLASTIC LEUKEMIA (ALL) NOT HAVING ACHIEVED REMISSION (H): ICD-10-CM

## 2022-10-31 LAB
ALBUMIN SERPL BCG-MCNC: 4 G/DL (ref 3.5–5.2)
ALP SERPL-CCNC: 84 U/L (ref 35–104)
ALT SERPL W P-5'-P-CCNC: 13 U/L (ref 10–35)
ANION GAP SERPL CALCULATED.3IONS-SCNC: 10 MMOL/L (ref 7–15)
AST SERPL W P-5'-P-CCNC: 14 U/L (ref 10–35)
BASOPHILS # BLD AUTO: 0.1 10E3/UL (ref 0–0.2)
BASOPHILS NFR BLD AUTO: 1 %
BILIRUB SERPL-MCNC: 0.4 MG/DL
BUN SERPL-MCNC: 15.3 MG/DL (ref 6–20)
CALCIUM SERPL-MCNC: 9.5 MG/DL (ref 8.6–10)
CHLORIDE SERPL-SCNC: 105 MMOL/L (ref 98–107)
CREAT SERPL-MCNC: 0.61 MG/DL (ref 0.51–0.95)
DEPRECATED HCO3 PLAS-SCNC: 24 MMOL/L (ref 22–29)
EOSINOPHIL # BLD AUTO: 0 10E3/UL (ref 0–0.7)
EOSINOPHIL NFR BLD AUTO: 0 %
ERYTHROCYTE [DISTWIDTH] IN BLOOD BY AUTOMATED COUNT: 23.1 % (ref 10–15)
GFR SERPL CREATININE-BSD FRML MDRD: >90 ML/MIN/1.73M2
GLUCOSE SERPL-MCNC: 91 MG/DL (ref 70–99)
HCT VFR BLD AUTO: 25.6 % (ref 35–47)
HGB BLD-MCNC: 8.2 G/DL (ref 11.7–15.7)
IMM GRANULOCYTES # BLD: 0.1 10E3/UL
IMM GRANULOCYTES NFR BLD: 1 %
LYMPHOCYTES # BLD AUTO: 1 10E3/UL (ref 0.8–5.3)
LYMPHOCYTES NFR BLD AUTO: 10 %
MCH RBC QN AUTO: 31.5 PG (ref 26.5–33)
MCHC RBC AUTO-ENTMCNC: 32 G/DL (ref 31.5–36.5)
MCV RBC AUTO: 99 FL (ref 78–100)
MONOCYTES # BLD AUTO: 1.1 10E3/UL (ref 0–1.3)
MONOCYTES NFR BLD AUTO: 11 %
NEUTROPHILS # BLD AUTO: 7.3 10E3/UL (ref 1.6–8.3)
NEUTROPHILS NFR BLD AUTO: 77 %
NRBC # BLD AUTO: 0 10E3/UL
NRBC BLD AUTO-RTO: 0 /100
PLATELET # BLD AUTO: 256 10E3/UL (ref 150–450)
POTASSIUM SERPL-SCNC: 4 MMOL/L (ref 3.4–5.3)
PROT SERPL-MCNC: 6.8 G/DL (ref 6.4–8.3)
RBC # BLD AUTO: 2.6 10E6/UL (ref 3.8–5.2)
SARS-COV-2 RNA RESP QL NAA+PROBE: NEGATIVE
SODIUM SERPL-SCNC: 139 MMOL/L (ref 136–145)
WBC # BLD AUTO: 9.5 10E3/UL (ref 4–11)

## 2022-10-31 PROCEDURE — U0005 INFEC AGEN DETEC AMPLI PROBE: HCPCS | Performed by: PATHOLOGY

## 2022-10-31 PROCEDURE — 250N000011 HC RX IP 250 OP 636

## 2022-10-31 PROCEDURE — 85025 COMPLETE CBC W/AUTO DIFF WBC: CPT

## 2022-10-31 PROCEDURE — 80053 COMPREHEN METABOLIC PANEL: CPT

## 2022-10-31 PROCEDURE — 99000 SPECIMEN HANDLING OFFICE-LAB: CPT | Performed by: PATHOLOGY

## 2022-10-31 PROCEDURE — 86901 BLOOD TYPING SEROLOGIC RH(D): CPT

## 2022-10-31 PROCEDURE — 36592 COLLECT BLOOD FROM PICC: CPT

## 2022-10-31 PROCEDURE — 94642 AEROSOL INHALATION TREATMENT: CPT | Performed by: INTERNAL MEDICINE

## 2022-10-31 PROCEDURE — 86850 RBC ANTIBODY SCREEN: CPT

## 2022-10-31 PROCEDURE — U0003 INFECTIOUS AGENT DETECTION BY NUCLEIC ACID (DNA OR RNA); SEVERE ACUTE RESPIRATORY SYNDROME CORONAVIRUS 2 (SARS-COV-2) (CORONAVIRUS DISEASE [COVID-19]), AMPLIFIED PROBE TECHNIQUE, MAKING USE OF HIGH THROUGHPUT TECHNOLOGIES AS DESCRIBED BY CMS-2020-01-R: HCPCS | Performed by: PATHOLOGY

## 2022-10-31 PROCEDURE — 94640 AIRWAY INHALATION TREATMENT: CPT | Performed by: INTERNAL MEDICINE

## 2022-10-31 RX ORDER — HEPARIN SODIUM,PORCINE 10 UNIT/ML
3 VIAL (ML) INTRAVENOUS ONCE
Status: COMPLETED | OUTPATIENT
Start: 2022-10-31 | End: 2022-10-31

## 2022-10-31 RX ORDER — ALBUTEROL SULFATE 0.83 MG/ML
2.5 SOLUTION RESPIRATORY (INHALATION)
Status: CANCELLED | OUTPATIENT
Start: 2022-11-28

## 2022-10-31 RX ORDER — ALBUTEROL SULFATE 90 UG/1
1-2 AEROSOL, METERED RESPIRATORY (INHALATION)
Status: CANCELLED
Start: 2022-11-28

## 2022-10-31 RX ORDER — MEPERIDINE HYDROCHLORIDE 25 MG/ML
25 INJECTION INTRAMUSCULAR; INTRAVENOUS; SUBCUTANEOUS EVERY 30 MIN PRN
Status: CANCELLED | OUTPATIENT
Start: 2022-11-28

## 2022-10-31 RX ORDER — DIPHENHYDRAMINE HYDROCHLORIDE 50 MG/ML
50 INJECTION INTRAMUSCULAR; INTRAVENOUS
Status: CANCELLED
Start: 2022-11-28

## 2022-10-31 RX ORDER — PENTAMIDINE ISETHIONATE 300 MG/300MG
300 INHALANT RESPIRATORY (INHALATION) ONCE
Status: CANCELLED
Start: 2022-11-28 | End: 2022-11-28

## 2022-10-31 RX ORDER — ALBUTEROL SULFATE 0.83 MG/ML
2.5 SOLUTION RESPIRATORY (INHALATION) ONCE
Status: CANCELLED
Start: 2022-11-28 | End: 2022-11-28

## 2022-10-31 RX ORDER — PENTAMIDINE ISETHIONATE 300 MG/300MG
300 INHALANT RESPIRATORY (INHALATION) ONCE
Status: COMPLETED | OUTPATIENT
Start: 2022-10-31 | End: 2022-10-31

## 2022-10-31 RX ORDER — EPINEPHRINE 1 MG/ML
0.3 INJECTION, SOLUTION, CONCENTRATE INTRAVENOUS EVERY 5 MIN PRN
Status: CANCELLED | OUTPATIENT
Start: 2022-11-28

## 2022-10-31 RX ORDER — METHYLPREDNISOLONE SODIUM SUCCINATE 125 MG/2ML
125 INJECTION, POWDER, LYOPHILIZED, FOR SOLUTION INTRAMUSCULAR; INTRAVENOUS
Status: CANCELLED
Start: 2022-11-28

## 2022-10-31 RX ORDER — ALBUTEROL SULFATE 0.83 MG/ML
2.5 SOLUTION RESPIRATORY (INHALATION) ONCE
Status: COMPLETED | OUTPATIENT
Start: 2022-10-31 | End: 2022-10-31

## 2022-10-31 RX ORDER — HEPARIN SODIUM,PORCINE 10 UNIT/ML
3 VIAL (ML) INTRAVENOUS ONCE
Status: DISCONTINUED | OUTPATIENT
Start: 2022-10-31 | End: 2022-11-05 | Stop reason: HOSPADM

## 2022-10-31 RX ADMIN — ALBUTEROL SULFATE 2.5 MG: 0.83 SOLUTION RESPIRATORY (INHALATION) at 13:36

## 2022-10-31 RX ADMIN — Medication 3 ML: at 13:04

## 2022-10-31 RX ADMIN — PENTAMIDINE ISETHIONATE 300 MG: 300 INHALANT RESPIRATORY (INHALATION) at 13:36

## 2022-10-31 NOTE — PROGRESS NOTES
Patient was seen today for a Pentamidine nebulizer tx ordered by Amber Scheierl APRN CNP.    Patient was first given 2.5 mg of Albuterol via nebulizer, after which Pentamidine 300 mg (Lot # [id number]) mixed with 6cc Sterile H20 was administered through a filtered nebulizer.    Pre-treatment: SpO2 = 98%   HR = 85   BBS = Clear  Post-treatment: SpO2 = 99%  HR =95  BBS = Clear    No adverse side effects noted by the patient.    This service today was provided under the direct supervision of Dr. Cardoza, who was available if needed.     Procedure was completed by Delmis Camacho.[email or web address]

## 2022-10-31 NOTE — NURSING NOTE
Chief Complaint   Patient presents with     Labs Only     Labs drawn via picc by RN.     Labs collected from PICC.  Line flushed with saline and heparin locked.      Patty Valentine RN

## 2022-11-01 ENCOUNTER — OFFICE VISIT (OUTPATIENT)
Dept: INFECTIOUS DISEASES | Facility: CLINIC | Age: 42
End: 2022-11-01
Attending: STUDENT IN AN ORGANIZED HEALTH CARE EDUCATION/TRAINING PROGRAM
Payer: COMMERCIAL

## 2022-11-01 VITALS
DIASTOLIC BLOOD PRESSURE: 78 MMHG | HEART RATE: 95 BPM | WEIGHT: 249 LBS | BODY MASS INDEX: 42.51 KG/M2 | SYSTOLIC BLOOD PRESSURE: 119 MMHG | HEIGHT: 64 IN | OXYGEN SATURATION: 97 %

## 2022-11-01 DIAGNOSIS — B02.9 VZV (VARICELLA-ZOSTER VIRUS) INFECTION: Primary | ICD-10-CM

## 2022-11-01 PROCEDURE — G0463 HOSPITAL OUTPT CLINIC VISIT: HCPCS

## 2022-11-01 PROCEDURE — 99214 OFFICE O/P EST MOD 30 MIN: CPT | Performed by: STUDENT IN AN ORGANIZED HEALTH CARE EDUCATION/TRAINING PROGRAM

## 2022-11-01 RX ORDER — VALACYCLOVIR HYDROCHLORIDE 1 G/1
1000 TABLET, FILM COATED ORAL 3 TIMES DAILY
Qty: 42 TABLET | Refills: 0 | Status: ON HOLD | OUTPATIENT
Start: 2022-11-01 | End: 2022-11-26

## 2022-11-01 ASSESSMENT — PAIN SCALES - GENERAL: PAINLEVEL: NO PAIN (0)

## 2022-11-01 NOTE — PROGRESS NOTES
[facility]  Transplant Infectious Disease Clinic Note:  Follow up     Patient:  Vashti Villegas, Date of birth 1980, Medical record number [id number]  Date of Visit:  11/01/2022         Assessment and Recommendations:   Recommendations:  -Stop ACV IV after she finishes her current supply (last dose tomorrow).   -noted plans to undergo chemo soon. Therefore, will recommend valtrex 1000 mg TID until she finishes her chemo, the suspected VZV lesions completely resolve and her ANC >1000.  -Continue ppx Fluconazole and levofloxacin as prior  -Shingles vaccine in 1-2 months after she finishes all her chemotherapy    -Will need COVID Bivalent and Flu vaccine can get it after he chemo.       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 Presented on 10/10 with an eruption of vesiculopapular lesion on the scalp, tip of nose, and upper lip/chin.       Infectious Disease issues include:     1) Crusted, hemorrhagic lesions noted on the scalp and face - in the setting of leukopenia and lymphopenia and while on ppx Acyclovir 400 mg po bid.   HSV1/2 PCR from a swab of the lesion of the scalp - negative   VZV PCR from a swab of the scalp - Negative   Erosive lesions noted in the distribution of V1 (Tip of the nose), V2 and V3 (Upper lip and chin, and C2 (scalp) dermatomes.   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   On 10/13 noted new erythematous papules and on pustular lesion.    Will stop  IV Acyclovir as all lesions have crusted over.      Other issues:  -  ms on 10/12  - PCP prophylaxis: Nebulized pentamidine last given 10/2; next due ~10/31  - Fungal prophylaxis: fluconazole 200 mg   - Bacterial prophylaxis: Levofloxacin 250 mg daily   -  Serostatus: CMV Neg, EBV Positive, HSV1+/HSV2- PTA acyclovir 400 mg BID held   - Gamma globulin status: 688 on 10/13  - Vaccination - There are non on file and she states that she has not received any vaccinations since childhood.   This will need to be addressed at the appropriate time   - Isolation status:  Good hand hygiene. Airborne precautions     Chuck Diaz MD. Pager [phone]         Interval history    She is seen in ID clinic. She is doing well. She states that she has no new lesions. She has several scabs on her head which are healed. She has no vision problems. She is taking the IV ACV with no issues. She denies any blurry vision, no hearing problems, no fevers, hearing/ear pain, chills, sob, cough, chest pain, no sore throat, no n/v/d or abd pain, no dysuria. She has no pain or tenderness of her scalp. No pustular lesions noted.       Review of Systems:  CONSTITUTIONAL:  No fevers or chills. No night sweats.  EYES: negative for icterus or acute vision changes.   ENT:  negative for hearing loss, tinnitus or sore throat  RESPIRATORY:  negative for cough, sputum, dyspnea  CARDIOVASCULAR:  negative for chest pain, heart palpitations  GASTROINTESTINAL:  negative for nausea, vomiting, diarrhea or constipation  GENITOURINARY:  negative for dysuria or hematuria.  HEME:  No easy bruising or bleeding  INTEGUMENT:  Crusted lesions noted on her scalp   NEURO:  Negative for headache or tremor.    Past Medical History:   Diagnosis Date     ALL (acute lymphocytic leukemia) (H)      Other acne      Shingles        Past Surgical History:   Procedure Laterality Date     IR BONE BIOPSY DEEP RIGHT  10/25/2022     IR LUMBAR PUNCTURE  10/21/2022     PICC Left 09/30/2022    Picc ok to use     PICC DOUBLE LUMEN PLACEMENT Left 08/20/2022    left cephalic 5 fr dl picc 44 cm     ZZC NONSPECIFIC PROCEDURE  01/01/1999    benign mole removal       No family history on file.    Social History     Social History Narrative    **  Merged History Encounter **          Social History     Tobacco Use     Smoking status: Never     Smokeless tobacco: Never       Immunization History   Administered Date(s) Administered     TD (ADULT, 7+) 09/13/1996       Patient Active Problem List   Diagnosis     Acute leukemia (H)     Acute lymphoblastic leukemia (ALL) (H)     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)     Prophylaxis for chemotherapy-induced neutropenia     Morbid obesity (H)     Disseminated zoster       No outpatient medications have been marked as taking for the 11/1/22 encounter (Appointment) with Chuck Diaz MD.       Allergies   Allergen Reactions     Blood Transfusion Related (Informational Only) Hives     Hive reaction with platelet transfusion on 8/26/2022. Please administer platelets with tylenol and benadryl premedication.               Physical Exam:   Vitals were reviewed.  All vitals stable  There were no vitals taken for this visit.  Wt Readings from Last 4 Encounters:   10/21/22 110.2 kg (243 lb)   10/18/22 112.6 kg (248 lb 4.8 oz)   10/14/22 111.8 kg (246 lb 6.4 oz)   10/10/22 113 kg (249 lb 1.6 oz)       Exam:  GENERAL: well-developed, well-nourished, alert, oriented, in no acute distress.  HEAD: Head is normocephalic, atraumatic   EYES: Eyes have anicteric sclerae.    ENT: Oropharynx is moist without exudates or ulcers.  NECK: Supple.  LUNGS: Clear to auscultation.  CARDIOVASCULAR: Regular rate and rhythm with no murmurs, gallops or rubs.  ABDOMEN: Normal bowel sounds, soft, nontender.  SKIN: No acute rashes. Right arm Line is in place without any surrounding erythema. No pustular lesions noted. Scabs noted on her scalp. Thinning of hair noted.   NEUROLOGIC: Grossly nonfocal.         Laboratory Data:     No results found for: ACD4    Inflammatory Markers  No lab results found.    Invalid input(s): RATE, AUTO, ESR, WESR    Immune Globulin Studies     Recent Labs   Lab Test 10/13/22  0744   IGG  688       Metabolic Studies    Recent Labs   Lab Test 10/31/22  1308 10/26/22  0835 10/24/22  1338 10/18/22  1215 10/14/22  0648 10/13/22  0744 10/12/22  2350    140 139   < > 139   < >  --    POTASSIUM 4.0 4.0 3.7   < > 3.6   < >  --    CHLORIDE 105 109 108   < > 107   < >  --    CO2 24 24 26   < > 22   < >  --    ANIONGAP 10 7 5   < > 10   < >  --    BUN 15.3 14 11   < > 9.8   < >  --    CR 0.61 0.73 0.81   < > 0.67   < >  --    GFRESTIMATED >90 >90 >90   < > >90   < >  --    GLC 91 108* 103*   < > 103*   < >  --    SOLEDAD 9.5 9.2 9.1   < > 8.9   < >  --    PHOS  --   --   --   --  3.5   < >  --    MAG  --   --   --   --  1.7   < >  --    URIC  --   --   --   --  3.0   < >  --    LACT  --   --   --   --   --   --  0.7    < > = values in this interval not displayed.       Hepatic Studies    Recent Labs   Lab Test 10/31/22  1308 10/24/22  1338 10/18/22  1215 10/11/22  0703 10/10/22  1817 10/06/22  1031 09/30/22  1605 09/26/22  1358 09/14/22  0704   BILITOTAL 0.4 0.6 0.3   < > 0.6   < > 0.3   < > 0.3   DBIL  --   --   --   --   --   --   --   --  <0.20   ALKPHOS 84 101 152*   < > 61   < > 83   < > 77   PROTTOTAL 6.8 7.4 6.9   < > 5.9*   < > 6.6   < > 5.2*   ALBUMIN 4.0 3.7 4.0   < > 3.7   < > 4.0   < > 3.4*   AST 14 27 36*   < > 15   < > 17   < > 16   ALT 13 32 30   < > 24   < > 24   < > 27   LDH  --   --   --   --  201  --  245  --   --     < > = values in this interval not displayed.       Pancreatitis testing  No lab results found.    Lipid testing  No lab results found.    Gout Labs      Recent Labs   Lab Test 10/14/22  0648 10/13/22  0744 10/12/22  0642 10/11/22  0703 10/10/22  1817   URIC 3.0 2.2* 2.0* 2.0* 2.2*       Hematology Studies   Recent Labs   Lab Test 10/31/22  1308 10/26/22  0835 10/24/22  1338 10/21/22  1223   WBC 9.5 9.1 10.1 24.2*   ABLA  --   --   --  0.2*   BLST  --   --   --  1   ANEU  --  6.4  --  17.7*   ANEUTAUTO 7.3  --  7.7  --    ALYM  --  1.7  --  1.5   ALYMPAUTO 1.0  --  1.2  --     COLIN  --  0.8  --  2.9*   AMONOAUTO 1.1  --  0.8  --    AEOS  --  0.0  --  0.0   AEOSAUTO 0.0  --  0.0  --    ABSBASO 0.1  --  0.1  --    HGB 8.2* 8.6* 8.3* 8.3*   HCT 25.6* 26.7* 25.5* 26.1*    282 245 179       Clotting Studies    Recent Labs   Lab Test 10/21/22  1223 10/14/22  0648 10/13/22  0744 10/12/22  0642 10/11/22  0703 10/10/22  1817   INR 1.07 1.16* 1.18* 1.12   < > 1.08   PTT  --   --   --   --   --  35    < > = values in this interval not displayed.       Iron Testing    Recent Labs   Lab Test 10/31/22  1308   MCV 99       Markers  No lab results found.    Invalid input(s): FETOPROTEIN, SERUM, AFP    Autoimmune Testing   No lab results found.    Invalid input(s): ANCAB, PANCA, CANCA    Arterial Blood Gas Testing  No lab results found.     Thyroid Studies   No lab results found.    Invalid input(s): FT4    Urine Studies     Recent Labs   Lab Test 10/03/22  0350 10/02/22  1931 10/02/22  1222 10/02/22  0254 10/01/22  1903 10/01/22  1100 08/22/22  1515   URINEPH 7.0 8.0 7.0 7.5 7.0   < > 5.5   NITRITE  --   --   --   --   --   --  Negative   LEUKEST  --   --   --   --   --   --  Negative    < > = values in this interval not displayed.       Medication levels  No lab results found.    Invalid input(s): AMIK    CSF testing     Recent Labs   Lab Test 10/21/22  1425 10/21/22  1423 09/14/22  1131 09/07/22  1124 08/31/22  1617 08/26/22  1517   CWBC  --  0 0 0 0 2   CRBC  --  1 9* 15* 9* 0   CGLU 53  --   --  57 68 106*   CTP 31.0  --   --  24.9 38.2 46.8*       Microbiology:  Fungal testing  No lab results found.    Invalid input(s): HIFUN, FUNGL    Beta D Glucan levels (Fungitell assay)    No results found for: FGTL, FGTLI     Last Culture results   Rapid Strep A Screen   Date Value Ref Range Status   01/21/2003   Final    NEGATIVE: No Group A streptococcal antigen detected by immunoassay, await   culture report.     Culture   Date Value Ref Range Status   10/21/2022 No Growth  Final   10/11/2022 1+  Normal sia  Final   09/07/2022 No Growth  Final   09/02/2022 No Growth  Final   09/02/2022 No Growth  Final   08/31/2022 No Growth  Final   08/26/2022 No Growth  Final     Culture Micro   Date Value Ref Range Status   09/25/2003   Final    >100,000 colonies/mL Multiple organisms present, probable contamination   01/21/2003 No Beta Streptococcus isolated  Final   01/04/2003 No Beta Streptococcus isolated  Final         Last check of C difficile  No results found for: CDBPCT    No components found for: AFBSTN    Syphilis Testing  Invalid input(s): [id number]    Tick Testing  No lab results found.    Invalid input(s): APHAGM    ASO Testing  Invalid input(s): [id number]    Quantiferon testing   Recent Labs   Lab Test 10/31/22  1308 10/26/22  0835   LYMPH 10 19       Infection Studies to assess Diarrhea  No lab results found.    Invalid input(s): NNDMRESULT    Virology:  Coronavirus-19 testing    Recent Labs   Lab Test 10/31/22  1246 10/11/22  0941 09/29/22  1308 08/19/22  2110   [id number] Negative Negative Negative Negative       Respiratory virus (non-coronavirus-19) testing    No lab results found.    CMV viral loads    CMV DNA IU/mL   Date Value Ref Range Status   10/13/2022 Not Detected Not Detected IU/mL Final       CMV resistance testing  No lab results found.  No results found for: CMVCID, CMVFOS, CMVGAN    No results found for: H6RES    EBV DNA Copies/mL   Date Value Ref Range Status   10/12/2022 Not Detected Not Detected copies/mL Final       BK viral loads No lab results found.    Parvovirus Testing  No lab results found.    Invalid input(s): PRVRES    Adenovirus Testing  No lab results found.    Invalid input(s): ADENAB, ADENOVIRUS, ADQT    Hepatitis B Testing     Recent Labs   Lab Test 08/19/22  1705   AUSAB 0.00   HBCAB Nonreactive   HEPBANG Nonreactive     Was the last Hepatitis B E antigen positive?   No results found for: HBEAGN     Hepatitis C Antibody   Date Value Ref Range Status   08/19/2022 Nonreactive  Nonreactive Final       CMV Antibody IgG   Date Value Ref Range Status   08/19/2022 No detectable antibody. No detectable antibody.  Final     EBV Capsid Antibody IgG   Date Value Ref Range Status   08/19/2022 Positive (A) No detectable antibody. Final     Comment:     Suggests recent or past exposure.     Herpes Simplex Virus Type 1 IgG Antibody   Date Value Ref Range Status   08/19/2022 Positive.  IgG antibody to HSV-1 detected. (A) No HSV-1 IgG antibodies detected Final     Herpes Simplex Virus Type 2 IgG Antibody   Date Value Ref Range Status   08/19/2022 No HSV-2 IgG antibodies detected. No HSV-2 IgG antibodies detected Final       No components found for: [id number]    Last Pathology Report   Case Report   Date Value Ref Range Status   10/25/2022   Final    Flow Cytometry Report                             Case: [id number]                                  Authorizing Provider:  Roya Quarles APRN   Collected:           10/25/2022 02:55 PM                                 CNP                                                                          Ordering Location:     [facility]  Received:            10/25/2022 03:17 PM                                 Cancer Clinic                                                                Pathologist:           Tay Collins MD                                                   Specimen:    Iliac Crest, Bone Marrow Aspirate, Left                                                     Clinical Information   Date Value Ref Range Status   10/25/2022   Final    Per Epic records:  42-year-old female with a history of Ph+ ALL. She started GRAALL + imitinib 8/24/22. Her most recent bone marrow biopsy ([id number]; collected on 9/26/22) was inadequate for morphologic evaluation (hemodilute aspirates and crushed core biopsy); concurrent flow cytometry ([id number]) showed no abnormal B lymphoblast population, PCR was negative for BCR::ABL1 transcripts, and  FISH was negative for BCR::ABL1 fusion and biallelic loss of CDKN2A. She last received G-CSF on 10/6/22.     10/25/2022   Final    42 year old female with history of B-ALL       Final Diagnosis   Date Value Ref Range Status   10/25/2022   Final    Bone marrow, posterior iliac crest, left decalcified trephine biopsy and touch imprint; particle crush, direct aspirate smear, and concentrated aspirate smear; and peripheral blood smear:    - Normal to slightly hypercellular marrow for age (cellularity estimated at 60-70%) with trilineage hematopoietic maturation and no increase in blasts  - No morphologic evidence of B-lymphoblastic leukemia  - Unusual B-lineage precursor population detected by flow cytometry  - Peripheral blood showing moderate normochromic normocytic anemia; rare circulating nucleated red blood cells  - See comment          Imaging:  Results for orders placed or performed during the hospital encounter of 10/21/22   IR Lumbar Puncture    Narrative    April F Michelle   [id number]      [id number]    APRIL F MICHELLE  [id number]  1980;  42 years    IR LUMBAR PUNCTURE  42F w/ Ph+ B-ALL, needs ppx IT chemo as part of chemo protocol; Acute  lymphoblastic leukemia (ALL) in remission (H)    -----    PRE-OPERATIVE DIAGNOSIS:  Leukemia    POST-OPERATIVE DIAGNOSIS:  Same    PROCEDURE:  Lumbar puncture under fluoroscopy      Impression    IMPRESSION:  Completed lumbar puncture under fluoroscopic guidance. ?A  22 gauge 7 inch spinal needle was advanced between L4-L5 with return  of fluid. ?A total of 10 mL was collected in 4 tubes for requested  labs.     LIBIA Barajas administered intrathecal chemo through LP access.      Needle removed after return of stylet. ?No immediate complication.  ?Plan: lie flat for 1 hour. ?Dx: Leukemia. ?Vinicius. <2     -----    CLINICAL HISTORY:  Leukemia    PERFORMED BY:  ANA Garay, GRANT (Interventional  Radiology)    CONSENT:  The patient understood the  "limitations, alternatives, and  risks of the procedure and agreed to the procedure.  Written informed  consent was obtained and is documented in the patient record.    MEDICATIONS:  1% lidocaine was used for local anesthesia    NURSING:  The patient was placed on continuous vital signs monitoring.   Vital signs were monitored by nursing staff under my supervision.    FLUOROSCOPY TIME (minutes):  <2    DESCRIPTION:  The patient was placed in the prone position and the  lower back was prepped and draped in the usual sterile fashion. 1%  lidocaine was used for local anesthesia at the intended puncture site.   Under fluoroscopic guidance, a 22 gauge, 7 inch spinal needle was  advanced into the thecal sac at the L4-L5 level with return of fluid.   A total of 10 mL CSF was collected.      LIBIA Barajas administered intrathecal chemo through LP access.      The needle was removed with stylet in place.  No immediate  complication.  CSF sample was sent for requested laboratory analysis.    COMPLICATIONS:  No immediate concerns; the patient remained stable  throughout the procedure and tolerated it well.    ESTIMATED BLOOD LOSS:  Minimal    SPECIMENS:  Per above    -----  \"The physician assistant (PA) who performed this procedure and signed  the above report is licensed to practice in the [facility]  pursuant to [place] Statute 147A.09.  This includes meeting the Statute and  [place] Board of Medical Practice requirement of a collaborating  physician.\"  -----    BELEM GODOY PA-C         SYSTEM ID:  [id number]       "

## 2022-11-01 NOTE — LETTER
11/1/2022       RE: Vashti Villegas  [address] [place] [address]     Dear Colleague,    Thank you for referring your patient, Vashti Villegas, to the [facility] INFECTIOUS DISEASE CLIN[facility] [place] at [facility]. Please see a copy of my visit note below.    [facility]  Transplant Infectious Disease Clinic Note:  Follow up     Patient:  Vashti Villegas, Date of birth 1980, Medical record number [id number]  Date of Visit:  11/01/2022         Assessment and Recommendations:   Recommendations:  -Stop ACV IV after she finishes her current supply (last dose tomorrow).   -noted plans to undergo chemo soon. Therefore, will recommend valtrex 1000 mg TID until she finishes her chemo and the suspected VZV lesions completely resolve. After, she may need high dose valtrex 1000mg BID until count recovery for secondary prophylaxis as she broke through prophylaxis oral acyclovir.  -Continue ppx Fluconazole and levofloxacin as prior  -Shingles vaccine in 1-2 months after she finishes all her chemotherapy    -Will need COVID Bivalent and Flu vaccine can get it after he chemo.       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 Presented on 10/10 with an eruption of vesiculopapular lesion on the scalp, tip of nose, and upper lip/chin.       Infectious Disease issues include:     1) Crusted, hemorrhagic lesions noted on the scalp and face - in the setting of leukopenia and lymphopenia and while on ppx Acyclovir 400 mg po bid.   HSV1/2 PCR from a swab of the lesion of the scalp - negative   VZV PCR from a swab of the scalp - Negative   Erosive lesions noted in the distribution of V1 (Tip of the nose), V2 and V3 (Upper lip and chin, and  C2 (scalp) dermatomes.   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   On 10/13 noted new erythematous papules and on pustular lesion.    Will stop  IV Acyclovir as all lesions have crusted over.      Other issues:  -  ms on 10/12  - PCP prophylaxis: Nebulized pentamidine last given 10/2; next due ~10/31  - Fungal prophylaxis: fluconazole 200 mg   - Bacterial prophylaxis: Levofloxacin 250 mg daily   - Serostatus: CMV Neg, EBV Positive, HSV1+/HSV2- PTA acyclovir 400 mg BID held   - Gamma globulin status: 688 on 10/13  - Vaccination - There are non on file and she states that she has not received any vaccinations since childhood.   This will need to be addressed at the appropriate time   - Isolation status:  Good hand hygiene. Airborne precautions     Chuck Diaz MD. Pager [phone]         Interval history    She is seen in ID clinic. She is doing well. She states that she has no new lesions. She has several scabs on her head which are healed. She has no vision problems. She is taking the IV ACV with no issues. She denies any blurry vision, no hearing problems, no fevers, hearing/ear pain, chills, sob, cough, chest pain, no sore throat, no n/v/d or abd pain, no dysuria. She has no pain or tenderness of her scalp. No pustular lesions noted.       Review of Systems:  CONSTITUTIONAL:  No fevers or chills. No night sweats.  EYES: negative for icterus or acute vision changes.   ENT:  negative for hearing loss, tinnitus or sore throat  RESPIRATORY:  negative for cough, sputum, dyspnea  CARDIOVASCULAR:  negative for chest pain, heart palpitations  GASTROINTESTINAL:  negative for nausea, vomiting, diarrhea or constipation  GENITOURINARY:  negative for dysuria or hematuria.  HEME:  No easy bruising or bleeding  INTEGUMENT:  Crusted lesions noted on her scalp   NEURO:  Negative for headache or tremor.    Past Medical History:   Diagnosis Date     ALL (acute lymphocytic leukemia) (H)       Other acne      Shingles        Past Surgical History:   Procedure Laterality Date     IR BONE BIOPSY DEEP RIGHT  10/25/2022     IR LUMBAR PUNCTURE  10/21/2022     PICC Left 09/30/2022    Picc ok to use     PICC DOUBLE LUMEN PLACEMENT Left 08/20/2022    left cephalic 5 fr dl picc 44 cm     ZZC NONSPECIFIC PROCEDURE  01/01/1999    benign mole removal       No family history on file.    Social History     Social History Narrative    ** Merged History Encounter **          Social History     Tobacco Use     Smoking status: Never     Smokeless tobacco: Never       Immunization History   Administered Date(s) Administered     TD (ADULT, 7+) 09/13/1996       Patient Active Problem List   Diagnosis     Acute leukemia (H)     Acute lymphoblastic leukemia (ALL) (H)     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)     Prophylaxis for chemotherapy-induced neutropenia     Morbid obesity (H)     Disseminated zoster       No outpatient medications have been marked as taking for the 11/1/22 encounter (Appointment) with Chuck Diaz MD.       Allergies   Allergen Reactions     Blood Transfusion Related (Informational Only) Hives     Hive reaction with platelet transfusion on 8/26/2022. Please administer platelets with tylenol and benadryl premedication.               Physical Exam:   Vitals were reviewed.  All vitals stable  There were no vitals taken for this visit.  Wt Readings from Last 4 Encounters:   10/21/22 110.2 kg (243 lb)   10/18/22 112.6 kg (248 lb 4.8 oz)   10/14/22 111.8 kg (246 lb 6.4 oz)   10/10/22 113 kg (249 lb 1.6 oz)       Exam:  GENERAL: well-developed, well-nourished, alert, oriented, in no acute distress.  HEAD: Head is normocephalic, atraumatic   EYES: Eyes have anicteric sclerae.    ENT: Oropharynx is moist without exudates or ulcers.  NECK: Supple.  LUNGS: Clear to auscultation.  CARDIOVASCULAR: Regular rate and rhythm with no murmurs, gallops or rubs.  ABDOMEN: Normal bowel  sounds, soft, nontender.  SKIN: No acute rashes. Right arm Line is in place without any surrounding erythema. No pustular lesions noted. Scabs noted on her scalp. Thinning of hair noted.   NEUROLOGIC: Grossly nonfocal.         Laboratory Data:     No results found for: ACD4    Inflammatory Markers  No lab results found.    Invalid input(s): RATE, AUTO, ESR, WESR    Immune Globulin Studies     Recent Labs   Lab Test 10/13/22  0744          Metabolic Studies    Recent Labs   Lab Test 10/31/22  1308 10/26/22  0835 10/24/22  1338 10/18/22  1215 10/14/22  0648 10/13/22  0744 10/12/22  2350    140 139   < > 139   < >  --    POTASSIUM 4.0 4.0 3.7   < > 3.6   < >  --    CHLORIDE 105 109 108   < > 107   < >  --    CO2 24 24 26   < > 22   < >  --    ANIONGAP 10 7 5   < > 10   < >  --    BUN 15.3 14 11   < > 9.8   < >  --    CR 0.61 0.73 0.81   < > 0.67   < >  --    GFRESTIMATED >90 >90 >90   < > >90   < >  --    GLC 91 108* 103*   < > 103*   < >  --    SOLEDAD 9.5 9.2 9.1   < > 8.9   < >  --    PHOS  --   --   --   --  3.5   < >  --    MAG  --   --   --   --  1.7   < >  --    URIC  --   --   --   --  3.0   < >  --    LACT  --   --   --   --   --   --  0.7    < > = values in this interval not displayed.       Hepatic Studies    Recent Labs   Lab Test 10/31/22  1308 10/24/22  1338 10/18/22  1215 10/11/22  0703 10/10/22  1817 10/06/22  1031 09/30/22  1605 09/26/22  1358 09/14/22  0704   BILITOTAL 0.4 0.6 0.3   < > 0.6   < > 0.3   < > 0.3   DBIL  --   --   --   --   --   --   --   --  <0.20   ALKPHOS 84 101 152*   < > 61   < > 83   < > 77   PROTTOTAL 6.8 7.4 6.9   < > 5.9*   < > 6.6   < > 5.2*   ALBUMIN 4.0 3.7 4.0   < > 3.7   < > 4.0   < > 3.4*   AST 14 27 36*   < > 15   < > 17   < > 16   ALT 13 32 30   < > 24   < > 24   < > 27   LDH  --   --   --   --  201  --  245  --   --     < > = values in this interval not displayed.       Pancreatitis testing  No lab results found.    Lipid testing  No lab results found.    Gout  Labs      Recent Labs   Lab Test 10/14/22  0648 10/13/22  0744 10/12/22  0642 10/11/22  0703 10/10/22  1817   URIC 3.0 2.2* 2.0* 2.0* 2.2*       Hematology Studies   Recent Labs   Lab Test 10/31/22  1308 10/26/22  0835 10/24/22  1338 10/21/22  1223   WBC 9.5 9.1 10.1 24.2*   ABLA  --   --   --  0.2*   BLST  --   --   --  1   ANEU  --  6.4  --  17.7*   ANEUTAUTO 7.3  --  7.7  --    ALYM  --  1.7  --  1.5   ALYMPAUTO 1.0  --  1.2  --    COLIN  --  0.8  --  2.9*   AMONOAUTO 1.1  --  0.8  --    AEOS  --  0.0  --  0.0   AEOSAUTO 0.0  --  0.0  --    ABSBASO 0.1  --  0.1  --    HGB 8.2* 8.6* 8.3* 8.3*   HCT 25.6* 26.7* 25.5* 26.1*    282 245 179       Clotting Studies    Recent Labs   Lab Test 10/21/22  1223 10/14/22  0648 10/13/22  0744 10/12/22  0642 10/11/22  0703 10/10/22  1817   INR 1.07 1.16* 1.18* 1.12   < > 1.08   PTT  --   --   --   --   --  35    < > = values in this interval not displayed.       Iron Testing    Recent Labs   Lab Test 10/31/22  1308   MCV 99       Markers  No lab results found.    Invalid input(s): FETOPROTEIN, SERUM, AFP    Autoimmune Testing   No lab results found.    Invalid input(s): ANCAB, PANCA, CANCA    Arterial Blood Gas Testing  No lab results found.     Thyroid Studies   No lab results found.    Invalid input(s): FT4    Urine Studies     Recent Labs   Lab Test 10/03/22  0350 10/02/22  1931 10/02/22  1222 10/02/22  0254 10/01/22  1903 10/01/22  1100 08/22/22  1515   URINEPH 7.0 8.0 7.0 7.5 7.0   < > 5.5   NITRITE  --   --   --   --   --   --  Negative   LEUKEST  --   --   --   --   --   --  Negative    < > = values in this interval not displayed.       Medication levels  No lab results found.    Invalid input(s): AMIK    CSF testing     Recent Labs   Lab Test 10/21/22  1425 10/21/22  1423 09/14/22  1131 09/07/22  1124 08/31/22  1617 08/26/22  1517   CWBC  --  0 0 0 0 2   CRBC  --  1 9* 15* 9* 0   CGLU 53  --   --  57 68 106*   CTP 31.0  --   --  24.9 38.2 46.8*        Microbiology:  Fungal testing  No lab results found.    Invalid input(s): HIFUN, FUNGL    Beta D Glucan levels (Fungitell assay)    No results found for: FGTL, FGTLI     Last Culture results   Rapid Strep A Screen   Date Value Ref Range Status   01/21/2003   Final    NEGATIVE: No Group A streptococcal antigen detected by immunoassay, await   culture report.     Culture   Date Value Ref Range Status   10/21/2022 No Growth  Final   10/11/2022 1+ Normal sia  Final   09/07/2022 No Growth  Final   09/02/2022 No Growth  Final   09/02/2022 No Growth  Final   08/31/2022 No Growth  Final   08/26/2022 No Growth  Final     Culture Micro   Date Value Ref Range Status   09/25/2003   Final    >100,000 colonies/mL Multiple organisms present, probable contamination   01/21/2003 No Beta Streptococcus isolated  Final   01/04/2003 No Beta Streptococcus isolated  Final         Last check of C difficile  No results found for: CDBPCT    No components found for: AFBSTN    Syphilis Testing  Invalid input(s): [id number]    Tick Testing  No lab results found.    Invalid input(s): APHAGM    ASO Testing  Invalid input(s): OPR4048    Quantiferon testing   Recent Labs   Lab Test 10/31/22  1308 10/26/22  0835   LYMPH 10 19       Infection Studies to assess Diarrhea  No lab results found.    Invalid input(s): NNDMRESULT    Virology:  Coronavirus-19 testing    Recent Labs   Lab Test 10/31/22  1246 10/11/22  0941 09/29/22  1308 08/19/22  2110   [id number] Negative Negative Negative Negative       Respiratory virus (non-coronavirus-19) testing    No lab results found.    CMV viral loads    CMV DNA IU/mL   Date Value Ref Range Status   10/13/2022 Not Detected Not Detected IU/mL Final       CMV resistance testing  No lab results found.  No results found for: CMVCID, CMVFOS, CMVGAN    No results found for: H6RES    EBV DNA Copies/mL   Date Value Ref Range Status   10/12/2022 Not Detected Not Detected copies/mL Final       BK viral loads No lab  results found.    Parvovirus Testing  No lab results found.    Invalid input(s): PRVRES    Adenovirus Testing  No lab results found.    Invalid input(s): ADENAB, ADENOVIRUS, ADQT    Hepatitis B Testing     Recent Labs   Lab Test 08/19/22  1705   AUSAB 0.00   HBCAB Nonreactive   HEPBANG Nonreactive     Was the last Hepatitis B E antigen positive?   No results found for: HBEAGN     Hepatitis C Antibody   Date Value Ref Range Status   08/19/2022 Nonreactive Nonreactive Final       CMV Antibody IgG   Date Value Ref Range Status   08/19/2022 No detectable antibody. No detectable antibody.  Final     EBV Capsid Antibody IgG   Date Value Ref Range Status   08/19/2022 Positive (A) No detectable antibody. Final     Comment:     Suggests recent or past exposure.     Herpes Simplex Virus Type 1 IgG Antibody   Date Value Ref Range Status   08/19/2022 Positive.  IgG antibody to HSV-1 detected. (A) No HSV-1 IgG antibodies detected Final     Herpes Simplex Virus Type 2 IgG Antibody   Date Value Ref Range Status   08/19/2022 No HSV-2 IgG antibodies detected. No HSV-2 IgG antibodies detected Final       No components found for: [id number]    Last Pathology Report   Case Report   Date Value Ref Range Status   10/25/2022   Final    Flow Cytometry Report                             Case: [id number]                                  Authorizing Provider:  Roya Quarles APRN   Collected:           10/25/2022 02:55 PM                                 CNP                                                                          Ordering Location:     [facility]  Received:            10/25/2022 03:17 PM                                 Cancer Clinic                                                                Pathologist:           Tay Collins MD                                                   Specimen:    Iliac Crest, Bone Marrow Aspirate, Left                                                     Clinical  Information   Date Value Ref Range Status   10/25/2022   Final    Per Epic records:  42-year-old female with a history of Ph+ ALL. She started GRAALL + imitinib 8/24/22. Her most recent bone marrow biopsy ([id number]; collected on 9/26/22) was inadequate for morphologic evaluation (hemodilute aspirates and crushed core biopsy); concurrent flow cytometry ([id number]) showed no abnormal B lymphoblast population, PCR was negative for BCR::ABL1 transcripts, and FISH was negative for BCR::ABL1 fusion and biallelic loss of CDKN2A. She last received G-CSF on 10/6/22.     10/25/2022   Final    42 year old female with history of B-ALL       Final Diagnosis   Date Value Ref Range Status   10/25/2022   Final    Bone marrow, posterior iliac crest, left decalcified trephine biopsy and touch imprint; particle crush, direct aspirate smear, and concentrated aspirate smear; and peripheral blood smear:    - Normal to slightly hypercellular marrow for age (cellularity estimated at 60-70%) with trilineage hematopoietic maturation and no increase in blasts  - No morphologic evidence of B-lymphoblastic leukemia  - Unusual B-lineage precursor population detected by flow cytometry  - Peripheral blood showing moderate normochromic normocytic anemia; rare circulating nucleated red blood cells  - See comment          Imaging:  Results for orders placed or performed during the hospital encounter of 10/21/22   IR Lumbar Puncture    Narrative    April F Michelle   [id number]      [id number]    APRIL F MICHELLE  [id number]  1980;  42 years    IR LUMBAR PUNCTURE  42F w/ Ph+ B-ALL, needs ppx IT chemo as part of chemo protocol; Acute  lymphoblastic leukemia (ALL) in remission (H)    -----    PRE-OPERATIVE DIAGNOSIS:  Leukemia    POST-OPERATIVE DIAGNOSIS:  Same    PROCEDURE:  Lumbar puncture under fluoroscopy      Impression    IMPRESSION:  Completed lumbar puncture under fluoroscopic guidance. ?A  22 gauge 7 inch spinal needle was advanced between L4-L5  "with return  of fluid. ?A total of 10 mL was collected in 4 tubes for requested  labs.     LIBIA Barajas administered intrathecal chemo through LP access.      Needle removed after return of stylet. ?No immediate complication.  ?Plan: lie flat for 1 hour. ?Dx: Leukemia. ?Vinicius. <2     -----    CLINICAL HISTORY:  Leukemia    PERFORMED BY:  ANA Garay PA-C (Interventional  Radiology)    CONSENT:  The patient understood the limitations, alternatives, and  risks of the procedure and agreed to the procedure.  Written informed  consent was obtained and is documented in the patient record.    MEDICATIONS:  1% lidocaine was used for local anesthesia    NURSING:  The patient was placed on continuous vital signs monitoring.   Vital signs were monitored by nursing staff under my supervision.    FLUOROSCOPY TIME (minutes):  <2    DESCRIPTION:  The patient was placed in the prone position and the  lower back was prepped and draped in the usual sterile fashion. 1%  lidocaine was used for local anesthesia at the intended puncture site.   Under fluoroscopic guidance, a 22 gauge, 7 inch spinal needle was  advanced into the thecal sac at the L4-L5 level with return of fluid.   A total of 10 mL CSF was collected.      LIBIA Barajas administered intrathecal chemo through LP access.      The needle was removed with stylet in place.  No immediate  complication.  CSF sample was sent for requested laboratory analysis.    COMPLICATIONS:  No immediate concerns; the patient remained stable  throughout the procedure and tolerated it well.    ESTIMATED BLOOD LOSS:  Minimal    SPECIMENS:  Per above    -----  \"The physician assistant (PA) who performed this procedure and signed  the above report is licensed to practice in the [facility] [place]  pursuant to [place] Statute 147A.09.  This includes meeting the Statute and  [place] Board of Medical Practice requirement of a " "collaborating  physician.\"  -----    BELEM GODOY PA-C         SYSTEM ID:  [id number]         JAMEEL OSMAN MD      "

## 2022-11-01 NOTE — NURSING NOTE
"Chief Complaint   Patient presents with     RECHECK     Follow-up     /78 (BP Location: Right arm, Patient Position: Sitting, Cuff Size: Adult Large)   Pulse 95   Ht 1.626 m (5' 4.02\")   Wt 112.9 kg (249 lb)   SpO2 97%   BMI 42.72 kg/m      Clau James on 11/1/2022 at 2:55 PM    "

## 2022-11-02 ENCOUNTER — HOME INFUSION (PRE-WILLOW HOME INFUSION) (OUTPATIENT)
Dept: PHARMACY | Facility: CLINIC | Age: 42
End: 2022-11-02

## 2022-11-02 ENCOUNTER — ONCOLOGY VISIT (OUTPATIENT)
Dept: ONCOLOGY | Facility: CLINIC | Age: 42
End: 2022-11-02
Attending: INTERNAL MEDICINE
Payer: COMMERCIAL

## 2022-11-02 ENCOUNTER — HOSPITAL ENCOUNTER (INPATIENT)
Facility: CLINIC | Age: 42
LOS: 4 days | Discharge: HOME | End: 2022-11-06
Attending: STUDENT IN AN ORGANIZED HEALTH CARE EDUCATION/TRAINING PROGRAM | Admitting: STUDENT IN AN ORGANIZED HEALTH CARE EDUCATION/TRAINING PROGRAM
Payer: COMMERCIAL

## 2022-11-02 ENCOUNTER — PATIENT OUTREACH (OUTPATIENT)
Dept: ONCOLOGY | Facility: CLINIC | Age: 42
End: 2022-11-02

## 2022-11-02 VITALS
TEMPERATURE: 98.2 F | BODY MASS INDEX: 42.89 KG/M2 | WEIGHT: 250 LBS | DIASTOLIC BLOOD PRESSURE: 85 MMHG | OXYGEN SATURATION: 99 % | SYSTOLIC BLOOD PRESSURE: 121 MMHG | HEART RATE: 94 BPM | RESPIRATION RATE: 16 BRPM

## 2022-11-02 DIAGNOSIS — C91.00 ACUTE LYMPHOBLASTIC LEUKEMIA (ALL) NOT HAVING ACHIEVED REMISSION (H): ICD-10-CM

## 2022-11-02 DIAGNOSIS — C91.00 ALL (ACUTE LYMPHOBLASTIC LEUKEMIA OF INFANT) (H): ICD-10-CM

## 2022-11-02 DIAGNOSIS — Z76.89 PROPHYLAXIS FOR CHEMOTHERAPY-INDUCED NEUTROPENIA: Primary | ICD-10-CM

## 2022-11-02 DIAGNOSIS — B02.9 VZV (VARICELLA-ZOSTER VIRUS) INFECTION: ICD-10-CM

## 2022-11-02 DIAGNOSIS — K59.00 CONSTIPATION, UNSPECIFIED CONSTIPATION TYPE: ICD-10-CM

## 2022-11-02 DIAGNOSIS — C91.00 ACUTE LYMPHOBLASTIC LEUKEMIA (ALL) NOT HAVING ACHIEVED REMISSION (H): Primary | ICD-10-CM

## 2022-11-02 DIAGNOSIS — Z76.89 PROPHYLAXIS FOR CHEMOTHERAPY-INDUCED NEUTROPENIA: ICD-10-CM

## 2022-11-02 LAB
ALBUMIN SERPL BCG-MCNC: 4 G/DL (ref 3.5–5.2)
ALP SERPL-CCNC: 78 U/L (ref 35–104)
ALT SERPL W P-5'-P-CCNC: 12 U/L (ref 10–35)
ANION GAP SERPL CALCULATED.3IONS-SCNC: 13 MMOL/L (ref 7–15)
APTT PPP: 84 SECONDS (ref 22–38)
AST SERPL W P-5'-P-CCNC: 15 U/L (ref 10–35)
BASOPHILS # BLD AUTO: 0.1 10E3/UL (ref 0–0.2)
BASOPHILS NFR BLD AUTO: 1 %
BILIRUB SERPL-MCNC: 0.4 MG/DL
BUN SERPL-MCNC: 12 MG/DL (ref 6–20)
CALCIUM SERPL-MCNC: 9.2 MG/DL (ref 8.6–10)
CHLORIDE SERPL-SCNC: 105 MMOL/L (ref 98–107)
CREAT SERPL-MCNC: 0.62 MG/DL (ref 0.51–0.95)
DEPRECATED HCO3 PLAS-SCNC: 21 MMOL/L (ref 22–29)
EOSINOPHIL # BLD AUTO: 0.1 10E3/UL (ref 0–0.7)
EOSINOPHIL NFR BLD AUTO: 1 %
ERYTHROCYTE [DISTWIDTH] IN BLOOD BY AUTOMATED COUNT: 23.6 % (ref 10–15)
FIBRINOGEN PPP-MCNC: 412 MG/DL (ref 170–490)
GFR SERPL CREATININE-BSD FRML MDRD: >90 ML/MIN/1.73M2
GLUCOSE SERPL-MCNC: 100 MG/DL (ref 70–99)
HCT VFR BLD AUTO: 26.4 % (ref 35–47)
HGB BLD-MCNC: 8.3 G/DL (ref 11.7–15.7)
IMM GRANULOCYTES # BLD: 0.1 10E3/UL
IMM GRANULOCYTES NFR BLD: 1 %
INR PPP: 1.1 (ref 0.85–1.15)
LDH SERPL L TO P-CCNC: 292 U/L (ref 0–250)
LYMPHOCYTES # BLD AUTO: 0.9 10E3/UL (ref 0.8–5.3)
LYMPHOCYTES NFR BLD AUTO: 10 %
MAGNESIUM SERPL-MCNC: 1.8 MG/DL (ref 1.7–2.3)
MCH RBC QN AUTO: 31.9 PG (ref 26.5–33)
MCHC RBC AUTO-ENTMCNC: 31.4 G/DL (ref 31.5–36.5)
MCV RBC AUTO: 102 FL (ref 78–100)
MONOCYTES # BLD AUTO: 1.1 10E3/UL (ref 0–1.3)
MONOCYTES NFR BLD AUTO: 12 %
NEUTROPHILS # BLD AUTO: 6.7 10E3/UL (ref 1.6–8.3)
NEUTROPHILS NFR BLD AUTO: 75 %
NRBC # BLD AUTO: 0 10E3/UL
NRBC BLD AUTO-RTO: 0 /100
PHOSPHATE SERPL-MCNC: 4 MG/DL (ref 2.5–4.5)
PLATELET # BLD AUTO: 238 10E3/UL (ref 150–450)
POTASSIUM SERPL-SCNC: 3.9 MMOL/L (ref 3.4–5.3)
PROT SERPL-MCNC: 6.4 G/DL (ref 6.4–8.3)
RBC # BLD AUTO: 2.6 10E6/UL (ref 3.8–5.2)
SODIUM SERPL-SCNC: 139 MMOL/L (ref 136–145)
URATE SERPL-MCNC: 3.9 MG/DL (ref 2.4–5.7)
WBC # BLD AUTO: 9 10E3/UL (ref 4–11)

## 2022-11-02 PROCEDURE — 36592 COLLECT BLOOD FROM PICC: CPT | Performed by: PHYSICIAN ASSISTANT

## 2022-11-02 PROCEDURE — 250N000011 HC RX IP 250 OP 636: Performed by: INTERNAL MEDICINE

## 2022-11-02 PROCEDURE — 85730 THROMBOPLASTIN TIME PARTIAL: CPT | Performed by: PHYSICIAN ASSISTANT

## 2022-11-02 PROCEDURE — 250N000013 HC RX MED GY IP 250 OP 250 PS 637: Performed by: PHYSICIAN ASSISTANT

## 2022-11-02 PROCEDURE — 83615 LACTATE (LD) (LDH) ENZYME: CPT | Performed by: PHYSICIAN ASSISTANT

## 2022-11-02 PROCEDURE — 999N000044 HC STATISTIC CVC DRESSING CHANGE

## 2022-11-02 PROCEDURE — 80053 COMPREHEN METABOLIC PANEL: CPT | Performed by: PHYSICIAN ASSISTANT

## 2022-11-02 PROCEDURE — 250N000013 HC RX MED GY IP 250 OP 250 PS 637: Performed by: INTERNAL MEDICINE

## 2022-11-02 PROCEDURE — 99214 OFFICE O/P EST MOD 30 MIN: CPT | Performed by: INTERNAL MEDICINE

## 2022-11-02 PROCEDURE — 99223 1ST HOSP IP/OBS HIGH 75: CPT | Mod: FS | Performed by: STUDENT IN AN ORGANIZED HEALTH CARE EDUCATION/TRAINING PROGRAM

## 2022-11-02 PROCEDURE — 82040 ASSAY OF SERUM ALBUMIN: CPT | Performed by: PHYSICIAN ASSISTANT

## 2022-11-02 PROCEDURE — 84550 ASSAY OF BLOOD/URIC ACID: CPT | Performed by: PHYSICIAN ASSISTANT

## 2022-11-02 PROCEDURE — 3E04305 INTRODUCTION OF OTHER ANTINEOPLASTIC INTO CENTRAL VEIN, PERCUTANEOUS APPROACH: ICD-10-PCS | Performed by: INTERNAL MEDICINE

## 2022-11-02 PROCEDURE — 120N000002 HC R&B MED SURG/OB UMMC

## 2022-11-02 PROCEDURE — 258N000003 HC RX IP 258 OP 636: Performed by: INTERNAL MEDICINE

## 2022-11-02 PROCEDURE — 85384 FIBRINOGEN ACTIVITY: CPT | Performed by: PHYSICIAN ASSISTANT

## 2022-11-02 PROCEDURE — M0220 HC INJECTION TIXAGEVIMAB & CILGAVIMAB (EVUSHELD): HCPCS | Performed by: INTERNAL MEDICINE

## 2022-11-02 PROCEDURE — G0463 HOSPITAL OUTPT CLINIC VISIT: HCPCS

## 2022-11-02 PROCEDURE — 83735 ASSAY OF MAGNESIUM: CPT | Performed by: PHYSICIAN ASSISTANT

## 2022-11-02 PROCEDURE — 84100 ASSAY OF PHOSPHORUS: CPT | Performed by: PHYSICIAN ASSISTANT

## 2022-11-02 PROCEDURE — 85025 COMPLETE CBC W/AUTO DIFF WBC: CPT | Performed by: PHYSICIAN ASSISTANT

## 2022-11-02 PROCEDURE — 85610 PROTHROMBIN TIME: CPT | Performed by: PHYSICIAN ASSISTANT

## 2022-11-02 RX ORDER — MEPERIDINE HYDROCHLORIDE 25 MG/ML
25 INJECTION INTRAMUSCULAR; INTRAVENOUS; SUBCUTANEOUS EVERY 30 MIN PRN
Status: CANCELLED | OUTPATIENT
Start: 2022-11-17

## 2022-11-02 RX ORDER — ALBUTEROL SULFATE 90 UG/1
1-2 AEROSOL, METERED RESPIRATORY (INHALATION)
Status: DISCONTINUED | OUTPATIENT
Start: 2022-11-02 | End: 2022-11-06 | Stop reason: HOSPADM

## 2022-11-02 RX ORDER — ACETAMINOPHEN 325 MG/1
650 TABLET ORAL EVERY 6 HOURS PRN
Status: DISCONTINUED | OUTPATIENT
Start: 2022-11-02 | End: 2022-11-06 | Stop reason: HOSPADM

## 2022-11-02 RX ORDER — LORAZEPAM 0.5 MG/1
.5-1 TABLET ORAL EVERY 6 HOURS PRN
Status: CANCELLED
Start: 2022-11-02

## 2022-11-02 RX ORDER — METHYLPREDNISOLONE SODIUM SUCCINATE 125 MG/2ML
125 INJECTION, POWDER, LYOPHILIZED, FOR SOLUTION INTRAMUSCULAR; INTRAVENOUS
Status: CANCELLED
Start: 2022-11-28

## 2022-11-02 RX ORDER — ACYCLOVIR 400 MG/1
400 TABLET ORAL 2 TIMES DAILY
Status: DISCONTINUED | OUTPATIENT
Start: 2022-11-02 | End: 2022-11-02

## 2022-11-02 RX ORDER — LORAZEPAM 2 MG/ML
.5-1 INJECTION INTRAMUSCULAR EVERY 6 HOURS PRN
Status: CANCELLED | OUTPATIENT
Start: 2022-11-02

## 2022-11-02 RX ORDER — DIPHENHYDRAMINE HYDROCHLORIDE 50 MG/ML
50 INJECTION INTRAMUSCULAR; INTRAVENOUS
Status: CANCELLED
Start: 2022-11-02

## 2022-11-02 RX ORDER — METHYLPREDNISOLONE SODIUM SUCCINATE 125 MG/2ML
125 INJECTION, POWDER, LYOPHILIZED, FOR SOLUTION INTRAMUSCULAR; INTRAVENOUS
Status: CANCELLED
Start: 2022-11-02

## 2022-11-02 RX ORDER — IMATINIB MESYLATE 100 MG/1
300 TABLET, FILM COATED ORAL 2 TIMES DAILY
Status: CANCELLED | OUTPATIENT
Start: 2022-11-02

## 2022-11-02 RX ORDER — LORAZEPAM 2 MG/ML
.5-1 INJECTION INTRAMUSCULAR EVERY 6 HOURS PRN
Status: DISCONTINUED | OUTPATIENT
Start: 2022-11-02 | End: 2022-11-06 | Stop reason: HOSPADM

## 2022-11-02 RX ORDER — ALBUTEROL SULFATE 90 UG/1
1-2 AEROSOL, METERED RESPIRATORY (INHALATION)
Status: CANCELLED
Start: 2022-11-17

## 2022-11-02 RX ORDER — ALBUTEROL SULFATE 0.83 MG/ML
2.5 SOLUTION RESPIRATORY (INHALATION)
Status: CANCELLED | OUTPATIENT
Start: 2022-11-02

## 2022-11-02 RX ORDER — EPINEPHRINE 1 MG/ML
0.3 INJECTION, SOLUTION, CONCENTRATE INTRAVENOUS EVERY 5 MIN PRN
Status: CANCELLED | OUTPATIENT
Start: 2022-11-28

## 2022-11-02 RX ORDER — ONDANSETRON 2 MG/ML
8 INJECTION INTRAMUSCULAR; INTRAVENOUS EVERY 8 HOURS PRN
Status: DISCONTINUED | OUTPATIENT
Start: 2022-11-02 | End: 2022-11-06 | Stop reason: HOSPADM

## 2022-11-02 RX ORDER — DIPHENHYDRAMINE HYDROCHLORIDE 50 MG/ML
50 INJECTION INTRAMUSCULAR; INTRAVENOUS
Status: CANCELLED
Start: 2022-11-17

## 2022-11-02 RX ORDER — PROCHLORPERAZINE MALEATE 5 MG
5-10 TABLET ORAL EVERY 6 HOURS PRN
Status: DISCONTINUED | OUTPATIENT
Start: 2022-11-02 | End: 2022-11-06 | Stop reason: HOSPADM

## 2022-11-02 RX ORDER — ALBUTEROL SULFATE 0.83 MG/ML
2.5 SOLUTION RESPIRATORY (INHALATION)
Status: DISCONTINUED | OUTPATIENT
Start: 2022-11-02 | End: 2022-11-06 | Stop reason: HOSPADM

## 2022-11-02 RX ORDER — VALACYCLOVIR HYDROCHLORIDE 500 MG/1
1000 TABLET, FILM COATED ORAL 3 TIMES DAILY
Status: DISCONTINUED | OUTPATIENT
Start: 2022-11-02 | End: 2022-11-06 | Stop reason: HOSPADM

## 2022-11-02 RX ORDER — ONDANSETRON 8 MG/1
8 TABLET, FILM COATED ORAL EVERY 8 HOURS PRN
Status: DISCONTINUED | OUTPATIENT
Start: 2022-11-02 | End: 2022-11-06 | Stop reason: HOSPADM

## 2022-11-02 RX ORDER — LORAZEPAM 0.5 MG/1
.5-1 TABLET ORAL DAILY PRN
Status: DISCONTINUED | OUTPATIENT
Start: 2022-11-02 | End: 2022-11-06 | Stop reason: HOSPADM

## 2022-11-02 RX ORDER — ONDANSETRON 8 MG/1
8 TABLET, FILM COATED ORAL EVERY 12 HOURS
Status: COMPLETED | OUTPATIENT
Start: 2022-11-02 | End: 2022-11-06

## 2022-11-02 RX ORDER — AMLODIPINE BESYLATE 5 MG/1
5 TABLET ORAL DAILY
Status: DISCONTINUED | OUTPATIENT
Start: 2022-11-02 | End: 2022-11-06 | Stop reason: HOSPADM

## 2022-11-02 RX ORDER — MEPERIDINE HYDROCHLORIDE 25 MG/ML
25 INJECTION INTRAMUSCULAR; INTRAVENOUS; SUBCUTANEOUS EVERY 30 MIN PRN
Status: CANCELLED | OUTPATIENT
Start: 2022-11-02

## 2022-11-02 RX ORDER — ALBUTEROL SULFATE 90 UG/1
1-2 AEROSOL, METERED RESPIRATORY (INHALATION)
Status: CANCELLED
Start: 2022-11-28

## 2022-11-02 RX ORDER — PROCHLORPERAZINE MALEATE 10 MG
10 TABLET ORAL EVERY 6 HOURS PRN
Status: DISCONTINUED | OUTPATIENT
Start: 2022-11-02 | End: 2022-11-02

## 2022-11-02 RX ORDER — DIPHENHYDRAMINE HYDROCHLORIDE 50 MG/ML
50 INJECTION INTRAMUSCULAR; INTRAVENOUS
Status: DISCONTINUED | OUTPATIENT
Start: 2022-11-02 | End: 2022-11-06 | Stop reason: HOSPADM

## 2022-11-02 RX ORDER — POLYETHYLENE GLYCOL 3350 17 G/17G
17 POWDER, FOR SOLUTION ORAL DAILY PRN
Status: DISCONTINUED | OUTPATIENT
Start: 2022-11-02 | End: 2022-11-06 | Stop reason: HOSPADM

## 2022-11-02 RX ORDER — ONDANSETRON 8 MG/1
8 TABLET, ORALLY DISINTEGRATING ORAL EVERY 8 HOURS PRN
Status: DISCONTINUED | OUTPATIENT
Start: 2022-11-02 | End: 2022-11-06 | Stop reason: HOSPADM

## 2022-11-02 RX ORDER — METHYLPREDNISOLONE SODIUM SUCCINATE 125 MG/2ML
125 INJECTION, POWDER, LYOPHILIZED, FOR SOLUTION INTRAMUSCULAR; INTRAVENOUS
Status: CANCELLED
Start: 2022-11-17

## 2022-11-02 RX ORDER — EPINEPHRINE 1 MG/ML
0.3 INJECTION, SOLUTION, CONCENTRATE INTRAVENOUS EVERY 5 MIN PRN
Status: DISCONTINUED | OUTPATIENT
Start: 2022-11-02 | End: 2022-11-06 | Stop reason: HOSPADM

## 2022-11-02 RX ORDER — PROCHLORPERAZINE MALEATE 10 MG
10 TABLET ORAL EVERY 6 HOURS PRN
Status: CANCELLED
Start: 2022-11-02

## 2022-11-02 RX ORDER — METHYLPREDNISOLONE SODIUM SUCCINATE 125 MG/2ML
125 INJECTION, POWDER, LYOPHILIZED, FOR SOLUTION INTRAMUSCULAR; INTRAVENOUS
Status: DISCONTINUED | OUTPATIENT
Start: 2022-11-02 | End: 2022-11-06 | Stop reason: HOSPADM

## 2022-11-02 RX ORDER — ALBUTEROL SULFATE 90 UG/1
1-2 AEROSOL, METERED RESPIRATORY (INHALATION)
Status: CANCELLED
Start: 2022-11-02

## 2022-11-02 RX ORDER — ONDANSETRON 8 MG/1
8 TABLET, FILM COATED ORAL EVERY 12 HOURS
Status: CANCELLED | OUTPATIENT
Start: 2022-11-02

## 2022-11-02 RX ORDER — MEPERIDINE HYDROCHLORIDE 25 MG/ML
25 INJECTION INTRAMUSCULAR; INTRAVENOUS; SUBCUTANEOUS EVERY 30 MIN PRN
Status: CANCELLED | OUTPATIENT
Start: 2022-11-28

## 2022-11-02 RX ORDER — MEPERIDINE HYDROCHLORIDE 25 MG/ML
25 INJECTION INTRAMUSCULAR; INTRAVENOUS; SUBCUTANEOUS EVERY 30 MIN PRN
Status: DISCONTINUED | OUTPATIENT
Start: 2022-11-02 | End: 2022-11-06 | Stop reason: HOSPADM

## 2022-11-02 RX ORDER — AMOXICILLIN 250 MG
2 CAPSULE ORAL 2 TIMES DAILY PRN
Status: DISCONTINUED | OUTPATIENT
Start: 2022-11-02 | End: 2022-11-06 | Stop reason: HOSPADM

## 2022-11-02 RX ORDER — ENOXAPARIN SODIUM 100 MG/ML
40 INJECTION SUBCUTANEOUS EVERY 24 HOURS
Status: DISCONTINUED | OUTPATIENT
Start: 2022-11-02 | End: 2022-11-03

## 2022-11-02 RX ORDER — IMATINIB MESYLATE 400 MG/1
400 TABLET, FILM COATED ORAL 2 TIMES DAILY
Status: DISCONTINUED | OUTPATIENT
Start: 2022-11-02 | End: 2022-11-02

## 2022-11-02 RX ORDER — PANTOPRAZOLE SODIUM 40 MG/1
40 TABLET, DELAYED RELEASE ORAL DAILY
Status: DISCONTINUED | OUTPATIENT
Start: 2022-11-03 | End: 2022-11-06 | Stop reason: HOSPADM

## 2022-11-02 RX ORDER — DIPHENHYDRAMINE HYDROCHLORIDE 50 MG/ML
50 INJECTION INTRAMUSCULAR; INTRAVENOUS
Status: CANCELLED
Start: 2022-11-28

## 2022-11-02 RX ORDER — EPINEPHRINE 1 MG/ML
0.3 INJECTION, SOLUTION INTRAMUSCULAR; SUBCUTANEOUS EVERY 5 MIN PRN
Status: CANCELLED | OUTPATIENT
Start: 2022-11-02

## 2022-11-02 RX ORDER — LORAZEPAM 0.5 MG/1
.5-1 TABLET ORAL EVERY 6 HOURS PRN
Status: DISCONTINUED | OUTPATIENT
Start: 2022-11-02 | End: 2022-11-06 | Stop reason: HOSPADM

## 2022-11-02 RX ORDER — IMATINIB MESYLATE 100 MG/1
300 TABLET, FILM COATED ORAL 2 TIMES DAILY
Status: DISCONTINUED | OUTPATIENT
Start: 2022-11-02 | End: 2022-11-06 | Stop reason: HOSPADM

## 2022-11-02 RX ORDER — VALACYCLOVIR HYDROCHLORIDE 500 MG/1
1000 TABLET, FILM COATED ORAL EVERY 12 HOURS SCHEDULED
Status: DISCONTINUED | OUTPATIENT
Start: 2022-11-02 | End: 2022-11-02

## 2022-11-02 RX ORDER — ALBUTEROL SULFATE 0.83 MG/ML
2.5 SOLUTION RESPIRATORY (INHALATION)
Status: CANCELLED | OUTPATIENT
Start: 2022-11-17

## 2022-11-02 RX ORDER — PENTAMIDINE ISETHIONATE 300 MG/300MG
300 INHALANT RESPIRATORY (INHALATION) ONCE
Status: CANCELLED
Start: 2022-11-28 | End: 2022-11-28

## 2022-11-02 RX ORDER — DEXAMETHASONE 4 MG/1
40 TABLET ORAL DAILY
Status: CANCELLED | OUTPATIENT
Start: 2022-11-02

## 2022-11-02 RX ORDER — EPINEPHRINE 1 MG/ML
0.3 INJECTION, SOLUTION INTRAMUSCULAR; SUBCUTANEOUS EVERY 5 MIN PRN
Status: CANCELLED | OUTPATIENT
Start: 2022-11-17

## 2022-11-02 RX ORDER — ALBUTEROL SULFATE 0.83 MG/ML
2.5 SOLUTION RESPIRATORY (INHALATION) ONCE
Status: CANCELLED
Start: 2022-11-28 | End: 2022-11-28

## 2022-11-02 RX ORDER — DEXAMETHASONE 4 MG/1
40 TABLET ORAL
Status: COMPLETED | OUTPATIENT
Start: 2022-11-02 | End: 2022-11-05

## 2022-11-02 RX ORDER — AMOXICILLIN 250 MG
1 CAPSULE ORAL 2 TIMES DAILY PRN
Status: DISCONTINUED | OUTPATIENT
Start: 2022-11-02 | End: 2022-11-06 | Stop reason: HOSPADM

## 2022-11-02 RX ORDER — ALBUTEROL SULFATE 0.83 MG/ML
2.5 SOLUTION RESPIRATORY (INHALATION)
Status: CANCELLED | OUTPATIENT
Start: 2022-11-28

## 2022-11-02 RX ADMIN — IMATINIB MESYLATE 300 MG: 100 TABLET, FILM COATED ORAL at 19:44

## 2022-11-02 RX ADMIN — VALACYCLOVIR HYDROCHLORIDE 1000 MG: 500 TABLET, FILM COATED ORAL at 22:10

## 2022-11-02 RX ADMIN — VALACYCLOVIR HYDROCHLORIDE 1000 MG: 500 TABLET, FILM COATED ORAL at 16:41

## 2022-11-02 RX ADMIN — MESNA 1390 MG: 100 INJECTION, SOLUTION INTRAVENOUS at 19:29

## 2022-11-02 RX ADMIN — AZD7442 6 ML: KIT at 12:01

## 2022-11-02 RX ADMIN — CYCLOPHOSPHAMIDE 695 MG: 2 INJECTION, POWDER, FOR SOLUTION INTRAVENOUS; ORAL at 20:44

## 2022-11-02 RX ADMIN — FOSAPREPITANT 150 MG: 150 INJECTION, POWDER, LYOPHILIZED, FOR SOLUTION INTRAVENOUS at 19:16

## 2022-11-02 RX ADMIN — ONDANSETRON HYDROCHLORIDE 8 MG: 8 TABLET, FILM COATED ORAL at 19:40

## 2022-11-02 RX ADMIN — DEXAMETHASONE 40 MG: 4 TABLET ORAL at 19:40

## 2022-11-02 ASSESSMENT — ACTIVITIES OF DAILY LIVING (ADL)
ADLS_ACUITY_SCORE: 20

## 2022-11-02 ASSESSMENT — PAIN SCALES - GENERAL: PAINLEVEL: NO PAIN (0)

## 2022-11-02 NOTE — H&P
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 Now admitted for cycle 3 GRAALL + imatinib.     HEME  # Ph(+) B-ALL  Followed by Dr. Hull. Patient presented to outside hospital with c/o right upper quadrant pain and was subsequently found to have a markedly elevated white blood cell count concerning for acute leukemia. While at OSH, she had CTA chest as well as CT A/P. These identified no PE, no acute or localized intraabdominal inflammatory process, mild splenomegaly, and few inconspicous low-density structures in the liver. She was transferred to [facility] for further workup and management. S/p Hydrea 1g TID (8/21-8/23) with improvement in WBC (100K ? 12K). Diagnostic BMBx completed 8/20/22, which demonstrated B-ALL with 85% blasts and hypercellular marrow. IHC shows CD10+, CD34+. 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 ALL NGS negative. BCR/ABL1 major/minor (sent from peripheral blood) both negative. Initiated pre-phase steroids with Prednisone 60 mg x8/21; increased to 60 mg/m2 (140 mg) 8/22-8/24. She initiated GRAALL + imatinib (C1D1 = 8/24/22). S/p BMT consult 9/12 with  Dr. Walsh. Induction was relatively uncomplicated. Post-induction BMBx done 9/26/22 and revealed MRD- CR1, with negative BCR/ABL1 and Clonoseq testing. She was admitted for C2 GRAALL on 9/30/22. Underwent BMBx 10/25/22 with no morphologic evidence of B-lymphoblastic leukemia and flow with unusual B lineage precursor population 0.09%. Currently she is being admitted for cycle 3 GRAALL + imatinib.   - PICC placed prior to admission  - Seen by Dr. Hull's note prior to admission. Proceed with cycle 3 GRAALL + imatinib, 10/31 meet parameters. At this point in protocol, imatinib dose drops to 300 mg per Dr. Hull. Will await Dr. Hull's signed progress note today for full visit recommendations.   - Will need new prescription for imatinib 300 mg BID at discharge, as previous doses were 400 mg BID. Of note, discharge pharmacy does not stock 300 mg strength, so will likely need to send prescription to  specialty pharmacy.    - Will need to clarify with LT[facility], located within [facility] tomorrow whether IT chemo is indicated per protocol with cycle 3. Pt has no h/o CNS disease. Of note, she requires LP with IT chemo in IR.      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 She was empirically started on IV  acyclovir (x10/10). She was evaluated by ID, derm and opthalmology. She was discharged to complete a 14-day course of IV acyclovir through (10/24), but it appears it was prolonged until ~11/2 to use up home supply.   - Per outpatient ID note, recommend Valtrex 1000 mg TID until completion of chemotherapy and complete resolution of suspected VZV lesions completely resolve. Then recommend Valtrex 1000 mg BID until count recovery for prophylaxis as she broke through ACV.     # ID PPx  - Valtrex 1000 mg TID until completes chemo, suspected VZV lesions completely resolve, and her ANC >1000 per ID note with Dr. Diaz on 11/1  - Held PTA fluconazole 200 mg daily and levofloxacin 250 mg daily given ANC >1.0  - Nebulized pentamidine last given 10/31, next due ~11/28    MISC  # Hypertension   - Continue PTA amlodipine 5 mg daily     # GERD   - PTA Protonix 20 mg daily. Increased to 40 mg during admission with high-dose steroids.      # Grade 1 neuropathy of the fingertips   Due to vincristine. Pt reports symptoms are stable. Reports mild paresthesias and hot/cold sensitivity.   - Monitor clinically    FEN:  - IVF per chemo protocol   - PRN lyte replacement  - RDAT    Prophy/Misc:  - VTE: Lovenox; hold if platelets <50K   - GI/PUD: Protonix   - Bowels: PRN Senna and Miralax     Dispo: Anticipate discharge ~Sunday for completion of chemo regimen  **Of note, pt reports having a commitment she wishes to attend on Sunday at 5 p.m. If there is any way to adjust chemo plan to make this happen, pt would appreciate it.     This patient was discussed with Dr. Recinos.    I spent 65 minutes face-to-face or coordinating care of Vashti Villegas. Over 50% of our time on the unit was spent counseling the patient and/or coordinating care.    Kajal Barajas PA-C  Pager: [phone]    Code Status   Full Code    History of Present Illness   Vashti Villegas is a 42 year old female who presents with past medical history of Ph (+) B-ALL s/p induction  chemotherapy with GRAALL regimen + imatinib (D1=8/24/22) and subsequently in MRD-CR1 she then proceeded with cycle 2 GRAALL (D1= 9/30/22) complicated by disseminated zoster infection. Now admitted for cycle 3 GRAALL + imatinib.     She has been doing well at home. Normal appetite, functional status, and energy levels. She feels that she has tolerated past cycles of GRAALL well other than disseminated shingles. Lesions are improved and now completely crusted over. She has mild peripheral neuropathy of finger tips, stable paresthesias and hot/cold sensitivity. She report nasal congestion for 2 days. Denies cough, sore throat, shortness of breath. Reports mild irritation around PICC.    Denies headache, vision changes, nausea, abdominal pain, diarrhea, constipation, dysuria.     Past Medical History    I have reviewed this patient's medical history and updated it with pertinent information if needed.   Past Medical History:   Diagnosis Date     ALL (acute lymphocytic leukemia) (H)      Other acne      Shingles      Past Surgical History   I have reviewed this patient's surgical history and updated it with pertinent information if needed.  Past Surgical History:   Procedure Laterality Date     IR BONE BIOPSY DEEP RIGHT  10/25/2022     IR LUMBAR PUNCTURE  10/21/2022     PICC Left 09/30/2022    Picc ok to use     PICC DOUBLE LUMEN PLACEMENT Left 08/20/2022    left cephalic 5 fr dl picc 44 cm     ZZC NONSPECIFIC PROCEDURE  01/01/1999    benign mole removal     Prior to Admission Medications   Prior to Admission Medications   Prescriptions Last Dose Informant Patient Reported? Taking?   CHOLECALCIFEROL PO 10/31/2022 at AM  Yes Yes   Sig: Take 1 tablet by mouth daily Unknown strength.   LORazepam (ATIVAN) 0.5 MG tablet Past Month  No Yes   Sig: Take 1-2 tablets (0.5-1 mg) by mouth daily as needed for anxiety . Take prior to BMBx procedures as needed. If initial dose unhelpful, okay to take a second 0.5-1 mg dose (for a max  of 2 mg at a time). Caution: causes sedation. Do not drive after taking this medication.   acetaminophen (TYLENOL) 325 MG tablet Past Month  Yes Yes   Sig: Take 2 tablets (650 mg) by mouth every 6 hours as needed for mild pain, other or fever (blood product administration premedication)   amLODIPine (NORVASC) 5 MG tablet 2022 at AM  No Yes   Sig: Take 1 tablet (5 mg) by mouth daily   clindamycin (CLEOCIN T) 1 % external lotion Past Week  No Yes   Sig: Apply topically 2 times daily to skin lesions for 13 more days to complete a 2-week course (10/12-10/25).   fluconazole (DIFLUCAN) 200 MG tablet 2022 at AM  No Yes   Sig: Take 1 tablet (200 mg) by mouth daily as needed (Take when ANC <1000)   heparin lock flush 10 UNIT/ML SOLN injection 2022 at PM  No Yes   Sig: 10 mLs by Intracatheter route daily Flush 5 mL into each PICC lumen daily.   imatinib (GLEEVEC) 400 MG tablet Past Month  Yes Yes   Sig: Take 400 mg by mouth 2 times daily . HOLD ON DISCHARGE until count recovery.   levofloxacin (LEVAQUIN) 250 MG tablet 2022 at AM  No Yes   Sig: Take 1 tablet (250 mg) by mouth daily as needed (ANC <1000)   ondansetron (ZOFRAN ODT) 4 MG ODT tab   No No   Sig: Take 1-2 tablets (4-8 mg) by mouth every 8 hours as needed for nausea or vomiting   Patient not taking: Reported on 10/18/2022   pantoprazole (PROTONIX) 20 MG EC tablet 2022 at AM  No Yes   Sig: Take 1 tablet (20 mg) by mouth daily   prochlorperazine (COMPAZINE) 10 MG tablet   No No   Sig: Take 1 tablet (10 mg) by mouth every 6 hours as needed (Breakthrough Nausea/Vomiting)   Patient not taking: Reported on 10/18/2022   sodium chloride, PF, (SALINE FLUSH) 0.9% PF flush 2022  No Yes   Si mLs by Intracatheter route daily Flush PICC with 20 mL daily, 10 mL per lumen   valACYclovir (VALTREX) 1000 mg tablet Not Taking  No No   Sig: Take 1 tablet (1,000 mg) by mouth 3 times daily for 14 days   Patient not taking: Reported on 2022       Facility-Administered Medications: None     Allergies   Allergies   Allergen Reactions     Blood Transfusion Related (Informational Only) Hives     Hive reaction with platelet transfusion on 8/26/2022. Please administer platelets with tylenol and benadryl premedication.      Social History   I have reviewed this patient's social history and updated it with pertinent information if needed. Vashti Villegas  reports that she has never smoked. She has never used smokeless tobacco.    Family History   I have reviewed this patient's family history and updated it with pertinent information if needed.   No family history on file.    Review of Systems   A comprehensive review of symptoms was obtained and negative unless noted above.    Physical Exam   Temp: 98.1  F (36.7  C) Temp src: Oral BP: 137/78 Pulse: 98   Resp: 18 SpO2: 98 % O2 Device: None (Room air)    Vital Signs with Ranges  Temp:  [98.1  F (36.7  C)-98.2  F (36.8  C)] 98.1  F (36.7  C)  Pulse:  [94-98] 98  Resp:  [16-18] 18  BP: (121-137)/(78-85) 137/78  SpO2:  [98 %-99 %] 98 %  248 lbs 1.6 oz    Constitutional: Pleasant, seen resting comfortably in bed in NAD. Alert and interactive.   HEENT: NCAT. PERRL, EOMI, anicteric sclera. Oral mucosa pink and moist with no lesions or thrush.  Respiratory: Non-labored breathing, good air exchange, lungs clear to auscultation bilaterally. No cough or wheeze noted.   Cardiovascular: Regular rate and rhythm. No murmur or rub.   GI: Normoactive bowel sounds. Abdomen soft, non-distended, and non-tender. No palpable masses or organomegaly.  Skin: Warm and dry. No concerning lesions or rash on exposed surfaces.  Musculoskeletal: Extremities grossly normal, no edema. Good strength and ROM in bed.   Neuropsychiatric: Mentation and affect appear normal/appropriate.  Vascular Access: PICC is CDI without swelling or discharge. Very mild erythema beneath clear PICC dressing.     Data   No results found for this or any previous visit  (from the past 24 hour(s)).

## 2022-11-02 NOTE — PROGRESS NOTES
[facility]: Cancer Care Plan of Care Education Note                                    Discussion with Patient:                                                      I met with April and her  Crescencio at the time of her visit with Dr. Steve Hull.      April is admitting to the hospital today for cycle 3 GRAALL.  Side effects of chemotherapy were reviewed by Dr. Hull and I reinforced teaching with focus on infection prevention and when to contact a provider. I explained that if she has immediate needs and symptom issues she should contact RN triage.  We also reviewed how to contact me     April works as a  and is currently on short term disability.  She will need an update for continuation of her disability and will contact our office with that form.  She asks about working once she has been discharged from the hospital. She states she can work from home and she would like to be able to work if able.  It was stressed that she should be careful not to cause herself too much stress and to also avoid any infection risks.   This question can be reviewed with the providers upon discharge.    April and Crescencio have 3 children.  Two are in high school and one in middle school.  She states they are doing fine with her diagnosis. We discussed that they should continue to be open in their communication with them about her disease.    April continues with hair loss and was asked if she was interested in a wig.  She declines at this time.  She is careful with her scalp especially due to  and is keeping it covered with a scarf while outside.        Assessment:                                                      Assessment completed with:: Patient;Spouse or significant other    Current living arrangement  I live in a private home with family    Plan of Care Education   Does patient understand diagnosis?: Yes  Vascular access:: PICC (Port to be placed before Cycle 4)  Side effect education::  Diarrhea/Constipation;Fatigue;Hair loss;Infection;Lab value monitoring (anemia, neutropenia, thrombocytopenia);Mouth sores;Neuropathy;Mylosuppression;Skin changes  Transportation means:: Regular car  Safety/self care at home reviewed with patient:: Yes  Informal Support system:: Family;Spouse  Coping - concerns/fears reviewed with patient:: Yes  When to call provider:: Bleeding;Increased shortness of breath;New/worsening pain;Shaking chills;Temperature >100.4F;Uncontrolled diarrhea/constipation;Uncontrolled nausea/vomiting    Evaluation of Learning  Patient Education Provided: Yes  Readiness:: Acceptance  Method:: Explanation  Response:: Verbalizes understanding      Intervention/Education provided during outreach:                                                       April will be scheduled for follow up visits after discharge from the hospital    Signature:  Flor Leslie RN

## 2022-11-02 NOTE — PLAN OF CARE
Nursing Focus: Admission    D: Patient admitted from home for chemotherapy. Patient has a history of AML.     I: Upon arrival to the unit patient was oriented to room, unit, and call light. Patient s height, weight, and vital signs were obtained. Allergies reviewed and allergy band applied. MD notified of patient s arrival on the unit. Adult AVS completed. Head to toe assessment completed. Full skin assessment completed. Education assessment completed. Care plan initiated.    A: Vital signs stable upon admission. Patient rates pain at 1/10 from shot received yesterday. Unable to complete double skin check at this time, will be completed on evening shift.     P: Continue to monitor patient s urine output while on chemo and intervene as needed. Continue with plan of care. Notify MD with any concerns or changes in patient status.

## 2022-11-02 NOTE — PROGRESS NOTES
[facility]  Follow up Visit  Nov 2, 2022    Hem/onc History:     --Patient presented to outside hospital with c/o right upper quadrant pain and was subsequently found to have a markedly elevated white blood cell count concerning for acute leukemia. While at OSH, she had CT PE protocol as well as CT A/P. These identified no PE, no acute or localized intraabdominal inflammatory process, mild splenomegaly, and few inconspicous low-density structures in the liver. She was transferred to [facility] for further workup and management. S/p Hydrea 1g TID (8/21-8/23) with improvement in WBC (100K ? 12K).   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 IHC shows CD10+, CD34+. 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 Abnormal cytogenetics. Initiated pre-phase steroids with Prednisone 60 mg x8/21; increased to 60 mg/m2 (140 mg) 8/22-8/24. She initiated GRAALL + imatinib (C1D1 = 8/24/22).   --S/p BMT consult 9/12 with Dr. Walsh. Induction was relatively uncomplicated. Post-induction BMBx done 9/26. Morphology inconclusive due to inadequate core but flow negative.  BCR/abl major and minor undetectable (as were baseline tests). She was admitted for C2 GRAALL.    --BMBx 9/26/22 MRD-negative by BCR-ABL PCR and by Clonoseq Assay.      Treatment Plan: GRAALL + imatinib (C2D1=9/30/22)  - Imatinib 400 mg BID - D1-14 (9/29-10/12)  - IVFs per chemo protocol, bicarb support  - Methotrexate 1 g/m2 IV x once on D1. Administered over 24 hours  - Leucovorin calcium q6hr starting 12 hr after END of methotrexate infusion. Continue until methotrexate level <0.05  - cytarabine 3 g/m2 IV q12 hr x 4 doses on Days 2-3  - Premeds: Zofran 16mg D1, 8mg q12hr x 4 doses (D2-3);  Dex 12mg D1-3, 8mg D4-5  - Supportive Care: Pred Forte eye drops  - IT chemo on D9. Will need under imaging guidance. As D9 falls on 10/8 (weekend),  requested procedure for 10/7. She will have labs on 10/6 (already scheduled) to determine platelet count in anticipation of this procedure as well.   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     Post-cycle-2 BMBx 10/25/2022 showed MRD-negative CR, though low-level abnormal B-cell population (favor hematogones over residual leukemia) was noted. Clonoseq negative at 1/10E6 level.      # CNS prophylaxis  Per the GRAALL protocol and given risk for CNS involvement of ALL. Will require LPs under fluoroscopic guidance.   - s/p weekly triple IT chemo (Cytarabine, Solu-Cortef, MTX) on Days 1, 8, and 15 of C1 induction. She additionally had repeat LP on 9/14. CSF flow has remained negative.     History of Present Illness:   April Bakari returns. Lesions on her scalp have crusted over. She is otherwise feeling well. Grade 1 neuropathy in her fingertips and toes; stable.    12-point ROS negative.     She is to be admitted today for Cycle 3 GRAAL chemotherapy +  imatinib.       Medications:       No current facility-administered medications for this visit.     No current outpatient medications on file.     Facility-Administered Medications Ordered in Other Visits   Medication     0.9% sodium chloride BOLUS     acetaminophen (TYLENOL) tablet 650 mg     albuterol (PROVENTIL HFA/VENTOLIN HFA) inhaler     albuterol (PROVENTIL) neb solution 2.5 mg     amLODIPine (NORVASC) tablet 5 mg     Chemotherapy Infusing-Continuous Infusion     [START ON 11/5/2022] Chemotherapy Infusing-Continuous Infusion     cyclophosphamide (CYTOXAN) 695 mg in sodium chloride 0.9 % 309.75 mL infusion     dexamethasone (DECADRON) tablet 40 mg     diphenhydrAMINE (BENADRYL) injection 50 mg     [START ON 11/5/2022] DOXOrubicin (ADRIAMYCIN) 120 mg in sodium chloride 0.9 % 1,060 mL infusion     enoxaparin ANTICOAGULANT (LOVENOX) injection 40 mg     EPINEPHrine PF (ADRENALIN) injection 0.3 mg     famotidine (PEPCID) injection  20 mg     fosaprepitant (EMEND) 150 mg in sodium chloride 0.9 % 275 mL intermittent infusion     heparin lock flush 10 UNIT/ML injection 3 mL     imatinib (GLEEVEC) tablet 300 mg     LORazepam (ATIVAN) injection 0.5-1 mg     LORazepam (ATIVAN) tablet 0.5-1 mg     LORazepam (ATIVAN) tablet 0.5-1 mg     meperidine (DEMEROL) injection 25 mg     mesna (MESNEX) 1,390 mg in sodium chloride 0.9 % 1,063.9 mL infusion     methylPREDNISolone sodium succinate (solu-MEDROL) injection 125 mg     No rectal suppositories if WBC less than 1000/microliters or platelets less than 50,000/ L     ondansetron (ZOFRAN) injection 8 mg    Or     ondansetron (ZOFRAN ODT) ODT tab 8 mg    Or     ondansetron (ZOFRAN) tablet 8 mg     ondansetron (ZOFRAN) tablet 8 mg     [START ON 11/3/2022] pantoprazole (PROTONIX) EC tablet 40 mg     polyethylene glycol (MIRALAX) Packet 17 g     prochlorperazine (COMPAZINE) tablet 5-10 mg    Or     prochlorperazine (COMPAZINE) injection 5-10 mg     senna-docusate (SENOKOT-S/PERICOLACE) 8.6-50 MG per tablet 1 tablet    Or     senna-docusate (SENOKOT-S/PERICOLACE) 8.6-50 MG per tablet 2 tablet     valACYclovir (VALTREX) tablet 1,000 mg     [START ON 11/5/2022] vinCRIStine (ONCOVIN) 2 mg in sodium chloride 0.9 % 29.5 mL infusion        Physical Exam:   Blood pressure 121/85, pulse 94, temperature 98.2  F (36.8  C), temperature source Oral, resp. rate 16, weight 113.4 kg (250 lb), SpO2 99 %.    ECOG 1    Physical Exam conducted via video conference:  General: Appears well, no acute distress.  Seated during interview. Able to ambulate without obvious deficit.  Skin: Scattered vesiculopapular lesions over scalp, crusted over.   Head and Neck:  No visible JVP. No masses.  ROM at neck without deficit.   EENT: EOMI. No visible nystagmus. No lid lesions.  Anicteric.  Voice without hoarseness. No gross hearing deficits.   Pulmonary: Respiratory rate ~16.  No audible wheezing or stridor. No cough.  Normal work of breathing.    Heart: Acyanotic.  No visible lower extremity edema.   Abdomen: Nondistended. No visible abnormal veinous patterns.   MS: ROM without deficit in upper and lower extremities  Psych:  Affect is appropriate.  No psychomotor agitation  Data:     I personally reviewed the following studies:    Recent Labs   Lab Test 09/26/22  1358 09/20/22  1332 09/14/22  0704 09/13/22  0716 09/12/22  0936   WBC 6.8 10.9 6.8 6.8 6.0   NEUTROPHIL 76 70 61 60 51   HGB 8.1* 8.6* 7.5* 7.6* 8.0*    354 386 347 328     Recent Labs   Lab Test 09/26/22  1358 09/14/22  0704 09/13/22  0716 09/12/22  1623 09/12/22  0936 09/11/22  1708 09/11/22  0941    139 135*  --  138  --  139   POTASSIUM 4.0 4.1 4.0 3.8 3.4   < > 3.3*   CHLORIDE 108* 106 102  --  105  --  103   CO2 21* 27 27  --  27  --  25   ANIONGAP 10 6* 6*  --  6*  --  11   BUN 13.2 15.7 13.4  --  13.9  --  14.0   CR 0.55 0.74 0.71  --  0.79  --  0.66   SOLEDAD 8.8 8.4* 8.2*  --  8.2*  --  8.2*    < > = values in this interval not displayed.     Recent Labs   Lab Test 09/14/22  0704 09/13/22  0716 09/12/22  0936 09/11/22  0941 09/10/22  0647 09/09/22  0618 09/08/22  0831 09/06/22  0835 09/05/22  0749 09/02/22  0745 09/01/22  0548   MAG  --   --   --   --  2.1 2.1 1.8  --  2.0   < > 2.1   PHOS 4.4 3.6 2.5   < > 3.0 3.4 3.4   < > 2.8   < > 2.3*   LDH  --   --   --   --   --   --  219  --  189  --  260*   URIC  --   --   --   --   --   --  2.1*  --  1.4*  --  1.7*    < > = values in this interval not displayed.     Recent Labs   Lab Test 09/26/22  1358 09/14/22  0704 09/12/22  0936 09/09/22  0618 09/08/22  0831 09/07/22  0559 09/05/22  0749 09/02/22  0745 09/01/22  0548   BILITOTAL 0.3 0.3 0.6   < >  --    < > 0.7   < >  --    ALKPHOS 83 77 77   < >  --    < > 62   < >  --    ALT 18 27 28   < >  --    < > 30   < >  --    AST 17 16 16   < >  --    < > 17   < >  --    ALBUMIN 3.8 3.4* 3.5   < >  --    < > 3.3*   < >  --    LDH  --   --   --   --  219  --  189  --  260*    < > = values  in this interval not displayed.       No results found for this or any previous visit (from the past 24 hour(s)).    Assessment and Plan:   Ms. Villegas is a 42 year old woman with recent diagnosis of Ph+ ALL.   Now in MRD- CR by BCR-ABL PCR as well as Clonoseq assay after cycle 1 and 2 of attenuated-dose GRAAL chemotherapy.   With no matched related donors, and excellent early/deep MRD-CR, chemotherapy is currently favored over BMT; this is supported by multiple recent clinical observations (Blood. 2016 Jul 28; 128(4): 504-507; Blood  . 2022 Jul 25;[id number]).     PLAN:  --Admit for Cycle 3 GRAAL + imatinib chemotherapy. May convert to Blinatumomab consolidation followed by maintenance in coming cycles.   --Intrathecal chemotherapy not built as part of standard [place] GRAAL chemotherapy plan; will address following hospital discharge.   --Valtrex 1000 mg TID throughout chemotherapy  --Evusheld given today  --Other supportive cares as per below     # CNS prophylaxis Will require all subsequent LPs under fluoroscopic guidance.   # Chemotherapy-induced Grade 1 neuropathy --will follow  # PPx  - Nebulized Pentamidine q28d for PJP ppx.              - Pentamidine given 10/2   - ppx Levaquin and Fluconazole when ANC <1.0.      # H/o COVID-19 infection   Not vaccinated (due to fear of needles per pt report).    # Hypertension: Amlodipine; Lasix as needed  # Indigestion/GERD    Steve Hull MD, PhD  Division of Hematology, Oncology, and Transplantation  [facility]

## 2022-11-02 NOTE — PHARMACY-ADMISSION MEDICATION HISTORY
Admission Medication History Completed by Pharmacy    See [facility] Admission Navigator for allergy information, preferred outpatient pharmacy, prior to admission medications and immunization status.     Medication History Sources:     Patient    Discharge Summary from 10/14/22    [facility]    Changes made to PTA medication list (reason):    Added: cholecalciferol (OTC pt takes at home)    Deleted: None    Changed: None    Additional Information:    Pt reports she has used up clindamycin 1% lotion at home; skin lesions still have not resolved.    Pt has not started taking valacyclovir. Prescription was sent to pharmacy yesterday, which pt has not picked up.      Prior to Admission medications    Medication Sig Last Dose Taking? Auth Provider Long Term End Date   acetaminophen (TYLENOL) 325 MG tablet Take 2 tablets (650 mg) by mouth every 6 hours as needed for mild pain, other or fever (blood product administration premedication) Past Month Yes Kajal Barajas PA-C     amLODIPine (NORVASC) 5 MG tablet Take 1 tablet (5 mg) by mouth daily 11/2/2022 at AM Yes Kajal Barajas PA-C Yes    CHOLECALCIFEROL PO Take 1 tablet by mouth daily Unknown strength. 10/31/2022 at AM Yes Unknown, Entered By History     clindamycin (CLEOCIN T) 1 % external lotion Apply topically 2 times daily to skin lesions for 13 more days to complete a 2-week course (10/12-10/25). Past Week Yes Bev Haines PA-C     fluconazole (DIFLUCAN) 200 MG tablet Take 1 tablet (200 mg) by mouth daily as needed (Take when ANC <1000) 11/2/2022 at AM Yes Maura Jorgensen PA-C     heparin lock flush 10 UNIT/ML SOLN injection 10 mLs by Intracatheter route daily Flush 5 mL into each PICC lumen daily. 11/1/2022 at PM Yes Kajal Barajas PA-C     imatinib (GLEEVEC) 400 MG tablet Take 400 mg by mouth 2 times daily . HOLD ON DISCHARGE until count recovery. Past Month Yes Bev Haines PA-C     levofloxacin (LEVAQUIN)  250 MG tablet Take 1 tablet (250 mg) by mouth daily as needed (ANC <1000) 11/2/2022 at AM Yes Maura Jorgensen PA-C     LORazepam (ATIVAN) 0.5 MG tablet Take 1-2 tablets (0.5-1 mg) by mouth daily as needed for anxiety . Take prior to BMBx procedures as needed. If initial dose unhelpful, okay to take a second 0.5-1 mg dose (for a max of 2 mg at a time). Caution: causes sedation. Do not drive after taking this medication. Past Month Yes Bev Haines PA-C     pantoprazole (PROTONIX) 20 MG EC tablet Take 1 tablet (20 mg) by mouth daily 11/2/2022 at AM Yes Sheila Hernandez APRN CNP     sodium chloride, PF, (SALINE FLUSH) 0.9% PF flush 20 mLs by Intracatheter route daily Flush PICC with 20 mL daily, 10 mL per lumen 11/2/2022 Yes Kajal Barajas PA-C     ondansetron (ZOFRAN ODT) 4 MG ODT tab Take 1-2 tablets (4-8 mg) by mouth every 8 hours as needed for nausea or vomiting  Patient not taking: Reported on 10/18/2022   Maura Jorgensen PA-C     prochlorperazine (COMPAZINE) 10 MG tablet Take 1 tablet (10 mg) by mouth every 6 hours as needed (Breakthrough Nausea/Vomiting)  Patient not taking: Reported on 10/18/2022   Kajal Barajas PA-C     valACYclovir (VALTREX) 1000 mg tablet Take 1 tablet (1,000 mg) by mouth 3 times daily for 14 days  Patient not taking: Reported on 11/2/2022 Not Taking  Chuck Diaz MD Yes 11/15/22       Date completed: 11/02/22    Medication history completed by: Veronika Morales, Pharmacy Student

## 2022-11-02 NOTE — PROGRESS NOTES
Nursing Focus: Chemotherapy  D: Positive blood return via CVC. Insertion site is clean/dry/intact, dressing intact with no complaints of pain.  Urine output is recorded in intake in Doc Flowsheet.    I: Premedications given per order (see electronic medical administration record). Dose #2 of Ifosfamide + Mesna started to infuse over 24 hours. Reviewed pt teaching on chemotherapy side effects.  Pt denies need for further teaching. Chemotherapy double checked per protocol by two chemotherapy competent RN's.   A: Tolerating procedure well. Denies nausea and or pain.   P: Continue to monitor urine output and symptoms of nausea. Screen for symptoms of toxicity.                                                 Ifosfamide Toxicity Assessment      Neuro asssessment completed Yes, significant findings were baseline unable to perform rapid hand motions with LUE.      Patient is Alert & Oriented x4. There are No signs of lethargy, confusion or hallucinations.     Patient is having new incontinence of bowel or bladder No.       Pincer Grasp intact Yes, LUE weakness baseline    Tremors negative baseline    Provider was notified No, no new changes during assessment.     -- Monitor for s/sx of ifosfamide toxicity: hallucinations, AMS, emotional lability, somnolence, myoclonic jerks, tremors, coordination difficulties, etc. If these occur, please call the fellow on call for recommendations

## 2022-11-02 NOTE — NURSING NOTE
THOMAS Administration Note:  Vashti Villegas presents today for [employer].   present during visit today: Not Applicable.    Consent:   Informed Consent confirmed in chart, obtained on this date: 11/2    Post Injection Assessment:   Patient tolerated injection without incident.      Patient was observed in the room for a minimum of 10 minutes after injection per standard, then remained in the buidling for a total 60 minute observation period.         Discharge Plan:    Patient and/or family verbalized understanding of discharge instructions and all questions answered.

## 2022-11-02 NOTE — NURSING NOTE
"Oncology Rooming Note    November 2, 2022 11:18 AM   Vashti Villegas is a 42 year old female who presents for:    Chief Complaint   Patient presents with     Oncology Clinic Visit     Acute lymphoblastic leukemia (ALL) not having achieved remission      Initial Vitals: /85 (BP Location: Left arm, Patient Position: Sitting, Cuff Size: Adult Large)   Pulse 94   Temp 98.2  F (36.8  C) (Oral)   Resp 16   Wt 113.4 kg (250 lb)   SpO2 99%   BMI 42.89 kg/m   Estimated body mass index is 42.89 kg/m  as calculated from the following:    Height as of 11/1/22: 1.626 m (5' 4.02\").    Weight as of this encounter: 113.4 kg (250 lb). Body surface area is 2.26 meters squared.  No Pain (0) Comment: Data Unavailable   No LMP recorded.  Allergies reviewed: Yes  Medications reviewed: Yes    Medications: Medication refills not needed today.  Pharmacy name entered into [facility]:    [id number] [place], [place] - [address]  [facility] - [place], [place] - [address]    Clinical concerns: No major changes reported.        Iman Quinonez LPN November 2, 2022 11:18 AM              "

## 2022-11-02 NOTE — LETTER
11/2/2022         RE: Vashti Villegas  [address] [place] [address]        Dear Colleague,    Thank you for referring your patient, Vashti Villegas, to the [facility]. Please see a copy of my visit note below.    [facility]  Follow up Visit  Nov 2, 2022    Hem/onc History:     --Patient presented to outside hospital with c/o right upper quadrant pain and was subsequently found to have a markedly elevated white blood cell count concerning for acute leukemia. While at OSH, she had CT PE protocol as well as CT A/P. These identified no PE, no acute or localized intraabdominal inflammatory process, mild splenomegaly, and few inconspicous low-density structures in the liver. She was transferred to [facility] for further workup and management. S/p Hydrea 1g TID (8/21-8/23) with improvement in WBC (100K ? 12K).   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 IHC shows CD10+, CD34+. 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 Abnormal cytogenetics. Initiated pre-phase steroids with Prednisone 60 mg x8/21; increased to 60 mg/m2 (140 mg) 8/22-8/24. She initiated GRAALL + imatinib (C1D1 = 8/24/22).   --S/p BMT consult 9/12 with Dr. Walsh. Induction was relatively uncomplicated. Post-induction BMBx done 9/26. Morphology inconclusive due to inadequate core but flow negative.  BCR/abl major and minor undetectable (as were baseline tests). She was admitted for C2 GRAALL.    --BMBx 9/26/22 MRD-negative by BCR-ABL PCR and by Clonoseq Assay.      Treatment Plan: GRAALL + imatinib (C2D1=9/30/22)  - Imatinib 400 mg BID - D1-14 (9/29-10/12)  - IVFs per chemo protocol, bicarb support  - Methotrexate 1 g/m2 IV x once on D1. Administered over 24 hours  - Leucovorin calcium q6hr starting 12 hr after END of methotrexate infusion. Continue until methotrexate level <0.05  -  cytarabine 3 g/m2 IV q12 hr x 4 doses on Days 2-3  - Premeds: Zofran 16mg D1, 8mg q12hr x 4 doses (D2-3);  Dex 12mg D1-3, 8mg D4-5  - Supportive Care: Pred Forte eye drops  - IT chemo on D9. Will need under imaging guidance. As D9 falls on 10/8 (weekend), requested procedure for 10/7. She will have labs on 10/6 (already scheduled) to determine platelet count in anticipation of this procedure as well.   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     Post-cycle-2 BMBx 10/25/2022 showed MRD-negative CR, though low-level abnormal B-cell population (favor hematogones over residual leukemia) was noted. Clonoseq negative at 1/10E6 level.      # CNS prophylaxis  Per the GRAALL protocol and given risk for CNS involvement of ALL. Will require LPs under fluoroscopic guidance.   - s/p weekly triple IT chemo (Cytarabine, Solu-Cortef, MTX) on Days 1, 8, and 15 of C1 induction. She additionally had repeat LP on 9/14. CSF flow has remained negative.     History of Present Illness:   April Echo returns. Lesions on her scalp have crusted over. She is otherwise feeling well. Grade 1 neuropathy in her fingertips and toes; stable.    12-point ROS negative.     She is to be admitted today for Cycle 3 GRAAL chemotherapy +  imatinib.       Medications:       No current facility-administered medications for this visit.     No current outpatient medications on file.     Facility-Administered Medications Ordered in Other Visits   Medication     0.9% sodium chloride BOLUS     acetaminophen (TYLENOL) tablet 650 mg     albuterol (PROVENTIL HFA/VENTOLIN HFA) inhaler     albuterol (PROVENTIL) neb solution 2.5 mg     amLODIPine (NORVASC) tablet 5 mg     Chemotherapy Infusing-Continuous Infusion     [START ON 11/5/2022] Chemotherapy Infusing-Continuous Infusion     cyclophosphamide (CYTOXAN) 695 mg in sodium chloride 0.9 % 309.75 mL infusion     dexamethasone (DECADRON) tablet 40 mg     diphenhydrAMINE  (BENADRYL) injection 50 mg     [START ON 11/5/2022] DOXOrubicin (ADRIAMYCIN) 120 mg in sodium chloride 0.9 % 1,060 mL infusion     enoxaparin ANTICOAGULANT (LOVENOX) injection 40 mg     EPINEPHrine PF (ADRENALIN) injection 0.3 mg     famotidine (PEPCID) injection 20 mg     fosaprepitant (EMEND) 150 mg in sodium chloride 0.9 % 275 mL intermittent infusion     heparin lock flush 10 UNIT/ML injection 3 mL     imatinib (GLEEVEC) tablet 300 mg     LORazepam (ATIVAN) injection 0.5-1 mg     LORazepam (ATIVAN) tablet 0.5-1 mg     LORazepam (ATIVAN) tablet 0.5-1 mg     meperidine (DEMEROL) injection 25 mg     mesna (MESNEX) 1,390 mg in sodium chloride 0.9 % 1,063.9 mL infusion     methylPREDNISolone sodium succinate (solu-MEDROL) injection 125 mg     No rectal suppositories if WBC less than 1000/microliters or platelets less than 50,000/ L     ondansetron (ZOFRAN) injection 8 mg    Or     ondansetron (ZOFRAN ODT) ODT tab 8 mg    Or     ondansetron (ZOFRAN) tablet 8 mg     ondansetron (ZOFRAN) tablet 8 mg     [START ON 11/3/2022] pantoprazole (PROTONIX) EC tablet 40 mg     polyethylene glycol (MIRALAX) Packet 17 g     prochlorperazine (COMPAZINE) tablet 5-10 mg    Or     prochlorperazine (COMPAZINE) injection 5-10 mg     senna-docusate (SENOKOT-S/PERICOLACE) 8.6-50 MG per tablet 1 tablet    Or     senna-docusate (SENOKOT-S/PERICOLACE) 8.6-50 MG per tablet 2 tablet     valACYclovir (VALTREX) tablet 1,000 mg     [START ON 11/5/2022] vinCRIStine (ONCOVIN) 2 mg in sodium chloride 0.9 % 29.5 mL infusion        Physical Exam:   Blood pressure 121/85, pulse 94, temperature 98.2  F (36.8  C), temperature source Oral, resp. rate 16, weight 113.4 kg (250 lb), SpO2 99 %.    ECOG 1    Physical Exam conducted via video conference:  General: Appears well, no acute distress.  Seated during interview. Able to ambulate without obvious deficit.  Skin: Scattered vesiculopapular lesions over scalp, crusted over.   Head and Neck:  No visible JVP.  No masses.  ROM at neck without deficit.   EENT: EOMI. No visible nystagmus. No lid lesions.  Anicteric.  Voice without hoarseness. No gross hearing deficits.   Pulmonary: Respiratory rate ~16.  No audible wheezing or stridor. No cough.  Normal work of breathing.   Heart: Acyanotic.  No visible lower extremity edema.   Abdomen: Nondistended. No visible abnormal veinous patterns.   MS: ROM without deficit in upper and lower extremities  Psych:  Affect is appropriate.  No psychomotor agitation  Data:     I personally reviewed the following studies:    Recent Labs   Lab Test 09/26/22  1358 09/20/22  1332 09/14/22  0704 09/13/22  0716 09/12/22  0936   WBC 6.8 10.9 6.8 6.8 6.0   NEUTROPHIL 76 70 61 60 51   HGB 8.1* 8.6* 7.5* 7.6* 8.0*    354 386 347 328     Recent Labs   Lab Test 09/26/22  1358 09/14/22  0704 09/13/22  0716 09/12/22  1623 09/12/22  0936 09/11/22  1708 09/11/22  0941    139 135*  --  138  --  139   POTASSIUM 4.0 4.1 4.0 3.8 3.4   < > 3.3*   CHLORIDE 108* 106 102  --  105  --  103   CO2 21* 27 27  --  27  --  25   ANIONGAP 10 6* 6*  --  6*  --  11   BUN 13.2 15.7 13.4  --  13.9  --  14.0   CR 0.55 0.74 0.71  --  0.79  --  0.66   SOLEDAD 8.8 8.4* 8.2*  --  8.2*  --  8.2*    < > = values in this interval not displayed.     Recent Labs   Lab Test 09/14/22  0704 09/13/22  0716 09/12/22  0936 09/11/22  0941 09/10/22  0647 09/09/22  0618 09/08/22  0831 09/06/22  0835 09/05/22  0749 09/02/22  0745 09/01/22  0548   MAG  --   --   --   --  2.1 2.1 1.8  --  2.0   < > 2.1   PHOS 4.4 3.6 2.5   < > 3.0 3.4 3.4   < > 2.8   < > 2.3*   LDH  --   --   --   --   --   --  219  --  189  --  260*   URIC  --   --   --   --   --   --  2.1*  --  1.4*  --  1.7*    < > = values in this interval not displayed.     Recent Labs   Lab Test 09/26/22  1358 09/14/22  0704 09/12/22  0936 09/09/22  0618 09/08/22  0831 09/07/22  0559 09/05/22  0749 09/02/22  0745 09/01/22  0548   BILITOTAL 0.3 0.3 0.6   < >  --    < > 0.7   < >  --     ALKPHOS 83 77 77   < >  --    < > 62   < >  --    ALT 18 27 28   < >  --    < > 30   < >  --    AST 17 16 16   < >  --    < > 17   < >  --    ALBUMIN 3.8 3.4* 3.5   < >  --    < > 3.3*   < >  --    LDH  --   --   --   --  219  --  189  --  260*    < > = values in this interval not displayed.       No results found for this or any previous visit (from the past 24 hour(s)).    Assessment and Plan:   Ms. Villegas is a 42 year old woman with recent diagnosis of Ph+ ALL.   Now in MRD- CR by BCR-ABL PCR as well as Clonoseq assay after cycle 1 and 2 of attenuated-dose GRAAL chemotherapy.   With no matched related donors, and excellent early/deep MRD-CR, chemotherapy is currently favored over BMT; this is supported by multiple recent clinical observations (Blood. 2016 Jul 28; 128(4): 504-507; Blood  . 2022 Jul 25;[id number]).     PLAN:  --Admit for Cycle 3 GRAAL + imatinib chemotherapy. May convert to Blinatumomab consolidation followed by maintenance in coming cycles.   --Intrathecal chemotherapy not built as part of standard [place] GRAAL chemotherapy plan; will address following hospital discharge.   --Valtrex 1000 mg TID throughout chemotherapy  --Evusheld given today  --Other supportive cares as per below     # CNS prophylaxis Will require all subsequent LPs under fluoroscopic guidance.   # Chemotherapy-induced Grade 1 neuropathy --will follow  # PPx  - Nebulized Pentamidine q28d for PJP ppx.              - Pentamidine given 10/2   - ppx Levaquin and Fluconazole when ANC <1.0.      # H/o COVID-19 infection   Not vaccinated (due to fear of needles per pt report).    # Hypertension: Amlodipine; Lasix as needed  # Indigestion/GERD      Again, thank you for allowing me to participate in the care of your patient.      Sincerely,    Steve Hull MD

## 2022-11-03 ENCOUNTER — TELEPHONE (OUTPATIENT)
Dept: EMERGENCY MEDICINE | Facility: CLINIC | Age: 42
End: 2022-11-03

## 2022-11-03 LAB
ALBUMIN SERPL BCG-MCNC: 3.9 G/DL (ref 3.5–5.2)
ALP SERPL-CCNC: 74 U/L (ref 35–104)
ALT SERPL W P-5'-P-CCNC: 12 U/L (ref 10–35)
ANION GAP SERPL CALCULATED.3IONS-SCNC: 13 MMOL/L (ref 7–15)
AST SERPL W P-5'-P-CCNC: 14 U/L (ref 10–35)
BASOPHILS # BLD AUTO: 0 10E3/UL (ref 0–0.2)
BASOPHILS NFR BLD AUTO: 0 %
BILIRUB SERPL-MCNC: 0.4 MG/DL
BUN SERPL-MCNC: 13.4 MG/DL (ref 6–20)
CALCIUM SERPL-MCNC: 9.2 MG/DL (ref 8.6–10)
CHLORIDE SERPL-SCNC: 105 MMOL/L (ref 98–107)
CREAT SERPL-MCNC: 0.53 MG/DL (ref 0.51–0.95)
DEPRECATED HCO3 PLAS-SCNC: 20 MMOL/L (ref 22–29)
EOSINOPHIL # BLD AUTO: 0 10E3/UL (ref 0–0.7)
EOSINOPHIL NFR BLD AUTO: 0 %
ERYTHROCYTE [DISTWIDTH] IN BLOOD BY AUTOMATED COUNT: 23.2 % (ref 10–15)
GFR SERPL CREATININE-BSD FRML MDRD: >90 ML/MIN/1.73M2
GLUCOSE SERPL-MCNC: 153 MG/DL (ref 70–99)
HCT VFR BLD AUTO: 28.2 % (ref 35–47)
HGB BLD-MCNC: 8.9 G/DL (ref 11.7–15.7)
IMM GRANULOCYTES # BLD: 0.1 10E3/UL
IMM GRANULOCYTES NFR BLD: 1 %
LYMPHOCYTES # BLD AUTO: 0.4 10E3/UL (ref 0.8–5.3)
LYMPHOCYTES NFR BLD AUTO: 4 %
MAGNESIUM SERPL-MCNC: 1.8 MG/DL (ref 1.7–2.3)
MCH RBC QN AUTO: 31.8 PG (ref 26.5–33)
MCHC RBC AUTO-ENTMCNC: 31.6 G/DL (ref 31.5–36.5)
MCV RBC AUTO: 101 FL (ref 78–100)
MONOCYTES # BLD AUTO: 0.1 10E3/UL (ref 0–1.3)
MONOCYTES NFR BLD AUTO: 1 %
NEUTROPHILS # BLD AUTO: 9.8 10E3/UL (ref 1.6–8.3)
NEUTROPHILS NFR BLD AUTO: 94 %
NRBC # BLD AUTO: 0 10E3/UL
NRBC BLD AUTO-RTO: 0 /100
PHOSPHATE SERPL-MCNC: 3.2 MG/DL (ref 2.5–4.5)
PLATELET # BLD AUTO: 223 10E3/UL (ref 150–450)
POTASSIUM SERPL-SCNC: 4.1 MMOL/L (ref 3.4–5.3)
PROT SERPL-MCNC: 6.6 G/DL (ref 6.4–8.3)
RBC # BLD AUTO: 2.8 10E6/UL (ref 3.8–5.2)
SODIUM SERPL-SCNC: 138 MMOL/L (ref 136–145)
WBC # BLD AUTO: 10.4 10E3/UL (ref 4–11)

## 2022-11-03 PROCEDURE — 999N000007 HC SITE CHECK

## 2022-11-03 PROCEDURE — 36592 COLLECT BLOOD FROM PICC: CPT | Performed by: PHYSICIAN ASSISTANT

## 2022-11-03 PROCEDURE — 99233 SBSQ HOSP IP/OBS HIGH 50: CPT | Mod: FS | Performed by: STUDENT IN AN ORGANIZED HEALTH CARE EDUCATION/TRAINING PROGRAM

## 2022-11-03 PROCEDURE — 250N000013 HC RX MED GY IP 250 OP 250 PS 637: Performed by: INTERNAL MEDICINE

## 2022-11-03 PROCEDURE — 258N000003 HC RX IP 258 OP 636: Performed by: INTERNAL MEDICINE

## 2022-11-03 PROCEDURE — 250N000013 HC RX MED GY IP 250 OP 250 PS 637: Performed by: PHYSICIAN ASSISTANT

## 2022-11-03 PROCEDURE — 83735 ASSAY OF MAGNESIUM: CPT | Performed by: PHYSICIAN ASSISTANT

## 2022-11-03 PROCEDURE — 84100 ASSAY OF PHOSPHORUS: CPT | Performed by: PHYSICIAN ASSISTANT

## 2022-11-03 PROCEDURE — 120N000002 HC R&B MED SURG/OB UMMC

## 2022-11-03 PROCEDURE — 250N000011 HC RX IP 250 OP 636: Performed by: INTERNAL MEDICINE

## 2022-11-03 PROCEDURE — 99356 PR PROLONGED SERV,INPATIENT,1ST HR: CPT | Mod: FS | Performed by: STUDENT IN AN ORGANIZED HEALTH CARE EDUCATION/TRAINING PROGRAM

## 2022-11-03 PROCEDURE — 82040 ASSAY OF SERUM ALBUMIN: CPT | Performed by: PHYSICIAN ASSISTANT

## 2022-11-03 PROCEDURE — 80053 COMPREHEN METABOLIC PANEL: CPT | Performed by: PHYSICIAN ASSISTANT

## 2022-11-03 PROCEDURE — 85025 COMPLETE CBC W/AUTO DIFF WBC: CPT | Performed by: PHYSICIAN ASSISTANT

## 2022-11-03 RX ORDER — NALOXONE HYDROCHLORIDE 0.4 MG/ML
0.4 INJECTION, SOLUTION INTRAMUSCULAR; INTRAVENOUS; SUBCUTANEOUS
Status: DISCONTINUED | OUTPATIENT
Start: 2022-11-03 | End: 2022-11-06 | Stop reason: HOSPADM

## 2022-11-03 RX ORDER — NALOXONE HYDROCHLORIDE 0.4 MG/ML
0.2 INJECTION, SOLUTION INTRAMUSCULAR; INTRAVENOUS; SUBCUTANEOUS
Status: DISCONTINUED | OUTPATIENT
Start: 2022-11-03 | End: 2022-11-06 | Stop reason: HOSPADM

## 2022-11-03 RX ORDER — IMATINIB MESYLATE 100 MG/1
300 TABLET, FILM COATED ORAL 2 TIMES DAILY
Qty: 84 TABLET | Refills: 0 | Status: ON HOLD | OUTPATIENT
Start: 2022-11-03 | End: 2022-11-26

## 2022-11-03 RX ORDER — SENNOSIDES 8.6 MG
1-2 TABLET ORAL 2 TIMES DAILY
Status: DISCONTINUED | OUTPATIENT
Start: 2022-11-03 | End: 2022-11-06 | Stop reason: HOSPADM

## 2022-11-03 RX ADMIN — VALACYCLOVIR HYDROCHLORIDE 1000 MG: 500 TABLET, FILM COATED ORAL at 14:26

## 2022-11-03 RX ADMIN — IMATINIB MESYLATE 300 MG: 100 TABLET, FILM COATED ORAL at 08:08

## 2022-11-03 RX ADMIN — AMLODIPINE BESYLATE 5 MG: 5 TABLET ORAL at 08:08

## 2022-11-03 RX ADMIN — SENNOSIDES 2 TABLET: 8.6 TABLET, FILM COATED ORAL at 19:48

## 2022-11-03 RX ADMIN — CYCLOPHOSPHAMIDE 695 MG: 2 INJECTION, POWDER, FOR SOLUTION INTRAVENOUS; ORAL at 08:25

## 2022-11-03 RX ADMIN — VALACYCLOVIR HYDROCHLORIDE 1000 MG: 500 TABLET, FILM COATED ORAL at 19:48

## 2022-11-03 RX ADMIN — VALACYCLOVIR HYDROCHLORIDE 1000 MG: 500 TABLET, FILM COATED ORAL at 08:08

## 2022-11-03 RX ADMIN — IMATINIB MESYLATE 300 MG: 100 TABLET, FILM COATED ORAL at 19:45

## 2022-11-03 RX ADMIN — PANTOPRAZOLE SODIUM 40 MG: 40 TABLET, DELAYED RELEASE ORAL at 08:08

## 2022-11-03 RX ADMIN — CYCLOPHOSPHAMIDE 695 MG: 2 INJECTION, POWDER, FOR SOLUTION INTRAVENOUS; ORAL at 20:31

## 2022-11-03 RX ADMIN — ONDANSETRON HYDROCHLORIDE 8 MG: 8 TABLET, FILM COATED ORAL at 08:08

## 2022-11-03 RX ADMIN — ONDANSETRON HYDROCHLORIDE 8 MG: 8 TABLET, FILM COATED ORAL at 19:48

## 2022-11-03 RX ADMIN — DEXAMETHASONE 40 MG: 4 TABLET ORAL at 16:41

## 2022-11-03 RX ADMIN — MESNA 1390 MG: 100 INJECTION, SOLUTION INTRAVENOUS at 18:45

## 2022-11-03 ASSESSMENT — ACTIVITIES OF DAILY LIVING (ADL)
ADLS_ACUITY_SCORE: 20

## 2022-11-03 NOTE — TELEPHONE ENCOUNTER
[facility] Prior Authorization Team   Phone: [phone]  Fax: [phone]    PA Initiation    Medication: Imatinib (QTY Case PA)  Insurance Company: [facility] - Phone [phone] Fax [phone]  Pharmacy Filling the Rx: [facility] - [place], [place] - [address]  Filling Pharmacy Phone: [phone]  Filling Pharmacy Fax: [phone]  Start Date: 11/3/2022

## 2022-11-03 NOTE — PLAN OF CARE
"Goal Outcome Evaluation:      Plan of Care Reviewed With: patient    Overall Patient Progress: no changeOverall Patient Progress: no change     Time 2489-3739    /78 (BP Location: Left arm)   Pulse 98   Temp 98.1  F (36.7  C) (Oral)   Resp 18   Ht 1.59 m (5' 2.6\")   Wt 112.5 kg (248 lb 1.6 oz)   SpO2 98%   BMI 44.52 kg/m      Reason for admission: C3D1 GRAALL + imatinib, ALL  Activity: indepednent  Pain: no pain  Neuro: A&O x4  Cardiac: wnl  Respiratory: wnl  GI/: voiding adequate, last BM 11/2  Diet: regular  Lines: PICC, area below insertion site is red and warm, notified MD, no orders  Wounds: scalp area  Labs/imaging: reviewed      New changes this shift: started mensa, day 1 cycle cytoxan given     Plan: Anticipate discharge on Sunday      Continue to monitor and follow POC       "

## 2022-11-03 NOTE — PROVIDER NOTIFICATION
Paged Dr Duncan Ignacio     Pt has area just below her PICC insertion site that is a little swollen, reddened, warm to the touch. Did you want to do an ultrasound? Cherie [id number]    Monitor site if increased symptoms call MD

## 2022-11-03 NOTE — PROGRESS NOTES
Nursing Focus: Chemotherapy  D: Positive blood return via PICC. Insertion site is clean/dry/intact, dressing intact with no complaints of pain.  Urine output is recorded in intake in Doc Flowsheet.    I: Premedications given per order (see electronic medical administration record). Dose #2 of Cytoxan started to infuse over 1 hour. Reviewed pt teaching on chemotherapy side effects.  Pt denies need for further teaching. Chemotherapy double checked per protocol by two chemotherapy competent RN's.   A: Tolerating procedure well. Denies nausea and or pain.   P: Continue to monitor urine output and symptoms of nausea. Screen for symptoms of toxicity.

## 2022-11-03 NOTE — PROGRESS NOTES
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 Now admitted for cycle 3 GRAALL + imatinib.      HEME  # Ph(+) B-ALL  Followed by Dr. Hull. Patient presented to outside hospital with c/o right upper quadrant pain and was subsequently found to have a markedly elevated white blood cell count concerning for acute leukemia. While at OSH, she had CTA chest as well as CT A/P. These identified no PE, no acute or localized intraabdominal inflammatory process, mild splenomegaly, and few inconspicous low-density structures in the liver. She was transferred to [facility] for further workup and management. S/p Hydrea 1g TID (8/21-8/23) with improvement in WBC (100K ? 12K). Diagnostic BMBx completed 8/20/22, which demonstrated B-ALL with 85% blasts and hypercellular marrow. IHC shows CD10+, CD34+. 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 ALL NGS negative. BCR/ABL1 major/minor (sent from peripheral blood) both negative. Initiated pre-phase steroids with Prednisone 60 mg x8/21; increased to 60 mg/m2 (140 mg) 8/22-8/24. She initiated GRAALL + imatinib (C1D1 = 8/24/22). S/p BMT  consult 9/12 with Dr. Walsh. Induction was relatively uncomplicated. Post-induction BMBx done 9/26/22 and revealed MRD- CR1, with negative BCR/ABL1 and Clonoseq testing. She was admitted for C2 GRAALL on 9/30/22. Underwent BMBx 10/25/22 with no morphologic evidence of B-lymphoblastic leukemia and flow with unusual B lineage precursor population 0.09%. Currently she is being admitted for cycle 3 GRAALL + imatinib.   - PICC placed prior to admission  - Seen by Dr. Hull's note prior to admission. Proceed with cycle 3 GRAALL + imatinib.  - Will need new prescription for imatinib 300 mg BID at discharge, prescription sent to  Specialty pharmacy   - Further intrathecal chemotherapy will be addressed as an outpatient per Dr. Hull note 11/2                   Treatment Plan: GRAALL + imatinib (C3D1 = 11/2/22)               - Premeds: Zofran 8 mg q12hr, Emend on D1               - Dexamethasone 40 mg D1-4               - Imatinib 300 mg BID x14 days (prescription sent to  specialty pharmacy; should arrive to our discharge pharmacy by Sunday 11/6)                - Cytoxan 300 mg/m2 q12hr x6 doses - D1-3               - Mesna continuous D 1-3, stop 12 hours after last Cytoxan dose               - Vincristine 2 mg x1 - D4                - Doxorubicin 50 mg/m2 x1 over 24 hours - D4    - Neulasta 6 mg subcutaneous - D     # Anemia   - Transfuse to keep Hgb >7 and platelets >10K  - Monitor CBC daily  - Of note, pt has history of transfusion reaction and requires premeds with Benadryl and Tylenol     ID  # 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 She was empirically started on IV acyclovir (x10/10). She was evaluated by ID, derm and opthalmology. She was discharged to complete a 14-day  course of IV acyclovir through (10/24), but it appears it was prolonged until ~11/2 to use up home supply.   - Per outpatient ID note, recommend Valtrex 1000 mg TID until completion of chemotherapy and complete resolution of suspected VZV lesions completely resolve. Then recommend Valtrex 1000 mg BID until count recovery for prophylaxis as she broke through ACV.      # ID PPx  - Valtrex 1000 mg TID until completes chemo, suspected VZV lesions completely resolve, and her ANC >1000 per ID note with Dr. Diaz on 11/1  - Held PTA fluconazole 200 mg daily and levofloxacin 250 mg daily given ANC >1.0  - Nebulized pentamidine last given 10/31, next due ~11/28     MISC  # Hypertension   - Continue PTA amlodipine 5 mg daily     # GERD   - PTA Protonix 20 mg daily. Increased to 40 mg during admission with high-dose steroids.      # Grade 1 neuropathy of the fingertips   Due to vincristine. Pt reports symptoms are stable. Reports mild paresthesias and hot/cold sensitivity.   - Monitor clinically    FEN:  -IVF per chemo plan   -PRN lyte replacement  -RDAT    Prophy/Misc:  -VTE: Lovenox; hold if platelets <50K   -GI/PUD: PTA Protonix   -Bowels: PRN Senna and Miralax     Dispo: Pending completion of chemotherapy; anticipate 11/6   Follow Up: APPT 18 entered and requested 11/3   1. Neulasta 11/7 afternoon or 11/8    2. 11/14 D11 Vincristine   3. 2x weekly labs and PRN blood transfusions starting week of 11/7 x 3 weeks    4. AVELINA follow up week of 11/7    This patient was discussed with Dr. Recinos     I spent >45 minutes face-to-face or coordinating care of Vashti Villegas. Over 50% of our time on the unit was spent counseling the patient and/or coordinating care.    Gail Kendrick (Johnson), GRANT   Hematology/ Oncology   Pager [phone]    Interval History   Nursing notes reviewed. April is feeling well. She usually tolerates the chemo infusion well. She has not noted any symptoms related to chemotherapy. She is eating fairly well.  Reports her shingles lesions continue to heal and she has no pain or tenderness. Reviewed that I will discuss with pharmacy to see if I can move the chemotherapy times up to accommodate an early discharge on Sunday. She is having some mild skin irritation near her PICC but it is thought to be related to the dressing. Informed her that I am working to determine where we can get a refill of Imatinib as the dose is not carried at our discharge pharmacy. All questions answered at this time.     A comprehensive review of symptoms was obtained and negative unless noted above.    Physical Exam   Temp: 97.9  F (36.6  C) Temp src: Oral BP: 123/70 Pulse: 105   Resp: 16 SpO2: 98 % O2 Device: None (Room air)    Vitals:    11/02/22 1401 11/03/22 0800   Weight: 112.5 kg (248 lb 1.6 oz) 112.2 kg (247 lb 4.8 oz)     Vital Signs with Ranges  Temp:  [97.7  F (36.5  C)-98.3  F (36.8  C)] 97.9  F (36.6  C)  Pulse:  [] 105  Resp:  [16-18] 16  BP: (120-137)/(54-82) 123/70  SpO2:  [97 %-99 %] 98 %  I/O last 3 completed shifts:  In: 850 [P.O.:360; IV Piggyback:490]  Out: 900 [Urine:900]    Constitutional: Pleasant female seen resting comfortably in bed in NAD. Alert and interactive.   HEENT: NCAT. PERRL, EOMI, anicteric sclera. Oral mucosa pink and moist with no lesions or thrush. Healing scabbed shingle lesions noted on scalp.   Respiratory: Non-labored breathing, good air exchange, lungs clear to auscultation bilaterally. No cough or wheeze noted.   Cardiovascular: Regular rate and rhythm. No murmur or rub.   GI: Normoactive bowel sounds. Abdomen soft, non-distended, and non-tender.   Skin: Warm and dry. No concerning lesions or rash on exposed surfaces.  Musculoskeletal: Extremities grossly normal, non-tender, no edema. Strong peripheral pulses. Good strength and ROM in bed.   Neuropsychiatric: Mentation and affect appear normal/appropriate.  Vascular Access: PICC is CDI without swelling, or discharge. Small area of erythema noted  just inferior to PICC insertion.     Medications   Current Facility-Administered Medications   Medication     0.9% sodium chloride BOLUS     acetaminophen (TYLENOL) tablet 650 mg     albuterol (PROVENTIL HFA/VENTOLIN HFA) inhaler     albuterol (PROVENTIL) neb solution 2.5 mg     amLODIPine (NORVASC) tablet 5 mg     Chemotherapy Infusing-Continuous Infusion     [START ON 11/5/2022] Chemotherapy Infusing-Continuous Infusion     cyclophosphamide (CYTOXAN) 695 mg in sodium chloride 0.9 % 309.75 mL infusion     dexamethasone (DECADRON) tablet 40 mg     diphenhydrAMINE (BENADRYL) injection 50 mg     [START ON 11/5/2022] DOXOrubicin (ADRIAMYCIN) 120 mg in sodium chloride 0.9 % 1,110 mL infusion     enoxaparin ANTICOAGULANT (LOVENOX) injection 40 mg     EPINEPHrine PF (ADRENALIN) injection 0.3 mg     famotidine (PEPCID) injection 20 mg     imatinib (GLEEVEC) tablet 300 mg     LORazepam (ATIVAN) injection 0.5-1 mg     LORazepam (ATIVAN) tablet 0.5-1 mg     LORazepam (ATIVAN) tablet 0.5-1 mg     meperidine (DEMEROL) injection 25 mg     mesna (MESNEX) 1,390 mg in sodium chloride 0.9 % 1,063.9 mL infusion     methylPREDNISolone sodium succinate (solu-MEDROL) injection 125 mg     naloxone (NARCAN) injection 0.2 mg    Or     naloxone (NARCAN) injection 0.4 mg    Or     naloxone (NARCAN) injection 0.2 mg    Or     naloxone (NARCAN) injection 0.4 mg     No rectal suppositories if WBC less than 1000/microliters or platelets less than 50,000/ L     ondansetron (ZOFRAN) injection 8 mg    Or     ondansetron (ZOFRAN ODT) ODT tab 8 mg    Or     ondansetron (ZOFRAN) tablet 8 mg     ondansetron (ZOFRAN) tablet 8 mg     pantoprazole (PROTONIX) EC tablet 40 mg     polyethylene glycol (MIRALAX) Packet 17 g     prochlorperazine (COMPAZINE) tablet 5-10 mg    Or     prochlorperazine (COMPAZINE) injection 5-10 mg     senna-docusate (SENOKOT-S/PERICOLACE) 8.6-50 MG per tablet 1 tablet    Or     senna-docusate (SENOKOT-S/PERICOLACE) 8.6-50 MG per  tablet 2 tablet     valACYclovir (VALTREX) tablet 1,000 mg     [START ON 11/5/2022] vinCRIStine (ONCOVIN) 2 mg in sodium chloride 0.9 % 29.5 mL infusion     Facility-Administered Medications Ordered in Other Encounters   Medication     heparin lock flush 10 UNIT/ML injection 3 mL       Data   CBC  Recent Labs   Lab 11/03/22  0556 11/02/22  1556 10/31/22  1308   WBC 10.4 9.0 9.5   RBC 2.80* 2.60* 2.60*   HGB 8.9* 8.3* 8.2*   HCT 28.2* 26.4* 25.6*   * 102* 99   MCH 31.8 31.9 31.5   MCHC 31.6 31.4* 32.0   RDW 23.2* 23.6* 23.1*    238 256     CMP  Recent Labs   Lab 11/03/22  0556 11/02/22  1556 10/31/22  1308    139 139   POTASSIUM 4.1 3.9 4.0   CHLORIDE 105 105 105   CO2 20* 21* 24   ANIONGAP 13 13 10   * 100* 91   BUN 13.4 12.0 15.3   CR 0.53 0.62 0.61   GFRESTIMATED >90 >90 >90   SOLEDAD 9.2 9.2 9.5   MAG 1.8 1.8  --    PHOS 3.2 4.0  --    PROTTOTAL 6.6 6.4 6.8   ALBUMIN 3.9 4.0 4.0   BILITOTAL 0.4 0.4 0.4   ALKPHOS 74 78 84   AST 14 15 14   ALT 12 12 13     INR  Recent Labs   Lab 11/02/22  1556   INR 1.10       Results for orders placed or performed during the hospital encounter of 10/21/22   IR Lumbar Puncture    Narrative    April F Michelle   [id number]      [id number]    APRIL F MICHELLE  [id number]  1980;  42 years    IR LUMBAR PUNCTURE  42F w/ Ph+ B-ALL, needs ppx IT chemo as part of chemo protocol; Acute  lymphoblastic leukemia (ALL) in remission (H)    -----    PRE-OPERATIVE DIAGNOSIS:  Leukemia    POST-OPERATIVE DIAGNOSIS:  Same    PROCEDURE:  Lumbar puncture under fluoroscopy      Impression    IMPRESSION:  Completed lumbar puncture under fluoroscopic guidance. ?A  22 gauge 7 inch spinal needle was advanced between L4-L5 with return  of fluid. ?A total of 10 mL was collected in 4 tubes for requested  labs.     LIBIA Barajas administered intrathecal chemo through LP access.      Needle removed after return of stylet. ?No immediate complication.  ?Plan: lie flat for 1 hour. ?Dx:  "Leukemia. ?Vinicius. <2     -----    CLINICAL HISTORY:  Leukemia    PERFORMED BY:  ANA Garay PA-C (Interventional  Radiology)    CONSENT:  The patient understood the limitations, alternatives, and  risks of the procedure and agreed to the procedure.  Written informed  consent was obtained and is documented in the patient record.    MEDICATIONS:  1% lidocaine was used for local anesthesia    NURSING:  The patient was placed on continuous vital signs monitoring.   Vital signs were monitored by nursing staff under my supervision.    FLUOROSCOPY TIME (minutes):  <2    DESCRIPTION:  The patient was placed in the prone position and the  lower back was prepped and draped in the usual sterile fashion. 1%  lidocaine was used for local anesthesia at the intended puncture site.   Under fluoroscopic guidance, a 22 gauge, 7 inch spinal needle was  advanced into the thecal sac at the L4-L5 level with return of fluid.   A total of 10 mL CSF was collected.      LIBIA Barajas administered intrathecal chemo through LP access.      The needle was removed with stylet in place.  No immediate  complication.  CSF sample was sent for requested laboratory analysis.    COMPLICATIONS:  No immediate concerns; the patient remained stable  throughout the procedure and tolerated it well.    ESTIMATED BLOOD LOSS:  Minimal    SPECIMENS:  Per above    -----  \"The physician assistant (PA) who performed this procedure and signed  the above report is licensed to practice in the [facility]  pursuant to [place] Statute 147A.09.  This includes meeting the Statute and  [place] Board of Medical Practice requirement of a collaborating  physician.\"  -----    BELEM GODOY PA-C         SYSTEM ID:  [id number]       "

## 2022-11-03 NOTE — PROGRESS NOTES
Nursing Focus: Chemotherapy  D: Positive blood return via PICC. Insertion site is clean/dry/intact, dressing intact with no complaints of pain.  Urine output is recorded in intake in Doc Flowsheet.    I: Premedications given per order (see electronic medical administration record). Dose #1  of cyclophosohamide started to infuse over 1 hours. Reviewed pt teaching on chemotherapy side effects.  Pt denies need for further teaching. Chemotherapy double checked per protocol by two chemotherapy competent RN's.   A: Tolerating procedure well. Denies nausea and or pain.   P: Continue to monitor urine output and symptoms of nausea. Screen for symptoms of toxicity.

## 2022-11-03 NOTE — PLAN OF CARE
[id number]: VSS on RA. Denies pain, nausea, and SOB. Continues to have some tenderness at PICC site with slight swelling/ edema in LUE. Up ad stephanie. LBM 11/2. Baseline peripheral neuropathy. Plan to receive next dose of Cytoxan at 0830.

## 2022-11-03 NOTE — PROGRESS NOTES
Admitted/transferred from: Home   2 RN   skin assessment completed by Cherie Gibbs, CAROL and Bolivar MORALEZ RN.  Skin assessment finding: Scabbed area on scalp  Interventions/actions: None  Will continue to monitor.

## 2022-11-04 LAB
ABO/RH(D): NORMAL
ALBUMIN SERPL BCG-MCNC: 3.6 G/DL (ref 3.5–5.2)
ALP SERPL-CCNC: 68 U/L (ref 35–104)
ALT SERPL W P-5'-P-CCNC: 11 U/L (ref 10–35)
ANION GAP SERPL CALCULATED.3IONS-SCNC: 11 MMOL/L (ref 7–15)
ANTIBODY SCREEN: NEGATIVE
AST SERPL W P-5'-P-CCNC: 11 U/L (ref 10–35)
BASOPHILS # BLD AUTO: 0 10E3/UL (ref 0–0.2)
BASOPHILS NFR BLD AUTO: 0 %
BILIRUB SERPL-MCNC: 0.3 MG/DL
BUN SERPL-MCNC: 10.4 MG/DL (ref 6–20)
CALCIUM SERPL-MCNC: 9.2 MG/DL (ref 8.6–10)
CHLORIDE SERPL-SCNC: 108 MMOL/L (ref 98–107)
CREAT SERPL-MCNC: 0.58 MG/DL (ref 0.51–0.95)
DEPRECATED HCO3 PLAS-SCNC: 21 MMOL/L (ref 22–29)
EOSINOPHIL # BLD AUTO: 0 10E3/UL (ref 0–0.7)
EOSINOPHIL NFR BLD AUTO: 0 %
ERYTHROCYTE [DISTWIDTH] IN BLOOD BY AUTOMATED COUNT: 23.6 % (ref 10–15)
GFR SERPL CREATININE-BSD FRML MDRD: >90 ML/MIN/1.73M2
GLUCOSE SERPL-MCNC: 151 MG/DL (ref 70–99)
HCT VFR BLD AUTO: 26.1 % (ref 35–47)
HGB BLD-MCNC: 8.2 G/DL (ref 11.7–15.7)
IMM GRANULOCYTES # BLD: 0.3 10E3/UL
IMM GRANULOCYTES NFR BLD: 2 %
LYMPHOCYTES # BLD AUTO: 0.4 10E3/UL (ref 0.8–5.3)
LYMPHOCYTES NFR BLD AUTO: 2 %
MCH RBC QN AUTO: 32.3 PG (ref 26.5–33)
MCHC RBC AUTO-ENTMCNC: 31.4 G/DL (ref 31.5–36.5)
MCV RBC AUTO: 103 FL (ref 78–100)
MONOCYTES # BLD AUTO: 0.3 10E3/UL (ref 0–1.3)
MONOCYTES NFR BLD AUTO: 2 %
NEUTROPHILS # BLD AUTO: 15.3 10E3/UL (ref 1.6–8.3)
NEUTROPHILS NFR BLD AUTO: 94 %
NRBC # BLD AUTO: 0 10E3/UL
NRBC BLD AUTO-RTO: 0 /100
PLATELET # BLD AUTO: 216 10E3/UL (ref 150–450)
POTASSIUM SERPL-SCNC: 3.9 MMOL/L (ref 3.4–5.3)
PROT SERPL-MCNC: 6.1 G/DL (ref 6.4–8.3)
RBC # BLD AUTO: 2.54 10E6/UL (ref 3.8–5.2)
SODIUM SERPL-SCNC: 140 MMOL/L (ref 136–145)
SPECIMEN EXPIRATION DATE: NORMAL
WBC # BLD AUTO: 16.2 10E3/UL (ref 4–11)

## 2022-11-04 PROCEDURE — 80053 COMPREHEN METABOLIC PANEL: CPT | Performed by: PHYSICIAN ASSISTANT

## 2022-11-04 PROCEDURE — 250N000013 HC RX MED GY IP 250 OP 250 PS 637: Performed by: PHYSICIAN ASSISTANT

## 2022-11-04 PROCEDURE — 36592 COLLECT BLOOD FROM PICC: CPT | Performed by: PHYSICIAN ASSISTANT

## 2022-11-04 PROCEDURE — 2894A VOIDCORRECT: CPT | Mod: FS | Performed by: STUDENT IN AN ORGANIZED HEALTH CARE EDUCATION/TRAINING PROGRAM

## 2022-11-04 PROCEDURE — 99356 PR PROLONGED SERV,INPATIENT,1ST HR: CPT | Mod: FS | Performed by: STUDENT IN AN ORGANIZED HEALTH CARE EDUCATION/TRAINING PROGRAM

## 2022-11-04 PROCEDURE — 120N000002 HC R&B MED SURG/OB UMMC

## 2022-11-04 PROCEDURE — 250N000011 HC RX IP 250 OP 636: Performed by: INTERNAL MEDICINE

## 2022-11-04 PROCEDURE — 85004 AUTOMATED DIFF WBC COUNT: CPT | Performed by: PHYSICIAN ASSISTANT

## 2022-11-04 PROCEDURE — 250N000013 HC RX MED GY IP 250 OP 250 PS 637: Performed by: INTERNAL MEDICINE

## 2022-11-04 PROCEDURE — 999N000007 HC SITE CHECK

## 2022-11-04 PROCEDURE — 99233 SBSQ HOSP IP/OBS HIGH 50: CPT | Mod: FS | Performed by: STUDENT IN AN ORGANIZED HEALTH CARE EDUCATION/TRAINING PROGRAM

## 2022-11-04 PROCEDURE — 258N000003 HC RX IP 258 OP 636: Performed by: INTERNAL MEDICINE

## 2022-11-04 RX ORDER — SENNOSIDES 8.6 MG
1-2 TABLET ORAL 2 TIMES DAILY
Qty: 60 TABLET | Refills: 1 | Status: ON HOLD | OUTPATIENT
Start: 2022-11-04 | End: 2023-01-26

## 2022-11-04 RX ADMIN — DEXAMETHASONE 40 MG: 4 TABLET ORAL at 13:06

## 2022-11-04 RX ADMIN — PANTOPRAZOLE SODIUM 40 MG: 40 TABLET, DELAYED RELEASE ORAL at 08:13

## 2022-11-04 RX ADMIN — CYCLOPHOSPHAMIDE 695 MG: 2 INJECTION, POWDER, FOR SOLUTION INTRAVENOUS; ORAL at 20:58

## 2022-11-04 RX ADMIN — IMATINIB MESYLATE 300 MG: 100 TABLET, FILM COATED ORAL at 20:58

## 2022-11-04 RX ADMIN — SENNOSIDES 1 TABLET: 8.6 TABLET, FILM COATED ORAL at 20:46

## 2022-11-04 RX ADMIN — VALACYCLOVIR HYDROCHLORIDE 1000 MG: 500 TABLET, FILM COATED ORAL at 08:13

## 2022-11-04 RX ADMIN — VALACYCLOVIR HYDROCHLORIDE 1000 MG: 500 TABLET, FILM COATED ORAL at 20:46

## 2022-11-04 RX ADMIN — MESNA 1390 MG: 100 INJECTION, SOLUTION INTRAVENOUS at 17:25

## 2022-11-04 RX ADMIN — SENNOSIDES 2 TABLET: 8.6 TABLET, FILM COATED ORAL at 08:13

## 2022-11-04 RX ADMIN — CYCLOPHOSPHAMIDE 695 MG: 2 INJECTION, POWDER, FOR SOLUTION INTRAVENOUS; ORAL at 08:58

## 2022-11-04 RX ADMIN — IMATINIB MESYLATE 300 MG: 100 TABLET, FILM COATED ORAL at 08:16

## 2022-11-04 RX ADMIN — ONDANSETRON HYDROCHLORIDE 8 MG: 8 TABLET, FILM COATED ORAL at 20:45

## 2022-11-04 RX ADMIN — VALACYCLOVIR HYDROCHLORIDE 1000 MG: 500 TABLET, FILM COATED ORAL at 13:06

## 2022-11-04 RX ADMIN — ONDANSETRON HYDROCHLORIDE 8 MG: 8 TABLET, FILM COATED ORAL at 08:13

## 2022-11-04 ASSESSMENT — ACTIVITIES OF DAILY LIVING (ADL)
ADLS_ACUITY_SCORE: 20

## 2022-11-04 NOTE — PLAN OF CARE
AVSS, no c/o pain, Pt declined lovenox because it causes her excessive bruising; says she is making sure to ambulate often; writer discussed this w/ LIBIA Soto so she is aware. Day 3 CIVI mesna hung.   See chemo note; dose 3 cytoxan infused.   Voiding with adequate UOP. LBM 11/2, however pt reports having constipation with previous cycle. d. Some redness/tenderness around PICC site, will continue to monitor. trace edema in LUE noted, will continue to monitor. Up independently in room and ambulated in hallway. Continue to monitor & w/ POC.

## 2022-11-04 NOTE — PROGRESS NOTES
Nursing Focus: Chemotherapy  D: Positive blood return via PICC. Insertion site is clean/dry/intact, dressing intact with no complaints of pain.  Urine output is recorded in intake in Doc Flowsheet.    I: Premedications given per order (see electronic medical administration record). Dose #3 of Cytoxan started to infuse over 1 hour. Reviewed pt teaching on chemotherapy side effects.  Pt denies need for further teaching. Chemotherapy double checked per protocol by two chemotherapy competent RN's.   A: Tolerating procedure well. Denies nausea and or pain.   P: Continue to monitor urine output and symptoms of nausea. Screen for symptoms of toxicity.

## 2022-11-04 NOTE — PROGRESS NOTES
Nursing Focus: Chemotherapy  D: Positive blood return via PICC. Insertion site is clean/dry/intact, dressing intact with no complaints of pain.  Urine output is recorded in intake in Doc Flowsheet.    I: Premedications given per order (see electronic medical administration record). Dose #4 of Cytoxan started to infuse over 1 hour. Reviewed pt teaching on chemotherapy side effects.  Pt denies need for further teaching. Chemotherapy double checked per protocol by two chemotherapy competent RN's.   A: Tolerating procedure well. Denies nausea and or pain.   P: Continue to monitor urine output and symptoms of nausea. Screen for symptoms of toxicity.

## 2022-11-04 NOTE — PROGRESS NOTES
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 Now admitted for cycle 3 GRAALL + imatinib.     TODAY D3   - Continue chemotherapy per protocol; anticipate discharge 11/6   - Continue to monitor closely for side effect related to chemotherapy   - Outpatient follow up requested      HEME  # Ph(+) B-ALL  Followed by Dr. Hull. Patient presented to outside hospital with c/o right upper quadrant pain and was subsequently found to have a markedly elevated white blood cell count concerning for acute leukemia. While at OSH, she had CTA chest as well as CT A/P. These identified no PE, no acute or localized intraabdominal inflammatory process, mild splenomegaly, and few inconspicous low-density structures in the liver. She was transferred to [facility] for further workup and management. S/p Hydrea 1g TID (8/21-8/23) with improvement in WBC (100K ? 12K). Diagnostic BMBx completed 8/20/22, which demonstrated B-ALL with 85% blasts and hypercellular marrow. IHC shows CD10+, CD34+. 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 ALL NGS negative. BCR/ABL1 major/minor (sent from  peripheral blood) both negative. Initiated pre-phase steroids with Prednisone 60 mg x8/21; increased to 60 mg/m2 (140 mg) 8/22-8/24. She initiated GRAALL + imatinib (C1D1 = 8/24/22). S/p BMT consult 9/12 with Dr. Walsh. Induction was relatively uncomplicated. Post-induction BMBx done 9/26/22 and revealed MRD- CR1, with negative BCR/ABL1 and Clonoseq testing. She was admitted for C2 GRAALL on 9/30/22. Underwent BMBx 10/25/22 with no morphologic evidence of B-lymphoblastic leukemia and flow with unusual B lineage precursor population 0.09%. Currently she is being admitted for cycle 3 GRAALL + imatinib.   - PICC placed prior to admission; will leave in on discharge. Discussed with patient   - Seen by Dr. Hull's note prior to admission. Proceed with cycle 3 GRAALL + imatinib.  - Prescription for imatinib 300 mg BID sent to  Specialty pharmacy; per unit pharamcist will be shipped to discharge pharmacy for patient to  prior to discharging.    - Further intrathecal chemotherapy will be addressed as an outpatient per Dr. Hull note 11/2                   Treatment Plan: GRAALL + imatinib (C3D1 = 11/2/22)               - Premeds: Zofran 8 mg q12hr, Emend on D1               - Dexamethasone 40 mg D1-4               - Imatinib 300 mg BID x14 days (prescription sent to  specialty pharmacy; should arrive to our discharge pharmacy by Sunday 11/6)                - Cytoxan 300 mg/m2 q12hr x6 doses - D1-3               - Mesna continuous D 1-3, stop 12 hours after last Cytoxan dose               - Vincristine 2 mg x1 - D4                - Doxorubicin 50 mg/m2 x1 over 24 hours - D4    - Neulasta 6 mg subcutaneous - scheduled afternoon 11/7      # Anemia   - Transfuse to keep Hgb >7 and platelets >10K  - Monitor CBC daily  - Of note, pt has history of transfusion reaction and requires premeds with Benadryl and Tylenol     ID  # 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 She was empirically started on IV acyclovir (x10/10). She was evaluated by ID, derm and opthalmology. She was discharged to complete a 14-day course of IV acyclovir through (10/24), but it appears it was prolonged until ~11/2 to use up home supply.   - Per outpatient ID note, recommend Valtrex 1000 mg TID until completion of chemotherapy and complete resolution of suspected VZV lesions completely resolve. Then recommend Valtrex 1000 mg BID until count recovery for prophylaxis as she broke through ACV.      # ID PPx  - Valtrex 1000 mg TID until completes chemo, suspected VZV lesions completely resolve, and her ANC >1000 per ID note with Dr. Diaz on 11/1  - Held PTA fluconazole 200 mg daily and levofloxacin 250 mg daily given ANC >1.0  - Nebulized pentamidine last given 10/31, next due ~11/28     MISC  # Hypertension   - Continue PTA amlodipine 5 mg daily     # GERD   - PTA Protonix 20 mg daily. Increased to 40 mg during admission with high-dose steroids.      # Grade 1 neuropathy of the fingertips   Due to vincristine. Pt reports symptoms are stable. Reports mild paresthesias and hot/cold sensitivity.   - Monitor clinically    FEN:  -IVF per chemo plan   -PRN lyte replacement  -RDAT    Prophy/Misc:  -VTE: Lovenox; hold if platelets <50K   -GI/PUD: PTA Protonix   -Bowels: PRN Senna and Miralax     Dispo: Pending completion of chemotherapy; anticipate 11/6   Follow Up: APPT 18 entered and requested 11/3   1. Neulasta 11/7 afternoon or 11/8    2. 11/14 D11 Vincristine   3. 2x weekly labs and PRN blood transfusions starting week of 11/7 x 3 weeks    4. AVELINA follow up week of 11/7    This patient was discussed with Dr. Recinos     I spent >45 minutes face-to-face or coordinating care of Vashti Villegas. Over 50% of our time on the unit  was spent counseling the patient and/or coordinating care.    Gail Kendrick PA-C ([facility])   Hematology/ Oncology   Pager [phone]    Interval History   Nursing notes reviewed. April is feeling well. She continues to have no major side effects from the chemotherapy. She feels like the skin irritation is improving so she is wondering if this is related to the cleaning solution rather than the dressing. Asked her to pay close attention to this with the next dressing change. She voiced understanding. Reviewed labs. All questions answered at this time.     A comprehensive review of symptoms was obtained and negative unless noted above.    Physical Exam   Temp: 97.9  F (36.6  C) Temp src: Oral BP: 117/59 Pulse: 101   Resp: 18 SpO2: 95 % O2 Device: None (Room air)    Vitals:    11/02/22 1401 11/03/22 0800   Weight: 112.5 kg (248 lb 1.6 oz) 112.2 kg (247 lb 4.8 oz)     Vital Signs with Ranges  Temp:  [97.8  F (36.6  C)-98.6  F (37  C)] 97.9  F (36.6  C)  Pulse:  [] 101  Resp:  [16-18] 18  BP: ()/(52-71) 117/59  SpO2:  [95 %-97 %] 95 %  I/O last 3 completed shifts:  In: 1064.4 [P.O.:400; IV Piggyback:664.4]  Out: 1870 [Urine:1870]    Constitutional: Pleasant female seen resting comfortably in bed in NAD. Alert and interactive.   HEENT: NCAT. PERRL, EOMI, anicteric sclera. Oral mucosa pink and moist with no lesions or thrush. Healing scabbed shingle lesions noted on scalp.   Respiratory: Non-labored breathing, good air exchange, lungs clear to auscultation bilaterally. No cough or wheeze noted.   Cardiovascular: Regular rate and rhythm. No murmur or rub.   GI: Normoactive bowel sounds. Abdomen soft, non-distended, and non-tender.   Skin: Warm and dry. No concerning lesions or rash on exposed surfaces.  Musculoskeletal: Extremities grossly normal, non-tender, no edema. Strong peripheral pulses. Good strength and ROM in bed.   Neuropsychiatric: Mentation and affect appear normal/appropriate.  Vascular Access:  PICC is CDI without swelling, or discharge. Small area of erythema noted just inferior to PICC insertion.     Medications   Current Facility-Administered Medications   Medication     0.9% sodium chloride BOLUS     acetaminophen (TYLENOL) tablet 650 mg     albuterol (PROVENTIL HFA/VENTOLIN HFA) inhaler     albuterol (PROVENTIL) neb solution 2.5 mg     amLODIPine (NORVASC) tablet 5 mg     Chemotherapy Infusing-Continuous Infusion     [START ON 11/5/2022] Chemotherapy Infusing-Continuous Infusion     cyclophosphamide (CYTOXAN) 695 mg in sodium chloride 0.9 % 309.75 mL infusion     dexamethasone (DECADRON) tablet 40 mg     diphenhydrAMINE (BENADRYL) injection 50 mg     [START ON 11/5/2022] DOXOrubicin (ADRIAMYCIN) 120 mg in sodium chloride 0.9 % 1,110 mL infusion     EPINEPHrine PF (ADRENALIN) injection 0.3 mg     famotidine (PEPCID) injection 20 mg     imatinib (GLEEVEC) tablet 300 mg     LORazepam (ATIVAN) injection 0.5-1 mg     LORazepam (ATIVAN) tablet 0.5-1 mg     LORazepam (ATIVAN) tablet 0.5-1 mg     meperidine (DEMEROL) injection 25 mg     mesna (MESNEX) 1,390 mg in sodium chloride 0.9 % 1,063.9 mL infusion     methylPREDNISolone sodium succinate (solu-MEDROL) injection 125 mg     naloxone (NARCAN) injection 0.2 mg    Or     naloxone (NARCAN) injection 0.4 mg    Or     naloxone (NARCAN) injection 0.2 mg    Or     naloxone (NARCAN) injection 0.4 mg     No rectal suppositories if WBC less than 1000/microliters or platelets less than 50,000/ L     ondansetron (ZOFRAN) injection 8 mg    Or     ondansetron (ZOFRAN ODT) ODT tab 8 mg    Or     ondansetron (ZOFRAN) tablet 8 mg     ondansetron (ZOFRAN) tablet 8 mg     pantoprazole (PROTONIX) EC tablet 40 mg     polyethylene glycol (MIRALAX) Packet 17 g     prochlorperazine (COMPAZINE) tablet 5-10 mg    Or     prochlorperazine (COMPAZINE) injection 5-10 mg     senna-docusate (SENOKOT-S/PERICOLACE) 8.6-50 MG per tablet 1 tablet    Or     senna-docusate  (SENOKOT-S/PERICOLACE) 8.6-50 MG per tablet 2 tablet     sennosides (SENOKOT) tablet 1-2 tablet     valACYclovir (VALTREX) tablet 1,000 mg     [START ON 11/5/2022] vinCRIStine (ONCOVIN) 2 mg in sodium chloride 0.9 % 29.5 mL infusion     Facility-Administered Medications Ordered in Other Encounters   Medication     heparin lock flush 10 UNIT/ML injection 3 mL       Data   CBC  Recent Labs   Lab 11/04/22  0633 11/03/22  0556 11/02/22  1556 10/31/22  1308   WBC 16.2* 10.4 9.0 9.5   RBC 2.54* 2.80* 2.60* 2.60*   HGB 8.2* 8.9* 8.3* 8.2*   HCT 26.1* 28.2* 26.4* 25.6*   * 101* 102* 99   MCH 32.3 31.8 31.9 31.5   MCHC 31.4* 31.6 31.4* 32.0   RDW 23.6* 23.2* 23.6* 23.1*    223 238 256     CMP  Recent Labs   Lab 11/04/22  0633 11/03/22  0556 11/02/22  1556 10/31/22  1308    138 139 139   POTASSIUM 3.9 4.1 3.9 4.0   CHLORIDE 108* 105 105 105   CO2 21* 20* 21* 24   ANIONGAP 11 13 13 10   * 153* 100* 91   BUN 10.4 13.4 12.0 15.3   CR 0.58 0.53 0.62 0.61   GFRESTIMATED >90 >90 >90 >90   SOLEDAD 9.2 9.2 9.2 9.5   MAG  --  1.8 1.8  --    PHOS  --  3.2 4.0  --    PROTTOTAL 6.1* 6.6 6.4 6.8   ALBUMIN 3.6 3.9 4.0 4.0   BILITOTAL 0.3 0.4 0.4 0.4   ALKPHOS 68 74 78 84   AST 11 14 15 14   ALT 11 12 12 13     INR  Recent Labs   Lab 11/02/22  1556   INR 1.10       Results for orders placed or performed during the hospital encounter of 10/21/22   IR Lumbar Puncture    Narrative    April F Michelle   [id number]      [id number]    APRIL F MICHELLE  [id number]  1980;  42 years    IR LUMBAR PUNCTURE  42F w/ Ph+ B-ALL, needs ppx IT chemo as part of chemo protocol; Acute  lymphoblastic leukemia (ALL) in remission (H)    -----    PRE-OPERATIVE DIAGNOSIS:  Leukemia    POST-OPERATIVE DIAGNOSIS:  Same    PROCEDURE:  Lumbar puncture under fluoroscopy      Impression    IMPRESSION:  Completed lumbar puncture under fluoroscopic guidance. ?A  22 gauge 7 inch spinal needle was advanced between L4-L5 with return  of fluid. ?A total of 10  "mL was collected in 4 tubes for requested  labs.     LIBIA Barajas administered intrathecal chemo through LP access.      Needle removed after return of stylet. ?No immediate complication.  ?Plan: lie flat for 1 hour. ?Dx: Leukemia. ?Vinicius. <2     -----    CLINICAL HISTORY:  Leukemia    PERFORMED BY:  ANA Garay PA-C (Interventional  Radiology)    CONSENT:  The patient understood the limitations, alternatives, and  risks of the procedure and agreed to the procedure.  Written informed  consent was obtained and is documented in the patient record.    MEDICATIONS:  1% lidocaine was used for local anesthesia    NURSING:  The patient was placed on continuous vital signs monitoring.   Vital signs were monitored by nursing staff under my supervision.    FLUOROSCOPY TIME (minutes):  <2    DESCRIPTION:  The patient was placed in the prone position and the  lower back was prepped and draped in the usual sterile fashion. 1%  lidocaine was used for local anesthesia at the intended puncture site.   Under fluoroscopic guidance, a 22 gauge, 7 inch spinal needle was  advanced into the thecal sac at the L4-L5 level with return of fluid.   A total of 10 mL CSF was collected.      LIBIA Barajas administered intrathecal chemo through LP access.      The needle was removed with stylet in place.  No immediate  complication.  CSF sample was sent for requested laboratory analysis.    COMPLICATIONS:  No immediate concerns; the patient remained stable  throughout the procedure and tolerated it well.    ESTIMATED BLOOD LOSS:  Minimal    SPECIMENS:  Per above    -----  \"The physician assistant (PA) who performed this procedure and signed  the above report is licensed to practice in the [facility]  pursuant to [place] Statute 147A.09.  This includes meeting the Statute and  [place] Board of Medical Practice requirement of a collaborating  physician.\"  -----    BELEM GODOY PA-C         SYSTEM ID:  " [id number]

## 2022-11-04 NOTE — PLAN OF CARE
Assumed care from 2300 to 0730  Neuro: a&ox4.   Respiratory: Sats>94%   Cardiac: WDL.     GI/: Bowel sounds present x4. Voiding spontaneously to bathroom.    Skin/Drains/Incisions: Intact.  Lines: DL LPICC infusing Mesna.  Pain: Rated pain 0/10 with no s/sx of pain noted.   Diet: Regular.  Activity Level: Up ad stephanie.   Plan: Monitor infusion.

## 2022-11-04 NOTE — PLAN OF CARE
Goal Outcome Evaluation:    [id number]    A/Ox4. Afebrile. Intermittently tachycardic to 101- denies CP. OVSS on RA. Dose #4 of Cytoxan administered and well tolerated; plan for dose #5 Cytoxan this PM. Denies pain, SOB and N/V. Fair appetite, fluids encouraged. Pt voiding spontaneously with adequate UOP. LBM 11/2 - scheduled sennosides given, declined additional intervention; continues to pass flatus. L-PICC infusing mesna. Requested to have shower later this PM. UAL. Plan for discharge 11/6.

## 2022-11-05 LAB
ABO/RH(D): NORMAL
ALBUMIN SERPL BCG-MCNC: 3.4 G/DL (ref 3.5–5.2)
ALBUMIN SERPL-MCNC: 3 G/DL (ref 3.4–5)
ALP SERPL-CCNC: 56 U/L (ref 35–104)
ALP SERPL-CCNC: 64 U/L (ref 40–150)
ALT SERPL W P-5'-P-CCNC: 11 U/L (ref 10–35)
ALT SERPL W P-5'-P-CCNC: 19 U/L (ref 0–50)
ANION GAP SERPL CALCULATED.3IONS-SCNC: 11 MMOL/L (ref 7–15)
ANION GAP SERPL CALCULATED.3IONS-SCNC: 9 MMOL/L (ref 3–14)
ANTIBODY SCREEN: NEGATIVE
AST SERPL W P-5'-P-CCNC: 11 U/L (ref 10–35)
AST SERPL W P-5'-P-CCNC: 9 U/L (ref 0–45)
BASOPHILS # BLD AUTO: 0 10E3/UL (ref 0–0.2)
BASOPHILS # BLD AUTO: 0 10E3/UL (ref 0–0.2)
BASOPHILS NFR BLD AUTO: 0 %
BASOPHILS NFR BLD AUTO: 0 %
BILIRUB SERPL-MCNC: 0.3 MG/DL
BILIRUB SERPL-MCNC: 0.5 MG/DL (ref 0.2–1.3)
BUN SERPL-MCNC: 11 MG/DL (ref 6–20)
BUN SERPL-MCNC: 14 MG/DL (ref 7–30)
CALCIUM SERPL-MCNC: 8 MG/DL (ref 8.5–10.1)
CALCIUM SERPL-MCNC: 8.4 MG/DL (ref 8.6–10)
CHLORIDE BLD-SCNC: 109 MMOL/L (ref 94–109)
CHLORIDE SERPL-SCNC: 110 MMOL/L (ref 98–107)
CO2 SERPL-SCNC: 23 MMOL/L (ref 20–32)
CREAT SERPL-MCNC: 0.52 MG/DL (ref 0.51–0.95)
CREAT SERPL-MCNC: 0.59 MG/DL (ref 0.52–1.04)
DEPRECATED HCO3 PLAS-SCNC: 20 MMOL/L (ref 22–29)
EOSINOPHIL # BLD AUTO: 0 10E3/UL (ref 0–0.7)
EOSINOPHIL # BLD AUTO: 0 10E3/UL (ref 0–0.7)
EOSINOPHIL NFR BLD AUTO: 0 %
EOSINOPHIL NFR BLD AUTO: 0 %
ERYTHROCYTE [DISTWIDTH] IN BLOOD BY AUTOMATED COUNT: 23.8 % (ref 10–15)
ERYTHROCYTE [DISTWIDTH] IN BLOOD BY AUTOMATED COUNT: 24 % (ref 10–15)
GFR SERPL CREATININE-BSD FRML MDRD: >90 ML/MIN/1.73M2
GFR SERPL CREATININE-BSD FRML MDRD: >90 ML/MIN/1.73M2
GLUCOSE BLD-MCNC: 131 MG/DL (ref 70–99)
GLUCOSE SERPL-MCNC: 132 MG/DL (ref 70–99)
HCT VFR BLD AUTO: 25.6 % (ref 35–47)
HCT VFR BLD AUTO: 27.2 % (ref 35–47)
HGB BLD-MCNC: 7.9 G/DL (ref 11.7–15.7)
HGB BLD-MCNC: 8.6 G/DL (ref 11.7–15.7)
IMM GRANULOCYTES # BLD: 0.4 10E3/UL
IMM GRANULOCYTES # BLD: 0.4 10E3/UL
IMM GRANULOCYTES NFR BLD: 3 %
IMM GRANULOCYTES NFR BLD: 3 %
LYMPHOCYTES # BLD AUTO: 0.1 10E3/UL (ref 0.8–5.3)
LYMPHOCYTES # BLD AUTO: 0.3 10E3/UL (ref 0.8–5.3)
LYMPHOCYTES NFR BLD AUTO: 1 %
LYMPHOCYTES NFR BLD AUTO: 2 %
MAGNESIUM SERPL-MCNC: 1.8 MG/DL (ref 1.7–2.3)
MCH RBC QN AUTO: 32 PG (ref 26.5–33)
MCH RBC QN AUTO: 32.6 PG (ref 26.5–33)
MCHC RBC AUTO-ENTMCNC: 30.9 G/DL (ref 31.5–36.5)
MCHC RBC AUTO-ENTMCNC: 31.6 G/DL (ref 31.5–36.5)
MCV RBC AUTO: 103 FL (ref 78–100)
MCV RBC AUTO: 104 FL (ref 78–100)
MONOCYTES # BLD AUTO: 0.2 10E3/UL (ref 0–1.3)
MONOCYTES # BLD AUTO: 0.5 10E3/UL (ref 0–1.3)
MONOCYTES NFR BLD AUTO: 2 %
MONOCYTES NFR BLD AUTO: 3 %
NEUTROPHILS # BLD AUTO: 12.5 10E3/UL (ref 1.6–8.3)
NEUTROPHILS # BLD AUTO: 14.4 10E3/UL (ref 1.6–8.3)
NEUTROPHILS NFR BLD AUTO: 92 %
NEUTROPHILS NFR BLD AUTO: 94 %
NRBC # BLD AUTO: 0 10E3/UL
NRBC # BLD AUTO: 0 10E3/UL
NRBC BLD AUTO-RTO: 0 /100
NRBC BLD AUTO-RTO: 0 /100
PHOSPHATE SERPL-MCNC: 3.3 MG/DL (ref 2.5–4.5)
PLATELET # BLD AUTO: 161 10E3/UL (ref 150–450)
PLATELET # BLD AUTO: 181 10E3/UL (ref 150–450)
POTASSIUM BLD-SCNC: 3.7 MMOL/L (ref 3.4–5.3)
POTASSIUM SERPL-SCNC: 3.4 MMOL/L (ref 3.4–5.3)
PROT SERPL-MCNC: 5.6 G/DL (ref 6.4–8.3)
PROT SERPL-MCNC: 6.2 G/DL (ref 6.8–8.8)
RBC # BLD AUTO: 2.47 10E6/UL (ref 3.8–5.2)
RBC # BLD AUTO: 2.64 10E6/UL (ref 3.8–5.2)
SODIUM SERPL-SCNC: 141 MMOL/L (ref 133–144)
SODIUM SERPL-SCNC: 141 MMOL/L (ref 136–145)
SPECIMEN EXPIRATION DATE: NORMAL
WBC # BLD AUTO: 13.3 10E3/UL (ref 4–11)
WBC # BLD AUTO: 15.5 10E3/UL (ref 4–11)

## 2022-11-05 PROCEDURE — 86923 COMPATIBILITY TEST ELECTRIC: CPT | Performed by: PHYSICIAN ASSISTANT

## 2022-11-05 PROCEDURE — 99233 SBSQ HOSP IP/OBS HIGH 50: CPT | Mod: GC | Performed by: STUDENT IN AN ORGANIZED HEALTH CARE EDUCATION/TRAINING PROGRAM

## 2022-11-05 PROCEDURE — 250N000011 HC RX IP 250 OP 636: Performed by: INTERNAL MEDICINE

## 2022-11-05 PROCEDURE — 258N000003 HC RX IP 258 OP 636: Performed by: INTERNAL MEDICINE

## 2022-11-05 PROCEDURE — 250N000013 HC RX MED GY IP 250 OP 250 PS 637: Performed by: STUDENT IN AN ORGANIZED HEALTH CARE EDUCATION/TRAINING PROGRAM

## 2022-11-05 PROCEDURE — 250N000013 HC RX MED GY IP 250 OP 250 PS 637: Performed by: PHYSICIAN ASSISTANT

## 2022-11-05 PROCEDURE — 84155 ASSAY OF PROTEIN SERUM: CPT | Performed by: STUDENT IN AN ORGANIZED HEALTH CARE EDUCATION/TRAINING PROGRAM

## 2022-11-05 PROCEDURE — 120N000002 HC R&B MED SURG/OB UMMC

## 2022-11-05 PROCEDURE — 85025 COMPLETE CBC W/AUTO DIFF WBC: CPT

## 2022-11-05 PROCEDURE — 85004 AUTOMATED DIFF WBC COUNT: CPT | Performed by: PHYSICIAN ASSISTANT

## 2022-11-05 PROCEDURE — 82040 ASSAY OF SERUM ALBUMIN: CPT | Performed by: PHYSICIAN ASSISTANT

## 2022-11-05 PROCEDURE — 36592 COLLECT BLOOD FROM PICC: CPT

## 2022-11-05 PROCEDURE — 250N000011 HC RX IP 250 OP 636: Performed by: STUDENT IN AN ORGANIZED HEALTH CARE EDUCATION/TRAINING PROGRAM

## 2022-11-05 PROCEDURE — 86901 BLOOD TYPING SEROLOGIC RH(D): CPT | Performed by: PHYSICIAN ASSISTANT

## 2022-11-05 PROCEDURE — 83735 ASSAY OF MAGNESIUM: CPT | Performed by: STUDENT IN AN ORGANIZED HEALTH CARE EDUCATION/TRAINING PROGRAM

## 2022-11-05 PROCEDURE — 84100 ASSAY OF PHOSPHORUS: CPT | Performed by: STUDENT IN AN ORGANIZED HEALTH CARE EDUCATION/TRAINING PROGRAM

## 2022-11-05 PROCEDURE — 999N000044 HC STATISTIC CVC DRESSING CHANGE

## 2022-11-05 PROCEDURE — 80053 COMPREHEN METABOLIC PANEL: CPT | Performed by: PHYSICIAN ASSISTANT

## 2022-11-05 PROCEDURE — 86850 RBC ANTIBODY SCREEN: CPT | Performed by: PHYSICIAN ASSISTANT

## 2022-11-05 RX ORDER — HEPARIN SODIUM,PORCINE 10 UNIT/ML
5-20 VIAL (ML) INTRAVENOUS
Status: DISCONTINUED | OUTPATIENT
Start: 2022-11-05 | End: 2022-11-06 | Stop reason: HOSPADM

## 2022-11-05 RX ORDER — POTASSIUM CHLORIDE 750 MG/1
40 TABLET, EXTENDED RELEASE ORAL ONCE
Status: COMPLETED | OUTPATIENT
Start: 2022-11-05 | End: 2022-11-05

## 2022-11-05 RX ORDER — HEPARIN SODIUM,PORCINE 10 UNIT/ML
5-20 VIAL (ML) INTRAVENOUS EVERY 24 HOURS
Status: DISCONTINUED | OUTPATIENT
Start: 2022-11-05 | End: 2022-11-06 | Stop reason: HOSPADM

## 2022-11-05 RX ADMIN — DEXAMETHASONE 40 MG: 4 TABLET ORAL at 10:59

## 2022-11-05 RX ADMIN — VALACYCLOVIR HYDROCHLORIDE 1000 MG: 500 TABLET, FILM COATED ORAL at 13:42

## 2022-11-05 RX ADMIN — VALACYCLOVIR HYDROCHLORIDE 1000 MG: 500 TABLET, FILM COATED ORAL at 08:12

## 2022-11-05 RX ADMIN — IMATINIB MESYLATE 300 MG: 100 TABLET, FILM COATED ORAL at 09:08

## 2022-11-05 RX ADMIN — ONDANSETRON HYDROCHLORIDE 8 MG: 8 TABLET, FILM COATED ORAL at 08:12

## 2022-11-05 RX ADMIN — SENNOSIDES 1 TABLET: 8.6 TABLET, FILM COATED ORAL at 08:12

## 2022-11-05 RX ADMIN — AMLODIPINE BESYLATE 5 MG: 5 TABLET ORAL at 08:12

## 2022-11-05 RX ADMIN — VINCRISTINE SULFATE 2 MG: 1 INJECTION, SOLUTION INTRAVENOUS at 10:59

## 2022-11-05 RX ADMIN — SODIUM CHLORIDE 120 MG: 0.9 INJECTION, SOLUTION INTRAVENOUS at 11:52

## 2022-11-05 RX ADMIN — ONDANSETRON HYDROCHLORIDE 8 MG: 8 TABLET, FILM COATED ORAL at 20:42

## 2022-11-05 RX ADMIN — PANTOPRAZOLE SODIUM 40 MG: 40 TABLET, DELAYED RELEASE ORAL at 08:12

## 2022-11-05 RX ADMIN — VALACYCLOVIR HYDROCHLORIDE 1000 MG: 500 TABLET, FILM COATED ORAL at 20:42

## 2022-11-05 RX ADMIN — POTASSIUM CHLORIDE 40 MEQ: 750 TABLET, EXTENDED RELEASE ORAL at 13:42

## 2022-11-05 RX ADMIN — IMATINIB MESYLATE 300 MG: 100 TABLET, FILM COATED ORAL at 20:43

## 2022-11-05 RX ADMIN — CYCLOPHOSPHAMIDE 695 MG: 2 INJECTION, POWDER, FOR SOLUTION INTRAVENOUS; ORAL at 09:03

## 2022-11-05 RX ADMIN — Medication 5 ML: at 18:06

## 2022-11-05 ASSESSMENT — ACTIVITIES OF DAILY LIVING (ADL)
ADLS_ACUITY_SCORE: 20

## 2022-11-05 NOTE — PROGRESS NOTES
Nursing Focus: Chemotherapy  D: Positive blood return via PICC. Insertion site is clean/dry/intact, dressing intact with no complaints of pain.  Urine output is recorded in intake in Doc Flowsheet.    I: Dose #1 of Doxorubicin started to infuse over 24 hours. Reviewed pt teaching on chemotherapy side effects. Pt denies need for further teaching. Chemotherapy double checked per protocol by two chemotherapy competent RN's.   A: Tolerating procedure well. Denies nausea and or pain.   P: Continue to monitor urine output and symptoms of nausea. Screen for symptoms of toxicity.

## 2022-11-05 NOTE — PROGRESS NOTES
Care Management     Length of Stay (days): 3    Expected Discharge Date: 11/06/2022     Concerns to be Addressed:     Discharge planning   Patient plan of care discussed at interdisciplinary rounds: No    Anticipated Discharge Disposition:  Home      Anticipated Discharge Services:  Home infusion for line cares   Anticipated Discharge DME:  None     Referrals Placed by CM/SW:  Established home infusion  Private pay costs discussed: Not applicable    Additional Information:  This writer called and spoke with April regarding discharge planning. April was on services with [facility] for lines cares, IV antibiotics, and intravenous fluids. April states that at this time, she is not anticipated to need IV antibiotics at discharge, although she will still need her PICC line.     This writer called and spoke with [facility] nurse. They will continue to provide supplies needed for line cares as she will still need to do heparin flushes daily. April had stated that she would be going to the clinic for labs and they would most likely change dressings there.     Orders will be resumed for lines cares with [facility] and April can go to clinic for weekly dressing changes until she has her port placed or PICC line is discontinued.     [facility]   [address] [place] [address]  (P) [phone]  (F) [phone]  Lines cares      Odalis Márquez, RN  Float RN Care Coordinator     For Weekend & Holiday on call RN Care Coordinator:  (Tasks: Home care, home infusion, medical equipment/oxygen, transportation, IMM & MOON forms, etc.)     Text Paging in [facility] Smart Web is the preferred method of contact for these teams     [place] [facility] [place] (3295-6121) Saturday & Sunday; (1417-3149) [facility] Recognized Holidays  Pager: [phone] Units: 4A, 4C, 4E, 5A & 5B   Pager: [phone] Units: 6A, 6B  Pager: [phone] Units: 6C, 6D  Pager: [phone] Units: 7A, 7B, 7C, 7D &  5C  Pager: [phone] Units: [facility], 5 Ortho, 5 Med/Surg, 6 Med/Surg, 8A, 10 ICU, & [facility]     For Weekend & Holiday on call Social Work:  (Tasks: TCU, transportation, Hospice, adjustment to illness counseling, Health Care Directives, Child Protection and Domestic Violence concerns, Vulnerable Adult, IMM forms, etc.)     Text Paging in [facility] Smart Web is the preferred method of contact for these teams    [place] (0800 - 1630) Saturday and Sunday  Pager: [phone] Units: 4A, 4C, 4E  Pager: [phone] Units: 5A & 5B  Pager: [phone] Units: 6A & 6B  Pager: [phone] Units: 6C & 6D  Pager: [phone] Units: 7A & 7B  Pager: [phone] Units: 7C & 7D  Pager: [phone] Units: [facility] (2075-9454) Saturday and Sunday  Pager: [phone] Units: 5 Ortho, 5 Med/Surg, & [facility]  Pager: [phone] Units: 6 Med/Surg, 8A, & 10 ICU   ______________________________________________     After hours (5265-3066) for all units everyday- (only the  is available after hours until midnight)  Pager [phone]

## 2022-11-05 NOTE — PLAN OF CARE
Nursing Focus: Chemotherapy  D: Positive brisk bright red blood return via PICC. Insertion site is clean/dry/intact, dressing intact with no complaints of pain.  Urine output is recorded in intake Doc Flowsheet.    I: Zofran and MESNA premedications given per order (see electronic medical administration record). Dose #5 of cytoxan started to infuse over 1 hours. Reviewed pt teaching on chemotherapy side effects.  Pt denies need for further teaching. Chemotherapy double checked per protocol by two chemotherapy competent RN's.   A: Tolerating chemo well. Denies nausea.   P: Continue to monitor urine output and symptoms of nausea. Screen for symptoms of toxicity.

## 2022-11-05 NOTE — PLAN OF CARE
"Goal Outcome Evaluation:  1755-2874      /67 (BP Location: Right arm, Patient Position: Supine, Cuff Size: Adult Regular)   Pulse 92   Temp 98.2  F (36.8  C) (Temporal)   Resp 18   Ht 1.59 m (5' 2.6\")   Wt 112.2 kg (247 lb 4.8 oz)   SpO2 95%   BMI 44.37 kg/m      Reason for admission: Admitted for induction chemo with GRAALL regimen + imatinib  Activity: Independent  Pain: Denies pain  Neuro: A&OX4  Cardiac:WNL  Respiratory: Sating in upper 90s in RA  GI/: No nausea or vomiting, LBM 11/4  Diet: Regular diet  Lines: DL PICC infusing mesna  Wounds: scabs to scalp  Labs/imaging: Reviewed, refer to pt chart      New changes this shift:None     Plan: Continue with chemo and discharge home potentially on 11/6      Continue to monitor and follow POC                         "

## 2022-11-05 NOTE — PLAN OF CARE
"Goal Outcome Evaluation:      Plan of Care Reviewed With: patient    Overall Patient Progress: no changeOverall Patient Progress: no change     Time 3265-6317    /64   Pulse 91   Temp 98.3  F (36.8  C) (Oral)   Resp 18   Ht 1.59 m (5' 2.6\")   Wt 112.2 kg (247 lb 4.8 oz)   SpO2 96%   BMI 44.37 kg/m      Reason for admission: B-ALL s/p induction chemotherapy, C3D3  Activity: Independent   Pain: Denies  Neuro: A&O x 4  Cardiac: tachycardic  Respiratory: wnl  GI/: Voiding adequate, last BM 11/4  Diet: Regular  Lines: PICC  Wounds: scabs on scalp, no open areas  Labs/imaging: Reviewed        Plan: Pending completion of chemotherapy; anticipate 11/6      Continue to monitor and follow POC   "

## 2022-11-05 NOTE — PROGRESS NOTES
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 Now admitted for cycle 3 GRAALL + imatinib and with unremarkable course thus far.     TODAY (D4)   - Continue chemotherapy per protocol; anticipate discharge 11/6   - Continue to monitor closely for side effect related to chemotherapy   - Outpatient follow up requested, has labs and infusion on Monday 11/7  - Potassium repletion today per replacement protocol     HEME  # Ph(+) B-ALL  Followed by Dr. Hull. Patient presented to outside hospital with c/o right upper quadrant pain and was subsequently found to have a markedly elevated white blood cell count concerning for acute leukemia. While at OSH, she had CTA chest as well as CT A/P. These identified no PE, no acute or localized intraabdominal inflammatory process, mild splenomegaly, and few inconspicous low-density structures in the liver. She was transferred to [facility] for further workup and management. S/p Hydrea 1g TID (8/21-8/23) with improvement in WBC (100K ? 12K). Diagnostic BMBx completed 8/20/22, which demonstrated B-ALL with 85% blasts and hypercellular marrow. IHC shows CD10+, CD34+. 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 ALL NGS negative. BCR/ABL1 major/minor (sent from peripheral blood) both negative. Initiated pre-phase steroids with Prednisone 60 mg x8/21; increased to 60 mg/m2 (140 mg) 8/22-8/24. She initiated GRAALL + imatinib (C1D1 = 8/24/22). S/p BMT consult 9/12 with Dr. Walsh. Induction was relatively uncomplicated. Post-induction BMBx done 9/26/22 and revealed MRD- CR1, with negative BCR/ABL1 and Clonoseq testing. She was admitted for C2 GRAALL on 9/30/22. Underwent BMBx 10/25/22 with no morphologic evidence of B-lymphoblastic leukemia and flow with unusual B lineage precursor population 0.09%. Currently she is admitted for cycle 3 GRAALL + imatinib.   - PICC placed prior to admission; will leave in on discharge. Discussed with patient.  - Seen by Dr. Hull's note prior to admission. Proceed with cycle 3 GRAALL + imatinib.  - Prescription for imatinib 300 mg BID sent to  Specialty pharmacy; per unit pharamcist will be shipped to discharge pharmacy for patient to  prior to discharging.    - Further intrathecal chemotherapy will be addressed as an outpatient per Dr. Hull note 11/2                   Treatment Plan: GRAALL + imatinib (C3D1 = 11/2/22)               - Premeds: Zofran 8 mg q12hr, Emend on D1               - Dexamethasone 40 mg D1-4               - Imatinib 300 mg BID x14 days (prescription sent to  specialty pharmacy; should arrive to our discharge pharmacy by Sunday 11/6)                - Cytoxan 300 mg/m2 q12hr x6 doses - D1-3               - Mesna continuous D 1-3, stop 12 hours after last Cytoxan dose               - Vincristine 2 mg x1 - D4                - Doxorubicin 50 mg/m2 x1 over 24 hours - D4    - Neulasta 6 mg subcutaneous - scheduled 11/7 AM      # Anemia   - Transfuse to keep Hgb >7 and platelets >10K  - Monitor CBC daily  - Of note, pt has history of transfusion reaction and requires premeds with Benadryl and  Tylenol     ID  # 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 She was empirically started on IV acyclovir (x10/10). She was evaluated by ID, derm and opthalmology. She was discharged to complete a 14-day course of IV acyclovir through (10/24), but it appears it was prolonged until ~11/2 to use up home supply.   - Per outpatient ID note, recommend Valtrex 1000 mg TID until completion of chemotherapy and complete resolution of suspected VZV lesions completely resolve. Then recommend Valtrex 1000 mg BID until count recovery for prophylaxis as she broke through ACV.      # ID PPx  - Valtrex 1000 mg TID until completes chemo, suspected VZV lesions completely resolve, and her ANC >1000 per ID note with Dr. Diaz on 11/1  - Held PTA fluconazole 200 mg daily and levofloxacin 250 mg daily given ANC >1.0  - Nebulized pentamidine last given 10/31, next due ~11/28     MISC  # Hypertension   - Continue PTA amlodipine 5 mg daily     # GERD   - PTA Protonix 20 mg daily. Increased to 40 mg during admission with high-dose steroids.      # Grade 1 neuropathy of the fingertips   Due to vincristine. Pt reports symptoms are stable. Reports mild paresthesias and hot/cold sensitivity.   - Monitor clinically    FEN:  -IVF per chemo plan   -PRN lyte replacement, potassium repletion today  -RDAT    Prophy/Misc:  -VTE: Lovenox; hold if platelets <50K   -GI/PUD: PTA Protonix   -Bowels: PRN Senna and Miralax     Dispo: Pending completion of chemotherapy; anticipate 11/6   Follow Up: APPT 18 entered and requested 11/3   1. Neulasta 11/7 AM (scheduled)   2. 11/14 D11 Vincristine   3. 2x weekly labs and PRN blood transfusions starting week of 11/7 x 3 weeks    4. AVELINA follow up week of 11/7    This patient was  discussed with Dr. Grisel Rice MD PhD  Hematology/Oncology Fellow  Pgr: [phone]      Interval History   Nursing notes reviewed. Doing well. No complaints today. Hoping the day goes smoothly and that she remains on track to discharge tomorrow.      A comprehensive review of symptoms was obtained and negative unless noted above.    Physical Exam   Temp: 97.4  F (36.3  C) Temp src: Temporal BP: 127/72 Pulse: 85   Resp: 16 SpO2: 96 % O2 Device: None (Room air)    Vitals:    11/02/22 1401 11/03/22 0800 11/05/22 0812   Weight: 112.5 kg (248 lb 1.6 oz) 112.2 kg (247 lb 4.8 oz) 114.5 kg (252 lb 8 oz)     Vital Signs with Ranges  Temp:  [97.4  F (36.3  C)-98.3  F (36.8  C)] 97.4  F (36.3  C)  Pulse:  [] 85  Resp:  [16-18] 16  BP: (117-128)/(59-72) 127/72  SpO2:  [95 %-97 %] 96 %  I/O last 3 completed shifts:  In: 734.4 [P.O.:380; IV Piggyback:354.4]  Out: 1000 [Urine:1000]    Constitutional: Pleasant female seen resting comfortably in bed in NAD. Alert and interactive.   HEENT: NCAT. EOMI, anicteric sclera. Oral mucosa pink and moist with no lesions or thrush. Healing scabbed shingle lesions noted on scalp.   Respiratory: Non-labored breathing, good air exchange, lungs clear to auscultation bilaterally. No cough or wheeze noted.   Cardiovascular: Regular rate and rhythm. No murmur or rub.   GI: Normoactive bowel sounds. Abdomen soft, non-distended, and non-tender.   Skin: Warm and dry. No concerning lesions or rash on exposed surfaces.  Musculoskeletal: Extremities grossly normal, non-tender, no edema.   Neuropsychiatric: Mentation and affect appear normal/appropriate.  Vascular Access: PICC is CDI without swelling, or discharge. Small area of erythema noted just inferior to PICC insertion.     Medications   Current Facility-Administered Medications   Medication     0.9% sodium chloride BOLUS     acetaminophen (TYLENOL) tablet 650 mg     albuterol (PROVENTIL HFA/VENTOLIN HFA) inhaler     albuterol  (PROVENTIL) neb solution 2.5 mg     amLODIPine (NORVASC) tablet 5 mg     Chemotherapy Infusing-Continuous Infusion     cyclophosphamide (CYTOXAN) 695 mg in sodium chloride 0.9 % 309.75 mL infusion     dexamethasone (DECADRON) tablet 40 mg     diphenhydrAMINE (BENADRYL) injection 50 mg     DOXOrubicin (ADRIAMYCIN) 120 mg in sodium chloride 0.9 % 1,110 mL infusion     EPINEPHrine PF (ADRENALIN) injection 0.3 mg     famotidine (PEPCID) injection 20 mg     imatinib (GLEEVEC) tablet 300 mg     LORazepam (ATIVAN) injection 0.5-1 mg     LORazepam (ATIVAN) tablet 0.5-1 mg     LORazepam (ATIVAN) tablet 0.5-1 mg     meperidine (DEMEROL) injection 25 mg     mesna (MESNEX) 1,390 mg in sodium chloride 0.9 % 1,063.9 mL infusion     methylPREDNISolone sodium succinate (solu-MEDROL) injection 125 mg     naloxone (NARCAN) injection 0.2 mg    Or     naloxone (NARCAN) injection 0.4 mg    Or     naloxone (NARCAN) injection 0.2 mg    Or     naloxone (NARCAN) injection 0.4 mg     No rectal suppositories if WBC less than 1000/microliters or platelets less than 50,000/ L     ondansetron (ZOFRAN) injection 8 mg    Or     ondansetron (ZOFRAN ODT) ODT tab 8 mg    Or     ondansetron (ZOFRAN) tablet 8 mg     ondansetron (ZOFRAN) tablet 8 mg     pantoprazole (PROTONIX) EC tablet 40 mg     polyethylene glycol (MIRALAX) Packet 17 g     prochlorperazine (COMPAZINE) tablet 5-10 mg    Or     prochlorperazine (COMPAZINE) injection 5-10 mg     senna-docusate (SENOKOT-S/PERICOLACE) 8.6-50 MG per tablet 1 tablet    Or     senna-docusate (SENOKOT-S/PERICOLACE) 8.6-50 MG per tablet 2 tablet     sennosides (SENOKOT) tablet 1-2 tablet     valACYclovir (VALTREX) tablet 1,000 mg     vinCRIStine (ONCOVIN) 2 mg in sodium chloride 0.9 % 29.5 mL infusion     Facility-Administered Medications Ordered in Other Encounters   Medication     heparin lock flush 10 UNIT/ML injection 3 mL       Data   CBC  Recent Labs   Lab 11/05/22  0623 11/04/22  0633 11/03/22  0556  11/02/22  1556   WBC 15.5* 16.2* 10.4 9.0   RBC 2.47* 2.54* 2.80* 2.60*   HGB 7.9* 8.2* 8.9* 8.3*   HCT 25.6* 26.1* 28.2* 26.4*   * 103* 101* 102*   MCH 32.0 32.3 31.8 31.9   MCHC 30.9* 31.4* 31.6 31.4*   RDW 24.0* 23.6* 23.2* 23.6*    216 223 238     CMP  Recent Labs   Lab 11/04/22  0633 11/03/22  0556 11/02/22  1556 10/31/22  1308    138 139 139   POTASSIUM 3.9 4.1 3.9 4.0   CHLORIDE 108* 105 105 105   CO2 21* 20* 21* 24   ANIONGAP 11 13 13 10   * 153* 100* 91   BUN 10.4 13.4 12.0 15.3   CR 0.58 0.53 0.62 0.61   GFRESTIMATED >90 >90 >90 >90   SOLEDAD 9.2 9.2 9.2 9.5   MAG  --  1.8 1.8  --    PHOS  --  3.2 4.0  --    PROTTOTAL 6.1* 6.6 6.4 6.8   ALBUMIN 3.6 3.9 4.0 4.0   BILITOTAL 0.3 0.4 0.4 0.4   ALKPHOS 68 74 78 84   AST 11 14 15 14   ALT 11 12 12 13     INR  Recent Labs   Lab 11/02/22  1556   INR 1.10       Results for orders placed or performed during the hospital encounter of 10/21/22   IR Lumbar Puncture    Narrative    April F Michelle   [id number]      [id number]    APRIL F MICHELLE  [id number]  1980;  42 years    IR LUMBAR PUNCTURE  42F w/ Ph+ B-ALL, needs ppx IT chemo as part of chemo protocol; Acute  lymphoblastic leukemia (ALL) in remission (H)    -----    PRE-OPERATIVE DIAGNOSIS:  Leukemia    POST-OPERATIVE DIAGNOSIS:  Same    PROCEDURE:  Lumbar puncture under fluoroscopy      Impression    IMPRESSION:  Completed lumbar puncture under fluoroscopic guidance. ?A  22 gauge 7 inch spinal needle was advanced between L4-L5 with return  of fluid. ?A total of 10 mL was collected in 4 tubes for requested  labs.     LIBIA Barajas administered intrathecal chemo through LP access.      Needle removed after return of stylet. ?No immediate complication.  ?Plan: lie flat for 1 hour. ?Dx: Leukemia. ?Vinicius. <2     -----    CLINICAL HISTORY:  Leukemia    PERFORMED BY:  ANA Garay PA-C (Interventional  Radiology)    CONSENT:  The patient understood the limitations,  "alternatives, and  risks of the procedure and agreed to the procedure.  Written informed  consent was obtained and is documented in the patient record.    MEDICATIONS:  1% lidocaine was used for local anesthesia    NURSING:  The patient was placed on continuous vital signs monitoring.   Vital signs were monitored by nursing staff under my supervision.    FLUOROSCOPY TIME (minutes):  <2    DESCRIPTION:  The patient was placed in the prone position and the  lower back was prepped and draped in the usual sterile fashion. 1%  lidocaine was used for local anesthesia at the intended puncture site.   Under fluoroscopic guidance, a 22 gauge, 7 inch spinal needle was  advanced into the thecal sac at the L4-L5 level with return of fluid.   A total of 10 mL CSF was collected.      LIBIA Barajas administered intrathecal chemo through LP access.      The needle was removed with stylet in place.  No immediate  complication.  CSF sample was sent for requested laboratory analysis.    COMPLICATIONS:  No immediate concerns; the patient remained stable  throughout the procedure and tolerated it well.    ESTIMATED BLOOD LOSS:  Minimal    SPECIMENS:  Per above    -----  \"The physician assistant (PA) who performed this procedure and signed  the above report is licensed to practice in the [facility]  pursuant to [place] Statute 147A.09.  This includes meeting the Statute and  [place] Board of Medical [employer] requirement of a collaborating  physician.\"  -----    BELEM GODOY PA-C         SYSTEM ID:  [id number]       "

## 2022-11-05 NOTE — PROVIDER NOTIFICATION
"Pg'd Dr Rice    \"Pt's K+ came back 3.4. Do you want to replace? Also no electrolyte conditional orders placed.     Can you also add PRN Heparin flushes? Thanks!\"  "

## 2022-11-06 ENCOUNTER — HOME INFUSION (PRE-WILLOW HOME INFUSION) (OUTPATIENT)
Dept: PHARMACY | Facility: CLINIC | Age: 42
End: 2022-11-06

## 2022-11-06 VITALS
RESPIRATION RATE: 18 BRPM | DIASTOLIC BLOOD PRESSURE: 67 MMHG | TEMPERATURE: 97.9 F | HEIGHT: 63 IN | WEIGHT: 252.6 LBS | HEART RATE: 82 BPM | SYSTOLIC BLOOD PRESSURE: 123 MMHG | BODY MASS INDEX: 44.76 KG/M2 | OXYGEN SATURATION: 97 %

## 2022-11-06 LAB
ABO/RH(D): NORMAL
ALBUMIN SERPL BCG-MCNC: 3.2 G/DL (ref 3.5–5.2)
ALP SERPL-CCNC: 57 U/L (ref 35–104)
ALT SERPL W P-5'-P-CCNC: 8 U/L (ref 10–35)
ANION GAP SERPL CALCULATED.3IONS-SCNC: 11 MMOL/L (ref 7–15)
ANTIBODY SCREEN: NEGATIVE
AST SERPL W P-5'-P-CCNC: 13 U/L (ref 10–35)
BASOPHILS # BLD AUTO: 0 10E3/UL (ref 0–0.2)
BASOPHILS NFR BLD AUTO: 0 %
BILIRUB SERPL-MCNC: 0.4 MG/DL
BLD PROD TYP BPU: NORMAL
BLOOD COMPONENT TYPE: NORMAL
BUN SERPL-MCNC: 12.5 MG/DL (ref 6–20)
CALCIUM SERPL-MCNC: 8.3 MG/DL (ref 8.6–10)
CHLORIDE SERPL-SCNC: 108 MMOL/L (ref 98–107)
CODING SYSTEM: NORMAL
CREAT SERPL-MCNC: 0.55 MG/DL (ref 0.51–0.95)
CROSSMATCH: NORMAL
DEPRECATED HCO3 PLAS-SCNC: 21 MMOL/L (ref 22–29)
EOSINOPHIL # BLD AUTO: 0 10E3/UL (ref 0–0.7)
EOSINOPHIL NFR BLD AUTO: 0 %
ERYTHROCYTE [DISTWIDTH] IN BLOOD BY AUTOMATED COUNT: 23 % (ref 10–15)
GFR SERPL CREATININE-BSD FRML MDRD: >90 ML/MIN/1.73M2
GLUCOSE SERPL-MCNC: 104 MG/DL (ref 70–99)
HCT VFR BLD AUTO: 25.1 % (ref 35–47)
HGB BLD-MCNC: 7.9 G/DL (ref 11.7–15.7)
IMM GRANULOCYTES # BLD: 0.2 10E3/UL
IMM GRANULOCYTES NFR BLD: 3 %
LYMPHOCYTES # BLD AUTO: 0.1 10E3/UL (ref 0.8–5.3)
LYMPHOCYTES NFR BLD AUTO: 1 %
MCH RBC QN AUTO: 32.2 PG (ref 26.5–33)
MCHC RBC AUTO-ENTMCNC: 31.5 G/DL (ref 31.5–36.5)
MCV RBC AUTO: 102 FL (ref 78–100)
MONOCYTES # BLD AUTO: 0.5 10E3/UL (ref 0–1.3)
MONOCYTES NFR BLD AUTO: 7 %
NEUTROPHILS # BLD AUTO: 6.8 10E3/UL (ref 1.6–8.3)
NEUTROPHILS NFR BLD AUTO: 89 %
NRBC # BLD AUTO: 0 10E3/UL
NRBC BLD AUTO-RTO: 0 /100
PLATELET # BLD AUTO: 136 10E3/UL (ref 150–450)
POTASSIUM SERPL-SCNC: 3.6 MMOL/L (ref 3.4–5.3)
PROT SERPL-MCNC: 5.2 G/DL (ref 6.4–8.3)
RBC # BLD AUTO: 2.45 10E6/UL (ref 3.8–5.2)
SODIUM SERPL-SCNC: 140 MMOL/L (ref 136–145)
SPECIMEN EXPIRATION DATE: NORMAL
UNIT ABO/RH: NORMAL
UNIT NUMBER: NORMAL
UNIT STATUS: NORMAL
UNIT TYPE ISBT: 5100
WBC # BLD AUTO: 7.6 10E3/UL (ref 4–11)

## 2022-11-06 PROCEDURE — 250N000013 HC RX MED GY IP 250 OP 250 PS 637: Performed by: PHYSICIAN ASSISTANT

## 2022-11-06 PROCEDURE — 85025 COMPLETE CBC W/AUTO DIFF WBC: CPT | Performed by: PHYSICIAN ASSISTANT

## 2022-11-06 PROCEDURE — 250N000011 HC RX IP 250 OP 636: Performed by: INTERNAL MEDICINE

## 2022-11-06 PROCEDURE — 36592 COLLECT BLOOD FROM PICC: CPT | Performed by: PHYSICIAN ASSISTANT

## 2022-11-06 PROCEDURE — 250N000011 HC RX IP 250 OP 636: Performed by: STUDENT IN AN ORGANIZED HEALTH CARE EDUCATION/TRAINING PROGRAM

## 2022-11-06 PROCEDURE — 80053 COMPREHEN METABOLIC PANEL: CPT | Performed by: PHYSICIAN ASSISTANT

## 2022-11-06 PROCEDURE — 250N000011 HC RX IP 250 OP 636: Performed by: PHYSICIAN ASSISTANT

## 2022-11-06 PROCEDURE — 82040 ASSAY OF SERUM ALBUMIN: CPT | Performed by: PHYSICIAN ASSISTANT

## 2022-11-06 PROCEDURE — 99239 HOSP IP/OBS DSCHRG MGMT >30: CPT | Mod: GC | Performed by: STUDENT IN AN ORGANIZED HEALTH CARE EDUCATION/TRAINING PROGRAM

## 2022-11-06 RX ORDER — ACETAMINOPHEN 325 MG/1
325 TABLET ORAL ONCE
Status: CANCELLED
Start: 2022-11-06 | End: 2022-11-06

## 2022-11-06 RX ORDER — HEPARIN SODIUM (PORCINE) LOCK FLUSH IV SOLN 100 UNIT/ML 100 UNIT/ML
5 SOLUTION INTRAVENOUS
Status: CANCELLED | OUTPATIENT
Start: 2022-11-06

## 2022-11-06 RX ORDER — HEPARIN SODIUM,PORCINE 10 UNIT/ML
5 VIAL (ML) INTRAVENOUS
Status: CANCELLED | OUTPATIENT
Start: 2022-11-06

## 2022-11-06 RX ORDER — DIPHENHYDRAMINE HCL 25 MG
25 CAPSULE ORAL ONCE
Status: CANCELLED
Start: 2022-11-06 | End: 2022-11-06

## 2022-11-06 RX ADMIN — ONDANSETRON HYDROCHLORIDE 8 MG: 8 TABLET, FILM COATED ORAL at 08:23

## 2022-11-06 RX ADMIN — AMLODIPINE BESYLATE 5 MG: 5 TABLET ORAL at 08:23

## 2022-11-06 RX ADMIN — PANTOPRAZOLE SODIUM 40 MG: 40 TABLET, DELAYED RELEASE ORAL at 08:23

## 2022-11-06 RX ADMIN — IMATINIB MESYLATE 300 MG: 100 TABLET, FILM COATED ORAL at 08:24

## 2022-11-06 RX ADMIN — Medication 5 ML: at 10:51

## 2022-11-06 RX ADMIN — VALACYCLOVIR HYDROCHLORIDE 1000 MG: 500 TABLET, FILM COATED ORAL at 08:23

## 2022-11-06 RX ADMIN — ONDANSETRON 8 MG: 8 TABLET, ORALLY DISINTEGRATING ORAL at 09:57

## 2022-11-06 ASSESSMENT — ACTIVITIES OF DAILY LIVING (ADL)
ADLS_ACUITY_SCORE: 20

## 2022-11-06 NOTE — PLAN OF CARE
"[id number]  /74   Pulse 86   Temp 98.4  F (36.9  C) (Oral)   Resp 18   Ht 1.59 m (5' 2.6\")   Wt 114.5 kg (252 lb 8 oz)   SpO2 96%   BMI 45.30 kg/m      Afebrile, VSS  No acute event.     TODAY (D4)   - Continue chemotherapy per protocol; anticipate discharge 11/6   - Continue to monitor closely for side effect related to chemotherapy   - Outpatient follow up requested, has labs and infusion on Monday 11/7      NEURO: Alert and oriented x4.      RESPIRATORY: Room Air, denies dizziness, and SOB. Lungs sound, clear, equal bilateral.     CARDIO: VSS, denies dizziness, and extremity pain.      GI/: Denies N/V, BS active, Pt reported loose stool and not taking laxatives, AUOP (see MAR).     SKIN: No acute change.      ACTIVITY: independent.      PAIN: Denies pain.    DLA: PICC double lumen.     BG: No     PLAN: Continue monitoring.         Goal Outcome Evaluation:                        "

## 2022-11-06 NOTE — PLAN OF CARE
"Time: 7800-0489    Afebrile with VSS on RA. Denies pain. Endorses mild nausea, ODT zofran given with relief. Denies SOB. A/Ox4, but feels slow and \"lazy\". Face and chest slightly flushed. Poor appetite, education given regarding importance of nutrition intake, pt verbalized understanding. UAL. Voiding well. 2 loose BM in past 24 hours, refusing senna. On PO chemo. IV chemo completed without complication. PICC caps changed and heparin locked. Discharging with PICC. To get port placement outpatient.     Discharge  D: Orders for discharge and outpatient medications written.  I: Home medications and return to clinic schedule reviewed with patient. Discharge instructions and parameters for calling Health Care Provider reviewed. Patient left at 1200 accompanied by .   A: Patient/family verbalized understanding and was ready for discharge.   P: Patient instructed to  medications in Pharmacy. Pt declined saying pt had senna at home. Discharge pharmacy notified. Follow up as scheduled at cancer clinic tomorrow.    "

## 2022-11-06 NOTE — PLAN OF CARE
"0700- 1900    /82 (BP Location: Right arm, Patient Position: Semi-Abreu's, Cuff Size: Adult Regular)   Pulse 87   Temp 98.7  F (37.1  C) (Oral)   Resp 18   Ht 1.59 m (5' 2.6\")   Wt 114.5 kg (252 lb 8 oz)   SpO2 97%   BMI 45.30 kg/m      VSS on RA, Afebrile. Denies pain, N/V, SOB. Mesna completed today. D4 Chemo today; Cytoxan, Vincristine and CIVI Doxo; tolerated infusions well with good blood return. K+- 3.4- replaced- results pending. Scabs on head are improving; noted some flushing today. BM today; good UOP. Good appetite. Family visited today. Continue POC.   "

## 2022-11-06 NOTE — DISCHARGE SUMMARY
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 She was admitted for cycle 3 GRAALL + imatinib which she tolerated well without complications.    Outpatient follow-up issues:  - Follow-up on 11/7 for neulasta with labs and infusion appt  - Twice weekly labs and PRN blood transfusions ordered but not yet scheduled  - AVELINA follow-up ordered but not yet scheduled; Dr. Hull ultimately to decide next steps and whether she will transition to an alternative treatment    New Medications: None    Hospital Course   Vashti Villegas was admitted on 11/2/2022.  The following problems were addressed during her hospitalization:    HEME  # Ph(+) B-ALL  Followed by Dr. Hull. Patient presented to outside hospital with c/o right upper quadrant pain and was subsequently found to have a markedly elevated white blood cell count concerning for acute leukemia. While at OSH, she had CTA chest as well as CT A/P. These identified no PE, no acute or localized intraabdominal inflammatory process, mild splenomegaly, and few inconspicous low-density  structures in the liver. She was transferred to [facility] for further workup and management. S/p Hydrea 1g TID (8/21-8/23) with improvement in WBC (100K ? 12K). Diagnostic BMBx completed 8/20/22, which demonstrated B-ALL with 85% blasts and hypercellular marrow. IHC shows CD10+, CD34+. 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 ALL NGS negative. BCR/ABL1 major/minor (sent from peripheral blood) both negative. Initiated pre-phase steroids with Prednisone 60 mg x8/21; increased to 60 mg/m2 (140 mg) 8/22-8/24. She initiated GRAALL + imatinib (C1D1 = 8/24/22). S/p BMT consult 9/12 with Dr. Walsh. Induction was relatively uncomplicated. Post-induction BMBx done 9/26/22 and revealed MRD- CR1, with negative BCR/ABL1 and Clonoseq testing. She was admitted for C2 GRAALL on 9/30/22. Underwent BMBx 10/25/22 with no morphologic evidence of B-lymphoblastic leukemia and flow with unusual B lineage precursor population 0.09%. She was thus admitted for cycle 3 GRAALL + imatinib. She tolerated this well with expected slight reduction in counts on the day of discharge and no symptomatic complications. She retained her PICC line on discharge to allow for frequent labs and transfusions, with an ultimate plan to eventually have a port placed. She will follow-up for Neulasta on 11/7. Additional follow-up is ordered and pending.    # Anemia   # Thrombocytopenia  Mild, and expectantly fell slightly with chemotherapy. Of note, pt has history of transfusion reaction and requires premeds with Benadryl and Tylenol.     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 She was empirically started on IV acyclovir (x10/10). She was evaluated by ID, derm and opthalmology. She was discharged to complete a 14-day course of IV acyclovir through (10/24). Per outpatient ID documentation, she was to continue Valtrex 1000 mg TID until completion of chemotherapy and complete resolution of suspected VZV lesions completely resolve. Then recommend Valtrex 1000 mg BID until count recovery for prophylaxis as she broke through ACV. She continued TID dosing through her admission.     # ID PPx  - Valtrex 1000 mg TID until completes chemo, suspected VZV lesions completely resolve, and her ANC >1000 per ID note with Dr. Diaz on 11/1  - Held PTA fluconazole 200 mg daily and levofloxacin 250 mg daily given ANC >1.0  - Nebulized pentamidine last given 10/31, next due ~11/28     MISC  # Hypertension   - Continued PTA amlodipine 5 mg daily     # GERD   - PTA Protonix 20 mg daily. Increased to 40 mg during admission with high-dose steroids. Restarted prior dosing on discharge.     # Grade 1 neuropathy of the fingertips   Due to vincristine. Pt reports symptoms are stable. Reports mild paresthesias and hot/cold sensitivity. Stable during admission    Significant Results and Procedures   None    Pending Results:  None    Code Status   Full Code    Primary Care Physician   [facility]    Physical Exam   Temp: 97.9  F (36.6  C) Temp src: Oral BP: 123/67 Pulse: 82   Resp: 18 SpO2: 97 % O2 Device: None (Room air)    Vitals:    11/03/22 0800 11/05/22 0812 11/06/22 0830   Weight: 112.2 kg (247 lb 4.8 oz) 114.5 kg (252 lb 8 oz) 114.6 kg (252 lb 9.6 oz)     Vital Signs with Ranges  Temp:  [97.9  F (36.6  C)-98.7  F  (37.1  C)] 97.9  F (36.6  C)  Pulse:  [82-89] 82  Resp:  [16-18] 18  BP: ()/(61-82) 123/67  SpO2:  [96 %-98 %] 97 %  I/O last 3 completed shifts:  In: 1591.2 [P.O.:480; IV Piggyback:1111.2]  Out: 3400 [Urine:3400]    Constitutional: Pleasant female seen resting comfortably in bed in NAD. Alert and interactive.   HEENT: NCAT. EOMI, anicteric sclera. Oral mucosa pink and moist with no lesions or thrush. Healing scabbed shingle lesions noted on scalp.   Respiratory: Non-labored breathing, good air exchange, lungs clear to auscultation bilaterally. No cough or wheeze noted.   Cardiovascular: Regular rate and rhythm. No murmur or rub.   GI: Normoactive bowel sounds. Abdomen soft, non-distended, and non-tender.   Skin: Warm and dry. No concerning lesions or rash on exposed surfaces.  Musculoskeletal: Extremities grossly normal, non-tender, no edema.   Neuropsychiatric: Mentation and affect appear normal/appropriate.  Vascular Access: PICC is CDI without swelling, or discharge. Small area of erythema noted just inferior to PICC insertion.     Time Spent on this Encounter   I, Ifeanyi Rice MD, personally saw the patient today and spent greater than 30 minutes discharging this patient.    Discharge Disposition   Discharged to home  Condition at discharge: Stable    Consultations This Hospital Stay   NURSING TO CONSULT FOR VASCULAR ACCESS CARE IP CONSULT  CARE MANAGEMENT / SOCIAL WORK IP CONSULT  NURSING TO CONSULT FOR VASCULAR ACCESS CARE IP CONSULT    Discharge Orders      Home Infusion Referral      Reason for your hospital stay    You were admitted for chemotherapy     Adult [facility] Follow-up and recommended labs and tests    Please follow up with appointments listed.     Appointments on [place] and/or [facility] (with [facility] or [facility] provider or service). Call [phone] if you haven't heard regarding these appointments within 7 days of discharge.     Activity    Your activity upon discharge:  activity as tolerated     When to contact your care team    [facility] cancer clinic triage line at [phone] for temp >100.4, uncontrolled nausea/vomiting/diarrhea/constipation, unrelieved pain, bleeding not relieved with pressure, dizziness, chest pain, shortness of breath, loss of consciousness, and any new or concerning symptoms. If you are concerned that your symptoms are life-threatening don't hesitate to call 911 or go to the nearest Emergency Department.     Diet    Follow this diet upon discharge: Orders Placed This Encounter      Regular Diet Adult     Check Out Appointment Request    Please arrange following; ancipitate discharge 11/6   1. Neulasta 11/7 afternoon or 11/8   2. 11/14 D11 Vincristine  3. 2x weekly labs and PRN blood transfusions starting week of 11/7 x 3 weeks   4. AVELINA follow up week of 11/7     Infusion Appointment Request     Discharge Medications   Discharge Medication List as of 11/6/2022 10:08 AM      START taking these medications    Details   sennosides (SENOKOT) 8.6 MG tablet Take 1-2 tablets by mouth 2 times daily, Disp-60 tablet, R-1, E-Prescribe         CONTINUE these medications which have CHANGED    Details   imatinib (GLEEVEC) 100 MG tablet Take 3 tablets (300 mg) by mouth 2 times daily, Disp-84 tablet, R-0, E-Prescribe         CONTINUE these medications which have NOT CHANGED    Details   acetaminophen (TYLENOL) 325 MG tablet Take 2 tablets (650 mg) by mouth every 6 hours as needed for mild pain, other or fever (blood product administration premedication), Historical      amLODIPine (NORVASC) 5 MG tablet Take 1 tablet (5 mg) by mouth daily, Disp-30 tablet, R-0, E-Prescribe      CHOLECALCIFEROL PO Take 1 tablet by mouth daily Unknown strength., Historical      fluconazole (DIFLUCAN) 200 MG tablet Take 1 tablet (200 mg) by mouth daily as needed (Take when ANC <1000), Disp-60 tablet, R-0, E-Prescribe      heparin lock flush 10 UNIT/ML SOLN injection 10 mLs by Intracatheter  route daily Flush 5 mL into each PICC lumen daily., Disp-300 mL, R-0, E-PrescribePrefer to fill in 10 mL (or 5 mL) syringes      levofloxacin (LEVAQUIN) 250 MG tablet Take 1 tablet (250 mg) by mouth daily as needed (ANC <1000), Disp-60 tablet, R-0, E-Prescribe      LORazepam (ATIVAN) 0.5 MG tablet Take 1-2 tablets (0.5-1 mg) by mouth daily as needed for anxiety . Take prior to BMBx procedures as needed. If initial dose unhelpful, okay to take a second 0.5-1 mg dose (for a max of 2 mg at a time). Caution: causes sedation. Do not drive after taking  this medication., Disp-10 tablet, R-0, E-Prescribe      pantoprazole (PROTONIX) 20 MG EC tablet Take 1 tablet (20 mg) by mouth daily, Disp-90 tablet, R-1, E-Prescribe      sodium chloride, PF, (SALINE FLUSH) 0.9% PF flush 20 mLs by Intracatheter route daily Flush PICC with 20 mL daily, 10 mL per lumen, Disp-600 mL, R-0, E-PrescribeFill in 10 or 20 mL syringes      ondansetron (ZOFRAN ODT) 4 MG ODT tab Take 1-2 tablets (4-8 mg) by mouth every 8 hours as needed for nausea or vomiting, Disp-60 tablet, R-0, E-Prescribe      prochlorperazine (COMPAZINE) 10 MG tablet Take 1 tablet (10 mg) by mouth every 6 hours as needed (Breakthrough Nausea/Vomiting), Disp-20 tablet, R-0, E-Prescribe      valACYclovir (VALTREX) 1000 mg tablet Take 1 tablet (1,000 mg) by mouth 3 times daily for 14 days, Disp-42 tablet, R-0, E-Prescribe         STOP taking these medications       clindamycin (CLEOCIN T) 1 % external lotion Comments:   Reason for Stopping:             Allergies   Allergies   Allergen Reactions     Blood Transfusion Related (Informational Only) Hives     Hive reaction with platelet transfusion on 8/26/2022. Please administer platelets with tylenol and benadryl premedication.      Data   Most Recent 3 CBC's:Recent Labs   Lab Test 11/06/22  0509 11/05/22  1812 11/05/22  0623   WBC 7.6 13.3* 15.5*   HGB 7.9* 8.6* 7.9*   * 103* 104*   * 161 181      Most Recent 3  BMP's:  Recent Labs   Lab Test 11/06/22  0509 11/05/22  1812 11/05/22  0623    141 141   POTASSIUM 3.6 3.7 3.4   CHLORIDE 108* 109 110*   CO2 21* 23 20*   BUN 12.5 14 11.0   CR 0.55 0.59 0.52   ANIONGAP 11 9 11   SOLEDAD 8.3* 8.0* 8.4*   * 131* 132*     Most Recent 2 LFT's:  Recent Labs   Lab Test 11/06/22  0509 11/05/22 1812   AST 13 9   ALT 8* 19   ALKPHOS 57 64   BILITOTAL 0.4 0.5     Most Recent INR's and Anticoagulation Dosing History:  Anticoagulation Dose History     Recent Dosing and Labs Latest Ref Rng & Units 10/10/2022 10/11/2022 10/12/2022 10/13/2022 10/14/2022 10/21/2022 11/2/2022    INR 0.85 - 1.15 1.08 1.10 1.12 1.18(H) 1.16(H) 1.07 1.10        The patient was seen by and discussed with Dr. Recinos.    Ifeanyi Rice MD PhD  Hematology/Oncology Fellow  Pgr: [phone]

## 2022-11-06 NOTE — PROGRESS NOTES
Nursing Focus: Chemotherapy  D: Positive blood return via PICC. Insertion site is clean/dry/intact, dressing intact with no complaints of pain.  Urine output is recorded in intake in Doc Flowsheet.    I: Premedications given per order (see electronic medical administration record). Dose #6 of Cytoxan started to infuse over 1 hours. Reviewed pt teaching on chemotherapy side effects.  Pt denies need for further teaching. Chemotherapy double checked per protocol by two chemotherapy competent RN's.   A: Tolerating procedure well. Denies nausea and or pain.   P: Continue to monitor urine output and symptoms of nausea. Screen for symptoms of toxicity.

## 2022-11-07 ENCOUNTER — INFUSION THERAPY VISIT (OUTPATIENT)
Dept: ONCOLOGY | Facility: CLINIC | Age: 42
End: 2022-11-07
Attending: INTERNAL MEDICINE
Payer: COMMERCIAL

## 2022-11-07 ENCOUNTER — TELEPHONE (OUTPATIENT)
Dept: ONCOLOGY | Facility: CLINIC | Age: 42
End: 2022-11-07

## 2022-11-07 ENCOUNTER — PATIENT OUTREACH (OUTPATIENT)
Dept: ONCOLOGY | Facility: CLINIC | Age: 42
End: 2022-11-07

## 2022-11-07 ENCOUNTER — APPOINTMENT (OUTPATIENT)
Dept: LAB | Facility: CLINIC | Age: 42
End: 2022-11-07
Attending: INTERNAL MEDICINE
Payer: COMMERCIAL

## 2022-11-07 ENCOUNTER — PATIENT OUTREACH (OUTPATIENT)
Dept: CARE COORDINATION | Facility: CLINIC | Age: 42
End: 2022-11-07

## 2022-11-07 VITALS
RESPIRATION RATE: 16 BRPM | DIASTOLIC BLOOD PRESSURE: 79 MMHG | WEIGHT: 256 LBS | SYSTOLIC BLOOD PRESSURE: 134 MMHG | TEMPERATURE: 97.6 F | HEART RATE: 79 BPM | OXYGEN SATURATION: 98 % | BODY MASS INDEX: 45.93 KG/M2

## 2022-11-07 DIAGNOSIS — C91.00 ACUTE LYMPHOBLASTIC LEUKEMIA (ALL) NOT HAVING ACHIEVED REMISSION (H): Primary | ICD-10-CM

## 2022-11-07 DIAGNOSIS — Z76.89 PROPHYLAXIS FOR CHEMOTHERAPY-INDUCED NEUTROPENIA: ICD-10-CM

## 2022-11-07 LAB
ALBUMIN SERPL BCG-MCNC: 3.4 G/DL (ref 3.5–5.2)
ALP SERPL-CCNC: 58 U/L (ref 35–104)
ALT SERPL W P-5'-P-CCNC: 14 U/L (ref 10–35)
ANION GAP SERPL CALCULATED.3IONS-SCNC: 7 MMOL/L (ref 7–15)
AST SERPL W P-5'-P-CCNC: 13 U/L (ref 10–35)
BASOPHILS # BLD AUTO: 0 10E3/UL (ref 0–0.2)
BASOPHILS NFR BLD AUTO: 0 %
BILIRUB SERPL-MCNC: 0.8 MG/DL
BUN SERPL-MCNC: 15 MG/DL (ref 6–20)
CALCIUM SERPL-MCNC: 8.2 MG/DL (ref 8.6–10)
CHLORIDE SERPL-SCNC: 105 MMOL/L (ref 98–107)
CREAT SERPL-MCNC: 0.68 MG/DL (ref 0.51–0.95)
DEPRECATED HCO3 PLAS-SCNC: 26 MMOL/L (ref 22–29)
EOSINOPHIL # BLD AUTO: 0 10E3/UL (ref 0–0.7)
EOSINOPHIL NFR BLD AUTO: 0 %
ERYTHROCYTE [DISTWIDTH] IN BLOOD BY AUTOMATED COUNT: 21.8 % (ref 10–15)
GFR SERPL CREATININE-BSD FRML MDRD: >90 ML/MIN/1.73M2
GLUCOSE SERPL-MCNC: 126 MG/DL (ref 70–99)
HCT VFR BLD AUTO: 27.1 % (ref 35–47)
HGB BLD-MCNC: 8.6 G/DL (ref 11.7–15.7)
IMM GRANULOCYTES # BLD: 0.1 10E3/UL
IMM GRANULOCYTES NFR BLD: 1 %
LYMPHOCYTES # BLD AUTO: 0.4 10E3/UL (ref 0.8–5.3)
LYMPHOCYTES NFR BLD AUTO: 3 %
MCH RBC QN AUTO: 32.1 PG (ref 26.5–33)
MCHC RBC AUTO-ENTMCNC: 31.7 G/DL (ref 31.5–36.5)
MCV RBC AUTO: 101 FL (ref 78–100)
MONOCYTES # BLD AUTO: 0.2 10E3/UL (ref 0–1.3)
MONOCYTES NFR BLD AUTO: 2 %
NEUTROPHILS # BLD AUTO: 11.2 10E3/UL (ref 1.6–8.3)
NEUTROPHILS NFR BLD AUTO: 94 %
NRBC # BLD AUTO: 0 10E3/UL
NRBC BLD AUTO-RTO: 0 /100
PLATELET # BLD AUTO: 138 10E3/UL (ref 150–450)
POTASSIUM SERPL-SCNC: 3.1 MMOL/L (ref 3.4–5.3)
PROT SERPL-MCNC: 5.5 G/DL (ref 6.4–8.3)
RBC # BLD AUTO: 2.68 10E6/UL (ref 3.8–5.2)
SODIUM SERPL-SCNC: 138 MMOL/L (ref 136–145)
WBC # BLD AUTO: 11.9 10E3/UL (ref 4–11)

## 2022-11-07 PROCEDURE — 250N000011 HC RX IP 250 OP 636: Performed by: PHYSICIAN ASSISTANT

## 2022-11-07 PROCEDURE — 36592 COLLECT BLOOD FROM PICC: CPT

## 2022-11-07 PROCEDURE — 86901 BLOOD TYPING SEROLOGIC RH(D): CPT | Performed by: NURSE PRACTITIONER

## 2022-11-07 PROCEDURE — 85025 COMPLETE CBC W/AUTO DIFF WBC: CPT

## 2022-11-07 PROCEDURE — 96372 THER/PROPH/DIAG INJ SC/IM: CPT | Performed by: PHYSICIAN ASSISTANT

## 2022-11-07 PROCEDURE — 80053 COMPREHEN METABOLIC PANEL: CPT

## 2022-11-07 PROCEDURE — 86923 COMPATIBILITY TEST ELECTRIC: CPT | Performed by: PHYSICIAN ASSISTANT

## 2022-11-07 PROCEDURE — 86850 RBC ANTIBODY SCREEN: CPT | Performed by: NURSE PRACTITIONER

## 2022-11-07 PROCEDURE — 250N000011 HC RX IP 250 OP 636: Performed by: INTERNAL MEDICINE

## 2022-11-07 RX ORDER — HEPARIN SODIUM,PORCINE 10 UNIT/ML
3 VIAL (ML) INTRAVENOUS ONCE
Status: COMPLETED | OUTPATIENT
Start: 2022-11-07 | End: 2022-11-07

## 2022-11-07 RX ADMIN — Medication 3 ML: at 10:50

## 2022-11-07 RX ADMIN — PEGFILGRASTIM 6 MG: 6 INJECTION SUBCUTANEOUS at 11:09

## 2022-11-07 ASSESSMENT — PAIN SCALES - GENERAL: PAINLEVEL: NO PAIN (0)

## 2022-11-07 NOTE — PATIENT INSTRUCTIONS
[facility] Triage and after hours / weekends / holidays:  [phone]    Please call the triage or after hours line if you experience a temperature greater than or equal to 100.5, shaking chills, have uncontrolled nausea, vomiting and/or diarrhea, dizziness, shortness of breath, chest pain, bleeding, unexplained bruising, or if you have any other new/concerning symptoms, questions or concerns.      If you are having any concerning symptoms or wish to speak to a provider before your next infusion visit, please call your care coordinator or triage to notify them so we can adequately serve you.     If you need a refill on a narcotic prescription or other medication, please call before your infusion appointment.

## 2022-11-07 NOTE — NURSING NOTE
Chief Complaint   Patient presents with     Blood Draw     Labs drawn via picc by RN. Vitals taken.     Labs collected from PICC.  Line flushed with saline and heparin locked.  Vitals taken and pt checked in for appt.    Patty Valentine RN

## 2022-11-07 NOTE — TELEPHONE ENCOUNTER
[facility] Prior Authorization Team   Phone: [phone]  Fax: [phone]    Prior Authorization Approval    Authorization Effective Date: 11/4/2022  Authorization Expiration Date: 9/13/2023  Medication: Imatinib (QTY Case PA)  Approved Dose/Quantity: 100mg/84 tabs  Reference #: Key: [id number], PA#: [id number]   Insurance Company: [facility] - Phone [phone] Fax [phone]  Expected CoPay: $0     CoPay Card Available:      Foundation Assistance Needed:    Which Pharmacy is filling the prescription (Not needed for infusion/clinic administered): [facility] - [place], [place] - [address]  Pharmacy Notified: Yes  Patient Notified: No

## 2022-11-07 NOTE — PROGRESS NOTES
[facility]: Post-Discharge Note  SITUATION                                                      Admission:    Admission Date: 11/02/22   Reason for Admission: cycle 3 GRAALL + imatinib  Discharge:   Discharge Date: 11/06/22  Discharge Diagnosis: Acute leukemia (H)    Acute lymphoblastic leukemia (ALL) (H)    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)    Prophylaxis for chemotherapy-induced neutropenia    Morbid obesity (H)    Disseminated zoster    ALL (acute lymphoblastic leukemia of infant    BACKGROUND                                                      Per hospital discharge summary and inpatient provider notes.    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 She was admitted for cycle 3 GRAALL + imatinib which she tolerated well without complications.    ASSESSMENT           Discharge Assessment  How are your symptoms? (Red Flag symptoms escalate to triage hotline per guidelines): Improved  Do you feel your condition is stable enough to be safe at home until your provider visit?: Yes  Does the patient have their discharge instructions? : Yes  Does the patient have questions regarding their discharge instructions? : No  Were you started on any new medications or were there changes to any of your previous medications? : Yes  Does the patient have all of their medications?: Yes  Do you have questions regarding any of your medications? : No  Do you have all of your needed medical supplies or equipment (DME)?  (i.e. oxygen tank, CPAP, cane, etc.): Yes  Discharge follow-up appointment scheduled within 14 calendar days? : No  Discharge Follow Up Appointment Date:  (scheduling is working on appointments)  Is patient agreeable to assistance with  scheduling? : Yes    Post-op ([facility] CTA Only)  If the patient had a surgery or procedure, do they have any questions for a nurse?: No    Post-op (Clinicians Only)  Did the patient have surgery or a procedure: No  Fever: No  Chills: No  Eating & Drinking: eating and drinking without complaints/concerns  Bowel Function: normal (an episode of diarrhea 2 days prior)  Urinary Status: voiding without complaint/concerns      PLAN                                                      Outpatient Plan:      Future Appointments   Date Time Provider Department Center   12/13/2022  1:00 PM Chuck Diaz MD [facility]         For any urgent concerns, please contact our 24 hour clinic line:   [place]: [phone]       Flor Leslie RN

## 2022-11-07 NOTE — PROGRESS NOTES
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)  Discharge:   Discharge Date: 11/06/22  Discharge Diagnosis:   Acute leukemia (H)    Acute lymphoblastic leukemia (ALL) (H)    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)    Prophylaxis for chemotherapy-induced neutropenia    Morbid obesity (H)    Disseminated zoster    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 She was admitted for cycle 3 GRAALL + imatinib which she tolerated well without complications.    ASSESSMENT           Discharge Assessment  How are you doing now that you are home?: Doing well  How are your symptoms? (Red Flag symptoms escalate to triage hotline per guidelines): Unchanged  Do you feel your condition is stable enough to be safe at home until your provider visit?: Yes  Does the patient have their discharge instructions? : Yes  Does the patient have questions regarding their discharge instructions? : No  Were you started on any new medications or were there changes to any of your previous medications? : Yes  Does the patient have all of their medications?: Yes  Do you have  questions regarding any of your medications? : No  Do you have all of your needed medical supplies or equipment (DME)?  (i.e. oxygen tank, CPAP, cane, etc.): Yes  Discharge follow-up appointment scheduled within 14 calendar days? : Yes  Discharge Follow Up Appointment Date: 11/07/22  Discharge Follow Up Appointment Scheduled with?: Specialty Care Provider    Post-op ([facility] CTA Only)  If the patient had a surgery or procedure, do they have any questions for a nurse?: No         PLAN                                                      Outpatient Plan:     Adult [facility] Follow-up and recommended labs and tests     Please follow up with appointments listed.      Appointments on [place] and/or [facility] (with [facility] or [facility] provider or service). Call [phone] if you haven't heard regarding these appointments within 7 days of discharge.      Future Appointments   Date Time Provider Department Center   11/7/2022 10:45 AM  [facility] LAB DRAW ONL [facility]   11/7/2022 11:30 AM  ONC INFUSION NURSE MIGUELANGEL [facility]   12/13/2022  1:00 PM Chuck Diaz MD [facility]     For any urgent concerns, please contact our 24 hour nurse triage line: [phone] ([address] [place] [address])       NERY Quesada  [phone]  [facility]

## 2022-11-07 NOTE — PROGRESS NOTES
Infusion Nursing Note:  Vashti Villegas presents today for neulasta, possible transfusion - none needed.    Patient seen by provider today: No   present during visit today: Not Applicable.    Note:   April reports feeling tired today but otherwise feels well. She denies fevers, dizziness, lightheadedness, SOB, chest pain, nausea, diarrhea, any signs of bleeding and pain.    Potassium resulted at 3.1 after patient left infusion, Dr. Hull notified.    Intravenous Access:  PICC.    Treatment Conditions:  Lab Results   Component Value Date    HGB 8.6 (L) 11/07/2022    WBC 11.9 (H) 11/07/2022    ANEU 6.4 10/26/2022    ANEUTAUTO 11.2 (H) 11/07/2022     (L) 11/07/2022      Lab Results   Component Value Date     11/07/2022    POTASSIUM 3.1 (L) 11/07/2022    MAG 1.8 11/05/2022    CR 0.68 11/07/2022    SOLEDAD 8.2 (L) 11/07/2022    BILITOTAL 0.8 11/07/2022    ALBUMIN 3.4 (L) 11/07/2022    ALT 14 11/07/2022    AST 13 11/07/2022     Results reviewed, labs did NOT meet treatment parameters: Hgb <7, plt <10.    Post Infusion Assessment:  Patient tolerated neulasta injection into left arm without incident.  Site patent and intact, free from redness, edema or discomfort.  No evidence of extravasations.  Access heparin locked per protocol.     Discharge Plan:   Patient declined prescription refills.  Discharge instructions reviewed with: Patient.  Patient and/or family verbalized understanding of discharge instructions and all questions answered.  AVS to patient via [employer].  Lucy care coordinator working on patients schedule, patient aware she will have appointments made this week.  Patient discharged in stable condition accompanied by: self.  Departure Mode: Ambulatory.      Manjula Lazo RN

## 2022-11-07 NOTE — TELEPHONE ENCOUNTER
Oral Chemotherapy Monitoring Program.    Placed call to patient in follow-up of Gleevec treatment.    Mailbox full; unable to leave voice mail.    Hyacinth Ojeda, PharmD, BCOP, BCPS  Clinical Pharmacy Specialist  [facility]  Phone [phone]

## 2022-11-07 NOTE — PROGRESS NOTES
This is a recent snapshot of the patient's [facility] medical record.  For current drug dose and complete information and questions, call [phone] or In Basket pool, [facility]v home infusion ([id number])  CSN Number:  [id number]

## 2022-11-09 NOTE — PROGRESS NOTES
This is a recent snapshot of the patient's [facility] medical record.  For current drug dose and complete information and questions, call [phone] or In Basket pool, [facility] home infusion ([id number])  CSN Number:  [id number]

## 2022-11-10 ENCOUNTER — LAB (OUTPATIENT)
Dept: LAB | Facility: CLINIC | Age: 42
End: 2022-11-10
Attending: INTERNAL MEDICINE
Payer: COMMERCIAL

## 2022-11-10 ENCOUNTER — ONCOLOGY VISIT (OUTPATIENT)
Dept: ONCOLOGY | Facility: CLINIC | Age: 42
End: 2022-11-10
Attending: PHYSICIAN ASSISTANT
Payer: COMMERCIAL

## 2022-11-10 VITALS
OXYGEN SATURATION: 99 % | WEIGHT: 252.5 LBS | RESPIRATION RATE: 16 BRPM | DIASTOLIC BLOOD PRESSURE: 74 MMHG | TEMPERATURE: 98 F | HEART RATE: 93 BPM | SYSTOLIC BLOOD PRESSURE: 115 MMHG | BODY MASS INDEX: 45.3 KG/M2

## 2022-11-10 DIAGNOSIS — C91.00 ACUTE LYMPHOBLASTIC LEUKEMIA (ALL) NOT HAVING ACHIEVED REMISSION (H): ICD-10-CM

## 2022-11-10 DIAGNOSIS — C91.00 ACUTE LYMPHOBLASTIC LEUKEMIA (ALL) NOT HAVING ACHIEVED REMISSION (H): Primary | ICD-10-CM

## 2022-11-10 PROCEDURE — 250N000011 HC RX IP 250 OP 636: Performed by: PHYSICIAN ASSISTANT

## 2022-11-10 PROCEDURE — 99214 OFFICE O/P EST MOD 30 MIN: CPT | Performed by: PHYSICIAN ASSISTANT

## 2022-11-10 PROCEDURE — G0463 HOSPITAL OUTPT CLINIC VISIT: HCPCS

## 2022-11-10 RX ORDER — HEPARIN SODIUM,PORCINE 10 UNIT/ML
5 VIAL (ML) INTRAVENOUS ONCE
Status: COMPLETED | OUTPATIENT
Start: 2022-11-10 | End: 2022-11-10

## 2022-11-10 RX ADMIN — Medication 5 ML: at 11:05

## 2022-11-10 ASSESSMENT — PAIN SCALES - GENERAL: PAINLEVEL: NO PAIN (0)

## 2022-11-10 NOTE — NURSING NOTE
"Oncology Rooming Note    November 10, 2022 11:33 AM   April SHARI Villegas is a 42 year old female who presents for:    Chief Complaint   Patient presents with     Blood Draw     Labs drawn by RN via PICC, vitals taken.     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      Initial Vitals: /74   Pulse 93   Temp 98  F (36.7  C)   Resp 16   Wt 114.5 kg (252 lb 8 oz)   SpO2 99%   BMI 45.30 kg/m   Estimated body mass index is 45.3 kg/m  as calculated from the following:    Height as of 11/2/22: 1.59 m (5' 2.6\").    Weight as of this encounter: 114.5 kg (252 lb 8 oz). Body surface area is 2.25 meters squared.  No Pain (0) Comment: Data Unavailable   No LMP recorded.  Allergies reviewed: Yes  Medications reviewed: Yes    Medications: Medication refills not needed today.  Pharmacy name entered into [employer]:    [id number] - [place], [place] - [address]  [facility] - [place], [place] - [address]    Clinical concerns: Patient reports minor stomach cramping (1/10)      Leah Soto), LPN November 10, 2022 11:34 AM                "

## 2022-11-10 NOTE — LETTER
11/10/2022         RE: Vashti Villegas  [address] [place] [address]        Dear Colleague,    Thank you for referring your patient, Vashti Villegas, to the [facility]. Please see a copy of my visit note below.    [facility]  Follow up Visit  Nov 10, 2022    Hem/onc History:     --Patient presented to outside hospital with c/o right upper quadrant pain and was subsequently found to have a markedly elevated white blood cell count concerning for acute leukemia. While at OSH, she had CT PE protocol as well as CT A/P. These identified no PE, no acute or localized intraabdominal inflammatory process, mild splenomegaly, and few inconspicous low-density structures in the liver. She was transferred to [facility] for further workup and management. S/p Hydrea 1g TID (8/21-8/23) with improvement in WBC (100K ? 12K).   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 IHC shows CD10+, CD34+. 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 Abnormal cytogenetics. Initiated pre-phase steroids with Prednisone 60 mg x8/21; increased to 60 mg/m2 (140 mg) 8/22-8/24. She initiated GRAALL + imatinib (C1D1 = 8/24/22).   --S/p BMT consult 9/12 with Dr. Walsh. Induction was relatively uncomplicated. Post-induction BMBx done 9/26. Morphology inconclusive due to inadequate core but flow negative.  BCR/abl major and minor undetectable (as were baseline tests). She was admitted for C2 GRAALL.    --BMBx 9/26/22 MRD-negative by BCR-ABL PCR and by Clonoseq Assay.      Treatment Plan: GRAALL + imatinib (C2D1=9/30/22)  - Imatinib 400 mg BID - D1-14 (9/29-10/12)  - IVFs per chemo protocol, bicarb support  - Methotrexate 1 g/m2 IV x once on D1. Administered over 24 hours  - Leucovorin calcium q6hr starting 12 hr after END of methotrexate infusion. Continue until methotrexate level <0.05  -  cytarabine 3 g/m2 IV q12 hr x 4 doses on Days 2-3  - Premeds: Zofran 16mg D1, 8mg q12hr x 4 doses (D2-3);  Dex 12mg D1-3, 8mg D4-5  - Supportive Care: Pred Forte eye drops  - IT chemo on D9. Will need under imaging guidance. As D9 falls on 10/8 (weekend), requested procedure for 10/7. She will have labs on 10/6 (already scheduled) to determine platelet count in anticipation of this procedure as well.   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     Post-cycle-2 BMBx 10/25/2022 showed MRD-negative CR, though low-level abnormal B-cell population (favor hematogones over residual leukemia) was noted. Clonoseq negative at 1/10E6 level.      # CNS prophylaxis  Per the GRAALL protocol and given risk for CNS involvement of ALL. Will require LPs under fluoroscopic guidance.   - s/p weekly triple IT chemo (Cytarabine, Solu-Cortef, MTX) on Days 1, 8, and 15 of C1 induction. She additionally had repeat LP on 9/14. CSF flow has remained negative.     History of Present Illness:   She presents today for hospital follow-up.     -she had some loose stools. One imodium helped  -no N/V.   -Some stomach cramping, though she is on her period too  -lesions on scalp are crusted and healing  -no f/c/ns  -still has some numbness and tingling. Had some twitching in eye for a little bit  -no edema or bleeding  -No Sob, CP or cough  -Appetite is good     12-point ROS negative.       Medications:       Current Outpatient Medications   Medication Sig     acetaminophen (TYLENOL) 325 MG tablet Take 2 tablets (650 mg) by mouth every 6 hours as needed for mild pain, other or fever (blood product administration premedication)     amLODIPine (NORVASC) 5 MG tablet Take 1 tablet (5 mg) by mouth daily     CHOLECALCIFEROL PO Take 1 tablet by mouth daily Unknown strength.     fluconazole (DIFLUCAN) 200 MG tablet Take 1 tablet (200 mg) by mouth daily as needed (Take when ANC <1000)     heparin lock flush 10 UNIT/ML  SOLN injection 10 mLs by Intracatheter route daily Flush 5 mL into each PICC lumen daily.     imatinib (GLEEVEC) 100 MG tablet Take 3 tablets (300 mg) by mouth 2 times daily     levofloxacin (LEVAQUIN) 250 MG tablet Take 1 tablet (250 mg) by mouth daily as needed (ANC <1000)     LORazepam (ATIVAN) 0.5 MG tablet Take 1-2 tablets (0.5-1 mg) by mouth daily as needed for anxiety . Take prior to BMBx procedures as needed. If initial dose unhelpful, okay to take a second 0.5-1 mg dose (for a max of 2 mg at a time). Caution: causes sedation. Do not drive after taking this medication.     ondansetron (ZOFRAN ODT) 4 MG ODT tab Take 1-2 tablets (4-8 mg) by mouth every 8 hours as needed for nausea or vomiting     pantoprazole (PROTONIX) 20 MG EC tablet Take 1 tablet (20 mg) by mouth daily     prochlorperazine (COMPAZINE) 10 MG tablet Take 1 tablet (10 mg) by mouth every 6 hours as needed (Breakthrough Nausea/Vomiting)     sennosides (SENOKOT) 8.6 MG tablet Take 1-2 tablets by mouth 2 times daily     sodium chloride, PF, (SALINE FLUSH) 0.9% PF flush 20 mLs by Intracatheter route daily Flush PICC with 20 mL daily, 10 mL per lumen     valACYclovir (VALTREX) 1000 mg tablet Take 1 tablet (1,000 mg) by mouth 3 times daily for 14 days     No current facility-administered medications for this visit.        Physical Exam:   Blood pressure 115/74, pulse 93, temperature 98  F (36.7  C), resp. rate 16, weight 114.5 kg (252 lb 8 oz), SpO2 99 %.    ECOG 1  General: Patient appears well in no acute distress.   Skin: No visualized rash or lesions on visualized skin  Eyes: EOMI, no erythema, sclera icterus or discharge noted  Resp: Appears to be breathing comfortably without accessory muscle usage, speaking in full sentences, no cough  MSK: Appears to have normal range of motion based on visualized movements  Neurologic: No apparent tremors, facial movements symmetric  Psych: affect normal, alert and oriented    Data:      Latest Reference  Range & Units 11/10/22 11:03   Sodium 136 - 145 mmol/L 136   Potassium 3.4 - 5.3 mmol/L 3.9   Chloride 98 - 107 mmol/L 103   Carbon Dioxide (CO2) 22 - 29 mmol/L 24   Urea Nitrogen 6.0 - 20.0 mg/dL 14.6   Creatinine 0.51 - 0.95 mg/dL 0.62   GFR Estimate >60 mL/min/1.73m2 >90   Calcium 8.6 - 10.0 mg/dL 8.5 (L)   Anion Gap 7 - 15 mmol/L 9   Albumin 3.5 - 5.2 g/dL 3.5   Protein Total 6.4 - 8.3 g/dL 5.7 (L)   Alkaline Phosphatase 35 - 104 U/L 85   ALT 10 - 35 U/L 14   AST 10 - 35 U/L 14   Bilirubin Total <=1.2 mg/dL 0.4   Glucose 70 - 99 mg/dL 99   WBC 4.0 - 11.0 10e3/uL 3.1 (L)   Hemoglobin 11.7 - 15.7 g/dL 8.0 (L)   Hematocrit 35.0 - 47.0 % 25.5 (L)   Platelet Count 150 - 450 10e3/uL 76 (L)   RBC Count 3.80 - 5.20 10e6/uL 2.45 (L)   MCV 78 - 100 fL 104 (H)   MCH 26.5 - 33.0 pg 32.7   MCHC 31.5 - 36.5 g/dL 31.4 (L)   RDW 10.0 - 15.0 % 20.8 (H)   % Neutrophils % 87   % Lymphocytes % 7   % Monocytes % 1   % Eosinophils % 4   % Basophils % 1   Absolute Basophils 0.0 - 0.2 10e3/uL 0.0   Absolute Neutrophil 1.6 - 8.3 10e3/uL 2.7   Absolute Lymphocytes 0.8 - 5.3 10e3/uL 0.2 (L)   Absolute Monocytes 0.0 - 1.3 10e3/uL 0.0   Absolute Eosinophils 0.0 - 0.7 10e3/uL 0.1   (L): Data is abnormally low  (H): Data is abnormally high    Assessment and Plan:     # Ph+ ALL.   Now in MRD- CR by BCR-ABL PCR as well as Clonoseq assay after cycle 1 and 2 of attenuated-dose GRAAL chemotherapy.   With no matched related donors, and excellent early/deep MRD-CR, chemotherapy is currently favored over BMT; this is supported by multiple recent clinical observations (Blood. 2016 Jul 28; 128(4): 504-507; Blood. 2022 Jul 25;blood.[id number]).   -Admitted on 11/2/22 for Cycle 3 GRAAL + imatinib chemotherapy.   -Tolerated treatment overall well  -Currently plan is to continue GRAAL for a total of 8 cycles. For reference, odd cycles are 21 days in length and even cycles are 14 days in length.   - Will plan to perform LP inpatient under fluoroscopy on Day  1 of each cycle  - Will continue with weekly labs and transfusion. Will increase based on judy  - Will follow-up with [facility] prior to admit for Cycle 4. Ok to delay a few days due to Thanksgiving    -Evusheld given on 11/2/22    # PPx  - Nebulized Pentamidine q28d for PJP ppx.              - Pentamidine given 10/31, next on ~11/28  - ppx Levaquin and Fluconazole when ANC <1.0.   -Continue Valtrex 1000 mg TID until completion of chemo due to h/o shingles    # CNS prophylaxis Will require all subsequent LPs under fluoroscopic guidance on Day 1 of each cycle IP.   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 Mild paresthesias. Stable    # H/o COVID-19 infection   Not vaccinated (due to fear of needles per pt report).    # Hypertension: Amlodipine; Lasix as needed  # Indigestion/GERD: Continue Protonix 20 mg daily.     Transition Care Management Services  Admission Date: 11/02/22    Discharge Date: 11/06/22    Discharge Diagnosis: Acute leukemia (H)    Acute lymphoblastic leukemia (ALL) (H)    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)    Prophylaxis for chemotherapy-induced neutropenia    Morbid obesity (H)    Disseminated zoster    ALL (acute lymphoblastic leukemia of infant    Interactive contact date: 11/7/2022  Face-to-face visit date: 11/10/2022        Medical complexity decision making: High complexity ([id number])      Sania Dwyer PA-C  [facility]  [address] [place] [address]  [phone]

## 2022-11-10 NOTE — NURSING NOTE
Chief Complaint   Patient presents with     Blood Draw     Labs drawn by RN via PICC, vitals taken.     Labs collected from PICC. Both lines flushed with saline and heparin locked, both caps changed.  Vitals taken and pt checked in for appt.    Tanya Andrea RN

## 2022-11-10 NOTE — PROGRESS NOTES
[facility]  Follow up Visit  Nov 10, 2022    Hem/onc History:     --Patient presented to outside hospital with c/o right upper quadrant pain and was subsequently found to have a markedly elevated white blood cell count concerning for acute leukemia. While at OSH, she had CT PE protocol as well as CT A/P. These identified no PE, no acute or localized intraabdominal inflammatory process, mild splenomegaly, and few inconspicous low-density structures in the liver. She was transferred to [facility] for further workup and management. S/p Hydrea 1g TID (8/21-8/23) with improvement in WBC (100K ? 12K).   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 IHC shows CD10+, CD34+. 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 Abnormal cytogenetics. Initiated pre-phase steroids with Prednisone 60 mg x8/21; increased to 60 mg/m2 (140 mg) 8/22-8/24. She initiated GRAALL + imatinib (C1D1 = 8/24/22).   --S/p BMT consult 9/12 with Dr. Walsh. Induction was relatively uncomplicated. Post-induction BMBx done 9/26. Morphology inconclusive due to inadequate core but flow negative.  BCR/abl major and minor undetectable (as were baseline tests). She was admitted for C2 GRAALL.    --BMBx 9/26/22 MRD-negative by BCR-ABL PCR and by Clonoseq Assay.      Treatment Plan: GRAALL + imatinib (C2D1=9/30/22)  - Imatinib 400 mg BID - D1-14 (9/29-10/12)  - IVFs per chemo protocol, bicarb support  - Methotrexate 1 g/m2 IV x once on D1. Administered over 24 hours  - Leucovorin calcium q6hr starting 12 hr after END of methotrexate infusion. Continue until methotrexate level <0.05  - cytarabine 3 g/m2 IV q12 hr x 4 doses on Days 2-3  - Premeds: Zofran 16mg D1, 8mg q12hr x 4 doses (D2-3);  Dex 12mg D1-3, 8mg D4-5  - Supportive Care: Pred Forte eye drops  - IT chemo on D9. Will need under imaging guidance. As D9 falls on 10/8 (weekend),  requested procedure for 10/7. She will have labs on 10/6 (already scheduled) to determine platelet count in anticipation of this procedure as well.   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     Post-cycle-2 BMBx 10/25/2022 showed MRD-negative CR, though low-level abnormal B-cell population (favor hematogones over residual leukemia) was noted. Clonoseq negative at 1/10E6 level.      # CNS prophylaxis  Per the GRAALL protocol and given risk for CNS involvement of ALL. Will require LPs under fluoroscopic guidance.   - s/p weekly triple IT chemo (Cytarabine, Solu-Cortef, MTX) on Days 1, 8, and 15 of C1 induction. She additionally had repeat LP on 9/14. CSF flow has remained negative.     History of Present Illness:   She presents today for hospital follow-up.     -she had some loose stools. One imodium helped  -no N/V.   -Some stomach cramping, though she is on her period too  -lesions on scalp are crusted and healing  -no f/c/ns  -still has some numbness and tingling. Had some twitching in eye for a little bit  -no edema or bleeding  -No Sob, CP or cough  -Appetite is good     12-point ROS negative.       Medications:       Current Outpatient Medications   Medication Sig     acetaminophen (TYLENOL) 325 MG tablet Take 2 tablets (650 mg) by mouth every 6 hours as needed for mild pain, other or fever (blood product administration premedication)     amLODIPine (NORVASC) 5 MG tablet Take 1 tablet (5 mg) by mouth daily     CHOLECALCIFEROL PO Take 1 tablet by mouth daily Unknown strength.     fluconazole (DIFLUCAN) 200 MG tablet Take 1 tablet (200 mg) by mouth daily as needed (Take when ANC <1000)     heparin lock flush 10 UNIT/ML SOLN injection 10 mLs by Intracatheter route daily Flush 5 mL into each PICC lumen daily.     imatinib (GLEEVEC) 100 MG tablet Take 3 tablets (300 mg) by mouth 2 times daily     levofloxacin (LEVAQUIN) 250 MG tablet Take 1 tablet (250 mg) by mouth daily as  needed (ANC <1000)     LORazepam (ATIVAN) 0.5 MG tablet Take 1-2 tablets (0.5-1 mg) by mouth daily as needed for anxiety . Take prior to BMBx procedures as needed. If initial dose unhelpful, okay to take a second 0.5-1 mg dose (for a max of 2 mg at a time). Caution: causes sedation. Do not drive after taking this medication.     ondansetron (ZOFRAN ODT) 4 MG ODT tab Take 1-2 tablets (4-8 mg) by mouth every 8 hours as needed for nausea or vomiting     pantoprazole (PROTONIX) 20 MG EC tablet Take 1 tablet (20 mg) by mouth daily     prochlorperazine (COMPAZINE) 10 MG tablet Take 1 tablet (10 mg) by mouth every 6 hours as needed (Breakthrough Nausea/Vomiting)     sennosides (SENOKOT) 8.6 MG tablet Take 1-2 tablets by mouth 2 times daily     sodium chloride, PF, (SALINE FLUSH) 0.9% PF flush 20 mLs by Intracatheter route daily Flush PICC with 20 mL daily, 10 mL per lumen     valACYclovir (VALTREX) 1000 mg tablet Take 1 tablet (1,000 mg) by mouth 3 times daily for 14 days     No current facility-administered medications for this visit.        Physical Exam:   Blood pressure 115/74, pulse 93, temperature 98  F (36.7  C), resp. rate 16, weight 114.5 kg (252 lb 8 oz), SpO2 99 %.    ECOG 1  General: Patient appears well in no acute distress.   Skin: No visualized rash or lesions on visualized skin  Eyes: EOMI, no erythema, sclera icterus or discharge noted  Resp: Appears to be breathing comfortably without accessory muscle usage, speaking in full sentences, no cough  MSK: Appears to have normal range of motion based on visualized movements  Neurologic: No apparent tremors, facial movements symmetric  Psych: affect normal, alert and oriented    Data:      Latest Reference Range & Units 11/10/22 11:03   Sodium 136 - 145 mmol/L 136   Potassium 3.4 - 5.3 mmol/L 3.9   Chloride 98 - 107 mmol/L 103   Carbon Dioxide (CO2) 22 - 29 mmol/L 24   Urea Nitrogen 6.0 - 20.0 mg/dL 14.6   Creatinine 0.51 - 0.95 mg/dL 0.62   GFR Estimate >60  mL/min/1.73m2 >90   Calcium 8.6 - 10.0 mg/dL 8.5 (L)   Anion Gap 7 - 15 mmol/L 9   Albumin 3.5 - 5.2 g/dL 3.5   Protein Total 6.4 - 8.3 g/dL 5.7 (L)   Alkaline Phosphatase 35 - 104 U/L 85   ALT 10 - 35 U/L 14   AST 10 - 35 U/L 14   Bilirubin Total <=1.2 mg/dL 0.4   Glucose 70 - 99 mg/dL 99   WBC 4.0 - 11.0 10e3/uL 3.1 (L)   Hemoglobin 11.7 - 15.7 g/dL 8.0 (L)   Hematocrit 35.0 - 47.0 % 25.5 (L)   Platelet Count 150 - 450 10e3/uL 76 (L)   RBC Count 3.80 - 5.20 10e6/uL 2.45 (L)   MCV 78 - 100 fL 104 (H)   MCH 26.5 - 33.0 pg 32.7   MCHC 31.5 - 36.5 g/dL 31.4 (L)   RDW 10.0 - 15.0 % 20.8 (H)   % Neutrophils % 87   % Lymphocytes % 7   % Monocytes % 1   % Eosinophils % 4   % Basophils % 1   Absolute Basophils 0.0 - 0.2 10e3/uL 0.0   Absolute Neutrophil 1.6 - 8.3 10e3/uL 2.7   Absolute Lymphocytes 0.8 - 5.3 10e3/uL 0.2 (L)   Absolute Monocytes 0.0 - 1.3 10e3/uL 0.0   Absolute Eosinophils 0.0 - 0.7 10e3/uL 0.1   (L): Data is abnormally low  (H): Data is abnormally high    Assessment and Plan:     # Ph+ ALL.   Now in MRD- CR by BCR-ABL PCR as well as Clonoseq assay after cycle 1 and 2 of attenuated-dose GRAAL chemotherapy.   With no matched related donors, and excellent early/deep MRD-CR, chemotherapy is currently favored over BMT; this is supported by multiple recent clinical observations (Blood. 2016 Jul 28; 128(4): 504-507; Blood. 2022 Jul 25;blood.[id number]).   -Admitted on 11/2/22 for Cycle 3 GRAAL + imatinib chemotherapy.   -Tolerated treatment overall well  -Currently plan is to continue GRAAL for a total of 8 cycles. For reference, odd cycles are 21 days in length and even cycles are 14 days in length.   - Will plan to perform LP inpatient under fluoroscopy on Day 1 of each cycle  - Will continue with weekly labs and transfusion. Will increase based on judy  - Will follow-up with [facility] prior to admit for Cycle 4. Ok to delay a few days due to Thanksgiving    -Evusheld given on 11/2/22    # PPx  - Nebulized  Pentamidine q28d for PJP ppx.              - Pentamidine given 10/31, next on ~11/28  - ppx Levaquin and Fluconazole when ANC <1.0.   -Continue Valtrex 1000 mg TID until completion of chemo due to h/o shingles    # CNS prophylaxis Will require all subsequent LPs under fluoroscopic guidance on Day 1 of each cycle IP.   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 Mild paresthesias. Stable    # H/o COVID-19 infection   Not vaccinated (due to fear of needles per pt report).    # Hypertension: Amlodipine; Lasix as needed  # Indigestion/GERD: Continue Protonix 20 mg daily.     Transition Care Management Services  Admission Date: 11/02/22    Discharge Date: 11/06/22    Discharge Diagnosis: Acute leukemia (H)    Acute lymphoblastic leukemia (ALL) (H)    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)    Prophylaxis for chemotherapy-induced neutropenia    Morbid obesity (H)    Disseminated zoster    ALL (acute lymphoblastic leukemia of infant    Interactive contact date: 11/7/2022  Face-to-face visit date: 11/10/2022        Medical complexity decision making: High complexity ([id number])      Sania Dwyer PA-C  [facility]  [address] [place] [address] [phone]

## 2022-11-10 NOTE — NURSING NOTE
PICC line dressing change done in clinic, pt tolerated. See flowsheet for additional details.    Sri Carroll CMA on 11/10/2022 at 12:53 PM

## 2022-11-13 ENCOUNTER — MYC MEDICAL ADVICE (OUTPATIENT)
Dept: ONCOLOGY | Facility: CLINIC | Age: 42
End: 2022-11-13

## 2022-11-14 ENCOUNTER — PATIENT OUTREACH (OUTPATIENT)
Dept: ONCOLOGY | Facility: CLINIC | Age: 42
End: 2022-11-14

## 2022-11-14 ENCOUNTER — TELEPHONE (OUTPATIENT)
Dept: ONCOLOGY | Facility: CLINIC | Age: 42
End: 2022-11-14

## 2022-11-14 ENCOUNTER — LAB (OUTPATIENT)
Dept: ONCOLOGY | Facility: CLINIC | Age: 42
End: 2022-11-14
Payer: COMMERCIAL

## 2022-11-14 DIAGNOSIS — C91.00 ACUTE LYMPHOBLASTIC LEUKEMIA (ALL) NOT HAVING ACHIEVED REMISSION (H): ICD-10-CM

## 2022-11-14 LAB
ALBUMIN SERPL-MCNC: 3 G/DL (ref 3.4–5)
ALP SERPL-CCNC: 72 U/L (ref 40–150)
ALT SERPL W P-5'-P-CCNC: 22 U/L (ref 0–50)
ANION GAP SERPL CALCULATED.3IONS-SCNC: 3 MMOL/L (ref 3–14)
AST SERPL W P-5'-P-CCNC: 11 U/L (ref 0–45)
BASOPHILS # BLD MANUAL: 0 10E3/UL (ref 0–0.2)
BASOPHILS NFR BLD MANUAL: 2 %
BILIRUB SERPL-MCNC: 0.4 MG/DL (ref 0.2–1.3)
BUN SERPL-MCNC: 11 MG/DL (ref 7–30)
CALCIUM SERPL-MCNC: 8.1 MG/DL (ref 8.5–10.1)
CHLORIDE BLD-SCNC: 111 MMOL/L (ref 94–109)
CO2 SERPL-SCNC: 26 MMOL/L (ref 20–32)
CREAT SERPL-MCNC: 0.67 MG/DL (ref 0.52–1.04)
DACRYOCYTES BLD QL SMEAR: SLIGHT
EOSINOPHIL # BLD MANUAL: 0 10E3/UL (ref 0–0.7)
EOSINOPHIL NFR BLD MANUAL: 9 %
ERYTHROCYTE [DISTWIDTH] IN BLOOD BY AUTOMATED COUNT: 18.3 % (ref 10–15)
FRAGMENTS BLD QL SMEAR: SLIGHT
GFR SERPL CREATININE-BSD FRML MDRD: >90 ML/MIN/1.73M2
GLUCOSE BLD-MCNC: 112 MG/DL (ref 70–99)
HCT VFR BLD AUTO: 21.7 % (ref 35–47)
HGB BLD-MCNC: 7.1 G/DL (ref 11.7–15.7)
LYMPHOCYTES # BLD MANUAL: 0.3 10E3/UL (ref 0.8–5.3)
LYMPHOCYTES NFR BLD MANUAL: 60 %
MCH RBC QN AUTO: 32.9 PG (ref 26.5–33)
MCHC RBC AUTO-ENTMCNC: 32.7 G/DL (ref 31.5–36.5)
MCV RBC AUTO: 101 FL (ref 78–100)
METAMYELOCYTES # BLD MANUAL: 0 10E3/UL
METAMYELOCYTES NFR BLD MANUAL: 1 %
MONOCYTES # BLD MANUAL: 0.1 10E3/UL (ref 0–1.3)
MONOCYTES NFR BLD MANUAL: 12 %
MYELOCYTES # BLD MANUAL: 0 10E3/UL
MYELOCYTES NFR BLD MANUAL: 1 %
NEUTROPHILS # BLD MANUAL: 0.1 10E3/UL (ref 1.6–8.3)
NEUTROPHILS NFR BLD MANUAL: 15 %
PLAT MORPH BLD: ABNORMAL
PLATELET # BLD AUTO: 32 10E3/UL (ref 150–450)
POTASSIUM BLD-SCNC: 3.7 MMOL/L (ref 3.4–5.3)
PROT SERPL-MCNC: 6.1 G/DL (ref 6.8–8.8)
RBC # BLD AUTO: 2.16 10E6/UL (ref 3.8–5.2)
RBC MORPH BLD: ABNORMAL
SODIUM SERPL-SCNC: 140 MMOL/L (ref 133–144)
VARIANT LYMPHS BLD QL SMEAR: PRESENT
WBC # BLD AUTO: 0.5 10E3/UL (ref 4–11)

## 2022-11-14 PROCEDURE — 85007 BL SMEAR W/DIFF WBC COUNT: CPT | Performed by: NURSE PRACTITIONER

## 2022-11-14 PROCEDURE — 36592 COLLECT BLOOD FROM PICC: CPT

## 2022-11-14 PROCEDURE — 80053 COMPREHEN METABOLIC PANEL: CPT | Performed by: NURSE PRACTITIONER

## 2022-11-14 PROCEDURE — 85027 COMPLETE CBC AUTOMATED: CPT | Performed by: NURSE PRACTITIONER

## 2022-11-14 RX ORDER — HEPARIN SODIUM,PORCINE 10 UNIT/ML
3 VIAL (ML) INTRAVENOUS
Status: DISCONTINUED | OUTPATIENT
Start: 2022-11-14 | End: 2022-11-14 | Stop reason: HOSPADM

## 2022-11-14 RX ADMIN — Medication 5 ML: at 15:22

## 2022-11-14 NOTE — TELEPHONE ENCOUNTER
FMLA and STD forms received via my chart message from pt.      Forms to be completed and put in folder for provider to approve.    Both forms to be sent to:  Fax #:  [phone]    Asya Salter CMA ([facility])

## 2022-11-14 NOTE — PROGRESS NOTES
[facility]: Cancer Care                                                                                          April called and spoke with scheduling asking if she could get her chemotherapy treatment on Thursday instead of today.  She told the  she does not have a ride,.    We will confirm this plan with Dr. Hull and I will call April back    Signature:  Flor Leslie RN

## 2022-11-14 NOTE — PROGRESS NOTES
[facility]: Cancer Care                                                                                          Call returned to April to let her know that Dr. Hull said it would be fine to move the chemotherapy to Thursday 11/17/2022 but she should still have her labs checked.  April will call to get a lab appointment at a clinic closer to her home since she is concerned about driving in the snow and does not have someone to come with her today.      Signature:  Flor Leslie RN

## 2022-11-14 NOTE — PROGRESS NOTES
Patient presents for PICC line labs and cap change.    Labs collected from PICC Red lumen. Both purple and red lumens flushed with saline, caps changed and heparin locked. See flowsheet.     Patient tolerated lab draw.     Patient declined dressing change states it is due 11/17/22    Jessica Christensen RN on 11/14/2022 at 3:36 PM

## 2022-11-14 NOTE — TELEPHONE ENCOUNTER
DATE:  11/14/2022  TIME OF RECEIPT FROM LAB:   1659  SITUATION: LAB TEST & VALUE is   WBC 0.5  ANC 0.1  Hemoglobin 7.1  Platelets 32  BACKGROUND: Dx   Previous Lab Values from Date: 11/10/22  WBC 3.1, ANC 2.7, Hemoglobin 8, Platelets 32  RESULTS PAGED TO (PROVIDER):  on call Heme Malignancy provider Dr. Del Toro  TIME LAB VALUE REPORTED TO PROVIDER: 1708    RECOMMENDATION:  1710  Per Dr. Del Toro, if patient feels okay, no immediate action needed tonight. Care team to follow up with patient tomorrow 11/15/22 to further advise if okay for pt to wait for lab recheck and transfusion on 11/17/22 or if Dr. Hull/care team would recommend moving appt times up earlier in this week.     1716 April states feels more energy today than did over weekeend, denies nose bleeds, lightheadeness, , bleeding gums. Does have a couple bruises on legs, but always has bruised easily but nothing out of ordinary. April is aware and states spoke to Lucy RNCC about this today and will plan to follow up with care team tomorrow 11/15/22 about plan for this week for transfusion.  This writer also educated on:   Neutropenic precautions reviewed with pt: Call with unexplained pain, sob, chest pain, and any new cold symptoms: nasal drainage, congestion, cough, new cuts that is not healing.     Signs/symptoms of possible infection you should call about day or night are temp of 100.4 OR shaking chills (no matter what your temperature is at that time). Other signs/symptoms of possible infection include mouth sores, sinus pressure or pain, cough, earache, headache (good to check temperature prior to taking anything like Tylenol for a headache), stiff neck, any rash, any area of redness, swelling or tenderness, burning, urgency or increased frequency of urination, bloody or cloudy urine.     Reminded pt to monitor for s/s of bleeding or bruising including new, unknown source of bruising, or bruises growing in size, petechia (looks like no none raised  rash like someone dotted area with red/pink, fine point marker), nose bleeding, bleeding gums or any other bleeding re: potentially dropping plt count and may need to be rechecked earlier than planned.     Instructed patient to seek care immediately for worsening symptoms, including: fever, chest pain, shortness of breath, dizziness, signs of uncontrolled bleeding.       Routed high priority to care team.

## 2022-11-15 ENCOUNTER — PATIENT OUTREACH (OUTPATIENT)
Dept: ONCOLOGY | Facility: CLINIC | Age: 42
End: 2022-11-15

## 2022-11-15 NOTE — PROGRESS NOTES
[facility]: Cancer Care                                                                                          Call placed to April to let her know that we are unable to get her in for a possible transfusion appointment tomorrow.  LIBIA Bajwa was notified and advises that we check her labs tomorrow.   [facility] was contacted and they will arrange for labs tomorrow morning and contact April.     April was advised that we will keep her transfusion appointment on Thursday.  If she experiences any bleeding, shortness of breath or chest pain she was advised to go to the ED    Signature:  Flor Leslie RN

## 2022-11-15 NOTE — TELEPHONE ENCOUNTER
FMLA and STD forms filled out and put in providers folder for review and signature.      Asya Salter CMA ([facility])

## 2022-11-15 NOTE — PROGRESS NOTES
[facility]: Cancer Care                                                                                          Call placed to April to follow up after her lab results from yesterday    She states she is doing fine with the exception of Shortness of breath with activity such as walking down the stairs.  She said she will sit down and her breathing recovers right away.  She has been limiting her activity.  She has no concerns with fatigue.  She is taking the Valtrex, Levaquin and Diflucan as prescribed.  I reinforced that she needs to follow neutropenic precautions including masking, good handwashing and staying away from those who are ill.   She was advised to contact our office immediately with any signs of fever, chills, respiratory, bowel, urinary concerns or any signs and symptoms of an infection.    She does report that she had heartburn last night that kept her awake.  She has a prescription for Pantoprazole but has not taking it consistently.  She was advised to take it daily.  She also used Tums last night with little relief    I informed April that we would work on getting her transfusion appointment moved up to tomorrow if able.  I will call her back with a plan.  She confirms she will be able to get to the clinic      Signature:  Flor Leslie RN

## 2022-11-16 ENCOUNTER — LAB (OUTPATIENT)
Dept: ONCOLOGY | Facility: CLINIC | Age: 42
End: 2022-11-16
Payer: COMMERCIAL

## 2022-11-16 ENCOUNTER — INFUSION THERAPY VISIT (OUTPATIENT)
Dept: INFUSION THERAPY | Facility: CLINIC | Age: 42
End: 2022-11-16
Payer: COMMERCIAL

## 2022-11-16 ENCOUNTER — PATIENT OUTREACH (OUTPATIENT)
Dept: ONCOLOGY | Facility: CLINIC | Age: 42
End: 2022-11-16

## 2022-11-16 VITALS
DIASTOLIC BLOOD PRESSURE: 74 MMHG | WEIGHT: 249.5 LBS | RESPIRATION RATE: 14 BRPM | OXYGEN SATURATION: 98 % | TEMPERATURE: 98.1 F | HEART RATE: 90 BPM | SYSTOLIC BLOOD PRESSURE: 123 MMHG | BODY MASS INDEX: 44.77 KG/M2

## 2022-11-16 DIAGNOSIS — C91.00 ACUTE LYMPHOBLASTIC LEUKEMIA (ALL) NOT HAVING ACHIEVED REMISSION (H): ICD-10-CM

## 2022-11-16 DIAGNOSIS — Z76.89 PROPHYLAXIS FOR CHEMOTHERAPY-INDUCED NEUTROPENIA: ICD-10-CM

## 2022-11-16 DIAGNOSIS — C91.00 ACUTE LYMPHOBLASTIC LEUKEMIA (ALL) NOT HAVING ACHIEVED REMISSION (H): Primary | ICD-10-CM

## 2022-11-16 DIAGNOSIS — I10 HYPERTENSION, UNSPECIFIED TYPE: Primary | ICD-10-CM

## 2022-11-16 LAB
ABO/RH(D): NORMAL
ALBUMIN SERPL-MCNC: 3.1 G/DL (ref 3.4–5)
ALP SERPL-CCNC: 72 U/L (ref 40–150)
ALT SERPL W P-5'-P-CCNC: 21 U/L (ref 0–50)
ANION GAP SERPL CALCULATED.3IONS-SCNC: 3 MMOL/L (ref 3–14)
ANTIBODY SCREEN: NEGATIVE
AST SERPL W P-5'-P-CCNC: 10 U/L (ref 0–45)
BASOPHILS # BLD MANUAL: 0 10E3/UL (ref 0–0.2)
BASOPHILS NFR BLD MANUAL: 1 %
BILIRUB SERPL-MCNC: 0.3 MG/DL (ref 0.2–1.3)
BLD PROD TYP BPU: NORMAL
BLOOD COMPONENT TYPE: NORMAL
BUN SERPL-MCNC: 15 MG/DL (ref 7–30)
CALCIUM SERPL-MCNC: 8.2 MG/DL (ref 8.5–10.1)
CHLORIDE BLD-SCNC: 114 MMOL/L (ref 94–109)
CO2 SERPL-SCNC: 26 MMOL/L (ref 20–32)
CODING SYSTEM: NORMAL
CREAT SERPL-MCNC: 0.78 MG/DL (ref 0.52–1.04)
CROSSMATCH: NORMAL
DACRYOCYTES BLD QL SMEAR: SLIGHT
EOSINOPHIL # BLD MANUAL: 0.1 10E3/UL (ref 0–0.7)
EOSINOPHIL NFR BLD MANUAL: 4 %
ERYTHROCYTE [DISTWIDTH] IN BLOOD BY AUTOMATED COUNT: 18.4 % (ref 10–15)
GFR SERPL CREATININE-BSD FRML MDRD: >90 ML/MIN/1.73M2
GLUCOSE BLD-MCNC: 105 MG/DL (ref 70–99)
HCT VFR BLD AUTO: 22 % (ref 35–47)
HGB BLD-MCNC: 7.2 G/DL (ref 11.7–15.7)
LYMPHOCYTES # BLD MANUAL: 0.5 10E3/UL (ref 0.8–5.3)
LYMPHOCYTES NFR BLD MANUAL: 21 %
MCH RBC QN AUTO: 33.2 PG (ref 26.5–33)
MCHC RBC AUTO-ENTMCNC: 32.7 G/DL (ref 31.5–36.5)
MCV RBC AUTO: 101 FL (ref 78–100)
METAMYELOCYTES # BLD MANUAL: 0 10E3/UL
METAMYELOCYTES NFR BLD MANUAL: 1 %
MONOCYTES # BLD MANUAL: 0.2 10E3/UL (ref 0–1.3)
MONOCYTES NFR BLD MANUAL: 7 %
NEUTROPHILS # BLD MANUAL: 1.5 10E3/UL (ref 1.6–8.3)
NEUTROPHILS NFR BLD MANUAL: 66 %
PLAT MORPH BLD: ABNORMAL
PLATELET # BLD AUTO: 72 10E3/UL (ref 150–450)
POTASSIUM BLD-SCNC: 3.7 MMOL/L (ref 3.4–5.3)
PROT SERPL-MCNC: 6.3 G/DL (ref 6.8–8.8)
RBC # BLD AUTO: 2.17 10E6/UL (ref 3.8–5.2)
RBC MORPH BLD: ABNORMAL
SARS-COV-2 RNA RESP QL NAA+PROBE: NEGATIVE
SODIUM SERPL-SCNC: 143 MMOL/L (ref 133–144)
SPECIMEN EXPIRATION DATE: NORMAL
UNIT ABO/RH: NORMAL
UNIT ABO/RH: NORMAL
UNIT NUMBER: NORMAL
UNIT STATUS: NORMAL
UNIT TYPE ISBT: 5100
UNIT TYPE ISBT: 5100
WBC # BLD AUTO: 2.3 10E3/UL (ref 4–11)

## 2022-11-16 PROCEDURE — U0005 INFEC AGEN DETEC AMPLI PROBE: HCPCS | Performed by: NURSE PRACTITIONER

## 2022-11-16 PROCEDURE — 85007 BL SMEAR W/DIFF WBC COUNT: CPT | Performed by: INTERNAL MEDICINE

## 2022-11-16 PROCEDURE — 85027 COMPLETE CBC AUTOMATED: CPT | Performed by: INTERNAL MEDICINE

## 2022-11-16 PROCEDURE — U0003 INFECTIOUS AGENT DETECTION BY NUCLEIC ACID (DNA OR RNA); SEVERE ACUTE RESPIRATORY SYNDROME CORONAVIRUS 2 (SARS-COV-2) (CORONAVIRUS DISEASE [COVID-19]), AMPLIFIED PROBE TECHNIQUE, MAKING USE OF HIGH THROUGHPUT TECHNOLOGIES AS DESCRIBED BY CMS-2020-01-R: HCPCS | Performed by: NURSE PRACTITIONER

## 2022-11-16 PROCEDURE — 80053 COMPREHEN METABOLIC PANEL: CPT | Performed by: INTERNAL MEDICINE

## 2022-11-16 PROCEDURE — 99207 PR NO CHARGE LOS: CPT

## 2022-11-16 RX ORDER — HEPARIN SODIUM (PORCINE) LOCK FLUSH IV SOLN 100 UNIT/ML 100 UNIT/ML
50 SOLUTION INTRAVENOUS EVERY 8 HOURS
Status: DISCONTINUED | OUTPATIENT
Start: 2022-11-16 | End: 2022-11-16 | Stop reason: HOSPADM

## 2022-11-16 RX ORDER — HEPARIN SODIUM,PORCINE 10 UNIT/ML
5 VIAL (ML) INTRAVENOUS
Status: CANCELLED | OUTPATIENT
Start: 2022-11-16

## 2022-11-16 RX ORDER — AMLODIPINE BESYLATE 5 MG/1
5 TABLET ORAL DAILY
Qty: 30 TABLET | Refills: 0 | Status: SHIPPED | OUTPATIENT
Start: 2022-11-16 | End: 2022-11-17

## 2022-11-16 RX ORDER — ACETAMINOPHEN 325 MG/1
325 TABLET ORAL ONCE
Status: CANCELLED
Start: 2022-11-16 | End: 2022-11-16

## 2022-11-16 RX ORDER — HEPARIN SODIUM (PORCINE) LOCK FLUSH IV SOLN 100 UNIT/ML 100 UNIT/ML
5 SOLUTION INTRAVENOUS
Status: CANCELLED | OUTPATIENT
Start: 2022-11-16

## 2022-11-16 RX ORDER — DIPHENHYDRAMINE HCL 25 MG
25 CAPSULE ORAL ONCE
Status: CANCELLED
Start: 2022-11-16 | End: 2022-11-16

## 2022-11-16 RX ADMIN — HEPARIN SODIUM (PORCINE) LOCK FLUSH IV SOLN 100 UNIT/ML 50 UNITS: 100 SOLUTION at 10:17

## 2022-11-16 NOTE — PROGRESS NOTES
TORB 11/16/2022 1450 Dr. Hull via Lucy Leslie, RNCC/K Danita, RN: Recheck labs on 11/17 and administer Vincristine if meets counts.  Administer blood products as needed again.    Bev Samayoa, RN

## 2022-11-16 NOTE — TELEPHONE ENCOUNTER
[facility]: Cancer Care                                                                                          Dr. Hull confirms we should do a COVID swab today due to sinus symptoms.  CAROL Smith with [facility] was notified.  She will put patient in isolation and collect the swab    Signature:  Flor Leslie RN

## 2022-11-16 NOTE — PROGRESS NOTES
Patient presents for PICC dressing change.  Prior to CVC Line: Dressing Change:     Verified patient's identity using patient name and date of birth.      Accessed CVC Line:  PICC dressing removed and site cleansed with betadine per pts request, and allowed to dry completely.     CVC Insertion Site Dressing Changed:     PICC site dressed with sterile technique with bio-patch placed at insertion site. Site intact, clean, slight redness at site, but no drainage noted.  Instructed pt to call if site increases in redness, drainage and temp greater than 100.4.  Patient tolerated procedure.     Labs drawn and caps changed.     Eric Ochoa RN  [facility]

## 2022-11-16 NOTE — PROGRESS NOTES
[facility]: Cancer Care                                                                                          Call placed to April to let her know that Dr. Hull said it would be fine to change admission for chemotherapy to Friday 11/25/2022.      I informed her that the port placement should take place prior to admission.  She was informed by the IR  that they could not get her in till mid December.  Message sent to IR nurse pool to ask for it to be scheduled next week    April was able to get in for labs and transfusion appointment at [facility] today.  She reports that she has a runny nose that is clear and started yesterday.  She is afebrile and no other respiratory symptoms.  I reminded her to keep a mask on and I will review this symptom with Dr. Hull to see if we need to do any respiratory or COVID testing.    Signature:  Flor Leslie RN

## 2022-11-16 NOTE — PROGRESS NOTES
Infusion Nursing Note:  Vashti Villegas presents today for for possible blood transfusion.  However, hemoglobin did not meet parameters and thus no blood transfusion was given.    Patient seen by provider today: No   present during visit today: Not Applicable.    Note: At 10:30 this morning received a call from CAROL Ayala at the .  States patient has runny nose and thus a Covid test has been ordered.  Placed patient in Special Precautions.  Swabbed nare.    Intravenous Access:  No Intravenous access/labs at this visit.    Treatment Conditions:  Lab Results   Component Value Date    HGB 7.2 (LL) 11/16/2022    WBC 2.3 (L) 11/16/2022    ANEU 1.5 (L) 11/16/2022    ANEUTAUTO 11.2 (H) 11/07/2022    PLT 72 (L) 11/16/2022      Results reviewed, labs did NOT meet treatment parameters: Hgb >7.0.    Post Infusion Assessment:  Not applicable.     Discharge Plan:   AVS to patient via MYCHART.  Patient states she is working with the  to see if she should keep, or cancel, tomorrow's apppointments at the .  Patient discharged in stable condition accompanied by: self.  Departure Mode: Ambulatory.      Slime Finley RN

## 2022-11-17 ENCOUNTER — MYC MEDICAL ADVICE (OUTPATIENT)
Dept: INTERVENTIONAL RADIOLOGY/VASCULAR | Facility: CLINIC | Age: 42
End: 2022-11-17

## 2022-11-17 ENCOUNTER — APPOINTMENT (OUTPATIENT)
Dept: LAB | Facility: CLINIC | Age: 42
End: 2022-11-17
Attending: INTERNAL MEDICINE
Payer: COMMERCIAL

## 2022-11-17 ENCOUNTER — TELEPHONE (OUTPATIENT)
Dept: INTERVENTIONAL RADIOLOGY/VASCULAR | Facility: CLINIC | Age: 42
End: 2022-11-17

## 2022-11-17 ENCOUNTER — INFUSION THERAPY VISIT (OUTPATIENT)
Dept: ONCOLOGY | Facility: CLINIC | Age: 42
End: 2022-11-17
Attending: INTERNAL MEDICINE
Payer: COMMERCIAL

## 2022-11-17 VITALS
TEMPERATURE: 98.1 F | OXYGEN SATURATION: 99 % | DIASTOLIC BLOOD PRESSURE: 51 MMHG | RESPIRATION RATE: 16 BRPM | HEART RATE: 92 BPM | SYSTOLIC BLOOD PRESSURE: 129 MMHG | WEIGHT: 252 LBS | BODY MASS INDEX: 45.21 KG/M2

## 2022-11-17 DIAGNOSIS — Z76.89 PROPHYLAXIS FOR CHEMOTHERAPY-INDUCED NEUTROPENIA: ICD-10-CM

## 2022-11-17 DIAGNOSIS — I10 HYPERTENSION, UNSPECIFIED TYPE: ICD-10-CM

## 2022-11-17 DIAGNOSIS — C91.00 ACUTE LYMPHOBLASTIC LEUKEMIA (ALL) NOT HAVING ACHIEVED REMISSION (H): Primary | ICD-10-CM

## 2022-11-17 LAB
BASOPHILS # BLD MANUAL: 0 10E3/UL (ref 0–0.2)
BASOPHILS NFR BLD MANUAL: 0 %
DACRYOCYTES BLD QL SMEAR: SLIGHT
EOSINOPHIL # BLD MANUAL: 0.1 10E3/UL (ref 0–0.7)
EOSINOPHIL NFR BLD MANUAL: 2 %
ERYTHROCYTE [DISTWIDTH] IN BLOOD BY AUTOMATED COUNT: 18.7 % (ref 10–15)
HCT VFR BLD AUTO: 22.7 % (ref 35–47)
HGB BLD-MCNC: 7.3 G/DL (ref 11.7–15.7)
LYMPHOCYTES # BLD MANUAL: 0.4 10E3/UL (ref 0.8–5.3)
LYMPHOCYTES NFR BLD MANUAL: 10 %
MCH RBC QN AUTO: 32.6 PG (ref 26.5–33)
MCHC RBC AUTO-ENTMCNC: 32.2 G/DL (ref 31.5–36.5)
MCV RBC AUTO: 101 FL (ref 78–100)
MONOCYTES # BLD MANUAL: 0.2 10E3/UL (ref 0–1.3)
MONOCYTES NFR BLD MANUAL: 5 %
NEUTROPHILS # BLD MANUAL: 3.3 10E3/UL (ref 1.6–8.3)
NEUTROPHILS NFR BLD MANUAL: 83 %
PLAT MORPH BLD: ABNORMAL
PLATELET # BLD AUTO: 89 10E3/UL (ref 150–450)
RBC # BLD AUTO: 2.24 10E6/UL (ref 3.8–5.2)
RBC MORPH BLD: ABNORMAL
WBC # BLD AUTO: 4 10E3/UL (ref 4–11)

## 2022-11-17 PROCEDURE — 85007 BL SMEAR W/DIFF WBC COUNT: CPT

## 2022-11-17 PROCEDURE — 96409 CHEMO IV PUSH SNGL DRUG: CPT

## 2022-11-17 PROCEDURE — 258N000003 HC RX IP 258 OP 636: Performed by: INTERNAL MEDICINE

## 2022-11-17 PROCEDURE — 86923 COMPATIBILITY TEST ELECTRIC: CPT | Performed by: PHYSICIAN ASSISTANT

## 2022-11-17 PROCEDURE — 36415 COLL VENOUS BLD VENIPUNCTURE: CPT

## 2022-11-17 PROCEDURE — 85027 COMPLETE CBC AUTOMATED: CPT

## 2022-11-17 PROCEDURE — 250N000011 HC RX IP 250 OP 636: Performed by: INTERNAL MEDICINE

## 2022-11-17 PROCEDURE — 86850 RBC ANTIBODY SCREEN: CPT

## 2022-11-17 PROCEDURE — 86901 BLOOD TYPING SEROLOGIC RH(D): CPT

## 2022-11-17 RX ORDER — HEPARIN SODIUM,PORCINE 10 UNIT/ML
5 VIAL (ML) INTRAVENOUS
Status: DISCONTINUED | OUTPATIENT
Start: 2022-11-17 | End: 2022-11-17 | Stop reason: HOSPADM

## 2022-11-17 RX ORDER — AMLODIPINE BESYLATE 5 MG/1
5 TABLET ORAL DAILY
Qty: 30 TABLET | Refills: 4 | Status: SHIPPED | OUTPATIENT
Start: 2022-11-17 | End: 2022-12-14

## 2022-11-17 RX ORDER — DEXAMETHASONE 4 MG/1
40 TABLET ORAL DAILY
Qty: 40 TABLET | Refills: 0 | Status: ON HOLD | OUTPATIENT
Start: 2022-11-17 | End: 2022-11-26

## 2022-11-17 RX ADMIN — Medication 5 ML: at 06:53

## 2022-11-17 RX ADMIN — Medication 5 ML: at 06:54

## 2022-11-17 RX ADMIN — VINCRISTINE SULFATE 2 MG: 1 INJECTION, SOLUTION INTRAVENOUS at 08:42

## 2022-11-17 RX ADMIN — SODIUM CHLORIDE 250 ML: 9 INJECTION, SOLUTION INTRAVENOUS at 08:42

## 2022-11-17 RX ADMIN — Medication 5 ML: at 09:00

## 2022-11-17 ASSESSMENT — PAIN SCALES - GENERAL: PAINLEVEL: NO PAIN (0)

## 2022-11-17 NOTE — PROGRESS NOTES
Infusion Nursing Note:  Vashti Villegas presents today for Cycle 3 Day 11 (delayed) Vincristine.      Note: April comes to infusion today with no questions or concerns.  She has been afebrile and denies signs and symptoms of infection including: cough, SOB, sore throat, vomiting, rash, or pain with urination.  She  wishes to proceed with today's planned treatment      Vincristine infused into free flowing NS.  Positive blood return pre, mid, and post infuison.  PICC site without redness or swelling.      Intravenous Access:  PICC.    Treatment Conditions:  Component      Latest Ref Rng & Units 11/17/2022   Sodium      136 - 145 mmol/L 141   Potassium      3.4 - 5.3 mmol/L 3.5   Chloride      98 - 107 mmol/L 106   Carbon Dioxide (CO2)      22 - 29 mmol/L 23   Anion Gap      7 - 15 mmol/L 12   Urea Nitrogen      6.0 - 20.0 mg/dL 11.7   Creatinine      0.51 - 0.95 mg/dL 0.69   Calcium      8.6 - 10.0 mg/dL 8.9   Glucose      70 - 99 mg/dL 94   Alkaline Phosphatase      35 - 104 U/L 69   AST      10 - 35 U/L 14   ALT      10 - 35 U/L 16   Protein Total      6.4 - 8.3 g/dL 6.1 (L)   Albumin      3.5 - 5.2 g/dL 3.7   Bilirubin Total      <=1.2 mg/dL 0.2   GFR Estimate      >60 mL/min/1.73m2 >90   % Neutrophils      % 83   % Lymphocytes      % 10   % Monocytes      % 5   % Eosinophils      % 2   % Basophils      % 0   Absolute Neutrophil      1.6 - 8.3 10e3/uL 3.3   Absolute Lymphocytes      0.8 - 5.3 10e3/uL 0.4 (L)   Absolute Monocytes      0.0 - 1.3 10e3/uL 0.2   Absolute Eosinophils      0.0 - 0.7 10e3/uL 0.1   Absolute Basophils      0.0 - 0.2 10e3/uL 0.0   RBC Morphology       Confirmed RBC Indices   Platelet Morphology      Automated Count Confirmed. Platelet morphology is normal. Automated Count Confirmed. Platelet morphology is normal.   Teardrop Cells      None Seen Slight (A)   WBC      4.0 - 11.0 10e3/uL 4.0   RBC Count      3.80 - 5.20 10e6/uL 2.24 (L)   Hemoglobin      11.7 - 15.7 g/dL 7.3 (L)   Hematocrit       35.0 - 47.0 % 22.7 (L)   MCV      78 - 100 fL 101 (H)   MCH      26.5 - 33.0 pg 32.6   MCHC      31.5 - 36.5 g/dL 32.2   RDW      10.0 - 15.0 % 18.7 (H)   Platelet Count      150 - 450 10e3/uL 89 (L)     Results reviewed, labs MET treatment parameters, ok to proceed with treatment.    Post Infusion Assessment:  Patient tolerated infusion without incident.     Discharge Plan:   Decadron prescription sent to pt's local pharmacy.  Pt aware.  AVS to patient via [employer].  Patient will return 11/22 for labs and possible blood transfusions      Diane Fraga RN

## 2022-11-17 NOTE — TELEPHONE ENCOUNTER
FMLA and STD paperwork completed, checked for accuracy, signed and faxed to @ [phone].  A copy was made, sent to scanning and original mailed to patient at home address.    Successful transmission verified in Right Fax.      Asya Salter CMA

## 2022-11-17 NOTE — PATIENT INSTRUCTIONS
[facility] Triage and after hours / weekends / holidays:  [phone]    Please call the triage or after hours line if you experience a temperature greater than or equal to 100.4, shaking chills, have uncontrolled nausea, vomiting and/or diarrhea, dizziness, shortness of breath, chest pain, bleeding, unexplained bruising, or if you have any other new/concerning symptoms, questions or concerns.      If you are having any concerning symptoms or wish to speak to a provider before your next infusion visit, please call your care coordinator or triage to notify them so we can adequately serve you.     If you need a refill on a narcotic prescription or other medication, please call before your infusion appointment.

## 2022-11-17 NOTE — NURSING NOTE
Chief Complaint   Patient presents with     Blood Draw     Labs drawn via PICC by RN. Vitals taken.     Labs drawn via PICC by RN. Flushed with saline and heparin. Pt tolerated well. Vitals taken. Pt checked into next appointment.    Monica Gamboa RN

## 2022-11-18 DIAGNOSIS — C91.00 ACUTE LYMPHOBLASTIC LEUKEMIA (ALL) NOT HAVING ACHIEVED REMISSION (H): ICD-10-CM

## 2022-11-18 DIAGNOSIS — Z76.89 PROPHYLAXIS FOR CHEMOTHERAPY-INDUCED NEUTROPENIA: Primary | ICD-10-CM

## 2022-11-18 RX ORDER — ALBUTEROL SULFATE 0.83 MG/ML
2.5 SOLUTION RESPIRATORY (INHALATION)
Status: CANCELLED | OUTPATIENT
Start: 2022-11-25

## 2022-11-18 RX ORDER — IMATINIB MESYLATE 100 MG/1
300 TABLET, FILM COATED ORAL 2 TIMES DAILY
Qty: 84 TABLET | Refills: 0 | Status: ON HOLD | OUTPATIENT
Start: 2022-11-25 | End: 2022-11-26

## 2022-11-18 RX ORDER — EPINEPHRINE 1 MG/ML
0.3 INJECTION, SOLUTION INTRAMUSCULAR; SUBCUTANEOUS EVERY 5 MIN PRN
Status: CANCELLED | OUTPATIENT
Start: 2022-11-25

## 2022-11-18 RX ORDER — PROCHLORPERAZINE MALEATE 10 MG
10 TABLET ORAL EVERY 6 HOURS PRN
Status: CANCELLED
Start: 2022-11-25

## 2022-11-18 RX ORDER — ONDANSETRON 8 MG/1
8 TABLET, FILM COATED ORAL EVERY 12 HOURS
Status: CANCELLED | OUTPATIENT
Start: 2022-11-26

## 2022-11-18 RX ORDER — DIPHENHYDRAMINE HYDROCHLORIDE 50 MG/ML
50 INJECTION INTRAMUSCULAR; INTRAVENOUS
Status: CANCELLED
Start: 2022-11-25

## 2022-11-18 RX ORDER — LEUCOVORIN CALCIUM 350 MG/17.5ML
15 INJECTION, POWDER, LYOPHILIZED, FOR SOLUTION INTRAMUSCULAR; INTRAVENOUS EVERY 6 HOURS
Status: CANCELLED | OUTPATIENT
Start: 2022-11-26

## 2022-11-18 RX ORDER — DEXAMETHASONE 4 MG/1
12 TABLET ORAL DAILY
Status: CANCELLED | OUTPATIENT
Start: 2022-11-25

## 2022-11-18 RX ORDER — METHYLPREDNISOLONE SODIUM SUCCINATE 125 MG/2ML
125 INJECTION, POWDER, LYOPHILIZED, FOR SOLUTION INTRAMUSCULAR; INTRAVENOUS
Status: CANCELLED
Start: 2022-11-25

## 2022-11-18 RX ORDER — ONDANSETRON 8 MG/1
16 TABLET, FILM COATED ORAL ONCE
Status: CANCELLED | OUTPATIENT
Start: 2022-11-25

## 2022-11-18 RX ORDER — ALBUTEROL SULFATE 90 UG/1
1-2 AEROSOL, METERED RESPIRATORY (INHALATION)
Status: CANCELLED
Start: 2022-11-25

## 2022-11-18 RX ORDER — SODIUM BICARBONATE 84 MG/ML
50 INJECTION, SOLUTION INTRAVENOUS
Status: CANCELLED | OUTPATIENT
Start: 2022-11-25

## 2022-11-18 RX ORDER — LORAZEPAM 0.5 MG/1
.5-1 TABLET ORAL EVERY 6 HOURS PRN
Status: CANCELLED
Start: 2022-11-25

## 2022-11-18 RX ORDER — MEPERIDINE HYDROCHLORIDE 25 MG/ML
25 INJECTION INTRAMUSCULAR; INTRAVENOUS; SUBCUTANEOUS EVERY 30 MIN PRN
Status: CANCELLED | OUTPATIENT
Start: 2022-11-25

## 2022-11-18 RX ORDER — PREDNISOLONE ACETATE 10 MG/ML
2 SUSPENSION/ DROPS OPHTHALMIC 4 TIMES DAILY
Status: CANCELLED | OUTPATIENT
Start: 2022-11-26

## 2022-11-18 RX ORDER — LORAZEPAM 2 MG/ML
.5-1 INJECTION INTRAMUSCULAR EVERY 6 HOURS PRN
Status: CANCELLED | OUTPATIENT
Start: 2022-11-25

## 2022-11-18 RX ORDER — IMATINIB MESYLATE 100 MG/1
300 TABLET, FILM COATED ORAL 2 TIMES DAILY
Status: CANCELLED | OUTPATIENT
Start: 2022-11-25

## 2022-11-18 RX ORDER — LEUCOVORIN CALCIUM 350 MG/17.5ML
50 INJECTION, POWDER, LYOPHILIZED, FOR SOLUTION INTRAMUSCULAR; INTRAVENOUS ONCE
Status: CANCELLED | OUTPATIENT
Start: 2022-11-26

## 2022-11-18 RX ORDER — DEXAMETHASONE 4 MG/1
8 TABLET ORAL DAILY
Status: CANCELLED | OUTPATIENT
Start: 2022-11-28

## 2022-11-18 NOTE — TELEPHONE ENCOUNTER
Writer has spoken with April regarding planned procedure with IR via telephone.  April acknowledges understanding of pre-procedure instructions.  I have provided April with IR number ([phone]) for questions or concerns.    Abby OLIVA  Interventional Radiology RN   [phone]

## 2022-11-19 ENCOUNTER — HEALTH MAINTENANCE LETTER (OUTPATIENT)
Age: 42
End: 2022-11-19

## 2022-11-21 ENCOUNTER — LAB (OUTPATIENT)
Dept: ONCOLOGY | Facility: CLINIC | Age: 42
End: 2022-11-21
Payer: COMMERCIAL

## 2022-11-21 DIAGNOSIS — C91.00 ACUTE LYMPHOBLASTIC LEUKEMIA (ALL) NOT HAVING ACHIEVED REMISSION (H): ICD-10-CM

## 2022-11-21 LAB
ALBUMIN SERPL-MCNC: 3.4 G/DL (ref 3.4–5)
ALP SERPL-CCNC: 83 U/L (ref 40–150)
ALT SERPL W P-5'-P-CCNC: 28 U/L (ref 0–50)
ANION GAP SERPL CALCULATED.3IONS-SCNC: 7 MMOL/L (ref 3–14)
AST SERPL W P-5'-P-CCNC: 11 U/L (ref 0–45)
BASOPHILS # BLD MANUAL: 0 10E3/UL (ref 0–0.2)
BASOPHILS NFR BLD MANUAL: 0 %
BILIRUB SERPL-MCNC: 0.4 MG/DL (ref 0.2–1.3)
BUN SERPL-MCNC: 19 MG/DL (ref 7–30)
CALCIUM SERPL-MCNC: 8.5 MG/DL (ref 8.5–10.1)
CHLORIDE BLD-SCNC: 107 MMOL/L (ref 94–109)
CO2 SERPL-SCNC: 27 MMOL/L (ref 20–32)
CREAT SERPL-MCNC: 0.6 MG/DL (ref 0.52–1.04)
DACRYOCYTES BLD QL SMEAR: SLIGHT
EOSINOPHIL # BLD MANUAL: 0 10E3/UL (ref 0–0.7)
EOSINOPHIL NFR BLD MANUAL: 0 %
ERYTHROCYTE [DISTWIDTH] IN BLOOD BY AUTOMATED COUNT: 18.6 % (ref 10–15)
GFR SERPL CREATININE-BSD FRML MDRD: >90 ML/MIN/1.73M2
GLUCOSE BLD-MCNC: 153 MG/DL (ref 70–99)
HCT VFR BLD AUTO: 21.8 % (ref 35–47)
HGB BLD-MCNC: 7.4 G/DL (ref 11.7–15.7)
LYMPHOCYTES # BLD MANUAL: 0.6 10E3/UL (ref 0.8–5.3)
LYMPHOCYTES NFR BLD MANUAL: 4 %
MCH RBC QN AUTO: 33.5 PG (ref 26.5–33)
MCHC RBC AUTO-ENTMCNC: 33.9 G/DL (ref 31.5–36.5)
MCV RBC AUTO: 99 FL (ref 78–100)
MONOCYTES # BLD MANUAL: 0.3 10E3/UL (ref 0–1.3)
MONOCYTES NFR BLD MANUAL: 2 %
NEUTROPHILS # BLD MANUAL: 13.8 10E3/UL (ref 1.6–8.3)
NEUTROPHILS NFR BLD MANUAL: 94 %
PLAT MORPH BLD: ABNORMAL
PLATELET # BLD AUTO: 317 10E3/UL (ref 150–450)
POTASSIUM BLD-SCNC: 3.2 MMOL/L (ref 3.4–5.3)
PROT SERPL-MCNC: 6.3 G/DL (ref 6.8–8.8)
RBC # BLD AUTO: 2.21 10E6/UL (ref 3.8–5.2)
RBC MORPH BLD: ABNORMAL
SODIUM SERPL-SCNC: 141 MMOL/L (ref 133–144)
TOXIC GRANULES BLD QL SMEAR: PRESENT
WBC # BLD AUTO: 14.7 10E3/UL (ref 4–11)

## 2022-11-21 PROCEDURE — 85027 COMPLETE CBC AUTOMATED: CPT | Performed by: INTERNAL MEDICINE

## 2022-11-21 PROCEDURE — 36592 COLLECT BLOOD FROM PICC: CPT | Performed by: INTERNAL MEDICINE

## 2022-11-21 PROCEDURE — 80053 COMPREHEN METABOLIC PANEL: CPT | Performed by: INTERNAL MEDICINE

## 2022-11-21 PROCEDURE — U0003 INFECTIOUS AGENT DETECTION BY NUCLEIC ACID (DNA OR RNA); SEVERE ACUTE RESPIRATORY SYNDROME CORONAVIRUS 2 (SARS-COV-2) (CORONAVIRUS DISEASE [COVID-19]), AMPLIFIED PROBE TECHNIQUE, MAKING USE OF HIGH THROUGHPUT TECHNOLOGIES AS DESCRIBED BY CMS-2020-01-R: HCPCS | Performed by: INTERNAL MEDICINE

## 2022-11-21 PROCEDURE — U0005 INFEC AGEN DETEC AMPLI PROBE: HCPCS | Performed by: INTERNAL MEDICINE

## 2022-11-21 PROCEDURE — 85007 BL SMEAR W/DIFF WBC COUNT: CPT | Performed by: INTERNAL MEDICINE

## 2022-11-21 RX ORDER — HEPARIN SODIUM,PORCINE 10 UNIT/ML
3 VIAL (ML) INTRAVENOUS
Status: DISCONTINUED | OUTPATIENT
Start: 2022-11-21 | End: 2022-11-21 | Stop reason: HOSPADM

## 2022-11-21 RX ORDER — HEPARIN SODIUM (PORCINE) LOCK FLUSH IV SOLN 100 UNIT/ML 100 UNIT/ML
5 SOLUTION INTRAVENOUS
Status: DISCONTINUED | OUTPATIENT
Start: 2022-11-21 | End: 2022-11-21 | Stop reason: HOSPADM

## 2022-11-21 RX ADMIN — Medication 5 ML: at 13:32

## 2022-11-21 NOTE — PROGRESS NOTES
Patient presents for PICC line labs and cap changes. Covid testing- pre-procedure for port placement also completed today.    Labs collected from PICC Red lumen. Both purple and red lumens flushed with saline, caps changed and heparin locked. See flowsheet.     Patient tolerated lab draw.     Patient declined dressing change today.     Jessica Christensen RN on 11/21/2022 at 1:50 PM

## 2022-11-22 ENCOUNTER — VIRTUAL VISIT (OUTPATIENT)
Dept: ONCOLOGY | Facility: CLINIC | Age: 42
End: 2022-11-22
Attending: INTERNAL MEDICINE
Payer: COMMERCIAL

## 2022-11-22 DIAGNOSIS — C91.00 ACUTE LYMPHOBLASTIC LEUKEMIA (ALL) NOT HAVING ACHIEVED REMISSION (H): Primary | ICD-10-CM

## 2022-11-22 LAB — SARS-COV-2 RNA RESP QL NAA+PROBE: NEGATIVE

## 2022-11-22 PROCEDURE — 99214 OFFICE O/P EST MOD 30 MIN: CPT | Mod: 95 | Performed by: PHYSICIAN ASSISTANT

## 2022-11-22 PROCEDURE — G0463 HOSPITAL OUTPT CLINIC VISIT: HCPCS | Mod: PN,RTG | Performed by: PHYSICIAN ASSISTANT

## 2022-11-22 NOTE — NURSING NOTE
Patient denies any changes since echeck-in regarding medication and allergies and states all information entered during echeck-in remains accurate.    Leticia Giordano, Visit Facilitator/MA.

## 2022-11-22 NOTE — PROGRESS NOTES
[facility]  Follow up Visit  Nov 22, 2022    Hem/onc History:     --Patient presented to outside hospital with c/o right upper quadrant pain and was subsequently found to have a markedly elevated white blood cell count concerning for acute leukemia. While at OSH, she had CT PE protocol as well as CT A/P. These identified no PE, no acute or localized intraabdominal inflammatory process, mild splenomegaly, and few inconspicous low-density structures in the liver. She was transferred to [facility] for further workup and management. S/p Hydrea 1g TID (8/21-8/23) with improvement in WBC (100K ? 12K).   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 IHC shows CD10+, CD34+. 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 Abnormal cytogenetics. Initiated pre-phase steroids with Prednisone 60 mg x8/21; increased to 60 mg/m2 (140 mg) 8/22-8/24. She initiated GRAALL + imatinib (C1D1 = 8/24/22).   --S/p BMT consult 9/12 with Dr. Walsh. Induction was relatively uncomplicated. Post-induction BMBx done 9/26. Morphology inconclusive due to inadequate core but flow negative.  BCR/abl major and minor undetectable (as were baseline tests). She was admitted for C2 GRAALL.    --BMBx 9/26/22 MRD-negative by BCR-ABL PCR and by Clonoseq Assay.      Treatment Plan: GRAALL + imatinib (C2D1=9/30/22)  - Imatinib 400 mg BID - D1-14 (9/29-10/12)  - IVFs per chemo protocol, bicarb support  - Methotrexate 1 g/m2 IV x once on D1. Administered over 24 hours  - Leucovorin calcium q6hr starting 12 hr after END of methotrexate infusion. Continue until methotrexate level <0.05  - cytarabine 3 g/m2 IV q12 hr x 4 doses on Days 2-3  - Premeds: Zofran 16mg D1, 8mg q12hr x 4 doses (D2-3);  Dex 12mg D1-3, 8mg D4-5  - Supportive Care: Pred Forte eye drops  - IT chemo on D9. Will need under imaging guidance. As D9 falls on 10/8 (weekend),  requested procedure for 10/7. She will have labs on 10/6 (already scheduled) to determine platelet count in anticipation of this procedure as well.   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     Post-cycle-2 BMBx 10/25/2022 showed MRD-negative CR, though low-level abnormal B-cell population (favor hematogones over residual leukemia) was noted. Clonoseq negative at 1/10E6 level.      # CNS prophylaxis  Per the GRAALL protocol and given risk for CNS involvement of ALL. Will require LPs under fluoroscopic guidance.   - s/p weekly triple IT chemo (Cytarabine, Solu-Cortef, MTX) on Days 1, 8, and 15 of C1 induction. She additionally had repeat LP on 9/14. CSF flow has remained negative.     History of Present Illness:   She presents today for hospital follow-up.     -has pain in her chest area b/l. Started yesterday, mild. Nervous about procedure  -stools have been normal  -No N/V. No abdominal cramping   -skin lesions on scalp are healing. No crusting. Just red. Not painful  -Denies f/c/ns  -Neuropathy is slightly better in toes, slightly worse on fingers. Pads of fingers, all fingers. No pain. Sensitive and numb. Not affecting function  -no edema or bleeding  -No SOB at rest or cough, HUGHES with stairs or moving quickly   -Appetite is good, taste is changed which makes it a little more difficult     12-point ROS negative.       Medications:       Current Outpatient Medications   Medication Sig     amLODIPine (NORVASC) 5 MG tablet Take 1 tablet (5 mg) by mouth daily     CHOLECALCIFEROL PO Take 1 tablet by mouth daily Unknown strength.     fluconazole (DIFLUCAN) 200 MG tablet Take 1 tablet (200 mg) by mouth daily as needed (Take when ANC <1000)     heparin lock flush 10 UNIT/ML SOLN injection 10 mLs by Intracatheter route daily Flush 5 mL into each PICC lumen daily.     [START ON 11/25/2022] imatinib (GLEEVEC) 100 MG tablet Take 3 tablets (300 mg) by mouth 2 times daily     imatinib  (GLEEVEC) 100 MG tablet Take 3 tablets (300 mg) by mouth 2 times daily     levofloxacin (LEVAQUIN) 250 MG tablet Take 1 tablet (250 mg) by mouth daily as needed (ANC <1000)     pantoprazole (PROTONIX) 20 MG EC tablet Take 1 tablet (20 mg) by mouth daily     sodium chloride, PF, (SALINE FLUSH) 0.9% PF flush 20 mLs by Intracatheter route daily Flush PICC with 20 mL daily, 10 mL per lumen     acetaminophen (TYLENOL) 325 MG tablet Take 2 tablets (650 mg) by mouth every 6 hours as needed for mild pain, other or fever (blood product administration premedication) (Patient not taking: Reported on 11/10/2022)     dexamethasone (DECADRON) 4 MG tablet Take 10 tablets (40 mg) by mouth daily Days 11 through 14. (Patient not taking: Reported on 11/22/2022)     LORazepam (ATIVAN) 0.5 MG tablet Take 1-2 tablets (0.5-1 mg) by mouth daily as needed for anxiety . Take prior to BMBx procedures as needed. If initial dose unhelpful, okay to take a second 0.5-1 mg dose (for a max of 2 mg at a time). Caution: causes sedation. Do not drive after taking this medication. (Patient not taking: Reported on 11/10/2022)     ondansetron (ZOFRAN ODT) 4 MG ODT tab Take 1-2 tablets (4-8 mg) by mouth every 8 hours as needed for nausea or vomiting (Patient not taking: Reported on 11/10/2022)     prochlorperazine (COMPAZINE) 10 MG tablet Take 1 tablet (10 mg) by mouth every 6 hours as needed (Breakthrough Nausea/Vomiting) (Patient not taking: Reported on 11/10/2022)     sennosides (SENOKOT) 8.6 MG tablet Take 1-2 tablets by mouth 2 times daily (Patient not taking: Reported on 11/10/2022)     valACYclovir (VALTREX) 1000 mg tablet Take 1 tablet (1,000 mg) by mouth 3 times daily for 14 days     No current facility-administered medications for this visit.        Physical Exam:   There were no vitals taken for this visit.    ECOG 1    Video physical exam  General: Patient appears well in no acute distress.   Skin: No visualized rash or lesions on visualized  skin  Eyes: EOMI, no erythema, sclera icterus or discharge noted  Resp: Appears to be breathing comfortably without accessory muscle usage, speaking in full sentences, no cough  MSK: Appears to have normal range of motion based on visualized movements  Neurologic: No apparent tremors, facial movements symmetric  Psych: affect normal, alert and oriented    Data:       I personally reviewed labs today   Most Recent 3 CBC's:Recent Labs   Lab Test 11/21/22  1333 11/17/22  0700 11/16/22  1014   WBC 14.7* 4.0 2.3*   HGB 7.4* 7.3* 7.2*   MCV 99 101* 101*    89* 72*     Most Recent 3 BMP's:  Recent Labs   Lab Test 11/21/22  1333 11/17/22  0700 11/16/22  1014    141 143   POTASSIUM 3.2* 3.5 3.7   CHLORIDE 107 106 114*   CO2 27 23 26   BUN 19 11.7 15   CR 0.60 0.69 0.78   ANIONGAP 7 12 3   SOLEDAD 8.5 8.9 8.2*   * 94 105*     Most Recent 2 LFT's:  Recent Labs   Lab Test 11/21/22  1333 11/17/22  0700   AST 11 14   ALT 28 16   ALKPHOS 83 69   BILITOTAL 0.4 0.2       Assessment and Plan:     # Ph+ ALL.   Now in MRD- CR by BCR-ABL PCR as well as Clonoseq assay after cycle 1 and 2 of attenuated-dose GRAAL chemotherapy.   With no matched related donors, and excellent early/deep MRD-CR, chemotherapy is currently favored over BMT; this is supported by multiple recent clinical observations (Blood. 2016 Jul 28; 128(4): 504-507; Blood. 2022 Jul 25;[id number]).   -Admitted on 11/2/22 for Cycle 3 GRAAL + imatinib chemotherapy.   -Currently plan is to continue GRAAL for a total of 8 cycles. [For reference, odd cycles are 21 days in length and even cycles are 14 days in length]. There is some consideration to do blincyto or clinical trial. Dr. Hull sent her info on this. She is considering it. Fortunately Dr. Hull is inpatient and she can ask additional questions/concerns while she is in   - Will plan to perform LP inpatient under fluoroscopy on Day 1 of each cycle  - Will admit tomorrow for Cycle 4  -follow-up for  hospital follow-up. Will need 3x/week labs and transfusions until next admit    --will get port placed tomorrow    -Evusheld given on 11/2/22    # PPx  - Nebulized Pentamidine q28d for PJP ppx.              - Pentamidine given 10/31, next on ~11/28  - ppx Levaquin and Fluconazole when ANC <1.0.   -Continue Valtrex 1000 mg TID until completion of chemo due to h/o shingles    # CNS prophylaxis Will require all subsequent LPs under fluoroscopic guidance on Day 1 of each cycle IP.   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 Mild paresthesias in fingers and toes. Not affecting motor function. Continue to monitor. No dose changes    # H/o COVID-19 infection   Not vaccinated (due to fear of needles per pt report).    # Hypertension: Amlodipine; Lasix as needed  # Indigestion/GERD: Continue Protonix 20 mg daily.     Sania Dwyer PA-C  [facility]  [address], [place] [address] [phone]

## 2022-11-22 NOTE — PROGRESS NOTES
April is a 42 year old who is being evaluated via a billable video visit.      How would you like to obtain your AVS? MyChart  If the video visit is dropped, the invitation should be resent by: Send to e-mail at: [email or web address]  Will anyone else be joining your video visit? No      Leticia Giordano Visit Facilitator/MA.    Video-Visit Details    Video Duration: 10 minutes    Originating Location (pt. Location): Home        Distant Location (provider location):  On-site    Platform used for Video Visit: Tyrell

## 2022-11-22 NOTE — LETTER
11/22/2022         RE: Vashti Villegas  [address]  [place] [address]        Dear Colleague,    Thank you for referring your patient, Vashti Villegas, to the [facility]. Please see a copy of my visit note below.    April is a 42 year old who is being evaluated via a billable video visit.      How would you like to obtain your AVS? MyChart  If the video visit is dropped, the invitation should be resent by: Send to e-mail at: [email or web address]  Will anyone else be joining your video visit? No      Leticia Giordano, Visit Facilitator/MA.    Video-Visit Details    Video Duration: 10 minutes    Originating Location (pt. Location): Home        Distant Location (provider location):  On-site    Platform used for Video Visit: [facility]  Follow up Visit  Nov 22, 2022    Hem/onc History:     --Patient presented to outside hospital with c/o right upper quadrant pain and was subsequently found to have a markedly elevated white blood cell count concerning for acute leukemia. While at OSH, she had CT PE protocol as well as CT A/P. These identified no PE, no acute or localized intraabdominal inflammatory process, mild splenomegaly, and few inconspicous low-density structures in the liver. She was transferred to [facility] for further workup and management. S/p Hydrea 1g TID (8/21-8/23) with improvement in WBC (100K ? 12K).   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 IHC shows CD10+, CD34+. 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 Abnormal cytogenetics. Initiated pre-phase steroids with Prednisone 60 mg x8/21; increased to 60 mg/m2 (140 mg) 8/22-8/24. She initiated GRAALL + imatinib (C1D1 = 8/24/22).   --S/p BMT consult 9/12 with Dr. Walsh. Induction was relatively uncomplicated. Post-induction BMBx done 9/26. Morphology inconclusive due to inadequate  core but flow negative.  BCR/abl major and minor undetectable (as were baseline tests). She was admitted for C2 [facility].    --BMBx 9/26/22 MRD-negative by BCR-ABL PCR and by Clonoseq Assay.      Treatment Plan: [facility] + imatinib (C2D1=9/30/22)  - Imatinib 400 mg BID - D1-14 (9/29-10/12)  - IVFs per chemo protocol, bicarb support  - Methotrexate 1 g/m2 IV x once on D1. Administered over 24 hours  - Leucovorin calcium q6hr starting 12 hr after END of methotrexate infusion. Continue until methotrexate level <0.05  - cytarabine 3 g/m2 IV q12 hr x 4 doses on Days 2-3  - Premeds: Zofran 16mg D1, 8mg q12hr x 4 doses (D2-3);  Dex 12mg D1-3, 8mg D4-5  - Supportive Care: Pred Forte eye drops  - IT chemo on D9. Will need under imaging guidance. As D9 falls on 10/8 (weekend), requested procedure for 10/7. She will have labs on 10/6 (already scheduled) to determine platelet count in anticipation of this procedure as well.   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     Post-cycle-2 BMBx 10/25/2022 showed MRD-negative CR, though low-level abnormal B-cell population (favor hematogones over residual leukemia) was noted. Clonoseq negative at 1/10E6 level.      # CNS prophylaxis  Per the GRAALL protocol and given risk for CNS involvement of ALL. Will require LPs under fluoroscopic guidance.   - s/p weekly triple IT chemo (Cytarabine, Solu-Cortef, MTX) on Days 1, 8, and 15 of C1 induction. She additionally had repeat LP on 9/14. CSF flow has remained negative.     History of Present Illness:   She presents today for hospital follow-up.     -has pain in her chest area b/l. Started yesterday, mild. Nervous about procedure  -stools have been normal  -No N/V. No abdominal cramping   -skin lesions on scalp are healing. No crusting. Just red. Not painful  -Denies f/c/ns  -Neuropathy is slightly better in toes, slightly worse on fingers. Pads of fingers, all fingers. No pain. Sensitive and numb. Not  affecting function  -no edema or bleeding  -No SOB at rest or cough, HUGHES with stairs or moving quickly   -Appetite is good, taste is changed which makes it a little more difficult     12-point ROS negative.       Medications:       Current Outpatient Medications   Medication Sig     amLODIPine (NORVASC) 5 MG tablet Take 1 tablet (5 mg) by mouth daily     CHOLECALCIFEROL PO Take 1 tablet by mouth daily Unknown strength.     fluconazole (DIFLUCAN) 200 MG tablet Take 1 tablet (200 mg) by mouth daily as needed (Take when ANC <1000)     heparin lock flush 10 UNIT/ML SOLN injection 10 mLs by Intracatheter route daily Flush 5 mL into each PICC lumen daily.     [START ON 11/25/2022] imatinib (GLEEVEC) 100 MG tablet Take 3 tablets (300 mg) by mouth 2 times daily     imatinib (GLEEVEC) 100 MG tablet Take 3 tablets (300 mg) by mouth 2 times daily     levofloxacin (LEVAQUIN) 250 MG tablet Take 1 tablet (250 mg) by mouth daily as needed (ANC <1000)     pantoprazole (PROTONIX) 20 MG EC tablet Take 1 tablet (20 mg) by mouth daily     sodium chloride, PF, (SALINE FLUSH) 0.9% PF flush 20 mLs by Intracatheter route daily Flush PICC with 20 mL daily, 10 mL per lumen     acetaminophen (TYLENOL) 325 MG tablet Take 2 tablets (650 mg) by mouth every 6 hours as needed for mild pain, other or fever (blood product administration premedication) (Patient not taking: Reported on 11/10/2022)     dexamethasone (DECADRON) 4 MG tablet Take 10 tablets (40 mg) by mouth daily Days 11 through 14. (Patient not taking: Reported on 11/22/2022)     LORazepam (ATIVAN) 0.5 MG tablet Take 1-2 tablets (0.5-1 mg) by mouth daily as needed for anxiety . Take prior to BMBx procedures as needed. If initial dose unhelpful, okay to take a second 0.5-1 mg dose (for a max of 2 mg at a time). Caution: causes sedation. Do not drive after taking this medication. (Patient not taking: Reported on 11/10/2022)     ondansetron (ZOFRAN ODT) 4 MG ODT tab Take 1-2 tablets (4-8  mg) by mouth every 8 hours as needed for nausea or vomiting (Patient not taking: Reported on 11/10/2022)     prochlorperazine (COMPAZINE) 10 MG tablet Take 1 tablet (10 mg) by mouth every 6 hours as needed (Breakthrough Nausea/Vomiting) (Patient not taking: Reported on 11/10/2022)     sennosides (SENOKOT) 8.6 MG tablet Take 1-2 tablets by mouth 2 times daily (Patient not taking: Reported on 11/10/2022)     valACYclovir (VALTREX) 1000 mg tablet Take 1 tablet (1,000 mg) by mouth 3 times daily for 14 days     No current facility-administered medications for this visit.        Physical Exam:   There were no vitals taken for this visit.    ECOG 1    Video physical exam  General: Patient appears well in no acute distress.   Skin: No visualized rash or lesions on visualized skin  Eyes: EOMI, no erythema, sclera icterus or discharge noted  Resp: Appears to be breathing comfortably without accessory muscle usage, speaking in full sentences, no cough  MSK: Appears to have normal range of motion based on visualized movements  Neurologic: No apparent tremors, facial movements symmetric  Psych: affect normal, alert and oriented    Data:       I personally reviewed labs today   Most Recent 3 CBC's:Recent Labs   Lab Test 11/21/22  1333 11/17/22  0700 11/16/22  1014   WBC 14.7* 4.0 2.3*   HGB 7.4* 7.3* 7.2*   MCV 99 101* 101*    89* 72*     Most Recent 3 BMP's:  Recent Labs   Lab Test 11/21/22  1333 11/17/22  0700 11/16/22  1014    141 143   POTASSIUM 3.2* 3.5 3.7   CHLORIDE 107 106 114*   CO2 27 23 26   BUN 19 11.7 15   CR 0.60 0.69 0.78   ANIONGAP 7 12 3   SOLEDAD 8.5 8.9 8.2*   * 94 105*     Most Recent 2 LFT's:  Recent Labs   Lab Test 11/21/22  1333 11/17/22  0700   AST 11 14   ALT 28 16   ALKPHOS 83 69   BILITOTAL 0.4 0.2       Assessment and Plan:     # Ph+ ALL.   Now in MRD- CR by BCR-ABL PCR as well as Clonoseq assay after cycle 1 and 2 of attenuated-dose GRAAL chemotherapy.   With no matched related donors,  and excellent early/deep MRD-CR, chemotherapy is currently favored over BMT; this is supported by multiple recent clinical observations (Blood. 2016 Jul 28; 128(4): 504-507; Blood. 2022 Jul 25;blood.[id number]).   -Admitted on 11/2/22 for Cycle 3 GRAAL + imatinib chemotherapy.   -Currently plan is to continue GRAAL for a total of 8 cycles. [For reference, odd cycles are 21 days in length and even cycles are 14 days in length]. There is some consideration to do blincyto or clinical trial. Dr. Hull sent her info on this. She is considering it. Fortunately Dr. Hull is inpatient and she can ask additional questions/concerns while she is in   - Will plan to perform LP inpatient under fluoroscopy on Day 1 of each cycle  - Will admit tomorrow for Cycle 4  -follow-up for hospital follow-up. Will need 3x/week labs and transfusions until next admit    --will get port placed tomorrow    -Evusheld given on 11/2/22    # PPx  - Nebulized Pentamidine q28d for PJP ppx.              - Pentamidine given 10/31, next on ~11/28  - ppx Levaquin and Fluconazole when ANC <1.0.   -Continue Valtrex 1000 mg TID until completion of chemo due to h/o shingles    # CNS prophylaxis Will require all subsequent LPs under fluoroscopic guidance on Day 1 of each cycle IP.   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 Mild paresthesias in fingers and toes. Not affecting motor function. Continue to monitor. No dose changes    # H/o COVID-19 infection   Not vaccinated (due to fear of needles per pt report).    # Hypertension: Amlodipine; Lasix as needed  # Indigestion/GERD: Continue Protonix 20 mg daily.     Sania Dwyer PA-C  [facility]  [address] [place] [address] [phone]

## 2022-11-23 ENCOUNTER — APPOINTMENT (OUTPATIENT)
Dept: MEDSURG UNIT | Facility: CLINIC | Age: 42
End: 2022-11-23
Attending: INTERNAL MEDICINE
Payer: COMMERCIAL

## 2022-11-23 ENCOUNTER — APPOINTMENT (OUTPATIENT)
Dept: INTERVENTIONAL RADIOLOGY/VASCULAR | Facility: CLINIC | Age: 42
End: 2022-11-23
Attending: INTERNAL MEDICINE
Payer: COMMERCIAL

## 2022-11-23 ENCOUNTER — HOSPITAL ENCOUNTER (INPATIENT)
Facility: CLINIC | Age: 42
LOS: 3 days | Discharge: HOME | End: 2022-11-26
Attending: INTERNAL MEDICINE | Admitting: INTERNAL MEDICINE
Payer: COMMERCIAL

## 2022-11-23 DIAGNOSIS — B02.9 VZV (VARICELLA-ZOSTER VIRUS) INFECTION: ICD-10-CM

## 2022-11-23 DIAGNOSIS — C91.00 ACUTE LYMPHOBLASTIC LEUKEMIA (ALL) NOT HAVING ACHIEVED REMISSION (H): Primary | ICD-10-CM

## 2022-11-23 DIAGNOSIS — Z76.89 PROPHYLAXIS FOR CHEMOTHERAPY-INDUCED NEUTROPENIA: ICD-10-CM

## 2022-11-23 LAB
ABO/RH(D): NORMAL
ALBUMIN SERPL BCG-MCNC: 3.4 G/DL (ref 3.5–5.2)
ALP SERPL-CCNC: 69 U/L (ref 35–104)
ALT SERPL W P-5'-P-CCNC: 19 U/L (ref 10–35)
ANION GAP SERPL CALCULATED.3IONS-SCNC: 11 MMOL/L (ref 7–15)
ANTIBODY SCREEN: NEGATIVE
APTT PPP: 24 SECONDS (ref 22–38)
AST SERPL W P-5'-P-CCNC: 16 U/L (ref 10–35)
BASOPHILS # BLD MANUAL: 0 10E3/UL (ref 0–0.2)
BASOPHILS NFR BLD MANUAL: 0 %
BILIRUB SERPL-MCNC: 0.2 MG/DL
BUN SERPL-MCNC: 14.8 MG/DL (ref 6–20)
CALCIUM SERPL-MCNC: 7.7 MG/DL (ref 8.6–10)
CHLORIDE SERPL-SCNC: 102 MMOL/L (ref 98–107)
CREAT SERPL-MCNC: 0.62 MG/DL (ref 0.51–0.95)
DACRYOCYTES BLD QL SMEAR: SLIGHT
DEPRECATED HCO3 PLAS-SCNC: 26 MMOL/L (ref 22–29)
EOSINOPHIL # BLD MANUAL: 0 10E3/UL (ref 0–0.7)
EOSINOPHIL NFR BLD MANUAL: 0 %
ERYTHROCYTE [DISTWIDTH] IN BLOOD BY AUTOMATED COUNT: 19.3 % (ref 10–15)
FIBRINOGEN PPP-MCNC: 235 MG/DL (ref 170–490)
GFR SERPL CREATININE-BSD FRML MDRD: >90 ML/MIN/1.73M2
GLUCOSE SERPL-MCNC: 80 MG/DL (ref 70–99)
HCT VFR BLD AUTO: 23.4 % (ref 35–47)
HGB BLD-MCNC: 7.7 G/DL (ref 11.7–15.7)
INR PPP: 1.07 (ref 0.85–1.15)
LYMPHOCYTES # BLD MANUAL: 0.3 10E3/UL (ref 0.8–5.3)
LYMPHOCYTES NFR BLD MANUAL: 3 %
MAGNESIUM SERPL-MCNC: 2.1 MG/DL (ref 1.7–2.3)
MCH RBC QN AUTO: 33.2 PG (ref 26.5–33)
MCHC RBC AUTO-ENTMCNC: 32.9 G/DL (ref 31.5–36.5)
MCV RBC AUTO: 101 FL (ref 78–100)
MONOCYTES # BLD MANUAL: 0.9 10E3/UL (ref 0–1.3)
MONOCYTES NFR BLD MANUAL: 8 %
NEUTROPHILS # BLD MANUAL: 10.2 10E3/UL (ref 1.6–8.3)
NEUTROPHILS NFR BLD MANUAL: 89 %
NRBC # BLD AUTO: 0.3 10E3/UL
NRBC BLD MANUAL-RTO: 3 %
PH UR STRIP: 8 PH (ref 5–7)
PHOSPHATE SERPL-MCNC: 3.8 MG/DL (ref 2.5–4.5)
PLAT MORPH BLD: ABNORMAL
PLATELET # BLD AUTO: 317 10E3/UL (ref 150–450)
POLYCHROMASIA BLD QL SMEAR: SLIGHT
POTASSIUM SERPL-SCNC: 3 MMOL/L (ref 3.4–5.3)
POTASSIUM SERPL-SCNC: 3.2 MMOL/L (ref 3.4–5.3)
PROT SERPL-MCNC: 5.4 G/DL (ref 6.4–8.3)
RBC # BLD AUTO: 2.32 10E6/UL (ref 3.8–5.2)
RBC MORPH BLD: ABNORMAL
SODIUM SERPL-SCNC: 139 MMOL/L (ref 136–145)
SPECIMEN EXPIRATION DATE: NORMAL
WBC # BLD AUTO: 11.5 10E3/UL (ref 4–11)

## 2022-11-23 PROCEDURE — 250N000011 HC RX IP 250 OP 636: Performed by: NURSE PRACTITIONER

## 2022-11-23 PROCEDURE — 250N000013 HC RX MED GY IP 250 OP 250 PS 637: Performed by: INTERNAL MEDICINE

## 2022-11-23 PROCEDURE — 85730 THROMBOPLASTIN TIME PARTIAL: CPT | Performed by: PHYSICIAN ASSISTANT

## 2022-11-23 PROCEDURE — 272N000602 HC WOUND GLUE CR1

## 2022-11-23 PROCEDURE — 86923 COMPATIBILITY TEST ELECTRIC: CPT | Performed by: PHYSICIAN ASSISTANT

## 2022-11-23 PROCEDURE — 02H633Z INSERTION OF INFUSION DEVICE INTO RIGHT ATRIUM, PERCUTANEOUS APPROACH: ICD-10-PCS | Performed by: RADIOLOGY

## 2022-11-23 PROCEDURE — 85610 PROTHROMBIN TIME: CPT | Performed by: PHYSICIAN ASSISTANT

## 2022-11-23 PROCEDURE — 99152 MOD SED SAME PHYS/QHP 5/>YRS: CPT | Mod: GC | Performed by: RADIOLOGY

## 2022-11-23 PROCEDURE — 258N000003 HC RX IP 258 OP 636: Performed by: INTERNAL MEDICINE

## 2022-11-23 PROCEDURE — 0JH63XZ INSERTION OF TUNNELED VASCULAR ACCESS DEVICE INTO CHEST SUBCUTANEOUS TISSUE AND FASCIA, PERCUTANEOUS APPROACH: ICD-10-PCS | Performed by: RADIOLOGY

## 2022-11-23 PROCEDURE — 999N000142 HC STATISTIC PROCEDURE PREP ONLY

## 2022-11-23 PROCEDURE — 86901 BLOOD TYPING SEROLOGIC RH(D): CPT | Performed by: PHYSICIAN ASSISTANT

## 2022-11-23 PROCEDURE — 84100 ASSAY OF PHOSPHORUS: CPT | Performed by: PHYSICIAN ASSISTANT

## 2022-11-23 PROCEDURE — 258N000003 HC RX IP 258 OP 636: Performed by: NURSE PRACTITIONER

## 2022-11-23 PROCEDURE — 250N000013 HC RX MED GY IP 250 OP 250 PS 637: Performed by: PHYSICIAN ASSISTANT

## 2022-11-23 PROCEDURE — 77001 FLUOROGUIDE FOR VEIN DEVICE: CPT | Mod: 26 | Performed by: RADIOLOGY

## 2022-11-23 PROCEDURE — 99222 1ST HOSP IP/OBS MODERATE 55: CPT | Mod: FS | Performed by: PHYSICIAN ASSISTANT

## 2022-11-23 PROCEDURE — 84132 ASSAY OF SERUM POTASSIUM: CPT | Performed by: INTERNAL MEDICINE

## 2022-11-23 PROCEDURE — 85384 FIBRINOGEN ACTIVITY: CPT | Performed by: PHYSICIAN ASSISTANT

## 2022-11-23 PROCEDURE — 999N000044 HC STATISTIC CVC DRESSING CHANGE

## 2022-11-23 PROCEDURE — 85027 COMPLETE CBC AUTOMATED: CPT | Performed by: PHYSICIAN ASSISTANT

## 2022-11-23 PROCEDURE — 250N000009 HC RX 250: Performed by: INTERNAL MEDICINE

## 2022-11-23 PROCEDURE — 120N000002 HC R&B MED SURG/OB UMMC

## 2022-11-23 PROCEDURE — 258N000002 HC RX IP 258 OP 250: Performed by: INTERNAL MEDICINE

## 2022-11-23 PROCEDURE — C1769 GUIDE WIRE: HCPCS

## 2022-11-23 PROCEDURE — 36591 DRAW BLOOD OFF VENOUS DEVICE: CPT | Performed by: PHYSICIAN ASSISTANT

## 2022-11-23 PROCEDURE — 36591 DRAW BLOOD OFF VENOUS DEVICE: CPT | Performed by: INTERNAL MEDICINE

## 2022-11-23 PROCEDURE — 36561 INSERT TUNNELED CV CATH: CPT

## 2022-11-23 PROCEDURE — 250N000011 HC RX IP 250 OP 636: Performed by: STUDENT IN AN ORGANIZED HEALTH CARE EDUCATION/TRAINING PROGRAM

## 2022-11-23 PROCEDURE — 85007 BL SMEAR W/DIFF WBC COUNT: CPT | Performed by: PHYSICIAN ASSISTANT

## 2022-11-23 PROCEDURE — 82310 ASSAY OF CALCIUM: CPT | Performed by: PHYSICIAN ASSISTANT

## 2022-11-23 PROCEDURE — 81003 URINALYSIS AUTO W/O SCOPE: CPT | Performed by: INTERNAL MEDICINE

## 2022-11-23 PROCEDURE — 250N000009 HC RX 250: Performed by: STUDENT IN AN ORGANIZED HEALTH CARE EDUCATION/TRAINING PROGRAM

## 2022-11-23 PROCEDURE — C1788 PORT, INDWELLING, IMP: HCPCS

## 2022-11-23 PROCEDURE — 250N000013 HC RX MED GY IP 250 OP 250 PS 637: Performed by: STUDENT IN AN ORGANIZED HEALTH CARE EDUCATION/TRAINING PROGRAM

## 2022-11-23 PROCEDURE — 83735 ASSAY OF MAGNESIUM: CPT | Performed by: PHYSICIAN ASSISTANT

## 2022-11-23 PROCEDURE — 76937 US GUIDE VASCULAR ACCESS: CPT | Mod: 26 | Performed by: RADIOLOGY

## 2022-11-23 PROCEDURE — 86850 RBC ANTIBODY SCREEN: CPT | Performed by: PHYSICIAN ASSISTANT

## 2022-11-23 PROCEDURE — 272N000504 HC NEEDLE CR4

## 2022-11-23 PROCEDURE — 36561 INSERT TUNNELED CV CATH: CPT | Mod: GC | Performed by: RADIOLOGY

## 2022-11-23 PROCEDURE — 250N000011 HC RX IP 250 OP 636: Performed by: INTERNAL MEDICINE

## 2022-11-23 PROCEDURE — 999N000127 HC STATISTIC PERIPHERAL IV START W US GUIDANCE

## 2022-11-23 RX ORDER — LORAZEPAM 0.5 MG/1
.5-1 TABLET ORAL EVERY 6 HOURS PRN
Status: DISCONTINUED | OUTPATIENT
Start: 2022-11-23 | End: 2022-11-25

## 2022-11-23 RX ORDER — IMATINIB MESYLATE 100 MG/1
300 TABLET, FILM COATED ORAL 2 TIMES DAILY
Status: DISCONTINUED | OUTPATIENT
Start: 2022-11-23 | End: 2022-11-23

## 2022-11-23 RX ORDER — LEUCOVORIN CALCIUM 350 MG/17.5ML
15 INJECTION, POWDER, LYOPHILIZED, FOR SOLUTION INTRAMUSCULAR; INTRAVENOUS EVERY 6 HOURS
Status: DISCONTINUED | OUTPATIENT
Start: 2022-11-25 | End: 2022-11-26 | Stop reason: HOSPADM

## 2022-11-23 RX ORDER — LEUCOVORIN CALCIUM 350 MG/17.5ML
50 INJECTION, POWDER, LYOPHILIZED, FOR SOLUTION INTRAMUSCULAR; INTRAVENOUS ONCE
Status: COMPLETED | OUTPATIENT
Start: 2022-11-25 | End: 2022-11-25

## 2022-11-23 RX ORDER — PROCHLORPERAZINE MALEATE 10 MG
10 TABLET ORAL EVERY 6 HOURS PRN
Status: DISCONTINUED | OUTPATIENT
Start: 2022-11-23 | End: 2022-11-23

## 2022-11-23 RX ORDER — SODIUM BICARBONATE 84 MG/ML
50 INJECTION, SOLUTION INTRAVENOUS
Status: DISCONTINUED | OUTPATIENT
Start: 2022-11-23 | End: 2022-11-26 | Stop reason: HOSPADM

## 2022-11-23 RX ORDER — ALBUTEROL SULFATE 0.83 MG/ML
2.5 SOLUTION RESPIRATORY (INHALATION)
Status: DISCONTINUED | OUTPATIENT
Start: 2022-11-23 | End: 2022-11-26 | Stop reason: HOSPADM

## 2022-11-23 RX ORDER — ONDANSETRON 8 MG/1
16 TABLET, FILM COATED ORAL ONCE
Status: COMPLETED | OUTPATIENT
Start: 2022-11-23 | End: 2022-11-23

## 2022-11-23 RX ORDER — IMATINIB MESYLATE 100 MG/1
300 TABLET, FILM COATED ORAL 2 TIMES DAILY
Status: DISCONTINUED | OUTPATIENT
Start: 2022-11-25 | End: 2022-11-26 | Stop reason: HOSPADM

## 2022-11-23 RX ORDER — POTASSIUM CHLORIDE 750 MG/1
40 TABLET, EXTENDED RELEASE ORAL ONCE
Status: COMPLETED | OUTPATIENT
Start: 2022-11-23 | End: 2022-11-23

## 2022-11-23 RX ORDER — AMOXICILLIN 250 MG
2 CAPSULE ORAL 2 TIMES DAILY PRN
Status: DISCONTINUED | OUTPATIENT
Start: 2022-11-23 | End: 2022-11-26 | Stop reason: HOSPADM

## 2022-11-23 RX ORDER — HEPARIN SODIUM (PORCINE) LOCK FLUSH IV SOLN 100 UNIT/ML 100 UNIT/ML
5-10 SOLUTION INTRAVENOUS
Status: DISCONTINUED | OUTPATIENT
Start: 2022-11-23 | End: 2022-11-26 | Stop reason: HOSPADM

## 2022-11-23 RX ORDER — ENOXAPARIN SODIUM 100 MG/ML
40 INJECTION SUBCUTANEOUS EVERY 24 HOURS
Status: DISCONTINUED | OUTPATIENT
Start: 2022-11-23 | End: 2022-11-26 | Stop reason: HOSPADM

## 2022-11-23 RX ORDER — LORAZEPAM 2 MG/ML
.5-1 INJECTION INTRAMUSCULAR EVERY 6 HOURS PRN
Status: DISCONTINUED | OUTPATIENT
Start: 2022-11-23 | End: 2022-11-25

## 2022-11-23 RX ORDER — ALBUTEROL SULFATE 90 UG/1
1-2 AEROSOL, METERED RESPIRATORY (INHALATION)
Status: DISCONTINUED | OUTPATIENT
Start: 2022-11-23 | End: 2022-11-26 | Stop reason: HOSPADM

## 2022-11-23 RX ORDER — ACETAMINOPHEN 325 MG/1
650 TABLET ORAL
Status: DISCONTINUED | OUTPATIENT
Start: 2022-11-23 | End: 2022-11-23

## 2022-11-23 RX ORDER — CEFAZOLIN SODIUM 2 G/100ML
2 INJECTION, SOLUTION INTRAVENOUS
Status: COMPLETED | OUTPATIENT
Start: 2022-11-23 | End: 2022-11-23

## 2022-11-23 RX ORDER — SODIUM CHLORIDE 9 MG/ML
INJECTION, SOLUTION INTRAVENOUS CONTINUOUS
Status: DISCONTINUED | OUTPATIENT
Start: 2022-11-23 | End: 2022-11-26 | Stop reason: HOSPADM

## 2022-11-23 RX ORDER — MEPERIDINE HYDROCHLORIDE 25 MG/ML
25 INJECTION INTRAMUSCULAR; INTRAVENOUS; SUBCUTANEOUS EVERY 30 MIN PRN
Status: DISCONTINUED | OUTPATIENT
Start: 2022-11-23 | End: 2022-11-26 | Stop reason: HOSPADM

## 2022-11-23 RX ORDER — DEXAMETHASONE 4 MG/1
12 TABLET ORAL
Status: COMPLETED | OUTPATIENT
Start: 2022-11-23 | End: 2022-11-25

## 2022-11-23 RX ORDER — AMLODIPINE BESYLATE 5 MG/1
5 TABLET ORAL DAILY
Status: DISCONTINUED | OUTPATIENT
Start: 2022-11-23 | End: 2022-11-26 | Stop reason: HOSPADM

## 2022-11-23 RX ORDER — HEPARIN SODIUM,PORCINE 10 UNIT/ML
5-10 VIAL (ML) INTRAVENOUS EVERY 24 HOURS
Status: DISCONTINUED | OUTPATIENT
Start: 2022-11-23 | End: 2022-11-26 | Stop reason: HOSPADM

## 2022-11-23 RX ORDER — DEXAMETHASONE 4 MG/1
8 TABLET ORAL DAILY
Status: DISCONTINUED | OUTPATIENT
Start: 2022-11-26 | End: 2022-11-26 | Stop reason: HOSPADM

## 2022-11-23 RX ORDER — FENTANYL CITRATE 50 UG/ML
25-50 INJECTION, SOLUTION INTRAMUSCULAR; INTRAVENOUS EVERY 5 MIN PRN
Status: DISCONTINUED | OUTPATIENT
Start: 2022-11-23 | End: 2022-11-23

## 2022-11-23 RX ORDER — HEPARIN SODIUM,PORCINE 10 UNIT/ML
5-10 VIAL (ML) INTRAVENOUS
Status: DISCONTINUED | OUTPATIENT
Start: 2022-11-23 | End: 2022-11-26 | Stop reason: HOSPADM

## 2022-11-23 RX ORDER — LIDOCAINE 40 MG/G
CREAM TOPICAL
Status: DISCONTINUED | OUTPATIENT
Start: 2022-11-23 | End: 2022-11-26 | Stop reason: HOSPADM

## 2022-11-23 RX ORDER — POTASSIUM CHLORIDE 750 MG/1
20 TABLET, EXTENDED RELEASE ORAL ONCE
Status: COMPLETED | OUTPATIENT
Start: 2022-11-24 | End: 2022-11-23

## 2022-11-23 RX ORDER — NALOXONE HYDROCHLORIDE 0.4 MG/ML
0.4 INJECTION, SOLUTION INTRAMUSCULAR; INTRAVENOUS; SUBCUTANEOUS
Status: DISCONTINUED | OUTPATIENT
Start: 2022-11-23 | End: 2022-11-23

## 2022-11-23 RX ORDER — PROCHLORPERAZINE MALEATE 10 MG
10 TABLET ORAL EVERY 6 HOURS PRN
Status: DISCONTINUED | OUTPATIENT
Start: 2022-11-23 | End: 2022-11-26 | Stop reason: HOSPADM

## 2022-11-23 RX ORDER — VALACYCLOVIR HYDROCHLORIDE 500 MG/1
1000 TABLET, FILM COATED ORAL 3 TIMES DAILY
Status: DISCONTINUED | OUTPATIENT
Start: 2022-11-23 | End: 2022-11-26 | Stop reason: HOSPADM

## 2022-11-23 RX ORDER — FLUMAZENIL 0.1 MG/ML
0.2 INJECTION, SOLUTION INTRAVENOUS
Status: DISCONTINUED | OUTPATIENT
Start: 2022-11-23 | End: 2022-11-23

## 2022-11-23 RX ORDER — NALOXONE HYDROCHLORIDE 0.4 MG/ML
0.2 INJECTION, SOLUTION INTRAMUSCULAR; INTRAVENOUS; SUBCUTANEOUS
Status: DISCONTINUED | OUTPATIENT
Start: 2022-11-23 | End: 2022-11-23

## 2022-11-23 RX ORDER — METHYLPREDNISOLONE SODIUM SUCCINATE 125 MG/2ML
125 INJECTION, POWDER, LYOPHILIZED, FOR SOLUTION INTRAMUSCULAR; INTRAVENOUS
Status: DISCONTINUED | OUTPATIENT
Start: 2022-11-23 | End: 2022-11-26 | Stop reason: HOSPADM

## 2022-11-23 RX ORDER — POLYETHYLENE GLYCOL 3350 17 G/17G
17 POWDER, FOR SOLUTION ORAL DAILY PRN
Status: DISCONTINUED | OUTPATIENT
Start: 2022-11-23 | End: 2022-11-26 | Stop reason: HOSPADM

## 2022-11-23 RX ORDER — HEPARIN SODIUM,PORCINE 10 UNIT/ML
2.5 VIAL (ML) INTRAVENOUS
Status: COMPLETED | OUTPATIENT
Start: 2022-11-23 | End: 2022-11-23

## 2022-11-23 RX ORDER — ONDANSETRON 4 MG/1
4-8 TABLET, ORALLY DISINTEGRATING ORAL EVERY 8 HOURS PRN
Status: DISCONTINUED | OUTPATIENT
Start: 2022-11-23 | End: 2022-11-26 | Stop reason: HOSPADM

## 2022-11-23 RX ORDER — ACETAMINOPHEN 500 MG
500-1000 TABLET ORAL EVERY 6 HOURS PRN
Status: DISCONTINUED | OUTPATIENT
Start: 2022-11-23 | End: 2022-11-26 | Stop reason: HOSPADM

## 2022-11-23 RX ORDER — FAMOTIDINE 10 MG
10 TABLET ORAL 2 TIMES DAILY
Status: DISCONTINUED | OUTPATIENT
Start: 2022-11-23 | End: 2022-11-26 | Stop reason: HOSPADM

## 2022-11-23 RX ORDER — DIPHENHYDRAMINE HYDROCHLORIDE 50 MG/ML
50 INJECTION INTRAMUSCULAR; INTRAVENOUS
Status: DISCONTINUED | OUTPATIENT
Start: 2022-11-23 | End: 2022-11-26 | Stop reason: HOSPADM

## 2022-11-23 RX ORDER — AMOXICILLIN 250 MG
1 CAPSULE ORAL 2 TIMES DAILY PRN
Status: DISCONTINUED | OUTPATIENT
Start: 2022-11-23 | End: 2022-11-26 | Stop reason: HOSPADM

## 2022-11-23 RX ORDER — PREDNISOLONE ACETATE 10 MG/ML
2 SUSPENSION/ DROPS OPHTHALMIC 4 TIMES DAILY
Status: DISCONTINUED | OUTPATIENT
Start: 2022-11-24 | End: 2022-11-26 | Stop reason: HOSPADM

## 2022-11-23 RX ORDER — SENNOSIDES 8.6 MG
1-2 TABLET ORAL 2 TIMES DAILY
Status: DISCONTINUED | OUTPATIENT
Start: 2022-11-23 | End: 2022-11-26 | Stop reason: HOSPADM

## 2022-11-23 RX ORDER — HEPARIN SODIUM,PORCINE 10 UNIT/ML
5 VIAL (ML) INTRAVENOUS
Status: DISCONTINUED | OUTPATIENT
Start: 2022-11-23 | End: 2022-11-23

## 2022-11-23 RX ORDER — EPINEPHRINE 1 MG/ML
0.3 INJECTION, SOLUTION, CONCENTRATE INTRAVENOUS EVERY 5 MIN PRN
Status: DISCONTINUED | OUTPATIENT
Start: 2022-11-23 | End: 2022-11-26 | Stop reason: HOSPADM

## 2022-11-23 RX ORDER — ONDANSETRON 8 MG/1
8 TABLET, FILM COATED ORAL EVERY 12 HOURS
Status: COMPLETED | OUTPATIENT
Start: 2022-11-24 | End: 2022-11-26

## 2022-11-23 RX ADMIN — POTASSIUM CHLORIDE 40 MEQ: 750 TABLET, EXTENDED RELEASE ORAL at 22:25

## 2022-11-23 RX ADMIN — MIDAZOLAM 0.5 MG: 1 INJECTION INTRAMUSCULAR; INTRAVENOUS at 08:42

## 2022-11-23 RX ADMIN — ACETAMINOPHEN 1000 MG: 500 TABLET ORAL at 21:47

## 2022-11-23 RX ADMIN — FENTANYL CITRATE 25 MCG: 50 INJECTION, SOLUTION INTRAMUSCULAR; INTRAVENOUS at 09:15

## 2022-11-23 RX ADMIN — FENTANYL CITRATE 25 MCG: 50 INJECTION, SOLUTION INTRAMUSCULAR; INTRAVENOUS at 09:30

## 2022-11-23 RX ADMIN — POTASSIUM CHLORIDE 20 MEQ: 750 TABLET, EXTENDED RELEASE ORAL at 23:43

## 2022-11-23 RX ADMIN — ONDANSETRON HYDROCHLORIDE 16 MG: 8 TABLET, FILM COATED ORAL at 21:30

## 2022-11-23 RX ADMIN — FAMOTIDINE 10 MG: 10 TABLET, FILM COATED ORAL at 20:38

## 2022-11-23 RX ADMIN — Medication 5 ML: at 15:06

## 2022-11-23 RX ADMIN — MIDAZOLAM 0.5 MG: 1 INJECTION INTRAMUSCULAR; INTRAVENOUS at 08:58

## 2022-11-23 RX ADMIN — SODIUM CHLORIDE: 9 INJECTION, SOLUTION INTRAVENOUS at 07:48

## 2022-11-23 RX ADMIN — FENTANYL CITRATE 25 MCG: 50 INJECTION, SOLUTION INTRAMUSCULAR; INTRAVENOUS at 08:49

## 2022-11-23 RX ADMIN — MIDAZOLAM 0.5 MG: 1 INJECTION INTRAMUSCULAR; INTRAVENOUS at 09:30

## 2022-11-23 RX ADMIN — CEFAZOLIN SODIUM 2 G: 2 INJECTION, SOLUTION INTRAVENOUS at 07:49

## 2022-11-23 RX ADMIN — FENTANYL CITRATE 25 MCG: 50 INJECTION, SOLUTION INTRAMUSCULAR; INTRAVENOUS at 08:42

## 2022-11-23 RX ADMIN — Medication 2.5 ML: at 09:34

## 2022-11-23 RX ADMIN — POTASSIUM CHLORIDE 40 MEQ: 750 TABLET, EXTENDED RELEASE ORAL at 16:45

## 2022-11-23 RX ADMIN — MIDAZOLAM 0.5 MG: 1 INJECTION INTRAMUSCULAR; INTRAVENOUS at 09:21

## 2022-11-23 RX ADMIN — VALACYCLOVIR HYDROCHLORIDE 1000 MG: 500 TABLET, FILM COATED ORAL at 20:38

## 2022-11-23 RX ADMIN — FENTANYL CITRATE 50 MCG: 50 INJECTION, SOLUTION INTRAMUSCULAR; INTRAVENOUS at 09:08

## 2022-11-23 RX ADMIN — SODIUM BICARBONATE: 84 INJECTION, SOLUTION INTRAVENOUS at 17:19

## 2022-11-23 RX ADMIN — FENTANYL CITRATE 25 MCG: 50 INJECTION, SOLUTION INTRAMUSCULAR; INTRAVENOUS at 08:59

## 2022-11-23 RX ADMIN — FENTANYL CITRATE 25 MCG: 50 INJECTION, SOLUTION INTRAMUSCULAR; INTRAVENOUS at 08:58

## 2022-11-23 RX ADMIN — ACETAMINOPHEN 650 MG: 325 TABLET, FILM COATED ORAL at 10:18

## 2022-11-23 RX ADMIN — DEXAMETHASONE 12 MG: 4 TABLET ORAL at 21:30

## 2022-11-23 RX ADMIN — MIDAZOLAM 1 MG: 1 INJECTION INTRAMUSCULAR; INTRAVENOUS at 09:08

## 2022-11-23 RX ADMIN — LIDOCAINE HYDROCHLORIDE 7 ML: 10 INJECTION, SOLUTION EPIDURAL; INFILTRATION; INTRACAUDAL; PERINEURAL at 09:25

## 2022-11-23 RX ADMIN — MIDAZOLAM 0.5 MG: 1 INJECTION INTRAMUSCULAR; INTRAVENOUS at 08:59

## 2022-11-23 RX ADMIN — METHOTREXATE 2.32 G: 25 INJECTION, SOLUTION INTRA-ARTERIAL; INTRAMUSCULAR; INTRATHECAL; INTRAVENOUS at 21:55

## 2022-11-23 RX ADMIN — FENTANYL CITRATE 25 MCG: 50 INJECTION, SOLUTION INTRAMUSCULAR; INTRAVENOUS at 09:21

## 2022-11-23 RX ADMIN — VALACYCLOVIR HYDROCHLORIDE 1000 MG: 500 TABLET, FILM COATED ORAL at 15:03

## 2022-11-23 RX ADMIN — ACETAMINOPHEN 500 MG: 500 TABLET ORAL at 15:49

## 2022-11-23 RX ADMIN — SODIUM BICARBONATE: 84 INJECTION, SOLUTION INTRAVENOUS at 21:45

## 2022-11-23 RX ADMIN — MIDAZOLAM 0.5 MG: 1 INJECTION INTRAMUSCULAR; INTRAVENOUS at 08:49

## 2022-11-23 RX ADMIN — MIDAZOLAM 0.5 MG: 1 INJECTION INTRAMUSCULAR; INTRAVENOUS at 09:14

## 2022-11-23 ASSESSMENT — COLUMBIA-SUICIDE SEVERITY RATING SCALE - C-SSRS
1. IN THE PAST MONTH, HAVE YOU WISHED YOU WERE DEAD OR WISHED YOU COULD GO TO SLEEP AND NOT WAKE UP?: NO
6. HAVE YOU EVER DONE ANYTHING, STARTED TO DO ANYTHING, OR PREPARED TO DO ANYTHING TO END YOUR LIFE?: NO
5. HAVE YOU STARTED TO WORK OUT OR WORKED OUT THE DETAILS OF HOW TO KILL YOURSELF? DO YOU INTEND TO CARRY OUT THIS PLAN?: NO
2. HAVE YOU ACTUALLY HAD ANY THOUGHTS OF KILLING YOURSELF IN THE PAST MONTH?: NO
4. HAVE YOU HAD THESE THOUGHTS AND HAD SOME INTENTION OF ACTING ON THEM?: NO
3. HAVE YOU BEEN THINKING ABOUT HOW YOU MIGHT KILL YOURSELF?: NO

## 2022-11-23 ASSESSMENT — ACTIVITIES OF DAILY LIVING (ADL)
ADLS_ACUITY_SCORE: 18
WALKING_OR_CLIMBING_STAIRS_DIFFICULTY: NO
HEARING_DIFFICULTY_OR_DEAF: NO
DOING_ERRANDS_INDEPENDENTLY_DIFFICULTY: NO
CONCENTRATING,_REMEMBERING_OR_MAKING_DECISIONS_DIFFICULTY: NO
WEAR_GLASSES_OR_BLIND: NO
DRESSING/BATHING_DIFFICULTY: NO
DIFFICULTY_EATING/SWALLOWING: NO
ADLS_ACUITY_SCORE: 18
ADLS_ACUITY_SCORE: 35
ADLS_ACUITY_SCORE: 18
ADLS_ACUITY_SCORE: 18
TOILETING_ISSUES: NO
ADLS_ACUITY_SCORE: 31
FALL_HISTORY_WITHIN_LAST_SIX_MONTHS: NO
ADLS_ACUITY_SCORE: 35
DIFFICULTY_COMMUNICATING: NO

## 2022-11-23 NOTE — PRE-PROCEDURE
GENERAL PRE-PROCEDURE:   Procedure:  Port placement  Date/Time:  11/23/2022 8:17 AM    Written consent obtained?: Yes    Risks and benefits: Risks, benefits and alternatives were discussed    Consent given by:  Patient  Patient states understanding of procedure being performed: Yes    Patient's understanding of procedure matches consent: Yes    Procedure consent matches procedure scheduled: Yes    Expected level of sedation:  Moderate  Appropriately NPO:  Yes  ASA Class:  3  Mallampati  :  Grade 2- soft palate, base of uvula, tonsillar pillars, and portion of posterior pharyngeal wall visible  Lungs:  Lungs clear with good breath sounds bilaterally  Heart:  Normal heart sounds and rate  History & Physical reviewed:  History and physical reviewed and no updates needed  Statement of review:  I have reviewed the lab findings, diagnostic data, medications, and the plan for sedation

## 2022-11-23 NOTE — PROGRESS NOTES
Pateint arrives to 2A in room one. Patient arrives with . VSS, AOx4. Prep completed and consent signed. Will continue to assess patient until taken for procedure.

## 2022-11-23 NOTE — PROGRESS NOTES
Patient returned to 2A s/p port placement. VSS. Right chest port accessed, C/D/I dressing in place. Patient tolerating PO fluids and food. Patient reports pain at port site - PRN tylenol given. Will continue to monitor patient until bed ready on unit 7D.

## 2022-11-23 NOTE — PROGRESS NOTES
Nursing Focus: Admission    D: Patient admitted/transferred from PACU via wheel chair for C4 Chemo methotrexate & cytarabine.      I: Upon arrival to the unit patient was oriented to room, unit, and call light. Patient s height, weight, and vital signs were obtained. Allergies reviewed and allergy band applied. MD notified of patient s arrival on the unit. Adult AVS completed. Head to toe assessment completed. Education assessment completed. Care plan initiated.     A: Vital signs stable upon admission. Patient have mild tenderness at port site since it was placed this morning. Two RN skin assessment completed No. Significant Skin Findings include no issues per pt. WOC Nurse Consult Ordered  No. Bed Algorithm can be found in PCS flow sheets (Support Surface Algorithm) and on IP [facility] NURSE RESOURCE TAB, was this used during this assessment?  Yes. Was a pulsate mattress ordered ** Yes/No.     P: Continue to monitor patient s poc and intervene as needed. Continue with plan of care. Notify MD with any concerns or changes in patient status.

## 2022-11-23 NOTE — IR NOTE
Patient Name: Vashti Villegas  Medical Record Number: [id number]  Today's Date: 11/23/2022    Procedure: chest port placement  Proceduralist: Dr. Franck Cuello    Procedure Start: 08:57   Procedure end: 09:36  Sedation medications administered: 225 mcg fentanyl, 4.5 mg versed     Report given to:  RN Jaya  : none    Other Notes: Pt arrived to IR room 5 from . Consent reviewed. Pt denies any questions or concerns regarding procedure. Pt positioned supine and monitored per protocol. Pt tolerated procedure without any noted complications. Pt transferred back to .

## 2022-11-23 NOTE — PROCEDURES
[facility]    Procedure: Right chest port placeemnt    Date/Time: 11/23/2022 9:38 AM  Performed by: Adams Quigley  Authorized by: Adams Quigley Fellow Physician: Dann  Other(s) attending procedure: Franck      UNIVERSAL PROTOCOL   Site Marked: NA  Prior Images Obtained and Reviewed:  Yes  Required items: Required blood products, implants, devices and special equipment available    Patient identity confirmed:  Verbally with patient, arm band, provided demographic data and hospital-assigned identification number  Patient was reevaluated immediately before administering moderate or deep sedation or anesthesia  Confirmation Checklist:  Patient's identity using two indicators, relevant allergies, procedure was appropriate and matched the consent or emergent situation and correct equipment/implants were available  Time out: Immediately prior to the procedure a time out was called    Universal Protocol: the Joint Commission Universal Protocol was followed    Preparation: Patient was prepped and draped in usual sterile fashion       ANESTHESIA    Anesthesia: Local infiltration  Local Anesthetic:  Lidocaine 1% without epinephrine  Anesthetic Total (mL):  20      SEDATION  Patient Sedated: Yes    Sedation Type:  Moderate (conscious) sedation  Sedation:  Fentanyl and midazolam  Vital signs: Vital signs monitored during sedation    See dictated procedure note for full details.  Findings: Port placed in RA, trimmed at 22 cm.     Specimens: none    Complications: None    Condition: Stable    Plan: Ready for use. Line left accessed.       PROCEDURE  Describe Procedure: 8 fr port placed with tip in high RA. Line trimmed at 22 cm tai.   Patient Tolerance:  Patient tolerated the procedure well with no immediate complications  Length of time physician/provider present for 1:1 monitoring during sedation: 25

## 2022-11-23 NOTE — H&P
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 She is now s/p 3 cycles of GRAALL + Imatinib (C2=9/30/22 - complicated by disseminated zoster infection, C3=11/2/22 - tolerated without complications). Now presents for admission for cycle 4 GRAALL + imatinib following port placement with IR today.         HEME   # Ph(+) B-ALL   Followed by Dr. Hull. Patient presented to outside hospital with c/o right upper quadrant pain and was subsequently found to have a markedly elevated white blood cell count concerning for acute leukemia. While at OSH, she had CTA chest as well as CT A/P. These identified no PE, no acute or localized intraabdominal inflammatory process, mild splenomegaly, and few inconspicuous low-density structures in the liver. She was transferred to [facility] for further workup and management. S/p Hydrea 1g TID (8/21-8/23) with improvement in WBC (100K ? 12K). Diagnostic BMBx completed 8/20/22, which demonstrated B-ALL with 85% blasts and hypercellular marrow. IHC shows CD10+, CD34+. 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 ALL NGS negative. BCR/ABL1 major/minor (sent from peripheral blood) both negative. Initiated pre-phase steroids with Prednisone 60 mg x8/21;  increased to 60 mg/m2 (140 mg) 8/22-8/24. She initiated GRAALL + imatinib (C1D1 = 8/24/22). S/p BMT consult 9/12 with Dr. Walsh. Induction was relatively uncomplicated. Post-induction BMBx done 9/26/22 and revealed MRD- CR1, with negative BCR/ABL1 and Clonoseq testing. With no matched related donors and excellent early/deep response chemotherapy is currently favored over BMT. 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 Underwent BMBx 10/25/22 with no morphologic evidence of B-lymphoblastic leukemia and flow with unusual B lineage precursor population 0.09%. C3 GRAALL + imatinib on 11/2/22 was tolerated well without complication.   - Overall current plan is to continue GRAALL for a total of 8 cycles (of note odd cycles are 21-days in length and even cycles are 14-days in length). Of note Imatinib refills are to be sent to  Specialty pharmacy though should confirm with inpatient pharmacist.   - Port placement with IR prior to admission   - Plan for LP in XR on D2, D8 of each cycle - scheduled in XR 11/25 at 11a.                   Treatment Plan: GRAALL + imatinib (C4D1 = 11/22/22)                - Premeds: Zofran (16 mg D1, 8 mg D2-3)                - Dexamethasone (12 mg D1-3, 8 mg D4-5)                - Imatinib 300 mg BID x14 days                - Methotrexate (1 g/m2) IV x1 - D1                - Cytarabine 6,960 mg IV q12h x 4 doses - D2-3            - NaCl 0.45% 1L with sodium bicarb 150 mEq/L - D1          - NaCl 0.45% with sodium bicarb 100 mEq/L at 125 ml/h until MTX level is <0.1           - Sodium bicarb 8.4% intermittent infusion 50 mEq IV q2h prn                - Leucovorin 50 mg IV x1 (12h after MTX), then 15 mg IV q6h until MTX level is <0.05                - Pred forte eye drops - 2 drops both eyes QID x4 days                - IT Cytarabine - will need to schedule with XR this admission and on D8 OP                - Neulasta 6 mg - will need to  schedule       # Anemia   - Transfuse to keep Hgb >7   - Will need to sign blood consent on admission   - Of note, pt has history of transfusion reaction and requires premeds with Benadryl and Tylenol       ID   # 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 Presented with scattered papulovesicular lesions on her chin and nose as well as scattered hemorrhagic erosions on the scalp and lower face (V2-3 distributions of the trigeminal nerve) c/f disseminated VZV. Lesions significantly improved on admission.  - Per outpatient ID note, recommend Valtrex 1000 mg TID until completion of chemotherapy and complete resolution of suspected VZV lesions completely resolve. Then recommend Valtrex 1000 mg BID until count recovery for prophylaxis as she broke through ACV.       # ID PPx   - Valtrex 1000 mg TID until completion of chemo d/t h/o VZV as above   - Hold PTA fluconazole 200 mg daily and levofloxacin 250 mg daily given ANC >1.0   - Nebulized pentamidine last given 10/31, next due ~11/28   - Received Evusheld on 11/2/22       MISC   # Hypertension   - Continue PTA amlodipine 5 mg daily       # GERD   - PTA Protonix 20 mg daily. Increase to 40 mg during admission with high-dose steroids.       # Grade 1 neuropathy of the fingertips   Due to vincristine. Pt reports symptoms are stable. Reports mild paresthesias and hot/cold sensitivity.   - Monitor clinically       FEN:   -IVF per chemo plan   -PRN lyte replacement, potassium repletion today   -RDAT       Prophy/Misc:   -VTE: Lovenox; hold if platelets <50K and prior to LP procedures   -GI/PUD: PTA Protonix   -Bowels: PRN Senna and Miralax       Dispo: Pending completion and clearance of chemotherapy; anticipate 3-5-day stay   Follow Up:   - Twice weekly labs and tx are scheduled week of 11/28   - Will need to request Neulasta infusion appt   - Will need to request [facility] hospital follow-up     Patient seen and discussed with  attending physician, Dr. Hull.      I spent 60 minutes in the care of this patient today, which included time necessary for review of interval events, obtaining history and physical exam, ordering medications/tests/procedures as medically indicated, review of pertinent medical literature, counseling of the patient, coordination of care, and documentation time. Over 50% of time was spent counseling the patient and/or coordinating care.    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 She is now s/p 3 cycles of GRAALL + Imatinib (C2=9/30/22 - complicated by disseminated zoster infection, C3=11/2/22 - tolerated without complications). Now presents for admission for cycle 4 GRAALL + imatinib following port placement with IR today.    On day of admission, patient states she is feeling well. She denies any new or worsening concerns today. She does endorse some associated pain with her new port (placed in IR this AM). Explained that this may be worse over the next day or so but should continue to get better. She denies any additional pain, nausea, emesis, headaches, vision changes, shortness of breath. She states she will need to rest going up the stairs in her home, however this is not new for her and she takes the appropriate precautions for this. No issues with bowel/bladder. No pain of VZV lesions and they are healing well. Questions answered at bedside.     Past Medical History    I have reviewed this patient's medical history and updated it with pertinent information if needed.   Past Medical  History:   Diagnosis Date     ALL (acute lymphocytic leukemia) (H)      Other acne      Shingles        Past Surgical History   I have reviewed this patient's surgical history and updated it with pertinent information if needed.  Past Surgical History:   Procedure Laterality Date     IR BONE BIOPSY DEEP RIGHT  10/25/2022     IR LUMBAR PUNCTURE  10/21/2022     PICC Left 09/30/2022    Picc ok to use     PICC DOUBLE LUMEN PLACEMENT Left 08/20/2022    left cephalic 5 fr dl picc 44 cm     ZZC NONSPECIFIC PROCEDURE  01/01/1999    benign mole removal     Prior to Admission Medications   Prior to Admission Medications   Prescriptions Last Dose Informant Patient Reported? Taking?   CHOLECALCIFEROL PO   Yes No   Sig: Take 1 tablet by mouth daily Unknown strength.   LORazepam (ATIVAN) 0.5 MG tablet   No No   Sig: Take 1-2 tablets (0.5-1 mg) by mouth daily as needed for anxiety . Take prior to BMBx procedures as needed. If initial dose unhelpful, okay to take a second 0.5-1 mg dose (for a max of 2 mg at a time). Caution: causes sedation. Do not drive after taking this medication.   Patient not taking: Reported on 11/10/2022   acetaminophen (TYLENOL) 325 MG tablet   Yes No   Sig: Take 2 tablets (650 mg) by mouth every 6 hours as needed for mild pain, other or fever (blood product administration premedication)   Patient not taking: Reported on 11/10/2022   amLODIPine (NORVASC) 5 MG tablet 11/22/2022  No Yes   Sig: Take 1 tablet (5 mg) by mouth daily   dexamethasone (DECADRON) 4 MG tablet   No No   Sig: Take 10 tablets (40 mg) by mouth daily Days 11 through 14.   Patient not taking: Reported on 11/22/2022   fluconazole (DIFLUCAN) 200 MG tablet 11/22/2022  No Yes   Sig: Take 1 tablet (200 mg) by mouth daily as needed (Take when ANC <1000)   heparin lock flush 10 UNIT/ML SOLN injection   No No   Sig: 10 mLs by Intracatheter route daily Flush 5 mL into each PICC lumen daily.   imatinib (GLEEVEC) 100 MG tablet   No No   Sig: Take 3  tablets (300 mg) by mouth 2 times daily   imatinib (GLEEVEC) 100 MG tablet   No No   Sig: Take 3 tablets (300 mg) by mouth 2 times daily   levofloxacin (LEVAQUIN) 250 MG tablet 2022  No Yes   Sig: Take 1 tablet (250 mg) by mouth daily as needed (ANC <1000)   ondansetron (ZOFRAN ODT) 4 MG ODT tab   No No   Sig: Take 1-2 tablets (4-8 mg) by mouth every 8 hours as needed for nausea or vomiting   Patient not taking: Reported on 11/10/2022   pantoprazole (PROTONIX) 20 MG EC tablet 2022  No Yes   Sig: Take 1 tablet (20 mg) by mouth daily   prochlorperazine (COMPAZINE) 10 MG tablet   No No   Sig: Take 1 tablet (10 mg) by mouth every 6 hours as needed (Breakthrough Nausea/Vomiting)   Patient not taking: Reported on 11/10/2022   sennosides (SENOKOT) 8.6 MG tablet   No No   Sig: Take 1-2 tablets by mouth 2 times daily   Patient not taking: Reported on 11/10/2022   sodium chloride, PF, (SALINE FLUSH) 0.9% PF flush   No No   Si mLs by Intracatheter route daily Flush PICC with 20 mL daily, 10 mL per lumen   valACYclovir (VALTREX) 1000 mg tablet 2022  No Yes   Sig: Take 1 tablet (1,000 mg) by mouth 3 times daily for 14 days      Facility-Administered Medications: None     Allergies   Allergies   Allergen Reactions     Blood Transfusion Related (Informational Only) Hives     Hive reaction with platelet transfusion on 2022. Please administer platelets with tylenol and benadryl premedication.      Social History   I have reviewed this patient's social history and updated it with pertinent information if needed. Vashti Villegas  reports that she has never smoked. She has never used smokeless tobacco.    Family History   I have reviewed this patient's family history and updated it with pertinent information if needed.   History reviewed. No pertinent family history.    Review of Systems   A complete Review of Systems is negative other than noted in the HPI or here.     Physical Exam   Temp: 98.8  F (37.1  C)  Temp src: Oral BP: 108/60 Pulse: 64   Resp: 18 SpO2: 97 % O2 Device: None (Room air) Oxygen Delivery: 3 LPM  Vital Signs with Ranges  Temp:  [98.1  F (36.7  C)-98.8  F (37.1  C)] 98.8  F (37.1  C)  Pulse:  [64-93] 64  Resp:  [9-18] 18  BP: (105-133)/() 108/60  SpO2:  [94 %-100 %] 97 %  251 lbs 15.77 oz  Constitutional: Pleasant appearing female seen sitting up in bed. No apparent distress, and appears stated age.  Eyes: Lids and lashes normal, sclera clear, conjunctiva normal.  ENT: Normocephalic, without obvious abnormality, atraumatic, oral pharynx with moist mucus membranes, tonsils without erythema or exudates, gums normal and good dentition.   Respiratory: No increased work of breathing, good air exchange, clear to auscultation bilaterally, no crackles or wheezing.  Cardiovascular: Regular rate and rhythm, normal S1 and S2, and no murmur noted.  GI: No masses or scars. +BS. Soft. No tenderness on palpation.  Skin: No bruising or bleeding appreciated. Normal skin color, texture and turgor without redness, warmth, or swelling. No rashes, no lesions, no jaundice.  Extremities: There is no redness, warmth, or swelling of the joints. No lower extremity edema. No cyanosis.  Neurologic: Awake, alert, oriented to name, place and time.    Vascular access: L PICC in place; newly-placed R port a cath - c/d/i    Data   Results for orders placed or performed during the hospital encounter of 11/23/22 (from the past 24 hour(s))   Right chest port placeemnt    Narrative    Adams Quigley     11/23/2022  9:40 AM  [facility]    Procedure: Right chest port placeemnt    Date/Time: 11/23/2022 9:38 AM  Performed by: Adams Quigley  Authorized by: Adams Quigley   IR Fellow Physician: Dann  Other(s) attending procedure: [email or web address]      UNIVERSAL PROTOCOL   Site Marked: NA  Prior Images Obtained and Reviewed:  Yes  Required items: Required blood products, implants, devices and special    equipment available    Patient identity confirmed:  Verbally with patient, arm band, provided   demographic data and hospital-assigned identification number  Patient was reevaluated immediately before administering moderate or deep   sedation or anesthesia  Confirmation Checklist:  Patient's identity using two indicators, relevant   allergies, procedure was appropriate and matched the consent or emergent   situation and correct equipment/implants were available  Time out: Immediately prior to the procedure a time out was called    Universal Protocol: the Joint Commission Universal Protocol was followed    Preparation: Patient was prepped and draped in usual sterile fashion       ANESTHESIA    Anesthesia: Local infiltration  Local Anesthetic:  Lidocaine 1% without epinephrine  Anesthetic Total (mL):  20      SEDATION  Patient Sedated: Yes    Sedation Type:  Moderate (conscious) sedation  Sedation:  Fentanyl and midazolam  Vital signs: Vital signs monitored during sedation    See dictated procedure note for full details.  Findings: Port placed in RA, trimmed at 22 cm.     Specimens: none    Complications: None    Condition: Stable    Plan: Ready for use. Line left accessed.       PROCEDURE  Describe Procedure: 8 fr port placed with tip in high RA. Line trimmed at   22 cm tai.   Patient Tolerance:  Patient tolerated the procedure well with no immediate   complications  Length of time physician/provider present for 1:1 monitoring during   sedation: 25

## 2022-11-24 LAB
ALBUMIN SERPL BCG-MCNC: 3.7 G/DL (ref 3.5–5.2)
ALP SERPL-CCNC: 82 U/L (ref 35–104)
ALT SERPL W P-5'-P-CCNC: 23 U/L (ref 10–35)
ANION GAP SERPL CALCULATED.3IONS-SCNC: 11 MMOL/L (ref 7–15)
AST SERPL W P-5'-P-CCNC: 16 U/L (ref 10–35)
BASOPHILS # BLD MANUAL: 0 10E3/UL (ref 0–0.2)
BASOPHILS NFR BLD MANUAL: 0 %
BILIRUB SERPL-MCNC: 0.3 MG/DL
BUN SERPL-MCNC: 10.7 MG/DL (ref 6–20)
CALCIUM SERPL-MCNC: 8.5 MG/DL (ref 8.6–10)
CHLORIDE SERPL-SCNC: 100 MMOL/L (ref 98–107)
CREAT SERPL-MCNC: 0.58 MG/DL (ref 0.51–0.95)
DEPRECATED HCO3 PLAS-SCNC: 26 MMOL/L (ref 22–29)
ELLIPTOCYTES BLD QL SMEAR: SLIGHT
EOSINOPHIL # BLD MANUAL: 0 10E3/UL (ref 0–0.7)
EOSINOPHIL NFR BLD MANUAL: 0 %
ERYTHROCYTE [DISTWIDTH] IN BLOOD BY AUTOMATED COUNT: 19.9 % (ref 10–15)
GFR SERPL CREATININE-BSD FRML MDRD: >90 ML/MIN/1.73M2
GLUCOSE SERPL-MCNC: 143 MG/DL (ref 70–99)
HCT VFR BLD AUTO: 24.6 % (ref 35–47)
HGB BLD-MCNC: 8.1 G/DL (ref 11.7–15.7)
LACTATE SERPL-SCNC: 1.9 MMOL/L (ref 0.7–2)
LYMPHOCYTES # BLD MANUAL: 0.5 10E3/UL (ref 0.8–5.3)
LYMPHOCYTES NFR BLD MANUAL: 4 %
MAGNESIUM SERPL-MCNC: 2.1 MG/DL (ref 1.7–2.3)
MCH RBC QN AUTO: 33.8 PG (ref 26.5–33)
MCHC RBC AUTO-ENTMCNC: 32.9 G/DL (ref 31.5–36.5)
MCV RBC AUTO: 103 FL (ref 78–100)
MONOCYTES # BLD MANUAL: 0.5 10E3/UL (ref 0–1.3)
MONOCYTES NFR BLD MANUAL: 4 %
MYELOCYTES # BLD MANUAL: 0.3 10E3/UL
MYELOCYTES NFR BLD MANUAL: 2 %
NEUTROPHILS # BLD MANUAL: 11.7 10E3/UL (ref 1.6–8.3)
NEUTROPHILS NFR BLD MANUAL: 90 %
NRBC # BLD AUTO: 0.1 10E3/UL
NRBC BLD MANUAL-RTO: 1 %
PH UR STRIP: 7.5 PH (ref 5–7)
PH UR STRIP: 8 PH (ref 5–7)
PH UR STRIP: 8 PH (ref 5–7)
PHOSPHATE SERPL-MCNC: 3.8 MG/DL (ref 2.5–4.5)
PLAT MORPH BLD: ABNORMAL
PLATELET # BLD AUTO: 317 10E3/UL (ref 150–450)
POLYCHROMASIA BLD QL SMEAR: SLIGHT
POTASSIUM SERPL-SCNC: 4.4 MMOL/L (ref 3.4–5.3)
POTASSIUM SERPL-SCNC: 4.4 MMOL/L (ref 3.4–5.3)
PROT SERPL-MCNC: 5.7 G/DL (ref 6.4–8.3)
RBC # BLD AUTO: 2.4 10E6/UL (ref 3.8–5.2)
RBC MORPH BLD: ABNORMAL
SODIUM SERPL-SCNC: 137 MMOL/L (ref 136–145)
WBC # BLD AUTO: 13 10E3/UL (ref 4–11)

## 2022-11-24 PROCEDURE — 250N000011 HC RX IP 250 OP 636: Performed by: STUDENT IN AN ORGANIZED HEALTH CARE EDUCATION/TRAINING PROGRAM

## 2022-11-24 PROCEDURE — 36591 DRAW BLOOD OFF VENOUS DEVICE: CPT | Performed by: PHYSICIAN ASSISTANT

## 2022-11-24 PROCEDURE — 80053 COMPREHEN METABOLIC PANEL: CPT | Performed by: PHYSICIAN ASSISTANT

## 2022-11-24 PROCEDURE — 83605 ASSAY OF LACTIC ACID: CPT | Performed by: INTERNAL MEDICINE

## 2022-11-24 PROCEDURE — 250N000011 HC RX IP 250 OP 636: Performed by: INTERNAL MEDICINE

## 2022-11-24 PROCEDURE — 3E03305 INTRODUCTION OF OTHER ANTINEOPLASTIC INTO PERIPHERAL VEIN, PERCUTANEOUS APPROACH: ICD-10-PCS | Performed by: PHYSICIAN ASSISTANT

## 2022-11-24 PROCEDURE — 99356 PR PROLONGED SERV,INPATIENT,1ST HR: CPT | Mod: FS | Performed by: STUDENT IN AN ORGANIZED HEALTH CARE EDUCATION/TRAINING PROGRAM

## 2022-11-24 PROCEDURE — 36591 DRAW BLOOD OFF VENOUS DEVICE: CPT | Performed by: INTERNAL MEDICINE

## 2022-11-24 PROCEDURE — 250N000009 HC RX 250: Performed by: INTERNAL MEDICINE

## 2022-11-24 PROCEDURE — 85027 COMPLETE CBC AUTOMATED: CPT | Performed by: PHYSICIAN ASSISTANT

## 2022-11-24 PROCEDURE — 250N000013 HC RX MED GY IP 250 OP 250 PS 637: Performed by: PHYSICIAN ASSISTANT

## 2022-11-24 PROCEDURE — 83735 ASSAY OF MAGNESIUM: CPT | Performed by: PHYSICIAN ASSISTANT

## 2022-11-24 PROCEDURE — 81003 URINALYSIS AUTO W/O SCOPE: CPT | Performed by: INTERNAL MEDICINE

## 2022-11-24 PROCEDURE — 99233 SBSQ HOSP IP/OBS HIGH 50: CPT | Mod: FS | Performed by: STUDENT IN AN ORGANIZED HEALTH CARE EDUCATION/TRAINING PROGRAM

## 2022-11-24 PROCEDURE — 258N000003 HC RX IP 258 OP 636: Performed by: INTERNAL MEDICINE

## 2022-11-24 PROCEDURE — 258N000002 HC RX IP 258 OP 250: Performed by: INTERNAL MEDICINE

## 2022-11-24 PROCEDURE — 120N000002 HC R&B MED SURG/OB UMMC

## 2022-11-24 PROCEDURE — 85007 BL SMEAR W/DIFF WBC COUNT: CPT | Performed by: PHYSICIAN ASSISTANT

## 2022-11-24 PROCEDURE — 84100 ASSAY OF PHOSPHORUS: CPT | Performed by: PHYSICIAN ASSISTANT

## 2022-11-24 RX ADMIN — DEXAMETHASONE 12 MG: 4 TABLET ORAL at 17:40

## 2022-11-24 RX ADMIN — VALACYCLOVIR HYDROCHLORIDE 1000 MG: 500 TABLET, FILM COATED ORAL at 19:50

## 2022-11-24 RX ADMIN — ACETAMINOPHEN 1000 MG: 500 TABLET ORAL at 22:04

## 2022-11-24 RX ADMIN — ACETAMINOPHEN 1000 MG: 500 TABLET ORAL at 05:58

## 2022-11-24 RX ADMIN — SODIUM BICARBONATE: 84 INJECTION, SOLUTION INTRAVENOUS at 06:13

## 2022-11-24 RX ADMIN — VALACYCLOVIR HYDROCHLORIDE 1000 MG: 500 TABLET, FILM COATED ORAL at 15:07

## 2022-11-24 RX ADMIN — FAMOTIDINE 10 MG: 10 TABLET, FILM COATED ORAL at 19:50

## 2022-11-24 RX ADMIN — FAMOTIDINE 10 MG: 10 TABLET, FILM COATED ORAL at 08:58

## 2022-11-24 RX ADMIN — AMLODIPINE BESYLATE 5 MG: 5 TABLET ORAL at 08:59

## 2022-11-24 RX ADMIN — SODIUM BICARBONATE: 84 INJECTION, SOLUTION INTRAVENOUS at 15:25

## 2022-11-24 RX ADMIN — Medication 5 ML: at 05:45

## 2022-11-24 RX ADMIN — PREDNISOLONE ACETATE 2 DROP: 10 SUSPENSION/ DROPS OPHTHALMIC at 21:57

## 2022-11-24 RX ADMIN — Medication 5 ML: at 15:37

## 2022-11-24 RX ADMIN — CYTARABINE 6960 MG: 100 INJECTION, SOLUTION INTRATHECAL; INTRAVENOUS; SUBCUTANEOUS at 22:37

## 2022-11-24 RX ADMIN — SODIUM BICARBONATE: 84 INJECTION, SOLUTION INTRAVENOUS at 22:37

## 2022-11-24 RX ADMIN — VALACYCLOVIR HYDROCHLORIDE 1000 MG: 500 TABLET, FILM COATED ORAL at 08:58

## 2022-11-24 RX ADMIN — ONDANSETRON HYDROCHLORIDE 8 MG: 8 TABLET, FILM COATED ORAL at 21:57

## 2022-11-24 RX ADMIN — Medication 5 ML: at 08:59

## 2022-11-24 ASSESSMENT — ACTIVITIES OF DAILY LIVING (ADL)
ADLS_ACUITY_SCORE: 18

## 2022-11-24 NOTE — PLAN OF CARE
[phone]  Goal Outcome Evaluation:  Pt A&Ox4, VSS on RA. Pain r/t port placed 11/23 managed with tylenol x2. Denies N/V. See chemo note below. PICC infusing with bicarb running 125 mL/hr, port remains heparin locked. K recheck 3.0, replaced orally, recheck with AM labs. Last urine pH 7.5 at 0530, next Q4hr pH due 0930, 1330, then Q8hr pH at 2130-- no bicarb bolus required. Leucovorin loading dose to initiate at 1000 11/25. Continue to monitor and with POC.        Nursing Focus: Chemotherapy  D: Positive brisk bright red blood return via PICC purple lumen. Insertion site is clean/dry/intact, dressing intact with no complaints of pain.  Urine output is recorded in intake Doc Flowsheet.    I: 1L bicarb bolus, zofran, and dexamethasone premedications given per order (see electronic medical administration record). Dose #1 of methotrexate started to infuse over 24hours. Reviewed pt teaching on chemotherapy side effects.  Pt denies need for further teaching. Chemotherapy double checked per protocol by two chemotherapy competent RN's.   A: Tolerating chemo well. Denies nausea.   P: Continue to monitor urine output and symptoms of nausea. Screen for symptoms of toxicity.

## 2022-11-24 NOTE — PROGRESS NOTES
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 She is now s/p 3 cycles of GRAALL + Imatinib (C2=9/30/22 - complicated by disseminated zoster infection, C3=11/2/22 - tolerated without complications). Now presents for admission for cycle 4 GRAALL + imatinib following port placement with IR.    Today:   - Day 2 today; tolerating well. Plan to start imatinib on 11/25.   - Plan for IT chemo in XR tomorrow at 1100.         HEME   # Ph(+) B-ALL   Followed by Dr. Hull. Patient presented to outside hospital with c/o right upper quadrant pain and was subsequently found to have a markedly elevated white blood cell count concerning for acute leukemia. While at OSH, she had CTA chest as well as CT A/P. These identified no PE, no acute or localized intraabdominal inflammatory process, mild splenomegaly, and few inconspicuous low-density structures in the liver. She was transferred to [facility] for further workup and management. S/p Hydrea 1g TID (8/21-8/23) with improvement in WBC (100K ? 12K). Diagnostic BMBx completed 8/20/22, which demonstrated B-ALL with 85% blasts and hypercellular marrow. IHC shows CD10+, CD34+. 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 ALL NGS negative. BCR/ABL1 major/minor (sent from peripheral  blood) both negative. Initiated pre-phase steroids with Prednisone 60 mg x8/21; increased to 60 mg/m2 (140 mg) 8/22-8/24. She initiated GRAALL + imatinib (C1D1 = 8/24/22). S/p BMT consult 9/12 with Dr. Walsh. Induction was relatively uncomplicated. Post-induction BMBx done 9/26/22 and revealed MRD- CR1, with negative BCR/ABL1 and Clonoseq testing. With no matched related donors and excellent early/deep response chemotherapy is currently favored over BMT. 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 Underwent BMBx 10/25/22 with no morphologic evidence of B-lymphoblastic leukemia and flow with unusual B lineage precursor population 0.09%. C3 GRAALL + imatinib on 11/2/22 was tolerated well without complication.   - Overall current plan is to continue GRAALL for a total of 8 cycles (of note odd cycles are 21-days in length and even cycles are 14-days in length). Of note Imatinib refills are to be sent to  Specialty pharmacy though should confirm with inpatient pharmacist.   - Port placement with IR prior to admission   - Plan for LP in XR on D2, D8 of each cycle - scheduled in XR 11/25 at 11a.                   Treatment Plan: GRAALL + imatinib (C4D1 = 11/22/22)                - Premeds: Zofran (16 mg D1, 8 mg D2-3)                - Dexamethasone (12 mg D1-3, 8 mg D4-5)                - Imatinib 300 mg BID x14 days (11/25 - 12/8)               - Methotrexate (1 g/m2) IV x1 - D1                - Cytarabine 6,960 mg IV q12h x 4 doses - D2-3                - NaCl 0.45% 1L with sodium bicarb 150 mEq/L - D1                - NaCl 0.45% with sodium bicarb 100 mEq/L at 125 ml/h until MTX level is <0.1                - Sodium bicarb 8.4% intermittent infusion 50 mEq IV q2h prn                - Leucovorin 50 mg IV x1 (12h after MTX), then 15 mg IV q6h until MTX level is <0.05                - Pred forte eye drops - 2 drops both eyes QID x4 days                - IT  Cytarabine - scheduled in XR on 11/25 and requested on D8 OP                - Neulasta 6 mg - will need to schedule       # Anemia   - Transfuse to keep Hgb >7   - Will need to sign blood consent on admission   - Of note, pt has history of transfusion reaction and requires premeds with Benadryl and Tylenol       ID   # 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 Presented with scattered papulovesicular lesions on her chin and nose as well as scattered hemorrhagic erosions on the scalp and lower face (V2-3 distributions of the trigeminal nerve) c/f disseminated VZV. Lesions significantly improved on admission.  - Per outpatient ID note, recommend Valtrex 1000 mg TID until completion of chemotherapy and complete resolution of suspected VZV lesions completely resolve. Then recommend Valtrex 1000 mg BID until count recovery for prophylaxis as she broke through ACV.       # ID PPx   - Valtrex 1000 mg TID until completion of chemo d/t h/o VZV as above   - Hold PTA fluconazole 200 mg daily and levofloxacin 250 mg daily given ANC >1.0   - Nebulized pentamidine last given 10/31, next due ~11/28   - Received Evusheld on 11/2/22       MISC   # Hypertension   - Continue PTA amlodipine 5 mg daily       # GERD   - PTA Protonix 20 mg daily. Increase to 40 mg during admission with high-dose steroids.       # Grade 1 neuropathy of the fingertips   Due to vincristine. Pt reports symptoms are stable. Reports mild paresthesias and hot/cold sensitivity.   - Monitor clinically       FEN:   -IVF per chemo plan   -PRN lyte replacement, potassium repletion today   -RDAT       Prophy/Misc:   -VTE: Lovenox; hold if platelets <50K and prior to LP procedures   -GI/PUD: PTA Protonix   -Bowels: PRN Senna and Miralax       Dispo: Pending completion and clearance of chemotherapy; anticipate 3-5-day stay   Follow Up Requested:   - Twice weekly labs and tx are scheduled week of 11/28   - Will need  to request Neulasta infusion appt  - Will need to request [facility] follow-up     Patient seen and discussed with attending physician, Dr. Recinos.      I spent 60 minutes in the care of this patient today, which included time necessary for review of interval events, obtaining history and physical exam, ordering medications/tests/procedures as medically indicated, review of pertinent medical literature, counseling of the patient, coordination of care, and documentation time. Over 50% of time was spent counseling the patient and/or coordinating care.     Marina Crain PA-C   Hematology/Oncology   Pager: #[phone]     Interval History   Overnight no acute events. Patient tolerating chemotherapy well so far. She does endorse ongoing pain around her port placement site, improved with tylenol. She does also have some bilateral shoulder/upper back pain which she endorses likely secondary to a massage from her  yesterday or positional after port placement. No radiation or sharp pain. Feels musculoskeletal in nature. No lower extremity edema, pain, nausea, emesis, abdominal pain. Urinating frequently with fluids and bowels working well. Questions answered at bedside.    Physical Exam   Temp: 98.3  F (36.8  C) Temp src: Oral BP: 123/74 Pulse: 80   Resp: 16 SpO2: 99 % O2 Device: None (Room air)    Vitals:    11/23/22 0734 11/23/22 1348 11/24/22 0700   Weight: 114.3 kg (251 lb 15.8 oz) 114.3 kg (251 lb 15.8 oz) 111.8 kg (246 lb 8 oz)     Vital Signs with Ranges  Temp:  [97.8  F (36.6  C)-98.8  F (37.1  C)] 98.3  F (36.8  C)  Pulse:  [64-82] 80  Resp:  [16-20] 16  BP: (106-123)/(53-74) 123/74  SpO2:  [96 %-99 %] 99 %  I/O last 3 completed shifts:  In: 1602.57 [I.V.:1174.17; IV Piggyback:428.4]  Out: 2700 [Urine:2700]    Constitutional: Pleasant appearing female seen laying in bed. No apparent distress, and appears stated age.  Eyes: Lids and lashes normal, sclera clear, conjunctiva normal.  ENT: Normocephalic, without  obvious abnormality, atraumatic  Respiratory: No increased work of breathing, good air exchange, clear to auscultation bilaterally, no crackles or wheezing.  Cardiovascular: Regular rate and rhythm, no murmur noted.  GI: No masses or scars. +BS. Soft. No tenderness on palpation.  Skin: No bruising or bleeding appreciated. Normal skin color, texture and turgor without redness, warmth, or swelling. No rashes, no lesions, no jaundice.  Extremities: There is no redness, warmth, or swelling of the joints. No lower extremity edema. No cyanosis.  Neurologic: Awake, alert, oriented to name, place and time.    Vascular access: Newly-placed R port a cath with mild bruising, although c/d/i    Medications     IV infusion builder WITH additives 125 mL/hr at 11/24/22 0613     - MEDICATION INSTRUCTIONS -       sodium chloride 0.9%       sodium chloride 75 mL/hr at 11/23/22 0748       amLODIPine  5 mg Oral Daily     [START ON 11/25/2022] INTRATHECAL - Cytarabine and/or methotrexate and/or Hydrocortisone   Intrathecal Once     cytarabine (CYTOSAR) infusion  3 g/m2 (Treatment Plan Recorded) Intravenous Q12H     dexamethasone  12 mg Oral Q20H     [START ON 11/26/2022] dexamethasone  8 mg Oral Daily     enoxaparin ANTICOAGULANT  40 mg Subcutaneous Q24H     famotidine  10 mg Oral BID     heparin  5-10 mL Intracatheter Q28 Days     heparin lock flush  5-10 mL Intracatheter Q24H     [START ON 11/25/2022] imatinib  300 mg Oral BID     [START ON 11/25/2022] leucovorin calcium  15 mg Intravenous Q6H     [START ON 11/25/2022] leucovorin calcium  50 mg Intravenous Once     methotrexate infusion  1 g/m2 (Treatment Plan Recorded) Intravenous Once     ondansetron  8 mg Oral Q12H     prednisoLONE acetate  2 drop Both Eyes 4x Daily     sennosides  1-2 tablet Oral BID     sodium chloride (PF)  10-20 mL Intracatheter Q28 Days     sodium chloride (PF)  3 mL Intracatheter Q8H     valACYclovir  1,000 mg Oral TID       Data   Results for orders placed or  performed during the hospital encounter of 11/23/22 (from the past 24 hour(s))   CBC with platelets differential    Narrative    The following orders were created for panel order CBC with platelets differential.  Procedure                               Abnormality         Status                     ---------                               -----------         ------                     CBC with platelets and d...[[id number]]  Abnormal            Final result               Manual Differential[[id number]]          Abnormal            Final result                 Please view results for these tests on the individual orders.   Comprehensive metabolic panel   Result Value Ref Range    Sodium 139 136 - 145 mmol/L    Potassium 3.2 (L) 3.4 - 5.3 mmol/L    Chloride 102 98 - 107 mmol/L    Carbon Dioxide (CO2) 26 22 - 29 mmol/L    Anion Gap 11 7 - 15 mmol/L    Urea Nitrogen 14.8 6.0 - 20.0 mg/dL    Creatinine 0.62 0.51 - 0.95 mg/dL    Calcium 7.7 (L) 8.6 - 10.0 mg/dL    Glucose 80 70 - 99 mg/dL    Alkaline Phosphatase 69 35 - 104 U/L    AST 16 10 - 35 U/L    ALT 19 10 - 35 U/L    Protein Total 5.4 (L) 6.4 - 8.3 g/dL    Albumin 3.4 (L) 3.5 - 5.2 g/dL    Bilirubin Total 0.2 <=1.2 mg/dL    GFR Estimate >90 >60 mL/min/1.73m2   Magnesium   Result Value Ref Range    Magnesium 2.1 1.7 - 2.3 mg/dL   Phosphorus   Result Value Ref Range    Phosphorus 3.8 2.5 - 4.5 mg/dL   ABO/RH Type and Screen     Narrative    The following orders were created for panel order ABO/RH Type and Screen .  Procedure                               Abnormality         Status                     ---------                               -----------         ------                     Adult Type and Screen[[id number]]                            Final result                 Please view results for these tests on the individual orders.   INR   Result Value Ref Range    INR 1.07 0.85 - 1.15   Fibrinogen activity   Result Value Ref Range    Fibrinogen Activity 235 170 - 490  mg/dL   Partial thromboplastin time   Result Value Ref Range    aPTT 24 22 - 38 Seconds   CBC with platelets and differential   Result Value Ref Range    WBC Count 11.5 (H) 4.0 - 11.0 10e3/uL    RBC Count 2.32 (L) 3.80 - 5.20 10e6/uL    Hemoglobin 7.7 (L) 11.7 - 15.7 g/dL    Hematocrit 23.4 (L) 35.0 - 47.0 %     (H) 78 - 100 fL    MCH 33.2 (H) 26.5 - 33.0 pg    MCHC 32.9 31.5 - 36.5 g/dL    RDW 19.3 (H) 10.0 - 15.0 %    Platelet Count 317 150 - 450 10e3/uL   Adult Type and Screen   Result Value Ref Range    ABO/RH(D) O POS     Antibody Screen Negative Negative    SPECIMEN EXPIRATION DATE [id number]    Manual Differential   Result Value Ref Range    % Neutrophils 89 %    % Lymphocytes 3 %    % Monocytes 8 %    % Eosinophils 0 %    % Basophils 0 %    NRBCs per 100 WBC 3 (H) <=0 %    Absolute Neutrophils 10.2 (H) 1.6 - 8.3 10e3/uL    Absolute Lymphocytes 0.3 (L) 0.8 - 5.3 10e3/uL    Absolute Monocytes 0.9 0.0 - 1.3 10e3/uL    Absolute Eosinophils 0.0 0.0 - 0.7 10e3/uL    Absolute Basophils 0.0 0.0 - 0.2 10e3/uL    Absolute NRBCs 0.3 (H) <=0.0 10e3/uL    RBC Morphology Confirmed RBC Indices     Platelet Assessment  Automated Count Confirmed. Platelet morphology is normal.     Automated Count Confirmed. Platelet morphology is normal.    Polychromasia Slight (A) None Seen    Teardrop Cells Slight (A) None Seen   Potassium   Result Value Ref Range    Potassium 3.0 (L) 3.4 - 5.3 mmol/L   pH urine POCT   Result Value Ref Range    pH Urine 8.0 5.0 - 7.0 pH   pH urine POCT   Result Value Ref Range    pH Urine 7.5 5.0 - 7.0 pH   pH urine POCT   Result Value Ref Range    pH Urine 7.5 5.0 - 7.0 pH   CBC with platelets differential    Narrative    The following orders were created for panel order CBC with platelets differential.  Procedure                               Abnormality         Status                     ---------                               -----------         ------                     CBC with platelets  and d...[[id number]]  Abnormal            Final result               Manual Differential[[id number]]          Abnormal            Final result                 Please view results for these tests on the individual orders.   Potassium   Result Value Ref Range    Potassium 4.4 3.4 - 5.3 mmol/L   Comprehensive metabolic panel   Result Value Ref Range    Sodium 137 136 - 145 mmol/L    Potassium 4.4 3.4 - 5.3 mmol/L    Chloride 100 98 - 107 mmol/L    Carbon Dioxide (CO2) 26 22 - 29 mmol/L    Anion Gap 11 7 - 15 mmol/L    Urea Nitrogen 10.7 6.0 - 20.0 mg/dL    Creatinine 0.58 0.51 - 0.95 mg/dL    Calcium 8.5 (L) 8.6 - 10.0 mg/dL    Glucose 143 (H) 70 - 99 mg/dL    Alkaline Phosphatase 82 35 - 104 U/L    AST 16 10 - 35 U/L    ALT 23 10 - 35 U/L    Protein Total 5.7 (L) 6.4 - 8.3 g/dL    Albumin 3.7 3.5 - 5.2 g/dL    Bilirubin Total 0.3 <=1.2 mg/dL    GFR Estimate >90 >60 mL/min/1.73m2   Magnesium   Result Value Ref Range    Magnesium 2.1 1.7 - 2.3 mg/dL   Phosphorus   Result Value Ref Range    Phosphorus 3.8 2.5 - 4.5 mg/dL   CBC with platelets and differential   Result Value Ref Range    WBC Count 13.0 (H) 4.0 - 11.0 10e3/uL    RBC Count 2.40 (L) 3.80 - 5.20 10e6/uL    Hemoglobin 8.1 (L) 11.7 - 15.7 g/dL    Hematocrit 24.6 (L) 35.0 - 47.0 %     (H) 78 - 100 fL    MCH 33.8 (H) 26.5 - 33.0 pg    MCHC 32.9 31.5 - 36.5 g/dL    RDW 19.9 (H) 10.0 - 15.0 %    Platelet Count 317 150 - 450 10e3/uL   Manual Differential   Result Value Ref Range    % Neutrophils 90 %    % Lymphocytes 4 %    % Monocytes 4 %    % Eosinophils 0 %    % Basophils 0 %    % Myelocytes 2 %    NRBCs per 100 WBC 1 (H) <=0 %    Absolute Neutrophils 11.7 (H) 1.6 - 8.3 10e3/uL    Absolute Lymphocytes 0.5 (L) 0.8 - 5.3 10e3/uL    Absolute Monocytes 0.5 0.0 - 1.3 10e3/uL    Absolute Eosinophils 0.0 0.0 - 0.7 10e3/uL    Absolute Basophils 0.0 0.0 - 0.2 10e3/uL    Absolute Myelocytes 0.3 (H) <=0.0 10e3/uL    Absolute NRBCs 0.1 (H) <=0.0 10e3/uL    RBC Morphology  Confirmed RBC Indices     Platelet Assessment  Automated Count Confirmed. Platelet morphology is normal.     Automated Count Confirmed. Platelet morphology is normal.    Elliptocytes Slight (A) None Seen    Polychromasia Slight (A) None Seen   pH urine POCT   Result Value Ref Range    pH Urine 8.0 5.0 - 7.0 pH

## 2022-11-24 NOTE — PROVIDER NOTIFICATION
"Notified ONEL MYRICK via [facility] #[id number].   \"7D 7517-2 A.K.: Pt states a pharmacist suggested to delay PO GLEEVEC d/t C3 last dose Friday. Also discussed w/ Dr. Hull delay, but no notes reflect. Please call to discuss robb dose scheduled. 7D CAROL Whitten/Nick [phone]\"    Call back from Dr. Myrick, doses prior to Friday should be rescheduled. No GLEEVEC this robb.   "

## 2022-11-25 ENCOUNTER — APPOINTMENT (OUTPATIENT)
Dept: GENERAL RADIOLOGY | Facility: CLINIC | Age: 42
End: 2022-11-25
Attending: PHYSICIAN ASSISTANT
Payer: COMMERCIAL

## 2022-11-25 LAB
ALBUMIN SERPL BCG-MCNC: 3.2 G/DL (ref 3.5–5.2)
ALP SERPL-CCNC: 70 U/L (ref 35–104)
ALT SERPL W P-5'-P-CCNC: 17 U/L (ref 10–35)
ANION GAP SERPL CALCULATED.3IONS-SCNC: 10 MMOL/L (ref 7–15)
AST SERPL W P-5'-P-CCNC: 13 U/L (ref 10–35)
BASOPHILS # BLD AUTO: 0 10E3/UL (ref 0–0.2)
BASOPHILS NFR BLD AUTO: 0 %
BILIRUB SERPL-MCNC: 0.3 MG/DL
BUN SERPL-MCNC: 13.2 MG/DL (ref 6–20)
CALCIUM SERPL-MCNC: 7.7 MG/DL (ref 8.6–10)
CHLORIDE SERPL-SCNC: 99 MMOL/L (ref 98–107)
CREAT SERPL-MCNC: 0.47 MG/DL (ref 0.51–0.95)
DEPRECATED HCO3 PLAS-SCNC: 30 MMOL/L (ref 22–29)
EOSINOPHIL # BLD AUTO: 0 10E3/UL (ref 0–0.7)
EOSINOPHIL NFR BLD AUTO: 0 %
ERYTHROCYTE [DISTWIDTH] IN BLOOD BY AUTOMATED COUNT: 21 % (ref 10–15)
GFR SERPL CREATININE-BSD FRML MDRD: >90 ML/MIN/1.73M2
GLUCOSE CSF-MCNC: 89 MG/DL (ref 40–70)
GLUCOSE SERPL-MCNC: 130 MG/DL (ref 70–99)
HCT VFR BLD AUTO: 23.1 % (ref 35–47)
HGB BLD-MCNC: 7.4 G/DL (ref 11.7–15.7)
IMM GRANULOCYTES # BLD: 0.2 10E3/UL
IMM GRANULOCYTES NFR BLD: 1 %
INR PPP: 1.08 (ref 0.85–1.15)
LACTATE SERPL-SCNC: 1.7 MMOL/L (ref 0.7–2)
LYMPHOCYTES # BLD AUTO: 0.1 10E3/UL (ref 0.8–5.3)
LYMPHOCYTES NFR BLD AUTO: 1 %
MCH RBC QN AUTO: 32.7 PG (ref 26.5–33)
MCHC RBC AUTO-ENTMCNC: 32 G/DL (ref 31.5–36.5)
MCV RBC AUTO: 102 FL (ref 78–100)
MONOCYTES # BLD AUTO: 0.4 10E3/UL (ref 0–1.3)
MONOCYTES NFR BLD AUTO: 3 %
MTX SERPL-SCNC: 0.48 UMOL/L
NEUTROPHILS # BLD AUTO: 12 10E3/UL (ref 1.6–8.3)
NEUTROPHILS NFR BLD AUTO: 95 %
NRBC # BLD AUTO: 0 10E3/UL
NRBC BLD AUTO-RTO: 0 /100
PH UR STRIP: 7.5 PH (ref 5–7)
PH UR STRIP: 7.5 PH (ref 5–7)
PH UR STRIP: 8 PH (ref 5–7)
PLATELET # BLD AUTO: 278 10E3/UL (ref 150–450)
POTASSIUM SERPL-SCNC: 3.8 MMOL/L (ref 3.4–5.3)
PROT CSF-MCNC: 28.6 MG/DL (ref 15–45)
PROT SERPL-MCNC: 5.2 G/DL (ref 6.4–8.3)
RBC # BLD AUTO: 2.26 10E6/UL (ref 3.8–5.2)
SODIUM SERPL-SCNC: 139 MMOL/L (ref 136–145)
WBC # BLD AUTO: 12.7 10E3/UL (ref 4–11)

## 2022-11-25 PROCEDURE — 87015 SPECIMEN INFECT AGNT CONCNTJ: CPT | Performed by: PHYSICIAN ASSISTANT

## 2022-11-25 PROCEDURE — 250N000013 HC RX MED GY IP 250 OP 250 PS 637: Performed by: PHYSICIAN ASSISTANT

## 2022-11-25 PROCEDURE — 250N000013 HC RX MED GY IP 250 OP 250 PS 637: Performed by: STUDENT IN AN ORGANIZED HEALTH CARE EDUCATION/TRAINING PROGRAM

## 2022-11-25 PROCEDURE — 83605 ASSAY OF LACTIC ACID: CPT | Performed by: INTERNAL MEDICINE

## 2022-11-25 PROCEDURE — 99233 SBSQ HOSP IP/OBS HIGH 50: CPT | Mod: FS | Performed by: STUDENT IN AN ORGANIZED HEALTH CARE EDUCATION/TRAINING PROGRAM

## 2022-11-25 PROCEDURE — 120N000002 HC R&B MED SURG/OB UMMC

## 2022-11-25 PROCEDURE — 87205 SMEAR GRAM STAIN: CPT | Performed by: PHYSICIAN ASSISTANT

## 2022-11-25 PROCEDURE — 88188 FLOWCYTOMETRY/READ 9-15: CPT | Mod: GC | Performed by: PATHOLOGY

## 2022-11-25 PROCEDURE — 3E0R305 INTRODUCTION OF OTHER ANTINEOPLASTIC INTO SPINAL CANAL, PERCUTANEOUS APPROACH: ICD-10-PCS | Performed by: PHYSICIAN ASSISTANT

## 2022-11-25 PROCEDURE — 250N000011 HC RX IP 250 OP 636: Performed by: STUDENT IN AN ORGANIZED HEALTH CARE EDUCATION/TRAINING PROGRAM

## 2022-11-25 PROCEDURE — 96450 CHEMOTHERAPY INTO CNS: CPT

## 2022-11-25 PROCEDURE — 36591 DRAW BLOOD OFF VENOUS DEVICE: CPT | Performed by: PHYSICIAN ASSISTANT

## 2022-11-25 PROCEDURE — 96450 CHEMOTHERAPY INTO CNS: CPT | Performed by: PHYSICIAN ASSISTANT

## 2022-11-25 PROCEDURE — 258N000002 HC RX IP 258 OP 250: Performed by: INTERNAL MEDICINE

## 2022-11-25 PROCEDURE — 250N000009 HC RX 250: Performed by: INTERNAL MEDICINE

## 2022-11-25 PROCEDURE — 250N000009 HC RX 250: Performed by: STUDENT IN AN ORGANIZED HEALTH CARE EDUCATION/TRAINING PROGRAM

## 2022-11-25 PROCEDURE — 258N000003 HC RX IP 258 OP 636: Performed by: INTERNAL MEDICINE

## 2022-11-25 PROCEDURE — 80053 COMPREHEN METABOLIC PANEL: CPT | Performed by: PHYSICIAN ASSISTANT

## 2022-11-25 PROCEDURE — 250N000011 HC RX IP 250 OP 636: Performed by: INTERNAL MEDICINE

## 2022-11-25 PROCEDURE — 85025 COMPLETE CBC W/AUTO DIFF WBC: CPT | Performed by: PHYSICIAN ASSISTANT

## 2022-11-25 PROCEDURE — 89050 BODY FLUID CELL COUNT: CPT | Performed by: PHYSICIAN ASSISTANT

## 2022-11-25 PROCEDURE — 81003 URINALYSIS AUTO W/O SCOPE: CPT | Performed by: INTERNAL MEDICINE

## 2022-11-25 PROCEDURE — 36591 DRAW BLOOD OFF VENOUS DEVICE: CPT | Performed by: INTERNAL MEDICINE

## 2022-11-25 PROCEDURE — 99207 XR LUMBAR PUNCTURE INTRATHECAL CHEMO ADMIN: CPT | Performed by: STUDENT IN AN ORGANIZED HEALTH CARE EDUCATION/TRAINING PROGRAM

## 2022-11-25 PROCEDURE — 88184 FLOWCYTOMETRY/ TC 1 MARKER: CPT | Performed by: PHYSICIAN ASSISTANT

## 2022-11-25 PROCEDURE — 99356 PR PROLONGED SERV,INPATIENT,1ST HR: CPT | Mod: FS | Performed by: STUDENT IN AN ORGANIZED HEALTH CARE EDUCATION/TRAINING PROGRAM

## 2022-11-25 PROCEDURE — 250N000011 HC RX IP 250 OP 636: Performed by: PHYSICIAN ASSISTANT

## 2022-11-25 PROCEDURE — 84157 ASSAY OF PROTEIN OTHER: CPT | Performed by: PHYSICIAN ASSISTANT

## 2022-11-25 PROCEDURE — 82945 GLUCOSE OTHER FLUID: CPT | Performed by: PHYSICIAN ASSISTANT

## 2022-11-25 PROCEDURE — 85610 PROTHROMBIN TIME: CPT | Performed by: INTERNAL MEDICINE

## 2022-11-25 PROCEDURE — 80204 DRUG ASSAY METHOTREXATE: CPT | Performed by: INTERNAL MEDICINE

## 2022-11-25 RX ORDER — LORAZEPAM 1 MG/1
2 TABLET ORAL ONCE
Status: COMPLETED | OUTPATIENT
Start: 2022-11-25 | End: 2022-11-25

## 2022-11-25 RX ORDER — LIDOCAINE HYDROCHLORIDE 10 MG/ML
5 INJECTION, SOLUTION EPIDURAL; INFILTRATION; INTRACAUDAL; PERINEURAL ONCE
Status: COMPLETED | OUTPATIENT
Start: 2022-11-25 | End: 2022-11-25

## 2022-11-25 RX ORDER — LORAZEPAM 0.5 MG/1
.5-1 TABLET ORAL EVERY 6 HOURS PRN
Status: DISCONTINUED | OUTPATIENT
Start: 2022-11-25 | End: 2022-11-26 | Stop reason: HOSPADM

## 2022-11-25 RX ORDER — LORAZEPAM 2 MG/ML
.5-1 INJECTION INTRAMUSCULAR EVERY 6 HOURS PRN
Status: DISCONTINUED | OUTPATIENT
Start: 2022-11-25 | End: 2022-11-26 | Stop reason: HOSPADM

## 2022-11-25 RX ADMIN — LORAZEPAM 2 MG: 1 TABLET ORAL at 10:55

## 2022-11-25 RX ADMIN — FAMOTIDINE 10 MG: 10 TABLET, FILM COATED ORAL at 20:35

## 2022-11-25 RX ADMIN — LEUCOVORIN CALCIUM 15 MG: 350 INJECTION, POWDER, LYOPHILIZED, FOR SOLUTION INTRAMUSCULAR; INTRAVENOUS at 16:38

## 2022-11-25 RX ADMIN — VALACYCLOVIR HYDROCHLORIDE 1000 MG: 500 TABLET, FILM COATED ORAL at 13:52

## 2022-11-25 RX ADMIN — LEUCOVORIN CALCIUM 50 MG: 350 INJECTION, POWDER, LYOPHILIZED, FOR SOLUTION INTRAMUSCULAR; INTRAVENOUS at 10:11

## 2022-11-25 RX ADMIN — IMATINIB MESYLATE 300 MG: 100 TABLET, FILM COATED ORAL at 09:16

## 2022-11-25 RX ADMIN — CYTARABINE 6960 MG: 100 INJECTION, SOLUTION INTRATHECAL; INTRAVENOUS; SUBCUTANEOUS at 21:14

## 2022-11-25 RX ADMIN — AMLODIPINE BESYLATE 5 MG: 5 TABLET ORAL at 09:15

## 2022-11-25 RX ADMIN — Medication 5 ML: at 09:55

## 2022-11-25 RX ADMIN — ONDANSETRON HYDROCHLORIDE 8 MG: 8 TABLET, FILM COATED ORAL at 09:15

## 2022-11-25 RX ADMIN — DEXAMETHASONE 12 MG: 4 TABLET ORAL at 13:52

## 2022-11-25 RX ADMIN — LEUCOVORIN CALCIUM 15 MG: 350 INJECTION, POWDER, LYOPHILIZED, FOR SOLUTION INTRAMUSCULAR; INTRAVENOUS at 22:13

## 2022-11-25 RX ADMIN — CYTARABINE 6960 MG: 100 INJECTION, SOLUTION INTRATHECAL; INTRAVENOUS; SUBCUTANEOUS at 10:27

## 2022-11-25 RX ADMIN — VALACYCLOVIR HYDROCHLORIDE 1000 MG: 500 TABLET, FILM COATED ORAL at 20:35

## 2022-11-25 RX ADMIN — PREDNISOLONE ACETATE 2 DROP: 10 SUSPENSION/ DROPS OPHTHALMIC at 16:37

## 2022-11-25 RX ADMIN — LIDOCAINE HYDROCHLORIDE 5 ML: 10 INJECTION, SOLUTION EPIDURAL; INFILTRATION; INTRACAUDAL; PERINEURAL at 11:20

## 2022-11-25 RX ADMIN — CYTARABINE: 100 INJECTION INTRATHECAL; INTRAVENOUS; SUBCUTANEOUS at 11:46

## 2022-11-25 RX ADMIN — Medication 5 ML: at 07:36

## 2022-11-25 RX ADMIN — SODIUM BICARBONATE: 84 INJECTION, SOLUTION INTRAVENOUS at 07:47

## 2022-11-25 RX ADMIN — PREDNISOLONE ACETATE 2 DROP: 10 SUSPENSION/ DROPS OPHTHALMIC at 20:35

## 2022-11-25 RX ADMIN — PREDNISOLONE ACETATE 2 DROP: 10 SUSPENSION/ DROPS OPHTHALMIC at 09:18

## 2022-11-25 RX ADMIN — Medication 5 ML: at 01:08

## 2022-11-25 RX ADMIN — ONDANSETRON HYDROCHLORIDE 8 MG: 8 TABLET, FILM COATED ORAL at 20:38

## 2022-11-25 RX ADMIN — FAMOTIDINE 10 MG: 10 TABLET, FILM COATED ORAL at 09:15

## 2022-11-25 RX ADMIN — SODIUM BICARBONATE: 84 INJECTION, SOLUTION INTRAVENOUS at 16:46

## 2022-11-25 RX ADMIN — PROCHLORPERAZINE EDISYLATE 10 MG: 5 INJECTION INTRAMUSCULAR; INTRAVENOUS at 01:14

## 2022-11-25 RX ADMIN — ONDANSETRON 8 MG: 4 TABLET, ORALLY DISINTEGRATING ORAL at 01:03

## 2022-11-25 RX ADMIN — VALACYCLOVIR HYDROCHLORIDE 1000 MG: 500 TABLET, FILM COATED ORAL at 09:15

## 2022-11-25 RX ADMIN — IMATINIB MESYLATE 300 MG: 100 TABLET, FILM COATED ORAL at 20:35

## 2022-11-25 RX ADMIN — PREDNISOLONE ACETATE 2 DROP: 10 SUSPENSION/ DROPS OPHTHALMIC at 13:48

## 2022-11-25 ASSESSMENT — ACTIVITIES OF DAILY LIVING (ADL)
ADLS_ACUITY_SCORE: 18

## 2022-11-25 NOTE — PROGRESS NOTES
D/I:  Patient received Dose #2 of 4 High Dose Cytarabine via PICC with brisk blood return checked prior to infusion.  Zofran premed given as ordered.  Cerebellar check completed & was intact.  Chemotherapy double checked by 2 chemo competent RNs.  Patient denied the need for further instruction.    A:  Patient appears to be tolerating chemotherapy well, thus far, with the exception of some nausea yesterday.    P:  Monitor patient for side effects of chemotherapy.  Continue with current plan of care.  Notify MD for any changes in patient's status.

## 2022-11-25 NOTE — PLAN OF CARE
Goal Outcome Evaluation:    [id number]    VSS on RA. Denies pain, nausea, SOB. Tylenol given for generalized discomfort. Methotrexate finished at 2200. Leucovorin to start at 1000 tomorrow. Bicarb infusing. Urine pH at 2200 was 7.5. Next pH at 0600. Dose 1 cytarabine infusing now. Cerebellar exam intact. Pred eye drops started prior.     Nursing Focus: Chemotherapy  D: Positive blood return via PICC. Insertion site is clean/dry/intact, dressing intact with no complaints of pain.  Urine output is recorded in intake in Doc Flowsheet.    I: Premedications given per order (see electronic medical administration record). Cerebellar exam intact. Dose #1 of Cytarabine started to infuse over 2 hours. Reviewed pt teaching on chemotherapy side effects.  Pt denies need for further teaching. Chemotherapy double checked per protocol by two chemotherapy competent RN's.   A: Tolerating procedure well. Denies nausea and or pain.   P: Continue to monitor urine output and symptoms of nausea. Screen for symptoms of toxicity.

## 2022-11-25 NOTE — PROCEDURES
DIAGNOSIS: ALL  PROCEDURE: Intrathecal chemo administration   LOCATION: Radiology  INDICATION: ALL  Pre-medication of 2 mg lorazepam for procedural anxiety which was well-tolerated and effective. Lumbar puncture performed and CSF samples collected by the General Radiology team under fluoroscopy. Chemotherapy was double-checked by this writer and Radiology RN. This writer then administered cytarabine (PF) (CYTOSAR) 100 mg in sodium chloride (PF) 0.9% PF 6 mL intrathecal inj (PF) without apparent complication.  Complications: None immediately.   Chemo instillation performed by: GRANT Graham PA-C  Pager: [phone]

## 2022-11-25 NOTE — PROGRESS NOTES
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 She is now s/p 3 cycles of GRAALL + Imatinib (C2=9/30/22 - complicated by disseminated zoster infection, C3=11/2/22 - tolerated without complications). Now presents for admission for cycle 4 GRAALL + imatinib following port placement with IR.    Today:   - Day 3 today; tolerating well. Start imatinib per protocol today (11/25).   - Completed IT chemo in XR; tolerated well with pre-medication of 2 mg ativan. Flow in process.  - Next due for pentamidine on 11/28; will complete prior to admission        HEME   # Ph(+) B-ALL   Followed by Dr. Hull. Patient presented to outside hospital with c/o right upper quadrant pain and was subsequently found to have a markedly elevated white blood cell count concerning for acute leukemia. While at OSH, she had CTA chest as well as CT A/P. These identified no PE, no acute or localized intraabdominal inflammatory process, mild splenomegaly, and few inconspicuous low-density structures in the liver. She was transferred to [facility] for further workup and management. S/p Hydrea 1g TID (8/21-8/23) with improvement in WBC (100K ? 12K). Diagnostic BMBx completed 8/20/22, which demonstrated B-ALL with 85% blasts and hypercellular marrow. IHC shows CD10+, CD34+. 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 ALL NGS negative. BCR/ABL1 major/minor (sent from peripheral blood) both negative. Initiated pre-phase steroids with Prednisone 60 mg x8/21; increased to 60 mg/m2 (140 mg) 8/22-8/24. She initiated GRAALL + imatinib (C1D1 = 8/24/22). S/p BMT consult 9/12 with Dr. Walsh. Induction was relatively uncomplicated. Post-induction BMBx done 9/26/22 and revealed MRD- CR1, with negative BCR/ABL1 and Clonoseq testing. With no matched related donors and excellent early/deep response chemotherapy is currently favored over BMT. 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 Underwent BMBx 10/25/22 with no morphologic evidence of B-lymphoblastic leukemia and flow with unusual B lineage precursor population 0.09%. C3 GRAALL + imatinib on 11/2/22 was tolerated well without complication.   - Overall current plan is to continue GRAALL for a total of 8 cycles (of note odd cycles are 21-days in length and even cycles are 14-days in length). Of note Imatinib refills are to be sent to  Specialty pharmacy though should confirm with inpatient pharmacist.   - Port placement with IR prior to admission   - Completed LP in XR on D2. Awaiting scheduling of D8 IT chemo outpatient.                    Treatment Plan: GRAALL + imatinib (C4D1 = 11/22/22)                - Premeds: Zofran (16 mg D1, 8 mg D2-3)                - Dexamethasone (12 mg D1-3, 8 mg D4-5)                - Imatinib 300 mg BID x14 days (11/25 - 12/8)               - Methotrexate (1 g/m2) IV x1 - D1     - Level in process 11/25               - Cytarabine 6,960 mg IV q12h x 4 doses - D2-3                - NaCl 0.45% 1L with sodium bicarb 150 mEq/L - D1                - NaCl 0.45% with sodium bicarb 100 mEq/L at 125 ml/h until MTX level is <0.1                - Sodium bicarb 8.4% intermittent infusion 50 mEq IV q2h prn                - Leucovorin 50 mg IV x1  (12h after MTX), then 15 mg IV q6h until MTX level is <0.05                - Pred forte eye drops - 2 drops both eyes QID x4 days                - IT Cytarabine - completed in XR on 11/25, requested on D8 OP                - Neulasta 6 mg - requested 11/29 or 11/30 outpatient       # Leukocytosis  Likely secondary to steroids per protocol, no s/sx infection.   - Continue to monitor.    # Anemia   Likely secondary to chemotherapy.  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 Presented with scattered papulovesicular lesions on her chin and nose as well as scattered hemorrhagic erosions on the scalp and lower face (V2-3 distributions of the trigeminal nerve) c/f disseminated VZV. Lesions significantly improved on admission.  - Per outpatient ID note, recommend Valtrex 1000 mg TID until completion of chemotherapy and complete resolution of suspected VZV lesions completely resolve. Then recommend Valtrex 1000 mg BID until count recovery for prophylaxis as she broke through ACV.       # ID PPx   - Valtrex 1000 mg TID until completion of chemo d/t h/o VZV as above   - Hold PTA fluconazole 200 mg daily and levofloxacin 250 mg daily given ANC >1.0   - Nebulized pentamidine last given 10/31, next due ~11/28  - Received Evusheld on 11/2/22       MISC   # Hypertension   - Continue PTA amlodipine 5 mg daily       # GERD   - PTA Protonix 20 mg daily held d/t concurrent methotrexate. Switch to pepcid 10 mg BID while admitted; resume protonix on discharge.        # Grade 1 neuropathy of the fingertips   Due to vincristine. Pt reports symptoms are stable. Reports mild paresthesias and hot/cold sensitivity.   - Monitor clinically       FEN:   -IVF per chemo plan   -PRN lyte replacement, potassium  repletion today   -RDAT       Prophy/Misc:   -VTE: Lovenox; hold if platelets <50K and prior to LP procedures   -GI/PUD: PTA Protonix ? Pepcid (with methotrexate)  -Bowels: PRN Senna and Miralax       Dispo: Pending completion and clearance of chemotherapy; anticipate 3-5-day stay   Follow Up Requested (11/25):   - Twice weekly labs and tx are scheduled week of 11/28   - Will need to request Neulasta infusion appt  - Will need to request [facility] hospital follow-up     Patient seen and discussed with attending physician, Dr. Recinos.      I spent 60 minutes in the care of this patient today, which included time necessary for review of interval events, obtaining history and physical exam, ordering medications/tests/procedures as medically indicated, review of pertinent medical literature, counseling of the patient, coordination of care, and documentation time. Over 50% of time was spent counseling the patient and/or coordinating care.     Marina Crain PA-C   Hematology/Oncology   Pager: #[phone]     Interval History   Overnight no acute events. Patient tolerating chemotherapy okay so far although did note worsening nausea and a headache towards the end of cytarabine yesterday. Will continue with premedications and could always consider additional PRNs. Tylenol available for headache. Port site feeling better today. No other new or worsening symptoms. No current headaches or nausea. No lower extremity edema. Urinating frequently with fluids and bowels working well. Questions answered at bedside.    Physical Exam   Temp: 98.1  F (36.7  C) Temp src: Oral BP: 114/60 Pulse: 90   Resp: 20 SpO2: 97 % O2 Device: None (Room air)    Vitals:    11/23/22 1348 11/24/22 0700 11/25/22 0900   Weight: 114.3 kg (251 lb 15.8 oz) 111.8 kg (246 lb 8 oz) 110.1 kg (242 lb 11.2 oz)     Vital Signs with Ranges  Temp:  [97.8  F (36.6  C)-98.8  F (37.1  C)] 98.1  F (36.7  C)  Pulse:  [65-98] 90  Resp:  [16-20] 20  BP: (107-135)/(54-75) 114/60  SpO2:   [97 %-99 %] 97 %  I/O last 3 completed shifts:  In: 3581.2 [I.V.:3010; IV Piggyback:571.2]  Out: 6700 [Urine:6700]    Constitutional: Pleasant appearing female seen laying in bed. No apparent distress, and appears stated age.  Eyes: Lids and lashes normal, sclera clear, conjunctiva normal.  ENT: Normocephalic, without obvious abnormality, atraumatic  Respiratory: No increased work of breathing, good air exchange, clear to auscultation bilaterally, no crackles or wheezing.  Cardiovascular: Regular rate and rhythm, no murmur noted.  GI: No masses or scars. +BS. Soft. No tenderness on palpation.  Skin: No bruising or bleeding appreciated. Normal skin color, texture and turgor without redness, warmth, or swelling. No rashes, no lesions, no jaundice.  Extremities: There is no redness, warmth, or swelling of the joints. No lower extremity edema. No cyanosis.  Neurologic: Awake, alert, oriented to name, place and time.    Vascular access: Newly-placed R port a cath c/d/i    Medications     IV infusion builder WITH additives 125 mL/hr at 11/25/22 0747     - MEDICATION INSTRUCTIONS -       sodium chloride 0.9%       sodium chloride 75 mL/hr at 11/23/22 0748       amLODIPine  5 mg Oral Daily     cytarabine (CYTOSAR) infusion  3 g/m2 (Treatment Plan Recorded) Intravenous Q12H     dexamethasone  12 mg Oral Q20H     [START ON 11/26/2022] dexamethasone  8 mg Oral Daily     [Held by provider] enoxaparin ANTICOAGULANT  40 mg Subcutaneous Q24H     famotidine  10 mg Oral BID     heparin  5-10 mL Intracatheter Q28 Days     heparin lock flush  5-10 mL Intracatheter Q24H     imatinib  300 mg Oral BID     leucovorin calcium  15 mg Intravenous Q6H     ondansetron  8 mg Oral Q12H     prednisoLONE acetate  2 drop Both Eyes 4x Daily     sennosides  1-2 tablet Oral BID     sodium chloride (PF)  10-20 mL Intracatheter Q28 Days     sodium chloride (PF)  3 mL Intracatheter Q8H     valACYclovir  1,000 mg Oral TID       Data   Results for orders  placed or performed during the hospital encounter of 11/23/22 (from the past 24 hour(s))   pH urine POCT   Result Value Ref Range    pH Urine 8.0 5.0 - 7.0 pH   Lactic Acid STAT   Result Value Ref Range    Lactic Acid 1.9 0.7 - 2.0 mmol/L   pH urine POCT   Result Value Ref Range    pH Urine 7.5 5.0 - 7.0 pH   pH urine POCT   Result Value Ref Range    pH Urine 7.5 5.0 - 7.0 pH   CBC with platelets differential    Narrative    The following orders were created for panel order CBC with platelets differential.  Procedure                               Abnormality         Status                     ---------                               -----------         ------                     CBC with platelets and d...[[id number]]  Abnormal            Final result                 Please view results for these tests on the individual orders.   Comprehensive metabolic panel   Result Value Ref Range    Sodium 139 136 - 145 mmol/L    Potassium 3.8 3.4 - 5.3 mmol/L    Chloride 99 98 - 107 mmol/L    Carbon Dioxide (CO2) 30 (H) 22 - 29 mmol/L    Anion Gap 10 7 - 15 mmol/L    Urea Nitrogen 13.2 6.0 - 20.0 mg/dL    Creatinine 0.47 (L) 0.51 - 0.95 mg/dL    Calcium 7.7 (L) 8.6 - 10.0 mg/dL    Glucose 130 (H) 70 - 99 mg/dL    Alkaline Phosphatase 70 35 - 104 U/L    AST 13 10 - 35 U/L    ALT 17 10 - 35 U/L    Protein Total 5.2 (L) 6.4 - 8.3 g/dL    Albumin 3.2 (L) 3.5 - 5.2 g/dL    Bilirubin Total 0.3 <=1.2 mg/dL    GFR Estimate >90 >60 mL/min/1.73m2   CBC with platelets and differential   Result Value Ref Range    WBC Count 12.7 (H) 4.0 - 11.0 10e3/uL    RBC Count 2.26 (L) 3.80 - 5.20 10e6/uL    Hemoglobin 7.4 (L) 11.7 - 15.7 g/dL    Hematocrit 23.1 (L) 35.0 - 47.0 %     (H) 78 - 100 fL    MCH 32.7 26.5 - 33.0 pg    MCHC 32.0 31.5 - 36.5 g/dL    RDW 21.0 (H) 10.0 - 15.0 %    Platelet Count 278 150 - 450 10e3/uL    % Neutrophils 95 %    % Lymphocytes 1 %    % Monocytes 3 %    % Eosinophils 0 %    % Basophils 0 %    % Immature  Granulocytes 1 %    NRBCs per 100 WBC 0 <1 /100    Absolute Neutrophils 12.0 (H) 1.6 - 8.3 10e3/uL    Absolute Lymphocytes 0.1 (L) 0.8 - 5.3 10e3/uL    Absolute Monocytes 0.4 0.0 - 1.3 10e3/uL    Absolute Eosinophils 0.0 0.0 - 0.7 10e3/uL    Absolute Basophils 0.0 0.0 - 0.2 10e3/uL    Absolute Immature Granulocytes 0.2 <=0.4 10e3/uL    Absolute NRBCs 0.0 10e3/uL   INR   Result Value Ref Range    INR 1.08 0.85 - 1.15   Lactic Acid STAT   Result Value Ref Range    Lactic Acid 1.7 0.7 - 2.0 mmol/L   CSF Cell Count with Differential:    Narrative    The following orders were created for panel order CSF Cell Count with Differential:.  Procedure                               Abnormality         Status                     ---------                               -----------         ------                     Cell Count CSF[[id number]]                                                                Please view results for these tests on the individual orders.

## 2022-11-25 NOTE — PLAN OF CARE
"[id number]  /65 (BP Location: Right arm)   Pulse 84   Temp 97.8  F (36.6  C) (Oral)   Resp 18   Ht 1.626 m (5' 4\")   Wt 111.8 kg (246 lb 8 oz)   SpO2 98%   BMI 42.31 kg/m      Afebrile, VSS. No acute event.     Today:   - Day 2 today; tolerating well.   - Plan to start imatinib on 11/25.   - Plan for IT chemo in XR tomorrow at 1100.     NEURO: Alert and oriented x4.      RESPIRATORY: Room Air, denies dizziness, and SOB.     CARDIO: VSS, denies dizziness, and extremity pain.      GI/: Denies N/V, BS active, BM x1, AUOP (see MAR)     SKIN: Intact/ No change     ACTIVITY: Independent.      PAIN: Denies pain.    DLA:  PICC double lumen infusing, and new Port (heparin locked).    BG: No      PLAN: Continue monitoring.     * Italic, from provider's note.        Goal Outcome Evaluation:      Plan of Care Reviewed With: patient                 "

## 2022-11-25 NOTE — PLAN OF CARE
Goal Outcome Evaluation:       RN Note [id number]:  Patient admitted for Cycle 4 GRALL for Ph+ ALL.  Patient received Dose 2 of 4 High Dose Cytarabine (see chemo note).  Patient also had LP with IT chemo today.  Leucovorin started today.  Urine pH was 8.0; next due at 2200. Methotrexate level was 0.48.  Patient voiding adequately; LBM was yesterday 11/24.  Sepsis protocol triggered; Lactic Acid was 1.7.

## 2022-11-25 NOTE — PLAN OF CARE
[id number]    A&Ox4. VSS on RA. Nausea managed with prn odt zofran x1 and IV compazine x1. Denies nausea and SOB. Urine pH at 0600 was 7.5. Next pH due at 1400. Bicarb infusing 125 mL/hr. Up independently. Pt slept between cares this shift. Plan to start imatinib today and for IT chemo in XR at 1100. Continue with plan of care.

## 2022-11-26 VITALS
OXYGEN SATURATION: 97 % | HEIGHT: 64 IN | SYSTOLIC BLOOD PRESSURE: 119 MMHG | TEMPERATURE: 98.6 F | BODY MASS INDEX: 41.47 KG/M2 | RESPIRATION RATE: 18 BRPM | WEIGHT: 242.9 LBS | DIASTOLIC BLOOD PRESSURE: 70 MMHG | HEART RATE: 73 BPM

## 2022-11-26 LAB
ABO/RH(D): NORMAL
ALBUMIN SERPL BCG-MCNC: 3.3 G/DL (ref 3.5–5.2)
ALP SERPL-CCNC: 63 U/L (ref 35–104)
ALT SERPL W P-5'-P-CCNC: 16 U/L (ref 10–35)
ANION GAP SERPL CALCULATED.3IONS-SCNC: 9 MMOL/L (ref 7–15)
ANTIBODY SCREEN: NEGATIVE
AST SERPL W P-5'-P-CCNC: 12 U/L (ref 10–35)
BASOPHILS # BLD AUTO: 0 10E3/UL (ref 0–0.2)
BASOPHILS NFR BLD AUTO: 0 %
BILIRUB SERPL-MCNC: 0.4 MG/DL
BLD PROD TYP BPU: NORMAL
BLOOD COMPONENT TYPE: NORMAL
BUN SERPL-MCNC: 15.3 MG/DL (ref 6–20)
CALCIUM SERPL-MCNC: 8.6 MG/DL (ref 8.6–10)
CHLORIDE SERPL-SCNC: 103 MMOL/L (ref 98–107)
CODING SYSTEM: NORMAL
CREAT SERPL-MCNC: 0.54 MG/DL (ref 0.51–0.95)
CROSSMATCH: NORMAL
DEPRECATED HCO3 PLAS-SCNC: 26 MMOL/L (ref 22–29)
EOSINOPHIL # BLD AUTO: 0 10E3/UL (ref 0–0.7)
EOSINOPHIL NFR BLD AUTO: 0 %
ERYTHROCYTE [DISTWIDTH] IN BLOOD BY AUTOMATED COUNT: 21.2 % (ref 10–15)
GFR SERPL CREATININE-BSD FRML MDRD: >90 ML/MIN/1.73M2
GLUCOSE SERPL-MCNC: 127 MG/DL (ref 70–99)
HCT VFR BLD AUTO: 21.8 % (ref 35–47)
HGB BLD-MCNC: 7.2 G/DL (ref 11.7–15.7)
IMM GRANULOCYTES # BLD: 0.1 10E3/UL
IMM GRANULOCYTES NFR BLD: 1 %
ISSUE DATE AND TIME: NORMAL
LYMPHOCYTES # BLD AUTO: 0.1 10E3/UL (ref 0.8–5.3)
LYMPHOCYTES NFR BLD AUTO: 1 %
MCH RBC QN AUTO: 33.3 PG (ref 26.5–33)
MCHC RBC AUTO-ENTMCNC: 33 G/DL (ref 31.5–36.5)
MCV RBC AUTO: 101 FL (ref 78–100)
MONOCYTES # BLD AUTO: 0.1 10E3/UL (ref 0–1.3)
MONOCYTES NFR BLD AUTO: 1 %
MTX SERPL-SCNC: <0.04 UMOL/L
NEUTROPHILS # BLD AUTO: 9.6 10E3/UL (ref 1.6–8.3)
NEUTROPHILS NFR BLD AUTO: 97 %
NRBC # BLD AUTO: 0 10E3/UL
NRBC BLD AUTO-RTO: 0 /100
PH UR STRIP: 7.5 PH (ref 5–7)
PLATELET # BLD AUTO: 216 10E3/UL (ref 150–450)
POTASSIUM SERPL-SCNC: 4.2 MMOL/L (ref 3.4–5.3)
PROT SERPL-MCNC: 5.2 G/DL (ref 6.4–8.3)
RBC # BLD AUTO: 2.16 10E6/UL (ref 3.8–5.2)
SODIUM SERPL-SCNC: 138 MMOL/L (ref 136–145)
SPECIMEN EXPIRATION DATE: NORMAL
UNIT ABO/RH: NORMAL
UNIT NUMBER: NORMAL
UNIT STATUS: NORMAL
UNIT TYPE ISBT: 5100
WBC # BLD AUTO: 9.9 10E3/UL (ref 4–11)

## 2022-11-26 PROCEDURE — 80204 DRUG ASSAY METHOTREXATE: CPT | Performed by: INTERNAL MEDICINE

## 2022-11-26 PROCEDURE — 80053 COMPREHEN METABOLIC PANEL: CPT | Performed by: PHYSICIAN ASSISTANT

## 2022-11-26 PROCEDURE — 250N000011 HC RX IP 250 OP 636: Performed by: STUDENT IN AN ORGANIZED HEALTH CARE EDUCATION/TRAINING PROGRAM

## 2022-11-26 PROCEDURE — 99239 HOSP IP/OBS DSCHRG MGMT >30: CPT | Mod: FS | Performed by: STUDENT IN AN ORGANIZED HEALTH CARE EDUCATION/TRAINING PROGRAM

## 2022-11-26 PROCEDURE — 250N000013 HC RX MED GY IP 250 OP 250 PS 637: Performed by: STUDENT IN AN ORGANIZED HEALTH CARE EDUCATION/TRAINING PROGRAM

## 2022-11-26 PROCEDURE — 258N000002 HC RX IP 258 OP 250: Performed by: INTERNAL MEDICINE

## 2022-11-26 PROCEDURE — 258N000003 HC RX IP 258 OP 636: Performed by: INTERNAL MEDICINE

## 2022-11-26 PROCEDURE — 250N000013 HC RX MED GY IP 250 OP 250 PS 637: Performed by: PHYSICIAN ASSISTANT

## 2022-11-26 PROCEDURE — 85004 AUTOMATED DIFF WBC COUNT: CPT | Performed by: PHYSICIAN ASSISTANT

## 2022-11-26 PROCEDURE — 250N000011 HC RX IP 250 OP 636: Performed by: INTERNAL MEDICINE

## 2022-11-26 PROCEDURE — 250N000009 HC RX 250: Performed by: INTERNAL MEDICINE

## 2022-11-26 PROCEDURE — 36591 DRAW BLOOD OFF VENOUS DEVICE: CPT | Performed by: PHYSICIAN ASSISTANT

## 2022-11-26 PROCEDURE — 82040 ASSAY OF SERUM ALBUMIN: CPT | Performed by: PHYSICIAN ASSISTANT

## 2022-11-26 PROCEDURE — 86901 BLOOD TYPING SEROLOGIC RH(D): CPT | Performed by: PHYSICIAN ASSISTANT

## 2022-11-26 PROCEDURE — P9040 RBC LEUKOREDUCED IRRADIATED: HCPCS | Performed by: PHYSICIAN ASSISTANT

## 2022-11-26 PROCEDURE — 86850 RBC ANTIBODY SCREEN: CPT | Performed by: PHYSICIAN ASSISTANT

## 2022-11-26 PROCEDURE — 86923 COMPATIBILITY TEST ELECTRIC: CPT | Performed by: PHYSICIAN ASSISTANT

## 2022-11-26 PROCEDURE — 81003 URINALYSIS AUTO W/O SCOPE: CPT | Performed by: INTERNAL MEDICINE

## 2022-11-26 RX ORDER — DIPHENHYDRAMINE HCL 25 MG
25-50 CAPSULE ORAL ONCE
Status: COMPLETED | OUTPATIENT
Start: 2022-11-26 | End: 2022-11-26

## 2022-11-26 RX ORDER — VALACYCLOVIR HYDROCHLORIDE 1 G/1
1000 TABLET, FILM COATED ORAL 3 TIMES DAILY
Qty: 42 TABLET | Refills: 0 | Status: ON HOLD | OUTPATIENT
Start: 2022-11-26 | End: 2022-12-22

## 2022-11-26 RX ORDER — PREDNISOLONE ACETATE 10 MG/ML
2 SUSPENSION/ DROPS OPHTHALMIC 4 TIMES DAILY
Qty: 5 ML | Refills: 0 | Status: SHIPPED | OUTPATIENT
Start: 2022-11-26 | End: 2022-11-29

## 2022-11-26 RX ORDER — LIDOCAINE AND PRILOCAINE 25; 25 MG/G; MG/G
CREAM TOPICAL
Qty: 1 KIT | Refills: 1 | Status: SHIPPED | OUTPATIENT
Start: 2022-11-26 | End: 2024-01-25

## 2022-11-26 RX ORDER — IMATINIB MESYLATE 100 MG/1
300 TABLET, FILM COATED ORAL 2 TIMES DAILY
Qty: 84 TABLET | Refills: 0 | Status: ON HOLD | OUTPATIENT
Start: 2022-11-26 | End: 2022-12-14

## 2022-11-26 RX ORDER — DEXAMETHASONE 4 MG/1
8 TABLET ORAL ONCE
Qty: 2 TABLET | Refills: 0 | Status: SHIPPED | OUTPATIENT
Start: 2022-11-26 | End: 2022-12-14

## 2022-11-26 RX ADMIN — DEXAMETHASONE 8 MG: 4 TABLET ORAL at 08:45

## 2022-11-26 RX ADMIN — VALACYCLOVIR HYDROCHLORIDE 1000 MG: 500 TABLET, FILM COATED ORAL at 08:46

## 2022-11-26 RX ADMIN — DIPHENHYDRAMINE HYDROCHLORIDE 50 MG: 25 CAPSULE ORAL at 11:30

## 2022-11-26 RX ADMIN — PREDNISOLONE ACETATE 2 DROP: 10 SUSPENSION/ DROPS OPHTHALMIC at 08:48

## 2022-11-26 RX ADMIN — SODIUM BICARBONATE: 84 INJECTION, SOLUTION INTRAVENOUS at 01:19

## 2022-11-26 RX ADMIN — AMLODIPINE BESYLATE 5 MG: 5 TABLET ORAL at 08:45

## 2022-11-26 RX ADMIN — CYTARABINE 6960 MG: 100 INJECTION, SOLUTION INTRATHECAL; INTRAVENOUS; SUBCUTANEOUS at 09:22

## 2022-11-26 RX ADMIN — ACETAMINOPHEN 1000 MG: 500 TABLET ORAL at 09:36

## 2022-11-26 RX ADMIN — IMATINIB MESYLATE 300 MG: 100 TABLET, FILM COATED ORAL at 08:51

## 2022-11-26 RX ADMIN — FAMOTIDINE 10 MG: 10 TABLET, FILM COATED ORAL at 08:45

## 2022-11-26 RX ADMIN — Medication 5 ML: at 13:30

## 2022-11-26 RX ADMIN — Medication 5 ML: at 05:42

## 2022-11-26 RX ADMIN — PREDNISOLONE ACETATE 2 DROP: 10 SUSPENSION/ DROPS OPHTHALMIC at 13:25

## 2022-11-26 RX ADMIN — LEUCOVORIN CALCIUM 15 MG: 350 INJECTION, POWDER, LYOPHILIZED, FOR SOLUTION INTRAMUSCULAR; INTRAVENOUS at 03:59

## 2022-11-26 RX ADMIN — ONDANSETRON HYDROCHLORIDE 8 MG: 8 TABLET, FILM COATED ORAL at 08:45

## 2022-11-26 ASSESSMENT — ACTIVITIES OF DAILY LIVING (ADL)
ADLS_ACUITY_SCORE: 18

## 2022-11-26 NOTE — DISCHARGE SUMMARY
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 She is now s/p 3 cycles of GRAALL + Imatinib (C2=9/30/22 - complicated by disseminated zoster infection, C3=11/2/22 - tolerated without complications) and presents for admission for cycle 4 GRAALL + imatinib following port placement with IR. Overall, cycle 4 chemotherapy was very well-tolerated without any new or worsening symptoms. She underwent LP with IT chemo in XR per protocol with flow cytometry still in process.    On day of discharge, patient states she is feeling well and ready to go home. She will receive her final dose of cytarabine and 1u pRBCs prior to discharge (hgb 7.2). She is arranged for proper outpatient follow up as below. She is hemodynamically stable, well appearing and labs without concern for discharge. She is aware of plan of care and discharge precautions discussed. Questions answered at bedside.    Recommendations for Outpatient:  - Please follow up to ensure ~day 8 IT chemo has been scheduled - this was requested while inpatient although unable to be scheduled (likely contribution from holiday week staffing). She did complete D8 IT chemo; flow in process.  - Please ensure resumption of levaquin/fluc if ANC <1000.   - Requested add on pentamidine to appt on 11/29 (due 11/28) - if unable, ok for later in week.     Follow-up:  - Labs/AVELINA/Neulasta/poss pRBCs - 11/29  - Labs/poss transfusions - 12/2  -  Requested add on Pentamidine 11/29 or 12/2  - Requested LP with IT chemo in XR ~11/30 - in process    New/changed medications:   - dex 8 mg on 11/27 per protocol  - pred forte eye drops through 11/29 AM  - EMLA cream kit for port access  - imatinib BID x 14d (through 12/8) - pt has supply at home  - refilled valcyte - continues on TID dosing    HEME   # Ph(+) B-ALL   Followed by Dr. Hull. Patient presented to outside hospital with c/o right upper quadrant pain and was subsequently found to have a markedly elevated white blood cell count concerning for acute leukemia. While at OSH, she had CTA chest as well as CT A/P. These identified no PE, no acute or localized intraabdominal inflammatory process, mild splenomegaly, and few inconspicuous low-density structures in the liver. She was transferred to [facility] for further workup and management. S/p Hydrea 1g TID (8/21-8/23) with improvement in WBC (100K ? 12K). Diagnostic BMBx completed 8/20/22, which demonstrated B-ALL with 85% blasts and hypercellular marrow. IHC shows CD10+, CD34+. 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 ALL NGS negative. BCR/ABL1 major/minor (sent from peripheral blood) both negative. Initiated pre-phase steroids with Prednisone 60 mg x8/21; increased to 60 mg/m2 (140 mg) 8/22-8/24. She initiated GRAALL + imatinib (C1D1 = 8/24/22). S/p BMT consult 9/12 with Dr. Walsh. Induction was relatively uncomplicated. Post-induction BMBx done 9/26/22 and revealed MRD- CR1, with negative BCR/ABL1 and Clonoseq testing. With no matched related donors and excellent  early/deep response chemotherapy is currently favored over BMT. 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 Underwent BMBx 10/25/22 with no morphologic evidence of B-lymphoblastic leukemia and flow with unusual B lineage precursor population 0.09%. C3 GRAALL + imatinib on 11/2/22 was tolerated well without complication.   - Overall current plan is to continue GRAALL for a total of 8 cycles (of note odd cycles are 21-days in length and even cycles are 14-days in length). Of note Imatinib refills are to be sent to  Specialty pharmacy though should confirm with inpatient pharmacist.   - Port placement with IR prior to admission   - Completed LP in XR on D2. Awaiting scheduling of D8 IT chemo outpatient.                    Treatment Plan: GRAALL + imatinib (C4D1 = 11/22/22)                - Premeds: Zofran (16 mg D1, 8 mg D2-3)                - Dexamethasone (12 mg D1-3, 8 mg D4-5)                - Imatinib 300 mg BID x14 days (11/25 - 12/8)               - Methotrexate (1 g/m2) IV x1 - D1                             - Level in process 11/25               - Cytarabine 6,960 mg IV q12h x 4 doses - D2-3                - NaCl 0.45% 1L with sodium bicarb 150 mEq/L - D1                - NaCl 0.45% with sodium bicarb 100 mEq/L at 125 ml/h until MTX level is <0.1                - Sodium bicarb 8.4% intermittent infusion 50 mEq IV q2h prn                - Leucovorin 50 mg IV x1 (12h after MTX), then 15 mg IV q6h until MTX level is <0.05                - Pred forte eye drops - 2 drops both eyes QID x4 days                - IT Cytarabine - completed in XR on 11/25, requested on D8 OP                - Neulasta 6 mg - requested 11/29 or 11/30 outpatient       # Leukocytosis  Likely secondary to steroids per protocol, no s/sx infection.   - Continue to monitor.     # Anemia   Likely secondary to chemotherapy. Transfuse today for hgb 7.2 on day of discharge.  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 Presented with scattered papulovesicular lesions on her chin and nose as well as scattered hemorrhagic erosions on the scalp and lower face (V2-3 distributions of the trigeminal nerve) c/f disseminated VZV. Lesions significantly improved on admission.  - Per outpatient ID note, recommend Valtrex 1000 mg TID until completion of chemotherapy and complete resolution of suspected VZV lesions completely resolve. Then recommend Valtrex 1000 mg BID until count recovery for prophylaxis as she broke through ACV.       # ID PPx   - Valtrex 1000 mg TID until completion of chemo d/t h/o VZV as above   - Hold PTA fluconazole 200 mg daily and levofloxacin 250 mg daily given ANC >1.0   - Nebulized pentamidine last given 10/31, next due ~11/28  - Received Evusheld on 11/2/22       MISC   # Hypertension   - Continue PTA amlodipine 5 mg daily       # GERD   - PTA Protonix 20 mg daily held d/t concurrent methotrexate. Switch to pepcid 10 mg BID while admitted; resume protonix on discharge.        # Grade 1 neuropathy of the fingertips   Due to vincristine. Pt reports symptoms are stable. Reports mild paresthesias and hot/cold sensitivity.   - Monitor clinically     Patient seen and discussed with attending physician, Dr. Recinos.      I spent 60 minutes in the care of this patient today, which included time necessary for review of interval events, obtaining history and physical exam, ordering medications/tests/procedures as medically indicated, review of pertinent medical literature, counseling of the patient, coordination of care, and documentation time. Over 50% of time was spent counseling the patient and/or coordinating care.    Marina Crain PA-C   Hematology/Oncology  Pager:  [phone]    Significant Results and Procedures   See below    Pending Results   These results will be followed up by OP team  Unresulted Labs Ordered in the Past 30 Days of this Admission     Date and Time Order Name Status Description    11/25/2022  7:19 AM Flow Cytometry Cerebrospinal fluid In process     11/25/2022  7:19 AM Cerebrospinal fluid Aerobic Bacterial Culture Routine Preliminary     11/21/2022  2:15 PM PREPARE PHERESED PLATELETS (UNIT) Preliminary     11/16/2022  3:00 PM PREPARE PHERESED PLATELETS (UNIT) Preliminary     11/16/2022  3:00 PM PREPARE RED BLOOD CELLS (UNIT) Preliminary     11/16/2022  3:00 PM PREPARE PHERESED PLATELETS (UNIT) Preliminary     11/9/2022  3:30 PM PREPARE RED BLOOD CELLS (UNIT) Preliminary     11/6/2022 12:15 PM PREPARE RED BLOOD CELLS (UNIT) Preliminary           Code Status   Full Code    Primary Care Physician   [facility]  Constitutional: Pleasant appearing female seen laying in bed. No apparent distress, and appears stated age.  Eyes: Lids and lashes normal, sclera clear, conjunctiva normal.  ENT: Normocephalic, without obvious abnormality, atraumatic  Respiratory: No increased work of breathing, good air exchange, clear to auscultation bilaterally, no crackles or wheezing.  Cardiovascular: Regular rate and rhythm, no murmur noted.  GI: No masses or scars. +BS. Soft. No tenderness on palpation.  Skin: No bruising or bleeding appreciated. Normal skin color, texture and turgor without redness, warmth, or swelling. No rashes, no lesions, no jaundice. Well healing lesions from VZV on scalp.  Extremities: There is no redness, warmth, or swelling of the joints. No lower extremity edema. No cyanosis.  Neurologic: Awake, alert, oriented to name, place and time.    Vascular access: Newly-placed R port a cath c/d/i    Time Spent on this Encounter   Marina JACKSON PA-C, personally saw the patient today and spent greater than 30 minutes discharging  this patient.    Discharge Disposition   Discharged to home  Condition at discharge: Stable    Consultations This Hospital Stay   NURSING TO CONSULT FOR VASCULAR ACCESS CARE IP CONSULT    Discharge Orders      XR Lumbar Puncture Intrathecal Chemo Admin     Reason for your hospital stay    You were hospitalized for cycle 4 of your chemotherapy which was tolerated well without concerns. You are arranged for the appropriate outpatient follow up as listed below.     Follow Up and recommended labs and tests    Follow-up arranged as below:   - Labs/poss transfusion/ video AVELINA visit - 11/29  - Labs/poss transfusion - 12/2  - Awaiting formal scheduling of LP with IT chemo for next week     Activity    Your activity upon discharge: activity as tolerated     When to contact your care team    [facility] triage line at [phone] for temp > or = 100.4, uncontrolled nausea/vomiting/diarrhea/constipation, unrelieved pain, bleeding not relieved with pressure, dizziness, chest pain, shortness of breath, loss of consciousness, and any new or concerning symptoms.     Diet    Follow this diet upon discharge: Regular Diet Adult     Check Out Appointment Request    - Add on Neulasta to visits on - 11/29  - LP in XR w/ labs/AVELINA visit ~11/30 - 12/1  - 2x labs/poss transfusions weeks of 12/5 and 12/12     Discharge Medications   Current Discharge Medication List      START taking these medications    Details   lidocaine-prilocaine (LIDOPRIL) 2.5-2.5 % kit Use prior to lab access  Qty: 1 kit, Refills: 1    Associated Diagnoses: 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)      prednisoLONE acetate (PRED FORTE) 1 % ophthalmic suspension Place 2 drops into both eyes 4 times daily for 3 days until the AM of 11/29 then stop.  Qty: 5 mL, Refills: 0    Associated Diagnoses: 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); Prophylaxis for chemotherapy-induced neutropenia          CONTINUE these medications which have CHANGED    Details   dexamethasone (DECADRON) 4 MG tablet Take 2 tablets (8 mg) by mouth once for 1 dose on 11/27  Qty: 2 tablet, Refills: 0    Associated Diagnoses: 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); Prophylaxis for chemotherapy-induced neutropenia      imatinib (GLEEVEC) 100 MG tablet Take 3 tablets (300 mg) by mouth 2 times daily through 12/8  Qty: 84 tablet, Refills: 0    Associated Diagnoses: 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); Prophylaxis for chemotherapy-induced neutropenia      valACYclovir (VALTREX) 1000 mg tablet Take 1 tablet (1,000 mg) by mouth 3 times daily until instructed to stop  Qty: 42 tablet, Refills: 0    Associated Diagnoses: VZV (varicella-zoster virus) infection         CONTINUE these medications which have NOT CHANGED    Details   amLODIPine (NORVASC) 5 MG tablet Take 1 tablet (5 mg) by mouth daily  Qty: 30 tablet, Refills: 4    Associated Diagnoses: Hypertension, unspecified type; 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)      CHOLECALCIFEROL PO Take 1 tablet by mouth daily Unknown strength.      fluconazole (DIFLUCAN) 200 MG tablet Take 1 tablet (200 mg) by mouth daily as needed (Take when ANC <1000)  Qty: 60 tablet, Refills: 0    Associated Diagnoses: 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)      levofloxacin (LEVAQUIN) 250 MG tablet Take 1 tablet (250 mg) by mouth daily as needed (ANC <1000)  Qty: 60 tablet, Refills: 0    Associated Diagnoses: 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)      pantoprazole (PROTONIX) 20 MG EC tablet Take 1 tablet (20 mg) by mouth daily  Qty: 90 tablet, Refills: 1    Associated Diagnoses: Gastroesophageal reflux disease, unspecified whether esophagitis present      acetaminophen (TYLENOL) 325 MG tablet Take 2 tablets (650 mg) by mouth every 6 hours as needed for mild pain,  other or fever (blood product administration premedication)    Associated Diagnoses: 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)      LORazepam (ATIVAN) 0.5 MG tablet Take 1-2 tablets (0.5-1 mg) by mouth daily as needed for anxiety . Take prior to BMBx procedures as needed. If initial dose unhelpful, okay to take a second 0.5-1 mg dose (for a max of 2 mg at a time). Caution: causes sedation. Do not drive after taking this medication.  Qty: 10 tablet, Refills: 0    Associated Diagnoses: Acute lymphoblastic leukemia (ALL) in remission (H)      ondansetron (ZOFRAN ODT) 4 MG ODT tab Take 1-2 tablets (4-8 mg) by mouth every 8 hours as needed for nausea or vomiting  Qty: 60 tablet, Refills: 0    Associated Diagnoses: 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)      prochlorperazine (COMPAZINE) 10 MG tablet Take 1 tablet (10 mg) by mouth every 6 hours as needed (Breakthrough Nausea/Vomiting)  Qty: 20 tablet, Refills: 0    Associated Diagnoses: 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)      sennosides (SENOKOT) 8.6 MG tablet Take 1-2 tablets by mouth 2 times daily  Qty: 60 tablet, Refills: 1    Associated Diagnoses: Constipation, unspecified constipation type         STOP taking these medications       heparin lock flush 10 UNIT/ML SOLN injection Comments:   Reason for Stopping:         sodium chloride, PF, (SALINE FLUSH) 0.9% PF flush Comments:   Reason for Stopping:             Allergies   Allergies   Allergen Reactions     Blood Transfusion Related (Informational Only) Hives     Hive reaction with platelet transfusion on 8/26/2022. Please administer platelets with tylenol and benadryl premedication.      Data   Most Recent 3 CBC's:Recent Labs   Lab Test 11/26/22  0548 11/25/22  0701 11/24/22  0551   WBC 9.9 12.7* 13.0*   HGB 7.2* 7.4* 8.1*   * 102* 103*    278 317      Most Recent 3 BMP's:  Recent Labs   Lab Test 11/26/22  0548  11/25/22  0701 11/24/22  0551    139 137   POTASSIUM 4.2 3.8 4.4  4.4   CHLORIDE 103 99 100   CO2 26 30* 26   BUN 15.3 13.2 10.7   CR 0.54 0.47* 0.58   ANIONGAP 9 10 11   SOLEDAD 8.6 7.7* 8.5*   * 130* 143*     Most Recent 2 LFT's:  Recent Labs   Lab Test 11/26/22  0548 11/25/22  0701   AST 12 13   ALT 16 17   ALKPHOS 63 70   BILITOTAL 0.4 0.3     Most Recent INR's and Anticoagulation Dosing History:  Anticoagulation Dose History     Recent Dosing and Labs Latest Ref Rng & Units 10/12/2022 10/13/2022 10/14/2022 10/21/2022 11/2/2022 11/23/2022 11/25/2022    INR 0.85 - 1.15 1.12 1.18(H) 1.16(H) 1.07 1.10 1.07 1.08        Most Recent 3 Troponin's:No lab results found.  Most Recent Cholesterol Panel:No lab results found.  Most Recent 6 Bacteria Isolates From Any Culture (See EPIC Reports for Culture Details):No lab results found.  Most Recent TSH, T4 and A1c Labs:No lab results found.  Results for orders placed or performed during the hospital encounter of 11/23/22   IR Chest Port Placement > 5 Yrs of Age    Narrative    Procedures 11/23/2022 2:40 PM:  1. Ultrasound guidance for venous access  2. Tunneled port (age > 5 yrs.) placement  3. Fluoroscopic guidance placement    History: Acute lymphoblastic leukemia, port is requested for  chemotherapy.     Comparisons: CT neck 8/22/2022    Staff: Dr. Franck JACKSON, ISIAH TORRES MD, attest that I was present for all critical  portions of the procedure and was immediately available to provide  guidance and assistance during the remainder of the procedure.    Fellow: Dr. Quigley    Medications:   1. 225 mcg Fentanyl  2. 3.5 Versed    Moderate sedation administered by the IR nurse at the supervision of  the attending. Vital signs and oxygenation continuously monitored. The  patient remained stable throughout the procedure.    Continuous face-to-face sedation time: 15 minutes    Fluoro time: .7 minutes     Findings/procedure:     Prior to the procedure, both verbal and  written informed consent  obtained from the patient.     Limited jugular ultrasound documented jugular vein patency. The right  neck and upper chest prepped and draped in the usual sterile fashion.  Buffered 1% Lidocaine used for local analgesia.    Under ultrasound guidance, right internal jugular venotomy made with a  micropuncture needle. Image documenting the vein within the vein  saved.    Micropuncture needle exchanged over guidewire for the micropuncture  sheath. Catheter length measured with the 0.018 guidewire.  Micropuncture sheath saline locked. Subcutaneous pocket created for  the port reservoir over the right anterior chest using a combination  of sharp and blunt dissection. Pocket liberally irrigated with saline.      Catheter subcutaneously tunneled from the right anterior chest pocket  to the right internal jugular venotomy site. Catheter cut to length at  the 22 cm tia. Micropuncture sheath exchanged over guidewire for the  peel-away sheath. Catheter placed through the peel-away sheath and  advanced under fluoroscopic guidance to the high right atrium.  Peel-away sheath removed. Port aspirated and flushed adequately.     Port heparin locked with 10:1 heparin solution. The right anterior  chest incision closed with 3-0 Vicryl interrupted sutures, a running  4-0 Monocryl subcuticular suture, and Dermabond. Right internal  jugular venotomy site closed with Dermabond. No immediate  complication.       Impression    Impression:  Uncomplicated image guided placement of right internal jugular venous  central venous catheter with port. Catheter tip overlies the high  right atrium. Port flushed, heparin locked, ready for immediate use.     I have personally reviewed the examination and initial interpretation  and I agree with the findings.    ISIAH TORRES MD         SYSTEM ID:  [id number]   XR Lumbar Punc Intrathecal Chemo Admin    Narrative    PROCEDURE: Lumbar Puncture using Fluoroscopy, 11/25/2022 12:01  PM    HISTORY:  Need for IT chemo per protocol    COMPARISON: 10/21/2022    STAFF NEURORADIOLOGIST: Dr. Maco Rust    FELLOW PHYSICIAN: Dr. Tay Hoff    MEDICATIONS:    1. 6 cc local 1% lidocaine.    TECHNIQUE: Verbal and written consent for lumbar puncture was obtained  from the patient, and benefits and risk of the procedure were  explained, including but not limited to worsening headache,  hemorrhage, infection, lower extremity pain, or nerve root injury. The  patient was sterilely prepped and draped with the patient in the prone  position, over the lower back. Under fluoroscopic guidance, the  interlaminar spaces were noted. 1% lidocaine was administered for  local anesthetic over the L3-4 interlaminar space, and a 22 gauge 5  inch needle was advanced into the thecal sac under fluoroscopic  guidance.      There was initial show of clear CSF. Approximately 8 cc of CSF were  collected.    Hematology/Oncology then administered intrathecal chemotherapy, dose  and rate as determined by Heme/Onc.    The needle was removed with the stylet in place. There was no  immediate complication associated with the procedure. Samples were  sent for the requested laboratory testing.      FLUORO TIME: 33 seconds    DOSE: 25 mGy      Impression    IMPRESSION: Successful lumbar puncture and intrathecal chemotherapy  administration without immediate complication.    PLAN: Patient discharged to patient care unit in stable condition for  monitoring. Orders placed for 1 hour of bed rest, with patient laying  on the back and the head of the bed flat.    I, MACO RUST MD, attest that I was immediately available to  provide guidance and assistance during the entirety of the procedure.    I have personally reviewed the examination and initial interpretation  and I agree with the findings.    MACO RUST MD         SYSTEM ID:  [id number]

## 2022-11-26 NOTE — PLAN OF CARE
Goal Outcome Evaluation:  2478-1436  Today is day 4 of chemo: Alert and oriented x4, afebrile, VSS on RA. Denies pain, nausea or vomiting. Urine pH at 10 pm was 7.5 and 7.5  at 6 am, next one at 2 pm.Continues with NaBicarb infusing at 125 ml/hr. Has Leucovorin  Q 6 hours. No significant events overnight, continue with POC.

## 2022-11-26 NOTE — PLAN OF CARE
"Goal Outcome Evaluation:    4450-9132    /70   Pulse 73   Temp 98.6  F (37  C) (Oral)   Resp 18   Ht 1.626 m (5' 4\")   Wt 110.2 kg (242 lb 14.4 oz)   SpO2 97%   BMI 41.69 kg/m      Reason for admission: Presented for admission for cycle 4 GRAALL + imatinib following port placement with IR.  Activity: UAL  Pain: Pt reporting 2/10 headache, given PRN Tylenol with effect.   Neuro: AxOx4. Neuros intact.    Cardiac: WDL.   Respiratory: NLB on RA. O2 sats WDL.   GI/: Voiding spontaneously with adequate UOP. LBM 11/25.   Diet: Regular diet, good appetite.   Lines: Chest port de-accessed at discharge, site WDL. DL PICC removed following hospital policy per order at discharge.   Wounds: Scattered scabbing on posterior head. BRAEDEN without drainage.   Labs/imaging: Reviewed. See chart. Methotrexate 0.04.      Plan: Pt discharged home s/p last dose of HD Cytarabine, received one unit RBC for 7.2 (one time order with downtrending counts for discharge), Methotrexate level within acceptable limits for discharge.       Continue to monitor and follow POC      "

## 2022-11-26 NOTE — PROGRESS NOTES
Nursing Focus: Chemotherapy  D: Positive blood return via PICC. Insertion site is clean/dry/intact, dressing intact with no complaints of pain.  Urine output is recorded in intake in Doc Flowsheet.    I: Premedications given per order (see electronic medical administration record). Dose #4 of HD Cytarabine started to infuse over 2 hours. Reviewed pt teaching on chemotherapy side effects. Pt denies need for further teaching. Chemotherapy double checked per protocol by two chemotherapy competent RN's.   A: Tolerating procedure well. Denies nausea and or pain.   P: Continue to monitor urine output and symptoms of nausea. Screen for symptoms of toxicity.

## 2022-11-28 ENCOUNTER — TELEPHONE (OUTPATIENT)
Dept: ONCOLOGY | Facility: CLINIC | Age: 42
End: 2022-11-28

## 2022-11-28 ENCOUNTER — PATIENT OUTREACH (OUTPATIENT)
Dept: CARE COORDINATION | Facility: CLINIC | Age: 42
End: 2022-11-28

## 2022-11-28 DIAGNOSIS — C91.01 ACUTE LYMPHOBLASTIC LEUKEMIA (ALL) IN REMISSION (H): Primary | ICD-10-CM

## 2022-11-28 LAB
ABO/RH(D): NORMAL
ANTIBODY SCREEN: NEGATIVE
APPEARANCE CSF: CLEAR
BLD PROD TYP BPU: NORMAL
BLOOD COMPONENT TYPE: NORMAL
CODING SYSTEM: NORMAL
COLOR CSF: COLORLESS
CROSSMATCH: NORMAL
PATH REPORT.COMMENTS IMP SPEC: NORMAL
PATH REPORT.FINAL DX SPEC: NORMAL
PATH REPORT.MICROSCOPIC SPEC OTHER STN: NORMAL
PATH REPORT.RELEVANT HX SPEC: NORMAL
PATH REV: ABNORMAL
RBC # CSF MANUAL: 4 /UL (ref 0–2)
SPECIMEN EXPIRATION DATE: NORMAL
TUBE # CSF: 4
UNIT ABO/RH: NORMAL
UNIT ABO/RH: NORMAL
UNIT NUMBER: NORMAL
UNIT STATUS: NORMAL
UNIT TYPE ISBT: 5100
UNIT TYPE ISBT: 9500
WBC # CSF MANUAL: 0 /UL (ref 0–5)

## 2022-11-28 RX ORDER — DIPHENHYDRAMINE HCL 25 MG
25 CAPSULE ORAL ONCE
Status: CANCELLED
Start: 2022-11-28 | End: 2022-11-28

## 2022-11-28 RX ORDER — ACETAMINOPHEN 325 MG/1
325 TABLET ORAL ONCE
Status: CANCELLED
Start: 2022-11-28 | End: 2022-11-28

## 2022-11-28 RX ORDER — HEPARIN SODIUM,PORCINE 10 UNIT/ML
5 VIAL (ML) INTRAVENOUS
Status: CANCELLED | OUTPATIENT
Start: 2022-11-28

## 2022-11-28 RX ORDER — HEPARIN SODIUM (PORCINE) LOCK FLUSH IV SOLN 100 UNIT/ML 100 UNIT/ML
5 SOLUTION INTRAVENOUS
Status: CANCELLED | OUTPATIENT
Start: 2022-11-28

## 2022-11-28 NOTE — PROGRESS NOTES
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 She is now s/p 3 cycles of GRAALL + Imatinib (C2=9/30/22 - complicated by disseminated zoster infection, C3=11/2/22 - tolerated without complications) and presents for admission for cycle 4 GRAALL + imatinib following port placement with IR. Overall, cycle 4 chemotherapy was very well-tolerated without any new or worsening symptoms. She underwent LP with IT chemo in XR per protocol with flow cytometry still in process.    ASSESSMENT           Discharge Assessment  How are you doing now that you are home?: I am doing okay, I still have a little bit of numbness in my hands and feet from the chemo but otherwise I am doing good.  How are your symptoms? (Red Flag symptoms escalate to triage hotline per guidelines): Improved  Do you feel your condition is stable enough to be safe at home until your provider visit?: Yes  Does the patient have their discharge instructions? : Yes  Does the patient have questions regarding their discharge instructions? : No  Were you started on any new medications or were there changes to any of your previous medications? : Yes  Does the patient have all of their medications?: Yes  Do you have questions regarding any of your medications? : No  Discharge follow-up appointment  scheduled within 14 calendar days? : Yes  Discharge Follow Up Appointment Date: 11/29/22  Discharge Follow Up Appointment Scheduled with?: Specialty Care Provider                  PLAN                                                      Outpatient Plan: Follow-up arranged as below:  - Labs/poss transfusion/ video AVELINA visit - 11/29  - Labs/poss transfusion - 12/2  - Awaiting formal scheduling of LP with IT chemo for next week    Future Appointments   Date Time Provider Department Center   11/29/2022  9:30 AM Sania Dwyer PA-C [facility]   11/29/2022 12:30 PM  [facility] LAB DRAW [facility]   11/29/2022  1:30 PM  ONC INFUSION NURSE [facility]   12/2/2022  9:45 AM  [facility] DRAW [facility]   12/2/2022 10:30 AM  ONC INFUSION NURSE [facility]   12/13/2022  1:00 PM Chuck Diaz MD [facility]         For any urgent concerns, please contact our 24 hour nurse triage line: [phone] ([phone])         NERY Ba  [phone]  [facility]

## 2022-11-28 NOTE — TELEPHONE ENCOUNTER
PA Initiation    Medication: Iclusig - Submitted  Insurance Company: [employer] - Phone [phone] Fax [phone]  Pharmacy Filling the Rx:    Filling Pharmacy Phone:    Filling Pharmacy Fax:    Start Date: 11/28/2022        Brittany Chao CPhTOncology Pharmacy [facility] [address] [address]  Office: [phone]  Fax: [phone]  [email or web address]

## 2022-11-29 ENCOUNTER — VIRTUAL VISIT (OUTPATIENT)
Dept: ONCOLOGY | Facility: CLINIC | Age: 42
End: 2022-11-29
Attending: PHYSICIAN ASSISTANT
Payer: COMMERCIAL

## 2022-11-29 ENCOUNTER — INFUSION THERAPY VISIT (OUTPATIENT)
Dept: ONCOLOGY | Facility: CLINIC | Age: 42
End: 2022-11-29
Attending: INTERNAL MEDICINE
Payer: COMMERCIAL

## 2022-11-29 ENCOUNTER — APPOINTMENT (OUTPATIENT)
Dept: LAB | Facility: CLINIC | Age: 42
End: 2022-11-29
Attending: INTERNAL MEDICINE
Payer: COMMERCIAL

## 2022-11-29 VITALS
RESPIRATION RATE: 16 BRPM | BODY MASS INDEX: 41.85 KG/M2 | WEIGHT: 243.8 LBS | HEART RATE: 74 BPM | SYSTOLIC BLOOD PRESSURE: 110 MMHG | OXYGEN SATURATION: 99 % | TEMPERATURE: 97.8 F | DIASTOLIC BLOOD PRESSURE: 68 MMHG

## 2022-11-29 DIAGNOSIS — C91.01 ACUTE LYMPHOBLASTIC LEUKEMIA (ALL) IN REMISSION (H): Primary | ICD-10-CM

## 2022-11-29 DIAGNOSIS — C91.00 ACUTE LYMPHOBLASTIC LEUKEMIA (ALL) NOT HAVING ACHIEVED REMISSION (H): Primary | ICD-10-CM

## 2022-11-29 DIAGNOSIS — Z76.89 PROPHYLAXIS FOR CHEMOTHERAPY-INDUCED NEUTROPENIA: ICD-10-CM

## 2022-11-29 LAB
ALBUMIN SERPL BCG-MCNC: 3.7 G/DL (ref 3.5–5.2)
ALP SERPL-CCNC: 66 U/L (ref 35–104)
ALT SERPL W P-5'-P-CCNC: 24 U/L (ref 10–35)
ANION GAP SERPL CALCULATED.3IONS-SCNC: 12 MMOL/L (ref 7–15)
AST SERPL W P-5'-P-CCNC: 16 U/L (ref 10–35)
BASOPHILS # BLD AUTO: 0 10E3/UL (ref 0–0.2)
BASOPHILS NFR BLD AUTO: 0 %
BILIRUB SERPL-MCNC: 0.6 MG/DL
BUN SERPL-MCNC: 23.6 MG/DL (ref 6–20)
CALCIUM SERPL-MCNC: 8.4 MG/DL (ref 8.6–10)
CHLORIDE SERPL-SCNC: 103 MMOL/L (ref 98–107)
CREAT SERPL-MCNC: 0.61 MG/DL (ref 0.51–0.95)
DEPRECATED HCO3 PLAS-SCNC: 22 MMOL/L (ref 22–29)
EOSINOPHIL # BLD AUTO: 0 10E3/UL (ref 0–0.7)
EOSINOPHIL NFR BLD AUTO: 0 %
ERYTHROCYTE [DISTWIDTH] IN BLOOD BY AUTOMATED COUNT: 19.9 % (ref 10–15)
GFR SERPL CREATININE-BSD FRML MDRD: >90 ML/MIN/1.73M2
GLUCOSE SERPL-MCNC: 85 MG/DL (ref 70–99)
HCT VFR BLD AUTO: 26.1 % (ref 35–47)
HGB BLD-MCNC: 8.6 G/DL (ref 11.7–15.7)
IMM GRANULOCYTES # BLD: 0 10E3/UL
IMM GRANULOCYTES NFR BLD: 1 %
LAB DIRECTOR COMMENTS: NORMAL
LAB DIRECTOR DISCLAIMER: NORMAL
LAB DIRECTOR INTERPRETATION: NORMAL
LAB DIRECTOR METHODOLOGY: NORMAL
LAB DIRECTOR RESULTS: NORMAL
LYMPHOCYTES # BLD AUTO: 0.3 10E3/UL (ref 0.8–5.3)
LYMPHOCYTES NFR BLD AUTO: 7 %
MCH RBC QN AUTO: 32.5 PG (ref 26.5–33)
MCHC RBC AUTO-ENTMCNC: 33 G/DL (ref 31.5–36.5)
MCV RBC AUTO: 99 FL (ref 78–100)
MONOCYTES # BLD AUTO: 0 10E3/UL (ref 0–1.3)
MONOCYTES NFR BLD AUTO: 0 %
NEUTROPHILS # BLD AUTO: 4.2 10E3/UL (ref 1.6–8.3)
NEUTROPHILS NFR BLD AUTO: 92 %
NRBC # BLD AUTO: 0 10E3/UL
NRBC BLD AUTO-RTO: 0 /100
PLATELET # BLD AUTO: 145 10E3/UL (ref 150–450)
POTASSIUM SERPL-SCNC: 3.7 MMOL/L (ref 3.4–5.3)
PROT SERPL-MCNC: 5.9 G/DL (ref 6.4–8.3)
RBC # BLD AUTO: 2.65 10E6/UL (ref 3.8–5.2)
SODIUM SERPL-SCNC: 137 MMOL/L (ref 136–145)
SPECIMEN DESCRIPTION: NORMAL
WBC # BLD AUTO: 4.6 10E3/UL (ref 4–11)

## 2022-11-29 PROCEDURE — 250N000011 HC RX IP 250 OP 636: Performed by: INTERNAL MEDICINE

## 2022-11-29 PROCEDURE — G0452 MOLECULAR PATHOLOGY INTERPR: HCPCS | Mod: 26 | Performed by: PATHOLOGY

## 2022-11-29 PROCEDURE — 86901 BLOOD TYPING SEROLOGIC RH(D): CPT

## 2022-11-29 PROCEDURE — 96372 THER/PROPH/DIAG INJ SC/IM: CPT | Performed by: INTERNAL MEDICINE

## 2022-11-29 PROCEDURE — 86923 COMPATIBILITY TEST ELECTRIC: CPT | Performed by: PHYSICIAN ASSISTANT

## 2022-11-29 PROCEDURE — 36415 COLL VENOUS BLD VENIPUNCTURE: CPT

## 2022-11-29 PROCEDURE — 80053 COMPREHEN METABOLIC PANEL: CPT

## 2022-11-29 PROCEDURE — 86850 RBC ANTIBODY SCREEN: CPT

## 2022-11-29 PROCEDURE — 85025 COMPLETE CBC W/AUTO DIFF WBC: CPT

## 2022-11-29 PROCEDURE — 99215 OFFICE O/P EST HI 40 MIN: CPT | Mod: 95 | Performed by: PHYSICIAN ASSISTANT

## 2022-11-29 PROCEDURE — 36591 DRAW BLOOD OFF VENOUS DEVICE: CPT

## 2022-11-29 PROCEDURE — 81206 BCR/ABL1 GENE MAJOR BP: CPT

## 2022-11-29 RX ORDER — HEPARIN SODIUM (PORCINE) LOCK FLUSH IV SOLN 100 UNIT/ML 100 UNIT/ML
5 SOLUTION INTRAVENOUS ONCE
Status: DISCONTINUED | OUTPATIENT
Start: 2022-11-29 | End: 2022-11-29 | Stop reason: HOSPADM

## 2022-11-29 RX ADMIN — PEGFILGRASTIM 6 MG: 6 INJECTION SUBCUTANEOUS at 13:19

## 2022-11-29 ASSESSMENT — PAIN SCALES - GENERAL: PAINLEVEL: NO PAIN (0)

## 2022-11-29 NOTE — NURSING NOTE
Pt states possible side effects of increased hand shaking with newer medications. Pt also states tenderness to the touch around the chest port and is hesitant to use lidocaine on the area. Pt would like to discuss this further.  Tabitha Valerio, Virtual Visit Facilitator

## 2022-11-29 NOTE — PROGRESS NOTES
[facility]  Follow up Visit  Nov 29, 2022    Hem/onc History:     --Patient presented to outside hospital with c/o right upper quadrant pain and was subsequently found to have a markedly elevated white blood cell count concerning for acute leukemia. While at OSH, she had CT PE protocol as well as CT A/P. These identified no PE, no acute or localized intraabdominal inflammatory process, mild splenomegaly, and few inconspicous low-density structures in the liver. She was transferred to [facility] for further workup and management. S/p Hydrea 1g TID (8/21-8/23) with improvement in WBC (100K ? 12K).   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 IHC shows CD10+, CD34+. 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 Abnormal cytogenetics. Initiated pre-phase steroids with Prednisone 60 mg x8/21; increased to 60 mg/m2 (140 mg) 8/22-8/24. She initiated GRAALL + imatinib (C1D1 = 8/24/22).   --S/p BMT consult 9/12 with Dr. Walsh. Induction was relatively uncomplicated. Post-induction BMBx done 9/26. Morphology inconclusive due to inadequate core but flow negative.  BCR/abl major and minor undetectable (as were baseline tests). She was admitted for C2 GRAALL.    --BMBx 9/26/22 MRD-negative by BCR-ABL PCR and by Clonoseq Assay.      Treatment Plan: GRAALL + imatinib (C2D1=9/30/22)  - Imatinib 400 mg BID - D1-14 (9/29-10/12)  - IVFs per chemo protocol, bicarb support  - Methotrexate 1 g/m2 IV x once on D1. Administered over 24 hours  - Leucovorin calcium q6hr starting 12 hr after END of methotrexate infusion. Continue until methotrexate level <0.05  - cytarabine 3 g/m2 IV q12 hr x 4 doses on Days 2-3  - Premeds: Zofran 16mg D1, 8mg q12hr x 4 doses (D2-3);  Dex 12mg D1-3, 8mg D4-5  - Supportive Care: Pred Forte eye drops  - IT chemo on D9. Will need under imaging guidance. As D9 falls on 10/8 (weekend),  requested procedure for 10/7. She will have labs on 10/6 (already scheduled) to determine platelet count in anticipation of this procedure as well.   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     Post-cycle-2 BMBx 10/25/2022 showed MRD-negative CR, though low-level abnormal B-cell population (favor hematogones over residual leukemia) was noted. Clonoseq negative at 1/10E6 level.      # CNS prophylaxis  Per the GRAALL protocol and given risk for CNS involvement of ALL. Will require LPs under fluoroscopic guidance.   - s/p weekly triple IT chemo (Cytarabine, Solu-Cortef, MTX) on Days 1, 8, and 15 of C1 induction. She additionally had repeat LP on 9/14. CSF flow has remained negative.     History of Present Illness:   She presents today for hospital follow-up.     -has some shaking in hands and little worsening of numbness. Still able to  things.   -cold sore on her mouth. No other sores in the mouth. No s/s swishes. Not effecting appetite  -new port, feeling ok. Apprehensive about it  -No GI or respiratory symptoms  -No f/c/ns  -No bleeding  -No new rash or lesions  -Energy has been ok    12-point ROS negative.       Medications:       Current Outpatient Medications   Medication Sig     acetaminophen (TYLENOL) 325 MG tablet Take 2 tablets (650 mg) by mouth every 6 hours as needed for mild pain, other or fever (blood product administration premedication)     amLODIPine (NORVASC) 5 MG tablet Take 1 tablet (5 mg) by mouth daily     CHOLECALCIFEROL PO Take 1 tablet by mouth daily Unknown strength.     dexamethasone (DECADRON) 4 MG tablet Take 2 tablets (8 mg) by mouth once for 1 dose on 11/27     fluconazole (DIFLUCAN) 200 MG tablet Take 1 tablet (200 mg) by mouth daily as needed (Take when ANC <1000)     imatinib (GLEEVEC) 100 MG tablet Take 3 tablets (300 mg) by mouth 2 times daily through 12/8     levofloxacin (LEVAQUIN) 250 MG tablet Take 1 tablet (250 mg) by mouth daily as  needed (ANC <1000)     lidocaine-prilocaine (LIDOPRIL) 2.5-2.5 % kit Use prior to lab access     LORazepam (ATIVAN) 0.5 MG tablet Take 1-2 tablets (0.5-1 mg) by mouth daily as needed for anxiety . Take prior to BMBx procedures as needed. If initial dose unhelpful, okay to take a second 0.5-1 mg dose (for a max of 2 mg at a time). Caution: causes sedation. Do not drive after taking this medication.     ondansetron (ZOFRAN ODT) 4 MG ODT tab Take 1-2 tablets (4-8 mg) by mouth every 8 hours as needed for nausea or vomiting     pantoprazole (PROTONIX) 20 MG EC tablet Take 1 tablet (20 mg) by mouth daily     PONATinib (ICLUSIG) 15 MG tablet Take 1 tablet (15 mg) by mouth daily     prednisoLONE acetate (PRED FORTE) 1 % ophthalmic suspension Place 2 drops into both eyes 4 times daily for 3 days until the AM of 11/29 then stop.     prochlorperazine (COMPAZINE) 10 MG tablet Take 1 tablet (10 mg) by mouth every 6 hours as needed (Breakthrough Nausea/Vomiting)     sennosides (SENOKOT) 8.6 MG tablet Take 1-2 tablets by mouth 2 times daily     valACYclovir (VALTREX) 1000 mg tablet Take 1 tablet (1,000 mg) by mouth 3 times daily until instructed to stop     No current facility-administered medications for this visit.        Physical Exam:   There were no vitals taken for this visit.    ECOG 1    Video physical exam  General: Patient appears well in no acute distress.   Skin: No visualized rash or lesions on visualized skin  Eyes: EOMI, no erythema, sclera icterus or discharge noted  Resp: Appears to be breathing comfortably without accessory muscle usage, speaking in full sentences, no cough  MSK: Appears to have normal range of motion based on visualized movements  Neurologic: No apparent tremors, facial movements symmetric  Psych: affect normal, alert and oriented    Data:       I personally reviewed labs today   Most Recent 3 CBC's:  Recent Labs   Lab Test 11/26/22  0548 11/25/22  0701 11/24/22  0551   WBC 9.9 12.7* 13.0*   HGB  7.2* 7.4* 8.1*   * 102* 103*    278 317     Most Recent 3 BMP's:  Recent Labs   Lab Test 11/26/22  0548 11/25/22  0701 11/24/22  0551    139 137   POTASSIUM 4.2 3.8 4.4  4.4   CHLORIDE 103 99 100   CO2 26 30* 26   BUN 15.3 13.2 10.7   CR 0.54 0.47* 0.58   ANIONGAP 9 10 11   SOLEDAD 8.6 7.7* 8.5*   * 130* 143*     Most Recent 2 LFT's:  Recent Labs   Lab Test 11/26/22  0548 11/25/22  0701   AST 12 13   ALT 16 17   ALKPHOS 63 70   BILITOTAL 0.4 0.3       Assessment and Plan:     # Ph+ ALL.   Now in MRD- CR by BCR-ABL PCR as well as Clonoseq assay after cycle 1 and 2 of attenuated-dose GRAAL chemotherapy.   With no matched related donors, and excellent early/deep MRD-CR, chemotherapy is currently favored over BMT; this is supported by multiple recent clinical observations (Blood. 2016 Jul 28; 128(4): 504-507; Blood. 2022 Jul 25;blood.[id number]).   -Admitted on 11/2/22 for Cycle 3 GRAAL + imatinib chemotherapy.   -Currently plan is to continue GRAAL for a total of 8 cycles. [For reference, odd cycles are 21 days in length and even cycles are 14 days in length].   -currently looking into using blincyto plus ponatinib, checking to see if covered by insurance  - Initiated Cycle 4 on 11/23/22. Overall tolerated well with some mucotitis and worsening neuropathy. Discussed use of s/s swishes. Should call if symptoms worsen and we can give her MMW  -needs Day 8 LP now, currently schedule for 12/5  -Will need to be admitted for next cycle on 12/7, though ok to delay to 12/9 for her daughters concert     Addendum: Will be changing to Blinatuomab + ponatinib. Asked for admission on 12/12/22 as long as counts are sufficient     -Had port placed on 11/23. Discussed how to use Emla cream      # PPx  - Nebulized Pentamidine q28d for PJP ppx.              - Pentamidine given 10/31, next on ~11/28, schedule for 12/2  - ppx Levaquin and Fluconazole when ANC <1.0.   -Continue Valtrex 1000 mg TID until completion  of chemo due to h/o shingles    -Evusheld given on 11/2/22    #Heme  -transfuse for hgb <7 and plt <10  - Of note, pt has history of transfusion reaction and requires premeds with Benadryl and Tylenol   -will continue twice weekly labs and transfusions for now but may need to increase depending on judy     # CNS prophylaxis Will require all subsequent LPs under fluoroscopic guidance. 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 Paresthesias in fingers and toes are worsening. Having some shaking as well, though still able to perform tasks.  Do not think the imatinib is causing it. Will continue this and monitor. No pain. No need for medications at this time.    # H/o COVID-19 infection   Not vaccinated (due to fear of needles per pt report).    # Hypertension: Amlodipine; Lasix as needed  # Indigestion/GERD: Continue Protonix 20 mg daily. Use Pepcid 10 mg BID while receiving methotrexate     Sania Dwyer PA-C  [facility]  [address] [place] [address]  [phone]

## 2022-11-29 NOTE — PROGRESS NOTES
April is a 42 year old who is being evaluated via a billable video visit.      Patient stated she is in the state of [place] for the visit today.    How would you like to obtain your AVS? MyChart  If the video visit is dropped, the invitation should be resent by: Text to cell phone: [phone]  Will anyone else be joining your video visit? Dian Valerio, Virtual Visit Facilitator      Video-Visit Details    Video Duration: 18 minutes     Originating Location (pt. Location): Home        Distant Location (provider location):  Off-site    Platform used for Video Visit: Plink Search     [facility]  Admission Date: 11/23/22    Discharge Date: 11/26/22    Discharge Diagnosis: cycle 4 of your chemotherapy which was tolerated well without concerns    Interactive contact date: 11/28/2022  Face-to-face visit date: 11/29/2022        Medical complexity decision making: High complexity ([id number])    75 minutes spent on the date of the encounter doing chart review, review of test results, interpretation of tests, patient visit and documentation

## 2022-11-29 NOTE — PROGRESS NOTES
Infusion Nursing Note:  Vashti Villegas presents today for possible blood products (not needed) and neulasta.    Patient seen by provider today: Yes: LIBIA Bajwa   present during visit today: Not Applicable.    Note: April presents to infusion today feeling well following her virtual visit with Sania. She denies any new concerns.    Intravenous Access:  No Intravenous access at this visit. Port was not accessed.     Treatment Conditions:  Lab Results   Component Value Date    HGB 8.6 (L) 11/29/2022    WBC 4.6 11/29/2022    ANEU 11.7 (H) 11/24/2022    ANEUTAUTO 4.2 11/29/2022     (L) 11/29/2022      Results reviewed, labs did NOT meet treatment parameters: Hgb >7, Plts>10.    Post Infusion Assessment:  Patient tolerated injection to L arm without incident.     Discharge Plan:   Patient declined prescription refills.  Discharge instructions reviewed with: Patient.  Patient and/or family verbalized understanding of discharge instructions and all questions answered.  AVS to patient via [employer].  Patient will return 12/2 for next appointment.   Patient discharged in stable condition accompanied by: self.  Departure Mode: Ambulatory.      Gabriela Jackson RN

## 2022-11-29 NOTE — LETTER
11/29/2022         RE: Vashti Villegas  [address] [place] [address]        Dear Colleague,    Thank you for referring your patient, Vashti Villegas, to the [facility]. Please see a copy of my visit note below.    [facility]  Follow up Visit  Nov 29, 2022    Hem/onc History:     --Patient presented to outside hospital with c/o right upper quadrant pain and was subsequently found to have a markedly elevated white blood cell count concerning for acute leukemia. While at OSH, she had CT PE protocol as well as CT A/P. These identified no PE, no acute or localized intraabdominal inflammatory process, mild splenomegaly, and few inconspicous low-density structures in the liver. She was transferred to [facility] for further workup and management. S/p Hydrea 1g TID (8/21-8/23) with improvement in WBC (100K ? 12K).   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 IHC shows CD10+, CD34+. 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 Abnormal cytogenetics. Initiated pre-phase steroids with Prednisone 60 mg x8/21; increased to 60 mg/m2 (140 mg) 8/22-8/24. She initiated GRAALL + imatinib (C1D1 = 8/24/22).   --S/p BMT consult 9/12 with Dr. Walsh. Induction was relatively uncomplicated. Post-induction BMBx done 9/26. Morphology inconclusive due to inadequate core but flow negative.  BCR/abl major and minor undetectable (as were baseline tests). She was admitted for C2 GRAALL.    --BMBx 9/26/22 MRD-negative by BCR-ABL PCR and by Clonoseq Assay.      Treatment Plan: GRAALL + imatinib (C2D1=9/30/22)  - Imatinib 400 mg BID - D1-14 (9/29-10/12)  - IVFs per chemo protocol, bicarb support  - Methotrexate 1 g/m2 IV x once on D1. Administered over 24 hours  - Leucovorin calcium q6hr starting 12 hr after END of methotrexate infusion. Continue until methotrexate level <0.05  -  cytarabine 3 g/m2 IV q12 hr x 4 doses on Days 2-3  - Premeds: Zofran 16mg D1, 8mg q12hr x 4 doses (D2-3);  Dex 12mg D1-3, 8mg D4-5  - Supportive Care: Pred Forte eye drops  - IT chemo on D9. Will need under imaging guidance. As D9 falls on 10/8 (weekend), requested procedure for 10/7. She will have labs on 10/6 (already scheduled) to determine platelet count in anticipation of this procedure as well.   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     Post-cycle-2 BMBx 10/25/2022 showed MRD-negative CR, though low-level abnormal B-cell population (favor hematogones over residual leukemia) was noted. Clonoseq negative at 1/10E6 level.      # CNS prophylaxis  Per the GRAALL protocol and given risk for CNS involvement of ALL. Will require LPs under fluoroscopic guidance.   - s/p weekly triple IT chemo (Cytarabine, Solu-Cortef, MTX) on Days 1, 8, and 15 of C1 induction. She additionally had repeat LP on 9/14. CSF flow has remained negative.     History of Present Illness:   She presents today for hospital follow-up.     -has some shaking in hands and little worsening of numbness. Still able to  things.   -cold sore on her mouth. No other sores in the mouth. No s/s swishes. Not effecting appetite  -new port, feeling ok. Apprehensive about it  -No GI or respiratory symptoms  -No f/c/ns  -No bleeding  -No new rash or lesions  -Energy has been ok    12-point ROS negative.       Medications:       Current Outpatient Medications   Medication Sig     acetaminophen (TYLENOL) 325 MG tablet Take 2 tablets (650 mg) by mouth every 6 hours as needed for mild pain, other or fever (blood product administration premedication)     amLODIPine (NORVASC) 5 MG tablet Take 1 tablet (5 mg) by mouth daily     CHOLECALCIFEROL PO Take 1 tablet by mouth daily Unknown strength.     dexamethasone (DECADRON) 4 MG tablet Take 2 tablets (8 mg) by mouth once for 1 dose on 11/27     fluconazole (DIFLUCAN) 200 MG  tablet Take 1 tablet (200 mg) by mouth daily as needed (Take when ANC <1000)     imatinib (GLEEVEC) 100 MG tablet Take 3 tablets (300 mg) by mouth 2 times daily through 12/8     levofloxacin (LEVAQUIN) 250 MG tablet Take 1 tablet (250 mg) by mouth daily as needed (ANC <1000)     lidocaine-prilocaine (LIDOPRIL) 2.5-2.5 % kit Use prior to lab access     LORazepam (ATIVAN) 0.5 MG tablet Take 1-2 tablets (0.5-1 mg) by mouth daily as needed for anxiety . Take prior to BMBx procedures as needed. If initial dose unhelpful, okay to take a second 0.5-1 mg dose (for a max of 2 mg at a time). Caution: causes sedation. Do not drive after taking this medication.     ondansetron (ZOFRAN ODT) 4 MG ODT tab Take 1-2 tablets (4-8 mg) by mouth every 8 hours as needed for nausea or vomiting     pantoprazole (PROTONIX) 20 MG EC tablet Take 1 tablet (20 mg) by mouth daily     PONATinib (ICLUSIG) 15 MG tablet Take 1 tablet (15 mg) by mouth daily     prednisoLONE acetate (PRED FORTE) 1 % ophthalmic suspension Place 2 drops into both eyes 4 times daily for 3 days until the AM of 11/29 then stop.     prochlorperazine (COMPAZINE) 10 MG tablet Take 1 tablet (10 mg) by mouth every 6 hours as needed (Breakthrough Nausea/Vomiting)     sennosides (SENOKOT) 8.6 MG tablet Take 1-2 tablets by mouth 2 times daily     valACYclovir (VALTREX) 1000 mg tablet Take 1 tablet (1,000 mg) by mouth 3 times daily until instructed to stop     No current facility-administered medications for this visit.        Physical Exam:   There were no vitals taken for this visit.    ECOG 1    Video physical exam  General: Patient appears well in no acute distress.   Skin: No visualized rash or lesions on visualized skin  Eyes: EOMI, no erythema, sclera icterus or discharge noted  Resp: Appears to be breathing comfortably without accessory muscle usage, speaking in full sentences, no cough  MSK: Appears to have normal range of motion based on visualized movements  Neurologic:  No apparent tremors, facial movements symmetric  Psych: affect normal, alert and oriented    Data:       I personally reviewed labs today   Most Recent 3 CBC's:  Recent Labs   Lab Test 11/26/22  0548 11/25/22  0701 11/24/22  0551   WBC 9.9 12.7* 13.0*   HGB 7.2* 7.4* 8.1*   * 102* 103*    278 317     Most Recent 3 BMP's:  Recent Labs   Lab Test 11/26/22  0548 11/25/22  0701 11/24/22  0551    139 137   POTASSIUM 4.2 3.8 4.4  4.4   CHLORIDE 103 99 100   CO2 26 30* 26   BUN 15.3 13.2 10.7   CR 0.54 0.47* 0.58   ANIONGAP 9 10 11   SOLEDAD 8.6 7.7* 8.5*   * 130* 143*     Most Recent 2 LFT's:  Recent Labs   Lab Test 11/26/22  0548 11/25/22  0701   AST 12 13   ALT 16 17   ALKPHOS 63 70   BILITOTAL 0.4 0.3       Assessment and Plan:     # Ph+ ALL.   Now in MRD- CR by BCR-ABL PCR as well as Clonoseq assay after cycle 1 and 2 of attenuated-dose GRAAL chemotherapy.   With no matched related donors, and excellent early/deep MRD-CR, chemotherapy is currently favored over BMT; this is supported by multiple recent clinical observations (Blood. 2016 Jul 28; 128(4): 504-507; Blood. 2022 Jul 25;blood.[id number]).   -Admitted on 11/2/22 for Cycle 3 GRAAL + imatinib chemotherapy.   -Currently plan is to continue GRAAL for a total of 8 cycles. [For reference, odd cycles are 21 days in length and even cycles are 14 days in length].   -currently looking into using blincyto plus ponatinib, checking to see if covered by insurance  - Initiated Cycle 4 on 11/23/22. Overall tolerated well with some mucotitis and worsening neuropathy. Discussed use of s/s swishes. Should call if symptoms worsen and we can give her MMW  -needs Day 8 LP now, currently schedule for 12/5  -Will need to be admitted for next cycle on 12/7, though ok to delay to 12/9 for her daughters concert     Addendum: Will be changing to Blinatuomab + ponatinib. Asked for admission on 12/12/22 as long as counts are sufficient     -Had port placed on 11/23.  Discussed how to use Emla cream      # PPx  - Nebulized Pentamidine q28d for PJP ppx.              - Pentamidine given 10/31, next on ~11/28, schedule for 12/2  - ppx Levaquin and Fluconazole when ANC <1.0.   -Continue Valtrex 1000 mg TID until completion of chemo due to h/o shingles    -Evusheld given on 11/2/22    #Heme  -transfuse for hgb <7 and plt <10  - Of note, pt has history of transfusion reaction and requires premeds with Benadryl and Tylenol   -will continue twice weekly labs and transfusions for now but may need to increase depending on judy     # CNS prophylaxis Will require all subsequent LPs under fluoroscopic guidance. 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 Paresthesias in fingers and toes are worsening. Having some shaking as well, though still able to perform tasks.  Do not think the imatinib is causing it. Will continue this and monitor. No pain. No need for medications at this time.    # H/o COVID-19 infection   Not vaccinated (due to fear of needles per pt report).    # Hypertension: Amlodipine; Lasix as needed  # Indigestion/GERD: Continue Protonix 20 mg daily. Use Pepcid 10 mg BID while receiving methotrexate     Sania Dwyer PA-C  [facility]  [address] [place] [address]  [phone]    April is a 42 year old who is being evaluated via a billable video visit.      Patient stated she is in the state of [place] for the visit today.    How would you like to obtain your AVS? MyChart  If the video visit is dropped, the invitation should be resent by: Text to cell phone: [phone]  Will anyone else be joining your video visit? No    Tabitha Valerio Virtual Visit Facilitator      Video-Visit Details    Video Duration: 18 minutes     Originating Location (pt. Location): Home        Distant Location (provider location):  Off-site    Platform used for Video Visit: [facility]  Management Services  Admission Date: 11/23/22    Discharge Date: 11/26/22    Discharge Diagnosis: cycle 4 of your chemotherapy which was tolerated well without concerns    Interactive contact date: 11/28/2022  Face-to-face visit date: 11/29/2022        Medical complexity decision making: High complexity ([id number])    75 minutes spent on the date of the encounter doing chart review, review of test results, interpretation of tests, patient visit and documentation

## 2022-11-29 NOTE — TELEPHONE ENCOUNTER
Prior Authorization Approval    Authorization Effective Date: 11/29/2022  Authorization Expiration Date: 11/28/2023  Medication: Iclusig - APPROVED  Approved Dose/Quantity:   Reference #:     Insurance Company: [employer] - Phone [phone] Fax [phone]  Expected CoPay:       CoPay Card Available:      Foundation Assistance Needed:    Which Pharmacy is filling the prescription (Not needed for infusion/clinic administered):    Pharmacy Notified:    Patient Notified:          Annette Childerscologjalen Pharmacy Liaison     [facility] [place] [address]  Office: [phone]  Fax: [phone]  [email or web address]

## 2022-11-29 NOTE — NURSING NOTE
Chief Complaint   Patient presents with     Blood Draw     Labs drawn via  by RN. Vitals taken.     Labs drawn with  by RN. Vitals taken. Patient checked into next appointment.    Monica Gamboa RN

## 2022-11-30 DIAGNOSIS — C91.00 ACUTE LYMPHOBLASTIC LEUKEMIA (ALL) NOT HAVING ACHIEVED REMISSION (H): Primary | ICD-10-CM

## 2022-11-30 LAB
BACTERIA CSF CULT: NO GROWTH
GRAM STAIN RESULT: NORMAL
GRAM STAIN RESULT: NORMAL

## 2022-12-01 ENCOUNTER — TELEPHONE (OUTPATIENT)
Dept: ONCOLOGY | Facility: CLINIC | Age: 42
End: 2022-12-01

## 2022-12-01 DIAGNOSIS — C91.00 ACUTE LYMPHOBLASTIC LEUKEMIA (ALL) NOT HAVING ACHIEVED REMISSION (H): Primary | ICD-10-CM

## 2022-12-01 LAB
ABO/RH(D): NORMAL
ANTIBODY SCREEN: NEGATIVE
BLD PROD TYP BPU: NORMAL
BLOOD COMPONENT TYPE: NORMAL
CODING SYSTEM: NORMAL
CROSSMATCH: NORMAL
SPECIMEN EXPIRATION DATE: NORMAL
UNIT ABO/RH: NORMAL
UNIT ABO/RH: NORMAL
UNIT NUMBER: NORMAL
UNIT STATUS: NORMAL
UNIT TYPE ISBT: 5100
UNIT TYPE ISBT: 5100

## 2022-12-01 RX ORDER — ACETAMINOPHEN 325 MG/1
325 TABLET ORAL ONCE
Status: CANCELLED
Start: 2022-12-01 | End: 2022-12-01

## 2022-12-01 RX ORDER — DIPHENHYDRAMINE HCL 25 MG
25 CAPSULE ORAL ONCE
Status: CANCELLED
Start: 2022-12-01 | End: 2022-12-01

## 2022-12-01 RX ORDER — HEPARIN SODIUM,PORCINE 10 UNIT/ML
5 VIAL (ML) INTRAVENOUS
Status: CANCELLED | OUTPATIENT
Start: 2022-12-01

## 2022-12-01 RX ORDER — HEPARIN SODIUM (PORCINE) LOCK FLUSH IV SOLN 100 UNIT/ML 100 UNIT/ML
5 SOLUTION INTRAVENOUS
Status: CANCELLED | OUTPATIENT
Start: 2022-12-01

## 2022-12-01 NOTE — ORAL ONC MGMT
Oral Chemotherapy Monitoring Program    Lab Monitoring Plan  CBC: q2 weeks x3 months, then monthly  CMP: monthly  Lipase: q2 weeks x3 months, then monthly    Subjective/Objective:  April F Bakari is a 42 year old female contacted by phone for an initial visit for oral chemotherapy education.       Assessment:  Patient is appropriate to start therapy. She is aware to bring Ponatinib supply to hospital with her on admission and to not start until that time.    Plan:  Basic chemotherapy teaching was reviewed with the patient including indication, start date of therapy, dose, administration, adverse effects, missed doses, food and drug interactions, monitoring, side effect management, office contact information, and safe handling. Written materials were provided and all questions answered.    Follow-Up:  1 week following initial start of therapy    Azar CamachoD  Oral Chemotherapy Monitoring Program  [facility]  [phone]  December 1, 2022        ORAL CHEMOTHERAPY 9/15/2022 9/19/2022 9/29/2022 11/7/2022 11/18/2022 12/1/2022   Assessment Type Initial Work up Initial Follow up;New Teach Refill Left Voicemail Refill New Teach   Diagnosis Code Acute Lymphoblastic Leukemia (ALL) Acute Lymphoblastic Leukemia (ALL) Acute Lymphoblastic Leukemia (ALL) Acute Lymphoblastic Leukemia (ALL) Acute Lymphoblastic Leukemia (ALL) Acute Lymphoblastic Leukemia (ALL)   Providers Dr. Alverto Hull   [facility]   Drug Name Gleevec (imatinib) Gleevec (imatinib) Gleevec (imatinib) Gleevec (imatinib) Gleevec (imatinib) Iclusig (ponatinib)   Dose 400 mg 400 mg 400 mg 400 mg 300 mg 15 mg   Current Schedule BID BID BID BID BID Daily   Cycle Details (No Data) (No Data) (No Data) - - Continuous   Start Date of Last Cycle - 8/24/2022 - - - -   Planned next cycle start date - - - - 11/25/2022 12/12/2022   Doses missed  "in last 2 weeks - 0 - - - -   Adherence Assessment - Adherent - - - -   Adverse Effects - Headache;Fatigue - - - -   Headache - Resolved due to intervention - - - -   Fatigue - Grade 1 - - - -   Any new drug interactions? - No - - - No   Is the dose as ordered appropriate for the patient? - Yes - - - Yes       Last PHQ-2 Score on record:   PHQ-2 ( 1999 Pfizer) 11/22/2022 11/1/2022   Q1: Little interest or pleasure in doing things 0 0   Q2: Feeling down, depressed or hopeless 0 0   PHQ-2 Score 0 0       Vitals:  BP:   BP Readings from Last 1 Encounters:   11/29/22 110/68     Wt Readings from Last 1 Encounters:   11/29/22 110.6 kg (243 lb 12.8 oz)     Estimated body surface area is 2.23 meters squared as calculated from the following:    Height as of 11/23/22: 1.626 m (5' 4\").    Weight as of 11/29/22: 110.6 kg (243 lb 12.8 oz).    Labs:  _  Result Component Current Result Ref Range   Sodium 137 (11/29/2022) 136 - 145 mmol/L     _  Result Component Current Result Ref Range   Potassium 3.7 (11/29/2022) 3.4 - 5.3 mmol/L     _  Result Component Current Result Ref Range   Calcium 8.4 (L) (11/29/2022) 8.6 - 10.0 mg/dL     _  Result Component Current Result Ref Range   Magnesium 2.1 (11/24/2022) 1.7 - 2.3 mg/dL     _  Result Component Current Result Ref Range   Phosphorus 3.8 (11/24/2022) 2.5 - 4.5 mg/dL     _  Result Component Current Result Ref Range   Albumin 3.7 (11/29/2022) 3.5 - 5.2 g/dL     _  Result Component Current Result Ref Range   Urea Nitrogen 23.6 (H) (11/29/2022) 6.0 - 20.0 mg/dL     _  Result Component Current Result Ref Range   Creatinine 0.61 (11/29/2022) 0.51 - 0.95 mg/dL     _  Result Component Current Result Ref Range   AST 16 (11/29/2022) 10 - 35 U/L     _  Result Component Current Result Ref Range   ALT 24 (11/29/2022) 10 - 35 U/L     _  Result Component Current Result Ref Range   Bilirubin Total 0.6 (11/29/2022) <=1.2 mg/dL     _  Result Component Current Result Ref Range   WBC Count 4.6 " (11/29/2022) 4.0 - 11.0 10e3/uL     _  Result Component Current Result Ref Range   Hemoglobin 8.6 (L) (11/29/2022) 11.7 - 15.7 g/dL     _  Result Component Current Result Ref Range   Platelet Count 145 (L) (11/29/2022) 150 - 450 10e3/uL     _  Result Component Current Result Ref Range   Absolute Neutrophils 11.7 (H) (11/24/2022) 1.6 - 8.3 10e3/uL

## 2022-12-02 ENCOUNTER — LAB (OUTPATIENT)
Dept: LAB | Facility: CLINIC | Age: 42
End: 2022-12-02
Attending: INTERNAL MEDICINE
Payer: COMMERCIAL

## 2022-12-02 ENCOUNTER — INFUSION THERAPY VISIT (OUTPATIENT)
Dept: ONCOLOGY | Facility: CLINIC | Age: 42
End: 2022-12-02
Attending: INTERNAL MEDICINE
Payer: COMMERCIAL

## 2022-12-02 VITALS
OXYGEN SATURATION: 99 % | TEMPERATURE: 98 F | HEART RATE: 84 BPM | WEIGHT: 248.1 LBS | BODY MASS INDEX: 42.59 KG/M2 | SYSTOLIC BLOOD PRESSURE: 116 MMHG | DIASTOLIC BLOOD PRESSURE: 70 MMHG | RESPIRATION RATE: 16 BRPM

## 2022-12-02 DIAGNOSIS — C91.00 ACUTE LYMPHOBLASTIC LEUKEMIA (ALL) NOT HAVING ACHIEVED REMISSION (H): ICD-10-CM

## 2022-12-02 DIAGNOSIS — C91.00 ACUTE LYMPHOBLASTIC LEUKEMIA (ALL) NOT HAVING ACHIEVED REMISSION (H): Primary | ICD-10-CM

## 2022-12-02 LAB
ERYTHROCYTE [DISTWIDTH] IN BLOOD BY AUTOMATED COUNT: 18.3 % (ref 10–15)
HCT VFR BLD AUTO: 23.3 % (ref 35–47)
HGB BLD-MCNC: 7.5 G/DL (ref 11.7–15.7)
LIPASE SERPL-CCNC: 27 U/L (ref 13–60)
MCH RBC QN AUTO: 32.3 PG (ref 26.5–33)
MCHC RBC AUTO-ENTMCNC: 32.2 G/DL (ref 31.5–36.5)
MCV RBC AUTO: 100 FL (ref 78–100)
PLAT MORPH BLD: NORMAL
PLATELET # BLD AUTO: 36 10E3/UL (ref 150–450)
RBC # BLD AUTO: 2.32 10E6/UL (ref 3.8–5.2)
RBC MORPH BLD: NORMAL
WBC # BLD AUTO: 0.2 10E3/UL (ref 4–11)

## 2022-12-02 PROCEDURE — 86850 RBC ANTIBODY SCREEN: CPT

## 2022-12-02 PROCEDURE — 86901 BLOOD TYPING SEROLOGIC RH(D): CPT

## 2022-12-02 PROCEDURE — 36415 COLL VENOUS BLD VENIPUNCTURE: CPT

## 2022-12-02 PROCEDURE — 83690 ASSAY OF LIPASE: CPT

## 2022-12-02 PROCEDURE — 86923 COMPATIBILITY TEST ELECTRIC: CPT | Performed by: PHYSICIAN ASSISTANT

## 2022-12-02 PROCEDURE — 85027 COMPLETE CBC AUTOMATED: CPT

## 2022-12-02 PROCEDURE — 94640 AIRWAY INHALATION TREATMENT: CPT | Performed by: INTERNAL MEDICINE

## 2022-12-02 PROCEDURE — 94642 AEROSOL INHALATION TREATMENT: CPT | Performed by: INTERNAL MEDICINE

## 2022-12-02 RX ORDER — MEPERIDINE HYDROCHLORIDE 25 MG/ML
25 INJECTION INTRAMUSCULAR; INTRAVENOUS; SUBCUTANEOUS EVERY 30 MIN PRN
Status: CANCELLED | OUTPATIENT
Start: 2022-12-26

## 2022-12-02 RX ORDER — DIPHENHYDRAMINE HYDROCHLORIDE 50 MG/ML
50 INJECTION INTRAMUSCULAR; INTRAVENOUS
Status: CANCELLED
Start: 2022-12-26

## 2022-12-02 RX ORDER — ALBUTEROL SULFATE 90 UG/1
1-2 AEROSOL, METERED RESPIRATORY (INHALATION)
Status: CANCELLED
Start: 2022-12-26

## 2022-12-02 RX ORDER — ALBUTEROL SULFATE 0.83 MG/ML
2.5 SOLUTION RESPIRATORY (INHALATION) ONCE
Status: CANCELLED
Start: 2022-12-26 | End: 2022-12-26

## 2022-12-02 RX ORDER — PENTAMIDINE ISETHIONATE 300 MG/300MG
300 INHALANT RESPIRATORY (INHALATION) ONCE
Status: CANCELLED
Start: 2022-12-26 | End: 2022-12-26

## 2022-12-02 RX ORDER — METHYLPREDNISOLONE SODIUM SUCCINATE 125 MG/2ML
125 INJECTION, POWDER, LYOPHILIZED, FOR SOLUTION INTRAMUSCULAR; INTRAVENOUS
Status: CANCELLED
Start: 2022-12-26

## 2022-12-02 RX ORDER — PENTAMIDINE ISETHIONATE 300 MG/300MG
300 INHALANT RESPIRATORY (INHALATION) ONCE
Status: COMPLETED | OUTPATIENT
Start: 2022-12-02 | End: 2022-12-02

## 2022-12-02 RX ORDER — ALBUTEROL SULFATE 0.83 MG/ML
2.5 SOLUTION RESPIRATORY (INHALATION) ONCE
Status: COMPLETED | OUTPATIENT
Start: 2022-12-02 | End: 2022-12-02

## 2022-12-02 RX ORDER — ALBUTEROL SULFATE 0.83 MG/ML
2.5 SOLUTION RESPIRATORY (INHALATION)
Status: CANCELLED | OUTPATIENT
Start: 2022-12-26

## 2022-12-02 RX ORDER — EPINEPHRINE 1 MG/ML
0.3 INJECTION, SOLUTION, CONCENTRATE INTRAVENOUS EVERY 5 MIN PRN
Status: CANCELLED | OUTPATIENT
Start: 2022-12-26

## 2022-12-02 RX ADMIN — ALBUTEROL SULFATE 2.5 MG: 0.83 SOLUTION RESPIRATORY (INHALATION) at 11:55

## 2022-12-02 RX ADMIN — PENTAMIDINE ISETHIONATE 300 MG: 300 INHALANT RESPIRATORY (INHALATION) at 12:04

## 2022-12-02 ASSESSMENT — PAIN SCALES - GENERAL: PAINLEVEL: NO PAIN (0)

## 2022-12-02 NOTE — PROGRESS NOTES
Infusion Nursing Note:  April F Bakari presents today for possible blood/plts transfusion-did not meet parameters.    Patient seen by provider today: No   present during visit today: Not Applicable.    Note: Patient presents to infusion feeling ok. Patient states yesterday she had 6-8 diarrhea stools that were not accompanied by discomfort, nausea, or vomiting. Pt states improvement today and that diarrhea is not uncommon post hospital chemo regimen in the past but yesterday was more severe. CMP WNL. Overall, patient denies acute discomfort and states no acute complaints or concerns needing to be addressed today. Specifically, pt denies s/s of infection such as fever, sore throat, cough, chest pain, shortness of breath, body aches, chills, headache, increased nasal congestion, or changes in taste/smell. plt 36,000. Hemoglobin 7.5. Patient denies s/s of low hemoglobin such as dizziness, lightheadedness, increased fatigue, chest pain, shortness of breath, headache, appearing pale, or increased heart rate. Patient also denies s/s of low platelets such as increased bruising, frequent nose bleeds, bleeding gums, or blood in urine/stool. Patient aware to seek medical attention if the above symptoms develop at home.     Updated Sania REZA on lab trends from 11/29-12/2 and diarrhea history yesterday. Pt offered to come in Sunday for lab/possible transfusion but unable to make it do to other plans-pt is aware to seek medical attention if she has any symptoms above that would warrant a lab/possible transfusion check.  TORB. 12/2/2022. 1117. Sania REZA. Wesley Parikh RN. Pt to call if diarrhea worsens. No need for blood or plt transfusion today. Educate neutropenic precautions. Patient to start taking Levaquin and Diflucan for low WBC.    Pt verbalizes the information above and states she will seek medical attention if anything changes over the wkd. Confirmed that patient is not on blood thinners with  patient and pharmacist Tabitha.     At 1531, patient answered and confirmed that she will start her Levaquin and Diflucan. Confirmed appt for 12/5/2022 for possible blood/plts at 0800 with BMT via infusion RN Deshawn Bailon.     Intravenous Access:  No Intravenous access at this visit.    Treatment Conditions:  Lab Results   Component Value Date    HGB 7.5 (L) 12/02/2022    WBC 0.2 (LL) 12/02/2022    ANEU 11.7 (H) 11/24/2022    ANEUTAUTO 4.2 11/29/2022    PLT 36 (LL) 12/02/2022      Lab Results   Component Value Date     12/02/2022    POTASSIUM 4.1 12/02/2022    MAG 2.1 11/24/2022    CR 0.68 12/02/2022    SOLEDAD 8.8 12/02/2022    BILITOTAL 0.7 12/02/2022    ALBUMIN 3.6 12/02/2022    ALT 26 12/02/2022    AST 13 12/02/2022     Results reviewed, labs did NOT meet treatment parameters: Hemoglobin greater then 7 (7.5) and plt count greater then 10,000 (36,000), therefore no product needed.    Discharge Plan:   Patient declined prescription refills.  Discharge instructions reviewed with: Patient.  Patient and/or family verbalized understanding of discharge instructions and all questions answered.  AVS to patient via [employer].  Patient will return 12/5 for next appointment. Communication has been made and infusion Manager Deshawn is working on getting pt scheduled.  Patient discharged in stable condition accompanied by: self.  Departure Mode: Ambulatory.  Face to Face time: 0 minutes.      Wesley Parikh RN

## 2022-12-02 NOTE — PROGRESS NOTES
Vashti Villegas was seen today for a Pentamidine nebulizer tx ordered by Maura Jorgensen PA-C.    Patient was first given 2.5 mg of Albuterol nebulizer, after which Pentamidine 300 mg (Lot # [id number]) mixed with 6cc Sterile H20 was administered through a filtered nebulizer.    Pre-treatment: SpO2 = 99%   HR = 100  BBS = clear  Post-treatment: SpO2 = 100%  HR = 100  BBS = clear    No adverse side effects noted by the patient.    This service today was provided under Dr. Perlman, who was available if needed.     Procedure was completed by Michell Polo.

## 2022-12-02 NOTE — PATIENT INSTRUCTIONS
[facility] Triage and after hours / weekends / holidays:  [phone]    Please call the triage or after hours line if you experience a temperature greater than or equal to 100.4, shaking chills, have uncontrolled nausea, vomiting and/or diarrhea, dizziness, shortness of breath, chest pain, bleeding, unexplained bruising, or if you have any other new/concerning symptoms, questions or concerns.      If you are having any concerning symptoms or wish to speak to a provider before your next infusion visit, please call your care coordinator or triage to notify them so we can adequately serve you.     If you need a refill on a narcotic prescription or other medication, please call before your infusion appointment.                 December 2022 Sunday Monday Tuesday Wednesday Thursday Friday Saturday                       1     2    LAB CENTRAL   9:45 AM   (15 min.)   [facility] LAB DRAW   [facility]    ONC INFUSION 6 HR (360 MIN)  10:30 AM   (360 min.)    ONC INFUSION NURSE   [facility]    UMP PENTAMADINE   2:45 PM   (60 min.)    PFL PENT   [facility] Pulmonary Function Testing [place] 3       4     5    LAB CENTRAL  10:45 AM   (15 min.)   [facility] LAB DRAW   [facility]    XR LP INTRATHECAL CHEMO ADMIN  12:00 PM   (60 min.)   [id number]   [facility] Xray [place] 6     7     8     9     10       11     12     13    UMP RETURN  12:45 PM   (30 min.)   Chuck Diaz MD   [facility] [place] 14     15     16     17       18     19     20     21     22     23     24       25     26     27     28     29     30     31 January 2023 Sunday Monday Tuesday Wednesday Thursday Friday Saturday   1     2     3     4     5     6     7       8     9     10     11     12     13     14       15     16     17     18     19     20     21       22      23     24     25     26     27     28       29     30     31                                           Lab Results:  Recent Results (from the past 12 hour(s))   Comprehensive metabolic panel    Collection Time: 12/02/22 10:12 AM   Result Value Ref Range    Sodium 141 136 - 145 mmol/L    Potassium 4.1 3.4 - 5.3 mmol/L    Chloride 106 98 - 107 mmol/L    Carbon Dioxide (CO2) 28 22 - 29 mmol/L    Anion Gap 7 7 - 15 mmol/L    Urea Nitrogen 15.3 6.0 - 20.0 mg/dL    Creatinine 0.68 0.51 - 0.95 mg/dL    Calcium 8.8 8.6 - 10.0 mg/dL    Glucose 90 70 - 99 mg/dL    Alkaline Phosphatase 65 35 - 104 U/L    AST 13 10 - 35 U/L    ALT 26 10 - 35 U/L    Protein Total 5.7 (L) 6.4 - 8.3 g/dL    Albumin 3.6 3.5 - 5.2 g/dL    Bilirubin Total 0.7 <=1.2 mg/dL    GFR Estimate >90 >60 mL/min/1.73m2   Lipase    Collection Time: 12/02/22 10:12 AM   Result Value Ref Range    Lipase 27 13 - 60 U/L   CBC with platelets and differential    Collection Time: 12/02/22 10:12 AM   Result Value Ref Range    WBC Count 0.2 (LL) 4.0 - 11.0 10e3/uL    RBC Count 2.32 (L) 3.80 - 5.20 10e6/uL    Hemoglobin 7.5 (L) 11.7 - 15.7 g/dL    Hematocrit 23.3 (L) 35.0 - 47.0 %     78 - 100 fL    MCH 32.3 26.5 - 33.0 pg    MCHC 32.2 31.5 - 36.5 g/dL    RDW 18.3 (H) 10.0 - 15.0 %    Platelet Count 36 (LL) 150 - 450 10e3/uL   RBC and Platelet Morphology    Collection Time: 12/02/22 10:12 AM   Result Value Ref Range    Platelet Assessment  Automated Count Confirmed. Platelet morphology is normal.     Automated Count Confirmed. Platelet morphology is normal.    RBC Morphology Confirmed RBC Indices

## 2022-12-04 LAB
BLD PROD TYP BPU: NORMAL
BLOOD COMPONENT TYPE: NORMAL
CODING SYSTEM: NORMAL
CROSSMATCH: NORMAL
ISSUE DATE AND TIME: NORMAL
UNIT ABO/RH: NORMAL
UNIT NUMBER: NORMAL
UNIT STATUS: NORMAL
UNIT TYPE ISBT: 5100
UNIT TYPE ISBT: 5100
UNIT TYPE ISBT: 9500

## 2022-12-04 RX ORDER — HEPARIN SODIUM,PORCINE 10 UNIT/ML
5 VIAL (ML) INTRAVENOUS
Status: CANCELLED | OUTPATIENT
Start: 2022-12-04

## 2022-12-04 RX ORDER — HEPARIN SODIUM (PORCINE) LOCK FLUSH IV SOLN 100 UNIT/ML 100 UNIT/ML
5 SOLUTION INTRAVENOUS
Status: CANCELLED | OUTPATIENT
Start: 2022-12-04

## 2022-12-04 RX ORDER — ACETAMINOPHEN 325 MG/1
325 TABLET ORAL ONCE
Status: CANCELLED
Start: 2022-12-04 | End: 2022-12-04

## 2022-12-04 RX ORDER — DIPHENHYDRAMINE HCL 25 MG
25 CAPSULE ORAL ONCE
Status: CANCELLED
Start: 2022-12-04 | End: 2022-12-04

## 2022-12-05 ENCOUNTER — INFUSION THERAPY VISIT (OUTPATIENT)
Dept: TRANSPLANT | Facility: CLINIC | Age: 42
End: 2022-12-05
Attending: INTERNAL MEDICINE
Payer: COMMERCIAL

## 2022-12-05 ENCOUNTER — LAB (OUTPATIENT)
Dept: LAB | Facility: CLINIC | Age: 42
End: 2022-12-05
Attending: INTERNAL MEDICINE
Payer: COMMERCIAL

## 2022-12-05 VITALS
DIASTOLIC BLOOD PRESSURE: 68 MMHG | BODY MASS INDEX: 43.53 KG/M2 | WEIGHT: 253.6 LBS | HEART RATE: 88 BPM | OXYGEN SATURATION: 98 % | TEMPERATURE: 98.5 F | SYSTOLIC BLOOD PRESSURE: 106 MMHG | RESPIRATION RATE: 18 BRPM

## 2022-12-05 VITALS
TEMPERATURE: 98.3 F | OXYGEN SATURATION: 97 % | HEART RATE: 98 BPM | DIASTOLIC BLOOD PRESSURE: 74 MMHG | RESPIRATION RATE: 18 BRPM | SYSTOLIC BLOOD PRESSURE: 116 MMHG

## 2022-12-05 DIAGNOSIS — C91.00 ACUTE LYMPHOBLASTIC LEUKEMIA (ALL) NOT HAVING ACHIEVED REMISSION (H): Primary | ICD-10-CM

## 2022-12-05 LAB
ABO/RH(D): NORMAL
ALBUMIN SERPL BCG-MCNC: 3.6 G/DL (ref 3.5–5.2)
ALP SERPL-CCNC: 66 U/L (ref 35–104)
ALT SERPL W P-5'-P-CCNC: 25 U/L (ref 10–35)
ANION GAP SERPL CALCULATED.3IONS-SCNC: 9 MMOL/L (ref 7–15)
ANTIBODY SCREEN: NEGATIVE
AST SERPL W P-5'-P-CCNC: 10 U/L (ref 10–35)
BILIRUB SERPL-MCNC: 0.3 MG/DL
BLD PROD TYP BPU: NORMAL
BLOOD COMPONENT TYPE: NORMAL
BUN SERPL-MCNC: 13.1 MG/DL (ref 6–20)
CALCIUM SERPL-MCNC: 8.8 MG/DL (ref 8.6–10)
CHLORIDE SERPL-SCNC: 108 MMOL/L (ref 98–107)
CODING SYSTEM: NORMAL
CREAT SERPL-MCNC: 0.62 MG/DL (ref 0.51–0.95)
DEPRECATED HCO3 PLAS-SCNC: 24 MMOL/L (ref 22–29)
GFR SERPL CREATININE-BSD FRML MDRD: >90 ML/MIN/1.73M2
GLUCOSE SERPL-MCNC: 100 MG/DL (ref 70–99)
ISSUE DATE AND TIME: NORMAL
PLATELET # BLD AUTO: 13 10E3/UL (ref 150–450)
PLATELET # BLD AUTO: 9 10E3/UL (ref 150–450)
POTASSIUM SERPL-SCNC: 3.9 MMOL/L (ref 3.4–5.3)
PROT SERPL-MCNC: 5.8 G/DL (ref 6.4–8.3)
SODIUM SERPL-SCNC: 141 MMOL/L (ref 136–145)
SPECIMEN EXPIRATION DATE: NORMAL
UNIT ABO/RH: NORMAL
UNIT NUMBER: NORMAL
UNIT STATUS: NORMAL
UNIT TYPE ISBT: 8400

## 2022-12-05 PROCEDURE — P9037 PLATE PHERES LEUKOREDU IRRAD: HCPCS | Performed by: PHYSICIAN ASSISTANT

## 2022-12-05 PROCEDURE — 250N000013 HC RX MED GY IP 250 OP 250 PS 637: Performed by: NURSE PRACTITIONER

## 2022-12-05 PROCEDURE — 36430 TRANSFUSION BLD/BLD COMPNT: CPT

## 2022-12-05 PROCEDURE — P9040 RBC LEUKOREDUCED IRRADIATED: HCPCS | Performed by: PHYSICIAN ASSISTANT

## 2022-12-05 PROCEDURE — 85049 AUTOMATED PLATELET COUNT: CPT | Mod: 91 | Performed by: INTERNAL MEDICINE

## 2022-12-05 PROCEDURE — 999N000248 HC STATISTIC IV INSERT WITH US BY RN

## 2022-12-05 PROCEDURE — 36415 COLL VENOUS BLD VENIPUNCTURE: CPT

## 2022-12-05 PROCEDURE — 80053 COMPREHEN METABOLIC PANEL: CPT

## 2022-12-05 PROCEDURE — 85049 AUTOMATED PLATELET COUNT: CPT | Mod: 91

## 2022-12-05 RX ORDER — HEPARIN SODIUM,PORCINE 10 UNIT/ML
5 VIAL (ML) INTRAVENOUS
Status: CANCELLED | OUTPATIENT
Start: 2022-12-05

## 2022-12-05 RX ORDER — ACETAMINOPHEN 325 MG/1
325 TABLET ORAL ONCE
Status: COMPLETED | OUTPATIENT
Start: 2022-12-05 | End: 2022-12-05

## 2022-12-05 RX ORDER — HEPARIN SODIUM (PORCINE) LOCK FLUSH IV SOLN 100 UNIT/ML 100 UNIT/ML
5 SOLUTION INTRAVENOUS
Status: CANCELLED | OUTPATIENT
Start: 2022-12-05

## 2022-12-05 RX ORDER — DIPHENHYDRAMINE HCL 25 MG
25 CAPSULE ORAL ONCE
Status: COMPLETED | OUTPATIENT
Start: 2022-12-05 | End: 2022-12-05

## 2022-12-05 RX ORDER — HEPARIN SODIUM (PORCINE) LOCK FLUSH IV SOLN 100 UNIT/ML 100 UNIT/ML
5 SOLUTION INTRAVENOUS
Status: DISCONTINUED | OUTPATIENT
Start: 2022-12-05 | End: 2022-12-05 | Stop reason: HOSPADM

## 2022-12-05 RX ORDER — ACETAMINOPHEN 325 MG/1
325 TABLET ORAL ONCE
Status: CANCELLED
Start: 2022-12-05 | End: 2022-12-05

## 2022-12-05 RX ORDER — DIPHENHYDRAMINE HCL 25 MG
25 CAPSULE ORAL ONCE
Status: CANCELLED
Start: 2022-12-05 | End: 2022-12-05

## 2022-12-05 RX ADMIN — ACETAMINOPHEN 650 MG: 325 TABLET ORAL at 09:10

## 2022-12-05 RX ADMIN — DIPHENHYDRAMINE HYDROCHLORIDE 25 MG: 25 CAPSULE ORAL at 13:28

## 2022-12-05 RX ADMIN — DIPHENHYDRAMINE HYDROCHLORIDE 25 MG: 25 CAPSULE ORAL at 09:10

## 2022-12-05 RX ADMIN — ACETAMINOPHEN 650 MG: 325 TABLET ORAL at 13:28

## 2022-12-05 ASSESSMENT — PAIN SCALES - GENERAL: PAINLEVEL: NO PAIN (0)

## 2022-12-05 NOTE — LETTER
12/5/2022         RE: Vashti Villegas  [address] [place] [address]        Dear Colleague,    Thank you for referring your patient, Vashti Villegas, to the [facility] BLOOD AND MARROW TRANSPLANT PROGRAM [place]. Please see a copy of my visit note below.    Infusion Nursing Note:  Vashti Villegas presents today for two doses of Plts and one unit PRBC's.     Patient seen by provider today: No   present during visit today: Not Applicable.    Note: Pt arrived today with a Plt count of 3K. Was scheduled to have a LP with IT Chemo in IR today. Following two doses of Plts with oral Premeds of Benadryl (25mg) and Tylenol, post Plt count was drawn and resulted at 13K, and then 9K on an additional redraw. Dr. Hull notified of results and LP was cancelled. IR notified. Per Lucy Reed the Nurse Coordinator would be in touch with Pt very soon as Pt is not scheduled to return anymore the rest of this week.    Pt also with a Hgb of 6.1 today. One unit PRBC's transfused with repeated oral Premeds given 30 minutes prior. Pt tolerated transfusion well. VSS throughout. See Flowsheet for further details.      Intravenous Access:  Peripheral IV placed.  Port was unable to be accessed today- lab staff did not have success. Pt with a newer Port- placed within the last couple weeks.      Treatment Conditions:  Lab Results   Component Value Date    HGB 6.1 (LL) 12/05/2022    WBC 0.2 (LL) 12/05/2022    ANEU 11.7 (H) 11/24/2022    ANEUTAUTO 4.2 11/29/2022    PLT 9 (LL) 12/05/2022        Post Infusion Assessment:  Patient tolerated infusion without incident.  Site patent and intact, free from redness, edema or discomfort.  Access discontinued per protocol.     Discharge Plan:   Patient discharged in stable condition accompanied by: .      Isabela Matthews RN                          Again, thank you for allowing me to participate in the care of your patient.        Sincerely,        No name on  file

## 2022-12-05 NOTE — NURSING NOTE
Chief Complaint   Patient presents with     Port Draw     Vitals taken, attempted port access by 2 RN's unable to access,PIV [facility] with [facility] by [facility], labs drawn, checked into next appt     Infusion     Pt here for two doses of Plts and one unit PRBC's. Pt with ALL.      Isabela Matthews, RN

## 2022-12-05 NOTE — NURSING NOTE
Chief Complaint   Patient presents with     Port Draw     Vitals taken, attempted port access by 2 RN's unable to access,Venipuncture, labs drawn, checked into next appt     /58 (BP Location: Right arm, Patient Position: Sitting, Cuff Size: Adult Large)   Pulse 96   Temp 98.7  F (37.1  C) (Oral)   Resp 16   Wt 115 kg (253 lb 9.6 oz)   SpO2 99%   BMI 43.53 kg/m    Deshawn Duncan RN on 12/5/2022 at 8:18 AM

## 2022-12-06 ENCOUNTER — LAB (OUTPATIENT)
Dept: LAB | Facility: CLINIC | Age: 42
End: 2022-12-06
Payer: COMMERCIAL

## 2022-12-06 ENCOUNTER — MYC MEDICAL ADVICE (OUTPATIENT)
Dept: ONCOLOGY | Facility: CLINIC | Age: 42
End: 2022-12-06

## 2022-12-06 ENCOUNTER — TELEPHONE (OUTPATIENT)
Dept: ONCOLOGY | Facility: CLINIC | Age: 42
End: 2022-12-06

## 2022-12-06 DIAGNOSIS — C91.00 ACUTE LYMPHOBLASTIC LEUKEMIA (ALL) NOT HAVING ACHIEVED REMISSION (H): ICD-10-CM

## 2022-12-06 LAB
ALBUMIN SERPL-MCNC: 3.1 G/DL (ref 3.4–5)
ALP SERPL-CCNC: 80 U/L (ref 40–150)
ALT SERPL W P-5'-P-CCNC: 31 U/L (ref 0–50)
ANION GAP SERPL CALCULATED.3IONS-SCNC: 4 MMOL/L (ref 3–14)
AST SERPL W P-5'-P-CCNC: 10 U/L (ref 0–45)
BASOPHILS # BLD MANUAL: 0 10E3/UL (ref 0–0.2)
BASOPHILS NFR BLD MANUAL: 0 %
BILIRUB SERPL-MCNC: 0.4 MG/DL (ref 0.2–1.3)
BLD PROD TYP BPU: NORMAL
BLD PROD TYP BPU: NORMAL
BLOOD COMPONENT TYPE: NORMAL
BLOOD COMPONENT TYPE: NORMAL
BUN SERPL-MCNC: 13 MG/DL (ref 7–30)
CALCIUM SERPL-MCNC: 8.5 MG/DL (ref 8.5–10.1)
CHLORIDE BLD-SCNC: 114 MMOL/L (ref 94–109)
CO2 SERPL-SCNC: 27 MMOL/L (ref 20–32)
CODING SYSTEM: NORMAL
CODING SYSTEM: NORMAL
CREAT SERPL-MCNC: 0.64 MG/DL (ref 0.52–1.04)
CROSSMATCH: NORMAL
DACRYOCYTES BLD QL SMEAR: SLIGHT
EOSINOPHIL # BLD MANUAL: 0 10E3/UL (ref 0–0.7)
EOSINOPHIL NFR BLD MANUAL: 0 %
ERYTHROCYTE [DISTWIDTH] IN BLOOD BY AUTOMATED COUNT: 18.2 % (ref 10–15)
GFR SERPL CREATININE-BSD FRML MDRD: >90 ML/MIN/1.73M2
GLUCOSE BLD-MCNC: 117 MG/DL (ref 70–99)
HCT VFR BLD AUTO: 21.7 % (ref 35–47)
HGB BLD-MCNC: 7.3 G/DL (ref 11.7–15.7)
ISSUE DATE AND TIME: NORMAL
ISSUE DATE AND TIME: NORMAL
LYMPHOCYTES # BLD MANUAL: 0.4 10E3/UL (ref 0.8–5.3)
LYMPHOCYTES NFR BLD MANUAL: 16 %
MCH RBC QN AUTO: 32.3 PG (ref 26.5–33)
MCHC RBC AUTO-ENTMCNC: 33.6 G/DL (ref 31.5–36.5)
MCV RBC AUTO: 96 FL (ref 78–100)
MONOCYTES # BLD MANUAL: 0.3 10E3/UL (ref 0–1.3)
MONOCYTES NFR BLD MANUAL: 15 %
NEUTROPHILS # BLD MANUAL: 1.5 10E3/UL (ref 1.6–8.3)
NEUTROPHILS NFR BLD MANUAL: 69 %
PLAT MORPH BLD: ABNORMAL
PLATELET # BLD AUTO: 9 10E3/UL (ref 150–450)
POTASSIUM BLD-SCNC: 3.8 MMOL/L (ref 3.4–5.3)
PROT SERPL-MCNC: 6.4 G/DL (ref 6.8–8.8)
RBC # BLD AUTO: 2.26 10E6/UL (ref 3.8–5.2)
RBC MORPH BLD: ABNORMAL
SODIUM SERPL-SCNC: 145 MMOL/L (ref 133–144)
TOXIC GRANULES BLD QL SMEAR: PRESENT
UNIT ABO/RH: NORMAL
UNIT ABO/RH: NORMAL
UNIT NUMBER: NORMAL
UNIT NUMBER: NORMAL
UNIT STATUS: NORMAL
UNIT STATUS: NORMAL
UNIT TYPE ISBT: 5100
UNIT TYPE ISBT: 9500
WBC # BLD AUTO: 2.2 10E3/UL (ref 4–11)

## 2022-12-06 PROCEDURE — 36415 COLL VENOUS BLD VENIPUNCTURE: CPT

## 2022-12-06 PROCEDURE — 86901 BLOOD TYPING SEROLOGIC RH(D): CPT

## 2022-12-06 PROCEDURE — 86900 BLOOD TYPING SEROLOGIC ABO: CPT

## 2022-12-06 PROCEDURE — 85007 BL SMEAR W/DIFF WBC COUNT: CPT

## 2022-12-06 PROCEDURE — 86850 RBC ANTIBODY SCREEN: CPT

## 2022-12-06 PROCEDURE — 80053 COMPREHEN METABOLIC PANEL: CPT

## 2022-12-06 PROCEDURE — 85027 COMPLETE CBC AUTOMATED: CPT

## 2022-12-06 RX ORDER — DIPHENHYDRAMINE HCL 25 MG
25 CAPSULE ORAL ONCE
Status: CANCELLED
Start: 2022-12-06 | End: 2022-12-06

## 2022-12-06 RX ORDER — HEPARIN SODIUM,PORCINE 10 UNIT/ML
5 VIAL (ML) INTRAVENOUS
Status: CANCELLED | OUTPATIENT
Start: 2022-12-06

## 2022-12-06 RX ORDER — ACETAMINOPHEN 325 MG/1
325 TABLET ORAL ONCE
Status: CANCELLED
Start: 2022-12-06 | End: 2022-12-06

## 2022-12-06 RX ORDER — HEPARIN SODIUM (PORCINE) LOCK FLUSH IV SOLN 100 UNIT/ML 100 UNIT/ML
5 SOLUTION INTRAVENOUS
Status: CANCELLED | OUTPATIENT
Start: 2022-12-06

## 2022-12-06 NOTE — TELEPHONE ENCOUNTER
"Oncology Nurse Triage    Situation:   April reporting the following symptoms:  -critical labs resulted  Plts 9.    Background:   Treating Provider:   Roya Quarles request Triage to make call to patient to assess if pt showing signs of acute bleeding.     Assessment:     Onset: Critical value Plts 9 today    Pt states, \"blood draw from peripheral did stop bleeding right away and the small scratches that patient did to self and was able to stop scratches and bleeding from scratches stopped within 30seconds  and when bit side of mouth accidentally, no bleeding inside the mouth.     Nose is dry, and when blows nose, some slight blood but no bloody nose.    Has some bruising on hand and from arm IV site yesterday and bruising staying the same size, has not grown in size.      Pt is also wondering if Period comes this month, and guidelines to monitor for increase bleeding from period. This writer educated on to seek care immediately/ED if soaks a period pad in 1hour.     Pt DENIES having any  blood in urine, black tarry stool, bright red stool, petechia, dizziness, headache, chest pain, SOB, fever.     This writer educated on bleeding precautions, avoid sharp objects and use of fork/knife when eating.    Reminded pt to monitor for s/s of bleeding or bruising including new, unknown source of bruising, or bruises growing in size, petechia (looks like no none raised rash like someone dotted area with red/pink, fine point marker), nose bleeding, bleeding gums or any other bleeding re: potentially dropping plt count and may need to be rechecked earlier than planned.      Recommendations:   Pt has Labs and Platelet transfusion planned for tomorrow at 8:00 with transfusion at 8:30am and is okay with this plan and is aware to go to ED if any signs of uncontrolled bleeding occur.   Pt stated,\"Sounds like an opportunity for my  to bring home take out for dinner to avoid using knife/fork to cook/eat dinner\".     1556 " Roya Quarles APRN aware and okay with canceling 8am labs for tomorrow. Infusion nurses okay with this too as they have T&S being processed. Pt can arrive at 8:30am for Transfusion tomorrow.

## 2022-12-07 ENCOUNTER — INFUSION THERAPY VISIT (OUTPATIENT)
Dept: TRANSPLANT | Facility: CLINIC | Age: 42
End: 2022-12-07
Attending: INTERNAL MEDICINE
Payer: COMMERCIAL

## 2022-12-07 ENCOUNTER — PATIENT OUTREACH (OUTPATIENT)
Dept: ONCOLOGY | Facility: CLINIC | Age: 42
End: 2022-12-07

## 2022-12-07 VITALS
SYSTOLIC BLOOD PRESSURE: 119 MMHG | HEART RATE: 89 BPM | TEMPERATURE: 98.2 F | DIASTOLIC BLOOD PRESSURE: 79 MMHG | RESPIRATION RATE: 16 BRPM | OXYGEN SATURATION: 97 %

## 2022-12-07 DIAGNOSIS — C91.00 ACUTE LYMPHOBLASTIC LEUKEMIA (ALL) NOT HAVING ACHIEVED REMISSION (H): Primary | ICD-10-CM

## 2022-12-07 PROCEDURE — P9040 RBC LEUKOREDUCED IRRADIATED: HCPCS | Performed by: PHYSICIAN ASSISTANT

## 2022-12-07 PROCEDURE — 36430 TRANSFUSION BLD/BLD COMPNT: CPT

## 2022-12-07 PROCEDURE — P9037 PLATE PHERES LEUKOREDU IRRAD: HCPCS | Performed by: PHYSICIAN ASSISTANT

## 2022-12-07 PROCEDURE — 250N000011 HC RX IP 250 OP 636: Performed by: INTERNAL MEDICINE

## 2022-12-07 PROCEDURE — 250N000013 HC RX MED GY IP 250 OP 250 PS 637: Performed by: NURSE PRACTITIONER

## 2022-12-07 RX ORDER — ACETAMINOPHEN 325 MG/1
325 TABLET ORAL ONCE
Status: COMPLETED | OUTPATIENT
Start: 2022-12-07 | End: 2022-12-07

## 2022-12-07 RX ORDER — DIPHENHYDRAMINE HCL 25 MG
25 CAPSULE ORAL ONCE
Status: COMPLETED | OUTPATIENT
Start: 2022-12-07 | End: 2022-12-07

## 2022-12-07 RX ORDER — HEPARIN SODIUM (PORCINE) LOCK FLUSH IV SOLN 100 UNIT/ML 100 UNIT/ML
5 SOLUTION INTRAVENOUS
Status: DISCONTINUED | OUTPATIENT
Start: 2022-12-07 | End: 2022-12-07 | Stop reason: HOSPADM

## 2022-12-07 RX ADMIN — ACETAMINOPHEN 325 MG: 325 TABLET ORAL at 08:46

## 2022-12-07 RX ADMIN — DIPHENHYDRAMINE HYDROCHLORIDE 25 MG: 25 CAPSULE ORAL at 08:46

## 2022-12-07 RX ADMIN — Medication 5 ML: at 11:47

## 2022-12-07 ASSESSMENT — PAIN SCALES - GENERAL: PAINLEVEL: NO PAIN (0)

## 2022-12-07 NOTE — PROGRESS NOTES
[facility]: Cancer Care Plan of Care Education Note                                    Discussion with Patient:                                                      I met with April during her platelet transfusion today.  We reviewed the plan for lab monitoring, possible transfusions and admission next week to start Blinatumumab and Ponalidomide.      Assessment:                                                      Assessment completed with:: Patient    Current living arrangement  I live in a private home with family    Plan of Care Education   Does patient understand diagnosis?: Yes  Tx plan/regimen:: Blinatumumab and Ponatinib  Does patient understand treatment plan/regimen?: Yes  Vascular access:: Port  Side effect education:: Lab value monitoring (anemia, neutropenia, thrombocytopenia)  Supportive services:: Home infusion  Reasons for deferring treatment reviewed with patient:: Yes    Evaluation of Learning  Patient Education Provided: Yes  Readiness:: Acceptance  Method:: Explanation  Response:: Verbalizes understanding      Intervention/Education provided during outreach:                                                       We reviewed safety precautions due to low platelets.     I explained that the Blinatumumab will be infused for 28 days and she will discharge from the hospital on a home infusion pump.  She will have support from Home infusion for bag changes and central line care    Patient to follow up as scheduled at next appointment     Signature:  Flor Leslie RN

## 2022-12-07 NOTE — PROGRESS NOTES
Infusion Nursing Note:  Vashti Villegas presents today for RBC/plt transfusion.    Patient seen by provider today: No   present during visit today: Not Applicable.    Note: April here today for transfusion based on 12/6 labs. Her plt count was 9, Hgb 7.3. Discussed results with patient, she inquires about increasing SOB with activity and if this was related to her hgb. Vitals obtained, pt tachycardic 110s. Given SOB with activity and tachycardia, giving RBCs today. April denies other symptoms of fever/cough/gi upset. IB sent to Dr. Hull and Sania Dwyer to discuss Hgb parameter further at next visit- update: per Dr. Hull, will adjust hgb parameter to 7.5 Premedicated with tylenol and benadryl, tolerated transfusions without side effect or symptom of reaction.    Intravenous Access:  Implanted Port. Pt notes a lot of difficulty obtaining access on Monday's visit. It appears her port is slightly tilted medially, this RN was able to obtain port access. Pt tolerated well.    Treatment Conditions:  Lab Results   Component Value Date    HGB 7.3 (LL) 12/06/2022    WBC 2.2 (L) 12/06/2022    ANEU 1.5 (L) 12/06/2022    ANEUTAUTO 4.2 11/29/2022    PLT 9 (LL) 12/06/2022        Post Infusion Assessment:  Patient tolerated infusion without incident.  Blood return noted pre and post infusion.  No evidence of extravasations.  Access discontinued per protocol.     Discharge Plan:   Patient discharged in stable condition accompanied by: self.  Departure Mode: Ambulatory.      Yazmin Alves RN

## 2022-12-07 NOTE — LETTER
Date:December 8, 2022      Provider requested that no letter be sent. Do not send.       [facility]

## 2022-12-07 NOTE — LETTER
12/7/2022         RE: Vashti Villegas  [address] [place] [address]        Dear Colleague,    Thank you for referring your patient, Vashti Villegas, to the [facility] BLOOD AND MARROW TRANSPLANT PROGRAM [place]. Please see a copy of my visit note below.    Infusion Nursing Note:  Vashti Villegas presents today for RBC/plt transfusion.    Patient seen by provider today: No   present during visit today: Not Applicable.    Note: April here today for transfusion based on 12/6 labs. Her plt count was 9, Hgb 7.3. Discussed results with patient, she inquires about increasing SOB with activity and if this was related to her hgb. Vitals obtained, pt tachycardic 110s. Given SOB with activity and tachycardia, giving RBCs today. April denies other symptoms of fever/cough/gi upset. IB sent to Dr. Hull and Sania Dwyer to discuss Hgb parameter further at next visit- update: per Dr. Hull, will adjust hgb parameter to 7.5 Premedicated with tylenol and benadryl, tolerated transfusions without side effect or symptom of reaction.    Intravenous Access:  Implanted Port. Pt notes a lot of difficulty obtaining access on Monday's visit. It appears her port is slightly tilted medially, this RN was able to obtain port access. Pt tolerated well.    Treatment Conditions:  Lab Results   Component Value Date    HGB 7.3 (LL) 12/06/2022    WBC 2.2 (L) 12/06/2022    ANEU 1.5 (L) 12/06/2022    ANEUTAUTO 4.2 11/29/2022    PLT 9 (LL) 12/06/2022        Post Infusion Assessment:  Patient tolerated infusion without incident.  Blood return noted pre and post infusion.  No evidence of extravasations.  Access discontinued per protocol.     Discharge Plan:   Patient discharged in stable condition accompanied by: self.  Departure Mode: Ambulatory.      Yazmin Alves, CAROL                        Again, thank you for allowing me to participate in the care of your patient.        Sincerely,        No name on file

## 2022-12-08 ENCOUNTER — APPOINTMENT (OUTPATIENT)
Dept: LAB | Facility: CLINIC | Age: 42
End: 2022-12-08
Payer: COMMERCIAL

## 2022-12-08 ENCOUNTER — PATIENT OUTREACH (OUTPATIENT)
Dept: ONCOLOGY | Facility: CLINIC | Age: 42
End: 2022-12-08

## 2022-12-08 LAB
ALBUMIN SERPL-MCNC: 3.2 G/DL (ref 3.4–5)
ALP SERPL-CCNC: 112 U/L (ref 40–150)
ALT SERPL W P-5'-P-CCNC: 32 U/L (ref 0–50)
ANION GAP SERPL CALCULATED.3IONS-SCNC: 4 MMOL/L (ref 3–14)
AST SERPL W P-5'-P-CCNC: 17 U/L (ref 0–45)
BASOPHILS # BLD MANUAL: 0 10E3/UL (ref 0–0.2)
BASOPHILS NFR BLD MANUAL: 0 %
BILIRUB SERPL-MCNC: 0.2 MG/DL (ref 0.2–1.3)
BUN SERPL-MCNC: 12 MG/DL (ref 7–30)
CALCIUM SERPL-MCNC: 8.7 MG/DL (ref 8.5–10.1)
CHLORIDE BLD-SCNC: 114 MMOL/L (ref 94–109)
CO2 SERPL-SCNC: 27 MMOL/L (ref 20–32)
CREAT SERPL-MCNC: 0.62 MG/DL (ref 0.52–1.04)
DACRYOCYTES BLD QL SMEAR: SLIGHT
ELLIPTOCYTES BLD QL SMEAR: SLIGHT
EOSINOPHIL # BLD MANUAL: 0 10E3/UL (ref 0–0.7)
EOSINOPHIL NFR BLD MANUAL: 0 %
ERYTHROCYTE [DISTWIDTH] IN BLOOD BY AUTOMATED COUNT: 19.6 % (ref 10–15)
GFR SERPL CREATININE-BSD FRML MDRD: >90 ML/MIN/1.73M2
GLUCOSE BLD-MCNC: 119 MG/DL (ref 70–99)
HCT VFR BLD AUTO: 24.8 % (ref 35–47)
HGB BLD-MCNC: 8.2 G/DL (ref 11.7–15.7)
LYMPHOCYTES # BLD MANUAL: 1.2 10E3/UL (ref 0.8–5.3)
LYMPHOCYTES NFR BLD MANUAL: 7 %
MCH RBC QN AUTO: 31.1 PG (ref 26.5–33)
MCHC RBC AUTO-ENTMCNC: 33.1 G/DL (ref 31.5–36.5)
MCV RBC AUTO: 94 FL (ref 78–100)
METAMYELOCYTES # BLD MANUAL: 1.7 10E3/UL
METAMYELOCYTES NFR BLD MANUAL: 10 %
MONOCYTES # BLD MANUAL: 1.3 10E3/UL (ref 0–1.3)
MONOCYTES NFR BLD MANUAL: 8 %
MYELOCYTES # BLD MANUAL: 0.2 10E3/UL
MYELOCYTES NFR BLD MANUAL: 1 %
NEUTROPHILS # BLD MANUAL: 12.2 10E3/UL (ref 1.6–8.3)
NEUTROPHILS NFR BLD MANUAL: 74 %
PLAT MORPH BLD: ABNORMAL
PLATELET # BLD AUTO: 85 10E3/UL (ref 150–450)
POTASSIUM BLD-SCNC: 3.9 MMOL/L (ref 3.4–5.3)
PROT SERPL-MCNC: 6.4 G/DL (ref 6.8–8.8)
RBC # BLD AUTO: 2.64 10E6/UL (ref 3.8–5.2)
RBC MORPH BLD: ABNORMAL
SODIUM SERPL-SCNC: 145 MMOL/L (ref 133–144)
TOXIC GRANULES BLD QL SMEAR: PRESENT
WBC # BLD AUTO: 16.5 10E3/UL (ref 4–11)

## 2022-12-08 PROCEDURE — 85007 BL SMEAR W/DIFF WBC COUNT: CPT | Performed by: INTERNAL MEDICINE

## 2022-12-08 PROCEDURE — 36415 COLL VENOUS BLD VENIPUNCTURE: CPT | Performed by: INTERNAL MEDICINE

## 2022-12-08 PROCEDURE — 85027 COMPLETE CBC AUTOMATED: CPT | Performed by: INTERNAL MEDICINE

## 2022-12-08 PROCEDURE — 80053 COMPREHEN METABOLIC PANEL: CPT | Performed by: INTERNAL MEDICINE

## 2022-12-08 NOTE — PROGRESS NOTES
[facility]: Cancer Care                                                                                          Cyndy with [employer] calls regarding approval of April's long term disability claim.  She confirms April's demographic information and I was able to provide her with the information that April has been diagnosed with leukemia and is being treated with chemotherapy.      Signature:  Flor Leslie RN

## 2022-12-09 ENCOUNTER — LAB (OUTPATIENT)
Dept: LAB | Facility: CLINIC | Age: 42
End: 2022-12-09
Payer: COMMERCIAL

## 2022-12-09 DIAGNOSIS — R05.1 ACUTE COUGH: ICD-10-CM

## 2022-12-09 DIAGNOSIS — C91.00 ACUTE LYMPHOBLASTIC LEUKEMIA (ALL) NOT HAVING ACHIEVED REMISSION (H): ICD-10-CM

## 2022-12-09 LAB — SARS-COV-2 RNA RESP QL NAA+PROBE: NEGATIVE

## 2022-12-09 PROCEDURE — 87633 RESP VIRUS 12-25 TARGETS: CPT | Performed by: PHYSICIAN ASSISTANT

## 2022-12-09 PROCEDURE — U0003 INFECTIOUS AGENT DETECTION BY NUCLEIC ACID (DNA OR RNA); SEVERE ACUTE RESPIRATORY SYNDROME CORONAVIRUS 2 (SARS-COV-2) (CORONAVIRUS DISEASE [COVID-19]), AMPLIFIED PROBE TECHNIQUE, MAKING USE OF HIGH THROUGHPUT TECHNOLOGIES AS DESCRIBED BY CMS-2020-01-R: HCPCS

## 2022-12-09 PROCEDURE — U0005 INFEC AGEN DETEC AMPLI PROBE: HCPCS

## 2022-12-12 ENCOUNTER — NURSE TRIAGE (OUTPATIENT)
Dept: NURSING | Facility: CLINIC | Age: 42
End: 2022-12-12

## 2022-12-12 ENCOUNTER — LAB (OUTPATIENT)
Dept: ONCOLOGY | Facility: CLINIC | Age: 42
End: 2022-12-12
Payer: COMMERCIAL

## 2022-12-12 ENCOUNTER — PATIENT OUTREACH (OUTPATIENT)
Dept: ONCOLOGY | Facility: CLINIC | Age: 42
End: 2022-12-12

## 2022-12-12 ENCOUNTER — HOSPITAL ENCOUNTER (OUTPATIENT)
Facility: CLINIC | Age: 42
Discharge: HOME | End: 2022-12-12
Attending: INTERNAL MEDICINE | Admitting: INTERNAL MEDICINE
Payer: COMMERCIAL

## 2022-12-12 DIAGNOSIS — C91.00 ACUTE LYMPHOBLASTIC LEUKEMIA (ALL) NOT HAVING ACHIEVED REMISSION (H): Primary | ICD-10-CM

## 2022-12-12 LAB
HOLD SPECIMEN: NORMAL
SARS-COV-2 RNA RESP QL NAA+PROBE: NEGATIVE

## 2022-12-12 PROCEDURE — U0003 INFECTIOUS AGENT DETECTION BY NUCLEIC ACID (DNA OR RNA); SEVERE ACUTE RESPIRATORY SYNDROME CORONAVIRUS 2 (SARS-COV-2) (CORONAVIRUS DISEASE [COVID-19]), AMPLIFIED PROBE TECHNIQUE, MAKING USE OF HIGH THROUGHPUT TECHNOLOGIES AS DESCRIBED BY CMS-2020-01-R: HCPCS | Performed by: NURSE PRACTITIONER

## 2022-12-12 PROCEDURE — U0005 INFEC AGEN DETEC AMPLI PROBE: HCPCS | Performed by: NURSE PRACTITIONER

## 2022-12-12 PROCEDURE — 36415 COLL VENOUS BLD VENIPUNCTURE: CPT

## 2022-12-12 RX ORDER — HEPARIN SODIUM,PORCINE 10 UNIT/ML
5 VIAL (ML) INTRAVENOUS
Status: CANCELLED | OUTPATIENT
Start: 2022-12-12

## 2022-12-12 RX ORDER — ACETAMINOPHEN 325 MG/1
325 TABLET ORAL ONCE
Status: CANCELLED
Start: 2022-12-12 | End: 2022-12-12

## 2022-12-12 RX ORDER — DIPHENHYDRAMINE HCL 25 MG
25 CAPSULE ORAL ONCE
Status: CANCELLED
Start: 2022-12-12 | End: 2022-12-12

## 2022-12-12 RX ORDER — HEPARIN SODIUM (PORCINE) LOCK FLUSH IV SOLN 100 UNIT/ML 100 UNIT/ML
5 SOLUTION INTRAVENOUS
Status: CANCELLED | OUTPATIENT
Start: 2022-12-12

## 2022-12-12 NOTE — PROGRESS NOTES
Patient presents for blood draw.  Patient is on the nurse port schedule but states she does not want her port accessed as it is tipped and difficult to access.  Would like a peripheral lab draw.  Lab staff gracious enough to double book onto their schedule and draw patient's lab peripherally today.  Patient also requesting pre-procedure covid swab.  Swab performed in both nostrils.  Patient wanted to wait for blood results.  Verbally informed patient of today's hgb/platelet results.    Slime Finley RN on 12/12/2022 at 8:52 AM

## 2022-12-13 ENCOUNTER — OFFICE VISIT (OUTPATIENT)
Dept: INFECTIOUS DISEASES | Facility: CLINIC | Age: 42
End: 2022-12-13
Attending: STUDENT IN AN ORGANIZED HEALTH CARE EDUCATION/TRAINING PROGRAM
Payer: COMMERCIAL

## 2022-12-13 VITALS
SYSTOLIC BLOOD PRESSURE: 121 MMHG | WEIGHT: 244.3 LBS | DIASTOLIC BLOOD PRESSURE: 83 MMHG | OXYGEN SATURATION: 97 % | HEART RATE: 105 BPM | TEMPERATURE: 98.2 F | BODY MASS INDEX: 41.71 KG/M2 | HEIGHT: 64 IN

## 2022-12-13 DIAGNOSIS — B02.7 DISSEMINATED HERPES ZOSTER: Primary | ICD-10-CM

## 2022-12-13 LAB
ABO/RH(D): NORMAL
ANTIBODY SCREEN: NEGATIVE
SPECIMEN EXPIRATION DATE: NORMAL

## 2022-12-13 PROCEDURE — G0463 HOSPITAL OUTPT CLINIC VISIT: HCPCS

## 2022-12-13 PROCEDURE — 99212 OFFICE O/P EST SF 10 MIN: CPT | Performed by: STUDENT IN AN ORGANIZED HEALTH CARE EDUCATION/TRAINING PROGRAM

## 2022-12-13 PROCEDURE — 99215 OFFICE O/P EST HI 40 MIN: CPT | Performed by: STUDENT IN AN ORGANIZED HEALTH CARE EDUCATION/TRAINING PROGRAM

## 2022-12-13 ASSESSMENT — PAIN SCALES - GENERAL: PAINLEVEL: MODERATE PAIN (4)

## 2022-12-13 NOTE — NURSING NOTE
"Chief Complaint   Patient presents with     RECHECK     Follow-up     /83 (BP Location: Right arm, Patient Position: Sitting, Cuff Size: Adult Large)   Pulse 105   Temp 98.2  F (36.8  C) (Oral)   Ht 1.626 m (5' 4.02\")   Wt 110.8 kg (244 lb 4.8 oz)   SpO2 97%   BMI 41.91 kg/m      Clau James on 12/13/2022 at 12:56 PM    "

## 2022-12-13 NOTE — TELEPHONE ENCOUNTER
"Triage Call:    Caller: Patient    Has a rash for approx 3-4 days on her abdomen.  It goes from left side to right side underneath breast.  There is also a little bit on a breast.   Goes from the bottom half of breast residential down abdomen.    The rash is itchy and little red dots and feels \"rough\".    She has taken some Benadryl that has taken the edge off the itching.      Protocol Recommended Disposition: Be seen in the next 2 weeks, she already has an appointment with ID tomorrow.      Caller verbalized understanding of instructions and questions answered.      Isabela Cortez RN on 12/12/2022 at 10:54 PM        Reason for Disposition    Red, moist, irritated area between skin folds (or under larger breasts)    Additional Information    Negative: [1] Sudden onset of rash (within last 2 hours) AND [2] difficulty breathing or swallowing    Negative: Sounds like a life-threatening emergency to the triager    Negative: [1] Localized purple or blood-colored spots or dots AND [2] not from injury or friction AND [3] fever    Negative: Patient sounds very sick or weak to the triager    Negative: [1] Red area or streak AND [2] fever    Negative: [1] Rash is painful to touch AND [2] fever    Negative: [1] Looks infected (spreading redness, pus) AND [2] large red area (> 2 in. or 5 cm)    Negative: [1] Looks infected (spreading redness, pus) AND [2] diabetes mellitus or weak immune system (e.g., HIV positive, cancer chemo, splenectomy, organ transplant, chronic steroids)    Negative: [1] Localized purple or blood-colored spots or dots AND [2] not from injury or friction AND [3] no fever    Negative: [1] Looks infected (spreading redness, pus) AND [2] no fever    Negative: Looks like a boil, infected sore, deep ulcer or other infected rash    Negative: [1] Localized rash is very painful AND [2] no fever    Negative: Genital area rash    Negative: Lyme disease suspected (e.g., bull's eye rash or tick bite / exposure)    " Negative: [1] Applying cream or ointment AND [2] causes severe itch, burning or pain    Negative: Medication patch causing local rash or itching    Negative: [1] Pimples (localized) AND [2 ] no improvement after using CARE ADVICE    Negative: Tender bumps in armpits    Negative: [1] Severe localized itching AND [2] after 2 days of steroid cream    Negative: Localized rash present > 7 days    Protocols used: RASH OR REDNESS - [id number]

## 2022-12-13 NOTE — LETTER
12/13/2022       RE: Vashti Villegas  [address]  [place] [address]     Dear Colleague,    Thank you for referring your patient, Vashti Villegas, to the [facility] INFECTIOUS DISEASE CLINIC [place] at [facility]. Please see a copy of my visit note below.    [facility]  Transplant Infectious Disease Clinic Note: Follow up     Patient:  Vashti Villegas, Date of birth 1980, Medical record number [id number]  Date of Visit:  12/13/2022           Assessment and Recommendations:   Recommendations:  -Continue valtrex 1000 mg TID for now. Will discuss with primary oncologist (Dr. Hull) about the new chemo regimen and its affect on counts. If her remains ANC >1000 and if the new chemo regimen has low occurrence of prolonged neutropenia, can drop valtrex down to 1000 daily   -As ANC >1000, can stop ppx Fluconazole and levofloxacin but will differ to oncology.   -recommend moisturizer for the rash on the abdomen. This is likely not infectious as she is on valtrex and fluc. If worsens, please get derm when she gets admitted.   -Shingles vaccine in 1-2 months after she finishes all her chemotherapy    -Will need COVID Bivalent and Flu vaccine can get it after he chemo.     RTC as needed.         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 Presented on 10/10 with an eruption of vesiculopapular lesion on the scalp, tip of nose, and upper lip/chin. This has       Infectious Disease issues include:     1) Crusted, hemorrhagic lesions noted on the scalp and face - in the setting of leukopenia and lymphopenia and while on ppx Acyclovir 400 mg po bid.   HSV1/2 PCR from a swab of the lesion of the scalp - negative   VZV PCR from a  swab of the scalp - Negative   Erosive lesions noted in the distribution of V1 (Tip of the nose), V2 and V3 (Upper lip and chin, and C2 (scalp) dermatomes.   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   On 10/13 noted new erythematous papules and on pustular lesion.    11/1/22 ID visit, stopped  IV Acyclovir as all lesions have// crusted over. But on vlatrex 1000 TID until she is done with chemo and ANC>1000.      2. Rash on the abd  -Has been going on for 3-4d. It is only itchy  -see media section for images.     Other issues:  -  ms on 10/12  - PCP prophylaxis: Nebulized pentamidine last given 10/2; next due ~10/31  - Fungal prophylaxis: fluconazole 200 mg   - Bacterial prophylaxis: Levofloxacin 250 mg daily   - Serostatus: CMV Neg, EBV Positive, HSV1+/HSV2- PTA acyclovir 400 mg BID held   - Gamma globulin status: 688 on 10/13  - Vaccination - There are non on file and she states that she has not received any vaccinations since childhood.   This will need to be addressed at the appropriate time   - Isolation status:  Good hand hygiene. Airborne precautions   Chuck Diaz MD. Pager [phone]         History of the Infectious Disease lllness:   The pt is here for follow up. She is still on valtrex 1000 TID. All her lesions from VZV are gone on the head and face. She has his rash on her abdomen bilaterally, which is only itchy. she has no pain. She has no fevers, chills, sob, no current headache, vision problems, hearing problems, no sob, no cough, n/v/d or dysuria.       Transplants:  N/A; Postoperative day:  .  Coordinator No matching coordinators    Review of Systems:  CONSTITUTIONAL:  No fevers or chills. No night sweats.  EYES: negative for icterus or acute vision changes.   ENT:  negative for hearing loss, tinnitus or sore throat  RESPIRATORY:  negative for cough, sputum, dyspnea  CARDIOVASCULAR:  negative for chest pain, heart palpitations  GASTROINTESTINAL:  negative  for nausea, vomiting, diarrhea or constipation  GENITOURINARY:  negative for dysuria or hematuria.  HEME:  No easy bruising or bleeding  INTEGUMENT:  negative for rash or pruritus  NEURO:  Negative for headache or tremor.    Past Medical History:   Diagnosis Date     ALL (acute lymphocytic leukemia) (H)      Other acne      Shingles        Past Surgical History:   Procedure Laterality Date     IR BONE BIOPSY DEEP RIGHT  10/25/2022     IR CHEST PORT PLACEMENT > 5 YRS OF AGE  11/23/2022     IR LUMBAR PUNCTURE  10/21/2022     PICC Left 09/30/2022    Picc ok to use     PICC DOUBLE LUMEN PLACEMENT Left 08/20/2022    left cephalic 5 fr dl picc 44 cm     ZZC NONSPECIFIC PROCEDURE  01/01/1999    benign mole removal       No family history on file.    Social History     Social History Narrative    ** Merged History Encounter **          Social History     Tobacco Use     Smoking status: Never     Smokeless tobacco: Never       Immunization History   Administered Date(s) Administered     TD (ADULT, 7+) 09/13/1996       Patient Active Problem List   Diagnosis     Acute leukemia (H)     Acute lymphoblastic leukemia (ALL) (H)     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)     Prophylaxis for chemotherapy-induced neutropenia     Morbid obesity (H)     Disseminated zoster     ALL (acute lymphoblastic leukemia of infant) (H)       No outpatient medications have been marked as taking for the 12/13/22 encounter (Appointment) with Chuck Diaz MD.       Allergies   Allergen Reactions     Blood Transfusion Related (Informational Only) Hives     Hive reaction with platelet transfusion on 8/26/2022. Please administer platelets with tylenol and benadryl premedication.               Physical Exam:   Vitals were reviewed.  All vitals stable  There were no vitals taken for this visit.  Wt Readings from Last 4 Encounters:   12/05/22 115 kg (253 lb 9.6 oz)   12/02/22 112.5 kg (248 lb 1.6 oz)   11/29/22 110.6 kg  (243 lb 12.8 oz)   11/26/22 110.2 kg (242 lb 14.4 oz)       Exam:  GENERAL: well-developed, well-nourished, alert, oriented, in no acute distress.  HEAD: Head is normocephalic, atraumatic. Thinning hairline.    EYES: Eyes have anicteric sclerae.    ENT: Oropharynx is moist without exudates or ulcers.  NECK: Supple.  LUNGS: Clear to auscultation.  CARDIOVASCULAR: Regular rate and rhythm with no murmurs, gallops or rubs.  ABDOMEN: Normal bowel sounds, soft, nontender.  SKIN:       NEUROLOGIC: Grossly nonfocal.         Laboratory Data:     No results found for: ACD4    Inflammatory Markers  No lab results found.    Invalid input(s): RATE, AUTO, ESR, WESR    Immune Globulin Studies     Recent Labs   Lab Test 10/13/22  0744          Metabolic Studies    Recent Labs   Lab Test 12/12/22  0844 12/08/22  1117 12/06/22  1445 11/26/22  0548 11/25/22  1001 11/24/22  1545 11/24/22  0551 11/03/22  0556 11/02/22  1556    145* 145*   < >  --    < > 137   < > 139   POTASSIUM 3.9 3.9 3.8   < >  --    < > 4.4  4.4   < > 3.9   CHLORIDE 110* 114* 114*   < >  --    < > 100   < > 105   CO2 26 27 27   < >  --    < > 26   < > 21*   ANIONGAP 5 4 4   < >  --    < > 11   < > 13   BUN 15 12 13   < >  --    < > 10.7   < > 12.0   CR 0.64 0.62 0.64   < >  --    < > 0.58   < > 0.62   GFRESTIMATED >90 >90 >90   < >  --    < > >90   < > >90   * 119* 117*   < >  --    < > 143*   < > 100*   SOLEDAD 8.6 8.7 8.5   < >  --    < > 8.5*   < > 9.2   PHOS  --   --   --   --   --   --  3.8   < > 4.0   MAG  --   --   --   --   --   --  2.1   < > 1.8   URIC  --   --   --   --   --   --   --   --  3.9   LACT  --   --   --   --  1.7   < >  --   --   --     < > = values in this interval not displayed.       Hepatic Studies    Recent Labs   Lab Test 12/12/22  0844 12/08/22  1117 12/06/22  1445 11/03/22  0556 11/02/22  1556 10/11/22  0703 10/10/22  1817 09/26/22  1358 09/14/22  0704   BILITOTAL 0.3 0.2 0.4   < > 0.4   < > 0.6   < > 0.3   DBIL  --    --   --   --   --   --   --   --  <0.20   ALKPHOS 118 112 80   < > 78   < > 61   < > 77   PROTTOTAL 7.0 6.4* 6.4*   < > 6.4   < > 5.9*   < > 5.2*   ALBUMIN 3.4 3.2* 3.1*   < > 4.0   < > 3.7   < > 3.4*   AST 15 17 10   < > 15   < > 15   < > 16   ALT 30 32 31   < > 12   < > 24   < > 27   LDH  --   --   --   --  292*  --  201   < >  --     < > = values in this interval not displayed.       Pancreatitis testing    Recent Labs   Lab Test 12/02/22  1012   LIPASE 27       Lipid testing  No lab results found.    Gout Labs      Recent Labs   Lab Test 11/02/22  1556 10/14/22  0648 10/13/22  0744 10/12/22  0642 10/11/22  0703   URIC 3.9 3.0 2.2* 2.0* 2.0*       Hematology Studies   Recent Labs   Lab Test 12/12/22  0844 12/08/22  1117 12/06/22  1445 12/05/22  1206 12/05/22  0819 12/02/22  1012 11/29/22  1254 11/26/22  0548 10/24/22  1338 10/21/22  1223   WBC 9.5 16.5* 2.2*  --  0.2*   < > 4.6 9.9   < > 24.2*   ABLA  --   --   --   --   --   --   --   --   --  0.2*   BLST  --   --   --   --   --   --   --   --   --  1   ANEU 7.4 12.2* 1.5*  --   --   --   --   --    < > 17.7*   ANEUTAUTO  --   --   --   --   --   --  4.2 9.6*   < >  --    ALYM 0.4* 1.2 0.4*  --   --   --   --   --    < > 1.5   ALYMPAUTO  --   --   --   --   --   --  0.3* 0.1*   < >  --    COLIN 1.5* 1.3 0.3  --   --   --   --   --    < > 2.9*   AMONOAUTO  --   --   --   --   --   --  0.0 0.1   < >  --    AEOS 0.1 0.0 0.0  --   --   --   --   --    < > 0.0   AEOSAUTO  --   --   --   --   --   --  0.0 0.0   < >  --    ABSBASO  --   --   --   --   --   --  0.0 0.0   < >  --    HGB 8.3* 8.2* 7.3*  --  6.1*   < > 8.6* 7.2*   < > 8.3*   HCT 25.7* 24.8* 21.7*  --  18.6*   < > 26.1* 21.8*   < > 26.1*    85* 9*   < > 3*   < > 145* 216   < > 179    < > = values in this interval not displayed.       Clotting Studies    Recent Labs   Lab Test 11/25/22  0741 11/23/22  1439 11/02/22  1556 10/21/22  1223   INR 1.08 1.07 1.10 1.07   PTT  --  24 84*  --        Iron Testing     Recent Labs   Lab Test 12/12/22  0844   MCV 98       Markers  No lab results found.    Invalid input(s): FETOPROTEIN, SERUM, AFP    Autoimmune Testing   No lab results found.    Invalid input(s): ANCAB, PANCA, CANCA    Arterial Blood Gas Testing  No lab results found.     Thyroid Studies   No lab results found.    Invalid input(s): FT4    Urine Studies     Recent Labs   Lab Test 11/26/22  0600 11/25/22  2204 11/25/22  1400 11/25/22  0600 11/24/22  2200 10/01/22  1100 08/22/22  1515   URINEPH 7.5 7.5 8.0 7.5 7.5   < > 5.5   NITRITE  --   --   --   --   --   --  Negative   LEUKEST  --   --   --   --   --   --  Negative    < > = values in this interval not displayed.       Medication levels  No lab results found.    Invalid input(s): AMIK    CSF testing     Recent Labs   Lab Test 11/25/22  1142 10/21/22  1425 10/21/22  1423 09/14/22  1131 09/07/22  1124 08/31/22  1617 08/26/22  1517   CWBC 0  --  0 0 0 0 2   CRBC 4*  --  1 9* 15* 9* 0   CGLU 89* 53  --   --  57 68 106*   CTP 28.6 31.0  --   --  24.9 38.2 46.8*       Microbiology:  Fungal testing  No lab results found.    Invalid input(s): HIFUN, FUNGL    Beta D Glucan levels (Fungitell assay)    No results found for: FGTL, FGTLI     Last Culture results   Rapid Strep A Screen   Date Value Ref Range Status   01/21/2003   Final    NEGATIVE: No Group A streptococcal antigen detected by immunoassay, await   culture report.     Culture   Date Value Ref Range Status   11/25/2022 No Growth  Final   10/21/2022 No Growth  Final   10/11/2022 1+ Normal sia  Final   09/07/2022 No Growth  Final   09/02/2022 No Growth  Final   09/02/2022 No Growth  Final   08/31/2022 No Growth  Final   08/26/2022 No Growth  Final     Culture Micro   Date Value Ref Range Status   09/25/2003   Final    >100,000 colonies/mL Multiple organisms present, probable contamination   01/21/2003 No Beta Streptococcus isolated  Final   01/04/2003 No Beta Streptococcus isolated  Final         Last checks of  Clostridioides difficile testing  No lab results found.    No components found for: AFBSTN    Syphilis Testing  Invalid input(s): [id number]    Tick Testing  No lab results found.    Invalid input(s): APHAGM    ASO Testing  Invalid input(s): [id number]    Quantiferon testing   Recent Labs   Lab Test 12/12/22  0844 12/08/22  1117   LYMPH 4 7       Infection Studies to assess Diarrhea  No lab results found.    Invalid input(s): NNDMRESULT    Virology:  Coronavirus-19 testing    Recent Labs   Lab Test 12/12/22  0843 12/09/22  0841 11/21/22  1333 11/16/22  1101   [id number] Negative Negative Negative Negative       Respiratory virus (non-coronavirus-19) testing    Recent Labs   Lab Test 12/09/22  0841   IFLUA Not Detected   FLUAH1 Not Detected   SZ9770 Not Detected   FLUAH3 Not Detected   IFLUB Not Detected   PIV1 Not Detected   PIV2 Not Detected   PIV3 Not Detected   PIV4 Not Detected   RSVA Not Detected   RSVB Not Detected   HMPV Not Detected   RHINEV Not Detected   ADENOV Not Detected   ORR Not Detected       CMV viral loads    CMV DNA IU/mL   Date Value Ref Range Status   10/13/2022 Not Detected Not Detected IU/mL Final       CMV resistance testing  No lab results found.  No results found for: CMVCID, CMVFOS, CMVGAN    No results found for: H6RES    EBV DNA Copies/mL   Date Value Ref Range Status   10/12/2022 Not Detected Not Detected copies/mL Final       BK viral loads No lab results found.    Parvovirus Testing  No lab results found.    Invalid input(s): PRVRES    Adenovirus Testing  No lab results found.    Invalid input(s): ADENAB, ADENOVIRUS, ADQT    Hepatitis B Testing     Recent Labs   Lab Test 08/19/22  1705   AUSAB 0.00   HBCAB Nonreactive   HEPBANG Nonreactive     Was the last Hepatitis B E antigen positive?   No results found for: HBEAGN     Hepatitis C Antibody   Date Value Ref Range Status   08/19/2022 Nonreactive Nonreactive Final       CMV Antibody IgG   Date Value Ref Range Status   08/19/2022 No  detectable antibody. No detectable antibody.  Final     EBV Capsid Antibody IgG   Date Value Ref Range Status   08/19/2022 Positive (A) No detectable antibody. Final     Comment:     Suggests recent or past exposure.     Herpes Simplex Virus Type 1 IgG Antibody   Date Value Ref Range Status   08/19/2022 Positive.  IgG antibody to HSV-1 detected. (A) No HSV-1 IgG antibodies detected Final     Herpes Simplex Virus Type 2 IgG Antibody   Date Value Ref Range Status   08/19/2022 No HSV-2 IgG antibodies detected. No HSV-2 IgG antibodies detected Final       No components found for: [id number]    Last Pathology Report   Case Report   Date Value Ref Range Status   11/25/2022   Final    Flow Cytometry Report                             Case: [id number]                                  Authorizing Provider:  Marina Crain PA-C    Collected:           11/25/2022 11:42 AM          Ordering Location:     [facility]     Received:            11/25/2022 12:49 PM                                 [address] [place] [address]                                                            Pathologist:           Etta Nelson MD                                                        Specimen:    Lumbar Puncture                                                                             Clinical Information   Date Value Ref Range Status   11/25/2022   Final    42 yrs old female with prior diagnosis of B-ALL .        Final Diagnosis   Date Value Ref Range Status   10/25/2022   Final    Bone marrow, posterior iliac crest, left decalcified trephine biopsy and touch imprint; particle crush, direct aspirate smear, and concentrated aspirate smear; and peripheral blood smear:    - Normal to slightly hypercellular marrow for age (cellularity estimated at 60-70%) with trilineage hematopoietic maturation and no increase in blasts  - No morphologic evidence of B-lymphoblastic leukemia  - Unusual B-lineage precursor population detected by flow  cytometry  - Peripheral blood showing moderate normochromic normocytic anemia; rare circulating nucleated red blood cells  - See comment          Imaging:  Results for orders placed or performed during the hospital encounter of 11/23/22   IR Chest Port Placement > 5 Yrs of Age    Narrative    Procedures 11/23/2022 2:40 PM:  1. Ultrasound guidance for venous access  2. Tunneled port (age > 5 yrs.) placement  3. Fluoroscopic guidance placement    History: Acute lymphoblastic leukemia, port is requested for  chemotherapy.     Comparisons: CT neck 8/22/2022    Staff: Dr. Franck JACKSON, ISIAH TORRES MD, attest that I was present for all critical  portions of the procedure and was immediately available to provide  guidance and assistance during the remainder of the procedure.    Fellow: Dr. Quigley    Medications:   1. 225 mcg Fentanyl  2. 3.5 Versed    Moderate sedation administered by the IR nurse at the supervision of  the attending. Vital signs and oxygenation continuously monitored. The  patient remained stable throughout the procedure.    Continuous face-to-face sedation time: 15 minutes    Fluoro time: .7 minutes     Findings/procedure:     Prior to the procedure, both verbal and written informed consent  obtained from the patient.     Limited jugular ultrasound documented jugular vein patency. The right  neck and upper chest prepped and draped in the usual sterile fashion.  Buffered 1% Lidocaine used for local analgesia.    Under ultrasound guidance, right internal jugular venotomy made with a  micropuncture needle. Image documenting the vein within the vein  saved.    Micropuncture needle exchanged over guidewire for the micropuncture  sheath. Catheter length measured with the 0.018 guidewire.  Micropuncture sheath saline locked. Subcutaneous pocket created for  the port reservoir over the right anterior chest using a combination  of sharp and blunt dissection. Pocket liberally irrigated with saline.      Catheter  subcutaneously tunneled from the right anterior chest pocket  to the right internal jugular venotomy site. Catheter cut to length at  the 22 cm tai. Micropuncture sheath exchanged over guidewire for the  peel-away sheath. Catheter placed through the peel-away sheath and  advanced under fluoroscopic guidance to the high right atrium.  Peel-away sheath removed. Port aspirated and flushed adequately.     Port heparin locked with 10:1 heparin solution. The right anterior  chest incision closed with 3-0 Vicryl interrupted sutures, a running  4-0 Monocryl subcuticular suture, and Dermabond. Right internal  jugular venotomy site closed with Dermabond. No immediate  complication.       Impression    Impression:  Uncomplicated image guided placement of right internal jugular venous  central venous catheter with port. Catheter tip overlies the high  right atrium. Port flushed, heparin locked, ready for immediate use.     I have personally reviewed the examination and initial interpretation  and I agree with the findings.    ISIAH TORRES MD         SYSTEM ID:  [id number]   XR Lumbar Punc Intrathecal Chemo Admin    Narrative    PROCEDURE: Lumbar Puncture using Fluoroscopy, 11/25/2022 12:01 PM    HISTORY:  Need for IT chemo per protocol    COMPARISON: 10/21/2022    STAFF NEURORADIOLOGIST: Dr. Jarrell Rust    FELLOW PHYSICIAN: Dr. Tay Hoff    MEDICATIONS:    1. 6 cc local 1% lidocaine.    TECHNIQUE: Verbal and written consent for lumbar puncture was obtained  from the patient, and benefits and risk of the procedure were  explained, including but not limited to worsening headache,  hemorrhage, infection, lower extremity pain, or nerve root injury. The  patient was sterilely prepped and draped with the patient in the prone  position, over the lower back. Under fluoroscopic guidance, the  interlaminar spaces were noted. 1% lidocaine was administered for  local anesthetic over the L3-4 interlaminar space, and a 22 gauge 5  inch  needle was advanced into the thecal sac under fluoroscopic  guidance.      There was initial show of clear CSF. Approximately 8 cc of CSF were  collected.    Hematology/Oncology then administered intrathecal chemotherapy, dose  and rate as determined by Heme/Onc.    The needle was removed with the stylet in place. There was no  immediate complication associated with the procedure. Samples were  sent for the requested laboratory testing.      FLUORO TIME: 33 seconds    DOSE: 25 mGy      Impression    IMPRESSION: Successful lumbar puncture and intrathecal chemotherapy  administration without immediate complication.    PLAN: Patient discharged to patient care unit in stable condition for  monitoring. Orders placed for 1 hour of bed rest, with patient laying  on the back and the head of the bed flat.    I, MACO GARZON MD, attest that I was immediately available to  provide guidance and assistance during the entirety of the procedure.    I have personally reviewed the examination and initial interpretation  and I agree with the findings.    MACO GARZON MD         SYSTEM ID:  [id number]       JAMEEL OSMAN MD

## 2022-12-13 NOTE — PROGRESS NOTES
[facility]  Date of visit: Dec 14, 2022      Reason for Visit: Ph (+) B-ALL       Oncology HPI:   Followed by Dr. Hull. Patient presented to outside hospital with c/o right upper quadrant pain and was subsequently found to have a markedly elevated white blood cell count concerning for acute leukemia. While at OSH, she had CTA chest as well as CT A/P. These identified no PE, no acute or localized intraabdominal inflammatory process, mild splenomegaly, and few inconspicuous low-density structures in the liver. She was transferred to [facility] for further workup and management. S/p Hydrea 1g TID (8/21-8/23) with improvement in WBC (100K ? 12K). Diagnostic BMBx completed 8/20/22, which demonstrated B-ALL with 85% blasts and hypercellular marrow. IHC shows CD10+, CD34+. 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 ALL NGS negative. BCR/ABL1 major/minor (sent from peripheral blood) both negative. Initiated pre-phase steroids with Prednisone 60 mg x8/21; increased to 60 mg/m2 (140 mg) 8/22-8/24. She initiated GRAALL + imatinib (C1D1 = 8/24/22). S/p BMT consult 9/12 with Dr. Walsh. Induction was relatively uncomplicated. Post-induction BMBx done 9/26/22 and revealed MRD- CR1, with negative BCR/ABL1 and Clonoseq testing. With no matched related donors and excellent early/deep response chemotherapy is currently favored over BMT. 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 Underwent BMBx 10/25/22 with no morphologic evidence of B-lymphoblastic  leukemia and flow with unusual B lineage precursor population 0.09%. C3 GRAALL + imatinib on 11/2/22 was tolerated well without complication.     Plan to admit for C1 Blinatumumab + Ponatinib on 12/14/2022      Interval history:   April is here for follow up prior to planned admission. She is overall feeling well. She has a persistent cough productive of thick, yellow phlegm. No fevers, no nasal congestion or drainage, no sinus pain/ pressure, no sore throat or ear issues. Feels like cough comes from her throat not her chest. No shortness of breath or chest symptoms. She also has few days of new rash across her upper abdomen. This is mildly itchy but not too bad. She has noticed some scattered scratches on her arms and bruising but no open areas. Scattered bruising persists on her arms from old IV access sites.   No bleeding issues. Neuropathy persists in fingertips-- seems to be triggered by extreme temperatures. Tingling in toes persists as well. Worse when legs are crossed. No swelling. Has some occasional bilateral calf pain but this is resolved today.   Appetite is stable, weight fluctuates some depending on IV fluids etc but overall stable range.   ROS otherwise neg        No current outpatient medications on file.       Allergies   Allergen Reactions     Blood Transfusion Related (Informational Only) Hives     Hive reaction with platelet transfusion on 8/26/2022. Please administer platelets with tylenol and benadryl premedication.          Physical Exam:  /86   Pulse 107   Temp 98.2  F (36.8  C) (Oral)   Resp 18   Wt 111 kg (244 lb 11.2 oz)   SpO2 99%   BMI 41.98 kg/m    General: No acute distress.  HEENT: EOMI, PERRL. Sclerae are anicteric. Oral mucosa is pink and moist with no lesions or thrush.   Lymph: Neck is supple with no lymphadenopathy in the cervical or supraclavicular areas.   Heart: Regular rate and rhythm.   Lungs: Clear to auscultation bilaterally.   Abdomen: Bowel sounds present,  soft, nontender with no palpable hepatosplenomegaly or masses.   Extremities: No lower extremity edema noted bilaterally.   Neuro: Alert and oriented x3, CN grossly intact, steady gait  Skin: No petechiae, or bruising noted on exposed skin. Scattered small red papular rash across upper abdomen, no erythema between spots. Mildly pruritic.       Labs:     I personally reviewed the following labs:    Most Recent 3 CBC's:  Recent Labs   Lab Test 12/14/22  0806 12/12/22  0844 12/08/22  1117   WBC 6.2 9.5 16.5*   HGB 8.3* 8.3* 8.2*   MCV 97 98 94    227 85*     Most Recent 3 BMP's:  Recent Labs   Lab Test 12/14/22  0806 12/12/22  0844 12/08/22  1117    141 145*   POTASSIUM 3.7 3.9 3.9   CHLORIDE 108* 110* 114*   CO2 24 26 27   BUN 14.7 15 12   CR 0.68 0.64 0.62   ANIONGAP 11 5 4   SOLEDAD 8.9 8.6 8.7   GLC 99 128* 119*     Most Recent 2 LFT's:  Recent Labs   Lab Test 12/14/22  0806 12/12/22  0844   AST 18 15   ALT 19 30   ALKPHOS 100 118   BILITOTAL 0.3 0.3           Imaging: n/a      Impression/plan:     # Ph(+) B-ALL   Followed by Dr. Hull. Patient presented to outside hospital with c/o right upper quadrant pain and was subsequently found to have a markedly elevated white blood cell count concerning for acute leukemia. While at OSH, she had CTA chest as well as CT A/P. These identified no PE, no acute or localized intraabdominal inflammatory process, mild splenomegaly, and few inconspicuous low-density structures in the liver. She was transferred to [facility] for further workup and management. S/p Hydrea 1g TID (8/21-8/23) with improvement in WBC (100K ? 12K). Diagnostic BMBx completed 8/20/22, which demonstrated B-ALL with 85% blasts and hypercellular marrow. IHC shows CD10+, CD34+. 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 ALL NGS negative. BCR/ABL1 major/minor (sent from peripheral blood) both negative. Initiated pre-phase steroids with Prednisone 60 mg x8/21; increased to 60 mg/m2 (140 mg) 8/22-8/24. She initiated GRAALL + imatinib (C1D1 = 8/24/22). S/p BMT consult 9/12 with Dr. Walsh. Induction was relatively uncomplicated. Post-induction BMBx done 9/26/22 and revealed MRD- CR1, with negative BCR/ABL1 and Clonoseq testing. With no matched related donors and excellent early/deep response chemotherapy is currently favored over BMT. 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 Underwent BMBx 10/25/22 with no morphologic evidence of B-lymphoblastic leukemia and flow with unusual B lineage precursor population 0.09%. C3 GRAALL + imatinib on 11/2/22 was tolerated well without complication.   - Prior plan was to continue GRAALL for a total of 8 cycles (of note odd cycles are 21-days in length and even cycles are 14-days in length), however now adjusting to Blinatumumab + Ponatinib after extensive discussions between April and Dr Hull. 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 Presented with scattered papulovesicular lesions on her chin and nose as well as scattered hemorrhagic erosions on the scalp and lower face (V2-3 distributions of the trigeminal nerve) c/f  disseminated VZV.  - Per outpatient ID note, continue valtrex 1000 mg TID for now. If her remains ANC >1000 and if the new chemo regimen (assuming we proceed with blina/ ponatinib) has low occurrence of prolonged neutropenia, can drop valtrex down to 1000 daily   - Shingles vaccine in 1-2 months after she finishes all her chemotherapy       # Abdominal rash   Scattered raised papular spots across upper abdomen. Does not appear fungal. Onset few days ago. Mildly pruritic. No new meds or skin products. Has not been using a lotion here. No associated symptoms to suggest infection.   Evaluated by ID on 12/13, recommend moisturizer for the rash on the abdomen. This is likely not infectious as she is on valtrex and fluc.  - If worsens, please get derm when she gets admitted.          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 Pt reports symptoms are stable. Reports mild paresthesias and hot/cold sensitivity.   - Monitor clinically     Inpatient team--  - Please hold all therapy until Dr Hull has had a chance to review options with April.   - Will request discharge follow up           Roya Quarles [facility]    45 minutes spent on the date of the encounter doing chart review, review of test results, interpretation of tests, patient visit, documentation and discussion with other provider(s)

## 2022-12-13 NOTE — PROGRESS NOTES
[facility]  Transplant Infectious Disease Clinic Note: Follow up     Patient:  Vashti Villegas, Date of birth 1980, Medical record number [id number]  Date of Visit:  12/13/2022           Assessment and Recommendations:   Recommendations:  -Continue valtrex 1000 mg TID for now. Will discuss with primary oncologist (Dr. Hull) about the new chemo regimen and its affect on counts. If her remains ANC >1000 and if the new chemo regimen has low occurrence of prolonged neutropenia, can drop valtrex down to 1000 daily   -As ANC >1000, can stop ppx Fluconazole and levofloxacin but will differ to oncology.   -recommend moisturizer for the rash on the abdomen. This is likely not infectious as she is on valtrex and fluc. If worsens, please get derm when she gets admitted.   -Shingles vaccine in 1-2 months after she finishes all her chemotherapy    -Will need COVID Bivalent and Flu vaccine can get it after he chemo.     RTC as needed.      Addendum 12/15/22: discussed the case with her hematologist Dr. Hull who stated that the ponatinib and blinatumomab is expected to be only mildly myelosuppressive. Threfore, he is agreeable to changing it down to 1000mg daily as long as her ANC >1000.       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 Presented on 10/10 with an eruption of vesiculopapular lesion on the scalp, tip of nose, and upper lip/chin. This has       Infectious Disease issues include:     1) Crusted, hemorrhagic lesions noted on the scalp and face - in the setting of leukopenia and lymphopenia and while on ppx Acyclovir 400 mg po bid.   HSV1/2 PCR from a swab of the lesion of the scalp - negative   VZV PCR from a swab of the scalp - Negative   Erosive lesions noted in the  distribution of V1 (Tip of the nose), V2 and V3 (Upper lip and chin, and C2 (scalp) dermatomes.   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   On 10/13 noted new erythematous papules and on pustular lesion.    11/1/22 ID visit, stopped  IV Acyclovir as all lesions have// crusted over. But on vlatrex 1000 TID until she is done with chemo and ANC>1000.      2. Rash on the abd  -Has been going on for 3-4d. It is only itchy  -see media section for images.     Other issues:  -  ms on 10/12  - PCP prophylaxis: Nebulized pentamidine last given 10/2; next due ~10/31  - Fungal prophylaxis: fluconazole 200 mg   - Bacterial prophylaxis: Levofloxacin 250 mg daily   - Serostatus: CMV Neg, EBV Positive, HSV1+/HSV2- PTA acyclovir 400 mg BID held   - Gamma globulin status: 688 on 10/13  - Vaccination - There are non on file and she states that she has not received any vaccinations since childhood.   This will need to be addressed at the appropriate time   - Isolation status:  Good hand hygiene. Airborne precautions   Chuck Diaz MD. Pager [phone]         History of the Infectious Disease lllness:   The pt is here for follow up. She is still on valtrex 1000 TID. All her lesions from VZV are gone on the head and face. She has his rash on her abdomen bilaterally, which is only itchy. she has no pain. She has no fevers, chills, sob, no current headache, vision problems, hearing problems, no sob, no cough, n/v/d or dysuria.       Transplants:  N/A; Postoperative day:  .  Coordinator No matching coordinators    Review of Systems:  CONSTITUTIONAL:  No fevers or chills. No night sweats.  EYES: negative for icterus or acute vision changes.   ENT:  negative for hearing loss, tinnitus or sore throat  RESPIRATORY:  negative for cough, sputum, dyspnea  CARDIOVASCULAR:  negative for chest pain, heart palpitations  GASTROINTESTINAL:  negative for nausea, vomiting, diarrhea or  constipation  GENITOURINARY:  negative for dysuria or hematuria.  HEME:  No easy bruising or bleeding  INTEGUMENT:  negative for rash or pruritus  NEURO:  Negative for headache or tremor.    Past Medical History:   Diagnosis Date     ALL (acute lymphocytic leukemia) (H)      Other acne      Shingles        Past Surgical History:   Procedure Laterality Date     IR BONE BIOPSY DEEP RIGHT  10/25/2022     IR CHEST PORT PLACEMENT > 5 YRS OF AGE  11/23/2022     IR LUMBAR PUNCTURE  10/21/2022     PICC Left 09/30/2022    Picc ok to use     PICC DOUBLE LUMEN PLACEMENT Left 08/20/2022    left cephalic 5 fr dl picc 44 cm     ZZC NONSPECIFIC PROCEDURE  01/01/1999    benign mole removal       No family history on file.    Social History     Social History Narrative    ** Merged History Encounter **          Social History     Tobacco Use     Smoking status: Never     Smokeless tobacco: Never       Immunization History   Administered Date(s) Administered     TD (ADULT, 7+) 09/13/1996       Patient Active Problem List   Diagnosis     Acute leukemia (H)     Acute lymphoblastic leukemia (ALL) (H)     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)     Prophylaxis for chemotherapy-induced neutropenia     Morbid obesity (H)     Disseminated zoster     ALL (acute lymphoblastic leukemia of infant) (H)       No outpatient medications have been marked as taking for the 12/13/22 encounter (Appointment) with Chuck Diaz MD.       Allergies   Allergen Reactions     Blood Transfusion Related (Informational Only) Hives     Hive reaction with platelet transfusion on 8/26/2022. Please administer platelets with tylenol and benadryl premedication.               Physical Exam:   Vitals were reviewed.  All vitals stable  There were no vitals taken for this visit.  Wt Readings from Last 4 Encounters:   12/05/22 115 kg (253 lb 9.6 oz)   12/02/22 112.5 kg (248 lb 1.6 oz)   11/29/22 110.6 kg (243 lb 12.8 oz)   11/26/22 110.2  kg (242 lb 14.4 oz)       Exam:  GENERAL: well-developed, well-nourished, alert, oriented, in no acute distress.  HEAD: Head is normocephalic, atraumatic. Thinning hairline.    EYES: Eyes have anicteric sclerae.    ENT: Oropharynx is moist without exudates or ulcers.  NECK: Supple.  LUNGS: Clear to auscultation.  CARDIOVASCULAR: Regular rate and rhythm with no murmurs, gallops or rubs.  ABDOMEN: Normal bowel sounds, soft, nontender.  SKIN:       NEUROLOGIC: Grossly nonfocal.         Laboratory Data:     No results found for: ACD4    Inflammatory Markers  No lab results found.    Invalid input(s): RATE, AUTO, ESR, WESR    Immune Globulin Studies     Recent Labs   Lab Test 10/13/22  0744          Metabolic Studies    Recent Labs   Lab Test 12/12/22  0844 12/08/22  1117 12/06/22  1445 11/26/22  0548 11/25/22  1001 11/24/22  1545 11/24/22  0551 11/03/22  0556 11/02/22  1556    145* 145*   < >  --    < > 137   < > 139   POTASSIUM 3.9 3.9 3.8   < >  --    < > 4.4  4.4   < > 3.9   CHLORIDE 110* 114* 114*   < >  --    < > 100   < > 105   CO2 26 27 27   < >  --    < > 26   < > 21*   ANIONGAP 5 4 4   < >  --    < > 11   < > 13   BUN 15 12 13   < >  --    < > 10.7   < > 12.0   CR 0.64 0.62 0.64   < >  --    < > 0.58   < > 0.62   GFRESTIMATED >90 >90 >90   < >  --    < > >90   < > >90   * 119* 117*   < >  --    < > 143*   < > 100*   SOLEDAD 8.6 8.7 8.5   < >  --    < > 8.5*   < > 9.2   PHOS  --   --   --   --   --   --  3.8   < > 4.0   MAG  --   --   --   --   --   --  2.1   < > 1.8   URIC  --   --   --   --   --   --   --   --  3.9   LACT  --   --   --   --  1.7   < >  --   --   --     < > = values in this interval not displayed.       Hepatic Studies    Recent Labs   Lab Test 12/12/22  0844 12/08/22  1117 12/06/22  1445 11/03/22  0556 11/02/22  1556 10/11/22  0703 10/10/22  1817 09/26/22  1358 09/14/22  0704   BILITOTAL 0.3 0.2 0.4   < > 0.4   < > 0.6   < > 0.3   DBIL  --   --   --   --   --   --   --   --   <0.20   ALKPHOS 118 112 80   < > 78   < > 61   < > 77   PROTTOTAL 7.0 6.4* 6.4*   < > 6.4   < > 5.9*   < > 5.2*   ALBUMIN 3.4 3.2* 3.1*   < > 4.0   < > 3.7   < > 3.4*   AST 15 17 10   < > 15   < > 15   < > 16   ALT 30 32 31   < > 12   < > 24   < > 27   LDH  --   --   --   --  292*  --  201   < >  --     < > = values in this interval not displayed.       Pancreatitis testing    Recent Labs   Lab Test 12/02/22  1012   LIPASE 27       Lipid testing  No lab results found.    Gout Labs      Recent Labs   Lab Test 11/02/22  1556 10/14/22  0648 10/13/22  0744 10/12/22  0642 10/11/22  0703   URIC 3.9 3.0 2.2* 2.0* 2.0*       Hematology Studies   Recent Labs   Lab Test 12/12/22  0844 12/08/22  1117 12/06/22  1445 12/05/22  1206 12/05/22  0819 12/02/22  1012 11/29/22  1254 11/26/22  0548 10/24/22  1338 10/21/22  1223   WBC 9.5 16.5* 2.2*  --  0.2*   < > 4.6 9.9   < > 24.2*   ABLA  --   --   --   --   --   --   --   --   --  0.2*   BLST  --   --   --   --   --   --   --   --   --  1   ANEU 7.4 12.2* 1.5*  --   --   --   --   --    < > 17.7*   ANEUTAUTO  --   --   --   --   --   --  4.2 9.6*   < >  --    ALYM 0.4* 1.2 0.4*  --   --   --   --   --    < > 1.5   ALYMPAUTO  --   --   --   --   --   --  0.3* 0.1*   < >  --    COLIN 1.5* 1.3 0.3  --   --   --   --   --    < > 2.9*   AMONOAUTO  --   --   --   --   --   --  0.0 0.1   < >  --    AEOS 0.1 0.0 0.0  --   --   --   --   --    < > 0.0   AEOSAUTO  --   --   --   --   --   --  0.0 0.0   < >  --    ABSBASO  --   --   --   --   --   --  0.0 0.0   < >  --    HGB 8.3* 8.2* 7.3*  --  6.1*   < > 8.6* 7.2*   < > 8.3*   HCT 25.7* 24.8* 21.7*  --  18.6*   < > 26.1* 21.8*   < > 26.1*    85* 9*   < > 3*   < > 145* 216   < > 179    < > = values in this interval not displayed.       Clotting Studies    Recent Labs   Lab Test 11/25/22  0741 11/23/22  1439 11/02/22  1556 10/21/22  1223   INR 1.08 1.07 1.10 1.07   PTT  --  24 84*  --        Iron Testing    Recent Labs   Lab Test  12/12/22  0844   MCV 98       Markers  No lab results found.    Invalid input(s): FETOPROTEIN, SERUM, AFP    Autoimmune Testing   No lab results found.    Invalid input(s): ANCAB, PANCA, CANCA    Arterial Blood Gas Testing  No lab results found.     Thyroid Studies   No lab results found.    Invalid input(s): FT4    Urine Studies     Recent Labs   Lab Test 11/26/22  0600 11/25/22  2204 11/25/22  1400 11/25/22  0600 11/24/22  2200 10/01/22  1100 08/22/22  1515   URINEPH 7.5 7.5 8.0 7.5 7.5   < > 5.5   NITRITE  --   --   --   --   --   --  Negative   LEUKEST  --   --   --   --   --   --  Negative    < > = values in this interval not displayed.       Medication levels  No lab results found.    Invalid input(s): AMIK    CSF testing     Recent Labs   Lab Test 11/25/22  1142 10/21/22  1425 10/21/22  1423 09/14/22  1131 09/07/22  1124 08/31/22  1617 08/26/22  1517   CWBC 0  --  0 0 0 0 2   CRBC 4*  --  1 9* 15* 9* 0   CGLU 89* 53  --   --  57 68 106*   CTP 28.6 31.0  --   --  24.9 38.2 46.8*       Microbiology:  Fungal testing  No lab results found.    Invalid input(s): HIFUN, FUNGL    Beta D Glucan levels (Fungitell assay)    No results found for: FGTL, FGTLI     Last Culture results   Rapid Strep A Screen   Date Value Ref Range Status   01/21/2003   Final    NEGATIVE: No Group A streptococcal antigen detected by immunoassay, await   culture report.     Culture   Date Value Ref Range Status   11/25/2022 No Growth  Final   10/21/2022 No Growth  Final   10/11/2022 1+ Normal sia  Final   09/07/2022 No Growth  Final   09/02/2022 No Growth  Final   09/02/2022 No Growth  Final   08/31/2022 No Growth  Final   08/26/2022 No Growth  Final     Culture Micro   Date Value Ref Range Status   09/25/2003   Final    >100,000 colonies/mL Multiple organisms present, probable contamination   01/21/2003 No Beta Streptococcus isolated  Final   01/04/2003 No Beta Streptococcus isolated  Final         Last checks of Clostridioides difficile  testing  No lab results found.    No components found for: AFBSTN    Syphilis Testing  Invalid input(s): EIB8709    Tick Testing  No lab results found.    Invalid input(s): APHAGM    ASO Testing  Invalid input(s): EFR2842    Quantiferon testing   Recent Labs   Lab Test 12/12/22  0844 12/08/22  1117   LYMPH 4 7       Infection Studies to assess Diarrhea  No lab results found.    Invalid input(s): NNDMRESULT    Virology:  Coronavirus-19 testing    Recent Labs   Lab Test 12/12/22  0843 12/09/22  0841 11/21/22  1333 11/16/22  1101   [id number] Negative Negative Negative Negative       Respiratory virus (non-coronavirus-19) testing    Recent Labs   Lab Test 12/09/22  0841   IFLUA Not Detected   FLUAH1 Not Detected   YM4144 Not Detected   FLUAH3 Not Detected   IFLUB Not Detected   PIV1 Not Detected   PIV2 Not Detected   PIV3 Not Detected   PIV4 Not Detected   RSVA Not Detected   RSVB Not Detected   HMPV Not Detected   RHINEV Not Detected   ADENOV Not Detected   ORR Not Detected       CMV viral loads    CMV DNA IU/mL   Date Value Ref Range Status   10/13/2022 Not Detected Not Detected IU/mL Final       CMV resistance testing  No lab results found.  No results found for: CMVCID, CMVFOS, CMVGAN    No results found for: H6RES    EBV DNA Copies/mL   Date Value Ref Range Status   10/12/2022 Not Detected Not Detected copies/mL Final       BK viral loads No lab results found.    Parvovirus Testing  No lab results found.    Invalid input(s): PRVRES    Adenovirus Testing  No lab results found.    Invalid input(s): ADENAB, ADENOVIRUS, ADQT    Hepatitis B Testing     Recent Labs   Lab Test 08/19/22  1705   AUSAB 0.00   HBCAB Nonreactive   HEPBANG Nonreactive     Was the last Hepatitis B E antigen positive?   No results found for: HBEAGN     Hepatitis C Antibody   Date Value Ref Range Status   08/19/2022 Nonreactive Nonreactive Final       CMV Antibody IgG   Date Value Ref Range Status   08/19/2022 No detectable antibody. No  detectable antibody.  Final     EBV Capsid Antibody IgG   Date Value Ref Range Status   08/19/2022 Positive (A) No detectable antibody. Final     Comment:     Suggests recent or past exposure.     Herpes Simplex Virus Type 1 IgG Antibody   Date Value Ref Range Status   08/19/2022 Positive.  IgG antibody to HSV-1 detected. (A) No HSV-1 IgG antibodies detected Final     Herpes Simplex Virus Type 2 IgG Antibody   Date Value Ref Range Status   08/19/2022 No HSV-2 IgG antibodies detected. No HSV-2 IgG antibodies detected Final       No components found for: [id number]    Last Pathology Report   Case Report   Date Value Ref Range Status   11/25/2022   Final    Flow Cytometry Report                             Case: [id number]                                  Authorizing Provider:  Marina Crain PA-C    Collected:           11/25/2022 11:42 AM          Ordering Location:     [facility]     Received:            11/25/2022 12:49 PM                                 [address] [place] [address]                                                            Pathologist:           Etta Nelson MD                                                        Specimen:    Lumbar Puncture                                                                             Clinical Information   Date Value Ref Range Status   11/25/2022   Final    42 yrs old female with prior diagnosis of B-ALL .        Final Diagnosis   Date Value Ref Range Status   10/25/2022   Final    Bone marrow, posterior iliac crest, left decalcified trephine biopsy and touch imprint; particle crush, direct aspirate smear, and concentrated aspirate smear; and peripheral blood smear:    - Normal to slightly hypercellular marrow for age (cellularity estimated at 60-70%) with trilineage hematopoietic maturation and no increase in blasts  - No morphologic evidence of B-lymphoblastic leukemia  - Unusual B-lineage precursor population detected by flow cytometry  - Peripheral  blood showing moderate normochromic normocytic anemia; rare circulating nucleated red blood cells  - See comment          Imaging:  Results for orders placed or performed during the hospital encounter of 11/23/22   IR Chest Port Placement > 5 Yrs of Age    Narrative    Procedures 11/23/2022 2:40 PM:  1. Ultrasound guidance for venous access  2. Tunneled port (age > 5 yrs.) placement  3. Fluoroscopic guidance placement    History: Acute lymphoblastic leukemia, port is requested for  chemotherapy.     Comparisons: CT neck 8/22/2022    Staff: Dr. Franck JACKSON, ISIAH TORRES MD, attest that I was present for all critical  portions of the procedure and was immediately available to provide  guidance and assistance during the remainder of the procedure.    Fellow: Dr. Quigley    Medications:   1. 225 mcg Fentanyl  2. 3.5 Versed    Moderate sedation administered by the IR nurse at the supervision of  the attending. Vital signs and oxygenation continuously monitored. The  patient remained stable throughout the procedure.    Continuous face-to-face sedation time: 15 minutes    Fluoro time: .7 minutes     Findings/procedure:     Prior to the procedure, both verbal and written informed consent  obtained from the patient.     Limited jugular ultrasound documented jugular vein patency. The right  neck and upper chest prepped and draped in the usual sterile fashion.  Buffered 1% Lidocaine used for local analgesia.    Under ultrasound guidance, right internal jugular venotomy made with a  micropuncture needle. Image documenting the vein within the vein  saved.    Micropuncture needle exchanged over guidewire for the micropuncture  sheath. Catheter length measured with the 0.018 guidewire.  Micropuncture sheath saline locked. Subcutaneous pocket created for  the port reservoir over the right anterior chest using a combination  of sharp and blunt dissection. Pocket liberally irrigated with saline.      Catheter subcutaneously tunneled  from the right anterior chest pocket  to the right internal jugular venotomy site. Catheter cut to length at  the 22 cm tai. Micropuncture sheath exchanged over guidewire for the  peel-away sheath. Catheter placed through the peel-away sheath and  advanced under fluoroscopic guidance to the high right atrium.  Peel-away sheath removed. Port aspirated and flushed adequately.     Port heparin locked with 10:1 heparin solution. The right anterior  chest incision closed with 3-0 Vicryl interrupted sutures, a running  4-0 Monocryl subcuticular suture, and Dermabond. Right internal  jugular venotomy site closed with Dermabond. No immediate  complication.       Impression    Impression:  Uncomplicated image guided placement of right internal jugular venous  central venous catheter with port. Catheter tip overlies the high  right atrium. Port flushed, heparin locked, ready for immediate use.     I have personally reviewed the examination and initial interpretation  and I agree with the findings.    ISIAH TORRES MD         SYSTEM ID:  [id number]   XR Lumbar Punc Intrathecal Chemo Admin    Narrative    PROCEDURE: Lumbar Puncture using Fluoroscopy, 11/25/2022 12:01 PM    HISTORY:  Need for IT chemo per protocol    COMPARISON: 10/21/2022    STAFF NEURORADIOLOGIST: Dr. Jarrell Rust    FELLOW PHYSICIAN: Dr. Tay Hoff    MEDICATIONS:    1. 6 cc local 1% lidocaine.    TECHNIQUE: Verbal and written consent for lumbar puncture was obtained  from the patient, and benefits and risk of the procedure were  explained, including but not limited to worsening headache,  hemorrhage, infection, lower extremity pain, or nerve root injury. The  patient was sterilely prepped and draped with the patient in the prone  position, over the lower back. Under fluoroscopic guidance, the  interlaminar spaces were noted. 1% lidocaine was administered for  local anesthetic over the L3-4 interlaminar space, and a 22 gauge 5  inch needle was advanced into  the thecal sac under fluoroscopic  guidance.      There was initial show of clear CSF. Approximately 8 cc of CSF were  collected.    Hematology/Oncology then administered intrathecal chemotherapy, dose  and rate as determined by Heme/Onc.    The needle was removed with the stylet in place. There was no  immediate complication associated with the procedure. Samples were  sent for the requested laboratory testing.      FLUORO TIME: 33 seconds    DOSE: 25 mGy      Impression    IMPRESSION: Successful lumbar puncture and intrathecal chemotherapy  administration without immediate complication.    PLAN: Patient discharged to patient care unit in stable condition for  monitoring. Orders placed for 1 hour of bed rest, with patient laying  on the back and the head of the bed flat.    I, MACO GARZON MD, attest that I was immediately available to  provide guidance and assistance during the entirety of the procedure.    I have personally reviewed the examination and initial interpretation  and I agree with the findings.    MACO GARZON MD         SYSTEM ID:  [id number]

## 2022-12-14 ENCOUNTER — APPOINTMENT (OUTPATIENT)
Dept: LAB | Facility: CLINIC | Age: 42
End: 2022-12-14
Attending: REGISTERED NURSE
Payer: COMMERCIAL

## 2022-12-14 ENCOUNTER — ONCOLOGY VISIT (OUTPATIENT)
Dept: ONCOLOGY | Facility: CLINIC | Age: 42
End: 2022-12-14
Attending: REGISTERED NURSE
Payer: COMMERCIAL

## 2022-12-14 ENCOUNTER — HOSPITAL ENCOUNTER (INPATIENT)
Facility: CLINIC | Age: 42
LOS: 9 days | Discharge: HOME | End: 2022-12-23
Attending: INTERNAL MEDICINE | Admitting: INTERNAL MEDICINE
Payer: COMMERCIAL

## 2022-12-14 VITALS
SYSTOLIC BLOOD PRESSURE: 126 MMHG | TEMPERATURE: 98.2 F | HEART RATE: 107 BPM | BODY MASS INDEX: 41.98 KG/M2 | RESPIRATION RATE: 18 BRPM | DIASTOLIC BLOOD PRESSURE: 86 MMHG | OXYGEN SATURATION: 99 % | WEIGHT: 244.7 LBS

## 2022-12-14 DIAGNOSIS — B02.9 VZV (VARICELLA-ZOSTER VIRUS) INFECTION: ICD-10-CM

## 2022-12-14 DIAGNOSIS — R05.2 SUBACUTE COUGH: Primary | ICD-10-CM

## 2022-12-14 DIAGNOSIS — C91.00 ACUTE LYMPHOBLASTIC LEUKEMIA (ALL) NOT HAVING ACHIEVED REMISSION (H): ICD-10-CM

## 2022-12-14 DIAGNOSIS — J18.9 PNEUMONIA OF RIGHT LUNG DUE TO INFECTIOUS ORGANISM, UNSPECIFIED PART OF LUNG: Primary | ICD-10-CM

## 2022-12-14 DIAGNOSIS — C91.01 ACUTE LYMPHOBLASTIC LEUKEMIA (ALL) IN REMISSION (H): ICD-10-CM

## 2022-12-14 DIAGNOSIS — Z76.89 PROPHYLAXIS FOR CHEMOTHERAPY-INDUCED NEUTROPENIA: ICD-10-CM

## 2022-12-14 DIAGNOSIS — I10 HYPERTENSION, UNSPECIFIED TYPE: ICD-10-CM

## 2022-12-14 LAB
ABO/RH(D): NORMAL
ALBUMIN SERPL BCG-MCNC: 4 G/DL (ref 3.5–5.2)
ALBUMIN SERPL BCG-MCNC: 4.1 G/DL (ref 3.5–5.2)
ALP SERPL-CCNC: 100 U/L (ref 35–104)
ALP SERPL-CCNC: 92 U/L (ref 35–104)
ALT SERPL W P-5'-P-CCNC: 19 U/L (ref 10–35)
ALT SERPL W P-5'-P-CCNC: 21 U/L (ref 10–35)
ANION GAP SERPL CALCULATED.3IONS-SCNC: 11 MMOL/L (ref 7–15)
ANION GAP SERPL CALCULATED.3IONS-SCNC: 11 MMOL/L (ref 7–15)
ANTIBODY SCREEN: NEGATIVE
AST SERPL W P-5'-P-CCNC: 18 U/L (ref 10–35)
AST SERPL W P-5'-P-CCNC: 21 U/L (ref 10–35)
BASOPHILS # BLD AUTO: 0 10E3/UL (ref 0–0.2)
BASOPHILS # BLD AUTO: 0 10E3/UL (ref 0–0.2)
BASOPHILS NFR BLD AUTO: 0 %
BASOPHILS NFR BLD AUTO: 0 %
BILIRUB SERPL-MCNC: 0.2 MG/DL
BILIRUB SERPL-MCNC: 0.3 MG/DL
BUN SERPL-MCNC: 13.1 MG/DL (ref 6–20)
BUN SERPL-MCNC: 14.7 MG/DL (ref 6–20)
C PNEUM DNA SPEC QL NAA+PROBE: NOT DETECTED
CALCIUM SERPL-MCNC: 8.6 MG/DL (ref 8.6–10)
CALCIUM SERPL-MCNC: 8.9 MG/DL (ref 8.6–10)
CHLORIDE SERPL-SCNC: 107 MMOL/L (ref 98–107)
CHLORIDE SERPL-SCNC: 108 MMOL/L (ref 98–107)
CREAT SERPL-MCNC: 0.65 MG/DL (ref 0.51–0.95)
CREAT SERPL-MCNC: 0.68 MG/DL (ref 0.51–0.95)
DEPRECATED HCO3 PLAS-SCNC: 22 MMOL/L (ref 22–29)
DEPRECATED HCO3 PLAS-SCNC: 24 MMOL/L (ref 22–29)
EOSINOPHIL # BLD AUTO: 0 10E3/UL (ref 0–0.7)
EOSINOPHIL # BLD AUTO: 0 10E3/UL (ref 0–0.7)
EOSINOPHIL NFR BLD AUTO: 0 %
EOSINOPHIL NFR BLD AUTO: 0 %
ERYTHROCYTE [DISTWIDTH] IN BLOOD BY AUTOMATED COUNT: 20 % (ref 10–15)
ERYTHROCYTE [DISTWIDTH] IN BLOOD BY AUTOMATED COUNT: 20.1 % (ref 10–15)
FLUAV H1 2009 PAND RNA SPEC QL NAA+PROBE: NOT DETECTED
FLUAV H1 RNA SPEC QL NAA+PROBE: NOT DETECTED
FLUAV H3 RNA SPEC QL NAA+PROBE: NOT DETECTED
FLUAV RNA SPEC QL NAA+PROBE: NOT DETECTED
FLUBV RNA SPEC QL NAA+PROBE: NOT DETECTED
GFR SERPL CREATININE-BSD FRML MDRD: >90 ML/MIN/1.73M2
GFR SERPL CREATININE-BSD FRML MDRD: >90 ML/MIN/1.73M2
GLUCOSE SERPL-MCNC: 127 MG/DL (ref 70–99)
GLUCOSE SERPL-MCNC: 99 MG/DL (ref 70–99)
HADV DNA SPEC QL NAA+PROBE: NOT DETECTED
HCOV PNL SPEC NAA+PROBE: NOT DETECTED
HCT VFR BLD AUTO: 25.2 % (ref 35–47)
HCT VFR BLD AUTO: 26 % (ref 35–47)
HGB BLD-MCNC: 7.8 G/DL (ref 11.7–15.7)
HGB BLD-MCNC: 8.3 G/DL (ref 11.7–15.7)
HMPV RNA SPEC QL NAA+PROBE: NOT DETECTED
HPIV1 RNA SPEC QL NAA+PROBE: NOT DETECTED
HPIV2 RNA SPEC QL NAA+PROBE: NOT DETECTED
HPIV3 RNA SPEC QL NAA+PROBE: NOT DETECTED
HPIV4 RNA SPEC QL NAA+PROBE: NOT DETECTED
IMM GRANULOCYTES # BLD: 0.1 10E3/UL
IMM GRANULOCYTES # BLD: 0.1 10E3/UL
IMM GRANULOCYTES NFR BLD: 2 %
IMM GRANULOCYTES NFR BLD: 2 %
LYMPHOCYTES # BLD AUTO: 0.4 10E3/UL (ref 0.8–5.3)
LYMPHOCYTES # BLD AUTO: 0.5 10E3/UL (ref 0.8–5.3)
LYMPHOCYTES NFR BLD AUTO: 6 %
LYMPHOCYTES NFR BLD AUTO: 9 %
M PNEUMO DNA SPEC QL NAA+PROBE: NOT DETECTED
MCH RBC QN AUTO: 31.1 PG (ref 26.5–33)
MCH RBC QN AUTO: 31.1 PG (ref 26.5–33)
MCHC RBC AUTO-ENTMCNC: 31 G/DL (ref 31.5–36.5)
MCHC RBC AUTO-ENTMCNC: 31.9 G/DL (ref 31.5–36.5)
MCV RBC AUTO: 100 FL (ref 78–100)
MCV RBC AUTO: 97 FL (ref 78–100)
MONOCYTES # BLD AUTO: 0.9 10E3/UL (ref 0–1.3)
MONOCYTES # BLD AUTO: 1.1 10E3/UL (ref 0–1.3)
MONOCYTES NFR BLD AUTO: 18 %
MONOCYTES NFR BLD AUTO: 18 %
NEUTROPHILS # BLD AUTO: 3.9 10E3/UL (ref 1.6–8.3)
NEUTROPHILS # BLD AUTO: 4.6 10E3/UL (ref 1.6–8.3)
NEUTROPHILS NFR BLD AUTO: 71 %
NEUTROPHILS NFR BLD AUTO: 74 %
NRBC # BLD AUTO: 0 10E3/UL
NRBC # BLD AUTO: 0 10E3/UL
NRBC BLD AUTO-RTO: 1 /100
NRBC BLD AUTO-RTO: 1 /100
PLATELET # BLD AUTO: 245 10E3/UL (ref 150–450)
PLATELET # BLD AUTO: 250 10E3/UL (ref 150–450)
POTASSIUM SERPL-SCNC: 3.7 MMOL/L (ref 3.4–5.3)
POTASSIUM SERPL-SCNC: 3.7 MMOL/L (ref 3.4–5.3)
PROT SERPL-MCNC: 6.4 G/DL (ref 6.4–8.3)
PROT SERPL-MCNC: 6.8 G/DL (ref 6.4–8.3)
RBC # BLD AUTO: 2.51 10E6/UL (ref 3.8–5.2)
RBC # BLD AUTO: 2.67 10E6/UL (ref 3.8–5.2)
RSV RNA SPEC QL NAA+PROBE: NOT DETECTED
RSV RNA SPEC QL NAA+PROBE: NOT DETECTED
RV+EV RNA SPEC QL NAA+PROBE: NOT DETECTED
SODIUM SERPL-SCNC: 140 MMOL/L (ref 136–145)
SODIUM SERPL-SCNC: 143 MMOL/L (ref 136–145)
SPECIMEN EXPIRATION DATE: NORMAL
WBC # BLD AUTO: 5.4 10E3/UL (ref 4–11)
WBC # BLD AUTO: 6.2 10E3/UL (ref 4–11)

## 2022-12-14 PROCEDURE — 120N000002 HC R&B MED SURG/OB UMMC

## 2022-12-14 PROCEDURE — 99207 PR CHGS TRANSFERRED TO HOSPITAL: CPT | Performed by: NURSE PRACTITIONER

## 2022-12-14 PROCEDURE — 85025 COMPLETE CBC W/AUTO DIFF WBC: CPT | Performed by: INTERNAL MEDICINE

## 2022-12-14 PROCEDURE — 250N000011 HC RX IP 250 OP 636: Performed by: NURSE PRACTITIONER

## 2022-12-14 PROCEDURE — 3E04305 INTRODUCTION OF OTHER ANTINEOPLASTIC INTO CENTRAL VEIN, PERCUTANEOUS APPROACH: ICD-10-PCS | Performed by: INTERNAL MEDICINE

## 2022-12-14 PROCEDURE — 36591 DRAW BLOOD OFF VENOUS DEVICE: CPT | Performed by: INTERNAL MEDICINE

## 2022-12-14 PROCEDURE — 86901 BLOOD TYPING SEROLOGIC RH(D): CPT | Performed by: NURSE PRACTITIONER

## 2022-12-14 PROCEDURE — 250N000013 HC RX MED GY IP 250 OP 250 PS 637: Performed by: PHYSICIAN ASSISTANT

## 2022-12-14 PROCEDURE — 250N000011 HC RX IP 250 OP 636: Performed by: INTERNAL MEDICINE

## 2022-12-14 PROCEDURE — 36591 DRAW BLOOD OFF VENOUS DEVICE: CPT | Performed by: NURSE PRACTITIONER

## 2022-12-14 PROCEDURE — 84450 TRANSFERASE (AST) (SGOT): CPT | Performed by: INTERNAL MEDICINE

## 2022-12-14 PROCEDURE — 99212 OFFICE O/P EST SF 10 MIN: CPT | Performed by: NURSE PRACTITIONER

## 2022-12-14 PROCEDURE — 258N000003 HC RX IP 258 OP 636: Performed by: INTERNAL MEDICINE

## 2022-12-14 PROCEDURE — 80053 COMPREHEN METABOLIC PANEL: CPT | Performed by: NURSE PRACTITIONER

## 2022-12-14 PROCEDURE — 87633 RESP VIRUS 12-25 TARGETS: CPT | Performed by: NURSE PRACTITIONER

## 2022-12-14 PROCEDURE — 85025 COMPLETE CBC W/AUTO DIFF WBC: CPT | Performed by: NURSE PRACTITIONER

## 2022-12-14 PROCEDURE — G0463 HOSPITAL OUTPT CLINIC VISIT: HCPCS

## 2022-12-14 PROCEDURE — 99222 1ST HOSP IP/OBS MODERATE 55: CPT | Mod: FS | Performed by: PHYSICIAN ASSISTANT

## 2022-12-14 PROCEDURE — 86850 RBC ANTIBODY SCREEN: CPT | Performed by: NURSE PRACTITIONER

## 2022-12-14 RX ORDER — ALBUTEROL SULFATE 0.83 MG/ML
2.5 SOLUTION RESPIRATORY (INHALATION)
Status: DISCONTINUED | OUTPATIENT
Start: 2022-12-14 | End: 2022-12-23 | Stop reason: HOSPADM

## 2022-12-14 RX ORDER — LORAZEPAM 0.5 MG/1
.5-1 TABLET ORAL EVERY 6 HOURS PRN
Status: DISCONTINUED | OUTPATIENT
Start: 2022-12-14 | End: 2022-12-23 | Stop reason: HOSPADM

## 2022-12-14 RX ORDER — ALBUTEROL SULFATE 90 UG/1
1-2 AEROSOL, METERED RESPIRATORY (INHALATION)
Status: DISCONTINUED | OUTPATIENT
Start: 2022-12-14 | End: 2022-12-23 | Stop reason: HOSPADM

## 2022-12-14 RX ORDER — METHYLPREDNISOLONE SODIUM SUCCINATE 125 MG/2ML
125 INJECTION, POWDER, LYOPHILIZED, FOR SOLUTION INTRAMUSCULAR; INTRAVENOUS
Status: DISCONTINUED | OUTPATIENT
Start: 2022-12-14 | End: 2022-12-23 | Stop reason: HOSPADM

## 2022-12-14 RX ORDER — ACETAMINOPHEN 325 MG/1
650 TABLET ORAL EVERY 6 HOURS PRN
Status: DISCONTINUED | OUTPATIENT
Start: 2022-12-14 | End: 2022-12-20

## 2022-12-14 RX ORDER — SENNOSIDES 8.6 MG
1-2 TABLET ORAL 2 TIMES DAILY
Status: DISCONTINUED | OUTPATIENT
Start: 2022-12-14 | End: 2022-12-18

## 2022-12-14 RX ORDER — AMOXICILLIN 250 MG
1 CAPSULE ORAL 2 TIMES DAILY PRN
Status: DISCONTINUED | OUTPATIENT
Start: 2022-12-14 | End: 2022-12-23 | Stop reason: HOSPADM

## 2022-12-14 RX ORDER — AMOXICILLIN 250 MG
2 CAPSULE ORAL 2 TIMES DAILY PRN
Status: DISCONTINUED | OUTPATIENT
Start: 2022-12-14 | End: 2022-12-23 | Stop reason: HOSPADM

## 2022-12-14 RX ORDER — POLYETHYLENE GLYCOL 3350 17 G/17G
17 POWDER, FOR SOLUTION ORAL DAILY PRN
Status: DISCONTINUED | OUTPATIENT
Start: 2022-12-14 | End: 2022-12-23 | Stop reason: HOSPADM

## 2022-12-14 RX ORDER — HEPARIN SODIUM (PORCINE) LOCK FLUSH IV SOLN 100 UNIT/ML 100 UNIT/ML
5 SOLUTION INTRAVENOUS ONCE
Status: COMPLETED | OUTPATIENT
Start: 2022-12-14 | End: 2022-12-14

## 2022-12-14 RX ORDER — DEXAMETHASONE SODIUM PHOSPHATE 4 MG/ML
20 INJECTION, SOLUTION INTRA-ARTICULAR; INTRALESIONAL; INTRAMUSCULAR; INTRAVENOUS; SOFT TISSUE
Status: DISCONTINUED | OUTPATIENT
Start: 2022-12-14 | End: 2022-12-20

## 2022-12-14 RX ORDER — AMLODIPINE BESYLATE 5 MG/1
5 TABLET ORAL DAILY
Qty: 30 TABLET | Refills: 4 | Status: SHIPPED | OUTPATIENT
Start: 2022-12-14 | End: 2023-05-16

## 2022-12-14 RX ORDER — PROCHLORPERAZINE MALEATE 10 MG
10 TABLET ORAL EVERY 6 HOURS PRN
Status: DISCONTINUED | OUTPATIENT
Start: 2022-12-14 | End: 2022-12-23 | Stop reason: HOSPADM

## 2022-12-14 RX ORDER — PANTOPRAZOLE SODIUM 20 MG/1
20 TABLET, DELAYED RELEASE ORAL DAILY
Status: DISCONTINUED | OUTPATIENT
Start: 2022-12-14 | End: 2022-12-23 | Stop reason: HOSPADM

## 2022-12-14 RX ORDER — AMLODIPINE BESYLATE 5 MG/1
5 TABLET ORAL DAILY
Status: DISCONTINUED | OUTPATIENT
Start: 2022-12-14 | End: 2022-12-23 | Stop reason: HOSPADM

## 2022-12-14 RX ORDER — ENOXAPARIN SODIUM 100 MG/ML
40 INJECTION SUBCUTANEOUS EVERY 24 HOURS
Status: DISCONTINUED | OUTPATIENT
Start: 2022-12-14 | End: 2022-12-17

## 2022-12-14 RX ORDER — MEPERIDINE HYDROCHLORIDE 25 MG/ML
25 INJECTION INTRAMUSCULAR; INTRAVENOUS; SUBCUTANEOUS EVERY 30 MIN PRN
Status: DISCONTINUED | OUTPATIENT
Start: 2022-12-14 | End: 2022-12-23 | Stop reason: HOSPADM

## 2022-12-14 RX ORDER — PROCHLORPERAZINE MALEATE 10 MG
10 TABLET ORAL EVERY 6 HOURS PRN
Status: DISCONTINUED | OUTPATIENT
Start: 2022-12-14 | End: 2022-12-14

## 2022-12-14 RX ORDER — LORAZEPAM 2 MG/ML
.5-1 INJECTION INTRAMUSCULAR EVERY 6 HOURS PRN
Status: DISCONTINUED | OUTPATIENT
Start: 2022-12-14 | End: 2022-12-23 | Stop reason: HOSPADM

## 2022-12-14 RX ORDER — DIPHENHYDRAMINE HYDROCHLORIDE 50 MG/ML
50 INJECTION INTRAMUSCULAR; INTRAVENOUS
Status: DISCONTINUED | OUTPATIENT
Start: 2022-12-14 | End: 2022-12-23 | Stop reason: HOSPADM

## 2022-12-14 RX ORDER — ONDANSETRON 4 MG/1
4-8 TABLET, ORALLY DISINTEGRATING ORAL EVERY 8 HOURS PRN
Status: DISCONTINUED | OUTPATIENT
Start: 2022-12-14 | End: 2022-12-23 | Stop reason: HOSPADM

## 2022-12-14 RX ORDER — VALACYCLOVIR HYDROCHLORIDE 500 MG/1
1000 TABLET, FILM COATED ORAL 3 TIMES DAILY
Status: DISCONTINUED | OUTPATIENT
Start: 2022-12-14 | End: 2022-12-15

## 2022-12-14 RX ORDER — EPINEPHRINE 1 MG/ML
0.3 INJECTION, SOLUTION, CONCENTRATE INTRAVENOUS EVERY 5 MIN PRN
Status: DISCONTINUED | OUTPATIENT
Start: 2022-12-14 | End: 2022-12-23 | Stop reason: HOSPADM

## 2022-12-14 RX ORDER — LORAZEPAM 0.5 MG/1
.5-1 TABLET ORAL DAILY PRN
Status: DISCONTINUED | OUTPATIENT
Start: 2022-12-14 | End: 2022-12-18

## 2022-12-14 RX ADMIN — DEXAMETHASONE SODIUM PHOSPHATE 20 MG: 4 INJECTION, SOLUTION INTRA-ARTICULAR; INTRALESIONAL; INTRAMUSCULAR; INTRAVENOUS; SOFT TISSUE at 17:20

## 2022-12-14 RX ADMIN — Medication 5 ML: at 08:09

## 2022-12-14 RX ADMIN — BLINATUMOMAB 9 MCG: KIT INTRAVENOUS at 18:30

## 2022-12-14 RX ADMIN — PANTOPRAZOLE SODIUM 20 MG: 20 TABLET, DELAYED RELEASE ORAL at 17:19

## 2022-12-14 RX ADMIN — AMLODIPINE BESYLATE 5 MG: 5 TABLET ORAL at 17:17

## 2022-12-14 RX ADMIN — VALACYCLOVIR HYDROCHLORIDE 1000 MG: 500 TABLET, FILM COATED ORAL at 19:27

## 2022-12-14 ASSESSMENT — ACTIVITIES OF DAILY LIVING (ADL)
ADLS_ACUITY_SCORE: 18
DRESSING/BATHING_DIFFICULTY: NO
DIFFICULTY_COMMUNICATING: NO
WALKING_OR_CLIMBING_STAIRS_DIFFICULTY: NO
HEARING_DIFFICULTY_OR_DEAF: NO
ADLS_ACUITY_SCORE: 35
DOING_ERRANDS_INDEPENDENTLY_DIFFICULTY: NO
TOILETING_ISSUES: NO
ADLS_ACUITY_SCORE: 18
ADLS_ACUITY_SCORE: 18
CONCENTRATING,_REMEMBERING_OR_MAKING_DECISIONS_DIFFICULTY: NO
WEAR_GLASSES_OR_BLIND: NO
FALL_HISTORY_WITHIN_LAST_SIX_MONTHS: NO
ADLS_ACUITY_SCORE: 18
DIFFICULTY_EATING/SWALLOWING: NO

## 2022-12-14 ASSESSMENT — PAIN SCALES - GENERAL: PAINLEVEL: NO PAIN (0)

## 2022-12-14 NOTE — NURSING NOTE
Chief Complaint   Patient presents with     Labs Only     Labs drawn via port by RN in lab, vitals completed.     Port accessed using power 20g 3/4inch needle. Pt tolerated well. Labs drawn. Line flushed with saline and heparin. Vitals completed and patient was checked in for next appt.    Yazmin Alves RN

## 2022-12-14 NOTE — NURSING NOTE
"Oncology Rooming Note    December 14, 2022 8:31 AM   April F Bakari is a 42 year old female who presents for:    Chief Complaint   Patient presents with     Labs Only     Labs drawn via port by RN in lab, vitals completed.     Oncology Clinic Visit     RTN for Acute Leukemia     Initial Vitals: Blood Pressure 126/86   Pulse 107   Temperature 98.2  F (36.8  C) (Oral)   Respiration 18   Weight 111 kg (244 lb 11.2 oz)   Oxygen Saturation 99%   Body Mass Index 41.98 kg/m   Estimated body mass index is 41.98 kg/m  as calculated from the following:    Height as of 12/13/22: 1.626 m (5' 4.02\").    Weight as of this encounter: 111 kg (244 lb 11.2 oz). Body surface area is 2.24 meters squared.  No Pain (0) Comment: Data Unavailable   No LMP recorded.  Allergies reviewed: Yes  Medications reviewed: Yes    Medications: Medication refills not needed today.  Pharmacy name entered into [employer]:    [id number] [place], [place] - [address]  [facility] - [place], [place] - [address]    Clinical concerns: none       Bhumika Carrasco MA            "

## 2022-12-14 NOTE — PROGRESS NOTES
"ICANS score     Score:   4  Orientation: Orientation to year, month, city, hospital: 4 points (1 point each)   3   Identify: Name 3 objects that nurse points to (e.g. clock, pen, button): 3 points (1 point each)   1   Following Commands: (e.g. show me two fingers or close your eyes and stick out your tongue): 1 point   1  Writing: Ability to write a standard sentence (see writing page \"The castro jumped over the log.\"): 1 point   1   Attention: Count backwards from 100 by 10s: 1 point     10  Total: Max 10, no impairment, reassess next shift                   **9 or below Notify MD and reassess in 4 hours   "

## 2022-12-14 NOTE — H&P
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     HEME   # Ph(+) B-ALL   Followed by Dr. Hull. Patient presented to outside hospital with c/o right upper quadrant pain and was subsequently found to have a markedly elevated white blood cell count concerning for acute leukemia. While at OSH, she had CTA chest as well as CT A/P. These identified no PE, no acute or localized intraabdominal inflammatory process, mild splenomegaly, and few inconspicuous low-density structures in the liver. She was transferred to [facility] for further workup and management. S/p Hydrea 1g TID (8/21-8/23) with improvement in WBC (100K ? 12K). Diagnostic BMBx completed 8/20/22, which demonstrated B-ALL with 85% blasts and hypercellular marrow. IHC shows CD10+, CD34+. 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 ALL NGS negative. BCR/ABL1 major/minor (sent from peripheral blood) both negative. Initiated pre-phase steroids with Prednisone 60 mg x8/21; increased to 60 mg/m2 (140 mg) 8/22-8/24. She initiated GRAALL + imatinib (C1D1 = 8/24/22). S/p BMT consult 9/12 with   [facility]. Induction was relatively uncomplicated. Post-induction BMBx done 9/26/22 and revealed MRD- CR1, with negative BCR/ABL1 and Clonoseq testing. With no matched related donors and excellent early/deep response chemotherapy is currently favored over BMT. 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 Underwent BMBx 10/25/22 with no morphologic evidence of B-lymphoblastic leukemia and flow with unusual B lineage precursor population 0.09%. C3 GRAALL + imatinib on 11/2/22 was tolerated well without complication. Prior plan was to continue GRAALL for a total of 8 cycles (of note odd cycles are 21-days in length and even cycles are 14-days in length), however now adjusting to Blinatumumab + Ponatinib.   - Port in place at admission   - Will discuss with home infusion liaison in AM - per outpatient team, coverage is arranged.   - Monitor for signs or symptoms of CRS or neurotoxicity (immune effector cell-associated neurotoxicity syndrome=ICANS). Per updated blinatumomab management guidelines:  ? Consider 1 dose of tocilizumab 8 mg/kg IV with Grade 2 CRS  ? Interrupt treatment for Grade 3 CRS. Start dexamethasone 8 mg Q8H x3 days, then taper over 4 days. Resume Blincyto once CRS resolves. Consider tocilizumab use as well, 8 mg/kg, up to 4 total doses (Q8H).  ? Consider single dose of dexamethasone 10 mg IV x1 for Grade 2 neurotoxicity (ICANS).  ? Hold blinatumomab for Grade 3 ICANS, until resolved to Grade 1 or better. Start dexamethasone 8 mg Q8H x up to 3 days, then taper over 4 days. Consider tocilizumab (same doses as for CRS).  ? Permanent discontinuation of treatment necessitated for Grade 4 CRS/ICANS.     Treatment Plan: Blincyto (C1D1 = 12/14/22)    Pre-meds: Dex 20 mg D1 and D8     IT methotrexate 12 mg - timing TBD     Blincyto 9 mcg daily, CIVI - Days 1-7     Blincyto 28 mcg daily, CIVI - Days 8-9 inpatient, then Days 10-28 through home  infusion    Ponatinib 15 mg daily    # Anemia   Likely secondary to chemotherapy.  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 Presented with scattered papulovesicular lesions on her chin and nose as well as scattered hemorrhagic erosions on the scalp and lower face (V2-3 distributions of the trigeminal nerve) c/f disseminated VZV. Lesions significantly improved on admission.  - Per previous outpatient ID note, recommend Valtrex 1000 mg TID until completion of chemotherapy and complete resolution of suspected VZV lesions completely resolve. Seen 12/13 with consideration of decreasing to 1000 mg daily given less anticipated prolonged neutropenia.       # ID PPx   - Valtrex 1000 mg TID for now  - Hold PTA fluconazole 200 mg daily and levofloxacin 250 mg daily given ANC >1.0   - Nebulized pentamidine last given 12/2; next due 12/30  - Received Evusheld on 11/2/22       MISC  # Abdominal rash  Noted ~12/9 outpatient. Mildly pruritic. Evaluated by ID on 12/13 who recommended moisturizer for the rash on the abdomen. This is likely not infectious as she has been on valtrex and fluconazole.   - Triamcinolone PRN   - If worsens, will consider derm consult    # Hypertension   - Continue PTA amlodipine 5 mg daily       # GERD   - PTA Protonix 20 mg daily      # Grade 1 neuropathy of the fingertips   Due to vincristine. Pt reports symptoms are stable. Reports mild paresthesias and hot/cold sensitivity.   - Monitor clinically       FEN:   -IVF per chemo plan   -PRN lyte replacement, potassium repletion today   -RDAT       Prophy/Misc:   -VTE: Lovenox; hold d/t anticipated LP  -GI/PUD: PTA Protonix  -Bowels: PRN Senna and Miralax       Dispo: Likely 12/23 pending tolerance of blincyto  Follow Up:  will request in coming days    I spent >60 minutes face-to-face and/or coordinating or discussing care plan. Over 50% of our time on the unit was spent counseling the patient and/or coordinating care    Patient plan of care discussed with Dr. Hull.  Assessment and plan are discussed and delivered to the patient.    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     On admission, patient states she is feeling well. She has noted an abdominal rash over past ~5 days, however feels this is getting better. Mildly pruritic but no pain, spreading, warmth. She was seen by ID who evaluated as well. She also notes a slight cough over the past 5 or so days - coughs about 5-6 times per day and occasionally productive. She denies congestion, shortness of breath, fevers, chills, nausea, sore throat. No abdominal pain, changes in bowel/bladder. She notes occasional, positional, cramping of bilateral lower extremities, although this is only with very specific postural changes. No lower extremity edema or pain with palpation. Discussions with Dr. Hull regarding plan of care. Questions answered at bedside.    Past Medical History    I have reviewed this patient's medical history and updated it with pertinent information if needed.   Past Medical History:   Diagnosis Date     ALL (acute lymphocytic leukemia) (H)      Other acne      Shingles        Past Surgical History   I have reviewed this  patient's surgical history and updated it with pertinent information if needed.  Past Surgical History:   Procedure Laterality Date     IR BONE BIOPSY DEEP RIGHT  10/25/2022     IR CHEST PORT PLACEMENT > 5 YRS OF AGE  11/23/2022     IR LUMBAR PUNCTURE  10/21/2022     PICC Left 09/30/2022    Picc ok to use     PICC DOUBLE LUMEN PLACEMENT Left 08/20/2022    left cephalic 5 fr dl picc 44 cm     ZZC NONSPECIFIC PROCEDURE  01/01/1999    benign mole removal       Prior to Admission Medications   Prior to Admission Medications   Prescriptions Last Dose Informant Patient Reported? Taking?   CHOLECALCIFEROL PO   Yes No   Sig: Take 1 tablet by mouth daily Unknown strength.   LORazepam (ATIVAN) 0.5 MG tablet   No No   Sig: Take 1-2 tablets (0.5-1 mg) by mouth daily as needed for anxiety . Take prior to BMBx procedures as needed. If initial dose unhelpful, okay to take a second 0.5-1 mg dose (for a max of 2 mg at a time). Caution: causes sedation. Do not drive after taking this medication.   PONATinib (ICLUSIG) 15 MG tablet   No No   Sig: Take 1 tablet (15 mg) by mouth daily -- start concurrent with blinatumomab infusion, planned for 12/12/22   Patient not taking: Reported on 12/5/2022   acetaminophen (TYLENOL) 325 MG tablet   Yes No   Sig: Take 2 tablets (650 mg) by mouth every 6 hours as needed for mild pain, other or fever (blood product administration premedication)   amLODIPine (NORVASC) 5 MG tablet   No No   Sig: Take 1 tablet (5 mg) by mouth daily   fluconazole (DIFLUCAN) 200 MG tablet   No No   Sig: Take 1 tablet (200 mg) by mouth daily as needed (Take when ANC <1000)   imatinib (GLEEVEC) 100 MG tablet   No No   Sig: Take 3 tablets (300 mg) by mouth 2 times daily through 12/8   levofloxacin (LEVAQUIN) 250 MG tablet   No No   Sig: Take 1 tablet (250 mg) by mouth daily as needed (ANC <1000)   lidocaine-prilocaine (LIDOPRIL) 2.5-2.5 % kit   No No   Sig: Use prior to lab access   ondansetron (ZOFRAN ODT) 4 MG ODT tab   No  No   Sig: Take 1-2 tablets (4-8 mg) by mouth every 8 hours as needed for nausea or vomiting   pantoprazole (PROTONIX) 20 MG EC tablet   No No   Sig: Take 1 tablet (20 mg) by mouth daily   prochlorperazine (COMPAZINE) 10 MG tablet   No No   Sig: Take 1 tablet (10 mg) by mouth every 6 hours as needed (Breakthrough Nausea/Vomiting)   sennosides (SENOKOT) 8.6 MG tablet   No No   Sig: Take 1-2 tablets by mouth 2 times daily   Patient not taking: Reported on 12/5/2022   valACYclovir (VALTREX) 1000 mg tablet   No No   Sig: Take 1 tablet (1,000 mg) by mouth 3 times daily until instructed to stop      Facility-Administered Medications: None     Allergies   Allergies   Allergen Reactions     Blood Transfusion Related (Informational Only) Hives     Hive reaction with platelet transfusion on 8/26/2022. Please administer platelets with tylenol and benadryl premedication.        Social History   I have reviewed this patient's social history and updated it with pertinent information if needed. Vashti Villegas  reports that she has never smoked. She has never used smokeless tobacco.    Family History   Family history reviewed with patient and is noncontributory.    Review of Systems   Complete and Comprehensive review of systems review and negative other than noted here or in the HPI.     Physical Exam                      Vital Signs with Ranges  Temp:  [98.2  F (36.8  C)] 98.2  F (36.8  C)  Pulse:  [107] 107  Resp:  [18] 18  BP: (126)/(86) 126/86  SpO2:  [99 %] 99 %  0 lbs 0 oz    Constitutional: Awake and conversational. Non- toxic appearing. No acute distress. Well developed, hydrated, and nourished. Appears stated age.   HEENT: Normocephalic, atraumatic. Head scarf in place.  Lymph: Neck supple.   Respiratory: Breathing comfortable with no increased work on room air. Good air exchange. No signs of respiratory distress or accessory muscle use. Lungs clear to auscultation bilaterally, no crackles or wheezing noted.  Cardiovascular:  Regular rate and rhythm. No murmurs, rubs, or gallops. No peripheral edema.    GI: Abdomen is soft, non-distended, non-tender and without distension. Bowel sounds present and normoactive. No masses or hepatosplenomegaly appreciated.  Skin: Skin is clean, dry, intact. No jaundice appreciated. See scanned in media chart from 12/14. Scattered macules without surrounding erythema or warmth.  Musculoskeletal/ Extremities: No redness, warmth, or swelling of the joints appreciated. Nailbeds pink and without cyanosis or clubbing.   Neurologic: Alert and oriented. Speech normal. Grossly nonfocal. Memory and thought process preserved. Motor function normal with 5/5 muscle strength bilaterally to UE and LE. Neuropsychiatric: Calm, affect congruent to situation. Appropriate mood and affect. Good judgment and insight. No visual/auditory hallucinations.       Data   Results for orders placed or performed in visit on 12/14/22 (from the past 24 hour(s))   Comprehensive metabolic panel   Result Value Ref Range    Sodium 143 136 - 145 mmol/L    Potassium 3.7 3.4 - 5.3 mmol/L    Chloride 108 (H) 98 - 107 mmol/L    Carbon Dioxide (CO2) 24 22 - 29 mmol/L    Anion Gap 11 7 - 15 mmol/L    Urea Nitrogen 14.7 6.0 - 20.0 mg/dL    Creatinine 0.68 0.51 - 0.95 mg/dL    Calcium 8.9 8.6 - 10.0 mg/dL    Glucose 99 70 - 99 mg/dL    Alkaline Phosphatase 100 35 - 104 U/L    AST 18 10 - 35 U/L    ALT 19 10 - 35 U/L    Protein Total 6.8 6.4 - 8.3 g/dL    Albumin 4.1 3.5 - 5.2 g/dL    Bilirubin Total 0.3 <=1.2 mg/dL    GFR Estimate >90 >60 mL/min/1.73m2   CBC with platelets differential    Narrative    The following orders were created for panel order CBC with platelets differential.  Procedure                               Abnormality         Status                     ---------                               -----------         ------                     CBC with platelets and d...[[id number]]  Abnormal            Final result                 Please  view results for these tests on the individual orders.   ABO/Rh type and screen    Narrative    The following orders were created for panel order ABO/Rh type and screen.  Procedure                               Abnormality         Status                     ---------                               -----------         ------                     Adult Type and Screen[[id number]]                            Final result                 Please view results for these tests on the individual orders.   CBC with platelets and differential   Result Value Ref Range    WBC Count 6.2 4.0 - 11.0 10e3/uL    RBC Count 2.67 (L) 3.80 - 5.20 10e6/uL    Hemoglobin 8.3 (L) 11.7 - 15.7 g/dL    Hematocrit 26.0 (L) 35.0 - 47.0 %    MCV 97 78 - 100 fL    MCH 31.1 26.5 - 33.0 pg    MCHC 31.9 31.5 - 36.5 g/dL    RDW 20.0 (H) 10.0 - 15.0 %    Platelet Count 250 150 - 450 10e3/uL    % Neutrophils 74 %    % Lymphocytes 6 %    % Monocytes 18 %    % Eosinophils 0 %    % Basophils 0 %    % Immature Granulocytes 2 %    NRBCs per 100 WBC 1 (H) <1 /100    Absolute Neutrophils 4.6 1.6 - 8.3 10e3/uL    Absolute Lymphocytes 0.4 (L) 0.8 - 5.3 10e3/uL    Absolute Monocytes 1.1 0.0 - 1.3 10e3/uL    Absolute Eosinophils 0.0 0.0 - 0.7 10e3/uL    Absolute Basophils 0.0 0.0 - 0.2 10e3/uL    Absolute Immature Granulocytes 0.1 <=0.4 10e3/uL    Absolute NRBCs 0.0 10e3/uL   Adult Type and Screen   Result Value Ref Range    ABO/RH(D) O POS     Antibody Screen Negative Negative    SPECIMEN EXPIRATION DATE [id number]

## 2022-12-14 NOTE — PHARMACY-ADMISSION MEDICATION HISTORY
Admission Medication History Completed by Pharmacy    See [facility] Admission Navigator for allergy information, preferred outpatient pharmacy, prior to admission medications and immunization status.     Medication History Sources:     Patient    Chart review    Surescripts    Changes made to PTA medication list (reason):    Added: None    Deleted: Imatinib    Changed: None    Additional Information:    Patient did not take any meds today before admission (amlodipine, valacyclovir, Protonix)    Patient last took Levaquin and Fluconazole yesterday 12/13    Patient brought in her ponatinib    Prior to Admission medications    Medication Sig Last Dose Taking? Auth Provider Long Term End Date   amLODIPine (NORVASC) 5 MG tablet Take 1 tablet (5 mg) by mouth daily 12/13/2022 Yes Roya Quarles APRN CNP Yes    fluconazole (DIFLUCAN) 200 MG tablet Take 1 tablet (200 mg) by mouth daily as needed (Take when ANC <1000) 12/13/2022 Yes Maura Jorgensen PA-C     levofloxacin (LEVAQUIN) 250 MG tablet Take 1 tablet (250 mg) by mouth daily as needed (ANC <1000) 12/13/2022 Yes Maura Jorgensen PA-C     pantoprazole (PROTONIX) 20 MG EC tablet Take 1 tablet (20 mg) by mouth daily 12/13/2022 Yes Sheila Hernandez APRN CNP     valACYclovir (VALTREX) 1000 mg tablet Take 1 tablet (1,000 mg) by mouth 3 times daily until instructed to stop 12/13/2022 Yes Marina Crain PA-C Yes    acetaminophen (TYLENOL) 325 MG tablet Take 2 tablets (650 mg) by mouth every 6 hours as needed for mild pain, other or fever (blood product administration premedication)   Kajal Barajas PA-C     CHOLECALCIFEROL PO Take 1 tablet by mouth daily Unknown strength.   Unknown, Entered By History     lidocaine-prilocaine (LIDOPRIL) 2.5-2.5 % kit Use prior to lab access   Marina Crain PA-C Yes    LORazepam (ATIVAN) 0.5 MG tablet Take 1-2 tablets (0.5-1 mg) by mouth daily as needed for anxiety . Take prior to BMBx procedures as needed. If initial  dose unhelpful, okay to take a second 0.5-1 mg dose (for a max of 2 mg at a time). Caution: causes sedation. Do not drive after taking this medication.   Bev Haines PA-C     ondansetron (ZOFRAN ODT) 4 MG ODT tab Take 1-2 tablets (4-8 mg) by mouth every 8 hours as needed for nausea or vomiting   Maura Jorgensen PA-C     PONATinib (ICLUSIG) 15 MG tablet Take 1 tablet (15 mg) by mouth daily -- start concurrent with blinatumomab infusion, planned for 12/12/22  Patient not taking: Reported on 12/5/2022   Steve Hull MD     prochlorperazine (COMPAZINE) 10 MG tablet Take 1 tablet (10 mg) by mouth every 6 hours as needed (Breakthrough Nausea/Vomiting)   Kajal Barajas PA-C     sennosides (SENOKOT) 8.6 MG tablet Take 1-2 tablets by mouth 2 times daily  Patient not taking: Reported on 12/5/2022   Gail Kendrick PA-C         Date completed: 12/14/22    Medication history completed by: Poly La [facility]

## 2022-12-14 NOTE — LETTER
12/14/2022         RE: Vashti Villegas  [address] [place] [address]        Dear Colleague,    Thank you for referring your patient, Vashti Villegas, to the [facility]. Please see a copy of my visit note below.    [facility]  Date of visit: Dec 14, 2022      Reason for Visit: Ph (+) B-ALL       Oncology HPI:   Followed by Dr. Hull. Patient presented to outside hospital with c/o right upper quadrant pain and was subsequently found to have a markedly elevated white blood cell count concerning for acute leukemia. While at OSH, she had CTA chest as well as CT A/P. These identified no PE, no acute or localized intraabdominal inflammatory process, mild splenomegaly, and few inconspicuous low-density structures in the liver. She was transferred to [facility] for further workup and management. S/p Hydrea 1g TID (8/21-8/23) with improvement in WBC (100K ? 12K). Diagnostic BMBx completed 8/20/22, which demonstrated B-ALL with 85% blasts and hypercellular marrow. IHC shows CD10+, CD34+. 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 ALL NGS negative. BCR/ABL1 major/minor (sent from peripheral blood) both negative. Initiated pre-phase steroids with Prednisone 60 mg x8/21; increased to 60 mg/m2 (140 mg) 8/22-8/24. She initiated GRAALL + imatinib (C1D1 = 8/24/22). S/p BMT consult 9/12 with Dr. Walsh. Induction was relatively uncomplicated. Post-induction BMBx done 9/26/22 and revealed MRD- CR1, with negative BCR/ABL1 and Clonoseq testing. With no matched related donors  and excellent early/deep response chemotherapy is currently favored over BMT. 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 Underwent BMBx 10/25/22 with no morphologic evidence of B-lymphoblastic leukemia and flow with unusual B lineage precursor population 0.09%. C3 GRAALL + imatinib on 11/2/22 was tolerated well without complication.     Plan to admit for C1 Blinatumumab + Ponatinib on 12/14/2022      Interval history:   April is here for follow up prior to planned admission. She is overall feeling well. She has a persistent cough productive of thick, yellow phlegm. No fevers, no nasal congestion or drainage, no sinus pain/ pressure, no sore throat or ear issues. Feels like cough comes from her throat not her chest. No shortness of breath or chest symptoms. She also has few days of new rash across her upper abdomen. This is mildly itchy but not too bad. She has noticed some scattered scratches on her arms and bruising but no open areas. Scattered bruising persists on her arms from old IV access sites.   No bleeding issues. Neuropathy persists in fingertips-- seems to be triggered by extreme temperatures. Tingling in toes persists as well. Worse when legs are crossed. No swelling. Has some occasional bilateral calf pain but this is resolved today.   Appetite is stable, weight fluctuates some depending on IV fluids etc but overall stable range.   ROS otherwise neg        No current outpatient medications on file.       Allergies   Allergen Reactions     Blood Transfusion Related (Informational Only) Hives     Hive reaction with platelet transfusion on 8/26/2022. Please administer platelets with tylenol and benadryl premedication.          Physical Exam:  /86   Pulse 107   Temp 98.2  F (36.8  C) (Oral)   Resp 18   Wt 111 kg (244 lb 11.2 oz)   SpO2 99%   BMI 41.98 kg/m    General: No acute distress.  HEENT: EOMI, PERRL. Sclerae are anicteric. Oral mucosa  is pink and moist with no lesions or thrush.   Lymph: Neck is supple with no lymphadenopathy in the cervical or supraclavicular areas.   Heart: Regular rate and rhythm.   Lungs: Clear to auscultation bilaterally.   Abdomen: Bowel sounds present, soft, nontender with no palpable hepatosplenomegaly or masses.   Extremities: No lower extremity edema noted bilaterally.   Neuro: Alert and oriented x3, CN grossly intact, steady gait  Skin: No petechiae, or bruising noted on exposed skin. Scattered small red papular rash across upper abdomen, no erythema between spots. Mildly pruritic.       Labs:     I personally reviewed the following labs:    Most Recent 3 CBC's:  Recent Labs   Lab Test 12/14/22  0806 12/12/22  0844 12/08/22  1117   WBC 6.2 9.5 16.5*   HGB 8.3* 8.3* 8.2*   MCV 97 98 94    227 85*     Most Recent 3 BMP's:  Recent Labs   Lab Test 12/14/22  0806 12/12/22  0844 12/08/22  1117    141 145*   POTASSIUM 3.7 3.9 3.9   CHLORIDE 108* 110* 114*   CO2 24 26 27   BUN 14.7 15 12   CR 0.68 0.64 0.62   ANIONGAP 11 5 4   SOLEDAD 8.9 8.6 8.7   GLC 99 128* 119*     Most Recent 2 LFT's:  Recent Labs   Lab Test 12/14/22  0806 12/12/22  0844   AST 18 15   ALT 19 30   ALKPHOS 100 118   BILITOTAL 0.3 0.3           Imaging: n/a      Impression/plan:     # Ph(+) B-ALL   Followed by Dr. Hull. Patient presented to outside hospital with c/o right upper quadrant pain and was subsequently found to have a markedly elevated white blood cell count concerning for acute leukemia. While at OSH, she had CTA chest as well as CT A/P. These identified no PE, no acute or localized intraabdominal inflammatory process, mild splenomegaly, and few inconspicuous low-density structures in the liver. She was transferred to [facility] for further workup and management. S/p Hydrea 1g TID (8/21-8/23) with improvement in WBC (100K ? 12K). Diagnostic BMBx completed 8/20/22, which demonstrated B-ALL with 85% blasts and hypercellular marrow. IHC shows  CD10+, CD34+. 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 ALL NGS negative. BCR/ABL1 major/minor (sent from peripheral blood) both negative. Initiated pre-phase steroids with Prednisone 60 mg x8/21; increased to 60 mg/m2 (140 mg) 8/22-8/24. She initiated GRAALL + imatinib (C1D1 = 8/24/22). S/p BMT consult 9/12 with Dr. Walsh. Induction was relatively uncomplicated. Post-induction BMBx done 9/26/22 and revealed MRD- CR1, with negative BCR/ABL1 and Clonoseq testing. With no matched related donors and excellent early/deep response chemotherapy is currently favored over BMT. 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 Underwent BMBx 10/25/22 with no morphologic evidence of B-lymphoblastic leukemia and flow with unusual B lineage precursor population 0.09%. C3 GRAALL + imatinib on 11/2/22 was tolerated well without complication.   - Prior plan was to continue GRAALL for a total of 8 cycles (of note odd cycles are 21-days in length and even cycles are 14-days in length), however now adjusting to Blinatumumab + Ponatinib after extensive discussions between April and Dr Hull. 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 Presented with scattered papulovesicular lesions on her chin and nose as well as scattered hemorrhagic erosions on the scalp and lower face (V2-3 distributions of the trigeminal nerve) c/f disseminated VZV.  - Per outpatient ID note, continue valtrex 1000 mg TID for now. If her remains ANC >1000 and if the new chemo regimen (assuming we proceed with blina/ ponatinib) has low occurrence of prolonged neutropenia, can drop valtrex down to 1000 daily   - Shingles vaccine in 1-2 months after she finishes all her chemotherapy       # Abdominal rash   Scattered raised papular spots across upper abdomen. Does not appear fungal. Onset few days ago. Mildly pruritic. No new meds or skin products. Has not been using a lotion here. No associated symptoms to suggest infection.   Evaluated by ID on 12/13, recommend moisturizer for the rash on the abdomen. This is likely not infectious as she is on valtrex and fluc.  - If worsens, please get derm when she gets admitted.          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 Pt reports symptoms are stable. Reports mild paresthesias and hot/cold sensitivity.   - Monitor clinically     Inpatient team--  - Please hold all therapy until Dr Hull has had a chance to review options with April.   - Will request discharge follow up           Roya Quarles [facility]    45 minutes spent on the date of the encounter doing chart review, review of test results, interpretation of tests, patient visit, documentation and discussion with other provider(s)

## 2022-12-15 LAB
ALBUMIN SERPL BCG-MCNC: 3.9 G/DL (ref 3.5–5.2)
ALP SERPL-CCNC: 96 U/L (ref 35–104)
ALT SERPL W P-5'-P-CCNC: 14 U/L (ref 10–35)
ANION GAP SERPL CALCULATED.3IONS-SCNC: 11 MMOL/L (ref 7–15)
APTT PPP: 24 SECONDS (ref 22–38)
AST SERPL W P-5'-P-CCNC: 18 U/L (ref 10–35)
BASOPHILS # BLD AUTO: 0 10E3/UL (ref 0–0.2)
BASOPHILS NFR BLD AUTO: 0 %
BILIRUB SERPL-MCNC: 0.3 MG/DL
BUN SERPL-MCNC: 14.8 MG/DL (ref 6–20)
CALCIUM SERPL-MCNC: 9 MG/DL (ref 8.6–10)
CHLORIDE SERPL-SCNC: 111 MMOL/L (ref 98–107)
CREAT SERPL-MCNC: 0.48 MG/DL (ref 0.51–0.95)
DEPRECATED HCO3 PLAS-SCNC: 20 MMOL/L (ref 22–29)
EOSINOPHIL # BLD AUTO: 0 10E3/UL (ref 0–0.7)
EOSINOPHIL NFR BLD AUTO: 0 %
ERYTHROCYTE [DISTWIDTH] IN BLOOD BY AUTOMATED COUNT: 19.4 % (ref 10–15)
FIBRINOGEN PPP-MCNC: 506 MG/DL (ref 170–490)
GFR SERPL CREATININE-BSD FRML MDRD: >90 ML/MIN/1.73M2
GLUCOSE SERPL-MCNC: 147 MG/DL (ref 70–99)
HCT VFR BLD AUTO: 27.1 % (ref 35–47)
HGB BLD-MCNC: 8.5 G/DL (ref 11.7–15.7)
IMM GRANULOCYTES # BLD: 0.1 10E3/UL
IMM GRANULOCYTES NFR BLD: 1 %
INR PPP: 1.01 (ref 0.85–1.15)
LYMPHOCYTES # BLD AUTO: 0.1 10E3/UL (ref 0.8–5.3)
LYMPHOCYTES NFR BLD AUTO: 1 %
MAGNESIUM SERPL-MCNC: 2.1 MG/DL (ref 1.7–2.3)
MCH RBC QN AUTO: 31.5 PG (ref 26.5–33)
MCHC RBC AUTO-ENTMCNC: 31.4 G/DL (ref 31.5–36.5)
MCV RBC AUTO: 100 FL (ref 78–100)
MONOCYTES # BLD AUTO: 0.3 10E3/UL (ref 0–1.3)
MONOCYTES NFR BLD AUTO: 4 %
NEUTROPHILS # BLD AUTO: 7.4 10E3/UL (ref 1.6–8.3)
NEUTROPHILS NFR BLD AUTO: 94 %
NRBC # BLD AUTO: 0.1 10E3/UL
NRBC BLD AUTO-RTO: 1 /100
PHOSPHATE SERPL-MCNC: 3.4 MG/DL (ref 2.5–4.5)
PLATELET # BLD AUTO: 241 10E3/UL (ref 150–450)
POTASSIUM SERPL-SCNC: 4.3 MMOL/L (ref 3.4–5.3)
PROT SERPL-MCNC: 6.7 G/DL (ref 6.4–8.3)
RBC # BLD AUTO: 2.7 10E6/UL (ref 3.8–5.2)
SODIUM SERPL-SCNC: 142 MMOL/L (ref 136–145)
WBC # BLD AUTO: 7.8 10E3/UL (ref 4–11)

## 2022-12-15 PROCEDURE — 120N000002 HC R&B MED SURG/OB UMMC

## 2022-12-15 PROCEDURE — 85610 PROTHROMBIN TIME: CPT | Performed by: PHYSICIAN ASSISTANT

## 2022-12-15 PROCEDURE — 250N000013 HC RX MED GY IP 250 OP 250 PS 637: Performed by: PHYSICIAN ASSISTANT

## 2022-12-15 PROCEDURE — 85025 COMPLETE CBC W/AUTO DIFF WBC: CPT | Performed by: PHYSICIAN ASSISTANT

## 2022-12-15 PROCEDURE — 80053 COMPREHEN METABOLIC PANEL: CPT | Performed by: PHYSICIAN ASSISTANT

## 2022-12-15 PROCEDURE — 36415 COLL VENOUS BLD VENIPUNCTURE: CPT | Performed by: PHYSICIAN ASSISTANT

## 2022-12-15 PROCEDURE — 85730 THROMBOPLASTIN TIME PARTIAL: CPT | Performed by: PHYSICIAN ASSISTANT

## 2022-12-15 PROCEDURE — 83735 ASSAY OF MAGNESIUM: CPT | Performed by: PHYSICIAN ASSISTANT

## 2022-12-15 PROCEDURE — 99233 SBSQ HOSP IP/OBS HIGH 50: CPT | Mod: FS | Performed by: PHYSICIAN ASSISTANT

## 2022-12-15 PROCEDURE — 85384 FIBRINOGEN ACTIVITY: CPT | Performed by: PHYSICIAN ASSISTANT

## 2022-12-15 PROCEDURE — 250N000011 HC RX IP 250 OP 636: Performed by: INTERNAL MEDICINE

## 2022-12-15 PROCEDURE — 84100 ASSAY OF PHOSPHORUS: CPT | Performed by: PHYSICIAN ASSISTANT

## 2022-12-15 PROCEDURE — 258N000003 HC RX IP 258 OP 636: Performed by: INTERNAL MEDICINE

## 2022-12-15 RX ORDER — VALACYCLOVIR HYDROCHLORIDE 500 MG/1
1000 TABLET, FILM COATED ORAL DAILY
Status: DISCONTINUED | OUTPATIENT
Start: 2022-12-16 | End: 2022-12-23 | Stop reason: HOSPADM

## 2022-12-15 RX ADMIN — BLINATUMOMAB 9 MCG: KIT INTRAVENOUS at 18:36

## 2022-12-15 RX ADMIN — AMLODIPINE BESYLATE 5 MG: 5 TABLET ORAL at 08:14

## 2022-12-15 RX ADMIN — PANTOPRAZOLE SODIUM 20 MG: 20 TABLET, DELAYED RELEASE ORAL at 08:14

## 2022-12-15 RX ADMIN — VALACYCLOVIR HYDROCHLORIDE 1000 MG: 500 TABLET, FILM COATED ORAL at 08:14

## 2022-12-15 ASSESSMENT — ACTIVITIES OF DAILY LIVING (ADL)
ADLS_ACUITY_SCORE: 18

## 2022-12-15 NOTE — PLAN OF CARE
"Goal Outcome Evaluation:      Plan of Care Reviewed With: patient    Overall Patient Progress: no changeOverall Patient Progress: no change    Time 7895-7011    /56 (BP Location: Right arm)   Pulse 89   Temp 97.6  F (36.4  C) (Oral)   Resp 18   Ht 1.626 m (5' 4\")   Wt 110.7 kg (244 lb)   SpO2 98%   BMI 41.88 kg/m      Reason for admission:  received 3 cycles of GRAALL consolidation who now presents for admission for blincyto + ponatinib. C1D1 of blincyto  Activity: Independent   Pain: Denies  Neuro: wnl  Cardiac: hx of htn  Respiratory: wnl  GI/: Voiding adequate, last BM 12/14 refused senna  Diet: Regular  Lines: Port  Wounds: rash on abdomen  Labs/imaging: reviewed      New changes this shift: Started blincyto @ 1830 10ml/hr     Plan: Initial start of blincyto 10ml/hr infusing via port      Continue to monitor and follow POC   "

## 2022-12-15 NOTE — PROGRESS NOTES
Nursing Focus: Admission    D: Patient admitted/transferred from home, cycle 1 Day 1 of blincyto    I: Upon arrival to the unit patient was oriented to room, unit, and call light. Patient s height, weight, and vital signs were obtained. Allergies reviewed and allergy band applied. MD notified of patient s arrival on the unit. Adult AVS completed. Head to toe assessment completed. Education assessment completed. Care plan initiated.     A: Vital signs stable upon admission. . Two RN skin assessment completed No patient refused. Significant Skin Findings include has rash on abdomen. Was a pulsate mattress ordered No.     P: Continue to monitor patient s VS and intervene as needed. Continue with plan of care. Notify MD with any concerns or changes in patient status.

## 2022-12-15 NOTE — PROGRESS NOTES
Nursing Focus: Chemotherapy  D: Positive blood return via Port. Insertion site is clean/dry/intact, dressing intact with no complaints of pain.  Urine output is recorded in intake in Doc Flowsheet.    I: Premedications given per order (see electronic medical administration record). Dose #1 of blincyto started to infuse over 24 hours. Reviewed pt teaching on chemotherapy side effects.  Pt denies need for further teaching. Chemotherapy double checked per protocol by two chemotherapy competent RN's.   A: Tolerating procedure well. Denies nausea and or pain.   P: Continue to monitor urine output and symptoms of nausea. Screen for symptoms of toxicity.

## 2022-12-15 NOTE — PROGRESS NOTES
Vital signs stable upon admission. Patient rates pain at 0. Two RN skin assessment completed No since pt decline/refused. . Significant Skin Findings include rash on abdomen. WOC Nurse Consult Ordered  No. Bed Algorithm can be found in PCS flow sheets (Support Surface Algorithm) and on IP [facility] NURSE RESOURCE TAB, was this used during this assessment?  Yes. Was a pulsate mattress ordered  No.      Continue to monitor patient s poc and intervene as needed. Continue with plan of care. Notify MD with any concerns or changes in patient status.

## 2022-12-15 NOTE — PROGRESS NOTES
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     Today:   - D2 blincyto + ponatinib; tolerating well.   - Plan for LP with IT chemo in XR tomorrow @ 11a.    HEME   # Ph(+) B-ALL   Followed by Dr. Hull. Patient presented to outside hospital with c/o right upper quadrant pain and was subsequently found to have a markedly elevated white blood cell count concerning for acute leukemia. While at OSH, she had CTA chest as well as CT A/P. These identified no PE, no acute or localized intraabdominal inflammatory process, mild splenomegaly, and few inconspicuous low-density structures in the liver. She was transferred to [facility] for further workup and management. S/p Hydrea 1g TID (8/21-8/23) with improvement in WBC (100K ? 12K). Diagnostic BMBx completed 8/20/22, which demonstrated B-ALL with 85% blasts and hypercellular marrow. IHC shows CD10+, CD34+. 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 ALL NGS negative. BCR/ABL1 major/minor (sent from peripheral blood) both negative. Initiated pre-phase steroids with Prednisone 60 mg x8/21; increased to 60 mg/m2 (140 mg) 8/22-8/24. She  initiated GRAALL + imatinib (C1D1 = 8/24/22). S/p BMT consult 9/12 with Dr. Walsh. Induction was relatively uncomplicated. Post-induction BMBx done 9/26/22 and revealed MRD- CR1, with negative BCR/ABL1 and Clonoseq testing. With no matched related donors and excellent early/deep response chemotherapy is currently favored over BMT. 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 Underwent BMBx 10/25/22 with no morphologic evidence of B-lymphoblastic leukemia and flow with unusual B lineage precursor population 0.09%. C3 GRAALL + imatinib on 11/2/22 was tolerated well without complication. Prior plan was to continue GRAALL for a total of 8 cycles (of note odd cycles are 21-days in length and even cycles are 14-days in length), however now adjusting to Blinatumumab + Ponatinib.   - Port in place at admission   - Will discuss with home infusion liaison in AM - per outpatient team, coverage is arranged.   - Monitor for signs or symptoms of CRS or neurotoxicity (immune effector cell-associated neurotoxicity syndrome=ICANS). Per updated blinatumomab management guidelines:  ? Consider 1 dose of tocilizumab 8 mg/kg IV with Grade 2 CRS  ? Interrupt treatment for Grade 3 CRS. Start dexamethasone 8 mg Q8H x3 days, then taper over 4 days. Resume Blincyto once CRS resolves. Consider tocilizumab use as well, 8 mg/kg, up to 4 total doses (Q8H).  ? Consider single dose of dexamethasone 10 mg IV x1 for Grade 2 neurotoxicity (ICANS).  ? Hold blinatumomab for Grade 3 ICANS, until resolved to Grade 1 or better. Start dexamethasone 8 mg Q8H x up to 3 days, then taper over 4 days. Consider tocilizumab (same doses as for CRS).  ? Permanent discontinuation of treatment necessitated for Grade 4 CRS/ICANS.     Treatment Plan: Blincyto (C1D1 = 12/14/22)    Pre-meds: Dex 20 mg D1 and D8     IT methotrexate 12 mg - in XR 12/16     Blincyto 9 mcg daily, CIVI - Days 1-7     Blincyto 28 mcg daily,  CIVI - Days 8-9 inpatient, then Days 10-28 through home infusion    Ponatinib 15 mg daily     # Anemia   Likely secondary to chemotherapy.  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 Presented with scattered papulovesicular lesions on her chin and nose as well as scattered hemorrhagic erosions on the scalp and lower face (V2-3 distributions of the trigeminal nerve) c/f disseminated VZV. Lesions significantly improved on admission.  - Per previous outpatient ID note, recommend Valtrex 1000 mg TID until completion of chemotherapy and complete resolution of suspected VZV lesions completely resolve. Seen 12/13 with consideration of decreasing to 1000 mg daily given less anticipated prolonged neutropenia.       # ID PPx   - Valtrex 1000 mg TID for now  - Hold PTA fluconazole 200 mg daily and levofloxacin 250 mg daily given ANC >1.0   - Nebulized pentamidine last given 12/2; next due 12/30  - Received Evusheld on 11/2/22       MISC  # Abdominal rash  Noted ~12/9 outpatient. Mildly pruritic. Evaluated by ID on 12/13 who recommended moisturizer for the rash on the abdomen. This is likely not infectious as she has been on valtrex and fluconazole.   - Triamcinolone PRN   - If worsens, will consider derm consult    # Hypertension   - Continue PTA amlodipine 5 mg daily       # GERD   - PTA Protonix 20 mg daily      # Grade 1 neuropathy of the fingertips   Due to vincristine. Pt reports symptoms are stable. Reports mild paresthesias and hot/cold sensitivity.   - Monitor clinically       FEN:   -IVF per chemo plan   -PRN lyte replacement, potassium repletion today   -RDAT       Prophy/Misc:   -VTE: Lovenox; hold d/t anticipated LP  -GI/PUD: PTA Protonix  -Bowels: PRN Senna and Miralax        Dispo: Likely 12/23 pending tolerance of blincyto  Follow Up: will request in coming days     I spent >60 minutes face-to-face and/or coordinating or discussing care plan. Over 50% of our time on the unit was spent counseling the patient and/or coordinating care     Patient plan of care discussed with Dr. Hull.  Assessment and plan are discussed and delivered to the patient.     Marina Escobar PA-C ([facility])  Hematology/Oncology  Pager: #[phone]    Interval History   No acute events. Patient seen ambulating the unit this AM then reclining in bed. No neuro changes. No fevers. Did have some mild night sweats last night. No chest pain, shortness of breath, pain or skin lesions of concern. No difficulties with bowel/bladder. Discussed plan for LP with IT chemo. Eating/drinking well. Mild lower extremity edema, although this has now improved/resolved.     Physical Exam   Temp: 98.4  F (36.9  C) Temp src: Oral BP: 121/67 Pulse: 93   Resp: 16 SpO2: 97 % O2 Device: None (Room air)    Vitals:    12/14/22 1509 12/15/22 0937   Weight: 110.7 kg (244 lb) 110.5 kg (243 lb 11.2 oz)     Vital Signs with Ranges  Temp:  [97.6  F (36.4  C)-98.4  F (36.9  C)] 98.4  F (36.9  C)  Pulse:  [83-93] 93  Resp:  [16-18] 16  BP: (119-126)/(56-77) 121/67  SpO2:  [94 %-98 %] 97 %  No intake/output data recorded.    Constitutional: Awake and conversational. Non- toxic appearing. No acute distress. Well developed, hydrated, and nourished. Appears stated age.   HEENT: Normocephalic, atraumatic. Head scarf in place.  Lymph: Neck supple.   Respiratory: Breathing comfortable with no increased work on room air. Good air exchange. No signs of respiratory distress or accessory muscle use. Lungs clear to auscultation bilaterally, no crackles or wheezing noted.  Cardiovascular: Regular rate and rhythm. No murmurs, rubs, or gallops. No peripheral edema.    GI: Abdomen is soft, non-distended, non-tender and without distension. Bowel sounds present and  normoactive. No masses or hepatosplenomegaly appreciated.  Skin: Skin is clean, dry, intact. No jaundice appreciated. See scanned in media chart from 12/14. Scattered macules without surrounding erythema or warmth.  Musculoskeletal/ Extremities: No redness, warmth, or swelling of the joints appreciated. Nailbeds pink and without cyanosis or clubbing.   Neurologic: Alert and oriented. Speech normal. Grossly nonfocal. Memory and thought process preserved. Motor function normal with 5/5 muscle strength bilaterally to UE and LE.  Neuropsychiatric: Calm, affect congruent to situation. Appropriate mood and affect. Good judgment and insight. No visual/auditory hallucinations.    Medications     - MEDICATION INSTRUCTIONS -         amLODIPine  5 mg Oral Daily     [START ON 12/21/2022] blinatumomab (BLINCYTO) 24 hour infusion  28 mcg Intravenous Q24H     [START ON 12/23/2022] blinatumomab (BLINCYTO) 24 hour HOME infusion  28 mcg Intravenous Q24H     blinatumomab (BLINCYTO) 24 hour infusion  9 mcg Intravenous Q24H     Chemotherapy Infusing-Continuous Infusion   Does not apply Q8H     dexamethasone  20 mg Intravenous Q7 Days     [Held by provider] enoxaparin ANTICOAGULANT  40 mg Subcutaneous Q24H     [START ON 12/16/2022] INTRATHECAL - Cytarabine and/or methotrexate and/or Hydrocortisone   Intrathecal Once     pantoprazole  20 mg Oral Daily     PONATinib  15 mg Oral Q24H     sennosides  1-2 tablet Oral BID     valACYclovir  1,000 mg Oral TID       Data   Results for orders placed or performed during the hospital encounter of 12/14/22 (from the past 24 hour(s))   ABO/RH Type and Screen     Narrative    The following orders were created for panel order ABO/RH Type and Screen .  Procedure                               Abnormality         Status                     ---------                               -----------         ------                     Adult Type and Screen[[id number]]                            Final result                  Please view results for these tests on the individual orders.   CBC with platelets differential    Narrative    The following orders were created for panel order CBC with platelets differential.  Procedure                               Abnormality         Status                     ---------                               -----------         ------                     CBC with platelets and d...[[id number]]  Abnormal            Final result                 Please view results for these tests on the individual orders.   Comprehensive metabolic panel   Result Value Ref Range    Sodium 140 136 - 145 mmol/L    Potassium 3.7 3.4 - 5.3 mmol/L    Chloride 107 98 - 107 mmol/L    Carbon Dioxide (CO2) 22 22 - 29 mmol/L    Anion Gap 11 7 - 15 mmol/L    Urea Nitrogen 13.1 6.0 - 20.0 mg/dL    Creatinine 0.65 0.51 - 0.95 mg/dL    Calcium 8.6 8.6 - 10.0 mg/dL    Glucose 127 (H) 70 - 99 mg/dL    Alkaline Phosphatase 92 35 - 104 U/L    AST 21 10 - 35 U/L    ALT 21 10 - 35 U/L    Protein Total 6.4 6.4 - 8.3 g/dL    Albumin 4.0 3.5 - 5.2 g/dL    Bilirubin Total 0.2 <=1.2 mg/dL    GFR Estimate >90 >60 mL/min/1.73m2   Adult Type and Screen   Result Value Ref Range    ABO/RH(D) O POS     Antibody Screen Negative Negative    SPECIMEN EXPIRATION DATE [id number]    CBC with platelets and differential   Result Value Ref Range    WBC Count 5.4 4.0 - 11.0 10e3/uL    RBC Count 2.51 (L) 3.80 - 5.20 10e6/uL    Hemoglobin 7.8 (L) 11.7 - 15.7 g/dL    Hematocrit 25.2 (L) 35.0 - 47.0 %     78 - 100 fL    MCH 31.1 26.5 - 33.0 pg    MCHC 31.0 (L) 31.5 - 36.5 g/dL    RDW 20.1 (H) 10.0 - 15.0 %    Platelet Count 245 150 - 450 10e3/uL    % Neutrophils 71 %    % Lymphocytes 9 %    % Monocytes 18 %    % Eosinophils 0 %    % Basophils 0 %    % Immature Granulocytes 2 %    NRBCs per 100 WBC 1 (H) <1 /100    Absolute Neutrophils 3.9 1.6 - 8.3 10e3/uL    Absolute Lymphocytes 0.5 (L) 0.8 - 5.3 10e3/uL    Absolute Monocytes 0.9 0.0 - 1.3 10e3/uL     Absolute Eosinophils 0.0 0.0 - 0.7 10e3/uL    Absolute Basophils 0.0 0.0 - 0.2 10e3/uL    Absolute Immature Granulocytes 0.1 <=0.4 10e3/uL    Absolute NRBCs 0.0 10e3/uL   CBC with platelets differential    Narrative    The following orders were created for panel order CBC with platelets differential.  Procedure                               Abnormality         Status                     ---------                               -----------         ------                     CBC with platelets and d...[[id number]]  Abnormal            Final result                 Please view results for these tests on the individual orders.   Comprehensive metabolic panel   Result Value Ref Range    Sodium 142 136 - 145 mmol/L    Potassium 4.3 3.4 - 5.3 mmol/L    Chloride 111 (H) 98 - 107 mmol/L    Carbon Dioxide (CO2) 20 (L) 22 - 29 mmol/L    Anion Gap 11 7 - 15 mmol/L    Urea Nitrogen 14.8 6.0 - 20.0 mg/dL    Creatinine 0.48 (L) 0.51 - 0.95 mg/dL    Calcium 9.0 8.6 - 10.0 mg/dL    Glucose 147 (H) 70 - 99 mg/dL    Alkaline Phosphatase 96 35 - 104 U/L    AST 18 10 - 35 U/L    ALT 14 10 - 35 U/L    Protein Total 6.7 6.4 - 8.3 g/dL    Albumin 3.9 3.5 - 5.2 g/dL    Bilirubin Total 0.3 <=1.2 mg/dL    GFR Estimate >90 >60 mL/min/1.73m2   Magnesium   Result Value Ref Range    Magnesium 2.1 1.7 - 2.3 mg/dL   Phosphorus   Result Value Ref Range    Phosphorus 3.4 2.5 - 4.5 mg/dL   INR   Result Value Ref Range    INR 1.01 0.85 - 1.15   Fibrinogen activity   Result Value Ref Range    Fibrinogen Activity 506 (H) 170 - 490 mg/dL   Partial thromboplastin time   Result Value Ref Range    aPTT 24 22 - 38 Seconds   CBC with platelets and differential   Result Value Ref Range    WBC Count 7.8 4.0 - 11.0 10e3/uL    RBC Count 2.70 (L) 3.80 - 5.20 10e6/uL    Hemoglobin 8.5 (L) 11.7 - 15.7 g/dL    Hematocrit 27.1 (L) 35.0 - 47.0 %     78 - 100 fL    MCH 31.5 26.5 - 33.0 pg    MCHC 31.4 (L) 31.5 - 36.5 g/dL    RDW 19.4 (H) 10.0 - 15.0 %    Platelet  Count 241 150 - 450 10e3/uL    % Neutrophils 94 %    % Lymphocytes 1 %    % Monocytes 4 %    % Eosinophils 0 %    % Basophils 0 %    % Immature Granulocytes 1 %    NRBCs per 100 WBC 1 (H) <1 /100    Absolute Neutrophils 7.4 1.6 - 8.3 10e3/uL    Absolute Lymphocytes 0.1 (L) 0.8 - 5.3 10e3/uL    Absolute Monocytes 0.3 0.0 - 1.3 10e3/uL    Absolute Eosinophils 0.0 0.0 - 0.7 10e3/uL    Absolute Basophils 0.0 0.0 - 0.2 10e3/uL    Absolute Immature Granulocytes 0.1 <=0.4 10e3/uL    Absolute NRBCs 0.1 10e3/uL

## 2022-12-15 NOTE — PROGRESS NOTES
"/67 (BP Location: Right arm)   Pulse 93   Temp 98.4  F (36.9  C) (Oral)   Resp 16   Ht 1.626 m (5' 4\")   Wt 110.5 kg (243 lb 11.2 oz)   SpO2 97%   BMI 41.83 kg/m       Status: admitted for blincyto + ponatinib. Dose #1 of blincyto infusing.  Neuro: A &O X4, denies N/V  Activity: Pt independent, walked outside in the hallway.  Diet: Regular diet  Labs: Reviewed, Hgb 8.5, WBC 7.8  LDAs: Port infusing blincyto 10 ml/hr  Skin/incisions: Rash on abdomen Pt reports improving.  Respiratory: WNL denies SOB  Cardiac: WNL denies chest pain  GI/: Voiding spontaneously to the bathroom, Pt had a BM this morning.  Plan:  Continue to monitor and follow POC    "

## 2022-12-16 ENCOUNTER — APPOINTMENT (OUTPATIENT)
Dept: ULTRASOUND IMAGING | Facility: CLINIC | Age: 42
End: 2022-12-16
Attending: PHYSICIAN ASSISTANT
Payer: COMMERCIAL

## 2022-12-16 ENCOUNTER — APPOINTMENT (OUTPATIENT)
Dept: GENERAL RADIOLOGY | Facility: CLINIC | Age: 42
End: 2022-12-16
Attending: PHYSICIAN ASSISTANT
Payer: COMMERCIAL

## 2022-12-16 LAB
ABO/RH(D): NORMAL
ALBUMIN SERPL BCG-MCNC: 3.7 G/DL (ref 3.5–5.2)
ALP SERPL-CCNC: 85 U/L (ref 35–104)
ALT SERPL W P-5'-P-CCNC: 13 U/L (ref 10–35)
ANION GAP SERPL CALCULATED.3IONS-SCNC: 11 MMOL/L (ref 7–15)
ANTIBODY SCREEN: NEGATIVE
APPEARANCE CSF: CLEAR
AST SERPL W P-5'-P-CCNC: 15 U/L (ref 10–35)
BASOPHILS # BLD AUTO: 0 10E3/UL (ref 0–0.2)
BASOPHILS NFR BLD AUTO: 0 %
BILIRUB SERPL-MCNC: 0.2 MG/DL
BUN SERPL-MCNC: 15.3 MG/DL (ref 6–20)
CALCIUM SERPL-MCNC: 9 MG/DL (ref 8.6–10)
CHLORIDE SERPL-SCNC: 109 MMOL/L (ref 98–107)
COLOR CSF: COLORLESS
CREAT SERPL-MCNC: 0.59 MG/DL (ref 0.51–0.95)
DEPRECATED HCO3 PLAS-SCNC: 22 MMOL/L (ref 22–29)
EOSINOPHIL # BLD AUTO: 0 10E3/UL (ref 0–0.7)
EOSINOPHIL NFR BLD AUTO: 0 %
ERYTHROCYTE [DISTWIDTH] IN BLOOD BY AUTOMATED COUNT: 20.2 % (ref 10–15)
GFR SERPL CREATININE-BSD FRML MDRD: >90 ML/MIN/1.73M2
GLUCOSE CSF-MCNC: 50 MG/DL (ref 40–70)
GLUCOSE SERPL-MCNC: 94 MG/DL (ref 70–99)
HCT VFR BLD AUTO: 23.8 % (ref 35–47)
HGB BLD-MCNC: 7.5 G/DL (ref 11.7–15.7)
IMM GRANULOCYTES # BLD: 0.1 10E3/UL
IMM GRANULOCYTES NFR BLD: 1 %
LYMPHOCYTES # BLD AUTO: 0.4 10E3/UL (ref 0.8–5.3)
LYMPHOCYTES NFR BLD AUTO: 6 %
MAGNESIUM SERPL-MCNC: 2 MG/DL (ref 1.7–2.3)
MCH RBC QN AUTO: 31.3 PG (ref 26.5–33)
MCHC RBC AUTO-ENTMCNC: 31.5 G/DL (ref 31.5–36.5)
MCV RBC AUTO: 99 FL (ref 78–100)
MONOCYTES # BLD AUTO: 1 10E3/UL (ref 0–1.3)
MONOCYTES NFR BLD AUTO: 16 %
NEUTROPHILS # BLD AUTO: 4.5 10E3/UL (ref 1.6–8.3)
NEUTROPHILS NFR BLD AUTO: 77 %
NRBC # BLD AUTO: 0.1 10E3/UL
NRBC BLD AUTO-RTO: 1 /100
PATH REV: NORMAL
PHOSPHATE SERPL-MCNC: 4.3 MG/DL (ref 2.5–4.5)
PLATELET # BLD AUTO: 234 10E3/UL (ref 150–450)
POTASSIUM SERPL-SCNC: 3.7 MMOL/L (ref 3.4–5.3)
PROT CSF-MCNC: 32.7 MG/DL (ref 15–45)
PROT SERPL-MCNC: 6.1 G/DL (ref 6.4–8.3)
RBC # BLD AUTO: 2.4 10E6/UL (ref 3.8–5.2)
RBC # CSF MANUAL: 2 /UL (ref 0–2)
SODIUM SERPL-SCNC: 142 MMOL/L (ref 136–145)
SPECIMEN EXPIRATION DATE: NORMAL
TUBE # CSF: 4
WBC # BLD AUTO: 5.9 10E3/UL (ref 4–11)
WBC # CSF MANUAL: 1 /UL (ref 0–5)

## 2022-12-16 PROCEDURE — 250N000009 HC RX 250: Performed by: RADIOLOGY

## 2022-12-16 PROCEDURE — 99207 PR NO BILLABLE SERVICE THIS VISIT: CPT | Performed by: PHYSICIAN ASSISTANT

## 2022-12-16 PROCEDURE — 99233 SBSQ HOSP IP/OBS HIGH 50: CPT | Mod: FS | Performed by: PHYSICIAN ASSISTANT

## 2022-12-16 PROCEDURE — 86850 RBC ANTIBODY SCREEN: CPT | Performed by: PHYSICIAN ASSISTANT

## 2022-12-16 PROCEDURE — 258N000003 HC RX IP 258 OP 636: Performed by: INTERNAL MEDICINE

## 2022-12-16 PROCEDURE — 77003 FLUOROGUIDE FOR SPINE INJECT: CPT | Mod: 26 | Performed by: RADIOLOGY

## 2022-12-16 PROCEDURE — 82945 GLUCOSE OTHER FLUID: CPT | Performed by: PHYSICIAN ASSISTANT

## 2022-12-16 PROCEDURE — 96450 CHEMOTHERAPY INTO CNS: CPT | Performed by: PHYSICIAN ASSISTANT

## 2022-12-16 PROCEDURE — 3E0R305 INTRODUCTION OF OTHER ANTINEOPLASTIC INTO SPINAL CANAL, PERCUTANEOUS APPROACH: ICD-10-PCS | Performed by: PHYSICIAN ASSISTANT

## 2022-12-16 PROCEDURE — 87070 CULTURE OTHR SPECIMN AEROBIC: CPT | Performed by: PHYSICIAN ASSISTANT

## 2022-12-16 PROCEDURE — 80053 COMPREHEN METABOLIC PANEL: CPT | Performed by: PHYSICIAN ASSISTANT

## 2022-12-16 PROCEDURE — 83735 ASSAY OF MAGNESIUM: CPT | Performed by: PHYSICIAN ASSISTANT

## 2022-12-16 PROCEDURE — 86901 BLOOD TYPING SEROLOGIC RH(D): CPT | Performed by: PHYSICIAN ASSISTANT

## 2022-12-16 PROCEDURE — 87205 SMEAR GRAM STAIN: CPT | Performed by: PHYSICIAN ASSISTANT

## 2022-12-16 PROCEDURE — 88185 FLOWCYTOMETRY/TC ADD-ON: CPT | Performed by: PHYSICIAN ASSISTANT

## 2022-12-16 PROCEDURE — 93971 EXTREMITY STUDY: CPT | Mod: 26 | Performed by: RADIOLOGY

## 2022-12-16 PROCEDURE — 36415 COLL VENOUS BLD VENIPUNCTURE: CPT | Performed by: PHYSICIAN ASSISTANT

## 2022-12-16 PROCEDURE — 96450 CHEMOTHERAPY INTO CNS: CPT | Mod: GC | Performed by: RADIOLOGY

## 2022-12-16 PROCEDURE — 85025 COMPLETE CBC W/AUTO DIFF WBC: CPT | Performed by: PHYSICIAN ASSISTANT

## 2022-12-16 PROCEDURE — 84157 ASSAY OF PROTEIN OTHER: CPT | Performed by: PHYSICIAN ASSISTANT

## 2022-12-16 PROCEDURE — 89050 BODY FLUID CELL COUNT: CPT | Performed by: PHYSICIAN ASSISTANT

## 2022-12-16 PROCEDURE — 120N000002 HC R&B MED SURG/OB UMMC

## 2022-12-16 PROCEDURE — 93971 EXTREMITY STUDY: CPT | Mod: LT

## 2022-12-16 PROCEDURE — 250N000013 HC RX MED GY IP 250 OP 250 PS 637: Performed by: STUDENT IN AN ORGANIZED HEALTH CARE EDUCATION/TRAINING PROGRAM

## 2022-12-16 PROCEDURE — 88188 FLOWCYTOMETRY/READ 9-15: CPT | Mod: GC | Performed by: PATHOLOGY

## 2022-12-16 PROCEDURE — 96450 CHEMOTHERAPY INTO CNS: CPT

## 2022-12-16 PROCEDURE — 250N000013 HC RX MED GY IP 250 OP 250 PS 637: Performed by: PHYSICIAN ASSISTANT

## 2022-12-16 PROCEDURE — 250N000011 HC RX IP 250 OP 636: Performed by: INTERNAL MEDICINE

## 2022-12-16 PROCEDURE — 250N000013 HC RX MED GY IP 250 OP 250 PS 637: Performed by: INTERNAL MEDICINE

## 2022-12-16 PROCEDURE — 88184 FLOWCYTOMETRY/ TC 1 MARKER: CPT | Performed by: PATHOLOGY

## 2022-12-16 PROCEDURE — 84100 ASSAY OF PHOSPHORUS: CPT | Performed by: PHYSICIAN ASSISTANT

## 2022-12-16 PROCEDURE — 82040 ASSAY OF SERUM ALBUMIN: CPT | Performed by: PHYSICIAN ASSISTANT

## 2022-12-16 RX ORDER — LIDOCAINE HYDROCHLORIDE 10 MG/ML
5 INJECTION, SOLUTION EPIDURAL; INFILTRATION; INTRACAUDAL; PERINEURAL ONCE
Status: COMPLETED | OUTPATIENT
Start: 2022-12-16 | End: 2022-12-16

## 2022-12-16 RX ADMIN — METHOTREXATE: 25 INJECTION, SOLUTION INTRA-ARTERIAL; INTRAMUSCULAR; INTRATHECAL; INTRAVENOUS at 11:39

## 2022-12-16 RX ADMIN — ACETAMINOPHEN 325 MG: 325 TABLET, FILM COATED ORAL at 19:02

## 2022-12-16 RX ADMIN — AMLODIPINE BESYLATE 5 MG: 5 TABLET ORAL at 08:32

## 2022-12-16 RX ADMIN — PANTOPRAZOLE SODIUM 20 MG: 20 TABLET, DELAYED RELEASE ORAL at 08:32

## 2022-12-16 RX ADMIN — BENZOCAINE AND LEVOMENTHOL: 200; 5 SPRAY TOPICAL at 05:40

## 2022-12-16 RX ADMIN — LORAZEPAM 1 MG: 0.5 TABLET ORAL at 10:36

## 2022-12-16 RX ADMIN — VALACYCLOVIR HYDROCHLORIDE 1000 MG: 500 TABLET, FILM COATED ORAL at 08:32

## 2022-12-16 RX ADMIN — LIDOCAINE HYDROCHLORIDE 9 ML: 10 INJECTION, SOLUTION EPIDURAL; INFILTRATION; INTRACAUDAL; PERINEURAL at 11:21

## 2022-12-16 RX ADMIN — BLINATUMOMAB 9 MCG: KIT INTRAVENOUS at 18:53

## 2022-12-16 ASSESSMENT — ACTIVITIES OF DAILY LIVING (ADL)
ADLS_ACUITY_SCORE: 18

## 2022-12-16 NOTE — PROGRESS NOTES
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     Today:   - D3 blincyto + ponatinib; tolerating well. Tentative discharge 12/23 if tolerates well.  - LLE ultrasound obtained due to mild increase in swelling; negative for DVT.  - Plan for LP with IT chemo in XR tomorrow @ 11a.  - Add on folate, B12, iron studies, retics to AM labs for anemia workup.    HEME   # Ph(+) B-ALL   Followed by Dr. Hull. Patient presented to outside hospital with c/o right upper quadrant pain and was subsequently found to have a markedly elevated white blood cell count concerning for acute leukemia. While at OSH, she had CTA chest as well as CT A/P. These identified no PE, no acute or localized intraabdominal inflammatory process, mild splenomegaly, and few inconspicuous low-density structures in the liver. She was transferred to [facility] for further workup and management. S/p Hydrea 1g TID (8/21-8/23) with improvement in WBC (100K ? 12K). Diagnostic BMBx completed 8/20/22, which demonstrated B-ALL with 85% blasts and hypercellular marrow. IHC shows CD10+, CD34+. 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 ALL NGS negative. BCR/ABL1 major/minor (sent from peripheral blood) both negative. Initiated pre-phase steroids with Prednisone 60 mg x8/21; increased to 60 mg/m2 (140 mg) 8/22-8/24. She initiated GRAALL + imatinib (C1D1 = 8/24/22). S/p BMT consult 9/12 with Dr. Walsh. Induction was relatively uncomplicated. Post-induction BMBx done 9/26/22 and revealed MRD- CR1, with negative BCR/ABL1 and Clonoseq testing. With no matched related donors and excellent early/deep response chemotherapy is currently favored over BMT. 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 Underwent BMBx 10/25/22 with no morphologic evidence of B-lymphoblastic leukemia and flow with unusual B lineage precursor population 0.09%. C3 GRAALL + imatinib on 11/2/22 was tolerated well without complication. Prior plan was to continue GRAALL for a total of 8 cycles (of note odd cycles are 21-days in length and even cycles are 14-days in length), however now adjusting to Blinatumumab + Ponatinib.   - Port in place at admission   - Will discuss with home infusion liaison in AM - per outpatient team, coverage is arranged.   - Monitor for signs or symptoms of CRS or neurotoxicity (immune effector cell-associated neurotoxicity syndrome=ICANS). Per updated blinatumomab management guidelines:  ? Consider 1 dose of tocilizumab 8 mg/kg IV with Grade 2 CRS  ? Interrupt treatment for Grade 3 CRS. Start dexamethasone 8 mg Q8H x3 days, then taper over 4 days. Resume Blincyto once CRS resolves. Consider tocilizumab use as well, 8 mg/kg, up to 4 total doses (Q8H).  ? Consider single dose of dexamethasone 10 mg IV x1 for Grade 2 neurotoxicity (ICANS).  ? Hold blinatumomab for Grade 3 ICANS, until resolved to Grade 1 or better. Start dexamethasone 8 mg Q8H x up to 3 days, then taper over 4 days. Consider tocilizumab (same doses as for CRS).  ? Permanent discontinuation of treatment necessitated for Grade 4  CRS/ICANS.     Treatment Plan: Blincyto (C1D1 = 12/14/22)    Pre-meds: Dex 20 mg D1 and D8     IT methotrexate 12 mg - in XR 12/16     Blincyto 9 mcg daily, CIVI - Days 1-7     Blincyto 28 mcg daily, CIVI - Days 8-9 inpatient, then Days 10-28 through home infusion    Ponatinib 15 mg daily     # Anemia   Likely secondary to chemotherapy.  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 Presented with scattered papulovesicular lesions on her chin and nose as well as scattered hemorrhagic erosions on the scalp and lower face (V2-3 distributions of the trigeminal nerve) c/f disseminated VZV. Lesions significantly improved on admission.  - Per previous outpatient ID note, recommend Valtrex 1000 mg TID until completion of chemotherapy and complete resolution of suspected VZV lesions completely resolve. Seen 12/13 with consideration of decreasing to 1000 mg daily given less anticipated prolonged neutropenia.       # ID PPx   - Valtrex 1000 mg TID for now  - Hold PTA fluconazole 200 mg daily and levofloxacin 250 mg daily given ANC >1.0   - Nebulized pentamidine last given 12/2; next due 12/30  - Received Evusheld on 11/2/22       MISC  # Abdominal rash  Noted ~12/9 outpatient. Mildly pruritic. Evaluated by ID on 12/13 who recommended moisturizer for the rash on the abdomen. This is likely not infectious as she has been on valtrex and fluconazole.   - Triamcinolone PRN   - If worsens, will consider derm consult    # Hypertension   - Continue PTA amlodipine 5 mg daily       # GERD   - PTA Protonix 20 mg daily      # Grade 1 neuropathy of the fingertips   Due to vincristine. Pt reports symptoms are stable. Reports mild paresthesias and hot/cold sensitivity.   - Monitor clinically       FEN:   -IVF  per chemo plan   -PRN lyte replacement, potassium repletion today   -RDAT       Prophy/Misc:   -VTE: Lovenox; hold d/t anticipated LP  -GI/PUD: PTA Protonix  -Bowels: PRN Senna and Miralax       Dispo: Likely 12/23 pending tolerance of blincyto  Follow Up:   - 12/26 -- Labs/AVELINA visit   - Home infusion for bag changes     I spent >60 minutes face-to-face and/or coordinating or discussing care plan. Over 50% of our time on the unit was spent counseling the patient and/or coordinating care     Patient plan of care discussed with Dr. Hull.  Assessment and plan are discussed and delivered to the patient.     Marina Escobar PA-C ([facility])  Hematology/Oncology  Pager: #[phone]    Interval History   No acute events. Patient seen ambulating the unit this AM then sitting on window sill. Anxious about LP but will get scheduled ativan prior. No neuro changes, fevers. No chest pain, shortness of breath, pain or skin lesions of concern. No difficulties with bowel/bladder. Eating/drinking well. Mild lower extremity edema (L>R) which has worsened from yesterday. Discussed health psychology consult which she was in agreement with. Questions answered at bedside.      Physical Exam   Temp: 98.3  F (36.8  C) Temp src: Oral BP: 121/62 Pulse: 79   Resp: 16 SpO2: 96 % O2 Device: None (Room air)    Vitals:    12/14/22 1509 12/15/22 0937 12/16/22 1037   Weight: 110.7 kg (244 lb) 110.5 kg (243 lb 11.2 oz) 111.4 kg (245 lb 9.6 oz)     Vital Signs with Ranges  Temp:  [97.7  F (36.5  C)-98.3  F (36.8  C)] 98.3  F (36.8  C)  Pulse:  [] 79  Resp:  [16-18] 16  BP: (107-132)/(60-81) 121/62  SpO2:  [96 %-97 %] 96 %  I/O last 3 completed shifts:  In: 400 [P.O.:240; IV Piggyback:160]  Out: -     Constitutional: Awake and conversational. Non- toxic appearing. No acute distress. Well developed, hydrated, and nourished. Appears stated age.   HEENT: Normocephalic, atraumatic. Head scarf in place.  Lymph: Neck supple.   Respiratory: Breathing  comfortable with no increased work on room air. Good air exchange. No signs of respiratory distress or accessory muscle use. Lungs clear to auscultation bilaterally, no crackles or wheezing noted.  Cardiovascular: Regular rate and rhythm. No murmurs, rubs, or gallops. No peripheral edema.    GI: Abdomen is soft, non-distended, non-tender and without distension. Bowel sounds present and normoactive. No masses or hepatosplenomegaly appreciated.  Skin: Skin is clean, dry, intact. No jaundice appreciated. See scanned in media chart from 12/14. Scattered macules without surrounding erythema or warmth.  Musculoskeletal/ Extremities: No redness, warmth, or swelling of the joints appreciated. Nailbeds pink and without cyanosis or clubbing. 1+ bilateral LE edema (L>R).  Neurologic: Alert and oriented. Speech normal. Grossly nonfocal. Memory and thought process preserved. Motor function normal with 5/5 muscle strength bilaterally to UE and LE.  Neuropsychiatric: Calm, affect congruent to situation. Appropriate mood and affect. Good judgment and insight. No visual/auditory hallucinations.    Medications     - MEDICATION INSTRUCTIONS -         amLODIPine  5 mg Oral Daily     [START ON 12/21/2022] blinatumomab (BLINCYTO) 24 hour infusion  28 mcg Intravenous Q24H     [START ON 12/23/2022] blinatumomab (BLINCYTO) 24 hour HOME infusion  28 mcg Intravenous Q24H     blinatumomab (BLINCYTO) 24 hour infusion  9 mcg Intravenous Q24H     Chemotherapy Infusing-Continuous Infusion   Does not apply Q8H     dexamethasone  20 mg Intravenous Q7 Days     [Held by provider] enoxaparin ANTICOAGULANT  40 mg Subcutaneous Q24H     pantoprazole  20 mg Oral Daily     PONATinib  15 mg Oral Q24H     sennosides  1-2 tablet Oral BID     valACYclovir  1,000 mg Oral Daily       Data   Results for orders placed or performed during the hospital encounter of 12/14/22 (from the past 24 hour(s))   CBC with platelets differential    Narrative    The following  orders were created for panel order CBC with platelets differential.  Procedure                               Abnormality         Status                     ---------                               -----------         ------                     CBC with platelets and d...[[id number]]  Abnormal            Final result                 Please view results for these tests on the individual orders.   ABO/Rh type and screen    Narrative    The following orders were created for panel order ABO/Rh type and screen.  Procedure                               Abnormality         Status                     ---------                               -----------         ------                     Adult Type and Screen[[id number]]                            In process                   Please view results for these tests on the individual orders.   Comprehensive metabolic panel   Result Value Ref Range    Sodium 142 136 - 145 mmol/L    Potassium 3.7 3.4 - 5.3 mmol/L    Chloride 109 (H) 98 - 107 mmol/L    Carbon Dioxide (CO2) 22 22 - 29 mmol/L    Anion Gap 11 7 - 15 mmol/L    Urea Nitrogen 15.3 6.0 - 20.0 mg/dL    Creatinine 0.59 0.51 - 0.95 mg/dL    Calcium 9.0 8.6 - 10.0 mg/dL    Glucose 94 70 - 99 mg/dL    Alkaline Phosphatase 85 35 - 104 U/L    AST 15 10 - 35 U/L    ALT 13 10 - 35 U/L    Protein Total 6.1 (L) 6.4 - 8.3 g/dL    Albumin 3.7 3.5 - 5.2 g/dL    Bilirubin Total 0.2 <=1.2 mg/dL    GFR Estimate >90 >60 mL/min/1.73m2   Magnesium   Result Value Ref Range    Magnesium 2.0 1.7 - 2.3 mg/dL   Phosphorus   Result Value Ref Range    Phosphorus 4.3 2.5 - 4.5 mg/dL   CBC with platelets and differential   Result Value Ref Range    WBC Count 5.9 4.0 - 11.0 10e3/uL    RBC Count 2.40 (L) 3.80 - 5.20 10e6/uL    Hemoglobin 7.5 (L) 11.7 - 15.7 g/dL    Hematocrit 23.8 (L) 35.0 - 47.0 %    MCV 99 78 - 100 fL    MCH 31.3 26.5 - 33.0 pg    MCHC 31.5 31.5 - 36.5 g/dL    RDW 20.2 (H) 10.0 - 15.0 %    Platelet Count 234 150 - 450 10e3/uL    %  Neutrophils 77 %    % Lymphocytes 6 %    % Monocytes 16 %    % Eosinophils 0 %    % Basophils 0 %    % Immature Granulocytes 1 %    NRBCs per 100 WBC 1 (H) <1 /100    Absolute Neutrophils 4.5 1.6 - 8.3 10e3/uL    Absolute Lymphocytes 0.4 (L) 0.8 - 5.3 10e3/uL    Absolute Monocytes 1.0 0.0 - 1.3 10e3/uL    Absolute Eosinophils 0.0 0.0 - 0.7 10e3/uL    Absolute Basophils 0.0 0.0 - 0.2 10e3/uL    Absolute Immature Granulocytes 0.1 <=0.4 10e3/uL    Absolute NRBCs 0.1 10e3/uL   Glucose CSF:   Result Value Ref Range    Glucose CSF 50 40 - 70 mg/dL    Narrative    CSF glucose concentrations are about 60 percent of normal plasma glucose.   Protein total CSF:   Result Value Ref Range    Protein total CSF 32.7 15.0 - 45.0 mg/dL   CSF Cell Count with Differential:    Narrative    The following orders were created for panel order CSF Cell Count with Differential:.  Procedure                               Abnormality         Status                     ---------                               -----------         ------                     Cell Count CSF[[id number]]                                   In process                   Please view results for these tests on the individual orders.   XR Lumbar Punc Intrathecal Chemo Admin    Narrative    PROCEDURE: Lumbar Puncture using Fluoroscopy, 12/16/2022 12:12 PM    HISTORY: IT chemo per protocol    COMPARISON: 11/25/2022    STAFF NEURORADIOLOGIST: Dr. Silvestre Bardales    Resident PHYSICIAN: Dr. Santino Martinez    TECHNIQUE: Verbal and written consent for lumbar puncture was obtained  from the patient, and benefits and risk of the procedure were  explained, including but not limited to worsening headache,  hemorrhage, infection, lower extremity pain, or nerve root injury. The  patient was sterilely prepped and draped with the patient in the prone  position, over the lower back. Under fluoroscopic guidance, the  interlaminar spaces were noted. 1% lidocaine was administered for  local  anesthetic over the L2-3 interlaminar space, and a 22 gauge 5  inch needle was advanced into the thecal sac under fluoroscopic  guidance.      There was initial show of clear CSF. Approximately 12 cc of CSF were  collected.    Hematology/Oncology then administered intrathecal chemotherapy, dose  and rate as determined by Heme/Onc.    The needle was removed with the stylet in place. There was no  immediate complication associated with the procedure. Samples were  sent for the requested laboratory testing.      FLUORO TIME: 38 seconds.    DOSE: 19 mGym2      Impression    IMPRESSION: Successful lumbar puncture without immediate complication.    PLAN: Patient discharged to patient care unit in stable condition for  monitoring. Orders placed for 1 hour of bed rest with patient laying  on the back and the head of the bed flat.    I, GUERA AVALOS MD, attest that I was present for all critical  portions of the procedure and was immediately available to provide  guidance and assistance during the remainder of the procedure.    I have personally reviewed the examination and initial interpretation  and I agree with the findings.    GUERA AVALOS MD         SYSTEM ID:  [id number]

## 2022-12-16 NOTE — PROCEDURES
DIAGNOSIS: Ppx IT chemo per protocol  PROCEDURE: Intrathecal chemo administration   LOCATION: Radiology  INDICATION: Ppx IT chemo per protocol  Lumbar puncture performed and CSF samples collected by the General Radiology team under fluoroscopy. Chemotherapy was double-checked by this writer and Radiology RN. This writer then administered methotrexate (PF) 12 mg in sodium chloride (PF) 0.9% PF 6 mL intrathecal inj (PF) without apparent complication.  Complications: None immediately.   Chemo instillation performed by: GRANT Graham PA-C  Pager: [phone]

## 2022-12-16 NOTE — PROGRESS NOTES
"ICANS score     Score:   4   Orientation: Orientation to year, month, city, hospital: 4 points (1 point each)   3   Identify: Name 3 objects that nurse points to (e.g. clock, pen, button): 3 points (1 point each)   1  Following Commands: (e.g. show me two fingers or close your eyes and stick out your tongue): 1 point   1   Writing: Ability to write a standard sentence (see writing page \"The castro jumped over the log.\"): 1 point   1   Attention: Count backwards from 100 by 10s: 1 point     10  Total: Max 10, no impairment, reassess next shift                   **9 or below Notify MD and reassess in 4 hours     Dose #2 of Blincyto hung over 24 hours via port.   "

## 2022-12-16 NOTE — PLAN OF CARE
Time: 5303-0801    No acute changes. Voiding well, not saving. UAL.  came to visit. Port infusing blinctyo. Dose #2 hung at 1845. Writer called pharmacy and unable to find that blincyto is safe to run in PIV for multiple days. Pt agreeable to keep infusing into port. Could we possible numb lab  draws like at clinic? Abdominal rash improving. Eating well, regular diet. Continue to monitor.

## 2022-12-16 NOTE — CONSULTS
Health Psychology                                                                                                                          Kelsey Cha, Ph.D., L.P [phone]  Lexi Lobato, Ph.D., L.P. [phone]  Avani Quinones, Ph.D, L..P. [phone]  Rosa Gonzalez, Ph.D., L.P. [phone]  Andre Younger, Ph.D., A.B.P.P., L.P. [phone]         Janett Plata, Ph.D., L.P. [phone]       Liz Mays, Ph.D., A.B.P.P., LP [phone]           [facility], 3rd Floor  [address] [place] [address]       Inpatient Health Psychology Consultation      Date of Service:  12/16/22    Consult received and chart reviewed. Stopped by April's room this evening and she was sleeping deeply, she did not wake to this writer's voice.     PLAN: Meet with patient Monday, 12/19.    Avani Quinones, PhD,   Clinical Health Psychologist  Phone: [phone]  Pager: [phone]

## 2022-12-16 NOTE — PLAN OF CARE
Goal Outcome Evaluation:  8737-2029    VSS on RA. Denies pain, nausea, SOB. Bag #2 blincyto infusing via port. ICANS score 10. Reports good UOP. LBM 12/15. Dermoplast topical spray ordered and in med bin for lab draws. No acute events overnight. LP with IT chemo in XR today.

## 2022-12-16 NOTE — PLAN OF CARE
Goal Outcome Evaluation:       RN Note [id number]: Patient receiving Day 2 Blincyto via Port.  Patient had LP with IT chemo in X-ray this morning. Site is C/D/I.  ICANS score is 10; no signs of neurotoxicity.  Patient agreeable to visit with Health Psychology this admission.  Ultrasound of LLE completed; negative for DVT..  Voiding adequately; LBM was yesterday.  Day 3 Blincyto hung late due to Pharmacy not delivering the med.

## 2022-12-16 NOTE — PROGRESS NOTES
"ICANS score      Score:   4   Orientation: Orientation to year, month, city, hospital: 4 points (1 point each)   3   Identify: Name 3 objects that nurse points to (e.g. clock, pen, button): 3 points (1 point each)   1  Following Commands: (e.g. show me two fingers or close your eyes and stick out your tongue): 1 point   1   Writing: Ability to write a standard sentence (see writing page \"The castro jumped over the log.\"): 1 point   1   Attention: Count backwards from 100 by 10s: 1 point      10  Total: Max 10, no impairment, reassess next shift                   **9 or below Notify MD and reassess in 4 hours      Dose #2 of Blincyto infusing via port.  "

## 2022-12-16 NOTE — PROGRESS NOTES
"    ICANS score      Score:   4   Orientation: Orientation to year, month, city, hospital: 4 points (1 point each)   3   Identify: Name 3 objects that nurse points to (e.g. clock, pen, button): 3 points (1 point each)   1  Following Commands: (e.g. show me two fingers or close your eyes and stick out your tongue): 1 point   1   Writing: Ability to write a standard sentence (see writing page \"The castro jumped over the log.\"): 1 point   1   Attention: Count backwards from 100 by 10s: 1 point      10  Total: Max 10, no impairment, reassess next shift                   **9 or below Notify MD and reassess in 4 hours      Dose #2 of Blincyto infusing via port.  "

## 2022-12-17 LAB
ALBUMIN SERPL BCG-MCNC: 4 G/DL (ref 3.5–5.2)
ALP SERPL-CCNC: 82 U/L (ref 35–104)
ALT SERPL W P-5'-P-CCNC: 20 U/L (ref 10–35)
ANION GAP SERPL CALCULATED.3IONS-SCNC: 11 MMOL/L (ref 7–15)
AST SERPL W P-5'-P-CCNC: 15 U/L (ref 10–35)
BASOPHILS # BLD AUTO: 0 10E3/UL (ref 0–0.2)
BASOPHILS NFR BLD AUTO: 0 %
BILIRUB SERPL-MCNC: 0.4 MG/DL
BUN SERPL-MCNC: 12.7 MG/DL (ref 6–20)
CALCIUM SERPL-MCNC: 9.1 MG/DL (ref 8.6–10)
CHLORIDE SERPL-SCNC: 107 MMOL/L (ref 98–107)
CREAT SERPL-MCNC: 0.62 MG/DL (ref 0.51–0.95)
DEPRECATED HCO3 PLAS-SCNC: 25 MMOL/L (ref 22–29)
EOSINOPHIL # BLD AUTO: 0 10E3/UL (ref 0–0.7)
EOSINOPHIL NFR BLD AUTO: 0 %
ERYTHROCYTE [DISTWIDTH] IN BLOOD BY AUTOMATED COUNT: 20.1 % (ref 10–15)
FERRITIN SERPL-MCNC: 568 NG/ML (ref 6–175)
FOLATE SERPL-MCNC: 14.1 NG/ML (ref 4.6–34.8)
GFR SERPL CREATININE-BSD FRML MDRD: >90 ML/MIN/1.73M2
GLUCOSE SERPL-MCNC: 82 MG/DL (ref 70–99)
HCT VFR BLD AUTO: 25.8 % (ref 35–47)
HGB BLD-MCNC: 8.4 G/DL (ref 11.7–15.7)
IMM GRANULOCYTES # BLD: 0.1 10E3/UL
IMM GRANULOCYTES NFR BLD: 1 %
IRON BINDING CAPACITY (ROCHE): 251 UG/DL (ref 240–430)
IRON SATN MFR SERPL: 16 % (ref 15–46)
IRON SERPL-MCNC: 41 UG/DL (ref 37–145)
LDH SERPL L TO P-CCNC: 294 U/L (ref 0–250)
LYMPHOCYTES # BLD AUTO: 0.3 10E3/UL (ref 0.8–5.3)
LYMPHOCYTES NFR BLD AUTO: 6 %
MAGNESIUM SERPL-MCNC: 2 MG/DL (ref 1.7–2.3)
MCH RBC QN AUTO: 31.8 PG (ref 26.5–33)
MCHC RBC AUTO-ENTMCNC: 32.6 G/DL (ref 31.5–36.5)
MCV RBC AUTO: 98 FL (ref 78–100)
MONOCYTES # BLD AUTO: 1 10E3/UL (ref 0–1.3)
MONOCYTES NFR BLD AUTO: 18 %
NEUTROPHILS # BLD AUTO: 4.3 10E3/UL (ref 1.6–8.3)
NEUTROPHILS NFR BLD AUTO: 75 %
NRBC # BLD AUTO: 0 10E3/UL
NRBC BLD AUTO-RTO: 0 /100
PHOSPHATE SERPL-MCNC: 4.9 MG/DL (ref 2.5–4.5)
PLATELET # BLD AUTO: 223 10E3/UL (ref 150–450)
POTASSIUM SERPL-SCNC: 3.8 MMOL/L (ref 3.4–5.3)
PROT SERPL-MCNC: 6.3 G/DL (ref 6.4–8.3)
RBC # BLD AUTO: 2.64 10E6/UL (ref 3.8–5.2)
RETICS # AUTO: 0.14 10E6/UL (ref 0.03–0.1)
RETICS/RBC NFR AUTO: 5.1 % (ref 0.5–2)
SODIUM SERPL-SCNC: 143 MMOL/L (ref 136–145)
VIT B12 SERPL-MCNC: >4000 PG/ML (ref 232–1245)
WBC # BLD AUTO: 5.7 10E3/UL (ref 4–11)

## 2022-12-17 PROCEDURE — 250N000011 HC RX IP 250 OP 636: Performed by: INTERNAL MEDICINE

## 2022-12-17 PROCEDURE — 85045 AUTOMATED RETICULOCYTE COUNT: CPT | Performed by: PHYSICIAN ASSISTANT

## 2022-12-17 PROCEDURE — 258N000003 HC RX IP 258 OP 636: Performed by: INTERNAL MEDICINE

## 2022-12-17 PROCEDURE — 82607 VITAMIN B-12: CPT | Performed by: PHYSICIAN ASSISTANT

## 2022-12-17 PROCEDURE — 83550 IRON BINDING TEST: CPT | Performed by: PHYSICIAN ASSISTANT

## 2022-12-17 PROCEDURE — 120N000002 HC R&B MED SURG/OB UMMC

## 2022-12-17 PROCEDURE — 250N000013 HC RX MED GY IP 250 OP 250 PS 637: Performed by: PHYSICIAN ASSISTANT

## 2022-12-17 PROCEDURE — 83735 ASSAY OF MAGNESIUM: CPT | Performed by: PHYSICIAN ASSISTANT

## 2022-12-17 PROCEDURE — 80053 COMPREHEN METABOLIC PANEL: CPT | Performed by: PHYSICIAN ASSISTANT

## 2022-12-17 PROCEDURE — 99233 SBSQ HOSP IP/OBS HIGH 50: CPT | Mod: FS | Performed by: PHYSICIAN ASSISTANT

## 2022-12-17 PROCEDURE — 82728 ASSAY OF FERRITIN: CPT | Performed by: PHYSICIAN ASSISTANT

## 2022-12-17 PROCEDURE — 85025 COMPLETE CBC W/AUTO DIFF WBC: CPT | Performed by: PHYSICIAN ASSISTANT

## 2022-12-17 PROCEDURE — 82746 ASSAY OF FOLIC ACID SERUM: CPT | Performed by: PHYSICIAN ASSISTANT

## 2022-12-17 PROCEDURE — 84100 ASSAY OF PHOSPHORUS: CPT | Performed by: PHYSICIAN ASSISTANT

## 2022-12-17 PROCEDURE — 36415 COLL VENOUS BLD VENIPUNCTURE: CPT | Performed by: PHYSICIAN ASSISTANT

## 2022-12-17 PROCEDURE — 250N000011 HC RX IP 250 OP 636: Performed by: PHYSICIAN ASSISTANT

## 2022-12-17 PROCEDURE — 83615 LACTATE (LD) (LDH) ENZYME: CPT | Performed by: PHYSICIAN ASSISTANT

## 2022-12-17 RX ORDER — ENOXAPARIN SODIUM 100 MG/ML
40 INJECTION SUBCUTANEOUS EVERY 24 HOURS
Status: DISCONTINUED | OUTPATIENT
Start: 2022-12-17 | End: 2022-12-23 | Stop reason: HOSPADM

## 2022-12-17 RX ADMIN — BLINATUMOMAB 9 MCG: KIT INTRAVENOUS at 18:27

## 2022-12-17 RX ADMIN — PANTOPRAZOLE SODIUM 20 MG: 20 TABLET, DELAYED RELEASE ORAL at 08:44

## 2022-12-17 RX ADMIN — ENOXAPARIN SODIUM 40 MG: 40 INJECTION SUBCUTANEOUS at 17:08

## 2022-12-17 RX ADMIN — AMLODIPINE BESYLATE 5 MG: 5 TABLET ORAL at 08:44

## 2022-12-17 RX ADMIN — ACETAMINOPHEN 650 MG: 325 TABLET, FILM COATED ORAL at 22:04

## 2022-12-17 RX ADMIN — VALACYCLOVIR HYDROCHLORIDE 1000 MG: 500 TABLET, FILM COATED ORAL at 08:44

## 2022-12-17 RX ADMIN — ACETAMINOPHEN 325 MG: 325 TABLET, FILM COATED ORAL at 08:42

## 2022-12-17 ASSESSMENT — ACTIVITIES OF DAILY LIVING (ADL)
ADLS_ACUITY_SCORE: 18

## 2022-12-17 NOTE — PROGRESS NOTES
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     Today:   - D4 blincyto + ponatinib; tolerating well. Tentative discharge 12/23 if tolerates well.  - Folate and iron/TIBC normal. Ferritin and vitamin B12 in process. Retics elevated - add on hapto, LDH to ensure no evidence of hemolysis.  - Continue ppx lovenox while inpatient; will consider discharge on statin/ASA.   - Health psych to see patient on Monday, 12/19.    HEME   # Ph(+) B-ALL   Followed by Dr. Hull. Patient presented to outside hospital with c/o right upper quadrant pain and was subsequently found to have a markedly elevated white blood cell count concerning for acute leukemia. While at OSH, she had CTA chest as well as CT A/P. These identified no PE, no acute or localized intraabdominal inflammatory process, mild splenomegaly, and few inconspicuous low-density structures in the liver. She was transferred to [facility] for further workup and management. S/p Hydrea 1g TID (8/21-8/23) with improvement in WBC (100K ? 12K). Diagnostic BMBx completed 8/20/22, which demonstrated B-ALL with 85% blasts and hypercellular marrow. IHC shows CD10+, CD34+. 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 ALL NGS negative. BCR/ABL1 major/minor (sent from peripheral blood) both negative. Initiated pre-phase steroids with Prednisone 60 mg x8/21; increased to 60 mg/m2 (140 mg) 8/22-8/24. She initiated GRAALL + imatinib (C1D1 = 8/24/22). S/p BMT consult 9/12 with Dr. Walsh. Induction was relatively uncomplicated. Post-induction BMBx done 9/26/22 and revealed MRD- CR1, with negative BCR/ABL1 and Clonoseq testing. With no matched related donors and excellent early/deep response chemotherapy is currently favored over BMT. 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 Underwent BMBx 10/25/22 with no morphologic evidence of B-lymphoblastic leukemia and flow with unusual B lineage precursor population 0.09%. C3 GRAALL + imatinib on 11/2/22 was tolerated well without complication. Prior plan was to continue GRAALL for a total of 8 cycles (of note odd cycles are 21-days in length and even cycles are 14-days in length), however now adjusting to Blinatumumab + Ponatinib.   - Per Dr. Hull, plan is to continue treatment with blinatumomab and ponatinib (Lancet Haematol 2022 Nov 16;-[phone]). She should remain on prophylactic antimicrobials during this study. Bone marrow examinations, including PCR for BCR - ABL , will be planned the end of cycles one, two, and five during ponatinib-blinatumomab combination therapy and every 3-6 months thereafter while on ponatinib maintenance. Clonseq MRD testing to be sent with each bone marrow examination. She will require a total of 12 prophylactic intrathecal chemotherapy administrations, alternating methotrexate and cytarabine, per protocol.  - Given risk for arterial occlusive events with ponatinib, will continue with ppx lovenox while inpatient. Pending ongoing tolerance/hospitalization, will consider discharge with ASA/statin.  - Port in place at admission    - Patient does have coverage for [facility] and has received IV abx, line cares, etc in past. Will discuss blincyto protocols/timing for bag changes in greater depth as approaching tentative discharge.  - Monitor for signs or symptoms of CRS or neurotoxicity (immune effector cell-associated neurotoxicity syndrome=ICANS). Per updated blinatumomab management guidelines:  ? Consider 1 dose of tocilizumab 8 mg/kg IV with Grade 2 CRS  ? Interrupt treatment for Grade 3 CRS. Start dexamethasone 8 mg Q8H x3 days, then taper over 4 days. Resume Blincyto once CRS resolves. Consider tocilizumab use as well, 8 mg/kg, up to 4 total doses (Q8H).  ? Consider single dose of dexamethasone 10 mg IV x1 for Grade 2 neurotoxicity (ICANS).  ? Hold blinatumomab for Grade 3 ICANS, until resolved to Grade 1 or better. Start dexamethasone 8 mg Q8H x up to 3 days, then taper over 4 days. Consider tocilizumab (same doses as for CRS).  ? Permanent discontinuation of treatment necessitated for Grade 4 CRS/ICANS.       Treatment Plan: Blincyto + Ponatinib (C1D1 = 12/14/22)    Pre-meds: Dex 20 mg D1 and D8     IT methotrexate 12 mg - in XR 12/16     Blincyto 9 mcg daily, CIVI - Days 1-7     Blincyto 28 mcg daily, CIVI - Days 8-9 inpatient, then Days 10-28 through home infusion    Ponatinib 15 mg daily     # Anemia   Likely secondary to chemotherapy. On 12/17, folate, iron/TIBCnormal and retics elevated. Ferritin and vitamin B12 in process.   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 Presented with scattered papulovesicular lesions on her chin and nose as well as scattered hemorrhagic erosions on the scalp and lower face (V2-3 distributions of the trigeminal nerve) c/f disseminated VZV. Lesions significantly improved  on admission. ID recommended considering decrease to 1000 mg daily on 12/13 visit (given less anticipated prolonged neutropenia with new regimen) and confirmed with Dr. Hull.   - Continue Valtrex 1000 mg daily.     # ID PPx   - Valtrex 1000 mg daily   - Hold PTA fluconazole 200 mg daily and levofloxacin 250 mg daily given ANC >1.0   - Nebulized pentamidine last given 12/2; next due 12/30  - Received Evusheld on 11/2/22       MISC  # Abdominal rash  Noted ~12/9 outpatient. Mildly pruritic. Evaluated by ID on 12/13 who recommended moisturizer for the rash on the abdomen. This is likely not infectious as she has been on valtrex and fluconazole.   - Triamcinolone PRN   - If worsens, will consider derm consult    # Hypertension   - Continue PTA amlodipine 5 mg daily       # GERD   - PTA Protonix 20 mg daily      # Grade 1 neuropathy of the fingertips   Due to vincristine. Pt reports symptoms are stable. Reports mild paresthesias and hot/cold sensitivity.   - Monitor clinically       # Coping  Patient endorses difficulty with coping regarding the unknowns of her leukemia and treatment. Supportive listening provided and patient agreeable to health psych.   - Health psych planning to see patient in hospital on 12/19.    FEN:   - bolus PRN IVF per chemo plan   - PRN lyte replacement  - RDAT       Prophy/Misc:   -VTE: Lovenox; hold for plts 50k  -GI/PUD: PTA Protonix  -Bowels: PRN Senna and Miralax       Dispo: Likely 12/23 pending tolerance of blincyto  Follow Up:   - 12/26 - Labs/AVELINA visit   - Home infusion for bag changes     I spent >60 minutes face-to-face and/or coordinating or discussing care plan. Over 50% of our time on the unit was spent counseling the patient and/or coordinating care     Patient plan of care discussed with Dr. Hull.  Assessment and plan are discussed and delivered to the patient.     Marina Escobar PA-C ([facility])  Hematology/Oncology  Pager: #[phone]    Interval History   No acute events. Patient  seen ambulating the unit this AM frequently. Denies neuro changes, fevers, chest pain, shortness of breath, pain or skin lesions of concern. No difficulties with bowel/bladder. Eating/drinking well. Edema improved and no changes. Planning to watch [employer] game. Questions answered at bedside.      Physical Exam   Temp: 98.5  F (36.9  C) Temp src: Oral BP: 112/59 Pulse: 105   Resp: 16 SpO2: 97 % O2 Device: None (Room air)    Vitals:    12/15/22 0937 12/16/22 1037 12/17/22 0824   Weight: 110.5 kg (243 lb 11.2 oz) 111.4 kg (245 lb 9.6 oz) 108.5 kg (239 lb 1.6 oz)     Vital Signs with Ranges  Temp:  [98.3  F (36.8  C)-98.9  F (37.2  C)] 98.5  F (36.9  C)  Pulse:  [] 105  Resp:  [16] 16  BP: (112-125)/(50-78) 112/59  SpO2:  [94 %-97 %] 97 %  I/O last 3 completed shifts:  In: 200 [IV Piggyback:200]  Out: -     Constitutional: Awake and conversational. Non-toxic appearing. No acute distress. Well developed, hydrated, and nourished. Appears stated age.   HEENT: Normocephalic, atraumatic. Head scarf in place.  Lymph: Neck supple.   Respiratory: Breathing comfortable with no increased work on room air. Good air exchange. No signs of respiratory distress or accessory muscle use. Lungs clear to auscultation bilaterally, no crackles or wheezing noted.  Cardiovascular: Regular rate and rhythm. No murmurs, rubs, or gallops. 1+ peripheral edema; stable.    GI: Abdomen is soft, non-distended, non-tender and without distension. Bowel sounds present and normoactive. No masses or hepatosplenomegaly appreciated.  Skin: Skin is clean, dry, intact. No jaundice appreciated. See scanned in media chart from 12/14. Scattered macules without surrounding erythema or warmth.  Musculoskeletal/ Extremities: Nailbeds pink and without cyanosis or clubbing. 1+ bilateral LE edema.  Neurologic: Alert and oriented. Speech normal. Grossly nonfocal. Memory and thought process preserved. Seen ambulating around unit without concerns.    Neuropsychiatric: Calm, affect congruent to situation. Appropriate mood and affect. Good judgment and insight.     Medications     - MEDICATION INSTRUCTIONS -         amLODIPine  5 mg Oral Daily     [START ON 12/21/2022] blinatumomab (BLINCYTO) 24 hour infusion  28 mcg Intravenous Q24H     [START ON 12/23/2022] blinatumomab (BLINCYTO) 24 hour HOME infusion  28 mcg Intravenous Q24H     blinatumomab (BLINCYTO) 24 hour infusion  9 mcg Intravenous Q24H     Chemotherapy Infusing-Continuous Infusion   Does not apply Q8H     dexamethasone  20 mg Intravenous Q7 Days     enoxaparin ANTICOAGULANT  40 mg Subcutaneous Q24H     pantoprazole  20 mg Oral Daily     PONATinib  15 mg Oral Q24H     sennosides  1-2 tablet Oral BID     valACYclovir  1,000 mg Oral Daily       Data   Results for orders placed or performed during the hospital encounter of 12/14/22 (from the past 24 hour(s))   XR Lumbar Punc Intrathecal Chemo Admin    Narrative    PROCEDURE: Lumbar Puncture using Fluoroscopy, 12/16/2022 12:12 PM    HISTORY: IT chemo per protocol    COMPARISON: 11/25/2022    STAFF NEURORADIOLOGIST: Dr. Silvestre Bardales    Resident PHYSICIAN: Dr. Santino Martinez    TECHNIQUE: Verbal and written consent for lumbar puncture was obtained  from the patient, and benefits and risk of the procedure were  explained, including but not limited to worsening headache,  hemorrhage, infection, lower extremity pain, or nerve root injury. The  patient was sterilely prepped and draped with the patient in the prone  position, over the lower back. Under fluoroscopic guidance, the  interlaminar spaces were noted. 1% lidocaine was administered for  local anesthetic over the L2-3 interlaminar space, and a 22 gauge 5  inch needle was advanced into the thecal sac under fluoroscopic  guidance.      There was initial show of clear CSF. Approximately 12 cc of CSF were  collected.    Hematology/Oncology then administered intrathecal chemotherapy, dose  and rate as  determined by Heme/Onc.    The needle was removed with the stylet in place. There was no  immediate complication associated with the procedure. Samples were  sent for the requested laboratory testing.      FLUORO TIME: 38 seconds.    DOSE: 19 mGym2      Impression    IMPRESSION: Successful lumbar puncture without immediate complication.    PLAN: Patient discharged to patient care unit in stable condition for  monitoring. Orders placed for 1 hour of bed rest with patient laying  on the back and the head of the bed flat.    I, GUERA AVALOS MD, attest that I was present for all critical  portions of the procedure and was immediately available to provide  guidance and assistance during the remainder of the procedure.    I have personally reviewed the examination and initial interpretation  and I agree with the findings.    GUERA AVALOS MD         SYSTEM ID:  [id number]   US Lower Extremity Venous Duplex Left    Narrative    EXAMINATION: DOPPLER VENOUS ULTRASOUND OF THE LEFT LOWER EXTREMITY,  12/16/2022 2:14 PM     COMPARISON: None.    HISTORY: Worsening left lower extremity swelling. Rule out DVT.    TECHNIQUE:  Gray-scale evaluation with compression, spectral flow, and  color Doppler assessment of the deep venous system of the left leg  from groin to knee, and then at the ankle.    FINDINGS:  In the left lower extremity, the common femoral, femoral, popliteal  and posterior tibial veins demonstrate normal compressibility and  blood flow. The right common femoral vein was examined for comparison  and demonstrates normal compressibility and blood flow.      Impression    IMPRESSION:  1.  No evidence left lower extremity deep venous thrombosis.    I have personally reviewed the examination and initial interpretation  and I agree with the findings.    SAM ROSEN MD         SYSTEM ID:  [id number]   CBC with platelets differential    Narrative    The following orders were created for panel order CBC with platelets  differential.  Procedure                               Abnormality         Status                     ---------                               -----------         ------                     CBC with platelets and d...[[id number]]  Abnormal            Final result                 Please view results for these tests on the individual orders.   Comprehensive metabolic panel   Result Value Ref Range    Sodium 143 136 - 145 mmol/L    Potassium 3.8 3.4 - 5.3 mmol/L    Chloride 107 98 - 107 mmol/L    Carbon Dioxide (CO2) 25 22 - 29 mmol/L    Anion Gap 11 7 - 15 mmol/L    Urea Nitrogen 12.7 6.0 - 20.0 mg/dL    Creatinine 0.62 0.51 - 0.95 mg/dL    Calcium 9.1 8.6 - 10.0 mg/dL    Glucose 82 70 - 99 mg/dL    Alkaline Phosphatase 82 35 - 104 U/L    AST 15 10 - 35 U/L    ALT 20 10 - 35 U/L    Protein Total 6.3 (L) 6.4 - 8.3 g/dL    Albumin 4.0 3.5 - 5.2 g/dL    Bilirubin Total 0.4 <=1.2 mg/dL    GFR Estimate >90 >60 mL/min/1.73m2   Magnesium   Result Value Ref Range    Magnesium 2.0 1.7 - 2.3 mg/dL   Phosphorus   Result Value Ref Range    Phosphorus 4.9 (H) 2.5 - 4.5 mg/dL   Folate   Result Value Ref Range    Folic Acid 14.1 4.6 - 34.8 ng/mL   Iron and iron binding capacity   Result Value Ref Range    Iron 41 37 - 145 ug/dL    Iron Sat Index 16 15 - 46 %    Iron Binding Capacity 251 240 - 430 ug/dL   Reticulocyte count   Result Value Ref Range    % Reticulocyte 5.1 (H) 0.5 - 2.0 %    Absolute Reticulocyte 0.136 (H) 0.025 - 0.095 10e6/uL   CBC with platelets and differential   Result Value Ref Range    WBC Count 5.7 4.0 - 11.0 10e3/uL    RBC Count 2.64 (L) 3.80 - 5.20 10e6/uL    Hemoglobin 8.4 (L) 11.7 - 15.7 g/dL    Hematocrit 25.8 (L) 35.0 - 47.0 %    MCV 98 78 - 100 fL    MCH 31.8 26.5 - 33.0 pg    MCHC 32.6 31.5 - 36.5 g/dL    RDW 20.1 (H) 10.0 - 15.0 %    Platelet Count 223 150 - 450 10e3/uL    % Neutrophils 75 %    % Lymphocytes 6 %    % Monocytes 18 %    % Eosinophils 0 %    % Basophils 0 %    % Immature Granulocytes 1 %     NRBCs per 100 WBC 0 <1 /100    Absolute Neutrophils 4.3 1.6 - 8.3 10e3/uL    Absolute Lymphocytes 0.3 (L) 0.8 - 5.3 10e3/uL    Absolute Monocytes 1.0 0.0 - 1.3 10e3/uL    Absolute Eosinophils 0.0 0.0 - 0.7 10e3/uL    Absolute Basophils 0.0 0.0 - 0.2 10e3/uL    Absolute Immature Granulocytes 0.1 <=0.4 10e3/uL    Absolute NRBCs 0.0 10e3/uL

## 2022-12-17 NOTE — PLAN OF CARE
[id number]  Goal Outcome Evaluation:  Pt A&Ox4, ICANS scores unchanged and intact, VSS on RA. Denies pain/N/V. #3 Blincyto continued at 10mL/hr via port. Good UOP. Reports 1 BM on robb 12/16, refusing senna. Up ad stephanie, ambulating tena x2. Sleep/rest promoted overnight with clustered cares. Makes needs known. Continue to monitor and with POC.

## 2022-12-17 NOTE — PROGRESS NOTES
D/I:  Patient receiving Day 3 Blincyto via Port.  Blincyto double checked by 2 chemo competent RNs.  Patient denies the need for further instruction.  ICANs score continues to be 10.    I:  Patient appears to be tolerating Blincyto well, thus far.    P:  Monitor patient for side effects of Blincyto.  Continue with current plan of care.  Notify MD with any changes in patient's status.

## 2022-12-17 NOTE — PLAN OF CARE
Goal Outcome Evaluation:       RN Note [id number]:  Patient receiving Day 3 Blincyto (Day 4 hung this evening).  ICANs score 10; no toxicities noted.  Patient with a mild headache relieved with 325mg Tylenol.  No complaints of nausea; fair appetite.  Good urine output; LBM 12/16.  Family visiting most of this afternoon; they brought patient food from [employer] (ate 100%).  Patient ambulated halls independently this shift.

## 2022-12-17 NOTE — PROGRESS NOTES
"  ICANS score      Score:   4   Orientation: Orientation to year, month, city, hospital: 4 points (1 point each)   3   Identify: Name 3 objects that nurse points to (e.g. clock, pen, button): 3 points (1 point each)   1   Following Commands: (e.g. show me two fingers or close your eyes and stick out your tongue): 1 point   1   Writing: Ability to write a standard sentence (see writing page \"The castro jumped over the log.\"): 1 point   1   Attention: Count backwards from 100 by 10s: 1 point      10   Total: Max 10, no impairment, reassess next shift                   **9 or below Notify MD and reassess in 4 hours   "

## 2022-12-18 LAB
ALBUMIN SERPL BCG-MCNC: 3.8 G/DL (ref 3.5–5.2)
ALP SERPL-CCNC: 77 U/L (ref 35–104)
ALT SERPL W P-5'-P-CCNC: 14 U/L (ref 10–35)
ANION GAP SERPL CALCULATED.3IONS-SCNC: 12 MMOL/L (ref 7–15)
AST SERPL W P-5'-P-CCNC: 21 U/L (ref 10–35)
BASOPHILS # BLD AUTO: 0 10E3/UL (ref 0–0.2)
BASOPHILS NFR BLD AUTO: 1 %
BILIRUB SERPL-MCNC: 0.5 MG/DL
BUN SERPL-MCNC: 10.8 MG/DL (ref 6–20)
CALCIUM SERPL-MCNC: 9 MG/DL (ref 8.6–10)
CHLORIDE SERPL-SCNC: 108 MMOL/L (ref 98–107)
CREAT SERPL-MCNC: 0.63 MG/DL (ref 0.51–0.95)
DEPRECATED HCO3 PLAS-SCNC: 22 MMOL/L (ref 22–29)
EOSINOPHIL # BLD AUTO: 0 10E3/UL (ref 0–0.7)
EOSINOPHIL NFR BLD AUTO: 0 %
ERYTHROCYTE [DISTWIDTH] IN BLOOD BY AUTOMATED COUNT: 19.7 % (ref 10–15)
GFR SERPL CREATININE-BSD FRML MDRD: >90 ML/MIN/1.73M2
GLUCOSE SERPL-MCNC: 80 MG/DL (ref 70–99)
HCT VFR BLD AUTO: 26 % (ref 35–47)
HGB BLD-MCNC: 8.4 G/DL (ref 11.7–15.7)
IMM GRANULOCYTES # BLD: 0 10E3/UL
IMM GRANULOCYTES NFR BLD: 1 %
LYMPHOCYTES # BLD AUTO: 0.4 10E3/UL (ref 0.8–5.3)
LYMPHOCYTES NFR BLD AUTO: 8 %
MAGNESIUM SERPL-MCNC: 2.1 MG/DL (ref 1.7–2.3)
MCH RBC QN AUTO: 31.5 PG (ref 26.5–33)
MCHC RBC AUTO-ENTMCNC: 32.3 G/DL (ref 31.5–36.5)
MCV RBC AUTO: 97 FL (ref 78–100)
MONOCYTES # BLD AUTO: 0.7 10E3/UL (ref 0–1.3)
MONOCYTES NFR BLD AUTO: 16 %
NEUTROPHILS # BLD AUTO: 3.2 10E3/UL (ref 1.6–8.3)
NEUTROPHILS NFR BLD AUTO: 74 %
NRBC # BLD AUTO: 0 10E3/UL
NRBC BLD AUTO-RTO: 0 /100
PHOSPHATE SERPL-MCNC: 4.8 MG/DL (ref 2.5–4.5)
PLATELET # BLD AUTO: 219 10E3/UL (ref 150–450)
POTASSIUM SERPL-SCNC: 3.9 MMOL/L (ref 3.4–5.3)
PROT SERPL-MCNC: 6.3 G/DL (ref 6.4–8.3)
RBC # BLD AUTO: 2.67 10E6/UL (ref 3.8–5.2)
SODIUM SERPL-SCNC: 142 MMOL/L (ref 136–145)
WBC # BLD AUTO: 4.3 10E3/UL (ref 4–11)

## 2022-12-18 PROCEDURE — 258N000003 HC RX IP 258 OP 636: Performed by: INTERNAL MEDICINE

## 2022-12-18 PROCEDURE — 99233 SBSQ HOSP IP/OBS HIGH 50: CPT | Mod: FS | Performed by: PHYSICIAN ASSISTANT

## 2022-12-18 PROCEDURE — 250N000011 HC RX IP 250 OP 636: Performed by: INTERNAL MEDICINE

## 2022-12-18 PROCEDURE — 83010 ASSAY OF HAPTOGLOBIN QUANT: CPT | Performed by: PHYSICIAN ASSISTANT

## 2022-12-18 PROCEDURE — 250N000013 HC RX MED GY IP 250 OP 250 PS 637: Performed by: PHYSICIAN ASSISTANT

## 2022-12-18 PROCEDURE — 83735 ASSAY OF MAGNESIUM: CPT | Performed by: PHYSICIAN ASSISTANT

## 2022-12-18 PROCEDURE — 36415 COLL VENOUS BLD VENIPUNCTURE: CPT | Performed by: PHYSICIAN ASSISTANT

## 2022-12-18 PROCEDURE — 250N000011 HC RX IP 250 OP 636: Performed by: PHYSICIAN ASSISTANT

## 2022-12-18 PROCEDURE — 120N000002 HC R&B MED SURG/OB UMMC

## 2022-12-18 PROCEDURE — 85025 COMPLETE CBC W/AUTO DIFF WBC: CPT | Performed by: PHYSICIAN ASSISTANT

## 2022-12-18 PROCEDURE — 84100 ASSAY OF PHOSPHORUS: CPT | Performed by: PHYSICIAN ASSISTANT

## 2022-12-18 PROCEDURE — 80053 COMPREHEN METABOLIC PANEL: CPT | Performed by: PHYSICIAN ASSISTANT

## 2022-12-18 RX ADMIN — VALACYCLOVIR HYDROCHLORIDE 1000 MG: 500 TABLET, FILM COATED ORAL at 08:04

## 2022-12-18 RX ADMIN — ACETAMINOPHEN 650 MG: 325 TABLET, FILM COATED ORAL at 22:06

## 2022-12-18 RX ADMIN — PANTOPRAZOLE SODIUM 20 MG: 20 TABLET, DELAYED RELEASE ORAL at 08:04

## 2022-12-18 RX ADMIN — AMLODIPINE BESYLATE 5 MG: 5 TABLET ORAL at 08:04

## 2022-12-18 RX ADMIN — ACETAMINOPHEN 650 MG: 325 TABLET, FILM COATED ORAL at 08:04

## 2022-12-18 RX ADMIN — BLINATUMOMAB 9 MCG: KIT INTRAVENOUS at 18:34

## 2022-12-18 RX ADMIN — ENOXAPARIN SODIUM 40 MG: 40 INJECTION SUBCUTANEOUS at 17:30

## 2022-12-18 ASSESSMENT — ACTIVITIES OF DAILY LIVING (ADL)
ADLS_ACUITY_SCORE: 18

## 2022-12-18 NOTE — PLAN OF CARE
"ICANS score     Score:   4   Orientation: Orientation to year, month, city, hospital: 4 points (1 point each)   3   Identify: Name 3 objects that nurse points to (e.g. clock, pen, button): 3 points (1 point each)   1   Following Commands: (e.g. show me two fingers or close your eyes and stick out your tongue): 1 point   1   Writing: Ability to write a standard sentence (see writing page \"The castro jumped over the log.\"):  1 point   1   Attention: Count backwards from 100 by 10s: 1 point     10   Total: Max 10, no impairment, reassess next shift                   **9 or below Notify MD and reassess in 4 hours     Blincyto Bag #4 continued   "

## 2022-12-18 NOTE — PROGRESS NOTES
D/I:  Patient receiving Day 4 Blincyto via Port.  Blincyto double checked by 2 chemo competent RNs.  Patient denies the need for further instruction.  ICANs score continues to be 10.     I:  Patient appears to be tolerating Blincyto well, thus far.     P:  Monitor patient for side effects of Blincyto.  Continue with current plan of care.  Notify MD with any changes in patient's status.

## 2022-12-18 NOTE — PROGRESS NOTES
"  ICANS score      Score:   4   Orientation: Orientation to year, month, city, hospital: 4 points (1 point each)   3   Identify: Name 3 objects that nurse points to (e.g. clock, pen, button): 3 points (1 point each)   1   Following Commands: (e.g. show me two fingers or close your eyes and stick out your tongue): 1 point   1   Writing: Ability to write a standard sentence (see writing page \"The castro jumped over the log.\"):        1 point   1   Attention: Count backwards from 100 by 10s: 1 point      10   Total: Max 10, no impairment, reassess next shift                   **9 or below Notify MD and reassess in 4 hours      Blincyto bag #4 continued           "

## 2022-12-18 NOTE — PROGRESS NOTES
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     Today:   - D5 blincyto + ponatinib; tolerating well. Tentative discharge 12/23 if continues to tolerate well.  - Noted very mild myalgias this AM, improved with APAP. Continue to monitor for resolution or worsening and consider workup as appropriate.  - Continue ppx lovenox while inpatient; will consider discharge on ASA +/- statin (pending improvement/worsening of current myalgias in coming days).   - Vitamin B12 resulted with >3000 - will clarify if patient taking home supplement (none reported in Epic).   - Health psych to see patient on Monday, 12/19.  - Week-day team to confirm formal timing/lab capability of [facility] - appreciate RNCC assistance.    HEME   # Ph(+) B-ALL   Followed by Dr. Hull. Patient presented to outside hospital with c/o right upper quadrant pain and was subsequently found to have a markedly elevated white blood cell count concerning for acute leukemia. While at OSH, she had CTA chest as well as CT A/P. These identified no PE, no acute or localized intraabdominal inflammatory process, mild splenomegaly, and few inconspicuous low-density structures in the liver. She was transferred to [facility] for further workup and management. S/p Hydrea 1g TID (8/21-8/23) with improvement in WBC (100K ? 12K). Diagnostic BMBx completed 8/20/22, which demonstrated B-ALL with 85% blasts and hypercellular marrow. IHC shows CD10+, CD34+. 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 ALL NGS negative. BCR/ABL1 major/minor (sent from peripheral blood) both negative. Initiated pre-phase steroids with Prednisone 60 mg x8/21; increased to 60 mg/m2 (140 mg) 8/22-8/24. She initiated GRAALL + imatinib (C1D1 = 8/24/22). S/p BMT consult 9/12 with Dr. Walsh. Induction was relatively uncomplicated. Post-induction BMBx done 9/26/22 and revealed MRD- CR1, with negative BCR/ABL1 and Clonoseq testing. With no matched related donors and excellent early/deep response chemotherapy is currently favored over BMT. 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 Underwent BMBx 10/25/22 with no morphologic evidence of B-lymphoblastic leukemia and flow with unusual B lineage precursor population 0.09%. C3 GRAALL + imatinib on 11/2/22 was tolerated well without complication. Prior plan was to continue GRAALL for a total of 8 cycles (of note odd cycles are 21-days in length and even cycles are 14-days in length), however now adjusting to Blinatumumab + Ponatinib.   - Per Dr. Hull, plan is to continue treatment with blinatumomab and ponatinib ([facility] Haematol 2022 Nov 16;[id number]). She should remain on prophylactic antimicrobials during this study. Bone marrow examinations, including PCR for BCR - ABL , will be planned the end of cycles one, two, and five during ponatinib-blinatumomab combination therapy and every 3-6 months thereafter while on ponatinib maintenance. Clonseq MRD testing to be sent with each bone marrow examination. She will require a total of 12 prophylactic intrathecal chemotherapy  administrations, alternating methotrexate and cytarabine, per protocol.  - Given risk for arterial occlusive events with ponatinib, will continue with ppx lovenox while inpatient. Pending ongoing tolerance/hospitalization, will consider discharge with ASA/statin.  - Port in place at admission   - Patient does have coverage for [facility] and has received IV abx, line cares, etc in past. Will discuss blincyto protocols/timing for bag changes in greater depth as approaching tentative discharge.  - Monitor for signs or symptoms of CRS or neurotoxicity (immune effector cell-associated neurotoxicity syndrome=ICANS). Per updated blinatumomab management guidelines:  ? Consider 1 dose of tocilizumab 8 mg/kg IV with Grade 2 CRS  ? Interrupt treatment for Grade 3 CRS. Start dexamethasone 8 mg Q8H x3 days, then taper over 4 days. Resume Blincyto once CRS resolves. Consider tocilizumab use as well, 8 mg/kg, up to 4 total doses (Q8H).  ? Consider single dose of dexamethasone 10 mg IV x1 for Grade 2 neurotoxicity (ICANS).  ? Hold blinatumomab for Grade 3 ICANS, until resolved to Grade 1 or better. Start dexamethasone 8 mg Q8H x up to 3 days, then taper over 4 days. Consider tocilizumab (same doses as for CRS).  ? Permanent discontinuation of treatment necessitated for Grade 4 CRS/ICANS.     Treatment Plan: Blincyto + Ponatinib (C1D1 = 12/14/22)    Pre-meds: Dex 20 mg D1 and D8     IT methotrexate 12 mg - in XR 12/16     Blincyto 9 mcg daily, CIVI - Days 1-7     Blincyto 28 mcg daily, CIVI - Days 8-9 inpatient, then Days 10-28 through home infusion    Ponatinib 15 mg daily     # Anemia   Likely secondary to chemotherapy. On 12/17, folate, iron/TIBCnormal and retics elevated. Ferritin high at 568 and vitamin B12 >3000. LDH mildly high (294), haptoglobin in process.   - Would be prudent to ask patient about B12 intake (no formal supplementation reported in Epic).  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 Presented with scattered papulovesicular lesions on her chin and nose as well as scattered hemorrhagic erosions on the scalp and lower face (V2-3 distributions of the trigeminal nerve) c/f disseminated VZV. Lesions significantly improved on admission. ID recommended considering decrease to 1000 mg daily on 12/13 visit (given less anticipated prolonged neutropenia with new regimen) and confirmed with Dr. Hull.   - Continue Valtrex 1000 mg daily.     # ID PPx   - Valtrex 1000 mg daily   - Hold PTA fluconazole 200 mg daily and levofloxacin 250 mg daily given ANC >1.0   - Nebulized pentamidine last given 12/2; next due 12/30  - Received Evusheld on 11/2/22       MISC  # Abdominal rash  Noted ~12/9 outpatient. Mildly pruritic. Evaluated by ID on 12/13 who recommended moisturizer for the rash on the abdomen. This is likely not infectious as she has been on valtrex and fluconazole.   - Triamcinolone PRN   - If worsens, will consider derm consult    # Hypertension   - Continue PTA amlodipine 5 mg daily       # GERD   - PTA Protonix 20 mg daily      # Grade 1 neuropathy of the fingertips   Due to vincristine. Pt reports symptoms are stable. Reports mild paresthesias and hot/cold sensitivity.   - Monitor clinically       # Coping  Patient endorses difficulty with coping regarding the unknowns of her leukemia and treatment. Supportive listening provided and patient agreeable to health psych.   - Health psych planning to see patient in hospital on 12/19.    Clinically Significant Risk Factors                        # Severe Obesity: Estimated body mass index is 41.28 kg/m  as calculated from the following:    Height as of this encounter: 1.626 m (5' 4\").    Weight as of this encounter: 109.1 kg (240 lb 8 oz).      "     FEN:   - bolus PRN IVF per chemo plan   - PRN lyte replacement  - RDAT       Prophy/Misc:   -VTE: Lovenox; hold for plts 50k  -GI/PUD: PTA Protonix  -Bowels: PRN Senna and Miralax       Dispo: Likely 12/23 pending tolerance of blincyto  Follow Up:   - 12/26 - Labs/AVELINA visit   - Home infusion for bag changes - can request further labs if not covered by [facility]     I spent >60 minutes face-to-face and/or coordinating or discussing care plan. Over 50% of our time on the unit was spent counseling the patient and/or coordinating care     Patient plan of care discussed with Dr. Hull.  Assessment and plan are discussed and delivered to the patient.     Marina Escobar PA-C ([facility])  Hematology/Oncology  Pager: #[phone]    Interval History   No acute events. Patient seen resting comfortably this morning. She notes mild myalgias this AM, legs greater than arms, that feel slightly like she just got Neulasta, but patient having a hard time describing. These are better after tylenol and were not very painful, just different. She queries if this could be due to a LLE ultrasound (given L>R), as the technician used a significant amount of pressure. Regardless, patient not too concerned given they are improved and minimal now. Denies neuro changes, fevers, chest pain, shortness of breath, pain or skin lesions of concern. No difficulties with bowel/bladder. Eating/drinking well. Just finished watching Miss [employer]en[employer] 2. Questions answered at bedside.      Physical Exam   Temp: 98.8  F (37.1  C) Temp src: Oral BP: 126/59 Pulse: 94   Resp: 16 SpO2: 98 % O2 Device: None (Room air)    Vitals:    12/16/22 1037 12/17/22 0824 12/18/22 0800   Weight: 111.4 kg (245 lb 9.6 oz) 108.5 kg (239 lb 1.6 oz) 109.1 kg (240 lb 8 oz)     Vital Signs with Ranges  Temp:  [98.1  F (36.7  C)-98.8  F (37.1  C)] 98.8  F (37.1  C)  Pulse:  [] 94  Resp:  [16] 16  BP: (116-128)/(59-75) 126/59  SpO2:  [96 %-98 %] 98 %  I/O last 3 completed shifts:  In:  200 [IV Piggyback:200]  Out: 2150 [Urine:2150]    Constitutional: Awake and conversational. Non-toxic appearing. No acute distress. Well developed, hydrated, and nourished. Appears stated age.   HEENT: Normocephalic, atraumatic. Head scarf in place.  Lymph: Neck supple.   Respiratory: Breathing comfortable with no increased work on room air. Good air exchange. No signs of respiratory distress or accessory muscle use. Lungs clear to auscultation bilaterally, no crackles or wheezing noted.  Cardiovascular: Regular rate and rhythm. No murmurs, rubs, or gallops. 1+ peripheral edema; stable.    GI: Abdomen is soft, non-distended, non-tender and without distension. Bowel sounds present and normoactive. No masses or hepatosplenomegaly appreciated.  Skin: Skin is clean, dry, intact. No jaundice appreciated. See scanned in media chart from 12/14. Scattered very small papules without surrounding erythema or warmth.  Musculoskeletal/ Extremities: Nailbeds pink and without cyanosis or clubbing. 1+ bilateral LE edema.  Neurologic: Alert and oriented. Speech normal. Grossly nonfocal. Memory and thought process preserved. Seen ambulating around unit without concerns.   Neuropsychiatric: Calm, affect congruent to situation. Appropriate mood and affect. Good judgment and insight.     Medications     - MEDICATION INSTRUCTIONS -         amLODIPine  5 mg Oral Daily     [START ON 12/21/2022] blinatumomab (BLINCYTO) 24 hour infusion  28 mcg Intravenous Q24H     [START ON 12/23/2022] blinatumomab (BLINCYTO) 24 hour HOME infusion  28 mcg Intravenous Q24H     blinatumomab (BLINCYTO) 24 hour infusion  9 mcg Intravenous Q24H     Chemotherapy Infusing-Continuous Infusion   Does not apply Q8H     dexamethasone  20 mg Intravenous Q7 Days     enoxaparin ANTICOAGULANT  40 mg Subcutaneous Q24H     pantoprazole  20 mg Oral Daily     PONATinib  15 mg Oral Q24H     valACYclovir  1,000 mg Oral Daily       Data   Results for orders placed or performed  during the hospital encounter of 12/14/22 (from the past 24 hour(s))   CBC with platelets differential    Narrative    The following orders were created for panel order CBC with platelets differential.  Procedure                               Abnormality         Status                     ---------                               -----------         ------                     CBC with platelets and d...[[id number]]  Abnormal            Final result                 Please view results for these tests on the individual orders.   Comprehensive metabolic panel   Result Value Ref Range    Sodium 142 136 - 145 mmol/L    Potassium 3.9 3.4 - 5.3 mmol/L    Chloride 108 (H) 98 - 107 mmol/L    Carbon Dioxide (CO2) 22 22 - 29 mmol/L    Anion Gap 12 7 - 15 mmol/L    Urea Nitrogen 10.8 6.0 - 20.0 mg/dL    Creatinine 0.63 0.51 - 0.95 mg/dL    Calcium 9.0 8.6 - 10.0 mg/dL    Glucose 80 70 - 99 mg/dL    Alkaline Phosphatase 77 35 - 104 U/L    AST 21 10 - 35 U/L    ALT 14 10 - 35 U/L    Protein Total 6.3 (L) 6.4 - 8.3 g/dL    Albumin 3.8 3.5 - 5.2 g/dL    Bilirubin Total 0.5 <=1.2 mg/dL    GFR Estimate >90 >60 mL/min/1.73m2   Magnesium   Result Value Ref Range    Magnesium 2.1 1.7 - 2.3 mg/dL   Phosphorus   Result Value Ref Range    Phosphorus 4.8 (H) 2.5 - 4.5 mg/dL   CBC with platelets and differential   Result Value Ref Range    WBC Count 4.3 4.0 - 11.0 10e3/uL    RBC Count 2.67 (L) 3.80 - 5.20 10e6/uL    Hemoglobin 8.4 (L) 11.7 - 15.7 g/dL    Hematocrit 26.0 (L) 35.0 - 47.0 %    MCV 97 78 - 100 fL    MCH 31.5 26.5 - 33.0 pg    MCHC 32.3 31.5 - 36.5 g/dL    RDW 19.7 (H) 10.0 - 15.0 %    Platelet Count 219 150 - 450 10e3/uL    % Neutrophils 74 %    % Lymphocytes 8 %    % Monocytes 16 %    % Eosinophils 0 %    % Basophils 1 %    % Immature Granulocytes 1 %    NRBCs per 100 WBC 0 <1 /100    Absolute Neutrophils 3.2 1.6 - 8.3 10e3/uL    Absolute Lymphocytes 0.4 (L) 0.8 - 5.3 10e3/uL    Absolute Monocytes 0.7 0.0 - 1.3 10e3/uL    Absolute  Eosinophils 0.0 0.0 - 0.7 10e3/uL    Absolute Basophils 0.0 0.0 - 0.2 10e3/uL    Absolute Immature Granulocytes 0.0 <=0.4 10e3/uL    Absolute NRBCs 0.0 10e3/uL

## 2022-12-18 NOTE — PLAN OF CARE
[id number]  Goal Outcome Evaluation:  Pt A&Ox4, VSS on RA. ICANS 10 throughout shift. Denies N/V. Mild pain/soreness managed with tylenol x1 and heat packs. Blincyto continued at 10mL/hr via port. UAL in tena frequently for evening. Good UOP, last BM 12/17. Makes needs known. Continue to monitor and with POC.

## 2022-12-18 NOTE — PLAN OF CARE
Goal Outcome Evaluation:       RN Note [id number]:  Patient receiving Day 4 Blincyto; ICANs score continues to be 10.  No complaints of nausea; had sausage, hash browns & yogurt for breakfast.  Patient ambulating halls independently several times this shift.  Patient complaining of joint pain (likely caused by Ponatinib); Tylenol given with relief.  Patient had a visitors this afternoon who brought her food (tacos, sandwich, egg rolls, spring rolls).

## 2022-12-19 LAB
ABO/RH(D): NORMAL
ALBUMIN SERPL BCG-MCNC: 3.9 G/DL (ref 3.5–5.2)
ALP SERPL-CCNC: 82 U/L (ref 35–104)
ALT SERPL W P-5'-P-CCNC: 15 U/L (ref 10–35)
ANION GAP SERPL CALCULATED.3IONS-SCNC: 11 MMOL/L (ref 7–15)
ANTIBODY SCREEN: NEGATIVE
AST SERPL W P-5'-P-CCNC: 18 U/L (ref 10–35)
BASOPHILS # BLD AUTO: 0 10E3/UL (ref 0–0.2)
BASOPHILS NFR BLD AUTO: 1 %
BILIRUB SERPL-MCNC: 0.4 MG/DL
BUN SERPL-MCNC: 11.4 MG/DL (ref 6–20)
CALCIUM SERPL-MCNC: 9 MG/DL (ref 8.6–10)
CHLORIDE SERPL-SCNC: 107 MMOL/L (ref 98–107)
CREAT SERPL-MCNC: 0.59 MG/DL (ref 0.51–0.95)
DEPRECATED HCO3 PLAS-SCNC: 22 MMOL/L (ref 22–29)
EOSINOPHIL # BLD AUTO: 0 10E3/UL (ref 0–0.7)
EOSINOPHIL NFR BLD AUTO: 0 %
ERYTHROCYTE [DISTWIDTH] IN BLOOD BY AUTOMATED COUNT: 19.8 % (ref 10–15)
GFR SERPL CREATININE-BSD FRML MDRD: >90 ML/MIN/1.73M2
GLUCOSE SERPL-MCNC: 93 MG/DL (ref 70–99)
HAPTOGLOB SERPL-MCNC: 204 MG/DL (ref 32–197)
HCT VFR BLD AUTO: 26.5 % (ref 35–47)
HGB BLD-MCNC: 8.5 G/DL (ref 11.7–15.7)
IMM GRANULOCYTES # BLD: 0 10E3/UL
IMM GRANULOCYTES NFR BLD: 1 %
LYMPHOCYTES # BLD AUTO: 0.3 10E3/UL (ref 0.8–5.3)
LYMPHOCYTES NFR BLD AUTO: 5 %
MAGNESIUM SERPL-MCNC: 2.1 MG/DL (ref 1.7–2.3)
MCH RBC QN AUTO: 31.3 PG (ref 26.5–33)
MCHC RBC AUTO-ENTMCNC: 32.1 G/DL (ref 31.5–36.5)
MCV RBC AUTO: 97 FL (ref 78–100)
MONOCYTES # BLD AUTO: 0.7 10E3/UL (ref 0–1.3)
MONOCYTES NFR BLD AUTO: 13 %
NEUTROPHILS # BLD AUTO: 4.4 10E3/UL (ref 1.6–8.3)
NEUTROPHILS NFR BLD AUTO: 80 %
NRBC # BLD AUTO: 0 10E3/UL
NRBC BLD AUTO-RTO: 0 /100
PATH REPORT.COMMENTS IMP SPEC: NORMAL
PATH REPORT.FINAL DX SPEC: NORMAL
PATH REPORT.MICROSCOPIC SPEC OTHER STN: NORMAL
PATH REPORT.RELEVANT HX SPEC: NORMAL
PHOSPHATE SERPL-MCNC: 4.1 MG/DL (ref 2.5–4.5)
PLATELET # BLD AUTO: 224 10E3/UL (ref 150–450)
POTASSIUM SERPL-SCNC: 4 MMOL/L (ref 3.4–5.3)
PROT SERPL-MCNC: 6.5 G/DL (ref 6.4–8.3)
RBC # BLD AUTO: 2.72 10E6/UL (ref 3.8–5.2)
SODIUM SERPL-SCNC: 140 MMOL/L (ref 136–145)
SPECIMEN EXPIRATION DATE: NORMAL
WBC # BLD AUTO: 5.5 10E3/UL (ref 4–11)

## 2022-12-19 PROCEDURE — 36415 COLL VENOUS BLD VENIPUNCTURE: CPT | Performed by: PHYSICIAN ASSISTANT

## 2022-12-19 PROCEDURE — 80053 COMPREHEN METABOLIC PANEL: CPT | Performed by: PHYSICIAN ASSISTANT

## 2022-12-19 PROCEDURE — 83735 ASSAY OF MAGNESIUM: CPT | Performed by: PHYSICIAN ASSISTANT

## 2022-12-19 PROCEDURE — 120N000002 HC R&B MED SURG/OB UMMC

## 2022-12-19 PROCEDURE — 258N000003 HC RX IP 258 OP 636: Performed by: INTERNAL MEDICINE

## 2022-12-19 PROCEDURE — 250N000011 HC RX IP 250 OP 636: Performed by: INTERNAL MEDICINE

## 2022-12-19 PROCEDURE — 250N000011 HC RX IP 250 OP 636: Performed by: PHYSICIAN ASSISTANT

## 2022-12-19 PROCEDURE — 84100 ASSAY OF PHOSPHORUS: CPT | Performed by: PHYSICIAN ASSISTANT

## 2022-12-19 PROCEDURE — 85004 AUTOMATED DIFF WBC COUNT: CPT | Performed by: PHYSICIAN ASSISTANT

## 2022-12-19 PROCEDURE — 250N000013 HC RX MED GY IP 250 OP 250 PS 637: Performed by: PHYSICIAN ASSISTANT

## 2022-12-19 PROCEDURE — 99233 SBSQ HOSP IP/OBS HIGH 50: CPT | Mod: FS | Performed by: PHYSICIAN ASSISTANT

## 2022-12-19 PROCEDURE — 86850 RBC ANTIBODY SCREEN: CPT | Performed by: PHYSICIAN ASSISTANT

## 2022-12-19 RX ADMIN — BLINATUMOMAB 9 MCG: KIT INTRAVENOUS at 18:17

## 2022-12-19 RX ADMIN — VALACYCLOVIR HYDROCHLORIDE 1000 MG: 500 TABLET, FILM COATED ORAL at 08:25

## 2022-12-19 RX ADMIN — PANTOPRAZOLE SODIUM 20 MG: 20 TABLET, DELAYED RELEASE ORAL at 08:25

## 2022-12-19 RX ADMIN — AMLODIPINE BESYLATE 5 MG: 5 TABLET ORAL at 08:25

## 2022-12-19 RX ADMIN — ACETAMINOPHEN 650 MG: 325 TABLET, FILM COATED ORAL at 15:51

## 2022-12-19 RX ADMIN — ENOXAPARIN SODIUM 40 MG: 40 INJECTION SUBCUTANEOUS at 15:51

## 2022-12-19 RX ADMIN — ACETAMINOPHEN 650 MG: 325 TABLET, FILM COATED ORAL at 06:27

## 2022-12-19 ASSESSMENT — ACTIVITIES OF DAILY LIVING (ADL)
ADLS_ACUITY_SCORE: 18
DEPENDENT_IADLS:: INDEPENDENT
ADLS_ACUITY_SCORE: 18

## 2022-12-19 NOTE — PROGRESS NOTES
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     Today:   - Day 6 blincyto + ponatinib; tolerating well.    - Tentative discharge 12/23 if continues to tolerate well.  - Continues to have mild myalgias, in her shoulders, upper arms, back, hip and thighs. Has had some improvement with APAP. Reports these are constant. Not worsening with movement. May be related to ponatinib. Hold off on starting a statin. Continue to monitor.   - Continue ppx lovenox while inpatient; will consider discharge on [facility]   - [facility] psych to see patient on Monday, 12/19.  - Working with RNCC to ensure [facility] regarding home Blincyto and lab capability at home    HEME   # Ph(+) B-ALL   Followed by Dr. Hull. Patient presented to outside hospital with c/o right upper quadrant pain and was subsequently found to have a markedly elevated white blood cell count concerning for acute leukemia. While at OSH, she had CTA chest as well as CT A/P. These identified no PE, no acute or localized intraabdominal inflammatory process, mild splenomegaly, and few inconspicuous low-density structures in the liver. She was transferred to [facility] for further workup and management. S/p Hydrea 1g TID (8/21-8/23) with improvement in WBC (100K ? 12K). Diagnostic BMBx completed 8/20/22, which demonstrated B-ALL with 85% blasts and hypercellular marrow. IHC shows CD10+, CD34+. 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 ALL NGS negative. BCR/ABL1 major/minor (sent from peripheral blood) both negative. Initiated pre-phase steroids with Prednisone 60 mg x8/21; increased to 60 mg/m2 (140 mg) 8/22-8/24. She initiated GRAALL + imatinib (C1D1 = 8/24/22). S/p BMT consult 9/12 with Dr. Walsh. Induction was relatively uncomplicated. Post-induction BMBx done 9/26/22 and revealed MRD- CR1, with negative BCR/ABL1 and Clonoseq testing. With no matched related donors and excellent early/deep response chemotherapy is currently favored over BMT. 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 Underwent BMBx 10/25/22 with no morphologic evidence of B-lymphoblastic leukemia and flow with unusual B lineage precursor population 0.09%. C3 GRAALL + imatinib on 11/2/22 was tolerated well without complication. Prior plan was to continue GRAALL for a total of 8 cycles (of note odd cycles are 21-days in length and even cycles are 14-days in length), however now adjusting to Blinatumumab + Ponatinib.   - Per Dr. Hull, plan is to continue treatment with blinatumomab and ponatinib (Lancet Haematol 2022 Nov 16;-[id number]). She should remain on prophylactic antimicrobials during this study. Bone marrow examinations, including PCR for BCR - ABL , will be planned the end of cycles one, two, and five during ponatinib-blinatumomab combination therapy and every 3-6 months thereafter while on ponatinib maintenance. Clonseq MRD testing to be sent with each bone marrow examination. She will require a total of 12 prophylactic intrathecal chemotherapy administrations, alternating methotrexate and cytarabine, per  protocol.  - Given risk for arterial occlusive events with ponatinib, will continue with ppx lovenox while inpatient. Pending ongoing tolerance/hospitalization, will consider discharge with ASA/statin.  - Port in place at admission   - Patient does have coverage for [facility] and has received IV abx, line cares, etc in past. Will discuss blincyto protocols/timing for bag changes in greater depth as approaching tentative discharge.  - Monitor for signs or symptoms of CRS or neurotoxicity (immune effector cell-associated neurotoxicity syndrome=ICANS). Per updated blinatumomab management guidelines:  ? Consider 1 dose of tocilizumab 8 mg/kg IV with Grade 2 CRS  ? Interrupt treatment for Grade 3 CRS. Start dexamethasone 8 mg Q8H x3 days, then taper over 4 days. Resume Blincyto once CRS resolves. Consider tocilizumab use as well, 8 mg/kg, up to 4 total doses (Q8H).  ? Consider single dose of dexamethasone 10 mg IV x1 for Grade 2 neurotoxicity (ICANS).  ? Hold blinatumomab for Grade 3 ICANS, until resolved to Grade 1 or better. Start dexamethasone 8 mg Q8H x up to 3 days, then taper over 4 days. Consider tocilizumab (same doses as for CRS).  ? Permanent discontinuation of treatment necessitated for Grade 4 CRS/ICANS.     Treatment Plan: Blincyto + Ponatinib (C1D1 = 12/14/22)    Pre-meds: Dex 20 mg D1 and D8     IT methotrexate 12 mg - in XR 12/16     Blincyto 9 mcg daily, CIVI - Days 1-7     Blincyto 28 mcg daily, CIVI - Days 8-9 inpatient, then Days 10-28 through home infusion    Ponatinib 15 mg daily     # Anemia   # Elevated B12 levels  Likely secondary to chemotherapy. On 12/17, folate, iron/TIBCnormal and retics elevated. Ferritin high at 568 and vitamin B12 >3000. LDH mildly high (294), haptoglobin in process.   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 Presented with scattered papulovesicular lesions on her chin and nose as well as scattered hemorrhagic erosions on the scalp and lower face (V2-3 distributions of the trigeminal nerve) c/f disseminated VZV. Lesions significantly improved on admission. ID recommended considering decrease to 1000 mg daily on 12/13 visit (given less anticipated prolonged neutropenia with new regimen) and confirmed with Dr. Hull.   - Continue Valtrex 1000 mg daily.     # ID PPx   - Valtrex 1000 mg daily   - Hold PTA fluconazole 200 mg daily and levofloxacin 250 mg daily given ANC >1.0   - Nebulized pentamidine last given 12/2; next due 12/30  - Received Evusheld on 11/2/22       MISC  # Myalgias  Noted to have myalgias starting 12/18 AM. Continues to have mild myalgias, in her shoulders, upper arms, back, hip and thighs. Has had some improvement with APAP. Reports these are constant. Not worsening with movement. May be related to ponatinib. Hold off on starting a statin.  Continue to monitor.     # Abdominal rash  Noted ~12/9 outpatient. Mildly pruritic. Evaluated by ID on 12/13 who recommended moisturizer for the rash on the abdomen. This is likely not infectious as she has been on valtrex and fluconazole.   - Triamcinolone PRN   - If worsens, will consider derm consult    # Hypertension   - Continue PTA amlodipine 5 mg daily       # GERD   - PTA Protonix 20 mg daily      # Grade 1 neuropathy of the fingertips   Due to vincristine. Pt reports symptoms are stable. Reports mild paresthesias and hot/cold sensitivity.   - Monitor clinically       # Coping  Patient endorses difficulty with coping regarding the unknowns of her leukemia and treatment. Supportive listening provided and patient agreeable to health psych.   - Health psych planning to see patient in hospital on 12/19.    Clinically Significant Risk Factors                      "   # Severe Obesity: Estimated body mass index is 41.38 kg/m  as calculated from the following:    Height as of this encounter: 1.626 m (5' 4\").    Weight as of this encounter: 109.4 kg (241 lb 1.6 oz).          FEN:   - Bolus PRN IVF per chemo plan   - PRN lyte replacement  - RDAT       Prophy/Misc:   -VTE: Lovenox; hold for plts 50k  -GI/PUD: PTA Protonix  -Bowels: PRN Senna and Miralax       Dispo: Likely 12/23 pending tolerance of blincyto  Follow Up:   - 12/26 - Labs/AVELINA visit   - Home infusion for bag changes - can request further labs if not covered by [facility]     I spent >40 minutes face-to-face and/or coordinating or discussing care plan. Over 50% of our time on the unit was spent counseling the patient and/or coordinating care     Patient plan of care discussed with Dr. Hull.  Assessment and plan are discussed and delivered to the patient.     Kelsey Gonzalez PA-C  Hematology/Oncology  Pager: #[phone]    Interval History   No acute events overnight. Afebrile.  April is sitting up in bed, overall comfortable. Continues to have mild arthraligias. Worse in her back and shoulders. Present in her upper arms, hips and thighs. No other new symptoms or concerns. Hopeful to have frequency of her vitals reduce so that she can get better sleep.  Appetite intact. Energy level ok.  Neuropathy stable  Denies fever, chills, mouth sores, SOB, cough, abdominal pain, diarrhea, constipation, nausea, vomiting, dysuria, hematuria, numbness, tingling      Physical Exam   Temp: 99.1  F (37.3  C) Temp src: Oral BP: 126/62 Pulse: 84   Resp: 16 SpO2: 97 % O2 Device: None (Room air)    Vitals:    12/17/22 0824 12/18/22 0800 12/19/22 0821   Weight: 108.5 kg (239 lb 1.6 oz) 109.1 kg (240 lb 8 oz) 109.4 kg (241 lb 1.6 oz)     Vital Signs with Ranges  Temp:  [98.1  F (36.7  C)-99.7  F (37.6  C)] 99.1  F (37.3  C)  Pulse:  [84-97] 84  Resp:  [16-20] 16  BP: (122-132)/(54-62) 126/62  SpO2:  [97 %-99 %] 97 %  I/O last 3 completed shifts:  In: " 80 [IV Piggyback:80]  Out: 1200 [Urine:1200]    Constitutional: Awake and conversational. Non-toxic appearing. No acute distress. Well developed, hydrated, and nourished. Appears stated age.   HEENT: Normocephalic, atraumatic. Head scarf in place.  Lymph: Neck supple.   Respiratory: Breathing comfortable with no increased work on room air. Good air exchange. No signs of respiratory distress or accessory muscle use. Lungs clear to auscultation bilaterally, no crackles or wheezing noted.  Cardiovascular: Regular rate and rhythm. No murmurs, rubs, or gallops. 1+ peripheral edema; stable.    GI: Abdomen is soft, non-distended, non-tender and without distension. Bowel sounds present and normoactive. No masses or hepatosplenomegaly appreciated.  Skin: Skin is clean, dry, intact. No jaundice appreciated.  Musculoskeletal/ Extremities: Nailbeds pink and without cyanosis or clubbing. 1+ bilateral LE edema.  Neurologic: Alert and oriented. Speech normal. Grossly nonfocal. Memory and thought process preserved. Seen ambulating around unit without concerns.   Neuropsychiatric: Calm, affect congruent to situation. Appropriate mood and affect. Good judgment and insight.     Medications     - MEDICATION INSTRUCTIONS -         amLODIPine  5 mg Oral Daily     [START ON 12/21/2022] blinatumomab (BLINCYTO) 24 hour infusion  28 mcg Intravenous Q24H     [START ON 12/23/2022] blinatumomab (BLINCYTO) 24 hour HOME infusion  28 mcg Intravenous Q24H     blinatumomab (BLINCYTO) 24 hour infusion  9 mcg Intravenous Q24H     Chemotherapy Infusing-Continuous Infusion   Does not apply Q8H     dexamethasone  20 mg Intravenous Q7 Days     enoxaparin ANTICOAGULANT  40 mg Subcutaneous Q24H     pantoprazole  20 mg Oral Daily     PONATinib  15 mg Oral Q24H     valACYclovir  1,000 mg Oral Daily       Data   Results for orders placed or performed during the hospital encounter of 12/14/22 (from the past 24 hour(s))   CBC with platelets differential     Narrative    The following orders were created for panel order CBC with platelets differential.  Procedure                               Abnormality         Status                     ---------                               -----------         ------                     CBC with platelets and d...[[id number]]  Abnormal            Final result                 Please view results for these tests on the individual orders.   ABO/Rh type and screen    Narrative    The following orders were created for panel order ABO/Rh type and screen.  Procedure                               Abnormality         Status                     ---------                               -----------         ------                     Adult Type and Screen[[id number]]                            Final result                 Please view results for these tests on the individual orders.   Comprehensive metabolic panel   Result Value Ref Range    Sodium 140 136 - 145 mmol/L    Potassium 4.0 3.4 - 5.3 mmol/L    Chloride 107 98 - 107 mmol/L    Carbon Dioxide (CO2) 22 22 - 29 mmol/L    Anion Gap 11 7 - 15 mmol/L    Urea Nitrogen 11.4 6.0 - 20.0 mg/dL    Creatinine 0.59 0.51 - 0.95 mg/dL    Calcium 9.0 8.6 - 10.0 mg/dL    Glucose 93 70 - 99 mg/dL    Alkaline Phosphatase 82 35 - 104 U/L    AST 18 10 - 35 U/L    ALT 15 10 - 35 U/L    Protein Total 6.5 6.4 - 8.3 g/dL    Albumin 3.9 3.5 - 5.2 g/dL    Bilirubin Total 0.4 <=1.2 mg/dL    GFR Estimate >90 >60 mL/min/1.73m2   Magnesium   Result Value Ref Range    Magnesium 2.1 1.7 - 2.3 mg/dL   Phosphorus   Result Value Ref Range    Phosphorus 4.1 2.5 - 4.5 mg/dL   CBC with platelets and differential   Result Value Ref Range    WBC Count 5.5 4.0 - 11.0 10e3/uL    RBC Count 2.72 (L) 3.80 - 5.20 10e6/uL    Hemoglobin 8.5 (L) 11.7 - 15.7 g/dL    Hematocrit 26.5 (L) 35.0 - 47.0 %    MCV 97 78 - 100 fL    MCH 31.3 26.5 - 33.0 pg    MCHC 32.1 31.5 - 36.5 g/dL    RDW 19.8 (H) 10.0 - 15.0 %    Platelet Count 224 150 - 450  10e3/uL    % Neutrophils 80 %    % Lymphocytes 5 %    % Monocytes 13 %    % Eosinophils 0 %    % Basophils 1 %    % Immature Granulocytes 1 %    NRBCs per 100 WBC 0 <1 /100    Absolute Neutrophils 4.4 1.6 - 8.3 10e3/uL    Absolute Lymphocytes 0.3 (L) 0.8 - 5.3 10e3/uL    Absolute Monocytes 0.7 0.0 - 1.3 10e3/uL    Absolute Eosinophils 0.0 0.0 - 0.7 10e3/uL    Absolute Basophils 0.0 0.0 - 0.2 10e3/uL    Absolute Immature Granulocytes 0.0 <=0.4 10e3/uL    Absolute NRBCs 0.0 10e3/uL   Adult Type and Screen   Result Value Ref Range    ABO/RH(D) O POS     Antibody Screen Negative Negative    SPECIMEN EXPIRATION DATE [id number]

## 2022-12-19 NOTE — PROGRESS NOTES
"ICANS score     Score:    Orientation: Orientation to year 2022, month December, [facility], [facility] [place]: [place] 4 points (4 point each)    Identify: Name 3 objects that nurse points to (e.g. clock, pen, button): 3 points (1 point each)   Following Commands: (e.g. show me two fingers or close your eyes and stick out your tongue): 1 point      Writing: Ability to write a standard sentence (see writing page \"The castro jumped over the log.\"): 1 point     Attention: Count backwards from 100 by 10s: 1 point        Total: Max 10, no impairment, reassess next shift                   **9 or below Notify MD and reassess in 4 hours     "

## 2022-12-19 NOTE — PLAN OF CARE
"ICANS score     Score:   4   Orientation: Orientation to year, month, city, hospital: 4 points (1 point each)   3   Identify: Name 3 objects that nurse points to (e.g. clock, pen, button): 3 points (1 point each)   1   Following Commands: (e.g. show me two fingers or close your eyes and stick out your tongue): 1 point   1   Writing: Ability to write a standard sentence (see writing page \"The castro jumped over the log.\"): 1 point   1   Attention: Count backwards from 100 by 10s: 1 point     10   Total: Max 10, no impairment, reassess next shift                   **9 or below Notify MD and reassess in 4 hours   Blincyto bag #5 continued.  "

## 2022-12-19 NOTE — PLAN OF CARE
Goal Outcome Evaluation:  Doing well. Denied pain or nausea. She had a stool today, ICANS score was a 10. Next due in 8 hours. Blincyto infusing without problems. Up independently.

## 2022-12-19 NOTE — CONSULTS
Health Psychology                                                                                                                          Kelsey Cha, Ph.D., L.P [phone]  Lexi Lobato, Ph.D., L.P. [phone]  Avani Quinones, Ph.D, L..P. [phone]  Rosa Gonzalez, Ph.D., L.P. [phone]  Andre Younger, Ph.D., A.B.P.P., L.P. [phone]         Janett Plata, Ph.D., L.P. [phone]       Liz Mays, Ph.D., A.B.P.P., LP [phone]           [facility], 3rd Floor  [address] [place] [address]       Inpatient Health Psychology Consultation      Date of Service:  12/19/22    Met with April today to introduce Health Psychology service. She was playing cards with a visitor. Provided her a brief, vague, summary of how we work with patients in the hospital. She was agreeable to meeting but preferred to meet at another time.    PLAN: Meet with April tomorrow, 12/20.    Avani Quinones, PhD,   Clinical Health Psychologist  Phone: [phone]  Pager: [phone]

## 2022-12-19 NOTE — PLAN OF CARE
"ICANS score     Score:   4   Orientation: Orientation to year, month, city, hospital: 4 points (1 point each)   3   Identify: Name 3 objects that nurse points to (e.g. clock, pen, button): 3 points (1 point each)   1   Following Commands: (e.g. show me two fingers or close your eyes and stick out your tongue): 1 point   1   Writing: Ability to write a standard sentence (see writing page \"The castro jumped over the log.\"): 1 point   1   Attention: Count backwards from 100 by 10s: 1 point     10   Total: Max 10, no impairment, reassess next shift                   **9 or below Notify MD and reassess in 4 hours   Blincyto bag #5 continued  "

## 2022-12-19 NOTE — CONSULTS
Care Management Initial Consult    General Information  Assessment completed with: Patient, VM-chart review,         Primary Care Provider verified and updated as needed:     Readmission within the last 30 days: no previous admission in last 30 days         Advance Care Planning:            Communication Assessment  Patient's communication style: spoken language (English or Bilingual)    Hearing Difficulty or Deaf: no   Wear Glasses or Blind: no    Cognitive  Cognitive/Neuro/Behavioral: WDL                      Living Environment:   People in home: child(yasemin), dependent, spouse (Children ages 13,15 and 17)     Current living Arrangements: house      Able to return to prior arrangements: yes       Family/Social Support:  Care provided by: spouse/significant other, self  Provides care for: no one  Marital Status:   , Children          Description of Support System: Supportive, Involved    Support Assessment: Adequate family and caregiver support    Current Resources:   Patient receiving home care services: No     Community Resources: None  Equipment currently used at home: none  Supplies currently used at home: None    Employment/Financial:  Employment Status:  (currently not working)        Financial Concerns: No concerns identified           Lifestyle & Psychosocial Needs:  Social Determinants of Health     Tobacco Use: Low Risk      Smoking Tobacco Use: Never     Smokeless Tobacco Use: Never     Passive Exposure: Not on file   Alcohol Use: Not on file   Financial Resource Strain: Not on file   Food Insecurity: Not on file   Transportation Needs: Not on file   Physical Activity: Not on file   Stress: Not on file   Social Connections: Not on file   Intimate Partner Violence: Not on file   Depression: Not at risk     PHQ-2 Score: 0   Housing Stability: Not on file       Functional Status:  Prior to admission patient needed assistance:   Dependent ADLs:: Independent  Dependent IADLs:: Independent        Mental Health Status:  Mental Health Status: No Current Concerns       Chemical Dependency Status:  Chemical Dependency Status: No Current Concerns             Values/Beliefs:  Spiritual, Cultural Beliefs, Anabaptist Practices, Values that affect care: no               Additional Information:  Pt admitted for blincyto + ponatinib. infusion.  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  Team anticipates discharge end of week on IV Blincyto.    Initiated referral to [facility].    Spoke with pt via phone.  Introduced RNCC role. Reviewed anticipated plan for discharge.   Pt willing to learn to manage home IV.  Discussed PLC..  PLC requested.  Discussed PCP status.  Pt has not seen a PCP in over two years.  Pt would like to establish care with a new PCP. Discussed clinic options.  Pt would like to explore [facility] or [facility].  [facility] request sent.   Pt notes no other concerns or questions. Agreed to have unit RNCC follow up with pt when closer to discharge.     Mary Corral RN BSN, PHN, ACM-RN  7A RN Care Coordinator  Phone: [phone]  Pager [phone]    To contact the weekend RNCC  [place] (0800 - 1630) Saturday and Sunday    Units: 4A, 4C, 4E, 5A and 5B- Pager 1: [phone]    Units: 6A, 6B, 6C, 6D- Pager 2: [phone]    Units: 7A, 7B, 7C, 7D, and 5C-Pager 3: [phone]    [facility] ([id number]) Saturday and Sunday    Units: 5 Ortho, 8A, 10 ICU, & Pediatric Units-Pager 4: [phone]    12/19/2022 4:32 PM

## 2022-12-19 NOTE — PLAN OF CARE
[id number]  Goal Outcome Evaluation:  Pt A&Ox4, VSS on RA. ICANS 10 throughout shift. Denies N/V. Pain/aches managed with tylenol x2. Blincyto continued via port at 10mL/hr. Good UOP. UAL, ambulating on evening in halls. Sleep/rest promoted over night with clustered cares. Continue to monitor and POC.

## 2022-12-20 ENCOUNTER — APPOINTMENT (OUTPATIENT)
Dept: GENERAL RADIOLOGY | Facility: CLINIC | Age: 42
End: 2022-12-20
Attending: PHYSICIAN ASSISTANT
Payer: COMMERCIAL

## 2022-12-20 LAB
ALBUMIN SERPL BCG-MCNC: 3.9 G/DL (ref 3.5–5.2)
ALBUMIN UR-MCNC: NEGATIVE MG/DL
ALP SERPL-CCNC: 78 U/L (ref 35–104)
ALT SERPL W P-5'-P-CCNC: 17 U/L (ref 10–35)
ANION GAP SERPL CALCULATED.3IONS-SCNC: 10 MMOL/L (ref 7–15)
APPEARANCE UR: CLEAR
AST SERPL W P-5'-P-CCNC: 12 U/L (ref 10–35)
BASOPHILS # BLD AUTO: 0 10E3/UL (ref 0–0.2)
BASOPHILS NFR BLD AUTO: 0 %
BILIRUB SERPL-MCNC: 0.4 MG/DL
BILIRUB UR QL STRIP: NEGATIVE
BUN SERPL-MCNC: 10.4 MG/DL (ref 6–20)
C PNEUM DNA SPEC QL NAA+PROBE: NOT DETECTED
CALCIUM SERPL-MCNC: 9 MG/DL (ref 8.6–10)
CHLORIDE SERPL-SCNC: 107 MMOL/L (ref 98–107)
COLOR UR AUTO: ABNORMAL
CREAT SERPL-MCNC: 0.56 MG/DL (ref 0.51–0.95)
DEPRECATED HCO3 PLAS-SCNC: 22 MMOL/L (ref 22–29)
EOSINOPHIL # BLD AUTO: 0 10E3/UL (ref 0–0.7)
EOSINOPHIL NFR BLD AUTO: 0 %
ERYTHROCYTE [DISTWIDTH] IN BLOOD BY AUTOMATED COUNT: 19.6 % (ref 10–15)
FLUAV H1 2009 PAND RNA SPEC QL NAA+PROBE: NOT DETECTED
FLUAV H1 RNA SPEC QL NAA+PROBE: NOT DETECTED
FLUAV H3 RNA SPEC QL NAA+PROBE: NOT DETECTED
FLUAV RNA SPEC QL NAA+PROBE: NOT DETECTED
FLUBV RNA SPEC QL NAA+PROBE: NOT DETECTED
GFR SERPL CREATININE-BSD FRML MDRD: >90 ML/MIN/1.73M2
GLUCOSE SERPL-MCNC: 99 MG/DL (ref 70–99)
GLUCOSE UR STRIP-MCNC: NEGATIVE MG/DL
HADV DNA SPEC QL NAA+PROBE: NOT DETECTED
HCOV PNL SPEC NAA+PROBE: NOT DETECTED
HCT VFR BLD AUTO: 25.3 % (ref 35–47)
HGB BLD-MCNC: 8.3 G/DL (ref 11.7–15.7)
HGB UR QL STRIP: NEGATIVE
HMPV RNA SPEC QL NAA+PROBE: NOT DETECTED
HPIV1 RNA SPEC QL NAA+PROBE: NOT DETECTED
HPIV2 RNA SPEC QL NAA+PROBE: NOT DETECTED
HPIV3 RNA SPEC QL NAA+PROBE: NOT DETECTED
HPIV4 RNA SPEC QL NAA+PROBE: NOT DETECTED
IMM GRANULOCYTES # BLD: 0 10E3/UL
IMM GRANULOCYTES NFR BLD: 1 %
KETONES UR STRIP-MCNC: NEGATIVE MG/DL
LACTATE SERPL-SCNC: 0.9 MMOL/L (ref 0.7–2)
LEUKOCYTE ESTERASE UR QL STRIP: NEGATIVE
LYMPHOCYTES # BLD AUTO: 0.3 10E3/UL (ref 0.8–5.3)
LYMPHOCYTES NFR BLD AUTO: 4 %
M PNEUMO DNA SPEC QL NAA+PROBE: NOT DETECTED
MAGNESIUM SERPL-MCNC: 1.9 MG/DL (ref 1.7–2.3)
MCH RBC QN AUTO: 31.7 PG (ref 26.5–33)
MCHC RBC AUTO-ENTMCNC: 32.8 G/DL (ref 31.5–36.5)
MCV RBC AUTO: 97 FL (ref 78–100)
MONOCYTES # BLD AUTO: 0.9 10E3/UL (ref 0–1.3)
MONOCYTES NFR BLD AUTO: 15 %
MUCOUS THREADS #/AREA URNS LPF: PRESENT /LPF
NEUTROPHILS # BLD AUTO: 4.8 10E3/UL (ref 1.6–8.3)
NEUTROPHILS NFR BLD AUTO: 80 %
NITRATE UR QL: NEGATIVE
NRBC # BLD AUTO: 0 10E3/UL
NRBC BLD AUTO-RTO: 0 /100
PH UR STRIP: 6 [PH] (ref 5–7)
PHOSPHATE SERPL-MCNC: 3.6 MG/DL (ref 2.5–4.5)
PLATELET # BLD AUTO: 190 10E3/UL (ref 150–450)
POTASSIUM SERPL-SCNC: 4 MMOL/L (ref 3.4–5.3)
PROT SERPL-MCNC: 6.2 G/DL (ref 6.4–8.3)
RBC # BLD AUTO: 2.62 10E6/UL (ref 3.8–5.2)
RBC URINE: 1 /HPF
RSV RNA SPEC QL NAA+PROBE: NOT DETECTED
RSV RNA SPEC QL NAA+PROBE: NOT DETECTED
RV+EV RNA SPEC QL NAA+PROBE: NOT DETECTED
SODIUM SERPL-SCNC: 139 MMOL/L (ref 136–145)
SP GR UR STRIP: 1.02 (ref 1–1.03)
SQUAMOUS EPITHELIAL: 5 /HPF
UROBILINOGEN UR STRIP-MCNC: NORMAL MG/DL
WBC # BLD AUTO: 6.1 10E3/UL (ref 4–11)
WBC URINE: 0 /HPF

## 2022-12-20 PROCEDURE — 36415 COLL VENOUS BLD VENIPUNCTURE: CPT | Performed by: PHYSICIAN ASSISTANT

## 2022-12-20 PROCEDURE — 85025 COMPLETE CBC W/AUTO DIFF WBC: CPT | Performed by: PHYSICIAN ASSISTANT

## 2022-12-20 PROCEDURE — 71046 X-RAY EXAM CHEST 2 VIEWS: CPT | Mod: 26 | Performed by: RADIOLOGY

## 2022-12-20 PROCEDURE — 258N000003 HC RX IP 258 OP 636: Performed by: INTERNAL MEDICINE

## 2022-12-20 PROCEDURE — 83605 ASSAY OF LACTIC ACID: CPT | Performed by: INTERNAL MEDICINE

## 2022-12-20 PROCEDURE — 80053 COMPREHEN METABOLIC PANEL: CPT | Performed by: PHYSICIAN ASSISTANT

## 2022-12-20 PROCEDURE — 36415 COLL VENOUS BLD VENIPUNCTURE: CPT | Performed by: INTERNAL MEDICINE

## 2022-12-20 PROCEDURE — 120N000002 HC R&B MED SURG/OB UMMC

## 2022-12-20 PROCEDURE — 71046 X-RAY EXAM CHEST 2 VIEWS: CPT

## 2022-12-20 PROCEDURE — 250N000011 HC RX IP 250 OP 636: Performed by: PHYSICIAN ASSISTANT

## 2022-12-20 PROCEDURE — 87633 RESP VIRUS 12-25 TARGETS: CPT | Performed by: PHYSICIAN ASSISTANT

## 2022-12-20 PROCEDURE — 87040 BLOOD CULTURE FOR BACTERIA: CPT | Performed by: PHYSICIAN ASSISTANT

## 2022-12-20 PROCEDURE — 250N000013 HC RX MED GY IP 250 OP 250 PS 637: Performed by: PHYSICIAN ASSISTANT

## 2022-12-20 PROCEDURE — 81001 URINALYSIS AUTO W/SCOPE: CPT | Performed by: PHYSICIAN ASSISTANT

## 2022-12-20 PROCEDURE — 99233 SBSQ HOSP IP/OBS HIGH 50: CPT | Mod: FS | Performed by: PHYSICIAN ASSISTANT

## 2022-12-20 PROCEDURE — 250N000011 HC RX IP 250 OP 636: Performed by: STUDENT IN AN ORGANIZED HEALTH CARE EDUCATION/TRAINING PROGRAM

## 2022-12-20 PROCEDURE — 83735 ASSAY OF MAGNESIUM: CPT | Performed by: PHYSICIAN ASSISTANT

## 2022-12-20 PROCEDURE — 999N000128 HC STATISTIC PERIPHERAL IV START W/O US GUIDANCE

## 2022-12-20 PROCEDURE — 84100 ASSAY OF PHOSPHORUS: CPT | Performed by: PHYSICIAN ASSISTANT

## 2022-12-20 PROCEDURE — 250N000013 HC RX MED GY IP 250 OP 250 PS 637: Performed by: STUDENT IN AN ORGANIZED HEALTH CARE EDUCATION/TRAINING PROGRAM

## 2022-12-20 PROCEDURE — 250N000011 HC RX IP 250 OP 636: Performed by: INTERNAL MEDICINE

## 2022-12-20 RX ORDER — NALOXONE HYDROCHLORIDE 0.4 MG/ML
0.2 INJECTION, SOLUTION INTRAMUSCULAR; INTRAVENOUS; SUBCUTANEOUS
Status: DISCONTINUED | OUTPATIENT
Start: 2022-12-20 | End: 2022-12-23 | Stop reason: HOSPADM

## 2022-12-20 RX ORDER — CELECOXIB 200 MG/1
200 CAPSULE ORAL ONCE
Status: COMPLETED | OUTPATIENT
Start: 2022-12-20 | End: 2022-12-20

## 2022-12-20 RX ORDER — DEXAMETHASONE 4 MG/1
20 TABLET ORAL ONCE
Status: COMPLETED | OUTPATIENT
Start: 2022-12-21 | End: 2022-12-21

## 2022-12-20 RX ORDER — NALOXONE HYDROCHLORIDE 0.4 MG/ML
0.4 INJECTION, SOLUTION INTRAMUSCULAR; INTRAVENOUS; SUBCUTANEOUS
Status: DISCONTINUED | OUTPATIENT
Start: 2022-12-20 | End: 2022-12-23 | Stop reason: HOSPADM

## 2022-12-20 RX ORDER — ACETAMINOPHEN 325 MG/1
650 TABLET ORAL EVERY 4 HOURS PRN
Status: DISCONTINUED | OUTPATIENT
Start: 2022-12-20 | End: 2022-12-23 | Stop reason: HOSPADM

## 2022-12-20 RX ADMIN — AMLODIPINE BESYLATE 5 MG: 5 TABLET ORAL at 09:30

## 2022-12-20 RX ADMIN — ACETAMINOPHEN 650 MG: 325 TABLET, FILM COATED ORAL at 19:49

## 2022-12-20 RX ADMIN — CEFEPIME HYDROCHLORIDE 2 G: 2 INJECTION, POWDER, FOR SOLUTION INTRAVENOUS at 18:51

## 2022-12-20 RX ADMIN — ENOXAPARIN SODIUM 40 MG: 40 INJECTION SUBCUTANEOUS at 15:49

## 2022-12-20 RX ADMIN — ACETAMINOPHEN 650 MG: 325 TABLET, FILM COATED ORAL at 14:35

## 2022-12-20 RX ADMIN — VALACYCLOVIR HYDROCHLORIDE 1000 MG: 500 TABLET, FILM COATED ORAL at 09:30

## 2022-12-20 RX ADMIN — PANTOPRAZOLE SODIUM 20 MG: 20 TABLET, DELAYED RELEASE ORAL at 09:30

## 2022-12-20 RX ADMIN — BLINATUMOMAB 9 MCG: KIT INTRAVENOUS at 18:18

## 2022-12-20 RX ADMIN — ACETAMINOPHEN 650 MG: 325 TABLET, FILM COATED ORAL at 06:38

## 2022-12-20 ASSESSMENT — ACTIVITIES OF DAILY LIVING (ADL)
ADLS_ACUITY_SCORE: 18

## 2022-12-20 NOTE — PROGRESS NOTES
"SPIRITUAL HEALTH SERVICES  [facility] 7D  REFERRAL SOURCE: Length of stay     Met pt in the hallway as she was returning to her room. Pt states \"I had to get out of the room and get some exercise.\" Pt states that she is \"fine,\" adding \"I'm on the home stretch now. I'll leave Friday.\" Pt was pleasant and had no spiritual health needs at this time.     PLAN:  remains available per pt/family/staff request.     Rev. Shalini Goddard MDiv, [facility]  Staff    Pager [phone]  * [facility] remains available 24/7 for emergent requests/referrals, either by having the switchboard page the on-call  or by entering an ASAP/STAT consult in Epic (this will also page the on-call ).*   "

## 2022-12-20 NOTE — PLAN OF CARE
"ICANS score     Score:   4   Orientation: Orientation to year, month, city, hospital: 4 points (1 point each)   3   Identify: Name 3 objects that nurse points to (e.g. clock, pen, button): 3 points (1 point each)   1   Following Commands: (e.g. show me two fingers or close your eyes and stick out your tongue): 1 point   1   Writing: Ability to write a standard sentence (see writing page \"The castro jumped over the log.\"): 1 point   1   Attention: Count backwards from 100 by 10s: 1 point     10   Total: Max 10, no impairment, reassess next shift                   **9 or below Notify MD and reassess in 4 hours   Blincyto bag #6 continued.  "

## 2022-12-20 NOTE — PROGRESS NOTES
Nursing Focus: Chemotherapy  D: Positive blood return via Port. Insertion site is clean/dry/intact, dressing intact with no complaints of pain.  Urine output is recorded in intake in Doc Flowsheet.    I: Premedications given per order (see electronic medical administration record). Dose #4 of blincyto started to infuse over 24 hours. Reviewed pt teaching on chemotherapy side effects.  Pt denies need for further teaching. Chemotherapy double checked per protocol by two chemotherapy competent RN's.   A: Tolerating procedure well. Denies nausea and or pain.   P: Continue to monitor urine output and symptoms of nausea. Screen for symptoms of toxicity.

## 2022-12-20 NOTE — PLAN OF CARE
[id number]:  Pt A&Ox4, ICANS 10, VSS on RA. Denies N. Pain 4/10 with head ache, tylenol x1. Continued blincyto via port. UAL. Good UOP, not saving. Makes needs known. Continue to monitor and with POC.

## 2022-12-20 NOTE — CONSULTS
Health Psychology                                                                                                                          Kelsey Cha, Ph.D., L.P [phone]  Lexi Lobato, Ph.D., L.P. [phone]  Avani Quinones, Ph.D, L..P. [phone]  Rosa Gonzalez, Ph.D., L.P. [phone]  Ander Younger, Ph.D., A.B.P.P., L.P. [phone]         Janett Plata, Ph.D., L.P. [phone]       Liz Mays, Ph.D., A.B.P.P., LP [phone]           [facility] [place], 3rd Floor  [address] [place] [address]       Inpatient Health Psychology Consultation      Date of Service:  12/20/22    Met with April today to introduce Health Psychology services and discuss nature of referral. April describes mild to moderate emotional impacts of cancer diagnosis/treatment including mood impacts and anxiety yet denies impairment or significant distress from symptoms. She expressed interest in connection with other individuals with cancer who may understand her experience.  She questioned about support groups and this writer made plans to identify potential resources and communicate them to April either in-person or via [employer] message.    Provided psychoeducation about resources available for her should she ever want to access them (I.e. Health Psychology or Psycho oncology, etc).     April shared information about what has been helpful for her in coping with diagnosis/admission/treatment up until this point. Primarily social support, doing her own research (with limits), asking questions to team. She is accepting of the current treatment approach while also acknowledging the uncertainty and anxiety it can create.     Discussed additional coping skills and offered to follow-up with April later in her stay should she remain admitted longer than expected. She agreed.    Avani Quinones, PhD,   Clinical Health Psychologist  Phone: [phone]  Pager: [phone]

## 2022-12-20 NOTE — PLAN OF CARE
"Goal Outcome Evaluation:     Time 6058-6892    /59 (BP Location: Right arm)   Pulse 103   Temp 98.7  F (37.1  C) (Oral)   Resp 18   Ht 1.626 m (5' 4\")   Wt 109.4 kg (241 lb 1.6 oz)   SpO2 97%   BMI 41.38 kg/m      Reason for admission:  Day 6 blincyto + ponatinib  Activity: Independent   Pain: has neck and back pain using aqua K and tylenol at present   Neuro: A&O x 4, ICANS 10  Cardiac: wnl  Respiratory: wnl  GI/: Voiding adequate, last BM 12/19  Diet: Regular  Lines: PICC   Wounds: none  Labs/imaging: Reviewed      Plan: blincyto 9 mcg x 7 days, then to 28 mcg, tentative discharge 12/23      Continue to monitor and follow POC   "

## 2022-12-20 NOTE — PROGRESS NOTES
"ICANS score     Score:       Orientation: Orientation to year, month, city, hospital: 4 points (1 point each)    Identify: Name 3 objects that nurse points to (e.g. clock, pen, button): 3 points (1 point each)   Following Commands: (e.g. show me two fingers or close your eyes and stick out your tongue): 1 point      Writing: Ability to write a standard sentence (see writing page \"The castro jumped over the log.\"): 1 point    Attention: Count backwards from 100 by 10s: 1 point       Total: Max 10, no impairment, reassess next shift                   **9 or below Notify MD and reassess in 4 hours   Score 10. Tennille Ruiz RN  "

## 2022-12-20 NOTE — PROGRESS NOTES
Care Management Follow Up    Length of Stay (days): 6  Expected Discharge Date: 12/24/2022  Concerns to be Addressed: discharge planning     Patient plan of care discussed at interdisciplinary rounds: Yes    Anticipated Discharge Disposition: Home   Anticipated Discharge Services: [facility] (Pt has Blincyto coverage through [employer] plan, family ded $6000 has been met in full, coverage should be at 100%.  Anticipated Discharge DME:       Additional Information:  Per [facility] - Pt has Blincyto coverage through [employer] plan, family ded $6000 has been met in full, coverage should be at 100%. Rosio, AVELINA wondering if everything is set up for a Fri 12/23 discharge?    Per marcelle Reed - Blincyto is a continuous infusion that has to get hooked up by [facility] nurses. [facility] needs final discharge orders on Friday with orders for any labs that need to be drawn. Derek will talk to pt tomorrow 12/21. She's done home infusion before. Current bag changes are occurring at 6PM daily, [facility] asking that this be changed to earlier time. Updated Rosio, AVELINA regarding this.    Later on, Derek liaison clarified further that [facility] plans to hookup the [facility] blincyto bag at 3pm on day of discharge. In case the [facility] nurse is delayed and not there exactly at 3PM - [facility] is requesting that hospital bag changes not occur any earlier than 4PM. RNCC sent message to inpatient pharmacistMiguel Angel who will update the pharmacist covering 7D.         Bety Hanson, RN, [place]  Float Care Coordinator  Covering 7D RNCC   Pager: [phone]

## 2022-12-20 NOTE — PROGRESS NOTES
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     Today:   - Day 7 blincyto + ponatinib; tolerating well. Tentative discharge 12/23 if continues to tolerate well. PLC arranged 12/22. RNCC arranging with [facility].   - Myalgias are improved, continue heat packs and tylenol PRN. Trial Celebrex 200mg once this evening if persistent.   - Continue ppx lovenox while inpatient; decided against starting ASA 81mg on discharge given frequency of LP's  - Frequency of intrathecal chemotherapy as dictated by Dr. Hull above  - Health psych to see patient today.  - Working with RNCC to ensure [facility] regarding home Blincyto and lab capability at home    HEME   # Ph(+) B-ALL   Followed by Dr. Hull. Patient presented to outside hospital with c/o right upper quadrant pain and was subsequently found to have a markedly elevated white blood cell count concerning for acute leukemia. While at OSH, she had CTA chest as well as CT A/P. These identified no PE, no acute or localized intraabdominal inflammatory process, mild splenomegaly, and few inconspicuous low-density structures in the liver. She was transferred to [facility] for further workup and management. S/p Hydrea 1g TID (8/21-8/23) with improvement in WBC (100K ? 12K). Diagnostic BMBx completed 8/20/22, which demonstrated B-ALL with 85% blasts and hypercellular marrow. IHC shows CD10+, CD34+. 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 ALL NGS negative. BCR/ABL1 major/minor (sent from peripheral blood) both negative. Initiated pre-phase steroids with Prednisone 60 mg x8/21; increased to 60 mg/m2 (140 mg) 8/22-8/24. She initiated GRAALL + imatinib (C1D1 = 8/24/22). S/p BMT consult 9/12 with Dr. Walsh. Induction was relatively uncomplicated. Post-induction BMBx done 9/26/22 and revealed MRD- CR1, with negative BCR/ABL1 and Clonoseq testing. With no matched related donors and excellent early/deep response chemotherapy is currently favored over BMT. 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 Underwent BMBx 10/25/22 with no morphologic evidence of B-lymphoblastic leukemia and flow with unusual B lineage precursor population 0.09%. C3 GRAALL + imatinib on 11/2/22 was tolerated well without complication. Prior plan was to continue GRAALL for a total of 8 cycles (of note odd cycles are 21-days in length and even cycles are 14-days in length), however now adjusting to Blinatumumab + Ponatinib.   - Per Dr. Hull, plan is to continue treatment with blinatumomab and ponatinib (Lancet Haematol 2022 Nov 16;-[id number]). She should remain on prophylactic antimicrobials during this study. Bone marrow examinations, including PCR for BCR - ABL , will be planned the end of cycles one, two, and five during ponatinib-blinatumomab combination therapy and every 3-6 months thereafter while on ponatinib maintenance. Clonoseq MRD testing to be sent with each bone marrow examination. She will require a total of 12 prophylactic intrathecal chemotherapy administrations, alternating methotrexate and cytarabine, per protocol.  -  Given risk for arterial occlusive events with ponatinib, will continue with ppx lovenox while inpatient. Decided against starting ASA 81mg on discharge given frequency of LP's, and hold off on starting a statin in setting of myalgias.   - Port in place at admission   - Patient does have coverage for [facility] and has received IV abx, line cares, etc in past. PLC arranged 12/22, [facility] will arrange bag hook up on day of discharge 12/23.   - Monitor for signs or symptoms of CRS or neurotoxicity (immune effector cell-associated neurotoxicity syndrome=ICANS). Per updated blinatumomab management guidelines:  ? Consider 1 dose of tocilizumab 8 mg/kg IV with Grade 2 CRS  ? Interrupt treatment for Grade 3 CRS. Start dexamethasone 8 mg Q8H x3 days, then taper over 4 days. Resume Blincyto once CRS resolves. Consider tocilizumab use as well, 8 mg/kg, up to 4 total doses (Q8H).  ? Consider single dose of dexamethasone 10 mg IV x1 for Grade 2 neurotoxicity (ICANS).  ? Hold blinatumomab for Grade 3 ICANS, until resolved to Grade 1 or better. Start dexamethasone 8 mg Q8H x up to 3 days, then taper over 4 days. Consider tocilizumab (same doses as for CRS).  ? Permanent discontinuation of treatment necessitated for Grade 4 CRS/ICANS.     Treatment Plan: Blincyto + Ponatinib (C1D1 = 12/14/22)    Pre-meds: Dex 20 mg D1 and D8     IT methotrexate 12 mg - done in XR 12/16, flow negative    Blincyto 9 mcg daily, CIVI - Days 1-7     Blincyto 28 mcg daily, CIVI - Days 8-9 inpatient, then Days 10-28 through home infusion    Ponatinib 15 mg daily     # Anemia   # Elevated B12 levels  Likely secondary to chemotherapy. On 12/17, folate, iron/TIBCnormal and retics elevated. Ferritin high at 568 and vitamin B12 >3000. LDH mildly high (294), haptoglobin in process.   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 Presented with scattered papulovesicular lesions on her chin and nose as well as scattered hemorrhagic erosions on the scalp and lower face (V2-3 distributions of the trigeminal nerve) c/f disseminated VZV. Lesions significantly improved on admission. ID recommended considering decrease to 1000 mg daily on 12/13 visit (given less anticipated prolonged neutropenia with new regimen) and confirmed with Dr. Hull.   - Continue Valtrex 1000 mg daily.     # ID PPx   - Valtrex 1000 mg daily   - Hold PTA fluconazole 200 mg daily and levofloxacin 250 mg daily given ANC >1.0   - Nebulized pentamidine last given 12/2; next due 12/30  - Received Evusheld on 11/2/22       MISC  # Myalgias  Noted to have myalgias starting 12/18 AM. Continues to have mild myalgias, in her shoulders, upper arms, back, hip and thighs. Has had some improvement with APAP. Reports these are constant. Not worsening with movement. May be related to ponatinib. Hold off on starting a statin. Clinically improving as of 12/20 with tylenol, heat, and changing her position.   - Continue to monitor. Trial celebrex 200mg once on 12/20 evening if needed. May also improve after steroid premed per blincyto protocol.     # Abdominal rash, resolving  Noted ~12/9 outpatient. Mildly pruritic. Evaluated by ID on 12/13 who recommended moisturizer for the rash on the abdomen. This is likely not infectious as she has been on valtrex and fluconazole.   - Triamcinolone PRN   - Resolving as of 12/20.     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 Pt reports symptoms are stable. Reports mild paresthesias and hot/cold sensitivity.  Monitor clinically       # Coping  Patient endorses difficulty with coping regarding the unknowns of her  "leukemia and treatment. Supportive listening provided and patient agreeable to health psych.   - Health psych planning to see patient in hospital on 12/20.    Clinically Significant Risk Factors                        # Severe Obesity: Estimated body mass index is 41.38 kg/m  as calculated from the following:    Height as of this encounter: 1.626 m (5' 4\").    Weight as of this encounter: 109.4 kg (241 lb 1.6 oz).          FEN:   - Bolus PRN IVF per chemo plan   - PRN lyte replacement  - RDAT       Prophy/Misc:   -VTE: Lovenox; hold for plts 50k  -GI/PUD: PTA Protonix  -Bowels: PRN Senna and Miralax       Dispo: Likely 12/23 pending tolerance of blincyto  Follow Up:   - 12/26 - Labs/AVELINA visit   - Home infusion for bag changes, requested twice weekly labs through [facility]     I spent >40 minutes face-to-face and/or coordinating or discussing care plan.      Patient plan of care discussed with Dr. Hull.  Assessment and plan are discussed and delivered to the patient.     Rosio Rivers PA-C  Hematology/Oncology  Pager #[phone] or available on [employer]    Interval History   No acute events overnight. Afebrile.  April is sitting up in bed, overall comfortable. Arthralgias are improved today, she's only having a little bit of discomfort but resolves when changing position or using heat. No other new symptoms or concerns. Appetite intact. Energy level ok. Neuropathy stable. No tremors or confusion. Gait is stable.   A comprehensive review of systems was reviewed with the patient and the pertinent positives are listed in the HPI above.        Physical Exam   Temp: 99.7  F (37.6  C) Temp src: Oral BP: 124/65 Pulse: 94   Resp: 18 SpO2: 96 % O2 Device: None (Room air)    Vitals:    12/17/22 0824 12/18/22 0800 12/19/22 0821   Weight: 108.5 kg (239 lb 1.6 oz) 109.1 kg (240 lb 8 oz) 109.4 kg (241 lb 1.6 oz)     Vital Signs with Ranges  Temp:  [98.7  F (37.1  C)-99.7  F (37.6  C)] 99.7  F (37.6  C)  Pulse:  [] 94  Resp:  [18] " 18  BP: (124-132)/(52-65) 124/65  SpO2:  [96 %-98 %] 96 %  I/O last 3 completed shifts:  In: 400 [P.O.:280; IV Piggyback:120]  Out: 925 [Urine:925]    Constitutional: Awake and conversational. Non-toxic appearing. No acute distress. Well developed, hydrated, and nourished. Appears stated age.   HEENT: Normocephalic, atraumatic. Head scarf in place.  Lymph: Neck supple.   Respiratory: Breathing comfortable with no increased work on room air. Good air exchange. No signs of respiratory distress or accessory muscle use. Lungs clear to auscultation bilaterally, no crackles or wheezing noted.  Cardiovascular: Regular rate and rhythm. No murmurs, rubs, or gallops. 1+ peripheral edema; stable.    GI: Abdomen is soft, non-distended, non-tender and without distension. Bowel sounds present and normoactive. No masses or hepatosplenomegaly appreciated.  Skin: Skin is clean, dry, intact. No jaundice appreciated.  Musculoskeletal/ Extremities: Nailbeds pink and without cyanosis or clubbing. 1+ bilateral LE edema.  Neurologic: Alert and oriented. Speech normal. Grossly nonfocal. Memory and thought process preserved. Seen ambulating around unit without concerns.   Neuropsychiatric: Calm, affect congruent to situation. Appropriate mood and affect. Good judgment and insight.     Medications     - MEDICATION INSTRUCTIONS -         amLODIPine  5 mg Oral Daily     [START ON 12/21/2022] blinatumomab (BLINCYTO) 24 hour infusion  28 mcg Intravenous Q24H     [START ON 12/23/2022] blinatumomab (BLINCYTO) 24 hour HOME infusion  28 mcg Intravenous Q24H     blinatumomab (BLINCYTO) 24 hour infusion  9 mcg Intravenous Q24H     celecoxib  200 mg Oral Once     Chemotherapy Infusing-Continuous Infusion   Does not apply Q8H     [START ON 12/21/2022] dexamethasone  20 mg Oral Once     enoxaparin ANTICOAGULANT  40 mg Subcutaneous Q24H     pantoprazole  20 mg Oral Daily     PONATinib  15 mg Oral Q24H     valACYclovir  1,000 mg Oral Daily       Data    Results for orders placed or performed during the hospital encounter of 12/14/22 (from the past 24 hour(s))   CBC with platelets differential    Narrative    The following orders were created for panel order CBC with platelets differential.  Procedure                               Abnormality         Status                     ---------                               -----------         ------                     CBC with platelets and d...[[id number]]  Abnormal            Final result                 Please view results for these tests on the individual orders.   Comprehensive metabolic panel   Result Value Ref Range    Sodium 139 136 - 145 mmol/L    Potassium 4.0 3.4 - 5.3 mmol/L    Chloride 107 98 - 107 mmol/L    Carbon Dioxide (CO2) 22 22 - 29 mmol/L    Anion Gap 10 7 - 15 mmol/L    Urea Nitrogen 10.4 6.0 - 20.0 mg/dL    Creatinine 0.56 0.51 - 0.95 mg/dL    Calcium 9.0 8.6 - 10.0 mg/dL    Glucose 99 70 - 99 mg/dL    Alkaline Phosphatase 78 35 - 104 U/L    AST 12 10 - 35 U/L    ALT 17 10 - 35 U/L    Protein Total 6.2 (L) 6.4 - 8.3 g/dL    Albumin 3.9 3.5 - 5.2 g/dL    Bilirubin Total 0.4 <=1.2 mg/dL    GFR Estimate >90 >60 mL/min/1.73m2   Magnesium   Result Value Ref Range    Magnesium 1.9 1.7 - 2.3 mg/dL   Phosphorus   Result Value Ref Range    Phosphorus 3.6 2.5 - 4.5 mg/dL   CBC with platelets and differential   Result Value Ref Range    WBC Count 6.1 4.0 - 11.0 10e3/uL    RBC Count 2.62 (L) 3.80 - 5.20 10e6/uL    Hemoglobin 8.3 (L) 11.7 - 15.7 g/dL    Hematocrit 25.3 (L) 35.0 - 47.0 %    MCV 97 78 - 100 fL    MCH 31.7 26.5 - 33.0 pg    MCHC 32.8 31.5 - 36.5 g/dL    RDW 19.6 (H) 10.0 - 15.0 %    Platelet Count 190 150 - 450 10e3/uL    % Neutrophils 80 %    % Lymphocytes 4 %    % Monocytes 15 %    % Eosinophils 0 %    % Basophils 0 %    % Immature Granulocytes 1 %    NRBCs per 100 WBC 0 <1 /100    Absolute Neutrophils 4.8 1.6 - 8.3 10e3/uL    Absolute Lymphocytes 0.3 (L) 0.8 - 5.3 10e3/uL    Absolute Monocytes  0.9 0.0 - 1.3 10e3/uL    Absolute Eosinophils 0.0 0.0 - 0.7 10e3/uL    Absolute Basophils 0.0 0.0 - 0.2 10e3/uL    Absolute Immature Granulocytes 0.0 <=0.4 10e3/uL    Absolute NRBCs 0.0 10e3/uL

## 2022-12-20 NOTE — PROGRESS NOTES
Brief Heme Malignancy Note:    Called regarding fever of 100.4F this afternoon. Assessed patient, having intermittent warmth and chills. Continues to have cough, non productive. No other focal symptoms. Non Neutropenic.  Ordered CXR, UA and Blood Cultures. Patient given tylenol.     Recheck VS in 4 hours.    Will hold off on antibiotics at this time given that patient is non neutropenic and there are no obvious sources of infection at time.     If patient continues to fever, will consider starting abx. Given persistent fever, this is likely grade 1 CRS.    If patient developed further symptoms concerning for severe CRS, can consider initiation of steroids. (Dexamethasone per protocol)    Plan discussed with Dr Della Gonzalez (Jia, PA-C  Hematology/Oncology  Pager: #[phone]

## 2022-12-21 LAB
ALBUMIN SERPL BCG-MCNC: 3.8 G/DL (ref 3.5–5.2)
ALP SERPL-CCNC: 71 U/L (ref 35–104)
ALT SERPL W P-5'-P-CCNC: 14 U/L (ref 10–35)
ANION GAP SERPL CALCULATED.3IONS-SCNC: 11 MMOL/L (ref 7–15)
AST SERPL W P-5'-P-CCNC: 12 U/L (ref 10–35)
BACTERIA CSF CULT: NO GROWTH
BASOPHILS # BLD AUTO: 0 10E3/UL (ref 0–0.2)
BASOPHILS NFR BLD AUTO: 1 %
BILIRUB SERPL-MCNC: 0.5 MG/DL
BUN SERPL-MCNC: 11.1 MG/DL (ref 6–20)
CALCIUM SERPL-MCNC: 8.9 MG/DL (ref 8.6–10)
CHLORIDE SERPL-SCNC: 107 MMOL/L (ref 98–107)
CREAT SERPL-MCNC: 0.6 MG/DL (ref 0.51–0.95)
DEPRECATED HCO3 PLAS-SCNC: 21 MMOL/L (ref 22–29)
EOSINOPHIL # BLD AUTO: 0 10E3/UL (ref 0–0.7)
EOSINOPHIL NFR BLD AUTO: 0 %
ERYTHROCYTE [DISTWIDTH] IN BLOOD BY AUTOMATED COUNT: 19.7 % (ref 10–15)
GFR SERPL CREATININE-BSD FRML MDRD: >90 ML/MIN/1.73M2
GLUCOSE SERPL-MCNC: 99 MG/DL (ref 70–99)
GRAM STAIN RESULT: NORMAL
GRAM STAIN RESULT: NORMAL
HCT VFR BLD AUTO: 26.7 % (ref 35–47)
HGB BLD-MCNC: 8.5 G/DL (ref 11.7–15.7)
IMM GRANULOCYTES # BLD: 0.1 10E3/UL
IMM GRANULOCYTES NFR BLD: 1 %
L PNEUMO1 AG UR QL IA: NEGATIVE
LACTATE SERPL-SCNC: 1 MMOL/L (ref 0.7–2)
LYMPHOCYTES # BLD AUTO: 0.2 10E3/UL (ref 0.8–5.3)
LYMPHOCYTES NFR BLD AUTO: 4 %
MAGNESIUM SERPL-MCNC: 2 MG/DL (ref 1.7–2.3)
MCH RBC QN AUTO: 31.1 PG (ref 26.5–33)
MCHC RBC AUTO-ENTMCNC: 31.8 G/DL (ref 31.5–36.5)
MCV RBC AUTO: 98 FL (ref 78–100)
MONOCYTES # BLD AUTO: 1 10E3/UL (ref 0–1.3)
MONOCYTES NFR BLD AUTO: 18 %
NEUTROPHILS # BLD AUTO: 4.3 10E3/UL (ref 1.6–8.3)
NEUTROPHILS NFR BLD AUTO: 76 %
NRBC # BLD AUTO: 0 10E3/UL
NRBC BLD AUTO-RTO: 0 /100
PHOSPHATE SERPL-MCNC: 4 MG/DL (ref 2.5–4.5)
PLATELET # BLD AUTO: 167 10E3/UL (ref 150–450)
POTASSIUM SERPL-SCNC: 3.7 MMOL/L (ref 3.4–5.3)
PROT SERPL-MCNC: 6.4 G/DL (ref 6.4–8.3)
RBC # BLD AUTO: 2.73 10E6/UL (ref 3.8–5.2)
S PNEUM AG SPEC QL: NEGATIVE
SODIUM SERPL-SCNC: 139 MMOL/L (ref 136–145)
WBC # BLD AUTO: 5.7 10E3/UL (ref 4–11)

## 2022-12-21 PROCEDURE — 87449 NOS EACH ORGANISM AG IA: CPT | Performed by: PHYSICIAN ASSISTANT

## 2022-12-21 PROCEDURE — 83735 ASSAY OF MAGNESIUM: CPT | Performed by: PHYSICIAN ASSISTANT

## 2022-12-21 PROCEDURE — 87899 AGENT NOS ASSAY W/OPTIC: CPT | Performed by: PHYSICIAN ASSISTANT

## 2022-12-21 PROCEDURE — 99233 SBSQ HOSP IP/OBS HIGH 50: CPT | Mod: FS | Performed by: PHYSICIAN ASSISTANT

## 2022-12-21 PROCEDURE — 250N000011 HC RX IP 250 OP 636: Performed by: PHYSICIAN ASSISTANT

## 2022-12-21 PROCEDURE — 83605 ASSAY OF LACTIC ACID: CPT | Performed by: INTERNAL MEDICINE

## 2022-12-21 PROCEDURE — 87040 BLOOD CULTURE FOR BACTERIA: CPT | Performed by: INTERNAL MEDICINE

## 2022-12-21 PROCEDURE — 250N000011 HC RX IP 250 OP 636: Performed by: INTERNAL MEDICINE

## 2022-12-21 PROCEDURE — 250N000013 HC RX MED GY IP 250 OP 250 PS 637: Performed by: PHYSICIAN ASSISTANT

## 2022-12-21 PROCEDURE — 250N000011 HC RX IP 250 OP 636: Performed by: STUDENT IN AN ORGANIZED HEALTH CARE EDUCATION/TRAINING PROGRAM

## 2022-12-21 PROCEDURE — 120N000002 HC R&B MED SURG/OB UMMC

## 2022-12-21 PROCEDURE — 36415 COLL VENOUS BLD VENIPUNCTURE: CPT | Performed by: PHYSICIAN ASSISTANT

## 2022-12-21 PROCEDURE — 87205 SMEAR GRAM STAIN: CPT | Performed by: PHYSICIAN ASSISTANT

## 2022-12-21 PROCEDURE — 258N000003 HC RX IP 258 OP 636: Performed by: INTERNAL MEDICINE

## 2022-12-21 PROCEDURE — 250N000013 HC RX MED GY IP 250 OP 250 PS 637: Performed by: STUDENT IN AN ORGANIZED HEALTH CARE EDUCATION/TRAINING PROGRAM

## 2022-12-21 PROCEDURE — 85004 AUTOMATED DIFF WBC COUNT: CPT | Performed by: PHYSICIAN ASSISTANT

## 2022-12-21 PROCEDURE — 87305 ASPERGILLUS AG IA: CPT | Performed by: PHYSICIAN ASSISTANT

## 2022-12-21 PROCEDURE — 36415 COLL VENOUS BLD VENIPUNCTURE: CPT | Performed by: INTERNAL MEDICINE

## 2022-12-21 PROCEDURE — 999N000127 HC STATISTIC PERIPHERAL IV START W US GUIDANCE

## 2022-12-21 PROCEDURE — 84100 ASSAY OF PHOSPHORUS: CPT | Performed by: PHYSICIAN ASSISTANT

## 2022-12-21 PROCEDURE — 82310 ASSAY OF CALCIUM: CPT | Performed by: PHYSICIAN ASSISTANT

## 2022-12-21 RX ADMIN — BLINATUMOMAB 28 MCG: KIT INTRAVENOUS at 19:29

## 2022-12-21 RX ADMIN — ENOXAPARIN SODIUM 40 MG: 40 INJECTION SUBCUTANEOUS at 17:58

## 2022-12-21 RX ADMIN — DEXAMETHASONE 20 MG: 4 TABLET ORAL at 12:08

## 2022-12-21 RX ADMIN — CEFEPIME HYDROCHLORIDE 2 G: 2 INJECTION, POWDER, FOR SOLUTION INTRAVENOUS at 17:58

## 2022-12-21 RX ADMIN — AMLODIPINE BESYLATE 5 MG: 5 TABLET ORAL at 07:27

## 2022-12-21 RX ADMIN — ACETAMINOPHEN 650 MG: 325 TABLET, FILM COATED ORAL at 14:25

## 2022-12-21 RX ADMIN — ACETAMINOPHEN 650 MG: 325 TABLET, FILM COATED ORAL at 07:27

## 2022-12-21 RX ADMIN — VALACYCLOVIR HYDROCHLORIDE 1000 MG: 500 TABLET, FILM COATED ORAL at 07:27

## 2022-12-21 RX ADMIN — ACETAMINOPHEN 650 MG: 325 TABLET, FILM COATED ORAL at 02:28

## 2022-12-21 RX ADMIN — PANTOPRAZOLE SODIUM 20 MG: 20 TABLET, DELAYED RELEASE ORAL at 07:28

## 2022-12-21 RX ADMIN — CEFEPIME HYDROCHLORIDE 2 G: 2 INJECTION, POWDER, FOR SOLUTION INTRAVENOUS at 10:15

## 2022-12-21 RX ADMIN — CEFEPIME HYDROCHLORIDE 2 G: 2 INJECTION, POWDER, FOR SOLUTION INTRAVENOUS at 02:25

## 2022-12-21 ASSESSMENT — ACTIVITIES OF DAILY LIVING (ADL)
ADLS_ACUITY_SCORE: 18

## 2022-12-21 NOTE — PROVIDER NOTIFICATION
"Provider Notification     Archana Mancilla (#[id number]) paged at [phone]:  \"Hi, pt has a temp of 101 and having chills. PRN tylenol is Q6 hours so it is not available. Can we increase the frequency or have a 1x order so she can take it now?    Thanks, Shalini\"   "

## 2022-12-21 NOTE — PROGRESS NOTES
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       TODAY:   - Febrile yesterday Tmax 102.4, CXR showed pneumonia. Continue Cefepime (12/20-x), will transition to PO antibiotics prior to discharge. Continue to monitor closely for CRS symptoms.   - Day 8 blincyto + ponatinib. Tentative discharge 12/23 if continues to tolerate well. RNCC and pharmacy arranging with [facility].   - Continue ppx lovenox while inpatient; will discuss with Dr Hull ASA 81mg on discharge for VOD ppx as does not need to be held for LPs.       HEME   # Ph(+) B-ALL   Followed by Dr. Hull. Patient presented to outside hospital with c/o right upper quadrant pain and was subsequently found to have a markedly elevated white blood cell count concerning for acute leukemia. While at OSH, she had CTA chest as well as CT A/P. These identified no PE, no acute or localized intraabdominal inflammatory process, mild splenomegaly, and few inconspicuous low-density structures in the liver. She was transferred to [facility] for further workup and management. S/p Hydrea 1g TID (8/21-8/23) with improvement in WBC (100K ? 12K). Diagnostic BMBx completed 8/20/22, which demonstrated B-ALL with 85% blasts and hypercellular marrow. IHC shows CD10+, CD34+. 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 ALL NGS negative. BCR/ABL1 major/minor (sent from peripheral blood) both negative. Initiated pre-phase steroids with Prednisone 60 mg x8/21; increased to 60 mg/m2 (140 mg) 8/22-8/24. She initiated GRAALL + imatinib (C1D1 = 8/24/22). S/p BMT consult 9/12 with Dr. Walsh. Induction was relatively uncomplicated. Post-induction BMBx done 9/26/22 and revealed MRD- CR1, with negative BCR/ABL1 and Clonoseq testing. With no matched related donors and excellent early/deep response chemotherapy is currently favored over BMT. 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 Underwent BMBx 10/25/22 with no morphologic evidence of B-lymphoblastic leukemia and flow with unusual B lineage precursor population 0.09%. C3 GRAALL + imatinib on 11/2/22 was tolerated well without complication. Prior plan was to continue GRAALL for a total of 8 cycles (of note odd cycles are 21-days in length and even cycles are 14-days in length), however now adjusting to Blinatumumab + Ponatinib.   - Per Dr. Hull, plan is to continue treatment with blinatumomab and ponatinib ([facility] Haematol 2022 Nov 16;[id number]). She should remain on prophylactic antimicrobials during this study. Bone marrow examinations, including PCR for BCR - ABL , will be planned the end of cycles one, two, and five during ponatinib-blinatumomab combination therapy and every 3-6 months thereafter while on ponatinib maintenance. Clonoseq MRD testing to be sent with each bone marrow examination. She will require a total of 12 prophylactic intrathecal chemotherapy administrations, alternating methotrexate and cytarabine, per protocol.  - Given risk for arterial occlusive events with ponatinib, will continue with ppx lovenox while inpatient. Decided against  starting ASA 81mg on discharge given frequency of LP's, and hold off on starting a statin in setting of myalgias.   - Port in place at admission   - Patient does have coverage for [facility] and has received IV abx, line cares, etc in past. PLC arranged 12/22, [facility] will arrange bag hook up on day of discharge 12/23.   - Monitor for signs or symptoms of CRS or neurotoxicity (immune effector cell-associated neurotoxicity syndrome=ICANS). Per updated blinatumomab management guidelines:  ? Consider 1 dose of tocilizumab 8 mg/kg IV with Grade 2 CRS  ? Interrupt treatment for Grade 3 CRS. Start dexamethasone 8 mg Q8H x3 days, then taper over 4 days. Resume Blincyto once CRS resolves. Consider tocilizumab use as well, 8 mg/kg, up to 4 total doses (Q8H).  ? Consider single dose of dexamethasone 10 mg IV x1 for Grade 2 neurotoxicity (ICANS).  ? Hold blinatumomab for Grade 3 ICANS, until resolved to Grade 1 or better. Start dexamethasone 8 mg Q8H x up to 3 days, then taper over 4 days. Consider tocilizumab (same doses as for CRS).  ? Permanent discontinuation of treatment necessitated for Grade 4 CRS/ICANS.     Treatment Plan: Blincyto + Ponatinib (C1D1 = 12/14/22)    Pre-meds: Dex 20 mg D1 and D8     IT methotrexate 12 mg - done in XR 12/16, flow negative    Blincyto 9 mcg daily, CIVI - Days 1-7     Blincyto 28 mcg daily, CIVI - Days 8-9 inpatient, then Days 10-28 through home infusion    Ponatinib 15 mg daily     # Anemia   # Elevated B12 levels  Likely secondary to chemotherapy. On 12/17, folate, iron/TIBCnormal and retics elevated. Ferritin high at 568 and vitamin B12 >3000. LDH mildly high (294), haptoglobin in process.   - Vitamin B12 resulted with >3000   - Transfuse to keep Hgb >7   - Will need to sign blood consent when needed  - Of note, pt has history of transfusion reaction and requires premeds with Benadryl and Tylenol       ID   # Lingular Pneumonia  # Non neutropenic fevers   Febrile 12/20 Tmax 102.4 with associated  productive cough and sore throat. No dyspnea or URI sxs. No neurologic changes. CXR obtained positive for lingular pneumonia which is likely culprit for fevers rather than Blincyto. Started on empiric Cefepime.   - BCx/UCx NGTD. RVP negative.  - Fungitell, s pneumo, legionella; pending   - Cefepime (12/20-x) ? transition to oral prior to discharge.      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 Presented with scattered papulovesicular lesions on her chin and nose as well as scattered hemorrhagic erosions on the scalp and lower face (V2-3 distributions of the trigeminal nerve) c/f disseminated VZV. Lesions significantly improved on admission. ID recommended considering decrease to 1000 mg daily on 12/13 visit (given less anticipated prolonged neutropenia with new regimen) and confirmed with Dr. Hull.   - Continue Valtrex 1000 mg daily.     # ID PPx   - Valtrex 1000 mg daily   - Hold PTA fluconazole 200 mg daily and levofloxacin 250 mg daily given ANC >1.0   - Nebulized pentamidine last given 12/2; next due 12/30  - Received Evusheld on 11/2/22       MISC  # Myalgias  Noted to have myalgias starting 12/18 AM. Continues to have mild myalgias, in her shoulders, upper arms, back, hip and thighs. Has had some improvement with APAP. Reports these are constant. Not worsening with movement. May be related to ponatinib. Hold off on starting a statin. Clinically improving as of 12/20 with tylenol, heat, and changing her position.   - Continue to monitor. Trial celebrex 200mg once on 12/20 evening if needed. May also improve after steroid premed per blincyto protocol.     # Abdominal rash, resolving  Noted ~12/9 outpatient. Mildly pruritic. Evaluated by ID on 12/13 who recommended moisturizer for the rash on the abdomen. This is likely not infectious as she has been on valtrex and fluconazole.   - Triamcinolone PRN   - Resolving as of 12/20.     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 Pt reports symptoms are stable. Reports mild paresthesias and hot/cold sensitivity. Monitor clinically       # Coping  Patient endorses difficulty with coping regarding the unknowns of her leukemia and treatment. Supportive listening provided and patient agreeable to health psych.   - Health psych planning to see patient in hospital on 12/20.    Clinically Significant Risk Factors                        # Severe Obesity: Estimated body mass index is 41.38 kg/m  as calculated from the following:    Height as of this encounter: 1.626 m (5' 4\").    Weight as of this encounter: 109.4 kg (241 lb 1.6 oz).          FEN:   - Bolus PRN IVF per chemo plan   - PRN lyte replacement  - RDAT       Prophy/Misc:   -VTE: Lovenox; hold for plts 50k  -GI/PUD: PTA Protonix  -Bowels: PRN Senna and Miralax       Dispo: Likely 12/23 pending tolerance of blincyto    Follow Up:   - 12/26 - Labs/AVELINA visit   - IR LP with IT chemo the week of 12/26 -12/29; and then again, any time between 1/9 -1/12 requested. Will need orders placed in discharge.  - Home infusion for bag changes, requested twice weekly labs through [facility]     I spent >40 minutes face-to-face and/or coordinating or discussing care plan.      Patient plan of care discussed with Dr. Fairchild.  Assessment and plan are discussed and delivered to the patient.    Maura Jorgensen PA-C    Hematology/Oncology   Pager: [phone]     Interval History   Overnight febrile Tmax 102.4, found to have new pneumonia for which she was started on Cefepime. Intermittent non productive cough. No other symptoms. Occasional chills. Appetite intact. Energy levels ok. Neuropathy stable. No tremors or confusion. Gait is stable.   A comprehensive review of systems was reviewed with the patient and the pertinent positives are listed in the HPI above.     Physical Exam   Temp: 100.4 "  F (38  C) Temp src: Oral BP: 115/61 Pulse: 88   Resp: 16 SpO2: 96 % O2 Device: None (Room air)    Vitals:    12/17/22 0824 12/18/22 0800 12/19/22 0821   Weight: 108.5 kg (239 lb 1.6 oz) 109.1 kg (240 lb 8 oz) 109.4 kg (241 lb 1.6 oz)     Vital Signs with Ranges  Temp:  [99.3  F (37.4  C)-102.4  F (39.1  C)] 100.4  F (38  C)  Pulse:  [] 88  Resp:  [16-20] 16  BP: (107-134)/(54-83) 115/61  SpO2:  [95 %-98 %] 96 %  I/O last 3 completed shifts:  In: 1820 [P.O.:1600; I.V.:100; IV Piggyback:120]  Out: -     Constitutional: Awake and conversational. Non-toxic appearing. No acute distress. Well developed, hydrated, and nourished. Appears stated age.   HEENT: Normocephalic, atraumatic. Head scarf in place.  Lymph: Neck supple.   Respiratory: Breathing comfortable with no increased work on room air. Good air exchange. No signs of respiratory distress or accessory muscle use. Lungs clear to auscultation bilaterally, no crackles or wheezing noted.  Cardiovascular: Regular rate and rhythm. No murmurs, rubs, or gallops. 1+ peripheral edema; stable.    GI: Abdomen is soft, non-distended, non-tender and without distension. Bowel sounds present and normoactive. No masses or hepatosplenomegaly appreciated.   Skin: Skin is clean, dry, intact. No jaundice appreciated.  Musculoskeletal/ Extremities: Nailbeds pink and without cyanosis or clubbing. 1+ bilateral LE edema.  Neurologic: Alert and oriented. Speech normal. Grossly nonfocal. Memory and thought process preserved. Seen ambulating around unit without concerns.   Neuropsychiatric: Calm, affect congruent to situation. Appropriate mood and affect. Good judgment and insight.     Medications     - MEDICATION INSTRUCTIONS -         amLODIPine  5 mg Oral Daily     blinatumomab (BLINCYTO) 24 hour infusion  28 mcg Intravenous Q24H     [START ON 12/23/2022] blinatumomab (BLINCYTO) 24 hour HOME infusion  28 mcg Intravenous Q24H     blinatumomab (BLINCYTO) 24 hour infusion  9 mcg  Intravenous Q24H     ceFEPIme  2 g Intravenous Q8H     Chemotherapy Infusing-Continuous Infusion   Does not apply Q8H     dexamethasone  20 mg Oral Once     enoxaparin ANTICOAGULANT  40 mg Subcutaneous Q24H     pantoprazole  20 mg Oral Daily     PONATinib  15 mg Oral Q24H     valACYclovir  1,000 mg Oral Daily       Data   Results for orders placed or performed during the hospital encounter of 12/14/22 (from the past 24 hour(s))   UA with Microscopic reflex to Culture    Specimen: Urine, Clean Catch   Result Value Ref Range    Color Urine Light Yellow Colorless, Straw, Light Yellow, Yellow    Appearance Urine Clear Clear    Glucose Urine Negative Negative mg/dL    Bilirubin Urine Negative Negative    Ketones Urine Negative Negative mg/dL    Specific Gravity Urine 1.018 1.003 - 1.035    Blood Urine Negative Negative    pH Urine 6.0 5.0 - 7.0    Protein Albumin Urine Negative Negative mg/dL    Urobilinogen Urine Normal Normal, 2.0 mg/dL    Nitrite Urine Negative Negative    Leukocyte Esterase Urine Negative Negative    Mucus Urine Present (A) None Seen /LPF    RBC Urine 1 <=2 /HPF    WBC Urine 0 <=5 /HPF    Squamous Epithelials Urine 5 (H) <=1 /HPF    Narrative    Urine Culture not indicated   Respiratory Panel PCR    Specimen: Nasopharyngeal; Swab   Result Value Ref Range    Adenovirus Not Detected Not Detected    Coronavirus Not Detected Not Detected    Human Metapneumovirus Not Detected Not Detected    Human Rhin/Enterovirus Not Detected Not Detected    Influenza A Not Detected Not Detected    Influenza A, H1 Not Detected Not Detected    Influenza A 2009 H1N1 Not Detected Not Detected    Influenza A, H3 Not Detected Not Detected    Influenza B Not Detected Not Detected    Parainfluenza Virus 1 Not Detected Not Detected    Parainfluenza Virus 2 Not Detected Not Detected    Parainfluenza Virus 3 Not Detected Not Detected    Parainfluenza Virus 4 Not Detected Not Detected    Respiratory Syncytial Virus A Not Detected  Not Detected    Respiratory Syncytial Virus B Not Detected Not Detected    Chlamydia Pneumoniae Not Detected Not Detected    Mycoplasma Pneumoniae Not Detected Not Detected    Narrative    The [facility] Respiratory Panel is a qualitative nucleic acid, multiplex, in vitro diagnostic test for the simultaneous detection and identification of multiple respiratory viral and bacterial nucleic acids in nasopharyngeal swabs collected in viral transport media from individual exhibiting signs and symptoms of respiratory infection. The assay has received FDA approval for the testing of nasopharyngeal (NP) swabs only. The Infectious Diseases Diagnostic Laboratory at [facility] has validated the performance characteristics for bronchial alveolar lavage specimens. This test is used for clinical purposes and should not be regarded as investigational or for research. This laboratory is certified under the Clinical Laboratory Improvement Amendments of 1988 (CLIA-88) as qualified to perform high complexity clinical laboratory testing.    Blood Culture Arm, Right    Specimen: Arm, Right; Blood   Result Value Ref Range    Culture No growth after 12 hours    XR Chest 2 Views    Narrative    PA and lateral chest    HISTORY: AML with fever    COMPARISON STUDY: None    FINDINGS: Increased airspace opacity in the lingula. Right IJ  Port-A-Cath tip in the mid SVC. Cardiac silhouette is not enlarged.      Impression    IMPRESSION: Lingular pneumonia    RADHA HERNANDES MD         SYSTEM ID:  [id number]   Lactic Acid STAT   Result Value Ref Range    Lactic Acid 0.9 0.7 - 2.0 mmol/L   CBC with platelets differential    Narrative    The following orders were created for panel order CBC with platelets differential.  Procedure                               Abnormality         Status                     ---------                               -----------         ------                     CBC with platelets and d...[[id number]]  Abnormal             Final result                 Please view results for these tests on the individual orders.   Comprehensive metabolic panel   Result Value Ref Range    Sodium 139 136 - 145 mmol/L    Potassium 3.7 3.4 - 5.3 mmol/L    Chloride 107 98 - 107 mmol/L    Carbon Dioxide (CO2) 21 (L) 22 - 29 mmol/L    Anion Gap 11 7 - 15 mmol/L    Urea Nitrogen 11.1 6.0 - 20.0 mg/dL    Creatinine 0.60 0.51 - 0.95 mg/dL    Calcium 8.9 8.6 - 10.0 mg/dL    Glucose 99 70 - 99 mg/dL    Alkaline Phosphatase 71 35 - 104 U/L    AST 12 10 - 35 U/L    ALT 14 10 - 35 U/L    Protein Total 6.4 6.4 - 8.3 g/dL    Albumin 3.8 3.5 - 5.2 g/dL    Bilirubin Total 0.5 <=1.2 mg/dL    GFR Estimate >90 >60 mL/min/1.73m2   Magnesium   Result Value Ref Range    Magnesium 2.0 1.7 - 2.3 mg/dL   Phosphorus   Result Value Ref Range    Phosphorus 4.0 2.5 - 4.5 mg/dL   CBC with platelets and differential   Result Value Ref Range    WBC Count 5.7 4.0 - 11.0 10e3/uL    RBC Count 2.73 (L) 3.80 - 5.20 10e6/uL    Hemoglobin 8.5 (L) 11.7 - 15.7 g/dL    Hematocrit 26.7 (L) 35.0 - 47.0 %    MCV 98 78 - 100 fL    MCH 31.1 26.5 - 33.0 pg    MCHC 31.8 31.5 - 36.5 g/dL    RDW 19.7 (H) 10.0 - 15.0 %    Platelet Count 167 150 - 450 10e3/uL    % Neutrophils 76 %    % Lymphocytes 4 %    % Monocytes 18 %    % Eosinophils 0 %    % Basophils 1 %    % Immature Granulocytes 1 %    NRBCs per 100 WBC 0 <1 /100    Absolute Neutrophils 4.3 1.6 - 8.3 10e3/uL    Absolute Lymphocytes 0.2 (L) 0.8 - 5.3 10e3/uL    Absolute Monocytes 1.0 0.0 - 1.3 10e3/uL    Absolute Eosinophils 0.0 0.0 - 0.7 10e3/uL    Absolute Basophils 0.0 0.0 - 0.2 10e3/uL    Absolute Immature Granulocytes 0.1 <=0.4 10e3/uL    Absolute NRBCs 0.0 10e3/uL

## 2022-12-21 NOTE — PLAN OF CARE
[id number]    A&Ox4. Tachycardic. Febrile, tmax 102.4, pt states she alternates between feeling hot and cold. Given tylenol x2 with some effectiveness. OVSS on RA. Headache managed with prn tylenol. Denies nausea and SOB. Voids spontaneously without saving. Eating and drinking well. Continuous Blincyto infusing 10 mL/hr, tolerating well. Up independently. Pt slept between cares. Continue with plan of care.

## 2022-12-21 NOTE — PROGRESS NOTES
Home Infusion  April is expected to discharge soon and will be going home on continuous IV Blincyto.  Patient has done home IV therapy before.    Met with April at bedside and provided her with information about [facility] services.  Informed her about administration method via the cadd  pump with a adilene pack for mobility.  Explained that on day of discharge [facility] would deliver her med and supplies to the hospital where I RN would come hook up pump for discharge.   Informed her about supplies and delivery of supplies, storage of medication, schedule for bag changes, plan for SNV and 24/7 availability of [facility] staff while on IV therapy.  Informed patient of plan for starting with a 24hr bag, then will have 48hr bags x 2, and finish with 7day bags.  Patient verbalized understanding of all information given.  She is agreeable with the plan and comfortable with discharging on the continuous Blincyto.   Questions answered.  Pending final discharge orders, [facility] will plan for delivery of med and supplies to hospital Friday afternoon.  I RN will come to hospital to do hookup approx 3pm on Friday 12/23.  Future deliveries and bag changes will take place at patient's home.    Will continue to follow until final discharge plans determined.    Derek Ibarra, RN CRNI  [facility] Nurse Liaison   [email or web address]  Cell: [phone] M-F  [facility] Main: [phone]

## 2022-12-21 NOTE — PROGRESS NOTES
"ICANS score     Score:   1 Orientation: Orientation to year, month, city, hospital: 4 points (1 point each)   3  Identify: Name 3 objects that nurse points to (e.g. clock, pen, button): 3 points (1 point each)   1 Following Commands: (e.g. show me two fingers or close your eyes and stick out your tongue): 1 point   1  Writing: Ability to write a standard sentence (see writing page \"The castro jumped over the log.\"): 1 point   1Attention: Count backwards from 100 by 10s: 1 point     10  Total: Max 10, no impairment, reassess next shift                   **9 or below Notify MD and reassess in 4 hours     "

## 2022-12-21 NOTE — PROGRESS NOTES
"ICANS score      Score:   Orientation: Orientation to year, month, city, hospital: 4 points (1 point each) = 4        Identify: Name 3 objects that nurse points to (e.g. clock, pen, button): 3 points (1 point each) = 3    Following Commands: (e.g. show me two fingers or close your eyes and stick out your tongue): 1 point = 1    Writing: Ability to write a standard sentence (see writing page \"The castro jumped over the log.\"): 1 point = 1    Attention: Count backwards from 100 by 10s: 1 point = 1      Total: Max 10, no impairment, reassess next shift                   **9 or below Notify MD and reassess in 4 hours   Score: 10  "

## 2022-12-21 NOTE — PLAN OF CARE
Goal Outcome Evaluation:  0700-1930  Spiked a fever of 101.3 (0) and blood cultures ,urinalysis and chest xray done. Chest xray show pneumonia so she was started on Cefepime. Has generalized achy ness, but declined pain medications. Respiratory viral panel done which was negative. ICANS score was a 10. Up independently. Dose # 7 of Blincyto hung per protocol without problems.

## 2022-12-21 NOTE — PLAN OF CARE
Goal Outcome Evaluation:  Had mild generalized achy ness, but declined medications. Spiked a fever to 102.3 (0) and she was asymptomatic. Maura REZA notified. Blood cultures done and lactic acid level drawn. Denied nausea. Her Blincyto dose will be increased tonight. Up independently.

## 2022-12-22 LAB
1,3 BETA GLUCAN SER-MCNC: <31 PG/ML
ABO/RH(D): NORMAL
ALBUMIN SERPL BCG-MCNC: 4.2 G/DL (ref 3.5–5.2)
ALP SERPL-CCNC: 78 U/L (ref 35–104)
ALT SERPL W P-5'-P-CCNC: 14 U/L (ref 10–35)
ANION GAP SERPL CALCULATED.3IONS-SCNC: 14 MMOL/L (ref 7–15)
ANTIBODY SCREEN: NEGATIVE
AST SERPL W P-5'-P-CCNC: 12 U/L (ref 10–35)
BASOPHILS # BLD AUTO: 0 10E3/UL (ref 0–0.2)
BASOPHILS NFR BLD AUTO: 0 %
BILIRUB SERPL-MCNC: 0.4 MG/DL
BUN SERPL-MCNC: 11.4 MG/DL (ref 6–20)
CALCIUM SERPL-MCNC: 9.8 MG/DL (ref 8.6–10)
CHLORIDE SERPL-SCNC: 109 MMOL/L (ref 98–107)
CREAT SERPL-MCNC: 0.47 MG/DL (ref 0.51–0.95)
CRYPTOC AG SPEC QL: NEGATIVE
DEPRECATED HCO3 PLAS-SCNC: 19 MMOL/L (ref 22–29)
EOSINOPHIL # BLD AUTO: 0 10E3/UL (ref 0–0.7)
EOSINOPHIL NFR BLD AUTO: 0 %
ERYTHROCYTE [DISTWIDTH] IN BLOOD BY AUTOMATED COUNT: 19.9 % (ref 10–15)
GFR SERPL CREATININE-BSD FRML MDRD: >90 ML/MIN/1.73M2
GLUCOSE SERPL-MCNC: 143 MG/DL (ref 70–99)
HCT VFR BLD AUTO: 27.3 % (ref 35–47)
HGB BLD-MCNC: 9.2 G/DL (ref 11.7–15.7)
IMM GRANULOCYTES # BLD: 0.1 10E3/UL
IMM GRANULOCYTES NFR BLD: 1 %
LYMPHOCYTES # BLD AUTO: 0.2 10E3/UL (ref 0.8–5.3)
LYMPHOCYTES NFR BLD AUTO: 2 %
MAGNESIUM SERPL-MCNC: 2.2 MG/DL (ref 1.7–2.3)
MCH RBC QN AUTO: 32.5 PG (ref 26.5–33)
MCHC RBC AUTO-ENTMCNC: 33.7 G/DL (ref 31.5–36.5)
MCV RBC AUTO: 97 FL (ref 78–100)
MONOCYTES # BLD AUTO: 0.7 10E3/UL (ref 0–1.3)
MONOCYTES NFR BLD AUTO: 11 %
NEUTROPHILS # BLD AUTO: 5.5 10E3/UL (ref 1.6–8.3)
NEUTROPHILS NFR BLD AUTO: 86 %
NRBC # BLD AUTO: 0 10E3/UL
NRBC BLD AUTO-RTO: 0 /100
OBSERVATION IMP: NEGATIVE
PATH INTERP SPEC-IMP: NORMAL
PHOSPHATE SERPL-MCNC: 3.4 MG/DL (ref 2.5–4.5)
PLATELET # BLD AUTO: 207 10E3/UL (ref 150–450)
POTASSIUM SERPL-SCNC: 3.9 MMOL/L (ref 3.4–5.3)
PROT SERPL-MCNC: 7.2 G/DL (ref 6.4–8.3)
RBC # BLD AUTO: 2.83 10E6/UL (ref 3.8–5.2)
SODIUM SERPL-SCNC: 142 MMOL/L (ref 136–145)
SPECIMEN EXPIRATION DATE: NORMAL
WBC # BLD AUTO: 6.4 10E3/UL (ref 4–11)

## 2022-12-22 PROCEDURE — 258N000003 HC RX IP 258 OP 636: Performed by: INTERNAL MEDICINE

## 2022-12-22 PROCEDURE — 250N000011 HC RX IP 250 OP 636: Performed by: INTERNAL MEDICINE

## 2022-12-22 PROCEDURE — 83735 ASSAY OF MAGNESIUM: CPT | Performed by: PHYSICIAN ASSISTANT

## 2022-12-22 PROCEDURE — 87899 AGENT NOS ASSAY W/OPTIC: CPT | Performed by: PHYSICIAN ASSISTANT

## 2022-12-22 PROCEDURE — 250N000013 HC RX MED GY IP 250 OP 250 PS 637: Performed by: PHYSICIAN ASSISTANT

## 2022-12-22 PROCEDURE — 82040 ASSAY OF SERUM ALBUMIN: CPT | Performed by: PHYSICIAN ASSISTANT

## 2022-12-22 PROCEDURE — 120N000002 HC R&B MED SURG/OB UMMC

## 2022-12-22 PROCEDURE — 99233 SBSQ HOSP IP/OBS HIGH 50: CPT | Mod: FS | Performed by: PHYSICIAN ASSISTANT

## 2022-12-22 PROCEDURE — 250N000011 HC RX IP 250 OP 636: Performed by: STUDENT IN AN ORGANIZED HEALTH CARE EDUCATION/TRAINING PROGRAM

## 2022-12-22 PROCEDURE — 84100 ASSAY OF PHOSPHORUS: CPT | Performed by: PHYSICIAN ASSISTANT

## 2022-12-22 PROCEDURE — 36415 COLL VENOUS BLD VENIPUNCTURE: CPT | Performed by: PHYSICIAN ASSISTANT

## 2022-12-22 PROCEDURE — 85025 COMPLETE CBC W/AUTO DIFF WBC: CPT | Performed by: PHYSICIAN ASSISTANT

## 2022-12-22 PROCEDURE — 86850 RBC ANTIBODY SCREEN: CPT | Performed by: PHYSICIAN ASSISTANT

## 2022-12-22 PROCEDURE — 86901 BLOOD TYPING SEROLOGIC RH(D): CPT | Performed by: PHYSICIAN ASSISTANT

## 2022-12-22 PROCEDURE — 80053 COMPREHEN METABOLIC PANEL: CPT | Performed by: PHYSICIAN ASSISTANT

## 2022-12-22 PROCEDURE — 250N000011 HC RX IP 250 OP 636: Performed by: PHYSICIAN ASSISTANT

## 2022-12-22 RX ORDER — PENTAMIDINE ISETHIONATE 300 MG/300MG
300 INHALANT RESPIRATORY (INHALATION) ONCE
Status: CANCELLED
Start: 2022-12-30 | End: 2022-12-30

## 2022-12-22 RX ORDER — ALBUTEROL SULFATE 90 UG/1
1-2 AEROSOL, METERED RESPIRATORY (INHALATION)
Status: CANCELLED
Start: 2022-12-30

## 2022-12-22 RX ORDER — VALACYCLOVIR HYDROCHLORIDE 1 G/1
1000 TABLET, FILM COATED ORAL DAILY
Qty: 30 TABLET | Refills: 0 | Status: ON HOLD | OUTPATIENT
Start: 2022-12-22 | End: 2022-12-31

## 2022-12-22 RX ORDER — LEVOFLOXACIN 750 MG/1
750 TABLET, FILM COATED ORAL DAILY
Qty: 3 TABLET | Refills: 0 | Status: SHIPPED | OUTPATIENT
Start: 2022-12-24 | End: 2022-12-22

## 2022-12-22 RX ORDER — ALBUTEROL SULFATE 0.83 MG/ML
2.5 SOLUTION RESPIRATORY (INHALATION)
Status: CANCELLED | OUTPATIENT
Start: 2022-12-30

## 2022-12-22 RX ORDER — DIPHENHYDRAMINE HYDROCHLORIDE 50 MG/ML
50 INJECTION INTRAMUSCULAR; INTRAVENOUS
Status: CANCELLED
Start: 2022-12-30

## 2022-12-22 RX ORDER — METHYLPREDNISOLONE SODIUM SUCCINATE 125 MG/2ML
125 INJECTION, POWDER, LYOPHILIZED, FOR SOLUTION INTRAMUSCULAR; INTRAVENOUS
Status: CANCELLED
Start: 2022-12-30

## 2022-12-22 RX ORDER — MEPERIDINE HYDROCHLORIDE 25 MG/ML
25 INJECTION INTRAMUSCULAR; INTRAVENOUS; SUBCUTANEOUS EVERY 30 MIN PRN
Status: CANCELLED | OUTPATIENT
Start: 2022-12-30

## 2022-12-22 RX ORDER — EPINEPHRINE 1 MG/ML
0.3 INJECTION, SOLUTION, CONCENTRATE INTRAVENOUS EVERY 5 MIN PRN
Status: CANCELLED | OUTPATIENT
Start: 2022-12-30

## 2022-12-22 RX ORDER — LEVOFLOXACIN 750 MG/1
750 TABLET, FILM COATED ORAL DAILY
Qty: 4 TABLET | Refills: 0 | Status: SHIPPED | OUTPATIENT
Start: 2022-12-24 | End: 2022-12-26

## 2022-12-22 RX ORDER — LEVOFLOXACIN 750 MG/1
750 TABLET, FILM COATED ORAL DAILY
Status: DISCONTINUED | OUTPATIENT
Start: 2022-12-22 | End: 2022-12-23 | Stop reason: HOSPADM

## 2022-12-22 RX ADMIN — AMLODIPINE BESYLATE 5 MG: 5 TABLET ORAL at 08:56

## 2022-12-22 RX ADMIN — PANTOPRAZOLE SODIUM 20 MG: 20 TABLET, DELAYED RELEASE ORAL at 08:56

## 2022-12-22 RX ADMIN — CEFEPIME HYDROCHLORIDE 2 G: 2 INJECTION, POWDER, FOR SOLUTION INTRAVENOUS at 01:36

## 2022-12-22 RX ADMIN — CEFEPIME HYDROCHLORIDE 2 G: 2 INJECTION, POWDER, FOR SOLUTION INTRAVENOUS at 10:42

## 2022-12-22 RX ADMIN — BLINATUMOMAB 28 MCG: KIT INTRAVENOUS at 19:50

## 2022-12-22 RX ADMIN — ENOXAPARIN SODIUM 40 MG: 40 INJECTION SUBCUTANEOUS at 16:26

## 2022-12-22 RX ADMIN — VALACYCLOVIR HYDROCHLORIDE 1000 MG: 500 TABLET, FILM COATED ORAL at 08:56

## 2022-12-22 RX ADMIN — Medication 1 LOZENGE: at 18:23

## 2022-12-22 RX ADMIN — LEVOFLOXACIN 750 MG: 750 TABLET, FILM COATED ORAL at 14:45

## 2022-12-22 ASSESSMENT — ACTIVITIES OF DAILY LIVING (ADL)
ADLS_ACUITY_SCORE: 18

## 2022-12-22 NOTE — PROGRESS NOTES
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       TODAY:   - Febrile yesterday Tmax 102.4, suspected to be multifactorial in setting of likely pneumonia and mild CRS. Discontinue cefepime, transition to oral levaquin 750mg today and plan to continue through 12/27 to complete a ~7-day course of antibiotics.   - Day 9 blincyto + ponatinib. Plan to discharge 12/23. [facility] will come hook up her blincyto bag at 3pm.    - Continue ppx lovenox while inpatient; discharge with ASA 81mg    HEME   # Ph(+) B-ALL   Followed by Dr. Hull. Patient presented to outside hospital with c/o right upper quadrant pain and was subsequently found to have a markedly elevated white blood cell count concerning for acute leukemia. While at OSH, she had CTA chest as well as CT A/P. These identified no PE, no acute or localized intraabdominal inflammatory process, mild splenomegaly, and few inconspicuous low-density structures in the liver. She was transferred to [facility] for further workup and management. S/p Hydrea 1g TID (8/21-8/23) with improvement in WBC (100K ? 12K). Diagnostic BMBx completed 8/20/22, which demonstrated B-ALL with 85% blasts and hypercellular marrow. IHC shows CD10+, CD34+. 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 ALL NGS negative. BCR/ABL1 major/minor (sent from peripheral blood) both negative. Initiated pre-phase steroids with Prednisone 60 mg x8/21; increased to 60 mg/m2 (140 mg) 8/22-8/24. She initiated GRAALL + imatinib (C1D1 = 8/24/22). S/p BMT consult 9/12 with Dr. Walsh. Induction was relatively uncomplicated. Post-induction BMBx done 9/26/22 and revealed MRD- CR1, with negative BCR/ABL1 and Clonoseq testing. With no matched related donors and excellent early/deep response chemotherapy is currently favored over BMT. 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 Underwent BMBx 10/25/22 with no morphologic evidence of B-lymphoblastic leukemia and flow with unusual B lineage precursor population 0.09%. C3 GRAALL + imatinib on 11/2/22 was tolerated well without complication. Prior plan was to continue GRAALL for a total of 8 cycles (of note odd cycles are 21-days in length and even cycles are 14-days in length), however now adjusting to Blinatumumab + Ponatinib.   - Per Dr. Hull, plan is to continue treatment with blinatumomab and ponatinib ([facility] Haematol 2022 Nov 16;[id number]). She should remain on prophylactic antimicrobials during this study. Bone marrow examinations, including PCR for BCR - ABL , will be planned the end of cycles one, two, and five during ponatinib-blinatumomab combination therapy and every 3-6 months thereafter while on ponatinib maintenance. Clonoseq MRD testing to be sent with each bone marrow examination. She will require a total of 12 prophylactic intrathecal chemotherapy administrations, alternating methotrexate and cytarabine, on days ~1, 15 and 29 per protocol. She has completed 6 of 12 doses as of 12/17  - Given risk for arterial occlusive events with ponatinib, will continue with ppx lovenox while  inpatient and discharge with ASA 81mg daily.    - Port in place at admission   - Patient does have coverage for [facility] and has received IV abx, line cares, etc in past. PLC arranged 12/22, [facility] will arrange bag hook up on day of discharge 12/23.   - Monitor for signs or symptoms of CRS or neurotoxicity (immune effector cell-associated neurotoxicity syndrome=ICANS). Per updated blinatumomab management guidelines:  ? Consider 1 dose of tocilizumab 8 mg/kg IV with Grade 2 CRS  ? Interrupt treatment for Grade 3 CRS. Start dexamethasone 8 mg Q8H x3 days, then taper over 4 days. Resume Blincyto once CRS resolves. Consider tocilizumab use as well, 8 mg/kg, up to 4 total doses (Q8H).  ? Consider single dose of dexamethasone 10 mg IV x1 for Grade 2 neurotoxicity (ICANS).  ? Hold blinatumomab for Grade 3 ICANS, until resolved to Grade 1 or better. Start dexamethasone 8 mg Q8H x up to 3 days, then taper over 4 days. Consider tocilizumab (same doses as for CRS).  ? Permanent discontinuation of treatment necessitated for Grade 4 CRS/ICANS.     Treatment Plan: Blincyto + Ponatinib (C1D1 = 12/14/22)    Pre-meds: Dex 20 mg D1 and D8     IT methotrexate 12 mg - done in XR 12/16, flow negative    Blincyto 9 mcg daily, CIVI - Days 1-7     Blincyto 28 mcg daily, CIVI - Days 8-9 inpatient, then Days 10-28 through home infusion    Ponatinib 15 mg daily     # Anemia   # Elevated B12 levels  Likely secondary to chemotherapy. On 12/17, folate, iron/TIBCnormal and retics elevated. Ferritin high at 568 and vitamin B12 >3000. LDH mildly high (294), haptoglobin in process.   - Vitamin B12 resulted with >3000   - Transfuse to keep Hgb >7   - Will need to sign blood consent when needed  - Of note, pt has history of transfusion reaction and requires premeds with Benadryl and Tylenol     # Concern for grade 1 CRS  Pt developed fevers to 102 on 12/20. This was also in the setting of cough and CXR findings consistent with pneumonia. Fevers have  persisted despite cefepime, which raises concern for possible underlying CRS in addition to pneumonia. Per protocol, in the absence of hypotension or hypoxia, this could indicate grade 1 CRS which does not require any intervention or holding of blincyto.   - If becomes HD unstable, would interrupt blincyto and initiate dexamethasone per protocol    ID   # Lingular Pneumonia  # Non neutropenic fevers   Febrile 12/20 Tmax 102.4 with associated productive cough and sore throat. No dyspnea or URI sxs. No neurologic changes. CXR obtained positive for lingular pneumonia which is likely culprit for fevers rather than Blincyto. Started on empiric Cefepime.   - BCx/UCx NGTD. RVP negative.  - Fungitell pending  - S pneumo, legionella negative  - Cefepime (12/20 PM-12/22) ? 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 Presented with scattered papulovesicular lesions on her chin and nose as well as scattered hemorrhagic erosions on the scalp and lower face (V2-3 distributions of the trigeminal nerve) c/f disseminated VZV. Lesions significantly improved on admission. ID recommended considering decrease to 1000 mg daily on 12/13 visit (given less anticipated prolonged neutropenia with new regimen) and confirmed with Dr. Hull.   - Continue Valtrex 1000 mg daily.     # ID PPx   - Valtrex 1000 mg daily   - Hold PTA fluconazole 200 mg daily and levofloxacin 250 mg daily given ANC >1.0   - Nebulized pentamidine last given 12/2; next due 12/30 (ordered/requested for outpt)  - Received Evusheld on 11/2/22       MISC  # Myalgias  Noted to have myalgias starting 12/18 AM. Continues to have mild myalgias, in her shoulders, upper arms, back, hip and thighs. Has had some improvement with APAP. Reports these are constant. Not worsening with movement. May be related to ponatinib. Hold off on starting a statin.  "Clinically improving as of 12/20 with tylenol, heat, and changing her position.   - Continue to monitor. Trial celebrex 200mg once on 12/20 evening if needed. May also improve after steroid premed per blincyto protocol.   - Improving. Consider adding a statin in the future if myalgias are resolved.     # Abdominal rash, resolving  Noted ~12/9 outpatient. Mildly pruritic. Evaluated by ID on 12/13 who recommended moisturizer for the rash on the abdomen. This is likely not infectious as she has been on valtrex and fluconazole.   - Triamcinolone PRN   - Resolving as of 12/20.     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 Pt reports symptoms are stable. Reports mild paresthesias and hot/cold sensitivity. Monitor clinically       # Coping  Patient endorses difficulty with coping regarding the unknowns of her leukemia and treatment. Supportive listening provided and patient agreeable to health psych.   - Health psych planning to see patient in hospital on 12/20.    Clinically Significant Risk Factors                        # Severe Obesity: Estimated body mass index is 40.9 kg/m  as calculated from the following:    Height as of this encounter: 1.626 m (5' 4\").    Weight as of this encounter: 108.1 kg (238 lb 4.8 oz).          FEN:   - Bolus PRN IVF per chemo plan   - PRN lyte replacement  - RDAT       Prophy/Misc:   -VTE: Lovenox; hold for plts 50k  -GI/PUD: PTA Protonix  -Bowels: PRN Senna and Miralax       Dispo: Likely 12/23 pending tolerance of blincyto    Follow Up:   - 12/26 - Labs through [facility]/AVELINA visit   - XR LP with IT chemo the week of 12/26 -12/29; and then again, any time between 1/9 -1/12 requested. Orders placed  - Home infusion for bag changes, twice weekly labs through [facility]  - Requested BMBx after cycle 1 (around 1/11) and follow up with Dr. Hull afterwards.   - Pentamidine neb requested " outpt on ~12/30     I spent >40 minutes face-to-face and/or coordinating or discussing care plan.      Patient plan of care discussed with Dr. Fairchild.  Assessment and plan are discussed and delivered to the patient.    Rosio Rivers PA-C  Hematology/Oncology  Pager #[phone] or available on [employer]    Interval History   Tm 102.4 at 1400 on 12/21, afebrile since. Ongoing mostly dry cough. No nasal congestion, sore throat, dyspnea, o, found to have new pneumonia for which she was started on Cefepime. Intermittent non productive cough. No other symptoms. Occasional chills. Appetite intact. Energy levels ok. Neuropathy stable. No tremors or confusion. Gait is stable.   A comprehensive review of systems was reviewed with the patient and the pertinent positives are listed in the HPI above.     Physical Exam   Temp: 98.2  F (36.8  C) Temp src: Oral BP: 123/63 Pulse: 78   Resp: 16 SpO2: 96 % O2 Device: None (Room air)    Vitals:    12/19/22 0821 12/21/22 0800 12/22/22 1003   Weight: 109.4 kg (241 lb 1.6 oz) 107.4 kg (236 lb 12.8 oz) 108.1 kg (238 lb 4.8 oz)     Vital Signs with Ranges  Temp:  [97.9  F (36.6  C)-102.4  F (39.1  C)] 98.2  F (36.8  C)  Pulse:  [] 78  Resp:  [16-18] 16  BP: (116-124)/(63-71) 123/63  SpO2:  [94 %-97 %] 96 %  I/O last 3 completed shifts:  In: 1220 [P.O.:800; I.V.:300; IV Piggyback:120]  Out: -     Constitutional: Awake and conversational. Non-toxic appearing. No acute distress. Well developed, hydrated, and nourished. Appears stated age.   HEENT: Normocephalic, atraumatic. Head scarf in place.  Lymph: Neck supple.   Respiratory: Breathing comfortable with no increased work on room air. Good air exchange. No signs of respiratory distress or accessory muscle use. Lungs clear to auscultation bilaterally, no crackles or wheezing noted. Occasional dry cough.   Cardiovascular: Regular rate and rhythm. No murmurs, rubs, or gallops. 1+ peripheral edema; stable.    GI: Abdomen is soft, non-distended,  non-tender and without distension. Bowel sounds present and normoactive. No masses or hepatosplenomegaly appreciated.   Skin: Skin is clean, dry, intact. No jaundice appreciated.  Musculoskeletal/ Extremities: Nailbeds pink and without cyanosis or clubbing. 1+ bilateral LE edema.  Neurologic: Alert and oriented. Speech normal. Grossly nonfocal. Memory and thought process preserved. Seen ambulating around unit without concerns.   Neuropsychiatric: Calm, affect congruent to situation. Appropriate mood and affect. Good judgment and insight.     Medications     - MEDICATION INSTRUCTIONS -         amLODIPine  5 mg Oral Daily     blinatumomab (BLINCYTO) 24 hour infusion  28 mcg Intravenous Q24H     [START ON 12/23/2022] blinatumomab (BLINCYTO) 24 hour HOME infusion  28 mcg Intravenous Q24H     Chemotherapy Infusing-Continuous Infusion   Does not apply Q8H     enoxaparin ANTICOAGULANT  40 mg Subcutaneous Q24H     levofloxacin  750 mg Oral Daily     pantoprazole  20 mg Oral Daily     PONATinib  15 mg Oral Q24H     valACYclovir  1,000 mg Oral Daily       Data   Results for orders placed or performed during the hospital encounter of 12/14/22 (from the past 24 hour(s))   Streptococcus pneumoniae antigen    Specimen: Urine, Midstream   Result Value Ref Range    Streptococcus pneumoniae antigen Negative Negative   Legionella pneumophila antigen urine    Specimen: Urine, Midstream   Result Value Ref Range    Legionella pneumophila serogroup 1 urinary antigen Negative Negative   Lactic Acid STAT   Result Value Ref Range    Lactic Acid 1.0 0.7 - 2.0 mmol/L   Blood Culture Hand, Left    Specimen: Hand, Left; Blood   Result Value Ref Range    Culture No growth after 12 hours    Respiratory Aerobic Bacterial Culture    Specimen: Expectorate; Sputum   Result Value Ref Range    Gram Stain Result <10 Squamous epithelial cells/low power field     Gram Stain Result >25 PMNs/low power field     Gram Stain Result No organisms seen    CBC  with platelets differential    Narrative    The following orders were created for panel order CBC with platelets differential.  Procedure                               Abnormality         Status                     ---------                               -----------         ------                     CBC with platelets and d...[[id number]]  Abnormal            Final result                 Please view results for these tests on the individual orders.   ABO/Rh type and screen    Narrative    The following orders were created for panel order ABO/Rh type and screen.  Procedure                               Abnormality         Status                     ---------                               -----------         ------                     Adult Type and Screen[[id number]]                            Final result                 Please view results for these tests on the individual orders.   Comprehensive metabolic panel   Result Value Ref Range    Sodium 142 136 - 145 mmol/L    Potassium 3.9 3.4 - 5.3 mmol/L    Chloride 109 (H) 98 - 107 mmol/L    Carbon Dioxide (CO2) 19 (L) 22 - 29 mmol/L    Anion Gap 14 7 - 15 mmol/L    Urea Nitrogen 11.4 6.0 - 20.0 mg/dL    Creatinine 0.47 (L) 0.51 - 0.95 mg/dL    Calcium 9.8 8.6 - 10.0 mg/dL    Glucose 143 (H) 70 - 99 mg/dL    Alkaline Phosphatase 78 35 - 104 U/L    AST 12 10 - 35 U/L    ALT 14 10 - 35 U/L    Protein Total 7.2 6.4 - 8.3 g/dL    Albumin 4.2 3.5 - 5.2 g/dL    Bilirubin Total 0.4 <=1.2 mg/dL    GFR Estimate >90 >60 mL/min/1.73m2   Magnesium   Result Value Ref Range    Magnesium 2.2 1.7 - 2.3 mg/dL   Phosphorus   Result Value Ref Range    Phosphorus 3.4 2.5 - 4.5 mg/dL   Cryptococcal antigen    Specimen: Arm, Left; Blood   Result Value Ref Range    Cryptococcal Antigen Negative Negative    Cryptococcal titer interpretation n/a    CBC with platelets and differential   Result Value Ref Range    WBC Count 6.4 4.0 - 11.0 10e3/uL    RBC Count 2.83 (L) 3.80 - 5.20 10e6/uL     Hemoglobin 9.2 (L) 11.7 - 15.7 g/dL    Hematocrit 27.3 (L) 35.0 - 47.0 %    MCV 97 78 - 100 fL    MCH 32.5 26.5 - 33.0 pg    MCHC 33.7 31.5 - 36.5 g/dL    RDW 19.9 (H) 10.0 - 15.0 %    Platelet Count 207 150 - 450 10e3/uL    % Neutrophils 86 %    % Lymphocytes 2 %    % Monocytes 11 %    % Eosinophils 0 %    % Basophils 0 %    % Immature Granulocytes 1 %    NRBCs per 100 WBC 0 <1 /100    Absolute Neutrophils 5.5 1.6 - 8.3 10e3/uL    Absolute Lymphocytes 0.2 (L) 0.8 - 5.3 10e3/uL    Absolute Monocytes 0.7 0.0 - 1.3 10e3/uL    Absolute Eosinophils 0.0 0.0 - 0.7 10e3/uL    Absolute Basophils 0.0 0.0 - 0.2 10e3/uL    Absolute Immature Granulocytes 0.1 <=0.4 10e3/uL    Absolute NRBCs 0.0 10e3/uL   Adult Type and Screen   Result Value Ref Range    ABO/RH(D) O POS     Antibody Screen Negative Negative    SPECIMEN EXPIRATION DATE [id number]

## 2022-12-22 NOTE — PROGRESS NOTES
"    ICANS score      Score:   4   Orientation: Orientation to year, month, city, hospital: 4 points (1 point each)   3   Identify: Name 3 objects that nurse points to (e.g. clock, pen, button): 3 points (1 point each)   1   Following Commands: (e.g. show me two fingers or close your eyes and stick out your tongue): 1 point   1   Writing: Ability to write a standard sentence (see writing page \"The castro jumped over the log.\"):        1 point   1   Attention: Count backwards from 100 by 10s: 1 point      10   Total: Max 10, no impairment, reassess next shift                   **9 or below Notify MD and reassess in 4 hours      Blincyto bag #8 continued   "

## 2022-12-22 NOTE — PLAN OF CARE
Goal Outcome Evaluation:    1500 - 1930. A&Ox4. VSS. Denies pain, N/V, and SOB. Temp at 1400 102.4. Afebrile this shift. Blincyto behinid due to PORT needle needing to be changed. Started at 1930. Up independently, voiding adequately. Continue with POC.

## 2022-12-22 NOTE — PROVIDER NOTIFICATION
"Provider Notification     No Marquez (#[id number]) paged at 2022:  \"Hi, pt lost IV access and does not want to have a new IV placed to receive IV antibiotics. Pt is wondering if she can transition to PO antibiotics and Q8 cefepime be discontinued?    ThanksShalini.\"        Response: Team wants patient to finish 48 hour course of IV antibiotics before transitioning to PO.   "

## 2022-12-22 NOTE — PROGRESS NOTES
Care Management Follow Up    Length of Stay (days): 8  Expected Discharge Date: 12/23/2022  Concerns to be Addressed: discharge planning     Patient plan of care discussed at interdisciplinary rounds: Yes     Anticipated Discharge Disposition: Home   Anticipated Discharge Services: [facility] (IV Blincyto)  Anticipated Discharge DME:         Additional Information:  AVELINA Avelar asking covering 7D SW if [facility] can do labs 2x/week and if needed can IV Blincyto hook up happen this weekend.     Per Derek, liaison they can do labs 2x/week and will need specifics on discharge orders. If team wants discharge on holiday weekend, [facility] will need to know this by today so they can get staffing in order.     Updated Rosio, AVELINA - team will plan for Friday 12/23 discharge as previously planned. Derek updated.     [facility] will hook up Blincyto bag on 12/23 at 3PM             Bety Hanson, RN, [place]  Float Care Coordinator  Covering 7D RN   Pager: [phone]

## 2022-12-22 NOTE — PLAN OF CARE
Goal Outcome Evaluation:       Patient receiving Day 8 Blincyto (28mcg/hr) via Port.  ICANs score remains 10.  No complaints of pain or nausea. Patient with productive cough; Cefepime changed to Levaquin to see if patient remains afebrile (last fever was 12/21 at 1400).  Patient to hopefully discharge late tomorrow after Home Infusion hooks up outpatient Blincyto & pump. Patient ambulating halls independently.  Patient eating leftovers from home.

## 2022-12-22 NOTE — PLAN OF CARE
Time: 7225-2795    A/Ox4 and able to make needs known. Denies pain/nausea/SOB. Mildly productive cough with some coarse lung sounds. Cough drops given. Edema +2 in ankles. Endorses fair appetite. Subcutaneous lovenox given, abdominal bruising present. Blincyto is running late. On chemo precautions. UAL. Had a BM today, denies constipation/diarrhea. Voiding and not saving. Chemo precautions. Anticipating discharge tomorrow evening.

## 2022-12-22 NOTE — PROGRESS NOTES
"Nursing Focus: Chemotherapy  D: Positive blood return via Port. Insertion site is clean/dry/intact, dressing intact with no complaints of pain.  Urine output is recorded in intake in Doc Flowsheet.    I:  Dose #8 of Blincyto started to infuse over 24 hours. Reviewed pt teaching on chemotherapy side effects.  Pt denies need for further teaching. Chemotherapy double checked per protocol by two chemotherapy competent RN's.   A: Tolerating procedure well. Denies nausea and or pain.   P: Continue to monitor urine output and symptoms of nausea. Screen for symptoms of toxicity.        ICANS score      Score:   1 Orientation: Orientation to year, month, city, hospital: 4 points (1 point each)   3  Identify: Name 3 objects that nurse points to (e.g. clock, pen, button): 3 points (1 point each)   1 Following Commands: (e.g. show me two fingers or close your eyes and stick out your tongue): 1 point   1  Writing: Ability to write a standard sentence (see writing page \"The castro jumped over the log.\"): 1 point   1Attention: Count backwards from 100 by 10s: 1 point      10  Total: Max 10, no impairment, reassess next shift                   **9 or below Notify MD and reassess in 4 hours   "

## 2022-12-22 NOTE — PLAN OF CARE
Goal Outcome Evaluation: 1900-0700      Plan of Care Reviewed With: patient    Overall Patient Progress: no change    Outcome Evaluation:     Respiratory: WDL RA, Denies SOB  Cardiac: WDL. Denies Chest pain. Afebrile overnight   Neuro: A&Ox4. Calls appropriately.   GI/: Voiding spontaneously, last BM 12/20  Diet: Regular  Skin: scattared bruising on fore arms   Lines/drains: port infusing blincyto, L PIV SL, IV cefepime   Pain: denies   Labs: hbg 8.5 and wbc 5.7  Activity: independent   Plan:  continue plan of care

## 2022-12-23 VITALS
SYSTOLIC BLOOD PRESSURE: 127 MMHG | TEMPERATURE: 98.8 F | HEART RATE: 90 BPM | HEIGHT: 64 IN | BODY MASS INDEX: 41.45 KG/M2 | DIASTOLIC BLOOD PRESSURE: 65 MMHG | WEIGHT: 242.8 LBS | RESPIRATION RATE: 18 BRPM | OXYGEN SATURATION: 96 %

## 2022-12-23 LAB
ALBUMIN SERPL BCG-MCNC: 3.7 G/DL (ref 3.5–5.2)
ALP SERPL-CCNC: 70 U/L (ref 35–104)
ALT SERPL W P-5'-P-CCNC: 14 U/L (ref 10–35)
ANION GAP SERPL CALCULATED.3IONS-SCNC: 12 MMOL/L (ref 7–15)
AST SERPL W P-5'-P-CCNC: 10 U/L (ref 10–35)
BACTERIA SPT CULT: NO GROWTH
BASOPHILS # BLD AUTO: 0 10E3/UL (ref 0–0.2)
BASOPHILS NFR BLD AUTO: 0 %
BILIRUB SERPL-MCNC: 0.2 MG/DL
BUN SERPL-MCNC: 18.4 MG/DL (ref 6–20)
CALCIUM SERPL-MCNC: 9 MG/DL (ref 8.6–10)
CHLORIDE SERPL-SCNC: 112 MMOL/L (ref 98–107)
CREAT SERPL-MCNC: 0.59 MG/DL (ref 0.51–0.95)
DEPRECATED HCO3 PLAS-SCNC: 20 MMOL/L (ref 22–29)
EOSINOPHIL # BLD AUTO: 0 10E3/UL (ref 0–0.7)
EOSINOPHIL NFR BLD AUTO: 0 %
ERYTHROCYTE [DISTWIDTH] IN BLOOD BY AUTOMATED COUNT: 20.2 % (ref 10–15)
GALACTOMANNAN AG SERPL QL IA: NEGATIVE
GALACTOMANNAN AG SPEC IA-ACNC: 0.04
GFR SERPL CREATININE-BSD FRML MDRD: >90 ML/MIN/1.73M2
GLUCOSE SERPL-MCNC: 107 MG/DL (ref 70–99)
GRAM STAIN RESULT: NORMAL
HCT VFR BLD AUTO: 23.9 % (ref 35–47)
HGB BLD-MCNC: 7.7 G/DL (ref 11.7–15.7)
IMM GRANULOCYTES # BLD: 0.1 10E3/UL
IMM GRANULOCYTES NFR BLD: 1 %
LYMPHOCYTES # BLD AUTO: 0.5 10E3/UL (ref 0.8–5.3)
LYMPHOCYTES NFR BLD AUTO: 5 %
MAGNESIUM SERPL-MCNC: 2 MG/DL (ref 1.7–2.3)
MCH RBC QN AUTO: 31.7 PG (ref 26.5–33)
MCHC RBC AUTO-ENTMCNC: 32.2 G/DL (ref 31.5–36.5)
MCV RBC AUTO: 98 FL (ref 78–100)
MONOCYTES # BLD AUTO: 1.3 10E3/UL (ref 0–1.3)
MONOCYTES NFR BLD AUTO: 15 %
NEUTROPHILS # BLD AUTO: 6.6 10E3/UL (ref 1.6–8.3)
NEUTROPHILS NFR BLD AUTO: 79 %
NRBC # BLD AUTO: 0 10E3/UL
NRBC BLD AUTO-RTO: 0 /100
PHOSPHATE SERPL-MCNC: 3.5 MG/DL (ref 2.5–4.5)
PLATELET # BLD AUTO: 160 10E3/UL (ref 150–450)
POTASSIUM SERPL-SCNC: 3.6 MMOL/L (ref 3.4–5.3)
PROT SERPL-MCNC: 6 G/DL (ref 6.4–8.3)
RBC # BLD AUTO: 2.43 10E6/UL (ref 3.8–5.2)
SODIUM SERPL-SCNC: 144 MMOL/L (ref 136–145)
WBC # BLD AUTO: 8.5 10E3/UL (ref 4–11)

## 2022-12-23 PROCEDURE — 80053 COMPREHEN METABOLIC PANEL: CPT | Performed by: PHYSICIAN ASSISTANT

## 2022-12-23 PROCEDURE — 99239 HOSP IP/OBS DSCHRG MGMT >30: CPT | Mod: FS | Performed by: PHYSICIAN ASSISTANT

## 2022-12-23 PROCEDURE — 85025 COMPLETE CBC W/AUTO DIFF WBC: CPT | Performed by: PHYSICIAN ASSISTANT

## 2022-12-23 PROCEDURE — 84100 ASSAY OF PHOSPHORUS: CPT | Performed by: PHYSICIAN ASSISTANT

## 2022-12-23 PROCEDURE — 250N000013 HC RX MED GY IP 250 OP 250 PS 637: Performed by: STUDENT IN AN ORGANIZED HEALTH CARE EDUCATION/TRAINING PROGRAM

## 2022-12-23 PROCEDURE — 250N000013 HC RX MED GY IP 250 OP 250 PS 637: Performed by: PHYSICIAN ASSISTANT

## 2022-12-23 PROCEDURE — 36415 COLL VENOUS BLD VENIPUNCTURE: CPT | Performed by: PHYSICIAN ASSISTANT

## 2022-12-23 PROCEDURE — 82040 ASSAY OF SERUM ALBUMIN: CPT | Performed by: PHYSICIAN ASSISTANT

## 2022-12-23 PROCEDURE — 83735 ASSAY OF MAGNESIUM: CPT | Performed by: PHYSICIAN ASSISTANT

## 2022-12-23 RX ORDER — GUAIFENESIN/DEXTROMETHORPHAN 100-10MG/5
10 SYRUP ORAL EVERY 4 HOURS PRN
Qty: 354 ML | Refills: 0 | Status: ON HOLD | OUTPATIENT
Start: 2022-12-23 | End: 2022-12-31

## 2022-12-23 RX ORDER — BENZONATATE 100 MG/1
100 CAPSULE ORAL 3 TIMES DAILY PRN
Status: DISCONTINUED | OUTPATIENT
Start: 2022-12-23 | End: 2022-12-23 | Stop reason: HOSPADM

## 2022-12-23 RX ORDER — GUAIFENESIN/DEXTROMETHORPHAN 100-10MG/5
10 SYRUP ORAL EVERY 4 HOURS PRN
Status: DISCONTINUED | OUTPATIENT
Start: 2022-12-23 | End: 2022-12-23 | Stop reason: HOSPADM

## 2022-12-23 RX ORDER — BENZONATATE 100 MG/1
100 CAPSULE ORAL 3 TIMES DAILY PRN
Qty: 30 CAPSULE | Refills: 0 | Status: SHIPPED | OUTPATIENT
Start: 2022-12-23 | End: 2023-08-23

## 2022-12-23 RX ADMIN — ACETAMINOPHEN 650 MG: 325 TABLET, FILM COATED ORAL at 09:57

## 2022-12-23 RX ADMIN — LEVOFLOXACIN 750 MG: 750 TABLET, FILM COATED ORAL at 09:00

## 2022-12-23 RX ADMIN — GUAIFENESIN AND DEXTROMETHORPHAN 10 ML: 100; 10 SYRUP ORAL at 12:59

## 2022-12-23 RX ADMIN — PANTOPRAZOLE SODIUM 20 MG: 20 TABLET, DELAYED RELEASE ORAL at 09:00

## 2022-12-23 RX ADMIN — VALACYCLOVIR HYDROCHLORIDE 1000 MG: 500 TABLET, FILM COATED ORAL at 09:00

## 2022-12-23 RX ADMIN — AMLODIPINE BESYLATE 5 MG: 5 TABLET ORAL at 09:00

## 2022-12-23 ASSESSMENT — ACTIVITIES OF DAILY LIVING (ADL)
ADLS_ACUITY_SCORE: 18

## 2022-12-23 NOTE — PROGRESS NOTES
"Nursing Focus: Chemotherapy    D: Positive blood return via Port. Insertion site is clean/dry/intact, dressing intact with no complaints of pain.  Urine output is recorded in intake in Doc Flowsheet.      I: Premedications given per order (see electronic medical administration record). Dose #9 of Blincyto started to infuse over 24 hours. Reviewed pt teaching on chemotherapy side effects. Pt denies need for further teaching. Chemotherapy double checked per protocol by two chemotherapy competent RN's.     A: Tolerating procedure well. Denies nausea and or pain.     P: Continue to monitor urine output and symptoms of nausea. Screen for symptoms of toxicity.    ICANS score      Score:   Orientation: Orientation to year, month, city, hospital: 4 points (1 point each) = 4      Identify: Name 3 objects that nurse points to (e.g. clock, pen, button): 3 points (1 point each) = 3  Following Commands: (e.g. show me two fingers or close your eyes and stick out your tongue): 1 point = 1  Writing: Ability to write a standard sentence (see writing page \"The castro jumped over the log.\"): 1 point = 1  Attention: Count backwards from 100 by 10s: 1 point = 1      Total: Max 10, no impairment, reassess next shift                   **9 or below Notify MD and reassess in 4 hours   Score: 10  "

## 2022-12-23 NOTE — PLAN OF CARE
Goal Outcome Evaluation: [id number]      Plan of Care Reviewed With: patient    Overall Patient Progress: no change    Outcome Evaluation:     Respiratory: WDL RA, Denies SOB  Cardiac: WDL. Denies Chest pain.  Neuro: A&Ox4. Calls appropriately.   GI/: Voiding spontaneously not saving, last BM 12/22  Diet: Regular  Skin: scattered bruising, edema on BLE  Lines/drains: L PIV SL, port infusing Blincyto   Pain: Denies  Labs:  hbg 7.7  Activity: independent   Plan: discharge today after blincyto, continue plan of care

## 2022-12-23 NOTE — PLAN OF CARE
Goal Outcome Evaluation:       D/I:  Patient has tolerated Blincyto well thus far.  Home Infusion came to hospital & hooked up ambulatory pump.  PIV removed.  RN reviewed discharge paperwork with patient &  Crescencio; both deny the need for further instruction.  All of patient's personal belongings taken with her upon discharge.    A:  Patient appears to be tolerating Blincyto.    P:  Patient discharged to home.

## 2022-12-23 NOTE — DISCHARGE SUMMARY
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   # Myalgias  # Abdominal rash, resolving  # Hypertension  # GERD  # Grade 1 neuropathy of the fingertips     Recommendations for Outpatient:  New/changed medications:    - ASA started   - Tessalon perles PRN for cough   - Robitussin DM for cough/congestion   - Ponatinib 15mg daily   - Blincyto 28 mcg continuous for 19 additional days   - Levaquin 750 mg daily for a 7 day course   - Valacyclovir 1g po     Follow-Up:    - [facility] for Blincyto and twice weekly labs   - Requested LPs outpatient every other week   - Follow up with Dr Hull on 1/13    Summary of Hospitalization:   April F Bakari is a 42-year-old female who presents with past medical history of hypertension, disseminated zoster and Ph (+) B-ALL (s/p induction chemotherapy with GRAALL regimen + imatinib in MRD-CR1) who has most recently received 3 cycles of GRAALL consolidation who now presents for admission for blincyto + ponatinib.     Course complicated with fever. Found to have pneumonia. Started on a course of Levaquin. Cultures negative. Afebrile for >48 hours at discharge.     Given fever there was some concern for grade 1 CRS. However, was noted to have an infection as above.  Otherwise tolerating Blincyto well.     History of Present Illness   April is overall feeling ok this morning. Afebrile. Continues to struggle with cough and congestion, likely related to pneumonia. Started on cough medication and decongestant to help with symptoms. Ready to discharge home. Looking forward to being home  for the holidays.   Denies fever, chills, mouth sores, abdominal pain, diarrhea, constipation, nausea, vomiting, dysuria, hematuria, numbness, tingling, swelling    Hospital Course   April F Bakari was admitted on 12/14/2022.  The following problems were addressed during her hospitalization:    HEME   # Ph(+) B-ALL   Followed by Dr. Hull. Patient presented to outside hospital with c/o right upper quadrant pain and was subsequently found to have a markedly elevated white blood cell count concerning for acute leukemia. While at OSH, she had CTA chest as well as CT A/P. These identified no PE, no acute or localized intraabdominal inflammatory process, mild splenomegaly, and few inconspicuous low-density structures in the liver. She was transferred to [facility] for further workup and management. S/p Hydrea 1g TID (8/21-8/23) with improvement in WBC (100K ? 12K). Diagnostic BMBx completed 8/20/22, which demonstrated B-ALL with 85% blasts and hypercellular marrow. IHC shows CD10+, CD34+. 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 ALL NGS negative. BCR/ABL1 major/minor (sent from peripheral blood) both negative. Initiated pre-phase steroids with Prednisone 60 mg x8/21; increased to 60 mg/m2 (140 mg) 8/22-8/24. She initiated GRAALL + imatinib (C1D1 = 8/24/22). S/p BMT consult 9/12 with Dr. Walsh. Induction was relatively uncomplicated. Post-induction BMBx done 9/26/22 and revealed MRD- CR1, with negative BCR/ABL1 and Clonoseq testing. With no matched related donors and excellent early/deep response chemotherapy is currently favored  over BMT. 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 Underwent BMBx 10/25/22 with no morphologic evidence of B-lymphoblastic leukemia and flow with unusual B lineage precursor population 0.09%. C3 GRAALL + imatinib on 11/2/22 was tolerated well without complication. Prior plan was to continue GRAALL for a total of 8 cycles (of note odd cycles are 21-days in length and even cycles are 14-days in length), however now adjusting to Blinatumumab + Ponatinib.   - Per Dr. Hull, plan is to continue treatment with blinatumomab and ponatinib (Lancet Haematol 2022 Nov 16;[id number]). She should remain on prophylactic antimicrobials during this study. Bone marrow examinations, including PCR for BCR - ABL , will be planned the end of cycles one, two, and five during ponatinib-blinatumomab combination therapy and every 3-6 months thereafter while on ponatinib maintenance. Clonoseq MRD testing to be sent with each bone marrow examination. She will require a total of 12 prophylactic intrathecal chemotherapy administrations, alternating methotrexate and cytarabine, on days ~1, 15 and 29 per protocol. She has completed 6 of 12 doses as of 12/17  - Given risk for arterial occlusive events with ponatinib, will continue with ppx lovenox while inpatient and discharge with ASA 81mg daily.    - Port in place at admission   - Patient does have coverage for [facility] and has received IV abx, line cares, etc in past. PLC arranged 12/22, [facility] will arrange bag hook up on day of discharge 12/23.   - Monitor for signs or symptoms of CRS or neurotoxicity (immune effector cell-associated neurotoxicity syndrome=ICANS). Per updated blinatumomab management guidelines:  ? Consider 1 dose of tocilizumab 8 mg/kg IV with Grade 2 CRS  ? Interrupt treatment for Grade 3 CRS. Start dexamethasone 8 mg Q8H x3 days, then taper over 4 days. Resume Blincyto once CRS resolves. Consider tocilizumab use as  well, 8 mg/kg, up to 4 total doses (Q8H).  ? Consider single dose of dexamethasone 10 mg IV x1 for Grade 2 neurotoxicity (ICANS).  ? Hold blinatumomab for Grade 3 ICANS, until resolved to Grade 1 or better. Start dexamethasone 8 mg Q8H x up to 3 days, then taper over 4 days. Consider tocilizumab (same doses as for CRS).  ? Permanent discontinuation of treatment necessitated for Grade 4 CRS/ICANS.     Treatment Plan: Blincyto + Ponatinib (C1D1 = 12/14/22)    Pre-meds: Dex 20 mg D1 and D8     IT methotrexate 12 mg - done in XR 12/16, flow negative    Blincyto 9 mcg daily, CIVI - Days 1-7     Blincyto 28 mcg daily, CIVI - Days 8-9 inpatient, then Days 10-28 through home infusion    Ponatinib 15 mg daily     # Anemia   # Elevated B12 levels  Likely secondary to chemotherapy. On 12/17, folate, iron/TIBCnormal and retics elevated. Ferritin high at 568 and vitamin B12 >3000. LDH mildly high (294), haptoglobin in process.   - Vitamin B12 resulted with >3000   - Transfuse to keep Hgb >7   - Will need to sign blood consent when needed  - Of note, pt has history of transfusion reaction and requires premeds with Benadryl and Tylenol      # Concern for grade 1 CRS  Pt developed fevers to 102 on 12/20. This was also in the setting of cough and CXR findings consistent with pneumonia. Fevers have persisted despite cefepime, which raises concern for possible underlying CRS in addition to pneumonia. Per protocol, in the absence of hypotension or hypoxia, this could indicate grade 1 CRS which does not require any intervention or holding of blincyto.   - If becomes HD unstable, would interrupt blincyto and initiate dexamethasone per protocol    ID   # Lingular Pneumonia  # Non neutropenic fevers   Febrile 12/20 Tmax 102.4 with associated productive cough and sore throat. No dyspnea or URI sxs. No neurologic changes. CXR obtained positive for lingular pneumonia which is likely culprit for fevers rather than Blincyto. Started on empiric  Cefepime.   - BCx/UCx NGTD. RVP negative.  - Fungitell pending  - S pneumo, legionella negative  - Cefepime (12/20 PM-12/22) ? 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 Presented with scattered papulovesicular lesions on her chin and nose as well as scattered hemorrhagic erosions on the scalp and lower face (V2-3 distributions of the trigeminal nerve) c/f disseminated VZV. Lesions significantly improved on admission. ID recommended considering decrease to 1000 mg daily on 12/13 visit (given less anticipated prolonged neutropenia with new regimen) and confirmed with Dr. Hull.   - Continue Valtrex 1000 mg daily.     # ID PPx   - Valtrex 1000 mg daily   - Hold PTA fluconazole 200 mg daily and levofloxacin 250 mg daily given ANC >1.0   - Nebulized pentamidine last given 12/2; next due 12/30 (ordered/requested for outpt)  - Received Evusheld on 11/2/22       MISC  # Myalgias  Noted to have myalgias starting 12/18 AM. Continues to have mild myalgias, in her shoulders, upper arms, back, hip and thighs. Has had some improvement with APAP. Reports these are constant. Not worsening with movement. May be related to ponatinib. Hold off on starting a statin. Clinically improving as of 12/20 with tylenol, heat, and changing her position.   - Continue to monitor. Trial celebrex 200mg once on 12/20 evening if needed. May also improve after steroid premed per blincyto protocol.   - Improving. Consider adding a statin in the future if myalgias are resolved.      # Abdominal rash, resolving  Noted ~12/9 outpatient. Mildly pruritic. Evaluated by ID on 12/13 who recommended moisturizer for the rash on the abdomen. This is likely not infectious as she has been on valtrex and fluconazole.   - Triamcinolone PRN   - Resolving as of 12/20.     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 Pt reports symptoms are stable. Reports mild paresthesias and hot/cold sensitivity. Monitor clinically       # Coping  Patient endorses difficulty with coping regarding the unknowns of her leukemia and treatment. Supportive listening provided and patient agreeable to health psych.   - Health psych planning to see patient in hospital on 12/20.       Plan of care discussed with Dr Fairchild.     Kelsey Gonzalez (Zaki), PAAngellaC  Hematology/Oncology    Significant Results and Procedures   NA    Pending Results   These results will be followed up by outpatient team  Unresulted Labs Ordered in the Past 30 Days of this Admission     Date and Time Order Name Status Description    12/21/2022  2:10 PM Blood Culture Hand, Left Preliminary     12/20/2022  2:29 PM Blood Culture Arm, Right Preliminary     12/4/2022 11:30 AM PREPARE PHERESED PLATELETS (UNIT) Preliminary     12/1/2022  9:30 AM PREPARE PHERESED PLATELETS (UNIT) Preliminary     12/1/2022  9:30 AM PREPARE PHERESED PLATELETS (UNIT) Preliminary     12/1/2022  9:30 AM PREPARE RED BLOOD CELLS (UNIT) Preliminary     11/28/2022 11:15 AM PREPARE PHERESED PLATELETS (UNIT) Preliminary     11/28/2022 11:15 AM PREPARE PHERESED PLATELETS (UNIT) Preliminary     11/28/2022 11:15 AM PREPARE RED BLOOD CELLS (UNIT) Preliminary     11/21/2022  2:15 PM PREPARE PHERESED PLATELETS (UNIT) Preliminary     11/16/2022  3:00 PM PREPARE PHERESED PLATELETS (UNIT) Preliminary     11/16/2022  3:00 PM PREPARE RED BLOOD CELLS (UNIT) Preliminary     11/16/2022  3:00 PM PREPARE PHERESED PLATELETS (UNIT) Preliminary           Code Status   Full Code    Primary Care Physician   [facility]    Physical Exam   Temp: 98.8  F (37.1  C) Temp src: Oral BP: 127/65 Pulse: 90   Resp: 18 SpO2: 96 % O2 Device: None (Room air)    Vitals:    12/21/22 0800 12/22/22 1003 12/23/22 1115   Weight: 107.4 kg (236 lb 12.8  oz) 108.1 kg (238 lb 4.8 oz) 110.1 kg (242 lb 12.8 oz)     Vital Signs with Ranges  Temp:  [98  F (36.7  C)-98.8  F (37.1  C)] 98.8  F (37.1  C)  Pulse:  [76-93] 90  Resp:  [16-18] 18  BP: (108-127)/(48-74) 127/65  SpO2:  [95 %-98 %] 96 %  I/O last 3 completed shifts:  In: 300 [I.V.:100; IV Piggyback:200]  Out: -     Constitutional: Awake and conversational. Non-toxic appearing. No acute distress. Well developed, hydrated, and nourished. Appears stated age.   HEENT: Normocephalic, atraumatic. Head scarf in place.  Lymph: Neck supple.   Respiratory: Breathing comfortable with no increased work on room air. Good air exchange. No signs of respiratory distress or accessory muscle use. Left Lungs clear to auscultation, fine crackles noted at right base. Occasional dry cough.   Cardiovascular: Regular rate and rhythm. No murmurs, rubs, or gallops. 1+ peripheral edema; stable.    GI: Abdomen is soft, non-distended, non-tender and without distension. Bowel sounds present and normoactive. No masses or hepatosplenomegaly appreciated.   Skin: Skin is clean, dry, intact. No jaundice appreciated.  Musculoskeletal/ Extremities: Nailbeds pink and without cyanosis or clubbing. 1+ bilateral LE edema.  Neurologic: Alert and oriented. Speech normal. Grossly nonfocal. Memory and thought process preserved. Seen ambulating around unit without concerns.   Neuropsychiatric: Calm, affect congruent to situation. Appropriate mood and affect. Good judgment and insight.      Time Spent on this Encounter   Kelsey JACKSON PA-C, personally saw the patient today and spent greater than 30 minutes discharging this patient.    Discharge Disposition   Discharged to home  Condition at discharge: Stable    Consultations This Hospital Stay   PSYCHOLOGY ADULT IP CONSULT  NURSING TO CONSULT FOR VASCULAR ACCESS CARE IP CONSULT  CARE MANAGEMENT / SOCIAL WORK IP CONSULT  PATIENT LEARNING CENTER IP CONSULT  NURSING TO CONSULT FOR VASCULAR ACCESS CARE IP  CONSULT  NURSING TO CONSULT FOR VASCULAR ACCESS CARE IP CONSULT  NURSING TO CONSULT FOR VASCULAR ACCESS CARE IP CONSULT    Discharge Orders      X-ray Lumbar punc for intrathecal chemo admin ([place] Only)     Comprehensive metabolic panel    To be arranged 2x weekly 12/26-1/13 with [facility]     Home Infusion Referral      Home Care Referral      Reason for your hospital stay    ALL, in remission. Blincyto     Activity    Your activity upon discharge: as tolerated     Discharge Instructions    If you feel lightheaded or dizzy, check your blood pressure. If the top number is <100 or the bottom number is <50 then come to the ED. If you feel short of breath, come to the ED. If you have a fever >100.4 with no other new or worsening symptoms no need to come to the ED, but if you have questions you can call the clinic.   Continue levaquin through 12/27, then stop     Adult [facility] Follow-up and recommended labs and tests    Please see your appointments as scheduled on Mychart - there will be additional appointments that will be added. We are working to arrange outpatient LPs with IT chemo in the outpatient as well through XR.    Appointments on [place] and/or [facility] (with [facility] or [facility] provider or service). Call [phone] if you haven't heard regarding these appointments within 7 days of discharge.     When to contact your care team    See discharge instructions for details.    ealth/[facility] cancer clinic triage line: [phone]     Diet    Follow this diet upon discharge: regular diet     Check Out Appointment Request    Letty- please arrange a BMBx on ~ 1/11/23, and follow up with Dr. Hull 1/13 or early the following week. And arrange pentamidine neb on ~12/30     CBC with Platelets & Differential    To be arranged 2x weekly 12/26-1/13 with [facility]     Discharge Medications   Discharge Medication List as of 12/23/2022  1:22 PM      START taking these medications    Details   aspirin (ASA) 81 MG EC tablet  Take 1 tablet (81 mg) by mouth daily, Disp-30 tablet, R-1, E-Prescribe      benzonatate (TESSALON) 100 MG capsule Take 1 capsule (100 mg) by mouth 3 times daily as needed for cough, Disp-30 capsule, R-0, E-Prescribe      blinatumomab 28 mcg Inject 28 mcg into the vein every 24 hours for 19 days, 28 mcg, Intravenous, EVERY 24 HOURS Starting Fri 12/23/2022, Until Wed 1/11/2023, For 19 days, [facility]      guaiFENesin-dextromethorphan (ROBITUSSIN DM) 100-10 MG/5ML syrup Take 10 mLs by mouth every 4 hours as needed for cough or congestion, Disp-354 mL, R-0, E-Prescribe         CONTINUE these medications which have CHANGED    Details   levofloxacin (LEVAQUIN) 750 MG tablet Take 1 tablet (750 mg) by mouth daily Then stop, Disp-4 tablet, R-0, E-Prescribe      valACYclovir (VALTREX) 1000 mg tablet Take 1 tablet (1,000 mg) by mouth daily, Disp-30 tablet, R-0, E-Prescribe         CONTINUE these medications which have NOT CHANGED    Details   acetaminophen (TYLENOL) 325 MG tablet Take 2 tablets (650 mg) by mouth every 6 hours as needed for mild pain, other or fever (blood product administration premedication), Historical      amLODIPine (NORVASC) 5 MG tablet Take 1 tablet (5 mg) by mouth daily, Disp-30 tablet, R-4, E-Prescribe      CHOLECALCIFEROL PO Take 1 tablet by mouth daily Unknown strength., Historical      lidocaine-prilocaine (LIDOPRIL) 2.5-2.5 % kit Use prior to lab accessDisp-1 kit, [id number]      LORazepam (ATIVAN) 0.5 MG tablet Take 1-2 tablets (0.5-1 mg) by mouth daily as needed for anxiety . Take prior to BMBx procedures as needed. If initial dose unhelpful, okay to take a second 0.5-1 mg dose (for a max of 2 mg at a time). Caution: causes sedation. Do not drive after taking  this medication., Disp-10 tablet, R-0, E-Prescribe      ondansetron (ZOFRAN ODT) 4 MG ODT tab Take 1-2 tablets (4-8 mg) by mouth every 8 hours as needed for nausea or vomiting, Disp-60 tablet, R-0,  E-Prescribe      pantoprazole (PROTONIX) 20 MG EC tablet Take 1 tablet (20 mg) by mouth daily, Disp-90 tablet, R-1, E-Prescribe      PONATinib (ICLUSIG) 15 MG tablet Take 1 tablet (15 mg) by mouth daily -- start concurrent with blinatumomab infusion, planned for 12/12/22, Disp-30 tablet, R-3, E-Prescribe      prochlorperazine (COMPAZINE) 10 MG tablet Take 1 tablet (10 mg) by mouth every 6 hours as needed (Breakthrough Nausea/Vomiting), Disp-20 tablet, R-0, E-Prescribe      sennosides (SENOKOT) 8.6 MG tablet Take 1-2 tablets by mouth 2 times daily, Disp-60 tablet, R-1, E-Prescribe         STOP taking these medications       fluconazole (DIFLUCAN) 200 MG tablet Comments:   Reason for Stopping:             Allergies   Allergies   Allergen Reactions     Blood Transfusion Related (Informational Only) Hives     Hive reaction with platelet transfusion on 8/26/2022. Please administer platelets with tylenol and benadryl premedication.      Data   Most Recent 3 CBC's:Recent Labs   Lab Test 12/23/22  0556 12/22/22  0725 12/21/22  0558   WBC 8.5 6.4 5.7   HGB 7.7* 9.2* 8.5*   MCV 98 97 98    207 167      Most Recent 3 BMP's:  Recent Labs   Lab Test 12/23/22  0556 12/22/22  0725 12/21/22  0558    142 139   POTASSIUM 3.6 3.9 3.7   CHLORIDE 112* 109* 107   CO2 20* 19* 21*   BUN 18.4 11.4 11.1   CR 0.59 0.47* 0.60   ANIONGAP 12 14 11   SOLEDAD 9.0 9.8 8.9   * 143* 99     Most Recent 2 LFT's:  Recent Labs   Lab Test 12/23/22  0556 12/22/22  0725   AST 10 12   ALT 14 14   ALKPHOS 70 78   BILITOTAL 0.2 0.4     Most Recent INR's and Anticoagulation Dosing History:  Anticoagulation Dose History     Recent Dosing and Labs Latest Ref Rng & Units 10/13/2022 10/14/2022 10/21/2022 11/2/2022 11/23/2022 11/25/2022 12/15/2022    INR 0.85 - 1.15 1.18(H) 1.16(H) 1.07 1.10 1.07 1.08 1.01        Most Recent 3 Troponin's:No lab results found.  Most Recent Cholesterol Panel:No lab results found.  Most Recent 6 Bacteria Isolates From  Any Culture (See EPIC Reports for Culture Details):No lab results found.  Most Recent TSH, T4 and A1c Labs:No lab results found.  Results for orders placed or performed during the hospital encounter of 12/14/22   XR Lumbar Punc Intrathecal Chemo Admin    Narrative    PROCEDURE: Lumbar Puncture using Fluoroscopy, 12/16/2022 12:12 PM    HISTORY: IT chemo per protocol    COMPARISON: 11/25/2022    STAFF NEURORADIOLOGIST: Dr. Guera Bardales    Resident PHYSICIAN: Dr. Santino Martinez    TECHNIQUE: Verbal and written consent for lumbar puncture was obtained  from the patient, and benefits and risk of the procedure were  explained, including but not limited to worsening headache,  hemorrhage, infection, lower extremity pain, or nerve root injury. The  patient was sterilely prepped and draped with the patient in the prone  position, over the lower back. Under fluoroscopic guidance, the  interlaminar spaces were noted. 1% lidocaine was administered for  local anesthetic over the L2-3 interlaminar space, and a 22 gauge 5  inch needle was advanced into the thecal sac under fluoroscopic  guidance.      There was initial show of clear CSF. Approximately 12 cc of CSF were  collected.    Hematology/Oncology then administered intrathecal chemotherapy, dose  and rate as determined by Heme/Onc.    The needle was removed with the stylet in place. There was no  immediate complication associated with the procedure. Samples were  sent for the requested laboratory testing.      FLUORO TIME: 38 seconds.    DOSE: 19 mGym2      Impression    IMPRESSION: Successful lumbar puncture without immediate complication.    PLAN: Patient discharged to patient care unit in stable condition for  monitoring. Orders placed for 1 hour of bed rest with patient laying  on the back and the head of the bed flat.    GUERA JACKSON MD, attest that I was present for all critical  portions of the procedure and was immediately available to provide  guidance and  assistance during the remainder of the procedure.    I have personally reviewed the examination and initial interpretation  and I agree with the findings.    GUERA AVALOS MD         SYSTEM ID:  [id number]   US Lower Extremity Venous Duplex Left    Narrative    EXAMINATION: DOPPLER VENOUS ULTRASOUND OF THE LEFT LOWER EXTREMITY,  12/16/2022 2:14 PM     COMPARISON: None.    HISTORY: Worsening left lower extremity swelling. Rule out DVT.    TECHNIQUE:  Gray-scale evaluation with compression, spectral flow, and  color Doppler assessment of the deep venous system of the left leg  from groin to knee, and then at the ankle.    FINDINGS:  In the left lower extremity, the common femoral, femoral, popliteal  and posterior tibial veins demonstrate normal compressibility and  blood flow. The right common femoral vein was examined for comparison  and demonstrates normal compressibility and blood flow.      Impression    IMPRESSION:  1.  No evidence left lower extremity deep venous thrombosis.    I have personally reviewed the examination and initial interpretation  and I agree with the findings.    SAM ROSEN MD         SYSTEM ID:  [id number]   XR Chest 2 Views    Narrative    PA and lateral chest    HISTORY: AML with fever    COMPARISON STUDY: None    FINDINGS: Increased airspace opacity in the lingula. Right IJ  Port-A-Cath tip in the mid SVC. Cardiac silhouette is not enlarged.      Impression    IMPRESSION: Lingular pneumonia    RADHA HERNANDES MD         SYSTEM ID:  [id number]

## 2022-12-25 LAB — BACTERIA BLD CULT: NO GROWTH

## 2022-12-25 NOTE — PROGRESS NOTES
April is a 42 year old who is being evaluated via a billable video visit.      How would you like to obtain your AVS? [employer]  If the video visit is dropped, the invitation should be resent by: Send to e-mail at: [email or web address]  Will anyone else be joining your video visit? No      Gabriela Morales      Video-Visit Details    Type of service:  Video Visit     Originating Location (pt. Location): Home    Distant Location (provider location):  On-site  Platform used for Video Visit: OneTouchEMR   Video duration : 22 minutes            [facility]  Date of visit: Dec 26, 2022      Reason for Visit: Ph (+) B-ALL       Oncology HPI:   Followed by Dr. Hull. Patient presented to outside hospital with c/o right upper quadrant pain and was subsequently found to have a markedly elevated white blood cell count concerning for acute leukemia. While at OSH, she had CTA chest as well as CT A/P. These identified no PE, no acute or localized intraabdominal inflammatory process, mild splenomegaly, and few inconspicuous low-density structures in the liver. She was transferred to [facility] for further workup and management. S/p Hydrea 1g TID (8/21-8/23) with improvement in WBC (100K ? 12K). Diagnostic BMBx completed 8/20/22, which demonstrated B-ALL with 85% blasts and hypercellular marrow. IHC shows CD10+, CD34+. 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 ALL NGS negative. BCR/ABL1 major/minor (sent from peripheral blood) both negative. Initiated pre-phase steroids with Prednisone 60 mg x8/21; increased to 60 mg/m2  (140 mg) 8/22-8/24. She initiated GRAALL + imatinib (C1D1 = 8/24/22). S/p BMT consult 9/12 with Dr. Walsh. Induction was relatively uncomplicated. Post-induction BMBx done 9/26/22 and revealed MRD- CR1, with negative BCR/ABL1 and Clonoseq testing. With no matched related donors and excellent early/deep response chemotherapy is currently favored over BMT. 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 Underwent BMBx 10/25/22 with no morphologic evidence of B-lymphoblastic leukemia and flow with unusual B lineage precursor population 0.09%. C3 GRAALL + imatinib on 11/2/22 was tolerated well without complication.     Plan to admit for C1 Blinatumumab + Ponatinib on 12/14/2022      Interval history:   April is here for follow up   She is doing well since getting home on Friday, happy to have the holiday with her family.  Body aches have recurred in her hips and back, she ran out of tylenol but will pick this up again  Abdominal rash resolved  Her cough persists, this is with less drainage. No sin us pain or pressure.   No bleeding issues. Neuropathy persists in fingertips-- still triggered by extreme temperatures. Tingling in toes persists as well. Worse when legs are crossed. No swelling.   Appetite is stable, weight fluctuates some depending on IV fluids etc but overall stable range.   ROS otherwise neg        Current Outpatient Medications   Medication Sig Dispense Refill     acetaminophen (TYLENOL) 325 MG tablet Take 2 tablets (650 mg) by mouth every 6 hours as needed for mild pain, other or fever (blood product administration premedication)       amLODIPine (NORVASC) 5 MG tablet Take 1 tablet (5 mg) by mouth daily 30 tablet 4     aspirin (ASA) 81 MG EC tablet Take 1 tablet (81 mg) by mouth daily 30 tablet 1     benzonatate (TESSALON) 100 MG capsule Take 1 capsule (100 mg) by mouth 3 times daily as needed for cough 30 capsule 0     blinatumomab 28 mcg Inject 28  mcg into the vein every 24 hours for 19 days       CHOLECALCIFEROL PO Take 1 tablet by mouth daily Unknown strength.       guaiFENesin-dextromethorphan (ROBITUSSIN DM) 100-10 MG/5ML syrup Take 10 mLs by mouth every 4 hours as needed for cough or congestion 354 mL 0     levofloxacin (LEVAQUIN) 750 MG tablet Take 1 tablet (750 mg) by mouth daily Then stop 4 tablet 0     lidocaine-prilocaine (LIDOPRIL) 2.5-2.5 % kit Use prior to lab access 1 kit 1     LORazepam (ATIVAN) 0.5 MG tablet Take 1-2 tablets (0.5-1 mg) by mouth daily as needed for anxiety . Take prior to BMBx procedures as needed. If initial dose unhelpful, okay to take a second 0.5-1 mg dose (for a max of 2 mg at a time). Caution: causes sedation. Do not drive after taking this medication. 10 tablet 0     ondansetron (ZOFRAN ODT) 4 MG ODT tab Take 1-2 tablets (4-8 mg) by mouth every 8 hours as needed for nausea or vomiting 60 tablet 0     pantoprazole (PROTONIX) 20 MG EC tablet Take 1 tablet (20 mg) by mouth daily 90 tablet 1     PONATinib (ICLUSIG) 15 MG tablet Take 1 tablet (15 mg) by mouth daily -- start concurrent with blinatumomab infusion, planned for 12/12/22 30 tablet 3     prochlorperazine (COMPAZINE) 10 MG tablet Take 1 tablet (10 mg) by mouth every 6 hours as needed (Breakthrough Nausea/Vomiting) 20 tablet 0     sennosides (SENOKOT) 8.6 MG tablet Take 1-2 tablets by mouth 2 times daily 60 tablet 1     valACYclovir (VALTREX) 1000 mg tablet Take 1 tablet (1,000 mg) by mouth daily 30 tablet 0       Allergies   Allergen Reactions     Blood Transfusion Related (Informational Only) Hives     Hive reaction with platelet transfusion on 8/26/2022. Please administer platelets with tylenol and benadryl premedication.          Video physical exam  General: Patient appears well in no acute distress.   Skin: No visualized rash or lesions on visualized skin  Eyes: EOMI, no erythema, sclera icterus or discharge noted  Resp: Appears to be breathing comfortably  without accessory muscle usage, speaking in full sentences, no cough  MSK: Appears to have normal range of motion based on visualized movements  Neurologic: No apparent tremors, facial movements symmetric  Psych: affect pleasant, alert and oriented      Labs:     I personally reviewed the following labs:    Most Recent 3 CBC's:  Recent Labs   Lab Test 12/23/22  0556 12/22/22  0725 12/21/22  0558   WBC 8.5 6.4 5.7   HGB 7.7* 9.2* 8.5*   MCV 98 97 98    207 167     Most Recent 3 BMP's:  Recent Labs   Lab Test 12/23/22  0556 12/22/22  0725 12/21/22  0558    142 139   POTASSIUM 3.6 3.9 3.7   CHLORIDE 112* 109* 107   CO2 20* 19* 21*   BUN 18.4 11.4 11.1   CR 0.59 0.47* 0.60   ANIONGAP 12 14 11   SOLEDAD 9.0 9.8 8.9   * 143* 99     Most Recent 2 LFT's:  Recent Labs   Lab Test 12/23/22  0556 12/22/22  0725   AST 10 12   ALT 14 14   ALKPHOS 70 78   BILITOTAL 0.2 0.4           Imaging: n/a      Impression/plan:     # Ph(+) B-ALL   Followed by Dr. Hull. Patient presented to outside hospital with c/o right upper quadrant pain and was subsequently found to have a markedly elevated white blood cell count concerning for acute leukemia. While at OSH, she had CTA chest as well as CT A/P. These identified no PE, no acute or localized intraabdominal inflammatory process, mild splenomegaly, and few inconspicuous low-density structures in the liver. She was transferred to [facility] for further workup and management. S/p Hydrea 1g TID (8/21-8/23) with improvement in WBC (100K ? 12K). Diagnostic BMBx completed 8/20/22, which demonstrated B-ALL with 85% blasts and hypercellular marrow. IHC shows CD10+, CD34+. 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 Cytogenetics revealed t(9;22), and multiple other abnormalities including an unbalanced translocation between chromosomes 3, 5, and 7 resulting in loss of most of the short arm and the proximal long  arm of chromosome 7 and a deletion within short arm of chromosome 9. 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 ALL NGS negative. BCR/ABL1 major/minor (sent from peripheral blood) both negative. Initiated pre-phase steroids with Prednisone 60 mg x8/21; increased to 60 mg/m2 (140 mg) 8/22-8/24. She initiated GRAALL + imatinib (C1D1 = 8/24/22). S/p BMT consult 9/12 with Dr. Walsh. Induction was relatively uncomplicated. Post-induction BMBx done 9/26/22 and revealed MRD- CR1, with negative BCR/ABL1 and Clonoseq testing. With no matched related donors and excellent early/deep response chemotherapy is currently favored over BMT. 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 Underwent BMBx 10/25/22 with no morphologic evidence of B-lymphoblastic leukemia and flow with unusual B lineage precursor population 0.09%. C3 GRAALL + imatinib on 11/2/22 was tolerated well without complication.   - Prior plan was to continue GRAALL for a total of 8 cycles (of note odd cycles are 21-days in length and even cycles are 14-days in length), however now adjusting to Blinatumumab + Ponatinib after extensive discussions between April and Dr Hull  - C1D13 (12/26) Blina + Ponatinib-- will disconnect on 1/10  - Per Dr. Hull, plan is to continue treatment with blinatumomab and ponatinib (Lancet Haematol 2022 Nov 16;-[id number]). She should remain on prophylactic antimicrobials during this study. Bone marrow examinations, including PCR for BCR - ABL , will be planned the end of cycles one, two, and five during ponatinib-blinatumomab combination therapy and every 3-6 months thereafter while on ponatinib maintenance. Clonoseq MRD testing to be sent with each bone marrow examination. She will require a total of 12 prophylactic intrathecal chemotherapy administrations, alternating methotrexate and cytarabine, on days ~1, 15 and 29 per protocol. She has completed 6 of 12  doses as of 12/17  - Given risk for arterial occlusive events with ponatinib, will continue with ppx lovenox while inpatient and discharge with ASA 81mg daily.    - Port in place   - Patient does have coverage for [facility] and has received IV abx, line cares, etc in past  - Monitor for signs or symptoms of CRS or neurotoxicity (immune effector cell-associated neurotoxicity syndrome=ICANS).  - Has LP scheduled on 1/2  - Plan for bmbx week on 1/15  - RTC to see Dr Hull following biopsy, admission for C2 Blina      # Anemia   - Transfuse to keep Hgb >7   - Of note, pt has history of transfusion reaction and requires premeds with Benadryl and Tylenol         # Lingular Pneumonia  # Non neutropenic fevers   Febrile 12/20 Tmax 102.4 with associated productive cough and sore throat. No dyspnea or URI sxs. No neurologic changes. CXR obtained positive for lingular pneumonia which is likely culprit for fevers rather than Blincyto. Started on empiric Cefepime.   - BCx/UCx NGTD. RVP negative.  - Beta D glucan (fungitell) neg  - S pneumo, legionella negative  - Cefepime (12/20 PM-12/22) ? 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 Presented with scattered papulovesicular lesions on her chin and nose as well as scattered hemorrhagic erosions on the scalp and lower face (V2-3 distributions of the trigeminal nerve) c/f disseminated VZV.  - Per outpatient ID note, continue valtrex 1000 mg TID for now. If her remains ANC >1000 and if the new chemo regimen (assuming we proceed with blina/ ponatinib) has low occurrence of prolonged neutropenia, can drop valtrex down to 1000 daily   - Shingles vaccine in 1-2 months after she finishes all her chemotherapy       # Abdominal rash -- resolved  Scattered raised papular spots across upper abdomen. Does not appear fungal. Onset few days ago. Mildly pruritic. No new meds  or skin products. Has not been using a lotion here. No associated symptoms to suggest infection.   Evaluated by ID on 12/13, recommend moisturizer for the rash on the abdomen. This is likely not infectious as she is on valtrex and fluc.         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 Pt reports symptoms are stable. Reports mild paresthesias and hot/cold sensitivity.   - Monitor clinically       # Myalgias  Started IP then resolved, now continues to have mild myalgias, in her shoulders, upper arms, back, hip and thighs. Has had some improvement with APAP. Reports these are constant. Not worsening with movement. May be related to ponatinib. Hold off on starting a statin.   - Tylenol PRN  - Improving. Consider adding a statin in the future if myalgias are resolved.         Transition Care Management Services  Admission Date: 11/23/22    Discharge Date: 11/26/22    Discharge Diagnosis: cycle 4 of your chemotherapy which was tolerated well without concerns    Interactive contact date: 11/28/2022  Face-to-face visit date: 12/26/2022        Medical complexity decision making: High complexity ([id number])            Roya Quarles [facility]    45 minutes spent on the date of the encounter doing chart review, review of test results, interpretation of tests, patient visit, documentation and discussion with other provider(s)

## 2022-12-26 ENCOUNTER — VIRTUAL VISIT (OUTPATIENT)
Dept: ONCOLOGY | Facility: CLINIC | Age: 42
End: 2022-12-26
Attending: INTERNAL MEDICINE
Payer: COMMERCIAL

## 2022-12-26 ENCOUNTER — LAB REQUISITION (OUTPATIENT)
Dept: LAB | Facility: CLINIC | Age: 42
End: 2022-12-26
Payer: COMMERCIAL

## 2022-12-26 VITALS — WEIGHT: 243 LBS | HEIGHT: 64 IN | BODY MASS INDEX: 41.48 KG/M2

## 2022-12-26 DIAGNOSIS — C91.00 ACUTE LYMPHOBLASTIC LEUKEMIA NOT HAVING ACHIEVED REMISSION (H): ICD-10-CM

## 2022-12-26 DIAGNOSIS — C91.00 ACUTE LYMPHOBLASTIC LEUKEMIA (ALL) NOT HAVING ACHIEVED REMISSION (H): Primary | ICD-10-CM

## 2022-12-26 LAB
ALBUMIN SERPL-MCNC: 3.3 G/DL (ref 3.4–5)
ALP SERPL-CCNC: 84 U/L (ref 40–150)
ALT SERPL W P-5'-P-CCNC: 23 U/L (ref 0–50)
ANION GAP SERPL CALCULATED.3IONS-SCNC: 4 MMOL/L (ref 3–14)
AST SERPL W P-5'-P-CCNC: 9 U/L (ref 0–45)
BACTERIA BLD CULT: NO GROWTH
BASOPHILS # BLD AUTO: 0 10E3/UL (ref 0–0.2)
BASOPHILS NFR BLD AUTO: 0 %
BILIRUB SERPL-MCNC: 0.4 MG/DL (ref 0.2–1.3)
BUN SERPL-MCNC: 13 MG/DL (ref 7–30)
CALCIUM SERPL-MCNC: 8.9 MG/DL (ref 8.5–10.1)
CHLORIDE BLD-SCNC: 110 MMOL/L (ref 94–109)
CO2 SERPL-SCNC: 28 MMOL/L (ref 20–32)
CREAT SERPL-MCNC: 0.59 MG/DL (ref 0.52–1.04)
EOSINOPHIL # BLD AUTO: 0.1 10E3/UL (ref 0–0.7)
EOSINOPHIL NFR BLD AUTO: 1 %
ERYTHROCYTE [DISTWIDTH] IN BLOOD BY AUTOMATED COUNT: 20 % (ref 10–15)
GFR SERPL CREATININE-BSD FRML MDRD: >90 ML/MIN/1.73M2
GLUCOSE BLD-MCNC: 90 MG/DL (ref 70–99)
HCT VFR BLD AUTO: 25.5 % (ref 35–47)
HGB BLD-MCNC: 8.6 G/DL (ref 11.7–15.7)
IMM GRANULOCYTES # BLD: 0.2 10E3/UL
IMM GRANULOCYTES NFR BLD: 2 %
LYMPHOCYTES # BLD AUTO: 0.6 10E3/UL (ref 0.8–5.3)
LYMPHOCYTES NFR BLD AUTO: 7 %
MCH RBC QN AUTO: 31.5 PG (ref 26.5–33)
MCHC RBC AUTO-ENTMCNC: 33.7 G/DL (ref 31.5–36.5)
MCV RBC AUTO: 93 FL (ref 78–100)
MONOCYTES # BLD AUTO: 1 10E3/UL (ref 0–1.3)
MONOCYTES NFR BLD AUTO: 11 %
NEUTROPHILS # BLD AUTO: 6.9 10E3/UL (ref 1.6–8.3)
NEUTROPHILS NFR BLD AUTO: 79 %
NRBC # BLD AUTO: 0 10E3/UL
NRBC BLD AUTO-RTO: 0 /100
PLATELET # BLD AUTO: 205 10E3/UL (ref 150–450)
POTASSIUM BLD-SCNC: 3.8 MMOL/L (ref 3.4–5.3)
PROT SERPL-MCNC: 6.9 G/DL (ref 6.8–8.8)
RBC # BLD AUTO: 2.73 10E6/UL (ref 3.8–5.2)
SODIUM SERPL-SCNC: 142 MMOL/L (ref 133–144)
WBC # BLD AUTO: 8.8 10E3/UL (ref 4–11)

## 2022-12-26 PROCEDURE — 99215 OFFICE O/P EST HI 40 MIN: CPT | Mod: 95 | Performed by: NURSE PRACTITIONER

## 2022-12-26 PROCEDURE — G0463 HOSPITAL OUTPT CLINIC VISIT: HCPCS | Mod: PN,GT | Performed by: NURSE PRACTITIONER

## 2022-12-26 PROCEDURE — 85025 COMPLETE CBC W/AUTO DIFF WBC: CPT | Performed by: INTERNAL MEDICINE

## 2022-12-26 PROCEDURE — 80053 COMPREHEN METABOLIC PANEL: CPT | Performed by: INTERNAL MEDICINE

## 2022-12-26 ASSESSMENT — PAIN SCALES - GENERAL: PAINLEVEL: NO PAIN (0)

## 2022-12-26 NOTE — LETTER
12/26/2022         RE: Vashti Villegas  [address]  [place] [address]        Dear Colleague,    Thank you for referring your patient, Vashti Villegas, to the [facility]. Please see a copy of my visit note below.    April is a 42 year old who is being evaluated via a billable video visit.      How would you like to obtain your AVS? MyChart  If the video visit is dropped, the invitation should be resent by: Send to e-mail at: [email or web address]  Will anyone else be joining your video visit? No      Gabriela Morales      Video-Visit Details    Type of service:  Video Visit     Originating Location (pt. Location): Home    Distant Location (provider location):  On-site  Platform used for Video Visit: [employer]   Video duration : 22 minutes            [facility]  Date of visit: Dec 26, 2022      Reason for Visit: Ph (+) B-ALL       Oncology HPI:   Followed by Dr. Hull. Patient presented to outside hospital with c/o right upper quadrant pain and was subsequently found to have a markedly elevated white blood cell count concerning for acute leukemia. While at OSH, she had CTA chest as well as CT A/P. These identified no PE, no acute or localized intraabdominal inflammatory process, mild splenomegaly, and few inconspicuous low-density structures in the liver. She was transferred to [facility] for further workup and management. S/p Hydrea 1g TID (8/21-8/23) with improvement in WBC (100K ? 12K). Diagnostic BMBx completed 8/20/22, which demonstrated B-ALL with 85% blasts and hypercellular marrow. IHC shows CD10+, CD34+. 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 Cytogenetics revealed t(9;22), and multiple other abnormalities including an unbalanced translocation between chromosomes 3, 5, and 7 resulting in loss of most of the short arm and the proximal long arm of chromosome 7 and a deletion  within short arm of chromosome 9. 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 ALL NGS negative. BCR/ABL1 major/minor (sent from peripheral blood) both negative. Initiated pre-phase steroids with Prednisone 60 mg x8/21; increased to 60 mg/m2 (140 mg) 8/22-8/24. She initiated GRAALL + imatinib (C1D1 = 8/24/22). S/p BMT consult 9/12 with Dr. Walsh. Induction was relatively uncomplicated. Post-induction BMBx done 9/26/22 and revealed MRD- CR1, with negative BCR/ABL1 and Clonoseq testing. With no matched related donors and excellent early/deep response chemotherapy is currently favored over BMT. 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 Underwent BMBx 10/25/22 with no morphologic evidence of B-lymphoblastic leukemia and flow with unusual B lineage precursor population 0.09%. C3 GRAALL + imatinib on 11/2/22 was tolerated well without complication.     Plan to admit for C1 Blinatumumab + Ponatinib on 12/14/2022      Interval history:   April is here for follow up   She is doing well since getting home on Friday, happy to have the holiday with her family.  Body aches have recurred in her hips and back, she ran out of tylenol but will pick this up again  Abdominal rash resolved  Her cough persists, this is with less drainage. No sin us pain or pressure.   No bleeding issues. Neuropathy persists in fingertips-- still triggered by extreme temperatures. Tingling in toes persists as well. Worse when legs are crossed. No swelling.   Appetite is stable, weight fluctuates some depending on IV fluids etc but overall stable range.   ROS otherwise neg        Current Outpatient Medications   Medication Sig Dispense Refill     acetaminophen (TYLENOL) 325 MG tablet Take 2 tablets (650 mg) by mouth every 6 hours as needed for mild pain, other or fever (blood product administration premedication)       amLODIPine (NORVASC) 5 MG tablet Take 1 tablet (5 mg) by mouth daily 30  tablet 4     aspirin (ASA) 81 MG EC tablet Take 1 tablet (81 mg) by mouth daily 30 tablet 1     benzonatate (TESSALON) 100 MG capsule Take 1 capsule (100 mg) by mouth 3 times daily as needed for cough 30 capsule 0     blinatumomab 28 mcg Inject 28 mcg into the vein every 24 hours for 19 days       CHOLECALCIFEROL PO Take 1 tablet by mouth daily Unknown strength.       guaiFENesin-dextromethorphan (ROBITUSSIN DM) 100-10 MG/5ML syrup Take 10 mLs by mouth every 4 hours as needed for cough or congestion 354 mL 0     levofloxacin (LEVAQUIN) 750 MG tablet Take 1 tablet (750 mg) by mouth daily Then stop 4 tablet 0     lidocaine-prilocaine (LIDOPRIL) 2.5-2.5 % kit Use prior to lab access 1 kit 1     LORazepam (ATIVAN) 0.5 MG tablet Take 1-2 tablets (0.5-1 mg) by mouth daily as needed for anxiety . Take prior to BMBx procedures as needed. If initial dose unhelpful, okay to take a second 0.5-1 mg dose (for a max of 2 mg at a time). Caution: causes sedation. Do not drive after taking this medication. 10 tablet 0     ondansetron (ZOFRAN ODT) 4 MG ODT tab Take 1-2 tablets (4-8 mg) by mouth every 8 hours as needed for nausea or vomiting 60 tablet 0     pantoprazole (PROTONIX) 20 MG EC tablet Take 1 tablet (20 mg) by mouth daily 90 tablet 1     PONATinib (ICLUSIG) 15 MG tablet Take 1 tablet (15 mg) by mouth daily -- start concurrent with blinatumomab infusion, planned for 12/12/22 30 tablet 3     prochlorperazine (COMPAZINE) 10 MG tablet Take 1 tablet (10 mg) by mouth every 6 hours as needed (Breakthrough Nausea/Vomiting) 20 tablet 0     sennosides (SENOKOT) 8.6 MG tablet Take 1-2 tablets by mouth 2 times daily 60 tablet 1     valACYclovir (VALTREX) 1000 mg tablet Take 1 tablet (1,000 mg) by mouth daily 30 tablet 0       Allergies   Allergen Reactions     Blood Transfusion Related (Informational Only) Hives     Hive reaction with platelet transfusion on 8/26/2022. Please administer platelets with tylenol and benadryl  premedication.          Video physical exam  General: Patient appears well in no acute distress.   Skin: No visualized rash or lesions on visualized skin  Eyes: EOMI, no erythema, sclera icterus or discharge noted  Resp: Appears to be breathing comfortably without accessory muscle usage, speaking in full sentences, no cough  MSK: Appears to have normal range of motion based on visualized movements  Neurologic: No apparent tremors, facial movements symmetric  Psych: affect pleasant, alert and oriented      Labs:     I personally reviewed the following labs:    Most Recent 3 CBC's:  Recent Labs   Lab Test 12/23/22  0556 12/22/22  0725 12/21/22  0558   WBC 8.5 6.4 5.7   HGB 7.7* 9.2* 8.5*   MCV 98 97 98    207 167     Most Recent 3 BMP's:  Recent Labs   Lab Test 12/23/22  0556 12/22/22  0725 12/21/22  0558    142 139   POTASSIUM 3.6 3.9 3.7   CHLORIDE 112* 109* 107   CO2 20* 19* 21*   BUN 18.4 11.4 11.1   CR 0.59 0.47* 0.60   ANIONGAP 12 14 11   SOLEDAD 9.0 9.8 8.9   * 143* 99     Most Recent 2 LFT's:  Recent Labs   Lab Test 12/23/22  0556 12/22/22  0725   AST 10 12   ALT 14 14   ALKPHOS 70 78   BILITOTAL 0.2 0.4           Imaging: n/a      Impression/plan:     # Ph(+) B-ALL   Followed by Dr. Hull. Patient presented to outside hospital with c/o right upper quadrant pain and was subsequently found to have a markedly elevated white blood cell count concerning for acute leukemia. While at OSH, she had CTA chest as well as CT A/P. These identified no PE, no acute or localized intraabdominal inflammatory process, mild splenomegaly, and few inconspicuous low-density structures in the liver. She was transferred to [facility] for further workup and management. S/p Hydrea 1g TID (8/21-8/23) with improvement in WBC (100K ? 12K). Diagnostic BMBx completed 8/20/22, which demonstrated B-ALL with 85% blasts and hypercellular marrow. IHC shows CD10+, CD34+. Dry tap, so additional studies sent on PB: FISH findings c/w Ph+  ALL with BCR-ABL1 fusion present in 93.5% of round nucelei. 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 ALL NGS negative. BCR/ABL1 major/minor (sent from peripheral blood) both negative. Initiated pre-phase steroids with Prednisone 60 mg x8/21; increased to 60 mg/m2 (140 mg) 8/22-8/24. She initiated GRAALL + imatinib (C1D1 = 8/24/22). S/p BMT consult 9/12 with Dr. Walsh. Induction was relatively uncomplicated. Post-induction BMBx done 9/26/22 and revealed MRD- CR1, with negative BCR/ABL1 and Clonoseq testing. With no matched related donors and excellent early/deep response chemotherapy is currently favored over BMT. 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 Underwent BMBx 10/25/22 with no morphologic evidence of B-lymphoblastic leukemia and flow with unusual B lineage precursor population 0.09%. C3 GRAALL + imatinib on 11/2/22 was tolerated well without complication.   - Prior plan was to continue GRAALL for a total of 8 cycles (of note odd cycles are 21-days in length and even cycles are 14-days in length), however now adjusting to Blinatumumab + Ponatinib after extensive discussions between April and Dr Hull  - C1D13 (12/26) Blina + Ponatinib-- will disconnect on 1/10  - Per Dr. Hull, plan is to continue treatment with blinatumomab and ponatinib (Lancet Haematol 2022 Nov 16;[id number]). She should remain on prophylactic antimicrobials during this study. Bone marrow examinations, including PCR for BCR - ABL , will be planned the end of cycles one, two, and five during ponatinib-blinatumomab combination therapy and every 3-6 months thereafter while on ponatinib maintenance.  Clonoseq MRD testing to be sent with each bone marrow examination. She will require a total of 12 prophylactic intrathecal chemotherapy administrations, alternating methotrexate and cytarabine, on days ~1, 15 and 29 per protocol. She has completed 6 of 12 doses as of 12/17  - Given risk for arterial occlusive events with ponatinib, will continue with ppx lovenox while inpatient and discharge with ASA 81mg daily.    - Port in place   - Patient does have coverage for [facility] and has received IV abx, line cares, etc in past  - Monitor for signs or symptoms of CRS or neurotoxicity (immune effector cell-associated neurotoxicity syndrome=ICANS).  - Has LP scheduled on 1/2  - Plan for bmbx week on 1/15  - RTC to see Dr Hull following biopsy, admission for C2 Blina      # Anemia   - Transfuse to keep Hgb >7   - Of note, pt has history of transfusion reaction and requires premeds with Benadryl and Tylenol         # Lingular Pneumonia  # Non neutropenic fevers   Febrile 12/20 Tmax 102.4 with associated productive cough and sore throat. No dyspnea or URI sxs. No neurologic changes. CXR obtained positive for lingular pneumonia which is likely culprit for fevers rather than Blincyto. Started on empiric Cefepime.   - BCx/UCx NGTD. RVP negative.  - Beta D glucan (fungitell) neg  - S pneumo, legionella negative  - Cefepime (12/20 PM-12/22) ? 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 Presented with scattered papulovesicular lesions on her chin and nose as well as scattered hemorrhagic erosions on the scalp and lower face (V2-3 distributions of the trigeminal nerve) c/f disseminated VZV.  - Per outpatient ID note, continue valtrex 1000 mg TID for now. If her remains ANC >1000 and if the new chemo regimen (assuming we proceed with blina/ ponatinib) has low occurrence of prolonged neutropenia, can drop valtrex  down to 1000 daily   - Shingles vaccine in 1-2 months after she finishes all her chemotherapy       # Abdominal rash -- resolved  Scattered raised papular spots across upper abdomen. Does not appear fungal. Onset few days ago. Mildly pruritic. No new meds or skin products. Has not been using a lotion here. No associated symptoms to suggest infection.   Evaluated by ID on 12/13, recommend moisturizer for the rash on the abdomen. This is likely not infectious as she is on valtrex and fluc.         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 Pt reports symptoms are stable. Reports mild paresthesias and hot/cold sensitivity.   - Monitor clinically       # Myalgias  Started IP then resolved, now continues to have mild myalgias, in her shoulders, upper arms, back, hip and thighs. Has had some improvement with APAP. Reports these are constant. Not worsening with movement. May be related to ponatinib. Hold off on starting a statin.   - Tylenol PRN  - Improving. Consider adding a statin in the future if myalgias are resolved.         Transition Care Management Services  Admission Date: 11/23/22    Discharge Date: 11/26/22    Discharge Diagnosis: cycle 4 of your chemotherapy which was tolerated well without concerns    Interactive contact date: 11/28/2022  Face-to-face visit date: 12/26/2022        Medical complexity decision making: High complexity ([id number])            Roya Quarles [facility]    45 minutes spent on the date of the encounter doing chart review, review of test results, interpretation of tests, patient visit, documentation and discussion with other  provider(s)

## 2022-12-28 ENCOUNTER — LAB REQUISITION (OUTPATIENT)
Dept: LAB | Facility: CLINIC | Age: 42
End: 2022-12-28
Payer: COMMERCIAL

## 2022-12-28 DIAGNOSIS — C91.00 ACUTE LYMPHOBLASTIC LEUKEMIA NOT HAVING ACHIEVED REMISSION (H): ICD-10-CM

## 2022-12-28 LAB
ALBUMIN SERPL-MCNC: 3.7 G/DL (ref 3.4–5)
ALP SERPL-CCNC: 86 U/L (ref 40–150)
ALT SERPL W P-5'-P-CCNC: 23 U/L (ref 0–50)
ANION GAP SERPL CALCULATED.3IONS-SCNC: 8 MMOL/L (ref 3–14)
AST SERPL W P-5'-P-CCNC: 8 U/L (ref 0–45)
BASOPHILS # BLD AUTO: 0 10E3/UL (ref 0–0.2)
BASOPHILS NFR BLD AUTO: 0 %
BILIRUB SERPL-MCNC: 0.4 MG/DL (ref 0.2–1.3)
BUN SERPL-MCNC: 13 MG/DL (ref 7–30)
CALCIUM SERPL-MCNC: 9.4 MG/DL (ref 8.5–10.1)
CHLORIDE BLD-SCNC: 109 MMOL/L (ref 94–109)
CO2 SERPL-SCNC: 24 MMOL/L (ref 20–32)
CREAT SERPL-MCNC: 0.59 MG/DL (ref 0.52–1.04)
EOSINOPHIL # BLD AUTO: 0.2 10E3/UL (ref 0–0.7)
EOSINOPHIL NFR BLD AUTO: 2 %
ERYTHROCYTE [DISTWIDTH] IN BLOOD BY AUTOMATED COUNT: 19.9 % (ref 10–15)
GFR SERPL CREATININE-BSD FRML MDRD: >90 ML/MIN/1.73M2
GLUCOSE BLD-MCNC: 126 MG/DL (ref 70–99)
HCT VFR BLD AUTO: 29.7 % (ref 35–47)
HGB BLD-MCNC: 10 G/DL (ref 11.7–15.7)
IMM GRANULOCYTES # BLD: 0.1 10E3/UL
IMM GRANULOCYTES NFR BLD: 2 %
LYMPHOCYTES # BLD AUTO: 0.7 10E3/UL (ref 0.8–5.3)
LYMPHOCYTES NFR BLD AUTO: 9 %
MCH RBC QN AUTO: 31.3 PG (ref 26.5–33)
MCHC RBC AUTO-ENTMCNC: 33.7 G/DL (ref 31.5–36.5)
MCV RBC AUTO: 93 FL (ref 78–100)
MONOCYTES # BLD AUTO: 0.7 10E3/UL (ref 0–1.3)
MONOCYTES NFR BLD AUTO: 9 %
NEUTROPHILS # BLD AUTO: 5.8 10E3/UL (ref 1.6–8.3)
NEUTROPHILS NFR BLD AUTO: 78 %
NRBC # BLD AUTO: 0 10E3/UL
NRBC BLD AUTO-RTO: 0 /100
PLATELET # BLD AUTO: 269 10E3/UL (ref 150–450)
POTASSIUM BLD-SCNC: 3.8 MMOL/L (ref 3.4–5.3)
PROT SERPL-MCNC: 7.4 G/DL (ref 6.8–8.8)
RBC # BLD AUTO: 3.19 10E6/UL (ref 3.8–5.2)
SODIUM SERPL-SCNC: 141 MMOL/L (ref 133–144)
WBC # BLD AUTO: 7.4 10E3/UL (ref 4–11)

## 2022-12-28 PROCEDURE — 80053 COMPREHEN METABOLIC PANEL: CPT | Performed by: INTERNAL MEDICINE

## 2022-12-28 PROCEDURE — 85004 AUTOMATED DIFF WBC COUNT: CPT | Performed by: INTERNAL MEDICINE

## 2022-12-30 ENCOUNTER — CARE COORDINATION (OUTPATIENT)
Dept: PHARMACY | Facility: CLINIC | Age: 42
End: 2022-12-30

## 2022-12-30 ENCOUNTER — APPOINTMENT (OUTPATIENT)
Dept: GENERAL RADIOLOGY | Facility: CLINIC | Age: 42
End: 2022-12-30
Attending: NURSE PRACTITIONER
Payer: COMMERCIAL

## 2022-12-30 ENCOUNTER — APPOINTMENT (OUTPATIENT)
Dept: CT IMAGING | Facility: CLINIC | Age: 42
End: 2022-12-30
Attending: NURSE PRACTITIONER
Payer: COMMERCIAL

## 2022-12-30 ENCOUNTER — TELEPHONE (OUTPATIENT)
Dept: ONCOLOGY | Facility: CLINIC | Age: 42
End: 2022-12-30

## 2022-12-30 ENCOUNTER — HOSPITAL ENCOUNTER (INPATIENT)
Facility: CLINIC | Age: 42
LOS: 1 days | Discharge: HOME | End: 2022-12-31
Attending: EMERGENCY MEDICINE | Admitting: INTERNAL MEDICINE
Payer: COMMERCIAL

## 2022-12-30 DIAGNOSIS — Z20.822 CONTACT WITH AND (SUSPECTED) EXPOSURE TO COVID-19: ICD-10-CM

## 2022-12-30 DIAGNOSIS — T82.594A: ICD-10-CM

## 2022-12-30 DIAGNOSIS — Z76.89 PROPHYLAXIS FOR CHEMOTHERAPY-INDUCED NEUTROPENIA: ICD-10-CM
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
## 2022-12-30 DIAGNOSIS — C91.01 ACUTE LYMPHOBLASTIC LEUKEMIA (ALL) IN REMISSION (H): Primary | ICD-10-CM

## 2022-12-30 DIAGNOSIS — B02.9 VZV (VARICELLA-ZOSTER VIRUS) INFECTION: ICD-10-CM

## 2022-12-30 DIAGNOSIS — C91.01 ALL (ACUTE LYMPHOID LEUKEMIA) IN REMISSION (H): ICD-10-CM

## 2022-12-30 DIAGNOSIS — J18.9 PNEUMONIA OF RIGHT LUNG DUE TO INFECTIOUS ORGANISM, UNSPECIFIED PART OF LUNG: ICD-10-CM

## 2022-12-30 DIAGNOSIS — C91.00 ALL (ACUTE LYMPHOCYTIC LEUKEMIA) (H): ICD-10-CM

## 2022-12-30 DIAGNOSIS — Z45.2 ADJUSTMENT AND MANAGEMENT OF VASCULAR ACCESS DEVICE: ICD-10-CM

## 2022-12-30 DIAGNOSIS — C91.00 ACUTE LYMPHOBLASTIC LEUKEMIA (ALL) NOT HAVING ACHIEVED REMISSION (H): ICD-10-CM

## 2022-12-30 LAB
HCG UR QL: NEGATIVE
INTERNAL QC OK POCT: NORMAL
POCT KIT EXPIRATION DATE: NORMAL
POCT KIT LOT NUMBER: NORMAL

## 2022-12-30 PROCEDURE — 71046 X-RAY EXAM CHEST 2 VIEWS: CPT

## 2022-12-30 PROCEDURE — 99285 EMERGENCY DEPT VISIT HI MDM: CPT | Mod: 25 | Performed by: NURSE PRACTITIONER

## 2022-12-30 PROCEDURE — 71250 CT THORAX DX C-: CPT | Mod: 26 | Performed by: RADIOLOGY

## 2022-12-30 PROCEDURE — 120N000002 HC R&B MED SURG/OB UMMC

## 2022-12-30 PROCEDURE — 71250 CT THORAX DX C-: CPT

## 2022-12-30 PROCEDURE — 71046 X-RAY EXAM CHEST 2 VIEWS: CPT | Mod: 26 | Performed by: RADIOLOGY

## 2022-12-30 PROCEDURE — 99285 EMERGENCY DEPT VISIT HI MDM: CPT | Performed by: NURSE PRACTITIONER

## 2022-12-30 PROCEDURE — 81025 URINE PREGNANCY TEST: CPT | Performed by: NURSE PRACTITIONER

## 2022-12-30 RX ORDER — LIDOCAINE 40 MG/G
CREAM TOPICAL
Status: DISCONTINUED | OUTPATIENT
Start: 2022-12-30 | End: 2022-12-31 | Stop reason: HOSPADM

## 2022-12-30 RX ORDER — LIDOCAINE 40 MG/G
CREAM TOPICAL
Status: DISCONTINUED
Start: 2022-12-30 | End: 2022-12-31 | Stop reason: HOSPADM

## 2022-12-30 ASSESSMENT — ACTIVITIES OF DAILY LIVING (ADL)
ADLS_ACUITY_SCORE: 33
ADLS_ACUITY_SCORE: 35
ADLS_ACUITY_SCORE: 33
ADLS_ACUITY_SCORE: 35

## 2022-12-30 NOTE — ED PROVIDER NOTES
"    [place] EMERGENCY DEPARTMENT ([facility])    12/30/22       ED PROVIDER NOTE   AJ  History     Chief Complaint   Patient presents with     Port Issue     The history is provided by the patient and medical records.     Vashti Villegas is a 42 year old female who presents with malfunctioning port. She has a history of ALL currently in remission after GRAALL induction. She was admitted 2 weeks ago for Cycle 1 Blinatumomab and ponatinib complicated by fevers, found to have pneumonia and started on Levaquin. She was discharged to home last week with a port in place for home infusion of Blincyto.  Today patient's home health nurse was unable to access patient's port. She had changed the needle earlier but the pump started having an occlusion alarm, so she went back to change the port needle but was unsuccessful. [facility] infusion clinic was closed so patient was referred to the Emergency Department for evaluation. Blincyto is a time sensitive medication, and has been stopped since 1330.  Patient reports she is having some discomfort around the insertion site currently due to having \"been poked multiple times today\".  Denies any fevers, chest pain, shortness of breath or other concerns.    Report given and notified that if Blincyto infusion is stopped more than 4 hours the Oncology fellow on call will need to be called regarding re dosing her with steroids before the infusion started.  She verbalizes understanding of this.    Past Medical History  Past Medical History:   Diagnosis Date     ALL (acute lymphocytic leukemia) (H)      Other acne      Shingles      Past Surgical History:   Procedure Laterality Date     IR BONE BIOPSY DEEP RIGHT  10/25/2022     IR CHEST PORT PLACEMENT > 5 YRS OF AGE  11/23/2022     IR LUMBAR PUNCTURE  10/21/2022     PICC Left 09/30/2022    Picc ok to use     PICC DOUBLE LUMEN PLACEMENT Left 08/20/2022    left cephalic 5 fr dl picc 44 cm     ZZC NONSPECIFIC PROCEDURE  01/01/1999    " "benign mole removal     acetaminophen (TYLENOL) 325 MG tablet  amLODIPine (NORVASC) 5 MG tablet  aspirin (ASA) 81 MG EC tablet  benzonatate (TESSALON) 100 MG capsule  blinatumomab 28 mcg  CHOLECALCIFEROL PO  guaiFENesin-dextromethorphan (ROBITUSSIN DM) 100-10 MG/5ML syrup  lidocaine-prilocaine (LIDOPRIL) 2.5-2.5 % kit  LORazepam (ATIVAN) 0.5 MG tablet  ondansetron (ZOFRAN ODT) 4 MG ODT tab  pantoprazole (PROTONIX) 20 MG EC tablet  PONATinib (ICLUSIG) 15 MG tablet  prochlorperazine (COMPAZINE) 10 MG tablet  sennosides (SENOKOT) 8.6 MG tablet  valACYclovir (VALTREX) 1000 mg tablet      Allergies   Allergen Reactions     Blood Transfusion Related (Informational Only) Hives     Hive reaction with platelet transfusion on 8/26/2022. Please administer platelets with tylenol and benadryl premedication.      Family History  No family history on file.  Social History   Social History     Tobacco Use     Smoking status: Never     Smokeless tobacco: Never      Past medical history, past surgical history, medications, allergies, family history, and social history were reviewed with the patient. No additional pertinent items.       Review of Systems  A complete review of systems was performed with pertinent positives and negatives noted in the HPI, and all other systems negative.    Physical Exam   BP: (!) 140/94  Pulse: 104  Temp: 98.4  F (36.9  C)  Resp: 16  Height: 162.6 cm (5' 4\")  Weight: 108.9 kg (240 lb)  SpO2: 98 %  Physical Exam  Constitutional:       General: She is not in acute distress.     Appearance: Normal appearance.   HENT:      Head: Normocephalic and atraumatic.   Cardiovascular:      Rate and Rhythm: Normal rate and regular rhythm.      Heart sounds: Normal heart sounds.   Pulmonary:      Effort: Pulmonary effort is normal.      Breath sounds: Normal breath sounds.   Abdominal:      General: Abdomen is flat.      Palpations: Abdomen is soft.   Skin:     General: Skin is warm and dry.      Capillary Refill: " Capillary refill takes less than 2 seconds.   Neurological:      General: No focal deficit present.      Mental Status: She is alert and oriented to person, place, and time.   Psychiatric:         Mood and Affect: Mood normal.         Behavior: Behavior normal.       ED Course      Procedures       Results for orders placed or performed during the hospital encounter of 12/30/22   XR Chest 2 Views     Status: None    Narrative    XR CHEST 2 VIEWS  12/30/2022 7:12 PM     HISTORY:  evaluate port       COMPARISON:  Chest radiograph 12/20/2022    FINDINGS:   Frontal and lateral views of the chest. Right chest wall Port-A-Cath  tip projects over mid SVC. Trachea is midline. Cardiac silhouette is  within normal limits. Decreased lingular opacity. No Pleural effusion  or appreciable pneumothorax.      Impression    IMPRESSION: Right chest wall Port-A-Cath tip projects over mid SVC.  Improved lingular airspace opacity.     I have personally reviewed the examination and initial interpretation  and I agree with the findings.    SAM ROSEN MD         SYSTEM ID:  [id number]   Chest CT w/o contrast     Status: None    Narrative    EXAM: CT CHEST W/O CONTRAST  LOCATION: [facility]  DATE/TIME: 12/30/2022 10:50 PM    INDICATION: evaluate port   flipped inverted, port damaged?. Port unable to be accessed.  COMPARISON: 12/30/2022  TECHNIQUE: CT chest without IV contrast. Multiplanar reformats were obtained. Dose reduction techniques were used.  CONTRAST: None.    FINDINGS:   LUNGS AND PLEURA: Mild reticulonodular infiltrates within the upper lobes. Atelectasis or infiltrate within the lingula and slight reticulonodular basilar infiltrates. No pleural effusion.    MEDIASTINUM/AXILLAE: No adenopathy or pericardial effusion. Small hiatal hernia. Right Port-A-Cath tip at cavoatrial junction.    CORONARY ARTERY CALCIFICATION: None visualized.    UPPER ABDOMEN: Grossly within normal limits  where seen.    MUSCULOSKELETAL: Mild degenerative change osseous structures. No fluid within the soft tissues along the Port-A-Cath. No discontinuity of the catheter.      Impression    IMPRESSION:   1.  Port-A-Cath appears intact with no adjacent fluid collection.  2.  Bilateral infiltrates could be infectious or inflammatory. Follow-up to confirm resolution.     hCG qual urine POCT     Status: Normal   Result Value Ref Range    HCG Qual Urine Negative Negative    Internal QC Check POCT Valid Valid    POCT Kit Lot Number na     POCT Kit Expiration Date ok      Medications   lidocaine (LMX4) 4 % cream (has no administration in time range)   lidocaine 1 % 0.1-5 mL (has no administration in time range)   lidocaine (LMX4) cream (has no administration in time range)   sodium chloride (PF) 0.9% PF flush 10-40 mL (has no administration in time range)        Assessments & Plan (with Medical Decision Making)   Vashti Villegas is a 42 year old female who presents with malfunctioning port.  Upon arrival patient is anxious appearing, states that she is very nervous about needles and the need to continue to attempt to access her port.  Charge nurse initially attempted to access the port twice, with first attempt she was confident that she was within the port but was unable to flush or draw, with second attempt patient reported a burning sensation around the port in her chest when flushed with saline.  Again was unable to draw blood.  In total patient had a total patient had 3 different nurses here attempted to access her port, one felt that they were able to get into the port but unable to flush or draw, 2 others were unable to get into the port feeling that they were hitting metal.  Chest x-ray did not show any misplacement or kinking of the tubing.  Case was discussed with interventional radiologist who reviewed the x-ray images and felt that port was properly positioned without any evidence on x-ray to explain why port was  flushed.  As patient is only had port for 5 weeks, less likely per radiologist that patient have developed a fibrin clot at the tip causing port malfunction.  Dimensional radiology not available to further troubleshoot this issue over the weekend.  Did advise further evaluation with noncontrast CT which showed a rotated port that appeared to still be intact with no infiltrates around the port.  Discussed with BMT fellow who felt best course at this point would be to admit patient to heme-onc service with a plan to place a PICC line and restart infusion tomorrow as well as create ongoing plan for once patient is able to be discharged so she can continue to get the full course of her treatment at home.  Per pharmacy, could give this medication preferably for no longer than 24 hours, however they are unable to mix the drug until tomorrow.  Will admit patient to oncology service, vascular access consult placed for PICC.    I have reviewed the nursing notes. I have reviewed the findings, diagnosis, plan with the patient.    Medical Decision Making  The patient presented with a problem that is a chronic illness mild to moderate exacerbation, progression, or side effect of treatment and a chronic illness severe exacerbation, progression, or side effect of treatment.    The patient's evaluation involved:  review of 3+ prior external note(s) (see separate area of note for details)  ordering and review of 2 test(s) (see separate area of note for details)  review of 1 test result(s) ordered prior to this encounter (see separate area of note for details)  discussion of management or test interpretation with another health professional (see separate area of note for details)    The patient's management involved a decision regarding minor surgery with identified risk factors and a decision regarding hospitalization.        New Prescriptions    No medications on file       Final diagnoses:   Adjustment and management of vascular  access device   ALL (acute lymphocytic leukemia) (H)       --  LOAN Jeffries CNP  [facility] EMERGENCY DEPARTMENT  12/30/2022     Una Eli APRN CNP  12/30/22 [id number]

## 2022-12-30 NOTE — LETTER
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]  Adjustment and management of vascular access device [Z45.2]  Admit Date/Time: 2022  4:59 PM  Discharge Date: 2022  Payor Source: Payor: BCBS / Plan: [place]BS [place] / Product Type: Indemnity /     Reason for Communication Hand-off Referral: Multiple providers/specialties    Discharge Plan:       Concern for non-adherence with plan of care:   Y/N no  Discharge Needs Assessment:  Needs    Flowsheet Row Most Recent Value   Equipment Currently Used at Home none            Follow-up specialty is recommended: Yes    Follow-up plan:    Future Appointments   Date Time Provider Department Center   2023  9:30 AM  PFL PENT PFT [facility]   2023 11:15 AM  [facility]ONIC LAB DRAW ONL [facility]   2023 12:00 PM  ONC INFUSION NURSE [facility]   2023  1:00 PM Gale Myers PA-C [facility]   2023  1:00 PM [id number] [facility]       Any outstanding tests or procedures:        Referrals     Future Labs/Procedures    Home infusion referral     Comments:    [facility]   Ph:  [phone]   Fax: [phone]     IV blincyto            Key Recommendations:  Dishcarging with [facility] IV blincyto drug     Karyn Marinelli, CAROL    AVS/Discharge Summary is the source of truth; this is a helpful guide for improved communication of patient story

## 2022-12-30 NOTE — TELEPHONE ENCOUNTER
Home infusion nurse is unable to access pt's port.  Pt due for Blincyto, has a 4 hour window.  Wants to know if pt can get port accessed here.    Infusion nurse explained that they cannot work pt in today and are unable to connect Blincyto bag that home infusion provided.    BMT nurse can access if [facility] can connect her Blincyto bag.    Called pt back and pt's Home Care Infusion nurse advised she go to the ED for them to access her port. Pt wants to know what to do. Home Care nurse is not with her.  Advised pt follow advice of Home Infusion nurse whose care she is under right now.    Routed to Dr. Hull and Lucy Leslie, CAROLCC

## 2022-12-30 NOTE — ED TRIAGE NOTES
"42 yr old female arrives via walk in w/ complaints of port issues. Pt has a time sensitive medication called \"glinsido\" that she was supposed to receive today but was unable to due to port issues.    Prior to triage patient's port was accessed by care staff but unable to be flushed without pain.       "

## 2022-12-30 NOTE — PROGRESS NOTES
T/C received from patients [facility] RN Ashia BARTON that she is unable to reaccess pts port.  She had changed her needle earler but pump began alarmng occlusion so she went back to try to change the port needle and was unsuccessful  T/C to [facility].  They are unable to take the patient as they close at 1700.  T/C to April and directed her to the [facility] ED  She verbalizes understanding and is on her way  States infusion has been stopped since 1330 (approx)  T/C to the charge nurse in the ED, Karen RIZVI  Report given and notified that if Blincyto infusion is stopped more than 4 hours the Oncology fellow on call will need to be called regarding re dosing her with steroids before the infusion started.  She verbalizes understanding of this.

## 2022-12-31 VITALS
HEART RATE: 92 BPM | SYSTOLIC BLOOD PRESSURE: 112 MMHG | OXYGEN SATURATION: 97 % | TEMPERATURE: 98.2 F | RESPIRATION RATE: 18 BRPM | BODY MASS INDEX: 40.78 KG/M2 | DIASTOLIC BLOOD PRESSURE: 68 MMHG | HEIGHT: 64 IN | WEIGHT: 238.9 LBS

## 2022-12-31 LAB
ABO/RH(D): NORMAL
ALBUMIN SERPL BCG-MCNC: 4.1 G/DL (ref 3.5–5.2)
ALP SERPL-CCNC: 70 U/L (ref 35–104)
ALT SERPL W P-5'-P-CCNC: 12 U/L (ref 10–35)
ANION GAP SERPL CALCULATED.3IONS-SCNC: 14 MMOL/L (ref 7–15)
ANTIBODY SCREEN: NEGATIVE
AST SERPL W P-5'-P-CCNC: 13 U/L (ref 10–35)
BASOPHILS # BLD AUTO: 0 10E3/UL (ref 0–0.2)
BASOPHILS NFR BLD AUTO: 0 %
BILIRUB SERPL-MCNC: 0.5 MG/DL
BUN SERPL-MCNC: 11.3 MG/DL (ref 6–20)
CALCIUM SERPL-MCNC: 9.7 MG/DL (ref 8.6–10)
CHLORIDE SERPL-SCNC: 106 MMOL/L (ref 98–107)
CREAT SERPL-MCNC: 0.57 MG/DL (ref 0.51–0.95)
DEPRECATED HCO3 PLAS-SCNC: 21 MMOL/L (ref 22–29)
EOSINOPHIL # BLD AUTO: 0.3 10E3/UL (ref 0–0.7)
EOSINOPHIL NFR BLD AUTO: 4 %
ERYTHROCYTE [DISTWIDTH] IN BLOOD BY AUTOMATED COUNT: 19.7 % (ref 10–15)
GFR SERPL CREATININE-BSD FRML MDRD: >90 ML/MIN/1.73M2
GLUCOSE SERPL-MCNC: 95 MG/DL (ref 70–99)
HCT VFR BLD AUTO: 30.9 % (ref 35–47)
HGB BLD-MCNC: 9.5 G/DL (ref 11.7–15.7)
IMM GRANULOCYTES # BLD: 0 10E3/UL
IMM GRANULOCYTES NFR BLD: 1 %
LYMPHOCYTES # BLD AUTO: 0.6 10E3/UL (ref 0.8–5.3)
LYMPHOCYTES NFR BLD AUTO: 8 %
MAGNESIUM SERPL-MCNC: 2.2 MG/DL (ref 1.7–2.3)
MCH RBC QN AUTO: 30.5 PG (ref 26.5–33)
MCHC RBC AUTO-ENTMCNC: 30.7 G/DL (ref 31.5–36.5)
MCV RBC AUTO: 99 FL (ref 78–100)
MONOCYTES # BLD AUTO: 0.9 10E3/UL (ref 0–1.3)
MONOCYTES NFR BLD AUTO: 12 %
NEUTROPHILS # BLD AUTO: 5.7 10E3/UL (ref 1.6–8.3)
NEUTROPHILS NFR BLD AUTO: 75 %
NRBC # BLD AUTO: 0 10E3/UL
NRBC BLD AUTO-RTO: 0 /100
PHOSPHATE SERPL-MCNC: 5.8 MG/DL (ref 2.5–4.5)
PLATELET # BLD AUTO: 255 10E3/UL (ref 150–450)
POTASSIUM SERPL-SCNC: 4.2 MMOL/L (ref 3.4–5.3)
PROT SERPL-MCNC: 6.5 G/DL (ref 6.4–8.3)
RBC # BLD AUTO: 3.11 10E6/UL (ref 3.8–5.2)
SARS-COV-2 RNA RESP QL NAA+PROBE: NEGATIVE
SODIUM SERPL-SCNC: 141 MMOL/L (ref 136–145)
SPECIMEN EXPIRATION DATE: NORMAL
WBC # BLD AUTO: 7.6 10E3/UL (ref 4–11)

## 2022-12-31 PROCEDURE — 272N000451 HC KIT SHRLOCK 5FR POWER PICC DOUBLE LUMEN

## 2022-12-31 PROCEDURE — 99235 HOSP IP/OBS SAME DATE MOD 70: CPT | Performed by: INTERNAL MEDICINE

## 2022-12-31 PROCEDURE — 99207 PR SC NO CHARGE VISIT: CPT | Performed by: STUDENT IN AN ORGANIZED HEALTH CARE EDUCATION/TRAINING PROGRAM

## 2022-12-31 PROCEDURE — 250N000013 HC RX MED GY IP 250 OP 250 PS 637: Performed by: INTERNAL MEDICINE

## 2022-12-31 PROCEDURE — 82310 ASSAY OF CALCIUM: CPT | Performed by: PHYSICIAN ASSISTANT

## 2022-12-31 PROCEDURE — 84100 ASSAY OF PHOSPHORUS: CPT | Performed by: INTERNAL MEDICINE

## 2022-12-31 PROCEDURE — 250N000011 HC RX IP 250 OP 636: Performed by: PHYSICIAN ASSISTANT

## 2022-12-31 PROCEDURE — 99239 HOSP IP/OBS DSCHRG MGMT >30: CPT | Performed by: PHYSICIAN ASSISTANT

## 2022-12-31 PROCEDURE — 36415 COLL VENOUS BLD VENIPUNCTURE: CPT | Performed by: INTERNAL MEDICINE

## 2022-12-31 PROCEDURE — 83735 ASSAY OF MAGNESIUM: CPT | Performed by: INTERNAL MEDICINE

## 2022-12-31 PROCEDURE — 85025 COMPLETE CBC W/AUTO DIFF WBC: CPT | Performed by: PHYSICIAN ASSISTANT

## 2022-12-31 PROCEDURE — U0005 INFEC AGEN DETEC AMPLI PROBE: HCPCS | Performed by: NURSE PRACTITIONER

## 2022-12-31 PROCEDURE — 86850 RBC ANTIBODY SCREEN: CPT | Performed by: INTERNAL MEDICINE

## 2022-12-31 PROCEDURE — 36569 INSJ PICC 5 YR+ W/O IMAGING: CPT

## 2022-12-31 PROCEDURE — 250N000009 HC RX 250: Performed by: INTERNAL MEDICINE

## 2022-12-31 PROCEDURE — 250N000011 HC RX IP 250 OP 636: Performed by: INTERNAL MEDICINE

## 2022-12-31 PROCEDURE — 80053 COMPREHEN METABOLIC PANEL: CPT | Performed by: PHYSICIAN ASSISTANT

## 2022-12-31 RX ORDER — VALACYCLOVIR HYDROCHLORIDE 500 MG/1
1000 TABLET, FILM COATED ORAL DAILY
Status: DISCONTINUED | OUTPATIENT
Start: 2022-12-31 | End: 2022-12-31 | Stop reason: HOSPADM

## 2022-12-31 RX ORDER — ASPIRIN 81 MG/1
81 TABLET ORAL DAILY
Status: DISCONTINUED | OUTPATIENT
Start: 2022-12-31 | End: 2022-12-31 | Stop reason: HOSPADM

## 2022-12-31 RX ORDER — PANTOPRAZOLE SODIUM 20 MG/1
20 TABLET, DELAYED RELEASE ORAL DAILY
Status: DISCONTINUED | OUTPATIENT
Start: 2022-12-31 | End: 2022-12-31 | Stop reason: HOSPADM

## 2022-12-31 RX ORDER — GUAIFENESIN/DEXTROMETHORPHAN 100-10MG/5
10 SYRUP ORAL EVERY 4 HOURS PRN
Status: DISCONTINUED | OUTPATIENT
Start: 2022-12-31 | End: 2022-12-31 | Stop reason: HOSPADM

## 2022-12-31 RX ORDER — ONDANSETRON 4 MG/1
4-8 TABLET, ORALLY DISINTEGRATING ORAL EVERY 8 HOURS PRN
Status: DISCONTINUED | OUTPATIENT
Start: 2022-12-31 | End: 2022-12-31 | Stop reason: HOSPADM

## 2022-12-31 RX ORDER — BENZONATATE 100 MG/1
100 CAPSULE ORAL 3 TIMES DAILY PRN
Status: DISCONTINUED | OUTPATIENT
Start: 2022-12-31 | End: 2022-12-31 | Stop reason: HOSPADM

## 2022-12-31 RX ORDER — HEPARIN SODIUM,PORCINE 10 UNIT/ML
5-20 VIAL (ML) INTRAVENOUS EVERY 24 HOURS
Status: DISCONTINUED | OUTPATIENT
Start: 2022-12-31 | End: 2022-12-31 | Stop reason: HOSPADM

## 2022-12-31 RX ORDER — GUAIFENESIN/DEXTROMETHORPHAN 100-10MG/5
10 SYRUP ORAL EVERY 4 HOURS PRN
Qty: 354 ML | Refills: 0 | Status: SHIPPED | OUTPATIENT
Start: 2022-12-31 | End: 2023-03-08

## 2022-12-31 RX ORDER — SENNOSIDES 8.6 MG
1-2 TABLET ORAL 2 TIMES DAILY
Status: DISCONTINUED | OUTPATIENT
Start: 2022-12-31 | End: 2022-12-31 | Stop reason: HOSPADM

## 2022-12-31 RX ORDER — AMLODIPINE BESYLATE 5 MG/1
5 TABLET ORAL DAILY
Status: DISCONTINUED | OUTPATIENT
Start: 2022-12-31 | End: 2022-12-31 | Stop reason: HOSPADM

## 2022-12-31 RX ORDER — PROCHLORPERAZINE MALEATE 10 MG
10 TABLET ORAL EVERY 6 HOURS PRN
Status: DISCONTINUED | OUTPATIENT
Start: 2022-12-31 | End: 2022-12-31 | Stop reason: HOSPADM

## 2022-12-31 RX ORDER — HEPARIN SODIUM,PORCINE 10 UNIT/ML
5-20 VIAL (ML) INTRAVENOUS
Status: DISCONTINUED | OUTPATIENT
Start: 2022-12-31 | End: 2022-12-31 | Stop reason: HOSPADM

## 2022-12-31 RX ORDER — LIDOCAINE 40 MG/G
CREAM TOPICAL
Status: DISCONTINUED | OUTPATIENT
Start: 2022-12-31 | End: 2022-12-31 | Stop reason: HOSPADM

## 2022-12-31 RX ORDER — LORAZEPAM 0.5 MG/1
.5-1 TABLET ORAL DAILY PRN
Status: DISCONTINUED | OUTPATIENT
Start: 2022-12-31 | End: 2022-12-31 | Stop reason: HOSPADM

## 2022-12-31 RX ORDER — VALACYCLOVIR HYDROCHLORIDE 500 MG/1
1000 TABLET, FILM COATED ORAL DAILY
Status: DISCONTINUED | OUTPATIENT
Start: 2022-12-31 | End: 2022-12-31

## 2022-12-31 RX ORDER — ACETAMINOPHEN 325 MG/1
650 TABLET ORAL EVERY 6 HOURS PRN
Status: DISCONTINUED | OUTPATIENT
Start: 2022-12-31 | End: 2022-12-31 | Stop reason: HOSPADM

## 2022-12-31 RX ORDER — VALACYCLOVIR HYDROCHLORIDE 1 G/1
1000 TABLET, FILM COATED ORAL DAILY
Qty: 30 TABLET | Refills: 1 | Status: SHIPPED | OUTPATIENT
Start: 2022-12-31 | End: 2023-02-08

## 2022-12-31 RX ORDER — DEXAMETHASONE 4 MG/1
20 TABLET ORAL ONCE
Status: COMPLETED | OUTPATIENT
Start: 2022-12-31 | End: 2022-12-31

## 2022-12-31 RX ADMIN — BENZONATATE 100 MG: 100 CAPSULE ORAL at 09:56

## 2022-12-31 RX ADMIN — DEXAMETHASONE 20 MG: 4 TABLET ORAL at 14:09

## 2022-12-31 RX ADMIN — LIDOCAINE HYDROCHLORIDE ANHYDROUS 1 ML: 10 INJECTION, SOLUTION INFILTRATION at 09:44

## 2022-12-31 RX ADMIN — ASPIRIN 81 MG: 81 TABLET ORAL at 09:55

## 2022-12-31 RX ADMIN — LORAZEPAM 1 MG: 0.5 TABLET ORAL at 08:18

## 2022-12-31 RX ADMIN — VALACYCLOVIR HYDROCHLORIDE 1000 MG: 500 TABLET, FILM COATED ORAL at 09:51

## 2022-12-31 RX ADMIN — AMLODIPINE BESYLATE 5 MG: 5 TABLET ORAL at 09:51

## 2022-12-31 RX ADMIN — ACETAMINOPHEN 650 MG: 325 TABLET, FILM COATED ORAL at 09:55

## 2022-12-31 RX ADMIN — SODIUM CHLORIDE, PRESERVATIVE FREE 5 ML: 5 INJECTION INTRAVENOUS at 11:07

## 2022-12-31 ASSESSMENT — ACTIVITIES OF DAILY LIVING (ADL)
CHANGE_IN_FUNCTIONAL_STATUS_SINCE_ONSET_OF_CURRENT_ILLNESS/INJURY: NO
VISION_MANAGEMENT: CONTACTS
TOILETING_ISSUES: NO
ADLS_ACUITY_SCORE: 20
DRESSING/BATHING_DIFFICULTY: NO
ADLS_ACUITY_SCORE: 20
CONCENTRATING,_REMEMBERING_OR_MAKING_DECISIONS_DIFFICULTY: NO
DOING_ERRANDS_INDEPENDENTLY_DIFFICULTY: NO
FALL_HISTORY_WITHIN_LAST_SIX_MONTHS: NO
ADLS_ACUITY_SCORE: 35
DIFFICULTY_EATING/SWALLOWING: NO
ADLS_ACUITY_SCORE: 20
WALKING_OR_CLIMBING_STAIRS_DIFFICULTY: NO
WEAR_GLASSES_OR_BLIND: YES

## 2022-12-31 NOTE — PLAN OF CARE
Time: 8641-2615    HR tachy when anxious, now resumed to baseline. Cold pack and tylenol given x1 for port site pain with adequate relief. Tessalon mark given for dry cough. Denies nausea. PICC placed, PRN ativan given for procedure. Heparin locked, pt performed successfully with help to hold supplies. UAL. Had a BM this AM. Teeth brushed. Home infusion nurse to come around 1500 to hook up infusion. Dexamethasone given 1-hour prior to inpatient blincyto infusion. AVS printed and discussed with pt, pt denied furthered questions. Discharged to home at 1500 alone, pt said she did not need any med refills from discharge pharmacy. Follow up for Pentamidine and LP with IT chemo on 1/2/2023.

## 2022-12-31 NOTE — PLAN OF CARE
"Status: chest port complications  Vitals:/74 (BP Location: Right arm)   Pulse 74   Temp 98.2  F (36.8  C) (Oral)   Resp 18   Ht 1.626 m (5' 4.02\")   Wt 108.6 kg (239 lb 6.4 oz)   SpO2 96%   BMI 41.07 kg/m   ,RA  Neuros: Axox4, Follows commands  IV: PIV-SL  Resp/trach: WDL no SOB/HUGHES reported  Diet: regular  Bowel status: No BM this shift  : AUOP voids spontaneously  Skin: R chest rash, BUE bruising  Pain: chest port site, no intervention needed  Activity:UAL  Plan: PICC placement, then discharge home  Updates this shift: Pt refused 2 RN skin assessment, pt requested ativan before PICC procedure                          "

## 2022-12-31 NOTE — H&P
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     #.  Port malfunctioning.  Plan for PICC placement in the morning     HEME   # Ph(+) B-ALL      Treatment Plan: Blincyto + Ponatinib (C1D1 = 12/14/22)    Pre-meds: Dex 20 mg D1 and D8     IT methotrexate 12 mg - done in XR 12/16, flow negative    Blincyto 9 mcg daily, CIVI - Days 1-7     Blincyto 28 mcg daily, CIVI - Days 8-9 inpatient, then Days 10-28 through home infusion    Ponatinib 15 mg daily  -Infusion per hematology plan after her PICC line placement     # Anemia   # Elevated B12 levels  Likely secondary to chemotherapy. On 12/17, folate, iron/TIBCnormal and retics elevated.   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     # ID PPx   - Valtrex 1000 mg daily   - Hold PTA fluconazole 200 mg daily and levofloxacin 250 mg daily given ANC >1.0   - Nebulized pentamidine last given 12/2; next due 12/30. Not sure if she got this   - Received Evusheld on 11/2/22        # Abdominal rash, resolving  Noted ~12/9 outpatient. Mildly pruritic. Evaluated by ID on 12/13 who recommended moisturizer for the rash on the abdomen. This is likely not infectious as she has been on valtrex and fluconazole.   - Triamcinolone PRN   -  Resolving as of 12/20.     # Hypertension - Continue PTA amlodipine 5 mg daily       # GERD - PTA Protonix 20 mg daily      CODE:  Full   Diet/IVF: regular   DVT ppx: SCD   Disposition/Admission Status: Anticipate 1-2 days, may be here more than 2 midnights    Franky Guardado MD  Hospitalist/nocturnist  [facility]    Departments of Medicine   Pager: [phone]             Chief Complaint:      PICC placement.        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  No new complaint                 Past Medical History:     Past Medical History:   Diagnosis Date     ALL (acute lymphocytic leukemia) (H)      Other acne      Shingles             Past Surgical History:     Past Surgical History:   Procedure Laterality Date     IR BONE BIOPSY DEEP RIGHT  10/25/2022     IR CHEST PORT PLACEMENT > 5 YRS OF AGE  11/23/2022     IR LUMBAR PUNCTURE  10/21/2022     PICC Left 09/30/2022    Picc ok to use     PICC DOUBLE LUMEN PLACEMENT Left 08/20/2022    left cephalic 5 fr dl picc 44 cm     ZZC NONSPECIFIC PROCEDURE  01/01/1999    benign mole removal              Social History:     Social History     Socioeconomic History     Marital status:      Spouse name: Not on file     Number of children: Not on file     Years of education: Not on file     Highest education level: Not on file   Occupational History     Not on file   Tobacco Use     Smoking status: Never     Smokeless tobacco: Never   Substance and Sexual Activity     Alcohol use: Not on file     Drug use: Not on file     Sexual activity: Not on file   Other Topics Concern      Not on file   Social History Narrative    ** Merged History Encounter **          Social Determinants of Health     Financial Resource Strain: Not on file   Food Insecurity: Not on file   Transportation Needs: Not on file   Physical Activity: Not on file   Stress: Not on file   Social Connections: Not on file   Intimate Partner Violence: Not on file   Housing Stability: Not on file            Family History:   reviewed and is non contributory          Immunizations:     Immunization History   Administered Date(s) Administered     TD (ADULT, 7+) 09/13/1996              Allergies:      Allergies   Allergen Reactions     Blood Transfusion Related (Informational Only) Hives     Hive reaction with platelet transfusion on 8/26/2022. Please administer platelets with tylenol and benadryl premedication.             Medications:     No current facility-administered medications on file prior to encounter.  acetaminophen (TYLENOL) 325 MG tablet, Take 2 tablets (650 mg) by mouth every 6 hours as needed for mild pain, other or fever (blood product administration premedication)  amLODIPine (NORVASC) 5 MG tablet, Take 1 tablet (5 mg) by mouth daily  aspirin (ASA) 81 MG EC tablet, Take 1 tablet (81 mg) by mouth daily  benzonatate (TESSALON) 100 MG capsule, Take 1 capsule (100 mg) by mouth 3 times daily as needed for cough  blinatumomab 28 mcg, Inject 28 mcg into the vein every 24 hours for 19 days  CHOLECALCIFEROL PO, Take 1 tablet by mouth daily Unknown strength.  guaiFENesin-dextromethorphan (ROBITUSSIN DM) 100-10 MG/5ML syrup, Take 10 mLs by mouth every 4 hours as needed for cough or congestion  lidocaine-prilocaine (LIDOPRIL) 2.5-2.5 % kit, Use prior to lab access  LORazepam (ATIVAN) 0.5 MG tablet, Take 1-2 tablets (0.5-1 mg) by mouth daily as needed for anxiety . Take prior to BMBx procedures as needed. If initial dose unhelpful, okay to take a second 0.5-1 mg dose (for a max of 2 mg at a time). Caution: causes sedation. Do not  "drive after taking this medication.  ondansetron (ZOFRAN ODT) 4 MG ODT tab, Take 1-2 tablets (4-8 mg) by mouth every 8 hours as needed for nausea or vomiting  pantoprazole (PROTONIX) 20 MG EC tablet, Take 1 tablet (20 mg) by mouth daily  PONATinib (ICLUSIG) 15 MG tablet, Take 1 tablet (15 mg) by mouth daily -- start concurrent with blinatumomab infusion, planned for 12/12/22  prochlorperazine (COMPAZINE) 10 MG tablet, Take 1 tablet (10 mg) by mouth every 6 hours as needed (Breakthrough Nausea/Vomiting)  sennosides (SENOKOT) 8.6 MG tablet, Take 1-2 tablets by mouth 2 times daily  valACYclovir (VALTREX) 1000 mg tablet, Take 1 tablet (1,000 mg) by mouth daily             Review of Systems:     A complete ROS was performed and is negative other than what is stated in the HPI.          Physical Exam:   Blood pressure (!) 163/99, pulse 102, temperature 98.4  F (36.9  C), temperature source Oral, resp. rate 16, height 1.626 m (5' 4\"), weight 108.9 kg (240 lb), SpO2 98 %.  General Appearance: Pleasant not in distress   HEENT: No jaundice, moist BM   Respiratory: CTAB  Cardiovascular: RRR, s1, s2 no m/g   GI: Soft,   Genitourinary: No CVAT   Extremities : No Edema   Neurologic: A&Ox3, moves all extremities equally               Data:   Reviewed in Epic           "

## 2022-12-31 NOTE — PROGRESS NOTES
Care Management Initial Consult    General Information  Assessment completed with: VM-chart review,      Primary Care Provider verified and updated as needed:     Readmission within the last 30 days: yes        Advance Care Planning:         Communication Assessment  Patient's communication style: spoken language (English or Bilingual)    Hearing Difficulty or Deaf: no   Wear Glasses or Blind: yes    Cognitive  Cognitive/Neuro/Behavioral: WDL                      Living Environment:   People in home: child(yasemin), dependent, spouse (Children ages 13,15 and 17)    Current living Arrangements: house  Able to return to prior arrangements: yes     Family/Social Support:  Care provided by: spouse/significant other, self  Provides care for:  No one              Description of Support System: supportive, involved       Current Resources:   Patient receiving home care services:  No     Community Resources:  none  Equipment currently used at home: none  Supplies currently used at home:  none    Employment/Financial:  Employment Status: not working      Financial Concerns:    Lifestyle & Psychosocial Needs:  Social Determinants of Health     Tobacco Use: Low Risk      Smoking Tobacco Use: Never     Smokeless Tobacco Use: Never     Passive Exposure: Not on file   Alcohol Use: Not on file   Financial Resource Strain: Not on file   Food Insecurity: Not on file   Transportation Needs: Not on file   Physical Activity: Not on file   Stress: Not on file   Social Connections: Not on file   Intimate Partner Violence: Not on file   Depression: Not at risk     PHQ-2 Score: 0   Housing Stability: Not on file       Functional Status:  Prior to admission patient needed assistance: independent        Mental Health Status: No current concerns         Chemical Dependency Status: No current concerns        Values/Beliefs:  Spiritual, Cultural Beliefs, Yarsanism Practices, Values that affect care: no           Additional Information:  RNCC  completed CM assessment due to elevated unplanned readmission risk score via chart review.   Vashti Villegas is a 42-year-old female with past medical history of hypertension, disseminated zoster and Ph (+) B-ALL  most recently receiving consolidation blincyto (D0W3=4212/14/22) + ponatinib who is admitted with a port malfunction. PICC placed 12/31 AM.    Writer received a page from LIBIA Avelar  to set up home infusion for blincyto drug. As per [facility], her blincyto bag was delivered to the [address] [place] [address] yesterday. Writer spoke to pharmacist Angel Ph: [phone] to ensure drug could be compounded and hooked up today in order for pt to discharge. Angel confirmed they have enough drug to be delivered and hooked up in hospital today. Pt to discharge after. She will drive herself home. External hand off completed.    [facility]   Ph:  [phone]   Fax: [phone]     RNCC continue to follow.    Karyn Jones RN, MSN  Casual RN Care Coordinator  [facility]  Phone: [phone]

## 2022-12-31 NOTE — PROVIDER NOTIFICATION
12/31/22 0949   PICC 12/31/22 Double Lumen Right Cephalic Chemotherapy   Placement Date/Time: 12/31/22 (c) 0949   Lumens: Double Lumen  Lumen Identification: Purple;Red  Catheter Brand: Timely  Catheter Size: 4 fr  Lot #: [id number]  Full barrier precautions done: Yes, hand hygiene, sterile gown, sterile gloves, mask, cap, ful...   Site Assessment WDL   External Cath Length (cm) (S)  1 cm   Extremity Circumference (cm) 36 cm   Dressing (S)  Chlorhexidine disk;Transparent;Other (Comment)  (Sorbaview)   Dressing Status clean;dry;intact   Dressing Change Due (S)  01/07/23   Line Necessity Yes, meets criteria   Purple - Status blood return noted;saline locked   Purple - Cap Change Due 01/04/23   Purple - Intervention Flushed   Red - Status blood return noted;saline locked   Red - Cap Change Due 01/04/23   Red - Intervention Flushed   Phlebitis Scale 0-->no symptoms   Infiltration? no   PICC Comment (S)  PICC is OK to use

## 2022-12-31 NOTE — PROCEDURES
[facility]    Double Lumen PICC Placement    Date/Time: 12/31/2022 9:49 AM  Performed by: Keo Ordoñez RN  Authorized by: Franky Guardado MD   Indications: Chemotherapy.      UNIVERSAL PROTOCOL   Site Marked: Yes  Prior Images Obtained and Reviewed:  Yes  Required items: Required blood products, implants, devices and special equipment available    Patient identity confirmed:  Verbally with patient, arm band, provided demographic data and hospital-assigned identification number  Patient was reevaluated immediately before administering moderate or deep sedation or anesthesia  Confirmation Checklist:  Patient's identity using two indicators, relevant allergies, procedure was appropriate and matched the consent or emergent situation and correct equipment/implants were available  Time out: Immediately prior to the procedure a time out was called    Universal Protocol: the [facility] Universal Protocol was followed    Preparation: Patient was prepped and draped in usual sterile fashion       ANESTHESIA    Anesthesia: See MAR for details  Local Anesthetic:  Lidocaine 1% without epinephrine  Anesthetic Total (mL):  1      SEDATION    Patient Sedated: No        Preparation: skin prepped with ChloraPrep  Skin prep agent: skin prep agent completely dried prior to procedure  Hand hygiene: hand hygiene performed prior to central venous catheter insertion  Type of line used: PICC  Catheter type: double lumen  Lumen type: non-valved and power PICC  Lumen Identification: Purple and Red  Catheter size: 4 Fr  Brand: Bard  Lot number: [id number]  Placement method: venipuncture, MST, ultrasound and tip navigation system  Number of attempts: 1  Difficulty threading catheter: no  Successful placement: yes  Orientation: right    Location: cephalic vein (vein diameter - 0.47 cm)  Arm circumference: adults 10 cm  Extremity circumference: 36  Visible catheter length: 1  Total  catheter length: 41  Dressing and securement: alcohol impregnated caps, sterile dressing applied, statlock, site cleansed and tissue adhesive (Sorbaview)  Post procedure assessment: blood return through all ports, free fluid flow and placement verified by 3CG technology  PROCEDURE   Patient Tolerance:  Patient tolerated the procedure well with no immediate complicationsDescribe Procedure: PICC tip is in satisfactory location as verified by Digly 3CG Tip Confirmation System. PICC is OK to use.  Disposal: sharps and needle count correct at the end of procedure, needles and guidewire disposed in sharps container

## 2022-12-31 NOTE — DISCHARGE SUMMARY
[facility]    Discharge Summary  Hematology / Oncology    Date of Admission:  12/30/2022  Date of Discharge:  12/31/2022  Discharging Provider: Rosio Rivers PA-C  Date of Service (when I saw the patient): 12/31/22    Discharge Diagnoses   Patient Active Problem List   Diagnosis     Acute leukemia (H)     Acute lymphoblastic leukemia (ALL) (H)     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)     Prophylaxis for chemotherapy-induced neutropenia     Morbid obesity (H)     Disseminated zoster     ALL (acute lymphoblastic leukemia of infant) (H)     ALL (acute lymphoblastic leukemia) (H)     ALL (acute lymphocytic leukemia) (H)     Adjustment and management of vascular access device       History of Present Illness   April F Bakari is a 42-year-old female with past medical history of hypertension, disseminated zoster and 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 Her port was unable to be accessed despite several attempts, admitted for an urgent PICC line in order to continue continuous blinctyo (PICC placed 12/31 AM). She missed her blincyto dose on 12/30 (would have been day 17), resumed 12/31, therefore she will continue blincyto through 1/11.     [facility] is hooking up her blincyto this afternoon (12/31 at 3pm) at current dose, premed with dex 20mg PO once (since it has been delayed for >4 hours).Patient is otherwise feeling well today, will be Day 17 blincyto. Has a persistently mostly dry cough, but no fevers. During her recent admission she was prescribe levaquin for radiographic findings of pna, completed course on 12/27, and has some evidence of clinical and radiographic improvement. She has not had any further fevers or neurotoxicities from blincyto since last admission. She has some mild  tingling of her right hand, resolving tingling on left hand, and persistent mild tingling in her toes which is not worsening and does not affect her dexterity or gait. A comprehensive review of systems was reviewed with the patient and the pertinent positives are listed in the HPI above.       To be Addressed at Follow up:  - After she completes cycle 1 blincyto, consider referring to IR to troubleshoot her Port. Will need PICC removed after completion of blincyto as well, which can be done by [facility].    New Discharge Medications:  - Continue blincyto through 1/11 (28-day total course)  - refilled robitussin DM PRN    Next Follow up Appointments:  - pentamidine, LP w/ IT chemo in IR on 1/2 (chemo signed)  - XR LP with IT chemo the week 1/9 -1/12 requested. Orders placed  - Home infusion for bag changes, twice weekly labs through [facility]  - Previously requested BMBx after cycle 1 (around 1/11) and follow up with Dr. Hull afterwards.     Hospital Course   April F Bakari was admitted on 12/30/2022.  The following problems were addressed during her hospitalization:      # Port malfunction  Unable to access port on 12/30 despite several attempts. C/D/I, nontender initially. Afebrile. CT Chest 12/30; Port-A-Cath appears intact with no adjacent fluid collection.   - Admitted for an urgent PICC line to be able to continue blincyto. PICC placed 12/31  - After she completes cycle 1 blincyto, consider referring to IR to troubleshoot her Port. Will need PICC removed after completion of blincyto as well, which can be done by [facility].      HEME   # Ph(+) B-ALL   Follows with Dr. Hull. Patient presented to outside hospital with c/o right upper quadrant pain and was subsequently found to have a markedly elevated white blood cell count concerning for acute leukemia. While at OSH, she had CTA chest as well as CT A/P. These identified no PE, no acute or localized intraabdominal inflammatory process, mild  splenomegaly, and few inconspicuous low-density structures in the liver. She was transferred to [facility] for further workup and management. S/p Hydrea 1g TID (8/21-8/23) with improvement in WBC (100K ? 12K). Diagnostic BMBx completed 8/20/22, which demonstrated B-ALL with 85% blasts and hypercellular marrow. IHC shows CD10+, CD34+. 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 ALL NGS negative. BCR/ABL1 major/minor (sent from peripheral blood) both negative. Initiated pre-phase steroids with Prednisone 60 mg x8/21; increased to 60 mg/m2 (140 mg) 8/22-8/24. She initiated GRAALL + imatinib (C1D1 = 8/24/22). S/p BMT consult 9/12 with Dr. Walsh. Induction was relatively uncomplicated. Post-induction BMBx done 9/26/22 and revealed MRD- CR1, with negative BCR/ABL1 and Clonoseq testing. With no matched related donors and excellent early/deep response chemotherapy is currently favored over BMT. 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 Underwent BMBx 10/25/22 with no morphologic evidence of B-lymphoblastic leukemia and flow with unusual B lineage precursor population 0.09%. C3 GRAALL + imatinib on 11/2/22 was tolerated well without complication. Prior plan was to continue GRAALL for a total of 8 cycles (of note odd cycles are 21-days in length and even cycles are 14-days in length), however now adjusting to Blinatumumab + Ponatinib.   - Per Dr. Hull, plan is to continue treatment with blinatumomab and ponatinib (L[facility]et Haematol 2022 Nov 16;-[id number]). She should  remain on prophylactic antimicrobials during this study. Bone marrow examinations, including PCR for BCR - ABL , will be planned the end of cycles one, two, and five during ponatinib-blinatumomab combination therapy and every 3-6 months thereafter while on ponatinib maintenance. Clonoseq MRD testing to be sent with each bone marrow examination. She will require a total of 12 prophylactic intrathecal chemotherapy administrations, alternating methotrexate and cytarabine, on days ~1, 15 and 29 per protocol. She has completed 6 of 12 doses as of 12/17  - Given risk for arterial occlusive events with ponatinib, will continue ASA 81mg daily.     - Started Blincyto + Ponatinib (C1D1 = 12/14/22). 12/30 held for inability to access her port despite several attempts. Admitted for PICC, placed 12/31. Day 17 Blincyto resumed on 12/31 with [facility], continue through Day 28 (1/11/23). Given dex 20mg PO premed prior because blincyto was delayed >4 hours between doses.      # Anemia   # Elevated B12 levels  Likely secondary to chemotherapy. On 12/17, folate, iron/TIBCnormal and retics elevated. Ferritin high at 568 and vitamin B12 >3000. LDH mildly high (294), haptoglobin in process.   - Vitamin B12 resulted with >3000   - Transfuse to keep Hgb >7   - Will need to sign blood consent when needed  - Of note, pt has history of transfusion reaction and requires premeds with Benadryl and Tylenol      # H/o grade 1 CRS  Pt developed fevers to 102 on 12/20. This was also in the setting of cough and CXR findings consistent with pneumonia. Fevers had persisted despite cefepime, which raises concern for possible underlying CRS in addition to pneumonia. Per protocol, in the absence of hypotension or hypoxia, this could indicate grade 1 CRS which does not require any intervention or holding of blincyto. She has not had any fevers since 12/22.     ID   # H/o Lingular Pneumonia  # Non neutropenic fevers   Febrile 12/20 Tmax 102.4 with associated  productive cough and sore throat. No dyspnea or URI sxs. No neurologic changes. CXR obtained positive for lingular pneumonia which is likely culprit for fevers rather than Blincyto. Started on empiric Cefepime. BCx/UCx NGTD. RVP negative. Fungitell, galact, S pneumo, legionella negative. Cefepime (12/20 PM-12/22) ? oral Levaquin 750mg through 12/27. She continues to have an intermittent mostly dry cough but remains afebrile and no dyspnea. CT this admission with bilateral infiltrates could be infectious or inflammatory. In the absence of fevers or worsening respiratory symptoms, we suspect she has resolving pneumonia, and radiographically this should resolve over the next 6-8 weeks.      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 Presented with scattered papulovesicular lesions on her chin and nose as well as scattered hemorrhagic erosions on the scalp and lower face (V2-3 distributions of the trigeminal nerve) c/f disseminated VZV. Lesions significantly improved on admission. ID recommended considering decrease to 1000 mg daily on 12/13 visit (given less anticipated prolonged neutropenia with new regimen) and confirmed with Dr. Hull.   - Continue Valtrex 1000 mg daily.     # ID PPx   - Valtrex 1000 mg daily   - Hold PTA fluconazole 200 mg daily and levofloxacin 250 mg daily given ANC >1.0   - Nebulized pentamidine last given 12/2; next scheduled 1/2 outpatient  - Received Manuel on 11/2/22       MISC  # Myalgias  Noted to have myalgias starting 12/18 AM. Continues to have mild myalgias, in her shoulders, upper arms, back, hip and thighs. Has had some improvement with APAP. Reports these are constant. Not worsening with movement. May be related to ponatinib. Hold off on starting a statin. Clinically improving as of 12/20 with tylenol, heat, and changing her position. Since discharge 12/23 she has had intermittent mild myalgias which continue to be managed  "with heat and tylenol.   - Continue to monitor. Consider celebrex 200mg daily if worsening  - Consider adding a statin in the future if myalgias are resolved.     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 Pt reports symptoms are stable in her right hand and improved in the left. Reports mild paresthesias and hot/cold sensitivity. Does not effect her gait or dexterity. Monitor clinically       # Coping  Patient endorses difficulty with coping regarding the unknowns of her leukemia and treatment. Seen by health psych during previous admission. No current needs this short admission.     Clinically Significant Risk Factors Present on Admission                       # Severe Obesity: Estimated body mass index is 40.99 kg/m  as calculated from the following:    Height as of this encounter: 1.626 m (5' 4.02\").    Weight as of this encounter: 108.4 kg (238 lb 14.4 oz).              FEN:   - Bolus PRN IVF per chemo plan   - PRN lyte replacement  - RDAT       Prophy/Misc:   -VTE: Lovenox; hold for plts 50k  -GI/PUD: PTA Protonix  -Bowels: PRN Senna and Miralax       Dispo: discharge home 12/31     I spent 90 minutes face-to-face and/or coordinating or discussing care plan.      Patient plan of care discussed with Dr. Dewitt.  Assessment and plan are discussed and delivered to the patient.     Rosio Rivers PA-C  Hematology/Oncology  Pager #[phone] or available on [employer]    Significant Results and Procedures   N/A    Pending Results   These results will be followed up by N/A  Unresulted Labs Ordered in the Past 30 Days of this Admission     Date and Time Order Name Status Description    12/4/2022 11:30 AM PREPARE PHERESED PLATELETS (UNIT) Preliminary     12/1/2022  9:30 AM PREPARE PHERESED PLATELETS (UNIT) Preliminary     12/1/2022  9:30 AM PREPARE PHERESED PLATELETS (UNIT) Preliminary     12/1/2022  9:30 " AM PREPARE RED BLOOD CELLS (UNIT) Preliminary           Code Status   Full Code    Primary Care Physician   [facility]    Temp:  [98  F (36.7  C)-98.8  F (37.1  C)] 98.2  F (36.8  C)  Pulse:  [] 92  Resp:  [16-18] 18  BP: (112-163)/(54-99) 112/68  SpO2:  [96 %-98 %] 97 %  General: Alert and interactive. Lying in bed in no acute distress.  HEENT: EOMI, anicteric sclera, oropharynx is pink and moist without erythema/exudates/lesions/thrush.  Cardiovascular: RRR. Normal S1/S2. No murmurs.  Respiratory: CTAB. No wheezes appreciated. Normal respiratory effort on ambient air.  Gastrointestinal: Soft, non-tender, non-distended, normoactive bowel sounds, no masses or organomegaly appreciated.  Musculoskeletal: No joint swelling or muscle tenderness.   Extremities: No extremity edema.   Skin: Warm and intact. No concerning lesions or rashes on exposed skin surfaces. No jaundice.  Neurologic: Alert and fully oriented, CN II-XII grossly intact, appropriately responsive during interview.  Port tender to palpation, otherwise C/D/I    Time Spent on this Encounter   I, Rosio Rivers PA-C, personally saw the patient today and spent greater than 30 minutes discharging this patient.    Discharge Disposition   Discharged to home  Condition at discharge: Stable    Consultations This Hospital Stay   NURSING TO CONSULT FOR VASCULAR ACCESS CARE IP CONSULT  VASCULAR ACCESS FOR PICC PLACEMENT ADULT IP CONSULT  VASCULAR ACCESS FOR PICC PLACEMENT ADULT IP CONSULT    Discharge Orders      Reason for your hospital stay    PICC placement     Activity    Your activity upon discharge: activity as tolerated     When to contact your care team    [facility] triage line at [phone] for temp > or = 100.4, uncontrolled nausea/vomiting/diarrhea/constipation, unrelieved pain, bleeding not relieved with pressure, dizziness, chest pain, shortness of breath, loss of consciousness, and any new or concerning  symptoms.     Discharge Instructions    Continue blincyto and panatinib as you were. Resume panatinib at 6:30pm again on 12/31, no need to take any additional doses to make up for the missed dose on 12/30.     Follow Up and recommended labs and tests    Follow up as scheduled below     Diet    Follow this diet upon discharge: regular diet     Discharge Medications   Current Discharge Medication List      CONTINUE these medications which have CHANGED    Details   blinatumomab 28 mcg Inject 28 mcg into the vein every 24 hours for 11 days  Qty: 11 Bag, Refills: 0    Associated Diagnoses: 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); Prophylaxis for chemotherapy-induced neutropenia      guaiFENesin-dextromethorphan (ROBITUSSIN DM) 100-10 MG/5ML syrup Take 10 mLs by mouth every 4 hours as needed for cough or congestion  Qty: 354 mL, Refills: 0    Associated Diagnoses: Pneumonia of right lung due to infectious organism, unspecified part of lung      valACYclovir (VALTREX) 1000 mg tablet Take 1 tablet (1,000 mg) by mouth daily  Qty: 30 tablet, Refills: 1    Associated Diagnoses: VZV (varicella-zoster virus) infection         CONTINUE these medications which have NOT CHANGED    Details   acetaminophen (TYLENOL) 325 MG tablet Take 2 tablets (650 mg) by mouth every 6 hours as needed for mild pain, other or fever (blood product administration premedication)    Associated Diagnoses: 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)      amLODIPine (NORVASC) 5 MG tablet Take 1 tablet (5 mg) by mouth daily  Qty: 30 tablet, Refills: 4    Associated Diagnoses: 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); Hypertension, unspecified type      aspirin (ASA) 81 MG EC tablet Take 1 tablet (81 mg) by mouth daily  Qty: 30 tablet, Refills: 1    Associated Diagnoses: Acute lymphoblastic leukemia (ALL) in remission (H)      benzonatate (TESSALON) 100 MG capsule Take 1 capsule  (100 mg) by mouth 3 times daily as needed for cough  Qty: 30 capsule, Refills: 0    Associated Diagnoses: Pneumonia of right lung due to infectious organism, unspecified part of lung      CHOLECALCIFEROL PO Take 1 tablet by mouth daily Unknown strength.      lidocaine-prilocaine (LIDOPRIL) 2.5-2.5 % kit Use prior to lab access  Qty: 1 kit, Refills: 1    Associated Diagnoses: 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)      LORazepam (ATIVAN) 0.5 MG tablet Take 1-2 tablets (0.5-1 mg) by mouth daily as needed for anxiety . Take prior to BMBx procedures as needed. If initial dose unhelpful, okay to take a second 0.5-1 mg dose (for a max of 2 mg at a time). Caution: causes sedation. Do not drive after taking this medication.  Qty: 10 tablet, Refills: 0    Associated Diagnoses: Acute lymphoblastic leukemia (ALL) in remission (H)      ondansetron (ZOFRAN ODT) 4 MG ODT tab Take 1-2 tablets (4-8 mg) by mouth every 8 hours as needed for nausea or vomiting  Qty: 60 tablet, Refills: 0    Associated Diagnoses: 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)      pantoprazole (PROTONIX) 20 MG EC tablet Take 1 tablet (20 mg) by mouth daily  Qty: 90 tablet, Refills: 1    Associated Diagnoses: Gastroesophageal reflux disease, unspecified whether esophagitis present      PONATinib (ICLUSIG) 15 MG tablet Take 1 tablet (15 mg) by mouth daily -- start concurrent with blinatumomab infusion, planned for 12/12/22  Qty: 30 tablet, Refills: 3    Associated Diagnoses: 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)      prochlorperazine (COMPAZINE) 10 MG tablet Take 1 tablet (10 mg) by mouth every 6 hours as needed (Breakthrough Nausea/Vomiting)  Qty: 20 tablet, Refills: 0    Associated Diagnoses: 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)      sennosides (SENOKOT) 8.6 MG tablet Take 1-2 tablets by mouth 2 times daily  Qty: 60 tablet, Refills: 1     Associated Diagnoses: Constipation, unspecified constipation type           Allergies   Allergies   Allergen Reactions     Blood Transfusion Related (Informational Only) Hives     Hive reaction with platelet transfusion on 8/26/2022. Please administer platelets with tylenol and benadryl premedication.      Data   CBC

## 2022-12-31 NOTE — PROGRESS NOTES
Attempt x 2 to access port.  Emla cream applied and removed.  Using a 20g 3/4 port access unsuccessfully. No blood return noted.  ED charge nurse attempted x 1 and also ICU resource RN.  MD updated.

## 2022-12-31 NOTE — PROGRESS NOTES
Patient refused 2 RN skin Assessment . Rash on Right upper chest and bruising to bilateral upper extremeties.  Completed by Carson / Mckinley

## 2022-12-31 NOTE — PROGRESS NOTES
On call Hematology/oncology fellow note.     42-year-old female who presents with past medical history of hypertension, disseminated zoster and Ph (+) B-ALL (s/p induction chemotherapy with GRAALL regimen + imatinib in MRD-CR1) who has most recently received 3 cycles of GRAALL consolidation, recent admission for blincyto + ponatinib.     Today, pt was due for her blincyto as home infusion administration. However, PORT could not be accessed. She was sent to the ED and 3 other RNs tried to access but failed (one RN was able to stick the needle inside but could not flush, nor draw blood). IR unable to do any procedure during the weekend.     CXR  IMPRESSION: Right chest wall Port-A-Cath tip projects over mid SVC.  Improved lingular airspace opacity.     Per ED pharmacist, blincyto can be administered from PIV one time, but would need PICC for further treatment. Also, they cannot mix the chemo during the night, the earliest they can do will be tomorrow morning.     -> admit the pt under heme malignancy service. Dr. Dewitt was made aware of this situation. The heme malignancy team will formally see pt in am, and coordinate with PICC and blincyto.     D/w Dr. Dewitt, heme malignancy attending. Sign out given to [facility] 2 hospitalist.     Go Jm  Hem/onc fellow  Pager [phone]

## 2022-12-31 NOTE — PROVIDER NOTIFICATION
[facility] paging to LIBIA Avelar    7D - 7502-2 - A.K.  JOSE: just got a call from main home infusion. Still possible for infusion nurse seeing [phone] to give April's blincyto. Waiting to hear back.Will update you soon.  Cindy, [phone]

## 2023-01-01 LAB
ABO/RH(D): NORMAL
ANTIBODY SCREEN: NEGATIVE
SPECIMEN EXPIRATION DATE: NORMAL

## 2023-01-02 ENCOUNTER — MYC MEDICAL ADVICE (OUTPATIENT)
Dept: ONCOLOGY | Facility: CLINIC | Age: 43
End: 2023-01-02

## 2023-01-02 ENCOUNTER — OFFICE VISIT (OUTPATIENT)
Dept: ONCOLOGY | Facility: CLINIC | Age: 43
End: 2023-01-02
Attending: PHYSICIAN ASSISTANT
Payer: COMMERCIAL

## 2023-01-02 ENCOUNTER — ANCILLARY PROCEDURE (OUTPATIENT)
Dept: GENERAL RADIOLOGY | Facility: CLINIC | Age: 43
End: 2023-01-02
Attending: PHYSICIAN ASSISTANT
Payer: COMMERCIAL

## 2023-01-02 ENCOUNTER — INFUSION THERAPY VISIT (OUTPATIENT)
Dept: ONCOLOGY | Facility: CLINIC | Age: 43
End: 2023-01-02
Attending: INTERNAL MEDICINE
Payer: COMMERCIAL

## 2023-01-02 ENCOUNTER — APPOINTMENT (OUTPATIENT)
Dept: LAB | Facility: CLINIC | Age: 43
End: 2023-01-02
Attending: INTERNAL MEDICINE
Payer: COMMERCIAL

## 2023-01-02 VITALS
OXYGEN SATURATION: 99 % | SYSTOLIC BLOOD PRESSURE: 116 MMHG | DIASTOLIC BLOOD PRESSURE: 71 MMHG | HEART RATE: 85 BPM | TEMPERATURE: 98.4 F

## 2023-01-02 VITALS
TEMPERATURE: 98.3 F | WEIGHT: 245.7 LBS | RESPIRATION RATE: 16 BRPM | HEART RATE: 80 BPM | BODY MASS INDEX: 42.15 KG/M2 | DIASTOLIC BLOOD PRESSURE: 74 MMHG | SYSTOLIC BLOOD PRESSURE: 120 MMHG | OXYGEN SATURATION: 99 %

## 2023-01-02 DIAGNOSIS — C91.00 ACUTE LYMPHOBLASTIC LEUKEMIA (ALL) NOT HAVING ACHIEVED REMISSION (H): ICD-10-CM

## 2023-01-02 DIAGNOSIS — Z76.89 PROPHYLAXIS FOR CHEMOTHERAPY-INDUCED NEUTROPENIA: ICD-10-CM

## 2023-01-02 DIAGNOSIS — C91.01 ACUTE LYMPHOBLASTIC LEUKEMIA (ALL) IN REMISSION (H): ICD-10-CM

## 2023-01-02 DIAGNOSIS — C91.00 ACUTE LYMPHOBLASTIC LEUKEMIA (ALL) NOT HAVING ACHIEVED REMISSION (H): Primary | ICD-10-CM

## 2023-01-02 LAB
ALBUMIN SERPL BCG-MCNC: 4.1 G/DL (ref 3.5–5.2)
ALP SERPL-CCNC: 68 U/L (ref 35–104)
ALT SERPL W P-5'-P-CCNC: 11 U/L (ref 10–35)
ANION GAP SERPL CALCULATED.3IONS-SCNC: 11 MMOL/L (ref 7–15)
APPEARANCE CSF: CLEAR
AST SERPL W P-5'-P-CCNC: 11 U/L (ref 10–35)
BASOPHILS # BLD AUTO: 0 10E3/UL (ref 0–0.2)
BASOPHILS NFR BLD AUTO: 0 %
BILIRUB SERPL-MCNC: 0.3 MG/DL
BUN SERPL-MCNC: 13.9 MG/DL (ref 6–20)
CALCIUM SERPL-MCNC: 9 MG/DL (ref 8.6–10)
CHLORIDE SERPL-SCNC: 110 MMOL/L (ref 98–107)
COLOR CSF: COLORLESS
CREAT SERPL-MCNC: 0.58 MG/DL (ref 0.51–0.95)
DEPRECATED HCO3 PLAS-SCNC: 21 MMOL/L (ref 22–29)
EOSINOPHIL # BLD AUTO: 0.4 10E3/UL (ref 0–0.7)
EOSINOPHIL NFR BLD AUTO: 3 %
ERYTHROCYTE [DISTWIDTH] IN BLOOD BY AUTOMATED COUNT: 19 % (ref 10–15)
GFR SERPL CREATININE-BSD FRML MDRD: >90 ML/MIN/1.73M2
GLUCOSE CSF-MCNC: 54 MG/DL (ref 40–70)
GLUCOSE SERPL-MCNC: 108 MG/DL (ref 70–99)
HCT VFR BLD AUTO: 28.9 % (ref 35–47)
HGB BLD-MCNC: 9.3 G/DL (ref 11.7–15.7)
IMM GRANULOCYTES # BLD: 0.1 10E3/UL
IMM GRANULOCYTES NFR BLD: 1 %
LIPASE SERPL-CCNC: 19 U/L (ref 13–60)
LYMPHOCYTES # BLD AUTO: 1 10E3/UL (ref 0.8–5.3)
LYMPHOCYTES NFR BLD AUTO: 9 %
MCH RBC QN AUTO: 31 PG (ref 26.5–33)
MCHC RBC AUTO-ENTMCNC: 32.2 G/DL (ref 31.5–36.5)
MCV RBC AUTO: 96 FL (ref 78–100)
MONOCYTES # BLD AUTO: 1.1 10E3/UL (ref 0–1.3)
MONOCYTES NFR BLD AUTO: 10 %
NEUTROPHILS # BLD AUTO: 8.5 10E3/UL (ref 1.6–8.3)
NEUTROPHILS NFR BLD AUTO: 77 %
NRBC # BLD AUTO: 0 10E3/UL
NRBC BLD AUTO-RTO: 0 /100
PATH REV: NORMAL
PLATELET # BLD AUTO: 292 10E3/UL (ref 150–450)
POTASSIUM SERPL-SCNC: 3.2 MMOL/L (ref 3.4–5.3)
PROT CSF-MCNC: 35.2 MG/DL (ref 15–45)
PROT SERPL-MCNC: 6.4 G/DL (ref 6.4–8.3)
RBC # BLD AUTO: 3 10E6/UL (ref 3.8–5.2)
RBC # CSF MANUAL: 0 /UL (ref 0–2)
SODIUM SERPL-SCNC: 142 MMOL/L (ref 136–145)
TUBE # CSF: 3
WBC # BLD AUTO: 11.2 10E3/UL (ref 4–11)
WBC # CSF MANUAL: 0 /UL (ref 0–5)

## 2023-01-02 PROCEDURE — 96450 CHEMOTHERAPY INTO CNS: CPT | Performed by: PHYSICIAN ASSISTANT

## 2023-01-02 PROCEDURE — 82945 GLUCOSE OTHER FLUID: CPT | Performed by: PATHOLOGY

## 2023-01-02 PROCEDURE — 99000 SPECIMEN HANDLING OFFICE-LAB: CPT | Performed by: PATHOLOGY

## 2023-01-02 PROCEDURE — 89050 BODY FLUID CELL COUNT: CPT | Performed by: PATHOLOGY

## 2023-01-02 PROCEDURE — 94642 AEROSOL INHALATION TREATMENT: CPT | Performed by: INTERNAL MEDICINE

## 2023-01-02 PROCEDURE — 83690 ASSAY OF LIPASE: CPT | Performed by: PHYSICIAN ASSISTANT

## 2023-01-02 PROCEDURE — 36591 DRAW BLOOD OFF VENOUS DEVICE: CPT | Performed by: PHYSICIAN ASSISTANT

## 2023-01-02 PROCEDURE — 85004 AUTOMATED DIFF WBC COUNT: CPT | Performed by: PHYSICIAN ASSISTANT

## 2023-01-02 PROCEDURE — 96521 REFILL/MAINT PORTABLE PUMP: CPT

## 2023-01-02 PROCEDURE — 96413 CHEMO IV INFUSION 1 HR: CPT

## 2023-01-02 PROCEDURE — 99207 PR NO BILLABLE SERVICE THIS VISIT: CPT | Performed by: PHYSICIAN ASSISTANT

## 2023-01-02 PROCEDURE — 86901 BLOOD TYPING SEROLOGIC RH(D): CPT | Performed by: PHYSICIAN ASSISTANT

## 2023-01-02 PROCEDURE — 87070 CULTURE OTHR SPECIMN AEROBIC: CPT | Performed by: PATHOLOGY

## 2023-01-02 PROCEDURE — 80053 COMPREHEN METABOLIC PANEL: CPT | Performed by: PHYSICIAN ASSISTANT

## 2023-01-02 PROCEDURE — 258N000003 HC RX IP 258 OP 636: Performed by: PHYSICIAN ASSISTANT

## 2023-01-02 PROCEDURE — 77003 FLUOROGUIDE FOR SPINE INJECT: CPT | Performed by: PHYSICIAN ASSISTANT

## 2023-01-02 PROCEDURE — 94640 AIRWAY INHALATION TREATMENT: CPT | Performed by: INTERNAL MEDICINE

## 2023-01-02 PROCEDURE — 250N000011 HC RX IP 250 OP 636: Performed by: PHYSICIAN ASSISTANT

## 2023-01-02 PROCEDURE — 82947 ASSAY GLUCOSE BLOOD QUANT: CPT | Performed by: PHYSICIAN ASSISTANT

## 2023-01-02 PROCEDURE — 84157 ASSAY OF PROTEIN OTHER: CPT | Performed by: PATHOLOGY

## 2023-01-02 PROCEDURE — 87205 SMEAR GRAM STAIN: CPT | Performed by: PATHOLOGY

## 2023-01-02 PROCEDURE — 250N000013 HC RX MED GY IP 250 OP 250 PS 637: Performed by: INTERNAL MEDICINE

## 2023-01-02 PROCEDURE — 87015 SPECIMEN INFECT AGNT CONCNTJ: CPT | Performed by: PATHOLOGY

## 2023-01-02 PROCEDURE — 96415 CHEMO IV INFUSION ADDL HR: CPT

## 2023-01-02 RX ORDER — EPINEPHRINE 1 MG/ML
0.3 INJECTION, SOLUTION INTRAMUSCULAR; SUBCUTANEOUS EVERY 5 MIN PRN
Status: CANCELLED | OUTPATIENT
Start: 2023-01-02

## 2023-01-02 RX ORDER — DIPHENHYDRAMINE HYDROCHLORIDE 50 MG/ML
50 INJECTION INTRAMUSCULAR; INTRAVENOUS
Status: CANCELLED | OUTPATIENT
Start: 2023-01-02

## 2023-01-02 RX ORDER — DIPHENHYDRAMINE HYDROCHLORIDE 50 MG/ML
50 INJECTION INTRAMUSCULAR; INTRAVENOUS
Status: CANCELLED
Start: 2023-01-23

## 2023-01-02 RX ORDER — METHYLPREDNISOLONE SODIUM SUCCINATE 125 MG/2ML
125 INJECTION, POWDER, LYOPHILIZED, FOR SOLUTION INTRAMUSCULAR; INTRAVENOUS
Status: CANCELLED | OUTPATIENT
Start: 2023-01-02

## 2023-01-02 RX ORDER — ALBUTEROL SULFATE 0.83 MG/ML
2.5 SOLUTION RESPIRATORY (INHALATION) ONCE
Status: CANCELLED
Start: 2023-01-23 | End: 2023-01-23

## 2023-01-02 RX ORDER — MEPERIDINE HYDROCHLORIDE 25 MG/ML
25 INJECTION INTRAMUSCULAR; INTRAVENOUS; SUBCUTANEOUS EVERY 30 MIN PRN
Status: CANCELLED | OUTPATIENT
Start: 2023-01-02

## 2023-01-02 RX ORDER — ALBUTEROL SULFATE 90 UG/1
1-2 AEROSOL, METERED RESPIRATORY (INHALATION)
Status: CANCELLED | OUTPATIENT
Start: 2023-01-02

## 2023-01-02 RX ORDER — ALBUTEROL SULFATE 90 UG/1
1-2 AEROSOL, METERED RESPIRATORY (INHALATION)
Status: CANCELLED
Start: 2023-01-23

## 2023-01-02 RX ORDER — LIDOCAINE HYDROCHLORIDE 10 MG/ML
30 INJECTION, SOLUTION EPIDURAL; INFILTRATION; INTRACAUDAL; PERINEURAL ONCE
Status: COMPLETED | OUTPATIENT
Start: 2023-01-02 | End: 2023-01-02

## 2023-01-02 RX ORDER — PENTAMIDINE ISETHIONATE 300 MG/300MG
300 INHALANT RESPIRATORY (INHALATION) ONCE
Status: CANCELLED
Start: 2023-01-23 | End: 2023-01-23

## 2023-01-02 RX ORDER — ALBUTEROL SULFATE 0.83 MG/ML
2.5 SOLUTION RESPIRATORY (INHALATION)
Status: CANCELLED | OUTPATIENT
Start: 2023-01-02

## 2023-01-02 RX ORDER — MEPERIDINE HYDROCHLORIDE 25 MG/ML
25 INJECTION INTRAMUSCULAR; INTRAVENOUS; SUBCUTANEOUS EVERY 30 MIN PRN
Status: CANCELLED | OUTPATIENT
Start: 2023-01-23

## 2023-01-02 RX ORDER — PENTAMIDINE ISETHIONATE 300 MG/300MG
300 INHALANT RESPIRATORY (INHALATION) ONCE
Status: COMPLETED | OUTPATIENT
Start: 2023-01-02 | End: 2023-01-02

## 2023-01-02 RX ORDER — ALBUTEROL SULFATE 0.83 MG/ML
2.5 SOLUTION RESPIRATORY (INHALATION) ONCE
Status: COMPLETED | OUTPATIENT
Start: 2023-01-02 | End: 2023-01-02

## 2023-01-02 RX ORDER — HEPARIN SODIUM,PORCINE 10 UNIT/ML
5 VIAL (ML) INTRAVENOUS
Status: DISCONTINUED | OUTPATIENT
Start: 2023-01-02 | End: 2023-01-02 | Stop reason: HOSPADM

## 2023-01-02 RX ORDER — EPINEPHRINE 1 MG/ML
0.3 INJECTION, SOLUTION, CONCENTRATE INTRAVENOUS EVERY 5 MIN PRN
Status: CANCELLED | OUTPATIENT
Start: 2023-01-23

## 2023-01-02 RX ORDER — ALBUTEROL SULFATE 0.83 MG/ML
2.5 SOLUTION RESPIRATORY (INHALATION)
Status: CANCELLED | OUTPATIENT
Start: 2023-01-23

## 2023-01-02 RX ORDER — POTASSIUM CHLORIDE 1500 MG/1
40 TABLET, EXTENDED RELEASE ORAL ONCE
Status: COMPLETED | OUTPATIENT
Start: 2023-01-02 | End: 2023-01-02

## 2023-01-02 RX ORDER — METHYLPREDNISOLONE SODIUM SUCCINATE 125 MG/2ML
125 INJECTION, POWDER, LYOPHILIZED, FOR SOLUTION INTRAMUSCULAR; INTRAVENOUS
Status: CANCELLED
Start: 2023-01-23

## 2023-01-02 RX ADMIN — BLINATUMOMAB 28 MCG: KIT INTRAVENOUS at 14:52

## 2023-01-02 RX ADMIN — PENTAMIDINE ISETHIONATE 300 MG: 300 INHALANT RESPIRATORY (INHALATION) at 09:22

## 2023-01-02 RX ADMIN — Medication 5 ML: at 10:08

## 2023-01-02 RX ADMIN — ALBUTEROL SULFATE 2.5 MG: 0.83 SOLUTION RESPIRATORY (INHALATION) at 09:20

## 2023-01-02 RX ADMIN — LIDOCAINE HYDROCHLORIDE 5 ML: 10 INJECTION, SOLUTION EPIDURAL; INFILTRATION; INTRACAUDAL; PERINEURAL at 12:54

## 2023-01-02 RX ADMIN — POTASSIUM CHLORIDE 40 MEQ: 1500 TABLET, EXTENDED RELEASE ORAL at 15:09

## 2023-01-02 ASSESSMENT — PAIN SCALES - GENERAL: PAINLEVEL: NO PAIN (0)

## 2023-01-02 NOTE — PROGRESS NOTES
Pt here for lumbar puncture. Tolerated without issue. Transferred to infusion center for further treatment.    Leah Haq, RN

## 2023-01-02 NOTE — PROGRESS NOTES
PROCEDURE VISIT  Jan 2, 2023      DIAGNOSIS: ALL  PROCEDURE: Intrathecal chemo administration   LOCATION: Radiology- [facility] [place] first floor  INDICATION: ALL      PROCEDURE NOTES:  Lumbar puncture performed and CSF samples collected by the General Radiology team under fluoroscopy. Chemotherapy was double-checked by this writer and Radiology RN. This writer then administered cytarabine (PF) (CYTOSAR) 100 mg in sodium chloride (PF) 0.9% PF 6 mL intrathecal inj (PF) without apparent complication.    Complications: None immediately.   Chemo instillation performed by: Cecilia Myers PA-C

## 2023-01-02 NOTE — TELEPHONE ENCOUNTER
Oral Chemotherapy Monitoring Program  Lab Follow Up    Reviewed lab results from 1/2/23.    ORAL CHEMOTHERAPY 9/15/2022 9/19/2022 9/29/2022 11/7/2022 11/18/2022 12/1/2022 1/2/2023   Assessment Type Initial Work up Initial Follow up;New Teach Refill Left Voicemail Refill New Teach Lab Monitoring   Diagnosis Code Acute Lymphoblastic Leukemia (ALL) Acute Lymphoblastic Leukemia (ALL) Acute Lymphoblastic Leukemia (ALL) Acute Lymphoblastic Leukemia (ALL) Acute Lymphoblastic Leukemia (ALL) Acute Lymphoblastic Leukemia (ALL) Acute Lymphoblastic Leukemia (ALL)   Providers Dr. Alverto Hull   [facility]   Drug Name Gleevec (imatinib) Gleevec (imatinib) Gleevec (imatinib) Gleevec (imatinib) Gleevec (imatinib) Iclusig (ponatinib) Iclusig (ponatinib)   Dose 400 mg 400 mg 400 mg 400 mg 300 mg 15 mg 15 mg   Current Schedule BID BID BID BID BID Daily Daily   Cycle Details (No Data) (No Data) (No Data) - - Continuous Continuous   Start Date of Last Cycle - 8/24/2022 - - - - -   Planned next cycle start date - - - - 11/25/2022 12/12/2022 -   Doses missed in last 2 weeks - 0 - - - - -   Adherence Assessment - Adherent - - - - -   Adverse Effects - Headache;Fatigue - - - - -   Headache - Resolved due to intervention - - - - -   Fatigue - Grade 1 - - - - -   Any new drug interactions? - No - - - No -   Is the dose as ordered appropriate for the patient? - Yes - - - Yes -       Labs:  _  Result Component Current Result Ref Range   Sodium 142 (1/2/2023) 136 - 145 mmol/L     _  Result Component Current Result Ref Range   Potassium 3.2 (L) (1/2/2023) 3.4 - 5.3 mmol/L     _  Result Component Current Result Ref Range   Calcium 9.0 (1/2/2023) 8.6 - 10.0 mg/dL     _  Result Component Current Result Ref Range   Magnesium 2.2 (12/31/2022) 1.7 - 2.3 mg/dL     _  Result Component Current Result Ref Range   Phosphorus  5.8 (H) (12/31/2022) 2.5 - 4.5 mg/dL     _  Result Component Current Result Ref Range   Albumin 4.1 (1/2/2023) 3.5 - 5.2 g/dL     _  Result Component Current Result Ref Range   Urea Nitrogen 13.9 (1/2/2023) 6.0 - 20.0 mg/dL     _  Result Component Current Result Ref Range   Creatinine 0.58 (1/2/2023) 0.51 - 0.95 mg/dL     _  Result Component Current Result Ref Range   AST 11 (1/2/2023) 10 - 35 U/L     _  Result Component Current Result Ref Range   ALT 11 (1/2/2023) 10 - 35 U/L     _  Result Component Current Result Ref Range   Bilirubin Total 0.3 (1/2/2023) <=1.2 mg/dL     _  Result Component Current Result Ref Range   WBC Count 11.2 (H) (1/2/2023) 4.0 - 11.0 10e3/uL     _  Result Component Current Result Ref Range   Hemoglobin 9.3 (L) (1/2/2023) 11.7 - 15.7 g/dL     _  Result Component Current Result Ref Range   Platelet Count 292 (1/2/2023) 150 - 450 10e3/uL     _  Result Component Current Result Ref Range   Absolute Neutrophils 7.4 (12/12/2022) 1.6 - 8.3 10e3/uL     _  Result Component Current Result Ref Range   Absolute Neutrophils 8.5 (H) (1/2/2023) 1.6 - 8.3 10e3/uL        Assessment & Plan:  No concerning abnormalities.  No changes with Ponatinib needed at this time.    AddIn Social message sent to patient.  Patient not contacted as patient was seen in infusion today.    Hyacinth Ojeda, PharmD, BCPS, BCOP  Oncology Clinical Pharmacy Specialist  [facility]  [phone]

## 2023-01-02 NOTE — PROGRESS NOTES
Interventional Radiology Brief Post Procedure Note    Procedure: IR Lumbar Puncture with IT chemo    Proceduralist: Ramana Hinton PA-C    Assistant: None    Time Out: Prior to the start of the procedure and with procedural staff participation, I verbally confirmed the patient s identity using two indicators, relevant allergies, that the procedure was appropriate and matched the consent or emergent situation, and that the correct equipment/implants were available. Immediately prior to starting the procedure I conducted the Time Out with the procedural staff and re-confirmed the patient s name, procedure, and site/side. (The Joint Commission universal protocol was followed.)  Yes    Medications   Medication Event Details Admin User Admin Time       Sedation: None. Local Anesthestic used    Findings: Completed image guided diagnostic lumbar puncture at L3/L4 with immediate return of clear CSF.  Total of 12 mL of clear CSF was collected for diagnostic testing.  Following collection of CSF an oncology provider arrived and delivered intrathecal chemotherapy.  Patient tolerated procedure well no immediate complication.    Estimated Blood Loss: Minimal    Fluoroscopy Time:  0.5 minute(s)    SPECIMENS: Fluid and/or tissue for laboratory analysis    Complications: 1. None     Condition: Stable    Plan: 1 hour bedrest. Follow-up per primary team.     Comments: See dictated procedure note for full details.    Ramana Hinton PA-C

## 2023-01-02 NOTE — PROGRESS NOTES
Vashti Villegas was seen today for a Pentamidine nebulizer tx ordered by Maura Jorgensen PA-C.     Patient was first given 2.5 mg of Albuterol nebulizer, after which Pentamidine 300 mg (Lot # [id number]) mixed with 6cc Sterile H20 was administered through a filtered nebulizer.     Pre-treatment: SpO2 = 100%   HR = 77  BBS = clear  Post-treatment: SpO2 = 99%  HR = 81   BBS = clear     No adverse side effects noted by the patient.     This service today was provided under Dr. العلي, who was available if needed.      Procedure was completed by MEDHAT Gutierrez.

## 2023-01-02 NOTE — DISCHARGE INSTRUCTIONS
Discharge instructions for Lumbar Puncture           AFTER YOU GO HOME    Relax and take it easy for 24 hours  You may resume normal activity tomorrow  Continue to take your medications as ordered by your provider  You may remove the bandage on your back in the evening or next morning  You may resume bathing the next day  Drink at least 4 glasses of extra fluid today if not on a fluid restriction  DO NOT drive or operate machinery at home or at work for at least 24 hours  If you develop a headache you can take over the counter medicines and lay down.  You can try drinking caffeine-coffee, soda.                   CALL YOU PRIMARY PHYSICIAN IF:  If you start to leak a large amount of fluid from the puncture site, lie down flat on your back.   You develop a severe headache  You develop nausea or vomiting   You develop a temperature of 101 degrees or greater     Date: ***  Provider: ***, GRANT, Interventional Radiology, [facility]    [facility]  [address] [place], [place] [id number]

## 2023-01-02 NOTE — NURSING NOTE
Chief Complaint   Patient presents with     Blood Draw     Vitals taken, PICC line labs drawn, heparin locked, checked into next appt     /74 (BP Location: Left arm, Patient Position: Sitting, Cuff Size: Adult Large)   Pulse 80   Temp 98.3  F (36.8  C) (Oral)   Resp 16   Wt 111.4 kg (245 lb 11.2 oz)   SpO2 99%   BMI 42.15 kg/m    Deshawn Duncan RN on 1/2/2023 at 10:11 AM

## 2023-01-02 NOTE — PROGRESS NOTES
Infusion Nursing Note:  Vashti Villegas presents today for Blincyto bag change.    Patient seen by provider today: No   present during visit today: Not Applicable.    Note: April presents to infusion today feeling well. Denies any pain, infectious symptoms, or other concerns. States she doesn't usually get the 20mg IV dexamethasone unless her dose is increased.     TORB Dr. Hull/Gabriela Jackson RN @ 1500:  -OK to replace K per protocol   -Patient is correct, shouldn't be getting dex since the dose is unchanged    Intravenous Access:  PICC.    Treatment Conditions:  Lab Results   Component Value Date    HGB 9.3 (L) 01/02/2023    WBC 11.2 (H) 01/02/2023    ANEU 7.4 12/12/2022    ANEUTAUTO 8.5 (H) 01/02/2023     01/02/2023      Lab Results   Component Value Date     01/02/2023    POTASSIUM 3.2 (L) 01/02/2023    MAG 2.2 12/31/2022    CR 0.58 01/02/2023    SOLEDAD 9.0 01/02/2023    BILITOTAL 0.3 01/02/2023    ALBUMIN 4.1 01/02/2023    ALT 11 01/02/2023    AST 11 01/02/2023       Post Infusion Assessment:  Patient tolerated infusion without incident.  Blood return noted pre and post infusion.  Site patent and intact, free from redness, edema or discomfort.  No evidence of extravasations.     Discharge Plan:   Patient declined prescription refills.  Discharge instructions reviewed with: Patient.  Patient and/or family verbalized understanding of discharge instructions and all questions answered.  Copy of AVS reviewed with patient and/or family.  Patient will have [facility] come out on 1/5/23.  Patient discharged in stable condition accompanied by: self.  Departure Mode: Ambulatory.      Gabriela Jackson RN

## 2023-01-03 ENCOUNTER — NURSE TRIAGE (OUTPATIENT)
Dept: NURSING | Facility: CLINIC | Age: 43
End: 2023-01-03

## 2023-01-03 ENCOUNTER — TELEPHONE (OUTPATIENT)
Dept: ONCOLOGY | Facility: CLINIC | Age: 43
End: 2023-01-03

## 2023-01-03 NOTE — TELEPHONE ENCOUNTER
Oral Chemotherapy Monitoring Program    Subjective/Objective:  Vashti Villegas is a 42 year old female contacted by phone for a follow-up visit for oral chemotherapy. April confirmed dose of ponatinib 15 mg daily. She is adherent and has missed 1 dose in the last 2 weeks while she was in the hospital. She reports some manageable muscle aches/pains that started while she is in the hospital, she says Dr. Hull is aware. April is currently taking tylenol for the aches. She denied NVD, constipation, and all other side effects. She has not started any new medications in the last 2 weeks.    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 Alverto Hull   [facility]   Drug Name Gleevec (imatinib) Gleevec (imatinib) Gleevec (imatinib) Gleevec (imatinib) Iclusig (ponatinib) Iclusig (ponatinib) Iclusig (ponatinib)   Dose 400 mg 400 mg 400 mg 300 mg 15 mg 15 mg 15 mg   Current Schedule BID BID BID BID Daily Daily Daily   Cycle Details (No Data) (No Data) - - Continuous Continuous Continuous   Start Date of Last Cycle 8/24/2022 - - - - - 12/14/2022   Planned next cycle start date - - - 11/25/2022 12/12/2022 - -   Doses missed in last 2 weeks 0 - - - - - 1   Adherence Assessment Adherent - - - - - Adherent   Adverse Effects Headache;Fatigue - - - - - Myalgias/Arthralgias   Myalgias/Arthralgias - - - - - - Grade 1   Pharmacist Intervention(myalgias/arthralgias) - - - - - - No  "  Headache Resolved due to intervention - - - - - -   Fatigue Grade 1 - - - - - -   Any new drug interactions? No - - - No - -   Is the dose as ordered appropriate for the patient? Yes - - - Yes - -       Last PHQ-2 Score on record:   PHQ-2 ( 1999 [employer]) 11/22/2022 11/1/2022   Q1: Little interest or pleasure in doing things 0 0   Q2: Feeling down, depressed or hopeless 0 0   PHQ-2 Score 0 0       Vitals:  BP:   BP Readings from Last 1 Encounters:   01/02/23 120/74     Wt Readings from Last 1 Encounters:   01/02/23 111.4 kg (245 lb 11.2 oz)     Estimated body surface area is 2.24 meters squared as calculated from the following:    Height as of 12/31/22: 1.626 m (5' 4.02\").    Weight as of 1/2/23: 111.4 kg (245 lb 11.2 oz).    Labs:  _  Result Component Current Result Ref Range   Sodium 142 (1/2/2023) 136 - 145 mmol/L     _  Result Component Current Result Ref Range   Potassium 3.2 (L) (1/2/2023) 3.4 - 5.3 mmol/L     _  Result Component Current Result Ref Range   Calcium 9.0 (1/2/2023) 8.6 - 10.0 mg/dL     _  Result Component Current Result Ref Range   Magnesium 2.2 (12/31/2022) 1.7 - 2.3 mg/dL     _  Result Component Current Result Ref Range   Phosphorus 5.8 (H) (12/31/2022) 2.5 - 4.5 mg/dL     _  Result Component Current Result Ref Range   Albumin 4.1 (1/2/2023) 3.5 - 5.2 g/dL     _  Result Component Current Result Ref Range   Urea Nitrogen 13.9 (1/2/2023) 6.0 - 20.0 mg/dL     _  Result Component Current Result Ref Range   Creatinine 0.58 (1/2/2023) 0.51 - 0.95 mg/dL     _  Result Component Current Result Ref Range   AST 11 (1/2/2023) 10 - 35 U/L     _  Result Component Current Result Ref Range   ALT 11 (1/2/2023) 10 - 35 U/L     _  Result Component Current Result Ref Range   Bilirubin Total 0.3 (1/2/2023) <=1.2 mg/dL     _  Result Component Current Result Ref Range   WBC Count 11.2 (H) (1/2/2023) 4.0 - 11.0 10e3/uL     _  Result Component Current Result Ref Range   Hemoglobin 9.3 (L) (1/2/2023) 11.7 - 15.7 g/dL "     _  Result Component Current Result Ref Range   Platelet Count 292 (1/2/2023) 150 - 450 10e3/uL     _  Result Component Current Result Ref Range   Absolute Neutrophils 7.4 (12/12/2022) 1.6 - 8.3 10e3/uL     _  Result Component Current Result Ref Range   Absolute Neutrophils 8.5 (H) (1/2/2023) 1.6 - 8.3 10e3/uL      Assessment/Plan:  Patient is tolerating ponatinib therapy with manageable adverse effects and should be continued as planned.     Follow-Up:  Labs to be scheduled 1/16. IB sent to .     Refill Due:  3/29    Esha Call  Pharmacy Intern  Oral Chemotherapy Monitoring Program   [facility]  [phone]

## 2023-01-04 ENCOUNTER — MYC MEDICAL ADVICE (OUTPATIENT)
Dept: ONCOLOGY | Facility: CLINIC | Age: 43
End: 2023-01-04

## 2023-01-04 ENCOUNTER — PATIENT OUTREACH (OUTPATIENT)
Dept: ONCOLOGY | Facility: CLINIC | Age: 43
End: 2023-01-04

## 2023-01-04 NOTE — PROGRESS NOTES
[facility]: Cancer Care                                                                                          April calls stating that she is currently in the emergency room at [facility] with fevers.  She states that she has a clot in her PICC line and was started on heparin.  She has been given antibiotics     She is concerned about the Bincyto infusion and wanted to make sure Dr. Hull is aware    7D attending MD was paged so we can discuss if April can be transferred to [facility]    Signature:  Flor Leslie RN

## 2023-01-04 NOTE — TELEPHONE ENCOUNTER
"[facility]: Dr RADHA Hull. DX acute lymphoblastic leukemia.   \"I have spiked a fever, so her protocol for me is to call.  T 101.2 orally @ 6:25 pm. I feel achy and tired. I slept a lot today. I had a lumbar puncture yesterday. The fever didn't start until tonight. I have felt cold today.   Chemotherapy, rec'd by lumbar puncture yesterday (Cytosar intrathecal injection), Continuous infusion of Blincyto as well\" . She states she is not alone--her  and 17 yr old son are with her. Because the roads are bad due to the weather, she would prefer to not have to go to the ER unless the oncall provider feels it is necessary.   Triaged to a disposition of Go to ED now.   Answering service contacted @ 6:47pm. Dr ELOINA Batista on call, paged. Patient should be evaluated tonight in the ER. (nearest ER OK, does not have to go to the [facility] )   6:57 pm Contacted patient with this instruction. She verbalized understanding and will go to the nearest ER now.     Rachel Gage RN Triage Nurse Advisor 6:58 PM 1/3/2023  Reason for Disposition    [1] Neutropenia known or suspected (e.g., recent cancer chemotherapy) AND [2] fever > 100.4 F (38.0 C)    Additional Information    Negative: Shock suspected (e.g., cold/pale/clammy skin, too weak to stand, low BP, rapid pulse)    Negative: Difficult to awaken or acting confused (e.g., disoriented, slurred speech)    Negative: Bluish (or gray) lips or face now    Negative: New-onset rash with many purple (or blood-colored) spots or dots    Negative: Sounds like a life-threatening emergency to the triager    Negative: Other symptom is present, see that guideline (e.g., symptoms of cough, runny nose, sore throat, earache, abdominal pain, diarrhea, vomiting)    Negative: Fever > 103 F (39.4 C)    Protocols used: CANCER - FEVER-A-      "

## 2023-01-04 NOTE — TELEPHONE ENCOUNTER
Is at the ER right now and is not having great luck with PICC line.   Wondering if they are going to fix her port.   Has a clot in picc line.   No beds at [facility] is in ER at [place] right now.   Would like a call back re plan of care moving forward.   Call Transferred to RN[facility] Lucy Leslie.

## 2023-01-05 ENCOUNTER — HOSPITAL ENCOUNTER (INPATIENT)
Facility: CLINIC | Age: 43
LOS: 1 days | Discharge: HOME HEALTH | End: 2023-01-06
Attending: INTERNAL MEDICINE | Admitting: INTERNAL MEDICINE
Payer: COMMERCIAL

## 2023-01-05 DIAGNOSIS — A41.9 SEPSIS (H): ICD-10-CM

## 2023-01-05 DIAGNOSIS — Z45.2 ADJUSTMENT AND MANAGEMENT OF VASCULAR ACCESS DEVICE: Primary | ICD-10-CM

## 2023-01-05 DIAGNOSIS — C91.00 ACUTE LYMPHOBLASTIC LEUKEMIA (ALL) NOT HAVING ACHIEVED REMISSION (H): ICD-10-CM

## 2023-01-05 LAB
ABO/RH(D): NORMAL
ALBUMIN SERPL BCG-MCNC: 4.2 G/DL (ref 3.5–5.2)
ALP SERPL-CCNC: 78 U/L (ref 35–104)
ALT SERPL W P-5'-P-CCNC: 12 U/L (ref 10–35)
ANION GAP SERPL CALCULATED.3IONS-SCNC: 15 MMOL/L (ref 7–15)
ANTIBODY SCREEN: NEGATIVE
AST SERPL W P-5'-P-CCNC: 10 U/L (ref 10–35)
BASOPHILS # BLD AUTO: 0.1 10E3/UL (ref 0–0.2)
BASOPHILS NFR BLD AUTO: 1 %
BILIRUB SERPL-MCNC: 0.4 MG/DL
BUN SERPL-MCNC: 11.8 MG/DL (ref 6–20)
CALCIUM SERPL-MCNC: 9.2 MG/DL (ref 8.6–10)
CHLORIDE SERPL-SCNC: 106 MMOL/L (ref 98–107)
CREAT SERPL-MCNC: 0.6 MG/DL (ref 0.51–0.95)
DEPRECATED HCO3 PLAS-SCNC: 19 MMOL/L (ref 22–29)
EOSINOPHIL # BLD AUTO: 0.3 10E3/UL (ref 0–0.7)
EOSINOPHIL NFR BLD AUTO: 4 %
ERYTHROCYTE [DISTWIDTH] IN BLOOD BY AUTOMATED COUNT: 18.3 % (ref 10–15)
FIBRINOGEN PPP-MCNC: 506 MG/DL (ref 170–490)
GFR SERPL CREATININE-BSD FRML MDRD: >90 ML/MIN/1.73M2
GLUCOSE SERPL-MCNC: 151 MG/DL (ref 70–99)
HCT VFR BLD AUTO: 31.3 % (ref 35–47)
HGB BLD-MCNC: 10 G/DL (ref 11.7–15.7)
IMM GRANULOCYTES # BLD: 0 10E3/UL
IMM GRANULOCYTES NFR BLD: 1 %
INR PPP: 1.01 (ref 0.85–1.15)
LYMPHOCYTES # BLD AUTO: 0.7 10E3/UL (ref 0.8–5.3)
LYMPHOCYTES NFR BLD AUTO: 8 %
MAGNESIUM SERPL-MCNC: 2 MG/DL (ref 1.7–2.3)
MCH RBC QN AUTO: 30.9 PG (ref 26.5–33)
MCHC RBC AUTO-ENTMCNC: 31.9 G/DL (ref 31.5–36.5)
MCV RBC AUTO: 97 FL (ref 78–100)
MONOCYTES # BLD AUTO: 0.8 10E3/UL (ref 0–1.3)
MONOCYTES NFR BLD AUTO: 10 %
NEUTROPHILS # BLD AUTO: 6.3 10E3/UL (ref 1.6–8.3)
NEUTROPHILS NFR BLD AUTO: 76 %
NRBC # BLD AUTO: 0 10E3/UL
NRBC BLD AUTO-RTO: 0 /100
PHOSPHATE SERPL-MCNC: 4 MG/DL (ref 2.5–4.5)
PLATELET # BLD AUTO: 276 10E3/UL (ref 150–450)
POTASSIUM SERPL-SCNC: 3.7 MMOL/L (ref 3.4–5.3)
PROT SERPL-MCNC: 6.6 G/DL (ref 6.4–8.3)
RBC # BLD AUTO: 3.24 10E6/UL (ref 3.8–5.2)
SODIUM SERPL-SCNC: 140 MMOL/L (ref 136–145)
SPECIMEN EXPIRATION DATE: NORMAL
WBC # BLD AUTO: 8.2 10E3/UL (ref 4–11)

## 2023-01-05 PROCEDURE — 85610 PROTHROMBIN TIME: CPT | Performed by: PHYSICIAN ASSISTANT

## 2023-01-05 PROCEDURE — 85025 COMPLETE CBC W/AUTO DIFF WBC: CPT | Performed by: PHYSICIAN ASSISTANT

## 2023-01-05 PROCEDURE — 3E04305 INTRODUCTION OF OTHER ANTINEOPLASTIC INTO CENTRAL VEIN, PERCUTANEOUS APPROACH: ICD-10-PCS | Performed by: INTERNAL MEDICINE

## 2023-01-05 PROCEDURE — 85384 FIBRINOGEN ACTIVITY: CPT | Performed by: PHYSICIAN ASSISTANT

## 2023-01-05 PROCEDURE — 99223 1ST HOSP IP/OBS HIGH 75: CPT | Mod: FS | Performed by: PHYSICIAN ASSISTANT

## 2023-01-05 PROCEDURE — 83735 ASSAY OF MAGNESIUM: CPT | Performed by: PHYSICIAN ASSISTANT

## 2023-01-05 PROCEDURE — 250N000011 HC RX IP 250 OP 636: Performed by: PHYSICIAN ASSISTANT

## 2023-01-05 PROCEDURE — 84100 ASSAY OF PHOSPHORUS: CPT | Performed by: PHYSICIAN ASSISTANT

## 2023-01-05 PROCEDURE — 80053 COMPREHEN METABOLIC PANEL: CPT | Performed by: PHYSICIAN ASSISTANT

## 2023-01-05 PROCEDURE — 120N000002 HC R&B MED SURG/OB UMMC

## 2023-01-05 PROCEDURE — 36592 COLLECT BLOOD FROM PICC: CPT | Performed by: PHYSICIAN ASSISTANT

## 2023-01-05 PROCEDURE — 250N000013 HC RX MED GY IP 250 OP 250 PS 637: Performed by: PHYSICIAN ASSISTANT

## 2023-01-05 PROCEDURE — 86901 BLOOD TYPING SEROLOGIC RH(D): CPT | Performed by: PHYSICIAN ASSISTANT

## 2023-01-05 RX ORDER — ASPIRIN 81 MG/1
81 TABLET ORAL DAILY
Status: DISCONTINUED | OUTPATIENT
Start: 2023-01-06 | End: 2023-01-06 | Stop reason: HOSPADM

## 2023-01-05 RX ORDER — AMLODIPINE BESYLATE 5 MG/1
5 TABLET ORAL DAILY
Status: DISCONTINUED | OUTPATIENT
Start: 2023-01-06 | End: 2023-01-06 | Stop reason: HOSPADM

## 2023-01-05 RX ORDER — GUAIFENESIN/DEXTROMETHORPHAN 100-10MG/5
10 SYRUP ORAL EVERY 4 HOURS PRN
Status: DISCONTINUED | OUTPATIENT
Start: 2023-01-05 | End: 2023-01-06 | Stop reason: HOSPADM

## 2023-01-05 RX ORDER — VALACYCLOVIR HYDROCHLORIDE 500 MG/1
1000 TABLET, FILM COATED ORAL DAILY
Status: DISCONTINUED | OUTPATIENT
Start: 2023-01-06 | End: 2023-01-06 | Stop reason: HOSPADM

## 2023-01-05 RX ORDER — PROCHLORPERAZINE MALEATE 5 MG
5 TABLET ORAL EVERY 6 HOURS PRN
Status: DISCONTINUED | OUTPATIENT
Start: 2023-01-05 | End: 2023-01-06 | Stop reason: HOSPADM

## 2023-01-05 RX ORDER — AMOXICILLIN 250 MG
1 CAPSULE ORAL 2 TIMES DAILY PRN
Status: DISCONTINUED | OUTPATIENT
Start: 2023-01-05 | End: 2023-01-06 | Stop reason: HOSPADM

## 2023-01-05 RX ORDER — POLYETHYLENE GLYCOL 3350 17 G/17G
17 POWDER, FOR SOLUTION ORAL DAILY PRN
Status: DISCONTINUED | OUTPATIENT
Start: 2023-01-05 | End: 2023-01-06 | Stop reason: HOSPADM

## 2023-01-05 RX ORDER — ENOXAPARIN SODIUM 100 MG/ML
40 INJECTION SUBCUTANEOUS EVERY 24 HOURS
Status: DISCONTINUED | OUTPATIENT
Start: 2023-01-05 | End: 2023-01-06 | Stop reason: HOSPADM

## 2023-01-05 RX ORDER — PANTOPRAZOLE SODIUM 20 MG/1
20 TABLET, DELAYED RELEASE ORAL DAILY PRN
Status: DISCONTINUED | OUTPATIENT
Start: 2023-01-05 | End: 2023-01-06 | Stop reason: HOSPADM

## 2023-01-05 RX ORDER — AMOXICILLIN 250 MG
2 CAPSULE ORAL 2 TIMES DAILY PRN
Status: DISCONTINUED | OUTPATIENT
Start: 2023-01-05 | End: 2023-01-06 | Stop reason: HOSPADM

## 2023-01-05 RX ORDER — ACETAMINOPHEN 325 MG/1
650 TABLET ORAL EVERY 4 HOURS PRN
Status: DISCONTINUED | OUTPATIENT
Start: 2023-01-05 | End: 2023-01-06 | Stop reason: HOSPADM

## 2023-01-05 RX ORDER — BENZONATATE 100 MG/1
100 CAPSULE ORAL 3 TIMES DAILY PRN
Status: DISCONTINUED | OUTPATIENT
Start: 2023-01-05 | End: 2023-01-06 | Stop reason: HOSPADM

## 2023-01-05 RX ADMIN — CEFEPIME HYDROCHLORIDE 2 G: 2 INJECTION, POWDER, FOR SOLUTION INTRAVENOUS at 22:30

## 2023-01-05 RX ADMIN — ACETAMINOPHEN 650 MG: 325 TABLET, FILM COATED ORAL at 21:00

## 2023-01-05 ASSESSMENT — ACTIVITIES OF DAILY LIVING (ADL)
ADLS_ACUITY_SCORE: 18

## 2023-01-05 NOTE — H&P
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 Patient presented to [place] ED with fevers. She is currently being transferred for to [facility] for further care.     HEME   # Ph(+) B-ALL   Follows with Dr. Hull. Patient presented to outside hospital with c/o right upper quadrant pain and was subsequently found to have a markedly elevated white blood cell count concerning for acute leukemia.  She was transferred to [facility] for further workup and management. Diagnostic BMBx completed 8/20/22, which demonstrated B-ALL with 85% blasts and hypercellular marrow. IHC shows CD10+, CD34+. 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 ALL NGS negative. BCR/ABL1 major/minor (sent from peripheral blood) both negative. Initiated pre-phase steroids with Prednisone 60 mg x8/21; increased to 60 mg/m2 (140 mg) 8/22-8/24. She initiated GRAALL + imatinib (C1D1 = 8/24/22). S/p BMT consult 9/12 with Dr. Walsh. Induction was relatively uncomplicated. Post-induction BMBx done 9/26/22 and revealed MRD- CR1, with negative BCR/ABL1 and Clonoseq testing. With no matched related donors and excellent early/deep response chemotherapy is currently favored over BMT. She was admitted for C2 [facility]  on 9/30/22 which was complicated by disseminated zoster infection. Underwent BMBx 10/25/22 with no morphologic evidence of B-lymphoblastic leukemia and flow with unusual B lineage precursor population 0.09%. C3 GRAALL + imatinib on 11/2/22 was tolerated well without complication. Prior plan was to continue GRAALL for a total of 8 cycles (of note odd cycles are 21-days in length and even cycles are 14-days in length), however now adjusting to Blinatumumab + Ponatinib. She was admitted on 12/30 with need for urgent PICC placement after port malfunction resumed Blincyto 12/31 (D17).   - Blincyto was not held or interrupted at OSH; will continue infusion  - Patient has home supply of Ponatinib; will continue while admitted.     # Cephalic Thrombus  At OSH, US 1/4 demonstrated no DVT but did show a thrombus in the cephalic vein associated with IV catheter. She was started on a heparin drip at the OSH,   - Unclear that heparin drip is warranted in the setting of a superficial thrombus      # Anemia   Likely secondary to chemotherapy.   - Transfuse to keep Hgb >7     # H/o grade 1 CRS  Pt developed fevers to 102 on 12/20. This was also in the setting of cough and CXR findings consistent with pneumonia. Fevers had persisted despite cefepime, which raises concern for possible underlying CRS in addition to pneumonia. Per protocol, in the absence of hypotension or hypoxia, this could indicate grade 1 CRS which does not require any intervention or holding of blincyto.     ID   # Non-neutropenic fever   Patient noted to have fever 101.7. She endorsed right should pain ~1/3/23, this is the same arm as PICC. She was started on IV Cefepime and Vancomycin.   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 Presented  with scattered papulovesicular lesions on her chin and nose as well as scattered hemorrhagic erosions on the scalp and lower face (V2-3 distributions of the trigeminal nerve) c/f disseminated VZV. Lesions significantly improved on admission. ID recommended considering decrease to 1000 mg daily on 12/13 visit (given less anticipated prolonged neutropenia with new regimen) and confirmed with Dr. Hull.   - Continue Valtrex 1000 mg daily    # ID PPx   - PTA Valtrex 1000 mg daily   - Hold PTA fluconazole 200 mg daily and levofloxacin 250 mg daily given ANC >1.0   - Nebulized pentamidine last given 12/2; next scheduled 1/2 outpatient  - Received Evusheld on 11/2/22     MISC  # Myalgias  Noted to have myalgias starting 12/18 AM. Continues to have mild myalgias, in her shoulders, upper arms, back, hip and thighs. Has had some improvement with APAP. Reports these are constant. Not worsening with movement. May be related to ponatinib. Hold off on starting a statin. Clinically improving as of 12/20 with tylenol, heat, and changing her position. Since discharge 12/23 she has had intermittent mild myalgias which continue to be managed with heat and tylenol.   - Continue to monitor. Consider celebrex 200mg daily if worsening  - Consider adding a statin in the future if myalgias are resolved.     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 Pt reports symptoms are stable in her right hand and improved in the left. Reports mild paresthesias and hot/cold sensitivity. Does not effect her gait or dexterity. Monitor clinically     FEN:  -Encourage PO fluids   -PRN lyte replacement  -RDAT    Prophy/Misc:  -VTE:Lovenox ppx; hold if plts<50K   -GI/PUD: PTA Protonix   -Bowels: PRN Senna and Miralax     Clinically Significant Risk Factors Present on Admission                       # Severe Obesity: Estimated body mass  "index is 40.83 kg/m  as calculated from the following:    Height as of 12/31/22: 1.626 m (5' 4.02\").    Weight as of this encounter: 108 kg (238 lb).           This patient was discussed with Dr. Fairchild    I spent >75 minutes face-to-face or coordinating care of Vashti Villegas. Over 50% of our time on the unit was spent counseling the patient and/or coordinating care.    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 Patient presented to [place] ED with fevers. She is currently being transferred for to [facility] for further care.     On admission, April is doing well. She is glad to be here. She continues to have some tenderness of her right shoulder where the clot was found. Discussed with her that because this is a superficial thrombus I did not think we would need to continue anticoagulation like they were at the outside hospital. She reports that the Blincyto has been going the entire time and there have been not interruptions. The PICC line also is functioning properly but the other hospital were unsure if they could use it with the clot and Blincyto. Discussed that we typically use symptom management for the superficial clots so we can use ice/heat and Tylenol to help with discomfort. She voiced understanding. She continues to have an intermittent cough since her last admission but this has been improving, occasionally using Tessalon pearls and Robitussin. All questions answered at this time.     A comprehensive review of systems was obtained and is negative other than noted here or in the HPI.      Past Medical " History    I have reviewed this patient's medical history and updated it with pertinent information if needed.   Past Medical History:   Diagnosis Date     ALL (acute lymphocytic leukemia) (H)      Other acne      Shingles        Past Surgical History   I have reviewed this patient's surgical history and updated it with pertinent information if needed.  Past Surgical History:   Procedure Laterality Date     IR BONE BIOPSY DEEP RIGHT  10/25/2022     IR CHEST PORT PLACEMENT > 5 YRS OF AGE  11/23/2022     IR LUMBAR PUNCTURE  10/21/2022     PICC Left 09/30/2022    Picc ok to use     PICC DOUBLE LUMEN PLACEMENT Left 08/20/2022    left cephalic 5 fr dl picc 44 cm     PICC INSERTION - DOUBLE LUMEN Right 12/31/2022    41cm (1cm external), Cephalic vein     ZZC NONSPECIFIC PROCEDURE  01/01/1999    benign mole removal       Prior to Admission Medications   Prior to Admission Medications   Prescriptions Last Dose Informant Patient Reported? Taking?   CHOLECALCIFEROL PO Past Week  Yes Yes   Sig: Take 1 tablet by mouth daily Unknown strength.   LORazepam (ATIVAN) 0.5 MG tablet Past Week  No Yes   Sig: Take 1-2 tablets (0.5-1 mg) by mouth daily as needed for anxiety . Take prior to BMBx procedures as needed. If initial dose unhelpful, okay to take a second 0.5-1 mg dose (for a max of 2 mg at a time). Caution: causes sedation. Do not drive after taking this medication.   PONATinib (ICLUSIG) 15 MG tablet 1/4/2023 at 1830  No Yes   Sig: Take 1 tablet (15 mg) by mouth daily -- start concurrent with blinatumomab infusion, planned for 12/12/22   acetaminophen (TYLENOL) 325 MG tablet 1/5/2023  Yes Yes   Sig: Take 2 tablets (650 mg) by mouth every 6 hours as needed for mild pain, other or fever (blood product administration premedication)   amLODIPine (NORVASC) 5 MG tablet 1/5/2023  No Yes   Sig: Take 1 tablet (5 mg) by mouth daily   aspirin (ASA) 81 MG EC tablet Past Week  No Yes   Sig: Take 1 tablet (81 mg) by mouth daily   benzonatate  (TESSALON) 100 MG capsule Past Week  No Yes   Sig: Take 1 capsule (100 mg) by mouth 3 times daily as needed for cough   blinatumomab 28 mcg 1/5/2023  No Yes   Sig: Inject 28 mcg into the vein every 24 hours for 11 days   guaiFENesin-dextromethorphan (ROBITUSSIN DM) 100-10 MG/5ML syrup Past Week  No Yes   Sig: Take 10 mLs by mouth every 4 hours as needed for cough or congestion   lidocaine-prilocaine (LIDOPRIL) 2.5-2.5 % kit   No No   Sig: Use prior to lab access   ondansetron (ZOFRAN ODT) 4 MG ODT tab 1/4/2023  No Yes   Sig: Take 1-2 tablets (4-8 mg) by mouth every 8 hours as needed for nausea or vomiting   pantoprazole (PROTONIX) 20 MG EC tablet Past Month  No Yes   Sig: Take 1 tablet (20 mg) by mouth daily   prochlorperazine (COMPAZINE) 10 MG tablet Past Month  No Yes   Sig: Take 1 tablet (10 mg) by mouth every 6 hours as needed (Breakthrough Nausea/Vomiting)   sennosides (SENOKOT) 8.6 MG tablet More than a month  No Yes   Sig: Take 1-2 tablets by mouth 2 times daily   valACYclovir (VALTREX) 1000 mg tablet 1/5/2023  No Yes   Sig: Take 1 tablet (1,000 mg) by mouth daily      Facility-Administered Medications: None     Allergies   Allergies   Allergen Reactions     Blood Transfusion Related (Informational Only) Hives     Hive reaction with platelet transfusion on 8/26/2022. Please administer platelets with tylenol and benadryl premedication.        Social History   I have reviewed this patient's social history and updated it with pertinent information if needed. Vashti Villegas  reports that she has never smoked. She has never used smokeless tobacco.    Family History   I have reviewed this patient's family history and updated it with pertinent information if needed.   No family history on file.    Review of Systems   A comprehensive review of symptoms was obtained and negative unless noted above.    Physical Exam   Temp: 98.2  F (36.8  C) Temp src: Oral BP: 125/88 Pulse: 104   Resp: 20 SpO2: 98 % O2 Device: None (Room  air)    Vital Signs with Ranges  Temp:  [98.2  F (36.8  C)] 98.2  F (36.8  C)  Pulse:  [104] 104  Resp:  [20] 20  BP: (125)/(88) 125/88  SpO2:  [98 %] 98 %  238 lbs 0 oz    Constitutional: Pleasant female seen resting comfortably in bed in NAD. Alert and interactive.   HEENT: NCAT. PERRL, EOMI, anicteric sclera. Oral mucosa pink and moist with no lesions or thrush.  Hematologic / Lymphatic: No overt bleeding. No cervical or clavicular adenopathy.  Respiratory: Non-labored breathing, good air exchange, lungs clear to auscultation bilaterally. Occasional cough noted.   Cardiovascular: Regular rate and rhythm. No murmur or rub.   GI: Normoactive bowel sounds. Abdomen soft, non-distended, and non-tender. No palpable masses or organomegaly.  Skin: Warm and dry. No concerning lesions or rash on exposed surfaces.  Musculoskeletal: Extremities grossly normal, non-tender, no edema. Strong peripheral pulses. Good strength and ROM in bed.   Neuropsychiatric: Mentation and affect appear normal/appropriate.  Vascular Access: PICC is CDI without erythema, swelling, or discharge.     Data   No results found for this or any previous visit (from the past 24 hour(s)).

## 2023-01-06 ENCOUNTER — APPOINTMENT (OUTPATIENT)
Dept: GENERAL RADIOLOGY | Facility: CLINIC | Age: 43
End: 2023-01-06
Attending: PHYSICIAN ASSISTANT
Payer: COMMERCIAL

## 2023-01-06 VITALS
OXYGEN SATURATION: 96 % | WEIGHT: 238.3 LBS | HEART RATE: 83 BPM | TEMPERATURE: 98.3 F | RESPIRATION RATE: 18 BRPM | BODY MASS INDEX: 40.88 KG/M2 | SYSTOLIC BLOOD PRESSURE: 136 MMHG | DIASTOLIC BLOOD PRESSURE: 80 MMHG

## 2023-01-06 LAB
ALBUMIN SERPL BCG-MCNC: 3.8 G/DL (ref 3.5–5.2)
ALP SERPL-CCNC: 66 U/L (ref 35–104)
ALT SERPL W P-5'-P-CCNC: 10 U/L (ref 10–35)
ANION GAP SERPL CALCULATED.3IONS-SCNC: 11 MMOL/L (ref 7–15)
AST SERPL W P-5'-P-CCNC: 10 U/L (ref 10–35)
BASOPHILS # BLD AUTO: 0.1 10E3/UL (ref 0–0.2)
BASOPHILS NFR BLD AUTO: 1 %
BILIRUB SERPL-MCNC: 0.3 MG/DL
BUN SERPL-MCNC: 11.1 MG/DL (ref 6–20)
CALCIUM SERPL-MCNC: 8.9 MG/DL (ref 8.6–10)
CHLORIDE SERPL-SCNC: 109 MMOL/L (ref 98–107)
CREAT SERPL-MCNC: 0.56 MG/DL (ref 0.51–0.95)
DEPRECATED HCO3 PLAS-SCNC: 21 MMOL/L (ref 22–29)
EOSINOPHIL # BLD AUTO: 0.3 10E3/UL (ref 0–0.7)
EOSINOPHIL NFR BLD AUTO: 5 %
ERYTHROCYTE [DISTWIDTH] IN BLOOD BY AUTOMATED COUNT: 18.3 % (ref 10–15)
FIBRINOGEN PPP-MCNC: 497 MG/DL (ref 170–490)
GFR SERPL CREATININE-BSD FRML MDRD: >90 ML/MIN/1.73M2
GLUCOSE SERPL-MCNC: 102 MG/DL (ref 70–99)
HCT VFR BLD AUTO: 27.6 % (ref 35–47)
HGB BLD-MCNC: 8.9 G/DL (ref 11.7–15.7)
IMM GRANULOCYTES # BLD: 0 10E3/UL
IMM GRANULOCYTES NFR BLD: 1 %
INR PPP: 1.09 (ref 0.85–1.15)
LYMPHOCYTES # BLD AUTO: 0.6 10E3/UL (ref 0.8–5.3)
LYMPHOCYTES NFR BLD AUTO: 9 %
MAGNESIUM SERPL-MCNC: 2 MG/DL (ref 1.7–2.3)
MCH RBC QN AUTO: 31.4 PG (ref 26.5–33)
MCHC RBC AUTO-ENTMCNC: 32.2 G/DL (ref 31.5–36.5)
MCV RBC AUTO: 98 FL (ref 78–100)
MONOCYTES # BLD AUTO: 0.8 10E3/UL (ref 0–1.3)
MONOCYTES NFR BLD AUTO: 13 %
NEUTROPHILS # BLD AUTO: 4.7 10E3/UL (ref 1.6–8.3)
NEUTROPHILS NFR BLD AUTO: 71 %
NRBC # BLD AUTO: 0 10E3/UL
NRBC BLD AUTO-RTO: 0 /100
PHOSPHATE SERPL-MCNC: 4.4 MG/DL (ref 2.5–4.5)
PLATELET # BLD AUTO: 225 10E3/UL (ref 150–450)
POTASSIUM SERPL-SCNC: 4.2 MMOL/L (ref 3.4–5.3)
PROT SERPL-MCNC: 5.9 G/DL (ref 6.4–8.3)
RBC # BLD AUTO: 2.83 10E6/UL (ref 3.8–5.2)
SODIUM SERPL-SCNC: 141 MMOL/L (ref 136–145)
URATE SERPL-MCNC: 2.9 MG/DL (ref 2.4–5.7)
WBC # BLD AUTO: 6.6 10E3/UL (ref 4–11)

## 2023-01-06 PROCEDURE — 83735 ASSAY OF MAGNESIUM: CPT | Performed by: INTERNAL MEDICINE

## 2023-01-06 PROCEDURE — 250N000011 HC RX IP 250 OP 636: Performed by: PHYSICIAN ASSISTANT

## 2023-01-06 PROCEDURE — 71046 X-RAY EXAM CHEST 2 VIEWS: CPT | Mod: 26 | Performed by: RADIOLOGY

## 2023-01-06 PROCEDURE — 85025 COMPLETE CBC W/AUTO DIFF WBC: CPT | Performed by: PHYSICIAN ASSISTANT

## 2023-01-06 PROCEDURE — 99239 HOSP IP/OBS DSCHRG MGMT >30: CPT | Mod: FS | Performed by: PHYSICIAN ASSISTANT

## 2023-01-06 PROCEDURE — 84550 ASSAY OF BLOOD/URIC ACID: CPT | Performed by: PHYSICIAN ASSISTANT

## 2023-01-06 PROCEDURE — 85610 PROTHROMBIN TIME: CPT | Performed by: PHYSICIAN ASSISTANT

## 2023-01-06 PROCEDURE — 84100 ASSAY OF PHOSPHORUS: CPT | Performed by: INTERNAL MEDICINE

## 2023-01-06 PROCEDURE — 85384 FIBRINOGEN ACTIVITY: CPT | Performed by: PHYSICIAN ASSISTANT

## 2023-01-06 PROCEDURE — 250N000009 HC RX 250: Performed by: PHYSICIAN ASSISTANT

## 2023-01-06 PROCEDURE — 250N000013 HC RX MED GY IP 250 OP 250 PS 637: Performed by: PHYSICIAN ASSISTANT

## 2023-01-06 PROCEDURE — 999N000044 HC STATISTIC CVC DRESSING CHANGE

## 2023-01-06 PROCEDURE — 80053 COMPREHEN METABOLIC PANEL: CPT | Performed by: PHYSICIAN ASSISTANT

## 2023-01-06 PROCEDURE — 999N000065 XR CHEST 2 VIEWS

## 2023-01-06 RX ORDER — HEPARIN SODIUM,PORCINE 10 UNIT/ML
5-10 VIAL (ML) INTRAVENOUS EVERY 24 HOURS
Status: DISCONTINUED | OUTPATIENT
Start: 2023-01-06 | End: 2023-01-06 | Stop reason: HOSPADM

## 2023-01-06 RX ORDER — HEPARIN SODIUM (PORCINE) LOCK FLUSH IV SOLN 100 UNIT/ML 100 UNIT/ML
5-10 SOLUTION INTRAVENOUS
Status: DISCONTINUED | OUTPATIENT
Start: 2023-01-06 | End: 2023-01-06 | Stop reason: HOSPADM

## 2023-01-06 RX ORDER — LIDOCAINE 40 MG/G
CREAM TOPICAL
Status: DISCONTINUED | OUTPATIENT
Start: 2023-01-06 | End: 2023-01-06 | Stop reason: HOSPADM

## 2023-01-06 RX ORDER — LIDOCAINE/PRILOCAINE 2.5 %-2.5%
CREAM (GRAM) TOPICAL
Status: DISCONTINUED | OUTPATIENT
Start: 2023-01-06 | End: 2023-01-06 | Stop reason: HOSPADM

## 2023-01-06 RX ORDER — HEPARIN SODIUM,PORCINE 10 UNIT/ML
5-10 VIAL (ML) INTRAVENOUS
Status: DISCONTINUED | OUTPATIENT
Start: 2023-01-06 | End: 2023-01-06 | Stop reason: HOSPADM

## 2023-01-06 RX ADMIN — CEFEPIME HYDROCHLORIDE 2 G: 2 INJECTION, POWDER, FOR SOLUTION INTRAVENOUS at 06:06

## 2023-01-06 RX ADMIN — SODIUM CHLORIDE, PRESERVATIVE FREE 5 ML: 5 INJECTION INTRAVENOUS at 10:14

## 2023-01-06 RX ADMIN — LIDOCAINE: 40 CREAM TOPICAL at 11:20

## 2023-01-06 RX ADMIN — ASPIRIN 81 MG: 81 TABLET ORAL at 08:54

## 2023-01-06 RX ADMIN — AMLODIPINE BESYLATE 5 MG: 5 TABLET ORAL at 08:53

## 2023-01-06 RX ADMIN — LIDOCAINE: 40 CREAM TOPICAL at 09:06

## 2023-01-06 RX ADMIN — VALACYCLOVIR HYDROCHLORIDE 1000 MG: 500 TABLET, FILM COATED ORAL at 08:54

## 2023-01-06 RX ADMIN — ACETAMINOPHEN 650 MG: 325 TABLET, FILM COATED ORAL at 06:12

## 2023-01-06 ASSESSMENT — ACTIVITIES OF DAILY LIVING (ADL)
ADLS_ACUITY_SCORE: 18

## 2023-01-06 NOTE — PROGRESS NOTES
Care Management Follow Up    Writer notified [facility] liaison that patient will discharge today.     Writer placed resumption order for home infusion.      Estela Youngblood RN, BSN  Care Coordinator 7D  Office: [phone]  Pager: [phone]    To contact the weekend RNCC  [place] (0800 - 1630) Saturday and Sunday    Units: 4A, 4C, 4E, 5A and 5B- Pager 1: [phone]    Units: 6A, 6B, 6C, 6D- Pager 2: [phone]    Units: 7A, 7B, 7C, 7D, and 5C-Pager 3: [phone]

## 2023-01-06 NOTE — PLAN OF CARE
Time: 5538-2767    Afebrile with VSS on RA. Endorses right upper arm pain, tylenol and heat pack given with minimal relief. Home blincyto infusing. Voiding and not saving. Had a BM today PTA. On IV cefepime. Ate leftover chipotle for dinner. Good PO intake. Refused lovenox. PCDs ordered. Possible discharge tomorrow.

## 2023-01-06 NOTE — PLAN OF CARE
Goal Outcome Evaluation:    Discharge  D: Orders for discharge and outpatient medications written.  I: Home medications and return to clinic schedule reviewed with patient. Discharge instructions and parameters for calling Health Care Provider reviewed. Patient left at 1500 independently to meet  in front of hospital lobby. Pt left with all belongings including home Ponatinib medications.   A: Patient/family verbalized understanding and was ready for discharge.   P: Patient instructed to  medications in Pharmacy. Follow up as scheduled.

## 2023-01-06 NOTE — PROGRESS NOTES
Right chest port accessed bedside.  I am fairly certain the needle entered the port reservoir, however there was no aspiration or ability to flush normal saline.  Needle left in place pending chest x-ray confirmation of needle in proper position.  If confirmed, port has some type of malfunction and should be replaced or the patient may receive ongoing venous access through a PICC line.

## 2023-01-06 NOTE — CONSULTS
"Patient is a 42 year old female with ALL, status post right internal jugular central venous chest port placement on 10/25/22. She presented to ED on 12/30/22 with complaint of \"port not working,\" with provider note (OANH Eli, APRN) noting \"patient's home health nurse was unable to access patient's port.\" Vascular access placed a right cephalic dual lumen PICC, which has been functioning well. Patient's team reports shoulder pain, with team reporting subsequent ultrasound significant for thrombus, now on heparin. Ultrasound images are not available for review. Team reports that patient's port has not been accessed during this admission, as the patient is declining access attempts due to pain/discomfort. Team requests consideration for port evaluation.     CT (12/30/22) notable for significant adipose plane, with port tilted medially toward sternum. Port catheter is adequately positioned in the SVC.     Recommend using PICC until port access is secured if thrombus is non-occlusive or patient is without upper extremity swelling.   Port issue appears to be difficult access rather than port dysfunction.  Recommend team attempt port access, to be performed by someone experienced with challenging port access. If this fails, please repeat IR inpatient consult, and IR personnel will attempt bedside access if able.        Jaya Sen PA-C  Interventional Radiology  [phone] pgr.    "

## 2023-01-06 NOTE — PLAN OF CARE
Goal Outcome Evaluation:    7868-5075    /72 (BP Location: Left arm)   Pulse 84   Temp 98.4  F (36.9  C) (Oral)   Resp 18   Wt 108.1 kg (238 lb 4.8 oz)   SpO2 98%   BMI 40.88 kg/m      Reason for admission: Presented to [facility] ED with fevers and right shoulder pain, found to have PICC-associated right cephalic vein SVT. Transferred to [facility] today given overall complexity and ongoing Blincyto infusion.  Activity: UAL  Pain: Pt reporting 5/10 pain to R shoulder, UE. Declining PRN pain medicine.   Neuro: AxOx4. Neuros intact.   Cardiac: WDL  Respiratory: NLB on RA. O2 sats WDL.   GI/: WDL  Diet: Regular  Lines: DL PICC intact, infusing Blincyto via [facility] home infusion pump. HLx1.   Wounds: No noted deficits.   Labs/imaging: Reviewed. See chart.       New changes this shift: Afebrile. Abx d/c'd. Malfunctioning port verified via XR for needle to be in appropriate place, will need exchange/replacement at later date. Discharged home at approximately 1500.       Continue to monitor and follow POC

## 2023-01-06 NOTE — UTILIZATION REVIEW
Admission Status; Secondary Review Determination         Under the authority of the Utilization Management Committee, the utilization review process indicated a secondary review on the above patient.  The review outcome is based on review of the medical records, discussions with staff, and applying clinical experience noted on the date of the review.        (x)      Inpatient Status Appropriate - This patient's medical care is consistent with medical management for inpatient care and reasonable inpatient medical practice.       RATIONALE FOR DETERMINATION   The patient is a 42-year-old female admitted on 1/5/2023.  Patient was transferred from [facility] to the [facility] due to nonneutropenic fever.  She was noted to have a fever of 101.7.  She has right shoulder pain and that is the same arm as a PICC for axis which apparently has a suspected thrombus.  She was started on IV cefepime and vancomycin.  She is currently being treated for acute lymphocytic leukemia having recently started cycle 1 blinatumomab.  Assessment by interventional radiology question whether this is a thrombus versus challenging access.  CT scan done on 12/30/2022 showed the port was tilted medially towards the sternum with port catheter adequately positioned in the superior vena cava.  Recommendation from interventional radiology is to allow the team to attempt port access with someone experience with challenging port access and if that fails, reconsult interventional radiology who will attempt bedside access.  Patient is currently on a heparin drip due to the thrombus.  Based on complexity and acute current treatment for the thrombus with heparin drip along with access issues, the patient is appropriate for current inpatient status.  The patient is immunosuppressed due to chemotherapy and was noted to have an elevated white count on 1/2/2023 of 11.2.  Hemoglobin is low at 8.9.  The patient has been typed and screened if  transfusion is required.    The severity of illness, intensity of service provided, expected LOS and risk for adverse outcome make the care complex, high risk and appropriate for hospital admission.        The information on this document is developed by the utilization review team in order for the business office to ensure compliance.  This only denotes the appropriateness of proper admission status and does not reflect the quality of care rendered.         The definitions of Inpatient Status and Observation Status used in making the determination above are those provided in the CMS Coverage Manual, Chapter 1 and Chapter 6, section 70.4.      Sincerely,     Bolivar Harris MD  Physician Advisor  Utilization Review/ Case Management  [facility].

## 2023-01-06 NOTE — DISCHARGE SUMMARY
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    Recommendations for Outpatient:  New/changed medications:    - N/A    Follow-Up:   - Requested follow up with an AVELINA in the next 1-2 weeks   - Continue twice weekly labs with home infusion team   - LP with IT chemo (with XR or IR) - week of 1/9-1/12    - IR referral for Bone Marrow Biopsy - week of 1/11-1/16    - IR port re-placement prior to Cycle 2 of Blincyto    Summary of Hospitalization:   Vashti Villegas is a 42 year old female who presents with past medical history of HTN, disseminated zoster and Ph (+) B-ALL most recently receiving consolidation with Blincyto (H4F8=1512/14/22) + Ponatinib. Patient presented to [place] ED with fevers. She is currently being transferred for to [facility] for further care.     Port has been thoroughly interrogation. At this time, the port is malfunctioning, it is not malpositioned. Will request port replacement with IR in outpatient to be placed prior to cycle 2    Patient has not continue to fever. CXR clear. Non neutropenic. No evidence of infection. Will stop further abx at this time.    Additionally, April has been found to have a cephalic vein thrombus, associated with the PICC. This is minimally symptomatic and non occlusive. No swelling noted in right arm. Discussed with IR, plan to continue to use this PICC line to complete cycle 1 of Blincyto. Then will have the PICC pulled. This should be completed on 1/10. Given the short duration of the PICC line to remain in place and that  this is a superficial clot, will hold off on starting AC at this time.    History of Present Illness   April is feeling well today. No repeat fevers. States she has some pain in her right shoulder, but states it is tolerable. Does not noted any swelling in her right rm compared to her left arm. Hopeful to discharge today. Appetite intact. Energy level ok.   Denies fever, chills, mouth sores, SOB, cough, abdominal pain, diarrhea, constipation, nausea, vomiting, dysuria, hematuria, numbness, tingling, swelling    Hospital Course   Vashti Villegas was admitted on 1/5/2023.  The following problems were addressed during her hospitalization:    # Port Malfunction  # IV Access issues   Patient presented to an OSH with new right shoulder pain. Found to have cephalic vein thrombus associated with PICC line. A week prior, patient was found to have a malfunctioning vs malpositioned Port. Port has been thoroughly interrogation. IR placed a need in port and did not receive blood return. With needle in place, CXR completed which confirmed proper placement. At this time, the port is malfunctioning, it is not malpositioned. Will request port replacement with IR in outpatient to be placed prior to cycle 2    HEME   # Ph(+) B-ALL   Follows with Dr. Hull. Patient presented to outside hospital with c/o right upper quadrant pain and was subsequently found to have a markedly elevated white blood cell count concerning for acute leukemia.  She was transferred to [facility] for further workup and management. Diagnostic BMBx completed 8/20/22, which demonstrated B-ALL with 85% blasts and hypercellular marrow. IHC shows CD10+, CD34+. 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 ALL NGS negative. BCR/ABL1 major/minor (sent from peripheral blood) both negative. Initiated pre-phase steroids with Prednisone 60 mg x8/21; increased to 60 mg/m2 (140 mg) 8/22-8/24. She initiated GRAALL + imatinib (C1D1 = 8/24/22). S/p BMT consult 9/12 with Dr. Walsh. Induction was relatively uncomplicated. Post-induction BMBx done 9/26/22 and revealed MRD- CR1, with negative BCR/ABL1 and Clonoseq testing. With no matched related donors and excellent early/deep response chemotherapy is currently favored over BMT. 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 Underwent BMBx 10/25/22 with no morphologic evidence of B-lymphoblastic leukemia and flow with unusual B lineage precursor population 0.09%. C3 GRAALL + imatinib on 11/2/22 was tolerated well without complication. Prior plan was to continue GRAALL for a total of 8 cycles (of note odd cycles are 21-days in length and even cycles are 14-days in length), however now adjusting to Blinatumumab + Ponatinib. She was admitted on 12/30 with need for urgent PICC placement after port malfunction resumed Blincyto 12/31 (D17).   - Blincyto was not held or interrupted at OSH; will continue infusion. Plan to continue this through the PICC line currently in place until 1/10 at completion of cycle 1 of Blincyto  - Patient has home supply of Ponatinib; will continue while admitted.      # Cephalic Thrombus  At OSH, US 1/4 demonstrated no DVT but did show a thrombus in the cephalic vein associated with IV catheter. She was started on a heparin drip at the OSH,   - Unclear that heparin drip is warranted in the setting of a superficial thrombus       # Anemia   Likely secondary to chemotherapy.   - Transfuse to keep Hgb >7      # H/o grade 1 CRS  Pt developed fevers to 102 on 12/20. This was also in the  setting of cough and CXR findings consistent with pneumonia. Fevers had persisted despite cefepime, which raises concern for possible underlying CRS in addition to pneumonia. Per protocol, in the absence of hypotension or hypoxia, this could indicate grade 1 CRS which does not require any intervention or holding of blincyto.      ID   # Non-neutropenic fever   Patient noted to have fever 101.7. She endorsed right should pain ~1/3/23, this is the same arm as PICC. She was started on IV Cefepime and Vancomycin.   - Influenza A/B, RSV,COVID - negative at OSH   - UA - unremarkable, UCx 1/4 NGTD   - CXR 1/3/23 no evidence of infectious etiology    - No evidence of infection. 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 Presented with scattered papulovesicular lesions on her chin and nose as well as scattered hemorrhagic erosions on the scalp and lower face (V2-3 distributions of the trigeminal nerve) c/f disseminated VZV. Lesions significantly improved on admission. ID recommended considering decrease to 1000 mg daily on 12/13 visit (given less anticipated prolonged neutropenia with new regimen) and confirmed with Dr. Hull.   - Continue Valtrex 1000 mg daily     # ID PPx   - PTA Valtrex 1000 mg daily   - Hold PTA fluconazole 200 mg daily and levofloxacin 250 mg daily given ANC >1.0   - Nebulized pentamidine last given 1/2, completed outpatient. Next due 1/30  - Received Manuel on 11/2/22      MISC  # Myalgias  Noted to have myalgias starting 12/18 AM. Continues to have mild myalgias, in her shoulders, upper arms, back, hip and thighs. Has had some improvement with APAP. Reports these are constant. Not worsening with movement. May be related to ponatinib. Hold off on starting a statin. Clinically improving as of 12/20 with tylenol, heat, and changing her position. Since  discharge 12/23 she has had intermittent mild myalgias which continue to be managed with heat and tylenol.   - Continue to monitor. Consider celebrex 200mg daily if worsening  - Consider adding a statin in the future if myalgias are resolved.     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 Pt reports symptoms are stable in her right hand and improved in the left. Reports mild paresthesias and hot/cold sensitivity. Does not effect her gait or dexterity. Monitor clinically       Plan of care discussed with Dr Fairchild.    Kelsey Gonzalez (Zaki), PAIMANI  Hematology/Oncology    Significant Results and Procedures   NA    Pending Results   These results will be followed up by outpatient team  Unresulted Labs Ordered in the Past 30 Days of this Admission     Date and Time Order Name Status Description    1/2/2023  1:13 PM AEROBIC BACTERIAL CULTURE ROUTINE Preliminary           Code Status   Full Code    Primary Care Physician   [facility]    Physical Exam   Temp: 98.3  F (36.8  C) Temp src: Oral BP: 136/80 Pulse: 83   Resp: 18 SpO2: 96 % O2 Device: None (Room air)    Vitals:    01/05/23 1558 01/06/23 0819   Weight: 108 kg (238 lb) 108.1 kg (238 lb 4.8 oz)     Vital Signs with Ranges  Temp:  [98  F (36.7  C)-98.4  F (36.9  C)] 98.3  F (36.8  C)  Pulse:  [] 83  Resp:  [18-20] 18  BP: (122-136)/(68-88) 136/80  SpO2:  [96 %-98 %] 96 %  I/O last 3 completed shifts:  In: 800 [P.O.:800]  Out: -     Constitutional: Pleasant female sitting in bed. Awake and conversational. Non- toxic appearing. No acute distress.   HEENT: Normocephalic, atraumatic. Sclerae anicteric. EOM intact. Moist mucus membranes  Respiratory: Breathing comfortable with no increased work on room air. No signs of respiratory distress or accessory muscle use. Lungs clear to auscultation bilaterally, no crackles or  wheezing noted.  Cardiovascular: Regular rate and rhythm. Normal S1 and S2. No murmurs, rubs, or gallops. No peripheral edema.    GI: Abdomen is soft, non-distended, non-tender and without distension. Bowel sounds present and normoactive.   Skin: Skin is clean, dry, intact. No jaundice appreciated.   Musculoskeletal/ Extremities: No redness, warmth, or swelling of the joints appreciated. Distal pulses palpable. Nailbeds pink and without cyanosis or clubbing.   Neurologic: Alert and oriented. Speech normal. Grossly nonfocal. Memory and thought process preserved. Motor function normal. Sensation intact bilaterally. CN II-XII intact.   Neuropsychiatric: Calm, affect congruent to situation. Appropriate mood and affect. Good judgment and insight. No visual/auditory hallucinations.    Time Spent on this Encounter   I, Kelsey Gonzalez PA-C, personally saw the patient today and spent greater than 30 minutes discharging this patient.    Discharge Disposition   Discharged to home  Condition at discharge: Stable    Consultations This Hospital Stay   INTERVENTIONAL RADIOLOGY ADULT/PEDS IP CONSULT  NURSING TO CONSULT FOR VASCULAR ACCESS CARE IP CONSULT    Discharge Orders      Home Infusion Referral      Reason for your hospital stay    You were admitted with a superficial clot associated with your PICC line. Ok to continue to use your PICC line. You have a malfunctioning port, IR has been troubleshooting this. Will plan for you to continue to follow up with IR regarding possible revision and further evaluation of your port.   At this time, we will stop the antibiotics as we do not think that you have an infection. Please reach out if you continue to have fevers.     Activity    Your activity upon discharge: activity as tolerated     When to contact your care team    [facility] [place]age line at [phone] for temp > or = 100.4, uncontrolled nausea/vomiting/diarrhea/constipation, unrelieved pain, bleeding not  relieved with pressure, dizziness, chest pain, shortness of breath, loss of consciousness, and any new or concerning symptoms.     Discharge Instructions    Please monitor your right arm closely especially monitor your pain levels and any possible swelling on the right side of the arm.     IV access    **Ordering Provider MUST call/page Care Coordinator/ to discuss arranging this service**    You are going home with the following vascular access device: PICC and Port-a-Cath.     Adult [facility] Follow-up and recommended labs and tests    Continue to have twice weekly labs with home infusion team  Requested follow up with an AVELINA in the next 1-2 weeks    Appointments on [place] and/or [facility] (with [facility] or [facility] provider or service). Call [phone] if you haven't heard regarding these appointments within 7 days of discharge.     Diet    Follow this diet upon discharge: Orders Placed This Encounter      Regular Diet Adult     Check Out Appointment Request    Please schedule follow up with an AVELINA in the next 1-2 weeks, virtual or inperson - per patient/provider preference    Patient getting 2 times weekly labs with home infusion team     Discharge Medications   Current Discharge Medication List      CONTINUE these medications which have NOT CHANGED    Details   acetaminophen (TYLENOL) 325 MG tablet Take 2 tablets (650 mg) by mouth every 6 hours as needed for mild pain, other or fever (blood product administration premedication)    Associated Diagnoses: 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)      amLODIPine (NORVASC) 5 MG tablet Take 1 tablet (5 mg) by mouth daily  Qty: 30 tablet, Refills: 4    Associated Diagnoses: 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); Hypertension, unspecified type      aspirin (ASA) 81 MG EC tablet Take 1 tablet (81 mg) by mouth daily  Qty: 30 tablet, Refills: 1    Associated Diagnoses: Acute lymphoblastic leukemia  (ALL) in remission (H)      benzonatate (TESSALON) 100 MG capsule Take 1 capsule (100 mg) by mouth 3 times daily as needed for cough  Qty: 30 capsule, Refills: 0    Associated Diagnoses: Pneumonia of right lung due to infectious organism, unspecified part of lung      blinatumomab 28 mcg Inject 28 mcg into the vein every 24 hours for 11 days  Qty: 11 Bag, Refills: 0    Associated Diagnoses: 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); Prophylaxis for chemotherapy-induced neutropenia      CHOLECALCIFEROL PO Take 1 tablet by mouth daily Unknown strength.      guaiFENesin-dextromethorphan (ROBITUSSIN DM) 100-10 MG/5ML syrup Take 10 mLs by mouth every 4 hours as needed for cough or congestion  Qty: 354 mL, Refills: 0    Associated Diagnoses: Pneumonia of right lung due to infectious organism, unspecified part of lung      LORazepam (ATIVAN) 0.5 MG tablet Take 1-2 tablets (0.5-1 mg) by mouth daily as needed for anxiety . Take prior to BMBx procedures as needed. If initial dose unhelpful, okay to take a second 0.5-1 mg dose (for a max of 2 mg at a time). Caution: causes sedation. Do not drive after taking this medication.  Qty: 10 tablet, Refills: 0    Associated Diagnoses: Acute lymphoblastic leukemia (ALL) in remission (H)      ondansetron (ZOFRAN ODT) 4 MG ODT tab Take 1-2 tablets (4-8 mg) by mouth every 8 hours as needed for nausea or vomiting  Qty: 60 tablet, Refills: 0    Associated Diagnoses: 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)      pantoprazole (PROTONIX) 20 MG EC tablet Take 1 tablet (20 mg) by mouth daily  Qty: 90 tablet, Refills: 1    Associated Diagnoses: Gastroesophageal reflux disease, unspecified whether esophagitis present      PONATinib (ICLUSIG) 15 MG tablet Take 1 tablet (15 mg) by mouth daily -- start concurrent with blinatumomab infusion, planned for 12/12/22  Qty: 30 tablet, Refills: 3    Associated Diagnoses: Acute lymphoblastic leukemia  (ALL) not having achieved remission (H)      prochlorperazine (COMPAZINE) 10 MG tablet Take 1 tablet (10 mg) by mouth every 6 hours as needed (Breakthrough Nausea/Vomiting)  Qty: 20 tablet, Refills: 0    Associated Diagnoses: 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)      sennosides (SENOKOT) 8.6 MG tablet Take 1-2 tablets by mouth 2 times daily  Qty: 60 tablet, Refills: 1    Associated Diagnoses: Constipation, unspecified constipation type      valACYclovir (VALTREX) 1000 mg tablet Take 1 tablet (1,000 mg) by mouth daily  Qty: 30 tablet, Refills: 1    Associated Diagnoses: VZV (varicella-zoster virus) infection      lidocaine-prilocaine (LIDOPRIL) 2.5-2.5 % kit Use prior to lab access  Qty: 1 kit, Refills: 1    Associated Diagnoses: 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)           Allergies   Allergies   Allergen Reactions     Blood Transfusion Related (Informational Only) Hives     Hive reaction with platelet transfusion on 8/26/2022. Please administer platelets with tylenol and benadryl premedication.      Data   Most Recent 3 CBC's:Recent Labs   Lab Test 01/06/23  0605 01/05/23  1655 01/02/23  1006   WBC 6.6 8.2 11.2*   HGB 8.9* 10.0* 9.3*   MCV 98 97 96    276 292      Most Recent 3 BMP's:  Recent Labs   Lab Test 01/06/23  0605 01/05/23  1655 01/02/23  1006    140 142   POTASSIUM 4.2 3.7 3.2*   CHLORIDE 109* 106 110*   CO2 21* 19* 21*   BUN 11.1 11.8 13.9   CR 0.56 0.60 0.58   ANIONGAP 11 15 11   SOLEDAD 8.9 9.2 9.0   * 151* 108*     Most Recent 2 LFT's:  Recent Labs   Lab Test 01/06/23  0605 01/05/23  1655   AST 10 10   ALT 10 12   ALKPHOS 66 78   BILITOTAL 0.3 0.4     Most Recent INR's and Anticoagulation Dosing History:  Anticoagulation Dose History     Recent Dosing and Labs Latest Ref Rng & Units 10/21/2022 11/2/2022 11/23/2022 11/25/2022 12/15/2022 1/5/2023 1/6/2023    INR 0.85 - 1.15 1.07 1.10 1.07 1.08 1.01 1.01 1.09        Most  Recent 3 Troponin's:No lab results found.  Most Recent Cholesterol Panel:No lab results found.  Most Recent 6 Bacteria Isolates From Any Culture (See EPIC Reports for Culture Details):No lab results found.  Most Recent TSH, T4 and A1c Labs:No lab results found.  Results for orders placed or performed in visit on 01/02/23   XR Lumbar Puncture Intrathecal Chemo Admin    Narrative    Procedure date: 1/2/2023.    PROCEDURE: Fluoroscopy-guided lumbar puncture. Intrathecal  chemotherapy delivery.    HISTORY: Patient is a 42-year-old female with a history of acute  lymphoblastic leukemia, scheduled for fluoroscopy-guided lumbar  puncture, as well as intrathecal chemotherapy delivery.    Preprocedure diagnosis: Acute lymphoblastic leukemia (ALL) not having  achieved remission (H).    Post procedure diagnosis: Same.    : Ramana Hinton PA-C    MEDICATIONS: 1% preservative free lidocaine, 7 cc subcutaneously.    Consent: Procedure as well as risks and benefits were discussed with  the patient detailed prior. Verbal and written consent for lumbar  puncture was obtained prior to the procedure.    Findings: The patient was placed in the prone  position on the  fluoroscopy table. Physical exam and fluoroscopy were used to identify  the percutaneous access point overlying the L3-L4 vertebral  interspace. The site was marked and prepped and draped in usual  sterile fashion. The skin and subcutaneous tissue were anesthetized  with preservative-free 1% lidocaine. Under fluoroscopic guidance, a 22  gauge, 5 inch spinal needle with inner stylet was advanced into the  thecal sac. Appropriate position confirmed with fluoroscopy in  multiple projections. The stylet was removed, with immediate return of  clear cerebrospinal fluid. Approximately 12 cc of CSF were collected  as follows: 2 cc into vial #1, 2 cc in vial #2. 4 cc into vial #3, and  4 cc into vial #4. Following collection of CSF, intrathecal  chemotherapy delivery performed  by oncology provider. The stylet was  returned to the spinal needle, and needle and stylet were removed. The  puncture site was massaged for approximately 1 minute in an effort to  reduce post lumbar puncture CSF leak. The site was cleansed and  dressed with a sterile bandage. Images were saved throughout the  procedure. The procedure was well tolerated, with no immediate  complications.    Specimens: 12 cc clear cerebrospinal fluid in 4 vials. Lab results  pending.    Estimated blood loss: Minimal    Fluoroscopic time: 0.5 Minutes.      Impression    Impression: Fluoroscopy-guided lumbar puncture performed at the  L3/L4  vertebral interspace. Return of clear cerebrospinal fluid. Intrathecal  chemotherapy delivery.    PLAN: Follow-up per primary team.    Attestation: The physician assistant (PA) who performed this procedure  and signed the above report is licensed to practice in the [facility] pursuant to [place] Statute 147A.09. ?This includes meeting the  Statute and [place] Board of Medical [employer] requirement of an  active Delegation Agreement, which documents delegation of services by  primary and alternate supervising physicians. All services rendered  are performed under a collaborative agreement with Dr. Danielle Juan, Director of Interventional Radiology, [facility].    SRIRAM OROSCO PA-C         SYSTEM ID:  [id number]

## 2023-01-06 NOTE — PROGRESS NOTES
IR asked to evaluate right chest port.  Concern for difficulty with access.      Recent imaging shows port is in adequate position, though slightly tilted.      Visited patient bedside.  She states 5 attempts by nursing staff have failed.  She refuses to let me attempt an additional access.      The site was evaluated.  The incision is well-healed.  The port is easily palpated under the skin without any thought that it is malpositioned.      Unit team has ordered a chest x-ray to assess for positioning.      Patient agrees to additional access attempt only after topical EMLA is applied.  IR will attempt to come back after EMLA for additional access attempt.    IR pgr [phone]

## 2023-01-06 NOTE — PLAN OF CARE
[id number]:     /72 (BP Location: Left arm)   Pulse 84   Temp 98.4  F (36.9  C) (Oral)   Resp 18   Wt 108 kg (238 lb)   SpO2 98%   BMI 40.83 kg/m      A&O x4. UAL. VSS on RA, pt remained afebrile this shift. Pt reports mild pain in her right arm at site of superficial clot, PRN tylenol given x1 with  some relief. Pt denies having any N/V or SOB. Last BM: 1/5, pt reports voiding spontaneously with AUOP. Home blincyto infusing per home settings. Continue POC.

## 2023-01-06 NOTE — PLAN OF CARE
Goal Outcome Evaluation:      Plan of Care Reviewed With: patient        [id number]:     Pt arrived via private car from [facility] where she was being treated for fevers and a RUE clot. Shaun is running CIVI via home infusion pump. 93 hours remain in the bag. Denies nausea or pain. Intermittent congested cough. Eating well. C/O discomfort to her right arm and neck. Hot packs given.     Cefepime to continue. IR to be consulted to further investigate her port- not currently in use.     Up walking independently in the halls.

## 2023-01-07 LAB
BACTERIA CSF CULT: NO GROWTH
GRAM STAIN RESULT: NORMAL
GRAM STAIN RESULT: NORMAL

## 2023-01-09 ENCOUNTER — LAB REQUISITION (OUTPATIENT)
Dept: LAB | Facility: CLINIC | Age: 43
End: 2023-01-09
Payer: COMMERCIAL

## 2023-01-09 ENCOUNTER — PATIENT OUTREACH (OUTPATIENT)
Dept: CARE COORDINATION | Facility: CLINIC | Age: 43
End: 2023-01-09

## 2023-01-09 DIAGNOSIS — C91.00 ACUTE LYMPHOBLASTIC LEUKEMIA NOT HAVING ACHIEVED REMISSION (H): ICD-10-CM

## 2023-01-09 DIAGNOSIS — C91.01 ACUTE LYMPHOBLASTIC LEUKEMIA (ALL) IN REMISSION (H): ICD-10-CM

## 2023-01-09 LAB
ALBUMIN SERPL-MCNC: 3.7 G/DL (ref 3.4–5)
ALP SERPL-CCNC: 74 U/L (ref 40–150)
ALT SERPL W P-5'-P-CCNC: 21 U/L (ref 0–50)
ANION GAP SERPL CALCULATED.3IONS-SCNC: 8 MMOL/L (ref 3–14)
AST SERPL W P-5'-P-CCNC: 14 U/L (ref 0–45)
BASOPHILS # BLD AUTO: 0 10E3/UL (ref 0–0.2)
BASOPHILS NFR BLD AUTO: 1 %
BILIRUB SERPL-MCNC: 0.5 MG/DL (ref 0.2–1.3)
BUN SERPL-MCNC: 11 MG/DL (ref 7–30)
CALCIUM SERPL-MCNC: 9.3 MG/DL (ref 8.5–10.1)
CHLORIDE BLD-SCNC: 106 MMOL/L (ref 94–109)
CO2 SERPL-SCNC: 24 MMOL/L (ref 20–32)
CREAT SERPL-MCNC: 0.54 MG/DL (ref 0.52–1.04)
EOSINOPHIL # BLD AUTO: 0.3 10E3/UL (ref 0–0.7)
EOSINOPHIL NFR BLD AUTO: 4 %
ERYTHROCYTE [DISTWIDTH] IN BLOOD BY AUTOMATED COUNT: 18.3 % (ref 10–15)
GFR SERPL CREATININE-BSD FRML MDRD: >90 ML/MIN/1.73M2
GLUCOSE BLD-MCNC: 102 MG/DL (ref 70–99)
HCT VFR BLD AUTO: 30.2 % (ref 35–47)
HGB BLD-MCNC: 9.8 G/DL (ref 11.7–15.7)
IMM GRANULOCYTES # BLD: 0 10E3/UL
IMM GRANULOCYTES NFR BLD: 1 %
LYMPHOCYTES # BLD AUTO: 0.9 10E3/UL (ref 0.8–5.3)
LYMPHOCYTES NFR BLD AUTO: 15 %
MCH RBC QN AUTO: 31.3 PG (ref 26.5–33)
MCHC RBC AUTO-ENTMCNC: 32.5 G/DL (ref 31.5–36.5)
MCV RBC AUTO: 97 FL (ref 78–100)
MONOCYTES # BLD AUTO: 0.9 10E3/UL (ref 0–1.3)
MONOCYTES NFR BLD AUTO: 15 %
NEUTROPHILS # BLD AUTO: 3.9 10E3/UL (ref 1.6–8.3)
NEUTROPHILS NFR BLD AUTO: 64 %
NRBC # BLD AUTO: 0 10E3/UL
NRBC BLD AUTO-RTO: 0 /100
PLATELET # BLD AUTO: 263 10E3/UL (ref 150–450)
POTASSIUM BLD-SCNC: 3.6 MMOL/L (ref 3.4–5.3)
PROT SERPL-MCNC: 6.8 G/DL (ref 6.8–8.8)
RBC # BLD AUTO: 3.13 10E6/UL (ref 3.8–5.2)
SODIUM SERPL-SCNC: 138 MMOL/L (ref 133–144)
WBC # BLD AUTO: 6 10E3/UL (ref 4–11)

## 2023-01-09 PROCEDURE — 80053 COMPREHEN METABOLIC PANEL: CPT | Performed by: INTERNAL MEDICINE

## 2023-01-09 PROCEDURE — 85004 AUTOMATED DIFF WBC COUNT: CPT | Performed by: INTERNAL MEDICINE

## 2023-01-09 RX ORDER — LORAZEPAM 0.5 MG/1
.5-1 TABLET ORAL DAILY PRN
Qty: 10 TABLET | Refills: 0 | Status: SHIPPED | OUTPATIENT
Start: 2023-01-09 | End: 2024-01-25

## 2023-01-09 NOTE — PROGRESS NOTES
[facility] Resource Center Contact  [facility]/Voicemail     Clinical Data: Transitional Care Management Outreach     Outreach attempted x 2.  Left message on patient's voicemail, providing [facility]'s 24/7 scheduling and nurse triage phone number [phone] ([phone]) for questions/concerns and/or to schedule an appt with an [facility] provider, if they do not have a PCP.      Plan:  General acute hospital will do no further outreaches at this time.       Peggy Sanchez  Community Health Worker  General acute hospital, [facility]  [phone]      *Connected Care Resource Team does NOT follow patient ongoing. Referrals are identified based on internal discharge reports and the outreach is to ensure patient has an understanding of their discharge instructions.

## 2023-01-09 NOTE — TELEPHONE ENCOUNTER
Lorazepam Refill   Last prescribing provider: Bev Haines     Last clinic visit date: 12/26/22 Roya Quarles     Recommendations for requested medication (if none, N/A): Copied from chart note 12/26/22 Roya Quarles     LORazepam (ATIVAN) 0.5 MG tablet Take 1-2 tablets (0.5-1 mg) by mouth daily as needed for anxiety . Take prior to BMBx procedures as needed. If initial dose unhelpful, okay to take a second 0.5-1 mg dose (for a max of 2 mg at a time). Caution: causes sedation. Do not drive after taking this medication. 10 tablet 0       Any other pertinent information (if none, N/A): N/A    Refilled: Y/N, if NO, why?

## 2023-01-11 ENCOUNTER — MYC MEDICAL ADVICE (OUTPATIENT)
Dept: GENERAL RADIOLOGY | Facility: CLINIC | Age: 43
End: 2023-01-11

## 2023-01-11 ENCOUNTER — PATIENT OUTREACH (OUTPATIENT)
Dept: ONCOLOGY | Facility: CLINIC | Age: 43
End: 2023-01-11
Payer: COMMERCIAL

## 2023-01-11 DIAGNOSIS — C91.00 ACUTE LYMPHOBLASTIC LEUKEMIA (ALL) NOT HAVING ACHIEVED REMISSION (H): Primary | ICD-10-CM

## 2023-01-11 NOTE — PROGRESS NOTES
[facility]: Cancer Care                                                                                          Call placed to April to review her upcoming schedule and admission.     I addressed her questions from her [employer] message    She confirms that [facility] coming today to discontinue the Blincyto bag and remove her PICC line.    She was advised that [facility] services would be resumed when she discharges from the hospital with this upcoming Blincyto cycle.     April will contact Regla Le to schedule an appointment on 1/23/2023 for Covid and lab tests  She was informed that Dr. Hull said she only needs CBC and CMP at this time.      I reviewed that she would be admitted on 1/25/2023 and can anticipate a 3 day admission.    She had asked when she will be done with all 5 cycles of treatment.  I let her know if it stays on schedule she can anticipate being done around 7/2/2023.    She has no other questions at this time and was advised to call or Mychart if she has any other questions or concerns.    Signature:  Flor Leslie RN

## 2023-01-12 ENCOUNTER — LAB REQUISITION (OUTPATIENT)
Dept: LAB | Facility: CLINIC | Age: 43
End: 2023-01-12
Payer: COMMERCIAL

## 2023-01-12 DIAGNOSIS — C91.00 ACUTE LYMPHOBLASTIC LEUKEMIA NOT HAVING ACHIEVED REMISSION (H): ICD-10-CM

## 2023-01-12 LAB
ALBUMIN SERPL-MCNC: 3.9 G/DL (ref 3.4–5)
ALP SERPL-CCNC: 73 U/L (ref 40–150)
ALT SERPL W P-5'-P-CCNC: 23 U/L (ref 0–50)
ANION GAP SERPL CALCULATED.3IONS-SCNC: 7 MMOL/L (ref 3–14)
AST SERPL W P-5'-P-CCNC: 8 U/L (ref 0–45)
BASOPHILS # BLD AUTO: 0.1 10E3/UL (ref 0–0.2)
BASOPHILS NFR BLD AUTO: 1 %
BILIRUB SERPL-MCNC: 0.4 MG/DL (ref 0.2–1.3)
BUN SERPL-MCNC: 15 MG/DL (ref 7–30)
CALCIUM SERPL-MCNC: 9.6 MG/DL (ref 8.5–10.1)
CHLORIDE BLD-SCNC: 109 MMOL/L (ref 94–109)
CO2 SERPL-SCNC: 25 MMOL/L (ref 20–32)
CREAT SERPL-MCNC: 0.63 MG/DL (ref 0.52–1.04)
EOSINOPHIL # BLD AUTO: 0.4 10E3/UL (ref 0–0.7)
EOSINOPHIL NFR BLD AUTO: 7 %
ERYTHROCYTE [DISTWIDTH] IN BLOOD BY AUTOMATED COUNT: 18.2 % (ref 10–15)
GFR SERPL CREATININE-BSD FRML MDRD: >90 ML/MIN/1.73M2
GLUCOSE BLD-MCNC: 82 MG/DL (ref 70–99)
HCT VFR BLD AUTO: 32.8 % (ref 35–47)
HGB BLD-MCNC: 10.7 G/DL (ref 11.7–15.7)
IMM GRANULOCYTES # BLD: 0 10E3/UL
IMM GRANULOCYTES NFR BLD: 1 %
LYMPHOCYTES # BLD AUTO: 0.8 10E3/UL (ref 0.8–5.3)
LYMPHOCYTES NFR BLD AUTO: 15 %
MCH RBC QN AUTO: 31.3 PG (ref 26.5–33)
MCHC RBC AUTO-ENTMCNC: 32.6 G/DL (ref 31.5–36.5)
MCV RBC AUTO: 96 FL (ref 78–100)
MONOCYTES # BLD AUTO: 0.9 10E3/UL (ref 0–1.3)
MONOCYTES NFR BLD AUTO: 15 %
NEUTROPHILS # BLD AUTO: 3.4 10E3/UL (ref 1.6–8.3)
NEUTROPHILS NFR BLD AUTO: 61 %
NRBC # BLD AUTO: 0 10E3/UL
NRBC BLD AUTO-RTO: 0 /100
PLATELET # BLD AUTO: 296 10E3/UL (ref 150–450)
POTASSIUM BLD-SCNC: 3.9 MMOL/L (ref 3.4–5.3)
PROT SERPL-MCNC: 7.2 G/DL (ref 6.8–8.8)
RBC # BLD AUTO: 3.42 10E6/UL (ref 3.8–5.2)
SODIUM SERPL-SCNC: 141 MMOL/L (ref 133–144)
WBC # BLD AUTO: 5.5 10E3/UL (ref 4–11)

## 2023-01-12 PROCEDURE — 80053 COMPREHEN METABOLIC PANEL: CPT | Performed by: INTERNAL MEDICINE

## 2023-01-12 PROCEDURE — 85004 AUTOMATED DIFF WBC COUNT: CPT | Performed by: INTERNAL MEDICINE

## 2023-01-16 ENCOUNTER — OFFICE VISIT (OUTPATIENT)
Dept: ONCOLOGY | Facility: CLINIC | Age: 43
End: 2023-01-16
Attending: NURSE PRACTITIONER
Payer: COMMERCIAL

## 2023-01-16 ENCOUNTER — ANCILLARY PROCEDURE (OUTPATIENT)
Dept: GENERAL RADIOLOGY | Facility: CLINIC | Age: 43
End: 2023-01-16
Attending: NURSE PRACTITIONER
Payer: COMMERCIAL

## 2023-01-16 ENCOUNTER — LAB (OUTPATIENT)
Dept: LAB | Facility: CLINIC | Age: 43
End: 2023-01-16
Attending: FAMILY MEDICINE
Payer: COMMERCIAL

## 2023-01-16 VITALS
RESPIRATION RATE: 16 BRPM | DIASTOLIC BLOOD PRESSURE: 82 MMHG | SYSTOLIC BLOOD PRESSURE: 125 MMHG | HEART RATE: 85 BPM | TEMPERATURE: 99.5 F | OXYGEN SATURATION: 97 %

## 2023-01-16 VITALS
WEIGHT: 243 LBS | TEMPERATURE: 100.1 F | SYSTOLIC BLOOD PRESSURE: 128 MMHG | RESPIRATION RATE: 16 BRPM | DIASTOLIC BLOOD PRESSURE: 77 MMHG | BODY MASS INDEX: 41.69 KG/M2 | HEART RATE: 99 BPM | OXYGEN SATURATION: 98 %

## 2023-01-16 DIAGNOSIS — C91.00 ACUTE LYMPHOBLASTIC LEUKEMIA (ALL) NOT HAVING ACHIEVED REMISSION (H): ICD-10-CM

## 2023-01-16 DIAGNOSIS — Z76.89 PROPHYLAXIS FOR CHEMOTHERAPY-INDUCED NEUTROPENIA: ICD-10-CM

## 2023-01-16 DIAGNOSIS — C91.01 ACUTE LYMPHOBLASTIC LEUKEMIA (ALL) IN REMISSION (H): ICD-10-CM

## 2023-01-16 DIAGNOSIS — Z76.89 PROPHYLAXIS FOR CHEMOTHERAPY-INDUCED NEUTROPENIA: Primary | ICD-10-CM

## 2023-01-16 DIAGNOSIS — C91.01 ACUTE LYMPHOBLASTIC LEUKEMIA (ALL) IN REMISSION (H): Primary | ICD-10-CM

## 2023-01-16 LAB
ALBUMIN SERPL BCG-MCNC: 4.5 G/DL (ref 3.5–5.2)
ALP SERPL-CCNC: 69 U/L (ref 35–104)
ALT SERPL W P-5'-P-CCNC: 15 U/L (ref 10–35)
ANION GAP SERPL CALCULATED.3IONS-SCNC: 12 MMOL/L (ref 7–15)
APPEARANCE CSF: CLEAR
AST SERPL W P-5'-P-CCNC: 14 U/L (ref 10–35)
BASOPHILS # BLD AUTO: 0 10E3/UL (ref 0–0.2)
BASOPHILS NFR BLD AUTO: 1 %
BILIRUB SERPL-MCNC: 0.5 MG/DL
BUN SERPL-MCNC: 14.5 MG/DL (ref 6–20)
CALCIUM SERPL-MCNC: 9.7 MG/DL (ref 8.6–10)
CHLORIDE SERPL-SCNC: 105 MMOL/L (ref 98–107)
COLOR CSF: COLORLESS
CREAT SERPL-MCNC: 0.69 MG/DL (ref 0.51–0.95)
DEPRECATED HCO3 PLAS-SCNC: 21 MMOL/L (ref 22–29)
EOSINOPHIL # BLD AUTO: 0.3 10E3/UL (ref 0–0.7)
EOSINOPHIL NFR BLD AUTO: 5 %
ERYTHROCYTE [DISTWIDTH] IN BLOOD BY AUTOMATED COUNT: 17.2 % (ref 10–15)
GFR SERPL CREATININE-BSD FRML MDRD: >90 ML/MIN/1.73M2
GLUCOSE CSF-MCNC: 51 MG/DL (ref 40–70)
GLUCOSE SERPL-MCNC: 88 MG/DL (ref 70–99)
HCT VFR BLD AUTO: 34.8 % (ref 35–47)
HGB BLD-MCNC: 11.4 G/DL (ref 11.7–15.7)
HOLD SPECIMEN: NORMAL
IMM GRANULOCYTES # BLD: 0 10E3/UL
IMM GRANULOCYTES NFR BLD: 0 %
LIPASE SERPL-CCNC: 30 U/L (ref 13–60)
LYMPHOCYTES # BLD AUTO: 1.1 10E3/UL (ref 0.8–5.3)
LYMPHOCYTES NFR BLD AUTO: 21 %
MCH RBC QN AUTO: 30.6 PG (ref 26.5–33)
MCHC RBC AUTO-ENTMCNC: 32.8 G/DL (ref 31.5–36.5)
MCV RBC AUTO: 94 FL (ref 78–100)
MONOCYTES # BLD AUTO: 1 10E3/UL (ref 0–1.3)
MONOCYTES NFR BLD AUTO: 19 %
NEUTROPHILS # BLD AUTO: 2.9 10E3/UL (ref 1.6–8.3)
NEUTROPHILS NFR BLD AUTO: 54 %
NRBC # BLD AUTO: 0 10E3/UL
NRBC BLD AUTO-RTO: 0 /100
PLATELET # BLD AUTO: 329 10E3/UL (ref 150–450)
POTASSIUM SERPL-SCNC: 4.1 MMOL/L (ref 3.4–5.3)
PROT CSF-MCNC: 42.1 MG/DL (ref 15–45)
PROT SERPL-MCNC: 7 G/DL (ref 6.4–8.3)
RBC # BLD AUTO: 3.72 10E6/UL (ref 3.8–5.2)
RBC # CSF MANUAL: 8 /UL (ref 0–2)
SODIUM SERPL-SCNC: 138 MMOL/L (ref 136–145)
TUBE # CSF: ABNORMAL
WBC # BLD AUTO: 5.3 10E3/UL (ref 4–11)
WBC # CSF MANUAL: 15 /UL (ref 0–5)

## 2023-01-16 PROCEDURE — 84157 ASSAY OF PROTEIN OTHER: CPT | Performed by: PHYSICIAN ASSISTANT

## 2023-01-16 PROCEDURE — 99207 PR NO BILLABLE SERVICE THIS VISIT: CPT | Performed by: NURSE PRACTITIONER

## 2023-01-16 PROCEDURE — 96450 CHEMOTHERAPY INTO CNS: CPT | Performed by: NURSE PRACTITIONER

## 2023-01-16 PROCEDURE — 83690 ASSAY OF LIPASE: CPT | Performed by: NURSE PRACTITIONER

## 2023-01-16 PROCEDURE — 77003 FLUOROGUIDE FOR SPINE INJECT: CPT | Performed by: PHYSICIAN ASSISTANT

## 2023-01-16 PROCEDURE — 88188 FLOWCYTOMETRY/READ 9-15: CPT | Mod: GC | Performed by: PATHOLOGY

## 2023-01-16 PROCEDURE — 88184 FLOWCYTOMETRY/ TC 1 MARKER: CPT | Performed by: PHYSICIAN ASSISTANT

## 2023-01-16 PROCEDURE — 82945 GLUCOSE OTHER FLUID: CPT | Performed by: PHYSICIAN ASSISTANT

## 2023-01-16 PROCEDURE — 36415 COLL VENOUS BLD VENIPUNCTURE: CPT | Performed by: NURSE PRACTITIONER

## 2023-01-16 PROCEDURE — 89050 BODY FLUID CELL COUNT: CPT | Performed by: NURSE PRACTITIONER

## 2023-01-16 PROCEDURE — 85025 COMPLETE CBC W/AUTO DIFF WBC: CPT | Performed by: NURSE PRACTITIONER

## 2023-01-16 PROCEDURE — 80053 COMPREHEN METABOLIC PANEL: CPT | Performed by: NURSE PRACTITIONER

## 2023-01-16 PROCEDURE — 96450 CHEMOTHERAPY INTO CNS: CPT | Performed by: PHYSICIAN ASSISTANT

## 2023-01-16 PROCEDURE — 88185 FLOWCYTOMETRY/TC ADD-ON: CPT | Performed by: PHYSICIAN ASSISTANT

## 2023-01-16 RX ORDER — LIDOCAINE HYDROCHLORIDE 10 MG/ML
5 INJECTION, SOLUTION EPIDURAL; INFILTRATION; INTRACAUDAL; PERINEURAL ONCE
Status: COMPLETED | OUTPATIENT
Start: 2023-01-16 | End: 2023-01-16

## 2023-01-16 RX ADMIN — LIDOCAINE HYDROCHLORIDE 5 ML: 10 INJECTION, SOLUTION EPIDURAL; INFILTRATION; INTRACAUDAL; PERINEURAL at 13:02

## 2023-01-16 ASSESSMENT — PAIN SCALES - GENERAL: PAINLEVEL: NO PAIN (0)

## 2023-01-16 NOTE — DISCHARGE INSTRUCTIONS
Discharge instructions for Lumbar Puncture           AFTER YOU GO HOME    Relax and take it easy for 24 hours  You may resume normal activity tomorrow  Continue to take your medications as ordered by your provider  You may remove the bandage on your back in the evening or next morning  You may resume bathing the next day  Drink at least 4 glasses of extra fluid today if not on a fluid restriction  DO NOT drive or operate machinery at home or at work for at least 24 hours  If you develop a headache you can take over the counter medicines and lay down.  You can try drinking caffeine-coffee, soda.                   CALL YOU PRIMARY PHYSICIAN IF:  If you start to leak a large amount of fluid from the puncture site, lie down flat on your back.   You develop a severe headache  You develop nausea or vomiting   You develop a temperature of 101 degrees or greater     Date: 1/16/23  Provider: Haydee Belle PA-C, Interventional Radiology, [facility]    [facility]  [address] [place], [place] [id number]

## 2023-01-16 NOTE — NURSING NOTE
Chief Complaint   Patient presents with     Blood Draw     Labs drawn via  by RN in lab.  VS taken        Labs collected from venipuncture by RN. Vitals taken. Checked in for appointment(s).    Ashia Mclaughlin RN

## 2023-01-16 NOTE — PROGRESS NOTES
BMT ONC Adult Lumbar Puncture and Intrathecal Chemotherapy Administration Procedure Note    Jan 16, 2023      Procedure: Lumbar Puncture to obtain CSF and give intrathecal chemotherapy    Diagnosis: Ph+ B-ALL    Learning needs assessment complete within 12 months: YES     Vitals reviewed:  NO    Informed Consent: Procedure, benefits, risks, and alternatives explained to the patient who voiced understanding of the information and agreed to proceed with lumbar puncture. Risks include bleeding, headache, and or infection.   Patient safety checklist completed.    Labs: Reviewed:     Lab Results   Component Value Date    INR 1.09 01/06/2023     01/12/2023     09/22/2003            After CSF collected by IR, proceeded with intrathecal chemotherapy administration--     Methotrexate in 6 mL of 0.9% preservative free sodium chloride was administered intrathecally slowly in a barbotage fashion.  The needle was removed and a bandage was applied to the site.  Chemotherapy double-check was performed per institutional policy.  Patient tolerated well.       Disposition: Patient will remain on back for 1 hour post procedure. Avoid soaking in a tub tonight.  Call if develops headaches, fevers and or chills.     Performed by:   LOAN Garcia CNP

## 2023-01-16 NOTE — LETTER
1/16/2023         RE: Vashti Villegas  [address] [place] [address]        Dear Colleague,    Thank you for referring your patient, Vashti Villegas, to the [facility]. Please see a copy of my visit note below.    BMT ONC Adult Lumbar Puncture and Intrathecal Chemotherapy Administration Procedure Note    Jan 16, 2023      Procedure: Lumbar Puncture to obtain CSF and give intrathecal chemotherapy    Diagnosis: Ph+ B-ALL    Learning needs assessment complete within 12 months: YES     Vitals reviewed:  NO    Informed Consent: Procedure, benefits, risks, and alternatives explained to the patient who voiced understanding of the information and agreed to proceed with lumbar puncture. Risks include bleeding, headache, and or infection.   Patient safety checklist completed.    Labs: Reviewed:     Lab Results   Component Value Date    INR 1.09 01/06/2023     01/12/2023     09/22/2003            After CSF collected by IR, proceeded with intrathecal chemotherapy administration--     Methotrexate in 6 mL of 0.9% preservative free sodium chloride was administered intrathecally slowly in a barbotage fashion.  The needle was removed and a bandage was applied to the site.  Chemotherapy double-check was performed per institutional policy.  Patient tolerated well.       Disposition: Patient will remain on back for 1 hour post procedure. Avoid soaking in a tub tonight.  Call if develops headaches, fevers and or chills.     Performed by:   LOAN Garcia CNP

## 2023-01-17 ENCOUNTER — APPOINTMENT (OUTPATIENT)
Dept: MEDSURG UNIT | Facility: CLINIC | Age: 43
End: 2023-01-17
Attending: INTERNAL MEDICINE
Payer: COMMERCIAL

## 2023-01-17 ENCOUNTER — APPOINTMENT (OUTPATIENT)
Dept: INTERVENTIONAL RADIOLOGY/VASCULAR | Facility: CLINIC | Age: 43
End: 2023-01-17
Attending: PHYSICIAN ASSISTANT
Payer: COMMERCIAL

## 2023-01-17 ENCOUNTER — HOSPITAL ENCOUNTER (OUTPATIENT)
Facility: CLINIC | Age: 43
Discharge: HOME | End: 2023-01-17
Attending: INTERNAL MEDICINE | Admitting: PHYSICIAN ASSISTANT
Payer: COMMERCIAL

## 2023-01-17 VITALS
WEIGHT: 236.9 LBS | BODY MASS INDEX: 40.45 KG/M2 | SYSTOLIC BLOOD PRESSURE: 130 MMHG | RESPIRATION RATE: 16 BRPM | HEART RATE: 96 BPM | TEMPERATURE: 98.4 F | OXYGEN SATURATION: 99 % | HEIGHT: 64 IN | DIASTOLIC BLOOD PRESSURE: 69 MMHG

## 2023-01-17 DIAGNOSIS — C91.00 ACUTE LYMPHOBLASTIC LEUKEMIA (ALL) NOT HAVING ACHIEVED REMISSION (H): ICD-10-CM

## 2023-01-17 DIAGNOSIS — C91.01 ACUTE LYMPHOBLASTIC LEUKEMIA (ALL) IN REMISSION (H): ICD-10-CM

## 2023-01-17 DIAGNOSIS — Z45.2 ADJUSTMENT AND MANAGEMENT OF VASCULAR ACCESS DEVICE: ICD-10-CM

## 2023-01-17 LAB
BASOPHILS # BLD AUTO: 0 10E3/UL (ref 0–0.2)
BASOPHILS NFR BLD AUTO: 1 %
EOSINOPHIL # BLD AUTO: 0.1 10E3/UL (ref 0–0.7)
EOSINOPHIL NFR BLD AUTO: 3 %
EOSINOPHIL NFR CSF MANUAL: 2 %
ERYTHROCYTE [DISTWIDTH] IN BLOOD BY AUTOMATED COUNT: 17.3 % (ref 10–15)
HCG UR QL: NEGATIVE
HCT VFR BLD AUTO: 34.5 % (ref 35–47)
HGB BLD-MCNC: 10.9 G/DL (ref 11.7–15.7)
IMM GRANULOCYTES # BLD: 0 10E3/UL
IMM GRANULOCYTES NFR BLD: 0 %
LAB DIRECTOR COMMENTS: NORMAL
LAB DIRECTOR COMMENTS: NORMAL
LAB DIRECTOR DISCLAIMER: NORMAL
LAB DIRECTOR DISCLAIMER: NORMAL
LAB DIRECTOR INTERPRETATION: NORMAL
LAB DIRECTOR INTERPRETATION: NORMAL
LAB DIRECTOR METHODOLOGY: NORMAL
LAB DIRECTOR METHODOLOGY: NORMAL
LAB DIRECTOR RESULTS: NORMAL
LAB DIRECTOR RESULTS: NORMAL
LYMPH ABN NFR CSF MANUAL: 87 %
LYMPHOCYTES # BLD AUTO: 0.8 10E3/UL (ref 0.8–5.3)
LYMPHOCYTES NFR BLD AUTO: 17 %
MCH RBC QN AUTO: 30.9 PG (ref 26.5–33)
MCHC RBC AUTO-ENTMCNC: 31.6 G/DL (ref 31.5–36.5)
MCV RBC AUTO: 98 FL (ref 78–100)
MONOCYTES # BLD AUTO: 1 10E3/UL (ref 0–1.3)
MONOCYTES NFR BLD AUTO: 22 %
MONOS+MACROS NFR CSF MANUAL: 11 %
NEUTROPHILS # BLD AUTO: 2.6 10E3/UL (ref 1.6–8.3)
NEUTROPHILS NFR BLD AUTO: 57 %
NEUTROPHILS NFR CSF MANUAL: NORMAL %
NRBC # BLD AUTO: 0 10E3/UL
NRBC BLD AUTO-RTO: 0 /100
PLAT MORPH BLD: NORMAL
PLATELET # BLD AUTO: 307 10E3/UL (ref 150–450)
RBC # BLD AUTO: 3.53 10E6/UL (ref 3.8–5.2)
RBC MORPH BLD: NORMAL
SPECIMEN DESCRIPTION: NORMAL
SPECIMEN DESCRIPTION: NORMAL
WBC # BLD AUTO: 4.6 10E3/UL (ref 4–11)

## 2023-01-17 PROCEDURE — 88188 FLOWCYTOMETRY/READ 9-15: CPT | Mod: GC | Performed by: PATHOLOGY

## 2023-01-17 PROCEDURE — 88305 TISSUE EXAM BY PATHOLOGIST: CPT | Mod: TC | Performed by: NURSE PRACTITIONER

## 2023-01-17 PROCEDURE — 85097 BONE MARROW INTERPRETATION: CPT | Mod: GC | Performed by: PATHOLOGY

## 2023-01-17 PROCEDURE — 77001 FLUOROGUIDE FOR VEIN DEVICE: CPT

## 2023-01-17 PROCEDURE — 99152 MOD SED SAME PHYS/QHP 5/>YRS: CPT | Performed by: PHYSICIAN ASSISTANT

## 2023-01-17 PROCEDURE — 250N000011 HC RX IP 250 OP 636: Performed by: NURSE PRACTITIONER

## 2023-01-17 PROCEDURE — 258N000003 HC RX IP 258 OP 636: Performed by: NURSE PRACTITIONER

## 2023-01-17 PROCEDURE — 999N000142 HC STATISTIC PROCEDURE PREP ONLY

## 2023-01-17 PROCEDURE — 36561 INSERT TUNNELED CV CATH: CPT | Mod: RT | Performed by: PHYSICIAN ASSISTANT

## 2023-01-17 PROCEDURE — 88184 FLOWCYTOMETRY/ TC 1 MARKER: CPT | Performed by: NURSE PRACTITIONER

## 2023-01-17 PROCEDURE — 88305 TISSUE EXAM BY PATHOLOGIST: CPT | Mod: 26 | Performed by: PATHOLOGY

## 2023-01-17 PROCEDURE — 96374 THER/PROPH/DIAG INJ IV PUSH: CPT

## 2023-01-17 PROCEDURE — 36591 DRAW BLOOD OFF VENOUS DEVICE: CPT | Performed by: NURSE PRACTITIONER

## 2023-01-17 PROCEDURE — 88369 M/PHMTRC ALYSISHQUANT/SEMIQ: CPT | Mod: 26 | Performed by: MEDICAL GENETICS

## 2023-01-17 PROCEDURE — 76937 US GUIDE VASCULAR ACCESS: CPT | Mod: 26 | Performed by: PHYSICIAN ASSISTANT

## 2023-01-17 PROCEDURE — 77001 FLUOROGUIDE FOR VEIN DEVICE: CPT | Mod: 26 | Performed by: PHYSICIAN ASSISTANT

## 2023-01-17 PROCEDURE — 250N000009 HC RX 250: Performed by: RADIOLOGY

## 2023-01-17 PROCEDURE — 88237 TISSUE CULTURE BONE MARROW: CPT | Performed by: NURSE PRACTITIONER

## 2023-01-17 PROCEDURE — 38222 DX BONE MARROW BX & ASPIR: CPT | Mod: RT | Performed by: RADIOLOGY

## 2023-01-17 PROCEDURE — 88271 CYTOGENETICS DNA PROBE: CPT | Performed by: NURSE PRACTITIONER

## 2023-01-17 PROCEDURE — 250N000011 HC RX IP 250 OP 636: Performed by: STUDENT IN AN ORGANIZED HEALTH CARE EDUCATION/TRAINING PROGRAM

## 2023-01-17 PROCEDURE — 85025 COMPLETE CBC W/AUTO DIFF WBC: CPT | Performed by: NURSE PRACTITIONER

## 2023-01-17 PROCEDURE — 77002 NEEDLE LOCALIZATION BY XRAY: CPT | Mod: 26 | Performed by: RADIOLOGY

## 2023-01-17 PROCEDURE — 81206 BCR/ABL1 GENE MAJOR BP: CPT

## 2023-01-17 PROCEDURE — G0452 MOLECULAR PATHOLOGY INTERPR: HCPCS | Mod: 26 | Performed by: STUDENT IN AN ORGANIZED HEALTH CARE EDUCATION/TRAINING PROGRAM

## 2023-01-17 PROCEDURE — C1769 GUIDE WIRE: HCPCS

## 2023-01-17 PROCEDURE — 36582 REPLACE TUNNELED CV CATH: CPT

## 2023-01-17 PROCEDURE — 999N000128 HC STATISTIC PERIPHERAL IV START W/O US GUIDANCE

## 2023-01-17 PROCEDURE — 272N000602 HC WOUND GLUE CR1

## 2023-01-17 PROCEDURE — 99152 MOD SED SAME PHYS/QHP 5/>YRS: CPT | Mod: GC | Performed by: RADIOLOGY

## 2023-01-17 PROCEDURE — 85060 BLOOD SMEAR INTERPRETATION: CPT | Mod: GC | Performed by: PATHOLOGY

## 2023-01-17 PROCEDURE — 36590 REMOVAL TUNNELED CV CATH: CPT | Mod: RT | Performed by: PHYSICIAN ASSISTANT

## 2023-01-17 PROCEDURE — 999N000132 HC STATISTIC PP CARE STAGE 1

## 2023-01-17 PROCEDURE — 88311 DECALCIFY TISSUE: CPT | Mod: 26 | Performed by: PATHOLOGY

## 2023-01-17 PROCEDURE — 250N000011 HC RX IP 250 OP 636: Performed by: RADIOLOGY

## 2023-01-17 PROCEDURE — 81025 URINE PREGNANCY TEST: CPT | Performed by: NURSE PRACTITIONER

## 2023-01-17 PROCEDURE — 99152 MOD SED SAME PHYS/QHP 5/>YRS: CPT

## 2023-01-17 PROCEDURE — 81207 BCR/ABL1 GENE MINOR BP: CPT

## 2023-01-17 PROCEDURE — 88185 FLOWCYTOMETRY/TC ADD-ON: CPT | Performed by: NURSE PRACTITIONER

## 2023-01-17 PROCEDURE — 272N000504 HC NEEDLE CR4

## 2023-01-17 PROCEDURE — C1788 PORT, INDWELLING, IMP: HCPCS

## 2023-01-17 PROCEDURE — 88368 INSITU HYBRIDIZATION MANUAL: CPT | Mod: 26 | Performed by: MEDICAL GENETICS

## 2023-01-17 RX ORDER — LIDOCAINE 40 MG/G
CREAM TOPICAL
Status: DISCONTINUED | OUTPATIENT
Start: 2023-01-17 | End: 2023-01-17 | Stop reason: HOSPADM

## 2023-01-17 RX ORDER — SODIUM CHLORIDE 9 MG/ML
INJECTION, SOLUTION INTRAVENOUS CONTINUOUS
Status: DISCONTINUED | OUTPATIENT
Start: 2023-01-17 | End: 2023-01-17 | Stop reason: HOSPADM

## 2023-01-17 RX ORDER — ONDANSETRON 2 MG/ML
4 INJECTION INTRAMUSCULAR; INTRAVENOUS ONCE
Status: COMPLETED | OUTPATIENT
Start: 2023-01-17 | End: 2023-01-17

## 2023-01-17 RX ORDER — FLUMAZENIL 0.1 MG/ML
0.2 INJECTION, SOLUTION INTRAVENOUS
Status: DISCONTINUED | OUTPATIENT
Start: 2023-01-17 | End: 2023-01-17 | Stop reason: HOSPADM

## 2023-01-17 RX ORDER — NALOXONE HYDROCHLORIDE 0.4 MG/ML
0.4 INJECTION, SOLUTION INTRAMUSCULAR; INTRAVENOUS; SUBCUTANEOUS
Status: DISCONTINUED | OUTPATIENT
Start: 2023-01-17 | End: 2023-01-17 | Stop reason: HOSPADM

## 2023-01-17 RX ORDER — NALOXONE HYDROCHLORIDE 0.4 MG/ML
0.2 INJECTION, SOLUTION INTRAMUSCULAR; INTRAVENOUS; SUBCUTANEOUS
Status: DISCONTINUED | OUTPATIENT
Start: 2023-01-17 | End: 2023-01-17 | Stop reason: HOSPADM

## 2023-01-17 RX ORDER — HEPARIN SODIUM (PORCINE) LOCK FLUSH IV SOLN 100 UNIT/ML 100 UNIT/ML
5 SOLUTION INTRAVENOUS
Status: COMPLETED | OUTPATIENT
Start: 2023-01-17 | End: 2023-01-17

## 2023-01-17 RX ORDER — FENTANYL CITRATE 50 UG/ML
25-50 INJECTION, SOLUTION INTRAMUSCULAR; INTRAVENOUS EVERY 5 MIN PRN
Status: DISCONTINUED | OUTPATIENT
Start: 2023-01-17 | End: 2023-01-17 | Stop reason: HOSPADM

## 2023-01-17 RX ORDER — CEFAZOLIN SODIUM 2 G/100ML
2 INJECTION, SOLUTION INTRAVENOUS
Status: COMPLETED | OUTPATIENT
Start: 2023-01-17 | End: 2023-01-17

## 2023-01-17 RX ADMIN — MIDAZOLAM HYDROCHLORIDE 4 MG: 1 INJECTION, SOLUTION INTRAMUSCULAR; INTRAVENOUS at 15:07

## 2023-01-17 RX ADMIN — LIDOCAINE HYDROCHLORIDE 25 ML: 10 INJECTION, SOLUTION EPIDURAL; INFILTRATION; INTRACAUDAL; PERINEURAL at 15:08

## 2023-01-17 RX ADMIN — SODIUM CHLORIDE: 9 INJECTION, SOLUTION INTRAVENOUS at 12:06

## 2023-01-17 RX ADMIN — SODIUM CHLORIDE, PRESERVATIVE FREE 5 ML: 5 INJECTION INTRAVENOUS at 15:28

## 2023-01-17 RX ADMIN — FENTANYL CITRATE 200 MCG: 50 INJECTION, SOLUTION INTRAMUSCULAR; INTRAVENOUS at 15:07

## 2023-01-17 RX ADMIN — CEFAZOLIN SODIUM 2 G: 2 INJECTION, SOLUTION INTRAVENOUS at 12:08

## 2023-01-17 RX ADMIN — ONDANSETRON 4 MG: 2 INJECTION INTRAMUSCULAR; INTRAVENOUS at 16:15

## 2023-01-17 ASSESSMENT — ACTIVITIES OF DAILY LIVING (ADL)
ADLS_ACUITY_SCORE: 35

## 2023-01-17 NOTE — PRE-PROCEDURE
GENERAL PRE-PROCEDURE:   Procedure:  Bone marrow biopsy and port replacement  Date/Time:  1/17/2023 12:22 PM    Verbal consent obtained?: Yes    Written consent obtained?: Yes    Risks and benefits: Risks, benefits and alternatives were discussed    Consent given by:  Patient  Patient states understanding of procedure being performed: Yes    Patient's understanding of procedure matches consent: Yes    Procedure consent matches procedure scheduled: Yes    Expected level of sedation:  Moderate  Appropriately NPO:  Yes  Mallampati  :  Grade 3- soft palate visible, posterior pharyngeal wall not visible  Lungs:  Lungs clear with good breath sounds bilaterally  Heart:  Normal heart sounds and rate  History & Physical reviewed:  History and physical reviewed and no updates needed  Statement of review:  I have reviewed the lab findings, diagnostic data, medications, and the plan for sedation

## 2023-01-17 NOTE — PROGRESS NOTES
Patient Name: April F Bakari  Medical Record Number: [id number]  Today's Date: 1/17/2023    Procedure: iliac bone marrow biopsy and right port replacement.  Proceduralist: MD Rolando., MD Sandra., GRANT Sen.  Pathology present: Yes, Hem path here for specimen collection    Procedure Start: 1333, 1430  Procedure end: 1600  Sedation medications administered: Fentanyl: 200 mcg Versed:4mg     Report given to: LÓPEZ RN  : n/a    Other Notes: Pt arrived to IR room 1 from . Consent reviewed. Pt denies any questions or concerns regarding procedure. Pt positioned prone and supine and monitored per protocol. Pt tolerated procedure without any noted complications. Pt transferred back to .    Leah Haq, RN

## 2023-01-17 NOTE — PROGRESS NOTES
Pt arrived on 2a post port replacement and bone marrow biopsy. VSS RA. Sites f/d/i. Pt experiencing nausea upon arrival to 2a, received order for zofran 4mg IV, given at this time. Family at bedside.

## 2023-01-17 NOTE — PROGRESS NOTES
Pt tolerating po intake, voided and ambulated in tena. Right neck and chest, lower back incision c/d/i with no bleeding or hematoma. Discharge instructions already reviewed with pt by previous RN. Pt and spouse have no further questions. PIV removed. Pt declined wheelchair escort. Discharged from unit accompanied by spouse. All belongings returned to pt.

## 2023-01-17 NOTE — PROGRESS NOTES
Pt arrived on 2A, room 7 for CT bone biopsy, port replacement. Denies pain. Prep completed. Consent signed.  Crescencio will  patient after procedure: [phone]

## 2023-01-17 NOTE — DISCHARGE INSTRUCTIONS
[facility]        Interventional Radiology  Discharge Instructions  Following Port Replacement    Your Port has been closed with  Absorbable suture  If after 10 days there are visible suture they may be trimmed by your primary doctor  Derma Yoo (Skin Glue)  Do not apply any ointments over site  Do not cover with any dressing  This thin layer will slough off in 7-10 days  May gently remove Derma Yoo in 10 days if still present    If there is any oozing or bleeding from the site, apply direct pressure for 5-10 minutes with a gauze pad.  If bleeding continues after 10 minutes call your primary doctor.  If bleeding cannot be controlled with direct pressure, call 911.    Call your Doctor if:  Bleeding as above  Swelling in your neck or arm  Sudden onset of Shortness of Breath, Lightheadedness, Palpitations.  Fever greater than 100.5  F  Other signs of infection such as, redness, tenderness, or drainage from the wound    If you were given sedation:  We recommend an adult stay with you for the first 24 hours.  No driving or alcoholic beverages for 24 hours.    Discharge Booklet given    ADDITIONAL INSTRUCTIONS: May use ice packs 3-4 times a day for 15 minutes for minor swelling or pain  No heavy lifting greater than 10 lbs. for one week  No tub bath, hot tub, or swimming until Derma Yoo (Skin Glue) is removed  It is OK to shower and get the incision wet but immediately pat it dry  No Emla Cream to Port site for 1 week.    [facility] INTERVENTIONAL RADIOLOGY DEPARTMENT  Procedure Physician:         Jaya Sen PA-C  Date of procedure: January 17, 2023  Telephone Numbers: [phone]      Monday-Friday 7:30 am to 4:00 pm  [phone] After 4:00 pm Monday-Friday, Weekends & Holidays.   Ask for the Interventional Radiologist on call.  Someone is on call 24 hrs/day  [facility] toll free number: [phone] Monday-Friday 8:00 am to 4:30 pm  [facility] Emergency Dept: [phone]    [facility]  [facility]    Interventional Radiology  Patient Instructions Following Bone Marrow Biopsy    AFTER YOU GO HOME  If you were given sedation DO NOT drive or operate machinery at home or at work for at least 24 hours  DO relax and take it easy for 48 hours, no strenuous activity for 24 hours  DO drink plenty of fluids  DO resume your regular diet, unless otherwise instructed by your Primary Physician  Keep the dressing dry and in place for 24 hours.  DO NOT SMOKE FOR AT LEAST 24 HOURS, if you have been given any medications that were to help you relax or sedate you during your procedure  DO NOT drink alcoholic beverages the day of your procedure  DO NOT do any strenuous exercise or lifting (> 10 lbs) for at least 3 days following your procedure  DO NOT take a bath or shower for at least 12 hours following your procedure  Remove dressing after shower the next day. Replace with Band aid for 2 days.  Never leave a wet dressing in place.  DO NOT make any important or legal decisions for 24 hours following your procedure  There should be minimum drainage from the biopsy site    CALL THE PHYSICIAN IF:  You start bleeding from the procedure site.  If you do start to bleed from that site, hold pressure on the site for a minimum of 10 minutes.  Your physician will tell you if you need to return to the hospital  You develop nausea or vomiting  You have excessive swelling, redness, or tenderness at the site  You have drainage that looks like it is infected.  You experience severe pain  You develop hives or a rash or unexplained itching  You develop a temperature of 101 degrees F or greater    ADDITIONAL INSTRUCTIONS: None    [facility] INTERVENTIONAL RADIOLOGY DEPARTMENT  Procedure Physician:         Dr. Blanton         Date of procedure: January 17, 2023  Telephone Numbers: [phone]      Monday-Friday 7:30 am to 4:00 pm  [phone] After 4:00 pm Monday-Friday, Weekends & Holidays.   Ask for the Interventional Radiologist on call.   Someone is on call 24 hrs/day  [facility] toll free number: [phone] Monday-Friday 8:00 am to 4:30 pm  [facility] Emergency Dept: [phone]

## 2023-01-17 NOTE — PROGRESS NOTES
Nausea resolved. Pt tolerating oral intake. Discharge instructions reviewed, copy given to pt. Report given to Dk MEDINA for continuation of care.

## 2023-01-17 NOTE — PROCEDURES
[facility]    Procedure: IR Procedure Note    Date/Time: 1/17/2023 3:59 PM  Performed by: Duncan Sen PA-C  Authorized by: Duncan Sen PA-C       UNIVERSAL PROTOCOL   Site Marked: NA  Prior Images Obtained and Reviewed:  Yes  Required items: Required blood products, implants, devices and special equipment available    Patient identity confirmed:  Verbally with patient, arm band, provided demographic data and hospital-assigned identification number  Patient was reevaluated immediately before administering moderate or deep sedation or anesthesia  Confirmation Checklist:  Patient's identity using two indicators, relevant allergies, procedure was appropriate and matched the consent or emergent situation and correct equipment/implants were available  Time out: Immediately prior to the procedure a time out was called    Universal Protocol: the Joint Commission Universal Protocol was followed    Preparation: Patient was prepped and draped in usual sterile fashion    ESBL (mL):  5     ANESTHESIA    Anesthesia: Local infiltration  Local Anesthetic:  Lidocaine 1% without epinephrine  Anesthetic Total (mL):  25      SEDATION  Patient Sedated: Yes    Sedation Type:  Moderate (conscious) sedation  Sedation:  Fentanyl and midazolam  Vital signs: Vital signs monitored during sedation    See dictated procedure note for full details.  Findings: Image guided replacement of right internal jugular, 8 Fr., 22 cm. Single lumen central venous chest port with new right internal jugular, 8 Fr., 22.5 cm., single lumen central venous chest port. Port is ready for use. Venous blood sample obtained per nursing request.    Specimens: other (see comment) (3 cc venous blood per nursing request.)    Complications: None    Condition: Stable    Plan: New port is ready for use. Follow up per primary team. Return to IR for port removal when indicated.      PROCEDURE    Patient  Tolerance:  Patient tolerated the procedure well with no immediate complications  Length of time physician/provider present for 1:1 monitoring during sedation: 90

## 2023-01-19 ENCOUNTER — TELEPHONE (OUTPATIENT)
Dept: ONCOLOGY | Facility: CLINIC | Age: 43
End: 2023-01-19
Payer: COMMERCIAL

## 2023-01-19 NOTE — TELEPHONE ENCOUNTER
Oncology Nurse Triage- HEADACHE      Situation: April  reporting headache.    Background:   Treating Provider:  Dr. Hull    Date of last office visit: 12/26/22 Roya Quarles APRN    Recent Treatments:LP on 1/16/23 by Roya Quarles    Assessment:     Onset: pt has slight headache prior to LP then last night increased  Location:top of head.   Worsens or wakes pt up from sleep? Did keep pt up from sleep  Radiating: Denies radiating arms, legs, neck   Is pt currently on a steroid? Denies   Other medications Tried?:  Tylenol 1000mg did help yesterday every 8hours.  Maxing out on Tylenol.     Rates: Best 3/10, but last night spiked to 7/10.   Pt does admit does not have a high threshold for pain.     Temperature: denies fever, has some chills last night.     LOC: AxOx3    Other symptoms include: Has some nausea,took Zofran which helped.   Denies restlessness, drowsiness, visual changes, soreness in neck/jaw/shoulders.   Denies uneven smile, weakness in extremities, drooping face, hx of stroke.     Denies interferes with activity,   Associated with drinking coffee, pt states had some caffeine today in morning.    Denies eating chocolate, or smoking.  Pt states did not have caffeine from Sunday thru Monday.     Pt is on low aspirin and was told not to take IBUprofen.     This writer educated to observe for change in headache or head pain, drink clear liquids if unknown origin, rest in a dark, quiet room, elevate head, apply ice or heat depending on preference to head and neck, take medications as prescribed.    [facility] JEFFREY Baig     Recommendations:     1018 Paged provider Roya Quarles APRN    1027 Per Roya, stop zofran and trial other agent for nausea. Continue hydration. Triage other interventions sound good.. if this persists to let call oncology back.     1033 This writer called and relayed information to Pt. Asked pt to repeat back info/plan.  Pt was able to reverbalize  understanding.    Instructed patient to seek care immediately for worsening symptoms, including: fever, chest pain, shortness of breath, dizziness, severe headache pain.

## 2023-01-20 ENCOUNTER — TELEPHONE (OUTPATIENT)
Dept: INTERVENTIONAL RADIOLOGY/VASCULAR | Facility: CLINIC | Age: 43
End: 2023-01-20
Payer: COMMERCIAL

## 2023-01-20 NOTE — TELEPHONE ENCOUNTER
POST CALL    Spoke with: April  Call attempt: 1  Message left: No  Any pain: Yes.  PORT site sore.  We discussed expected discomfort, use of anti-inflam OTC medicaitons as needed.  Any fever: No  Any redness/swelling/ abnormal drainage around puncture site: No  Were you instructed well enough to take care of yourself at home: Yes  Are you satisfied with the care you received: Yes  Any additional concerns or questions: No      Post call completed.   January 20, 2023 11:02 AM  Abby Timmons RN

## 2023-01-22 ENCOUNTER — TELEPHONE (OUTPATIENT)
Dept: ONCOLOGY | Facility: CLINIC | Age: 43
End: 2023-01-22
Payer: COMMERCIAL

## 2023-01-22 ENCOUNTER — NURSE TRIAGE (OUTPATIENT)
Dept: NURSING | Facility: CLINIC | Age: 43
End: 2023-01-22
Payer: COMMERCIAL

## 2023-01-22 DIAGNOSIS — G44.209 TENSION HEADACHE: Primary | ICD-10-CM

## 2023-01-22 LAB
ABO/RH(D): NORMAL
ANTIBODY SCREEN: NEGATIVE
SPECIMEN EXPIRATION DATE: NORMAL

## 2023-01-22 RX ORDER — BUTALBITAL, ACETAMINOPHEN AND CAFFEINE 50; 325; 40 MG/1; MG/1; MG/1
1 TABLET ORAL EVERY 6 HOURS PRN
Qty: 20 TABLET | Refills: 0 | Status: CANCELLED | OUTPATIENT
Start: 2023-01-22

## 2023-01-22 RX ORDER — BUTALBITAL, ACETAMINOPHEN AND CAFFEINE 50; 325; 40 MG/1; MG/1; MG/1
1 TABLET ORAL EVERY 6 HOURS PRN
Qty: 20 TABLET | Refills: 0 | Status: SHIPPED | OUTPATIENT
Start: 2023-01-22 | End: 2023-01-27

## 2023-01-22 NOTE — TELEPHONE ENCOUNTER
"Called on Thursday about headaches since last Sunday, it's 8 days. Wants to know what else he can take other than Tylenol? Needs something stronger that can last longer than two hours. LISA Rodrigues yesterday did head CT and labs. All of it looked normal. Headache gets really bad at night. Needs something different. Had a lumbar puncture on Monday, 1-16-23    Most reilief is sitting up. Standing the pressure can be a lot. Best position is sitting. With Lumbar Puncture laying is what they say helps. Never any issues in the past with Lumbar Puncture headaches. If she coughs, sneezes or laughs, her head hurts too. Pain at 3 right now, took Tylenol one hour ago. Maxing out on Tylenol, said she could take 3000 mg total per day. Worse at night, gets up to 8 with pain. She's sleeping on the couch the past week, neck gets stiff and sore. She's sitting up instead of laying flat while trying to sleep.  Pharmacy: [facility] in [place] on [address]    12:48 p.m. I paged Dr Blayne Hoover to call me directly.  Fioricet will be prescribed.      I called and spoke with April and told her the name of the medication that will be sent to the pharmacy. I then told her to contact the clinic in the morning to discuss next steps and whether or not the medication is helping. I told her to call back any time, we're available 24 hours a day, if anything worsens, changes or something else develops. She understands all of this and said \"ok\".  Eryn Johns RN  [facility]          Reason for Disposition    Prescription request for new medicine (not a refill)    Additional Information    Negative: [1] Intentional drug overdose AND [2] suicidal thoughts or ideas    Negative: Drug overdose and triager unable to answer question    Negative: Caller requesting a renewal or refill of a medicine patient is currently taking    Negative: Caller requesting information unrelated to medicine    Negative: Caller requesting information about " COVID-19 Vaccine    Negative: Caller requesting information about Emergency Contraception    Negative: Caller requesting information about Combined Birth Control Pills    Negative: Caller requesting information about Progestin Birth Control Pills    Negative: Caller requesting information about Post-Op pain or medicines    Negative: Caller requesting a prescription antibiotic (such as Penicillin) for Strep throat and has a positive culture result    Negative: Caller requesting a prescription anti-viral med (such as Tamiflu) and has influenza (flu) symptoms    Negative: Immunization reaction suspected    Negative: Rash while taking a medicine or within 3 days of stopping it    Negative: [1] Asthma and [2] having symptoms of asthma (cough, wheezing, etc.)    Negative: [1] Symptom of illness (e.g., headache, abdominal pain, earache, vomiting) AND [2] more than mild    Negative: Breastfeeding questions about mother's medicines and diet    Negative: MORE THAN A DOUBLE DOSE of a prescription or over-the-counter (OTC) drug    Negative: [1] DOUBLE DOSE (an extra dose or lesser amount) of prescription drug AND [2] any symptoms (e.g., dizziness, nausea, pain, sleepiness)    Negative: [1] DOUBLE DOSE (an extra dose or lesser amount) of over-the-counter (OTC) drug AND [2] any symptoms (e.g., dizziness, nausea, pain, sleepiness)    Negative: Took another person's prescription drug    Negative: [1] DOUBLE DOSE (an extra dose or lesser amount) of prescription drug AND [2] NO symptoms (Exception: a double dose of antibiotics)    Negative: Diabetes drug error or overdose (e.g., took wrong type of insulin or took extra dose)    Negative: [1] Prescription not at pharmacy AND [2] was prescribed by PCP recently (Exception: triager has access to EMR and prescription is recorded there. Go to Home Care and confirm for pharmacy.)    Negative: [1] Pharmacy calling with prescription question AND [2] triager unable to answer question     Negative: [1] Caller has URGENT medicine question about med that PCP or specialist prescribed AND [2] triager unable to answer question    Negative: Medicine patch causing local rash or itching    Negative: [1] Caller has medicine question about med NOT prescribed by PCP AND [2] triager unable to answer question (e.g., compatibility with other med, storage)    Protocols used: MEDICATION QUESTION CALL-A-

## 2023-01-22 NOTE — TELEPHONE ENCOUNTER
Received call from nursing triage on behalf of April regarding headaches (please see nurse triage note for further details). She has had recurrent mild to moderate HA for 1 week, with limited benefit from tylenol. She is reported to have gone to [facility] ED yesterday, with negative work-up, including negative head CT.  Will prescribe a short course of PRN fioricet. Advised not to take more than 4g of tylenol products in a day. Dr. Riojas with malignant hematology helped with or911 View order/ADELE license. Will route message to Dr. Steve Hull as JOSE who she is planning to see on 1/25.

## 2023-01-23 ENCOUNTER — LAB (OUTPATIENT)
Dept: LAB | Facility: CLINIC | Age: 43
End: 2023-01-23
Payer: COMMERCIAL

## 2023-01-23 DIAGNOSIS — C91.01 ACUTE LYMPHOBLASTIC LEUKEMIA (ALL) IN REMISSION (H): Primary | ICD-10-CM

## 2023-01-23 DIAGNOSIS — C91.00 ACUTE LYMPHOBLASTIC LEUKEMIA (ALL) NOT HAVING ACHIEVED REMISSION (H): ICD-10-CM

## 2023-01-23 LAB
ALBUMIN SERPL-MCNC: 3.6 G/DL (ref 3.4–5)
ALP SERPL-CCNC: 58 U/L (ref 40–150)
ALT SERPL W P-5'-P-CCNC: 19 U/L (ref 0–50)
ANION GAP SERPL CALCULATED.3IONS-SCNC: 8 MMOL/L (ref 3–14)
AST SERPL W P-5'-P-CCNC: 8 U/L (ref 0–45)
BASOPHILS # BLD AUTO: 0 10E3/UL (ref 0–0.2)
BASOPHILS NFR BLD AUTO: 1 %
BILIRUB SERPL-MCNC: 0.4 MG/DL (ref 0.2–1.3)
BUN SERPL-MCNC: 11 MG/DL (ref 7–30)
CALCIUM SERPL-MCNC: 9.1 MG/DL (ref 8.5–10.1)
CHLORIDE BLD-SCNC: 111 MMOL/L (ref 94–109)
CO2 SERPL-SCNC: 25 MMOL/L (ref 20–32)
CREAT SERPL-MCNC: 0.61 MG/DL (ref 0.52–1.04)
EOSINOPHIL # BLD AUTO: 0.2 10E3/UL (ref 0–0.7)
EOSINOPHIL NFR BLD AUTO: 6 %
ERYTHROCYTE [DISTWIDTH] IN BLOOD BY AUTOMATED COUNT: 16.1 % (ref 10–15)
GFR SERPL CREATININE-BSD FRML MDRD: >90 ML/MIN/1.73M2
GLUCOSE BLD-MCNC: 135 MG/DL (ref 70–99)
HCT VFR BLD AUTO: 31.7 % (ref 35–47)
HGB BLD-MCNC: 10.3 G/DL (ref 11.7–15.7)
IMM GRANULOCYTES # BLD: 0 10E3/UL
IMM GRANULOCYTES NFR BLD: 1 %
LYMPHOCYTES # BLD AUTO: 0.8 10E3/UL (ref 0.8–5.3)
LYMPHOCYTES NFR BLD AUTO: 23 %
MCH RBC QN AUTO: 30.6 PG (ref 26.5–33)
MCHC RBC AUTO-ENTMCNC: 32.5 G/DL (ref 31.5–36.5)
MCV RBC AUTO: 94 FL (ref 78–100)
MONOCYTES # BLD AUTO: 0.6 10E3/UL (ref 0–1.3)
MONOCYTES NFR BLD AUTO: 20 %
NEUTROPHILS # BLD AUTO: 1.6 10E3/UL (ref 1.6–8.3)
NEUTROPHILS NFR BLD AUTO: 49 %
NRBC # BLD AUTO: 0 10E3/UL
NRBC BLD AUTO-RTO: 0 /100
PLATELET # BLD AUTO: 232 10E3/UL (ref 150–450)
POTASSIUM BLD-SCNC: 3.4 MMOL/L (ref 3.4–5.3)
PROT SERPL-MCNC: 6.7 G/DL (ref 6.8–8.8)
RBC # BLD AUTO: 3.37 10E6/UL (ref 3.8–5.2)
SODIUM SERPL-SCNC: 144 MMOL/L (ref 133–144)
WBC # BLD AUTO: 3.2 10E3/UL (ref 4–11)

## 2023-01-23 PROCEDURE — U0003 INFECTIOUS AGENT DETECTION BY NUCLEIC ACID (DNA OR RNA); SEVERE ACUTE RESPIRATORY SYNDROME CORONAVIRUS 2 (SARS-COV-2) (CORONAVIRUS DISEASE [COVID-19]), AMPLIFIED PROBE TECHNIQUE, MAKING USE OF HIGH THROUGHPUT TECHNOLOGIES AS DESCRIBED BY CMS-2020-01-R: HCPCS

## 2023-01-23 PROCEDURE — 36415 COLL VENOUS BLD VENIPUNCTURE: CPT

## 2023-01-23 PROCEDURE — 85025 COMPLETE CBC W/AUTO DIFF WBC: CPT

## 2023-01-23 PROCEDURE — 80053 COMPREHEN METABOLIC PANEL: CPT

## 2023-01-23 PROCEDURE — 86850 RBC ANTIBODY SCREEN: CPT

## 2023-01-23 PROCEDURE — 86900 BLOOD TYPING SEROLOGIC ABO: CPT

## 2023-01-23 PROCEDURE — U0005 INFEC AGEN DETEC AMPLI PROBE: HCPCS

## 2023-01-23 PROCEDURE — 86901 BLOOD TYPING SEROLOGIC RH(D): CPT

## 2023-01-23 NOTE — TELEPHONE ENCOUNTER
Oncology Nurse Triage- HEADACHE      Situation: April  Reporting continued headache.    Background:   Treating Provider:  Dr Hull    Date of last office visit: 1/16/2023 Roya Quarles LP      Assessment:     Onset: Went to ED for worsening headache on 1/21/22 [facility]. CT Scan was done. Pt wondering if CSF leaking? CT scan results showed no immediate concern but recommended to follow up with Dr. Hull/Onc care team so pt calling oncology back.      Location: Top of head pressure, happens when lays down. Woke up pt at 3am. Pt also did not take meds for over 5hours.   Denies radiating to  arms, legs, neck     Other medications Tried?; On Sunday started ESGIC.     Rates: 2/10, averaging 3/10 but worsens with increase activity or if lays flat or if misses a dose of pain meds.     Temperature: Pt had a incident of fever this morning lasting about 20minutes at 100.4F but then went way on own without Tylenol.     LOC: AxOx3    Other symptoms include: Sunday had lower back pain, but no pain today Monday 1/23/22.  Denies nausea/vomiting/restlessness, drowsiness,  visual changes, soreness in neck/jaw/shoulders,     Denies associated with drinking coffee, eating chocolate, or smoking.  Pt does drink energy drinks in morning.     This writer educated to observe for change in headache or head pain, drink clear liquids if unknown origin, rest in a dark, quiet room, elevate head, apply ice or heat depending on preference to head and neck, take medications as prescribed.    Pt also requesting lidocaine ointment to numb port site. [facility] [place] [facility]    Recommendations:   Pt feels okay to wait until Wed 1/25/23 with Dr. Hull.  Pt is aware to seek immediate care for worsening symptoms, including: fever, chest pain, shortness of breath, dizziness.    Routed high priority to care team.

## 2023-01-24 ENCOUNTER — TELEPHONE (OUTPATIENT)
Dept: ONCOLOGY | Facility: CLINIC | Age: 43
End: 2023-01-24
Payer: COMMERCIAL

## 2023-01-24 LAB
ABO/RH(D): NORMAL
ANTIBODY SCREEN: NEGATIVE
SARS-COV-2 RNA RESP QL NAA+PROBE: NEGATIVE
SPECIMEN EXPIRATION DATE: NORMAL

## 2023-01-24 RX ORDER — LIDOCAINE/PRILOCAINE 2.5 %-2.5%
CREAM (GRAM) TOPICAL PRN
Qty: 30 G | Refills: 3 | Status: SHIPPED | OUTPATIENT
Start: 2023-01-24 | End: 2024-01-25

## 2023-01-24 NOTE — TELEPHONE ENCOUNTER
I spoke with April. She is feeling better. She last took Fioricet this morning. Headache has been at the very top of her head; it does not get worse with pressing on it. Worse when laying flat. Headache has gotten significantly better over the past 12 hours. No visual problems or nausea.     She reports she had a fever yesterday morning -- reports a temperature of 100.4 yesterday, and night sweats. Still coughing a little bit, as she has for several weeks.

## 2023-01-24 NOTE — PROGRESS NOTES
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 While at OSH, she had CT PE protocol as well as CT A/P. These identified no PE, no acute or localized intraabdominal inflammatory process, mild splenomegaly, and few inconspicous low-density structures in the liver. She was transferred to [facility] for further workup and management. S/p Hydrea 1g TID (8/21-8/23) with improvement in WBC (100K ? 12K).   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 IHC shows CD10+, CD34+. 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 Abnormal cytogenetics.   --Microarry study: CN gains/losses in 3p21, 4q25, 7p11 (region includes CREB5 and IKZF1; biallelic loss of TARP), 9p13.2-p22.3 (region includes CDKN2a/2b and PAX5), 12q13, 14q11.     INDUCTION:   --Initiated pre-phase steroids with Prednisone 60 mg x8/21; increased to 60 mg/m2 (140 mg) 8/22-8/24. She initiated GRAALL + imatinib (C1D1 = 8/24/22).   --S/p BMT consult 9/12 with Dr. Walsh. Induction was relatively uncomplicated.   -s/p weekly triple IT chemo (Cytarabine, Solu-Cortef, MTX) on Days 1, 8, and 15 of C1 induction. CNS negative.   --BMBx 9/26/22 MRD-negative by BCR-ABL PCR and by Clonoseq Assay.      --GRAALL + imatinib (C2D1=9/30/22). 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   Post-cycle-2 BMBx 10/25/2022 showed MRD-negative CR, though low-level abnormal B-cell population (favor hematogones over residual leukemia) was noted. BCR-ABL negative. Clonoseq negative at 1/10E6 level.    --C3 GRAALL + imatinib on 11/2/22 was tolerated well  without complication.    CONSOLIDATION:   --Discussed with Dr. Rutherford, BMT faculty meeting: No matched related donors identified. High panel reactive antibodies towards  unrelated donor samples. With an excellent early, deep MRD response to chemotherapy and no optimal donor sources identified, discussed foregoing BMT in CR1 in favor of definitive therapy.  Discussed risks and benefits of GRAAL versus newer approaches. April agreed to a combination of Blinatumomab plus ponatinib  (Jesusita et al The Lancet Hematology Volume 10, ISSUE 1, e24-e34, January 2023).   --Cycle 1Day 1 Blinatumomab plus Ponatinib 12/14/2022. Complicated by PICC disruption, brief Blincyto interruption ~12/21/22.   --Aspirin 81 mg daily added for VTE prophylaxis per original study.   --BMBx 1/17/2023: BCR-ABL p190 now detectable; Clonoseq pending    INTRATHECAL CHEMOTHERAPY:  8/26/22: Cytarabine 40 mg/methotrexate 15 mg  8/31/22: Cytarabine 40 mg/methotrexate 15 mg  9/7/22: Cytarabine 40 mg/methotrexate 15 mg  9/14/22: Methotrexate 15 mg  10/21/22: Cytarabine 40 mg/methotrexate 15 mg  12/16/22: Methotrexate 12 mg  1/2/23; Cytarabine 100 mg  1/16/23: Methotrexate 12 mg  History of Present Illness:   April Bakari returns.   Grade 1 neuropathy in her fingertips and toes; gradually improving.  Headaches are controlled, but still needing frequent acetaminophen and fioricet.     12-point ROS negative.    Medications:       Current Outpatient Medications   Medication Sig     amLODIPine (NORVASC) 5 MG tablet Take 1 tablet (5 mg) by mouth daily     aspirin (ASA) 81 MG EC tablet Take 1 tablet (81 mg) by mouth daily     benzonatate (TESSALON) 100 MG capsule Take 1 capsule (100 mg) by mouth 3 times daily as needed for cough     butalbital-acetaminophen-caffeine (ESGIC) -40 MG tablet Take 1 tablet by mouth every 6 hours as needed for headaches     CHOLECALCIFEROL PO Take 1 tablet by mouth daily Unknown strength.     lidocaine-prilocaine (EMLA) 2.5-2.5 %  external cream Apply topically as needed (Apply 30 to 60minutes before Port Needle Access for pain)     lidocaine-prilocaine (LIDOPRIL) 2.5-2.5 % kit Use prior to lab access     LORazepam (ATIVAN) 0.5 MG tablet Take 1-2 tablets (0.5-1 mg) by mouth daily as needed for anxiety . Take prior to BMBx procedures as needed. If initial dose unhelpful, okay to take a second 0.5-1 mg dose (for a max of 2 mg at a time). Caution: causes sedation. Do not drive after taking this medication.     ondansetron (ZOFRAN ODT) 4 MG ODT tab Take 1-2 tablets (4-8 mg) by mouth every 8 hours as needed for nausea or vomiting     PONATinib (ICLUSIG) 15 MG tablet Take 1 tablet (15 mg) by mouth daily -- start concurrent with blinatumomab infusion, planned for 12/12/22     prochlorperazine (COMPAZINE) 10 MG tablet Take 1 tablet (10 mg) by mouth every 6 hours as needed (Breakthrough Nausea/Vomiting)     valACYclovir (VALTREX) 1000 mg tablet Take 1 tablet (1,000 mg) by mouth daily     acetaminophen (TYLENOL) 325 MG tablet Take 2 tablets (650 mg) by mouth every 6 hours as needed for mild pain, other or fever (blood product administration premedication)     guaiFENesin-dextromethorphan (ROBITUSSIN DM) 100-10 MG/5ML syrup Take 10 mLs by mouth every 4 hours as needed for cough or congestion (Patient not taking: Reported on 1/25/2023)     pantoprazole (PROTONIX) 20 MG EC tablet Take 1 tablet (20 mg) by mouth daily (Patient not taking: Reported on 1/25/2023)     sennosides (SENOKOT) 8.6 MG tablet Take 1-2 tablets by mouth 2 times daily (Patient not taking: Reported on 1/25/2023)     No current facility-administered medications for this visit.        Physical Exam:   Blood pressure 124/84, pulse 84, temperature 98  F (36.7  C), temperature source Oral, resp. rate 16, weight 109.4 kg (241 lb 1.6 oz), SpO2 100 %.    ECOG 1    Constitutional: No acute distress  HEENT: Normocephalic/atraumatic  Eyes: EOMI, PERRLA, anicteric  Lymph: No cervical,  supraclavicular, or axillary lymphadenopathy  Respiratory: No increased work of breathing while on room air.  CTAB.    Cardiovascular: RRR, normal S1/S2, no MRGs. No edema.  Extremities WWP.  Pedal pulses 2+.  GI: nontender/nondistended, BS+. No HSM.   Skin: No rashes or lesions on visible areas.    Musculoskeletal: Normal tone, bulk.    Neurologic: Alert and oriented to time, person, place, and situation.  No gross focal cranial nerve abnormalities.  Moving all 4 extremities.      No results found for this or any previous visit (from the past 24 hour(s)).    Assessment and Plan:   Ms. Villegas is a 42 year old woman with recent diagnosis of Ph+ ALL.     After induction, she was in an MRD- CR (CR1) by BCR-ABL PCR as well as Clonoseq assay after cycle 1 and 2 of attenuated-dose GRAAL chemotherapy. With no matched related donors, and excellent early/deep MRD-CR, chemotherapy is currently favored over BMT; this is supported by multiple recent clinical observations (Blood. 2016 Jul 28; 128(4): 504-507; Blood 2022 Jul 25;blood.[id number]). After discussion, transitioned to Blinatumomab + Ponatinib (Short et al The Lancet Hematology Volume 10, ISSUE 1, e24-e34, January 2023) as curative intent therapy. C1D1 12/14/22.     Restaging BMBx 1/17 shows MRD positivity by BCR-ABL testing <0.079%; Clonoseq pending.     Her headaches seem to be improving, but not well defined yet. I will ask the inpatient team to get an MRI/MRV prior to more IT chemotherapy.     Proceed now with Cycle 2 Blinatumomab + Ponatinib (Short et al The Lancet Hematology Volume 10, ISSUE 1, e24-e34, January 2023).     PLAN:  --Proceed with Cycle 2 Blinatumomab  --Remain on ponatinib 15 mg QD  --Defer IT until after imaging; plan for a total of 12 doses of intrathecal chemotherapy per protocol; alternating doses of intrathecal methotrexate 12 mg and cytarabine 100 mg, with three doses per cycle (as achievable) for a total of 12 doses.   --Bone marrow  examinations, including PCR for BCR--ABL , at the end of cycles one, two, and five during ponatinib-blinatumomab combination therapy and every 3-6 months thereafter while on ponatinib maintenance. Clonseq MRD testing to be sent with each bone marrow examination.   --ASA 81 mg prophylaxis (does not need to be held for LPs per current [facility] guidelines)     --Other supportive cares as per below     # CNS prophylaxis Will require all subsequent LPs under fluoroscopic guidance.   # Chemotherapy-induced Grade 1 neuropathy --will follow  # Varicella Zoster; improved  # PPx  -Valtrex 1000 mg daily per ID   - Nebulized Pentamidine q28d for PJP ppx.  - ppx Levaquin and Fluconazole when ANC <1.0.      # Headaches  # H/o COVID-19 infection   Not vaccinated (due to fear of needles per pt report).  Evusheld given 11/2/2022    # Hypertension: Amlodipine; Lasix as needed  # Indigestion/GERD    Steve Hull MD, PhD  Division of Hematology, Oncology, and Transplantation  [facility]

## 2023-01-25 ENCOUNTER — APPOINTMENT (OUTPATIENT)
Dept: LAB | Facility: CLINIC | Age: 43
End: 2023-01-25
Attending: INTERNAL MEDICINE
Payer: COMMERCIAL

## 2023-01-25 ENCOUNTER — HOSPITAL ENCOUNTER (INPATIENT)
Facility: CLINIC | Age: 43
LOS: 2 days | Discharge: HOME HEALTH | End: 2023-01-27
Payer: COMMERCIAL

## 2023-01-25 ENCOUNTER — ONCOLOGY VISIT (OUTPATIENT)
Dept: ONCOLOGY | Facility: CLINIC | Age: 43
End: 2023-01-25
Attending: INTERNAL MEDICINE
Payer: COMMERCIAL

## 2023-01-25 ENCOUNTER — APPOINTMENT (OUTPATIENT)
Dept: MRI IMAGING | Facility: CLINIC | Age: 43
End: 2023-01-25
Attending: PHYSICIAN ASSISTANT
Payer: COMMERCIAL

## 2023-01-25 VITALS
DIASTOLIC BLOOD PRESSURE: 84 MMHG | TEMPERATURE: 98 F | OXYGEN SATURATION: 100 % | RESPIRATION RATE: 16 BRPM | HEART RATE: 84 BPM | SYSTOLIC BLOOD PRESSURE: 124 MMHG | BODY MASS INDEX: 41.38 KG/M2 | WEIGHT: 241.1 LBS

## 2023-01-25 DIAGNOSIS — C91.00 ACUTE LYMPHOBLASTIC LEUKEMIA (ALL) NOT HAVING ACHIEVED REMISSION (H): ICD-10-CM

## 2023-01-25 DIAGNOSIS — Z76.89 PROPHYLAXIS FOR CHEMOTHERAPY-INDUCED NEUTROPENIA: Primary | ICD-10-CM

## 2023-01-25 LAB
ALBUMIN SERPL BCG-MCNC: 4.3 G/DL (ref 3.5–5.2)
ALP SERPL-CCNC: 67 U/L (ref 35–104)
ALT SERPL W P-5'-P-CCNC: 12 U/L (ref 10–35)
ANION GAP SERPL CALCULATED.3IONS-SCNC: 12 MMOL/L (ref 7–15)
AST SERPL W P-5'-P-CCNC: 15 U/L (ref 10–35)
BASOPHILS # BLD AUTO: 0 10E3/UL (ref 0–0.2)
BASOPHILS NFR BLD AUTO: 1 %
BILIRUB SERPL-MCNC: 0.3 MG/DL
BUN SERPL-MCNC: 10.6 MG/DL (ref 6–20)
CALCIUM SERPL-MCNC: 9.3 MG/DL (ref 8.6–10)
CHLORIDE SERPL-SCNC: 109 MMOL/L (ref 98–107)
CREAT SERPL-MCNC: 0.67 MG/DL (ref 0.51–0.95)
DEPRECATED HCO3 PLAS-SCNC: 22 MMOL/L (ref 22–29)
EOSINOPHIL # BLD AUTO: 0.2 10E3/UL (ref 0–0.7)
EOSINOPHIL NFR BLD AUTO: 6 %
ERYTHROCYTE [DISTWIDTH] IN BLOOD BY AUTOMATED COUNT: 16.1 % (ref 10–15)
GFR SERPL CREATININE-BSD FRML MDRD: >90 ML/MIN/1.73M2
GLUCOSE SERPL-MCNC: 96 MG/DL (ref 70–99)
HCT VFR BLD AUTO: 34.4 % (ref 35–47)
HGB BLD-MCNC: 11 G/DL (ref 11.7–15.7)
IMM GRANULOCYTES # BLD: 0 10E3/UL
IMM GRANULOCYTES NFR BLD: 0 %
LYMPHOCYTES # BLD AUTO: 0.6 10E3/UL (ref 0.8–5.3)
LYMPHOCYTES NFR BLD AUTO: 16 %
MCH RBC QN AUTO: 30.7 PG (ref 26.5–33)
MCHC RBC AUTO-ENTMCNC: 32 G/DL (ref 31.5–36.5)
MCV RBC AUTO: 96 FL (ref 78–100)
MONOCYTES # BLD AUTO: 0.5 10E3/UL (ref 0–1.3)
MONOCYTES NFR BLD AUTO: 15 %
NEUTROPHILS # BLD AUTO: 2.2 10E3/UL (ref 1.6–8.3)
NEUTROPHILS NFR BLD AUTO: 62 %
NRBC # BLD AUTO: 0 10E3/UL
NRBC BLD AUTO-RTO: 0 /100
PLATELET # BLD AUTO: 251 10E3/UL (ref 150–450)
POTASSIUM SERPL-SCNC: 3.9 MMOL/L (ref 3.4–5.3)
PROT SERPL-MCNC: 6.7 G/DL (ref 6.4–8.3)
RBC # BLD AUTO: 3.58 10E6/UL (ref 3.8–5.2)
SODIUM SERPL-SCNC: 143 MMOL/L (ref 136–145)
WBC # BLD AUTO: 3.6 10E3/UL (ref 4–11)

## 2023-01-25 PROCEDURE — 255N000002 HC RX 255 OP 636

## 2023-01-25 PROCEDURE — 99214 OFFICE O/P EST MOD 30 MIN: CPT | Performed by: INTERNAL MEDICINE

## 2023-01-25 PROCEDURE — 999N000130 HC STATISTIC PORT-A-CATH ACCESS/FLUSHING

## 2023-01-25 PROCEDURE — 99212 OFFICE O/P EST SF 10 MIN: CPT | Mod: 25 | Performed by: INTERNAL MEDICINE

## 2023-01-25 PROCEDURE — 250N000011 HC RX IP 250 OP 636: Performed by: INTERNAL MEDICINE

## 2023-01-25 PROCEDURE — 70546 MR ANGIOGRAPH HEAD W/O&W/DYE: CPT | Mod: 26 | Performed by: STUDENT IN AN ORGANIZED HEALTH CARE EDUCATION/TRAINING PROGRAM

## 2023-01-25 PROCEDURE — A9585 GADOBUTROL INJECTION: HCPCS

## 2023-01-25 PROCEDURE — G0463 HOSPITAL OUTPT CLINIC VISIT: HCPCS

## 2023-01-25 PROCEDURE — 3E04305 INTRODUCTION OF OTHER ANTINEOPLASTIC INTO CENTRAL VEIN, PERCUTANEOUS APPROACH: ICD-10-PCS | Performed by: PHYSICIAN ASSISTANT

## 2023-01-25 PROCEDURE — 85025 COMPLETE CBC W/AUTO DIFF WBC: CPT | Performed by: INTERNAL MEDICINE

## 2023-01-25 PROCEDURE — 120N000002 HC R&B MED SURG/OB UMMC

## 2023-01-25 PROCEDURE — 86901 BLOOD TYPING SEROLOGIC RH(D): CPT | Performed by: INTERNAL MEDICINE

## 2023-01-25 PROCEDURE — 86850 RBC ANTIBODY SCREEN: CPT | Performed by: INTERNAL MEDICINE

## 2023-01-25 PROCEDURE — 80053 COMPREHEN METABOLIC PANEL: CPT | Performed by: INTERNAL MEDICINE

## 2023-01-25 PROCEDURE — 258N000003 HC RX IP 258 OP 636: Performed by: INTERNAL MEDICINE

## 2023-01-25 PROCEDURE — 70546 MR ANGIOGRAPH HEAD W/O&W/DYE: CPT

## 2023-01-25 PROCEDURE — 99223 1ST HOSP IP/OBS HIGH 75: CPT | Mod: AI

## 2023-01-25 PROCEDURE — 36415 COLL VENOUS BLD VENIPUNCTURE: CPT | Performed by: INTERNAL MEDICINE

## 2023-01-25 RX ORDER — ALBUTEROL SULFATE 90 UG/1
1-2 AEROSOL, METERED RESPIRATORY (INHALATION)
Status: CANCELLED
Start: 2023-01-26

## 2023-01-25 RX ORDER — BENZONATATE 100 MG/1
100 CAPSULE ORAL 3 TIMES DAILY PRN
Status: DISCONTINUED | OUTPATIENT
Start: 2023-01-25 | End: 2023-01-27 | Stop reason: HOSPADM

## 2023-01-25 RX ORDER — ALBUTEROL SULFATE 0.83 MG/ML
2.5 SOLUTION RESPIRATORY (INHALATION)
Status: DISCONTINUED | OUTPATIENT
Start: 2023-01-25 | End: 2023-01-27 | Stop reason: HOSPADM

## 2023-01-25 RX ORDER — LIDOCAINE/PRILOCAINE 2.5 %-2.5%
CREAM (GRAM) TOPICAL DAILY PRN
Status: DISCONTINUED | OUTPATIENT
Start: 2023-01-25 | End: 2023-01-27 | Stop reason: HOSPADM

## 2023-01-25 RX ORDER — LORAZEPAM 0.5 MG/1
.5-1 TABLET ORAL DAILY PRN
Status: DISCONTINUED | OUTPATIENT
Start: 2023-01-25 | End: 2023-01-25

## 2023-01-25 RX ORDER — DIPHENHYDRAMINE HYDROCHLORIDE 50 MG/ML
50 INJECTION INTRAMUSCULAR; INTRAVENOUS
Status: CANCELLED
Start: 2023-01-26

## 2023-01-25 RX ORDER — AMLODIPINE BESYLATE 5 MG/1
5 TABLET ORAL DAILY
Status: DISCONTINUED | OUTPATIENT
Start: 2023-01-26 | End: 2023-01-27 | Stop reason: HOSPADM

## 2023-01-25 RX ORDER — EPINEPHRINE 1 MG/ML
0.3 INJECTION, SOLUTION INTRAMUSCULAR; SUBCUTANEOUS EVERY 5 MIN PRN
Status: CANCELLED | OUTPATIENT
Start: 2023-01-26

## 2023-01-25 RX ORDER — PROCHLORPERAZINE MALEATE 10 MG
10 TABLET ORAL EVERY 6 HOURS PRN
Status: DISCONTINUED | OUTPATIENT
Start: 2023-01-25 | End: 2023-01-27 | Stop reason: HOSPADM

## 2023-01-25 RX ORDER — ACETAMINOPHEN 325 MG/1
650 TABLET ORAL EVERY 6 HOURS PRN
Status: DISCONTINUED | OUTPATIENT
Start: 2023-01-25 | End: 2023-01-27 | Stop reason: HOSPADM

## 2023-01-25 RX ORDER — LORAZEPAM 0.5 MG/1
.5-1 TABLET ORAL EVERY 6 HOURS PRN
Status: DISCONTINUED | OUTPATIENT
Start: 2023-01-25 | End: 2023-01-27 | Stop reason: HOSPADM

## 2023-01-25 RX ORDER — CODEINE PHOSPHATE AND GUAIFENESIN 10; 100 MG/5ML; MG/5ML
5 SOLUTION ORAL EVERY 4 HOURS PRN
Status: DISCONTINUED | OUTPATIENT
Start: 2023-01-25 | End: 2023-01-27 | Stop reason: HOSPADM

## 2023-01-25 RX ORDER — LORAZEPAM 2 MG/ML
.5-1 INJECTION INTRAMUSCULAR EVERY 6 HOURS PRN
Status: CANCELLED | OUTPATIENT
Start: 2023-01-26

## 2023-01-25 RX ORDER — METHYLPREDNISOLONE SODIUM SUCCINATE 125 MG/2ML
125 INJECTION, POWDER, LYOPHILIZED, FOR SOLUTION INTRAMUSCULAR; INTRAVENOUS
Status: CANCELLED
Start: 2023-01-26

## 2023-01-25 RX ORDER — DIPHENHYDRAMINE HYDROCHLORIDE 50 MG/ML
50 INJECTION INTRAMUSCULAR; INTRAVENOUS
Status: DISCONTINUED | OUTPATIENT
Start: 2023-01-25 | End: 2023-01-27 | Stop reason: HOSPADM

## 2023-01-25 RX ORDER — ALBUTEROL SULFATE 90 UG/1
1-2 AEROSOL, METERED RESPIRATORY (INHALATION)
Status: DISCONTINUED | OUTPATIENT
Start: 2023-01-25 | End: 2023-01-27 | Stop reason: HOSPADM

## 2023-01-25 RX ORDER — MEPERIDINE HYDROCHLORIDE 25 MG/ML
25 INJECTION INTRAMUSCULAR; INTRAVENOUS; SUBCUTANEOUS EVERY 30 MIN PRN
Status: DISCONTINUED | OUTPATIENT
Start: 2023-01-25 | End: 2023-01-27 | Stop reason: HOSPADM

## 2023-01-25 RX ORDER — PROCHLORPERAZINE MALEATE 10 MG
10 TABLET ORAL EVERY 6 HOURS PRN
Status: CANCELLED
Start: 2023-01-26

## 2023-01-25 RX ORDER — ONDANSETRON 4 MG/1
4-8 TABLET, ORALLY DISINTEGRATING ORAL EVERY 8 HOURS PRN
Status: DISCONTINUED | OUTPATIENT
Start: 2023-01-25 | End: 2023-01-27 | Stop reason: HOSPADM

## 2023-01-25 RX ORDER — METHYLPREDNISOLONE SODIUM SUCCINATE 125 MG/2ML
125 INJECTION, POWDER, LYOPHILIZED, FOR SOLUTION INTRAMUSCULAR; INTRAVENOUS
Status: DISCONTINUED | OUTPATIENT
Start: 2023-01-25 | End: 2023-01-27 | Stop reason: HOSPADM

## 2023-01-25 RX ORDER — VALACYCLOVIR HYDROCHLORIDE 500 MG/1
1000 TABLET, FILM COATED ORAL DAILY
Status: DISCONTINUED | OUTPATIENT
Start: 2023-01-26 | End: 2023-01-27 | Stop reason: HOSPADM

## 2023-01-25 RX ORDER — PANTOPRAZOLE SODIUM 20 MG/1
20 TABLET, DELAYED RELEASE ORAL DAILY PRN
Status: DISCONTINUED | OUTPATIENT
Start: 2023-01-25 | End: 2023-01-27 | Stop reason: HOSPADM

## 2023-01-25 RX ORDER — SENNOSIDES 8.6 MG
1-2 TABLET ORAL 2 TIMES DAILY
Status: DISCONTINUED | OUTPATIENT
Start: 2023-01-25 | End: 2023-01-27 | Stop reason: HOSPADM

## 2023-01-25 RX ORDER — ASPIRIN 81 MG/1
81 TABLET ORAL DAILY
Status: DISCONTINUED | OUTPATIENT
Start: 2023-01-26 | End: 2023-01-27 | Stop reason: HOSPADM

## 2023-01-25 RX ORDER — LORAZEPAM 2 MG/ML
.5-1 INJECTION INTRAMUSCULAR EVERY 6 HOURS PRN
Status: DISCONTINUED | OUTPATIENT
Start: 2023-01-25 | End: 2023-01-27 | Stop reason: HOSPADM

## 2023-01-25 RX ORDER — BUTALBITAL, ACETAMINOPHEN AND CAFFEINE 50; 325; 40 MG/1; MG/1; MG/1
1 TABLET ORAL EVERY 6 HOURS PRN
Status: DISCONTINUED | OUTPATIENT
Start: 2023-01-25 | End: 2023-01-25

## 2023-01-25 RX ORDER — PROCHLORPERAZINE MALEATE 10 MG
10 TABLET ORAL EVERY 6 HOURS PRN
Status: DISCONTINUED | OUTPATIENT
Start: 2023-01-25 | End: 2023-01-25

## 2023-01-25 RX ORDER — EPINEPHRINE 1 MG/ML
0.3 INJECTION, SOLUTION, CONCENTRATE INTRAVENOUS EVERY 5 MIN PRN
Status: DISCONTINUED | OUTPATIENT
Start: 2023-01-25 | End: 2023-01-27 | Stop reason: HOSPADM

## 2023-01-25 RX ORDER — BUTALBITAL, ACETAMINOPHEN AND CAFFEINE 50; 325; 40 MG/1; MG/1; MG/1
1 TABLET ORAL EVERY 4 HOURS PRN
Status: DISCONTINUED | OUTPATIENT
Start: 2023-01-25 | End: 2023-01-27 | Stop reason: HOSPADM

## 2023-01-25 RX ORDER — LORAZEPAM 0.5 MG/1
.5-1 TABLET ORAL EVERY 6 HOURS PRN
Status: CANCELLED
Start: 2023-01-26

## 2023-01-25 RX ORDER — ALBUTEROL SULFATE 0.83 MG/ML
2.5 SOLUTION RESPIRATORY (INHALATION)
Status: CANCELLED | OUTPATIENT
Start: 2023-01-26

## 2023-01-25 RX ORDER — GADOBUTROL 604.72 MG/ML
10 INJECTION INTRAVENOUS ONCE
Status: COMPLETED | OUTPATIENT
Start: 2023-01-25 | End: 2023-01-25

## 2023-01-25 RX ORDER — ENOXAPARIN SODIUM 100 MG/ML
40 INJECTION SUBCUTANEOUS EVERY 24 HOURS
Status: DISCONTINUED | OUTPATIENT
Start: 2023-01-25 | End: 2023-01-26

## 2023-01-25 RX ORDER — MEPERIDINE HYDROCHLORIDE 25 MG/ML
25 INJECTION INTRAMUSCULAR; INTRAVENOUS; SUBCUTANEOUS EVERY 30 MIN PRN
Status: CANCELLED | OUTPATIENT
Start: 2023-01-26

## 2023-01-25 RX ADMIN — DEXAMETHASONE SODIUM PHOSPHATE 20 MG: 10 INJECTION, SOLUTION INTRAMUSCULAR; INTRAVENOUS at 20:24

## 2023-01-25 RX ADMIN — BLINATUMOMAB 28 MCG: KIT INTRAVENOUS at 21:34

## 2023-01-25 RX ADMIN — GADOBUTROL 10 ML: 604.72 INJECTION INTRAVENOUS at 19:55

## 2023-01-25 ASSESSMENT — ACTIVITIES OF DAILY LIVING (ADL)
ADLS_ACUITY_SCORE: 35
ADLS_ACUITY_SCORE: 35
ADLS_ACUITY_SCORE: 18

## 2023-01-25 ASSESSMENT — PAIN SCALES - GENERAL: PAINLEVEL: MILD PAIN (2)

## 2023-01-25 NOTE — NURSING NOTE
"Oncology Rooming Note    January 25, 2023 10:42 AM   April F Bakari is a 42 year old female who presents for:    Chief Complaint   Patient presents with     Blood Draw     Labs drawn with  by rn.  VS taken.     Initial Vitals: /84 (BP Location: Right arm, Patient Position: Sitting, Cuff Size: Adult Large)   Pulse 84   Temp 98  F (36.7  C) (Oral)   Resp 16   Wt 109.4 kg (241 lb 1.6 oz)   SpO2 100%   BMI 41.38 kg/m   Estimated body mass index is 41.38 kg/m  as calculated from the following:    Height as of 1/17/23: 1.626 m (5' 4\").    Weight as of this encounter: 109.4 kg (241 lb 1.6 oz). Body surface area is 2.22 meters squared.  Mild Pain (2) Comment: Data Unavailable   No LMP recorded. Patient is postmenopausal.  Allergies reviewed: Yes  Medications reviewed: Yes    Medications: Medication refills not needed today.  Pharmacy name entered into [facility]:    [id number] [place], [place] - [address]  [facility] - [place], [place] - [address]    Clinical concerns:        Asya Salter CMA              "

## 2023-01-25 NOTE — LETTER
1/25/2023         RE: Vashti Villegas  [address] [place] [address]        Dear Colleague,    Thank you for referring your patient, Vashti Villegas, to the [facility]. Please see a copy of my visit note below.    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 While at OSH, she had CT PE protocol as well as CT A/P. These identified no PE, no acute or localized intraabdominal inflammatory process, mild splenomegaly, and few inconspicous low-density structures in the liver. She was transferred to [facility] for further workup and management. S/p Hydrea 1g TID (8/21-8/23) with improvement in WBC (100K ? 12K).   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 IHC shows CD10+, CD34+. 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 Abnormal cytogenetics.   --Microarry study: CN gains/losses in 3p21, 4q25, 7p11 (region includes CREB5 and IKZF1; biallelic loss of TARP), 9p13.2-p22.3 (region includes CDKN2a/2b and PAX5), 12q13, 14q11.     INDUCTION:   --Initiated pre-phase steroids with Prednisone 60 mg x8/21; increased to 60 mg/m2 (140 mg) 8/22-8/24. She initiated GRAALL + imatinib (C1D1 = 8/24/22).   --S/p BMT consult 9/12 with Dr. Walsh. Induction was relatively uncomplicated.   -s/p weekly triple IT chemo (Cytarabine, Solu-Cortef, MTX) on Days 1, 8, and 15 of C1 induction. CNS negative.   --BMBx 9/26/22 MRD-negative by BCR-ABL PCR and by Clonoseq Assay.      --GRAALL + imatinib (C2D1=9/30/22). 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   Post-cycle-2  BMBx 10/25/2022 showed MRD-negative CR, though low-level abnormal B-cell population (favor hematogones over residual leukemia) was noted. BCR-ABL negative. Clonoseq negative at 1/10E6 level.    --C3 GRAALL + imatinib on 11/2/22 was tolerated well without complication.    CONSOLIDATION:   --Discussed with Dr. Rutherford, BMT faculty meeting: No matched related donors identified. High panel reactive antibodies towards  unrelated donor samples. With an excellent early, deep MRD response to chemotherapy and no optimal donor sources identified, discussed foregoing BMT in CR1 in favor of definitive therapy.  Discussed risks and benefits of GRAAL versus newer approaches. April agreed to a combination of Blinatumomab plus ponatinib  (Short et al The Lancet Hematology Volume 10, ISSUE 1, e24-e34, January 2023).   --Cycle 1Day 1 Blinatumomab plus Ponatinib 12/14/2022. Complicated by PICC disruption, brief Blincyto interruption ~12/21/22.   --Aspirin 81 mg daily added for VTE prophylaxis per original study.   --BMBx 1/17/2023: BCR-ABL p190 now detectable; Clonoseq pending    INTRATHECAL CHEMOTHERAPY:  8/26/22: Cytarabine 40 mg/methotrexate 15 mg  8/31/22: Cytarabine 40 mg/methotrexate 15 mg  9/7/22: Cytarabine 40 mg/methotrexate 15 mg  9/14/22: Methotrexate 15 mg  10/21/22: Cytarabine 40 mg/methotrexate 15 mg  12/16/22: Methotrexate 12 mg  1/2/23; Cytarabine 100 mg  1/16/23: Methotrexate 12 mg  History of Present Illness:   April Bakari returns.   Grade 1 neuropathy in her fingertips and toes; gradually improving.  Headaches are controlled, but still needing frequent acetaminophen and fioricet.     12-point ROS negative.    Medications:       Current Outpatient Medications   Medication Sig     amLODIPine (NORVASC) 5 MG tablet Take 1 tablet (5 mg) by mouth daily     aspirin (ASA) 81 MG EC tablet Take 1 tablet (81 mg) by mouth daily     benzonatate (TESSALON) 100 MG capsule Take 1 capsule (100 mg) by mouth 3 times daily as needed for  cough     butalbital-acetaminophen-caffeine (ESGIC) -40 MG tablet Take 1 tablet by mouth every 6 hours as needed for headaches     CHOLECALCIFEROL PO Take 1 tablet by mouth daily Unknown strength.     lidocaine-prilocaine (EMLA) 2.5-2.5 % external cream Apply topically as needed (Apply 30 to 60minutes before Port Needle Access for pain)     lidocaine-prilocaine (LIDOPRIL) 2.5-2.5 % kit Use prior to lab access     LORazepam (ATIVAN) 0.5 MG tablet Take 1-2 tablets (0.5-1 mg) by mouth daily as needed for anxiety . Take prior to BMBx procedures as needed. If initial dose unhelpful, okay to take a second 0.5-1 mg dose (for a max of 2 mg at a time). Caution: causes sedation. Do not drive after taking this medication.     ondansetron (ZOFRAN ODT) 4 MG ODT tab Take 1-2 tablets (4-8 mg) by mouth every 8 hours as needed for nausea or vomiting     PONATinib (ICLUSIG) 15 MG tablet Take 1 tablet (15 mg) by mouth daily -- start concurrent with blinatumomab infusion, planned for 12/12/22     prochlorperazine (COMPAZINE) 10 MG tablet Take 1 tablet (10 mg) by mouth every 6 hours as needed (Breakthrough Nausea/Vomiting)     valACYclovir (VALTREX) 1000 mg tablet Take 1 tablet (1,000 mg) by mouth daily     acetaminophen (TYLENOL) 325 MG tablet Take 2 tablets (650 mg) by mouth every 6 hours as needed for mild pain, other or fever (blood product administration premedication)     guaiFENesin-dextromethorphan (ROBITUSSIN DM) 100-10 MG/5ML syrup Take 10 mLs by mouth every 4 hours as needed for cough or congestion (Patient not taking: Reported on 1/25/2023)     pantoprazole (PROTONIX) 20 MG EC tablet Take 1 tablet (20 mg) by mouth daily (Patient not taking: Reported on 1/25/2023)     sennosides (SENOKOT) 8.6 MG tablet Take 1-2 tablets by mouth 2 times daily (Patient not taking: Reported on 1/25/2023)     No current facility-administered medications for this visit.        Physical Exam:   Blood pressure 124/84, pulse 84, temperature 98   F (36.7  C), temperature source Oral, resp. rate 16, weight 109.4 kg (241 lb 1.6 oz), SpO2 100 %.    ECOG 1    Constitutional: No acute distress  HEENT: Normocephalic/atraumatic  Eyes: EOMI, PERRLA, anicteric  Lymph: No cervical, supraclavicular, or axillary lymphadenopathy  Respiratory: No increased work of breathing while on room air.  CTAB.    Cardiovascular: RRR, normal S1/S2, no MRGs. No edema.  Extremities WWP.  Pedal pulses 2+.  GI: nontender/nondistended, BS+. No HSM.   Skin: No rashes or lesions on visible areas.    Musculoskeletal: Normal tone, bulk.    Neurologic: Alert and oriented to time, person, place, and situation.  No gross focal cranial nerve abnormalities.  Moving all 4 extremities.      No results found for this or any previous visit (from the past 24 hour(s)).    Assessment and Plan:   Ms. Villegas is a 42 year old woman with recent diagnosis of Ph+ ALL.     After induction, she was in an MRD- CR (CR1) by BCR-ABL PCR as well as Clonoseq assay after cycle 1 and 2 of attenuated-dose GRAAL chemotherapy. With no matched related donors, and excellent early/deep MRD-CR, chemotherapy is currently favored over BMT; this is supported by multiple recent clinical observations (Blood. 2016 Jul 28; 128(4): 504-507; Blood 2022 Jul 25;blood.[id number]). After discussion, transitioned to Blinatumomab + Ponatinib (Short et al The Lancet Hematology Volume 10, ISSUE 1, e24-e34, January 2023) as curative intent therapy. C1D1 12/14/22.     Restaging BMBx 1/17 shows MRD positivity by BCR-ABL testing <0.079%; Clonoseq pending.     Her headaches seem to be improving, but not well defined yet. I will ask the inpatient team to get an MRI/MRV prior to more IT chemotherapy.     Proceed now with Cycle 2 Blinatumomab + Ponatinib (Short et al The Lancet Hematology Volume 10, ISSUE 1, e24-e34, January 2023).     PLAN:  --Proceed with Cycle 2 Blinatumomab  --Remain on ponatinib 15 mg QD  --Defer IT until after imaging; plan for a  total of 12 doses of intrathecal chemotherapy per protocol; alternating doses of intrathecal methotrexate 12 mg and cytarabine 100 mg, with three doses per cycle (as achievable) for a total of 12 doses.   --Bone marrow examinations, including PCR for BCR--ABL , at the end of cycles one, two, and five during ponatinib-blinatumomab combination therapy and every 3-6 months thereafter while on ponatinib maintenance. Clonseq MRD testing to be sent with each bone marrow examination.   --ASA 81 mg prophylaxis (does not need to be held for LPs per current [facility]/[place] guidelines)     --Other supportive cares as per below     # CNS prophylaxis Will require all subsequent LPs under fluoroscopic guidance.   # Chemotherapy-induced Grade 1 neuropathy --will follow  # Varicella Zoster; improved  # PPx  -Valtrex 1000 mg daily per ID   - Nebulized Pentamidine q28d for PJP ppx.  - ppx Levaquin and Fluconazole when ANC <1.0.      # Headaches  # H/o COVID-19 infection   Not vaccinated (due to fear of needles per pt report).  Evusheld given 11/2/2022    # Hypertension: Amlodipine; Lasix as needed  # Indigestion/GERD    Steve Hull MD, PhD  Division of Hematology, Oncology, and Transplantation  [facility]

## 2023-01-25 NOTE — H&P
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 Repeat BMBx showing MRD positivity by BCR-ABL testing <0.079%. Now admitted for C2 blincyto + ponatinib (C2D1=1/25/23).      HEME   # Ph(+) B-ALL   Followed by Dr. Hull. Patient presented to outside hospital with c/o right upper quadrant pain and was subsequently found to have a markedly elevated white blood cell count concerning for acute leukemia. While at OSH, she had CTA chest as well as CT A/P. These identified no PE, no acute or localized intraabdominal inflammatory process, mild splenomegaly, and few inconspicuous low-density structures in the liver. She was transferred to [facility] for further workup and management. S/p Hydrea 1g TID (8/21-8/23) with improvement in WBC (100K ? 12K). Diagnostic BMBx completed 8/20/22, which demonstrated B-ALL with 85% blasts and hypercellular marrow. IHC shows CD10+, CD34+. 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 ALL NGS negative. BCR/ABL1 major/minor (sent from peripheral blood) both negative. Initiated pre-phase steroids with Prednisone 60 mg x8/21;  increased to 60 mg/m2 (140 mg) 8/22-8/24. She initiated GRAALL + imatinib (C1D1 = 8/24/22). S/p BMT consult 9/12 with Dr. Walsh. Induction was relatively uncomplicated. Post-induction BMBx done 9/26/22 and revealed MRD- CR1, with negative BCR/ABL1 and Clonoseq testing. With no matched related donors and excellent early/deep response chemotherapy is currently favored over BMT. 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 Underwent BMBx 10/25/22 with no morphologic evidence of B-lymphoblastic leukemia and flow with unusual B lineage precursor population 0.09%. C3 GRAALL + imatinib on 11/2/22 was tolerated well without complication. Prior plan was to continue GRAALL for a total of 8 cycles (of note odd cycles are 21-days in length and even cycles are 14-days in length), however, transitioned to Blinatumumab + Ponatinib as curative intent therapy given no matched sib donors. S/p Blincyto + Ponatinib (C1D1 = 12/14/22), complicated by neutropenic fever 2/2 pneumonia. Recent BMBx 1/17/2023: BCR-ABL p190 now detectable; Clonoseq pending.  - Port placed x1/17/23, labs within parameters to proceed with scheduled chemo as below.   - Will discuss with home infusion liaison in AM - per outpatient team, coverage is arranged.    - If C2 well tolerated without CRS concerns, subsequent Cycles of Blincyto can be done outpatient.  - Per Dr Hull, plan for repeat BMBx after Blincyto C1, C2 and C5 then every 3-6 months thereafter. Clonoseq to be sent with each BMBx.   - Monitor for signs or symptoms of CRS or neurotoxicity (immune effector cell-associated neurotoxicity syndrome=ICANS). Per updated blinatumomab management guidelines:  ? Consider 1 dose of tocilizumab 8 mg/kg IV with Grade 2 CRS  ? Interrupt treatment for Grade 3 CRS. Start dexamethasone 8 mg Q8H x3 days, then taper over 4 days. Resume Blincyto once CRS resolves. Consider tocilizumab use as well, 8 mg/kg, up to 4  total doses (Q8H).  ? Consider single dose of dexamethasone 10 mg IV x1 for Grade 2 neurotoxicity (ICANS).  ? Hold blinatumomab for Grade 3 ICANS, until resolved to Grade 1 or better. Start dexamethasone 8 mg Q8H x up to 3 days, then taper over 4 days. Consider tocilizumab (same doses as for CRS).  ? Permanent discontinuation of treatment necessitated for Grade 4 CRS/ICANS.      Treatment Plan: Blincyto (C2D1 = 1/25/23)    Pre-meds: Dex 20 mg D1    Blincyto 28 mcg continuous D1-2 inpatient then continuous home infusion D3-28     Ponatinib 15 mg daily     IT Chemotherapy:    8/26/22: Cytarabine 40 mg/methotrexate 15 mg    8/31/22: Cytarabine 40 mg/methotrexate 15 mg    9/7/22: Cytarabine 40 mg/methotrexate 15 mg    9/14/22: Methotrexate 15 mg    10/21/22: Cytarabine 40 mg/methotrexate 15 mg    12/16/22: Methotrexate 12 mg    1/2/23; Cytarabine 100 mg    1/16/23: Methotrexate 12 mg     Defer inpatient chemo until after MRI/MRV evaluation complete. Plan for 12 total doses (3 per cycle) alternating IT methotrexate 12 mg and IT Cytarabine 100 mg.     # Anemia   # Leukopenia   Likely secondary to chemotherapy.  - Transfuse to keep Hgb >7. Of note, pt has history of transfusion reaction and requires premeds with Benadryl and Tylenol       # Recent PICC associated thrombus   Recent admission for cephalic vein thrombus (x1/4) associated with PICC line, non occlusive. No AC started as superficial and asymptomatic thrombus. A week prior, patient was found to have a malfunctioning vs malpositioned Port. S/p PICC removal and replacement of port via IR.   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 Presented with scattered papulovesicular lesions on her chin and nose as well as scattered hemorrhagic erosions on the scalp and lower face (V2-3 distributions of the trigeminal nerve) c/f disseminated VZV. Lesions significantly improved on admission. ID  recommended considering decrease to 1000 mg daily on 12/13 visit (given less anticipated prolonged neutropenia with new regimen) and confirmed with Dr. Hull.   - Continue Valtrex 1000 mg daily    # ID PPx   - Hold PTA fluconazole 200 mg daily and levofloxacin 250 mg daily given ANC >1.0   - Nebulized pentamidine last given 1/2, completed outpatient. Next due 1/30   - Received Evusheld on 11/2/22       MISC  # Hypertension   - Continue PTA amlodipine 5 mg daily     # Headaches   On admission reports intermittent headaches x2-3 weeks following recent LP. Located on the top of her head and initially rated 8/10 with significant coughing. Over the past few days headaches improved with PRN tylenol and Esgic and now rated 3/10. No NV, vision changes or weakness with headaches. Of note, recently seen at local ED for headaches with CT head negative x1/21 but did show periventricular hypoattenuation suggestive of leukomalacia that may be treatment-related. She states that the headache does seem somewhat worse when she gets up or stands up but does not get better as she lays flat. She reports her most comfortable position is sitting up.   - MRI/MRV ordered on admission prior to more IT chemo - pending    - Consider IR consult for blood patch if persistent given positional component of headaches, although her headaches are better with sitting upright which argues against.     # GERD   - PTA Protonix 20 mg daily      # Grade 1 neuropathy of the fingertips   Due to vincristine. Pt reports symptoms are stable. Reports mild paresthesias and hot/cold sensitivity.   - Monitor clinically       FEN:   - IVF per chemo plan   - PRN lyte replacement, potassium repletion today   - RDAT       Misc:   - VTE: Lovenox; HOLD when LP planned*   - GI/PUD: PTA Protonix  - Bowels: PRN Senna and Miralax       Dispo: 3-5 day hospital stay for scheduled chemo then discharge to home with [facility] support.  Follow Up: Will need D14, D21 LP with IT chemo  "under XRAY and AVELINA follow up with weekly labs via home infusions.      I spent >60 minutes face-to-face and/or coordinating or discussing care plan. Over 50% of our time on the unit was spent counseling the patient and/or coordinating care     Discussed patient and plan with attending physician, Dr. Cook.     Maura Jorgensen PA-C   Hematology/Oncology   Pager: [phone]     Clinically Significant Risk Factors Present on Admission                       # Severe Obesity: Estimated body mass index is 41.38 kg/m  as calculated from the following:    Height as of 1/17/23: 1.626 m (5' 4\").    Weight as of an earlier encounter on 1/25/23: 109.4 kg (241 lb 1.6 oz).           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 Repeat BMBx showing MRD positivity by BCR-ABL testing <0.079%. Now admitted for C2 blincyto + ponatinib (C2D1=1/25/23).    On admission patient reports she has been struggling with headaches the past 1-2 weeks since prior LP. HA located on the top of her head and initially rated 8/10 with significant coughing. Over the past few days headaches improved with PRN tylenol and Esgic and now rated 3/10. No NV, vision changes or weakness with headaches. Of note, recently seen at local ED for headaches with CT head negative x1/21 but did show periventricular hypoattenuation suggestive of leukomalacia that may be treatment-related. She states that the headache does seem somewhat worse when she gets up or stands up but does not " get better as she lays flat. She reports her most comfortable position is sitting up. Denies recent falls, fevers, NV or issues with bowel or bladder. Reviewed chemo plan together and all questions answered.     Past Medical History    I have reviewed this patient's medical history and updated it with pertinent information if needed.   Past Medical History:   Diagnosis Date     ALL (acute lymphocytic leukemia) (H)      Other acne      Shingles        Past Surgical History   I have reviewed this patient's surgical history and updated it with pertinent information if needed.  Past Surgical History:   Procedure Laterality Date     IR BONE BIOPSY DEEP RIGHT  10/25/2022     IR BONE BIOPSY DEEP RIGHT  1/17/2023     IR CHEST PORT PLACEMENT > 5 YRS OF AGE  11/23/2022     IR LUMBAR PUNCTURE  10/21/2022     IR PORT REPLACEMENT CATHETER ONLY RIGHT  1/17/2023     PICC Left 09/30/2022    Picc ok to use     PICC DOUBLE LUMEN PLACEMENT Left 08/20/2022    left cephalic 5 fr dl picc 44 cm     PICC INSERTION - DOUBLE LUMEN Right 12/31/2022    41cm (1cm external), Cephalic vein     ZZC NONSPECIFIC PROCEDURE  01/01/1999    benign mole removal       Prior to Admission Medications   Cannot display prior to admission medications because the patient has not been admitted in this contact.     Allergies   Allergies   Allergen Reactions     Blood Transfusion Related (Informational Only) Hives     Hive reaction with platelet transfusion on 8/26/2022. Please administer platelets with tylenol and benadryl premedication.        Social History   I have reviewed this patient's social history and updated it with pertinent information if needed. Vashti Villegas  reports that she has never smoked. She has never used smokeless tobacco. She reports that she does not currently use alcohol. She reports that she does not use drugs.    Family History   I have reviewed this patient's family history and updated it with pertinent information if needed.   No  family history on file.    Review of Systems   The 10 point Review of Systems is negative other than noted in the HPI or here.     Physical Exam                      Vital Signs with Ranges     0 lbs 0 oz    Constitutional: Pleasant female seen sitting up in bed. No apparent distress, and appears stated age.  Eyes: Lids and lashes normal, sclera clear, conjunctiva normal.  ENT: Normocephalic, without obvious abnormality, atraumatic, sinuses nontender on palpation, oral pharynx with moist mucus membranes, tonsils without erythema or exudates, gums normal and good dentition.   Respiratory: No increased work of breathing, good air exchange, clear to auscultation bilaterally, no crackles or wheezing.  Cardiovascular: Normal apical impulse, regular rate and rhythm, normal S1 and S2, and no murmur noted.  GI: No masses or scars. +BS. Soft. No tenderness on palpation.  Lymph/Hematologic: No cervical lymphadenopathy and no supraclavicular lymphadenopathy.  Skin: No bruising or bleeding, normal skin color, texture, turgor, no redness, warmth, or swelling, no rashes, no lesions, no jaundice.  Extremities: There is no redness, warmth, or swelling of the joints. No lower extremity edema. No cyanosis.  Neurologic: Awake, alert, oriented to name, place and time.    Vascular access: port CDI     Data   No results found for this or any previous visit (from the past 24 hour(s)).

## 2023-01-25 NOTE — NURSING NOTE
Chief Complaint   Patient presents with     Blood Draw     Labs drawn with  by rn.  VS taken.     Labs drawn with  by rn (pt did not want to use port).  Pt tolerated well.  VS taken.  Pt checked in for next appt.    Nithya Fung RN

## 2023-01-26 LAB
ABO/RH(D): NORMAL
ALBUMIN SERPL BCG-MCNC: 4.4 G/DL (ref 3.5–5.2)
ALP SERPL-CCNC: 70 U/L (ref 35–104)
ALT SERPL W P-5'-P-CCNC: 16 U/L (ref 10–35)
ANION GAP SERPL CALCULATED.3IONS-SCNC: 14 MMOL/L (ref 7–15)
ANTIBODY SCREEN: NEGATIVE
AST SERPL W P-5'-P-CCNC: 16 U/L (ref 10–35)
BASOPHILS # BLD AUTO: 0 10E3/UL (ref 0–0.2)
BASOPHILS NFR BLD AUTO: 0 %
BILIRUB SERPL-MCNC: 0.2 MG/DL
BUN SERPL-MCNC: 9.6 MG/DL (ref 6–20)
CALCIUM SERPL-MCNC: 9.7 MG/DL (ref 8.6–10)
CHLORIDE SERPL-SCNC: 110 MMOL/L (ref 98–107)
CREAT SERPL-MCNC: 0.49 MG/DL (ref 0.51–0.95)
DEPRECATED HCO3 PLAS-SCNC: 18 MMOL/L (ref 22–29)
EOSINOPHIL # BLD AUTO: 0 10E3/UL (ref 0–0.7)
EOSINOPHIL NFR BLD AUTO: 0 %
ERYTHROCYTE [DISTWIDTH] IN BLOOD BY AUTOMATED COUNT: 16 % (ref 10–15)
GFR SERPL CREATININE-BSD FRML MDRD: >90 ML/MIN/1.73M2
GLUCOSE SERPL-MCNC: 181 MG/DL (ref 70–99)
HCT VFR BLD AUTO: 34.8 % (ref 35–47)
HGB BLD-MCNC: 10.9 G/DL (ref 11.7–15.7)
IMM GRANULOCYTES # BLD: 0 10E3/UL
IMM GRANULOCYTES NFR BLD: 1 %
INR PPP: 1.04 (ref 0.85–1.15)
LYMPHOCYTES # BLD AUTO: 0.3 10E3/UL (ref 0.8–5.3)
LYMPHOCYTES NFR BLD AUTO: 12 %
MAGNESIUM SERPL-MCNC: 2.2 MG/DL (ref 1.7–2.3)
MCH RBC QN AUTO: 30.6 PG (ref 26.5–33)
MCHC RBC AUTO-ENTMCNC: 31.3 G/DL (ref 31.5–36.5)
MCV RBC AUTO: 98 FL (ref 78–100)
MONOCYTES # BLD AUTO: 0.1 10E3/UL (ref 0–1.3)
MONOCYTES NFR BLD AUTO: 3 %
NEUTROPHILS # BLD AUTO: 2 10E3/UL (ref 1.6–8.3)
NEUTROPHILS NFR BLD AUTO: 84 %
NRBC # BLD AUTO: 0 10E3/UL
NRBC BLD AUTO-RTO: 0 /100
PHOSPHATE SERPL-MCNC: 3.5 MG/DL (ref 2.5–4.5)
PLATELET # BLD AUTO: 264 10E3/UL (ref 150–450)
POTASSIUM SERPL-SCNC: 4.5 MMOL/L (ref 3.4–5.3)
PROT SERPL-MCNC: 6.7 G/DL (ref 6.4–8.3)
RBC # BLD AUTO: 3.56 10E6/UL (ref 3.8–5.2)
SODIUM SERPL-SCNC: 142 MMOL/L (ref 136–145)
SPECIMEN EXPIRATION DATE: NORMAL
WBC # BLD AUTO: 2.3 10E3/UL (ref 4–11)

## 2023-01-26 PROCEDURE — 250N000013 HC RX MED GY IP 250 OP 250 PS 637: Performed by: PHYSICIAN ASSISTANT

## 2023-01-26 PROCEDURE — 99418 PROLNG IP/OBS E/M EA 15 MIN: CPT

## 2023-01-26 PROCEDURE — 83735 ASSAY OF MAGNESIUM: CPT | Performed by: PHYSICIAN ASSISTANT

## 2023-01-26 PROCEDURE — 80053 COMPREHEN METABOLIC PANEL: CPT | Performed by: PHYSICIAN ASSISTANT

## 2023-01-26 PROCEDURE — 84100 ASSAY OF PHOSPHORUS: CPT | Performed by: PHYSICIAN ASSISTANT

## 2023-01-26 PROCEDURE — 85610 PROTHROMBIN TIME: CPT | Performed by: PHYSICIAN ASSISTANT

## 2023-01-26 PROCEDURE — 99233 SBSQ HOSP IP/OBS HIGH 50: CPT | Mod: FS

## 2023-01-26 PROCEDURE — 250N000011 HC RX IP 250 OP 636: Performed by: INTERNAL MEDICINE

## 2023-01-26 PROCEDURE — 86901 BLOOD TYPING SEROLOGIC RH(D): CPT | Performed by: PHYSICIAN ASSISTANT

## 2023-01-26 PROCEDURE — 36415 COLL VENOUS BLD VENIPUNCTURE: CPT | Performed by: PHYSICIAN ASSISTANT

## 2023-01-26 PROCEDURE — 258N000003 HC RX IP 258 OP 636: Performed by: INTERNAL MEDICINE

## 2023-01-26 PROCEDURE — 120N000002 HC R&B MED SURG/OB UMMC

## 2023-01-26 PROCEDURE — 85025 COMPLETE CBC W/AUTO DIFF WBC: CPT | Performed by: PHYSICIAN ASSISTANT

## 2023-01-26 RX ADMIN — ASPIRIN 81 MG: 81 TABLET ORAL at 09:22

## 2023-01-26 RX ADMIN — VALACYCLOVIR HYDROCHLORIDE 1000 MG: 500 TABLET, FILM COATED ORAL at 09:21

## 2023-01-26 RX ADMIN — BUTALBITAL, ACETAMINOPHEN, AND CAFFEINE 1 TABLET: 50; 325; 40 TABLET ORAL at 22:10

## 2023-01-26 RX ADMIN — AMLODIPINE BESYLATE 5 MG: 5 TABLET ORAL at 09:22

## 2023-01-26 RX ADMIN — BLINATUMOMAB 28 MCG: KIT INTRAVENOUS at 21:43

## 2023-01-26 ASSESSMENT — ACTIVITIES OF DAILY LIVING (ADL)
ADLS_ACUITY_SCORE: 18

## 2023-01-26 NOTE — DISCHARGE SUMMARY
[facility]    Discharge Summary  Hematology / Oncology    Date of Admission:  1/25/2023  Date of Discharge:  1/27/23  Discharging Provider: Maura Jorgensen PA-C  Date of Service (when I saw the patient): 1/27/23    Discharge Diagnoses   # Ph(+) B-ALL   # H/o disseminated VZV  # Headaches       History of Present Illness   April F Bakari is a 42-year-old female who presents with past medical history of hypertension, disseminated zoster and Ph (+) B-ALL (s/p induction chemotherapy with GRAALL regimen + imatinib in MRD-CR1) who has most recently received 3 cycles of GRAALL consolidation then transitioned to blincyto + ponatinib (Q0M4=2412/14/22). Repeat BMBx showing MRD positivity by BCR-ABL testing <0.079%. Now admitted for C2 blincyto + ponatinib (C2D1=1/25/23), tolerated without issue. Improved headaches this admission with negative MRV (x1/25) thought to be due to Ponatinib.     We reviewed signs & symptoms of concern that should prompt a call to clinic triage or a visit to the ED, and patient voiced understanding. All questions answered. On the day of discharge, patient was quite well-appearing, hemodynamically stable, and felt safe and comfortable with the plans for discharge and follow-up.       Follow up:    Twice weekly labs via [facility] and weekly Blincyto bag changes    AVELINA within 1 week of discharge    IR referral for LPs q2 weeks for IT chemo at [facility]     Medication Highlights:     Continuous Blincyto through 3/9/23       Hospital Course   April F Bakari was admitted on 1/25/2023.  The following problems were addressed during her hospitalization:    HEME   # Ph(+) B-ALL   Followed by Dr. Hull. Patient presented to outside hospital with c/o right upper quadrant pain and was subsequently found to have a markedly elevated white blood cell count concerning for acute leukemia. While at OSH, she had CTA chest as well as CT A/P. These identified no PE, no acute or localized  intraabdominal inflammatory process, mild splenomegaly, and few inconspicuous low-density structures in the liver. She was transferred to [facility] for further workup and management. S/p Hydrea 1g TID (8/21-8/23) with improvement in WBC (100K ? 12K). Diagnostic BMBx completed 8/20/22, which demonstrated B-ALL with 85% blasts and hypercellular marrow. IHC shows CD10+, CD34+. 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 ALL NGS negative. BCR/ABL1 major/minor (sent from peripheral blood) both negative. Initiated pre-phase steroids with Prednisone 60 mg x8/21; increased to 60 mg/m2 (140 mg) 8/22-8/24. She initiated GRAALL + imatinib (C1D1 = 8/24/22). S/p BMT consult 9/12 with Dr. Walsh. Induction was relatively uncomplicated. Post-induction BMBx done 9/26/22 and revealed MRD- CR1, with negative BCR/ABL1 and Clonoseq testing. With no matched related donors and excellent early/deep response chemotherapy is currently favored over BMT. 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 Underwent BMBx 10/25/22 with no morphologic evidence of B-lymphoblastic leukemia and flow with unusual B lineage precursor population 0.09%. C3 GRAALL + imatinib on 11/2/22 was tolerated well without complication. Prior plan was to continue GRAALL for a total of 8 cycles (of note odd cycles are 21-days in length and even cycles are 14-days in length), however, transitioned to Blinatumumab + Ponatinib as curative intent therapy given no matched sib donors. S/p Blincyto + Ponatinib (C1D1 = 12/14/22),  complicated by neutropenic fever 2/2 pneumonia. Recent BMBx 1/17/2023: BCR-ABL p190 now detectable; Clonoseq pending.  - Port placed x1/17/23, labs within parameters to proceed with scheduled chemo as below.   - Will discuss with home infusion liaison in AM - per outpatient team, coverage is arranged.                - If C2 well tolerated without CRS concerns, subsequent Cycles of Blincyto can be done outpatient.  - Per Dr Hull, plan for repeat BMBx after Blincyto C1, C2 and C5 then every 3-6 months thereafter. Clonoseq to be sent with each BMBx.   - Monitor for signs or symptoms of CRS or neurotoxicity (immune effector cell-associated neurotoxicity syndrome=ICANS). Per updated blinatumomab management guidelines:  ? Consider 1 dose of tocilizumab 8 mg/kg IV with Grade 2 CRS  ? Interrupt treatment for Grade 3 CRS. Start dexamethasone 8 mg Q8H x3 days, then taper over 4 days. Resume Blincyto once CRS resolves. Consider tocilizumab use as well, 8 mg/kg, up to 4 total doses (Q8H).  ? Consider single dose of dexamethasone 10 mg IV x1 for Grade 2 neurotoxicity (ICANS).  ? Hold blinatumomab for Grade 3 ICANS, until resolved to Grade 1 or better. Start dexamethasone 8 mg Q8H x up to 3 days, then taper over 4 days. Consider tocilizumab (same doses as for CRS).  ? Permanent discontinuation of treatment necessitated for Grade 4 CRS/ICANS.      Treatment Plan: Blincyto (C2D1 = 1/25/23)    Pre-meds: Dex 20 mg D1    Blincyto 28 mcg continuous D1-2 inpatient then continuous home infusion D3-28     Ponatinib 15 mg daily     IT Chemotherapy:    8/26/22: Cytarabine 40 mg/methotrexate 15 mg    8/31/22: Cytarabine 40 mg/methotrexate 15 mg    9/7/22: Cytarabine 40 mg/methotrexate 15 mg    9/14/22: Methotrexate 15 mg    10/21/22: Cytarabine 40 mg/methotrexate 15 mg    12/16/22: Methotrexate 12 mg    1/2/23; Cytarabine 100 mg    1/16/23: Methotrexate 12 mg     Four remaining IT chemo doses requested outpatient with IR q2 weeks.      #  Anemia   # Leukopenia   Likely secondary to chemotherapy.  - Transfuse to keep Hgb >7. Of note, pt has history of transfusion reaction and requires premeds with Benadryl and Tylenol       # Recent PICC associated thrombus   Recent admission for cephalic vein thrombus (x1/4) associated with PICC line, non occlusive. No AC started as superficial and asymptomatic thrombus. A week prior, patient was found to have a malfunctioning vs malpositioned Port. S/p PICC removal and replacement of port via IR.   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 Presented with scattered papulovesicular lesions on her chin and nose as well as scattered hemorrhagic erosions on the scalp and lower face (V2-3 distributions of the trigeminal nerve) c/f disseminated VZV. Lesions significantly improved on admission. ID recommended considering decrease to 1000 mg daily on 12/13 visit (given less anticipated prolonged neutropenia with new regimen) and confirmed with Dr. Hull.   - Continue Valtrex 1000 mg daily    # ID PPx   - Hold PTA fluconazole 200 mg daily and levofloxacin 250 mg daily given ANC >1.0   - Nebulized pentamidine last given 1/2, completed outpatient. Next due 1/30   - Received Evusheld on 11/2/22       MISC  # Hypertension   - Continue PTA amlodipine 5 mg daily      # Headaches, improved   On admission reports intermittent headaches x2-3 weeks following recent LP. Located on the top of her head and initially rated 8/10 with significant coughing. Over the past few days headaches improved with PRN tylenol and Esgic and now rated 3/10. No NV, vision changes or weakness with headaches. Of note, recently seen at local ED for headaches with CT head negative x1/21 but did show periventricular hypoattenuation suggestive of leukomalacia that may be treatment-related. She states that the headache does seem somewhat worse when she gets up or stands up but does not get  better as she lays flat. She reports her most comfortable position is sitting up.   - MRI/MRV ordered on admission prior to more IT chemo; negative for acute path.     - Consider IR consult for blood patch if persistent given positional component of headaches, although her headaches are better with sitting upright which argues against. Suspect headaches most likely 2/2 Ponatanib as known culprit.   - PRN Esgic helpful      # GERD   - PTA Protonix 20 mg daily      # Grade 1 neuropathy of the fingertips   Due to vincristine. Pt reports symptoms are stable. Reports mild paresthesias and hot/cold sensitivity.   - Monitor clinically       FEN:   - IVF per chemo plan   - PRN lyte replacement, potassium repletion today   - RDAT       Misc:   - VTE: Lovenox declined by patient   - GI/PUD: PTA Protonix  - Bowels: PRN Senna and Miralax       Dispo: 3-5 day hospital stay for scheduled chemo then discharge to home with [facility] support.     I spent >60 minutes face-to-face and/or coordinating or discussing care plan. Over 50% of our time on the unit was spent counseling the patient and/or coordinating care.     Discussed patient and plan with attending physician, Dr. Cook.      Maura Jorgensen PA-C   Hematology/Oncology   Pager: [phone]     Significant Results and Procedures   Results for orders placed or performed during the hospital encounter of 01/25/23   MRV Brain wo & w Contrast    Narrative    MRV of the head with and without contrast    History:  B-ALL, intermittent headaches on IT chemo.    Comparison:  none      Technique:   Head MRV: 2D time-of-flight MR venogram (MRV) of the head was  performed without intravenous contrast. Following intravenous  gadolinium-based contrast administration, a contrast enhanced MRV of  the intracranial vessels was performed.    Contrast: 10 mL gadolinium best    Findings:   Head MRV demonstrates no definite thrombosis or stenosis of the major  intracranial dural sinuses or deep  cerebral veins. Dominant right  transverse sinus. Postcontrast MRV images do not demonstrate any  definite intraluminal filling defect.      Impression    Impression:  Head MRV demonstrates patent major dural and deep venous sinuses  intracranially.    I have personally reviewed the examination and initial interpretation  and I agree with the findings.    MACO GARZON MD         SYSTEM ID:  [id number]     Pending Results   These results will be followed up by AVELINA  Unresulted Labs Ordered in the Past 30 Days of this Admission     Date and Time Order Name Status Description    1/17/2023 12:08 PM FISH In process     1/17/2023 12:08 PM CHROMOSOME ANALYSIS, BONE MARROW, DIAGNOSIS/RELAPSE In process     1/16/2023 11:59 AM LABORATORY MISCELLANEOUS ORDER In process           Code Status   Full Code    Primary Care Physician   Roya Quarles    Physical Exam   Temp: 98.5  F (36.9  C) Temp src: Oral BP: 111/79 Pulse: 77   Resp: 18 SpO2: 97 % O2 Device: None (Room air)    Vitals:    01/25/23 1537 01/26/23 0824   Weight: 108.9 kg (240 lb) 108.2 kg (238 lb 8 oz)     Vital Signs with Ranges  Temp:  [97.8  F (36.6  C)-98.7  F (37.1  C)] 98.5  F (36.9  C)  Pulse:  [77-89] 77  Resp:  [16-18] 18  BP: (111-130)/(53-79) 111/79  SpO2:  [96 %-98 %] 97 %  I/O last 3 completed shifts:  In: 400 [P.O.:300; I.V.:100]  Out: -     Constitutional: Pleasant female seen sitting up in bed. No apparent distress, and appears stated age.  Eyes: Lids and lashes normal, sclera clear, conjunctiva normal.   ENT: Normocephalic, without obvious abnormality, atraumatic, sinuses nontender on palpation, oral pharynx with moist mucus membranes, tonsils without erythema or exudates, gums normal and good dentition.   Respiratory: No increased work of breathing, good air exchange, clear to auscultation bilaterally, no crackles or wheezing.  Cardiovascular: Normal apical impulse, regular rate and rhythm, normal S1 and S2, and no murmur noted.  GI: No masses or  scars. +BS. Soft. No tenderness on palpation.  Lymph/Hematologic: No cervical lymphadenopathy and no supraclavicular lymphadenopathy.   Skin: No bruising or bleeding, normal skin color, texture, turgor, no redness, warmth, or swelling, no rashes, no lesions, no jaundice.   Extremities: There is no redness, warmth, or swelling of the joints. No lower extremity edema. No cyanosis.  Neurologic: Awake, alert, oriented to name, place and time.    Vascular access: port CDI       Time Spent on this Encounter   IMaura PA-C, personally saw the patient today and spent greater than 30 minutes discharging this patient.    Discharge Disposition   Discharged to home  Condition at discharge: Stable    Consultations This Hospital Stay   None    Discharge Orders      IR Lumbar Puncture     **Comprehensive metabolic panel FUTURE 2mo     Reason for your hospital stay    Scheduled Blincyto chemotherapy     Activity    Your activity upon discharge: activity as tolerated     Follow Up and recommended labs and tests    - Follow up requested with twice weekly labs with home infusions, weekly Blincyto bag changes and lumbar puncture in ~2 weeks x4 more doses.   - Subsequent cycles of Blincyto chemo will be done outpatient     When to contact your care team    [facility] (open daily 8am-4pm) at [phone] for temp greater than or equal to 100.4, uncontrolled nausea/vomiting/diarrhea/constipation, unrelieved pain, bleeding not relieved with pressure, dizziness, chest pain, shortness of breath, loss of consciousness, and any new or concerning symptoms. If symptoms occur after triage hours please report to local Emergency Department for evaluation.     IV access    **Ordering Provider MUST call/page Care Coordinator/ to discuss arranging this service**    You are going home with the following vascular access device: PICC.     Discharge Instructions    - Continue as needed Esgic and  Tylenol for headaches that we suspect are from your Ponatinib.     Flow Cytometry Cerebrospinal fluid     Diet    Follow this diet upon discharge: Orders Placed This Encounter      Regular Diet Adult     Check Out Appointment Request    - AVELINA 1-2 weeks after hospital discharge   - LP with [place] chemotherapy at [facility] in [place] in q2 weeks x4 doses  - schedule AVELINA and then Cycle 3 Blincyto infusion hook up 3/9/23     **CBC with platelets differential FUTURE 2mo     Discharge Medications   Current Discharge Medication List      START taking these medications    Details   blinatumomab 28 mcg Inject 28 mcg into the vein every 24 hours    Associated Diagnoses: Prophylaxis for chemotherapy-induced neutropenia; 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)         CONTINUE these medications which have NOT CHANGED    Details   acetaminophen (TYLENOL) 325 MG tablet Take 2 tablets (650 mg) by mouth every 6 hours as needed for mild pain, other or fever (blood product administration premedication)    Associated Diagnoses: 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)      amLODIPine (NORVASC) 5 MG tablet Take 1 tablet (5 mg) by mouth daily  Qty: 30 tablet, Refills: 4    Associated Diagnoses: 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); Hypertension, unspecified type      aspirin (ASA) 81 MG EC tablet Take 1 tablet (81 mg) by mouth daily  Qty: 30 tablet, Refills: 1    Associated Diagnoses: Acute lymphoblastic leukemia (ALL) in remission (H)      benzonatate (TESSALON) 100 MG capsule Take 1 capsule (100 mg) by mouth 3 times daily as needed for cough  Qty: 30 capsule, Refills: 0    Associated Diagnoses: Pneumonia of right lung due to infectious organism, unspecified part of lung      butalbital-acetaminophen-caffeine (ESGIC) -40 MG tablet Take 1 tablet by mouth every 6 hours as needed for headaches  Qty: 20 tablet, Refills: 0    Associated Diagnoses: Tension  headache      CHOLECALCIFEROL PO Take 1 tablet by mouth daily Unknown strength.      guaiFENesin-dextromethorphan (ROBITUSSIN DM) 100-10 MG/5ML syrup Take 10 mLs by mouth every 4 hours as needed for cough or congestion  Qty: 354 mL, Refills: 0    Associated Diagnoses: Pneumonia of right lung due to infectious organism, unspecified part of lung      lidocaine-prilocaine (EMLA) 2.5-2.5 % external cream Apply topically as needed (Apply 30 to 60minutes before Port Needle Access for pain)  Qty: 30 g, Refills: 3    Associated Diagnoses: Acute lymphoblastic leukemia (ALL) in remission (H)      lidocaine-prilocaine (LIDOPRIL) 2.5-2.5 % kit Use prior to lab access  Qty: 1 kit, Refills: 1    Associated Diagnoses: 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)      LORazepam (ATIVAN) 0.5 MG tablet Take 1-2 tablets (0.5-1 mg) by mouth daily as needed for anxiety . Take prior to BMBx procedures as needed. If initial dose unhelpful, okay to take a second 0.5-1 mg dose (for a max of 2 mg at a time). Caution: causes sedation. Do not drive after taking this medication.  Qty: 10 tablet, Refills: 0    Associated Diagnoses: Acute lymphoblastic leukemia (ALL) in remission (H)      ondansetron (ZOFRAN ODT) 4 MG ODT tab Take 1-2 tablets (4-8 mg) by mouth every 8 hours as needed for nausea or vomiting  Qty: 60 tablet, Refills: 0    Associated Diagnoses: 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)      PONATinib (ICLUSIG) 15 MG tablet Take 1 tablet (15 mg) by mouth daily -- start concurrent with blinatumomab infusion, planned for 12/12/22  Qty: 30 tablet, Refills: 3    Associated Diagnoses: 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)      prochlorperazine (COMPAZINE) 10 MG tablet Take 1 tablet (10 mg) by mouth every 6 hours as needed (Breakthrough Nausea/Vomiting)  Qty: 20 tablet, Refills: 0    Associated Diagnoses: Acute lymphoblastic leukemia (ALL) not having achieved  remission (H)      valACYclovir (VALTREX) 1000 mg tablet Take 1 tablet (1,000 mg) by mouth daily  Qty: 30 tablet, Refills: 1    Associated Diagnoses: VZV (varicella-zoster virus) infection         STOP taking these medications       pantoprazole (PROTONIX) 20 MG EC tablet Comments:   Reason for Stopping:         sennosides (SENOKOT) 8.6 MG tablet Comments:   Reason for Stopping:             Allergies   Allergies   Allergen Reactions     Blood Transfusion Related (Informational Only) Hives     Hive reaction with platelet transfusion on 8/26/2022. Please administer platelets with tylenol and benadryl premedication.      Data   Most Recent 3 CBC's:Recent Labs   Lab Test 01/26/23  0502 01/25/23  1026 01/23/23  1347   WBC 2.3* 3.6* 3.2*   HGB 10.9* 11.0* 10.3*   MCV 98 96 94    251 232      Most Recent 3 BMP's:  Recent Labs   Lab Test 01/26/23  0502 01/25/23  1026 01/23/23  1347    143 144   POTASSIUM 4.5 3.9 3.4   CHLORIDE 110* 109* 111*   CO2 18* 22 25   BUN 9.6 10.6 11   CR 0.49* 0.67 0.61   ANIONGAP 14 12 8   SOLEDAD 9.7 9.3 9.1   * 96 135*     Most Recent 2 LFT's:  Recent Labs   Lab Test 01/26/23  0502 01/25/23  1026   AST 16 15   ALT 16 12   ALKPHOS 70 67   BILITOTAL 0.2 0.3     Most Recent INR's and Anticoagulation Dosing History:  Anticoagulation Dose History     Recent Dosing and Labs Latest Ref Rng & Units 11/2/2022 11/23/2022 11/25/2022 12/15/2022 1/5/2023 1/6/2023 1/26/2023    INR 0.85 - 1.15 1.10 1.07 1.08 1.01 1.01 1.09 1.04        Most Recent 3 Troponin's:No lab results found.  Most Recent Cholesterol Panel:No lab results found.  Most Recent 6 Bacteria Isolates From Any Culture (See EPIC Reports for Culture Details):No lab results found.  Most Recent TSH, T4 and A1c Labs:No lab results found.

## 2023-01-26 NOTE — PLAN OF CARE
[id number]  Goal Outcome Evaluation:    /68   Pulse 86   Temp 97.8  F (36.6  C) (Oral)   Resp 18   Wt 108.9 kg (240 lb)   SpO2 96%   BMI 41.20 kg/m      Vitals stable. Denies pain/nausea/sob. Pt does note some night sweats, sheets changes for comfort. Cycle 2 dose 1 Blincyto currently running, ICANS assessmnet completed by chemo nurse. Independent. Voiding adequately, not saving. Last BM 1/25.     Port cannot be disconnected while Blincyto is running, however pt declined having IV placed. Education provided on importance of having access to additional line.

## 2023-01-26 NOTE — PROGRESS NOTES
Nursing Focus: Admission    D: Patient admitted from home/clinic appointment for Cycle 2 Blincyto+ Ponatinib consolidation.      I: Upon arrival to the unit patient was oriented to room, unit, and call light. Patient s height, weight, and vital signs were obtained. Allergies reviewed and allergy band applied. MD notified of patient s arrival on the unit. Adult AVS completed. Head to toe assessment completed. Education assessment completed. Care plan initiated.     A: Vital signs stable upon admission. Patient denies pain. Pt declined full skin assessment and reports no skin concerns other than bruising. Pt is independent and ricardo score is 23. Low risk for pressure injury.     P: Continue to monitor patient and intervene as needed. Continue with plan of care. Notify MD with any concerns or changes in patient status.

## 2023-01-26 NOTE — PLAN OF CARE
Goal Outcome Evaluation:  [id number]    VSS on RA. Denies pain, nausea, SOB. Port accessed by vascular access. Tissue surrounding port is very tender. Brain MRI completed and C2D1 of blincyto now infusing. Pt declined lovenox injection this evening but is agreeable to it for tomorrow. Has intermittent, dry cough which has been ongoing. Not taking cough meds at home. Pt is UAL.        Nursing Focus: Chemotherapy  D: Positive blood return via Port. Insertion site is clean/dry/intact, dressing intact with no complaints of pain.  Urine output is recorded in intake in Doc Flowsheet.    I: Premedications given per order (see electronic medical administration record). Dose #1 of Blincyto started to infuse over 24 hours. Reviewed pt teaching on chemotherapy side effects.  Pt denies need for further teaching. Chemotherapy double checked per protocol by two chemotherapy competent RN's.   A: Tolerating procedure well. Denies nausea and or pain.   P: Continue to monitor urine output and symptoms of nausea. Screen for symptoms of toxicity.

## 2023-01-26 NOTE — PROGRESS NOTES
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 Repeat BMBx showing MRD positivity by BCR-ABL testing <0.079%. Now admitted for C2 blincyto + ponatinib (C2D1=1/25/23).       HEME   # Ph(+) B-ALL   Followed by Dr. Hull. Patient presented to outside hospital with c/o right upper quadrant pain and was subsequently found to have a markedly elevated white blood cell count concerning for acute leukemia. While at OSH, she had CTA chest as well as CT A/P. These identified no PE, no acute or localized intraabdominal inflammatory process, mild splenomegaly, and few inconspicuous low-density structures in the liver. She was transferred to [facility] for further workup and management. S/p Hydrea 1g TID (8/21-8/23) with improvement in WBC (100K ? 12K). Diagnostic BMBx completed 8/20/22, which demonstrated B-ALL with 85% blasts and hypercellular marrow. IHC shows CD10+, CD34+. 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 ALL NGS negative. BCR/ABL1 major/minor (sent from peripheral blood) both negative. Initiated pre-phase steroids with Prednisone 60 mg x8/21; increased to 60 mg/m2 (140 mg)  8/22-8/24. She initiated GRAALL + imatinib (C1D1 = 8/24/22). S/p BMT consult 9/12 with Dr. Walsh. Induction was relatively uncomplicated. Post-induction BMBx done 9/26/22 and revealed MRD- CR1, with negative BCR/ABL1 and Clonoseq testing. With no matched related donors and excellent early/deep response chemotherapy is currently favored over BMT. 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 Underwent BMBx 10/25/22 with no morphologic evidence of B-lymphoblastic leukemia and flow with unusual B lineage precursor population 0.09%. C3 GRAALL + imatinib on 11/2/22 was tolerated well without complication. Prior plan was to continue GRAALL for a total of 8 cycles (of note odd cycles are 21-days in length and even cycles are 14-days in length), however, transitioned to Blinatumumab + Ponatinib as curative intent therapy given no matched sib donors. S/p Blincyto + Ponatinib (C1D1 = 12/14/22), complicated by neutropenic fever 2/2 pneumonia. Recent BMBx 1/17/2023: BCR-ABL p190 now detectable; Clonoseq pending.  - Port placed x1/17/23, labs within parameters to proceed with scheduled chemo as below.   - Will discuss with home infusion liaison in AM - per outpatient team, coverage is arranged.                - If C2 well tolerated without CRS concerns, subsequent Cycles of Blincyto can be done outpatient.  - Per Dr Hull, plan for repeat BMBx after Blincyto C1, C2 and C5 then every 3-6 months thereafter. Clonoseq to be sent with each BMBx.   - Monitor for signs or symptoms of CRS or neurotoxicity (immune effector cell-associated neurotoxicity syndrome=ICANS). Per updated blinatumomab management guidelines:  ? Consider 1 dose of tocilizumab 8 mg/kg IV with Grade 2 CRS  ? Interrupt treatment for Grade 3 CRS. Start dexamethasone 8 mg Q8H x3 days, then taper over 4 days. Resume Blincyto once CRS resolves. Consider tocilizumab use as well, 8 mg/kg, up to 4 total doses  (Q8H).  ? Consider single dose of dexamethasone 10 mg IV x1 for Grade 2 neurotoxicity (ICANS).  ? Hold blinatumomab for Grade 3 ICANS, until resolved to Grade 1 or better. Start dexamethasone 8 mg Q8H x up to 3 days, then taper over 4 days. Consider tocilizumab (same doses as for CRS).  ? Permanent discontinuation of treatment necessitated for Grade 4 CRS/ICANS.      Treatment Plan: Blincyto (C2D1 = 1/25/23)    Pre-meds: Dex 20 mg D1    Blincyto 28 mcg continuous D1-2 inpatient then continuous home infusion D3-28     Ponatinib 15 mg daily     IT Chemotherapy:    8/26/22: Cytarabine 40 mg/methotrexate 15 mg    8/31/22: Cytarabine 40 mg/methotrexate 15 mg    9/7/22: Cytarabine 40 mg/methotrexate 15 mg    9/14/22: Methotrexate 15 mg    10/21/22: Cytarabine 40 mg/methotrexate 15 mg    12/16/22: Methotrexate 12 mg    1/2/23; Cytarabine 100 mg    1/16/23: Methotrexate 12 mg     Four remaining IT chemo doses requested outpatient with IR q2 weeks.      # Anemia   # Leukopenia   Likely secondary to chemotherapy.  - Transfuse to keep Hgb >7. Of note, pt has history of transfusion reaction and requires premeds with Benadryl and Tylenol       # Recent PICC associated thrombus   Recent admission for cephalic vein thrombus (x1/4) associated with PICC line, non occlusive. No AC started as superficial and asymptomatic thrombus. A week prior, patient was found to have a malfunctioning vs malpositioned Port. S/p PICC removal and replacement of port via IR.   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 Presented with scattered papulovesicular lesions on her chin and nose as well as scattered hemorrhagic erosions on the scalp and lower face (V2-3 distributions of the trigeminal nerve) c/f disseminated VZV. Lesions significantly improved on admission. ID recommended considering decrease to 1000 mg daily on 12/13 visit (given less anticipated prolonged neutropenia  with new regimen) and confirmed with Dr. Hull.   - Continue Valtrex 1000 mg daily    # ID PPx   - Hold PTA fluconazole 200 mg daily and levofloxacin 250 mg daily given ANC >1.0   - Nebulized pentamidine last given 1/2, completed outpatient. Next due 1/30   - Received Evusheld on 11/2/22       MISC  # Hypertension   - Continue PTA amlodipine 5 mg daily      # Headaches, improved   On admission reports intermittent headaches x2-3 weeks following recent LP. Located on the top of her head and initially rated 8/10 with significant coughing. Over the past few days headaches improved with PRN tylenol and Esgic and now rated 3/10. No NV, vision changes or weakness with headaches. Of note, recently seen at local ED for headaches with CT head negative x1/21 but did show periventricular hypoattenuation suggestive of leukomalacia that may be treatment-related. She states that the headache does seem somewhat worse when she gets up or stands up but does not get better as she lays flat. She reports her most comfortable position is sitting up.   - MRI/MRV ordered on admission prior to more IT chemo; negative for acute path.     - Consider IR consult for blood patch if persistent given positional component of headaches, although her headaches are better with sitting upright which argues against. Suspect headaches most likely 2/2 Ponatanib as known culprit.   - PRN Esgic helpful      # GERD   - PTA Protonix 20 mg daily      # Grade 1 neuropathy of the fingertips   Due to vincristine. Pt reports symptoms are stable. Reports mild paresthesias and hot/cold sensitivity.   - Monitor clinically       FEN:   - IVF per chemo plan   - PRN lyte replacement, potassium repletion today   - RDAT       Misc:   - VTE: Lovenox declined by patient   - GI/PUD: PTA Protonix  - Bowels: PRN Senna and Miralax       Dispo: 3-5 day hospital stay for scheduled chemo then discharge to home with [facility] support.  Follow Up: Will need D14, D21 LP with IT chemo  under XRAY and AVELINA follow up with weekly labs via home infusions.      I spent >60 minutes face-to-face and/or coordinating or discussing care plan. Over 50% of our time on the unit was spent counseling the patient and/or coordinating care.     Discussed patient and plan with attending physician, Dr. Cook.      Maura Jorgensen PA-C   Hematology/Oncology   Pager: [phone]    Interval History   Overnight no acute events. Tolerate MRI without issues. 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 Stable mild headache rated 2/10. No associated nausea, weakness or vision changes. Good PO intake, no issues with bowels or bladder. Ambulating independently. Reviewed plan to discharge home tomorrow with [facility] and no LP prior.     Physical Exam   Temp: 98.1  F (36.7  C) Temp src: Oral BP: 126/68 Pulse: 89   Resp: 18 SpO2: 98 % O2 Device: None (Room air)    Vitals:    01/25/23 1537 01/26/23 0824   Weight: 108.9 kg (240 lb) 108.2 kg (238 lb 8 oz)     Vital Signs with Ranges  Temp:  [97.8  F (36.6  C)-98.7  F (37.1  C)] 98.1  F (36.7  C)  Pulse:  [80-89] 89  Resp:  [16-18] 18  BP: (115-134)/(53-73) 126/68  SpO2:  [96 %-98 %] 98 %  I/O last 3 completed shifts:  In: 400 [P.O.:300; I.V.:100]  Out: -     Constitutional: Pleasant female seen sitting up in bed. No apparent distress, and appears stated age.  Eyes: Lids and lashes normal, sclera clear, conjunctiva normal.   ENT: Normocephalic, without obvious abnormality, atraumatic, sinuses nontender on palpation, oral pharynx with moist mucus membranes, tonsils without erythema or exudates, gums normal and good dentition.   Respiratory: No increased work of breathing, good air exchange, clear to auscultation bilaterally, no crackles or wheezing.  Cardiovascular: Normal apical impulse, regular rate and rhythm, normal S1 and S2, and no murmur noted.  GI: No masses or scars. +BS. Soft. No tenderness on palpation.  Lymph/Hematologic: No cervical lymphadenopathy and no supraclavicular  lymphadenopathy.   Skin: No bruising or bleeding, normal skin color, texture, turgor, no redness, warmth, or swelling, no rashes, no lesions, no jaundice.   Extremities: There is no redness, warmth, or swelling of the joints. No lower extremity edema. No cyanosis.  Neurologic: Awake, alert, oriented to name, place and time.    Vascular access: port CDI      Medications     - MEDICATION INSTRUCTIONS -         amLODIPine  5 mg Oral Daily     aspirin  81 mg Oral Daily     blinatumomab (BLINCYTO) 24 hour infusion  28 mcg Intravenous Q24H     [START ON 1/27/2023] blinatumomab (BLINCYTO) 24 hour HOME infusion  28 mcg Intravenous Q24H     Chemotherapy Infusing-Continuous Infusion   Does not apply Q8H     PONATinib  15 mg Oral QPM     sennosides  1-2 tablet Oral BID     valACYclovir  1,000 mg Oral Daily       Data   Results for orders placed or performed during the hospital encounter of 01/25/23 (from the past 24 hour(s))   MRV Brain wo & w Contrast    Narrative    MRV of the head with and without contrast    History:  B-ALL, intermittent headaches on IT chemo.    Comparison:  none      Technique:   Head MRV: 2D time-of-flight MR venogram (MRV) of the head was  performed without intravenous contrast. Following intravenous  gadolinium-based contrast administration, a contrast enhanced MRV of  the intracranial vessels was performed.    Contrast: 10 mL gadolinium best    Findings:   Head MRV demonstrates no definite thrombosis or stenosis of the major  intracranial dural sinuses or deep cerebral veins. Dominant right  transverse sinus. Postcontrast MRV images do not demonstrate any  definite intraluminal filling defect.      Impression    Impression:  Head MRV demonstrates patent major dural and deep venous sinuses  intracranially.    I have personally reviewed the examination and initial interpretation  and I agree with the findings.    MACO GARZON MD         SYSTEM ID:  [id number]   CBC with platelets differential     Narrative    The following orders were created for panel order CBC with platelets differential.  Procedure                               Abnormality         Status                     ---------                               -----------         ------                     CBC with platelets and d...[[id number]]  Abnormal            Final result                 Please view results for these tests on the individual orders.   ABO/RH Type and Screen     Narrative    The following orders were created for panel order ABO/RH Type and Screen .  Procedure                               Abnormality         Status                     ---------                               -----------         ------                     Adult Type and Screen[[id number]]                            Final result                 Please view results for these tests on the individual orders.   Comprehensive metabolic panel   Result Value Ref Range    Sodium 142 136 - 145 mmol/L    Potassium 4.5 3.4 - 5.3 mmol/L    Chloride 110 (H) 98 - 107 mmol/L    Carbon Dioxide (CO2) 18 (L) 22 - 29 mmol/L    Anion Gap 14 7 - 15 mmol/L    Urea Nitrogen 9.6 6.0 - 20.0 mg/dL    Creatinine 0.49 (L) 0.51 - 0.95 mg/dL    Calcium 9.7 8.6 - 10.0 mg/dL    Glucose 181 (H) 70 - 99 mg/dL    Alkaline Phosphatase 70 35 - 104 U/L    AST 16 10 - 35 U/L    ALT 16 10 - 35 U/L    Protein Total 6.7 6.4 - 8.3 g/dL    Albumin 4.4 3.5 - 5.2 g/dL    Bilirubin Total 0.2 <=1.2 mg/dL    GFR Estimate >90 >60 mL/min/1.73m2   INR   Result Value Ref Range    INR 1.04 0.85 - 1.15   CBC with platelets and differential   Result Value Ref Range    WBC Count 2.3 (L) 4.0 - 11.0 10e3/uL    RBC Count 3.56 (L) 3.80 - 5.20 10e6/uL    Hemoglobin 10.9 (L) 11.7 - 15.7 g/dL    Hematocrit 34.8 (L) 35.0 - 47.0 %    MCV 98 78 - 100 fL    MCH 30.6 26.5 - 33.0 pg    MCHC 31.3 (L) 31.5 - 36.5 g/dL    RDW 16.0 (H) 10.0 - 15.0 %    Platelet Count 264 150 - 450 10e3/uL    % Neutrophils 84 %    % Lymphocytes 12 %    %  Monocytes 3 %    % Eosinophils 0 %    % Basophils 0 %    % Immature Granulocytes 1 %    NRBCs per 100 WBC 0 <1 /100    Absolute Neutrophils 2.0 1.6 - 8.3 10e3/uL    Absolute Lymphocytes 0.3 (L) 0.8 - 5.3 10e3/uL    Absolute Monocytes 0.1 0.0 - 1.3 10e3/uL    Absolute Eosinophils 0.0 0.0 - 0.7 10e3/uL    Absolute Basophils 0.0 0.0 - 0.2 10e3/uL    Absolute Immature Granulocytes 0.0 <=0.4 10e3/uL    Absolute NRBCs 0.0 10e3/uL   Adult Type and Screen   Result Value Ref Range    ABO/RH(D) O POS     Antibody Screen Negative Negative    SPECIMEN EXPIRATION DATE [id number]    Magnesium   Result Value Ref Range    Magnesium 2.2 1.7 - 2.3 mg/dL   Phosphorus   Result Value Ref Range    Phosphorus 3.5 2.5 - 4.5 mg/dL

## 2023-01-27 VITALS
TEMPERATURE: 98.9 F | RESPIRATION RATE: 18 BRPM | HEART RATE: 80 BPM | BODY MASS INDEX: 40.94 KG/M2 | DIASTOLIC BLOOD PRESSURE: 71 MMHG | WEIGHT: 238.5 LBS | SYSTOLIC BLOOD PRESSURE: 136 MMHG | OXYGEN SATURATION: 96 %

## 2023-01-27 LAB
ALBUMIN SERPL BCG-MCNC: 3.8 G/DL (ref 3.5–5.2)
ALP SERPL-CCNC: 55 U/L (ref 35–104)
ALT SERPL W P-5'-P-CCNC: 11 U/L (ref 10–35)
ANION GAP SERPL CALCULATED.3IONS-SCNC: 11 MMOL/L (ref 7–15)
AST SERPL W P-5'-P-CCNC: 13 U/L (ref 10–35)
BASOPHILS # BLD AUTO: 0 10E3/UL (ref 0–0.2)
BASOPHILS NFR BLD AUTO: 1 %
BILIRUB SERPL-MCNC: 0.2 MG/DL
BUN SERPL-MCNC: 11.7 MG/DL (ref 6–20)
CALCIUM SERPL-MCNC: 8.4 MG/DL (ref 8.6–10)
CHLORIDE SERPL-SCNC: 111 MMOL/L (ref 98–107)
CREAT SERPL-MCNC: 0.61 MG/DL (ref 0.51–0.95)
DEPRECATED HCO3 PLAS-SCNC: 20 MMOL/L (ref 22–29)
EOSINOPHIL # BLD AUTO: 0 10E3/UL (ref 0–0.7)
EOSINOPHIL NFR BLD AUTO: 1 %
ERYTHROCYTE [DISTWIDTH] IN BLOOD BY AUTOMATED COUNT: 16.4 % (ref 10–15)
GFR SERPL CREATININE-BSD FRML MDRD: >90 ML/MIN/1.73M2
GLUCOSE SERPL-MCNC: 98 MG/DL (ref 70–99)
HCT VFR BLD AUTO: 30.4 % (ref 35–47)
HGB BLD-MCNC: 9.5 G/DL (ref 11.7–15.7)
IMM GRANULOCYTES # BLD: 0 10E3/UL
IMM GRANULOCYTES NFR BLD: 1 %
INR PPP: 1.03 (ref 0.85–1.15)
LYMPHOCYTES # BLD AUTO: 0.8 10E3/UL (ref 0.8–5.3)
LYMPHOCYTES NFR BLD AUTO: 25 %
MCH RBC QN AUTO: 30.7 PG (ref 26.5–33)
MCHC RBC AUTO-ENTMCNC: 31.3 G/DL (ref 31.5–36.5)
MCV RBC AUTO: 98 FL (ref 78–100)
MONOCYTES # BLD AUTO: 0.6 10E3/UL (ref 0–1.3)
MONOCYTES NFR BLD AUTO: 19 %
NEUTROPHILS # BLD AUTO: 1.6 10E3/UL (ref 1.6–8.3)
NEUTROPHILS NFR BLD AUTO: 53 %
NRBC # BLD AUTO: 0 10E3/UL
NRBC BLD AUTO-RTO: 0 /100
PLATELET # BLD AUTO: 247 10E3/UL (ref 150–450)
POTASSIUM SERPL-SCNC: 3.8 MMOL/L (ref 3.4–5.3)
PROT SERPL-MCNC: 5.6 G/DL (ref 6.4–8.3)
RBC # BLD AUTO: 3.09 10E6/UL (ref 3.8–5.2)
SODIUM SERPL-SCNC: 142 MMOL/L (ref 136–145)
WBC # BLD AUTO: 3 10E3/UL (ref 4–11)

## 2023-01-27 PROCEDURE — 80053 COMPREHEN METABOLIC PANEL: CPT | Performed by: PHYSICIAN ASSISTANT

## 2023-01-27 PROCEDURE — 99239 HOSP IP/OBS DSCHRG MGMT >30: CPT | Mod: FS | Performed by: PHYSICIAN ASSISTANT

## 2023-01-27 PROCEDURE — 85049 AUTOMATED PLATELET COUNT: CPT | Performed by: PHYSICIAN ASSISTANT

## 2023-01-27 PROCEDURE — 250N000013 HC RX MED GY IP 250 OP 250 PS 637: Performed by: PHYSICIAN ASSISTANT

## 2023-01-27 PROCEDURE — 36415 COLL VENOUS BLD VENIPUNCTURE: CPT | Performed by: PHYSICIAN ASSISTANT

## 2023-01-27 PROCEDURE — 85610 PROTHROMBIN TIME: CPT | Performed by: PHYSICIAN ASSISTANT

## 2023-01-27 RX ADMIN — ASPIRIN 81 MG: 81 TABLET ORAL at 09:26

## 2023-01-27 RX ADMIN — VALACYCLOVIR HYDROCHLORIDE 1000 MG: 500 TABLET, FILM COATED ORAL at 09:26

## 2023-01-27 RX ADMIN — AMLODIPINE BESYLATE 5 MG: 5 TABLET ORAL at 09:26

## 2023-01-27 ASSESSMENT — ACTIVITIES OF DAILY LIVING (ADL)
ADLS_ACUITY_SCORE: 18

## 2023-01-27 NOTE — PLAN OF CARE
"1900-0700:     A&O x4. UAL. VSS on RA. Pt reports mild headache, prn esgic given with relief. Pt denies having any N/V or SOB. Last BM:  1/25, pt reports voiding spontaneously with AUOP. Blincyto infusing. Continue POC.           ICANS score     Score:   4  Orientation: Orientation to year, month, city, hospital: 4 points (1 point each)   3  Identify: Name 3 objects that nurse points to (e.g. clock, pen, button): 3 points (1 point each)   1  Following Commands: (e.g. show me two fingers or close your eyes and stick out your tongue): 1 point   1   Writing: Ability to write a standard sentence (see writing page \"The castro jumped over the log.\"): 1 point   1   Attention: Count backwards from 100 by 10s: 1 point     10   Total: Max 10, no impairment, reassess next shift                   **9 or below Notify MD and reassess in 4 hours       "

## 2023-01-27 NOTE — PLAN OF CARE
Goal Outcome Evaluation:       D/I:  Patient has successfully started Cycle 2 of Blincyto via Port.  Home Infusion to come at 1430 (per patient after they called her) to connect patient to ambulatory pump.  RN reviewed discharge paperwork with patient; April denies the need for any further instruction.  All of patient's personal belongings taken with her upon discharge.    A:  Patient appears to be tolerating Cycle 2 Blinyto well.    P:  Patient discharged to home via private car.

## 2023-01-27 NOTE — PLAN OF CARE
[id number]    Day 2 Blincyto. VSS, afebrile and on RA. A&Ox4. Ambulating in the halls several times throughout shift. Dose #1 Blincyto infusing at 10 ml/hr. ICANS score 10 x2. Port site . Rated headache 2/10 throughout shift, declined tylenol when offered. No nausea or SOB reported. Voiding spontaneously, not saving. LBM 1/25, declined scheduled senna & passing gas. Up independently. Home infusion to hook patient up to blincyto at 1500 and pt will discharge afterwards. Continue to monitor & w/ POC.

## 2023-01-27 NOTE — PROGRESS NOTES
Care Management Discharge Note    Discharge Date: 01/27/2023       Discharge Disposition:  Home with family    Discharge Services:  [facility]    Discharge DME:  Infusion supplies     Discharge Transportation:  Spouse will transport home    Private pay costs discussed: Not applicable    Education Provided on the Discharge Plan:  Yes   Persons Notified of Discharge Plans: Pt, [facility]  Patient/Family in Agreement with the Plan:  Yes     Handoff Referral Completed: No    Additional Information:  RNCC notified Pt will discharge today, likely around 1400. [facility] liaision updated and will meet Pt at the bedside prior to discharge for hookup and a RN will meet the Pt at her home around 1500. Orders placed for home infusion.     Pt's  at bedside and will tansport home.    No other discharge needs identified at this time.         Bernice Bird RN  RNCC Float

## 2023-01-27 NOTE — PROGRESS NOTES
"Nursing Focus: Chemotherapy - BLINCYTO    D: Insertion site is clean/dry/intact, dressing intact with no complaints of pain.  Urine output is recorded in intake in Doc Flowsheet.      I: Premedications given per order (see electronic medical administration record). Dose #2 of blincyto started to infuse over 24 hours. Reviewed pt teaching on chemotherapy side effects.  Pt denies need for further teaching. Chemotherapy double checked per protocol by two chemotherapy competent RN's.     A: Tolerating infusion well. Denies nausea and or pain.     P: Continue to monitor urine output and symptoms of nausea. Screen for symptoms of toxicity.      ICANS score     Score:   4  Orientation: Orientation to year, month, city, hospital: 4 points (1 point each)   3  Identify: Name 3 objects that nurse points to (e.g. clock, pen, button): 3 points (1 point each)   1  Following Commands: (e.g. show me two fingers or close your eyes and stick out your tongue): 1 point   1   Writing: Ability to write a standard sentence (see writing page \"The castro jumped over the log.\"): 1 point   1   Attention: Count backwards from 100 by 10s: 1 point     10   Total: Max 10, no impairment, reassess next shift                   **9 or below Notify MD and reassess in 4 hours     "

## 2023-01-29 DIAGNOSIS — C91.01 ACUTE LYMPHOBLASTIC LEUKEMIA (ALL) IN REMISSION (H): Primary | ICD-10-CM

## 2023-01-30 ENCOUNTER — LAB REQUISITION (OUTPATIENT)
Dept: LAB | Facility: CLINIC | Age: 43
End: 2023-01-30
Payer: COMMERCIAL

## 2023-01-30 DIAGNOSIS — C91.00 ACUTE LYMPHOBLASTIC LEUKEMIA NOT HAVING ACHIEVED REMISSION (H): ICD-10-CM

## 2023-01-30 LAB
ALBUMIN SERPL-MCNC: 4.1 G/DL (ref 3.4–5)
ALP SERPL-CCNC: 62 U/L (ref 40–150)
ALT SERPL W P-5'-P-CCNC: 19 U/L (ref 0–50)
ANION GAP SERPL CALCULATED.3IONS-SCNC: 7 MMOL/L (ref 3–14)
AST SERPL W P-5'-P-CCNC: 8 U/L (ref 0–45)
BASOPHILS # BLD AUTO: 0 10E3/UL (ref 0–0.2)
BASOPHILS NFR BLD AUTO: 0 %
BILIRUB SERPL-MCNC: 0.4 MG/DL (ref 0.2–1.3)
BUN SERPL-MCNC: 12 MG/DL (ref 7–30)
CALCIUM SERPL-MCNC: 9.3 MG/DL (ref 8.5–10.1)
CHLORIDE BLD-SCNC: 110 MMOL/L (ref 94–109)
CO2 SERPL-SCNC: 24 MMOL/L (ref 20–32)
CREAT SERPL-MCNC: 0.7 MG/DL (ref 0.52–1.04)
EOSINOPHIL # BLD AUTO: 0.1 10E3/UL (ref 0–0.7)
EOSINOPHIL NFR BLD AUTO: 4 %
ERYTHROCYTE [DISTWIDTH] IN BLOOD BY AUTOMATED COUNT: 15.6 % (ref 10–15)
GFR SERPL CREATININE-BSD FRML MDRD: >90 ML/MIN/1.73M2
GLUCOSE BLD-MCNC: 84 MG/DL (ref 70–99)
HCT VFR BLD AUTO: 37.8 % (ref 35–47)
HGB BLD-MCNC: 12.3 G/DL (ref 11.7–15.7)
IMM GRANULOCYTES # BLD: 0 10E3/UL
IMM GRANULOCYTES NFR BLD: 0 %
LYMPHOCYTES # BLD AUTO: 1 10E3/UL (ref 0.8–5.3)
LYMPHOCYTES NFR BLD AUTO: 32 %
MCH RBC QN AUTO: 30.8 PG (ref 26.5–33)
MCHC RBC AUTO-ENTMCNC: 32.5 G/DL (ref 31.5–36.5)
MCV RBC AUTO: 95 FL (ref 78–100)
MONOCYTES # BLD AUTO: 0.6 10E3/UL (ref 0–1.3)
MONOCYTES NFR BLD AUTO: 20 %
NEUTROPHILS # BLD AUTO: 1.3 10E3/UL (ref 1.6–8.3)
NEUTROPHILS NFR BLD AUTO: 44 %
NRBC # BLD AUTO: 0 10E3/UL
NRBC BLD AUTO-RTO: 0 /100
PLATELET # BLD AUTO: 349 10E3/UL (ref 150–450)
POTASSIUM BLD-SCNC: 3.8 MMOL/L (ref 3.4–5.3)
PROT SERPL-MCNC: 7.3 G/DL (ref 6.8–8.8)
RBC # BLD AUTO: 4 10E6/UL (ref 3.8–5.2)
SODIUM SERPL-SCNC: 141 MMOL/L (ref 133–144)
WBC # BLD AUTO: 3.1 10E3/UL (ref 4–11)

## 2023-01-30 PROCEDURE — 85004 AUTOMATED DIFF WBC COUNT: CPT | Performed by: INTERNAL MEDICINE

## 2023-01-30 PROCEDURE — 80053 COMPREHEN METABOLIC PANEL: CPT | Performed by: INTERNAL MEDICINE

## 2023-01-31 NOTE — PROGRESS NOTES
[facility]  Date of visit: Feb 2, 2023        Reason for Visit: Ph (+) B-ALL      Oncology HPI:   Followed by Dr. Hull. Patient presented to outside hospital with c/o right upper quadrant pain and was subsequently found to have a markedly elevated white blood cell count concerning for acute leukemia. While at OSH, she had CTA chest as well as CT A/P. These identified no PE, no acute or localized intraabdominal inflammatory process, mild splenomegaly, and few inconspicuous low-density structures in the liver. She was transferred to [facility] for further workup and management. S/p Hydrea 1g TID (8/21-8/23) with improvement in WBC (100K ? 12K). Diagnostic BMBx completed 8/20/22, which demonstrated B-ALL with 85% blasts and hypercellular marrow. IHC shows CD10+, CD34+. 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 ALL NGS negative. BCR/ABL1 major/minor (sent from peripheral blood) both negative. Initiated pre-phase steroids with Prednisone 60 mg x8/21; increased to 60 mg/m2 (140 mg) 8/22-8/24. She initiated GRAALL + imatinib (C1D1 = 8/24/22). S/p BMT consult 9/12 with Dr. Walsh. Induction was relatively uncomplicated. Post-induction BMBx done 9/26/22 and revealed MRD- CR1, with negative BCR/ABL1 and Clonoseq testing. With no matched related donors and excellent early/deep response chemotherapy is currently favored over BMT. 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 Underwent BMBx 10/25/22 with no morphologic evidence of B-lymphoblastic  leukemia and flow with unusual B lineage precursor population 0.09%. C3 GRAALL + imatinib on 11/2/22 was tolerated well without complication. Prior plan was to continue GRAALL for a total of 8 cycles (of note odd cycles are 21-days in length and even cycles are 14-days in length), however, transitioned to Blinatumumab + Ponatinib as curative intent therapy given no matched sib donors. S/p Blincyto + Ponatinib (C1D1 = 12/14/22), complicated by neutropenic fever 2/2 pneumonia. Recent BMBx 1/17/2023: BCR-ABL p190 now detectable; Clonoseq pending.  - Port placed x1/17/23  - Per Dr Hull, plan for repeat BMBx after Blincyto C1, C2 and C5 then every 3-6 months thereafter. Clonoseq to be sent    - C2 Blincyto, D1 = 1/25/23      Interval history:     April is doing well today. Headaches resolved, unclear etiology but have not recurred. Otherwise eating fine, hydrating. No fevers or chills. No pain today. No rash. Reviewed plan for next few weeks-               Current Outpatient Medications   Medication Sig Dispense Refill     amLODIPine (NORVASC) 5 MG tablet Take 1 tablet (5 mg) by mouth daily 30 tablet 4     aspirin (ASA) 81 MG EC tablet Take 1 tablet (81 mg) by mouth daily 30 tablet 1     CHOLECALCIFEROL PO Take 1 tablet by mouth daily Unknown strength.       PONATinib (ICLUSIG) 15 MG tablet Take 1 tablet (15 mg) by mouth daily -- start concurrent with blinatumomab infusion, planned for 12/12/22 30 tablet 3     valACYclovir (VALTREX) 1000 mg tablet Take 1 tablet (1,000 mg) by mouth daily 30 tablet 1     acetaminophen (TYLENOL) 325 MG tablet Take 2 tablets (650 mg) by mouth every 6 hours as needed for mild pain, other or fever (blood product administration premedication) (Patient not taking: Reported on 2/2/2023)       benzonatate (TESSALON) 100 MG capsule Take 1 capsule (100 mg) by mouth 3 times daily as needed for cough (Patient not taking: Reported on 2/2/2023) 30 capsule 0     blinatumomab 28 mcg Inject 28 mcg into  "the vein every 24 hours (Patient not taking: Reported on 2/2/2023)       guaiFENesin-dextromethorphan (ROBITUSSIN DM) 100-10 MG/5ML syrup Take 10 mLs by mouth every 4 hours as needed for cough or congestion (Patient not taking: Reported on 2/2/2023) 354 mL 0     lidocaine-prilocaine (EMLA) 2.5-2.5 % external cream Apply topically as needed (Apply 30 to 60minutes before Port Needle Access for pain) (Patient not taking: Reported on 2/2/2023) 30 g 3     lidocaine-prilocaine (LIDOPRIL) 2.5-2.5 % kit Use prior to lab access (Patient not taking: Reported on 2/2/2023) 1 kit 1     LORazepam (ATIVAN) 0.5 MG tablet Take 1-2 tablets (0.5-1 mg) by mouth daily as needed for anxiety . Take prior to BMBx procedures as needed. If initial dose unhelpful, okay to take a second 0.5-1 mg dose (for a max of 2 mg at a time). Caution: causes sedation. Do not drive after taking this medication. (Patient not taking: Reported on 2/2/2023) 10 tablet 0     ondansetron (ZOFRAN ODT) 4 MG ODT tab Take 1-2 tablets (4-8 mg) by mouth every 8 hours as needed for nausea or vomiting (Patient not taking: Reported on 2/2/2023) 60 tablet 0     prochlorperazine (COMPAZINE) 10 MG tablet Take 1 tablet (10 mg) by mouth every 6 hours as needed (Breakthrough Nausea/Vomiting) (Patient not taking: Reported on 2/2/2023) 20 tablet 0       Allergies   Allergen Reactions     Blood Transfusion Related (Informational Only) Hives     Hive reaction with platelet transfusion on 8/26/2022. Please administer platelets with tylenol and benadryl premedication.          Physical Exam:  /78 (BP Location: Right arm, Patient Position: Chair, Cuff Size: Adult Large)   Pulse 103   Temp 98.6  F (37  C) (Oral)   Resp 16   Ht 1.626 m (5' 4.02\")   Wt 107.2 kg (236 lb 5.3 oz)   SpO2 99%   BMI 40.55 kg/m    General: No acute distress.  HEENT: EOMI, PERRL. Sclerae are anicteric. Oral mucosa is pink and moist with no lesions or thrush.   Lymph: Neck is supple with no " lymphadenopathy in the cervical or supraclavicular areas.   Heart: Regular rate and rhythm.   Lungs: Clear to auscultation bilaterally.   Abdomen: Bowel sounds present, soft, nontender with no palpable hepatosplenomegaly or masses.   Extremities: No lower extremity edema noted bilaterally.   Neuro: Alert and oriented x3, CN grossly intact, steady gait  Skin: No rashes, petechiae, or bruising noted on exposed skin.      Labs:     I personally reviewed the following labs:    Most Recent 3 CBC's:  Recent Labs   Lab Test 02/02/23  1137 01/30/23  1215 01/27/23  0530   WBC 2.2* 3.1* 3.0*   HGB 11.7 12.3 9.5*   MCV 92 95 98    349 247     Most Recent 3 BMP's:  Recent Labs   Lab Test 02/02/23  1137 01/30/23  1215 01/27/23  0530    141 142   POTASSIUM 3.9 3.8 3.8   CHLORIDE 107 110* 111*   CO2 22 24 20*   BUN 7.6 12 11.7   CR 0.70 0.70 0.61   ANIONGAP 12 7 11   SOLEDAD 9.7 9.3 8.4*   GLC 94 84 98     Most Recent 2 LFT's:  Recent Labs   Lab Test 02/02/23  1137 01/30/23  1215   AST 15 8   ALT 14 19   ALKPHOS 68 62   BILITOTAL 0.3 0.4           Imaging: n/a      Impression/plan:     # Ph(+) B-ALL   Followed by Dr. Hull. Patient presented to outside hospital with c/o right upper quadrant pain and was subsequently found to have a markedly elevated white blood cell count concerning for acute leukemia. While at OSH, she had CTA chest as well as CT A/P. These identified no PE, no acute or localized intraabdominal inflammatory process, mild splenomegaly, and few inconspicuous low-density structures in the liver. She was transferred to [facility] for further workup and management. S/p Hydrea 1g TID (8/21-8/23) with improvement in WBC (100K ? 12K). Diagnostic BMBx completed 8/20/22, which demonstrated B-ALL with 85% blasts and hypercellular marrow. IHC shows CD10+, CD34+. 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 ALL NGS negative. BCR/ABL1 major/minor (sent from peripheral blood) both negative. Initiated pre-phase steroids with Prednisone 60 mg x8/21; increased to 60 mg/m2 (140 mg) 8/22-8/24. She initiated GRAALL + imatinib (C1D1 = 8/24/22). S/p BMT consult 9/12 with Dr. Walsh. Induction was relatively uncomplicated. Post-induction BMBx done 9/26/22 and revealed MRD- CR1, with negative BCR/ABL1 and Clonoseq testing. With no matched related donors and excellent early/deep response chemotherapy is currently favored over BMT. 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 Underwent BMBx 10/25/22 with no morphologic evidence of B-lymphoblastic leukemia and flow with unusual B lineage precursor population 0.09%. C3 GRAALL + imatinib on 11/2/22 was tolerated well without complication. Prior plan was to continue GRAALL for a total of 8 cycles (of note odd cycles are 21-days in length and even cycles are 14-days in length), however, transitioned to Blinatumumab + Ponatinib as curative intent therapy given no matched sib donors. S/p Blincyto + Ponatinib (C1D1 = 12/14/22), complicated by neutropenic fever 2/2 pneumonia. Recent BMBx 1/17/2023: BCR-ABL p190 now detectable; Clonoseq pending.  - Port placed x1/17/23  - Resumed Blincyto C2D1 = 1/25/23, with Ponatinib 15 mg daily  - Currently Blincyto C2D9 (2/2) with continuous Ponatinib 15 mg daily     IT Chemotherapy:    8/26/22: Cytarabine 40 mg/methotrexate 15 mg    8/31/22: Cytarabine 40 mg/methotrexate 15 mg    9/7/22: Cytarabine 40 mg/methotrexate 15 mg    9/14/22: Methotrexate 15 mg    10/21/22: Cytarabine 40 mg/methotrexate 15 mg    11/25/22:  Cytarabine 40 mg    12/16/22: Methotrexate 12 mg    1/2/23; Cytarabine 100 mg    1/16/23: Methotrexate 12 mg     2/2/23: Cytarabine 100 mg    Needs 2 more-- requesting ~2/23 and early March      - Subsequent cycles of Blina can be initiated OP  - Per Dr Hull, plan for repeat BMBx after Blincyto C1, C2 and C5 then every 3-6 months thereafter. Clonoseq to be sent with each BMBx.  - Requested next sedated bmbx ~2/27  - RTC ~3/8 to review biopsy and initiate C3 blina    # Anemia   # Leukopenia   Likely secondary to chemotherapy.  - Transfuse to keep Hgb >7. Of note, pt has history of transfusion reaction and requires premeds with Benadryl and Tylenol       # Recent PICC associated thrombus   Recent admission for cephalic vein thrombus (x1/4) associated with PICC line, non occlusive. No AC started as superficial and asymptomatic thrombus. A week prior, patient was found to have a malfunctioning vs malpositioned Port. S/p PICC removal and replacement of port via IR.   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 Presented with scattered papulovesicular lesions on her chin and nose as well as scattered hemorrhagic erosions on the scalp and lower face (V2-3 distributions of the trigeminal nerve) c/f disseminated VZV. Lesions significantly improved on admission. ID recommended considering decrease to 1000 mg daily on 12/13 visit (given less anticipated prolonged neutropenia with new regimen) and confirmed with Dr. Hull.   - Continue Valtrex 1000 mg daily    # ID PPx   - Hold fluconazole 200 mg daily and levofloxacin 250 mg daily given ANC >1.0   - Nebulized pentamidine last given 1/2, completed outpatient. Next due 1/30   - Received Evusheld on 11/2/22         # Hypertension   - Continue amlodipine 5 mg daily      # Headaches, resolved  On admission reports intermittent headaches x2-3 weeks following recent LP. Located on the top of her head and initially  rated 8/10 with significant coughing. Over the past few days headaches improved with PRN tylenol and Esgic and now rated 3/10. No NV, vision changes or weakness with headaches. Of note, recently seen at local ED for headaches with CT head negative x1/21 but did show periventricular hypoattenuation suggestive of leukomalacia that may be treatment-related. She states that the headache does seem somewhat worse when she gets up or stands up but does not get better as she lays flat. She reports her most comfortable position is sitting up.   - MRI/MRV negative for acute path.     - Consider IR consult for blood patch if persistent given positional component of headaches, although her headaches are better with sitting upright which argues against. Suspect headaches most likely 2/2 Ponatanib as known culprit.   - PRN Esgic helpful      # GERD   - Protonix 20 mg daily      # Grade 1 neuropathy of the fingertips   Due to vincristine. Pt reports symptoms are stable. Reports mild paresthesias and hot/cold sensitivity.   - Monitor clinically                 Roya Quarles [facility]    40 minutes spent on the date of the encounter doing chart review, review of test results, interpretation of tests, patient visit, documentation and discussion with other provider(s)

## 2023-01-31 NOTE — PROGRESS NOTES
BMT ONC Adult Lumbar Puncture and Intrathecal Chemotherapy Administration Procedure Note-- in Interventional Radiology    Feb 2, 2023      Procedure: Lumbar Puncture to obtain CSF and give intrathecal chemotherapy    Diagnosis: Ph (+) B-ALL    Learning needs assessment complete within 12 months: YES     Vitals reviewed:  YES    Informed Consent: Procedure, benefits, risks, and alternatives explained to the patient who voiced understanding of the information and agreed to proceed with lumbar puncture. Risks include bleeding, headache, and or infection.   Patient safety checklist completed.    Labs: Reviewed:     Lab Results   Component Value Date    INR 1.03 01/27/2023     01/30/2023     09/22/2003     After collection of CSF from interventional radiologist, administered intrathecal chemotherapy.       Cytarabine in 6 mL of 0.9% preservative free sodium chloride was administered intrathecally slowly in a barbotage fashion.  The needle was removed and a bandage was applied to the site.  Chemotherapy double-check was performed per institutional policy.  Patient tolerated well.    Disposition: Patient will remain on back for 1 hour post procedure. Avoid soaking in a tub tonight.  Call if develops headaches, fevers and or chills.     Performed by:   LOAN Garcia CNP

## 2023-02-01 LAB
ABO/RH(D): NORMAL
ANTIBODY SCREEN: NEGATIVE
SPECIMEN EXPIRATION DATE: NORMAL

## 2023-02-02 ENCOUNTER — ONCOLOGY VISIT (OUTPATIENT)
Dept: ONCOLOGY | Facility: CLINIC | Age: 43
End: 2023-02-02
Attending: NURSE PRACTITIONER
Payer: COMMERCIAL

## 2023-02-02 ENCOUNTER — LAB (OUTPATIENT)
Dept: LAB | Facility: CLINIC | Age: 43
End: 2023-02-02
Attending: INTERNAL MEDICINE
Payer: COMMERCIAL

## 2023-02-02 ENCOUNTER — ANCILLARY PROCEDURE (OUTPATIENT)
Dept: GENERAL RADIOLOGY | Facility: CLINIC | Age: 43
End: 2023-02-02
Attending: NURSE PRACTITIONER
Payer: COMMERCIAL

## 2023-02-02 VITALS — DIASTOLIC BLOOD PRESSURE: 88 MMHG | SYSTOLIC BLOOD PRESSURE: 148 MMHG | HEART RATE: 109 BPM

## 2023-02-02 VITALS
WEIGHT: 236.33 LBS | HEIGHT: 64 IN | DIASTOLIC BLOOD PRESSURE: 78 MMHG | OXYGEN SATURATION: 99 % | BODY MASS INDEX: 40.35 KG/M2 | TEMPERATURE: 98.6 F | SYSTOLIC BLOOD PRESSURE: 119 MMHG | HEART RATE: 103 BPM | RESPIRATION RATE: 16 BRPM

## 2023-02-02 VITALS — WEIGHT: 236.3 LBS | BODY MASS INDEX: 40.56 KG/M2 | RESPIRATION RATE: 16 BRPM

## 2023-02-02 DIAGNOSIS — C91.01 ACUTE LYMPHOBLASTIC LEUKEMIA (ALL) IN REMISSION (H): ICD-10-CM

## 2023-02-02 DIAGNOSIS — C91.00 ACUTE LYMPHOBLASTIC LEUKEMIA (ALL) NOT HAVING ACHIEVED REMISSION (H): ICD-10-CM

## 2023-02-02 DIAGNOSIS — C91.01 ACUTE LYMPHOBLASTIC LEUKEMIA (ALL) IN REMISSION (H): Primary | ICD-10-CM

## 2023-02-02 DIAGNOSIS — C91.01 ACUTE LYMPHOCYTIC LEUKEMIA IN REMISSION (H): Primary | ICD-10-CM

## 2023-02-02 DIAGNOSIS — Z76.89 PROPHYLAXIS FOR CHEMOTHERAPY-INDUCED NEUTROPENIA: Primary | ICD-10-CM

## 2023-02-02 LAB
ALBUMIN SERPL BCG-MCNC: 4.5 G/DL (ref 3.5–5.2)
ALP SERPL-CCNC: 68 U/L (ref 35–104)
ALT SERPL W P-5'-P-CCNC: 14 U/L (ref 10–35)
ANION GAP SERPL CALCULATED.3IONS-SCNC: 12 MMOL/L (ref 7–15)
APPEARANCE CSF: CLEAR
AST SERPL W P-5'-P-CCNC: 15 U/L (ref 10–35)
BASOPHILS # BLD MANUAL: 0 10E3/UL (ref 0–0.2)
BASOPHILS NFR BLD MANUAL: 1 %
BILIRUB SERPL-MCNC: 0.3 MG/DL
BUN SERPL-MCNC: 7.6 MG/DL (ref 6–20)
BURR CELLS BLD QL SMEAR: SLIGHT
CALCIUM SERPL-MCNC: 9.7 MG/DL (ref 8.6–10)
CHLORIDE SERPL-SCNC: 107 MMOL/L (ref 98–107)
COLOR CSF: COLORLESS
CREAT SERPL-MCNC: 0.7 MG/DL (ref 0.51–0.95)
DACRYOCYTES BLD QL SMEAR: SLIGHT
DEPRECATED HCO3 PLAS-SCNC: 22 MMOL/L (ref 22–29)
EOSINOPHIL # BLD MANUAL: 0.1 10E3/UL (ref 0–0.7)
EOSINOPHIL NFR BLD MANUAL: 4 %
ERYTHROCYTE [DISTWIDTH] IN BLOOD BY AUTOMATED COUNT: 15.2 % (ref 10–15)
GFR SERPL CREATININE-BSD FRML MDRD: >90 ML/MIN/1.73M2
GLUCOSE CSF-MCNC: 47 MG/DL (ref 40–70)
GLUCOSE SERPL-MCNC: 94 MG/DL (ref 70–99)
HCT VFR BLD AUTO: 35.2 % (ref 35–47)
HGB BLD-MCNC: 11.7 G/DL (ref 11.7–15.7)
HOLD SPECIMEN: NORMAL
LIPASE SERPL-CCNC: 20 U/L (ref 13–60)
LYMPHOCYTES # BLD MANUAL: 1 10E3/UL (ref 0.8–5.3)
LYMPHOCYTES NFR BLD MANUAL: 45 %
MCH RBC QN AUTO: 30.5 PG (ref 26.5–33)
MCHC RBC AUTO-ENTMCNC: 33.2 G/DL (ref 31.5–36.5)
MCV RBC AUTO: 92 FL (ref 78–100)
MONOCYTES # BLD MANUAL: 0.4 10E3/UL (ref 0–1.3)
MONOCYTES NFR BLD MANUAL: 20 %
NEUTROPHILS # BLD MANUAL: 0.7 10E3/UL (ref 1.6–8.3)
NEUTROPHILS NFR BLD MANUAL: 30 %
PLAT MORPH BLD: ABNORMAL
PLATELET # BLD AUTO: 282 10E3/UL (ref 150–450)
POTASSIUM SERPL-SCNC: 3.9 MMOL/L (ref 3.4–5.3)
PROT CSF-MCNC: 66 MG/DL (ref 15–45)
PROT SERPL-MCNC: 6.7 G/DL (ref 6.4–8.3)
RBC # BLD AUTO: 3.83 10E6/UL (ref 3.8–5.2)
RBC # CSF MANUAL: 11 /UL (ref 0–2)
RBC MORPH BLD: ABNORMAL
SODIUM SERPL-SCNC: 141 MMOL/L (ref 136–145)
TUBE # CSF: 3
WBC # BLD AUTO: 2.2 10E3/UL (ref 4–11)
WBC # CSF MANUAL: 11 /UL (ref 0–5)

## 2023-02-02 PROCEDURE — 88184 FLOWCYTOMETRY/ TC 1 MARKER: CPT | Performed by: NURSE PRACTITIONER

## 2023-02-02 PROCEDURE — 99207 PR NO BILLABLE SERVICE THIS VISIT: CPT | Performed by: NURSE PRACTITIONER

## 2023-02-02 PROCEDURE — 85007 BL SMEAR W/DIFF WBC COUNT: CPT | Performed by: NURSE PRACTITIONER

## 2023-02-02 PROCEDURE — 80053 COMPREHEN METABOLIC PANEL: CPT | Performed by: NURSE PRACTITIONER

## 2023-02-02 PROCEDURE — 96450 CHEMOTHERAPY INTO CNS: CPT | Performed by: NURSE PRACTITIONER

## 2023-02-02 PROCEDURE — 83690 ASSAY OF LIPASE: CPT | Performed by: NURSE PRACTITIONER

## 2023-02-02 PROCEDURE — 96450 CHEMOTHERAPY INTO CNS: CPT | Performed by: PHYSICIAN ASSISTANT

## 2023-02-02 PROCEDURE — 99212 OFFICE O/P EST SF 10 MIN: CPT | Performed by: NURSE PRACTITIONER

## 2023-02-02 PROCEDURE — 86901 BLOOD TYPING SEROLOGIC RH(D): CPT | Performed by: NURSE PRACTITIONER

## 2023-02-02 PROCEDURE — 77003 FLUOROGUIDE FOR SPINE INJECT: CPT | Performed by: PHYSICIAN ASSISTANT

## 2023-02-02 PROCEDURE — 89051 BODY FLUID CELL COUNT: CPT | Performed by: PATHOLOGY

## 2023-02-02 PROCEDURE — 99000 SPECIMEN HANDLING OFFICE-LAB: CPT | Performed by: PATHOLOGY

## 2023-02-02 PROCEDURE — 88185 FLOWCYTOMETRY/TC ADD-ON: CPT | Performed by: NURSE PRACTITIONER

## 2023-02-02 PROCEDURE — G0463 HOSPITAL OUTPT CLINIC VISIT: HCPCS

## 2023-02-02 PROCEDURE — 85027 COMPLETE CBC AUTOMATED: CPT | Performed by: NURSE PRACTITIONER

## 2023-02-02 PROCEDURE — 84157 ASSAY OF PROTEIN OTHER: CPT | Performed by: PATHOLOGY

## 2023-02-02 PROCEDURE — 99215 OFFICE O/P EST HI 40 MIN: CPT | Performed by: NURSE PRACTITIONER

## 2023-02-02 PROCEDURE — 88188 FLOWCYTOMETRY/READ 9-15: CPT | Performed by: STUDENT IN AN ORGANIZED HEALTH CARE EDUCATION/TRAINING PROGRAM

## 2023-02-02 PROCEDURE — 36415 COLL VENOUS BLD VENIPUNCTURE: CPT | Performed by: NURSE PRACTITIONER

## 2023-02-02 PROCEDURE — 82945 GLUCOSE OTHER FLUID: CPT | Performed by: PATHOLOGY

## 2023-02-02 RX ORDER — HEPARIN SODIUM (PORCINE) LOCK FLUSH IV SOLN 100 UNIT/ML 100 UNIT/ML
5 SOLUTION INTRAVENOUS
Status: DISCONTINUED | OUTPATIENT
Start: 2023-02-02 | End: 2023-02-02 | Stop reason: HOSPADM

## 2023-02-02 RX ORDER — LIDOCAINE HYDROCHLORIDE 10 MG/ML
5 INJECTION, SOLUTION EPIDURAL; INFILTRATION; INTRACAUDAL; PERINEURAL ONCE
Status: COMPLETED | OUTPATIENT
Start: 2023-02-02 | End: 2023-02-02

## 2023-02-02 RX ADMIN — LIDOCAINE HYDROCHLORIDE 5 ML: 10 INJECTION, SOLUTION EPIDURAL; INFILTRATION; INTRACAUDAL; PERINEURAL at 14:08

## 2023-02-02 ASSESSMENT — PAIN SCALES - GENERAL
PAINLEVEL: NO PAIN (0)
PAINLEVEL: NO PAIN (0)

## 2023-02-02 NOTE — DISCHARGE INSTRUCTIONS
Discharge instructions for Lumbar Puncture           AFTER YOU GO HOME      Relax and take it easy for 24 hours  You may resume normal activity after the 24 hour period  You may develop a headache that will normally go away on its own in 1 to 2 days. Lying down should help relieve this pain.  If you develop a headache you can take over the counter medicines and lay down.  You can try drinking caffeine-coffee, soda.   Drink at least 4 glasses of extra fluid today if not on a fluid restriction  Continue to take your medications as ordered by your provider  You may remove the bandage and resume bathing the next day  DO NOT drive or operate machinery at home or at work for at least 24 hours               CALL YOU PRIMARY PHYSICIAN IF:    Severe head or neck pain that doesn't go away or that gets worse  You feel less alert or have trouble waking up  Repeated upset stomach (nausea) or vomiting  Swelling, pain, bruising, or redness at the puncture site  Fever of 100.4 F (38 C) or higher, or as advised by your provider  If you start to leak a large amount of fluid from the puncture site (also, lie down flat on your back)      Date: 2/2/23  Provider: Jaya ADAME PA-C, Interventional Radiology, [facility]    [facility]  [address] [place] [address]

## 2023-02-02 NOTE — LETTER
2/2/2023         RE: Vashti Villegas  [address] [place] [address]        Dear Colleague,    Thank you for referring your patient, Vashti Villegas, to the [facility]. Please see a copy of my visit note below.    [facility]  Date of visit: Feb 2, 2023        Reason for Visit: Ph (+) B-ALL      Oncology HPI:   Followed by Dr. Hull. Patient presented to outside hospital with c/o right upper quadrant pain and was subsequently found to have a markedly elevated white blood cell count concerning for acute leukemia. While at OSH, she had CTA chest as well as CT A/P. These identified no PE, no acute or localized intraabdominal inflammatory process, mild splenomegaly, and few inconspicuous low-density structures in the liver. She was transferred to [facility] for further workup and management. S/p Hydrea 1g TID (8/21-8/23) with improvement in WBC (100K ? 12K). Diagnostic BMBx completed 8/20/22, which demonstrated B-ALL with 85% blasts and hypercellular marrow. IHC shows CD10+, CD34+. 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 ALL NGS negative. BCR/ABL1 major/minor (sent from peripheral blood) both negative. Initiated pre-phase steroids with Prednisone 60 mg x8/21; increased to 60 mg/m2 (140 mg) 8/22-8/24. She initiated GRAALL + imatinib (C1D1 = 8/24/22). S/p BMT consult 9/12 with Dr. Walsh. Induction was relatively uncomplicated. Post-induction BMBx done 9/26/22 and revealed MRD- CR1, with negative BCR/ABL1 and Clonoseq testing. With no matched related donors  and excellent early/deep response chemotherapy is currently favored over BMT. 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 Underwent BMBx 10/25/22 with no morphologic evidence of B-lymphoblastic leukemia and flow with unusual B lineage precursor population 0.09%. C3 GRAALL + imatinib on 11/2/22 was tolerated well without complication. Prior plan was to continue GRAALL for a total of 8 cycles (of note odd cycles are 21-days in length and even cycles are 14-days in length), however, transitioned to Blinatumumab + Ponatinib as curative intent therapy given no matched sib donors. S/p Blincyto + Ponatinib (C1D1 = 12/14/22), complicated by neutropenic fever 2/2 pneumonia. Recent BMBx 1/17/2023: BCR-ABL p190 now detectable; Clonoseq pending.  - Port placed x1/17/23  - Per Dr Hull, plan for repeat BMBx after Blincyto C1, C2 and C5 then every 3-6 months thereafter. Clonoseq to be sent    - C2 Blincyto, D1 = 1/25/23      Interval history:     April is doing well today. Headaches resolved, unclear etiology but have not recurred. Otherwise eating fine, hydrating. No fevers or chills. No pain today. No rash. Reviewed plan for next few weeks-               Current Outpatient Medications   Medication Sig Dispense Refill     amLODIPine (NORVASC) 5 MG tablet Take 1 tablet (5 mg) by mouth daily 30 tablet 4     aspirin (ASA) 81 MG EC tablet Take 1 tablet (81 mg) by mouth daily 30 tablet 1     CHOLECALCIFEROL PO Take 1 tablet by mouth daily Unknown strength.       PONATinib (ICLUSIG) 15 MG tablet Take 1 tablet (15 mg) by mouth daily -- start concurrent with blinatumomab infusion, planned for 12/12/22 30 tablet 3     valACYclovir (VALTREX) 1000 mg tablet Take 1 tablet (1,000 mg) by mouth daily 30 tablet 1     acetaminophen (TYLENOL) 325 MG tablet Take 2 tablets (650 mg) by mouth every 6 hours as needed for mild pain, other or fever (blood product administration premedication)  (Patient not taking: Reported on 2/2/2023)       benzonatate (TESSALON) 100 MG capsule Take 1 capsule (100 mg) by mouth 3 times daily as needed for cough (Patient not taking: Reported on 2/2/2023) 30 capsule 0     blinatumomab 28 mcg Inject 28 mcg into the vein every 24 hours (Patient not taking: Reported on 2/2/2023)       guaiFENesin-dextromethorphan (ROBITUSSIN DM) 100-10 MG/5ML syrup Take 10 mLs by mouth every 4 hours as needed for cough or congestion (Patient not taking: Reported on 2/2/2023) 354 mL 0     lidocaine-prilocaine (EMLA) 2.5-2.5 % external cream Apply topically as needed (Apply 30 to 60minutes before Port Needle Access for pain) (Patient not taking: Reported on 2/2/2023) 30 g 3     lidocaine-prilocaine (LIDOPRIL) 2.5-2.5 % kit Use prior to lab access (Patient not taking: Reported on 2/2/2023) 1 kit 1     LORazepam (ATIVAN) 0.5 MG tablet Take 1-2 tablets (0.5-1 mg) by mouth daily as needed for anxiety . Take prior to BMBx procedures as needed. If initial dose unhelpful, okay to take a second 0.5-1 mg dose (for a max of 2 mg at a time). Caution: causes sedation. Do not drive after taking this medication. (Patient not taking: Reported on 2/2/2023) 10 tablet 0     ondansetron (ZOFRAN ODT) 4 MG ODT tab Take 1-2 tablets (4-8 mg) by mouth every 8 hours as needed for nausea or vomiting (Patient not taking: Reported on 2/2/2023) 60 tablet 0     prochlorperazine (COMPAZINE) 10 MG tablet Take 1 tablet (10 mg) by mouth every 6 hours as needed (Breakthrough Nausea/Vomiting) (Patient not taking: Reported on 2/2/2023) 20 tablet 0       Allergies   Allergen Reactions     Blood Transfusion Related (Informational Only) Hives     Hive reaction with platelet transfusion on 8/26/2022. Please administer platelets with tylenol and benadryl premedication.          Physical Exam:  /78 (BP Location: Right arm, Patient Position: Chair, Cuff Size: Adult Large)   Pulse 103   Temp 98.6  F (37  C) (Oral)   Resp 16   Ht  "1.626 m (5' 4.02\")   Wt 107.2 kg (236 lb 5.3 oz)   SpO2 99%   BMI 40.55 kg/m    General: No acute distress.  HEENT: EOMI, PERRL. Sclerae are anicteric. Oral mucosa is pink and moist with no lesions or thrush.   Lymph: Neck is supple with no lymphadenopathy in the cervical or supraclavicular areas.   Heart: Regular rate and rhythm.   Lungs: Clear to auscultation bilaterally.   Abdomen: Bowel sounds present, soft, nontender with no palpable hepatosplenomegaly or masses.   Extremities: No lower extremity edema noted bilaterally.   Neuro: Alert and oriented x3, CN grossly intact, steady gait  Skin: No rashes, petechiae, or bruising noted on exposed skin.      Labs:     I personally reviewed the following labs:    Most Recent 3 CBC's:  Recent Labs   Lab Test 02/02/23  1137 01/30/23  1215 01/27/23  0530   WBC 2.2* 3.1* 3.0*   HGB 11.7 12.3 9.5*   MCV 92 95 98    349 247     Most Recent 3 BMP's:  Recent Labs   Lab Test 02/02/23  1137 01/30/23  1215 01/27/23  0530    141 142   POTASSIUM 3.9 3.8 3.8   CHLORIDE 107 110* 111*   CO2 22 24 20*   BUN 7.6 12 11.7   CR 0.70 0.70 0.61   ANIONGAP 12 7 11   SOLEDAD 9.7 9.3 8.4*   GLC 94 84 98     Most Recent 2 LFT's:  Recent Labs   Lab Test 02/02/23  1137 01/30/23  1215   AST 15 8   ALT 14 19   ALKPHOS 68 62   BILITOTAL 0.3 0.4           Imaging: n/a      Impression/plan:     # Ph(+) B-ALL   Followed by Dr. Hull. Patient presented to outside hospital with c/o right upper quadrant pain and was subsequently found to have a markedly elevated white blood cell count concerning for acute leukemia. While at OSH, she had CTA chest as well as CT A/P. These identified no PE, no acute or localized intraabdominal inflammatory process, mild splenomegaly, and few inconspicuous low-density structures in the liver. She was transferred to [facility] for further workup and management. S/p Hydrea 1g TID (8/21-8/23) with improvement in WBC (100K ? 12K). Diagnostic BMBx completed 8/20/22, which " demonstrated B-ALL with 85% blasts and hypercellular marrow. IHC shows CD10+, CD34+. 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 ALL NGS negative. BCR/ABL1 major/minor (sent from peripheral blood) both negative. Initiated pre-phase steroids with Prednisone 60 mg x8/21; increased to 60 mg/m2 (140 mg) 8/22-8/24. She initiated GRAALL + imatinib (C1D1 = 8/24/22). S/p BMT consult 9/12 with Dr. Walsh. Induction was relatively uncomplicated. Post-induction BMBx done 9/26/22 and revealed MRD- CR1, with negative BCR/ABL1 and Clonoseq testing. With no matched related donors and excellent early/deep response chemotherapy is currently favored over BMT. 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 Underwent BMBx 10/25/22 with no morphologic evidence of B-lymphoblastic leukemia and flow with unusual B lineage precursor population 0.09%. C3 GRAALL + imatinib on 11/2/22 was tolerated well without complication. Prior plan was to continue GRAALL for a total of 8 cycles (of note odd cycles are 21-days in length and even cycles are 14-days in length), however, transitioned to Blinatumumab + Ponatinib as curative intent therapy given no matched sib donors. S/p Blincyto + Ponatinib (C1D1 = 12/14/22), complicated by neutropenic fever 2/2 pneumonia. Recent BMBx 1/17/2023: BCR-ABL p190 now detectable; Clonoseq pending.  - Port placed x1/17/23  - Resumed Blincyto C2D1 = 1/25/23, with Ponatinib 15 mg daily  - Currently Blincyto C2D9 (2/2) with continuous Ponatinib 15 mg daily     IT  Chemotherapy:    8/26/22: Cytarabine 40 mg/methotrexate 15 mg    8/31/22: Cytarabine 40 mg/methotrexate 15 mg    9/7/22: Cytarabine 40 mg/methotrexate 15 mg    9/14/22: Methotrexate 15 mg    10/21/22: Cytarabine 40 mg/methotrexate 15 mg    11/25/22: Cytarabine 40 mg    12/16/22: Methotrexate 12 mg    1/2/23; Cytarabine 100 mg    1/16/23: Methotrexate 12 mg     2/2/23: Cytarabine 100 mg    Needs 2 more-- requesting ~2/23 and early March      - Subsequent cycles of Blina can be initiated OP  - Per Dr Hull, plan for repeat BMBx after Blincyto C1, C2 and C5 then every 3-6 months thereafter. Clonoseq to be sent with each BMBx.  - Requested next sedated bmbx ~2/27  - RTC ~3/8 to review biopsy and initiate C3 blina    # Anemia   # Leukopenia   Likely secondary to chemotherapy.  - Transfuse to keep Hgb >7. Of note, pt has history of transfusion reaction and requires premeds with Benadryl and Tylenol       # Recent PICC associated thrombus   Recent admission for cephalic vein thrombus (x1/4) associated with PICC line, non occlusive. No AC started as superficial and asymptomatic thrombus. A week prior, patient was found to have a malfunctioning vs malpositioned Port. S/p PICC removal and replacement of port via IR.   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 Presented with scattered papulovesicular lesions on her chin and nose as well as scattered hemorrhagic erosions on the scalp and lower face (V2-3 distributions of the trigeminal nerve) c/f disseminated VZV. Lesions significantly improved on admission. ID recommended considering decrease to 1000 mg daily on 12/13 visit (given less anticipated prolonged neutropenia with new regimen) and confirmed with Dr. Hull.   - Continue Valtrex 1000 mg daily    # ID PPx   - Hold fluconazole 200 mg daily and levofloxacin 250 mg daily given ANC >1.0   - Nebulized pentamidine last given 1/2, completed outpatient. Next  due 1/30   - Received Evusheld on 11/2/22         # Hypertension   - Continue amlodipine 5 mg daily      # Headaches, resolved  On admission reports intermittent headaches x2-3 weeks following recent LP. Located on the top of her head and initially rated 8/10 with significant coughing. Over the past few days headaches improved with PRN tylenol and Esgic and now rated 3/10. No NV, vision changes or weakness with headaches. Of note, recently seen at local ED for headaches with CT head negative x1/21 but did show periventricular hypoattenuation suggestive of leukomalacia that may be treatment-related. She states that the headache does seem somewhat worse when she gets up or stands up but does not get better as she lays flat. She reports her most comfortable position is sitting up.   - MRI/MRV negative for acute path.     - Consider IR consult for blood patch if persistent given positional component of headaches, although her headaches are better with sitting upright which argues against. Suspect headaches most likely 2/2 Ponatanib as known culprit.   - PRN Esgic helpful      # GERD   - Protonix 20 mg daily      # Grade 1 neuropathy of the fingertips   Due to vincristine. Pt reports symptoms are stable. Reports mild paresthesias and hot/cold sensitivity.   - Monitor clinically                 Roya Quarles [facility]-BC    40 minutes spent on the date of the encounter doing chart review, review of test results, interpretation of tests, patient visit, documentation and discussion with other provider(s)           Again, thank you for allowing me to participate in the care of your patient.        Sincerely,        Roya Quarles, LOAN CNP

## 2023-02-02 NOTE — LETTER
Date:February 3, 2023      Patient was self referred, no letter generated. Do not send.        [facility] Health Information

## 2023-02-02 NOTE — NURSING NOTE
"Oncology Rooming Note    February 2, 2023 11:51 AM   April F Bakari is a 42 year old female who presents for:    Chief Complaint   Patient presents with     Oncology Clinic Visit     [facility] RETURN - ALL     Initial Vitals: /78 (BP Location: Right arm, Patient Position: Chair, Cuff Size: Adult Large)   Pulse 103   Temp 98.6  F (37  C) (Oral)   Resp 16   Ht 1.626 m (5' 4.02\")   Wt 107.2 kg (236 lb 5.3 oz)   SpO2 99%   BMI 40.55 kg/m   Estimated body mass index is 40.55 kg/m  as calculated from the following:    Height as of this encounter: 1.626 m (5' 4.02\").    Weight as of this encounter: 107.2 kg (236 lb 5.3 oz). Body surface area is 2.2 meters squared.  No Pain (0) Comment: Data Unavailable   No LMP recorded. Patient is postmenopausal.  Allergies reviewed: Yes  Medications reviewed: Yes    Medications: Medication refills not needed today.  Pharmacy name entered into [facility]:    [id number] [place], [place] - [address]  [facility] - [place], [place] - [address]            "

## 2023-02-02 NOTE — LETTER
Date:February 3, 2023      Patient was self referred, no letter generated. Do not send.        [facility] Health Information

## 2023-02-02 NOTE — LETTER
2/2/2023         RE: Vashti Villegas  [address] [address]        Dear Colleague,    Thank you for referring your patient, Vashti Villegas, to the [facility]. Please see a copy of my visit note below.    BMT ONC Adult Lumbar Puncture and Intrathecal Chemotherapy Administration Procedure Note-- in Interventional Radiology    Feb 2, 2023      Procedure: Lumbar Puncture to obtain CSF and give intrathecal chemotherapy    Diagnosis: Ph (+) B-ALL    Learning needs assessment complete within 12 months: YES     Vitals reviewed:  YES    Informed Consent: Procedure, benefits, risks, and alternatives explained to the patient who voiced understanding of the information and agreed to proceed with lumbar puncture. Risks include bleeding, headache, and or infection.   Patient safety checklist completed.    Labs: Reviewed:     Lab Results   Component Value Date    INR 1.03 01/27/2023     01/30/2023     09/22/2003     After collection of CSF from interventional radiologist, administered intrathecal chemotherapy.       Cytarabine in 6 mL of 0.9% preservative free sodium chloride was administered intrathecally slowly in a barbotage fashion.  The needle was removed and a bandage was applied to the site.  Chemotherapy double-check was performed per institutional policy.  Patient tolerated well.    Disposition: Patient will remain on back for 1 hour post procedure. Avoid soaking in a tub tonight.  Call if develops headaches, fevers and or chills.     Performed by:   LOAN Garcia CNP      Again, thank you for allowing me to participate in the care of your patient.        Sincerely,        LOAN Garcia CNP

## 2023-02-02 NOTE — PROGRESS NOTES
Interventional Radiology Brief Post Procedure Note    Procedure: Lumbar puncture.    Proceduralist: Duncan Sen [facility]    Assistant: None    Time Out: Prior to the start of the procedure and with procedural staff participation, I verbally confirmed the patient s identity using two indicators, relevant allergies, that the procedure was appropriate and matched the consent or emergent situation, and that the correct equipment/implants were available. Immediately prior to starting the procedure I conducted the Time Out with the procedural staff and re-confirmed the patient s name, procedure, and site/side. (The Joint Commission universal protocol was followed.)  Yes    Medications   Medication Event Details Admin User Admin Time       Sedation: None. Local Anesthestic used    Findings: Fluoroscopy guided lumbar puncture performed at L3/L4 vertebral interspace. Return of clear CSF, with sample collected as follows: 2 ml into vials # 1 - # 3, 4 ml into vial # 4. IT chemotherapy delivery per Oncology provider.    Estimated Blood Loss: Minimal    Fluoroscopy Time:  0.3 minute(s)    SPECIMENS: Fluid and/or tissue for laboratory analysis    Complications: 1. None     Condition: Stable    Plan: Follow up per primary team. Bedrest supine for a minimum of one hour.    Comments: See dictated procedure note for full details.    Duncan Sen PA-C

## 2023-02-02 NOTE — PROGRESS NOTES
Patient tolerated procedure well. No obvious complications. VSS. AVS given and pt escorted to the waiting room.

## 2023-02-04 ENCOUNTER — NURSE TRIAGE (OUTPATIENT)
Dept: NURSING | Facility: CLINIC | Age: 43
End: 2023-02-04
Payer: COMMERCIAL

## 2023-02-04 ENCOUNTER — HOSPITAL ENCOUNTER (EMERGENCY)
Facility: CLINIC | Age: 43
Discharge: HOME | End: 2023-02-04
Attending: STUDENT IN AN ORGANIZED HEALTH CARE EDUCATION/TRAINING PROGRAM | Admitting: STUDENT IN AN ORGANIZED HEALTH CARE EDUCATION/TRAINING PROGRAM
Payer: COMMERCIAL

## 2023-02-04 VITALS
SYSTOLIC BLOOD PRESSURE: 137 MMHG | HEART RATE: 121 BPM | DIASTOLIC BLOOD PRESSURE: 77 MMHG | OXYGEN SATURATION: 98 % | TEMPERATURE: 97.9 F | RESPIRATION RATE: 18 BRPM

## 2023-02-04 DIAGNOSIS — C91.00 ACUTE LYMPHOBLASTIC LEUKEMIA (ALL) NOT HAVING ACHIEVED REMISSION (H): ICD-10-CM

## 2023-02-04 DIAGNOSIS — Z51.12 ENCOUNTER FOR ANTINEOPLASTIC IMMUNOTHERAPY: ICD-10-CM

## 2023-02-04 PROCEDURE — 99284 EMERGENCY DEPT VISIT MOD MDM: CPT | Performed by: STUDENT IN AN ORGANIZED HEALTH CARE EDUCATION/TRAINING PROGRAM

## 2023-02-04 PROCEDURE — 120N000002 HC R&B MED SURG/OB UMMC

## 2023-02-04 PROCEDURE — 99283 EMERGENCY DEPT VISIT LOW MDM: CPT | Performed by: STUDENT IN AN ORGANIZED HEALTH CARE EDUCATION/TRAINING PROGRAM

## 2023-02-04 ASSESSMENT — ACTIVITIES OF DAILY LIVING (ADL)
ADLS_ACUITY_SCORE: 33
ADLS_ACUITY_SCORE: 35

## 2023-02-04 NOTE — TELEPHONE ENCOUNTER
Patient calling.    Per patient, needle change of port was done yesterday at [facility].  Reports leaking fluid around the site. Receives a continuous infusion. Unsure if medication is being received.    Recommended she go to ER to get her port assessed and ensure patency.    Roseanna Mccormack RN on 2/4/2023 at 10:18 AM    Reason for Disposition    Nursing judgment or information in reference    Protocols used: NO GUIDELINE [id number]

## 2023-02-04 NOTE — PROGRESS NOTES
BRIEF HEME MALIGNANCY NOTE     Inpatient heme malignancy team was notified that Vashti Villegas 43 yo female with pmh Ph (+) ALL who was presenting to the ED for concern of leakage of Blincyto around her port.     She reports that she first noticed it this morning and subsequently stopped her Blincyto infusion ~10 AM this morning. I talked with 7D charge Katerina Taylor RN and she was able to go down and evaluate the Port site. It appears that there was condensation under the dressing but no displacement of the needle itself. Patient reported using a new heated blanket and condensation may be related to sweat rather than chemotherapy leakage. Katerina Taylor RN was able to waste a small amount of Blincyto and re-access port with good blood return. Discussed with oncology pharmacists Emmett Mcclendon PharmD who felt that we were close enough to the 4 hour window to resume Blincyto without need for further observation. Reviewed above with staff attending Dr. Recinos who is also in agreement with resumption and discharge. Called and spoke to April on her cell phone, she was happy about not having to spend the night in the hospital and was in agreement with the plan. PTA Blincyto infusion was resumed. I provided 7D unit number if patient had any further questions prior to going home. All questions answered at this time.     Gail Kendrick (Johnson) PAAngellaC  Hematology/Oncology  Pager #[phone]

## 2023-02-04 NOTE — DISCHARGE INSTRUCTIONS
Instructions from your doctor today:  Emergency Department testing is focused on the potential causes of your symptoms that are the most dangerous possibilities, and cannot cover every possibility. Based on the evaluation, it was deemed sufficiently safe to discharge and continue management through the clinics. Thus, follow-up is very important to assess for improvement/worsening, potential further testing, and potential treatment adjustments. If you were given opioid pain medications or other medications that can make you drowsy while in the ED, you should not drive for at least several hours and not until you feel completely back to normal.     We spoke with your oncologist today who feel that they are comfortable restarting your infusion here.  You are able to go home and follow-up with them as needed outside the hospital.    Please make an appointment to follow up with:  - Hematology Oncology Clinic (phone: [phone]) in 5-7 days even if entirely better.  - If you do not have a primary care provider, you can be seen in follow-up and establish care by calling any of the clinics below:     - Primary Care Center (phone: [phone])     - Primary Care / [facility] (phone: [phone])   - Have your clinic provider review the results from today's visit with you again, including any potential follow-up or additional testing that may be needed based on the results. Occasionally, incidental findings are found on later review by radiologists that may need follow-up.     Return to the Emergency Department immediately if you have difficulty breathing, fever, chest pain, any other concerning symptoms worsening symptoms, or any other urgent or potentially life-threatening concerns.

## 2023-02-04 NOTE — ED TRIAGE NOTES
Pt comes in through triage for her port leaking. Her port was accessed yesterday at 3 pm and she noticed this morning that there was leakage. Can see sponge underneath has grown bigger and condensation under dressing. Pt is getting immunotherapy drug called blincido and you can't flush the port at all due to the potential for overdose.     Triage Assessment     Row Name 02/04/23 1148       Triage Assessment (Adult)    Airway WDL WDL       Respiratory WDL    Respiratory WDL WDL       Skin Circulation/Temperature WDL    Skin Circulation/Temperature WDL WDL       Cardiac WDL    Cardiac WDL WDL       Peripheral/Neurovascular WDL    Peripheral Neurovascular WDL WDL       Cognitive/Neuro/Behavioral WDL    Cognitive/Neuro/Behavioral WDL WDL

## 2023-02-04 NOTE — ED PROVIDER NOTES
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  Patient has had issues with ports previously, she is currently on her second port which was placed on the 17th. On 25th nurses were concerned that the port wasn't in the right place. They called vascular, who reviewed x-ray and felt that the port was through incision. She was seen by Home [facility] yesterday and was last accessed yesterday at 3 PM, and home infusion felt that the access point is beneath incision.  This morning patient woke up with her shirt wet and leaking from her port site, and the sponge underneath the port has grown bigger.  There is also condensation noted underneath her transparent dressing. She has some pain at her port site. Patient reports that we are unable to flush the port as this would increase her potential for overdose of her Blincyto. However she is concerned the port needle is displaced and patient is concerned that she hasn't been receiving her full infusion. If she is off the infusion she has to premedicate with steroids, pause for 30 minutes then restart on Blincyto. She was tired yesterday but thinks it is due to her intrathecal chemo 2 days ago, otherwise feels well and would prefer to not have any labs done. \"Today is a good day.\" She did just finished energy drink with caffeine. No headaches or complications from intrathecal chemo    Past Medical History  Past Medical History:   Diagnosis Date     ALL (acute lymphocytic leukemia) (H)      Other acne      Shingles      Past Surgical History:   Procedure Laterality Date     IR BONE BIOPSY DEEP RIGHT  10/25/2022     IR BONE BIOPSY DEEP " RIGHT  1/17/2023     IR CHEST PORT PLACEMENT > 5 YRS OF AGE  11/23/2022     IR LUMBAR PUNCTURE  10/21/2022     IR PORT REPLACEMENT CATHETER ONLY RIGHT  1/17/2023     PICC Left 09/30/2022    Picc ok to use     PICC DOUBLE LUMEN PLACEMENT Left 08/20/2022    left cephalic 5 fr dl picc 44 cm     PICC INSERTION - DOUBLE LUMEN Right 12/31/2022    41cm (1cm external), Cephalic vein     ZZC NONSPECIFIC PROCEDURE  01/01/1999    benign mole removal     acetaminophen (TYLENOL) 325 MG tablet  amLODIPine (NORVASC) 5 MG tablet  aspirin (ASA) 81 MG EC tablet  benzonatate (TESSALON) 100 MG capsule  blinatumomab 28 mcg  CHOLECALCIFEROL PO  guaiFENesin-dextromethorphan (ROBITUSSIN DM) 100-10 MG/5ML syrup  lidocaine-prilocaine (EMLA) 2.5-2.5 % external cream  lidocaine-prilocaine (LIDOPRIL) 2.5-2.5 % kit  LORazepam (ATIVAN) 0.5 MG tablet  ondansetron (ZOFRAN ODT) 4 MG ODT tab  PONATinib (ICLUSIG) 15 MG tablet  prochlorperazine (COMPAZINE) 10 MG tablet  valACYclovir (VALTREX) 1000 mg tablet      Allergies   Allergen Reactions     Blood Transfusion Related (Informational Only) Hives     Hive reaction with platelet transfusion on 8/26/2022. Please administer platelets with tylenol and benadryl premedication.      Family History  History reviewed. No pertinent family history.  Social History   Social History     Tobacco Use     Smoking status: Never     Smokeless tobacco: Never   Vaping Use     Vaping Use: Never used   Substance Use Topics     Alcohol use: Not Currently     Drug use: Never      Past medical history, past surgical history, medications, allergies, family history, and social history were reviewed with the patient. No additional pertinent items.      A medically appropriate review of systems was performed with pertinent positives and negatives noted in the HPI, and all other systems negative.    Physical Exam   BP: 137/77  Pulse: (!) 121  Temp: 97.9  F (36.6  C)  Resp: 18  SpO2: 98 %  Physical Exam  General: awake, alert,  NAD  Head: normal cephalic  HEENT: pupils equal, conjugate gaze intact  Neck: Supple  CV: regular rate and rhythm without murmur  Lungs: clear to auscultation  Abd: soft, non-tender, no guarding, no peritoneal signs  EXT: lower extremities without swelling or edema  Neuro: awake, answers questions appropriately. No focal deficits noted      ED Course, Procedures, & Data     ED Course as of 02/04/23 1332   Sat Feb 04, 2023   1308 BMT paged Dr. Serge Hernandez   1332 Case discussed with bone marrow transplant      Procedures              No results found for any visits on 02/04/23.  Medications - No data to display  Labs Ordered and Resulted from Time of ED Arrival to Time of ED Departure - No data to display  No orders to display           Medical Decision Making  The patient's presentation is strongly suggestive of a chronic illness mild to moderate exacerbation, progression, or side effect of treatment.    The patient's evaluation involved:  review of external note(s) from 2 sources (ED, oncology)  ordering and/or review of 1 test(s) in this encounter (covid screening)  discussion of management or test interpretation with another health professional (heme/onc)    The patient's management involved a decision regarding hospitalization.      Assessment & Plan    42-year-old female with a history of AL L currently on chemotherapy presents with concern from leaking from her port.  She has had the infusion turned off since 9:00 this morning, was noted to be tachycardic for triage, but this is since resolved and she notes that this was due to anxiety and drinking an energy drink.  Spoke with heme-onc who recommended admission in order for her to be restarted on her medication.  We will plan for that at this time    I have reviewed the nursing notes. I have reviewed the findings, diagnosis, plan and need for follow up with the patient.    4:16 PMAfter speaking with oncology, they actually believe that this was not that the  needle was in the wrong spot, and that it was more that she was sweating under the dressing.  They feel comfortable restarting her infusion here, and being discharged home with follow-up with oncology outside of the hospital.  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 EMERGENCY DEPARTMENT  2/4/2023     Tala Ramirez MD  02/04/23 [id number]       Tala Ramirez MD  02/04/23 4533

## 2023-02-06 ENCOUNTER — LAB REQUISITION (OUTPATIENT)
Dept: LAB | Facility: CLINIC | Age: 43
End: 2023-02-06
Payer: COMMERCIAL

## 2023-02-06 ENCOUNTER — PATIENT OUTREACH (OUTPATIENT)
Dept: CARE COORDINATION | Facility: CLINIC | Age: 43
End: 2023-02-06
Payer: COMMERCIAL

## 2023-02-06 ENCOUNTER — PATIENT OUTREACH (OUTPATIENT)
Dept: ONCOLOGY | Facility: CLINIC | Age: 43
End: 2023-02-06

## 2023-02-06 DIAGNOSIS — C91.00 ACUTE LYMPHOBLASTIC LEUKEMIA NOT HAVING ACHIEVED REMISSION (H): ICD-10-CM

## 2023-02-06 LAB
ALBUMIN SERPL-MCNC: 3.8 G/DL (ref 3.4–5)
ALP SERPL-CCNC: 63 U/L (ref 40–150)
ALT SERPL W P-5'-P-CCNC: 22 U/L (ref 0–50)
ANION GAP SERPL CALCULATED.3IONS-SCNC: 6 MMOL/L (ref 3–14)
AST SERPL W P-5'-P-CCNC: 12 U/L (ref 0–45)
BASOPHILS # BLD MANUAL: 0.1 10E3/UL (ref 0–0.2)
BASOPHILS NFR BLD MANUAL: 3 %
BILIRUB SERPL-MCNC: 0.4 MG/DL (ref 0.2–1.3)
BUN SERPL-MCNC: 9 MG/DL (ref 7–30)
CALCIUM SERPL-MCNC: 8.9 MG/DL (ref 8.5–10.1)
CHLORIDE BLD-SCNC: 110 MMOL/L (ref 94–109)
CO2 SERPL-SCNC: 24 MMOL/L (ref 20–32)
CREAT SERPL-MCNC: 0.66 MG/DL (ref 0.52–1.04)
DACRYOCYTES BLD QL SMEAR: SLIGHT
ELLIPTOCYTES BLD QL SMEAR: SLIGHT
EOSINOPHIL # BLD MANUAL: 0.1 10E3/UL (ref 0–0.7)
EOSINOPHIL NFR BLD MANUAL: 5 %
ERYTHROCYTE [DISTWIDTH] IN BLOOD BY AUTOMATED COUNT: 14.7 % (ref 10–15)
FRAGMENTS BLD QL SMEAR: SLIGHT
GFR SERPL CREATININE-BSD FRML MDRD: >90 ML/MIN/1.73M2
GLUCOSE BLD-MCNC: 92 MG/DL (ref 70–99)
HCT VFR BLD AUTO: 35.1 % (ref 35–47)
HGB BLD-MCNC: 11.6 G/DL (ref 11.7–15.7)
LYMPH ABN NFR CSF MANUAL: NORMAL %
LYMPHOCYTES # BLD MANUAL: 1 10E3/UL (ref 0.8–5.3)
LYMPHOCYTES NFR BLD MANUAL: 52 %
MCH RBC QN AUTO: 30.4 PG (ref 26.5–33)
MCHC RBC AUTO-ENTMCNC: 33 G/DL (ref 31.5–36.5)
MCV RBC AUTO: 92 FL (ref 78–100)
MONOCYTES # BLD MANUAL: 0.5 10E3/UL (ref 0–1.3)
MONOCYTES NFR BLD MANUAL: 23 %
MONOS+MACROS NFR CSF MANUAL: NORMAL %
NEUTROPHILS # BLD MANUAL: 0.3 10E3/UL (ref 1.6–8.3)
NEUTROPHILS NFR BLD MANUAL: 17 %
NEUTROPHILS NFR CSF MANUAL: NORMAL %
PLAT MORPH BLD: ABNORMAL
PLATELET # BLD AUTO: 227 10E3/UL (ref 150–450)
POTASSIUM BLD-SCNC: 3.8 MMOL/L (ref 3.4–5.3)
PROT SERPL-MCNC: 6.9 G/DL (ref 6.8–8.8)
RBC # BLD AUTO: 3.82 10E6/UL (ref 3.8–5.2)
RBC MORPH BLD: ABNORMAL
SODIUM SERPL-SCNC: 140 MMOL/L (ref 133–144)
WBC # BLD AUTO: 2 10E3/UL (ref 4–11)

## 2023-02-06 PROCEDURE — 85007 BL SMEAR W/DIFF WBC COUNT: CPT | Performed by: INTERNAL MEDICINE

## 2023-02-06 PROCEDURE — 82310 ASSAY OF CALCIUM: CPT | Performed by: INTERNAL MEDICINE

## 2023-02-06 PROCEDURE — 85014 HEMATOCRIT: CPT | Performed by: INTERNAL MEDICINE

## 2023-02-06 NOTE — PROGRESS NOTES
Clinic Care Coordination Contact  Community Health Worker Initial Outreach    Patient accepts CC:No, Pt declined needs for extra support.    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  Patient has had issues with ports previously, she is currently on her second port which was placed on the 17th. On 25th nurses were concerned that the port wasn't in the right place    ASSESSMENT           Discharge Assessment  How are you doing now that you are home?: Pt stated that she is doing well  How are your symptoms? (Red Flag symptoms escalate to triage hotline per guidelines): Improved  Do you feel your condition is stable enough to be safe at home until your provider visit?: Yes  Does the patient have their discharge instructions? : Yes  Does the patient have questions regarding their discharge instructions? : No  Were you started on any new medications or were there changes to any of your previous medications? : Yes  Does the patient have all of their medications?: Yes  Do you have questions regarding any of your medications? : No  Do you have all of your needed medical supplies or equipment (DME)?  (i.e. oxygen tank, CPAP, cane, etc.): Yes  Discharge follow-up appointment scheduled within 14 calendar days? : No  Is patient agreeable to assistance with scheduling? : No  Post-op ([facility] CTA Only)  If the patient had a surgery or procedure, do they  have any questions for a nurse?: No     PLAN                                                      Outpatient Plan:      Please make an appointment to follow up with:  - Hematology Oncology Clinic (phone: [phone]) in 5-7 days  even if entirely better.    Future Appointments   Date Time Provider Department Center   2/7/2023 10:00 AM  PFL PENT UCPFT [facility]   2/23/2023 11:00 AM  [facility] LAB DRAW ONL [facility]   2/23/2023 12:00 PM [id number]   2/23/2023 12:15 PM Odalis Arnold PA-C [facility]   3/8/2023 10:15 AM  [facility] LAB DRAW [facility]   3/8/2023 10:45 AM Roya Quarles APRN CNP [facility]   3/8/2023 12:00 PM  ONC INFUSION NURSE [facility]   3/14/2023 11:00 AM  LAB UCLABR [facility]   3/14/2023 12:00 PM [email or web address] [facility]   3/14/2023 12:30 PM Roya Quarles APRN CNP [facility]     For any urgent concerns, please contact our 24 hour nurse triage line: [phone] ([phone])       NERY Quesada  [phone]  [facility]

## 2023-02-06 NOTE — PROGRESS NOTES
[facility]: Cancer Care Short Note                                    Discussion with Patient:                                                        OUTBOUND CALL: RNCC called patient. Introduced self as covering RNCC-- Lucy is OOO today.   Notified that patient was in the ED for port malfunction over the weekend. Discharge note from ED states that port just had condensation under dressing. Blincyto resumed at that time.   Pt states that port is working great. Denies pain, leakage, redness, etc. Blincyto appears to be infusion without difficulty.   Pt is appreciative of call.  Encouraged patient to call RNCC at number below if he/she has any other questions, comments, or concerns.    Monica Tenorio, RN, MSN, OCN   RN Care Coordinator   [facility]   [address] [place] [address] [id number] [phone]

## 2023-02-07 DIAGNOSIS — C91.00 ACUTE LYMPHOBLASTIC LEUKEMIA (ALL) NOT HAVING ACHIEVED REMISSION (H): Primary | ICD-10-CM

## 2023-02-07 PROCEDURE — 94642 AEROSOL INHALATION TREATMENT: CPT | Performed by: INTERNAL MEDICINE

## 2023-02-07 PROCEDURE — 94640 AIRWAY INHALATION TREATMENT: CPT | Performed by: INTERNAL MEDICINE

## 2023-02-07 RX ORDER — PENTAMIDINE ISETHIONATE 300 MG/300MG
300 INHALANT RESPIRATORY (INHALATION) ONCE
Status: COMPLETED | OUTPATIENT
Start: 2023-02-07 | End: 2023-02-07

## 2023-02-07 RX ORDER — MEPERIDINE HYDROCHLORIDE 25 MG/ML
25 INJECTION INTRAMUSCULAR; INTRAVENOUS; SUBCUTANEOUS EVERY 30 MIN PRN
Status: CANCELLED | OUTPATIENT
Start: 2023-02-20

## 2023-02-07 RX ORDER — ALBUTEROL SULFATE 0.83 MG/ML
2.5 SOLUTION RESPIRATORY (INHALATION) ONCE
Status: COMPLETED | OUTPATIENT
Start: 2023-02-07 | End: 2023-02-07

## 2023-02-07 RX ORDER — METHYLPREDNISOLONE SODIUM SUCCINATE 125 MG/2ML
125 INJECTION, POWDER, LYOPHILIZED, FOR SOLUTION INTRAMUSCULAR; INTRAVENOUS
Status: CANCELLED
Start: 2023-02-20

## 2023-02-07 RX ORDER — EPINEPHRINE 1 MG/ML
0.3 INJECTION, SOLUTION, CONCENTRATE INTRAVENOUS EVERY 5 MIN PRN
Status: CANCELLED | OUTPATIENT
Start: 2023-02-20

## 2023-02-07 RX ORDER — DIPHENHYDRAMINE HYDROCHLORIDE 50 MG/ML
50 INJECTION INTRAMUSCULAR; INTRAVENOUS
Status: CANCELLED
Start: 2023-02-20

## 2023-02-07 RX ORDER — ALBUTEROL SULFATE 0.83 MG/ML
2.5 SOLUTION RESPIRATORY (INHALATION)
Status: CANCELLED | OUTPATIENT
Start: 2023-02-20

## 2023-02-07 RX ORDER — ALBUTEROL SULFATE 90 UG/1
1-2 AEROSOL, METERED RESPIRATORY (INHALATION)
Status: CANCELLED
Start: 2023-02-20

## 2023-02-07 RX ORDER — PENTAMIDINE ISETHIONATE 300 MG/300MG
300 INHALANT RESPIRATORY (INHALATION) ONCE
Status: CANCELLED
Start: 2023-02-20 | End: 2023-02-20

## 2023-02-07 RX ORDER — ALBUTEROL SULFATE 0.83 MG/ML
2.5 SOLUTION RESPIRATORY (INHALATION) ONCE
Status: CANCELLED
Start: 2023-02-20 | End: 2023-02-20

## 2023-02-07 RX ADMIN — ALBUTEROL SULFATE 2.5 MG: 0.83 SOLUTION RESPIRATORY (INHALATION) at 10:00

## 2023-02-07 RX ADMIN — PENTAMIDINE ISETHIONATE 300 MG: 300 INHALANT RESPIRATORY (INHALATION) at 10:00

## 2023-02-07 NOTE — PROGRESS NOTES
Patient was seen today for a Pentamidine nebulizer tx ordered by LIBIA Jorgensen.    Patient was first given 2.5 mg of albuterol nebulizer, after which Pentamidine 300 mg (Lot # [id number]) mixed with 6cc Sterile H20 was administered through a filtered nebulizer.    Pre-treatment: SpO2 = 99%   HR = 91   BBS = clear   Post-treatment: SpO2 = 99%  HR = 107  BBS = clear    No adverse side effects noted by the patient.    This service today was provided under the direct supervision of Dr. Aldana, who was available if needed.     Procedure was completed by ABBIE CHAVES.

## 2023-02-08 DIAGNOSIS — B02.9 VZV (VARICELLA-ZOSTER VIRUS) INFECTION: ICD-10-CM

## 2023-02-08 DIAGNOSIS — C91.01 ACUTE LYMPHOBLASTIC LEUKEMIA (ALL) IN REMISSION (H): Primary | ICD-10-CM

## 2023-02-08 RX ORDER — VALACYCLOVIR HYDROCHLORIDE 1 G/1
1000 TABLET, FILM COATED ORAL DAILY
Qty: 30 TABLET | Refills: 1 | Status: SHIPPED | OUTPATIENT
Start: 2023-02-08 | End: 2023-05-31

## 2023-02-08 RX ORDER — LEVOFLOXACIN 250 MG/1
250 TABLET, FILM COATED ORAL DAILY
Qty: 30 TABLET | Refills: 3 | Status: ON HOLD | OUTPATIENT
Start: 2023-02-08 | End: 2023-03-14

## 2023-02-08 RX ORDER — FLUCONAZOLE 200 MG/1
200 TABLET ORAL DAILY
Qty: 30 TABLET | Refills: 3 | Status: ON HOLD | OUTPATIENT
Start: 2023-02-08 | End: 2023-03-14

## 2023-02-09 ENCOUNTER — LAB REQUISITION (OUTPATIENT)
Dept: LAB | Facility: CLINIC | Age: 43
End: 2023-02-09
Payer: COMMERCIAL

## 2023-02-09 DIAGNOSIS — C91.00 ACUTE LYMPHOBLASTIC LEUKEMIA NOT HAVING ACHIEVED REMISSION (H): ICD-10-CM

## 2023-02-09 LAB
ALBUMIN SERPL BCG-MCNC: 4.5 G/DL (ref 3.5–5.2)
ALP SERPL-CCNC: 75 U/L (ref 35–104)
ALT SERPL W P-5'-P-CCNC: 19 U/L (ref 10–35)
ANION GAP SERPL CALCULATED.3IONS-SCNC: 12 MMOL/L (ref 7–15)
AST SERPL W P-5'-P-CCNC: 19 U/L (ref 10–35)
BASOPHILS # BLD MANUAL: 0 10E3/UL (ref 0–0.2)
BASOPHILS NFR BLD MANUAL: 0 %
BILIRUB SERPL-MCNC: 0.3 MG/DL
BUN SERPL-MCNC: 10.1 MG/DL (ref 6–20)
CALCIUM SERPL-MCNC: 9.4 MG/DL (ref 8.6–10)
CHLORIDE SERPL-SCNC: 106 MMOL/L (ref 98–107)
CREAT SERPL-MCNC: 0.82 MG/DL (ref 0.51–0.95)
DEPRECATED HCO3 PLAS-SCNC: 23 MMOL/L (ref 22–29)
ELLIPTOCYTES BLD QL SMEAR: SLIGHT
EOSINOPHIL # BLD MANUAL: 0.1 10E3/UL (ref 0–0.7)
EOSINOPHIL NFR BLD MANUAL: 7 %
ERYTHROCYTE [DISTWIDTH] IN BLOOD BY AUTOMATED COUNT: 14.8 % (ref 10–15)
GFR SERPL CREATININE-BSD FRML MDRD: >90 ML/MIN/1.73M2
GLUCOSE SERPL-MCNC: 105 MG/DL (ref 70–99)
HCT VFR BLD AUTO: 34.9 % (ref 35–47)
HGB BLD-MCNC: 11.6 G/DL (ref 11.7–15.7)
LYMPHOCYTES # BLD MANUAL: 0.7 10E3/UL (ref 0.8–5.3)
LYMPHOCYTES NFR BLD MANUAL: 39 %
MCH RBC QN AUTO: 30.5 PG (ref 26.5–33)
MCHC RBC AUTO-ENTMCNC: 33.2 G/DL (ref 31.5–36.5)
MCV RBC AUTO: 92 FL (ref 78–100)
MONOCYTES # BLD MANUAL: 0.5 10E3/UL (ref 0–1.3)
MONOCYTES NFR BLD MANUAL: 29 %
NEUTROPHILS # BLD MANUAL: 0.5 10E3/UL (ref 1.6–8.3)
NEUTROPHILS NFR BLD MANUAL: 25 %
PLAT MORPH BLD: ABNORMAL
PLATELET # BLD AUTO: 235 10E3/UL (ref 150–450)
POTASSIUM SERPL-SCNC: 3.9 MMOL/L (ref 3.4–5.3)
PROT SERPL-MCNC: 6.7 G/DL (ref 6.4–8.3)
RBC # BLD AUTO: 3.8 10E6/UL (ref 3.8–5.2)
RBC MORPH BLD: ABNORMAL
SODIUM SERPL-SCNC: 141 MMOL/L (ref 136–145)
WBC # BLD AUTO: 1.8 10E3/UL (ref 4–11)

## 2023-02-09 PROCEDURE — 85027 COMPLETE CBC AUTOMATED: CPT | Performed by: INTERNAL MEDICINE

## 2023-02-09 PROCEDURE — 80053 COMPREHEN METABOLIC PANEL: CPT | Performed by: INTERNAL MEDICINE

## 2023-02-09 PROCEDURE — 85007 BL SMEAR W/DIFF WBC COUNT: CPT | Performed by: INTERNAL MEDICINE

## 2023-02-13 ENCOUNTER — NURSE TRIAGE (OUTPATIENT)
Dept: ONCOLOGY | Facility: CLINIC | Age: 43
End: 2023-02-13

## 2023-02-13 ENCOUNTER — LAB REQUISITION (OUTPATIENT)
Dept: LAB | Facility: CLINIC | Age: 43
End: 2023-02-13
Payer: COMMERCIAL

## 2023-02-13 DIAGNOSIS — C91.00 ACUTE LYMPHOBLASTIC LEUKEMIA NOT HAVING ACHIEVED REMISSION (H): ICD-10-CM

## 2023-02-13 LAB
ALBUMIN SERPL BCG-MCNC: 4.3 G/DL (ref 3.5–5.2)
ALP SERPL-CCNC: 71 U/L (ref 35–104)
ALT SERPL W P-5'-P-CCNC: 20 U/L (ref 10–35)
ANION GAP SERPL CALCULATED.3IONS-SCNC: 13 MMOL/L (ref 7–15)
AST SERPL W P-5'-P-CCNC: 21 U/L (ref 10–35)
BASOPHILS # BLD MANUAL: 0.1 10E3/UL (ref 0–0.2)
BASOPHILS NFR BLD MANUAL: 4 %
BILIRUB SERPL-MCNC: 0.2 MG/DL
BUN SERPL-MCNC: 9.1 MG/DL (ref 6–20)
CALCIUM SERPL-MCNC: 9.4 MG/DL (ref 8.6–10)
CHLORIDE SERPL-SCNC: 108 MMOL/L (ref 98–107)
CREAT SERPL-MCNC: 0.58 MG/DL (ref 0.51–0.95)
DEPRECATED HCO3 PLAS-SCNC: 21 MMOL/L (ref 22–29)
EOSINOPHIL # BLD MANUAL: 0.2 10E3/UL (ref 0–0.7)
EOSINOPHIL NFR BLD MANUAL: 9 %
ERYTHROCYTE [DISTWIDTH] IN BLOOD BY AUTOMATED COUNT: 15.5 % (ref 10–15)
GFR SERPL CREATININE-BSD FRML MDRD: >90 ML/MIN/1.73M2
GLUCOSE SERPL-MCNC: 102 MG/DL (ref 70–99)
HCT VFR BLD AUTO: 35 % (ref 35–47)
HGB BLD-MCNC: 11.4 G/DL (ref 11.7–15.7)
LYMPHOCYTES # BLD MANUAL: 0.7 10E3/UL (ref 0.8–5.3)
LYMPHOCYTES NFR BLD MANUAL: 43 %
MCH RBC QN AUTO: 30.4 PG (ref 26.5–33)
MCHC RBC AUTO-ENTMCNC: 32.6 G/DL (ref 31.5–36.5)
MCV RBC AUTO: 93 FL (ref 78–100)
METAMYELOCYTES # BLD MANUAL: 0 10E3/UL
METAMYELOCYTES NFR BLD MANUAL: 1 %
MONOCYTES # BLD MANUAL: 0.5 10E3/UL (ref 0–1.3)
MONOCYTES NFR BLD MANUAL: 28 %
NEUTROPHILS # BLD MANUAL: 0.3 10E3/UL (ref 1.6–8.3)
NEUTROPHILS NFR BLD MANUAL: 15 %
NRBC # BLD AUTO: 0 10E3/UL
NRBC BLD MANUAL-RTO: 1 %
PLAT MORPH BLD: ABNORMAL
PLATELET # BLD AUTO: 229 10E3/UL (ref 150–450)
POTASSIUM SERPL-SCNC: 3.8 MMOL/L (ref 3.4–5.3)
PROT SERPL-MCNC: 6.3 G/DL (ref 6.4–8.3)
RBC # BLD AUTO: 3.75 10E6/UL (ref 3.8–5.2)
RBC MORPH BLD: ABNORMAL
SODIUM SERPL-SCNC: 142 MMOL/L (ref 136–145)
WBC # BLD AUTO: 1.7 10E3/UL (ref 4–11)

## 2023-02-13 PROCEDURE — 85027 COMPLETE CBC AUTOMATED: CPT | Performed by: INTERNAL MEDICINE

## 2023-02-13 PROCEDURE — 85007 BL SMEAR W/DIFF WBC COUNT: CPT | Performed by: INTERNAL MEDICINE

## 2023-02-13 PROCEDURE — 80053 COMPREHEN METABOLIC PANEL: CPT | Performed by: INTERNAL MEDICINE

## 2023-02-13 NOTE — TELEPHONE ENCOUNTER
CRITICAL VALUE:     DATE:  2/13/23    TIME OF RECEIPT FROM LAB:  12:08     LAB TEST:  ANC 0.3 WBC 1.7 Hgb 11.4 Platelets 229     RESULTS SENT TO (PROVIDER):  Dr Hull and RNCC Lucy Leslie

## 2023-02-15 DIAGNOSIS — D70.1 CHEMOTHERAPY-INDUCED NEUTROPENIA (H): Primary | ICD-10-CM

## 2023-02-15 DIAGNOSIS — T45.1X5A CHEMOTHERAPY-INDUCED NEUTROPENIA (H): Primary | ICD-10-CM

## 2023-02-15 RX ORDER — FILGRASTIM 300 UG/ML
480 INJECTION, SOLUTION INTRAVENOUS; SUBCUTANEOUS DAILY
Qty: 5 ML | Refills: 3 | Status: SHIPPED | OUTPATIENT
Start: 2023-02-15 | End: 2023-02-17

## 2023-02-16 ENCOUNTER — LAB REQUISITION (OUTPATIENT)
Dept: LAB | Facility: CLINIC | Age: 43
End: 2023-02-16
Payer: COMMERCIAL

## 2023-02-16 DIAGNOSIS — C91.00 ACUTE LYMPHOBLASTIC LEUKEMIA NOT HAVING ACHIEVED REMISSION (H): ICD-10-CM

## 2023-02-16 LAB
ALBUMIN SERPL BCG-MCNC: 4.2 G/DL (ref 3.5–5.2)
ALP SERPL-CCNC: 67 U/L (ref 35–104)
ALT SERPL W P-5'-P-CCNC: 23 U/L (ref 10–35)
ANION GAP SERPL CALCULATED.3IONS-SCNC: 13 MMOL/L (ref 7–15)
AST SERPL W P-5'-P-CCNC: 27 U/L (ref 10–35)
BASOPHILS # BLD MANUAL: 0.1 10E3/UL (ref 0–0.2)
BASOPHILS NFR BLD MANUAL: 3 %
BILIRUB SERPL-MCNC: 0.3 MG/DL
BUN SERPL-MCNC: 13.2 MG/DL (ref 6–20)
CALCIUM SERPL-MCNC: 9.6 MG/DL (ref 8.6–10)
CHLORIDE SERPL-SCNC: 105 MMOL/L (ref 98–107)
CREAT SERPL-MCNC: 0.66 MG/DL (ref 0.51–0.95)
DEPRECATED HCO3 PLAS-SCNC: 21 MMOL/L (ref 22–29)
ELLIPTOCYTES BLD QL SMEAR: SLIGHT
EOSINOPHIL # BLD MANUAL: 0.2 10E3/UL (ref 0–0.7)
EOSINOPHIL NFR BLD MANUAL: 7 %
ERYTHROCYTE [DISTWIDTH] IN BLOOD BY AUTOMATED COUNT: 15.4 % (ref 10–15)
GFR SERPL CREATININE-BSD FRML MDRD: >90 ML/MIN/1.73M2
GLUCOSE SERPL-MCNC: 122 MG/DL (ref 70–99)
HCT VFR BLD AUTO: 36.1 % (ref 35–47)
HGB BLD-MCNC: 11.8 G/DL (ref 11.7–15.7)
LYMPHOCYTES # BLD MANUAL: 1.2 10E3/UL (ref 0.8–5.3)
LYMPHOCYTES NFR BLD MANUAL: 49 %
MCH RBC QN AUTO: 30.6 PG (ref 26.5–33)
MCHC RBC AUTO-ENTMCNC: 32.7 G/DL (ref 31.5–36.5)
MCV RBC AUTO: 94 FL (ref 78–100)
MONOCYTES # BLD MANUAL: 0.7 10E3/UL (ref 0–1.3)
MONOCYTES NFR BLD MANUAL: 26 %
NEUTROPHILS # BLD MANUAL: 0.4 10E3/UL (ref 1.6–8.3)
NEUTROPHILS NFR BLD MANUAL: 15 %
PLAT MORPH BLD: ABNORMAL
PLATELET # BLD AUTO: 301 10E3/UL (ref 150–450)
POLYCHROMASIA BLD QL SMEAR: SLIGHT
POTASSIUM SERPL-SCNC: 3.9 MMOL/L (ref 3.4–5.3)
PROT SERPL-MCNC: 6.5 G/DL (ref 6.4–8.3)
RBC # BLD AUTO: 3.86 10E6/UL (ref 3.8–5.2)
RBC MORPH BLD: ABNORMAL
SCANNED LAB RESULT: NORMAL
SODIUM SERPL-SCNC: 139 MMOL/L (ref 136–145)
WBC # BLD AUTO: 2.5 10E3/UL (ref 4–11)

## 2023-02-16 PROCEDURE — 80053 COMPREHEN METABOLIC PANEL: CPT | Performed by: INTERNAL MEDICINE

## 2023-02-16 PROCEDURE — 85027 COMPLETE CBC AUTOMATED: CPT | Performed by: INTERNAL MEDICINE

## 2023-02-16 PROCEDURE — 85007 BL SMEAR W/DIFF WBC COUNT: CPT | Performed by: INTERNAL MEDICINE

## 2023-02-17 ENCOUNTER — TELEPHONE (OUTPATIENT)
Dept: ONCOLOGY | Facility: CLINIC | Age: 43
End: 2023-02-17

## 2023-02-17 DIAGNOSIS — C91.00 ACUTE LYMPHOBLASTIC LEUKEMIA (ALL) NOT HAVING ACHIEVED REMISSION (H): Primary | ICD-10-CM

## 2023-02-17 RX ORDER — FILGRASTIM 480 UG/.8ML
480 INJECTION, SOLUTION INTRAVENOUS; SUBCUTANEOUS DAILY
Qty: 2.4 ML | Refills: 0 | Status: SHIPPED | OUTPATIENT
Start: 2023-02-17 | End: 2023-02-20

## 2023-02-17 NOTE — TELEPHONE ENCOUNTER
Hello,     Current Neupogen rx that was sent to pharmacy is for 300mcg/ml vials. Pt dose is to inject 480mcg daily. To make it easier for pt and to prevent having to waste some medication, can we change rx to dispense the 480mcg pre-filled syringe?    Thank you,  Malinda Weldon, PharmD  [facility]  Direct [facility] line - [phone]

## 2023-02-20 ENCOUNTER — LAB REQUISITION (OUTPATIENT)
Dept: LAB | Facility: CLINIC | Age: 43
End: 2023-02-20
Payer: COMMERCIAL

## 2023-02-20 DIAGNOSIS — C91.00 ACUTE LYMPHOBLASTIC LEUKEMIA NOT HAVING ACHIEVED REMISSION (H): ICD-10-CM

## 2023-02-20 LAB
ALBUMIN SERPL BCG-MCNC: 4.4 G/DL (ref 3.5–5.2)
ALP SERPL-CCNC: 77 U/L (ref 35–104)
ALT SERPL W P-5'-P-CCNC: 22 U/L (ref 10–35)
ANION GAP SERPL CALCULATED.3IONS-SCNC: 12 MMOL/L (ref 7–15)
AST SERPL W P-5'-P-CCNC: 24 U/L (ref 10–35)
BASOPHILS # BLD MANUAL: 0.1 10E3/UL (ref 0–0.2)
BASOPHILS NFR BLD MANUAL: 3 %
BILIRUB SERPL-MCNC: 0.2 MG/DL
BUN SERPL-MCNC: 11.6 MG/DL (ref 6–20)
CALCIUM SERPL-MCNC: 9.6 MG/DL (ref 8.6–10)
CHLORIDE SERPL-SCNC: 108 MMOL/L (ref 98–107)
CREAT SERPL-MCNC: 0.58 MG/DL (ref 0.51–0.95)
DEPRECATED HCO3 PLAS-SCNC: 22 MMOL/L (ref 22–29)
EOSINOPHIL # BLD MANUAL: 0.2 10E3/UL (ref 0–0.7)
EOSINOPHIL NFR BLD MANUAL: 6 %
ERYTHROCYTE [DISTWIDTH] IN BLOOD BY AUTOMATED COUNT: 14.8 % (ref 10–15)
GFR SERPL CREATININE-BSD FRML MDRD: >90 ML/MIN/1.73M2
GLUCOSE SERPL-MCNC: 84 MG/DL (ref 70–99)
HCT VFR BLD AUTO: 35.9 % (ref 35–47)
HGB BLD-MCNC: 11.9 G/DL (ref 11.7–15.7)
LYMPHOCYTES # BLD MANUAL: 1 10E3/UL (ref 0.8–5.3)
LYMPHOCYTES NFR BLD MANUAL: 34 %
MCH RBC QN AUTO: 30.4 PG (ref 26.5–33)
MCHC RBC AUTO-ENTMCNC: 33.1 G/DL (ref 31.5–36.5)
MCV RBC AUTO: 92 FL (ref 78–100)
METAMYELOCYTES # BLD MANUAL: 0 10E3/UL
METAMYELOCYTES NFR BLD MANUAL: 1 %
MONOCYTES # BLD MANUAL: 0.6 10E3/UL (ref 0–1.3)
MONOCYTES NFR BLD MANUAL: 21 %
MYELOCYTES # BLD MANUAL: 0 10E3/UL
MYELOCYTES NFR BLD MANUAL: 1 %
NEUTROPHILS # BLD MANUAL: 1 10E3/UL (ref 1.6–8.3)
NEUTROPHILS NFR BLD MANUAL: 34 %
PLAT MORPH BLD: ABNORMAL
PLATELET # BLD AUTO: 287 10E3/UL (ref 150–450)
POTASSIUM SERPL-SCNC: 4.1 MMOL/L (ref 3.4–5.3)
PROT SERPL-MCNC: 6.4 G/DL (ref 6.4–8.3)
RBC # BLD AUTO: 3.91 10E6/UL (ref 3.8–5.2)
RBC MORPH BLD: ABNORMAL
SODIUM SERPL-SCNC: 142 MMOL/L (ref 136–145)
WBC # BLD AUTO: 2.9 10E3/UL (ref 4–11)

## 2023-02-20 PROCEDURE — 80053 COMPREHEN METABOLIC PANEL: CPT | Performed by: INTERNAL MEDICINE

## 2023-02-20 PROCEDURE — 85014 HEMATOCRIT: CPT | Performed by: INTERNAL MEDICINE

## 2023-02-20 PROCEDURE — 85007 BL SMEAR W/DIFF WBC COUNT: CPT | Performed by: INTERNAL MEDICINE

## 2023-02-23 ENCOUNTER — TELEPHONE (OUTPATIENT)
Dept: ONCOLOGY | Facility: CLINIC | Age: 43
End: 2023-02-23

## 2023-02-23 NOTE — TELEPHONE ENCOUNTER
Oral Chemotherapy Monitoring Program    Primary Oncologist: Dr Steve Hull  Primary Oncology Clinic: [facility]  Cancer Diagnosis: ALL    Therapy History:  Ponatinib 15mg PO daily, restarted 2/16/23.    Subjective/Objective:  Vashti Villegas is a 42 year old female contacted by phone for a follow-up visit for oral chemotherapy.  Patient states that the Ponatinib treatment is going well since restarting a week ago and she reports no side effects.  She denies rash, constipation and MSK pain.    ORAL CHEMOTHERAPY 9/29/2022 11/7/2022 11/18/2022 12/1/2022 1/2/2023 1/3/2023 2/23/2023   Assessment Type Refill Left Voicemail Refill New Teach Lab Monitoring Initial Follow up Monthly Follow up   Diagnosis Code Acute Lymphoblastic Leukemia (ALL) Acute Lymphoblastic Leukemia (ALL) Acute Lymphoblastic Leukemia (ALL) Acute Lymphoblastic Leukemia (ALL) Acute Lymphoblastic Leukemia (ALL) Acute Lymphoblastic Leukemia (ALL) Acute Lymphoblastic Leukemia (ALL)   Providers Dr. Alverto Hull   [facility]   Drug Name Gleevec (imatinib) Gleevec (imatinib) Gleevec (imatinib) Iclusig (ponatinib) Iclusig (ponatinib) Iclusig (ponatinib) Iclusig (ponatinib)   Dose 400 mg 400 mg 300 mg 15 mg 15 mg 15 mg 15 mg   Current Schedule BID BID BID Daily Daily Daily Daily   Cycle Details (No Data) - - Continuous Continuous Continuous Continuous   Start Date of Last Cycle - - - - - 12/14/2022 2/16/2023   Planned next cycle start date - - 11/25/2022 12/12/2022 - - -   Doses missed in last 2 weeks - - - - - 1 0   Adherence Assessment - - - - - Adherent Adherent   Adverse Effects - - - - - Myalgias/Arthralgias No AE identified during assessment   Myalgias/Arthralgias - - - - - Grade 1 -   Pharmacist Intervention(myalgias/arthralgias) - - - - - No -   Headache - - - - - - -   Fatigue - - - - - - -   Any new drug interactions? - - - No  "- - -   Is the dose as ordered appropriate for the patient? - - - Yes - - -       Vitals:  BP:   BP Readings from Last 1 Encounters:   02/04/23 137/77     Wt Readings from Last 1 Encounters:   02/02/23 107.2 kg (236 lb 4.8 oz)     Estimated body surface area is 2.2 meters squared as calculated from the following:    Height as of 2/2/23: 1.626 m (5' 4.02\").    Weight as of 2/2/23: 107.2 kg (236 lb 5.3 oz).    Labs:  _  Result Component Current Result Ref Range   Sodium 142 (2/20/2023) 136 - 145 mmol/L     _  Result Component Current Result Ref Range   Potassium 4.1 (2/20/2023) 3.4 - 5.3 mmol/L     _  Result Component Current Result Ref Range   Calcium 9.6 (2/20/2023) 8.6 - 10.0 mg/dL     _  Result Component Current Result Ref Range   Magnesium 2.2 (1/26/2023) 1.7 - 2.3 mg/dL     _  Result Component Current Result Ref Range   Phosphorus 3.5 (1/26/2023) 2.5 - 4.5 mg/dL     _  Result Component Current Result Ref Range   Albumin 4.4 (2/20/2023) 3.5 - 5.2 g/dL     _  Result Component Current Result Ref Range   Urea Nitrogen 11.6 (2/20/2023) 6.0 - 20.0 mg/dL     _  Result Component Current Result Ref Range   Creatinine 0.58 (2/20/2023) 0.51 - 0.95 mg/dL       _  Result Component Current Result Ref Range   AST 24 (2/20/2023) 10 - 35 U/L     _  Result Component Current Result Ref Range   ALT 22 (2/20/2023) 10 - 35 U/L     _  Result Component Current Result Ref Range   Bilirubin Total 0.2 (2/20/2023) <=1.2 mg/dL       _  Result Component Current Result Ref Range   WBC Count 2.9 (L) (2/20/2023) 4.0 - 11.0 10e3/uL     _  Result Component Current Result Ref Range   Hemoglobin 11.9 (2/20/2023) 11.7 - 15.7 g/dL     _  Result Component Current Result Ref Range   Platelet Count 287 (2/20/2023) 150 - 450 10e3/uL     _  Result Component Current Result Ref Range   Absolute Neutrophils 1.0 (L) (2/20/2023) 1.6 - 8.3 10e3/uL       Assessment:  Patient is tolerating ponatinib well.      Plan:  No change needed at this time.    Hyacinth Claros" Lynette, PharmD, BCPS, [facility]  Oncology Clinical Pharmacy Specialist  [facility]/ [facility]  [phone]

## 2023-02-24 ENCOUNTER — LAB REQUISITION (OUTPATIENT)
Dept: LAB | Facility: CLINIC | Age: 43
End: 2023-02-24
Payer: COMMERCIAL

## 2023-02-24 ENCOUNTER — MYC MEDICAL ADVICE (OUTPATIENT)
Dept: INTERVENTIONAL RADIOLOGY/VASCULAR | Facility: CLINIC | Age: 43
End: 2023-02-24

## 2023-02-24 DIAGNOSIS — C91.00 ACUTE LYMPHOBLASTIC LEUKEMIA NOT HAVING ACHIEVED REMISSION (H): ICD-10-CM

## 2023-02-24 LAB
ALBUMIN SERPL BCG-MCNC: 4.4 G/DL (ref 3.5–5.2)
ALP SERPL-CCNC: 79 U/L (ref 35–104)
ALT SERPL W P-5'-P-CCNC: 26 U/L (ref 10–35)
ANION GAP SERPL CALCULATED.3IONS-SCNC: 14 MMOL/L (ref 7–15)
AST SERPL W P-5'-P-CCNC: 23 U/L (ref 10–35)
BASOPHILS # BLD AUTO: 0 10E3/UL (ref 0–0.2)
BASOPHILS NFR BLD AUTO: 1 %
BILIRUB SERPL-MCNC: 0.2 MG/DL
BUN SERPL-MCNC: 11 MG/DL (ref 6–20)
CALCIUM SERPL-MCNC: 9.7 MG/DL (ref 8.6–10)
CHLORIDE SERPL-SCNC: 103 MMOL/L (ref 98–107)
CREAT SERPL-MCNC: 0.7 MG/DL (ref 0.51–0.95)
DEPRECATED HCO3 PLAS-SCNC: 21 MMOL/L (ref 22–29)
EOSINOPHIL # BLD AUTO: 0.2 10E3/UL (ref 0–0.7)
EOSINOPHIL NFR BLD AUTO: 4 %
ERYTHROCYTE [DISTWIDTH] IN BLOOD BY AUTOMATED COUNT: 14.3 % (ref 10–15)
GFR SERPL CREATININE-BSD FRML MDRD: >90 ML/MIN/1.73M2
GLUCOSE SERPL-MCNC: 81 MG/DL (ref 70–99)
HCT VFR BLD AUTO: 37.2 % (ref 35–47)
HGB BLD-MCNC: 12.6 G/DL (ref 11.7–15.7)
IMM GRANULOCYTES # BLD: 0 10E3/UL
IMM GRANULOCYTES NFR BLD: 1 %
LYMPHOCYTES # BLD AUTO: 1.2 10E3/UL (ref 0.8–5.3)
LYMPHOCYTES NFR BLD AUTO: 22 %
MCH RBC QN AUTO: 30.6 PG (ref 26.5–33)
MCHC RBC AUTO-ENTMCNC: 33.9 G/DL (ref 31.5–36.5)
MCV RBC AUTO: 90 FL (ref 78–100)
MONOCYTES # BLD AUTO: 1 10E3/UL (ref 0–1.3)
MONOCYTES NFR BLD AUTO: 19 %
NEUTROPHILS # BLD AUTO: 3 10E3/UL (ref 1.6–8.3)
NEUTROPHILS NFR BLD AUTO: 53 %
NRBC # BLD AUTO: 0 10E3/UL
NRBC BLD AUTO-RTO: 0 /100
PLATELET # BLD AUTO: 283 10E3/UL (ref 150–450)
POTASSIUM SERPL-SCNC: 3.9 MMOL/L (ref 3.4–5.3)
PROT SERPL-MCNC: 6.5 G/DL (ref 6.4–8.3)
RBC # BLD AUTO: 4.12 10E6/UL (ref 3.8–5.2)
SODIUM SERPL-SCNC: 138 MMOL/L (ref 136–145)
WBC # BLD AUTO: 5.5 10E3/UL (ref 4–11)

## 2023-02-24 PROCEDURE — 80053 COMPREHEN METABOLIC PANEL: CPT | Performed by: INTERNAL MEDICINE

## 2023-02-24 PROCEDURE — 85025 COMPLETE CBC W/AUTO DIFF WBC: CPT | Performed by: INTERNAL MEDICINE

## 2023-02-27 ENCOUNTER — ANCILLARY PROCEDURE (OUTPATIENT)
Dept: GENERAL RADIOLOGY | Facility: CLINIC | Age: 43
End: 2023-02-27
Attending: NURSE PRACTITIONER
Payer: COMMERCIAL

## 2023-02-27 ENCOUNTER — LAB (OUTPATIENT)
Dept: LAB | Facility: CLINIC | Age: 43
End: 2023-02-27
Attending: INTERNAL MEDICINE
Payer: COMMERCIAL

## 2023-02-27 VITALS — SYSTOLIC BLOOD PRESSURE: 136 MMHG | HEART RATE: 111 BPM | DIASTOLIC BLOOD PRESSURE: 100 MMHG

## 2023-02-27 DIAGNOSIS — C91.00 ACUTE LYMPHOBLASTIC LEUKEMIA (ALL) NOT HAVING ACHIEVED REMISSION (H): ICD-10-CM

## 2023-02-27 DIAGNOSIS — Z76.89 PROPHYLAXIS FOR CHEMOTHERAPY-INDUCED NEUTROPENIA: Primary | ICD-10-CM

## 2023-02-27 DIAGNOSIS — C91.01 ACUTE LYMPHOBLASTIC LEUKEMIA (ALL) IN REMISSION (H): ICD-10-CM

## 2023-02-27 DIAGNOSIS — T14.8XXA SKIN WOUND FROM SURGICAL INCISION: Primary | ICD-10-CM

## 2023-02-27 LAB
GLUCOSE CSF-MCNC: 56 MG/DL (ref 40–70)
HOLD SPECIMEN: NORMAL
PROT CSF-MCNC: 42.2 MG/DL (ref 15–45)

## 2023-02-27 PROCEDURE — 99000 SPECIMEN HANDLING OFFICE-LAB: CPT | Performed by: PATHOLOGY

## 2023-02-27 PROCEDURE — 77003 FLUOROGUIDE FOR SPINE INJECT: CPT | Performed by: RADIOLOGY

## 2023-02-27 PROCEDURE — 88185 FLOWCYTOMETRY/TC ADD-ON: CPT | Performed by: NURSE PRACTITIONER

## 2023-02-27 PROCEDURE — 88188 FLOWCYTOMETRY/READ 9-15: CPT | Mod: GC | Performed by: PATHOLOGY

## 2023-02-27 PROCEDURE — 84157 ASSAY OF PROTEIN OTHER: CPT | Performed by: PATHOLOGY

## 2023-02-27 PROCEDURE — 82945 GLUCOSE OTHER FLUID: CPT | Performed by: PATHOLOGY

## 2023-02-27 PROCEDURE — 88184 FLOWCYTOMETRY/ TC 1 MARKER: CPT | Performed by: NURSE PRACTITIONER

## 2023-02-27 PROCEDURE — 96450 CHEMOTHERAPY INTO CNS: CPT | Performed by: RADIOLOGY

## 2023-02-27 PROCEDURE — 89050 BODY FLUID CELL COUNT: CPT | Performed by: PATHOLOGY

## 2023-02-27 RX ORDER — LIDOCAINE HYDROCHLORIDE 10 MG/ML
30 INJECTION, SOLUTION EPIDURAL; INFILTRATION; INTRACAUDAL; PERINEURAL ONCE
Status: COMPLETED | OUTPATIENT
Start: 2023-02-27 | End: 2023-02-27

## 2023-02-27 RX ORDER — CEPHALEXIN 500 MG/1
500 CAPSULE ORAL 2 TIMES DAILY
Qty: 14 CAPSULE | Refills: 0 | Status: SHIPPED | OUTPATIENT
Start: 2023-02-27 | End: 2023-03-06

## 2023-02-27 RX ADMIN — LIDOCAINE HYDROCHLORIDE 12.5 ML: 10 INJECTION, SOLUTION EPIDURAL; INFILTRATION; INTRACAUDAL; PERINEURAL at 12:13

## 2023-02-27 NOTE — PROGRESS NOTES
Interventional Radiology Brief Post Procedure Note    Procedure: Lumbar puncture with IT chemotherapy.    Proceduralist: Ernesto Bartlett MD    Assistant: Jaya Sen PA-C    Time Out: Prior to the start of the procedure and with procedural staff participation, I verbally confirmed the patient s identity using two indicators, relevant allergies, that the procedure was appropriate and matched the consent or emergent situation, and that the correct equipment/implants were available. Immediately prior to starting the procedure I conducted the Time Out with the procedural staff and re-confirmed the patient s name, procedure, and site/side. (The Joint Commission universal protocol was followed.)  Yes    Medications   Medication Event Details Admin User Admin Time       Sedation: None. Local Anesthestic used (12.5 ml)    Findings: Attempted lumbar puncture at L2/L3 level failed due to osteophyte contact. Initial attempts at lumbar puncture performed at L3/L4 level failed due to bowing of the needle limiting deeper positioning. Successful fluoroscopy guided lumbar puncture performed at L3/L4 vertebral level using a left paramedian approach. 10 ml clear CSF collected as follows: 2 ml into vials # 1 - # 3, 4 ml into vial #4. Intrathecal chemotherapy deliver per Hematololgy/Oncology staff (BREE Quarles, LOAN).    Patient asked author to inspect her right chest port site, which she says is open. Port was placed 1/17/23. Inspection reveals a small (5 mm) area of dehiscence along the port incision site. The port site is without pain, fluctuance, erythema, or discharge. There is no catheter or connection cuff visible at this time, however, the patient reports that she did previously see clear plastic under the defect.    Patient's Oncologist (Dr. Hull) contacted for further discussion. Per plan, the right chest port site was sterilely prepared, and topical skin glue was used to close the incision. The port should not be used for at  least 10 days to allow for the incision to heal. Prophylactic cephalexin was ordered via E*prescribe to the patient's preferred pharmacy. Patient instructed to monitor for pain, redness, fever, discharge, tremor, chills, or nausea/vomiting.    Estimated Blood Loss: Minimal    Fluoroscopy Time:  1.6 minute(s)    SPECIMENS: Fluid and/or tissue for laboratory analysis    Complications: 1. None     Condition: Stable    Plan: Follow up per primary team. Bedrest supine for a minimum of 1 hour. No strenuous activity for 3 days.    Approximately 60 minutes of face-to-face time occurred with the patient during this encounter.    Comments: See dictated procedure note for full details.    Duncan Sen PA-C

## 2023-02-27 NOTE — DISCHARGE INSTRUCTIONS
Discharge instructions for Lumbar Puncture           AFTER YOU GO HOME      Relax and take it easy for 24 hours  You may resume normal activity after the 24 hour period  You may develop a headache that will normally go away on its own in 1 to 2 days. Lying down should help relieve this pain.  If you develop a headache you can take over the counter medicines and lay down.  You can try drinking caffeine-coffee, soda.   Drink at least 4 glasses of extra fluid today if not on a fluid restriction  Continue to take your medications as ordered by your provider  You may remove the bandage and resume bathing the next day  DO NOT drive or operate machinery at home or at work for at least 24 hours               CALL YOU PRIMARY PHYSICIAN IF:    Severe head or neck pain that doesn't go away or that gets worse  You feel less alert or have trouble waking up  Repeated upset stomach (nausea) or vomiting  Swelling, pain, bruising, or redness at the puncture site  Fever of 100.4 F (38 C) or higher, or as advised by your provider  If you start to leak a large amount of fluid from the puncture site (also, lie down flat on your back)      Date: 2/27/23  Provider: Jaya ADAME PA-C, Interventional Radiology, [facility]    [facility]  [address] [place] [address]

## 2023-02-28 LAB
APPEARANCE CSF: CLEAR
COLOR CSF: COLORLESS
PATH REPORT.COMMENTS IMP SPEC: NORMAL
PATH REPORT.FINAL DX SPEC: NORMAL
PATH REPORT.MICROSCOPIC SPEC OTHER STN: NORMAL
PATH REPORT.RELEVANT HX SPEC: NORMAL
PATH REV: ABNORMAL
RBC # CSF MANUAL: 6 /UL (ref 0–2)
TUBE # CSF: 3
WBC # CSF MANUAL: 2 /UL (ref 0–5)

## 2023-03-01 ENCOUNTER — HOSPITAL ENCOUNTER (OUTPATIENT)
Facility: CLINIC | Age: 43
Discharge: HOME | End: 2023-03-01
Attending: INTERNAL MEDICINE | Admitting: RADIOLOGY
Payer: COMMERCIAL

## 2023-03-01 ENCOUNTER — APPOINTMENT (OUTPATIENT)
Dept: MEDSURG UNIT | Facility: CLINIC | Age: 43
End: 2023-03-01
Attending: INTERNAL MEDICINE
Payer: COMMERCIAL

## 2023-03-01 ENCOUNTER — APPOINTMENT (OUTPATIENT)
Dept: INTERVENTIONAL RADIOLOGY/VASCULAR | Facility: CLINIC | Age: 43
End: 2023-03-01
Attending: NURSE PRACTITIONER
Payer: COMMERCIAL

## 2023-03-01 VITALS
SYSTOLIC BLOOD PRESSURE: 128 MMHG | TEMPERATURE: 98.7 F | WEIGHT: 241.8 LBS | OXYGEN SATURATION: 96 % | HEART RATE: 84 BPM | BODY MASS INDEX: 41.28 KG/M2 | HEIGHT: 64 IN | RESPIRATION RATE: 16 BRPM | DIASTOLIC BLOOD PRESSURE: 76 MMHG

## 2023-03-01 DIAGNOSIS — C91.01 ACUTE LYMPHOBLASTIC LEUKEMIA (ALL) IN REMISSION (H): ICD-10-CM

## 2023-03-01 DIAGNOSIS — C91.00 ACUTE LYMPHOBLASTIC LEUKEMIA (ALL) NOT HAVING ACHIEVED REMISSION (H): ICD-10-CM

## 2023-03-01 LAB
BASOPHILS # BLD AUTO: 0.1 10E3/UL (ref 0–0.2)
BASOPHILS NFR BLD AUTO: 1 %
EOSINOPHIL # BLD AUTO: 0.2 10E3/UL (ref 0–0.7)
EOSINOPHIL NFR BLD AUTO: 3 %
ERYTHROCYTE [DISTWIDTH] IN BLOOD BY AUTOMATED COUNT: 14.4 % (ref 10–15)
HCG UR QL: NEGATIVE
HCT VFR BLD AUTO: 39 % (ref 35–47)
HGB BLD-MCNC: 12.5 G/DL (ref 11.7–15.7)
IMM GRANULOCYTES # BLD: 0 10E3/UL
IMM GRANULOCYTES NFR BLD: 1 %
INR PPP: 0.95 (ref 0.85–1.15)
LYMPHOCYTES # BLD AUTO: 0.9 10E3/UL (ref 0.8–5.3)
LYMPHOCYTES NFR BLD AUTO: 11 %
MCH RBC QN AUTO: 29.8 PG (ref 26.5–33)
MCHC RBC AUTO-ENTMCNC: 32.1 G/DL (ref 31.5–36.5)
MCV RBC AUTO: 93 FL (ref 78–100)
MONOCYTES # BLD AUTO: 0.7 10E3/UL (ref 0–1.3)
MONOCYTES NFR BLD AUTO: 8 %
NEUTROPHILS # BLD AUTO: 6.4 10E3/UL (ref 1.6–8.3)
NEUTROPHILS NFR BLD AUTO: 76 %
NRBC # BLD AUTO: 0 10E3/UL
NRBC BLD AUTO-RTO: 0 /100
PLATELET # BLD AUTO: 267 10E3/UL (ref 150–450)
RBC # BLD AUTO: 4.2 10E6/UL (ref 3.8–5.2)
WBC # BLD AUTO: 8.3 10E3/UL (ref 4–11)

## 2023-03-01 PROCEDURE — 258N000003 HC RX IP 258 OP 636: Performed by: NURSE PRACTITIONER

## 2023-03-01 PROCEDURE — 272N000505 HC NEEDLE CR5

## 2023-03-01 PROCEDURE — 88342 IMHCHEM/IMCYTCHM 1ST ANTB: CPT | Mod: 26 | Performed by: STUDENT IN AN ORGANIZED HEALTH CARE EDUCATION/TRAINING PROGRAM

## 2023-03-01 PROCEDURE — 38222 DX BONE MARROW BX & ASPIR: CPT | Mod: RT | Performed by: RADIOLOGY

## 2023-03-01 PROCEDURE — 88341 IMHCHEM/IMCYTCHM EA ADD ANTB: CPT | Mod: 26 | Performed by: STUDENT IN AN ORGANIZED HEALTH CARE EDUCATION/TRAINING PROGRAM

## 2023-03-01 PROCEDURE — 88184 FLOWCYTOMETRY/ TC 1 MARKER: CPT | Performed by: STUDENT IN AN ORGANIZED HEALTH CARE EDUCATION/TRAINING PROGRAM

## 2023-03-01 PROCEDURE — 250N000011 HC RX IP 250 OP 636: Performed by: PHYSICIAN ASSISTANT

## 2023-03-01 PROCEDURE — 81207 BCR/ABL1 GENE MINOR BP: CPT | Performed by: INTERNAL MEDICINE

## 2023-03-01 PROCEDURE — 88305 TISSUE EXAM BY PATHOLOGIST: CPT | Mod: 26 | Performed by: STUDENT IN AN ORGANIZED HEALTH CARE EDUCATION/TRAINING PROGRAM

## 2023-03-01 PROCEDURE — 88305 TISSUE EXAM BY PATHOLOGIST: CPT | Mod: TC | Performed by: INTERNAL MEDICINE

## 2023-03-01 PROCEDURE — 250N000011 HC RX IP 250 OP 636: Performed by: RADIOLOGY

## 2023-03-01 PROCEDURE — 88184 FLOWCYTOMETRY/ TC 1 MARKER: CPT | Performed by: INTERNAL MEDICINE

## 2023-03-01 PROCEDURE — 96374 THER/PROPH/DIAG INJ IV PUSH: CPT

## 2023-03-01 PROCEDURE — 85610 PROTHROMBIN TIME: CPT | Performed by: NURSE PRACTITIONER

## 2023-03-01 PROCEDURE — 88189 FLOWCYTOMETRY/READ 16 & >: CPT | Performed by: STUDENT IN AN ORGANIZED HEALTH CARE EDUCATION/TRAINING PROGRAM

## 2023-03-01 PROCEDURE — 88264 CHROMOSOME ANALYSIS 20-25: CPT | Performed by: INTERNAL MEDICINE

## 2023-03-01 PROCEDURE — 85004 AUTOMATED DIFF WBC COUNT: CPT | Performed by: NURSE PRACTITIONER

## 2023-03-01 PROCEDURE — G0452 MOLECULAR PATHOLOGY INTERPR: HCPCS | Mod: 26 | Performed by: PATHOLOGY

## 2023-03-01 PROCEDURE — 88237 TISSUE CULTURE BONE MARROW: CPT | Performed by: INTERNAL MEDICINE

## 2023-03-01 PROCEDURE — 36591 DRAW BLOOD OFF VENOUS DEVICE: CPT | Performed by: NURSE PRACTITIONER

## 2023-03-01 PROCEDURE — 88368 INSITU HYBRIDIZATION MANUAL: CPT | Mod: 26 | Performed by: MEDICAL GENETICS

## 2023-03-01 PROCEDURE — 999N000142 HC STATISTIC PROCEDURE PREP ONLY

## 2023-03-01 PROCEDURE — 250N000009 HC RX 250: Performed by: PHYSICIAN ASSISTANT

## 2023-03-01 PROCEDURE — 88369 M/PHMTRC ALYSISHQUANT/SEMIQ: CPT | Mod: 26 | Performed by: MEDICAL GENETICS

## 2023-03-01 PROCEDURE — 77002 NEEDLE LOCALIZATION BY XRAY: CPT

## 2023-03-01 PROCEDURE — 77002 NEEDLE LOCALIZATION BY XRAY: CPT | Mod: 26 | Performed by: RADIOLOGY

## 2023-03-01 PROCEDURE — 999N000132 HC STATISTIC PP CARE STAGE 1

## 2023-03-01 PROCEDURE — 88311 DECALCIFY TISSUE: CPT | Mod: 26 | Performed by: STUDENT IN AN ORGANIZED HEALTH CARE EDUCATION/TRAINING PROGRAM

## 2023-03-01 PROCEDURE — 88271 CYTOGENETICS DNA PROBE: CPT | Performed by: INTERNAL MEDICINE

## 2023-03-01 PROCEDURE — 85097 BONE MARROW INTERPRETATION: CPT | Performed by: STUDENT IN AN ORGANIZED HEALTH CARE EDUCATION/TRAINING PROGRAM

## 2023-03-01 PROCEDURE — 81025 URINE PREGNANCY TEST: CPT | Performed by: NURSE PRACTITIONER

## 2023-03-01 PROCEDURE — 99152 MOD SED SAME PHYS/QHP 5/>YRS: CPT | Performed by: RADIOLOGY

## 2023-03-01 RX ORDER — ONDANSETRON 2 MG/ML
4 INJECTION INTRAMUSCULAR; INTRAVENOUS ONCE
Status: COMPLETED | OUTPATIENT
Start: 2023-03-01 | End: 2023-03-01

## 2023-03-01 RX ORDER — FLUMAZENIL 0.1 MG/ML
0.2 INJECTION, SOLUTION INTRAVENOUS
Status: DISCONTINUED | OUTPATIENT
Start: 2023-03-01 | End: 2023-03-01 | Stop reason: HOSPADM

## 2023-03-01 RX ORDER — SODIUM CHLORIDE 9 MG/ML
INJECTION, SOLUTION INTRAVENOUS CONTINUOUS
Status: DISCONTINUED | OUTPATIENT
Start: 2023-03-01 | End: 2023-03-01 | Stop reason: HOSPADM

## 2023-03-01 RX ORDER — NALOXONE HYDROCHLORIDE 0.4 MG/ML
0.2 INJECTION, SOLUTION INTRAMUSCULAR; INTRAVENOUS; SUBCUTANEOUS
Status: DISCONTINUED | OUTPATIENT
Start: 2023-03-01 | End: 2023-03-01 | Stop reason: HOSPADM

## 2023-03-01 RX ORDER — NALOXONE HYDROCHLORIDE 0.4 MG/ML
0.4 INJECTION, SOLUTION INTRAMUSCULAR; INTRAVENOUS; SUBCUTANEOUS
Status: DISCONTINUED | OUTPATIENT
Start: 2023-03-01 | End: 2023-03-01 | Stop reason: HOSPADM

## 2023-03-01 RX ORDER — FENTANYL CITRATE 50 UG/ML
25-50 INJECTION, SOLUTION INTRAMUSCULAR; INTRAVENOUS EVERY 5 MIN PRN
Status: DISCONTINUED | OUTPATIENT
Start: 2023-03-01 | End: 2023-03-01 | Stop reason: HOSPADM

## 2023-03-01 RX ORDER — LIDOCAINE 40 MG/G
CREAM TOPICAL
Status: DISCONTINUED | OUTPATIENT
Start: 2023-03-01 | End: 2023-03-01 | Stop reason: HOSPADM

## 2023-03-01 RX ADMIN — FENTANYL CITRATE 225 MCG: 50 INJECTION, SOLUTION INTRAMUSCULAR; INTRAVENOUS at 12:31

## 2023-03-01 RX ADMIN — MIDAZOLAM 4.5 MG: 1 INJECTION INTRAMUSCULAR; INTRAVENOUS at 12:30

## 2023-03-01 RX ADMIN — SODIUM CHLORIDE: 9 INJECTION, SOLUTION INTRAVENOUS at 10:19

## 2023-03-01 RX ADMIN — ONDANSETRON HYDROCHLORIDE 4 MG: 2 INJECTION, SOLUTION INTRAMUSCULAR; INTRAVENOUS at 12:44

## 2023-03-01 RX ADMIN — LIDOCAINE HYDROCHLORIDE 10 ML: 10 INJECTION, SOLUTION EPIDURAL; INFILTRATION; INTRACAUDAL; PERINEURAL at 12:31

## 2023-03-01 ASSESSMENT — ACTIVITIES OF DAILY LIVING (ADL)
ADLS_ACUITY_SCORE: 35
ADLS_ACUITY_SCORE: 35

## 2023-03-01 NOTE — PROGRESS NOTES
Patient Name: April F Bakari  Medical Record Number: [id number]  Today's Date: 3/1/2023    Procedure: Image guided bone marrow biopsy.  Proceduralist: MD Rolando  Pathology present: Yes, Heme path on site.    Procedure Start: 1200  Procedure end: 1230  Sedation medications administered: Fentanyl: 225 mcg Versed:4.5mg     Report given to: 2A,RN  : n/a    Other Notes: Pt arrived to IR room 5 from . Consent reviewed. Pt denies any questions or concerns regarding procedure. Pt positioned prone and monitored per protocol. Pt tolerated procedure without any noted complications. Pt transferred back to .    Leah Haq, RN

## 2023-03-01 NOTE — PROGRESS NOTES
Pt is tolerating liquids and foods, ambulating, urinating, puncture sites are stable (no bleeding and no hematoma). Pt has a  -  Crescencio.  A/O x4 and making needs known.  VSS.  IV access removed.  Discharge education completed and outlined in AVS or handout: medications reviewed with patient.  All questions answered and addressed.  Belongings returned to patient at discharge.  Discharged to self care.  Pt ambulated out to [facility] waiting room where  is waiting, accompanied by RN.

## 2023-03-01 NOTE — PROGRESS NOTES
Pt prepped for Bone Marrow Biopsy.  VSS.  Pt reports some soreness from old LP site early this week, otherwise denies any other pain.  Right PIV placed and currently infusing NS at 75 ml/hr.  Labs pending.  Awaiting consent.  Per pt report, will call  Crescencio to pick her up after procedure done and ready to be discharged.

## 2023-03-01 NOTE — DISCHARGE INSTRUCTIONS
[facility]    Interventional Radiology  Patient Instructions Following Bone Biopsy    AFTER YOU GO HOME  If you were given sedation DO NOT drive or operate machinery at home or at work for at least 24 hours  DO relax and take it easy for 48 hours, no strenuous activity for 24 hours  DO drink plenty of fluids  DO resume your regular diet, unless otherwise instructed by your Primary Physician  Keep the dressing dry and in place for 24 hours.  DO NOT SMOKE FOR AT LEAST 24 HOURS, if you have been given any medications that were to help you relax or sedate you during your procedure  DO NOT drink alcoholic beverages the day of your procedure  DO NOT do any strenuous exercise or lifting (> 10 lbs) for at least 7 days following your procedure  DO NOT take a bath or shower for at least 12 hours following your procedure  Remove dressing after shower the next day. Replace with Band aid for 2 days.  Never leave a wet dressing in place.  DO NOT make any important or legal decisions for 24 hours following your procedure  There should be minimum drainage from the biopsy site    CALL THE PHYSICIAN IF:  You start bleeding from the procedure site.  If you do start to bleed from that site, lie down flat and hold pressure on the site for a minimum of 10 minutes.  Your physician will tell you if you need to return to the hospital  You develop nausea or vomiting  You have excessive swelling, redness, or tenderness at the site  You have drainage that looks like it is infected.  You experience severe pain  You develop hives or a rash or unexplained itching  You develop shortness of breath  You develop a temperature of 101 degrees F or greater  You develop chest pain or cough up blood, lightheadedness or fainting    [facility] INTERVENTIONAL RADIOLOGY DEPARTMENT  Procedure Physician: Cyndy Marshall MD                                Date of procedure: March 1, 2023  Telephone Numbers: [phone]      Monday-Friday 7:30 am  to 4:00 pm  [phone] After 4:00 pm Monday-Friday, Weekends & Holidays.   Ask for the Interventional Radiologist on call.  Someone is on call 24 hrs/day  [facility] toll free number: [phone] Monday-Friday 8:00 am to 4:30 pm  [facility] Emergency Dept: [phone]

## 2023-03-01 NOTE — PROGRESS NOTES
Returned from IR s/p bone biopsy.  Right upper buttock biopsy site CDI.  C/O soreness at biopsy site; declined ice or Tylenol at this time.  Queasy upon arrival; Zofran giben.  Resting in bed.

## 2023-03-01 NOTE — PROCEDURES
[facility] Brief Procedure Note    Pre-operative diagnosis: ALL   Post-operative diagnosis ALL   Procedure: Fluoroscopic guided bone marrow biopsy   Surgeon: Cyndy Blanton MD   Assistants(s): -   Estimated blood loss: Minimal        Findings: Uneventful fluoroscopic guided bone marrow biopsy and aspiration performed from right iliac bone. Samples handed to [facility].   Complications: None.

## 2023-03-02 DIAGNOSIS — C91.01 ACUTE LYMPHOBLASTIC LEUKEMIA (ALL) IN REMISSION (H): Primary | ICD-10-CM

## 2023-03-07 DIAGNOSIS — Z76.89 PROPHYLAXIS FOR CHEMOTHERAPY-INDUCED NEUTROPENIA: Primary | ICD-10-CM

## 2023-03-07 DIAGNOSIS — C91.00 ACUTE LYMPHOBLASTIC LEUKEMIA (ALL) NOT HAVING ACHIEVED REMISSION (H): ICD-10-CM

## 2023-03-07 LAB
ABO/RH(D): NORMAL
ANTIBODY SCREEN: NEGATIVE
SPECIMEN EXPIRATION DATE: NORMAL

## 2023-03-07 RX ORDER — MEPERIDINE HYDROCHLORIDE 25 MG/ML
25 INJECTION INTRAMUSCULAR; INTRAVENOUS; SUBCUTANEOUS EVERY 30 MIN PRN
Status: CANCELLED | OUTPATIENT
Start: 2023-03-07

## 2023-03-07 RX ORDER — METHYLPREDNISOLONE SODIUM SUCCINATE 125 MG/2ML
125 INJECTION, POWDER, LYOPHILIZED, FOR SOLUTION INTRAMUSCULAR; INTRAVENOUS
Status: CANCELLED
Start: 2023-03-07

## 2023-03-07 RX ORDER — ALBUTEROL SULFATE 0.83 MG/ML
2.5 SOLUTION RESPIRATORY (INHALATION)
Status: CANCELLED | OUTPATIENT
Start: 2023-03-07

## 2023-03-07 RX ORDER — EPINEPHRINE 1 MG/ML
0.3 INJECTION, SOLUTION INTRAMUSCULAR; SUBCUTANEOUS EVERY 5 MIN PRN
Status: CANCELLED | OUTPATIENT
Start: 2023-03-07

## 2023-03-07 RX ORDER — DIPHENHYDRAMINE HYDROCHLORIDE 50 MG/ML
50 INJECTION INTRAMUSCULAR; INTRAVENOUS
Status: CANCELLED
Start: 2023-03-07

## 2023-03-07 RX ORDER — ALBUTEROL SULFATE 90 UG/1
1-2 AEROSOL, METERED RESPIRATORY (INHALATION)
Status: CANCELLED
Start: 2023-03-07

## 2023-03-07 NOTE — PROGRESS NOTES
[facility]  Date of visit: Mar 8, 2023        Reason for Visit: Ph (+) B-ALL      Oncology HPI:   Followed by Dr. Hull. Patient presented to outside hospital with c/o right upper quadrant pain and was subsequently found to have a markedly elevated white blood cell count concerning for acute leukemia. While at OSH, she had CTA chest as well as CT A/P. These identified no PE, no acute or localized intraabdominal inflammatory process, mild splenomegaly, and few inconspicuous low-density structures in the liver. She was transferred to [facility] for further workup and management. S/p Hydrea 1g TID (8/21-8/23) with improvement in WBC (100K ? 12K). Diagnostic BMBx completed 8/20/22, which demonstrated B-ALL with 85% blasts and hypercellular marrow. IHC shows CD10+, CD34+. 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 ALL NGS negative. BCR/ABL1 major/minor (sent from peripheral blood) both negative. Initiated pre-phase steroids with Prednisone 60 mg x8/21; increased to 60 mg/m2 (140 mg) 8/22-8/24. She initiated GRAALL + imatinib (C1D1 = 8/24/22). S/p BMT consult 9/12 with Dr. Walsh. Induction was relatively uncomplicated. Post-induction BMBx done 9/26/22 and revealed MRD- CR1, with negative BCR/ABL1 and Clonoseq testing. With no matched related donors and excellent early/deep response chemotherapy is currently favored over BMT. 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 Underwent BMBx 10/25/22 with no morphologic evidence of B-lymphoblastic  leukemia and flow with unusual B lineage precursor population 0.09%. C3 GRAALL + imatinib on 11/2/22 was tolerated well without complication. Prior plan was to continue GRAALL for a total of 8 cycles (of note odd cycles are 21-days in length and even cycles are 14-days in length), however, transitioned to Blinatumumab + Ponatinib as curative intent therapy given no matched sib donors. S/p Blincyto + Ponatinib (C1D1 = 12/14/22), complicated by neutropenic fever 2/2 pneumonia. Recent BMBx 1/17/2023: BCR-ABL p190 now detectable; Clonoseq pending.  - Port placed x1/17/23  - Per Dr Hull, plan for repeat BMBx after Blincyto C1, C2 and C5 then every 3-6 months thereafter. Clonoseq to be sent    - C2 Blincyto, D1 = 1/25/23  - Bmbx 3/1-- path/ flow neg, BCR-ABL minor p190 PCR pending, however by FISH still with low level detectable BCR-ABL fusion at 0.125%      Interval history:   April is overall doing really well without any major side effects from treatment. Her energy is good. Her appetite and weight are stable. No GI complaints. No fevers or infections. No bleeding. No rash. Headaches continue to be resolved.     She does note in her right hand she has some sharp burning pain that wakes her from sleep. This will come and go. Initially thought to be sleeping positioning however when sitting up after awakening, this still occurs. No swelling that she is aware of or skin changes. Strength remains intact. This only occurs at night. She feels tight in her fingers as well when this happens. Shakes her hands out, no interventions due to transient nature. Getting a massage tomorrow, hoping this will help.     Port opened up last week-- was re-glued shut by IR and started antibiotics. No issues at this time-- no redness, swelling, drainage. No fevers or chills.             Current Outpatient Medications   Medication Sig Dispense Refill     acetaminophen (TYLENOL) 325 MG tablet Take 2 tablets (650 mg) by mouth every 6 hours as  needed for mild pain, other or fever (blood product administration premedication) (Patient not taking: Reported on 2/2/2023)       amLODIPine (NORVASC) 5 MG tablet Take 1 tablet (5 mg) by mouth daily 30 tablet 4     aspirin (ASA) 81 MG EC tablet Take 1 tablet (81 mg) by mouth daily 30 tablet 1     benzonatate (TESSALON) 100 MG capsule Take 1 capsule (100 mg) by mouth 3 times daily as needed for cough (Patient not taking: Reported on 2/2/2023) 30 capsule 0     CHOLECALCIFEROL PO Take 1 tablet by mouth daily Unknown strength.       fluconazole (DIFLUCAN) 200 MG tablet Take 1 tablet (200 mg) by mouth daily 30 tablet 3     levofloxacin (LEVAQUIN) 250 MG tablet Take 1 tablet (250 mg) by mouth daily 30 tablet 3     lidocaine-prilocaine (EMLA) 2.5-2.5 % external cream Apply topically as needed (Apply 30 to 60minutes before Port Needle Access for pain) (Patient not taking: Reported on 2/2/2023) 30 g 3     lidocaine-prilocaine (LIDOPRIL) 2.5-2.5 % kit Use prior to lab access (Patient not taking: Reported on 2/2/2023) 1 kit 1     LORazepam (ATIVAN) 0.5 MG tablet Take 1-2 tablets (0.5-1 mg) by mouth daily as needed for anxiety . Take prior to BMBx procedures as needed. If initial dose unhelpful, okay to take a second 0.5-1 mg dose (for a max of 2 mg at a time). Caution: causes sedation. Do not drive after taking this medication. (Patient not taking: Reported on 2/2/2023) 10 tablet 0     NEUPOGEN 480 MCG/0.8ML SOSY syringe        ondansetron (ZOFRAN ODT) 4 MG ODT tab Take 1-2 tablets (4-8 mg) by mouth every 8 hours as needed for nausea or vomiting (Patient not taking: Reported on 2/2/2023) 60 tablet 0     PONATinib (ICLUSIG) 15 MG tablet Take 1 tablet (15 mg) by mouth daily -- start concurrent with blinatumomab infusion, planned for 12/12/22 30 tablet 3     prochlorperazine (COMPAZINE) 10 MG tablet Take 1 tablet (10 mg) by mouth every 6 hours as needed (Breakthrough Nausea/Vomiting) (Patient not taking: Reported on 2/2/2023) 20  tablet 0     valACYclovir (VALTREX) 1000 mg tablet Take 1 tablet (1,000 mg) by mouth daily 30 tablet 1       Allergies   Allergen Reactions     Blood Transfusion Related (Informational Only) Hives     Hive reaction with platelet transfusion on 8/26/2022. Please administer platelets with tylenol and benadryl premedication.          Physical Exam:  /77 (BP Location: Right arm, Patient Position: Sitting, Cuff Size: Adult Large)   Pulse 83   Temp 98.1  F (36.7  C) (Oral)   Resp 16   Wt 112.4 kg (247 lb 14.4 oz)   SpO2 100%   BMI 42.55 kg/m    General: No acute distress.  HEENT: EOMI, PERRL. Sclerae are anicteric. Oral mucosa is pink and moist with no lesions or thrush.   Lymph: Neck is supple with no lymphadenopathy in the cervical or supraclavicular areas.   Heart: Regular rate and rhythm.   Lungs: Clear to auscultation bilaterally.   Abdomen: Bowel sounds present, soft, nontender with no palpable hepatosplenomegaly or masses.   Extremities: No lower extremity edema noted bilaterally.   Neuro: Alert and oriented x3, CN grossly intact, steady gait  Skin: No rashes, petechiae, or bruising noted on exposed skin.      Labs:     I personally reviewed the following labs:    Most Recent 3 CBC's:  Recent Labs   Lab Test 03/08/23  1039 03/01/23  1005 02/27/23  1157   WBC 7.4 8.3 6.4   HGB 11.8 12.5 12.0   MCV 89 93 90    267 266     Most Recent 3 BMP's:  Recent Labs   Lab Test 03/08/23  1039 02/27/23  1157 02/24/23  1330    141 138   POTASSIUM 4.0 3.8 3.9   CHLORIDE 105 107 103   CO2 24 24 21*   BUN 8.9 9.5 11.0   CR 0.50* 0.52 0.70   ANIONGAP 10 10 14   SOLEDAD 9.3 9.4 9.7   GLC 85 114* 81     Most Recent 2 LFT's:  Recent Labs   Lab Test 03/08/23  1039 02/27/23  1157   AST 19 18   ALT 32 22   ALKPHOS 81 86   BILITOTAL 0.2 0.2           Imaging: n/a      Impression/plan:     # Ph(+) B-ALL   Followed by Dr. Hull. Patient presented to outside hospital with c/o right upper quadrant pain and was subsequently  found to have a markedly elevated white blood cell count concerning for acute leukemia. While at OSH, she had CTA chest as well as CT A/P. These identified no PE, no acute or localized intraabdominal inflammatory process, mild splenomegaly, and few inconspicuous low-density structures in the liver. She was transferred to [facility] for further workup and management. S/p Hydrea 1g TID (8/21-8/23) with improvement in WBC (100K ? 12K). Diagnostic BMBx completed 8/20/22, which demonstrated B-ALL with 85% blasts and hypercellular marrow. IHC shows CD10+, CD34+. 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 ALL NGS negative. BCR/ABL1 major/minor (sent from peripheral blood) both negative. Initiated pre-phase steroids with Prednisone 60 mg x8/21; increased to 60 mg/m2 (140 mg) 8/22-8/24. She initiated GRAALL + imatinib (C1D1 = 8/24/22). S/p BMT consult 9/12 with Dr. Walsh. Induction was relatively uncomplicated. Post-induction BMBx done 9/26/22 and revealed MRD- CR1, with negative BCR/ABL1 and Clonoseq testing. With no matched related donors and excellent early/deep response chemotherapy is currently favored over BMT. 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 Underwent BMBx 10/25/22 with no morphologic evidence of B-lymphoblastic leukemia and flow with unusual B lineage precursor population 0.09%. C3 GRAALL + imatinib on 11/2/22 was tolerated well without complication. Prior plan was to continue GRAALL for a total of 8 cycles (of note odd cycles are 21-days in length and even  cycles are 14-days in length), however, transitioned to Blinatumumab + Ponatinib as curative intent therapy given no matched sib donors. S/p Blincyto + Ponatinib (C1D1 = 12/14/22), complicated by neutropenic fever 2/2 pneumonia. Recent BMBx 1/17/2023: BCR-ABL p190 now detectable; Clonoseq pending.  - Port placed x1/17/23  - Resumed Blincyto C2D1 = 1/25/23, with Ponatinib 15 mg daily  - Bmbx 3/1-- flow/ path neg for disease, FISH with 0.125% BCR-ABL fusion however PCR still pending  - Plan to start C3 Blina tomorrow 3/8 via [facility]. Continue Ponatinib 15 mg daily-- do not interrupt this     IT Chemotherapy:    8/26/22: Cytarabine 40 mg/methotrexate 15 mg    8/31/22: Cytarabine 40 mg/methotrexate 15 mg    9/7/22: Cytarabine 40 mg/methotrexate 15 mg    9/14/22: Methotrexate 15 mg    10/21/22: Cytarabine 40 mg/methotrexate 15 mg    11/25/22: Cytarabine 40 mg    12/16/22: Methotrexate 12 mg    1/2/23; Cytarabine 100 mg    1/16/23: Methotrexate 12 mg     2/2/23: Cytarabine 100 mg    2/27/23: Methotrexate 12 mg    Still to occur-- 3/14/23: Cytarabine 100 mg      - Subsequent cycles of Blina can be initiated OP  - Per Dr Hull, plan for repeat BMBx after Blincyto C1, C2 and C5 then every 3-6 months thereafter. Clonoseq to be sent with each BMBx.        # Anemia   # Leukopenia   Likely secondary to chemotherapy.  - Transfuse to keep Hgb >7. Of note, pt has history of transfusion reaction and requires premeds with Benadryl and Tylenol       # Recent PICC associated thrombus   Recent admission for cephalic vein thrombus (x1/4) associated with PICC line, non occlusive. No AC started as superficial and asymptomatic thrombus. A week prior, patient was found to have a malfunctioning vs malpositioned Port. S/p PICC removal and replacement of port via IR.   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 Presented with scattered papulovesicular lesions on her chin and nose  as well as scattered hemorrhagic erosions on the scalp and lower face (V2-3 distributions of the trigeminal nerve) c/f disseminated VZV. Lesions significantly improved on admission. ID recommended considering decrease to 1000 mg daily on 12/13 visit (given less anticipated prolonged neutropenia with new regimen) and confirmed with Dr. Hull.   - Continue Valtrex 1000 mg daily      # ID PPx   - Hold fluconazole 200 mg daily and levofloxacin 250 mg daily given ANC >1.0   - Nebulized pentamidine monthly  - Received Evusheld on 11/2/22         # Hypertension   - Continue amlodipine 5 mg daily      # Headaches, resolved  On admission reports intermittent headaches x2-3 weeks following recent LP. Located on the top of her head and initially rated 8/10 with significant coughing. Over the past few days headaches improved with PRN tylenol and Esgic and now rated 3/10. No NV, vision changes or weakness with headaches. Of note, recently seen at local ED for headaches with CT head negative x1/21 but did show periventricular hypoattenuation suggestive of leukomalacia that may be treatment-related. She states that the headache does seem somewhat worse when she gets up or stands up but does not get better as she lays flat. She reports her most comfortable position is sitting up.   - MRI/MRV negative for acute path.     - Consider IR consult for blood patch if persistent given positional component of headaches, although her headaches are better with sitting upright which argues against. Suspect headaches most likely 2/2 Ponatanib as known culprit.   - PRN Esgic helpful      # GERD   - Protonix 20 mg daily      # Grade 1 neuropathy of the fingertips   Due to vincristine. Pt reports symptoms are stable. Reports mild paresthesias and hot/cold sensitivity.   - Monitor clinically                 Roya [facility]    45 minutes spent on the date of the encounter doing chart review, review of test results, interpretation of tests,  patient visit, documentation and discussion with other provider(s)

## 2023-03-08 ENCOUNTER — ONCOLOGY VISIT (OUTPATIENT)
Dept: ONCOLOGY | Facility: CLINIC | Age: 43
End: 2023-03-08
Attending: INTERNAL MEDICINE
Payer: COMMERCIAL

## 2023-03-08 ENCOUNTER — APPOINTMENT (OUTPATIENT)
Dept: LAB | Facility: CLINIC | Age: 43
End: 2023-03-08
Attending: INTERNAL MEDICINE
Payer: COMMERCIAL

## 2023-03-08 VITALS
OXYGEN SATURATION: 100 % | TEMPERATURE: 98.1 F | WEIGHT: 247.9 LBS | HEART RATE: 83 BPM | BODY MASS INDEX: 42.55 KG/M2 | SYSTOLIC BLOOD PRESSURE: 128 MMHG | RESPIRATION RATE: 16 BRPM | DIASTOLIC BLOOD PRESSURE: 77 MMHG

## 2023-03-08 DIAGNOSIS — C91.00 ACUTE LYMPHOBLASTIC LEUKEMIA (ALL) NOT HAVING ACHIEVED REMISSION (H): ICD-10-CM

## 2023-03-08 LAB
ALBUMIN SERPL BCG-MCNC: 4.3 G/DL (ref 3.5–5.2)
ALP SERPL-CCNC: 81 U/L (ref 35–104)
ALT SERPL W P-5'-P-CCNC: 32 U/L (ref 10–35)
ANION GAP SERPL CALCULATED.3IONS-SCNC: 10 MMOL/L (ref 7–15)
AST SERPL W P-5'-P-CCNC: 19 U/L (ref 10–35)
BASOPHILS # BLD AUTO: 0 10E3/UL (ref 0–0.2)
BASOPHILS NFR BLD AUTO: 0 %
BILIRUB SERPL-MCNC: 0.2 MG/DL
BUN SERPL-MCNC: 8.9 MG/DL (ref 6–20)
CALCIUM SERPL-MCNC: 9.3 MG/DL (ref 8.6–10)
CHLORIDE SERPL-SCNC: 105 MMOL/L (ref 98–107)
CREAT SERPL-MCNC: 0.5 MG/DL (ref 0.51–0.95)
DEPRECATED HCO3 PLAS-SCNC: 24 MMOL/L (ref 22–29)
EOSINOPHIL # BLD AUTO: 0.3 10E3/UL (ref 0–0.7)
EOSINOPHIL NFR BLD AUTO: 4 %
ERYTHROCYTE [DISTWIDTH] IN BLOOD BY AUTOMATED COUNT: 14.4 % (ref 10–15)
GFR SERPL CREATININE-BSD FRML MDRD: >90 ML/MIN/1.73M2
GLUCOSE SERPL-MCNC: 85 MG/DL (ref 70–99)
HCT VFR BLD AUTO: 35.5 % (ref 35–47)
HGB BLD-MCNC: 11.8 G/DL (ref 11.7–15.7)
IMM GRANULOCYTES # BLD: 0 10E3/UL
IMM GRANULOCYTES NFR BLD: 0 %
LIPASE SERPL-CCNC: 23 U/L (ref 13–60)
LYMPHOCYTES # BLD AUTO: 0.9 10E3/UL (ref 0.8–5.3)
LYMPHOCYTES NFR BLD AUTO: 12 %
MCH RBC QN AUTO: 29.5 PG (ref 26.5–33)
MCHC RBC AUTO-ENTMCNC: 33.2 G/DL (ref 31.5–36.5)
MCV RBC AUTO: 89 FL (ref 78–100)
MONOCYTES # BLD AUTO: 0.9 10E3/UL (ref 0–1.3)
MONOCYTES NFR BLD AUTO: 13 %
NEUTROPHILS # BLD AUTO: 5.3 10E3/UL (ref 1.6–8.3)
NEUTROPHILS NFR BLD AUTO: 71 %
NRBC # BLD AUTO: 0 10E3/UL
NRBC BLD AUTO-RTO: 0 /100
PLATELET # BLD AUTO: 261 10E3/UL (ref 150–450)
POTASSIUM SERPL-SCNC: 4 MMOL/L (ref 3.4–5.3)
PROT SERPL-MCNC: 6.5 G/DL (ref 6.4–8.3)
RBC # BLD AUTO: 4 10E6/UL (ref 3.8–5.2)
SODIUM SERPL-SCNC: 139 MMOL/L (ref 136–145)
WBC # BLD AUTO: 7.4 10E3/UL (ref 4–11)

## 2023-03-08 PROCEDURE — 83690 ASSAY OF LIPASE: CPT | Performed by: NURSE PRACTITIONER

## 2023-03-08 PROCEDURE — G0463 HOSPITAL OUTPT CLINIC VISIT: HCPCS | Performed by: NURSE PRACTITIONER

## 2023-03-08 PROCEDURE — 80053 COMPREHEN METABOLIC PANEL: CPT | Performed by: NURSE PRACTITIONER

## 2023-03-08 PROCEDURE — 36591 DRAW BLOOD OFF VENOUS DEVICE: CPT | Performed by: NURSE PRACTITIONER

## 2023-03-08 PROCEDURE — 99215 OFFICE O/P EST HI 40 MIN: CPT | Performed by: NURSE PRACTITIONER

## 2023-03-08 PROCEDURE — 99212 OFFICE O/P EST SF 10 MIN: CPT | Performed by: NURSE PRACTITIONER

## 2023-03-08 PROCEDURE — 86850 RBC ANTIBODY SCREEN: CPT | Performed by: NURSE PRACTITIONER

## 2023-03-08 PROCEDURE — 85018 HEMOGLOBIN: CPT | Performed by: NURSE PRACTITIONER

## 2023-03-08 RX ORDER — FILGRASTIM 480 UG/.8ML
INJECTION, SOLUTION INTRAVENOUS; SUBCUTANEOUS
COMMUNITY
Start: 2023-02-20 | End: 2023-08-23

## 2023-03-08 ASSESSMENT — PAIN SCALES - GENERAL: PAINLEVEL: NO PAIN (0)

## 2023-03-08 NOTE — NURSING NOTE
Chief Complaint   Patient presents with     Blood Draw     Labs drawn with  by rn.  VS taken.     Labs drawn with  by rn.  Pt tolerated well.  VS taken.  Pt checked in for next appt.    Nithya Fung RN

## 2023-03-08 NOTE — LETTER
Date:March 8, 2023      Provider requested that no letter be sent. Do not send.       [facility]

## 2023-03-08 NOTE — LETTER
3/8/2023         RE: Vashti Villegas  [address] [place] [address]        Dear Colleague,    Thank you for referring your patient, Vashti Villegas, to the [facility]. Please see a copy of my visit note below.    [facility]  Date of visit: Mar 8, 2023        Reason for Visit: Ph (+) B-ALL      Oncology HPI:   Followed by Dr. Hull. Patient presented to outside hospital with c/o right upper quadrant pain and was subsequently found to have a markedly elevated white blood cell count concerning for acute leukemia. While at OSH, she had CTA chest as well as CT A/P. These identified no PE, no acute or localized intraabdominal inflammatory process, mild splenomegaly, and few inconspicuous low-density structures in the liver. She was transferred to [facility] for further workup and management. S/p Hydrea 1g TID (8/21-8/23) with improvement in WBC (100K ? 12K). Diagnostic BMBx completed 8/20/22, which demonstrated B-ALL with 85% blasts and hypercellular marrow. IHC shows CD10+, CD34+. 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 ALL NGS negative. BCR/ABL1 major/minor (sent from peripheral blood) both negative. Initiated pre-phase steroids with Prednisone 60 mg x8/21; increased to 60 mg/m2 (140 mg) 8/22-8/24. She initiated GRAALL + imatinib (C1D1 = 8/24/22). S/p BMT consult 9/12 with Dr. Walsh. Induction was relatively uncomplicated. Post-induction BMBx done 9/26/22 and revealed MRD- CR1, with negative BCR/ABL1 and Clonoseq testing. With no matched related donors  and excellent early/deep response chemotherapy is currently favored over BMT. 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 Underwent BMBx 10/25/22 with no morphologic evidence of B-lymphoblastic leukemia and flow with unusual B lineage precursor population 0.09%. C3 GRAALL + imatinib on 11/2/22 was tolerated well without complication. Prior plan was to continue GRAALL for a total of 8 cycles (of note odd cycles are 21-days in length and even cycles are 14-days in length), however, transitioned to Blinatumumab + Ponatinib as curative intent therapy given no matched sib donors. S/p Blincyto + Ponatinib (C1D1 = 12/14/22), complicated by neutropenic fever 2/2 pneumonia. Recent BMBx 1/17/2023: BCR-ABL p190 now detectable; Clonoseq pending.  - Port placed x1/17/23  - Per Dr Hull, plan for repeat BMBx after Blincyto C1, C2 and C5 then every 3-6 months thereafter. Clonoseq to be sent    - C2 Blincyto, D1 = 1/25/23  - Bmbx 3/1-- path/ flow neg, BCR-ABL minor p190 PCR pending, however by FISH still with low level detectable BCR-ABL fusion at 0.125%      Interval history:   April is overall doing really well without any major side effects from treatment. Her energy is good. Her appetite and weight are stable. No GI complaints. No fevers or infections. No bleeding. No rash. Headaches continue to be resolved.     She does note in her right hand she has some sharp burning pain that wakes her from sleep. This will come and go. Initially thought to be sleeping positioning however when sitting up after awakening, this still occurs. No swelling that she is aware of or skin changes. Strength remains intact. This only occurs at night. She feels tight in her fingers as well when this happens. Shakes her hands out, no interventions due to transient nature. Getting a massage tomorrow, hoping this will help.     Port opened up last week-- was re-glued shut by IR and started antibiotics.  No issues at this time-- no redness, swelling, drainage. No fevers or chills.             Current Outpatient Medications   Medication Sig Dispense Refill     acetaminophen (TYLENOL) 325 MG tablet Take 2 tablets (650 mg) by mouth every 6 hours as needed for mild pain, other or fever (blood product administration premedication) (Patient not taking: Reported on 2/2/2023)       amLODIPine (NORVASC) 5 MG tablet Take 1 tablet (5 mg) by mouth daily 30 tablet 4     aspirin (ASA) 81 MG EC tablet Take 1 tablet (81 mg) by mouth daily 30 tablet 1     benzonatate (TESSALON) 100 MG capsule Take 1 capsule (100 mg) by mouth 3 times daily as needed for cough (Patient not taking: Reported on 2/2/2023) 30 capsule 0     CHOLECALCIFEROL PO Take 1 tablet by mouth daily Unknown strength.       fluconazole (DIFLUCAN) 200 MG tablet Take 1 tablet (200 mg) by mouth daily 30 tablet 3     levofloxacin (LEVAQUIN) 250 MG tablet Take 1 tablet (250 mg) by mouth daily 30 tablet 3     lidocaine-prilocaine (EMLA) 2.5-2.5 % external cream Apply topically as needed (Apply 30 to 60minutes before Port Needle Access for pain) (Patient not taking: Reported on 2/2/2023) 30 g 3     lidocaine-prilocaine (LIDOPRIL) 2.5-2.5 % kit Use prior to lab access (Patient not taking: Reported on 2/2/2023) 1 kit 1     LORazepam (ATIVAN) 0.5 MG tablet Take 1-2 tablets (0.5-1 mg) by mouth daily as needed for anxiety . Take prior to BMBx procedures as needed. If initial dose unhelpful, okay to take a second 0.5-1 mg dose (for a max of 2 mg at a time). Caution: causes sedation. Do not drive after taking this medication. (Patient not taking: Reported on 2/2/2023) 10 tablet 0     NEUPOGEN 480 MCG/0.8ML SOSY syringe        ondansetron (ZOFRAN ODT) 4 MG ODT tab Take 1-2 tablets (4-8 mg) by mouth every 8 hours as needed for nausea or vomiting (Patient not taking: Reported on 2/2/2023) 60 tablet 0     PONATinib (ICLUSIG) 15 MG tablet Take 1 tablet (15 mg) by mouth daily -- start  concurrent with blinatumomab infusion, planned for 12/12/22 30 tablet 3     prochlorperazine (COMPAZINE) 10 MG tablet Take 1 tablet (10 mg) by mouth every 6 hours as needed (Breakthrough Nausea/Vomiting) (Patient not taking: Reported on 2/2/2023) 20 tablet 0     valACYclovir (VALTREX) 1000 mg tablet Take 1 tablet (1,000 mg) by mouth daily 30 tablet 1       Allergies   Allergen Reactions     Blood Transfusion Related (Informational Only) Hives     Hive reaction with platelet transfusion on 8/26/2022. Please administer platelets with tylenol and benadryl premedication.          Physical Exam:  /77 (BP Location: Right arm, Patient Position: Sitting, Cuff Size: Adult Large)   Pulse 83   Temp 98.1  F (36.7  C) (Oral)   Resp 16   Wt 112.4 kg (247 lb 14.4 oz)   SpO2 100%   BMI 42.55 kg/m    General: No acute distress.  HEENT: EOMI, PERRL. Sclerae are anicteric. Oral mucosa is pink and moist with no lesions or thrush.   Lymph: Neck is supple with no lymphadenopathy in the cervical or supraclavicular areas.   Heart: Regular rate and rhythm.   Lungs: Clear to auscultation bilaterally.   Abdomen: Bowel sounds present, soft, nontender with no palpable hepatosplenomegaly or masses.   Extremities: No lower extremity edema noted bilaterally.   Neuro: Alert and oriented x3, CN grossly intact, steady gait  Skin: No rashes, petechiae, or bruising noted on exposed skin.      Labs:     I personally reviewed the following labs:    Most Recent 3 CBC's:  Recent Labs   Lab Test 03/08/23  1039 03/01/23  1005 02/27/23  1157   WBC 7.4 8.3 6.4   HGB 11.8 12.5 12.0   MCV 89 93 90    267 266     Most Recent 3 BMP's:  Recent Labs   Lab Test 03/08/23  1039 02/27/23  1157 02/24/23  1330    141 138   POTASSIUM 4.0 3.8 3.9   CHLORIDE 105 107 103   CO2 24 24 21*   BUN 8.9 9.5 11.0   CR 0.50* 0.52 0.70   ANIONGAP 10 10 14   SOLEDAD 9.3 9.4 9.7   GLC 85 114* 81     Most Recent 2 LFT's:  Recent Labs   Lab Test 03/08/23  1039  02/27/23  1157   AST 19 18   ALT 32 22   ALKPHOS 81 86   BILITOTAL 0.2 0.2           Imaging: n/a      Impression/plan:     # Ph(+) B-ALL   Followed by Dr. Hull. Patient presented to outside hospital with c/o right upper quadrant pain and was subsequently found to have a markedly elevated white blood cell count concerning for acute leukemia. While at OSH, she had CTA chest as well as CT A/P. These identified no PE, no acute or localized intraabdominal inflammatory process, mild splenomegaly, and few inconspicuous low-density structures in the liver. She was transferred to [facility] for further workup and management. S/p Hydrea 1g TID (8/21-8/23) with improvement in WBC (100K ? 12K). Diagnostic BMBx completed 8/20/22, which demonstrated B-ALL with 85% blasts and hypercellular marrow. IHC shows CD10+, CD34+. 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 ALL NGS negative. BCR/ABL1 major/minor (sent from peripheral blood) both negative. Initiated pre-phase steroids with Prednisone 60 mg x8/21; increased to 60 mg/m2 (140 mg) 8/22-8/24. She initiated GRAALL + imatinib (C1D1 = 8/24/22). S/p BMT consult 9/12 with Dr. Walsh. Induction was relatively uncomplicated. Post-induction BMBx done 9/26/22 and revealed MRD- CR1, with negative BCR/ABL1 and Clonoseq testing. With no matched related donors and excellent early/deep response chemotherapy is currently favored over BMT. 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 Underwent BMBx 10/25/22 with no morphologic evidence of  B-lymphoblastic leukemia and flow with unusual B lineage precursor population 0.09%. C3 GRAALL + imatinib on 11/2/22 was tolerated well without complication. Prior plan was to continue GRAALL for a total of 8 cycles (of note odd cycles are 21-days in length and even cycles are 14-days in length), however, transitioned to Blinatumumab + Ponatinib as curative intent therapy given no matched sib donors. S/p Blincyto + Ponatinib (C1D1 = 12/14/22), complicated by neutropenic fever 2/2 pneumonia. Recent BMBx 1/17/2023: BCR-ABL p190 now detectable; Clonoseq pending.  - Port placed x1/17/23  - Resumed Blincyto C2D1 = 1/25/23, with Ponatinib 15 mg daily  - Bmbx 3/1-- flow/ path neg for disease, FISH with 0.125% BCR-ABL fusion however PCR still pending  - Plan to start C3 Blina tomorrow 3/8 via [facility]. Continue Ponatinib 15 mg daily-- do not interrupt this     IT Chemotherapy:    8/26/22: Cytarabine 40 mg/methotrexate 15 mg    8/31/22: Cytarabine 40 mg/methotrexate 15 mg    9/7/22: Cytarabine 40 mg/methotrexate 15 mg    9/14/22: Methotrexate 15 mg    10/21/22: Cytarabine 40 mg/methotrexate 15 mg    11/25/22: Cytarabine 40 mg    12/16/22: Methotrexate 12 mg    1/2/23; Cytarabine 100 mg    1/16/23: Methotrexate 12 mg     2/2/23: Cytarabine 100 mg    2/27/23: Methotrexate 12 mg    Still to occur-- 3/14/23: Cytarabine 100 mg      - Subsequent cycles of Blina can be initiated OP  - Per Dr Hull, plan for repeat BMBx after Blincyto C1, C2 and C5 then every 3-6 months thereafter. Clonoseq to be sent with each BMBx.        # Anemia   # Leukopenia   Likely secondary to chemotherapy.  - Transfuse to keep Hgb >7. Of note, pt has history of transfusion reaction and requires premeds with Benadryl and Tylenol       # Recent PICC associated thrombus   Recent admission for cephalic vein thrombus (x1/4) associated with PICC line, non occlusive. No AC started as superficial and asymptomatic thrombus. A week prior, patient was found to have a  malfunctioning vs malpositioned Port. S/p PICC removal and replacement of port via IR.   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 Presented with scattered papulovesicular lesions on her chin and nose as well as scattered hemorrhagic erosions on the scalp and lower face (V2-3 distributions of the trigeminal nerve) c/f disseminated VZV. Lesions significantly improved on admission. ID recommended considering decrease to 1000 mg daily on 12/13 visit (given less anticipated prolonged neutropenia with new regimen) and confirmed with Dr. Hull.   - Continue Valtrex 1000 mg daily      # ID PPx   - Hold fluconazole 200 mg daily and levofloxacin 250 mg daily given ANC >1.0   - Nebulized pentamidine monthly  - Received Evusheld on 11/2/22         # Hypertension   - Continue amlodipine 5 mg daily      # Headaches, resolved  On admission reports intermittent headaches x2-3 weeks following recent LP. Located on the top of her head and initially rated 8/10 with significant coughing. Over the past few days headaches improved with PRN tylenol and Esgic and now rated 3/10. No NV, vision changes or weakness with headaches. Of note, recently seen at local ED for headaches with CT head negative x1/21 but did show periventricular hypoattenuation suggestive of leukomalacia that may be treatment-related. She states that the headache does seem somewhat worse when she gets up or stands up but does not get better as she lays flat. She reports her most comfortable position is sitting up.   - MRI/MRV negative for acute path.     - Consider IR consult for blood patch if persistent given positional component of headaches, although her headaches are better with sitting upright which argues against. Suspect headaches most likely 2/2 Ponatanib as known culprit.   - PRN Esgic helpful      # GERD   - Protonix 20 mg daily      # Grade 1 neuropathy of the fingertips   Due to  vincristine. Pt reports symptoms are stable. Reports mild paresthesias and hot/cold sensitivity.   - Monitor clinically                 Roya Quarles [facility]    45 minutes spent on the date of the encounter doing chart review, review of test results, interpretation of tests, patient visit, documentation and discussion with other provider(s)         Again, thank you for allowing me to participate in the care of your patient.        Sincerely,        LOAN Garcia CNP

## 2023-03-08 NOTE — NURSING NOTE
"Oncology Rooming Note    March 8, 2023 10:46 AM   April SHARI Villegas is a 42 year old female who presents for:    Chief Complaint   Patient presents with     Blood Draw     Labs drawn with  by rn.  VS taken.     Oncology Clinic Visit     Acute lymphoblastic leukemia      Initial Vitals: /77 (BP Location: Right arm, Patient Position: Sitting, Cuff Size: Adult Large)   Pulse 83   Temp 98.1  F (36.7  C) (Oral)   Resp 16   Wt 112.4 kg (247 lb 14.4 oz)   SpO2 100%   BMI 42.55 kg/m   Estimated body mass index is 42.55 kg/m  as calculated from the following:    Height as of 3/1/23: 1.626 m (5' 4\").    Weight as of this encounter: 112.4 kg (247 lb 14.4 oz). Body surface area is 2.25 meters squared.  No Pain (0) Comment: Data Unavailable   No LMP recorded. Patient is postmenopausal.  Allergies reviewed: Yes  Medications reviewed: Yes    Medications: Medication refills not needed today.  Pharmacy name entered into [facility]:    [id number] [place], [place] - [address]  [facility] - [place], [place] - [address]    Clinical concerns: Pt says she has burning sensation in hands that appears mostly at night when sleeping. Pt says this has been going on for about a week.      James Aragon            "

## 2023-03-09 ENCOUNTER — HOME INFUSION (PRE-WILLOW HOME INFUSION) (OUTPATIENT)
Dept: PHARMACY | Facility: CLINIC | Age: 43
End: 2023-03-09
Payer: COMMERCIAL

## 2023-03-09 DIAGNOSIS — Z76.89 PROPHYLAXIS FOR CHEMOTHERAPY-INDUCED NEUTROPENIA: Primary | ICD-10-CM

## 2023-03-09 DIAGNOSIS — C91.00 ACUTE LYMPHOBLASTIC LEUKEMIA (ALL) NOT HAVING ACHIEVED REMISSION (H): ICD-10-CM

## 2023-03-09 NOTE — PROGRESS NOTES
Infusion Nursing Note:  Vashti Villegas seen in home today for initiation of C3 Blincyto x 28days. No new concerns or s/s's.      Ashia Miller RN

## 2023-03-13 ENCOUNTER — LAB REQUISITION (OUTPATIENT)
Dept: LAB | Facility: CLINIC | Age: 43
End: 2023-03-13
Payer: COMMERCIAL

## 2023-03-13 DIAGNOSIS — C91.00 ACUTE LYMPHOBLASTIC LEUKEMIA NOT HAVING ACHIEVED REMISSION (H): ICD-10-CM

## 2023-03-13 LAB
ALBUMIN SERPL BCG-MCNC: 4.5 G/DL (ref 3.5–5.2)
ALP SERPL-CCNC: 90 U/L (ref 35–104)
ALT SERPL W P-5'-P-CCNC: 36 U/L (ref 10–35)
ANION GAP SERPL CALCULATED.3IONS-SCNC: 12 MMOL/L (ref 7–15)
AST SERPL W P-5'-P-CCNC: 17 U/L (ref 10–35)
BASOPHILS # BLD AUTO: 0 10E3/UL (ref 0–0.2)
BASOPHILS NFR BLD AUTO: 0 %
BILIRUB SERPL-MCNC: 0.2 MG/DL
BUN SERPL-MCNC: 11.6 MG/DL (ref 6–20)
CALCIUM SERPL-MCNC: 9.7 MG/DL (ref 8.6–10)
CHLORIDE SERPL-SCNC: 104 MMOL/L (ref 98–107)
CREAT SERPL-MCNC: 0.58 MG/DL (ref 0.51–0.95)
DEPRECATED HCO3 PLAS-SCNC: 23 MMOL/L (ref 22–29)
EOSINOPHIL # BLD AUTO: 0.4 10E3/UL (ref 0–0.7)
EOSINOPHIL NFR BLD AUTO: 4 %
ERYTHROCYTE [DISTWIDTH] IN BLOOD BY AUTOMATED COUNT: 14.5 % (ref 10–15)
GFR SERPL CREATININE-BSD FRML MDRD: >90 ML/MIN/1.73M2
GLUCOSE SERPL-MCNC: 77 MG/DL (ref 70–99)
HCT VFR BLD AUTO: 37 % (ref 35–47)
HGB BLD-MCNC: 12.5 G/DL (ref 11.7–15.7)
IMM GRANULOCYTES # BLD: 0 10E3/UL
IMM GRANULOCYTES NFR BLD: 0 %
LYMPHOCYTES # BLD AUTO: 1 10E3/UL (ref 0.8–5.3)
LYMPHOCYTES NFR BLD AUTO: 11 %
MCH RBC QN AUTO: 29.9 PG (ref 26.5–33)
MCHC RBC AUTO-ENTMCNC: 33.8 G/DL (ref 31.5–36.5)
MCV RBC AUTO: 89 FL (ref 78–100)
MONOCYTES # BLD AUTO: 1.1 10E3/UL (ref 0–1.3)
MONOCYTES NFR BLD AUTO: 12 %
NEUTROPHILS # BLD AUTO: 6.6 10E3/UL (ref 1.6–8.3)
NEUTROPHILS NFR BLD AUTO: 73 %
NRBC # BLD AUTO: 0 10E3/UL
NRBC BLD AUTO-RTO: 0 /100
PLATELET # BLD AUTO: 340 10E3/UL (ref 150–450)
POTASSIUM SERPL-SCNC: 4.1 MMOL/L (ref 3.4–5.3)
PROT SERPL-MCNC: 6.7 G/DL (ref 6.4–8.3)
RBC # BLD AUTO: 4.18 10E6/UL (ref 3.8–5.2)
SODIUM SERPL-SCNC: 139 MMOL/L (ref 136–145)
WBC # BLD AUTO: 9.1 10E3/UL (ref 4–11)

## 2023-03-13 PROCEDURE — 85025 COMPLETE CBC W/AUTO DIFF WBC: CPT | Performed by: INTERNAL MEDICINE

## 2023-03-13 PROCEDURE — 80053 COMPREHEN METABOLIC PANEL: CPT | Performed by: INTERNAL MEDICINE

## 2023-03-14 ENCOUNTER — HOSPITAL ENCOUNTER (OUTPATIENT)
Dept: GENERAL RADIOLOGY | Facility: CLINIC | Age: 43
Discharge: HOME | End: 2023-03-14
Attending: NURSE PRACTITIONER
Payer: COMMERCIAL

## 2023-03-14 ENCOUNTER — APPOINTMENT (OUTPATIENT)
Dept: MEDSURG UNIT | Facility: CLINIC | Age: 43
End: 2023-03-14
Attending: NURSE PRACTITIONER
Payer: COMMERCIAL

## 2023-03-14 ENCOUNTER — HOSPITAL ENCOUNTER (OUTPATIENT)
Facility: CLINIC | Age: 43
Discharge: HOME | End: 2023-03-14
Attending: NURSE PRACTITIONER | Admitting: RADIOLOGY
Payer: COMMERCIAL

## 2023-03-14 VITALS
WEIGHT: 242.1 LBS | DIASTOLIC BLOOD PRESSURE: 53 MMHG | TEMPERATURE: 98 F | OXYGEN SATURATION: 98 % | SYSTOLIC BLOOD PRESSURE: 113 MMHG | RESPIRATION RATE: 16 BRPM | BODY MASS INDEX: 41.56 KG/M2 | HEART RATE: 81 BPM

## 2023-03-14 DIAGNOSIS — C91.00 ACUTE LYMPHOBLASTIC LEUKEMIA (ALL) NOT HAVING ACHIEVED REMISSION (H): ICD-10-CM

## 2023-03-14 DIAGNOSIS — Z76.89 PROPHYLAXIS FOR CHEMOTHERAPY-INDUCED NEUTROPENIA: Primary | ICD-10-CM

## 2023-03-14 DIAGNOSIS — C91.01 ACUTE LYMPHOBLASTIC LEUKEMIA (ALL) IN REMISSION (H): Primary | ICD-10-CM

## 2023-03-14 DIAGNOSIS — Z76.89 PROPHYLAXIS FOR CHEMOTHERAPY-INDUCED NEUTROPENIA: ICD-10-CM

## 2023-03-14 LAB
GLUCOSE CSF-MCNC: 53 MG/DL (ref 40–70)
INR BLD: 1.2 (ref 2–3)
PROT CSF-MCNC: 34.1 MG/DL (ref 15–45)

## 2023-03-14 PROCEDURE — 62328 DX LMBR SPI PNXR W/FLUOR/CT: CPT | Performed by: RADIOLOGY

## 2023-03-14 PROCEDURE — 82945 GLUCOSE OTHER FLUID: CPT

## 2023-03-14 PROCEDURE — 89050 BODY FLUID CELL COUNT: CPT

## 2023-03-14 PROCEDURE — 88184 FLOWCYTOMETRY/ TC 1 MARKER: CPT

## 2023-03-14 PROCEDURE — 84157 ASSAY OF PROTEIN OTHER: CPT

## 2023-03-14 PROCEDURE — 999N000142 HC STATISTIC PROCEDURE PREP ONLY

## 2023-03-14 PROCEDURE — 999N000132 HC STATISTIC PP CARE STAGE 1

## 2023-03-14 PROCEDURE — 88188 FLOWCYTOMETRY/READ 9-15: CPT | Performed by: PATHOLOGY

## 2023-03-14 PROCEDURE — 250N000011 HC RX IP 250 OP 636: Performed by: NURSE PRACTITIONER

## 2023-03-14 PROCEDURE — 85610 PROTHROMBIN TIME: CPT

## 2023-03-14 PROCEDURE — 96450 CHEMOTHERAPY INTO CNS: CPT

## 2023-03-14 RX ADMIN — CYTARABINE: 100 INJECTION INTRATHECAL; INTRAVENOUS; SUBCUTANEOUS at 13:42

## 2023-03-14 RX ADMIN — LIDOCAINE HYDROCHLORIDE 10 ML: 20 INJECTION, SOLUTION EPIDURAL; INFILTRATION; INTRACAUDAL; PERINEURAL at 13:34

## 2023-03-14 ASSESSMENT — ACTIVITIES OF DAILY LIVING (ADL)
ADLS_ACUITY_SCORE: 35
ADLS_ACUITY_SCORE: 35

## 2023-03-14 NOTE — PROGRESS NOTES
Tolerated bedrest without issues.  Tolerated food, fluids, ambulation and urination.  Denies pain or headache.  Low back site CDI.  Reviewed discharge instructions with April and her .  Discharged to home with .

## 2023-03-14 NOTE — PROGRESS NOTES
Arrived from home for a lumbar puncture with chemotherapy infusion.  VSS.  Denies pain.  Consent to be obtained in procedural room.  Ready for procedure.

## 2023-03-14 NOTE — PROGRESS NOTES
Pt s/p LP.  VSS.  Pt reports slight soreness in lower back pain that is tolerable.  Mid lower back site WDL, dressing CDI.  No bleeding or hematoma.

## 2023-03-14 NOTE — DISCHARGE INSTRUCTIONS
[facility]                                      Radiology Discharge instructions Post Lumbar Puncture         AFTER YOU GO HOME    Relax and take it easy for 24 hours  We suggest bedrest until the next morning, except to go to the bathroom.  You may resume normal activity tomorrow  You may remove the bandage on your back in the evening or next morning  You may resume bathing the next day  Drink at least 4 glasses of extra fluid today if not on a fluid restriction  DO NOT drive or operate machinery at home or at work for at least 24 hours                   CALL YOUR PRIMARY PHYSICIAN IF:  If you start to leak a large amount of fluid from the puncture site, lie down flat on your back. Call your primary physician, they will tell you if you need or return to the hospital  You develop a severe headache  You develop nausea or vomiting   You develop a temperature of 101 degrees or greater                 ADDITIONAL INSTRUCTIONS:         [facility] RADIOLOGY DEPARTMENT             Procedure Physician: Dr. Gonzales                               Date of Procedure:March 14, 2023               [facility]...........[phone].......Ask for the Neuro Radiologist on call. Someone is on call 24 hour/day               [facility] Toll Free Number.........[phone].....Monday-Friday 8:00 am to 4:30 pm    .

## 2023-03-16 ENCOUNTER — LAB REQUISITION (OUTPATIENT)
Dept: LAB | Facility: CLINIC | Age: 43
End: 2023-03-16
Payer: COMMERCIAL

## 2023-03-16 DIAGNOSIS — C91.00 ACUTE LYMPHOBLASTIC LEUKEMIA NOT HAVING ACHIEVED REMISSION (H): ICD-10-CM

## 2023-03-16 LAB
ALBUMIN SERPL BCG-MCNC: 4.1 G/DL (ref 3.5–5.2)
ALP SERPL-CCNC: 87 U/L (ref 35–104)
ALT SERPL W P-5'-P-CCNC: 25 U/L (ref 10–35)
ANION GAP SERPL CALCULATED.3IONS-SCNC: 10 MMOL/L (ref 7–15)
APPEARANCE CSF: CLEAR
AST SERPL W P-5'-P-CCNC: 14 U/L (ref 10–35)
BASOPHILS # BLD AUTO: 0 10E3/UL (ref 0–0.2)
BASOPHILS NFR BLD AUTO: 1 %
BILIRUB SERPL-MCNC: 0.4 MG/DL
BUN SERPL-MCNC: 9.7 MG/DL (ref 6–20)
CALCIUM SERPL-MCNC: 9 MG/DL (ref 8.6–10)
CHLORIDE SERPL-SCNC: 107 MMOL/L (ref 98–107)
COLOR CSF: COLORLESS
CREAT SERPL-MCNC: 0.62 MG/DL (ref 0.51–0.95)
DEPRECATED HCO3 PLAS-SCNC: 24 MMOL/L (ref 22–29)
EOSINOPHIL # BLD AUTO: 0.4 10E3/UL (ref 0–0.7)
EOSINOPHIL NFR BLD AUTO: 6 %
ERYTHROCYTE [DISTWIDTH] IN BLOOD BY AUTOMATED COUNT: 14.5 % (ref 10–15)
GFR SERPL CREATININE-BSD FRML MDRD: >90 ML/MIN/1.73M2
GLUCOSE SERPL-MCNC: 88 MG/DL (ref 70–99)
HCT VFR BLD AUTO: 36.9 % (ref 35–47)
HGB BLD-MCNC: 12.2 G/DL (ref 11.7–15.7)
IMM GRANULOCYTES # BLD: 0 10E3/UL
IMM GRANULOCYTES NFR BLD: 0 %
LYMPHOCYTES # BLD AUTO: 0.6 10E3/UL (ref 0.8–5.3)
LYMPHOCYTES NFR BLD AUTO: 9 %
MCH RBC QN AUTO: 30 PG (ref 26.5–33)
MCHC RBC AUTO-ENTMCNC: 33.1 G/DL (ref 31.5–36.5)
MCV RBC AUTO: 91 FL (ref 78–100)
MONOCYTES # BLD AUTO: 0.8 10E3/UL (ref 0–1.3)
MONOCYTES NFR BLD AUTO: 13 %
NEUTROPHILS # BLD AUTO: 4.6 10E3/UL (ref 1.6–8.3)
NEUTROPHILS NFR BLD AUTO: 71 %
NRBC # BLD AUTO: 0 10E3/UL
NRBC BLD AUTO-RTO: 0 /100
PATH REV: ABNORMAL
PLATELET # BLD AUTO: 309 10E3/UL (ref 150–450)
POTASSIUM SERPL-SCNC: 4 MMOL/L (ref 3.4–5.3)
PROT SERPL-MCNC: 6.4 G/DL (ref 6.4–8.3)
RBC # BLD AUTO: 4.06 10E6/UL (ref 3.8–5.2)
RBC # CSF MANUAL: 186 /UL (ref 0–2)
SODIUM SERPL-SCNC: 141 MMOL/L (ref 136–145)
TUBE # CSF: 3
WBC # BLD AUTO: 6.4 10E3/UL (ref 4–11)
WBC # CSF MANUAL: 4 /UL (ref 0–5)

## 2023-03-16 PROCEDURE — 80053 COMPREHEN METABOLIC PANEL: CPT | Performed by: INTERNAL MEDICINE

## 2023-03-16 PROCEDURE — 85025 COMPLETE CBC W/AUTO DIFF WBC: CPT | Performed by: INTERNAL MEDICINE

## 2023-03-20 ENCOUNTER — LAB REQUISITION (OUTPATIENT)
Dept: LAB | Facility: CLINIC | Age: 43
End: 2023-03-20
Payer: COMMERCIAL

## 2023-03-20 DIAGNOSIS — C91.00 ACUTE LYMPHOBLASTIC LEUKEMIA NOT HAVING ACHIEVED REMISSION (H): ICD-10-CM

## 2023-03-20 LAB
ALBUMIN SERPL BCG-MCNC: 4.4 G/DL (ref 3.5–5.2)
ALP SERPL-CCNC: 98 U/L (ref 35–104)
ALT SERPL W P-5'-P-CCNC: 26 U/L (ref 10–35)
ANION GAP SERPL CALCULATED.3IONS-SCNC: 14 MMOL/L (ref 7–15)
AST SERPL W P-5'-P-CCNC: 21 U/L (ref 10–35)
BASOPHILS # BLD AUTO: 0 10E3/UL (ref 0–0.2)
BASOPHILS NFR BLD AUTO: 1 %
BILIRUB SERPL-MCNC: 0.3 MG/DL
BUN SERPL-MCNC: 14.3 MG/DL (ref 6–20)
CALCIUM SERPL-MCNC: 9.6 MG/DL (ref 8.6–10)
CHLORIDE SERPL-SCNC: 103 MMOL/L (ref 98–107)
CREAT SERPL-MCNC: 0.56 MG/DL (ref 0.51–0.95)
DEPRECATED HCO3 PLAS-SCNC: 20 MMOL/L (ref 22–29)
EOSINOPHIL # BLD AUTO: 0.3 10E3/UL (ref 0–0.7)
EOSINOPHIL NFR BLD AUTO: 4 %
ERYTHROCYTE [DISTWIDTH] IN BLOOD BY AUTOMATED COUNT: 14.3 % (ref 10–15)
GFR SERPL CREATININE-BSD FRML MDRD: >90 ML/MIN/1.73M2
GLUCOSE SERPL-MCNC: 77 MG/DL (ref 70–99)
HCT VFR BLD AUTO: 37.8 % (ref 35–47)
HGB BLD-MCNC: 12.5 G/DL (ref 11.7–15.7)
IMM GRANULOCYTES # BLD: 0 10E3/UL
IMM GRANULOCYTES NFR BLD: 0 %
LYMPHOCYTES # BLD AUTO: 0.8 10E3/UL (ref 0.8–5.3)
LYMPHOCYTES NFR BLD AUTO: 11 %
MCH RBC QN AUTO: 29.4 PG (ref 26.5–33)
MCHC RBC AUTO-ENTMCNC: 33.1 G/DL (ref 31.5–36.5)
MCV RBC AUTO: 89 FL (ref 78–100)
MONOCYTES # BLD AUTO: 1 10E3/UL (ref 0–1.3)
MONOCYTES NFR BLD AUTO: 12 %
NEUTROPHILS # BLD AUTO: 5.8 10E3/UL (ref 1.6–8.3)
NEUTROPHILS NFR BLD AUTO: 72 %
NRBC # BLD AUTO: 0 10E3/UL
NRBC BLD AUTO-RTO: 0 /100
PLATELET # BLD AUTO: 324 10E3/UL (ref 150–450)
POTASSIUM SERPL-SCNC: 4.3 MMOL/L (ref 3.4–5.3)
PROT SERPL-MCNC: 6.6 G/DL (ref 6.4–8.3)
RBC # BLD AUTO: 4.25 10E6/UL (ref 3.8–5.2)
SODIUM SERPL-SCNC: 137 MMOL/L (ref 136–145)
WBC # BLD AUTO: 8 10E3/UL (ref 4–11)

## 2023-03-20 PROCEDURE — 80053 COMPREHEN METABOLIC PANEL: CPT | Performed by: INTERNAL MEDICINE

## 2023-03-20 PROCEDURE — 85025 COMPLETE CBC W/AUTO DIFF WBC: CPT | Performed by: INTERNAL MEDICINE

## 2023-03-23 ENCOUNTER — LAB REQUISITION (OUTPATIENT)
Dept: LAB | Facility: CLINIC | Age: 43
End: 2023-03-23
Payer: COMMERCIAL

## 2023-03-23 DIAGNOSIS — C91.00 ACUTE LYMPHOBLASTIC LEUKEMIA NOT HAVING ACHIEVED REMISSION (H): ICD-10-CM

## 2023-03-23 LAB
ALBUMIN SERPL BCG-MCNC: 4.3 G/DL (ref 3.5–5.2)
ALP SERPL-CCNC: 92 U/L (ref 35–104)
ALT SERPL W P-5'-P-CCNC: 24 U/L (ref 10–35)
ANION GAP SERPL CALCULATED.3IONS-SCNC: 11 MMOL/L (ref 7–15)
AST SERPL W P-5'-P-CCNC: 18 U/L (ref 10–35)
BASOPHILS # BLD AUTO: 0 10E3/UL (ref 0–0.2)
BASOPHILS NFR BLD AUTO: 1 %
BILIRUB SERPL-MCNC: 0.3 MG/DL
BUN SERPL-MCNC: 13.2 MG/DL (ref 6–20)
CALCIUM SERPL-MCNC: 9.3 MG/DL (ref 8.6–10)
CHLORIDE SERPL-SCNC: 107 MMOL/L (ref 98–107)
CREAT SERPL-MCNC: 0.59 MG/DL (ref 0.51–0.95)
DEPRECATED HCO3 PLAS-SCNC: 24 MMOL/L (ref 22–29)
EOSINOPHIL # BLD AUTO: 0.3 10E3/UL (ref 0–0.7)
EOSINOPHIL NFR BLD AUTO: 5 %
ERYTHROCYTE [DISTWIDTH] IN BLOOD BY AUTOMATED COUNT: 14.4 % (ref 10–15)
GFR SERPL CREATININE-BSD FRML MDRD: >90 ML/MIN/1.73M2
GLUCOSE SERPL-MCNC: 85 MG/DL (ref 70–99)
HCT VFR BLD AUTO: 36.4 % (ref 35–47)
HGB BLD-MCNC: 12.1 G/DL (ref 11.7–15.7)
IMM GRANULOCYTES # BLD: 0 10E3/UL
IMM GRANULOCYTES NFR BLD: 0 %
LYMPHOCYTES # BLD AUTO: 0.9 10E3/UL (ref 0.8–5.3)
LYMPHOCYTES NFR BLD AUTO: 13 %
MCH RBC QN AUTO: 29.8 PG (ref 26.5–33)
MCHC RBC AUTO-ENTMCNC: 33.2 G/DL (ref 31.5–36.5)
MCV RBC AUTO: 90 FL (ref 78–100)
MONOCYTES # BLD AUTO: 0.7 10E3/UL (ref 0–1.3)
MONOCYTES NFR BLD AUTO: 11 %
NEUTROPHILS # BLD AUTO: 4.9 10E3/UL (ref 1.6–8.3)
NEUTROPHILS NFR BLD AUTO: 70 %
NRBC # BLD AUTO: 0 10E3/UL
NRBC BLD AUTO-RTO: 0 /100
PLATELET # BLD AUTO: 318 10E3/UL (ref 150–450)
POTASSIUM SERPL-SCNC: 4.1 MMOL/L (ref 3.4–5.3)
PROT SERPL-MCNC: 6.6 G/DL (ref 6.4–8.3)
RBC # BLD AUTO: 4.06 10E6/UL (ref 3.8–5.2)
SODIUM SERPL-SCNC: 142 MMOL/L (ref 136–145)
WBC # BLD AUTO: 6.9 10E3/UL (ref 4–11)

## 2023-03-23 PROCEDURE — 85025 COMPLETE CBC W/AUTO DIFF WBC: CPT | Performed by: INTERNAL MEDICINE

## 2023-03-23 PROCEDURE — 80053 COMPREHEN METABOLIC PANEL: CPT | Performed by: INTERNAL MEDICINE

## 2023-03-27 ENCOUNTER — LAB REQUISITION (OUTPATIENT)
Dept: LAB | Facility: CLINIC | Age: 43
End: 2023-03-27
Payer: COMMERCIAL

## 2023-03-27 DIAGNOSIS — C91.00 ACUTE LYMPHOBLASTIC LEUKEMIA NOT HAVING ACHIEVED REMISSION (H): ICD-10-CM

## 2023-03-27 LAB
ALBUMIN SERPL BCG-MCNC: 4.3 G/DL (ref 3.5–5.2)
ALP SERPL-CCNC: 91 U/L (ref 35–104)
ALT SERPL W P-5'-P-CCNC: 26 U/L (ref 10–35)
ANION GAP SERPL CALCULATED.3IONS-SCNC: 11 MMOL/L (ref 7–15)
AST SERPL W P-5'-P-CCNC: 19 U/L (ref 10–35)
BASOPHILS # BLD AUTO: 0.1 10E3/UL (ref 0–0.2)
BASOPHILS NFR BLD AUTO: 1 %
BILIRUB SERPL-MCNC: 0.2 MG/DL
BUN SERPL-MCNC: 14.3 MG/DL (ref 6–20)
CALCIUM SERPL-MCNC: 9.7 MG/DL (ref 8.6–10)
CHLORIDE SERPL-SCNC: 106 MMOL/L (ref 98–107)
CREAT SERPL-MCNC: 0.65 MG/DL (ref 0.51–0.95)
DEPRECATED HCO3 PLAS-SCNC: 24 MMOL/L (ref 22–29)
EOSINOPHIL # BLD AUTO: 0.3 10E3/UL (ref 0–0.7)
EOSINOPHIL NFR BLD AUTO: 5 %
ERYTHROCYTE [DISTWIDTH] IN BLOOD BY AUTOMATED COUNT: 14.4 % (ref 10–15)
GFR SERPL CREATININE-BSD FRML MDRD: >90 ML/MIN/1.73M2
GLUCOSE SERPL-MCNC: 77 MG/DL (ref 70–99)
HCT VFR BLD AUTO: 37.2 % (ref 35–47)
HGB BLD-MCNC: 12.4 G/DL (ref 11.7–15.7)
IMM GRANULOCYTES # BLD: 0 10E3/UL
IMM GRANULOCYTES NFR BLD: 0 %
LYMPHOCYTES # BLD AUTO: 0.9 10E3/UL (ref 0.8–5.3)
LYMPHOCYTES NFR BLD AUTO: 13 %
MCH RBC QN AUTO: 29.8 PG (ref 26.5–33)
MCHC RBC AUTO-ENTMCNC: 33.3 G/DL (ref 31.5–36.5)
MCV RBC AUTO: 89 FL (ref 78–100)
MONOCYTES # BLD AUTO: 1 10E3/UL (ref 0–1.3)
MONOCYTES NFR BLD AUTO: 15 %
NEUTROPHILS # BLD AUTO: 4.4 10E3/UL (ref 1.6–8.3)
NEUTROPHILS NFR BLD AUTO: 66 %
NRBC # BLD AUTO: 0 10E3/UL
NRBC BLD AUTO-RTO: 0 /100
PLATELET # BLD AUTO: 291 10E3/UL (ref 150–450)
POTASSIUM SERPL-SCNC: 4.3 MMOL/L (ref 3.4–5.3)
PROT SERPL-MCNC: 6.6 G/DL (ref 6.4–8.3)
RBC # BLD AUTO: 4.16 10E6/UL (ref 3.8–5.2)
SODIUM SERPL-SCNC: 141 MMOL/L (ref 136–145)
WBC # BLD AUTO: 6.6 10E3/UL (ref 4–11)

## 2023-03-27 PROCEDURE — 80053 COMPREHEN METABOLIC PANEL: CPT | Performed by: INTERNAL MEDICINE

## 2023-03-27 PROCEDURE — 82310 ASSAY OF CALCIUM: CPT | Performed by: INTERNAL MEDICINE

## 2023-03-27 PROCEDURE — 85025 COMPLETE CBC W/AUTO DIFF WBC: CPT | Performed by: INTERNAL MEDICINE

## 2023-03-29 DIAGNOSIS — C91.00 ACUTE LYMPHOBLASTIC LEUKEMIA (ALL) NOT HAVING ACHIEVED REMISSION (H): ICD-10-CM

## 2023-03-29 DIAGNOSIS — Z76.89 PROPHYLAXIS FOR CHEMOTHERAPY-INDUCED NEUTROPENIA: Primary | ICD-10-CM

## 2023-03-29 RX ORDER — EPINEPHRINE 1 MG/ML
0.3 INJECTION, SOLUTION, CONCENTRATE INTRAVENOUS EVERY 5 MIN PRN
Status: CANCELLED | OUTPATIENT
Start: 2023-04-20

## 2023-03-29 RX ORDER — DIPHENHYDRAMINE HYDROCHLORIDE 50 MG/ML
50 INJECTION INTRAMUSCULAR; INTRAVENOUS
Status: CANCELLED
Start: 2023-04-20

## 2023-03-29 RX ORDER — MEPERIDINE HYDROCHLORIDE 25 MG/ML
25 INJECTION INTRAMUSCULAR; INTRAVENOUS; SUBCUTANEOUS EVERY 30 MIN PRN
Status: CANCELLED | OUTPATIENT
Start: 2023-04-20

## 2023-03-29 RX ORDER — ALBUTEROL SULFATE 0.83 MG/ML
2.5 SOLUTION RESPIRATORY (INHALATION)
Status: CANCELLED | OUTPATIENT
Start: 2023-04-20

## 2023-03-29 RX ORDER — ALBUTEROL SULFATE 90 UG/1
1-2 AEROSOL, METERED RESPIRATORY (INHALATION)
Status: CANCELLED
Start: 2023-04-20

## 2023-03-30 ENCOUNTER — DOCUMENTATION ONLY (OUTPATIENT)
Dept: ONCOLOGY | Facility: CLINIC | Age: 43
End: 2023-03-30
Payer: COMMERCIAL

## 2023-03-30 ENCOUNTER — LAB REQUISITION (OUTPATIENT)
Dept: LAB | Facility: CLINIC | Age: 43
End: 2023-03-30
Payer: COMMERCIAL

## 2023-03-30 DIAGNOSIS — C91.00 ACUTE LYMPHOBLASTIC LEUKEMIA NOT HAVING ACHIEVED REMISSION (H): ICD-10-CM

## 2023-03-30 LAB
ALBUMIN SERPL BCG-MCNC: 4.3 G/DL (ref 3.5–5.2)
ALP SERPL-CCNC: 91 U/L (ref 35–104)
ALT SERPL W P-5'-P-CCNC: 26 U/L (ref 10–35)
ANION GAP SERPL CALCULATED.3IONS-SCNC: 12 MMOL/L (ref 7–15)
AST SERPL W P-5'-P-CCNC: 18 U/L (ref 10–35)
BASOPHILS # BLD AUTO: 0 10E3/UL (ref 0–0.2)
BASOPHILS NFR BLD AUTO: 0 %
BILIRUB SERPL-MCNC: 0.3 MG/DL
BUN SERPL-MCNC: 11.6 MG/DL (ref 6–20)
CALCIUM SERPL-MCNC: 9.5 MG/DL (ref 8.6–10)
CHLORIDE SERPL-SCNC: 103 MMOL/L (ref 98–107)
CREAT SERPL-MCNC: 0.63 MG/DL (ref 0.51–0.95)
DEPRECATED HCO3 PLAS-SCNC: 25 MMOL/L (ref 22–29)
EOSINOPHIL # BLD AUTO: 0.2 10E3/UL (ref 0–0.7)
EOSINOPHIL NFR BLD AUTO: 3 %
ERYTHROCYTE [DISTWIDTH] IN BLOOD BY AUTOMATED COUNT: 14.4 % (ref 10–15)
GFR SERPL CREATININE-BSD FRML MDRD: >90 ML/MIN/1.73M2
GLUCOSE SERPL-MCNC: 103 MG/DL (ref 70–99)
HCT VFR BLD AUTO: 37.6 % (ref 35–47)
HGB BLD-MCNC: 12.5 G/DL (ref 11.7–15.7)
IMM GRANULOCYTES # BLD: 0 10E3/UL
IMM GRANULOCYTES NFR BLD: 0 %
LYMPHOCYTES # BLD AUTO: 0.8 10E3/UL (ref 0.8–5.3)
LYMPHOCYTES NFR BLD AUTO: 11 %
MCH RBC QN AUTO: 29.6 PG (ref 26.5–33)
MCHC RBC AUTO-ENTMCNC: 33.2 G/DL (ref 31.5–36.5)
MCV RBC AUTO: 89 FL (ref 78–100)
MONOCYTES # BLD AUTO: 0.7 10E3/UL (ref 0–1.3)
MONOCYTES NFR BLD AUTO: 9 %
NEUTROPHILS # BLD AUTO: 5.6 10E3/UL (ref 1.6–8.3)
NEUTROPHILS NFR BLD AUTO: 77 %
NRBC # BLD AUTO: 0 10E3/UL
NRBC BLD AUTO-RTO: 0 /100
PLATELET # BLD AUTO: 284 10E3/UL (ref 150–450)
POTASSIUM SERPL-SCNC: 3.6 MMOL/L (ref 3.4–5.3)
PROT SERPL-MCNC: 6.6 G/DL (ref 6.4–8.3)
RBC # BLD AUTO: 4.23 10E6/UL (ref 3.8–5.2)
SODIUM SERPL-SCNC: 140 MMOL/L (ref 136–145)
WBC # BLD AUTO: 7.4 10E3/UL (ref 4–11)

## 2023-03-30 PROCEDURE — 85025 COMPLETE CBC W/AUTO DIFF WBC: CPT | Performed by: INTERNAL MEDICINE

## 2023-03-30 PROCEDURE — 80053 COMPREHEN METABOLIC PANEL: CPT | Performed by: INTERNAL MEDICINE

## 2023-04-03 ENCOUNTER — LAB REQUISITION (OUTPATIENT)
Dept: LAB | Facility: CLINIC | Age: 43
End: 2023-04-03
Payer: COMMERCIAL

## 2023-04-03 DIAGNOSIS — C91.00 ACUTE LYMPHOBLASTIC LEUKEMIA NOT HAVING ACHIEVED REMISSION (H): ICD-10-CM

## 2023-04-03 LAB
ALBUMIN SERPL BCG-MCNC: 4.5 G/DL (ref 3.5–5.2)
ALP SERPL-CCNC: 100 U/L (ref 35–104)
ALT SERPL W P-5'-P-CCNC: 28 U/L (ref 10–35)
ANION GAP SERPL CALCULATED.3IONS-SCNC: 12 MMOL/L (ref 7–15)
AST SERPL W P-5'-P-CCNC: 21 U/L (ref 10–35)
BASOPHILS # BLD AUTO: 0 10E3/UL (ref 0–0.2)
BASOPHILS NFR BLD AUTO: 1 %
BILIRUB SERPL-MCNC: 0.3 MG/DL
BUN SERPL-MCNC: 14.8 MG/DL (ref 6–20)
CALCIUM SERPL-MCNC: 9.7 MG/DL (ref 8.6–10)
CHLORIDE SERPL-SCNC: 106 MMOL/L (ref 98–107)
CREAT SERPL-MCNC: 0.57 MG/DL (ref 0.51–0.95)
DEPRECATED HCO3 PLAS-SCNC: 23 MMOL/L (ref 22–29)
EOSINOPHIL # BLD AUTO: 0.4 10E3/UL (ref 0–0.7)
EOSINOPHIL NFR BLD AUTO: 6 %
ERYTHROCYTE [DISTWIDTH] IN BLOOD BY AUTOMATED COUNT: 14.5 % (ref 10–15)
GFR SERPL CREATININE-BSD FRML MDRD: >90 ML/MIN/1.73M2
GLUCOSE SERPL-MCNC: 107 MG/DL (ref 70–99)
HCT VFR BLD AUTO: 37.9 % (ref 35–47)
HGB BLD-MCNC: 12.7 G/DL (ref 11.7–15.7)
IMM GRANULOCYTES # BLD: 0 10E3/UL
IMM GRANULOCYTES NFR BLD: 0 %
LYMPHOCYTES # BLD AUTO: 1 10E3/UL (ref 0.8–5.3)
LYMPHOCYTES NFR BLD AUTO: 14 %
MCH RBC QN AUTO: 29.7 PG (ref 26.5–33)
MCHC RBC AUTO-ENTMCNC: 33.5 G/DL (ref 31.5–36.5)
MCV RBC AUTO: 89 FL (ref 78–100)
MONOCYTES # BLD AUTO: 0.8 10E3/UL (ref 0–1.3)
MONOCYTES NFR BLD AUTO: 12 %
NEUTROPHILS # BLD AUTO: 4.5 10E3/UL (ref 1.6–8.3)
NEUTROPHILS NFR BLD AUTO: 67 %
NRBC # BLD AUTO: 0 10E3/UL
NRBC BLD AUTO-RTO: 0 /100
PLATELET # BLD AUTO: 304 10E3/UL (ref 150–450)
POTASSIUM SERPL-SCNC: 4.5 MMOL/L (ref 3.4–5.3)
PROT SERPL-MCNC: 6.8 G/DL (ref 6.4–8.3)
RBC # BLD AUTO: 4.28 10E6/UL (ref 3.8–5.2)
SODIUM SERPL-SCNC: 141 MMOL/L (ref 136–145)
WBC # BLD AUTO: 6.8 10E3/UL (ref 4–11)

## 2023-04-03 PROCEDURE — 85004 AUTOMATED DIFF WBC COUNT: CPT | Performed by: INTERNAL MEDICINE

## 2023-04-03 PROCEDURE — 80053 COMPREHEN METABOLIC PANEL: CPT | Performed by: INTERNAL MEDICINE

## 2023-04-04 DIAGNOSIS — C91.00 ACUTE LYMPHOBLASTIC LEUKEMIA (ALL) NOT HAVING ACHIEVED REMISSION (H): Primary | ICD-10-CM

## 2023-04-05 ENCOUNTER — TELEPHONE (OUTPATIENT)
Dept: ONCOLOGY | Facility: CLINIC | Age: 43
End: 2023-04-05

## 2023-04-05 ENCOUNTER — ONCOLOGY VISIT (OUTPATIENT)
Dept: ONCOLOGY | Facility: CLINIC | Age: 43
End: 2023-04-05
Attending: INTERNAL MEDICINE
Payer: COMMERCIAL

## 2023-04-05 VITALS
DIASTOLIC BLOOD PRESSURE: 84 MMHG | BODY MASS INDEX: 43.29 KG/M2 | HEART RATE: 76 BPM | RESPIRATION RATE: 16 BRPM | SYSTOLIC BLOOD PRESSURE: 126 MMHG | OXYGEN SATURATION: 99 % | TEMPERATURE: 98 F | WEIGHT: 252.2 LBS

## 2023-04-05 DIAGNOSIS — C91.01 ACUTE LYMPHOBLASTIC LEUKEMIA (ALL) IN REMISSION (H): Primary | ICD-10-CM

## 2023-04-05 DIAGNOSIS — C91.00 ACUTE LYMPHOBLASTIC LEUKEMIA (ALL) NOT HAVING ACHIEVED REMISSION (H): Primary | ICD-10-CM

## 2023-04-05 PROCEDURE — 99212 OFFICE O/P EST SF 10 MIN: CPT | Performed by: INTERNAL MEDICINE

## 2023-04-05 PROCEDURE — 99214 OFFICE O/P EST MOD 30 MIN: CPT | Performed by: INTERNAL MEDICINE

## 2023-04-05 ASSESSMENT — PAIN SCALES - GENERAL: PAINLEVEL: NO PAIN (0)

## 2023-04-05 NOTE — LETTER
4/5/2023         RE: Vashti Villegas  [address] [place] [address]        Dear Colleague,    Thank you for referring your patient, Vashti Villegas, to the [facility]. Please see a copy of my visit note below.    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 While at OSH, she had CT PE protocol as well as CT A/P. These identified no PE, no acute or localized intraabdominal inflammatory process, mild splenomegaly, and few inconspicous low-density structures in the liver. She was transferred to [facility] for further workup and management. S/p Hydrea 1g TID (8/21-8/23) with improvement in WBC (100K ? 12K).   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 IHC shows CD10+, CD34+. 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 Abnormal cytogenetics.   --Microarry study: CN gains/losses in 3p21, 4q25, 7p11 (region includes CREB5 and IKZF1; biallelic loss of TARP), 9p13.2-p22.3 (region includes CDKN2a/2b and PAX5), 12q13, 14q11.     INDUCTION:   --Initiated pre-phase steroids with Prednisone 60 mg x8/21; increased to 60 mg/m2 (140 mg) 8/22-8/24. She initiated GRAALL + imatinib (C1D1 = 8/24/22).   --S/p BMT consult 9/12 with Dr. Walsh. Induction was relatively uncomplicated.   -s/p weekly triple IT chemo (Cytarabine, Solu-Cortef, MTX) on Days 1, 8, and 15 of C1 induction. CNS negative.   --BMBx 9/26/22 MRD-negative by BCR-ABL PCR and by Clonoseq Assay.      --GRAALL + imatinib (C2D1=9/30/22). 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   Post-cycle-2 BMBx  10/25/2022 showed MRD-negative CR, though low-level abnormal B-cell population (favor hematogones over residual leukemia) was noted. BCR-ABL negative. Clonoseq negative at 1/10E6 level.    --C3 GRAALL + imatinib on 11/2/22 was tolerated well without complication.    CONSOLIDATION:   --Discussed with Dr. Rutherford, BMT faculty meeting: No matched related donors identified. High panel reactive antibodies towards  unrelated donor samples. With an excellent early, deep MRD response to chemotherapy and no optimal donor sources identified, discussed foregoing BMT in CR1 in favor of definitive therapy.  Discussed risks and benefits of GRAAL versus newer approaches. April agreed to a combination of Blinatumomab plus ponatinib  (Short et al The Lancet Hematology Volume 10, ISSUE 1, e24-e34, January 2023).   --Aspirin 81 mg daily added for VTE prophylaxis per original study.   --Cycle 1Day 1 Blinatumomab plus Ponatinib 15 mg 12/14/2022. Complicated by PICC disruption, brief Blincyto interruption ~12/21/22.   --BMBx 1/17/2023: MRD- by MFC. BCR-ABL p190 detectable at <0.079%. Clonoseq negative.  --Cycle 2 Day 1 Blinatumomab plus Ponatinib 15 mg  2/6/2023.  --BMBx 3/1/2023: MRD- by MFC. BCR-ABL p190 undetectable. Clonoseq negative. FISH with borderline detectable BCR-ABL fusion at 0.125% (at LoD for assay).  --Cycle 3 Day 1 Blinatumomab plus Ponatinib 15 mg 3/9/2023.    INTRATHECAL CHEMOTHERAPY: ALL CSF studies negative by C  8/26/22: Cytarabine 40 mg/methotrexate 15 mg  8/31/22: Cytarabine 40 mg/methotrexate 15 mg  9/7/22: Cytarabine 40 mg/methotrexate 15 mg  9/14/22: Methotrexate 15 mg  10/21/22: Cytarabine 40 mg/methotrexate 15 mg  11/25/22: Cytarabine 100 mg  12/16/22: Methotrexate 12 mg  1/2/23; Cytarabine 100 mg  1/16/23: Methotrexate 12 mg  2/02/23: Cytarabine 100 mg  2/27/23: Methotrexate 12 mg  3/14/23: Cytarabine 100 mg      History of Present Illness:   April Kiel returns.     No clear neuropathy, but having some  nighttime awakenings from tingling pain involving her right palp, extending up to her elbow.   Otherwise doing well.   12-point ROS negative.    /84   Pulse 76   Temp 98  F (36.7  C) (Oral)   Resp 16   Wt 114.4 kg (252 lb 3.2 oz)   SpO2 99%   BMI 43.29 kg/m      Medications:       Current Outpatient Medications   Medication Sig    amLODIPine (NORVASC) 5 MG tablet Take 1 tablet (5 mg) by mouth daily    aspirin (ASA) 81 MG EC tablet Take 1 tablet (81 mg) by mouth daily    lidocaine-prilocaine (EMLA) 2.5-2.5 % external cream Apply topically as needed (Apply 30 to 60minutes before Port Needle Access for pain)    lidocaine-prilocaine (LIDOPRIL) 2.5-2.5 % kit Use prior to lab access    PONATinib (ICLUSIG) 15 MG tablet Take 1 tablet (15 mg) by mouth daily -- start concurrent with blinatumomab infusion, planned for 12/12/22    valACYclovir (VALTREX) 1000 mg tablet Take 1 tablet (1,000 mg) by mouth daily    acetaminophen (TYLENOL) 325 MG tablet Take 2 tablets (650 mg) by mouth every 6 hours as needed for mild pain, other or fever (blood product administration premedication) (Patient not taking: Reported on 2/2/2023)    benzonatate (TESSALON) 100 MG capsule Take 1 capsule (100 mg) by mouth 3 times daily as needed for cough (Patient not taking: Reported on 4/5/2023)    LORazepam (ATIVAN) 0.5 MG tablet Take 1-2 tablets (0.5-1 mg) by mouth daily as needed for anxiety . Take prior to BMBx procedures as needed. If initial dose unhelpful, okay to take a second 0.5-1 mg dose (for a max of 2 mg at a time). Caution: causes sedation. Do not drive after taking this medication. (Patient not taking: Reported on 2/2/2023)    NEUPOGEN 480 MCG/0.8ML SOSY syringe  (Patient not taking: Reported on 4/5/2023)    ondansetron (ZOFRAN ODT) 4 MG ODT tab Take 1-2 tablets (4-8 mg) by mouth every 8 hours as needed for nausea or vomiting (Patient not taking: Reported on 2/2/2023)    prochlorperazine (COMPAZINE) 10 MG tablet Take 1 tablet (10  mg) by mouth every 6 hours as needed (Breakthrough Nausea/Vomiting) (Patient not taking: Reported on 2/2/2023)     No current facility-administered medications for this visit.     Facility-Administered Medications Ordered in Other Visits   Medication    blinatumomab (BLINCYTO) 28 mcg in sodium chloride 0.9 % 240 mL home Infusion        Physical Exam:   Blood pressure 126/84, pulse 76, temperature 98  F (36.7  C), temperature source Oral, resp. rate 16, weight 114.4 kg (252 lb 3.2 oz), SpO2 99 %.    ECOG 1    Constitutional: No acute distress  HEENT: Normocephalic/atraumatic  Eyes: EOMI, PERRLA, anicteric  Respiratory: No increased work of breathing while on room air.  CTAB.    Cardiovascular: RRR, normal S1/S2, no MRGs. No edema.  Extremities WWP.  Pedal pulses 2+.  GI: nontender/nondistended, BS+. No HSM.   Skin: No rashes or lesions on visible areas.    Musculoskeletal: Normal tone, bulk.    Neurologic: Alert and oriented to time, person, place, and situation.  No gross focal cranial nerve abnormalities.  Moving all 4 extremities. Negative Phalens and Tinel's sign.      No results found for this or any previous visit (from the past 24 hour(s)).    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 Villegas is a 42 year old woman with recent diagnosis of Ph+ ALL.     After induction, she was in an MRD- CR (CR1) by MFC, BCR-ABL PCR, and Clonoseq assay after cycle 1 and 2 of attenuated-dose GRAAL chemotherapy. With no matched related donors, and an excellent early/deep MRD-CR, chemotherapy is currently favored over BMT; this is supported by multiple recent clinical observations (Blood. 2016 Jul 28; 128(4): 504-507; Blood 2022 Jul 25;blood.[id number]). After discussion, she transitioned to Blinatumomab + Ponatinib (Short et al The Lancet Hematology Volume 10, ISSUE 1, e24-e34, January 2023) as curative intent therapy. C1D1 12/14/22. Course complicated by transient cytopenias and headaches. She has  completed 12 total doses of prophylactic intrathecal chemotherapy.     She is currently doing quite well with no adverse symptoms or sequelae of treatment.     PLAN:  --Continue blinatumomab + ponatinib 15 mg QD   --Re-stage 30 days after cycle 5 (~mid-August) with BMBx and BCR-ABL studies as well as Clonoseq testnig; port out then if MRD-negative.   --RTC w/ Della 1 week after BMBx in August.   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  - ppx Levaquin and Fluconazole when ANC <1.0.      # Headaches  # H/o COVID-19 infection   --Not vaccinated (due to fear of needles per pt report).  --Evusheld given 11/2/2022    # Hypertension: Amlodipine; Lasix as needed  # Indigestion/GERD    Steve Hull MD, PhD  Division of Hematology, Oncology, and Transplantation  [facility]

## 2023-04-05 NOTE — PROGRESS NOTES
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 While at OSH, she had CT PE protocol as well as CT A/P. These identified no PE, no acute or localized intraabdominal inflammatory process, mild splenomegaly, and few inconspicous low-density structures in the liver. She was transferred to [facility] for further workup and management. S/p Hydrea 1g TID (8/21-8/23) with improvement in WBC (100K ? 12K).   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 IHC shows CD10+, CD34+. 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 Abnormal cytogenetics.   --Microarry study: CN gains/losses in 3p21, 4q25, 7p11 (region includes CREB5 and IKZF1; biallelic loss of TARP), 9p13.2-p22.3 (region includes CDKN2a/2b and PAX5), 12q13, 14q11.     INDUCTION:   --Initiated pre-phase steroids with Prednisone 60 mg x8/21; increased to 60 mg/m2 (140 mg) 8/22-8/24. She initiated GRAALL + imatinib (C1D1 = 8/24/22).   --S/p BMT consult 9/12 with Dr. Walsh. Induction was relatively uncomplicated.   -s/p weekly triple IT chemo (Cytarabine, Solu-Cortef, MTX) on Days 1, 8, and 15 of C1 induction. CNS negative.   --BMBx 9/26/22 MRD-negative by BCR-ABL PCR and by Clonoseq Assay.      --GRAALL + imatinib (C2D1=9/30/22). 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   Post-cycle-2 BMBx 10/25/2022 showed MRD-negative CR, though low-level abnormal B-cell population (favor hematogones over residual leukemia) was noted. BCR-ABL negative. Clonoseq negative at 1/10E6 level.    --C3 GRAALL + imatinib on 11/2/22 was tolerated well  without complication.    CONSOLIDATION:   --Discussed with Dr. Rutherford, BMT faculty meeting: No matched related donors identified. High panel reactive antibodies towards  unrelated donor samples. With an excellent early, deep MRD response to chemotherapy and no optimal donor sources identified, discussed foregoing BMT in CR1 in favor of definitive therapy.  Discussed risks and benefits of GRAAL versus newer approaches. April agreed to a combination of Blinatumomab plus ponatinib  (Jesusita et al The Lancet Hematology Volume 10, ISSUE 1, e24-e34, January 2023).   --Aspirin 81 mg daily added for VTE prophylaxis per original study.   --Cycle 1Day 1 Blinatumomab plus Ponatinib 15 mg 12/14/2022. Complicated by PICC disruption, brief Blincyto interruption ~12/21/22.   --BMBx 1/17/2023: MRD- by MFC. BCR-ABL p190 detectable at <0.079%. Clonoseq negative.  --Cycle 2 Day 1 Blinatumomab plus Ponatinib 15 mg  2/6/2023.  --BMBx 3/1/2023: MRD- by MFC. BCR-ABL p190 undetectable. Clonoseq negative. FISH with borderline detectable BCR-ABL fusion at 0.125% (at LoD for assay).  --Cycle 3 Day 1 Blinatumomab plus Ponatinib 15 mg 3/9/2023.    INTRATHECAL CHEMOTHERAPY: ALL CSF studies negative by C  8/26/22: Cytarabine 40 mg/methotrexate 15 mg  8/31/22: Cytarabine 40 mg/methotrexate 15 mg  9/7/22: Cytarabine 40 mg/methotrexate 15 mg  9/14/22: Methotrexate 15 mg  10/21/22: Cytarabine 40 mg/methotrexate 15 mg  11/25/22: Cytarabine 100 mg  12/16/22: Methotrexate 12 mg  1/2/23; Cytarabine 100 mg  1/16/23: Methotrexate 12 mg  2/02/23: Cytarabine 100 mg  2/27/23: Methotrexate 12 mg  3/14/23: Cytarabine 100 mg      History of Present Illness:   April Bakari returns.     No clear neuropathy, but having some nighttime awakenings from tingling pain involving her right palp, extending up to her elbow.   Otherwise doing well.   12-point ROS negative.    /84   Pulse 76   Temp 98  F (36.7  C) (Oral)   Resp 16   Wt 114.4 kg (252 lb 3.2 oz)   SpO2  99%   BMI 43.29 kg/m      Medications:       Current Outpatient Medications   Medication Sig     amLODIPine (NORVASC) 5 MG tablet Take 1 tablet (5 mg) by mouth daily     aspirin (ASA) 81 MG EC tablet Take 1 tablet (81 mg) by mouth daily     lidocaine-prilocaine (EMLA) 2.5-2.5 % external cream Apply topically as needed (Apply 30 to 60minutes before Port Needle Access for pain)     lidocaine-prilocaine (LIDOPRIL) 2.5-2.5 % kit Use prior to lab access     PONATinib (ICLUSIG) 15 MG tablet Take 1 tablet (15 mg) by mouth daily -- start concurrent with blinatumomab infusion, planned for 12/12/22     valACYclovir (VALTREX) 1000 mg tablet Take 1 tablet (1,000 mg) by mouth daily     acetaminophen (TYLENOL) 325 MG tablet Take 2 tablets (650 mg) by mouth every 6 hours as needed for mild pain, other or fever (blood product administration premedication) (Patient not taking: Reported on 2/2/2023)     benzonatate (TESSALON) 100 MG capsule Take 1 capsule (100 mg) by mouth 3 times daily as needed for cough (Patient not taking: Reported on 4/5/2023)     LORazepam (ATIVAN) 0.5 MG tablet Take 1-2 tablets (0.5-1 mg) by mouth daily as needed for anxiety . Take prior to BMBx procedures as needed. If initial dose unhelpful, okay to take a second 0.5-1 mg dose (for a max of 2 mg at a time). Caution: causes sedation. Do not drive after taking this medication. (Patient not taking: Reported on 2/2/2023)     NEUPOGEN 480 MCG/0.8ML SOSY syringe  (Patient not taking: Reported on 4/5/2023)     ondansetron (ZOFRAN ODT) 4 MG ODT tab Take 1-2 tablets (4-8 mg) by mouth every 8 hours as needed for nausea or vomiting (Patient not taking: Reported on 2/2/2023)     prochlorperazine (COMPAZINE) 10 MG tablet Take 1 tablet (10 mg) by mouth every 6 hours as needed (Breakthrough Nausea/Vomiting) (Patient not taking: Reported on 2/2/2023)     No current facility-administered medications for this visit.     Facility-Administered Medications Ordered in Other  Visits   Medication     blinatumomab (BLINCYTO) 28 mcg in sodium chloride 0.9 % 240 mL home Infusion        Physical Exam:   Blood pressure 126/84, pulse 76, temperature 98  F (36.7  C), temperature source Oral, resp. rate 16, weight 114.4 kg (252 lb 3.2 oz), SpO2 99 %.    ECOG 1    Constitutional: No acute distress  HEENT: Normocephalic/atraumatic  Eyes: EOMI, PERRLA, anicteric  Respiratory: No increased work of breathing while on room air.  CTAB.    Cardiovascular: RRR, normal S1/S2, no MRGs. No edema.  Extremities WWP.  Pedal pulses 2+.  GI: nontender/nondistended, BS+. No HSM.   Skin: No rashes or lesions on visible areas.    Musculoskeletal: Normal tone, bulk.    Neurologic: Alert and oriented to time, person, place, and situation.  No gross focal cranial nerve abnormalities.  Moving all 4 extremities. Negative Phalens and Tinel's sign.      No results found for this or any previous visit (from the past 24 hour(s)).    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 Villegas is a 42 year old woman with recent diagnosis of Ph+ ALL.     After induction, she was in an MRD- CR (CR1) by MFC, BCR-ABL PCR, and Clonoseq assay after cycle 1 and 2 of attenuated-dose GRAAL chemotherapy. With no matched related donors, and an excellent early/deep MRD-CR, chemotherapy is currently favored over BMT; this is supported by multiple recent clinical observations (Blood. 2016 Jul 28; 128(4): 504-507; Blood 2022 Jul 25;blood.[id number]). After discussion, she transitioned to Blinatumomab + Ponatinib (Short et al The Lancet Hematology Volume 10, ISSUE 1, e24-e34, January 2023) as curative intent therapy. C1D1 12/14/22. Course complicated by transient cytopenias and headaches. She has completed 12 total doses of prophylactic intrathecal chemotherapy.     She is currently doing quite well with no adverse symptoms or sequelae of treatment.     PLAN:  --Continue blinatumomab + ponatinib 15 mg QD   --Re-stage 30 days  after cycle 5 (~mid-August) with BMBx and BCR-ABL studies as well as Clonoseq testnig; port out then if MRD-negative.   --RTC w/ Della 1 week after BMBx in August.   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  - ppx Levaquin and Fluconazole when ANC <1.0.      # Headaches  # H/o COVID-19 infection   --Not vaccinated (due to fear of needles per pt report).  --Evusheld given 11/2/2022    # Hypertension: Amlodipine; Lasix as needed  # Indigestion/GERD    Stvee Hull MD, PhD  Division of Hematology, Oncology, and Transplantation  [facility]

## 2023-04-05 NOTE — NURSING NOTE
"Oncology Rooming Note    April 5, 2023 9:19 AM   Vashti Villegas is a 43 year old female who presents for:    Chief Complaint   Patient presents with     Oncology Clinic Visit     ALL     Initial Vitals: /84   Pulse 76   Temp 98  F (36.7  C) (Oral)   Resp 16   Wt 114.4 kg (252 lb 3.2 oz)   SpO2 99%   BMI 43.29 kg/m   Estimated body mass index is 43.29 kg/m  as calculated from the following:    Height as of 3/1/23: 1.626 m (5' 4\").    Weight as of this encounter: 114.4 kg (252 lb 3.2 oz). Body surface area is 2.27 meters squared.  No Pain (0) Comment: Data Unavailable   No LMP recorded. Patient is postmenopausal.  Allergies reviewed: Yes  Medications reviewed: Yes    Medications: Medication refills not needed today.  Pharmacy name entered into [facility]:    [id number] [place], [place] - [address]  [facility] - [place], [place] - [address]    Clinical concerns: No new clinical concerns other than reason for visit today.     Tami Dixon, EMT    "

## 2023-04-06 ENCOUNTER — LAB REQUISITION (OUTPATIENT)
Dept: LAB | Facility: CLINIC | Age: 43
End: 2023-04-06
Payer: COMMERCIAL

## 2023-04-06 DIAGNOSIS — C91.00 ACUTE LYMPHOBLASTIC LEUKEMIA NOT HAVING ACHIEVED REMISSION (H): ICD-10-CM

## 2023-04-06 LAB
ALBUMIN SERPL BCG-MCNC: 4.3 G/DL (ref 3.5–5.2)
ALP SERPL-CCNC: 95 U/L (ref 35–104)
ALT SERPL W P-5'-P-CCNC: 26 U/L (ref 10–35)
ANION GAP SERPL CALCULATED.3IONS-SCNC: 9 MMOL/L (ref 7–15)
AST SERPL W P-5'-P-CCNC: 17 U/L (ref 10–35)
BASOPHILS # BLD AUTO: 0 10E3/UL (ref 0–0.2)
BASOPHILS NFR BLD AUTO: 1 %
BILIRUB SERPL-MCNC: 0.2 MG/DL
BUN SERPL-MCNC: 14 MG/DL (ref 6–20)
CALCIUM SERPL-MCNC: 9.4 MG/DL (ref 8.6–10)
CHLORIDE SERPL-SCNC: 106 MMOL/L (ref 98–107)
CREAT SERPL-MCNC: 0.51 MG/DL (ref 0.51–0.95)
DEPRECATED HCO3 PLAS-SCNC: 24 MMOL/L (ref 22–29)
EOSINOPHIL # BLD AUTO: 0.4 10E3/UL (ref 0–0.7)
EOSINOPHIL NFR BLD AUTO: 6 %
ERYTHROCYTE [DISTWIDTH] IN BLOOD BY AUTOMATED COUNT: 14.4 % (ref 10–15)
GFR SERPL CREATININE-BSD FRML MDRD: >90 ML/MIN/1.73M2
GLUCOSE SERPL-MCNC: 100 MG/DL (ref 70–99)
HCT VFR BLD AUTO: 38.2 % (ref 35–47)
HGB BLD-MCNC: 12.5 G/DL (ref 11.7–15.7)
IMM GRANULOCYTES # BLD: 0 10E3/UL
IMM GRANULOCYTES NFR BLD: 0 %
LYMPHOCYTES # BLD AUTO: 1.1 10E3/UL (ref 0.8–5.3)
LYMPHOCYTES NFR BLD AUTO: 17 %
MCH RBC QN AUTO: 29.4 PG (ref 26.5–33)
MCHC RBC AUTO-ENTMCNC: 32.7 G/DL (ref 31.5–36.5)
MCV RBC AUTO: 90 FL (ref 78–100)
MONOCYTES # BLD AUTO: 0.8 10E3/UL (ref 0–1.3)
MONOCYTES NFR BLD AUTO: 12 %
NEUTROPHILS # BLD AUTO: 4.1 10E3/UL (ref 1.6–8.3)
NEUTROPHILS NFR BLD AUTO: 64 %
NRBC # BLD AUTO: 0 10E3/UL
NRBC BLD AUTO-RTO: 0 /100
PLATELET # BLD AUTO: 313 10E3/UL (ref 150–450)
POTASSIUM SERPL-SCNC: 4 MMOL/L (ref 3.4–5.3)
PROT SERPL-MCNC: 6.5 G/DL (ref 6.4–8.3)
RBC # BLD AUTO: 4.25 10E6/UL (ref 3.8–5.2)
SODIUM SERPL-SCNC: 139 MMOL/L (ref 136–145)
WBC # BLD AUTO: 6.3 10E3/UL (ref 4–11)

## 2023-04-06 PROCEDURE — 80053 COMPREHEN METABOLIC PANEL: CPT | Performed by: INTERNAL MEDICINE

## 2023-04-06 PROCEDURE — 85014 HEMATOCRIT: CPT | Performed by: INTERNAL MEDICINE

## 2023-04-06 NOTE — ORAL ONC MGMT
Oral Chemotherapy Monitoring Program    Subjective/Objective:  Vashti Villegas is a 43 year old female contacted by phone for a follow-up visit for oral chemotherapy.  April confirms taking the appropriate dose of ponatinib 15mg once daily. Denies new or worsening side effects. Reports missing 1 dose this month. No other missed doses. Denies recent hospital or ED visits. Denies any recent medication changes. No questions or concerns today. She will be out of medication over the weekend.         12/1/2022     3:00 PM 1/2/2023     4:00 PM 1/3/2023     9:00 AM 2/23/2023     9:00 AM 2/27/2023     3:00 PM 4/5/2023    10:00 AM 4/6/2023    12:00 PM   ORAL CHEMOTHERAPY   Assessment Type New Teach Lab Monitoring Initial Follow up Monthly Follow up Lab Monitoring Refill Monthly Follow up;Chart Review   Diagnosis Code Acute Lymphoblastic Leukemia (ALL) Acute Lymphoblastic Leukemia (ALL) Acute Lymphoblastic Leukemia (ALL) Acute Lymphoblastic Leukemia (ALL) Acute Lymphoblastic Leukemia (ALL) Acute Lymphoblastic Leukemia (ALL) Acute Lymphoblastic Leukemia (ALL)   Providers Dr. Della Hull   [facility]   Drug Name Iclusig (ponatinib) Iclusig (ponatinib) Iclusig (ponatinib) Iclusig (ponatinib) Iclusig (ponatinib) Iclusig (ponatinib) Iclusig (ponatinib)   Dose 15 mg 15 mg 15 mg 15 mg 15 mg 15 mg 15 mg   Current Schedule Daily Daily Daily Daily Daily Daily Daily   Cycle Details Continuous Continuous Continuous Continuous Continuous Continuous Continuous   Start Date of Last Cycle   12/14/2022 2/16/2023      Planned next cycle start date 12/12/2022         Doses missed in last 2 weeks   1 0   1   Adherence Assessment   Adherent Adherent   Adherent   Adverse Effects   Myalgias/Arthralgias No AE identified during assessment   No AE identified during assessment   Myalgias/Arthralgias   Grade 1       Pharmacist  "Intervention(myalgias/arthralgias)   No       Any new drug interactions? No      No   Is the dose as ordered appropriate for the patient? Yes      Yes   Since the last time we talked, have you been hospitalized or used the emergency room?       No       Last PHQ-2 Score on record:       11/22/2022     2:09 PM 11/1/2022     2:51 PM   PHQ-2 ( 1999 [employer])   Q1: Little interest or pleasure in doing things 0 0   Q2: Feeling down, depressed or hopeless 0 0   PHQ-2 Score 0 0       Vitals:  BP:   BP Readings from Last 1 Encounters:   04/05/23 126/84     Wt Readings from Last 1 Encounters:   04/05/23 114.4 kg (252 lb 3.2 oz)     Estimated body surface area is 2.27 meters squared as calculated from the following:    Height as of 3/1/23: 1.626 m (5' 4\").    Weight as of an earlier encounter on 4/5/23: 114.4 kg (252 lb 3.2 oz).    Labs:  _  Result Component Current Result Ref Range   Sodium 141 (4/3/2023) 136 - 145 mmol/L   _  Result Component Current Result Ref Range   Potassium 4.5 (4/3/2023) 3.4 - 5.3 mmol/L   _  Result Component Current Result Ref Range   Calcium 9.7 (4/3/2023) 8.6 - 10.0 mg/dL   _  Result Component Current Result Ref Range   Albumin 4.5 (4/3/2023) 3.5 - 5.2 g/dL   _  Result Component Current Result Ref Range   Urea Nitrogen 14.8 (4/3/2023) 6.0 - 20.0 mg/dL   _  Result Component Current Result Ref Range   Creatinine 0.57 (4/3/2023) 0.51 - 0.95 mg/dL   _  Result Component Current Result Ref Range   AST 21 (4/3/2023) 10 - 35 U/L   _  Result Component Current Result Ref Range   ALT 28 (4/3/2023) 10 - 35 U/L   _  Result Component Current Result Ref Range   Bilirubin Total 0.3 (4/3/2023) <=1.2 mg/dL   _  Result Component Current Result Ref Range   WBC Count 6.8 (4/3/2023) 4.0 - 11.0 10e3/uL   _  Result Component Current Result Ref Range   Hemoglobin 12.7 (4/3/2023) 11.7 - 15.7 g/dL   _  Result Component Current Result Ref Range   Platelet Count 304 (4/3/2023) 150 - 450 10e3/uL   _  Result Component Current " Result Ref Range   Absolute Neutrophils 4.5 (4/3/2023) 1.6 - 8.3 10e3/uL      Assessment/Plan:  Denies new or worse side effects. Continue plan of care.     Follow-Up:  4/19: appointment     Refill Due:  [facility] will deliver Iclusig refill 4/7    Tay Lozano, PharmD  Hematology/Oncology Clinical Pharmacist  [facility]  [facility]  [phone]

## 2023-04-18 ENCOUNTER — LAB REQUISITION (OUTPATIENT)
Dept: LAB | Facility: CLINIC | Age: 43
End: 2023-04-18
Payer: COMMERCIAL

## 2023-04-18 DIAGNOSIS — C91.00 ACUTE LYMPHOBLASTIC LEUKEMIA NOT HAVING ACHIEVED REMISSION (H): ICD-10-CM

## 2023-04-18 LAB
ALBUMIN SERPL BCG-MCNC: 4.4 G/DL (ref 3.5–5.2)
ALP SERPL-CCNC: 91 U/L (ref 35–104)
ALT SERPL W P-5'-P-CCNC: 27 U/L (ref 10–35)
ANION GAP SERPL CALCULATED.3IONS-SCNC: 13 MMOL/L (ref 7–15)
AST SERPL W P-5'-P-CCNC: 18 U/L (ref 10–35)
BASOPHILS # BLD AUTO: 0 10E3/UL (ref 0–0.2)
BASOPHILS NFR BLD AUTO: 1 %
BILIRUB SERPL-MCNC: 0.2 MG/DL
BUN SERPL-MCNC: 12.7 MG/DL (ref 6–20)
CALCIUM SERPL-MCNC: 9.6 MG/DL (ref 8.6–10)
CHLORIDE SERPL-SCNC: 103 MMOL/L (ref 98–107)
CREAT SERPL-MCNC: 0.59 MG/DL (ref 0.51–0.95)
DEPRECATED HCO3 PLAS-SCNC: 22 MMOL/L (ref 22–29)
EOSINOPHIL # BLD AUTO: 0.4 10E3/UL (ref 0–0.7)
EOSINOPHIL NFR BLD AUTO: 4 %
ERYTHROCYTE [DISTWIDTH] IN BLOOD BY AUTOMATED COUNT: 14.3 % (ref 10–15)
GFR SERPL CREATININE-BSD FRML MDRD: >90 ML/MIN/1.73M2
GLUCOSE SERPL-MCNC: 80 MG/DL (ref 70–99)
HCT VFR BLD AUTO: 38.8 % (ref 35–47)
HGB BLD-MCNC: 12.9 G/DL (ref 11.7–15.7)
IMM GRANULOCYTES # BLD: 0 10E3/UL
IMM GRANULOCYTES NFR BLD: 1 %
LYMPHOCYTES # BLD AUTO: 1 10E3/UL (ref 0.8–5.3)
LYMPHOCYTES NFR BLD AUTO: 12 %
MCH RBC QN AUTO: 28.7 PG (ref 26.5–33)
MCHC RBC AUTO-ENTMCNC: 33.2 G/DL (ref 31.5–36.5)
MCV RBC AUTO: 86 FL (ref 78–100)
MONOCYTES # BLD AUTO: 0.9 10E3/UL (ref 0–1.3)
MONOCYTES NFR BLD AUTO: 10 %
NEUTROPHILS # BLD AUTO: 6.1 10E3/UL (ref 1.6–8.3)
NEUTROPHILS NFR BLD AUTO: 72 %
NRBC # BLD AUTO: 0 10E3/UL
NRBC BLD AUTO-RTO: 0 /100
PLATELET # BLD AUTO: 308 10E3/UL (ref 150–450)
POTASSIUM SERPL-SCNC: 4.3 MMOL/L (ref 3.4–5.3)
PROT SERPL-MCNC: 7 G/DL (ref 6.4–8.3)
RBC # BLD AUTO: 4.49 10E6/UL (ref 3.8–5.2)
SODIUM SERPL-SCNC: 138 MMOL/L (ref 136–145)
WBC # BLD AUTO: 8.3 10E3/UL (ref 4–11)

## 2023-04-18 PROCEDURE — 85025 COMPLETE CBC W/AUTO DIFF WBC: CPT | Performed by: INTERNAL MEDICINE

## 2023-04-18 PROCEDURE — 80053 COMPREHEN METABOLIC PANEL: CPT | Performed by: INTERNAL MEDICINE

## 2023-04-18 NOTE — PROGRESS NOTES
Virtual Visit Details    Type of service:  Video Visit     Originating Location (pt. Location): Home    Distant Location (provider location):  On-site  Platform used for Video Visit: ShawnWell     Start: 10:49  Finish: 11:13          [facility] [place]  Date of visit: Apr 19, 2023        Reason for Visit: Ph (+) B-ALL      Oncology HPI:   Followed by Dr. Hull. Patient presented to outside hospital with c/o right upper quadrant pain and was subsequently found to have a markedly elevated white blood cell count concerning for acute leukemia. While at OSH, she had CTA chest as well as CT A/P. These identified no PE, no acute or localized intraabdominal inflammatory process, mild splenomegaly, and few inconspicuous low-density structures in the liver. She was transferred to [facility] for further workup and management. S/p Hydrea 1g TID (8/21-8/23) with improvement in WBC (100K ? 12K). Diagnostic BMBx completed 8/20/22, which demonstrated B-ALL with 85% blasts and hypercellular marrow. IHC shows CD10+, CD34+. 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 ALL NGS negative. BCR/ABL1 major/minor (sent from peripheral blood) both negative. Initiated pre-phase steroids with Prednisone 60 mg x8/21; increased to 60 mg/m2 (140 mg) 8/22-8/24. She initiated GRAALL + imatinib (C1D1 = 8/24/22). S/p BMT consult 9/12 with Dr. Walsh. Induction was relatively uncomplicated. Post-induction BMBx done 9/26/22 and revealed MRD- CR1, with negative BCR/ABL1 and Clonoseq testing. With no matched related donors and excellent  early/deep response chemotherapy is currently favored over BMT. 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 Underwent BMBx 10/25/22 with no morphologic evidence of B-lymphoblastic leukemia and flow with unusual B lineage precursor population 0.09%. C3 GRAALL + imatinib on 11/2/22 was tolerated well without complication. Prior plan was to continue GRAALL for a total of 8 cycles (of note odd cycles are 21-days in length and even cycles are 14-days in length), however, transitioned to Blinatumumab + Ponatinib as curative intent therapy given no matched sib donors. S/p Blincyto + Ponatinib (C1D1 = 12/14/22), complicated by neutropenic fever 2/2 pneumonia. Recent BMBx 1/17/2023: BCR-ABL p190 now detectable; Clonoseq pending.  - Port placed x1/17/23  - Per Dr Hull, plan for repeat BMBx after Blincyto C1, C2 and C5 then every 3-6 months thereafter. Clonoseq to be sent    - C2 Blincyto, D1 = 1/25/23  - Bmbx 3/1-- path/ flow neg, BCR-ABL minor p190 PCR pending, however by FISH still with low level detectable BCR-ABL fusion at 0.125%  - C3 Blincyto, D1 = 3/9/23 with ponatinib      Interval history:   April is overall doing really well. She is back to work part time, this has been a little mentally draining for her but she is excited to be working again. She has good energy, appetite is stable. No recent fevers or infections. No GI complaints. No bleeding. No rash. Headaches continue to be resolved.     Numbness has returned.. plans to see her primary care provider for this, possibly get EMG. Strength remains intact. This only occurs at night. She feels tight in her fingers as well when this happens. Shakes her hands out, no interventions due to transient nature.             Current Outpatient Medications   Medication Sig Dispense Refill     acetaminophen (TYLENOL) 325 MG tablet Take 2 tablets (650 mg) by mouth every 6 hours as needed for mild pain, other or fever (blood  product administration premedication) (Patient not taking: Reported on 2/2/2023)       amLODIPine (NORVASC) 5 MG tablet Take 1 tablet (5 mg) by mouth daily 30 tablet 4     aspirin (ASA) 81 MG EC tablet Take 1 tablet (81 mg) by mouth daily 30 tablet 1     benzonatate (TESSALON) 100 MG capsule Take 1 capsule (100 mg) by mouth 3 times daily as needed for cough (Patient not taking: Reported on 4/5/2023) 30 capsule 0     lidocaine-prilocaine (EMLA) 2.5-2.5 % external cream Apply topically as needed (Apply 30 to 60minutes before Port Needle Access for pain) 30 g 3     lidocaine-prilocaine (LIDOPRIL) 2.5-2.5 % kit Use prior to lab access 1 kit 1     LORazepam (ATIVAN) 0.5 MG tablet Take 1-2 tablets (0.5-1 mg) by mouth daily as needed for anxiety . Take prior to BMBx procedures as needed. If initial dose unhelpful, okay to take a second 0.5-1 mg dose (for a max of 2 mg at a time). Caution: causes sedation. Do not drive after taking this medication. (Patient not taking: Reported on 2/2/2023) 10 tablet 0     NEUPOGEN 480 MCG/0.8ML SOSY syringe  (Patient not taking: Reported on 4/5/2023)       ondansetron (ZOFRAN ODT) 4 MG ODT tab Take 1-2 tablets (4-8 mg) by mouth every 8 hours as needed for nausea or vomiting (Patient not taking: Reported on 2/2/2023) 60 tablet 0     PONATinib (ICLUSIG) 15 MG tablet Take 1 tablet (15 mg) by mouth daily 30 tablet 0     prochlorperazine (COMPAZINE) 10 MG tablet Take 1 tablet (10 mg) by mouth every 6 hours as needed (Breakthrough Nausea/Vomiting) (Patient not taking: Reported on 2/2/2023) 20 tablet 0     valACYclovir (VALTREX) 1000 mg tablet Take 1 tablet (1,000 mg) by mouth daily 30 tablet 1       Allergies   Allergen Reactions     Blood Transfusion Related (Informational Only) Hives     Hive reaction with platelet transfusion on 8/26/2022. Please administer platelets with tylenol and benadryl premedication.      Seasonal Allergies      Runny nose, itchiness         Physical Exam:  Video  physical exam  General: Patient appears well in no acute distress.   Skin: No visualized rash or lesions on visualized skin  Eyes: EOMI, no erythema, sclera icterus or discharge noted  Resp: Appears to be breathing comfortably without accessory muscle usage, speaking in full sentences, no cough  MSK: Appears to have normal range of motion based on visualized movements  Neurologic: No apparent tremors, facial movements symmetric  Psych: affect pleasant, alert and oriented        Labs:     I personally reviewed the following labs:    Most Recent 3 CBC's:  Recent Labs   Lab Test 04/18/23  1045 04/06/23  1100 04/03/23  0945   WBC 8.3 6.3 6.8   HGB 12.9 12.5 12.7   MCV 86 90 89    313 304     Most Recent 3 BMP's:  Recent Labs   Lab Test 04/18/23  1045 04/06/23  1100 04/03/23  0945    139 141   POTASSIUM 4.3 4.0 4.5   CHLORIDE 103 106 106   CO2 22 24 23   BUN 12.7 14.0 14.8   CR 0.59 0.51 0.57   ANIONGAP 13 9 12   SOLEDAD 9.6 9.4 9.7   GLC 80 100* 107*     Most Recent 2 LFT's:  Recent Labs   Lab Test 04/18/23  1045 04/06/23  1100   AST 18 17   ALT 27 26   ALKPHOS 91 95   BILITOTAL 0.2 0.2           Imaging: n/a      Impression/plan:     # Ph(+) B-ALL   Followed by Dr. Hull. Patient presented to outside hospital with c/o right upper quadrant pain and was subsequently found to have a markedly elevated white blood cell count concerning for acute leukemia. While at OSH, she had CTA chest as well as CT A/P. These identified no PE, no acute or localized intraabdominal inflammatory process, mild splenomegaly, and few inconspicuous low-density structures in the liver. She was transferred to [facility] for further workup and management. S/p Hydrea 1g TID (8/21-8/23) with improvement in WBC (100K ? 12K). Diagnostic BMBx completed 8/20/22, which demonstrated B-ALL with 85% blasts and hypercellular marrow. IHC shows CD10+, CD34+. 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 ALL NGS negative. BCR/ABL1 major/minor (sent from peripheral blood) both negative. Initiated pre-phase steroids with Prednisone 60 mg x8/21; increased to 60 mg/m2 (140 mg) 8/22-8/24. She initiated GRAALL + imatinib (C1D1 = 8/24/22). S/p BMT consult 9/12 with Dr. Walsh. Induction was relatively uncomplicated. Post-induction BMBx done 9/26/22 and revealed MRD- CR1, with negative BCR/ABL1 and Clonoseq testing. With no matched related donors and excellent early/deep response chemotherapy is currently favored over BMT. 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 Underwent BMBx 10/25/22 with no morphologic evidence of B-lymphoblastic leukemia and flow with unusual B lineage precursor population 0.09%. C3 GRAALL + imatinib on 11/2/22 was tolerated well without complication. Prior plan was to continue GRAALL for a total of 8 cycles (of note odd cycles are 21-days in length and even cycles are 14-days in length), however, transitioned to Blinatumumab + Ponatinib as curative intent therapy given no matched sib donors. S/p Blincyto + Ponatinib (C1D1 = 12/14/22), complicated by neutropenic fever 2/2 pneumonia. Recent BMBx 1/17/2023: BCR-ABL p190 now detectable; Clonoseq pending.  - Port placed x1/17/23  - Resumed Blincyto C2D1 = 1/25/23, with Ponatinib 15 mg daily  - Bmbx 3/1-- flow/ path neg for disease, FISH with 0.125% BCR-ABL fusion however PCR still pending  - Plan to start C4 Blina 4/20 via [facility]. Continue Ponatinib 15 mg daily-- do not interrupt this  - Has completed IT ppx  - Continue ASA 81 daily  - Subsequent cycles of Blina  can be initiated OP  - Weekly labs per [facility]  - Per Dr Hull, plan for repeat BMBx after Blincyto C1, C2 and C5 then every 3-6 months thereafter. Clonoseq to be sent with each BMBx.    # Anemia   # Leukopenia   Likely secondary to chemotherapy.  - Transfuse to keep Hgb >7. Of note, pt has history of transfusion reaction and requires premeds with Benadryl and Tylenol       # Recent PICC associated thrombus   Recent admission for cephalic vein thrombus (x1/4) associated with PICC line, non occlusive. No AC started as superficial and asymptomatic thrombus. A week prior, patient was found to have a malfunctioning vs malpositioned Port. S/p PICC removal and replacement of port via IR.   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 Presented with scattered papulovesicular lesions on her chin and nose as well as scattered hemorrhagic erosions on the scalp and lower face (V2-3 distributions of the trigeminal nerve) c/f disseminated VZV. Lesions significantly improved on admission. ID recommended considering decrease to 1000 mg daily on 12/13 visit (given less anticipated prolonged neutropenia with new regimen) and confirmed with Dr. Hull.   - Continue Valtrex 1000 mg daily    # ID PPx   - Valacyclovir ppx  - Hold fluconazole 200 mg daily and levofloxacin 250 mg daily given ANC >1.0   - Nebulized pentamidine monthly  - Received Manuel on 11/2/22     # Hypertension   - Continue amlodipine 5 mg daily -- will visit discontinuation of this post treatment     # Headaches, resolved  On admission reports intermittent headaches x2-3 weeks following recent LP. Located on the top of her head and initially rated 8/10 with significant coughing. Over the past few days headaches improved with PRN tylenol and Esgic and now rated 3/10. No NV, vision changes or weakness with headaches. Of note, recently seen at local ED for headaches with CT head negative x1/21 but did  show periventricular hypoattenuation suggestive of leukomalacia that may be treatment-related. She states that the headache does seem somewhat worse when she gets up or stands up but does not get better as she lays flat. She reports her most comfortable position is sitting up.   - MRI/ MRV negative for acute path.     - Consider IR consult for blood patch if persistent given positional component of headaches, although her headaches are better with sitting upright which argues against. Suspect headaches most likely 2/2 Ponatanib as known culprit.   - PRN Esgic helpful      # GERD   - Protonix 20 mg daily      # Grade 1 neuropathy of the fingertips   Due to vincristine. Pt reports symptoms are stable. Reports mild paresthesias and hot/cold sensitivity.   - Monitor clinically           PLAN:  --Continue blinatumomab + ponatinib 15 mg QD   --Re-stage 30 days after cycle 5 (~mid-August) with BMBx and BCR-ABL studies as well as Clonoseq testnig; port out then if MRD-negative.   --RTC w/ Della 1 week after BMBx in August.   --ASA 81 mg prophylaxis      Roya Quarles [facility]    40 minutes spent on the date of the encounter doing chart review, review of test results, interpretation of tests, patient visit, documentation and discussion with other provider(s)

## 2023-04-19 ENCOUNTER — VIRTUAL VISIT (OUTPATIENT)
Dept: ONCOLOGY | Facility: CLINIC | Age: 43
End: 2023-04-19
Attending: INTERNAL MEDICINE
Payer: COMMERCIAL

## 2023-04-19 DIAGNOSIS — C91.01 ACUTE LYMPHOBLASTIC LEUKEMIA (ALL) IN REMISSION (H): Primary | ICD-10-CM

## 2023-04-19 PROCEDURE — G0463 HOSPITAL OUTPT CLINIC VISIT: HCPCS | Mod: PN,GT | Performed by: NURSE PRACTITIONER

## 2023-04-19 PROCEDURE — 99214 OFFICE O/P EST MOD 30 MIN: CPT | Mod: VID | Performed by: NURSE PRACTITIONER

## 2023-04-19 NOTE — NURSING NOTE
Is the patient currently in the state of [place]? YES    Visit mode:VIDEO    If the visit is dropped, the patient can be reconnected by: VIDEO VISIT: Text to cell phone: [phone]    Will anyone else be joining the visit? NO      How would you like to obtain your AVS? MyChart    Are changes needed to the allergy or medication list? NO    Reason for visit: RECHECK (Follow up)

## 2023-04-19 NOTE — LETTER
4/19/2023         RE: Vashti Villegas  [address] [place] [address]        Dear Colleague,    Thank you for referring your patient, Vashti Villegas, to the [facility]. Please see a copy of my visit note below.    Virtual Visit Details    Type of service:  Video Visit     Originating Location (pt. Location): Home    Distant Location (provider location):  On-site  Platform used for Video Visit: Tyrell     Start: 10:49  Finish: 11:13          [facility] [place]  Date of visit: Apr 19, 2023        Reason for Visit: Ph (+) B-ALL      Oncology HPI:   Followed by Dr. Hull. Patient presented to outside hospital with c/o right upper quadrant pain and was subsequently found to have a markedly elevated white blood cell count concerning for acute leukemia. While at OSH, she had CTA chest as well as CT A/P. These identified no PE, no acute or localized intraabdominal inflammatory process, mild splenomegaly, and few inconspicuous low-density structures in the liver. She was transferred to [facility] for further workup and management. S/p Hydrea 1g TID (8/21-8/23) with improvement in WBC (100K ? 12K). Diagnostic BMBx completed 8/20/22, which demonstrated B-ALL with 85% blasts and hypercellular marrow. IHC shows CD10+, CD34+. 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 ALL NGS negative. BCR/ABL1 major/minor (sent from peripheral blood) both negative. Initiated pre-phase steroids with Prednisone 60 mg x8/21; increased to 60 mg/m2 (140 mg) 8/22-8/24. She initiated GRAALL +  imatinib (C1D1 = 8/24/22). S/p BMT consult 9/12 with Dr. Walsh. Induction was relatively uncomplicated. Post-induction BMBx done 9/26/22 and revealed MRD- CR1, with negative BCR/ABL1 and Clonoseq testing. With no matched related donors and excellent early/deep response chemotherapy is currently favored over BMT. 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 Underwent BMBx 10/25/22 with no morphologic evidence of B-lymphoblastic leukemia and flow with unusual B lineage precursor population 0.09%. C3 GRAALL + imatinib on 11/2/22 was tolerated well without complication. Prior plan was to continue GRAALL for a total of 8 cycles (of note odd cycles are 21-days in length and even cycles are 14-days in length), however, transitioned to Blinatumumab + Ponatinib as curative intent therapy given no matched sib donors. S/p Blincyto + Ponatinib (C1D1 = 12/14/22), complicated by neutropenic fever 2/2 pneumonia. Recent BMBx 1/17/2023: BCR-ABL p190 now detectable; Clonoseq pending.  - Port placed x1/17/23  - Per Dr Hull, plan for repeat BMBx after Blincyto C1, C2 and C5 then every 3-6 months thereafter. Clonoseq to be sent    - C2 Blincyto, D1 = 1/25/23  - Bmbx 3/1-- path/ flow neg, BCR-ABL minor p190 PCR pending, however by FISH still with low level detectable BCR-ABL fusion at 0.125%  - C3 Blincyto, D1 = 3/9/23 with ponatinib      Interval history:   April is overall doing really well. She is back to work part time, this has been a little mentally draining for her but she is excited to be working again. She has good energy, appetite is stable. No recent fevers or infections. No GI complaints. No bleeding. No rash. Headaches continue to be resolved.     Numbness has returned.. plans to see her primary care provider for this, possibly get EMG. Strength remains intact. This only occurs at night. She feels tight in her fingers as well when this happens. Shakes her hands out, no  interventions due to transient nature.             Current Outpatient Medications   Medication Sig Dispense Refill    acetaminophen (TYLENOL) 325 MG tablet Take 2 tablets (650 mg) by mouth every 6 hours as needed for mild pain, other or fever (blood product administration premedication) (Patient not taking: Reported on 2/2/2023)      amLODIPine (NORVASC) 5 MG tablet Take 1 tablet (5 mg) by mouth daily 30 tablet 4    aspirin (ASA) 81 MG EC tablet Take 1 tablet (81 mg) by mouth daily 30 tablet 1    benzonatate (TESSALON) 100 MG capsule Take 1 capsule (100 mg) by mouth 3 times daily as needed for cough (Patient not taking: Reported on 4/5/2023) 30 capsule 0    lidocaine-prilocaine (EMLA) 2.5-2.5 % external cream Apply topically as needed (Apply 30 to 60minutes before Port Needle Access for pain) 30 g 3    lidocaine-prilocaine (LIDOPRIL) 2.5-2.5 % kit Use prior to lab access 1 kit 1    LORazepam (ATIVAN) 0.5 MG tablet Take 1-2 tablets (0.5-1 mg) by mouth daily as needed for anxiety . Take prior to BMBx procedures as needed. If initial dose unhelpful, okay to take a second 0.5-1 mg dose (for a max of 2 mg at a time). Caution: causes sedation. Do not drive after taking this medication. (Patient not taking: Reported on 2/2/2023) 10 tablet 0    NEUPOGEN 480 MCG/0.8ML SOSY syringe  (Patient not taking: Reported on 4/5/2023)      ondansetron (ZOFRAN ODT) 4 MG ODT tab Take 1-2 tablets (4-8 mg) by mouth every 8 hours as needed for nausea or vomiting (Patient not taking: Reported on 2/2/2023) 60 tablet 0    PONATinib (ICLUSIG) 15 MG tablet Take 1 tablet (15 mg) by mouth daily 30 tablet 0    prochlorperazine (COMPAZINE) 10 MG tablet Take 1 tablet (10 mg) by mouth every 6 hours as needed (Breakthrough Nausea/Vomiting) (Patient not taking: Reported on 2/2/2023) 20 tablet 0    valACYclovir (VALTREX) 1000 mg tablet Take 1 tablet (1,000 mg) by mouth daily 30 tablet 1       Allergies   Allergen Reactions    Blood Transfusion Related  (Informational Only) Hives     Hive reaction with platelet transfusion on 8/26/2022. Please administer platelets with tylenol and benadryl premedication.     Seasonal Allergies      Runny nose, itchiness         Physical Exam:  Video physical exam  General: Patient appears well in no acute distress.   Skin: No visualized rash or lesions on visualized skin  Eyes: EOMI, no erythema, sclera icterus or discharge noted  Resp: Appears to be breathing comfortably without accessory muscle usage, speaking in full sentences, no cough  MSK: Appears to have normal range of motion based on visualized movements  Neurologic: No apparent tremors, facial movements symmetric  Psych: affect pleasant, alert and oriented        Labs:     I personally reviewed the following labs:    Most Recent 3 CBC's:  Recent Labs   Lab Test 04/18/23  1045 04/06/23  1100 04/03/23  0945   WBC 8.3 6.3 6.8   HGB 12.9 12.5 12.7   MCV 86 90 89    313 304     Most Recent 3 BMP's:  Recent Labs   Lab Test 04/18/23  1045 04/06/23  1100 04/03/23  0945    139 141   POTASSIUM 4.3 4.0 4.5   CHLORIDE 103 106 106   CO2 22 24 23   BUN 12.7 14.0 14.8   CR 0.59 0.51 0.57   ANIONGAP 13 9 12   SOLEDAD 9.6 9.4 9.7   GLC 80 100* 107*     Most Recent 2 LFT's:  Recent Labs   Lab Test 04/18/23  1045 04/06/23  1100   AST 18 17   ALT 27 26   ALKPHOS 91 95   BILITOTAL 0.2 0.2           Imaging: n/a      Impression/plan:     # Ph(+) B-ALL   Followed by Dr. Hull. Patient presented to outside hospital with c/o right upper quadrant pain and was subsequently found to have a markedly elevated white blood cell count concerning for acute leukemia. While at OSH, she had CTA chest as well as CT A/P. These identified no PE, no acute or localized intraabdominal inflammatory process, mild splenomegaly, and few inconspicuous low-density structures in the liver. She was transferred to [facility] for further workup and management. S/p Hydrea 1g TID (8/21-8/23) with improvement in WBC (100K ?  12K). Diagnostic BMBx completed 8/20/22, which demonstrated B-ALL with 85% blasts and hypercellular marrow. IHC shows CD10+, CD34+. 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 ALL NGS negative. BCR/ABL1 major/minor (sent from peripheral blood) both negative. Initiated pre-phase steroids with Prednisone 60 mg x8/21; increased to 60 mg/m2 (140 mg) 8/22-8/24. She initiated GRAALL + imatinib (C1D1 = 8/24/22). S/p BMT consult 9/12 with Dr. Walsh. Induction was relatively uncomplicated. Post-induction BMBx done 9/26/22 and revealed MRD- CR1, with negative BCR/ABL1 and Clonoseq testing. With no matched related donors and excellent early/deep response chemotherapy is currently favored over BMT. 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 Underwent BMBx 10/25/22 with no morphologic evidence of B-lymphoblastic leukemia and flow with unusual B lineage precursor population 0.09%. C3 GRAALL + imatinib on 11/2/22 was tolerated well without complication. Prior plan was to continue GRAALL for a total of 8 cycles (of note odd cycles are 21-days in length and even cycles are 14-days in length), however, transitioned to Blinatumumab + Ponatinib as curative intent therapy given no matched sib donors. S/p Blincyto + Ponatinib (C1D1 = 12/14/22), complicated by neutropenic fever 2/2 pneumonia. Recent BMBx 1/17/2023: BCR-ABL p190 now detectable; Clonoseq pending.  - Port placed x1/17/23  - Resumed Blincyto C2D1 = 1/25/23, with Ponatinib 15 mg daily  - Bmbx 3/1-- flow/ path neg for disease,  FISH with 0.125% BCR-ABL fusion however PCR still pending  - Plan to start C4 Blina 4/20 via [facility]. Continue Ponatinib 15 mg daily-- do not interrupt this  - Has completed IT ppx  - Continue ASA 81 daily  - Subsequent cycles of Blina can be initiated OP  - Weekly labs per [facility]  - Per Dr Hull, plan for repeat BMBx after Blincyto C1, C2 and C5 then every 3-6 months thereafter. Clonoseq to be sent with each BMBx.    # Anemia   # Leukopenia   Likely secondary to chemotherapy.  - Transfuse to keep Hgb >7. Of note, pt has history of transfusion reaction and requires premeds with Benadryl and Tylenol       # Recent PICC associated thrombus   Recent admission for cephalic vein thrombus (x1/4) associated with PICC line, non occlusive. No AC started as superficial and asymptomatic thrombus. A week prior, patient was found to have a malfunctioning vs malpositioned Port. S/p PICC removal and replacement of port via IR.   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 Presented with scattered papulovesicular lesions on her chin and nose as well as scattered hemorrhagic erosions on the scalp and lower face (V2-3 distributions of the trigeminal nerve) c/f disseminated VZV. Lesions significantly improved on admission. ID recommended considering decrease to 1000 mg daily on 12/13 visit (given less anticipated prolonged neutropenia with new regimen) and confirmed with Dr. Hull.   - Continue Valtrex 1000 mg daily    # ID PPx   - Valacyclovir ppx  - Hold fluconazole 200 mg daily and levofloxacin 250 mg daily given ANC >1.0   - Nebulized pentamidine monthly  - Received Manuel on 11/2/22     # Hypertension   - Continue amlodipine 5 mg daily -- will visit discontinuation of this post treatment     # Headaches, resolved  On admission reports intermittent headaches x2-3 weeks following recent LP. Located on the top of her head and initially rated 8/10 with significant coughing.  Over the past few days headaches improved with PRN tylenol and Esgic and now rated 3/10. No NV, vision changes or weakness with headaches. Of note, recently seen at local ED for headaches with CT head negative x1/21 but did show periventricular hypoattenuation suggestive of leukomalacia that may be treatment-related. She states that the headache does seem somewhat worse when she gets up or stands up but does not get better as she lays flat. She reports her most comfortable position is sitting up.   - MRI/ MRV negative for acute path.     - Consider IR consult for blood patch if persistent given positional component of headaches, although her headaches are better with sitting upright which argues against. Suspect headaches most likely 2/2 Ponatanib as known culprit.   - PRN Esgic helpful      # GERD   - Protonix 20 mg daily      # Grade 1 neuropathy of the fingertips   Due to vincristine. Pt reports symptoms are stable. Reports mild paresthesias and hot/cold sensitivity.   - Monitor clinically           PLAN:  --Continue blinatumomab + ponatinib 15 mg QD   --Re-stage 30 days after cycle 5 (~mid-August) with BMBx and BCR-ABL studies as well as Clonoseq testnig; port out then if MRD-negative.   --RTC w/ Della 1 week after BMBx in August.   --ASA 81 mg prophylaxis      Roya Quarles [facility]    40 minutes spent on the date of the encounter doing chart review, review of test results, interpretation of tests, patient visit, documentation and discussion with other provider(s)

## 2023-04-25 DIAGNOSIS — C91.00 ACUTE LYMPHOBLASTIC LEUKEMIA (ALL) NOT HAVING ACHIEVED REMISSION (H): Primary | ICD-10-CM

## 2023-04-27 ENCOUNTER — LAB REQUISITION (OUTPATIENT)
Dept: LAB | Facility: CLINIC | Age: 43
End: 2023-04-27
Payer: COMMERCIAL

## 2023-04-27 DIAGNOSIS — C91.00 ACUTE LYMPHOBLASTIC LEUKEMIA NOT HAVING ACHIEVED REMISSION (H): ICD-10-CM

## 2023-04-27 LAB
ALBUMIN SERPL BCG-MCNC: 4.4 G/DL (ref 3.5–5.2)
ALP SERPL-CCNC: 100 U/L (ref 35–104)
ALT SERPL W P-5'-P-CCNC: 26 U/L (ref 10–35)
ANION GAP SERPL CALCULATED.3IONS-SCNC: 11 MMOL/L (ref 7–15)
AST SERPL W P-5'-P-CCNC: 18 U/L (ref 10–35)
BASOPHILS # BLD AUTO: 0 10E3/UL (ref 0–0.2)
BASOPHILS NFR BLD AUTO: 1 %
BILIRUB SERPL-MCNC: 0.3 MG/DL
BUN SERPL-MCNC: 11.9 MG/DL (ref 6–20)
CALCIUM SERPL-MCNC: 9.5 MG/DL (ref 8.6–10)
CHLORIDE SERPL-SCNC: 106 MMOL/L (ref 98–107)
CREAT SERPL-MCNC: 0.56 MG/DL (ref 0.51–0.95)
DEPRECATED HCO3 PLAS-SCNC: 23 MMOL/L (ref 22–29)
EOSINOPHIL # BLD AUTO: 0.5 10E3/UL (ref 0–0.7)
EOSINOPHIL NFR BLD AUTO: 6 %
ERYTHROCYTE [DISTWIDTH] IN BLOOD BY AUTOMATED COUNT: 14.6 % (ref 10–15)
GFR SERPL CREATININE-BSD FRML MDRD: >90 ML/MIN/1.73M2
GLUCOSE SERPL-MCNC: 89 MG/DL (ref 70–99)
HCT VFR BLD AUTO: 39.9 % (ref 35–47)
HGB BLD-MCNC: 13.2 G/DL (ref 11.7–15.7)
IMM GRANULOCYTES # BLD: 0.1 10E3/UL
IMM GRANULOCYTES NFR BLD: 1 %
LYMPHOCYTES # BLD AUTO: 0.8 10E3/UL (ref 0.8–5.3)
LYMPHOCYTES NFR BLD AUTO: 10 %
MCH RBC QN AUTO: 28.9 PG (ref 26.5–33)
MCHC RBC AUTO-ENTMCNC: 33.1 G/DL (ref 31.5–36.5)
MCV RBC AUTO: 87 FL (ref 78–100)
MONOCYTES # BLD AUTO: 1.1 10E3/UL (ref 0–1.3)
MONOCYTES NFR BLD AUTO: 14 %
NEUTROPHILS # BLD AUTO: 5.4 10E3/UL (ref 1.6–8.3)
NEUTROPHILS NFR BLD AUTO: 68 %
NRBC # BLD AUTO: 0 10E3/UL
NRBC BLD AUTO-RTO: 0 /100
PLATELET # BLD AUTO: 282 10E3/UL (ref 150–450)
POTASSIUM SERPL-SCNC: 4.2 MMOL/L (ref 3.4–5.3)
PROT SERPL-MCNC: 6.7 G/DL (ref 6.4–8.3)
RBC # BLD AUTO: 4.57 10E6/UL (ref 3.8–5.2)
SODIUM SERPL-SCNC: 140 MMOL/L (ref 136–145)
WBC # BLD AUTO: 7.9 10E3/UL (ref 4–11)

## 2023-04-27 PROCEDURE — 82310 ASSAY OF CALCIUM: CPT | Performed by: INTERNAL MEDICINE

## 2023-04-27 PROCEDURE — 85025 COMPLETE CBC W/AUTO DIFF WBC: CPT | Performed by: INTERNAL MEDICINE

## 2023-04-28 DIAGNOSIS — C91.00 ACUTE LYMPHOBLASTIC LEUKEMIA (ALL) NOT HAVING ACHIEVED REMISSION (H): Primary | ICD-10-CM

## 2023-05-01 ENCOUNTER — TELEPHONE (OUTPATIENT)
Dept: ONCOLOGY | Facility: CLINIC | Age: 43
End: 2023-05-01
Payer: COMMERCIAL

## 2023-05-01 NOTE — ORAL ONC MGMT
Oral Chemotherapy Monitoring Program    Subjective/Objective:  Vashti Villegas is a 43 year old female contacted by phone for a follow-up visit for oral chemotherapy. April confirms taking the appropriate dose of ponatinib 15mg once daily. Denies new or worsening side effects. Denies any recent hospital or ED visits. Denies any recent medication changes. Reports only missing one dose over the past month, she fell asleep early one night. She skipped the dose. Reviewed labs from 4/27, no new concerning abnormalities. No questions or concerns today. She has about 1 week supply of medication on hand. She is aware of next appointments and labs.         1/2/2023     4:00 PM 1/3/2023     9:00 AM 2/23/2023     9:00 AM 2/27/2023     3:00 PM 4/5/2023    10:00 AM 4/6/2023    12:00 PM 4/28/2023     8:00 AM   ORAL CHEMOTHERAPY   Assessment Type Lab Monitoring Initial Follow up Monthly Follow up Lab Monitoring Refill Monthly Follow up;Chart Review Refill   Diagnosis Code Acute Lymphoblastic Leukemia (ALL) Acute Lymphoblastic Leukemia (ALL) Acute Lymphoblastic Leukemia (ALL) Acute Lymphoblastic Leukemia (ALL) Acute Lymphoblastic Leukemia (ALL) Acute Lymphoblastic Leukemia (ALL) Acute Lymphoblastic Leukemia (ALL)   Providers Dr. Della Hull   [facility]   Is this patient followed by the [facility] team?     Yes Yes Yes   Drug Name Iclusig (ponatinib) Iclusig (ponatinib) Iclusig (ponatinib) Iclusig (ponatinib) Iclusig (ponatinib) Iclusig (ponatinib) Iclusig (ponatinib)   Dose 15 mg 15 mg 15 mg 15 mg 15 mg 15 mg 15 mg   Current Schedule Daily Daily Daily Daily Daily Daily Daily   Cycle Details Continuous Continuous Continuous Continuous Continuous Continuous Continuous   Start Date of Last Cycle  12/14/2022 2/16/2023       Doses missed in last 2 weeks  1 0   1    Adherence Assessment  Adherent Adherent   Adherent    Adverse Effects   "Myalgias/Arthralgias No AE identified during assessment   No AE identified during assessment    Myalgias/Arthralgias  Grade 1        Pharmacist Intervention(myalgias/arthralgias)  No        Any new drug interactions?      No    Is the dose as ordered appropriate for the patient?      Yes    Since the last time we talked, have you been hospitalized or used the emergency room?      No        Last PHQ-2 Score on record:       11/22/2022     2:09 PM 11/1/2022     2:51 PM   PHQ-2 ( 1999 [employer])   Q1: Little interest or pleasure in doing things 0 0   Q2: Feeling down, depressed or hopeless 0 0   PHQ-2 Score 0 0       Vitals:  BP:   BP Readings from Last 1 Encounters:   04/05/23 126/84     Wt Readings from Last 1 Encounters:   04/05/23 114.4 kg (252 lb 3.2 oz)     Estimated body surface area is 2.27 meters squared as calculated from the following:    Height as of 3/1/23: 1.626 m (5' 4\").    Weight as of 4/5/23: 114.4 kg (252 lb 3.2 oz).    Labs:  _  Result Component Current Result Ref Range   Sodium 140 (4/27/2023) 136 - 145 mmol/L   _  Result Component Current Result Ref Range   Potassium 4.2 (4/27/2023) 3.4 - 5.3 mmol/L   _  Result Component Current Result Ref Range   Calcium 9.5 (4/27/2023) 8.6 - 10.0 mg/dL     Result Component Current Result Ref Range   Albumin 4.4 (4/27/2023) 3.5 - 5.2 g/dL   _  Result Component Current Result Ref Range   Urea Nitrogen 11.9 (4/27/2023) 6.0 - 20.0 mg/dL   _  Result Component Current Result Ref Range   Creatinine 0.56 (4/27/2023) 0.51 - 0.95 mg/dL   _  Result Component Current Result Ref Range   AST 18 (4/27/2023) 10 - 35 U/L   _  Result Component Current Result Ref Range   ALT 26 (4/27/2023) 10 - 35 U/L   _  Result Component Current Result Ref Range   Bilirubin Total 0.3 (4/27/2023) <=1.2 mg/dL   _  Result Component Current Result Ref Range   WBC Count 7.9 (4/27/2023) 4.0 - 11.0 10e3/uL   _  Result Component Current Result Ref Range   Hemoglobin 13.2 (4/27/2023) 11.7 - 15.7 g/dL "   _  Result Component Current Result Ref Range   Platelet Count 282 (4/27/2023) 150 - 450 10e3/uL   _  Result Component Current Result Ref Range   Absolute Neutrophils 5.4 (4/27/2023) 1.6 - 8.3 10e3/uL      Assessment/Plan:  Reviewed labs from 4/27. No new concerning abnormalities. Denies new or worse adverse events. Continue plan of care.     Follow-Up:  5/31: appointment and labs     Refill Due:  [facility] will deliver 30-day supply Iclusig 5/4    Tay Lozano, PharmD  Hematology/Oncology Clinical Pharmacist  [facility]  [facility]  [phone]

## 2023-05-03 ENCOUNTER — PATIENT OUTREACH (OUTPATIENT)
Dept: CARE COORDINATION | Facility: CLINIC | Age: 43
End: 2023-05-03
Payer: COMMERCIAL

## 2023-05-03 NOTE — PROGRESS NOTES
Clinic Care Coordination Contact    Clinical Product Navigator RN reviewed chart; patient on payer product coverage.  Review results: patient identified as potential for high touch care management intervention through system interdisciplinary work group. Upon review, patient is connected to a specialty care team and engaged in active oncology treatment.     At this time, no further care coordination outreaches will be scheduled unless patient initiates or new referral for care coordination is placed.       Hyacinth Castaneda RN/Clinical Product Navigator

## 2023-05-04 ENCOUNTER — LAB REQUISITION (OUTPATIENT)
Dept: LAB | Facility: CLINIC | Age: 43
End: 2023-05-04
Payer: COMMERCIAL

## 2023-05-04 DIAGNOSIS — C91.00 ACUTE LYMPHOBLASTIC LEUKEMIA NOT HAVING ACHIEVED REMISSION (H): ICD-10-CM

## 2023-05-04 LAB
ALBUMIN SERPL BCG-MCNC: 4.4 G/DL (ref 3.5–5.2)
ALP SERPL-CCNC: 105 U/L (ref 35–104)
ALT SERPL W P-5'-P-CCNC: 21 U/L (ref 10–35)
ANION GAP SERPL CALCULATED.3IONS-SCNC: 13 MMOL/L (ref 7–15)
AST SERPL W P-5'-P-CCNC: 14 U/L (ref 10–35)
BASOPHILS # BLD AUTO: 0.1 10E3/UL (ref 0–0.2)
BASOPHILS NFR BLD AUTO: 1 %
BILIRUB SERPL-MCNC: 0.3 MG/DL
BUN SERPL-MCNC: 15.2 MG/DL (ref 6–20)
CALCIUM SERPL-MCNC: 9.6 MG/DL (ref 8.6–10)
CHLORIDE SERPL-SCNC: 107 MMOL/L (ref 98–107)
CREAT SERPL-MCNC: 0.67 MG/DL (ref 0.51–0.95)
DEPRECATED HCO3 PLAS-SCNC: 24 MMOL/L (ref 22–29)
EOSINOPHIL # BLD AUTO: 0.4 10E3/UL (ref 0–0.7)
EOSINOPHIL NFR BLD AUTO: 5 %
ERYTHROCYTE [DISTWIDTH] IN BLOOD BY AUTOMATED COUNT: 13.8 % (ref 10–15)
GFR SERPL CREATININE-BSD FRML MDRD: >90 ML/MIN/1.73M2
GLUCOSE SERPL-MCNC: 81 MG/DL (ref 70–99)
HCT VFR BLD AUTO: 39 % (ref 35–47)
HGB BLD-MCNC: 13 G/DL (ref 11.7–15.7)
IMM GRANULOCYTES # BLD: 0 10E3/UL
IMM GRANULOCYTES NFR BLD: 0 %
LYMPHOCYTES # BLD AUTO: 1.1 10E3/UL (ref 0.8–5.3)
LYMPHOCYTES NFR BLD AUTO: 13 %
MCH RBC QN AUTO: 29 PG (ref 26.5–33)
MCHC RBC AUTO-ENTMCNC: 33.3 G/DL (ref 31.5–36.5)
MCV RBC AUTO: 87 FL (ref 78–100)
MONOCYTES # BLD AUTO: 1 10E3/UL (ref 0–1.3)
MONOCYTES NFR BLD AUTO: 11 %
NEUTROPHILS # BLD AUTO: 6 10E3/UL (ref 1.6–8.3)
NEUTROPHILS NFR BLD AUTO: 70 %
NRBC # BLD AUTO: 0 10E3/UL
NRBC BLD AUTO-RTO: 0 /100
PLATELET # BLD AUTO: 337 10E3/UL (ref 150–450)
POTASSIUM SERPL-SCNC: 3.9 MMOL/L (ref 3.4–5.3)
PROT SERPL-MCNC: 7 G/DL (ref 6.4–8.3)
RBC # BLD AUTO: 4.49 10E6/UL (ref 3.8–5.2)
SODIUM SERPL-SCNC: 144 MMOL/L (ref 136–145)
WBC # BLD AUTO: 8.5 10E3/UL (ref 4–11)

## 2023-05-04 PROCEDURE — 85004 AUTOMATED DIFF WBC COUNT: CPT | Performed by: INTERNAL MEDICINE

## 2023-05-04 PROCEDURE — 80053 COMPREHEN METABOLIC PANEL: CPT | Performed by: INTERNAL MEDICINE

## 2023-05-11 ENCOUNTER — LAB REQUISITION (OUTPATIENT)
Dept: LAB | Facility: CLINIC | Age: 43
End: 2023-05-11
Payer: COMMERCIAL

## 2023-05-11 DIAGNOSIS — C91.00 ACUTE LYMPHOBLASTIC LEUKEMIA NOT HAVING ACHIEVED REMISSION (H): ICD-10-CM

## 2023-05-11 LAB
ALBUMIN SERPL BCG-MCNC: 4.6 G/DL (ref 3.5–5.2)
ALP SERPL-CCNC: 101 U/L (ref 35–104)
ALT SERPL W P-5'-P-CCNC: 24 U/L (ref 10–35)
ANION GAP SERPL CALCULATED.3IONS-SCNC: 15 MMOL/L (ref 7–15)
AST SERPL W P-5'-P-CCNC: 19 U/L (ref 10–35)
BASOPHILS # BLD AUTO: 0 10E3/UL (ref 0–0.2)
BASOPHILS NFR BLD AUTO: 0 %
BILIRUB SERPL-MCNC: 0.3 MG/DL
BUN SERPL-MCNC: 13.5 MG/DL (ref 6–20)
CALCIUM SERPL-MCNC: 9.7 MG/DL (ref 8.6–10)
CHLORIDE SERPL-SCNC: 103 MMOL/L (ref 98–107)
CREAT SERPL-MCNC: 0.56 MG/DL (ref 0.51–0.95)
DEPRECATED HCO3 PLAS-SCNC: 23 MMOL/L (ref 22–29)
EOSINOPHIL # BLD AUTO: 0.3 10E3/UL (ref 0–0.7)
EOSINOPHIL NFR BLD AUTO: 4 %
ERYTHROCYTE [DISTWIDTH] IN BLOOD BY AUTOMATED COUNT: 14.1 % (ref 10–15)
GFR SERPL CREATININE-BSD FRML MDRD: >90 ML/MIN/1.73M2
GLUCOSE SERPL-MCNC: 75 MG/DL (ref 70–99)
HCT VFR BLD AUTO: 39.9 % (ref 35–47)
HGB BLD-MCNC: 13.1 G/DL (ref 11.7–15.7)
IMM GRANULOCYTES # BLD: 0 10E3/UL
IMM GRANULOCYTES NFR BLD: 0 %
LYMPHOCYTES # BLD AUTO: 1 10E3/UL (ref 0.8–5.3)
LYMPHOCYTES NFR BLD AUTO: 15 %
MCH RBC QN AUTO: 28.5 PG (ref 26.5–33)
MCHC RBC AUTO-ENTMCNC: 32.8 G/DL (ref 31.5–36.5)
MCV RBC AUTO: 87 FL (ref 78–100)
MONOCYTES # BLD AUTO: 0.9 10E3/UL (ref 0–1.3)
MONOCYTES NFR BLD AUTO: 12 %
NEUTROPHILS # BLD AUTO: 4.7 10E3/UL (ref 1.6–8.3)
NEUTROPHILS NFR BLD AUTO: 69 %
NRBC # BLD AUTO: 0 10E3/UL
NRBC BLD AUTO-RTO: 0 /100
PLATELET # BLD AUTO: 333 10E3/UL (ref 150–450)
POTASSIUM SERPL-SCNC: 3.7 MMOL/L (ref 3.4–5.3)
PROT SERPL-MCNC: 7.1 G/DL (ref 6.4–8.3)
RBC # BLD AUTO: 4.6 10E6/UL (ref 3.8–5.2)
SODIUM SERPL-SCNC: 141 MMOL/L (ref 136–145)
WBC # BLD AUTO: 6.9 10E3/UL (ref 4–11)

## 2023-05-11 PROCEDURE — 80053 COMPREHEN METABOLIC PANEL: CPT | Performed by: INTERNAL MEDICINE

## 2023-05-11 PROCEDURE — 85025 COMPLETE CBC W/AUTO DIFF WBC: CPT | Performed by: INTERNAL MEDICINE

## 2023-05-16 DIAGNOSIS — I10 HYPERTENSION, UNSPECIFIED TYPE: ICD-10-CM

## 2023-05-16 DIAGNOSIS — C91.00 ACUTE LYMPHOBLASTIC LEUKEMIA (ALL) NOT HAVING ACHIEVED REMISSION (H): ICD-10-CM

## 2023-05-16 RX ORDER — AMLODIPINE BESYLATE 5 MG/1
TABLET ORAL
Qty: 30 TABLET | Refills: 4 | Status: SHIPPED | OUTPATIENT
Start: 2023-05-16 | End: 2023-05-31

## 2023-05-16 NOTE — TELEPHONE ENCOUNTER
amLODIPine (NORVASC) Refill   Last prescribing provider: theo Quarles     Last clinic visit date: 4/19/23 Theo Quarles     Recommendations for requested medication (if none, N/A): Copied from chart note 4/19/23 Theo Quarles   Hypertension   - Continue amlodipine 5 mg daily -- will visit discontinuation of this post treatment      Any other pertinent information (if none, N/A): N/A    Refilled: Y/N, if NO, why?

## 2023-05-18 ENCOUNTER — LAB REQUISITION (OUTPATIENT)
Dept: LAB | Facility: CLINIC | Age: 43
End: 2023-05-18
Payer: COMMERCIAL

## 2023-05-18 DIAGNOSIS — C91.00 ACUTE LYMPHOBLASTIC LEUKEMIA NOT HAVING ACHIEVED REMISSION (H): ICD-10-CM

## 2023-05-18 LAB
ALBUMIN SERPL BCG-MCNC: 4.5 G/DL (ref 3.5–5.2)
ALP SERPL-CCNC: 96 U/L (ref 35–104)
ALT SERPL W P-5'-P-CCNC: 25 U/L (ref 10–35)
ANION GAP SERPL CALCULATED.3IONS-SCNC: 11 MMOL/L (ref 7–15)
AST SERPL W P-5'-P-CCNC: 20 U/L (ref 10–35)
BASOPHILS # BLD AUTO: 0.1 10E3/UL (ref 0–0.2)
BASOPHILS NFR BLD AUTO: 1 %
BILIRUB SERPL-MCNC: 0.3 MG/DL
BUN SERPL-MCNC: 11.1 MG/DL (ref 6–20)
CALCIUM SERPL-MCNC: 9.5 MG/DL (ref 8.6–10)
CHLORIDE SERPL-SCNC: 106 MMOL/L (ref 98–107)
CREAT SERPL-MCNC: 0.62 MG/DL (ref 0.51–0.95)
DEPRECATED HCO3 PLAS-SCNC: 25 MMOL/L (ref 22–29)
EOSINOPHIL # BLD AUTO: 0.3 10E3/UL (ref 0–0.7)
EOSINOPHIL NFR BLD AUTO: 4 %
ERYTHROCYTE [DISTWIDTH] IN BLOOD BY AUTOMATED COUNT: 13.9 % (ref 10–15)
GFR SERPL CREATININE-BSD FRML MDRD: >90 ML/MIN/1.73M2
GLUCOSE SERPL-MCNC: 87 MG/DL (ref 70–99)
HCT VFR BLD AUTO: 39.2 % (ref 35–47)
HGB BLD-MCNC: 12.8 G/DL (ref 11.7–15.7)
IMM GRANULOCYTES # BLD: 0.1 10E3/UL
IMM GRANULOCYTES NFR BLD: 1 %
LYMPHOCYTES # BLD AUTO: 1.6 10E3/UL (ref 0.8–5.3)
LYMPHOCYTES NFR BLD AUTO: 18 %
MCH RBC QN AUTO: 28.6 PG (ref 26.5–33)
MCHC RBC AUTO-ENTMCNC: 32.7 G/DL (ref 31.5–36.5)
MCV RBC AUTO: 88 FL (ref 78–100)
MONOCYTES # BLD AUTO: 0.9 10E3/UL (ref 0–1.3)
MONOCYTES NFR BLD AUTO: 11 %
NEUTROPHILS # BLD AUTO: 5.5 10E3/UL (ref 1.6–8.3)
NEUTROPHILS NFR BLD AUTO: 65 %
NRBC # BLD AUTO: 0 10E3/UL
NRBC BLD AUTO-RTO: 0 /100
PLATELET # BLD AUTO: 295 10E3/UL (ref 150–450)
POTASSIUM SERPL-SCNC: 4.1 MMOL/L (ref 3.4–5.3)
PROT SERPL-MCNC: 6.9 G/DL (ref 6.4–8.3)
RBC # BLD AUTO: 4.47 10E6/UL (ref 3.8–5.2)
SODIUM SERPL-SCNC: 142 MMOL/L (ref 136–145)
WBC # BLD AUTO: 8.4 10E3/UL (ref 4–11)

## 2023-05-18 PROCEDURE — 85025 COMPLETE CBC W/AUTO DIFF WBC: CPT | Performed by: INTERNAL MEDICINE

## 2023-05-18 PROCEDURE — 80053 COMPREHEN METABOLIC PANEL: CPT | Performed by: INTERNAL MEDICINE

## 2023-05-24 DIAGNOSIS — C91.00 ACUTE LYMPHOBLASTIC LEUKEMIA (ALL) NOT HAVING ACHIEVED REMISSION (H): ICD-10-CM

## 2023-05-24 DIAGNOSIS — Z76.89 PROPHYLAXIS FOR CHEMOTHERAPY-INDUCED NEUTROPENIA: Primary | ICD-10-CM

## 2023-05-24 RX ORDER — METHYLPREDNISOLONE SODIUM SUCCINATE 125 MG/2ML
125 INJECTION, POWDER, LYOPHILIZED, FOR SOLUTION INTRAMUSCULAR; INTRAVENOUS
Status: CANCELLED
Start: 2023-06-01

## 2023-05-24 RX ORDER — ALBUTEROL SULFATE 90 UG/1
1-2 AEROSOL, METERED RESPIRATORY (INHALATION)
Status: CANCELLED
Start: 2023-06-01

## 2023-05-24 RX ORDER — EPINEPHRINE 1 MG/ML
0.3 INJECTION, SOLUTION INTRAMUSCULAR; SUBCUTANEOUS EVERY 5 MIN PRN
Status: CANCELLED | OUTPATIENT
Start: 2023-06-01

## 2023-05-24 RX ORDER — MEPERIDINE HYDROCHLORIDE 25 MG/ML
25 INJECTION INTRAMUSCULAR; INTRAVENOUS; SUBCUTANEOUS EVERY 30 MIN PRN
Status: CANCELLED | OUTPATIENT
Start: 2023-06-01

## 2023-05-24 RX ORDER — DIPHENHYDRAMINE HYDROCHLORIDE 50 MG/ML
50 INJECTION INTRAMUSCULAR; INTRAVENOUS
Status: CANCELLED
Start: 2023-06-01

## 2023-05-24 RX ORDER — ALBUTEROL SULFATE 0.83 MG/ML
2.5 SOLUTION RESPIRATORY (INHALATION)
Status: CANCELLED | OUTPATIENT
Start: 2023-06-01

## 2023-05-30 NOTE — PROGRESS NOTES
Virtual Visit Details    Type of service:  Video Visit     Originating Location (pt. Location): home     Distant Location (provider location):  On-site  Platform used for Video Visit: Tyrell     Video start: 9:45 am  Video end: 10:07 am      [facility]  Date of visit: May 31, 2023        Reason for Visit: Ph (+) B-ALL      Oncology HPI:   Followed by Dr. Hull. Patient presented to outside hospital with c/o right upper quadrant pain and was subsequently found to have a markedly elevated white blood cell count concerning for acute leukemia. While at OSH, she had CTA chest as well as CT A/P. These identified no PE, no acute or localized intraabdominal inflammatory process, mild splenomegaly, and few inconspicuous low-density structures in the liver. She was transferred to [facility] for further workup and management. S/p Hydrea 1g TID (8/21-8/23) with improvement in WBC (100K ? 12K). Diagnostic BMBx completed 8/20/22, which demonstrated B-ALL with 85% blasts and hypercellular marrow. IHC shows CD10+, CD34+. 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 ALL NGS negative. BCR/ABL1 major/minor (sent from peripheral blood) both negative. Initiated pre-phase steroids with Prednisone 60 mg x8/21; increased to 60 mg/m2 (140 mg) 8/22-8/24. She initiated GRAALL + imatinib (C1D1 = 8/24/22). S/p BMT consult 9/12 with Dr. Walsh. Induction was relatively uncomplicated. Post-induction BMBx done 9/26/22 and revealed MRD- CR1, with negative BCR/ABL1 and Clonoseq testing. With no matched related donors and  excellent early/deep response chemotherapy is currently favored over BMT. 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 Underwent BMBx 10/25/22 with no morphologic evidence of B-lymphoblastic leukemia and flow with unusual B lineage precursor population 0.09%. C3 GRAALL + imatinib on 11/2/22 was tolerated well without complication. Prior plan was to continue GRAALL for a total of 8 cycles (of note odd cycles are 21-days in length and even cycles are 14-days in length), however, transitioned to Blinatumumab + Ponatinib as curative intent therapy given no matched sib donors. S/p Blincyto + Ponatinib (C1D1 = 12/14/22), complicated by neutropenic fever 2/2 pneumonia. Recent BMBx 1/17/2023: BCR-ABL p190 now detectable; Clonoseq pending.  - Port placed  - Per Dr Hull, plan for repeat BMBx after Blincyto C1, C2 and C5 then every 3-6 months thereafter. Clonoseq to be sent    - C2 Blincyto, D1 = 1/25/23  - Bmbx 3/1-- path/ flow neg, BCR-ABL minor p190 PCR pending, however by FISH still with low level detectable BCR-ABL fusion at 0.125%  - C3 Blincyto, D1 = 3/9/23 with ponatinib  - C4 Blincyto, D1 = 4/20/23      Interval history:   April is overall doing really well. 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 She is busy and very active.   Sharp left sided abd pain, quick and lasts about 10 secs about once a day. Very random timing. No change in eating habits. No early satiety. Feels she is drinking more sugary drinks and less water. No nausea or indigestion. Wonders if this is related to her menstrual cycle however has not had period in months. This is not too concerning to her and she will let us know if progreses  No fevers or chills.   Has stopped taking amlodipine and BP remains stable, SBP < 120s.   Good appetite, continues to hydrate. No digestion concerns  No skin rash, no bleeding  Had isolated headache yesterday, took tylenol and  resolved. Attributes this to again drinking more sugary drinks and less water.   ROS otherwise neg.         Current Outpatient Medications   Medication Sig Dispense Refill     acetaminophen (TYLENOL) 325 MG tablet Take 2 tablets (650 mg) by mouth every 6 hours as needed for mild pain, other or fever (blood product administration premedication) (Patient not taking: Reported on 2/2/2023)       amLODIPine (NORVASC) 5 MG tablet TAKE 1 TABLET(5 MG) BY MOUTH DAILY 30 tablet 4     aspirin (ASA) 81 MG EC tablet Take 1 tablet (81 mg) by mouth daily 30 tablet 1     benzonatate (TESSALON) 100 MG capsule Take 1 capsule (100 mg) by mouth 3 times daily as needed for cough (Patient not taking: Reported on 4/5/2023) 30 capsule 0     lidocaine-prilocaine (EMLA) 2.5-2.5 % external cream Apply topically as needed (Apply 30 to 60minutes before Port Needle Access for pain) 30 g 3     lidocaine-prilocaine (LIDOPRIL) 2.5-2.5 % kit Use prior to lab access 1 kit 1     LORazepam (ATIVAN) 0.5 MG tablet Take 1-2 tablets (0.5-1 mg) by mouth daily as needed for anxiety . Take prior to BMBx procedures as needed. If initial dose unhelpful, okay to take a second 0.5-1 mg dose (for a max of 2 mg at a time). Caution: causes sedation. Do not drive after taking this medication. (Patient not taking: Reported on 2/2/2023) 10 tablet 0     NEUPOGEN 480 MCG/0.8ML SOSY syringe  (Patient not taking: Reported on 4/5/2023)       ondansetron (ZOFRAN ODT) 4 MG ODT tab Take 1-2 tablets (4-8 mg) by mouth every 8 hours as needed for nausea or vomiting (Patient not taking: Reported on 2/2/2023) 60 tablet 0     PONATinib (ICLUSIG) 15 MG tablet Take 1 tablet (15 mg) by mouth daily 30 tablet 0     prochlorperazine (COMPAZINE) 10 MG tablet Take 1 tablet (10 mg) by mouth every 6 hours as needed (Breakthrough Nausea/Vomiting) (Patient not taking: Reported on 2/2/2023) 20 tablet 0     valACYclovir (VALTREX) 1000 mg tablet Take 1 tablet (1,000 mg) by mouth daily 30 tablet 1        Allergies   Allergen Reactions     Blood Transfusion Related (Informational Only) Hives     Hive reaction with platelet transfusion on 8/26/2022. Please administer platelets with tylenol and benadryl premedication.      Seasonal Allergies      Runny nose, itchiness         Physical Exam:  Video physical exam  General: Patient appears well in no acute distress.   Skin: No visualized rash or lesions on visualized skin  Eyes: EOMI, no erythema, sclera icterus or discharge noted  Resp: Appears to be breathing comfortably without accessory muscle usage, speaking in full sentences, no cough  MSK: Appears to have normal range of motion based on visualized movements  Neurologic: No apparent tremors, facial movements symmetric  Psych: affect pleasant, alert and oriented        Labs:     I personally reviewed the following labs:    Most Recent 3 CBC's:  Recent Labs   Lab Test 05/18/23  1200 05/11/23  1145 05/04/23  1200   WBC 8.4 6.9 8.5   HGB 12.8 13.1 13.0   MCV 88 87 87    333 337     Most Recent 3 BMP's:  Recent Labs   Lab Test 05/18/23  1200 05/11/23  1145 05/04/23  1200    141 144   POTASSIUM 4.1 3.7 3.9   CHLORIDE 106 103 107   CO2 25 23 24   BUN 11.1 13.5 15.2   CR 0.62 0.56 0.67   ANIONGAP 11 15 13   SOLEDAD 9.5 9.7 9.6   GLC 87 75 81     Most Recent 2 LFT's:  Recent Labs   Lab Test 05/18/23  1200 05/11/23  1145   AST 20 19   ALT 25 24   ALKPHOS 96 101   BILITOTAL 0.3 0.3           Imaging: n/a      Impression/plan:     # Ph(+) B-ALL   Followed by Dr. Hull. Patient presented to outside hospital with c/o right upper quadrant pain and was subsequently found to have a markedly elevated white blood cell count concerning for acute leukemia. While at OSH, she had CTA chest as well as CT A/P. These identified no PE, no acute or localized intraabdominal inflammatory process, mild splenomegaly, and few inconspicuous low-density structures in the liver. She was transferred to [facility] for further workup and  management. S/p Hydrea 1g TID (8/21-8/23) with improvement in WBC (100K ? 12K). Diagnostic BMBx completed 8/20/22, which demonstrated B-ALL with 85% blasts and hypercellular marrow. IHC shows CD10+, CD34+. 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 ALL NGS negative. BCR/ABL1 major/minor (sent from peripheral blood) both negative. Initiated pre-phase steroids with Prednisone 60 mg x8/21; increased to 60 mg/m2 (140 mg) 8/22-8/24. She initiated GRAALL + imatinib (C1D1 = 8/24/22). S/p BMT consult 9/12 with Dr. Walsh. Induction was relatively uncomplicated. Post-induction BMBx done 9/26/22 and revealed MRD- CR1, with negative BCR/ABL1 and Clonoseq testing. With no matched related donors and excellent early/deep response chemotherapy is currently favored over BMT. 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 Underwent BMBx 10/25/22 with no morphologic evidence of B-lymphoblastic leukemia and flow with unusual B lineage precursor population 0.09%. C3 GRAALL + imatinib on 11/2/22 was tolerated well without complication. Prior plan was to continue GRAALL for a total of 8 cycles (of note odd cycles are 21-days in length and even cycles are 14-days in length), however, transitioned to Blinatumumab + Ponatinib as curative intent therapy given no matched sib donors. S/p Blincyto + Ponatinib (C1D1 = 12/14/22), complicated by neutropenic fever 2/2 pneumonia. Recent BMBx 1/17/2023: BCR-ABL p190 now detectable; Clonoseq pending.  - Blincyto C2D1 = 1/25/23, with Ponatinib 15 mg  daily  - Bmbx 3/1-- flow/ path neg for disease, FISH with 0.125% BCR-ABL fusion   - Plan to start C5 Blina 6/1 via [facility]. Continue Ponatinib 15 mg daily-- do not interrupt this. Ok to proceed with blina 6/1 based on 5/18 labs  - Has completed IT ppx  - Continue ASA 81 daily  - Weekly labs per [facility]  - Per Dr Hull, plan for repeat BMBx after Blincyto C1, C2 and C5 then every 3-6 months thereafter. Clonoseq to be sent with each BMBx. Will request mid- August BMBX, CT - guided, Dr Hull week later to review   - Port placed-- debating when to have this removed post blina. May wait to remove until year 2 as she has had several traumatic port issues     # Heme  - Transfuse to keep Hgb >7. Of note, pt has history of transfusion reaction and requires premeds with Benadryl and Tylenol       # Recent PICC associated thrombus   Recent admission for cephalic vein thrombus (x1/4) associated with PICC line, non occlusive. No AC started as superficial and asymptomatic thrombus. A week prior, patient was found to have a malfunctioning vs malpositioned Port. S/p PICC removal and replacement of port via IR.   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 Ok to continue to hold for now, if needed could resume 2.5 mg if needed     # Headaches, resolved  On admission reports intermittent headaches x2-3 weeks following recent LP. Located on the top of her head and initially rated 8/10 with significant coughing. Over the past few days headaches improved with PRN tylenol and Esgic and now rated 3/10. No NV, vision changes or weakness with headaches. Of note, recently seen at local ED for headaches with CT head negative x1/21 but did show periventricular  hypoattenuation suggestive of leukomalacia that may be treatment-related. She states that the headache does seem somewhat worse when she gets up or stands up but does not get better as she lays flat. She reports her most comfortable position is sitting up.   - MRI/ MRV negative for acute path.     - Consider IR consult for blood patch if persistent given positional component of headaches, although her headaches are better with sitting upright which argues against. Suspect headaches most likely 2/2 Ponatanib as known culprit.   - PRN Esgic helpful      # GERD   - Protonix 20 mg daily      # Grade 1 neuropathy of the fingertips   Due to vincristine. Pt reports symptoms are stable. Reports mild paresthesias and hot/cold sensitivity.   - Monitor clinically           PLAN:  --Proceed with C5 blinatumomab tomorrow  --Continue ponatinib 15 mg daily  --Re-stage 30 days after cycle 5 (~mid-August) with BMBx and BCR-ABL studies as well as Clonoseq testnig; port out then if MRD-negative.   --RTC w/ Della 1 week after BMBx in August.   --ASA 81 mg prophylaxis      Roya Quarles [facility]    40 minutes spent on the date of the encounter doing chart review, review of test results, interpretation of tests, patient visit, documentation and discussion with other provider(s)

## 2023-05-31 ENCOUNTER — VIRTUAL VISIT (OUTPATIENT)
Dept: ONCOLOGY | Facility: CLINIC | Age: 43
End: 2023-05-31
Attending: INTERNAL MEDICINE
Payer: COMMERCIAL

## 2023-05-31 DIAGNOSIS — B02.9 VZV (VARICELLA-ZOSTER VIRUS) INFECTION: ICD-10-CM

## 2023-05-31 DIAGNOSIS — C91.00 ACUTE LYMPHOBLASTIC LEUKEMIA (ALL) NOT HAVING ACHIEVED REMISSION (H): Primary | ICD-10-CM

## 2023-05-31 DIAGNOSIS — C91.01 ACUTE LYMPHOBLASTIC LEUKEMIA (ALL) IN REMISSION (H): Primary | ICD-10-CM

## 2023-05-31 PROCEDURE — 99215 OFFICE O/P EST HI 40 MIN: CPT | Mod: 95 | Performed by: NURSE PRACTITIONER

## 2023-05-31 RX ORDER — VALACYCLOVIR HYDROCHLORIDE 1 G/1
1000 TABLET, FILM COATED ORAL DAILY
Qty: 30 TABLET | Refills: 3 | Status: SHIPPED | OUTPATIENT
Start: 2023-05-31 | End: 2023-08-23

## 2023-05-31 RX ORDER — VALACYCLOVIR HYDROCHLORIDE 1 G/1
1000 TABLET, FILM COATED ORAL DAILY
Qty: 90 TABLET | Refills: 4 | Status: SHIPPED | OUTPATIENT
Start: 2023-05-31 | End: 2023-05-31

## 2023-05-31 NOTE — LETTER
5/31/2023         RE: Vashti Villegas  [address] [place] [address]        Dear Colleague,    Thank you for referring your patient, Vashti Villegas, to the [facility]. Please see a copy of my visit note below.    Virtual Visit Details    Type of service:  Video Visit     Originating Location (pt. Location): home     Distant Location (provider location):  On-site  Platform used for Video Visit: [employer]     Video start: 9:45 am  Video end: 10:07 am      [facility] [place]  Date of visit: May 31, 2023        Reason for Visit: Ph (+) B-ALL      Oncology HPI:   Followed by Dr. Hull. Patient presented to outside hospital with c/o right upper quadrant pain and was subsequently found to have a markedly elevated white blood cell count concerning for acute leukemia. While at OSH, she had CTA chest as well as CT A/P. These identified no PE, no acute or localized intraabdominal inflammatory process, mild splenomegaly, and few inconspicuous low-density structures in the liver. She was transferred to [facility] for further workup and management. S/p Hydrea 1g TID (8/21-8/23) with improvement in WBC (100K ? 12K). Diagnostic BMBx completed 8/20/22, which demonstrated B-ALL with 85% blasts and hypercellular marrow. IHC shows CD10+, CD34+. 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 ALL NGS negative. BCR/ABL1 major/minor (sent from peripheral blood) both negative. Initiated pre-phase steroids with Prednisone 60 mg x8/21; increased to 60 mg/m2 (140 mg) 8/22-8/24. She initiated  GRAALL + imatinib (C1D1 = 8/24/22). S/p BMT consult 9/12 with Dr. Walsh. Induction was relatively uncomplicated. Post-induction BMBx done 9/26/22 and revealed MRD- CR1, with negative BCR/ABL1 and Clonoseq testing. With no matched related donors and excellent early/deep response chemotherapy is currently favored over BMT. 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 Underwent BMBx 10/25/22 with no morphologic evidence of B-lymphoblastic leukemia and flow with unusual B lineage precursor population 0.09%. C3 GRAALL + imatinib on 11/2/22 was tolerated well without complication. Prior plan was to continue GRAALL for a total of 8 cycles (of note odd cycles are 21-days in length and even cycles are 14-days in length), however, transitioned to Blinatumumab + Ponatinib as curative intent therapy given no matched sib donors. S/p Blincyto + Ponatinib (C1D1 = 12/14/22), complicated by neutropenic fever 2/2 pneumonia. Recent BMBx 1/17/2023: BCR-ABL p190 now detectable; Clonoseq pending.  - Port placed  - Per Dr Hull, plan for repeat BMBx after Blincyto C1, C2 and C5 then every 3-6 months thereafter. Clonoseq to be sent    - C2 Blincyto, D1 = 1/25/23  - Bmbx 3/1-- path/ flow neg, BCR-ABL minor p190 PCR pending, however by FISH still with low level detectable BCR-ABL fusion at 0.125%  - C3 Blincyto, D1 = 3/9/23 with ponatinib  - C4 Blincyto, D1 = 4/20/23      Interval history:   April is overall doing really well. 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 She is busy and very active.   Sharp left sided abd pain, quick and lasts about 10 secs about once a day. Very random timing. No change in eating habits. No early satiety. Feels she is drinking more sugary drinks and less water. No nausea or indigestion. Wonders if this is related to her menstrual cycle however has not had period in months. This is not too concerning to her and she will let us  know if progreses  No fevers or chills.   Has stopped taking amlodipine and BP remains stable, SBP < 120s.   Good appetite, continues to hydrate. No digestion concerns  No skin rash, no bleeding  Had isolated headache yesterday, took tylenol and resolved. Attributes this to again drinking more sugary drinks and less water.   ROS otherwise neg.         Current Outpatient Medications   Medication Sig Dispense Refill    acetaminophen (TYLENOL) 325 MG tablet Take 2 tablets (650 mg) by mouth every 6 hours as needed for mild pain, other or fever (blood product administration premedication) (Patient not taking: Reported on 2/2/2023)      amLODIPine (NORVASC) 5 MG tablet TAKE 1 TABLET(5 MG) BY MOUTH DAILY 30 tablet 4    aspirin (ASA) 81 MG EC tablet Take 1 tablet (81 mg) by mouth daily 30 tablet 1    benzonatate (TESSALON) 100 MG capsule Take 1 capsule (100 mg) by mouth 3 times daily as needed for cough (Patient not taking: Reported on 4/5/2023) 30 capsule 0    lidocaine-prilocaine (EMLA) 2.5-2.5 % external cream Apply topically as needed (Apply 30 to 60minutes before Port Needle Access for pain) 30 g 3    lidocaine-prilocaine (LIDOPRIL) 2.5-2.5 % kit Use prior to lab access 1 kit 1    LORazepam (ATIVAN) 0.5 MG tablet Take 1-2 tablets (0.5-1 mg) by mouth daily as needed for anxiety . Take prior to BMBx procedures as needed. If initial dose unhelpful, okay to take a second 0.5-1 mg dose (for a max of 2 mg at a time). Caution: causes sedation. Do not drive after taking this medication. (Patient not taking: Reported on 2/2/2023) 10 tablet 0    NEUPOGEN 480 MCG/0.8ML SOSY syringe  (Patient not taking: Reported on 4/5/2023)      ondansetron (ZOFRAN ODT) 4 MG ODT tab Take 1-2 tablets (4-8 mg) by mouth every 8 hours as needed for nausea or vomiting (Patient not taking: Reported on 2/2/2023) 60 tablet 0    PONATinib (ICLUSIG) 15 MG tablet Take 1 tablet (15 mg) by mouth daily 30 tablet 0    prochlorperazine (COMPAZINE) 10 MG tablet  Take 1 tablet (10 mg) by mouth every 6 hours as needed (Breakthrough Nausea/Vomiting) (Patient not taking: Reported on 2/2/2023) 20 tablet 0    valACYclovir (VALTREX) 1000 mg tablet Take 1 tablet (1,000 mg) by mouth daily 30 tablet 1       Allergies   Allergen Reactions    Blood Transfusion Related (Informational Only) Hives     Hive reaction with platelet transfusion on 8/26/2022. Please administer platelets with tylenol and benadryl premedication.     Seasonal Allergies      Runny nose, itchiness         Physical Exam:  Video physical exam  General: Patient appears well in no acute distress.   Skin: No visualized rash or lesions on visualized skin  Eyes: EOMI, no erythema, sclera icterus or discharge noted  Resp: Appears to be breathing comfortably without accessory muscle usage, speaking in full sentences, no cough  MSK: Appears to have normal range of motion based on visualized movements  Neurologic: No apparent tremors, facial movements symmetric  Psych: affect pleasant, alert and oriented        Labs:     I personally reviewed the following labs:    Most Recent 3 CBC's:  Recent Labs   Lab Test 05/18/23  1200 05/11/23  1145 05/04/23  1200   WBC 8.4 6.9 8.5   HGB 12.8 13.1 13.0   MCV 88 87 87    333 337     Most Recent 3 BMP's:  Recent Labs   Lab Test 05/18/23  1200 05/11/23  1145 05/04/23  1200    141 144   POTASSIUM 4.1 3.7 3.9   CHLORIDE 106 103 107   CO2 25 23 24   BUN 11.1 13.5 15.2   CR 0.62 0.56 0.67   ANIONGAP 11 15 13   SOLEDAD 9.5 9.7 9.6   GLC 87 75 81     Most Recent 2 LFT's:  Recent Labs   Lab Test 05/18/23  1200 05/11/23  1145   AST 20 19   ALT 25 24   ALKPHOS 96 101   BILITOTAL 0.3 0.3           Imaging: n/a      Impression/plan:     # Ph(+) B-ALL   Followed by Dr. Hull. Patient presented to outside hospital with c/o right upper quadrant pain and was subsequently found to have a markedly elevated white blood cell count concerning for acute leukemia. While at OSH, she had CTA chest as  well as CT A/P. These identified no PE, no acute or localized intraabdominal inflammatory process, mild splenomegaly, and few inconspicuous low-density structures in the liver. She was transferred to [facility] for further workup and management. S/p Hydrea 1g TID (8/21-8/23) with improvement in WBC (100K ? 12K). Diagnostic BMBx completed 8/20/22, which demonstrated B-ALL with 85% blasts and hypercellular marrow. IHC shows CD10+, CD34+. 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 ALL NGS negative. BCR/ABL1 major/minor (sent from peripheral blood) both negative. Initiated pre-phase steroids with Prednisone 60 mg x8/21; increased to 60 mg/m2 (140 mg) 8/22-8/24. She initiated GRAALL + imatinib (C1D1 = 8/24/22). S/p BMT consult 9/12 with Dr. Walsh. Induction was relatively uncomplicated. Post-induction BMBx done 9/26/22 and revealed MRD- CR1, with negative BCR/ABL1 and Clonoseq testing. With no matched related donors and excellent early/deep response chemotherapy is currently favored over BMT. 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 Underwent BMBx 10/25/22 with no morphologic evidence of B-lymphoblastic leukemia and flow with unusual B lineage precursor population 0.09%. C3 GRAALL + imatinib on 11/2/22 was tolerated well without complication. Prior plan was to continue GRAALL for a total of 8 cycles (of note odd cycles are 21-days in length and even cycles are 14-days in length), however, transitioned to Blinatumumab + Ponatinib as curative intent therapy given no matched  sib donors. S/p Blincyto + Ponatinib (C1D1 = 12/14/22), complicated by neutropenic fever 2/2 pneumonia. Recent BMBx 1/17/2023: BCR-ABL p190 now detectable; Clonoseq pending.  - Blincyto C2D1 = 1/25/23, with Ponatinib 15 mg daily  - Bmbx 3/1-- flow/ path neg for disease, FISH with 0.125% BCR-ABL fusion   - Plan to start C5 Blina 6/1 via [facility]. Continue Ponatinib 15 mg daily-- do not interrupt this. Ok to proceed with blina 6/1 based on 5/18 labs  - Has completed IT ppx  - Continue ASA 81 daily  - Weekly labs per [facility]  - Per Dr Hull, plan for repeat BMBx after Blincyto C1, C2 and C5 then every 3-6 months thereafter. Clonoseq to be sent with each BMBx. Will request mid- August BMBX, CT - guided, Dr Hull week later to review   - Port placed-- debating when to have this removed post blina. May wait to remove until year 2 as she has had several traumatic port issues     # Heme  - Transfuse to keep Hgb >7. Of note, pt has history of transfusion reaction and requires premeds with Benadryl and Tylenol       # Recent PICC associated thrombus   Recent admission for cephalic vein thrombus (x1/4) associated with PICC line, non occlusive. No AC started as superficial and asymptomatic thrombus. A week prior, patient was found to have a malfunctioning vs malpositioned Port. S/p PICC removal and replacement of port via IR.   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 Ok to continue to hold for now, if needed could resume 2.5 mg if needed     # Headaches, resolved  On admission reports intermittent headaches x2-3 weeks following recent LP. Located on the top of her head and initially rated 8/10 with significant coughing. Over the past few days headaches  improved with PRN tylenol and Esgic and now rated 3/10. No NV, vision changes or weakness with headaches. Of note, recently seen at local ED for headaches with CT head negative x1/21 but did show periventricular hypoattenuation suggestive of leukomalacia that may be treatment-related. She states that the headache does seem somewhat worse when she gets up or stands up but does not get better as she lays flat. She reports her most comfortable position is sitting up.   - MRI/ MRV negative for acute path.     - Consider IR consult for blood patch if persistent given positional component of headaches, although her headaches are better with sitting upright which argues against. Suspect headaches most likely 2/2 Ponatanib as known culprit.   - PRN Esgic helpful      # GERD   - Protonix 20 mg daily      # Grade 1 neuropathy of the fingertips   Due to vincristine. Pt reports symptoms are stable. Reports mild paresthesias and hot/cold sensitivity.   - Monitor clinically           PLAN:  --Proceed with C5 blinatumomab tomorrow  --Continue ponatinib 15 mg daily  --Re-stage 30 days after cycle 5 (~mid-August) with BMBx and BCR-ABL studies as well as Clonoseq testnig; port out then if MRD-negative.   --RTC w/ Della 1 week after BMBx in August.   --ASA 81 mg prophylaxis      [facility]    40 minutes spent on the date of the encounter doing chart review, review of test results, interpretation of tests, patient visit, documentation and discussion with other provider(s)

## 2023-05-31 NOTE — NURSING NOTE
Is the patient currently in the state of [place]? YES    Visit mode:VIDEO    If the visit is dropped, the patient can be reconnected by: VIDEO VISIT: Text to cell phone: [phone]    Will anyone else be joining the visit? NO      How would you like to obtain your AVS? MyChart    Are changes needed to the allergy or medication list? NO    Reason for visit: RECHECK (Video visit )  Nicole Mclaughlin

## 2023-06-01 ENCOUNTER — LAB REQUISITION (OUTPATIENT)
Dept: LAB | Facility: CLINIC | Age: 43
End: 2023-06-01
Payer: COMMERCIAL

## 2023-06-01 DIAGNOSIS — C91.00 ACUTE LYMPHOBLASTIC LEUKEMIA NOT HAVING ACHIEVED REMISSION (H): ICD-10-CM

## 2023-06-01 LAB
ALBUMIN SERPL BCG-MCNC: 4.5 G/DL (ref 3.5–5.2)
ALP SERPL-CCNC: 86 U/L (ref 35–104)
ALT SERPL W P-5'-P-CCNC: 28 U/L (ref 10–35)
ANION GAP SERPL CALCULATED.3IONS-SCNC: 13 MMOL/L (ref 7–15)
AST SERPL W P-5'-P-CCNC: 23 U/L (ref 10–35)
BASOPHILS # BLD AUTO: 0 10E3/UL (ref 0–0.2)
BASOPHILS NFR BLD AUTO: 0 %
BILIRUB SERPL-MCNC: 0.3 MG/DL
BUN SERPL-MCNC: 11.5 MG/DL (ref 6–20)
CALCIUM SERPL-MCNC: 9.6 MG/DL (ref 8.6–10)
CHLORIDE SERPL-SCNC: 106 MMOL/L (ref 98–107)
CREAT SERPL-MCNC: 0.59 MG/DL (ref 0.51–0.95)
DEPRECATED HCO3 PLAS-SCNC: 25 MMOL/L (ref 22–29)
EOSINOPHIL # BLD AUTO: 0.3 10E3/UL (ref 0–0.7)
EOSINOPHIL NFR BLD AUTO: 4 %
ERYTHROCYTE [DISTWIDTH] IN BLOOD BY AUTOMATED COUNT: 14.1 % (ref 10–15)
GFR SERPL CREATININE-BSD FRML MDRD: >90 ML/MIN/1.73M2
GLUCOSE SERPL-MCNC: 74 MG/DL (ref 70–99)
HCT VFR BLD AUTO: 39.9 % (ref 35–47)
HGB BLD-MCNC: 13 G/DL (ref 11.7–15.7)
IMM GRANULOCYTES # BLD: 0 10E3/UL
IMM GRANULOCYTES NFR BLD: 0 %
LYMPHOCYTES # BLD AUTO: 0.9 10E3/UL (ref 0.8–5.3)
LYMPHOCYTES NFR BLD AUTO: 12 %
MCH RBC QN AUTO: 28.7 PG (ref 26.5–33)
MCHC RBC AUTO-ENTMCNC: 32.6 G/DL (ref 31.5–36.5)
MCV RBC AUTO: 88 FL (ref 78–100)
MONOCYTES # BLD AUTO: 0.8 10E3/UL (ref 0–1.3)
MONOCYTES NFR BLD AUTO: 10 %
NEUTROPHILS # BLD AUTO: 5.9 10E3/UL (ref 1.6–8.3)
NEUTROPHILS NFR BLD AUTO: 74 %
NRBC # BLD AUTO: 0 10E3/UL
NRBC BLD AUTO-RTO: 0 /100
PLATELET # BLD AUTO: 300 10E3/UL (ref 150–450)
POTASSIUM SERPL-SCNC: 3.5 MMOL/L (ref 3.4–5.3)
PROT SERPL-MCNC: 6.7 G/DL (ref 6.4–8.3)
RBC # BLD AUTO: 4.53 10E6/UL (ref 3.8–5.2)
SODIUM SERPL-SCNC: 144 MMOL/L (ref 136–145)
WBC # BLD AUTO: 8 10E3/UL (ref 4–11)

## 2023-06-01 PROCEDURE — 85041 AUTOMATED RBC COUNT: CPT | Performed by: INTERNAL MEDICINE

## 2023-06-01 PROCEDURE — 80053 COMPREHEN METABOLIC PANEL: CPT | Performed by: INTERNAL MEDICINE

## 2023-06-08 ENCOUNTER — LAB REQUISITION (OUTPATIENT)
Dept: LAB | Facility: CLINIC | Age: 43
End: 2023-06-08
Payer: COMMERCIAL

## 2023-06-08 DIAGNOSIS — C91.00 ACUTE LYMPHOBLASTIC LEUKEMIA NOT HAVING ACHIEVED REMISSION (H): ICD-10-CM

## 2023-06-08 LAB
ALBUMIN SERPL BCG-MCNC: 4.3 G/DL (ref 3.5–5.2)
ALP SERPL-CCNC: 101 U/L (ref 35–104)
ALT SERPL W P-5'-P-CCNC: 27 U/L (ref 10–35)
ANION GAP SERPL CALCULATED.3IONS-SCNC: 12 MMOL/L (ref 7–15)
AST SERPL W P-5'-P-CCNC: 17 U/L (ref 10–35)
BASOPHILS # BLD AUTO: 0.1 10E3/UL (ref 0–0.2)
BASOPHILS NFR BLD AUTO: 1 %
BILIRUB SERPL-MCNC: 0.4 MG/DL
BUN SERPL-MCNC: 12.2 MG/DL (ref 6–20)
CALCIUM SERPL-MCNC: 9.3 MG/DL (ref 8.6–10)
CHLORIDE SERPL-SCNC: 107 MMOL/L (ref 98–107)
CREAT SERPL-MCNC: 0.6 MG/DL (ref 0.51–0.95)
DEPRECATED HCO3 PLAS-SCNC: 23 MMOL/L (ref 22–29)
EOSINOPHIL # BLD AUTO: 0.4 10E3/UL (ref 0–0.7)
EOSINOPHIL NFR BLD AUTO: 4 %
ERYTHROCYTE [DISTWIDTH] IN BLOOD BY AUTOMATED COUNT: 14 % (ref 10–15)
GFR SERPL CREATININE-BSD FRML MDRD: >90 ML/MIN/1.73M2
GLUCOSE SERPL-MCNC: 77 MG/DL (ref 70–99)
HCT VFR BLD AUTO: 38.2 % (ref 35–47)
HGB BLD-MCNC: 12.8 G/DL (ref 11.7–15.7)
IMM GRANULOCYTES # BLD: 0.1 10E3/UL
IMM GRANULOCYTES NFR BLD: 1 %
LYMPHOCYTES # BLD AUTO: 0.9 10E3/UL (ref 0.8–5.3)
LYMPHOCYTES NFR BLD AUTO: 10 %
MCH RBC QN AUTO: 28.9 PG (ref 26.5–33)
MCHC RBC AUTO-ENTMCNC: 33.5 G/DL (ref 31.5–36.5)
MCV RBC AUTO: 86 FL (ref 78–100)
MONOCYTES # BLD AUTO: 0.9 10E3/UL (ref 0–1.3)
MONOCYTES NFR BLD AUTO: 10 %
NEUTROPHILS # BLD AUTO: 7 10E3/UL (ref 1.6–8.3)
NEUTROPHILS NFR BLD AUTO: 74 %
NRBC # BLD AUTO: 0 10E3/UL
NRBC BLD AUTO-RTO: 0 /100
PLATELET # BLD AUTO: 291 10E3/UL (ref 150–450)
POTASSIUM SERPL-SCNC: 3.8 MMOL/L (ref 3.4–5.3)
PROT SERPL-MCNC: 6.6 G/DL (ref 6.4–8.3)
RBC # BLD AUTO: 4.43 10E6/UL (ref 3.8–5.2)
SODIUM SERPL-SCNC: 142 MMOL/L (ref 136–145)
WBC # BLD AUTO: 9.3 10E3/UL (ref 4–11)

## 2023-06-08 PROCEDURE — 85025 COMPLETE CBC W/AUTO DIFF WBC: CPT | Performed by: INTERNAL MEDICINE

## 2023-06-08 PROCEDURE — 80053 COMPREHEN METABOLIC PANEL: CPT | Performed by: INTERNAL MEDICINE

## 2023-06-14 DIAGNOSIS — C91.00 ACUTE LYMPHOBLASTIC LEUKEMIA (ALL) NOT HAVING ACHIEVED REMISSION (H): Primary | ICD-10-CM

## 2023-06-14 PROCEDURE — 94642 AEROSOL INHALATION TREATMENT: CPT | Performed by: INTERNAL MEDICINE

## 2023-06-14 PROCEDURE — 94640 AIRWAY INHALATION TREATMENT: CPT | Performed by: INTERNAL MEDICINE

## 2023-06-14 RX ORDER — EPINEPHRINE 1 MG/ML
0.3 INJECTION, SOLUTION, CONCENTRATE INTRAVENOUS EVERY 5 MIN PRN
Status: CANCELLED | OUTPATIENT
Start: 2023-06-27

## 2023-06-14 RX ORDER — ALBUTEROL SULFATE 0.83 MG/ML
2.5 SOLUTION RESPIRATORY (INHALATION)
Status: CANCELLED | OUTPATIENT
Start: 2023-06-27

## 2023-06-14 RX ORDER — ALBUTEROL SULFATE 90 UG/1
1-2 AEROSOL, METERED RESPIRATORY (INHALATION)
Status: CANCELLED
Start: 2023-06-27

## 2023-06-14 RX ORDER — MEPERIDINE HYDROCHLORIDE 25 MG/ML
25 INJECTION INTRAMUSCULAR; INTRAVENOUS; SUBCUTANEOUS EVERY 30 MIN PRN
Status: CANCELLED | OUTPATIENT
Start: 2023-06-27

## 2023-06-14 RX ORDER — PENTAMIDINE ISETHIONATE 300 MG/300MG
300 INHALANT RESPIRATORY (INHALATION) ONCE
Status: COMPLETED | OUTPATIENT
Start: 2023-06-14 | End: 2023-06-14

## 2023-06-14 RX ORDER — METHYLPREDNISOLONE SODIUM SUCCINATE 125 MG/2ML
125 INJECTION, POWDER, LYOPHILIZED, FOR SOLUTION INTRAMUSCULAR; INTRAVENOUS
Status: CANCELLED
Start: 2023-06-27

## 2023-06-14 RX ORDER — DIPHENHYDRAMINE HYDROCHLORIDE 50 MG/ML
50 INJECTION INTRAMUSCULAR; INTRAVENOUS
Status: CANCELLED
Start: 2023-06-27

## 2023-06-14 RX ORDER — PENTAMIDINE ISETHIONATE 300 MG/300MG
300 INHALANT RESPIRATORY (INHALATION) ONCE
Status: CANCELLED
Start: 2023-06-27 | End: 2023-06-27

## 2023-06-14 RX ORDER — ALBUTEROL SULFATE 0.83 MG/ML
2.5 SOLUTION RESPIRATORY (INHALATION) ONCE
Status: CANCELLED
Start: 2023-06-27 | End: 2023-06-27

## 2023-06-14 RX ORDER — ALBUTEROL SULFATE 0.83 MG/ML
2.5 SOLUTION RESPIRATORY (INHALATION) ONCE
Status: DISCONTINUED | OUTPATIENT
Start: 2023-06-14 | End: 2023-06-14 | Stop reason: HOSPADM

## 2023-06-14 RX ADMIN — PENTAMIDINE ISETHIONATE 300 MG: 300 INHALANT RESPIRATORY (INHALATION) at 09:07

## 2023-06-14 NOTE — PROGRESS NOTES
Patient was seen today for a Pentamidine nebulizer tx ordered by LIBIA Jorgensen.     Patient was first given 4 puffs Albuterol MDI, after which Pentamidine 300 mg (Lot # [id number]) mixed with 6cc Sterile H20 was administered through a filtered nebulizer.     Pre-treatment: SpO2 = 99%   HR = 76   BBS = clear   Post-treatment: SpO2 = 99%  HR = 86  BBS = clear     No adverse side effects noted by the patient.     This service today was provided with Dr Alma Franco available if needed.      Procedure was completed by MEDHAT Gutierrez.

## 2023-06-15 ENCOUNTER — LAB REQUISITION (OUTPATIENT)
Dept: LAB | Facility: CLINIC | Age: 43
End: 2023-06-15
Payer: COMMERCIAL

## 2023-06-15 DIAGNOSIS — C91.00 ACUTE LYMPHOBLASTIC LEUKEMIA NOT HAVING ACHIEVED REMISSION (H): ICD-10-CM

## 2023-06-15 LAB
ALBUMIN SERPL BCG-MCNC: 4.6 G/DL (ref 3.5–5.2)
ALP SERPL-CCNC: 101 U/L (ref 35–104)
ALT SERPL W P-5'-P-CCNC: 29 U/L (ref 0–50)
ANION GAP SERPL CALCULATED.3IONS-SCNC: 11 MMOL/L (ref 7–15)
AST SERPL W P-5'-P-CCNC: 21 U/L (ref 0–45)
BASOPHILS # BLD AUTO: 0 10E3/UL (ref 0–0.2)
BASOPHILS NFR BLD AUTO: 1 %
BILIRUB SERPL-MCNC: 0.3 MG/DL
BUN SERPL-MCNC: 11.2 MG/DL (ref 6–20)
CALCIUM SERPL-MCNC: 9.7 MG/DL (ref 8.6–10)
CHLORIDE SERPL-SCNC: 103 MMOL/L (ref 98–107)
CREAT SERPL-MCNC: 0.58 MG/DL (ref 0.51–0.95)
DEPRECATED HCO3 PLAS-SCNC: 26 MMOL/L (ref 22–29)
EOSINOPHIL # BLD AUTO: 0.3 10E3/UL (ref 0–0.7)
EOSINOPHIL NFR BLD AUTO: 4 %
ERYTHROCYTE [DISTWIDTH] IN BLOOD BY AUTOMATED COUNT: 13.7 % (ref 10–15)
GFR SERPL CREATININE-BSD FRML MDRD: >90 ML/MIN/1.73M2
GLUCOSE SERPL-MCNC: 81 MG/DL (ref 70–99)
HCT VFR BLD AUTO: 41.8 % (ref 35–47)
HGB BLD-MCNC: 13.7 G/DL (ref 11.7–15.7)
IMM GRANULOCYTES # BLD: 0.1 10E3/UL
IMM GRANULOCYTES NFR BLD: 1 %
LYMPHOCYTES # BLD AUTO: 0.8 10E3/UL (ref 0.8–5.3)
LYMPHOCYTES NFR BLD AUTO: 10 %
MCH RBC QN AUTO: 28.4 PG (ref 26.5–33)
MCHC RBC AUTO-ENTMCNC: 32.8 G/DL (ref 31.5–36.5)
MCV RBC AUTO: 87 FL (ref 78–100)
MONOCYTES # BLD AUTO: 0.8 10E3/UL (ref 0–1.3)
MONOCYTES NFR BLD AUTO: 10 %
NEUTROPHILS # BLD AUTO: 6.3 10E3/UL (ref 1.6–8.3)
NEUTROPHILS NFR BLD AUTO: 74 %
NRBC # BLD AUTO: 0 10E3/UL
NRBC BLD AUTO-RTO: 0 /100
PLATELET # BLD AUTO: 299 10E3/UL (ref 150–450)
POTASSIUM SERPL-SCNC: 4.1 MMOL/L (ref 3.4–5.3)
PROT SERPL-MCNC: 7.1 G/DL (ref 6.4–8.3)
RBC # BLD AUTO: 4.83 10E6/UL (ref 3.8–5.2)
SODIUM SERPL-SCNC: 140 MMOL/L (ref 136–145)
WBC # BLD AUTO: 8.3 10E3/UL (ref 4–11)

## 2023-06-15 PROCEDURE — 80053 COMPREHEN METABOLIC PANEL: CPT | Performed by: INTERNAL MEDICINE

## 2023-06-15 PROCEDURE — 85025 COMPLETE CBC W/AUTO DIFF WBC: CPT | Performed by: INTERNAL MEDICINE

## 2023-06-22 ENCOUNTER — LAB REQUISITION (OUTPATIENT)
Dept: LAB | Facility: CLINIC | Age: 43
End: 2023-06-22
Payer: COMMERCIAL

## 2023-06-22 DIAGNOSIS — C91.00 ACUTE LYMPHOBLASTIC LEUKEMIA NOT HAVING ACHIEVED REMISSION (H): ICD-10-CM

## 2023-06-22 LAB
ALBUMIN SERPL BCG-MCNC: 4.4 G/DL (ref 3.5–5.2)
ALP SERPL-CCNC: 97 U/L (ref 35–104)
ALT SERPL W P-5'-P-CCNC: 27 U/L (ref 0–50)
ANION GAP SERPL CALCULATED.3IONS-SCNC: 14 MMOL/L (ref 7–15)
AST SERPL W P-5'-P-CCNC: 23 U/L (ref 0–45)
BASOPHILS # BLD AUTO: 0 10E3/UL (ref 0–0.2)
BASOPHILS NFR BLD AUTO: 1 %
BILIRUB SERPL-MCNC: 0.4 MG/DL
BUN SERPL-MCNC: 15 MG/DL (ref 6–20)
CALCIUM SERPL-MCNC: 9.5 MG/DL (ref 8.6–10)
CHLORIDE SERPL-SCNC: 105 MMOL/L (ref 98–107)
CREAT SERPL-MCNC: 0.6 MG/DL (ref 0.51–0.95)
DEPRECATED HCO3 PLAS-SCNC: 23 MMOL/L (ref 22–29)
EOSINOPHIL # BLD AUTO: 0.4 10E3/UL (ref 0–0.7)
EOSINOPHIL NFR BLD AUTO: 5 %
ERYTHROCYTE [DISTWIDTH] IN BLOOD BY AUTOMATED COUNT: 13.3 % (ref 10–15)
GFR SERPL CREATININE-BSD FRML MDRD: >90 ML/MIN/1.73M2
GLUCOSE SERPL-MCNC: 112 MG/DL (ref 70–99)
HCT VFR BLD AUTO: 39.2 % (ref 35–47)
HGB BLD-MCNC: 13.2 G/DL (ref 11.7–15.7)
IMM GRANULOCYTES # BLD: 0 10E3/UL
IMM GRANULOCYTES NFR BLD: 1 %
LYMPHOCYTES # BLD AUTO: 0.9 10E3/UL (ref 0.8–5.3)
LYMPHOCYTES NFR BLD AUTO: 11 %
MCH RBC QN AUTO: 28.9 PG (ref 26.5–33)
MCHC RBC AUTO-ENTMCNC: 33.7 G/DL (ref 31.5–36.5)
MCV RBC AUTO: 86 FL (ref 78–100)
MONOCYTES # BLD AUTO: 0.7 10E3/UL (ref 0–1.3)
MONOCYTES NFR BLD AUTO: 9 %
NEUTROPHILS # BLD AUTO: 5.8 10E3/UL (ref 1.6–8.3)
NEUTROPHILS NFR BLD AUTO: 73 %
NRBC # BLD AUTO: 0 10E3/UL
NRBC BLD AUTO-RTO: 0 /100
PLATELET # BLD AUTO: 318 10E3/UL (ref 150–450)
POTASSIUM SERPL-SCNC: 4 MMOL/L (ref 3.4–5.3)
PROT SERPL-MCNC: 6.9 G/DL (ref 6.4–8.3)
RBC # BLD AUTO: 4.57 10E6/UL (ref 3.8–5.2)
SODIUM SERPL-SCNC: 142 MMOL/L (ref 136–145)
WBC # BLD AUTO: 7.8 10E3/UL (ref 4–11)

## 2023-06-22 PROCEDURE — 80053 COMPREHEN METABOLIC PANEL: CPT | Performed by: INTERNAL MEDICINE

## 2023-06-22 PROCEDURE — 85025 COMPLETE CBC W/AUTO DIFF WBC: CPT | Performed by: INTERNAL MEDICINE

## 2023-06-29 ENCOUNTER — LAB REQUISITION (OUTPATIENT)
Dept: LAB | Facility: CLINIC | Age: 43
End: 2023-06-29
Payer: COMMERCIAL

## 2023-06-29 DIAGNOSIS — C91.00 ACUTE LYMPHOBLASTIC LEUKEMIA (ALL) NOT HAVING ACHIEVED REMISSION (H): Primary | ICD-10-CM

## 2023-06-29 DIAGNOSIS — C91.00 ACUTE LYMPHOBLASTIC LEUKEMIA NOT HAVING ACHIEVED REMISSION (H): ICD-10-CM

## 2023-06-29 LAB
ALBUMIN SERPL BCG-MCNC: 4.5 G/DL (ref 3.5–5.2)
ALP SERPL-CCNC: 92 U/L (ref 35–104)
ALT SERPL W P-5'-P-CCNC: 29 U/L (ref 0–50)
ANION GAP SERPL CALCULATED.3IONS-SCNC: 13 MMOL/L (ref 7–15)
AST SERPL W P-5'-P-CCNC: 24 U/L (ref 0–45)
BASOPHILS # BLD AUTO: 0.1 10E3/UL (ref 0–0.2)
BASOPHILS NFR BLD AUTO: 1 %
BILIRUB SERPL-MCNC: 0.2 MG/DL
BUN SERPL-MCNC: 10.8 MG/DL (ref 6–20)
CALCIUM SERPL-MCNC: 9.5 MG/DL (ref 8.6–10)
CHLORIDE SERPL-SCNC: 103 MMOL/L (ref 98–107)
CREAT SERPL-MCNC: 0.61 MG/DL (ref 0.51–0.95)
DEPRECATED HCO3 PLAS-SCNC: 25 MMOL/L (ref 22–29)
EOSINOPHIL # BLD AUTO: 0.4 10E3/UL (ref 0–0.7)
EOSINOPHIL NFR BLD AUTO: 4 %
ERYTHROCYTE [DISTWIDTH] IN BLOOD BY AUTOMATED COUNT: 13.5 % (ref 10–15)
GFR SERPL CREATININE-BSD FRML MDRD: >90 ML/MIN/1.73M2
GLUCOSE SERPL-MCNC: 71 MG/DL (ref 70–99)
HCT VFR BLD AUTO: 39.7 % (ref 35–47)
HGB BLD-MCNC: 13.3 G/DL (ref 11.7–15.7)
IMM GRANULOCYTES # BLD: 0.1 10E3/UL
IMM GRANULOCYTES NFR BLD: 2 %
LYMPHOCYTES # BLD AUTO: 0.9 10E3/UL (ref 0.8–5.3)
LYMPHOCYTES NFR BLD AUTO: 10 %
MCH RBC QN AUTO: 28.7 PG (ref 26.5–33)
MCHC RBC AUTO-ENTMCNC: 33.5 G/DL (ref 31.5–36.5)
MCV RBC AUTO: 86 FL (ref 78–100)
MONOCYTES # BLD AUTO: 1 10E3/UL (ref 0–1.3)
MONOCYTES NFR BLD AUTO: 12 %
NEUTROPHILS # BLD AUTO: 6 10E3/UL (ref 1.6–8.3)
NEUTROPHILS NFR BLD AUTO: 71 %
NRBC # BLD AUTO: 0 10E3/UL
NRBC BLD AUTO-RTO: 0 /100
PLATELET # BLD AUTO: 295 10E3/UL (ref 150–450)
POTASSIUM SERPL-SCNC: 4.2 MMOL/L (ref 3.4–5.3)
PROT SERPL-MCNC: 6.9 G/DL (ref 6.4–8.3)
RBC # BLD AUTO: 4.63 10E6/UL (ref 3.8–5.2)
SODIUM SERPL-SCNC: 141 MMOL/L (ref 136–145)
WBC # BLD AUTO: 8.4 10E3/UL (ref 4–11)

## 2023-06-29 PROCEDURE — 85025 COMPLETE CBC W/AUTO DIFF WBC: CPT | Performed by: INTERNAL MEDICINE

## 2023-06-29 PROCEDURE — 80053 COMPREHEN METABOLIC PANEL: CPT | Performed by: INTERNAL MEDICINE

## 2023-06-30 ENCOUNTER — TELEPHONE (OUTPATIENT)
Dept: ONCOLOGY | Facility: CLINIC | Age: 43
End: 2023-06-30
Payer: COMMERCIAL

## 2023-06-30 DIAGNOSIS — C91.00 ACUTE LYMPHOBLASTIC LEUKEMIA (ALL) NOT HAVING ACHIEVED REMISSION (H): Primary | ICD-10-CM

## 2023-06-30 NOTE — TELEPHONE ENCOUNTER
Oral Chemotherapy Monitoring Program     Placed call to Vashti Villegas in follow up of Iclusig oral chemotherapy.     Unable to leave a message as voicemail box is full.     Gladys Carrizales, PharmD  Oral Chemotherapy Monitoring Program  [facility]  [phone]  June 30, 2023

## 2023-07-03 NOTE — PROGRESS NOTES
Virtual Visit Details    Type of service:  Video Visit     Originating Location (pt. Location): Home    Distant Location (provider location):  On-site  Platform used for Video Visit: Tyrell   Video duration: 20 mins      [facility]  Date of visit: Jul 5, 2023        Reason for Visit: Ph (+) B-ALL      Oncology HPI:   Followed by Dr. Hull. Patient presented to outside hospital with c/o right upper quadrant pain and was subsequently found to have a markedly elevated white blood cell count concerning for acute leukemia. While at OSH, she had CTA chest as well as CT A/P. These identified no PE, no acute or localized intraabdominal inflammatory process, mild splenomegaly, and few inconspicuous low-density structures in the liver. She was transferred to [facility] for further workup and management. S/p Hydrea 1g TID (8/21-8/23) with improvement in WBC (100K ? 12K). Diagnostic BMBx completed 8/20/22, which demonstrated B-ALL with 85% blasts and hypercellular marrow. IHC shows CD10+, CD34+. 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 ALL NGS negative. BCR/ABL1 major/minor (sent from peripheral blood) both negative. Initiated pre-phase steroids with Prednisone 60 mg x8/21; increased to 60 mg/m2 (140 mg) 8/22-8/24. She initiated GRAALL + imatinib (C1D1 = 8/24/22). S/p BMT consult 9/12 with Dr. Walsh. Induction was relatively uncomplicated. Post-induction BMBx done 9/26/22 and revealed MRD- CR1, with negative BCR/ABL1 and Clonoseq testing. With no matched related donors and excellent early/deep  response chemotherapy is currently favored over BMT. 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 Underwent BMBx 10/25/22 with no morphologic evidence of B-lymphoblastic leukemia and flow with unusual B lineage precursor population 0.09%. C3 GRAALL + imatinib on 11/2/22 was tolerated well without complication. Prior plan was to continue GRAALL for a total of 8 cycles (of note odd cycles are 21-days in length and even cycles are 14-days in length), however, transitioned to Blinatumumab + Ponatinib as curative intent therapy given no matched sib donors. S/p Blincyto + Ponatinib (C1D1 = 12/14/22), complicated by neutropenic fever 2/2 pneumonia. Recent BMBx 1/17/2023: BCR-ABL p190 now detectable; Clonoseq pending.  - Port placed  - Per Dr Hull, plan for repeat BMBx after Blincyto C1, C2 and C5 then every 3-6 months thereafter. Clonoseq to be sent    - C2 Blincyto, D1 = 1/25/23  - Bmbx 3/1-- path/ flow neg, BCR-ABL minor p190 PCR pending, however by FISH still with low level detectable BCR-ABL fusion at 0.125%  - C3 Blincyto, D1 = 3/9/23 with ponatinib  - C4 Blincyto, D1 = 4/20/23  - C5 Blincyto, D1 = 6/1/2023      Interval history:   April is overall doing really well.   No new symptoms. Completed last cycle of blina very well. No recent infections no bleeding. Appetite stable. Hydrating well. Headaches remain resolved. No rash. ROS otherwise neg.           Current Outpatient Medications   Medication Sig Dispense Refill     acetaminophen (TYLENOL) 325 MG tablet Take 2 tablets (650 mg) by mouth every 6 hours as needed for mild pain, other or fever (blood product administration premedication) (Patient not taking: Reported on 2/2/2023)       aspirin (ASA) 81 MG EC tablet Take 1 tablet (81 mg) by mouth daily 30 tablet 1     benzonatate (TESSALON) 100 MG capsule Take 1 capsule (100 mg) by mouth 3 times daily as needed for cough (Patient not taking: Reported on 4/5/2023)  30 capsule 0     lidocaine-prilocaine (EMLA) 2.5-2.5 % external cream Apply topically as needed (Apply 30 to 60minutes before Port Needle Access for pain) 30 g 3     lidocaine-prilocaine (LIDOPRIL) 2.5-2.5 % kit Use prior to lab access 1 kit 1     LORazepam (ATIVAN) 0.5 MG tablet Take 1-2 tablets (0.5-1 mg) by mouth daily as needed for anxiety . Take prior to BMBx procedures as needed. If initial dose unhelpful, okay to take a second 0.5-1 mg dose (for a max of 2 mg at a time). Caution: causes sedation. Do not drive after taking this medication. 10 tablet 0     NEUPOGEN 480 MCG/0.8ML SOSY syringe  (Patient not taking: Reported on 4/5/2023)       ondansetron (ZOFRAN ODT) 4 MG ODT tab Take 1-2 tablets (4-8 mg) by mouth every 8 hours as needed for nausea or vomiting (Patient not taking: Reported on 2/2/2023) 60 tablet 0     PONATinib (ICLUSIG) 15 MG tablet Take 1 tablet (15 mg) by mouth daily 30 tablet 0     prochlorperazine (COMPAZINE) 10 MG tablet Take 1 tablet (10 mg) by mouth every 6 hours as needed (Breakthrough Nausea/Vomiting) (Patient not taking: Reported on 2/2/2023) 20 tablet 0     valACYclovir (VALTREX) 1000 mg tablet Take 1 tablet (1,000 mg) by mouth daily 30 tablet 3       Allergies   Allergen Reactions     Blood Transfusion Related (Informational Only) Hives     Hive reaction with platelet transfusion on 8/26/2022. Please administer platelets with tylenol and benadryl premedication.      Seasonal Allergies      Runny nose, itchiness         Physical Exam:  Video physical exam  General: Patient appears well in no acute distress.   Skin: No visualized rash or lesions on visualized skin  Eyes: EOMI, no erythema, sclera icterus or discharge noted  Resp: Appears to be breathing comfortably without accessory muscle usage, speaking in full sentences, no cough  MSK: Appears to have normal range of motion based on visualized movements  Neurologic: No apparent tremors, facial movements symmetric  Psych: affect  pleasant, alert and oriented        Labs:     I personally reviewed the following labs:    Most Recent 3 CBC's:  Recent Labs   Lab Test 06/29/23  1145 06/22/23  1130 06/15/23  1130   WBC 8.4 7.8 8.3   HGB 13.3 13.2 13.7   MCV 86 86 87    318 299     Most Recent 3 BMP's:  Recent Labs   Lab Test 06/29/23  1145 06/22/23  1130 06/15/23  1130    142 140   POTASSIUM 4.2 4.0 4.1   CHLORIDE 103 105 103   CO2 25 23 26   BUN 10.8 15.0 11.2   CR 0.61 0.60 0.58   ANIONGAP 13 14 11   SOLEDAD 9.5 9.5 9.7   GLC 71 112* 81     Most Recent 2 LFT's:  Recent Labs   Lab Test 06/29/23  1145 06/22/23  1130   AST 24 23   ALT 29 27   ALKPHOS 92 97   BILITOTAL 0.2 0.4           Imaging: n/a      Impression/plan:     # Ph(+) B-ALL   Followed by Dr. Hull. Patient presented to outside hospital with c/o right upper quadrant pain and was subsequently found to have a markedly elevated white blood cell count concerning for acute leukemia. While at OSH, she had CTA chest as well as CT A/P. These identified no PE, no acute or localized intraabdominal inflammatory process, mild splenomegaly, and few inconspicuous low-density structures in the liver. She was transferred to [facility] for further workup and management. S/p Hydrea 1g TID (8/21-8/23) with improvement in WBC (100K ? 12K). Diagnostic BMBx completed 8/20/22, which demonstrated B-ALL with 85% blasts and hypercellular marrow. IHC shows CD10+, CD34+. 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 ALL NGS negative. BCR/ABL1 major/minor (sent from peripheral blood) both negative. Initiated pre-phase  steroids with Prednisone 60 mg x8/21; increased to 60 mg/m2 (140 mg) 8/22-8/24. She initiated GRAALL + imatinib (C1D1 = 8/24/22). S/p BMT consult 9/12 with Dr. Walsh. Induction was relatively uncomplicated. Post-induction BMBx done 9/26/22 and revealed MRD- CR1, with negative BCR/ABL1 and Clonoseq testing. With no matched related donors and excellent early/deep response chemotherapy is currently favored over BMT. 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 Underwent BMBx 10/25/22 with no morphologic evidence of B-lymphoblastic leukemia and flow with unusual B lineage precursor population 0.09%. C3 GRAALL + imatinib on 11/2/22 was tolerated well without complication. Prior plan was to continue GRAALL for a total of 8 cycles (of note odd cycles are 21-days in length and even cycles are 14-days in length), however, transitioned to Blinatumumab + Ponatinib as curative intent therapy given no matched sib donors. S/p Blincyto + Ponatinib (C1D1 = 12/14/22), complicated by neutropenic fever 2/2 pneumonia. Recent BMBx 1/17/2023: BCR-ABL p190 now detectable; Clonoseq pending.  - Blincyto C2D1 = 1/25/23, with Ponatinib 15 mg daily  - Bmbx 3/1-- flow/ path neg for disease, FISH with 0.125% BCR-ABL fusion   - Completed C5 Blina, D1 = 6/1 via [facility]  - Continue Ponatinib 15 mg daily-- do not interrupt this  - Has completed IT ppx  - Continue ASA 81 daily  - Per Dr Hull, plan for repeat BMBx after Blincyto C1, C2 and C5 then every 3-6 months thereafter. Clonoseq to be sent with each BMBx. Next BMBx 8/8 (CT guided), Dr Hull scheduled 8/23  - Port placed-- debating when to have this removed post blina. May wait to remove until year 2 as she has had several traumatic port issues     # Heme  - Transfuse to keep Hgb >7. Of note, pt has history of transfusion reaction and requires premeds with Benadryl and Tylenol       # Recent PICC associated thrombus   Recent admission  for cephalic vein thrombus (x1/4) associated with PICC line, non occlusive. No AC started as superficial and asymptomatic thrombus. A week prior, patient was found to have a malfunctioning vs malpositioned Port. S/p PICC removal and replacement of port via IR.   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 Ok to continue to hold for now, if needed could resume 2.5 mg if needed     # Headaches, resolved  On admission reports intermittent headaches x2-3 weeks following recent LP. Located on the top of her head and initially rated 8/10 with significant coughing. Over the past few days headaches improved with PRN tylenol and Esgic and now rated 3/10. No NV, vision changes or weakness with headaches. Of note, recently seen at local ED for headaches with CT head negative x1/21 but did show periventricular hypoattenuation suggestive of leukomalacia that may be treatment-related. She states that the headache does seem somewhat worse when she gets up or stands up but does not get better as she lays flat. She reports her most comfortable position is sitting up.   - MRI/ MRV negative for acute path.       # GERD   - Protonix 20 mg daily      # Grade 1 neuropathy of the fingertips   Due to vincristine. Pt reports symptoms are stable. Reports mild paresthesias and hot/cold sensitivity.   - Monitor clinically           PLAN:  --Continue ponatinib 15 mg daily  --Re-stage BMBx and BCR-ABL studies as well as Clonoseq testing scheduled 8/8; port out then if MRD-negative.   --RTC w/ Della 8/23  --ASA 81 mg prophylaxis      Roya Quarles [facility]    35 minutes spent on the date of the encounter doing chart review, review of test results, interpretation of  tests, patient visit, documentation and discussion with other provider(s)

## 2023-07-05 ENCOUNTER — VIRTUAL VISIT (OUTPATIENT)
Dept: ONCOLOGY | Facility: CLINIC | Age: 43
End: 2023-07-05
Attending: INTERNAL MEDICINE
Payer: COMMERCIAL

## 2023-07-05 VITALS — BODY MASS INDEX: 43.29 KG/M2 | HEIGHT: 64 IN

## 2023-07-05 DIAGNOSIS — C91.00 ACUTE LYMPHOBLASTIC LEUKEMIA (ALL) NOT HAVING ACHIEVED REMISSION (H): Primary | ICD-10-CM

## 2023-07-05 PROCEDURE — 99214 OFFICE O/P EST MOD 30 MIN: CPT | Mod: VID | Performed by: NURSE PRACTITIONER

## 2023-07-05 ASSESSMENT — PAIN SCALES - GENERAL: PAINLEVEL: NO PAIN (0)

## 2023-07-05 NOTE — NURSING NOTE
Is the patient currently in the state of [place]? YES    Visit mode:VIDEO    If the visit is dropped, the patient can be reconnected by: VIDEO VISIT: Text to cell phone: [phone]    Will anyone else be joining the visit? NO      How would you like to obtain your AVS? MyChart    Are changes needed to the allergy or medication list? YES: Patient stopped using Lidocaine-Prilocaine cream due to recent recall.      Patient would like to discuss the Lidocaine cream recall.     Reason for visit: SHAKIRA Gordillo, Virtual Facilitator

## 2023-07-05 NOTE — LETTER
7/5/2023         RE: Vashti Villegas  [address] [place] [address]        Dear Colleague,    Thank you for referring your patient, Vashti Villegas, to the [facility]. Please see a copy of my visit note below.    Virtual Visit Details    Type of service:  Video Visit     Originating Location (pt. Location): Home    Distant Location (provider location):  On-site  Platform used for Video Visit: [employer]   Video duration: 20 mins      [facility]  Date of visit: Jul 5, 2023        Reason for Visit: Ph (+) B-ALL      Oncology HPI:   Followed by Dr. Hull. Patient presented to outside hospital with c/o right upper quadrant pain and was subsequently found to have a markedly elevated white blood cell count concerning for acute leukemia. While at OSH, she had CTA chest as well as CT A/P. These identified no PE, no acute or localized intraabdominal inflammatory process, mild splenomegaly, and few inconspicuous low-density structures in the liver. She was transferred to [facility] for further workup and management. S/p Hydrea 1g TID (8/21-8/23) with improvement in WBC (100K ? 12K). Diagnostic BMBx completed 8/20/22, which demonstrated B-ALL with 85% blasts and hypercellular marrow. IHC shows CD10+, CD34+. 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 ALL NGS negative. BCR/ABL1 major/minor (sent from peripheral blood) both negative. Initiated pre-phase steroids with Prednisone 60 mg x8/21; increased to 60 mg/m2 (140 mg) 8/22-8/24. She initiated GRAALL + imatinib (C1D1 =  8/24/22). S/p BMT consult 9/12 with Dr. Walsh. Induction was relatively uncomplicated. Post-induction BMBx done 9/26/22 and revealed MRD- CR1, with negative BCR/ABL1 and Clonoseq testing. With no matched related donors and excellent early/deep response chemotherapy is currently favored over BMT. 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 Underwent BMBx 10/25/22 with no morphologic evidence of B-lymphoblastic leukemia and flow with unusual B lineage precursor population 0.09%. C3 GRAALL + imatinib on 11/2/22 was tolerated well without complication. Prior plan was to continue GRAALL for a total of 8 cycles (of note odd cycles are 21-days in length and even cycles are 14-days in length), however, transitioned to Blinatumumab + Ponatinib as curative intent therapy given no matched sib donors. S/p Blincyto + Ponatinib (C1D1 = 12/14/22), complicated by neutropenic fever 2/2 pneumonia. Recent BMBx 1/17/2023: BCR-ABL p190 now detectable; Clonoseq pending.  - Port placed  - Per Dr Hull, plan for repeat BMBx after Blincyto C1, C2 and C5 then every 3-6 months thereafter. Clonoseq to be sent    - C2 Blincyto, D1 = 1/25/23  - Bmbx 3/1-- path/ flow neg, BCR-ABL minor p190 PCR pending, however by FISH still with low level detectable BCR-ABL fusion at 0.125%  - C3 Blincyto, D1 = 3/9/23 with ponatinib  - C4 Blincyto, D1 = 4/20/23  - C5 Blincyto, D1 = 6/1/2023      Interval history:   April is overall doing really well.   No new symptoms. Completed last cycle of blina very well. No recent infections no bleeding. Appetite stable. Hydrating well. Headaches remain resolved. No rash. ROS otherwise neg.           Current Outpatient Medications   Medication Sig Dispense Refill    acetaminophen (TYLENOL) 325 MG tablet Take 2 tablets (650 mg) by mouth every 6 hours as needed for mild pain, other or fever (blood product administration premedication) (Patient not taking: Reported on  2/2/2023)      aspirin (ASA) 81 MG EC tablet Take 1 tablet (81 mg) by mouth daily 30 tablet 1    benzonatate (TESSALON) 100 MG capsule Take 1 capsule (100 mg) by mouth 3 times daily as needed for cough (Patient not taking: Reported on 4/5/2023) 30 capsule 0    lidocaine-prilocaine (EMLA) 2.5-2.5 % external cream Apply topically as needed (Apply 30 to 60minutes before Port Needle Access for pain) 30 g 3    lidocaine-prilocaine (LIDOPRIL) 2.5-2.5 % kit Use prior to lab access 1 kit 1    LORazepam (ATIVAN) 0.5 MG tablet Take 1-2 tablets (0.5-1 mg) by mouth daily as needed for anxiety . Take prior to BMBx procedures as needed. If initial dose unhelpful, okay to take a second 0.5-1 mg dose (for a max of 2 mg at a time). Caution: causes sedation. Do not drive after taking this medication. 10 tablet 0    NEUPOGEN 480 MCG/0.8ML SOSY syringe  (Patient not taking: Reported on 4/5/2023)      ondansetron (ZOFRAN ODT) 4 MG ODT tab Take 1-2 tablets (4-8 mg) by mouth every 8 hours as needed for nausea or vomiting (Patient not taking: Reported on 2/2/2023) 60 tablet 0    PONATinib (ICLUSIG) 15 MG tablet Take 1 tablet (15 mg) by mouth daily 30 tablet 0    prochlorperazine (COMPAZINE) 10 MG tablet Take 1 tablet (10 mg) by mouth every 6 hours as needed (Breakthrough Nausea/Vomiting) (Patient not taking: Reported on 2/2/2023) 20 tablet 0    valACYclovir (VALTREX) 1000 mg tablet Take 1 tablet (1,000 mg) by mouth daily 30 tablet 3       Allergies   Allergen Reactions    Blood Transfusion Related (Informational Only) Hives     Hive reaction with platelet transfusion on 8/26/2022. Please administer platelets with tylenol and benadryl premedication.     Seasonal Allergies      Runny nose, itchiness         Physical Exam:  Video physical exam  General: Patient appears well in no acute distress.   Skin: No visualized rash or lesions on visualized skin  Eyes: EOMI, no erythema, sclera icterus or discharge noted  Resp: Appears to be breathing  comfortably without accessory muscle usage, speaking in full sentences, no cough  MSK: Appears to have normal range of motion based on visualized movements  Neurologic: No apparent tremors, facial movements symmetric  Psych: affect pleasant, alert and oriented        Labs:     I personally reviewed the following labs:    Most Recent 3 CBC's:  Recent Labs   Lab Test 06/29/23  1145 06/22/23  1130 06/15/23  1130   WBC 8.4 7.8 8.3   HGB 13.3 13.2 13.7   MCV 86 86 87    318 299     Most Recent 3 BMP's:  Recent Labs   Lab Test 06/29/23  1145 06/22/23  1130 06/15/23  1130    142 140   POTASSIUM 4.2 4.0 4.1   CHLORIDE 103 105 103   CO2 25 23 26   BUN 10.8 15.0 11.2   CR 0.61 0.60 0.58   ANIONGAP 13 14 11   SOLEDAD 9.5 9.5 9.7   GLC 71 112* 81     Most Recent 2 LFT's:  Recent Labs   Lab Test 06/29/23  1145 06/22/23  1130   AST 24 23   ALT 29 27   ALKPHOS 92 97   BILITOTAL 0.2 0.4           Imaging: n/a      Impression/plan:     # Ph(+) B-ALL   Followed by Dr. Hull. Patient presented to outside hospital with c/o right upper quadrant pain and was subsequently found to have a markedly elevated white blood cell count concerning for acute leukemia. While at OSH, she had CTA chest as well as CT A/P. These identified no PE, no acute or localized intraabdominal inflammatory process, mild splenomegaly, and few inconspicuous low-density structures in the liver. She was transferred to [facility] for further workup and management. S/p Hydrea 1g TID (8/21-8/23) with improvement in WBC (100K ? 12K). Diagnostic BMBx completed 8/20/22, which demonstrated B-ALL with 85% blasts and hypercellular marrow. IHC shows CD10+, CD34+. 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 Cytogenetics revealed t(9;22), and multiple other abnormalities including an unbalanced translocation between chromosomes 3, 5, and 7 resulting in loss of most of the short arm and the  proximal long arm of chromosome 7 and a deletion within short arm of chromosome 9. 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 ALL NGS negative. BCR/ABL1 major/minor (sent from peripheral blood) both negative. Initiated pre-phase steroids with Prednisone 60 mg x8/21; increased to 60 mg/m2 (140 mg) 8/22-8/24. She initiated GRAALL + imatinib (C1D1 = 8/24/22). S/p BMT consult 9/12 with Dr. Walsh. Induction was relatively uncomplicated. Post-induction BMBx done 9/26/22 and revealed MRD- CR1, with negative BCR/ABL1 and Clonoseq testing. With no matched related donors and excellent early/deep response chemotherapy is currently favored over BMT. 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 Underwent BMBx 10/25/22 with no morphologic evidence of B-lymphoblastic leukemia and flow with unusual B lineage precursor population 0.09%. C3 GRAALL + imatinib on 11/2/22 was tolerated well without complication. Prior plan was to continue GRAALL for a total of 8 cycles (of note odd cycles are 21-days in length and even cycles are 14-days in length), however, transitioned to Blinatumumab + Ponatinib as curative intent therapy given no matched sib donors. S/p Blincyto + Ponatinib (C1D1 = 12/14/22), complicated by neutropenic fever 2/2 pneumonia. Recent BMBx 1/17/2023: BCR-ABL p190 now detectable; Clonoseq pending.  - Blincyto C2D1 = 1/25/23, with Ponatinib 15 mg daily  - Bmbx 3/1-- flow/ path neg for disease, FISH with 0.125% BCR-ABL fusion   - Completed C5 Blina, D1 = 6/1 via [facility]  - Continue Ponatinib 15 mg daily-- do not interrupt this  - Has completed IT ppx  - Continue ASA 81 daily  - Per Dr Hull, plan for repeat BMBx after Blincyto C1, C2 and C5 then every 3-6 months thereafter. Clonoseq to be sent with each BMBx. Next BMBx 8/8 (CT guided), Dr Hull scheduled 8/23  - Port placed-- debating when to have this removed post blina. May wait to remove until year 2 as she  has had several traumatic port issues     # Heme  - Transfuse to keep Hgb >7. Of note, pt has history of transfusion reaction and requires premeds with Benadryl and Tylenol       # Recent PICC associated thrombus   Recent admission for cephalic vein thrombus (x1/4) associated with PICC line, non occlusive. No AC started as superficial and asymptomatic thrombus. A week prior, patient was found to have a malfunctioning vs malpositioned Port. S/p PICC removal and replacement of port via IR.   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 Ok to continue to hold for now, if needed could resume 2.5 mg if needed     # Headaches, resolved  On admission reports intermittent headaches x2-3 weeks following recent LP. Located on the top of her head and initially rated 8/10 with significant coughing. Over the past few days headaches improved with PRN tylenol and Esgic and now rated 3/10. No NV, vision changes or weakness with headaches. Of note, recently seen at local ED for headaches with CT head negative x1/21 but did show periventricular hypoattenuation suggestive of leukomalacia that may be treatment-related. She states that the headache does seem somewhat worse when she gets up or stands up but does not get better as she lays flat. She reports her most comfortable position is sitting up.   - MRI/ MRV negative for acute path.       # GERD   - Protonix 20 mg daily      # Grade 1 neuropathy of the fingertips   Due to vincristine. Pt reports symptoms are stable. Reports mild paresthesias and hot/cold sensitivity.   - Monitor clinically           PLAN:  --Continue ponatinib 15 mg daily  --Re-stage BMBx and BCR-ABL studies as well as Clonoseq testing  scheduled 8/8; port out then if MRD-negative.   --RTC w/ Della 8/23  --ASA 81 mg prophylaxis      Roya Quarles [facility]    35 minutes spent on the date of the encounter doing chart review, review of test results, interpretation of tests, patient visit, documentation and discussion with other provider(s)

## 2023-07-05 NOTE — TELEPHONE ENCOUNTER
Oral Chemotherapy Monitoring Program    Subjective/Objective:  Vashti Villegas is a 43 year old female contacted by phone for a follow-up visit for oral chemotherapy.  April confirms taking the appropriate dose of Iclusig 15mg once daily. Denies new or worsening side effects, missed doses, and recent hospital or ED visits. Patient has not had any recent medication changes.           4/6/2023    12:00 PM 4/28/2023     8:00 AM 5/1/2023    12:00 PM 5/31/2023    11:00 AM 6/30/2023     7:00 AM 6/30/2023    11:00 AM 7/5/2023     2:00 PM   ORAL CHEMOTHERAPY   Assessment Type Monthly Follow up;Chart Review Refill Monthly Follow up;Lab Monitoring Refill Refill Other Quarterly Follow up   Diagnosis Code Acute Lymphoblastic Leukemia (ALL) Acute Lymphoblastic Leukemia (ALL) Acute Lymphoblastic Leukemia (ALL) Acute Lymphoblastic Leukemia (ALL) Acute Lymphoblastic Leukemia (ALL) Acute Lymphoblastic Leukemia (ALL) Acute Lymphoblastic Leukemia (ALL)   Providers Della Hull   [facility]   Is this patient followed by the [facility] OC team? Yes Yes Yes Yes Yes Yes Yes   Drug Name Iclusig (ponatinib) Iclusig (ponatinib) Iclusig (ponatinib) Iclusig (ponatinib) Iclusig (ponatinib) Iclusig (ponatinib) Iclusig (ponatinib)   Dose 15 mg 15 mg 15 mg 15 mg 15 mg 15 mg 15 mg   Current Schedule Daily Daily Daily Daily Daily Daily Daily   Cycle Details Continuous Continuous Continuous Continuous Continuous Continuous Continuous   Doses missed in last 2 weeks 1  1    1   Adherence Assessment Adherent  Adherent    Adherent   Adverse Effects No AE identified during assessment  No AE identified during assessment    No AE identified during assessment   Any new drug interactions? No  No    No   Is the dose as ordered appropriate for the patient? Yes  Yes    Yes   Since the last time we talked, have you been hospitalized or used the emergency room? No  No    No  "      Last PHQ-2 Score on record:       11/22/2022     2:09 PM 11/1/2022     2:51 PM   PHQ-2 ( 1999 [employer])   Q1: Little interest or pleasure in doing things 0 0   Q2: Feeling down, depressed or hopeless 0 0   PHQ-2 Score 0 0       Vitals:  BP:   BP Readings from Last 1 Encounters:   04/05/23 126/84     Wt Readings from Last 1 Encounters:   04/05/23 114.4 kg (252 lb 3.2 oz)     Estimated body surface area is 2.27 meters squared as calculated from the following:    Height as of 7/5/23: 1.626 m (5' 4\").    Weight as of 4/5/23: 114.4 kg (252 lb 3.2 oz).    Labs:  _  Result Component Current Result Ref Range   Sodium 141 (6/29/2023) 136 - 145 mmol/L     _  Result Component Current Result Ref Range   Potassium 4.2 (6/29/2023) 3.4 - 5.3 mmol/L     _  Result Component Current Result Ref Range   Calcium 9.5 (6/29/2023) 8.6 - 10.0 mg/dL     No results found for Mag within last 30 days.     No results found for Phos within last 30 days.     _  Result Component Current Result Ref Range   Albumin 4.5 (6/29/2023) 3.5 - 5.2 g/dL     _  Result Component Current Result Ref Range   Urea Nitrogen 10.8 (6/29/2023) 6.0 - 20.0 mg/dL     _  Result Component Current Result Ref Range   Creatinine 0.61 (6/29/2023) 0.51 - 0.95 mg/dL     _  Result Component Current Result Ref Range   AST 24 (6/29/2023) 0 - 45 U/L     _  Result Component Current Result Ref Range   ALT 29 (6/29/2023) 0 - 50 U/L     _  Result Component Current Result Ref Range   Bilirubin Total 0.2 (6/29/2023) <=1.2 mg/dL     _  Result Component Current Result Ref Range   WBC Count 8.4 (6/29/2023) 4.0 - 11.0 10e3/uL     _  Result Component Current Result Ref Range   Hemoglobin 13.3 (6/29/2023) 11.7 - 15.7 g/dL     _  Result Component Current Result Ref Range   Platelet Count 295 (6/29/2023) 150 - 450 10e3/uL     No results found for ANC within last 30 days.     _  Result Component Current Result Ref Range   Absolute Neutrophils 6.0 (6/29/2023) 1.6 - 8.3 10e3/uL    "       Assessment/Plan:  April continues to tolerate Iclusig therapy well.  Continue Iclusig therapy as planned.    Follow-Up:  8/23: Follow up with Dr. Hull + labs  9/29: Quarterly assessment    Refill Due:  [facility] to deliver Iclusig 7/10/23.    Gladys Carrizales, PharmD  Hematology/Oncology Clinical Pharmacist  [facility]  [phone]

## 2023-07-12 ENCOUNTER — MYC MEDICAL ADVICE (OUTPATIENT)
Dept: ONCOLOGY | Facility: CLINIC | Age: 43
End: 2023-07-12

## 2023-07-12 ENCOUNTER — LAB (OUTPATIENT)
Dept: LAB | Facility: CLINIC | Age: 43
End: 2023-07-12
Attending: INTERNAL MEDICINE
Payer: COMMERCIAL

## 2023-07-12 DIAGNOSIS — C91.00 ACUTE LYMPHOBLASTIC LEUKEMIA (ALL) NOT HAVING ACHIEVED REMISSION (H): ICD-10-CM

## 2023-07-12 DIAGNOSIS — C91.00 ACUTE LYMPHOBLASTIC LEUKEMIA (ALL) NOT HAVING ACHIEVED REMISSION (H): Primary | ICD-10-CM

## 2023-07-12 LAB
ALBUMIN SERPL BCG-MCNC: 4.6 G/DL (ref 3.5–5.2)
ALP SERPL-CCNC: 88 U/L (ref 35–104)
ALT SERPL W P-5'-P-CCNC: 31 U/L (ref 0–50)
ANION GAP SERPL CALCULATED.3IONS-SCNC: 10 MMOL/L (ref 7–15)
AST SERPL W P-5'-P-CCNC: 23 U/L (ref 0–45)
BASOPHILS # BLD AUTO: 0.1 10E3/UL (ref 0–0.2)
BASOPHILS NFR BLD AUTO: 1 %
BILIRUB SERPL-MCNC: 0.4 MG/DL
BUN SERPL-MCNC: 14.7 MG/DL (ref 6–20)
CALCIUM SERPL-MCNC: 9.7 MG/DL (ref 8.6–10)
CHLORIDE SERPL-SCNC: 108 MMOL/L (ref 98–107)
CREAT SERPL-MCNC: 0.55 MG/DL (ref 0.51–0.95)
DEPRECATED HCO3 PLAS-SCNC: 24 MMOL/L (ref 22–29)
EOSINOPHIL # BLD AUTO: 0.3 10E3/UL (ref 0–0.7)
EOSINOPHIL NFR BLD AUTO: 4 %
ERYTHROCYTE [DISTWIDTH] IN BLOOD BY AUTOMATED COUNT: 13.7 % (ref 10–15)
GFR SERPL CREATININE-BSD FRML MDRD: >90 ML/MIN/1.73M2
GLUCOSE SERPL-MCNC: 107 MG/DL (ref 70–99)
HCT VFR BLD AUTO: 39 % (ref 35–47)
HGB BLD-MCNC: 12.8 G/DL (ref 11.7–15.7)
IMM GRANULOCYTES # BLD: 0 10E3/UL
IMM GRANULOCYTES NFR BLD: 1 %
LIPASE SERPL-CCNC: 24 U/L (ref 13–60)
LYMPHOCYTES # BLD AUTO: 0.8 10E3/UL (ref 0.8–5.3)
LYMPHOCYTES NFR BLD AUTO: 10 %
MCH RBC QN AUTO: 28 PG (ref 26.5–33)
MCHC RBC AUTO-ENTMCNC: 32.8 G/DL (ref 31.5–36.5)
MCV RBC AUTO: 85 FL (ref 78–100)
MONOCYTES # BLD AUTO: 0.9 10E3/UL (ref 0–1.3)
MONOCYTES NFR BLD AUTO: 11 %
NEUTROPHILS # BLD AUTO: 5.8 10E3/UL (ref 1.6–8.3)
NEUTROPHILS NFR BLD AUTO: 73 %
NRBC # BLD AUTO: 0 10E3/UL
NRBC BLD AUTO-RTO: 0 /100
PLATELET # BLD AUTO: 279 10E3/UL (ref 150–450)
POTASSIUM SERPL-SCNC: 4.3 MMOL/L (ref 3.4–5.3)
PROT SERPL-MCNC: 6.8 G/DL (ref 6.4–8.3)
RBC # BLD AUTO: 4.57 10E6/UL (ref 3.8–5.2)
SODIUM SERPL-SCNC: 142 MMOL/L (ref 136–145)
WBC # BLD AUTO: 7.9 10E3/UL (ref 4–11)

## 2023-07-12 PROCEDURE — 85025 COMPLETE CBC W/AUTO DIFF WBC: CPT

## 2023-07-12 PROCEDURE — 83690 ASSAY OF LIPASE: CPT

## 2023-07-12 PROCEDURE — 80053 COMPREHEN METABOLIC PANEL: CPT

## 2023-07-12 PROCEDURE — 94640 AIRWAY INHALATION TREATMENT: CPT | Performed by: PHYSICIAN ASSISTANT

## 2023-07-12 PROCEDURE — 94642 AEROSOL INHALATION TREATMENT: CPT | Performed by: PHYSICIAN ASSISTANT

## 2023-07-12 PROCEDURE — 36591 DRAW BLOOD OFF VENOUS DEVICE: CPT

## 2023-07-12 PROCEDURE — 250N000011 HC RX IP 250 OP 636: Mod: JZ | Performed by: INTERNAL MEDICINE

## 2023-07-12 RX ORDER — METHYLPREDNISOLONE SODIUM SUCCINATE 125 MG/2ML
125 INJECTION, POWDER, LYOPHILIZED, FOR SOLUTION INTRAMUSCULAR; INTRAVENOUS
Status: CANCELLED
Start: 2023-08-07

## 2023-07-12 RX ORDER — ALBUTEROL SULFATE 90 UG/1
1-2 AEROSOL, METERED RESPIRATORY (INHALATION)
Status: CANCELLED
Start: 2023-08-07

## 2023-07-12 RX ORDER — PENTAMIDINE ISETHIONATE 300 MG/300MG
300 INHALANT RESPIRATORY (INHALATION) ONCE
Status: COMPLETED | OUTPATIENT
Start: 2023-07-12 | End: 2023-07-12

## 2023-07-12 RX ORDER — HEPARIN SODIUM (PORCINE) LOCK FLUSH IV SOLN 100 UNIT/ML 100 UNIT/ML
5 SOLUTION INTRAVENOUS ONCE
Status: COMPLETED | OUTPATIENT
Start: 2023-07-12 | End: 2023-07-12

## 2023-07-12 RX ORDER — ALBUTEROL SULFATE 0.83 MG/ML
2.5 SOLUTION RESPIRATORY (INHALATION) ONCE
Status: DISCONTINUED | OUTPATIENT
Start: 2023-07-12 | End: 2023-07-12 | Stop reason: HOSPADM

## 2023-07-12 RX ORDER — DIPHENHYDRAMINE HYDROCHLORIDE 50 MG/ML
50 INJECTION INTRAMUSCULAR; INTRAVENOUS
Status: CANCELLED
Start: 2023-08-07

## 2023-07-12 RX ORDER — ALBUTEROL SULFATE 0.83 MG/ML
2.5 SOLUTION RESPIRATORY (INHALATION)
Status: CANCELLED | OUTPATIENT
Start: 2023-08-07

## 2023-07-12 RX ORDER — PENTAMIDINE ISETHIONATE 300 MG/300MG
300 INHALANT RESPIRATORY (INHALATION) ONCE
Status: CANCELLED
Start: 2023-08-07 | End: 2023-08-07

## 2023-07-12 RX ORDER — EPINEPHRINE 1 MG/ML
0.3 INJECTION, SOLUTION, CONCENTRATE INTRAVENOUS EVERY 5 MIN PRN
Status: CANCELLED | OUTPATIENT
Start: 2023-08-07

## 2023-07-12 RX ORDER — ALBUTEROL SULFATE 0.83 MG/ML
2.5 SOLUTION RESPIRATORY (INHALATION) ONCE
Status: CANCELLED
Start: 2023-08-07 | End: 2023-08-07

## 2023-07-12 RX ORDER — MEPERIDINE HYDROCHLORIDE 25 MG/ML
25 INJECTION INTRAMUSCULAR; INTRAVENOUS; SUBCUTANEOUS EVERY 30 MIN PRN
Status: CANCELLED | OUTPATIENT
Start: 2023-08-07

## 2023-07-12 RX ADMIN — Medication 5 ML: at 08:58

## 2023-07-12 RX ADMIN — PENTAMIDINE ISETHIONATE 300 MG: 300 INHALANT RESPIRATORY (INHALATION) at 09:27

## 2023-07-12 NOTE — PROGRESS NOTES
April F Kiel  Patient was seen today for a Pentamidine nebulizer tx ordered by     Patient was first given 4 puffs Albuterol MDI, after which Pentamidine 300 mg (Lot # [id number]) mixed with 6cc Sterile H20 was administered through a filtered nebulizer.    Pre-treatment: SpO2 = 99%   HR = 79  BBS = clear  Post-treatment: SpO2 = 99%  HR = 75  BBS = clear    No adverse side effects noted by the patient.    This service today was provided  By Dr. Guadalupe, who was available if needed.     Procedure was completed by MIRNA HOPPER.

## 2023-07-12 NOTE — TELEPHONE ENCOUNTER
Oral Chemotherapy Monitoring Program  Lab Follow Up    Reviewed lab results from 7/12/23.        4/28/2023     8:00 AM 5/1/2023    12:00 PM 5/31/2023    11:00 AM 6/30/2023     7:00 AM 6/30/2023    11:00 AM 7/5/2023     2:00 PM 7/12/2023     4:00 PM   ORAL CHEMOTHERAPY   Assessment Type Refill Monthly Follow up;Lab Monitoring Refill Refill Other Quarterly Follow up Lab Monitoring   Diagnosis Code Acute Lymphoblastic Leukemia (ALL) Acute Lymphoblastic Leukemia (ALL) Acute Lymphoblastic Leukemia (ALL) Acute Lymphoblastic Leukemia (ALL) Acute Lymphoblastic Leukemia (ALL) Acute Lymphoblastic Leukemia (ALL) Acute Lymphoblastic Leukemia (ALL)   Providers Della Hull   [facility]   Is this patient followed by the [facility] OC team? Yes Yes Yes Yes Yes Yes Yes   Drug Name Iclusig (ponatinib) Iclusig (ponatinib) Iclusig (ponatinib) Iclusig (ponatinib) Iclusig (ponatinib) Iclusig (ponatinib) Iclusig (ponatinib)   Dose 15 mg 15 mg 15 mg 15 mg 15 mg 15 mg 15 mg   Current Schedule Daily Daily Daily Daily Daily Daily Daily   Cycle Details Continuous Continuous Continuous Continuous Continuous Continuous Continuous   Doses missed in last 2 weeks  1    1    Adherence Assessment  Adherent    Adherent    Adverse Effects  No AE identified during assessment    No AE identified during assessment    Any new drug interactions?  No    No    Is the dose as ordered appropriate for the patient?  Yes    Yes    Since the last time we talked, have you been hospitalized or used the emergency room?  No    No        Labs:  _  Result Component Current Result Ref Range   Sodium 142 (7/12/2023) 136 - 145 mmol/L     _  Result Component Current Result Ref Range   Potassium 4.3 (7/12/2023) 3.4 - 5.3 mmol/L     _  Result Component Current Result Ref Range   Calcium 9.7 (7/12/2023) 8.6 - 10.0 mg/dL     No results found for Mag within last 30 days.     No results found  for Phos within last 30 days.     _  Result Component Current Result Ref Range   Albumin 4.6 (7/12/2023) 3.5 - 5.2 g/dL     _  Result Component Current Result Ref Range   Urea Nitrogen 14.7 (7/12/2023) 6.0 - 20.0 mg/dL     _  Result Component Current Result Ref Range   Creatinine 0.55 (7/12/2023) 0.51 - 0.95 mg/dL     _  Result Component Current Result Ref Range   AST 23 (7/12/2023) 0 - 45 U/L     _  Result Component Current Result Ref Range   ALT 31 (7/12/2023) 0 - 50 U/L     _  Result Component Current Result Ref Range   Bilirubin Total 0.4 (7/12/2023) <=1.2 mg/dL     _  Result Component Current Result Ref Range   WBC Count 7.9 (7/12/2023) 4.0 - 11.0 10e3/uL     _  Result Component Current Result Ref Range   Hemoglobin 12.8 (7/12/2023) 11.7 - 15.7 g/dL     _  Result Component Current Result Ref Range   Platelet Count 279 (7/12/2023) 150 - 450 10e3/uL     No results found for ANC within last 30 days.     _  Result Component Current Result Ref Range   Absolute Neutrophils 5.8 (7/12/2023) 1.6 - 8.3 10e3/uL        Assessment & Plan:  Results show no concerning abnormalities.  [employer] message sent to patient.  Continue to take Iclusig as planned.       Follow-Up:  8/8: Labs  8/23: Appt with Dr. Hull + labs  9/29: Quarterly assessment    Gladys Carrizales, AzarD  Hematology/Oncology Clinical Pharmacist  [facility]  [phone]

## 2023-07-12 NOTE — NURSING NOTE
Chief Complaint   Patient presents with     Port Draw     Labs drawn via port by rn in lab. VS taken.     Port accessed with 20g 3/4 inch gripper needle by RN, labs collected, line flushed with saline and heparin.     Issa Moore, RN

## 2023-07-21 DIAGNOSIS — C91.00 ACUTE LYMPHOBLASTIC LEUKEMIA (ALL) NOT HAVING ACHIEVED REMISSION (H): Primary | ICD-10-CM

## 2023-07-31 DIAGNOSIS — C91.00 ACUTE LYMPHOBLASTIC LEUKEMIA (ALL) NOT HAVING ACHIEVED REMISSION (H): Primary | ICD-10-CM

## 2023-08-01 ENCOUNTER — MYC MEDICAL ADVICE (OUTPATIENT)
Dept: INTERVENTIONAL RADIOLOGY/VASCULAR | Facility: CLINIC | Age: 43
End: 2023-08-01
Payer: COMMERCIAL

## 2023-08-01 DIAGNOSIS — C91.01 ACUTE LYMPHOBLASTIC LEUKEMIA (ALL) IN REMISSION (H): Primary | ICD-10-CM

## 2023-08-08 ENCOUNTER — HOSPITAL ENCOUNTER (OUTPATIENT)
Facility: CLINIC | Age: 43
Discharge: HOME | End: 2023-08-08
Attending: NURSE PRACTITIONER | Admitting: RADIOLOGY
Payer: COMMERCIAL

## 2023-08-08 ENCOUNTER — TRANSFERRED RECORDS (OUTPATIENT)
Dept: HEALTH INFORMATION MANAGEMENT | Facility: CLINIC | Age: 43
End: 2023-08-08

## 2023-08-08 ENCOUNTER — APPOINTMENT (OUTPATIENT)
Dept: MEDSURG UNIT | Facility: CLINIC | Age: 43
End: 2023-08-08
Attending: NURSE PRACTITIONER
Payer: COMMERCIAL

## 2023-08-08 ENCOUNTER — APPOINTMENT (OUTPATIENT)
Dept: INTERVENTIONAL RADIOLOGY/VASCULAR | Facility: CLINIC | Age: 43
End: 2023-08-08
Attending: NURSE PRACTITIONER
Payer: COMMERCIAL

## 2023-08-08 VITALS
BODY MASS INDEX: 43.17 KG/M2 | HEART RATE: 65 BPM | SYSTOLIC BLOOD PRESSURE: 104 MMHG | HEIGHT: 64 IN | TEMPERATURE: 97.8 F | WEIGHT: 252.87 LBS | DIASTOLIC BLOOD PRESSURE: 67 MMHG | OXYGEN SATURATION: 98 % | RESPIRATION RATE: 16 BRPM

## 2023-08-08 DIAGNOSIS — C91.01 ACUTE LYMPHOBLASTIC LEUKEMIA (ALL) IN REMISSION (H): ICD-10-CM

## 2023-08-08 LAB
BASOPHILS # BLD AUTO: 0 10E3/UL (ref 0–0.2)
BASOPHILS NFR BLD AUTO: 1 %
EOSINOPHIL # BLD AUTO: 0.2 10E3/UL (ref 0–0.7)
EOSINOPHIL NFR BLD AUTO: 3 %
ERYTHROCYTE [DISTWIDTH] IN BLOOD BY AUTOMATED COUNT: 14 % (ref 10–15)
HCT VFR BLD AUTO: 37.4 % (ref 35–47)
HGB BLD-MCNC: 12.2 G/DL (ref 11.7–15.7)
IMM GRANULOCYTES # BLD: 0.1 10E3/UL
IMM GRANULOCYTES NFR BLD: 1 %
INR PPP: 2.22 (ref 0.85–1.15)
LYMPHOCYTES # BLD AUTO: 0.8 10E3/UL (ref 0.8–5.3)
LYMPHOCYTES NFR BLD AUTO: 9 %
MCH RBC QN AUTO: 28.4 PG (ref 26.5–33)
MCHC RBC AUTO-ENTMCNC: 32.6 G/DL (ref 31.5–36.5)
MCV RBC AUTO: 87 FL (ref 78–100)
MONOCYTES # BLD AUTO: 1.1 10E3/UL (ref 0–1.3)
MONOCYTES NFR BLD AUTO: 12 %
NEUTROPHILS # BLD AUTO: 6.5 10E3/UL (ref 1.6–8.3)
NEUTROPHILS NFR BLD AUTO: 74 %
NRBC # BLD AUTO: 0 10E3/UL
NRBC BLD AUTO-RTO: 0 /100
PLATELET # BLD AUTO: 307 10E3/UL (ref 150–450)
RBC # BLD AUTO: 4.29 10E6/UL (ref 3.8–5.2)
WBC # BLD AUTO: 8.6 10E3/UL (ref 4–11)

## 2023-08-08 PROCEDURE — 999N000142 HC STATISTIC PROCEDURE PREP ONLY

## 2023-08-08 PROCEDURE — 85610 PROTHROMBIN TIME: CPT | Performed by: NURSE PRACTITIONER

## 2023-08-08 PROCEDURE — 81207 BCR/ABL1 GENE MINOR BP: CPT | Performed by: NURSE PRACTITIONER

## 2023-08-08 PROCEDURE — G0452 MOLECULAR PATHOLOGY INTERPR: HCPCS | Mod: 26 | Performed by: PATHOLOGY

## 2023-08-08 PROCEDURE — 88188 FLOWCYTOMETRY/READ 9-15: CPT | Mod: GC | Performed by: STUDENT IN AN ORGANIZED HEALTH CARE EDUCATION/TRAINING PROGRAM

## 2023-08-08 PROCEDURE — 88237 TISSUE CULTURE BONE MARROW: CPT | Performed by: NURSE PRACTITIONER

## 2023-08-08 PROCEDURE — 88264 CHROMOSOME ANALYSIS 20-25: CPT | Performed by: NURSE PRACTITIONER

## 2023-08-08 PROCEDURE — 88368 INSITU HYBRIDIZATION MANUAL: CPT | Mod: 26 | Performed by: MEDICAL GENETICS

## 2023-08-08 PROCEDURE — 38222 DX BONE MARROW BX & ASPIR: CPT | Mod: LT | Performed by: RADIOLOGY

## 2023-08-08 PROCEDURE — 250N000011 HC RX IP 250 OP 636: Performed by: STUDENT IN AN ORGANIZED HEALTH CARE EDUCATION/TRAINING PROGRAM

## 2023-08-08 PROCEDURE — 999N000132 HC STATISTIC PP CARE STAGE 1

## 2023-08-08 PROCEDURE — 88305 TISSUE EXAM BY PATHOLOGIST: CPT | Mod: 26 | Performed by: STUDENT IN AN ORGANIZED HEALTH CARE EDUCATION/TRAINING PROGRAM

## 2023-08-08 PROCEDURE — 99152 MOD SED SAME PHYS/QHP 5/>YRS: CPT

## 2023-08-08 PROCEDURE — 88311 DECALCIFY TISSUE: CPT | Mod: 26 | Performed by: STUDENT IN AN ORGANIZED HEALTH CARE EDUCATION/TRAINING PROGRAM

## 2023-08-08 PROCEDURE — 88184 FLOWCYTOMETRY/ TC 1 MARKER: CPT | Performed by: NURSE PRACTITIONER

## 2023-08-08 PROCEDURE — 272N000507 HC NEEDLE CR7

## 2023-08-08 PROCEDURE — 250N000011 HC RX IP 250 OP 636: Performed by: RADIOLOGY

## 2023-08-08 PROCEDURE — 36591 DRAW BLOOD OFF VENOUS DEVICE: CPT | Performed by: INTERNAL MEDICINE

## 2023-08-08 PROCEDURE — 258N000003 HC RX IP 258 OP 636: Performed by: NURSE PRACTITIONER

## 2023-08-08 PROCEDURE — 99152 MOD SED SAME PHYS/QHP 5/>YRS: CPT | Mod: GC | Performed by: RADIOLOGY

## 2023-08-08 PROCEDURE — 85097 BONE MARROW INTERPRETATION: CPT | Performed by: STUDENT IN AN ORGANIZED HEALTH CARE EDUCATION/TRAINING PROGRAM

## 2023-08-08 PROCEDURE — 88305 TISSUE EXAM BY PATHOLOGIST: CPT | Mod: TC | Performed by: NURSE PRACTITIONER

## 2023-08-08 PROCEDURE — 77012 CT SCAN FOR NEEDLE BIOPSY: CPT

## 2023-08-08 PROCEDURE — 250N000011 HC RX IP 250 OP 636: Mod: JZ | Performed by: NURSE PRACTITIONER

## 2023-08-08 PROCEDURE — 77012 CT SCAN FOR NEEDLE BIOPSY: CPT | Mod: 26 | Performed by: RADIOLOGY

## 2023-08-08 PROCEDURE — 85025 COMPLETE CBC W/AUTO DIFF WBC: CPT | Performed by: INTERNAL MEDICINE

## 2023-08-08 PROCEDURE — 88271 CYTOGENETICS DNA PROBE: CPT | Performed by: NURSE PRACTITIONER

## 2023-08-08 RX ORDER — FENTANYL CITRATE 50 UG/ML
25-50 INJECTION, SOLUTION INTRAMUSCULAR; INTRAVENOUS EVERY 5 MIN PRN
Status: DISCONTINUED | OUTPATIENT
Start: 2023-08-08 | End: 2023-08-08 | Stop reason: HOSPADM

## 2023-08-08 RX ORDER — NALOXONE HYDROCHLORIDE 0.4 MG/ML
0.2 INJECTION, SOLUTION INTRAMUSCULAR; INTRAVENOUS; SUBCUTANEOUS
Status: DISCONTINUED | OUTPATIENT
Start: 2023-08-08 | End: 2023-08-08 | Stop reason: HOSPADM

## 2023-08-08 RX ORDER — NALOXONE HYDROCHLORIDE 0.4 MG/ML
0.4 INJECTION, SOLUTION INTRAMUSCULAR; INTRAVENOUS; SUBCUTANEOUS
Status: DISCONTINUED | OUTPATIENT
Start: 2023-08-08 | End: 2023-08-08 | Stop reason: HOSPADM

## 2023-08-08 RX ORDER — SODIUM CHLORIDE 9 MG/ML
INJECTION, SOLUTION INTRAVENOUS CONTINUOUS
Status: DISCONTINUED | OUTPATIENT
Start: 2023-08-08 | End: 2023-08-08 | Stop reason: HOSPADM

## 2023-08-08 RX ORDER — HEPARIN SODIUM (PORCINE) LOCK FLUSH IV SOLN 100 UNIT/ML 100 UNIT/ML
5-10 SOLUTION INTRAVENOUS
Status: DISCONTINUED | OUTPATIENT
Start: 2023-08-08 | End: 2023-08-08 | Stop reason: HOSPADM

## 2023-08-08 RX ORDER — LIDOCAINE 40 MG/G
CREAM TOPICAL
Status: DISCONTINUED | OUTPATIENT
Start: 2023-08-08 | End: 2023-08-08 | Stop reason: HOSPADM

## 2023-08-08 RX ORDER — HEPARIN SODIUM,PORCINE 10 UNIT/ML
5-10 VIAL (ML) INTRAVENOUS EVERY 24 HOURS
Status: DISCONTINUED | OUTPATIENT
Start: 2023-08-08 | End: 2023-08-08 | Stop reason: HOSPADM

## 2023-08-08 RX ORDER — BUPIVACAINE HYDROCHLORIDE 5 MG/ML
1-30 INJECTION, SOLUTION EPIDURAL; INTRACAUDAL ONCE
Status: COMPLETED | OUTPATIENT
Start: 2023-08-08 | End: 2023-08-08

## 2023-08-08 RX ORDER — HEPARIN SODIUM,PORCINE 10 UNIT/ML
5-10 VIAL (ML) INTRAVENOUS
Status: DISCONTINUED | OUTPATIENT
Start: 2023-08-08 | End: 2023-08-08 | Stop reason: HOSPADM

## 2023-08-08 RX ORDER — FLUMAZENIL 0.1 MG/ML
0.2 INJECTION, SOLUTION INTRAVENOUS
Status: DISCONTINUED | OUTPATIENT
Start: 2023-08-08 | End: 2023-08-08 | Stop reason: HOSPADM

## 2023-08-08 RX ORDER — DIPHENHYDRAMINE HYDROCHLORIDE 50 MG/ML
25-50 INJECTION INTRAMUSCULAR; INTRAVENOUS
Status: COMPLETED | OUTPATIENT
Start: 2023-08-08 | End: 2023-08-08

## 2023-08-08 RX ADMIN — MIDAZOLAM HYDROCHLORIDE 1 MG: 1 INJECTION, SOLUTION INTRAMUSCULAR; INTRAVENOUS at 10:21

## 2023-08-08 RX ADMIN — MIDAZOLAM HYDROCHLORIDE 1 MG: 1 INJECTION, SOLUTION INTRAMUSCULAR; INTRAVENOUS at 10:05

## 2023-08-08 RX ADMIN — DIPHENHYDRAMINE HYDROCHLORIDE 50 MG: 50 INJECTION, SOLUTION INTRAMUSCULAR; INTRAVENOUS at 10:20

## 2023-08-08 RX ADMIN — FENTANYL CITRATE 50 MCG: 50 INJECTION, SOLUTION INTRAMUSCULAR; INTRAVENOUS at 10:21

## 2023-08-08 RX ADMIN — FENTANYL CITRATE 50 MCG: 50 INJECTION, SOLUTION INTRAMUSCULAR; INTRAVENOUS at 10:11

## 2023-08-08 RX ADMIN — Medication 5 ML: at 12:30

## 2023-08-08 RX ADMIN — MIDAZOLAM HYDROCHLORIDE 1 MG: 1 INJECTION, SOLUTION INTRAMUSCULAR; INTRAVENOUS at 10:54

## 2023-08-08 RX ADMIN — BUPIVACAINE HYDROCHLORIDE 10 ML: 5 INJECTION, SOLUTION EPIDURAL; INTRACAUDAL; PERINEURAL at 10:12

## 2023-08-08 RX ADMIN — FENTANYL CITRATE 50 MCG: 50 INJECTION, SOLUTION INTRAMUSCULAR; INTRAVENOUS at 10:54

## 2023-08-08 RX ADMIN — MIDAZOLAM HYDROCHLORIDE 1 MG: 1 INJECTION, SOLUTION INTRAMUSCULAR; INTRAVENOUS at 10:11

## 2023-08-08 RX ADMIN — SODIUM CHLORIDE: 9 INJECTION, SOLUTION INTRAVENOUS at 08:53

## 2023-08-08 RX ADMIN — FENTANYL CITRATE 50 MCG: 50 INJECTION, SOLUTION INTRAMUSCULAR; INTRAVENOUS at 10:05

## 2023-08-08 RX ADMIN — FENTANYL CITRATE 50 MCG: 50 INJECTION, SOLUTION INTRAMUSCULAR; INTRAVENOUS at 09:58

## 2023-08-08 RX ADMIN — MIDAZOLAM HYDROCHLORIDE 1 MG: 1 INJECTION, SOLUTION INTRAMUSCULAR; INTRAVENOUS at 09:59

## 2023-08-08 ASSESSMENT — ACTIVITIES OF DAILY LIVING (ADL)
ADLS_ACUITY_SCORE: 35

## 2023-08-08 NOTE — IR NOTE
Patient Name: Vashti Villegas  Medical Record Number: [id number]  Today's Date: 8/8/2023    Procedure: image guided bone marrow biopsy  Proceduralist: Dr RADHA Boles    Sedation start time: 0959  Sedation end time: 1103  Sedation medications administered: 5 mg versed, 250 mcg fentanyl  Total sedation time: 64 min  Sedation Notes: 50 mg IV benadryl given in addition to sedation       Procedure start time: 1002  Procedure end time: 1103    Report given to: Patty MEDINA   : none    Other Notes: Pt arrived to IR room 1 from . Consent reviewed, pt confirmed. Pt denies any questions or concerns regarding procedure. Pt positioned prone and monitored per protocol. Special heme path present for procedure, specimens collected as ordered. Left sacral site cleansed and dressed per protocol. Pt tolerated procedure without any noted complications. Pt transferred back to .

## 2023-08-08 NOTE — PRE-PROCEDURE
GENERAL PRE-PROCEDURE:   Procedure:  Bone marrow biopsy  Date/Time:  8/8/2023 9:17 AM    Verbal consent obtained?: Yes    Written consent obtained?: Yes    Risks and benefits: Risks, benefits and alternatives were discussed    DC Plan: Appropriate discharge home plan in place for patients who are going home after procedure   Consent given by:  Patient  Patient states understanding of procedure being performed: Yes    Patient's understanding of procedure matches consent: Yes    Procedure consent matches procedure scheduled: Yes    Expected level of sedation:  Moderate  Appropriately NPO:  Yes  ASA Class:  2  Mallampati  :  Grade 3- soft palate visible, posterior pharyngeal wall not visible  Lungs:  Lungs clear with good breath sounds bilaterally  Heart:  Normal heart sounds and rate  History & Physical reviewed:  History and physical reviewed and no updates needed  Statement of review:  I have reviewed the lab findings, diagnostic data, medications, and the plan for sedation

## 2023-08-08 NOTE — PROGRESS NOTES
Pt has met discharge criteria. VSS, denies pain. L sacral biopsy site dressing is CDI, no hematoma. Pt ambulated to bathroom and voided without complication. Pt tolerated food and drink. Port heparinized and deaccessed. Pt verbalized understanding of discharge instructions, copy given to pt.   Pt awaiting husbands arrival to hospital for ride home.

## 2023-08-08 NOTE — DISCHARGE INSTRUCTIONS
[facility]    Interventional Radiology  Patient Instructions Following Biopsy    AFTER YOU GO HOME  If you were given sedation, for the first 24 hours: we recommend that an adult stay with you, DO NOT drive or operate machinery at home or at work, DO NOT smoke, DO NOT make any important or legal decisions.  DO NOT drink alcoholic beverages the day of your procedure  Drink plenty of fluids   Resume your regular diet, unless otherwise instructed by your Primary Physician  Relax and take it easy for 48 hours  DO NOT do any strenuous exercise or lifting (> 10 lbs) for at least 7 days following your procedure  Keep the dressing dry and in place for 24 hours. Replace with Band aid for 2 days.  Never leave a wet dressing in place.  Do not take a shower for at least 12 hours following your procedure. No tub bath, hot tub, or swimming for 5 days.  There should be minimum drainage from the biopsy site    CALL THE PHYSICIAN IF:  You start bleeding from the procedure site.  If you do start to bleed from that site, lie down flat and hold pressure on the site for a minimum of 10 minutes.  Your physician will tell you if you need to return to the hospital  You develop nausea or vomiting  You have excessive swelling, redness, or tenderness at the site  You have drainage that looks like it is infected.  You experience severe pain  You develop hives or a rash or unexplained itching  You develop shortness of breath  You develop a temperature of 101 degrees F or greater    [facility] INTERVENTIONAL RADIOLOGY DEPARTMENT  Procedure Physician: Dr. Boles and Dr. Alcantara                            Date of procedure: August 8, 2023  Telephone Numbers: [phone]      Monday-Friday 7:30 am to 4:00 pm  [phone] After 4:00 pm Monday-Friday, Weekends & Holidays.   Ask for the Interventional Radiologist on call.  Someone is on call 24 hrs/day  [facility] toll free number: [phone] Monday-Friday 8:00 am to 4:30 pm  [facility]  Emergency Dept: [phone]

## 2023-08-08 NOTE — PROCEDURES
[facility]    Procedure: IR Procedure Note    Date/Time: 8/8/2023 11:07 AM    Performed by: Uriel Alcantara MD  Authorized by: Uriel Alcantara MD  IR Fellow Physician:  Radiology Resident Physician: Uriel Alcantara  Other(s) attending procedure: Nicole Boles      UNIVERSAL PROTOCOL   Site Marked: NA  Prior Images Obtained and Reviewed:  Yes  Required items: Required blood products, implants, devices and special equipment available    Patient identity confirmed:  Verbally with patient, arm band, provided demographic data and hospital-assigned identification number  Patient was reevaluated immediately before administering moderate or deep sedation or anesthesia  Confirmation Checklist:  Patient's identity using two indicators, relevant allergies, procedure was appropriate and matched the consent or emergent situation and correct equipment/implants were available  Time out: Immediately prior to the procedure a time out was called    Universal Protocol: the Joint Commission Universal Protocol was followed    Preparation: Patient was prepped and draped in usual sterile fashion       ANESTHESIA    Anesthesia: Local infiltration  Local Anesthetic:  Lidocaine 1% without epinephrine      SEDATION  Patient Sedated: Yes    Sedation Type:  Moderate (conscious) sedation  Sedation:  Fentanyl and midazolam (benedryl)  Vital signs: Vital signs monitored during sedation    See dictated procedure note for full details.  Findings: Successful fluoro and ct guided bone marrow biopsy    Specimens: none    Complications: None    Condition: Stable    Plan: One hour bedrest then okay to discharge      PROCEDURE  Describe Procedure: Successful fluoro and ct guided bone marrow biopsy  Patient Tolerance:  Patient tolerated the procedure well with no immediate complications  Length of time physician/provider present for 1:1 monitoring during sedation: 60

## 2023-08-08 NOTE — PROGRESS NOTES
Pt returned to 2A following bone marrow biopsy. VSS, c/o mild pain at biopsy site. Dressing is CDI, no hematoma. Pt given food and drink.

## 2023-08-08 NOTE — PROGRESS NOTES
Pt arrived to 2A for bone marrow biopsy. Pt's VSS, denies pain. Port accessed, infusing. Labs sent. Awaiting consent.   , Crescencio, available for ride home.

## 2023-08-09 ENCOUNTER — TELEPHONE (OUTPATIENT)
Dept: INTERVENTIONAL RADIOLOGY/VASCULAR | Facility: CLINIC | Age: 43
End: 2023-08-09
Payer: COMMERCIAL

## 2023-08-09 NOTE — TELEPHONE ENCOUNTER
POST CALL    Spoke with: April Bakari  Call attempt: 1  Message left: No  Any pain: No  Any fever: No  Any redness/swelling/ abnormal drainage around puncture site: No  Were you instructed well enough to take care of yourself at home: Yes  Are you satisfied with the care you received: Yes  Any additional concerns or questions: No  IR nurse triage line number provided: Yes    Post call completed.   August 9, 2023 10:25 AM  Candace Carranza RN

## 2023-08-09 NOTE — ORAL ONC MGMT
Oral Chemotherapy Monitoring Program  Lab Follow Up  Reviewed lab results from 8/8/23.    Labs:    _  Result Component Current Result Ref Range   WBC Count 8.6 (8/8/2023) 4.0 - 11.0 10e3/uL     _  Result Component Current Result Ref Range   Hemoglobin 12.2 (8/8/2023) 11.7 - 15.7 g/dL     _  Result Component Current Result Ref Range   Platelet Count 307 (8/8/2023) 150 - 450 10e3/uL       Assessment & Plan:  No new concerning abnormalities. Continue plan of care. Patient not contacted since she was seen at clinic for procedure.     Follow-Up:  8/23: Dr. Hull visit and labs       Tay Lozano, PharmD  Hematology/Oncology Clinical Pharmacist  [facility]  [facility]  [phone]

## 2023-08-22 LAB
ABO/RH(D): NORMAL
ANTIBODY SCREEN: NEGATIVE
SPECIMEN EXPIRATION DATE: NORMAL

## 2023-08-22 NOTE — PROGRESS NOTES
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 While at OSH, she had CT PE protocol as well as CT A/P. These identified no PE, no acute or localized intraabdominal inflammatory process, mild splenomegaly, and few inconspicous low-density structures in the liver. She was transferred to [facility] for further workup and management. S/p Hydrea 1g TID (8/21-8/23) with improvement in WBC (100K ? 12K).   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 IHC shows CD10+, CD34+. 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 Abnormal cytogenetics.   --Microarry study: CN gains/losses in 3p21, 4q25, 7p11 (region includes CREB5 and IKZF1; biallelic loss of TARP), 9p13.2-p22.3 (region includes CDKN2a/2b and PAX5), 12q13, 14q11.     INDUCTION:   --Initiated pre-phase steroids with Prednisone 60 mg x8/21; increased to 60 mg/m2 (140 mg) 8/22-8/24. She initiated GRAALL + imatinib (C1D1 = 8/24/22).   --S/p BMT consult 9/12 with Dr. Walsh. Induction was relatively uncomplicated.   -s/p weekly triple IT chemo (Cytarabine, Solu-Cortef, MTX) on Days 1, 8, and 15 of C1 induction. CNS negative.   --BMBx 9/26/22 MRD-negative by BCR-ABL PCR and by Clonoseq Assay.      --GRAALL + imatinib (C2D1=9/30/22). 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   Post-cycle-2 BMBx 10/25/2022 showed MRD-negative CR, though low-level abnormal B-cell population (favor hematogones over residual leukemia) was noted. BCR-ABL negative. Clonoseq negative at 1/10E6 level.    --C3 GRAALL + imatinib on 11/2/22 was tolerated well  without complication.    CONSOLIDATION:   --Discussed with Dr. Rutherford, BMT faculty meeting: No matched related donors identified. High panel reactive antibodies towards  unrelated donor samples. With an excellent early, deep MRD response to chemotherapy and no optimal donor sources identified, discussed foregoing BMT in CR1 in favor of definitive therapy.  Discussed risks and benefits of GRAAL versus newer approaches. April agreed to a combination of Blinatumomab plus ponatinib  (Jesusita et al The Lancet Hematology Volume 10, ISSUE 1, e24-e34, January 2023).   --Aspirin 81 mg daily added for VTE prophylaxis per original study.   --Cycle 1Day 1 Blinatumomab plus Ponatinib 15 mg 12/14/2022. Complicated by PICC disruption, brief Blincyto interruption ~12/21/22.   --BMBx 1/17/2023: MRD- by [facility]. BCR-ABL p190 detectable at <0.079%. Clonoseq negative.  --Cycle 2 Day 1 Blinatumomab plus Ponatinib 15 mg  2/6/2023.  --BMBx 3/1/2023: MRD- by [facility]. BCR-ABL p190 undetectable. Clonoseq negative. FISH with borderline detectable BCR-ABL fusion at 0.125% (at LoD for assay).  --Cycle 3 Day 1 Blinatumomab plus Ponatinib 15 mg 3/9/2023.  --Cycle 4 Day 1 Blinatumomab plus Ponatinib 15 mg 4/20/23.  --Cycle 5 Day 1 Blinatumomab plus Ponatinib 15 mg 6/1/23.  --Continued on Ponatinib 15 mg every day maintenance   --8/8/2023 BMBx hypocellular (40%) marrow, no evidence of leukemia. MRD- by M[facility], BCR-ABL QPCR negative, Clonoseq PENDING    INTRATHECAL CHEMOTHERAPY: ALL CSF studies negative by MFC  8/26/22: Cytarabine 40 mg/methotrexate 15 mg  8/31/22: Cytarabine 40 mg/methotrexate 15 mg  9/7/22: Cytarabine 40 mg/methotrexate 15 mg  9/14/22: Methotrexate 15 mg  10/21/22: Cytarabine 40 mg/methotrexate 15 mg  11/25/22: Cytarabine 100 mg  12/16/22: Methotrexate 12 mg  1/2/23; Cytarabine 100 mg  1/16/23: Methotrexate 12 mg  2/02/23: Cytarabine 100 mg  2/27/23: Methotrexate 12 mg  3/14/23: Cytarabine 100 mg      History of Present Illness:   April Kiel  returns.  Having some upper airway congestion, without cough, that is improving, but has been going on for ~3 weeks. She developed while vacationing in [place] with friends; 2 friends developed COVID-19; April elected to not be tested.   Otherwise doing well.   12-point ROS otherwise negative.    Medications:       Current Outpatient Medications   Medication Sig    acetaminophen (TYLENOL) 325 MG tablet Take 650 mg by mouth every 6 hours as needed for mild pain, other or fever (blood product administration premedication)    aspirin (ASA) 81 MG EC tablet Take 1 tablet (81 mg) by mouth daily    benzonatate (TESSALON) 100 MG capsule Take 1 capsule (100 mg) by mouth 3 times daily as needed for cough    lidocaine-prilocaine (EMLA) 2.5-2.5 % external cream Apply topically as needed (Apply 30 to 60minutes before Port Needle Access for pain)    lidocaine-prilocaine (LIDOPRIL) 2.5-2.5 % kit Use prior to lab access    LORazepam (ATIVAN) 0.5 MG tablet Take 1-2 tablets (0.5-1 mg) by mouth daily as needed for anxiety . Take prior to BMBx procedures as needed. If initial dose unhelpful, okay to take a second 0.5-1 mg dose (for a max of 2 mg at a time). Caution: causes sedation. Do not drive after taking this medication.    ondansetron (ZOFRAN ODT) 4 MG ODT tab Take 1-2 tablets (4-8 mg) by mouth every 8 hours as needed for nausea or vomiting    PONATinib (ICLUSIG) 15 MG tablet Take 1 tablet (15 mg) by mouth daily    prochlorperazine (COMPAZINE) 10 MG tablet Take 1 tablet (10 mg) by mouth every 6 hours as needed (Breakthrough Nausea/Vomiting)    valACYclovir (VALTREX) 1000 mg tablet Take 1 tablet (1,000 mg) by mouth daily    NEUPOGEN 480 MCG/0.8ML SOSY syringe  (Patient not taking: Reported on 8/23/2023)     No current facility-administered medications for this visit.        Physical Exam:   Blood pressure 122/78, pulse 73, temperature 97.8  F (36.6  C), temperature source Oral, resp. rate 16, weight 117.3 kg (258 lb 8 oz), SpO2 99  %.    ECOG 1  Gen: NAD  HEENT: slight periorbital edema. No sinus pressure. OP clear. MMM. CV: RRR Pulm: CTAB Lymph: No LAD    No results found for this or any previous visit (from the past 24 hour(s)).    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 After induction with cycle 1 and 2 of attenuated-dose GRAAL chemotherapy,, she was in an MRD- CR (CR1) by [facility], BCR-ABL PCR, and Clonoseq assay.  With no matched related donors, and an excellent early/deep MRD-CR, chemotherapy is currently favored over BMT; this is supported by multiple recent clinical observations (Blood. 2016 Jul 28; 128(4): 504-507; Blood 2022 Jul 25;blood.[id number]). After discussion, she transitioned to Blinatumomab + Ponatinib (Short et al The Lancet Hematology Volume 10, ISSUE 1, e24-e34, January 2023) as curative intent therapy. C1D1 12/14/22. Course complicated by transient cytopenias and headaches. She  completed 12 total doses of prophylactic intrathecal chemotherapy.     She is currently doing quite well with no adverse symptoms or sequelae of treatment.     I discussed COVID-19 again with her today -- she is not interested in vaccination. Defer testing for now, as it would not affect management. In the future, she should pursue Paxlovid if she develops confirmed COVID-19.     PLAN:  --Continue maintenace ponatinib 15 mg QD for 5 years since initiation of therapy, then reassess.  --CBC w/ diff, CMP every 8 weeks. Peripheral blood BCR-ABL and Clonoseq NGS MRD testing every 4 months. Clinic visit every 4 months after BCR-ABL and Clonoseq have resulted.   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      # Headaches  # H/o COVID-19 infection   --Not vaccinated (due to fear of needles per pt report).  --Evusheld given 11/2/2022    # Hypertension: Amlodipine; Lasix as needed  # Indigestion/GERD    Steve Hull MD, PhD  Division of Hematology, Oncology, and Transplantation  [facility]

## 2023-08-23 ENCOUNTER — APPOINTMENT (OUTPATIENT)
Dept: LAB | Facility: CLINIC | Age: 43
End: 2023-08-23
Attending: INTERNAL MEDICINE
Payer: COMMERCIAL

## 2023-08-23 ENCOUNTER — ONCOLOGY VISIT (OUTPATIENT)
Dept: ONCOLOGY | Facility: CLINIC | Age: 43
End: 2023-08-23
Attending: INTERNAL MEDICINE
Payer: COMMERCIAL

## 2023-08-23 VITALS
SYSTOLIC BLOOD PRESSURE: 122 MMHG | HEART RATE: 73 BPM | DIASTOLIC BLOOD PRESSURE: 78 MMHG | BODY MASS INDEX: 44.37 KG/M2 | OXYGEN SATURATION: 99 % | RESPIRATION RATE: 16 BRPM | TEMPERATURE: 97.8 F | WEIGHT: 258.5 LBS

## 2023-08-23 DIAGNOSIS — C91.00 ACUTE LYMPHOBLASTIC LEUKEMIA (ALL) NOT HAVING ACHIEVED REMISSION (H): ICD-10-CM

## 2023-08-23 LAB
ALBUMIN SERPL BCG-MCNC: 4.3 G/DL (ref 3.5–5.2)
ALP SERPL-CCNC: 90 U/L (ref 35–104)
ALT SERPL W P-5'-P-CCNC: 29 U/L (ref 0–50)
ANION GAP SERPL CALCULATED.3IONS-SCNC: 10 MMOL/L (ref 7–15)
AST SERPL W P-5'-P-CCNC: 25 U/L (ref 0–45)
BASOPHILS # BLD AUTO: 0.1 10E3/UL (ref 0–0.2)
BASOPHILS NFR BLD AUTO: 1 %
BILIRUB SERPL-MCNC: 0.3 MG/DL
BUN SERPL-MCNC: 12.1 MG/DL (ref 6–20)
CALCIUM SERPL-MCNC: 8.9 MG/DL (ref 8.6–10)
CHLORIDE SERPL-SCNC: 107 MMOL/L (ref 98–107)
CREAT SERPL-MCNC: 0.54 MG/DL (ref 0.51–0.95)
DEPRECATED HCO3 PLAS-SCNC: 24 MMOL/L (ref 22–29)
EOSINOPHIL # BLD AUTO: 0.2 10E3/UL (ref 0–0.7)
EOSINOPHIL NFR BLD AUTO: 3 %
ERYTHROCYTE [DISTWIDTH] IN BLOOD BY AUTOMATED COUNT: 14.3 % (ref 10–15)
GFR SERPL CREATININE-BSD FRML MDRD: >90 ML/MIN/1.73M2
GLUCOSE SERPL-MCNC: 92 MG/DL (ref 70–99)
HCT VFR BLD AUTO: 38.6 % (ref 35–47)
HGB BLD-MCNC: 12.4 G/DL (ref 11.7–15.7)
IMM GRANULOCYTES # BLD: 0.1 10E3/UL
IMM GRANULOCYTES NFR BLD: 1 %
LYMPHOCYTES # BLD AUTO: 1 10E3/UL (ref 0.8–5.3)
LYMPHOCYTES NFR BLD AUTO: 11 %
MCH RBC QN AUTO: 27.5 PG (ref 26.5–33)
MCHC RBC AUTO-ENTMCNC: 32.1 G/DL (ref 31.5–36.5)
MCV RBC AUTO: 86 FL (ref 78–100)
MONOCYTES # BLD AUTO: 0.9 10E3/UL (ref 0–1.3)
MONOCYTES NFR BLD AUTO: 11 %
NEUTROPHILS # BLD AUTO: 6.4 10E3/UL (ref 1.6–8.3)
NEUTROPHILS NFR BLD AUTO: 73 %
NRBC # BLD AUTO: 0 10E3/UL
NRBC BLD AUTO-RTO: 0 /100
PLATELET # BLD AUTO: 267 10E3/UL (ref 150–450)
POTASSIUM SERPL-SCNC: 4.2 MMOL/L (ref 3.4–5.3)
PROT SERPL-MCNC: 6.2 G/DL (ref 6.4–8.3)
RBC # BLD AUTO: 4.51 10E6/UL (ref 3.8–5.2)
SODIUM SERPL-SCNC: 141 MMOL/L (ref 136–145)
WBC # BLD AUTO: 8.6 10E3/UL (ref 4–11)

## 2023-08-23 PROCEDURE — 85025 COMPLETE CBC W/AUTO DIFF WBC: CPT | Performed by: INTERNAL MEDICINE

## 2023-08-23 PROCEDURE — 99213 OFFICE O/P EST LOW 20 MIN: CPT | Performed by: INTERNAL MEDICINE

## 2023-08-23 PROCEDURE — 250N000011 HC RX IP 250 OP 636: Mod: JZ | Performed by: INTERNAL MEDICINE

## 2023-08-23 PROCEDURE — 36591 DRAW BLOOD OFF VENOUS DEVICE: CPT | Performed by: INTERNAL MEDICINE

## 2023-08-23 PROCEDURE — 99214 OFFICE O/P EST MOD 30 MIN: CPT | Performed by: INTERNAL MEDICINE

## 2023-08-23 PROCEDURE — 86901 BLOOD TYPING SEROLOGIC RH(D): CPT | Performed by: INTERNAL MEDICINE

## 2023-08-23 PROCEDURE — 80053 COMPREHEN METABOLIC PANEL: CPT | Performed by: INTERNAL MEDICINE

## 2023-08-23 RX ORDER — ACYCLOVIR 400 MG/1
400 TABLET ORAL EVERY 12 HOURS
Qty: 60 TABLET | Refills: 3 | Status: SHIPPED | OUTPATIENT
Start: 2023-08-23 | End: 2024-01-25

## 2023-08-23 RX ORDER — HEPARIN SODIUM (PORCINE) LOCK FLUSH IV SOLN 100 UNIT/ML 100 UNIT/ML
5 SOLUTION INTRAVENOUS
Status: COMPLETED | OUTPATIENT
Start: 2023-08-23 | End: 2023-08-23

## 2023-08-23 RX ADMIN — Medication 5 ML: at 09:48

## 2023-08-23 ASSESSMENT — PAIN SCALES - GENERAL: PAINLEVEL: NO PAIN (0)

## 2023-08-23 NOTE — LETTER
8/23/2023         RE: Vashti Villegas  [address] [place] [address]        Dear Colleague,    Thank you for referring your patient, Vashti Villegas, to the [facility]. Please see a copy of my visit note below.    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 While at OSH, she had CT PE protocol as well as CT A/P. These identified no PE, no acute or localized intraabdominal inflammatory process, mild splenomegaly, and few inconspicous low-density structures in the liver. She was transferred to [facility] for further workup and management. S/p Hydrea 1g TID (8/21-8/23) with improvement in WBC (100K ? 12K).   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 IHC shows CD10+, CD34+. 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 Abnormal cytogenetics.   --Microarry study: CN gains/losses in 3p21, 4q25, 7p11 (region includes CREB5 and IKZF1; biallelic loss of TARP), 9p13.2-p22.3 (region includes CDKN2a/2b and PAX5), 12q13, 14q11.     INDUCTION:   --Initiated pre-phase steroids with Prednisone 60 mg x8/21; increased to 60 mg/m2 (140 mg) 8/22-8/24. She initiated GRAALL + imatinib (C1D1 = 8/24/22).   --S/p BMT consult 9/12 with Dr. Walsh. Induction was relatively uncomplicated.   -s/p weekly triple IT chemo (Cytarabine, Solu-Cortef, MTX) on Days 1, 8, and 15 of C1 induction. CNS negative.   --BMBx 9/26/22 MRD-negative by BCR-ABL PCR and by Clonoseq Assay.      --GRAALL + imatinib (C2D1=9/30/22). 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   Post-cycle-2  BMBx 10/25/2022 showed MRD-negative CR, though low-level abnormal B-cell population (favor hematogones over residual leukemia) was noted. BCR-ABL negative. Clonoseq negative at 1/10E6 level.    --C3 GRAALL + imatinib on 11/2/22 was tolerated well without complication.    CONSOLIDATION:   --Discussed with Dr. Rutherford, BMT faculty meeting: No matched related donors identified. High panel reactive antibodies towards  unrelated donor samples. With an excellent early, deep MRD response to chemotherapy and no optimal donor sources identified, discussed foregoing BMT in CR1 in favor of definitive therapy.  Discussed risks and benefits of GRAAL versus newer approaches. April agreed to a combination of Blinatumomab plus ponatinib  (Short et al The Lancet Hematology Volume 10, ISSUE 1, e24-e34, January 2023).   --Aspirin 81 mg daily added for VTE prophylaxis per original study.   --Cycle 1Day 1 Blinatumomab plus Ponatinib 15 mg 12/14/2022. Complicated by PICC disruption, brief Blincyto interruption ~12/21/22.   --BMBx 1/17/2023: MRD- by [facility]. BCR-ABL p190 detectable at <0.079%. Clonoseq negative.  --Cycle 2 Day 1 Blinatumomab plus Ponatinib 15 mg  2/6/2023.  --BMBx 3/1/2023: MRD- by [facility]. BCR-ABL p190 undetectable. Clonoseq negative. FISH with borderline detectable BCR-ABL fusion at 0.125% (at LoD for assay).  --Cycle 3 Day 1 Blinatumomab plus Ponatinib 15 mg 3/9/2023.  --Cycle 4 Day 1 Blinatumomab plus Ponatinib 15 mg 4/20/23.  --Cycle 5 Day 1 Blinatumomab plus Ponatinib 15 mg 6/1/23.  --Continued on Ponatinib 15 mg every day maintenance   --8/8/2023 BMBx hypocellular (40%) marrow, no evidence of leukemia. MRD- by [facility], BCR-ABL QPCR negative, Clonoseq PENDING    INTRATHECAL CHEMOTHERAPY: ALL CSF studies negative by MFC  8/26/22: Cytarabine 40 mg/methotrexate 15 mg  8/31/22: Cytarabine 40 mg/methotrexate 15 mg  9/7/22: Cytarabine 40 mg/methotrexate 15 mg  9/14/22: Methotrexate 15 mg  10/21/22: Cytarabine 40 mg/methotrexate 15  mg  11/25/22: Cytarabine 100 mg  12/16/22: Methotrexate 12 mg  1/2/23; Cytarabine 100 mg  1/16/23: Methotrexate 12 mg  2/02/23: Cytarabine 100 mg  2/27/23: Methotrexate 12 mg  3/14/23: Cytarabine 100 mg      History of Present Illness:   April Bakari bush.  Having some upper airway congestion, without cough, that is improving, but has been going on for ~3 weeks. She developed while vacationing in [place] with friends; 2 friends developed COVID-19; April elected to not be tested.   Otherwise doing well.   12-point ROS otherwise negative.    Medications:       Current Outpatient Medications   Medication Sig    acetaminophen (TYLENOL) 325 MG tablet Take 650 mg by mouth every 6 hours as needed for mild pain, other or fever (blood product administration premedication)    aspirin (ASA) 81 MG EC tablet Take 1 tablet (81 mg) by mouth daily    benzonatate (TESSALON) 100 MG capsule Take 1 capsule (100 mg) by mouth 3 times daily as needed for cough    lidocaine-prilocaine (EMLA) 2.5-2.5 % external cream Apply topically as needed (Apply 30 to 60minutes before Port Needle Access for pain)    lidocaine-prilocaine (LIDOPRIL) 2.5-2.5 % kit Use prior to lab access    LORazepam (ATIVAN) 0.5 MG tablet Take 1-2 tablets (0.5-1 mg) by mouth daily as needed for anxiety . Take prior to BMBx procedures as needed. If initial dose unhelpful, okay to take a second 0.5-1 mg dose (for a max of 2 mg at a time). Caution: causes sedation. Do not drive after taking this medication.    ondansetron (ZOFRAN ODT) 4 MG ODT tab Take 1-2 tablets (4-8 mg) by mouth every 8 hours as needed for nausea or vomiting    PONATinib (ICLUSIG) 15 MG tablet Take 1 tablet (15 mg) by mouth daily    prochlorperazine (COMPAZINE) 10 MG tablet Take 1 tablet (10 mg) by mouth every 6 hours as needed (Breakthrough Nausea/Vomiting)    valACYclovir (VALTREX) 1000 mg tablet Take 1 tablet (1,000 mg) by mouth daily    NEUPOGEN 480 MCG/0.8ML SOSY syringe  (Patient not taking:  Reported on 8/23/2023)     No current facility-administered medications for this visit.        Physical Exam:   Blood pressure 122/78, pulse 73, temperature 97.8  F (36.6  C), temperature source Oral, resp. rate 16, weight 117.3 kg (258 lb 8 oz), SpO2 99 %.    ECOG 1  Gen: NAD  HEENT: slight periorbital edema. No sinus pressure. OP clear. MMM. CV: RRR Pulm: CTAB Lymph: No LAD    No results found for this or any previous visit (from the past 24 hour(s)).    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 After induction with cycle 1 and 2 of attenuated-dose GRAAL chemotherapy,, she was in an MRD- CR (CR1) by M[facility], BCR-ABL PCR, and Clonoseq assay.  With no matched related donors, and an excellent early/deep MRD-CR, chemotherapy is currently favored over BMT; this is supported by multiple recent clinical observations (Blood. 2016 Jul 28; 128(4): 504-507; Blood 2022 Jul 25;blood.[id number]). After discussion, she transitioned to Blinatumomab + Ponatinib (Short et al The Lancet Hematology Volume 10, ISSUE 1, e24-e34, January 2023) as curative intent therapy. C1D1 12/14/22. Course complicated by transient cytopenias and headaches. She  completed 12 total doses of prophylactic intrathecal chemotherapy.     She is currently doing quite well with no adverse symptoms or sequelae of treatment.     I discussed COVID-19 again with her today -- she is not interested in vaccination. Defer testing for now, as it would not affect management. In the future, she should pursue Paxlovid if she develops confirmed COVID-19.     PLAN:  --Continue maintenace ponatinib 15 mg QD for 5 years since initiation of therapy, then reassess.  --CBC w/ diff, CMP every 8 weeks. Peripheral blood BCR-ABL and Clonoseq NGS MRD testing every 4 months. Clinic visit every 4 months after BCR-ABL and Clonoseq have resulted.   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      # Headaches  # H/o COVID-19 infection   --Not vaccinated (due to fear of needles per pt report).  --Evusheld given 11/2/2022    # Hypertension: Amlodipine; Lasix as needed  # Indigestion/GERD    Steve Hull MD, PhD  Division of Hematology, Oncology, and Transplantation  [facility]

## 2023-08-23 NOTE — NURSING NOTE
"Oncology Rooming Note    August 23, 2023 9:59 AM   April F Bakari is a 43 year old female who presents for:    Chief Complaint   Patient presents with    Oncology Clinic Visit     Acute lymphoblastic leukemia     Initial Vitals: /78 (BP Location: Right arm, Patient Position: Sitting, Cuff Size: Adult Large)   Pulse 73   Temp 97.8  F (36.6  C) (Oral)   Resp 16   Wt 117.3 kg (258 lb 8 oz)   SpO2 99%   BMI 44.37 kg/m   Estimated body mass index is 44.37 kg/m  as calculated from the following:    Height as of 8/8/23: 1.626 m (5' 4\").    Weight as of this encounter: 117.3 kg (258 lb 8 oz). Body surface area is 2.3 meters squared.  No Pain (0) Comment: Data Unavailable   No LMP recorded. Patient is postmenopausal.  Allergies reviewed: Yes  Medications reviewed: Yes    Medications: Medication refills not needed today.  Pharmacy name entered into [facility]:    [id number] - [place], [place] - [address]  [facility] - [place], [place] - [address]    Clinical concerns:       Pavan Lopez              "

## 2023-08-23 NOTE — NURSING NOTE
"Chief Complaint   Patient presents with    Oncology Clinic Visit     Acute lymphoblastic leukemia    Port Draw     Labs drawn from port by rn.  VS taken.     Port accessed with 20 gauge 3/4\" gripper needle and labs drawn by rn.  Port flushed with NS and heparin then de-accessed.  Pt tolerated well.  VS taken.  Pt checked in for next appt.    Nithya Fung RN      "

## 2023-08-24 DIAGNOSIS — C91.00 ACUTE LYMPHOBLASTIC LEUKEMIA (ALL) NOT HAVING ACHIEVED REMISSION (H): Primary | ICD-10-CM

## 2023-08-25 DIAGNOSIS — C91.00 ACUTE LYMPHOBLASTIC LEUKEMIA (ALL) NOT HAVING ACHIEVED REMISSION (H): Primary | ICD-10-CM

## 2023-09-05 NOTE — PROGRESS NOTES
I spoke with April and informed her that her recent Clonoseq testing shows a positive MRD test, with a value of <1 per 3.4e6 total nucleated cells. This is below the limit of quantitation, but not below the limit of detection. BCR-ABL testing, flow cytometry, and morphologic testing show no signs of persistent disease.     I explained that this could be a false positive. Alternatively, it could represent low-level residual leukemia. If it is residual leukemia, it may simply be that we detected it now (and not on previous testing) simply due to serial re-sampling. If that's the case, repeat testing is likely to be again negative, or possibly positive again at the same very low level. On the other hand, if we repeat testing, and the signal level is increasing, only then would I have concern for possible progression/relapse.     I will arrange for repeat Clonoseq testing by peripheral blood in the next 3 weeks. All questions answered.      Steve Hull MD, PhD  Division of Hematology, Oncology, and Transplantation  [facility]

## 2023-09-10 LAB
ABO/RH(D): NORMAL
ANTIBODY SCREEN: NEGATIVE
SPECIMEN EXPIRATION DATE: NORMAL

## 2023-09-11 ENCOUNTER — LAB (OUTPATIENT)
Dept: LAB | Facility: CLINIC | Age: 43
End: 2023-09-11
Payer: COMMERCIAL

## 2023-09-11 DIAGNOSIS — C91.00 ACUTE LYMPHOBLASTIC LEUKEMIA (ALL) NOT HAVING ACHIEVED REMISSION (H): ICD-10-CM

## 2023-09-11 LAB
ALBUMIN SERPL BCG-MCNC: 4.3 G/DL (ref 3.5–5.2)
ALP SERPL-CCNC: 91 U/L (ref 35–104)
ALT SERPL W P-5'-P-CCNC: 18 U/L (ref 0–50)
ANION GAP SERPL CALCULATED.3IONS-SCNC: 12 MMOL/L (ref 7–15)
AST SERPL W P-5'-P-CCNC: 18 U/L (ref 0–45)
BASOPHILS # BLD AUTO: 0.1 10E3/UL (ref 0–0.2)
BASOPHILS NFR BLD AUTO: 1 %
BILIRUB SERPL-MCNC: 0.3 MG/DL
BUN SERPL-MCNC: 10 MG/DL (ref 6–20)
CALCIUM SERPL-MCNC: 8.4 MG/DL (ref 8.6–10)
CHLORIDE SERPL-SCNC: 106 MMOL/L (ref 98–107)
CREAT SERPL-MCNC: 0.62 MG/DL (ref 0.51–0.95)
DEPRECATED HCO3 PLAS-SCNC: 23 MMOL/L (ref 22–29)
EGFRCR SERPLBLD CKD-EPI 2021: >90 ML/MIN/1.73M2
EOSINOPHIL # BLD AUTO: 0.3 10E3/UL (ref 0–0.7)
EOSINOPHIL NFR BLD AUTO: 3 %
ERYTHROCYTE [DISTWIDTH] IN BLOOD BY AUTOMATED COUNT: 14.6 % (ref 10–15)
GLUCOSE SERPL-MCNC: 119 MG/DL (ref 70–99)
HCT VFR BLD AUTO: 38.1 % (ref 35–47)
HGB BLD-MCNC: 12.5 G/DL (ref 11.7–15.7)
IMM GRANULOCYTES # BLD: 0.1 10E3/UL
IMM GRANULOCYTES NFR BLD: 1 %
LAB DIRECTOR COMMENTS: NORMAL
LAB DIRECTOR DISCLAIMER: NORMAL
LAB DIRECTOR INTERPRETATION: NORMAL
LAB DIRECTOR METHODOLOGY: NORMAL
LAB DIRECTOR RESULTS: NORMAL
LDH SERPL L TO P-CCNC: 151 U/L (ref 0–250)
LIPASE SERPL-CCNC: 17 U/L (ref 13–60)
LYMPHOCYTES # BLD AUTO: 1.6 10E3/UL (ref 0.8–5.3)
LYMPHOCYTES NFR BLD AUTO: 17 %
MCH RBC QN AUTO: 27.8 PG (ref 26.5–33)
MCHC RBC AUTO-ENTMCNC: 32.8 G/DL (ref 31.5–36.5)
MCV RBC AUTO: 85 FL (ref 78–100)
MONOCYTES # BLD AUTO: 0.9 10E3/UL (ref 0–1.3)
MONOCYTES NFR BLD AUTO: 10 %
NEUTROPHILS # BLD AUTO: 6.5 10E3/UL (ref 1.6–8.3)
NEUTROPHILS NFR BLD AUTO: 68 %
NRBC # BLD AUTO: 0 10E3/UL
NRBC BLD AUTO-RTO: 0 /100
PLATELET # BLD AUTO: 268 10E3/UL (ref 150–450)
POTASSIUM SERPL-SCNC: 3.6 MMOL/L (ref 3.4–5.3)
PROT SERPL-MCNC: 6.3 G/DL (ref 6.4–8.3)
RBC # BLD AUTO: 4.49 10E6/UL (ref 3.8–5.2)
SODIUM SERPL-SCNC: 141 MMOL/L (ref 136–145)
SPECIMEN DESCRIPTION: NORMAL
WBC # BLD AUTO: 9.4 10E3/UL (ref 4–11)

## 2023-09-11 PROCEDURE — 85025 COMPLETE CBC W/AUTO DIFF WBC: CPT

## 2023-09-11 PROCEDURE — 86901 BLOOD TYPING SEROLOGIC RH(D): CPT

## 2023-09-11 PROCEDURE — 81207 BCR/ABL1 GENE MINOR BP: CPT

## 2023-09-11 PROCEDURE — 83615 LACTATE (LD) (LDH) ENZYME: CPT

## 2023-09-11 PROCEDURE — G0452 MOLECULAR PATHOLOGY INTERPR: HCPCS | Performed by: PATHOLOGY

## 2023-09-11 PROCEDURE — 86900 BLOOD TYPING SEROLOGIC ABO: CPT

## 2023-09-11 PROCEDURE — 83690 ASSAY OF LIPASE: CPT

## 2023-09-11 PROCEDURE — 86850 RBC ANTIBODY SCREEN: CPT

## 2023-09-11 PROCEDURE — 80053 COMPREHEN METABOLIC PANEL: CPT

## 2023-09-11 PROCEDURE — 36415 COLL VENOUS BLD VENIPUNCTURE: CPT

## 2023-09-20 ENCOUNTER — PATIENT OUTREACH (OUTPATIENT)
Dept: ONCOLOGY | Facility: CLINIC | Age: 43
End: 2023-09-20
Payer: COMMERCIAL

## 2023-09-20 NOTE — PROGRESS NOTES
[facility]: Cancer Care                                                                                          Message left for April to let her know that the Clonoseq testing was not collected at her last lab appointment.  I asked her to call me back so we can arrange to either schedule a new appointment or possibly have it collected in her home through [employer]     Signature:  Flor Leslie RN

## 2023-09-25 DIAGNOSIS — C91.00 ACUTE LYMPHOBLASTIC LEUKEMIA (ALL) NOT HAVING ACHIEVED REMISSION (H): Primary | ICD-10-CM

## 2023-09-28 ENCOUNTER — TELEPHONE (OUTPATIENT)
Dept: ONCOLOGY | Facility: CLINIC | Age: 43
End: 2023-09-28
Payer: COMMERCIAL

## 2023-09-28 DIAGNOSIS — C91.00 ACUTE LYMPHOBLASTIC LEUKEMIA (ALL) NOT HAVING ACHIEVED REMISSION (H): Primary | ICD-10-CM

## 2023-09-28 NOTE — TELEPHONE ENCOUNTER
Oral Chemotherapy Monitoring Program     Placed call to patient in follow up of oral chemotherapy. She reports having ~20 days of medication on hand. She requests a call  to when she is needing a refill. She does confirm adherence, she has missed a total of ~7 doses since starting the medication. PDC=0.89. We discussed the importance of adherence.     Follow-up:   10/11: quarterly assessment    Odalis Landry, PharmD  Hematology/Oncology Clinical Pharmacist  [facility]  [facility]

## 2023-10-02 ENCOUNTER — PATIENT OUTREACH (OUTPATIENT)
Dept: ONCOLOGY | Facility: CLINIC | Age: 43
End: 2023-10-02
Payer: COMMERCIAL

## 2023-10-02 DIAGNOSIS — C91.01 ACUTE LYMPHOBLASTIC LEUKEMIA (ALL) IN REMISSION (H): Primary | ICD-10-CM

## 2023-10-02 NOTE — PROGRESS NOTES
[facility]: Cancer Care                                                                                          April calls back to let me know that she is currently traveling for work. But will able to get her Clonoseq testing done Wednesday 10/4/2023 3pm or later at [place].     [facility] scheduling was notified and will contact April to arrange an appointment.    Signature:  Flor Leslie RN

## 2023-10-04 ENCOUNTER — LAB (OUTPATIENT)
Dept: ONCOLOGY | Facility: CLINIC | Age: 43
End: 2023-10-04
Payer: COMMERCIAL

## 2023-10-04 DIAGNOSIS — C91.00 ACUTE LYMPHOBLASTIC LEUKEMIA (ALL) NOT HAVING ACHIEVED REMISSION (H): ICD-10-CM

## 2023-10-04 DIAGNOSIS — C91.01 ACUTE LYMPHOBLASTIC LEUKEMIA (ALL) IN REMISSION (H): ICD-10-CM

## 2023-10-04 LAB
ALBUMIN SERPL BCG-MCNC: 4.3 G/DL (ref 3.5–5.2)
ALP SERPL-CCNC: 88 U/L (ref 35–104)
ALT SERPL W P-5'-P-CCNC: 27 U/L (ref 0–50)
ANION GAP SERPL CALCULATED.3IONS-SCNC: 10 MMOL/L (ref 7–15)
AST SERPL W P-5'-P-CCNC: 28 U/L (ref 0–45)
BASO+EOS+MONOS # BLD AUTO: NORMAL 10*3/UL
BASO+EOS+MONOS NFR BLD AUTO: NORMAL %
BASOPHILS # BLD AUTO: 0.1 10E3/UL (ref 0–0.2)
BASOPHILS NFR BLD AUTO: 1 %
BILIRUB SERPL-MCNC: 0.2 MG/DL
BUN SERPL-MCNC: 12.7 MG/DL (ref 6–20)
CALCIUM SERPL-MCNC: 9 MG/DL (ref 8.6–10)
CHLORIDE SERPL-SCNC: 107 MMOL/L (ref 98–107)
CREAT SERPL-MCNC: 0.72 MG/DL (ref 0.51–0.95)
DEPRECATED HCO3 PLAS-SCNC: 23 MMOL/L (ref 22–29)
EGFRCR SERPLBLD CKD-EPI 2021: >90 ML/MIN/1.73M2
EOSINOPHIL # BLD AUTO: 0.3 10E3/UL (ref 0–0.7)
EOSINOPHIL NFR BLD AUTO: 4 %
ERYTHROCYTE [DISTWIDTH] IN BLOOD BY AUTOMATED COUNT: 14.2 % (ref 10–15)
GLUCOSE SERPL-MCNC: 107 MG/DL (ref 70–99)
HCT VFR BLD AUTO: 38.3 % (ref 35–47)
HGB BLD-MCNC: 12.8 G/DL (ref 11.7–15.7)
IMM GRANULOCYTES # BLD: 0.1 10E3/UL
IMM GRANULOCYTES NFR BLD: 1 %
LIPASE SERPL-CCNC: 19 U/L (ref 13–60)
LYMPHOCYTES # BLD AUTO: 1.3 10E3/UL (ref 0.8–5.3)
LYMPHOCYTES NFR BLD AUTO: 14 %
MCH RBC QN AUTO: 28.6 PG (ref 26.5–33)
MCHC RBC AUTO-ENTMCNC: 33.4 G/DL (ref 31.5–36.5)
MCV RBC AUTO: 86 FL (ref 78–100)
MONOCYTES # BLD AUTO: 0.9 10E3/UL (ref 0–1.3)
MONOCYTES NFR BLD AUTO: 10 %
NEUTROPHILS # BLD AUTO: 6.3 10E3/UL (ref 1.6–8.3)
NEUTROPHILS NFR BLD AUTO: 70 %
NRBC # BLD AUTO: 0 10E3/UL
NRBC BLD AUTO-RTO: 0 /100
PLATELET # BLD AUTO: 262 10E3/UL (ref 150–450)
POTASSIUM SERPL-SCNC: 3.8 MMOL/L (ref 3.4–5.3)
PROT SERPL-MCNC: 6.5 G/DL (ref 6.4–8.3)
RBC # BLD AUTO: 4.48 10E6/UL (ref 3.8–5.2)
SODIUM SERPL-SCNC: 140 MMOL/L (ref 135–145)
WBC # BLD AUTO: 8.9 10E3/UL (ref 4–11)

## 2023-10-04 PROCEDURE — 36415 COLL VENOUS BLD VENIPUNCTURE: CPT

## 2023-10-04 PROCEDURE — 99000 SPECIMEN HANDLING OFFICE-LAB: CPT

## 2023-10-04 PROCEDURE — 85025 COMPLETE CBC W/AUTO DIFF WBC: CPT

## 2023-10-04 PROCEDURE — 80053 COMPREHEN METABOLIC PANEL: CPT

## 2023-10-04 PROCEDURE — 83690 ASSAY OF LIPASE: CPT

## 2023-10-04 RX ORDER — HEPARIN SODIUM (PORCINE) LOCK FLUSH IV SOLN 100 UNIT/ML 100 UNIT/ML
5 SOLUTION INTRAVENOUS
Status: DISCONTINUED | OUTPATIENT
Start: 2023-10-04 | End: 2023-10-04 | Stop reason: HOSPADM

## 2023-10-04 NOTE — PROGRESS NOTES
Patient requested phlebotomist to draw labs instead of using her port today. Labs collected by  without difficulty.     Jessica Christensen RN on 10/4/2023 at 3:43 PM

## 2023-10-11 ENCOUNTER — TELEPHONE (OUTPATIENT)
Dept: ONCOLOGY | Facility: CLINIC | Age: 43
End: 2023-10-11
Payer: COMMERCIAL

## 2023-10-11 NOTE — ORAL ONC MGMT
Oral Chemotherapy Monitoring Program    Subjective/Objective:  Vashti Villegas is a 43 year old female contacted by phone for a follow-up visit for oral chemotherapy.  April confirms taking the appropriate dose of [id number] once daily. Denies new or worsening side effects, missed doses, and recent hospital or ED visits. Denies any recent medication changes. Reviewed labs from last week. No questions or concerns. Aware of next appointments.         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 Yes Yes Yes Yes Yes Yes Yes   Drug Name Iclusig (ponatinib) Iclusig (ponatinib) Iclusig (ponatinib) Iclusig (ponatinib) Iclusig (ponatinib) Iclusig (ponatinib) Iclusig (ponatinib)   Dose 15 mg 15 mg 15 mg 15 mg 15 mg 15 mg 15 mg   Current Schedule Daily Daily Daily Daily Daily Daily Daily   Cycle Details Continuous Continuous Continuous Continuous Continuous Continuous Continuous   Planned next cycle start date     10/5/2023  10/16/2023   Doses missed in last 2 weeks       0   Adherence Assessment       Adherent   Adverse Effects       No AE identified during assessment   Any new drug interactions?       No   Is the dose as ordered appropriate for the patient?       Yes   Since the last time we talked, have you been hospitalized or used the emergency room?       " No       Last PHQ-2 Score on record:       11/22/2022     2:09 PM 11/1/2022     2:51 PM   PHQ-2 ( 1999 [employer])   Q1: Little interest or pleasure in doing things 0 0   Q2: Feeling down, depressed or hopeless 0 0   PHQ-2 Score 0 0       Vitals:  BP:   BP Readings from Last 1 Encounters:   08/23/23 122/78     Wt Readings from Last 1 Encounters:   08/23/23 117.3 kg (258 lb 8 oz)     Estimated body surface area is 2.3 meters squared as calculated from the following:    Height as of 8/8/23: 1.626 m (5' 4\").    Weight as of 8/23/23: 117.3 kg (258 lb 8 oz).    Labs:  _  Result Component Current Result Ref Range   Sodium 140 (10/4/2023) 135 - 145 mmol/L     _  Result Component Current Result Ref Range   Potassium 3.8 (10/4/2023) 3.4 - 5.3 mmol/L     _  Result Component Current Result Ref Range   Calcium 9.0 (10/4/2023) 8.6 - 10.0 mg/dL   _  Result Component Current Result Ref Range   Albumin 4.3 (10/4/2023) 3.5 - 5.2 g/dL     _  Result Component Current Result Ref Range   Urea Nitrogen 12.7 (10/4/2023) 6.0 - 20.0 mg/dL     _  Result Component Current Result Ref Range   Creatinine 0.72 (10/4/2023) 0.51 - 0.95 mg/dL     _  Result Component Current Result Ref Range   AST 28 (10/4/2023) 0 - 45 U/L     _  Result Component Current Result Ref Range   ALT 27 (10/4/2023) 0 - 50 U/L     _  Result Component Current Result Ref Range   Bilirubin Total 0.2 (10/4/2023) <=1.2 mg/dL     _  Result Component Current Result Ref Range   WBC Count 8.9 (10/4/2023) 4.0 - 11.0 10e3/uL     _  Result Component Current Result Ref Range   Hemoglobin 12.8 (10/4/2023) 11.7 - 15.7 g/dL     _  Result Component Current Result Ref Range   Platelet Count 262 (10/4/2023) 150 - 450 10e3/uL   _  Result Component Current Result Ref Range   Absolute Neutrophils 6.3 (10/4/2023) 1.6 - 8.3 10e3/uL      Assessment/Plan:  Reviewed CBC and CMP, no new concerning abnormalities. No new or worse side effects. Continue plan of care.     Follow-Up:  11/6: labs     Refill " Due:  [facility] will deliver Iclusig refill 10/13     Tay Lozano, PharmD  Hematology/Oncology Clinical Pharmacist  [facility]  [facility]  [phone]

## 2023-10-24 LAB — SCANNED LAB RESULT: NORMAL

## 2023-11-06 DIAGNOSIS — C91.00 ACUTE LYMPHOBLASTIC LEUKEMIA (ALL) NOT HAVING ACHIEVED REMISSION (H): Primary | ICD-10-CM

## 2023-11-07 DIAGNOSIS — C91.00 ACUTE LYMPHOBLASTIC LEUKEMIA (ALL) NOT HAVING ACHIEVED REMISSION (H): Primary | ICD-10-CM

## 2023-11-07 NOTE — PROGRESS NOTES
Duplicate encounter opened in error.    Antoinette Live PharmD  Hematology/Oncology Clinical Pharmacist  [facility]   [phone]

## 2023-11-09 ENCOUNTER — LAB (OUTPATIENT)
Dept: LAB | Facility: CLINIC | Age: 43
End: 2023-11-09
Payer: COMMERCIAL

## 2023-11-09 DIAGNOSIS — C91.00 ACUTE LYMPHOBLASTIC LEUKEMIA (ALL) NOT HAVING ACHIEVED REMISSION (H): ICD-10-CM

## 2023-11-09 LAB
ALBUMIN SERPL BCG-MCNC: 4.5 G/DL (ref 3.5–5.2)
ALP SERPL-CCNC: 94 U/L (ref 35–104)
ALT SERPL W P-5'-P-CCNC: 25 U/L (ref 0–50)
ANION GAP SERPL CALCULATED.3IONS-SCNC: 12 MMOL/L (ref 7–15)
AST SERPL W P-5'-P-CCNC: 22 U/L (ref 0–45)
BASOPHILS # BLD AUTO: 0 10E3/UL (ref 0–0.2)
BASOPHILS NFR BLD AUTO: 0 %
BILIRUB SERPL-MCNC: 0.4 MG/DL
BUN SERPL-MCNC: 13.7 MG/DL (ref 6–20)
CALCIUM SERPL-MCNC: 9.5 MG/DL (ref 8.6–10)
CHLORIDE SERPL-SCNC: 104 MMOL/L (ref 98–107)
CREAT SERPL-MCNC: 0.56 MG/DL (ref 0.51–0.95)
DEPRECATED HCO3 PLAS-SCNC: 23 MMOL/L (ref 22–29)
EGFRCR SERPLBLD CKD-EPI 2021: >90 ML/MIN/1.73M2
EOSINOPHIL # BLD AUTO: 0.6 10E3/UL (ref 0–0.7)
EOSINOPHIL NFR BLD AUTO: 6 %
ERYTHROCYTE [DISTWIDTH] IN BLOOD BY AUTOMATED COUNT: 14 % (ref 10–15)
GLUCOSE SERPL-MCNC: 87 MG/DL (ref 70–99)
HCT VFR BLD AUTO: 40.2 % (ref 35–47)
HGB BLD-MCNC: 13.4 G/DL (ref 11.7–15.7)
IMM GRANULOCYTES # BLD: 0 10E3/UL
IMM GRANULOCYTES NFR BLD: 0 %
LIPASE SERPL-CCNC: 18 U/L (ref 13–60)
LYMPHOCYTES # BLD AUTO: 1.3 10E3/UL (ref 0.8–5.3)
LYMPHOCYTES NFR BLD AUTO: 14 %
MCH RBC QN AUTO: 28.2 PG (ref 26.5–33)
MCHC RBC AUTO-ENTMCNC: 33.3 G/DL (ref 31.5–36.5)
MCV RBC AUTO: 85 FL (ref 78–100)
MONOCYTES # BLD AUTO: 1 10E3/UL (ref 0–1.3)
MONOCYTES NFR BLD AUTO: 10 %
NEUTROPHILS # BLD AUTO: 6.8 10E3/UL (ref 1.6–8.3)
NEUTROPHILS NFR BLD AUTO: 70 %
NRBC # BLD AUTO: 0 10E3/UL
NRBC BLD AUTO-RTO: 0 /100
PLATELET # BLD AUTO: 264 10E3/UL (ref 150–450)
POTASSIUM SERPL-SCNC: 3.9 MMOL/L (ref 3.4–5.3)
PROT SERPL-MCNC: 6.8 G/DL (ref 6.4–8.3)
RBC # BLD AUTO: 4.75 10E6/UL (ref 3.8–5.2)
SODIUM SERPL-SCNC: 139 MMOL/L (ref 135–145)
WBC # BLD AUTO: 9.7 10E3/UL (ref 4–11)

## 2023-11-09 PROCEDURE — 80053 COMPREHEN METABOLIC PANEL: CPT

## 2023-11-09 PROCEDURE — 83690 ASSAY OF LIPASE: CPT

## 2023-11-09 PROCEDURE — 36415 COLL VENOUS BLD VENIPUNCTURE: CPT

## 2023-11-09 PROCEDURE — 85025 COMPLETE CBC W/AUTO DIFF WBC: CPT

## 2023-11-10 ENCOUNTER — MYC MEDICAL ADVICE (OUTPATIENT)
Dept: ONCOLOGY | Facility: CLINIC | Age: 43
End: 2023-11-10
Payer: COMMERCIAL

## 2023-11-10 NOTE — TELEPHONE ENCOUNTER
Oral Chemotherapy Monitoring Program  Lab Follow Up    Reviewed CBC and CMP lab results from 11/9/23.        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 Yes Yes Yes Yes Yes Yes Yes   Drug Name Iclusig (ponatinib) Iclusig (ponatinib) Iclusig (ponatinib) Iclusig (ponatinib) Iclusig (ponatinib) Iclusig (ponatinib) Iclusig (ponatinib)   Dose 15 mg 15 mg 15 mg 15 mg 15 mg 15 mg 15 mg   Current Schedule Daily Daily Daily Daily Daily Daily Daily   Cycle Details Continuous Continuous Continuous Continuous Continuous Continuous Continuous   Planned next cycle start date   10/5/2023  10/16/2023     Doses missed in last 2 weeks     0     Adherence Assessment     Adherent     Adverse Effects     No AE identified during assessment     Any new drug interactions?     No     Is the dose as ordered appropriate for the patient?     Yes     Since the last time we talked, have you been hospitalized or used the emergency room?     No         Labs:  _  Result Component Current Result Ref Range   Sodium 139 (11/9/2023) 135 - 145 mmol/L     _  Result Component Current Result Ref Range   Potassium 3.9 (11/9/2023) 3.4 - 5.3 mmol/L     _  Result Component Current Result Ref Range   Calcium 9.5 (11/9/2023) 8.6 - 10.0 mg/dL     No results found for Mag within last 30 days.      No results found for Phos within last 30 days.     _  Result Component Current Result Ref Range   Albumin 4.5 (11/9/2023) 3.5 - 5.2 g/dL     _  Result Component Current Result Ref Range   Urea Nitrogen 13.7 (11/9/2023) 6.0 - 20.0 mg/dL     _  Result Component Current Result Ref Range   Creatinine 0.56 (11/9/2023) 0.51 - 0.95 mg/dL     _  Result Component Current Result Ref Range   AST 22 (11/9/2023) 0 - 45 U/L     _  Result Component Current Result Ref Range   ALT 25 (11/9/2023) 0 - 50 U/L     _  Result Component Current Result Ref Range   Bilirubin Total 0.4 (11/9/2023) <=1.2 mg/dL     _  Result Component Current Result Ref Range   WBC Count 9.7 (11/9/2023) 4.0 - 11.0 10e3/uL     _  Result Component Current Result Ref Range   Hemoglobin 13.4 (11/9/2023) 11.7 - 15.7 g/dL     _  Result Component Current Result Ref Range   Platelet Count 264 (11/9/2023) 150 - 450 10e3/uL     No results found for ANC within last 30 days.     _  Result Component Current Result Ref Range   Absolute Neutrophils 6.8 (11/9/2023) 1.6 - 8.3 10e3/uL        Assessment & Plan:  Results are stable with no concerning abnormalities.    Sent [employer] message to pt with results.    Follow-Up:  11/3: labs    Antoinette Live PharmD  Hematology/Oncology Clinical Pharmacist  [facility]   [phone]

## 2023-11-21 ENCOUNTER — TELEPHONE (OUTPATIENT)
Dept: ONCOLOGY | Facility: CLINIC | Age: 43
End: 2023-11-21
Payer: COMMERCIAL

## 2023-11-21 NOTE — TELEPHONE ENCOUNTER
PA Initiation    Medication: ICLUSIG 15 MG PO TABS  Insurance Company: [facility] - Phone [phone] Fax [phone]  Pharmacy Filling the Rx: [facility] - [address]   Filling Pharmacy Phone:    Filling Pharmacy Fax:    Start Date: 11/21/2023    Prior Authorization Approval    Medication: ICLUSIG 15 MG PO TABS  Authorization Effective Date: 11/21/2023  Authorization Expiration Date: 11/20/2024  Approved Dose/Quantity:   Reference #:     Insurance Company: [facility] - Phone [phone] Fax [phone]  Expected CoPay: $ 0  CoPay Card Available: No    Financial Assistance Needed:   Which Pharmacy is filling the prescription: [facility] - [place] [address]   Pharmacy Notified:   Patient Notified:         Brittany Chao CPhTOncologjalen Pharmacy [facility] [address] [address]  Office: [phone]  Fax: [phone]  [email or web address]

## 2023-11-25 ENCOUNTER — ANESTHESIA EVENT (OUTPATIENT)
Dept: SURGERY | Facility: AMBULATORY SURGERY CENTER | Age: 43
End: 2023-11-25
Payer: COMMERCIAL

## 2023-12-01 ENCOUNTER — ANCILLARY PROCEDURE (OUTPATIENT)
Dept: RADIOLOGY | Facility: AMBULATORY SURGERY CENTER | Age: 43
End: 2023-12-01
Attending: INTERNAL MEDICINE
Payer: COMMERCIAL

## 2023-12-01 ENCOUNTER — HOSPITAL ENCOUNTER (OUTPATIENT)
Facility: AMBULATORY SURGERY CENTER | Age: 43
Discharge: HOME | End: 2023-12-01
Attending: RADIOLOGY
Payer: COMMERCIAL

## 2023-12-01 VITALS
BODY MASS INDEX: 43.02 KG/M2 | DIASTOLIC BLOOD PRESSURE: 72 MMHG | OXYGEN SATURATION: 98 % | HEIGHT: 64 IN | HEART RATE: 76 BPM | SYSTOLIC BLOOD PRESSURE: 111 MMHG | TEMPERATURE: 97.6 F | RESPIRATION RATE: 16 BRPM | WEIGHT: 252 LBS

## 2023-12-01 DIAGNOSIS — C91.00 ACUTE LYMPHOBLASTIC LEUKEMIA (ALL) NOT HAVING ACHIEVED REMISSION (H): ICD-10-CM

## 2023-12-01 PROCEDURE — 36590 REMOVAL TUNNELED CV CATH: CPT | Mod: RT | Performed by: RADIOLOGY

## 2023-12-01 RX ORDER — PROPOFOL 10 MG/ML
INJECTION, EMULSION INTRAVENOUS PRN
Status: DISCONTINUED | OUTPATIENT
Start: 2023-12-01 | End: 2023-12-01

## 2023-12-01 RX ORDER — SODIUM CHLORIDE, SODIUM LACTATE, POTASSIUM CHLORIDE, CALCIUM CHLORIDE 600; 310; 30; 20 MG/100ML; MG/100ML; MG/100ML; MG/100ML
INJECTION, SOLUTION INTRAVENOUS CONTINUOUS
Status: DISCONTINUED | OUTPATIENT
Start: 2023-12-01 | End: 2023-12-02 | Stop reason: HOSPADM

## 2023-12-01 RX ORDER — ACETAMINOPHEN 325 MG/1
975 TABLET ORAL ONCE
Status: DISCONTINUED | OUTPATIENT
Start: 2023-12-01 | End: 2023-12-02 | Stop reason: HOSPADM

## 2023-12-01 RX ORDER — ACETAMINOPHEN 325 MG/1
975 TABLET ORAL ONCE
Status: COMPLETED | OUTPATIENT
Start: 2023-12-01 | End: 2023-12-01

## 2023-12-01 RX ORDER — LIDOCAINE HYDROCHLORIDE 10 MG/ML
INJECTION, SOLUTION EPIDURAL; INFILTRATION; INTRACAUDAL; PERINEURAL DAILY PRN
Status: DISCONTINUED | OUTPATIENT
Start: 2023-12-01 | End: 2023-12-01 | Stop reason: HOSPADM

## 2023-12-01 RX ORDER — PROPOFOL 10 MG/ML
INJECTION, EMULSION INTRAVENOUS CONTINUOUS PRN
Status: DISCONTINUED | OUTPATIENT
Start: 2023-12-01 | End: 2023-12-01

## 2023-12-01 RX ORDER — LIDOCAINE HYDROCHLORIDE 20 MG/ML
INJECTION, SOLUTION INFILTRATION; PERINEURAL PRN
Status: DISCONTINUED | OUTPATIENT
Start: 2023-12-01 | End: 2023-12-01

## 2023-12-01 RX ORDER — ONDANSETRON 2 MG/ML
4 INJECTION INTRAMUSCULAR; INTRAVENOUS EVERY 30 MIN PRN
Status: DISCONTINUED | OUTPATIENT
Start: 2023-12-01 | End: 2023-12-02 | Stop reason: HOSPADM

## 2023-12-01 RX ORDER — HEPARIN SODIUM (PORCINE) LOCK FLUSH IV SOLN 100 UNIT/ML 100 UNIT/ML
5-10 SOLUTION INTRAVENOUS
Status: DISCONTINUED | OUTPATIENT
Start: 2023-12-01 | End: 2023-12-02 | Stop reason: HOSPADM

## 2023-12-01 RX ORDER — HEPARIN SODIUM,PORCINE 10 UNIT/ML
5-10 VIAL (ML) INTRAVENOUS EVERY 24 HOURS
Status: DISCONTINUED | OUTPATIENT
Start: 2023-12-01 | End: 2023-12-02 | Stop reason: HOSPADM

## 2023-12-01 RX ORDER — FENTANYL CITRATE 50 UG/ML
50 INJECTION, SOLUTION INTRAMUSCULAR; INTRAVENOUS
Status: DISCONTINUED | OUTPATIENT
Start: 2023-12-01 | End: 2023-12-02 | Stop reason: HOSPADM

## 2023-12-01 RX ORDER — HEPARIN SODIUM,PORCINE 10 UNIT/ML
5-10 VIAL (ML) INTRAVENOUS
Status: DISCONTINUED | OUTPATIENT
Start: 2023-12-01 | End: 2023-12-02 | Stop reason: HOSPADM

## 2023-12-01 RX ORDER — ONDANSETRON 4 MG/1
4 TABLET, ORALLY DISINTEGRATING ORAL EVERY 30 MIN PRN
Status: DISCONTINUED | OUTPATIENT
Start: 2023-12-01 | End: 2023-12-02 | Stop reason: HOSPADM

## 2023-12-01 RX ORDER — LIDOCAINE 40 MG/G
CREAM TOPICAL
Status: DISCONTINUED | OUTPATIENT
Start: 2023-12-01 | End: 2023-12-02 | Stop reason: HOSPADM

## 2023-12-01 RX ORDER — OXYCODONE HYDROCHLORIDE 5 MG/1
10 TABLET ORAL
Status: DISCONTINUED | OUTPATIENT
Start: 2023-12-01 | End: 2023-12-02 | Stop reason: HOSPADM

## 2023-12-01 RX ORDER — OXYCODONE HYDROCHLORIDE 5 MG/1
5 TABLET ORAL
Status: DISCONTINUED | OUTPATIENT
Start: 2023-12-01 | End: 2023-12-02 | Stop reason: HOSPADM

## 2023-12-01 RX ADMIN — SODIUM CHLORIDE, SODIUM LACTATE, POTASSIUM CHLORIDE, CALCIUM CHLORIDE: 600; 310; 30; 20 INJECTION, SOLUTION INTRAVENOUS at 10:07

## 2023-12-01 RX ADMIN — PROPOFOL 50 MG: 10 INJECTION, EMULSION INTRAVENOUS at 10:55

## 2023-12-01 RX ADMIN — PROPOFOL 50 MG: 10 INJECTION, EMULSION INTRAVENOUS at 11:10

## 2023-12-01 RX ADMIN — ACETAMINOPHEN 975 MG: 325 TABLET ORAL at 09:58

## 2023-12-01 RX ADMIN — PROPOFOL 150 MCG/KG/MIN: 10 INJECTION, EMULSION INTRAVENOUS at 10:55

## 2023-12-01 RX ADMIN — LIDOCAINE HYDROCHLORIDE 50 MG: 20 INJECTION, SOLUTION INFILTRATION; PERINEURAL at 10:55

## 2023-12-01 NOTE — BRIEF OP NOTE
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  Complications: None.  Implants: * No implants in log *      8 Georgian 22 cm single lumen Bard PowerPort ISP removed completely. Pocket irrigated and dried. Closed primarily.

## 2023-12-01 NOTE — DISCHARGE INSTRUCTIONS
A collaboration between [facility] and [facility]  Experts in minimally invasive, targeted treatments performed using imaging guidance    Venous Access Device, Port Catheter or Tunneled Central Line Removal    Today you had your existing venous access device removed, either because it was no longer needed or because there was malfunction or infection issues.    After you go home:  Drink plenty of fluids.  Generally 6-8 (8 ounce) glasses a day is recommended.  Resume your regular diet unless otherwise ordered by a medical provider.  Keep any applied tape/gauze dressings clean and dry.  Change tape/gauze dressings if they get wet or soiled.  You may shower the following day after procedure, however cover and protect from moisture any tape/gauze dressings.  You may let water hit and run over dried skin glue, but do not scrub.  Pat the area dry after showering.  Port removal incisions are closed with absorbable suture, meaning they do not need to be removed at a later date, and a topical skin adhesive (skin glue).  This glue will wear off in 7-14 days.  Do not remove before this time.  If 14 days have passed and residual glue is present, you may gently remove it.  You may remove tape/gauze dressings after 5 days if the site looks closed and in the process of healing.  Do not apply gels, lotions, or ointments to the glue site for the first 10 days as this may cause the glue to prematurely soften and fail.  Do not perform strenuous activities or lift greater than 10 pounds for the next three days.  If there is bleeding or oozing from the procedure site, apply firm pressure to the area for 5-10 minutes.  If the bleeding continues seek medical advice at the numbers below.  Mild procedure site discomfort can be treated with an ice pack and over-the-counter pain relievers.              For 24 hours after any sedation used:  Relax and take it easy.  No strenuous  activities.  Do not drive or operate machines at home or at work.  No alcohol consumption.  Do not make any important or legal decisions.    Call our Interventional Radiology (IR) service if:  If you start bleeding from the procedure site.  If you do start to bleed from the site, lie down and hold some pressure on the site.  Our radiology provider can help you decide if you need to return to the hospital.  If you have new or worsening pain related to the procedure.  If you have concerning swelling at the procedure site.  If you develop persistent nausea or vomiting.  If you develop hives or a rash or any unexplained itching.  If you have a fever of greater than 100.5  F and chills in the first 5 days after procedure.  Any other concerns related to your procedure.      [facility]  Interventional Radiology (IR)  [address] [facility] [place] [address]    Contact Number:  [phone]  (IR Nurse Triage)  Monday - Friday 7am - 4pm    After hours for urgent concerns:  [phone]  After 4pm Monday - Friday, Weekends and Holidays.   Ask for Interventional Radiology on-call.  Someone is available 24 hours a day.  [facility] toll free number:  [phone]

## 2023-12-01 NOTE — ANESTHESIA CARE TRANSFER NOTE
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    Anesthesia Type:   MAC     Note:    Oropharynx: oropharynx clear of all foreign objects and spontaneously breathing  Level of Consciousness: awake  Oxygen Supplementation: room air    Independent Airway: airway patency satisfactory and stable  Dentition: dentition unchanged  Vital Signs Stable: post-procedure vital signs reviewed and stable  Report to RN Given: handoff report given  Patient transferred to: Phase II  Comments: Uneventful transport   Report to RN - Jessica  Pt comfortable  Exchanging well; color natl  Pt responds appropriately to command  IV patent  Lips/teeth/dentition as preop status  Questions answered      Handoff Report: Identifed the Patient, Identified the Reponsible Provider, Reviewed the pertinent medical history, Discussed the surgical course, Reviewed Intra-OP anesthesia mangement and issues during anesthesia, Set expectations for post-procedure period and Allowed opportunity for questions and acknowledgement of understanding      Vitals:  Vitals Value Taken Time   /65 12/01/23 1138   Temp 36.3  C (97.3  F) 12/01/23 1138   Pulse 85 1138   Resp 16 12/01/23 1138   SpO2 97 % 12/01/23 1138       Electronically Signed By: LOAN LUU CRNA  December 1, 2023  11:39 AM

## 2023-12-01 NOTE — ANESTHESIA POSTPROCEDURE EVALUATION
Patient: April F Bakari    Procedure: Procedure(s):  Remove port vascular access right       Anesthesia Type:  MAC    Note:  Disposition: Outpatient   Postop Pain Control: Uneventful            Sign Out: Well controlled pain   PONV: No   Neuro/Psych: Uneventful            Sign Out: Acceptable/Baseline neuro status   Airway/Respiratory: Uneventful            Sign Out: Acceptable/Baseline resp. status   CV/Hemodynamics: Uneventful            Sign Out: Acceptable CV status; No obvious hypovolemia; No obvious fluid overload   Other NRE: NONE   DID A NON-ROUTINE EVENT OCCUR? No           Last vitals:  Vitals Value Taken Time   BP     Temp     Pulse     Resp     SpO2         Electronically Signed By: David Avila MD  December 1, 2023  11:37 AM

## 2023-12-01 NOTE — ANESTHESIA PREPROCEDURE EVALUATION
Anesthesia Pre-Procedure Evaluation    Patient:  Bakari   MRN: [id number] : 1980        Procedure : Procedure(s):  Remove port vascular access right          Past Medical History:   Diagnosis Date    ALL (acute lymphocytic leukemia) (H)     Other acne     Shingles       Past Surgical History:   Procedure Laterality Date    GI SURGERY      GYN SURGERY      IR BONE BIOPSY DEEP RIGHT  10/25/2022    IR BONE BIOPSY DEEP RIGHT  2023    IR BONE BIOPSY DEEP RIGHT  2023    IR CHEST PORT PLACEMENT > 5 YRS OF AGE  2022    IR LUMBAR PUNCTURE  10/21/2022    IR PORT REPLACEMENT CATHETER ONLY RIGHT  2023    PICC Left 2022    Picc ok to use    PICC DOUBLE LUMEN PLACEMENT Left 2022    left cephalic 5 fr dl picc 44 cm    PICC INSERTION - DOUBLE LUMEN Right 2022    41cm (1cm external), Cephalic vein    TUBAL LIGATION      Z NONSPECIFIC PROCEDURE  1999    benign mole removal      Allergies   Allergen Reactions    Blood Transfusion Related (Informational Only) Hives     Hive reaction with platelet transfusion on 2022. Please administer platelets with tylenol and benadryl premedication.     Seasonal Allergies      Runny nose, itchiness      Social History     Tobacco Use    Smoking status: Never    Smokeless tobacco: Never   Substance Use Topics    Alcohol use: Not Currently      Wt Readings from Last 1 Encounters:   23 114.3 kg (252 lb)        Anesthesia Evaluation            ROS/MED HX  ENT/Pulmonary:  - neg pulmonary ROS     Neurologic:  - neg neurologic ROS     Cardiovascular:  - neg cardiovascular ROS     METS/Exercise Tolerance: >4 METS    Hematologic:  - neg hematologic  ROS     Musculoskeletal:  - neg musculoskeletal ROS     GI/Hepatic:  - neg GI/hepatic ROS     Renal/Genitourinary:  - neg Renal ROS     Endo:  - neg endo ROS   (+)               Obesity,       Psychiatric/Substance Use:  - neg psychiatric ROS     Infectious Disease:  - neg infectious  disease ROS     Malignancy:  - neg malignancy ROS (+) Malignancy, History of Lymphoma/Leukemia.Lymph CA Remission status post Chemo.      Other:  - neg other ROS          Physical Exam    Airway        Mallampati: II   TM distance: > 3 FB   Neck ROM: full   Mouth opening: > 3 cm    Respiratory Devices and Support         Dental       (+) Completely normal teeth      Cardiovascular          Rhythm and rate: regular and normal     Pulmonary           breath sounds clear to auscultation           OUTSIDE LABS:  CBC:   Lab Results   Component Value Date    WBC 9.7 11/09/2023    WBC 8.9 10/04/2023    HGB 13.4 11/09/2023    HGB 12.8 10/04/2023    HCT 40.2 11/09/2023    HCT 38.3 10/04/2023     11/09/2023     10/04/2023     BMP:   Lab Results   Component Value Date     11/09/2023     10/04/2023    POTASSIUM 3.9 11/09/2023    POTASSIUM 3.8 10/04/2023    CHLORIDE 104 11/09/2023    CHLORIDE 107 10/04/2023    CO2 23 11/09/2023    CO2 23 10/04/2023    BUN 13.7 11/09/2023    BUN 12.7 10/04/2023    CR 0.56 11/09/2023    CR 0.72 10/04/2023    GLC 87 11/09/2023     (H) 10/04/2023     COAGS:   Lab Results   Component Value Date    PTT 24 12/15/2022    INR 2.22 (H) 08/08/2023    FIBR 497 (H) 01/06/2023     POC:   Lab Results   Component Value Date    HCG Negative 03/01/2023     HEPATIC:   Lab Results   Component Value Date    ALBUMIN 4.5 11/09/2023    PROTTOTAL 6.8 11/09/2023    ALT 25 11/09/2023    AST 22 11/09/2023    ALKPHOS 94 11/09/2023    BILITOTAL 0.4 11/09/2023     OTHER:   Lab Results   Component Value Date    LACT 1.0 12/21/2022    SOLEDAD 9.5 11/09/2023    PHOS 3.5 01/26/2023    MAG 2.2 01/26/2023    LIPASE 18 11/09/2023    TSH 1.54 09/22/2003       Anesthesia Plan    ASA Status:  3    NPO Status:  NPO Appropriate    Anesthesia Type: MAC.     - Reason for MAC: straight local not clinically adequate              Consents    Anesthesia Plan(s) and associated risks, benefits, and realistic  "alternatives discussed. Questions answered and patient/representative(s) expressed understanding.     - Discussed:     - Discussed with:  Patient            Postoperative Care            Comments:               David Avila MD    I have reviewed the pertinent notes and labs in the chart from the past 30 days and (re)examined the patient.  Any updates or changes from those notes are reflected in this note.             # Drug Induced Platelet Defect: home medication list includes an antiplatelet medication  # Severe Obesity: Estimated body mass index is 43.26 kg/m  as calculated from the following:    Height as of this encounter: 1.626 m (5' 4\").    Weight as of this encounter: 114.3 kg (252 lb).      "

## 2023-12-04 ENCOUNTER — ANESTHESIA (OUTPATIENT)
Dept: SURGERY | Facility: AMBULATORY SURGERY CENTER | Age: 43
End: 2023-12-04
Payer: COMMERCIAL

## 2023-12-08 DIAGNOSIS — C91.00 ACUTE LYMPHOBLASTIC LEUKEMIA (ALL) NOT HAVING ACHIEVED REMISSION (H): Primary | ICD-10-CM

## 2023-12-22 ENCOUNTER — LAB (OUTPATIENT)
Dept: LAB | Facility: CLINIC | Age: 43
End: 2023-12-22
Payer: COMMERCIAL

## 2023-12-22 DIAGNOSIS — C91.00 ACUTE LYMPHOBLASTIC LEUKEMIA (ALL) NOT HAVING ACHIEVED REMISSION (H): ICD-10-CM

## 2023-12-22 LAB
ALBUMIN SERPL BCG-MCNC: 4.3 G/DL (ref 3.5–5.2)
ALP SERPL-CCNC: 91 U/L (ref 40–150)
ALT SERPL W P-5'-P-CCNC: 27 U/L (ref 0–50)
ANION GAP SERPL CALCULATED.3IONS-SCNC: 9 MMOL/L (ref 7–15)
AST SERPL W P-5'-P-CCNC: 22 U/L (ref 0–45)
BASOPHILS # BLD AUTO: 0.1 10E3/UL (ref 0–0.2)
BASOPHILS NFR BLD AUTO: 1 %
BILIRUB SERPL-MCNC: 0.4 MG/DL
BUN SERPL-MCNC: 10.7 MG/DL (ref 6–20)
CALCIUM SERPL-MCNC: 9.2 MG/DL (ref 8.6–10)
CHLORIDE SERPL-SCNC: 105 MMOL/L (ref 98–107)
CREAT SERPL-MCNC: 0.56 MG/DL (ref 0.51–0.95)
DEPRECATED HCO3 PLAS-SCNC: 26 MMOL/L (ref 22–29)
EGFRCR SERPLBLD CKD-EPI 2021: >90 ML/MIN/1.73M2
EOSINOPHIL # BLD AUTO: 0.6 10E3/UL (ref 0–0.7)
EOSINOPHIL NFR BLD AUTO: 7 %
ERYTHROCYTE [DISTWIDTH] IN BLOOD BY AUTOMATED COUNT: 13.9 % (ref 10–15)
GLUCOSE SERPL-MCNC: 97 MG/DL (ref 70–99)
HCT VFR BLD AUTO: 40.6 % (ref 35–47)
HGB BLD-MCNC: 13.4 G/DL (ref 11.7–15.7)
IMM GRANULOCYTES # BLD: 0.1 10E3/UL
IMM GRANULOCYTES NFR BLD: 1 %
LAB DIRECTOR COMMENTS: NORMAL
LAB DIRECTOR DISCLAIMER: NORMAL
LAB DIRECTOR INTERPRETATION: NORMAL
LAB DIRECTOR METHODOLOGY: NORMAL
LAB DIRECTOR RESULTS: NORMAL
LDH SERPL L TO P-CCNC: 164 U/L (ref 0–250)
LIPASE SERPL-CCNC: 18 U/L (ref 13–60)
LOCATION OF TASK: NORMAL
LYMPHOCYTES # BLD AUTO: 1.2 10E3/UL (ref 0.8–5.3)
LYMPHOCYTES NFR BLD AUTO: 13 %
MCH RBC QN AUTO: 28.4 PG (ref 26.5–33)
MCHC RBC AUTO-ENTMCNC: 33 G/DL (ref 31.5–36.5)
MCV RBC AUTO: 86 FL (ref 78–100)
MONOCYTES # BLD AUTO: 0.8 10E3/UL (ref 0–1.3)
MONOCYTES NFR BLD AUTO: 8 %
NEUTROPHILS # BLD AUTO: 6.5 10E3/UL (ref 1.6–8.3)
NEUTROPHILS NFR BLD AUTO: 70 %
NRBC # BLD AUTO: 0 10E3/UL
NRBC BLD AUTO-RTO: 0 /100
PLATELET # BLD AUTO: 265 10E3/UL (ref 150–450)
POTASSIUM SERPL-SCNC: 4.1 MMOL/L (ref 3.4–5.3)
PROT SERPL-MCNC: 6.6 G/DL (ref 6.4–8.3)
RBC # BLD AUTO: 4.72 10E6/UL (ref 3.8–5.2)
SODIUM SERPL-SCNC: 140 MMOL/L (ref 135–145)
SPECIMEN DESCRIPTION: NORMAL
WBC # BLD AUTO: 9.2 10E3/UL (ref 4–11)

## 2023-12-22 PROCEDURE — 85025 COMPLETE CBC W/AUTO DIFF WBC: CPT

## 2023-12-22 PROCEDURE — 83690 ASSAY OF LIPASE: CPT

## 2023-12-22 PROCEDURE — 81207 BCR/ABL1 GENE MINOR BP: CPT

## 2023-12-22 PROCEDURE — 80053 COMPREHEN METABOLIC PANEL: CPT

## 2023-12-22 PROCEDURE — G0452 MOLECULAR PATHOLOGY INTERPR: HCPCS | Performed by: STUDENT IN AN ORGANIZED HEALTH CARE EDUCATION/TRAINING PROGRAM

## 2023-12-22 PROCEDURE — 36415 COLL VENOUS BLD VENIPUNCTURE: CPT

## 2023-12-22 PROCEDURE — 83615 LACTATE (LD) (LDH) ENZYME: CPT

## 2024-01-08 ENCOUNTER — TELEPHONE (OUTPATIENT)
Dept: ONCOLOGY | Facility: CLINIC | Age: 44
End: 2024-01-08
Payer: COMMERCIAL

## 2024-01-08 DIAGNOSIS — C91.00 ACUTE LYMPHOBLASTIC LEUKEMIA (ALL) NOT HAVING ACHIEVED REMISSION (H): Primary | ICD-10-CM

## 2024-01-08 NOTE — TELEPHONE ENCOUNTER
Brittany Chao CPhTOncology Pharmacy Liaison     [facility] [place] [address]  Office: [phone]  Fax: [phone]  [email or web address]

## 2024-01-10 ENCOUNTER — TELEPHONE (OUTPATIENT)
Dept: ONCOLOGY | Facility: CLINIC | Age: 44
End: 2024-01-10
Payer: COMMERCIAL

## 2024-01-10 NOTE — TELEPHONE ENCOUNTER
Oral Chemotherapy Monitoring Program     Placed call to patient in follow up of oral chemotherapy. Left message requesting call back. No drug names were mentioned. Will update when response received.     Grace Myhre, PharmD  Hematology/Oncology Clinical Pharmacist  [facility]  [facility]  [phone]

## 2024-01-12 ENCOUNTER — TELEPHONE (OUTPATIENT)
Dept: ONCOLOGY | Facility: CLINIC | Age: 44
End: 2024-01-12
Payer: COMMERCIAL

## 2024-01-12 NOTE — TELEPHONE ENCOUNTER
Oral Chemotherapy Monitoring Program    Subjective/Objective:  Vashti Villegas is a 43 year old female contacted by phone for a follow-up visit for oral chemotherapy.  Pt confirms taking the appropriate dose of Iclusig, 15mg daily. Denies new or worsening side effects, missed doses, and recent hospital or ED visits. Patient has not had any recent medication changes. Pt states she has about a week of medication remaining.        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 Yes Yes Yes Yes Yes Yes Yes   Drug Name Iclusig (ponatinib) Iclusig (ponatinib) Iclusig (ponatinib) Iclusig (ponatinib) Iclusig (ponatinib) Iclusig (ponatinib) Iclusig (ponatinib)   Dose 15 mg 15 mg 15 mg 15 mg 15 mg 15 mg 15 mg   Current Schedule Daily Daily Daily Daily Daily Daily Daily   Cycle Details Continuous Continuous Continuous Continuous Continuous Continuous Continuous   Doses missed in last 2 weeks       0   Adherence Assessment       Adherent   Adverse Effects       No AE identified during assessment   Any new drug interactions?       No   Is the dose as ordered appropriate for the patient?       Yes   Since the last time we talked, have you been hospitalized or used the emergency room?       No       Last PHQ-2 Score on record:       11/22/2022     2:09 PM 11/1/2022     2:51 PM   PHQ-2 ( 1999  "[employer])   Q1: Little interest or pleasure in doing things 0 0   Q2: Feeling down, depressed or hopeless 0 0   PHQ-2 Score 0 0       Vitals:  BP:   BP Readings from Last 1 Encounters:   12/01/23 111/72     Wt Readings from Last 1 Encounters:   12/01/23 114.3 kg (252 lb)     Estimated body surface area is 2.27 meters squared as calculated from the following:    Height as of 12/1/23: 1.626 m (5' 4\").    Weight as of 12/1/23: 114.3 kg (252 lb).    Labs:  _  Result Component Current Result Ref Range   Sodium 140 (12/22/2023) 135 - 145 mmol/L     _  Result Component Current Result Ref Range   Potassium 4.1 (12/22/2023) 3.4 - 5.3 mmol/L     _  Result Component Current Result Ref Range   Calcium 9.2 (12/22/2023) 8.6 - 10.0 mg/dL     No results found for Mag within last 30 days.     No results found for Phos within last 30 days.     _  Result Component Current Result Ref Range   Albumin 4.3 (12/22/2023) 3.5 - 5.2 g/dL     _  Result Component Current Result Ref Range   Urea Nitrogen 10.7 (12/22/2023) 6.0 - 20.0 mg/dL     _  Result Component Current Result Ref Range   Creatinine 0.56 (12/22/2023) 0.51 - 0.95 mg/dL     _  Result Component Current Result Ref Range   AST 22 (12/22/2023) 0 - 45 U/L     _  Result Component Current Result Ref Range   ALT 27 (12/22/2023) 0 - 50 U/L     _  Result Component Current Result Ref Range   Bilirubin Total 0.4 (12/22/2023) <=1.2 mg/dL     _  Result Component Current Result Ref Range   WBC Count 9.2 (12/22/2023) 4.0 - 11.0 10e3/uL     _  Result Component Current Result Ref Range   Hemoglobin 13.4 (12/22/2023) 11.7 - 15.7 g/dL     _  Result Component Current Result Ref Range   Platelet Count 265 (12/22/2023) 150 - 450 10e3/uL     No results found for ANC within last 30 days.     _  Result Component Current Result Ref Range   Absolute Neutrophils 6.5 (12/22/2023) 1.6 - 8.3 10e3/uL          Assessment/Plan:  Pt is tolerating Iclusig well. Continue therapy as planned.  PDC=0.96, no adherence " concerns.    Follow-Up:  1/26: Review appt/labs    Refill Due:  [facility] to deliver Iclusig 1/16    Grace Myhre, PharmD  Hematology/Oncology Pharmacist  [facility]  [facility]  [phone]

## 2024-01-25 ENCOUNTER — ONCOLOGY VISIT (OUTPATIENT)
Dept: ONCOLOGY | Facility: CLINIC | Age: 44
End: 2024-01-25
Attending: INTERNAL MEDICINE
Payer: COMMERCIAL

## 2024-01-25 ENCOUNTER — APPOINTMENT (OUTPATIENT)
Dept: LAB | Facility: CLINIC | Age: 44
End: 2024-01-25
Attending: INTERNAL MEDICINE
Payer: COMMERCIAL

## 2024-01-25 VITALS
HEART RATE: 87 BPM | BODY MASS INDEX: 44.95 KG/M2 | HEIGHT: 64 IN | TEMPERATURE: 97.8 F | DIASTOLIC BLOOD PRESSURE: 89 MMHG | RESPIRATION RATE: 16 BRPM | OXYGEN SATURATION: 98 % | WEIGHT: 263.3 LBS | SYSTOLIC BLOOD PRESSURE: 152 MMHG

## 2024-01-25 DIAGNOSIS — C91.00 ACUTE LYMPHOBLASTIC LEUKEMIA (ALL) NOT HAVING ACHIEVED REMISSION (H): ICD-10-CM

## 2024-01-25 LAB
ALBUMIN SERPL BCG-MCNC: 4.5 G/DL (ref 3.5–5.2)
ALP SERPL-CCNC: 90 U/L (ref 40–150)
ALT SERPL W P-5'-P-CCNC: 30 U/L (ref 0–50)
ANION GAP SERPL CALCULATED.3IONS-SCNC: 9 MMOL/L (ref 7–15)
AST SERPL W P-5'-P-CCNC: 23 U/L (ref 0–45)
BASOPHILS # BLD AUTO: 0.1 10E3/UL (ref 0–0.2)
BASOPHILS NFR BLD AUTO: 1 %
BILIRUB SERPL-MCNC: 0.4 MG/DL
BUN SERPL-MCNC: 13.3 MG/DL (ref 6–20)
CALCIUM SERPL-MCNC: 9.3 MG/DL (ref 8.6–10)
CHLORIDE SERPL-SCNC: 106 MMOL/L (ref 98–107)
CREAT SERPL-MCNC: 0.58 MG/DL (ref 0.51–0.95)
DEPRECATED HCO3 PLAS-SCNC: 25 MMOL/L (ref 22–29)
EGFRCR SERPLBLD CKD-EPI 2021: >90 ML/MIN/1.73M2
EOSINOPHIL # BLD AUTO: 0.6 10E3/UL (ref 0–0.7)
EOSINOPHIL NFR BLD AUTO: 7 %
ERYTHROCYTE [DISTWIDTH] IN BLOOD BY AUTOMATED COUNT: 13.7 % (ref 10–15)
GLUCOSE SERPL-MCNC: 100 MG/DL (ref 70–99)
HCT VFR BLD AUTO: 43.3 % (ref 35–47)
HGB BLD-MCNC: 14.5 G/DL (ref 11.7–15.7)
IMM GRANULOCYTES # BLD: 0.1 10E3/UL
IMM GRANULOCYTES NFR BLD: 1 %
LIPASE SERPL-CCNC: 20 U/L (ref 13–60)
LYMPHOCYTES # BLD AUTO: 1.4 10E3/UL (ref 0.8–5.3)
LYMPHOCYTES NFR BLD AUTO: 17 %
MCH RBC QN AUTO: 28.8 PG (ref 26.5–33)
MCHC RBC AUTO-ENTMCNC: 33.5 G/DL (ref 31.5–36.5)
MCV RBC AUTO: 86 FL (ref 78–100)
MONOCYTES # BLD AUTO: 0.8 10E3/UL (ref 0–1.3)
MONOCYTES NFR BLD AUTO: 10 %
NEUTROPHILS # BLD AUTO: 5.4 10E3/UL (ref 1.6–8.3)
NEUTROPHILS NFR BLD AUTO: 64 %
NRBC # BLD AUTO: 0 10E3/UL
NRBC BLD AUTO-RTO: 0 /100
PLATELET # BLD AUTO: 255 10E3/UL (ref 150–450)
POTASSIUM SERPL-SCNC: 4.1 MMOL/L (ref 3.4–5.3)
PROT SERPL-MCNC: 7 G/DL (ref 6.4–8.3)
RBC # BLD AUTO: 5.04 10E6/UL (ref 3.8–5.2)
SODIUM SERPL-SCNC: 140 MMOL/L (ref 135–145)
WBC # BLD AUTO: 8.2 10E3/UL (ref 4–11)

## 2024-01-25 PROCEDURE — 36415 COLL VENOUS BLD VENIPUNCTURE: CPT | Performed by: INTERNAL MEDICINE

## 2024-01-25 PROCEDURE — 80053 COMPREHEN METABOLIC PANEL: CPT | Performed by: INTERNAL MEDICINE

## 2024-01-25 PROCEDURE — 85025 COMPLETE CBC W/AUTO DIFF WBC: CPT | Performed by: INTERNAL MEDICINE

## 2024-01-25 PROCEDURE — 93010 ELECTROCARDIOGRAM REPORT: CPT | Performed by: INTERNAL MEDICINE

## 2024-01-25 PROCEDURE — 99214 OFFICE O/P EST MOD 30 MIN: CPT | Performed by: INTERNAL MEDICINE

## 2024-01-25 PROCEDURE — 83690 ASSAY OF LIPASE: CPT | Performed by: INTERNAL MEDICINE

## 2024-01-25 PROCEDURE — 99213 OFFICE O/P EST LOW 20 MIN: CPT | Mod: 25 | Performed by: INTERNAL MEDICINE

## 2024-01-25 PROCEDURE — 93005 ELECTROCARDIOGRAM TRACING: CPT

## 2024-01-25 RX ORDER — ASPIRIN 81 MG/1
81 TABLET ORAL DAILY
COMMUNITY
End: 2024-08-19

## 2024-01-25 ASSESSMENT — PAIN SCALES - GENERAL: PAINLEVEL: NO PAIN (0)

## 2024-01-25 NOTE — LETTER
1/25/2024         RE: Vashti Villegas  [address] [place] [address]        Dear Colleague,    Thank you for referring your patient, Vashti Villegas, to the [facility]. Please see a copy of my visit note below.    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 While at OSH, she had CT PE protocol as well as CT A/P. These identified no PE, no acute or localized intraabdominal inflammatory process, mild splenomegaly, and few inconspicous low-density structures in the liver. She was transferred to [facility] for further workup and management. S/p Hydrea 1g TID (8/21-8/23) with improvement in WBC (100K ? 12K).   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 IHC shows CD10+, CD34+. 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 Abnormal cytogenetics.   --Microarry study: CN gains/losses in 3p21, 4q25, 7p11 (region includes CREB5 and IKZF1; biallelic loss of TARP), 9p13.2-p22.3 (region includes CDKN2a/2b and PAX5), 12q13, 14q11.     INDUCTION:   --Initiated pre-phase steroids with Prednisone 60 mg x8/21; increased to 60 mg/m2 (140 mg) 8/22-8/24. She initiated GRAALL + imatinib (C1D1 = 8/24/22).   --S/p BMT consult 9/12 with Dr. Walsh. Induction was relatively uncomplicated.   -s/p weekly triple IT chemo (Cytarabine, Solu-Cortef, MTX) on Days 1, 8, and 15 of C1 induction. CNS negative.   --BMBx 9/26/22 MRD-negative by BCR-ABL PCR and by Clonoseq Assay.      --GRAALL + imatinib (C2D1=9/30/22). 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   Post-cycle-2  BMBx 10/25/2022 showed MRD-negative CR, though low-level abnormal B-cell population (favor hematogones over residual leukemia) was noted. BCR-ABL negative. Clonoseq negative at 1/10E6 level.    --C3 GRAALL + imatinib on 11/2/22 was tolerated well without complication.    CONSOLIDATION:   --Discussed with Dr. Rutherford, BMT faculty meeting: No matched related donors identified. High panel reactive antibodies towards  unrelated donor samples. With an excellent early, deep MRD response to chemotherapy and no optimal donor sources identified, discussed foregoing BMT in CR1 in favor of definitive therapy.  Discussed risks and benefits of GRAAL versus newer approaches. April agreed to a combination of Blinatumomab plus ponatinib  (Short et al The Lancet Hematology Volume 10, ISSUE 1, e24-e34, January 2023).   --Aspirin 81 mg daily added for VTE prophylaxis per original study.   --Cycle 1Day 1 Blinatumomab plus Ponatinib 15 mg 12/14/2022. Complicated by PICC disruption, brief Blincyto interruption ~12/21/22.   --BMBx 1/17/2023: MRD- by MFC. BCR-ABL p190 detectable at <0.079%. Clonoseq negative.  --Cycle 2 Day 1 Blinatumomab plus Ponatinib 15 mg  2/6/2023.  --BMBx 3/1/2023: MRD- by MFC. BCR-ABL p190 undetectable. Clonoseq negative. FISH with borderline detectable BCR-ABL fusion at 0.125% (at LoD for assay).  --Cycle 3 Day 1 Blinatumomab plus Ponatinib 15 mg 3/9/2023.  --Cycle 4 Day 1 Blinatumomab plus Ponatinib 15 mg 4/20/23.  --Cycle 5 Day 1 Blinatumomab plus Ponatinib 15 mg 6/1/23.  --Continued on Ponatinib 15 mg every day maintenance with ASA 81 mg daily  --8/8/2023 BMBx hypocellular (40%) marrow, no evidence of leukemia. MRD- by MFC, BCR-ABL QPCR negative, Clonoseq negative  --October 2023: PB BCR-ABL and Clonoseq negative    INTRATHECAL CHEMOTHERAPY: ALL CSF studies negative by MFC  8/26/22: Cytarabine 40 mg/methotrexate 15 mg  8/31/22: Cytarabine 40 mg/methotrexate 15 mg  9/7/22: Cytarabine 40 mg/methotrexate 15  "mg  9/14/22: Methotrexate 15 mg  10/21/22: Cytarabine 40 mg/methotrexate 15 mg  11/25/22: Cytarabine 100 mg  12/16/22: Methotrexate 12 mg  1/2/23; Cytarabine 100 mg  1/16/23: Methotrexate 12 mg  2/02/23: Cytarabine 100 mg  2/27/23: Methotrexate 12 mg  3/14/23: Cytarabine 100 mg      History of Present Illness:   April Kiel returns.  Work is a bit stressful and she has had some dull frontal headaches recently; her BP is a bit elevated in clinic today.   She has a slight rash over her left pretibial surface and right inguinal area that she gets every winter.  Otherwise doing well.  No recent infectious episodes, no vision changes or other symptoms.   12-point ROS otherwise negative.    Medications:       Current Outpatient Medications   Medication Sig    acyclovir (ZOVIRAX) 400 MG tablet Take 1 tablet (400 mg) by mouth every 12 hours for 120 days    aspirin 81 MG EC tablet Take 81 mg by mouth daily    PONATinib (ICLUSIG) 15 MG tablet Take 1 tablet (15 mg) by mouth daily    acetaminophen (TYLENOL) 325 MG tablet Take 650 mg by mouth every 6 hours as needed for mild pain, other or fever (blood product administration premedication) (Patient not taking: Reported on 1/25/2024)    ondansetron (ZOFRAN ODT) 4 MG ODT tab Take 1-2 tablets (4-8 mg) by mouth every 8 hours as needed for nausea or vomiting (Patient not taking: Reported on 1/25/2024)     No current facility-administered medications for this visit.        Physical Exam:   Blood pressure (!) 152/89, pulse 87, temperature 97.8  F (36.6  C), temperature source Oral, resp. rate 16, height 1.626 m (5' 4.02\"), weight 119.4 kg (263 lb 4.8 oz), SpO2 98%.    ECOG 1  Gen: NAD  HEENT: slight periorbital edema. No sinus pressure. OP clear. MMM. CV: RRR Pulm: CTAB Lymph: No LAD  Skin: papular rash with scabbing over left pretibial region and right inguinal area, no erythema or excoriation    No results found for this or any previous visit (from the past 24 hour(s)).    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 After induction with cycle 1 and 2 of attenuated-dose GRAAL chemotherapy,, she was in an MRD- CR (CR1) by MFC, BCR-ABL PCR, and Clonoseq assay.  With no matched related donors, and an excellent early/deep MRD-CR, chemotherapy is currently favored over BMT; this is supported by multiple recent clinical observations (Blood. 2016 Jul 28; 128(4): 504-507; Blood 2022 Jul 25;blood.[id number]). After discussion, she transitioned to Blinatumomab + Ponatinib (Short et al The Lancet Hematology Volume 10, ISSUE 1, e24-e34, January 2023) as curative intent therapy. C1D1 12/14/22. Course complicated by transient cytopenias and headaches. She completed 12 total doses of prophylactic intrathecal chemotherapy.     She is currently doing quite well with no adverse symptoms or sequelae of treatment. Occasional headaches seem related to caffeine; but if this pattern changes or intensifies, she knows to contact us.     She informs me she had a fundoscopic exam in September 2023, and will have the records sent to us.     PLAN:  --Continue maintenace ponatinib 15 mg QD for 5 years since initiation of therapy, then reassess.  --CBC w/ diff, CMP every 8 weeks. Peripheral blood BCR-ABL and Clonoseq NGS MRD testing every 4 months. Clinic visit every 4 months after BCR-ABL and Clonoseq have resulted.   --Repeat BMBx at 2 years (August 2024)  --ASA 81 mg prophylaxis  --Annual eye exam while on ponatinib  --Annual EKG and lipid/TG panel while on ponatinib  --See PCP and undergo age-appropriate cancer screening include mammogram and colonoscopy  --Other supportive cares as per below    # Rash -- Consistent with Xeroderma in both long-term pattern and appearance. Photograph taken today. Will try moisturizing cream, can refer to Dermatology if not improving.   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      # Headaches  # H/o COVID-19 infection   --Refuses COVID-19 vaccination    # Hypertension: Amlodipine; Lasix as needed  # Indigestion/GERD    Steve Hull MD, PhD  Division of Hematology, Oncology, and Transplantation  [facility]

## 2024-01-25 NOTE — NURSING NOTE
"Oncology Rooming Note    January 25, 2024 10:20 AM   April F Bakari is a 43 year old female who presents for:    Chief Complaint   Patient presents with    Blood Draw     Labs drawn via  by RN in lab.  VS taken    Oncology Clinic Visit     [facility]URN - [facility]     Initial Vitals: BP (!) 152/89   Pulse 87   Temp 97.8  F (36.6  C) (Oral)   Resp 16   Ht 1.626 m (5' 4.02\")   Wt 119.4 kg (263 lb 4.8 oz)   SpO2 98%   BMI 45.17 kg/m   Estimated body mass index is 45.17 kg/m  as calculated from the following:    Height as of this encounter: 1.626 m (5' 4.02\").    Weight as of this encounter: 119.4 kg (263 lb 4.8 oz). Body surface area is 2.32 meters squared.  No Pain (0) Comment: Data Unavailable   No LMP recorded. Patient is postmenopausal.  Allergies reviewed: Yes  Medications reviewed: Yes    Medications: Medication refills not needed today.  Pharmacy name entered into [facility]:    [id number] [place], [place] - [address]  [facility] - [place], [place] - [address]    Frailty Screening:   Is the patient here for a new oncology consult visit in cancer care? 2. No      Charles Epps LPN              "

## 2024-01-25 NOTE — PROGRESS NOTES
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 While at OSH, she had CT PE protocol as well as CT A/P. These identified no PE, no acute or localized intraabdominal inflammatory process, mild splenomegaly, and few inconspicous low-density structures in the liver. She was transferred to [facility] for further workup and management. S/p Hydrea 1g TID (8/21-8/23) with improvement in WBC (100K ? 12K).   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 IHC shows CD10+, CD34+. 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 Abnormal cytogenetics.   --Microarry study: CN gains/losses in 3p21, 4q25, 7p11 (region includes CREB5 and IKZF1; biallelic loss of TARP), 9p13.2-p22.3 (region includes CDKN2a/2b and PAX5), 12q13, 14q11.     INDUCTION:   --Initiated pre-phase steroids with Prednisone 60 mg x8/21; increased to 60 mg/m2 (140 mg) 8/22-8/24. She initiated GRAALL + imatinib (C1D1 = 8/24/22).   --S/p BMT consult 9/12 with Dr. Walsh. Induction was relatively uncomplicated.   -s/p weekly triple IT chemo (Cytarabine, Solu-Cortef, MTX) on Days 1, 8, and 15 of C1 induction. CNS negative.   --BMBx 9/26/22 MRD-negative by BCR-ABL PCR and by Clonoseq Assay.      --GRAALL + imatinib (C2D1=9/30/22). 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   Post-cycle-2 BMBx 10/25/2022 showed MRD-negative CR, though low-level abnormal B-cell population (favor hematogones over residual leukemia) was noted. BCR-ABL negative. Clonoseq negative at 1/10E6 level.    --C3 GRAALL + imatinib on 11/2/22 was tolerated well  without complication.    CONSOLIDATION:   --Discussed with Dr. Rutherford, BMT faculty meeting: No matched related donors identified. High panel reactive antibodies towards  unrelated donor samples. With an excellent early, deep MRD response to chemotherapy and no optimal donor sources identified, discussed foregoing BMT in CR1 in favor of definitive therapy.  Discussed risks and benefits of GRAAL versus newer approaches. April agreed to a combination of Blinatumomab plus ponatinib  (Jesusita et al The Lancet Hematology Volume 10, ISSUE 1, e24-e34, January 2023).   --Aspirin 81 mg daily added for VTE prophylaxis per original study.   --Cycle 1Day 1 Blinatumomab plus Ponatinib 15 mg 12/14/2022. Complicated by PICC disruption, brief Blincyto interruption ~12/21/22.   --BMBx 1/17/2023: MRD- by MFC. BCR-ABL p190 detectable at <0.079%. Clonoseq negative.  --Cycle 2 Day 1 Blinatumomab plus Ponatinib 15 mg  2/6/2023.  --BMBx 3/1/2023: MRD- by MFC. BCR-ABL p190 undetectable. Clonoseq negative. FISH with borderline detectable BCR-ABL fusion at 0.125% (at LoD for assay).  --Cycle 3 Day 1 Blinatumomab plus Ponatinib 15 mg 3/9/2023.  --Cycle 4 Day 1 Blinatumomab plus Ponatinib 15 mg 4/20/23.  --Cycle 5 Day 1 Blinatumomab plus Ponatinib 15 mg 6/1/23.  --Continued on Ponatinib 15 mg every day maintenance with ASA 81 mg daily  --8/8/2023 BMBx hypocellular (40%) marrow, no evidence of leukemia. MRD- by MFC, BCR-ABL QPCR negative, Clonoseq negative  --October 2023: PB BCR-ABL and Clonoseq negative    INTRATHECAL CHEMOTHERAPY: ALL CSF studies negative by MFC  8/26/22: Cytarabine 40 mg/methotrexate 15 mg  8/31/22: Cytarabine 40 mg/methotrexate 15 mg  9/7/22: Cytarabine 40 mg/methotrexate 15 mg  9/14/22: Methotrexate 15 mg  10/21/22: Cytarabine 40 mg/methotrexate 15 mg  11/25/22: Cytarabine 100 mg  12/16/22: Methotrexate 12 mg  1/2/23; Cytarabine 100 mg  1/16/23: Methotrexate 12 mg  2/02/23: Cytarabine 100 mg  2/27/23: Methotrexate 12  "mg  3/14/23: Cytarabine 100 mg      History of Present Illness:   April Bakari returns.  Work is a bit stressful and she has had some dull frontal headaches recently; her BP is a bit elevated in clinic today.   She has a slight rash over her left pretibial surface and right inguinal area that she gets every winter.  Otherwise doing well.  No recent infectious episodes, no vision changes or other symptoms.   12-point ROS otherwise negative.    Medications:       Current Outpatient Medications   Medication Sig    acyclovir (ZOVIRAX) 400 MG tablet Take 1 tablet (400 mg) by mouth every 12 hours for 120 days    aspirin 81 MG EC tablet Take 81 mg by mouth daily    PONATinib (ICLUSIG) 15 MG tablet Take 1 tablet (15 mg) by mouth daily    acetaminophen (TYLENOL) 325 MG tablet Take 650 mg by mouth every 6 hours as needed for mild pain, other or fever (blood product administration premedication) (Patient not taking: Reported on 1/25/2024)    ondansetron (ZOFRAN ODT) 4 MG ODT tab Take 1-2 tablets (4-8 mg) by mouth every 8 hours as needed for nausea or vomiting (Patient not taking: Reported on 1/25/2024)     No current facility-administered medications for this visit.        Physical Exam:   Blood pressure (!) 152/89, pulse 87, temperature 97.8  F (36.6  C), temperature source Oral, resp. rate 16, height 1.626 m (5' 4.02\"), weight 119.4 kg (263 lb 4.8 oz), SpO2 98%.    ECOG 1  Gen: NAD  HEENT: slight periorbital edema. No sinus pressure. OP clear. MMM. CV: RRR Pulm: CTAB Lymph: No LAD  Skin: papular rash with scabbing over left pretibial region and right inguinal area, no erythema or excoriation    No results found for this or any previous visit (from the past 24 hour(s)).    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 After induction with cycle 1 and 2 of attenuated-dose GRAAL chemotherapy,, she was in an MRD- CR (CR1) by MFC, BCR-ABL PCR, " and Clonoseq assay.  With no matched related donors, and an excellent early/deep MRD-CR, chemotherapy is currently favored over BMT; this is supported by multiple recent clinical observations (Blood. 2016 Jul 28; 128(4): 504-507; Blood 2022 Jul 25;blood.[id number]). After discussion, she transitioned to Blinatumomab + Ponatinib (Short et al The Lancet Hematology Volume 10, ISSUE 1, e24-e34, January 2023) as curative intent therapy. C1D1 12/14/22. Course complicated by transient cytopenias and headaches. She completed 12 total doses of prophylactic intrathecal chemotherapy.     She is currently doing quite well with no adverse symptoms or sequelae of treatment. Occasional headaches seem related to caffeine; but if this pattern changes or intensifies, she knows to contact us.     She informs me she had a fundoscopic exam in September 2023, and will have the records sent to us.     PLAN:  --Continue maintenace ponatinib 15 mg QD for 5 years since initiation of therapy, then reassess.  --CBC w/ diff, CMP every 8 weeks. Peripheral blood BCR-ABL and Clonoseq NGS MRD testing every 4 months. Clinic visit every 4 months after BCR-ABL and Clonoseq have resulted.   --Repeat BMBx at 2 years (August 2024)  --ASA 81 mg prophylaxis  --Annual eye exam while on ponatinib  --Annual EKG and lipid/TG panel while on ponatinib  --See PCP and undergo age-appropriate cancer screening include mammogram and colonoscopy  --Other supportive cares as per below    # Rash -- Consistent with Xeroderma in both long-term pattern and appearance. Photograph taken today. Will try moisturizing cream, can refer to Dermatology if not improving.   # CNS prophylaxis--complete  # Chemotherapy-induced Grade 1 neuropathy --will follow  # Varicella Zoster; improved  # PPx  --Transition from Valacyclovir back to Acyclovir 400 mg BID  -- ppx Levaquin and Fluconazole when ANC <1.0.      # Headaches  # H/o COVID-19 infection   --Refuses COVID-19 vaccination    #  Hypertension: Amlodipine; Lasix as needed  # Indigestion/GERD    Steve Hull MD, PhD  Division of Hematology, Oncology, and Transplantation  [facility]

## 2024-01-25 NOTE — NURSING NOTE
EKG was performed today per order written by Dr. Steve Hull.  Name and  verified with patient. Patient tolerated well without incident. File transmitted to maritza.    Elzbieta Richardson on 2024 at 11:16 AM

## 2024-01-28 ENCOUNTER — HEALTH MAINTENANCE LETTER (OUTPATIENT)
Age: 44
End: 2024-01-28

## 2024-01-31 LAB
ATRIAL RATE - MUSE: 64 BPM
DIASTOLIC BLOOD PRESSURE - MUSE: NORMAL MMHG
INTERPRETATION ECG - MUSE: NORMAL
P AXIS - MUSE: 25 DEGREES
PR INTERVAL - MUSE: 156 MS
QRS DURATION - MUSE: 88 MS
QT - MUSE: 420 MS
QTC - MUSE: 433 MS
R AXIS - MUSE: -25 DEGREES
SYSTOLIC BLOOD PRESSURE - MUSE: NORMAL MMHG
T AXIS - MUSE: 25 DEGREES
VENTRICULAR RATE- MUSE: 64 BPM

## 2024-02-04 DIAGNOSIS — C91.00 ACUTE LYMPHOBLASTIC LEUKEMIA (ALL) NOT HAVING ACHIEVED REMISSION (H): Primary | ICD-10-CM

## 2024-02-06 ENCOUNTER — PATIENT OUTREACH (OUTPATIENT)
Dept: ONCOLOGY | Facility: CLINIC | Age: 44
End: 2024-02-06
Payer: COMMERCIAL

## 2024-02-06 DIAGNOSIS — C91.01 ACUTE LYMPHOBLASTIC LEUKEMIA (ALL) IN REMISSION (H): Primary | ICD-10-CM

## 2024-02-07 DIAGNOSIS — C91.00 ACUTE LYMPHOBLASTIC LEUKEMIA (ALL) NOT HAVING ACHIEVED REMISSION (H): Primary | ICD-10-CM

## 2024-02-29 ENCOUNTER — TELEPHONE (OUTPATIENT)
Dept: DERMATOLOGY | Facility: CLINIC | Age: 44
End: 2024-02-29
Payer: COMMERCIAL

## 2024-02-29 DIAGNOSIS — C91.01 ACUTE LYMPHOBLASTIC LEUKEMIA (ALL) IN REMISSION (H): Primary | ICD-10-CM

## 2024-02-29 NOTE — TELEPHONE ENCOUNTER
This encounter is being sent to inform the clinic that this patient has a referral from Steve Hull MD in  ONCOLOGY ADULT for the diagnoses of Acute lymphoblastic leukemia (ALL) in remission (H) [C91.01] On long-term ponatinib for Ph+ ALL maintenance -- drug rash or unrelated? Manage while maintaining therapy? and has requested that this patient be seen within 1-2 weeks and/or with Any.  Based on the availability of our provider(s), we are unable to accommodate this request.    Were all sites offered this patient?  Yes    Does scheduling algorithm request to schedule next available?  Patient has been scheduled for the first available opening with Dina on 03/19/2024.  We have informed the patient that the clinic will review their referral and reach out if a sooner appointment is medically necessary.

## 2024-03-05 DIAGNOSIS — C91.00 ACUTE LYMPHOBLASTIC LEUKEMIA (ALL) NOT HAVING ACHIEVED REMISSION (H): Primary | ICD-10-CM

## 2024-03-08 DIAGNOSIS — C91.00 ACUTE LYMPHOBLASTIC LEUKEMIA (ALL) NOT HAVING ACHIEVED REMISSION (H): Primary | ICD-10-CM

## 2024-03-16 NOTE — TELEPHONE ENCOUNTER
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 Manage while maintaining therapy?, refferred by Steve Hull MD in  ONCOLOGY ADULT Recs in [facility], Per Robina at [facility]      RECORDS REQUESTED FROM:       Clinic name Comments Records Status Imaging Status   Oncology 2.29.24, 1.25.24  [facility]

## 2024-03-19 ENCOUNTER — OFFICE VISIT (OUTPATIENT)
Dept: DERMATOLOGY | Facility: CLINIC | Age: 44
End: 2024-03-19
Payer: COMMERCIAL

## 2024-03-19 ENCOUNTER — PRE VISIT (OUTPATIENT)
Dept: DERMATOLOGY | Facility: CLINIC | Age: 44
End: 2024-03-19

## 2024-03-19 DIAGNOSIS — R21 RASH AND NONSPECIFIC SKIN ERUPTION: Primary | ICD-10-CM

## 2024-03-19 DIAGNOSIS — C91.01 ACUTE LYMPHOBLASTIC LEUKEMIA (ALL) IN REMISSION (H): ICD-10-CM

## 2024-03-19 DIAGNOSIS — L29.9 PRURITUS: ICD-10-CM

## 2024-03-19 PROCEDURE — 99204 OFFICE O/P NEW MOD 45 MIN: CPT | Performed by: STUDENT IN AN ORGANIZED HEALTH CARE EDUCATION/TRAINING PROGRAM

## 2024-03-19 RX ORDER — TRIAMCINOLONE ACETONIDE 1 MG/G
OINTMENT TOPICAL 2 TIMES DAILY
Qty: 454 G | Refills: 3 | Status: SHIPPED | OUTPATIENT
Start: 2024-03-19

## 2024-03-19 NOTE — PATIENT INSTRUCTIONS
Start triamcinolone 2x daily until rash has cleared and itching has improved.     [facility]   2.02

## 2024-03-19 NOTE — NURSING NOTE
Dermatology Rooming Note    Vashti Villegas's goals for this visit include:   Chief Complaint   Patient presents with    Autoimmune Disease     Rash on arms and a patch on her right leg; has been ongoing for 2-3 months. Pt states its sensitive to the touch and exhibits pruritus.      Teofilo Joyce, EMT-B

## 2024-03-19 NOTE — PROGRESS NOTES
I have personally examined this patient and agree with the medical student's documentation and plan of care. I have reviewed and amended the medical student's note as necessary.The documentation accurately reflects my clinical observations, diagnoses, treatment and follow-up plans.     Shayan Arroyo MD  Dermatology Staff      [facility] Dermatology Note  Encounter Date: Mar 19, 2024  Office Visit     Dermatology Problem List:  1. Eczematous rash to dorsal forearms (cutoff at sleeve)  - Ddx includes PMLE vs. Contact dermatitis vs. Spongiotic drug eruption.  Current tx: Triamcinolone 0.1%     ____________________________________________    Assessment & Plan:    # Pruritus  # Eczematous rash, affecting dorsal forearms and old lesions to right medial thigh  Patient presenting with spongiotic rash to dorsal forearms that cuts off at the sleeve lines and does not affect the volar aspect of the forearms. Given cutoff at sleeve line and fact patient enjoys working outside in her garden, ddx includes PMLE vs. Contact dermatitis vs. Spongiotic drug eruption.   - Discussed the possibility of obtaining a biopsy today for further diagnostic clarity. However, given that this would not significantly change our management or ability to treat the rash, patient declined. We discussed that biopsy and/or patch testing can be done in the future to better evaluate potential cause for the rash.  - Start 0.1% triamcinolone BID until current rash has cleared  - Will follow up via [employer] in 10 days to let us know how things are going            Procedures Performed:   None    Follow-up: Will follow up via MyChart in 10 days to update on progress and to determine if a stronger therapy may be warranted. She can follow up as needed for further workup (patch testing, biopsy) if more clarity is needed surrounding dx.     Staff and Medical Student:     I, Reji Cazares, MS3, saw and discussed this patient with the attending  physician Dr. Shayan Arroyo MD.       ____________________________________________    CC: Autoimmune Disease (Rash on arms and a patch on her right leg; has been ongoing for 2-3 months. Pt states its sensitive to the touch and exhibits pruritus. )    HPI:  Ms. 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 About two months ago, the patient developed an intensely itchy rash consisting of small pink papules on her right leg and medial thigh. This rash remained for about 6 weeks and has now almost totally disappeared. One month ago, she developed a similar itchy rash on her bilateral dorsal forearms extending up to the sleeve line of the arms. This rash has continued to bother her since its onset and has not responded to moisturizer or OTC hydrocortisone. She denies fevers, chills, night sweats, weight loss, or joint pain.     Patient is otherwise feeling well, without additional skin concerns.    Labs:  None reviewed.    Physical Exam:  Vitals: There were no vitals taken for this visit.  SKIN: Focused examination of bilateral forearms, arms, legs, and thighs was performed.  - On bilateral dorsal forearms, there are multiple 2 mm red papules, most of which have central area of erosion. Several excoriations noted. On left antecubital fossa, there are numerous clustered 2 mm pink papules without central erosion. There is a notable cutoff at the sleeve lines and no involvement of volar aspect of forearms.  - On right medial thigh, there are numerous 2-3 mm red macules where patient previously had active rash.   - No other lesions of concern on areas examined.               Medications:  Current Outpatient Medications   Medication    aspirin 81 MG EC tablet    PONATinib (ICLUSIG) 15 MG tablet    triamcinolone (KENALOG) 0.1 %  external ointment    acetaminophen (TYLENOL) 325 MG tablet    acyclovir (ZOVIRAX) 400 MG tablet    ondansetron (ZOFRAN ODT) 4 MG ODT tab     No current facility-administered medications for this visit.      Past Medical History:   Patient Active Problem List   Diagnosis    Acute leukemia (H)    Acute lymphoblastic leukemia (ALL) (H)    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)    Prophylaxis for chemotherapy-induced neutropenia    Morbid obesity (H)    Disseminated zoster    ALL (acute lymphoblastic leukemia of infant) (H)    ALL (acute lymphoblastic leukemia) (H)    ALL (acute lymphocytic leukemia) (H)    Adjustment and management of vascular access device    Sepsis (H)    B-cell acute lymphoblastic leukemia (ALL) (H)    Encounter for antineoplastic immunotherapy     Past Medical History:   Diagnosis Date    ALL (acute lymphocytic leukemia) (H)     Other acne     Shingles         CC Steve Hull MD  [address] [place] [address] on close of this encounter.

## 2024-03-19 NOTE — LETTER
3/19/2024       RE: Vashti Villegas  [address] [place] [address]     Dear Colleague,    Thank you for referring your patient, Vashti Villegas, to the [facility] [place] [facility]. Please see a copy of my visit note below.    I have personally examined this patient and agree with the medical student's documentation and plan of care. I have reviewed and amended the medical student's note as necessary.The documentation accurately reflects my clinical observations, diagnoses, treatment and follow-up plans.     Shayan Arroyo MD  Dermatology Staff      [facility] Dermatology Note  Encounter Date: Mar 19, 2024  Office Visit     Dermatology Problem List:  1. Eczematous rash to dorsal forearms (cutoff at sleeve)  - Ddx includes PMLE vs. Contact dermatitis vs. Spongiotic drug eruption.  Current tx: Triamcinolone 0.1%     ____________________________________________    Assessment & Plan:    # Pruritus  # Eczematous rash, affecting dorsal forearms and old lesions to right medial thigh  Patient presenting with spongiotic rash to dorsal forearms that cuts off at the sleeve lines and does not affect the volar aspect of the forearms. Given cutoff at sleeve line and fact patient enjoys working outside in her garden, ddx includes PMLE vs. Contact dermatitis vs. Spongiotic drug eruption.   - Discussed the possibility of obtaining a biopsy today for further diagnostic clarity. However, given that this would not significantly change our management or ability to treat the rash, patient declined. We discussed that biopsy and/or patch testing can be done in the future to better evaluate potential cause for the rash.  - Start 0.1% triamcinolone BID until current rash has cleared  - Will follow up via [employer] in 10 days to let us know how things are going            Procedures Performed:   None    Follow-up: Will follow up via [employer] in  10 days to update on progress and to determine if a stronger therapy may be warranted. She can follow up as needed for further workup (patch testing, biopsy) if more clarity is needed surrounding dx.     Staff and Medical Student:     I, Reji Cazares, MS3, saw and discussed this patient with the attending physician Dr. Shayan Arroyo MD.       ____________________________________________    CC: Autoimmune Disease (Rash on arms and a patch on her right leg; has been ongoing for 2-3 months. Pt states its sensitive to the touch and exhibits pruritus. )    HPI:  Ms. 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 About two months ago, the patient developed an intensely itchy rash consisting of small pink papules on her right leg and medial thigh. This rash remained for about 6 weeks and has now almost totally disappeared. One month ago, she developed a similar itchy rash on her bilateral dorsal forearms extending up to the sleeve line of the arms. This rash has continued to bother her since its onset and has not responded to moisturizer or OTC hydrocortisone. She denies fevers, chills, night sweats, weight loss, or joint pain.     Patient is otherwise feeling well, without additional skin concerns.    Labs:  None reviewed.    Physical Exam:  Vitals: There were no vitals taken for this visit.  SKIN: Focused examination of bilateral forearms, arms, legs, and thighs was performed.  - On bilateral dorsal forearms, there are multiple 2 mm red papules, most of which have central area of erosion. Several excoriations noted. On left antecubital fossa, there are numerous clustered 2 mm pink papules without central erosion. There is a notable cutoff at the sleeve lines and no involvement of volar aspect of forearms.  - On right  medial thigh, there are numerous 2-3 mm red macules where patient previously had active rash.   - No other lesions of concern on areas examined.               Medications:  Current Outpatient Medications   Medication    aspirin 81 MG EC tablet    PONATinib (ICLUSIG) 15 MG tablet    triamcinolone (KENALOG) 0.1 % external ointment    acetaminophen (TYLENOL) 325 MG tablet    acyclovir (ZOVIRAX) 400 MG tablet    ondansetron (ZOFRAN ODT) 4 MG ODT tab     No current facility-administered medications for this visit.      Past Medical History:   Patient Active Problem List   Diagnosis    Acute leukemia (H)    Acute lymphoblastic leukemia (ALL) (H)    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)    Prophylaxis for chemotherapy-induced neutropenia    Morbid obesity (H)    Disseminated zoster    ALL (acute lymphoblastic leukemia of infant) (H)    ALL (acute lymphoblastic leukemia) (H)    ALL (acute lymphocytic leukemia) (H)    Adjustment and management of vascular access device    Sepsis (H)    B-cell acute lymphoblastic leukemia (ALL) (H)    Encounter for antineoplastic immunotherapy     Past Medical History:   Diagnosis Date    ALL (acute lymphocytic leukemia) (H)     Other acne     Shingles         CC Steve Hull MD  [address] [place] [id number] on close of this encounter.      Dermatology Rooming Note    Vashti Villegas's goals for this visit include:   Chief Complaint   Patient presents with    Autoimmune Disease     Rash on arms and a patch on her right leg; has been ongoing for 2-3 months. Pt states its sensitive to the touch and exhibits pruritus.      Teofilo Joyce, EMT-B

## 2024-04-01 DIAGNOSIS — C91.00 ACUTE LYMPHOBLASTIC LEUKEMIA (ALL) NOT HAVING ACHIEVED REMISSION (H): Primary | ICD-10-CM

## 2024-04-06 ENCOUNTER — OFFICE VISIT (OUTPATIENT)
Dept: URGENT CARE | Facility: URGENT CARE | Age: 44
End: 2024-04-06
Payer: COMMERCIAL

## 2024-04-06 VITALS
TEMPERATURE: 97.8 F | RESPIRATION RATE: 14 BRPM | SYSTOLIC BLOOD PRESSURE: 141 MMHG | DIASTOLIC BLOOD PRESSURE: 87 MMHG | OXYGEN SATURATION: 98 % | HEART RATE: 79 BPM

## 2024-04-06 DIAGNOSIS — R20.0 BILATERAL HAND NUMBNESS: ICD-10-CM

## 2024-04-06 DIAGNOSIS — G56.03 BILATERAL CARPAL TUNNEL SYNDROME: Primary | ICD-10-CM

## 2024-04-06 PROCEDURE — 99204 OFFICE O/P NEW MOD 45 MIN: CPT | Performed by: INTERNAL MEDICINE

## 2024-04-06 ASSESSMENT — ENCOUNTER SYMPTOMS: SLEEP DISTURBANCE: 1

## 2024-04-06 NOTE — PROGRESS NOTES
ASSESSMENT AND PLAN:      ICD-10-CM    1. Bilateral carpal tunnel syndrome  G56.03 Orthopedic  Referral     Occupational Therapy  Referral     Wrist/Arm/Hand Bracking Supplies Order Wrist Brace; Bilateral; non-thumb spica      2. Bilateral hand numbness  R20.0 Orthopedic  Referral     Occupational Therapy  Referral     Wrist/Arm/Hand Bracking Supplies Order Wrist Brace; Bilateral; non-thumb spica      PLAN:  MS Injury/Pain  ice, rest, splint: , Tylenol, and OT, ortho hand referral      Return if symptoms worsen or fail to improve.        Disha Kang MD  [facility] URGENT [facility]    Subjective     April F Bakari is a 44 year old who presents for Patient presents with:  Musculoskeletal Problem: Numbness/tingling, some pain in left hand. Hot feeling  Feels it a little bit in right hand    a new patient of [facility].        Onset of symptoms was 10 day(s) ago.  Location: bilateral hand tingling with some pain in left hand.  Greater than right  Context:      No injury     Called nurses line - recommended to check for infection  Thinks possible related to ALL treatment         Pain & numbness all fingers, base of thumb is sore  Patient experienced pain  Course of symptoms is worsening.    Current and Associated symptoms: numbness full hand, sometimes feels heating up  Denies  Swelling, Bruising, Warmth, Redness, and Decreased range of motion  Aggravating Factors: holding phone  Therapies to improve symptoms include: none  This is the first time this type of problem has occurred for this patient.     keyboarder    Review of Systems   Psychiatric/Behavioral:  Positive for sleep disturbance.            Objective    BP (!) 141/87   Pulse 79   Temp 97.8  F (36.6  C) (Oral)   Resp 14   SpO2 98%   Physical Exam  Vitals reviewed.   Constitutional:       Appearance: Normal appearance. She is not ill-appearing.   Musculoskeletal:      Comments: bilateral hands  Numbness present  bilateral hands  Unable to make worse with flexion at wrists    Points mainly to digits 2-4    No swelling, redness or warmth   Neurological:      Mental Status: She is alert.

## 2024-04-07 ENCOUNTER — HEALTH MAINTENANCE LETTER (OUTPATIENT)
Age: 44
End: 2024-04-07

## 2024-04-08 ENCOUNTER — TELEPHONE (OUTPATIENT)
Dept: ONCOLOGY | Facility: CLINIC | Age: 44
End: 2024-04-08
Payer: COMMERCIAL

## 2024-04-08 DIAGNOSIS — C91.00 ACUTE LYMPHOBLASTIC LEUKEMIA (ALL) NOT HAVING ACHIEVED REMISSION (H): Primary | ICD-10-CM

## 2024-04-08 NOTE — TELEPHONE ENCOUNTER
Oral Chemotherapy Monitoring Program    Subjective/Objective:  Vashti Villegas is a 44 year old female contacted by phone for a follow-up visit for oral chemotherapy.  April confirms taking the appropriate dose of Iclusig 15 mg daily. Denies new or worsening side effects, missed doses, and recent hospital or ED visits. Patient has not had any recent medication changes. Patient spoke about new numbness in her hand/wrist, states that they think it is unrelated to Iclusig and may be carpel tunnel. She is seeing a hand specialist in the future.          1/10/2024     1:00 PM 1/12/2024    12:00 PM 1/26/2024    10:00 AM 2/7/2024    10:00 AM 3/8/2024    10:00 AM 4/8/2024     9:00 AM 4/8/2024    12:00 PM   ORAL CHEMOTHERAPY   Assessment Type Other Quarterly Follow up Chart Review Refill Refill Refill Quarterly Follow up   Diagnosis Code Acute Lymphoblastic Leukemia (ALL) Acute Lymphoblastic Leukemia (ALL) Acute Lymphoblastic Leukemia (ALL) Acute Lymphoblastic Leukemia (ALL) Acute Lymphoblastic Leukemia (ALL) Acute Lymphoblastic Leukemia (ALL) Acute Lymphoblastic Leukemia (ALL)   Providers Della Hull   [facility]   Is this patient followed by the [facility] team? Yes Yes Yes Yes Yes Yes Yes   Drug Name Iclusig (ponatinib) Iclusig (ponatinib) Iclusig (ponatinib) Iclusig (ponatinib) Iclusig (ponatinib) Iclusig (ponatinib) Iclusig (ponatinib)   Dose 15 mg 15 mg 15 mg 15 mg 15 mg 15 mg 15 mg   Current Schedule Daily Daily Daily Daily Daily Daily Daily   Cycle Details Continuous Continuous Continuous Continuous Continuous Continuous Continuous   Doses missed in last 2 weeks  0     0   Adherence Assessment  Adherent     Adherent   Adverse Effects  No AE identified during assessment     No AE identified during assessment   Any new drug interactions?  No     No   Is the dose as ordered appropriate for the patient?  Yes        Since the last  "time we talked, have you been hospitalized or used the emergency room?  No     No       Last PHQ-2 Score on record:       3/19/2024     7:59 AM 11/22/2022     2:09 PM   PHQ-2 ( 1999 [employer])   Q1: Little interest or pleasure in doing things 0 0   Q2: Feeling down, depressed or hopeless 0 0   PHQ-2 Score 0 0       Vitals:  BP:   BP Readings from Last 1 Encounters:   04/06/24 (!) 141/87     Wt Readings from Last 1 Encounters:   01/25/24 119.4 kg (263 lb 4.8 oz)     Estimated body surface area is 2.32 meters squared as calculated from the following:    Height as of 1/25/24: 1.626 m (5' 4.02\").    Weight as of 1/25/24: 119.4 kg (263 lb 4.8 oz).    Labs:  No results found for NA within last 30 days.     No results found for K within last 30 days.     No results found for CA within last 30 days.     No results found for Mag within last 30 days.     No results found for Phos within last 30 days.     No results found for ALBUMIN within last 30 days.     No results found for BUN within last 30 days.     No results found for CR within last 30 days.     No results found for AST within last 30 days.     No results found for ALT within last 30 days.     No results found for BILITOTAL within last 30 days.     No results found for WBC within last 30 days.     No results found for HGB within last 30 days.     No results found for PLT within last 30 days.     No results found for ANC within last 30 days.     No results found for ANC within last 30 days.          Assessment/Plan:  Patient continues to tolerate Iclusig. Continue therapy as planned    Follow-Up:  4/22 labs  7/2 quarterly assessment    Refill Due:  [facility] to deliver Iclusig on Wednesday 4/10    Gladys Carrizales, AzarD  Hematology/Oncology Clinical Pharmacist  [facility]  [phone]      "

## 2024-04-18 ENCOUNTER — THERAPY VISIT (OUTPATIENT)
Dept: OCCUPATIONAL THERAPY | Facility: CLINIC | Age: 44
End: 2024-04-18
Attending: INTERNAL MEDICINE
Payer: COMMERCIAL

## 2024-04-18 DIAGNOSIS — G56.03 BILATERAL CARPAL TUNNEL SYNDROME: ICD-10-CM

## 2024-04-18 DIAGNOSIS — R20.0 BILATERAL HAND NUMBNESS: ICD-10-CM

## 2024-04-18 PROCEDURE — 97165 OT EVAL LOW COMPLEX 30 MIN: CPT | Mod: GO

## 2024-04-18 PROCEDURE — 97110 THERAPEUTIC EXERCISES: CPT | Mod: 59

## 2024-04-18 PROCEDURE — 97530 THERAPEUTIC ACTIVITIES: CPT | Mod: GO

## 2024-04-18 NOTE — PROGRESS NOTES
OCCUPATIONAL THERAPY EVALUATION  Type of Visit: Evaluation    See electronic medical record for Abuse and Falls Screening details.    Subjective      Presenting condition or subjective complaint: Hand numbness and tingling  Date of onset: (P) 24    Relevant medical history: Cancer; Overweight   Dates & types of surgery:   &     Prior diagnostic imaging/testing results:     None  Prior therapy history for the same diagnosis, illness or injury: No      Prior Level of Function  Transfers: Independent  Ambulation: Independent  ADL: Independent  IADL:  Independent    Living Environment  Social support: With a significant other or spouse   Type of home: House   Stairs to enter the home: Yes       Ramp: No   Stairs inside the home: Yes   Is there a railing: Yes   Help at home: None    Employment: Yes    Hobbies/Interests:  None    Patient goals for therapy: Want irritation to go away    Pain assessment: Pain present  See objective evaluation for additional pain details     Objective   ADDITIONAL HISTORY:  Right hand dominant  Patient reports symptoms of pain, stiffness/loss of motion, edema, numbness, and tingling   Transportation: drives with worsening symptoms  Currently working in normal job without restrictions    Functional Outcome Measure:   Upper Extremity Functional Index Score:  SCORE:   Column Totals: /80: 67   (A lower score indicates greater disability.)    PAIN:  Pain Level at Rest: 2/10  Pain Level with Use: 7/10  Pain Location: thumb, index finger, long finger, ring finger, and small finger  Pain Quality: Burning, Numb, Throbbing, and Tingling  Pain Frequency: intermittent or daily  Pain is Worst: daytime or nighttime  Pain is Exacerbated By: typing, holding the phone  Pain is Relieved By: OTC wrist brace  Pain Progression: Worsened    POSTURE: Forward Neck Posture and Rounded Forward Shoulders     SENSATION: Decreased Median Nerve distribution per pt report     ROM:   Wrist ROM  Left  AROM Right AROM    Extension 64 58   Flexion 52 53   Radial Deviation (RD) 21 15   Ulnar Deviation (UD) 20 24   Supination 80 67   Pronation 88 88     SPECIAL TESTS:   CTS Special Tests  Pain Report Left Right   Median Nerve Compression at Pronator + @ 17 secs -   Carpal Compression Test-Durkan Test (30 sec) + @ 20 secs -   Chand Test for Lumbrical Incursion (fist x30 sec) + @ 17 secs + @ 28 secs   Tinel's at Carpal Tunnel + +   Phalen's Sign + @ 18 secs + @ 15 secs     NEURAL TENSION TESTING: MNT: Median Neurodynamic Test (based on ALVARADO Serrano's ULNT)   4/18/2024   0-5 Scale L: 2/5 S1   R: 3/5   Position:   0/5: Arm across abdomen in coronal plane  1/5: Depress shoulder, ER to neutral ABD shoulder to 45 degrees  2/5: ER shoulder to end range, keep elbow at 90 degrees  3/5: Extend elbow to 0 degrees  4/5: Fully supinate forearm  5/5: Extend wrist, fingers and thumb  Notes:  (+) indicates beyond grade level but less than half-way to next level  (-) indicates over half-way to level  S1 onset/change of patient's symptoms  S2 definite stop point based on patient's discomfort level    STRENGTH:     Measured in pounds 4/18/2024 4/18/2024    Left Right   Trial 1 56 47     Lateral Pinch  Measured in pounds 4/18/2024 4/18/2024    Left Right   Trial 1 13 14     3 Point Pinch  Measured in pounds 4/18/2024 4/18/2024    Left Right   Trial 1 9 13     Assessment & Plan   CLINICAL IMPRESSIONS  Medical Diagnosis: B CTS    Treatment Diagnosis: Bilateral hand numbness    Impression/Assessment: Pt is a 44 year old female presenting to Occupational Therapy due to B CTS & B hand numbness.  The following significant findings have been identified: Impaired ROM, Impaired sensation, Impaired strength, and Pain.  These identified deficits interfere with their ability to perform self care tasks, work tasks, recreational activities, household chores, driving ,  yard work, and meal planning and preparation as compared to previous level of  function.   Patient's limitations or Problem List includes: Pain, Decreased ROM/motion, Increased edema, Weakness, Sensory disturbance, Decreased , Decreased pinch, Tightness in musculature, and Adherence in connective tissue of the bilateral hand which interferes with the patient's ability to perform Self Care Tasks (dressing, hygiene/toileting), Work Tasks, Sleep Patterns, Recreational Activities, Household Chores, and Driving  as compared to previous level of function.    Clinical Decision Making (Complexity):  Assessment of Occupational Performance: 5 or more Performance Deficits  Occupational Performance Limitations: dressing, hygiene and grooming, driving and community mobility, health management and maintenance, home establishment and management, meal preparation and cleanup, sleep, work, and leisure activities  Clinical Decision Making (Complexity): Low complexity    PLAN OF CARE  Treatment Interventions:  Modalities:  US and Paraffin  Therapeutic Exercise:  AROM, AAROM, PROM, Tendon Gliding, Blocking, Reverse Blocking, Place and Hold, Contract Relax, Extensor Tracking, Isotonics, Isometrics, and Stabilization  Neuromuscular re-education:  Nerve Gliding and Kinesiotaping  Manual Techniques:  Friction massage, Myofascial release, and Manual edema mobilization  Orthotic Fabrication:  Static and Forearm based  Self Care:  Self Care Tasks, Ergonomic Considerations, and Work Tasks    Long Term Goals   OT Goal 1  Goal Identifier: (P) Typing  Goal Description: (P) Pt will report no pain with typing required for work tasks with using ergonomic recommendations outlined in their treatment plan.  Goal Progress: (P) 7/10 pain  Target Date: (P) 05/30/24      Frequency of Treatment: 1x weekly  Duration of Treatment: 6 weeks     Education Assessment: Learner/Method: (P) Patient;Demonstration;Pictures/Video  Education Comments: (P) PTRX via phone     Risks and benefits of evaluation/treatment have been explained.    Patient/Family/caregiver agrees with Plan of Care.     Evaluation Time:    OT Eval, Low Complexity Minutes (22366): (P) 27  Signing Clinician: Isabela Nguyen OT

## 2024-04-22 ENCOUNTER — LAB (OUTPATIENT)
Dept: LAB | Facility: CLINIC | Age: 44
End: 2024-04-22
Attending: INTERNAL MEDICINE
Payer: COMMERCIAL

## 2024-04-22 DIAGNOSIS — C91.01 ACUTE LYMPHOBLASTIC LEUKEMIA (ALL) IN REMISSION (H): ICD-10-CM

## 2024-04-22 DIAGNOSIS — C91.00 ACUTE LYMPHOBLASTIC LEUKEMIA (ALL) NOT HAVING ACHIEVED REMISSION (H): ICD-10-CM

## 2024-04-22 LAB
ALBUMIN SERPL BCG-MCNC: 4.6 G/DL (ref 3.5–5.2)
ALP SERPL-CCNC: 87 U/L (ref 40–150)
ALT SERPL W P-5'-P-CCNC: 46 U/L (ref 0–50)
ANION GAP SERPL CALCULATED.3IONS-SCNC: 8 MMOL/L (ref 7–15)
AST SERPL W P-5'-P-CCNC: 32 U/L (ref 0–45)
BASOPHILS # BLD AUTO: 0.1 10E3/UL (ref 0–0.2)
BASOPHILS NFR BLD AUTO: 1 %
BILIRUB SERPL-MCNC: 0.4 MG/DL
BUN SERPL-MCNC: 14.3 MG/DL (ref 6–20)
CALCIUM SERPL-MCNC: 9.3 MG/DL (ref 8.6–10)
CHLORIDE SERPL-SCNC: 104 MMOL/L (ref 98–107)
CREAT SERPL-MCNC: 0.6 MG/DL (ref 0.51–0.95)
DEPRECATED HCO3 PLAS-SCNC: 28 MMOL/L (ref 22–29)
EGFRCR SERPLBLD CKD-EPI 2021: >90 ML/MIN/1.73M2
EOSINOPHIL # BLD AUTO: 0.9 10E3/UL (ref 0–0.7)
EOSINOPHIL NFR BLD AUTO: 9 %
ERYTHROCYTE [DISTWIDTH] IN BLOOD BY AUTOMATED COUNT: 13.7 % (ref 10–15)
GLUCOSE SERPL-MCNC: 98 MG/DL (ref 70–99)
HCT VFR BLD AUTO: 42.7 % (ref 35–47)
HGB BLD-MCNC: 13.7 G/DL (ref 11.7–15.7)
IMM GRANULOCYTES # BLD: 0.1 10E3/UL
IMM GRANULOCYTES NFR BLD: 1 %
LAB DIRECTOR COMMENTS: NORMAL
LAB DIRECTOR DISCLAIMER: NORMAL
LAB DIRECTOR INTERPRETATION: NORMAL
LAB DIRECTOR METHODOLOGY: NORMAL
LAB DIRECTOR RESULTS: NORMAL
LDH SERPL L TO P-CCNC: 212 U/L (ref 0–250)
LIPASE SERPL-CCNC: 23 U/L (ref 13–60)
LOCATION OF TASK: NORMAL
LYMPHOCYTES # BLD AUTO: 1.3 10E3/UL (ref 0.8–5.3)
LYMPHOCYTES NFR BLD AUTO: 13 %
MCH RBC QN AUTO: 28.3 PG (ref 26.5–33)
MCHC RBC AUTO-ENTMCNC: 32.1 G/DL (ref 31.5–36.5)
MCV RBC AUTO: 88 FL (ref 78–100)
MONOCYTES # BLD AUTO: 0.9 10E3/UL (ref 0–1.3)
MONOCYTES NFR BLD AUTO: 9 %
NEUTROPHILS # BLD AUTO: 6.5 10E3/UL (ref 1.6–8.3)
NEUTROPHILS NFR BLD AUTO: 67 %
NRBC # BLD AUTO: 0 10E3/UL
NRBC BLD AUTO-RTO: 0 /100
PLATELET # BLD AUTO: 278 10E3/UL (ref 150–450)
POTASSIUM SERPL-SCNC: 4.4 MMOL/L (ref 3.4–5.3)
PROT SERPL-MCNC: 6.8 G/DL (ref 6.4–8.3)
RBC # BLD AUTO: 4.84 10E6/UL (ref 3.8–5.2)
SODIUM SERPL-SCNC: 140 MMOL/L (ref 135–145)
SPECIMEN DESCRIPTION: NORMAL
WBC # BLD AUTO: 9.8 10E3/UL (ref 4–11)

## 2024-04-22 PROCEDURE — 83690 ASSAY OF LIPASE: CPT

## 2024-04-22 PROCEDURE — 36415 COLL VENOUS BLD VENIPUNCTURE: CPT

## 2024-04-22 PROCEDURE — 85025 COMPLETE CBC W/AUTO DIFF WBC: CPT

## 2024-04-22 PROCEDURE — 83615 LACTATE (LD) (LDH) ENZYME: CPT

## 2024-04-22 PROCEDURE — 81207 BCR/ABL1 GENE MINOR BP: CPT

## 2024-04-22 PROCEDURE — 80053 COMPREHEN METABOLIC PANEL: CPT

## 2024-04-22 PROCEDURE — G0452 MOLECULAR PATHOLOGY INTERPR: HCPCS | Performed by: STUDENT IN AN ORGANIZED HEALTH CARE EDUCATION/TRAINING PROGRAM

## 2024-04-23 ENCOUNTER — MYC MEDICAL ADVICE (OUTPATIENT)
Dept: ONCOLOGY | Facility: CLINIC | Age: 44
End: 2024-04-23
Payer: COMMERCIAL

## 2024-04-23 NOTE — TELEPHONE ENCOUNTER
Oral Chemotherapy Monitoring Program  Lab Follow Up    Reviewed CBC and CMP lab results from 4/22.        1/12/2024    12:00 PM 1/26/2024    10:00 AM 2/7/2024    10:00 AM 3/8/2024    10:00 AM 4/8/2024     9:00 AM 4/8/2024    12:00 PM 4/23/2024    12:00 PM   ORAL CHEMOTHERAPY   Assessment Type Quarterly Follow up Chart Review Refill Refill Refill Quarterly Follow up Lab Monitoring   Diagnosis Code Acute Lymphoblastic Leukemia (ALL) Acute Lymphoblastic Leukemia (ALL) Acute Lymphoblastic Leukemia (ALL) Acute Lymphoblastic Leukemia (ALL) Acute Lymphoblastic Leukemia (ALL) Acute Lymphoblastic Leukemia (ALL) Acute Lymphoblastic Leukemia (ALL)   Providers Della Hull   [facility]   Is this patient followed by the [facility] OC team? Yes Yes Yes Yes Yes Yes Yes   Drug Name Iclusig (ponatinib) Iclusig (ponatinib) Iclusig (ponatinib) Iclusig (ponatinib) Iclusig (ponatinib) Iclusig (ponatinib) Iclusig (ponatinib)   Dose 15 mg 15 mg 15 mg 15 mg 15 mg 15 mg 15 mg   Current Schedule Daily Daily Daily Daily Daily Daily Daily   Cycle Details Continuous Continuous Continuous Continuous Continuous Continuous Continuous   Doses missed in last 2 weeks 0     0    Adherence Assessment Adherent     Adherent    Adverse Effects No AE identified during assessment     No AE identified during assessment    Any new drug interactions? No     No    Is the dose as ordered appropriate for the patient? Yes         Since the last time we talked, have you been hospitalized or used the emergency room? No     No        Labs:  _  Result Component Current Result Ref Range   Sodium 140 (4/22/2024) 135 - 145 mmol/L     _  Result Component Current Result Ref Range   Potassium 4.4 (4/22/2024) 3.4 - 5.3 mmol/L     _  Result Component Current Result Ref Range   Calcium 9.3 (4/22/2024) 8.6 - 10.0 mg/dL     No results found for Mag within last 30 days.     No results found  for Phos within last 30 days.     _  Result Component Current Result Ref Range   Albumin 4.6 (4/22/2024) 3.5 - 5.2 g/dL     _  Result Component Current Result Ref Range   Urea Nitrogen 14.3 (4/22/2024) 6.0 - 20.0 mg/dL     _  Result Component Current Result Ref Range   Creatinine 0.60 (4/22/2024) 0.51 - 0.95 mg/dL     _  Result Component Current Result Ref Range   AST 32 (4/22/2024) 0 - 45 U/L     _  Result Component Current Result Ref Range   ALT 46 (4/22/2024) 0 - 50 U/L     _  Result Component Current Result Ref Range   Bilirubin Total 0.4 (4/22/2024) <=1.2 mg/dL     _  Result Component Current Result Ref Range   WBC Count 9.8 (4/22/2024) 4.0 - 11.0 10e3/uL     _  Result Component Current Result Ref Range   Hemoglobin 13.7 (4/22/2024) 11.7 - 15.7 g/dL     _  Result Component Current Result Ref Range   Platelet Count 278 (4/22/2024) 150 - 450 10e3/uL     No results found for ANC within last 30 days.     _  Result Component Current Result Ref Range   Absolute Neutrophils 6.5 (4/22/2024) 1.6 - 8.3 10e3/uL        Assessment & Plan:  Results are stable with no concerning abnormalities. Continue Iclusig as planned.    Sent MyChart to April with lab results.    Follow-Up:  5/20: labs  6/5: appt Dr. Della Live PharmD  Hematology/Oncology Clinical Pharmacist  [facility]   [phone]

## 2024-04-25 ENCOUNTER — THERAPY VISIT (OUTPATIENT)
Dept: OCCUPATIONAL THERAPY | Facility: CLINIC | Age: 44
End: 2024-04-25
Attending: INTERNAL MEDICINE
Payer: COMMERCIAL

## 2024-04-25 DIAGNOSIS — R20.0 BILATERAL HAND NUMBNESS: Primary | ICD-10-CM

## 2024-04-25 DIAGNOSIS — G56.03 BILATERAL CARPAL TUNNEL SYNDROME: ICD-10-CM

## 2024-04-25 PROCEDURE — 97112 NEUROMUSCULAR REEDUCATION: CPT | Mod: GO

## 2024-04-25 PROCEDURE — 97140 MANUAL THERAPY 1/> REGIONS: CPT | Mod: GO

## 2024-05-02 ENCOUNTER — OFFICE VISIT (OUTPATIENT)
Dept: FAMILY MEDICINE | Facility: CLINIC | Age: 44
End: 2024-05-02
Payer: COMMERCIAL

## 2024-05-02 ENCOUNTER — THERAPY VISIT (OUTPATIENT)
Dept: OCCUPATIONAL THERAPY | Facility: CLINIC | Age: 44
End: 2024-05-02
Attending: INTERNAL MEDICINE
Payer: COMMERCIAL

## 2024-05-02 VITALS
TEMPERATURE: 98.6 F | HEART RATE: 78 BPM | SYSTOLIC BLOOD PRESSURE: 118 MMHG | DIASTOLIC BLOOD PRESSURE: 70 MMHG | RESPIRATION RATE: 20 BRPM | OXYGEN SATURATION: 97 %

## 2024-05-02 DIAGNOSIS — Z00.00 ROUTINE GENERAL MEDICAL EXAMINATION AT A HEALTH CARE FACILITY: Primary | ICD-10-CM

## 2024-05-02 DIAGNOSIS — Z12.11 SPECIAL SCREENING FOR MALIGNANT NEOPLASMS, COLON: ICD-10-CM

## 2024-05-02 DIAGNOSIS — Z13.220 SCREENING FOR HYPERLIPIDEMIA: ICD-10-CM

## 2024-05-02 DIAGNOSIS — Z12.31 ENCOUNTER FOR SCREENING MAMMOGRAM FOR MALIGNANT NEOPLASM OF BREAST: ICD-10-CM

## 2024-05-02 DIAGNOSIS — C91.00 ACUTE LYMPHOBLASTIC LEUKEMIA (ALL) NOT HAVING ACHIEVED REMISSION (H): ICD-10-CM

## 2024-05-02 DIAGNOSIS — R06.83 SNORING: ICD-10-CM

## 2024-05-02 DIAGNOSIS — Z12.4 SCREENING FOR CERVICAL CANCER: ICD-10-CM

## 2024-05-02 DIAGNOSIS — R20.0 BILATERAL HAND NUMBNESS: Primary | ICD-10-CM

## 2024-05-02 DIAGNOSIS — G56.03 BILATERAL CARPAL TUNNEL SYNDROME: ICD-10-CM

## 2024-05-02 PROCEDURE — 97140 MANUAL THERAPY 1/> REGIONS: CPT | Mod: GO

## 2024-05-02 PROCEDURE — 97112 NEUROMUSCULAR REEDUCATION: CPT | Mod: GO

## 2024-05-02 PROCEDURE — 97035 APP MDLTY 1+ULTRASOUND EA 15: CPT | Mod: GO

## 2024-05-02 PROCEDURE — G0145 SCR C/V CYTO,THINLAYER,RESCR: HCPCS | Performed by: PHYSICIAN ASSISTANT

## 2024-05-02 PROCEDURE — 99386 PREV VISIT NEW AGE 40-64: CPT | Performed by: PHYSICIAN ASSISTANT

## 2024-05-02 PROCEDURE — 87624 HPV HI-RISK TYP POOLED RSLT: CPT | Performed by: PHYSICIAN ASSISTANT

## 2024-05-02 SDOH — HEALTH STABILITY: PHYSICAL HEALTH: ON AVERAGE, HOW MANY DAYS PER WEEK DO YOU ENGAGE IN MODERATE TO STRENUOUS EXERCISE (LIKE A BRISK WALK)?: 3 DAYS

## 2024-05-02 ASSESSMENT — PATIENT HEALTH QUESTIONNAIRE - PHQ9
SUM OF ALL RESPONSES TO PHQ QUESTIONS 1-9: 2
5. POOR APPETITE OR OVEREATING: SEVERAL DAYS

## 2024-05-02 ASSESSMENT — PAIN SCALES - GENERAL: PAINLEVEL: NO PAIN (1)

## 2024-05-02 ASSESSMENT — ANXIETY QUESTIONNAIRES
1. FEELING NERVOUS, ANXIOUS, OR ON EDGE: NOT AT ALL
GAD7 TOTAL SCORE: 1
7. FEELING AFRAID AS IF SOMETHING AWFUL MIGHT HAPPEN: NOT AT ALL
IF YOU CHECKED OFF ANY PROBLEMS ON THIS QUESTIONNAIRE, HOW DIFFICULT HAVE THESE PROBLEMS MADE IT FOR YOU TO DO YOUR WORK, TAKE CARE OF THINGS AT HOME, OR GET ALONG WITH OTHER PEOPLE: NOT DIFFICULT AT ALL
2. NOT BEING ABLE TO STOP OR CONTROL WORRYING: NOT AT ALL
GAD7 TOTAL SCORE: 1
5. BEING SO RESTLESS THAT IT IS HARD TO SIT STILL: NOT AT ALL
6. BECOMING EASILY ANNOYED OR IRRITABLE: NOT AT ALL
3. WORRYING TOO MUCH ABOUT DIFFERENT THINGS: NOT AT ALL

## 2024-05-02 ASSESSMENT — SOCIAL DETERMINANTS OF HEALTH (SDOH): HOW OFTEN DO YOU GET TOGETHER WITH FRIENDS OR RELATIVES?: MORE THAN THREE TIMES A WEEK

## 2024-05-02 NOTE — PROGRESS NOTES
"Preventive Care Visit  [facility] RACHEAL Bagley PA-C, Family Medicine  May 2, 2024      Assessment & Plan     Routine general medical examination at a health care facility  Reviewed VS, weight/BMI   Routine screenings see orders  Immunizations: see orders and documentation in HPI. Discussed vaccines coverage as pharmacy benefit.  Health and cancer screening recommendations delivered according to the USPSTF and other appropriate society guidelines  Nutrition and exercise counseling performed.    Encounter for screening mammogram for malignant neoplasm of breast  Discussed screening; advised and ordered mammogram. Pt given info to schedule.   - *MA Screening Digital Bilateral; Future    Special screening for malignant neoplasms, colon  Counseled on screening recommendations. Pt agreeable to scope. Order placed.  - Colonoscopy Screening  Referral; Future    Screening for hyperlipidemia  She does routine labs for oncology. She will come fasting for next labs and lipids can be drawn then.   - Lipid panel reflex to direct LDL Fasting; Future    Screening for cervical cancer  Obtained . 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   Counseled on vaccines and advised. She declines today but states will consider.     Snoring  Referral for sleep study.   - Adult Sleep Eval & Management  Referral; Future    Patient has been advised of split billing requirements and indicates understanding: Yes          BMI  Estimated body mass index is 45.17 kg/m  as calculated from the following:    Height as of 1/25/24: 1.626 m (5' 4.02\").    Weight as of 1/25/24: 119.4 kg (263 lb 4.8 oz).   Weight management plan: Discussed healthy diet and exercise guidelines    Counseling  Appropriate preventive services were " "discussed with this patient, including applicable screening as appropriate for fall prevention, nutrition, physical activity, Tobacco-use cessation, weight loss and cognition.  Checklist reviewing preventive services available has been given to the patient.  Reviewed patient's diet, addressing concerns and/or questions.   She is at risk for lack of exercise and has been provided with information to increase physical activity for the benefit of her well-being.   The patient was instructed to see the dentist every 6 months.   She is at risk for psychosocial distress and has been provided with information to reduce risk.       Follow Up: see above. Additionally patient was instructed to contact clinic for worsening symptoms, non-improvement in time frame discussed, and for questions regarding treatment plan.   For virtual visits, the patient was advised to be seen for in person evaluation if symptoms or condition are worsening or non-improvement as expected.   Abby Bagley PA-C    Matt Kingston is a 44 year old, presenting for the following:  Physical and Wrist Pain        5/2/2024    10:53 AM   Additional Questions   Roomed by Arelis ELLIS   Accompanied by [place]         5/2/2024    10:53 AM   Patient Reported Additional Medications   Patient reports taking the following new medications NA        Health Care Directive  Patient does not have a Health Care Directive or Living Will: Discussed advance care planning with patient; however, patient declined at this time.    HPI    Routine Health Maintenance:  Immunizations - I am not a needles person and I don't like vaccines - \"I don't do flu and I don't do covid\".  Mammogram: her oncology team did advise a mammogram   Colonoscopy: is willing to do. .     Fasting: NO  Lipids screening: last 2019  Diabetes screening: last 2019 maybe- normal A1c.     GYN Hx:  Pap: Last: 07/02/2019- [facility] 07/2019.  Always normal paps   Contraception: tubal ligation   Periods: no " period in a year since chemotherapy  Sexually active/STD screening concerns: yes, , monogamous , no concerns STDs  Denies GYN concerns of PCB, pelvic pain, dyspareunia, vaginal discharge    ALL- Daily chemotherapy pill Iclusig - to take for 5 years. Recent labs 2 weeks ago. Does labs every 2 months. Also on aspirin 81mg daily and acyclovir 400mg BID.     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     Exercise- lifts 2-3x a week. No cardio.     Snoring- yes. Very loud. Family in next room can hear. 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     Pain History: carpal tunnel. Seeing someone for that  When did you first notice your pain? 1-2 months ago    Have you seen this provider for your pain in the past? No   Where in your body do your have pain? Wrists and hands- bilateral   Are you seeing anyone else for your pain? Yes - PT   What makes your pain better? None  What makes your pain worse? Holding objects  How has pain affected your ability to work? 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 Desk job   Who lives in your household?  and 3 children                       5/2/2024   General Health   How would you rate your overall physical health? (!) FAIR   Feel stress (tense, anxious, or unable to sleep) Only a little   (!) STRESS CONCERN      5/2/2024   Nutrition   Diet: Regular (no restrictions)         5/2/2024   Exercise   Days per week of moderate/strenous exercise 3 days         5/2/2024   Social Factors   Frequency of gathering with friends or relatives More than three times a week   Worry food won't last until get money to buy more No   Food not last or not have enough money for food? No   Do you have housing?  Yes   Are you worried about losing your housing? No   Lack of transportation? No   Unable to get utilities (heat,electricity)? No          5/2/2024   Fall Risk   Fallen 2 or more times in the past year? No   Trouble with walking or balance? No          5/2/2024   Dental   Dentist two times every year? (!) NO         5/2/2024   TB Screening   Were you born outside of the [place]? No           Today's PHQ-2 Score:       5/2/2024    11:03 AM   PHQ-2 ( 1999 [employer])   Q1: Little interest or pleasure in doing things 0   Q2: Feeling down, depressed or hopeless 0   PHQ-2 Score 0         5/2/2024   Substance Use   Alcohol more than 3/day or more than 7/wk No   Do you use any other substances recreationally? No     Social History     Tobacco Use    Smoking status: Never    Smokeless tobacco: Never   Vaping Use    Vaping status: Never Used   Substance Use Topics    Alcohol use: Not Currently    Drug use: Never          Mammogram Screening - Mammogram every 1-2 years updated in Health Maintenance based on mutual decision making      History of abnormal Pap smear: NO - age 30-65 PAP every 5 years with negative HPV co-testing recommended       ASCVD Risk   The ASCVD Risk score (Priyank CAMPOS, et al., 2019) failed to calculate for the following reasons:    Cannot find a previous HDL lab    Cannot find a previous total cholesterol lab       Reviewed and updated as needed this visit by Provider                    Past Medical History:   Diagnosis Date    ALL (acute lymphocytic leukemia) (H)     Other acne     Shingles      Past Surgical History:   Procedure Laterality Date    GI SURGERY      GYN SURGERY      IR BONE BIOPSY DEEP RIGHT  10/25/2022    IR BONE BIOPSY DEEP RIGHT  01/17/2023    IR BONE BIOPSY DEEP RIGHT  03/01/2023    IR CHEST PORT PLACEMENT > 5 YRS OF AGE  11/23/2022    IR LUMBAR PUNCTURE  10/21/2022    IR PORT REMOVAL RIGHT  12/1/2023    IR PORT REPLACEMENT CATHETER ONLY RIGHT  01/17/2023    PICC Left 09/30/2022    Picc ok to use    PICC DOUBLE LUMEN PLACEMENT Left 08/20/2022    left cephalic 5 fr dl picc 44 cm    PICC INSERTION - DOUBLE LUMEN Right  12/31/2022    41cm (1cm external), Cephalic vein    REMOVE PORT VASCULAR ACCESS Right 12/1/2023    Procedure: Remove port vascular access right;  Surgeon: Ernesto Bartlett MD;  Location: [facility] OR    TUBAL LIGATION  2009    [facility] NONSPECIFIC PROCEDURE  01/01/1999    benign mole removal     Lab work is in process  Labs reviewed in EPIC  BP Readings from Last 3 Encounters:   05/02/24 118/70   04/06/24 (!) 141/87   01/25/24 (!) 152/89    Wt Readings from Last 3 Encounters:   01/25/24 119.4 kg (263 lb 4.8 oz)   12/01/23 114.3 kg (252 lb)   08/23/23 117.3 kg (258 lb 8 oz)                  Patient Active Problem List   Diagnosis    Acute leukemia (H)    Acute lymphoblastic leukemia (ALL) (H)    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)    Prophylaxis for chemotherapy-induced neutropenia    Morbid obesity (H)    Disseminated zoster    ALL (acute lymphoblastic leukemia of infant) (H)    ALL (acute lymphoblastic leukemia) (H)    ALL (acute lymphocytic leukemia) (H)    Adjustment and management of vascular access device    Sepsis (H)    B-cell acute lymphoblastic leukemia (ALL) (H)    Encounter for antineoplastic immunotherapy    Bilateral hand numbness    Bilateral carpal tunnel syndrome     Past Surgical History:   Procedure Laterality Date    GI SURGERY      GYN SURGERY      IR BONE BIOPSY DEEP RIGHT  10/25/2022    IR BONE BIOPSY DEEP RIGHT  01/17/2023    IR BONE BIOPSY DEEP RIGHT  03/01/2023    IR CHEST PORT PLACEMENT > 5 YRS OF AGE  11/23/2022    IR LUMBAR PUNCTURE  10/21/2022    IR PORT REMOVAL RIGHT  12/1/2023    IR PORT REPLACEMENT CATHETER ONLY RIGHT  01/17/2023    PICC Left 09/30/2022    Picc ok to use    PICC DOUBLE LUMEN PLACEMENT Left 08/20/2022    left cephalic 5 fr dl picc 44 cm    PICC INSERTION - DOUBLE LUMEN Right 12/31/2022    41cm (1cm external), Cephalic vein    REMOVE PORT VASCULAR ACCESS Right 12/1/2023    Procedure: Remove port vascular access right;  Surgeon: Ernesto Bartlett  "MD Boni;  Location: [facility] OR    TUBAL LIGATION  2009    [facility] NONSPECIFIC PROCEDURE  1999    benign mole removal       Social History     Tobacco Use    Smoking status: Never    Smokeless tobacco: Never   Substance Use Topics    Alcohol use: Not Currently     Family History   Problem Relation Age of Onset    Chronic Obstructive Pulmonary Disease Mother          in her sleep 61    Coronary Artery Disease Father 70        MI at 71yo; CABG x3    Diabetes Type 2  Father     Chronic Obstructive Pulmonary Disease Father         former smoker    No Known Problems Sister          Current Outpatient Medications   Medication Sig Dispense Refill    aspirin 81 MG EC tablet Take 81 mg by mouth daily      PONATinib (ICLUSIG) 15 MG tablet Take 1 tablet (15 mg) by mouth daily 30 tablet 0    triamcinolone (KENALOG) 0.1 % external ointment Apply topically 2 times daily 454 g 3    acyclovir (ZOVIRAX) 400 MG tablet Take 1 tablet (400 mg) by mouth every 12 hours for 120 days 60 tablet 3     Allergies   Allergen Reactions    Blood Transfusion Related (Informational Only) Hives     Hive reaction with platelet transfusion on 2022. Please administer platelets with tylenol and benadryl premedication.     Seasonal Allergies      Runny nose, itchiness         Review of Systems  Constitutional, HEENT, cardiovascular, pulmonary, gi and gu systems are negative, except as otherwise noted.     Objective    Exam  /70   Pulse 78   Temp 98.6  F (37  C) (Temporal)   Resp 20   SpO2 97%    Estimated body mass index is 45.17 kg/m  as calculated from the following:    Height as of 24: 1.626 m (5' 4.02\").    Weight as of 24: 119.4 kg (263 lb 4.8 oz).    Physical Exam  GENERAL: alert and no distress  EYES: Eyes grossly normal to inspection, PERRL and conjunctivae and sclerae normal  HENT: ear canals and TM's normal, nose and mouth without ulcers or lesions  NECK: no adenopathy, no asymmetry, masses, or scars  RESP: lungs " clear to auscultation - no rales, rhonchi or wheezes  BREAST: normal without masses, tenderness or nipple discharge and no palpable axillary masses or adenopathy  CV: regular rate and rhythm, normal S1 S2, no S3 or S4, no murmur, click or rub, no peripheral edema  ABDOMEN: soft, nontender, no hepatosplenomegaly, no masses and bowel sounds normal   (female) w/bimanual: normal female external genitalia, normal urethral meatus, normal vaginal mucosa, and normal cervix/adnexa/uterus without masses or discharge  MS: no gross musculoskeletal defects noted, no edema  SKIN: no suspicious lesions or rashes  NEURO: Normal strength and tone, mentation intact and speech normal  PSYCH: mentation appears normal, affect normal/bright    No results found for any visits on 05/02/24.      Signed Electronically by: Abby Bagley PA-C

## 2024-05-02 NOTE — PATIENT INSTRUCTIONS
Preventive Care Advice   This is general advice given by our system to help you stay healthy. However, your care team may have specific advice just for you. Please talk to your care team about your preventive care needs.  Nutrition  Eat 5 or more servings of fruits and vegetables each day.  Try wheat bread, brown rice and whole grain pasta (instead of white bread, rice, and pasta).  Get enough calcium and vitamin D. Check the label on foods and aim for 100% of the RDA (recommended daily allowance).  Lifestyle  Exercise at least 150 minutes each week   (30 minutes a day, 5 days a week).  Do muscle strengthening activities 2 days a week. These help control your weight and prevent disease.  No smoking.  Wear sunscreen to prevent skin cancer.  Have a dental exam and cleaning every 6 months.  Yearly exams  See your health care team every year to talk about:  Any changes in your health.  Any medicines your care team has prescribed.  Preventive care, family planning, and ways to prevent chronic diseases.  Shots (vaccines)   HPV shots (up to age 26), if you've never had them before.  Hepatitis B shots (up to age 59), if you've never had them before.  COVID-19 shot: Get this shot when it's due.  Flu shot: Get a flu shot every year.  Tetanus shot: Get a tetanus shot every 10 years.  Pneumococcal, hepatitis A, and RSV shots: Ask your care team if you need these based on your risk.  Shingles shot (for age 50 and up).  General health tests  Diabetes screening:  Starting at age 35, Get screened for diabetes at least every 3 years.  If you are younger than age 35, ask your care team if you should be screened for diabetes.  Cholesterol test: At age 39, start having a cholesterol test every 5 years, or more often if advised.  Bone density scan (DEXA): At age 50, ask your care team if you should have this scan for osteoporosis (brittle bones).  Hepatitis C: Get tested at least once in your life.  STIs (sexually transmitted  infections)  Before age 24: Ask your care team if you should be screened for STIs.  After age 24: Get screened for STIs if you're at risk. You are at risk for STIs (including HIV) if:  You are sexually active with more than one person.  You don't use condoms every time.  You or a partner was diagnosed with a sexually transmitted infection.  If you are at risk for HIV, ask about PrEP medicine to prevent HIV.  Get tested for HIV at least once in your life, whether you are at risk for HIV or not.  Cancer screening tests  Cervical cancer screening: If you have a cervix, begin getting regular cervical cancer screening tests at age 21. Most people who have regular screenings with normal results can stop after age 65. Talk about this with your provider.  Breast cancer scan (mammogram): If you've ever had breasts, begin having regular mammograms starting at age 40. This is a scan to check for breast cancer.  Colon cancer screening: It is important to start screening for colon cancer at age 45.  Have a colonoscopy test every 10 years (or more often if you're at risk) Or, ask your provider about stool tests like a FIT test every year or Cologuard test every 3 years.  To learn more about your testing options, visit: https://www.EarthLink/528752.pdf.  For help making a decision, visit: https://bit.ly/kp06827.  Prostate cancer screening test: If you have a prostate and are age 55 to 69, ask your provider if you would benefit from a yearly prostate cancer screening test.  Lung cancer screening: If you are a current or former smoker age 50 to 80, ask your care team if ongoing lung cancer screenings are right for you.  For informational purposes only. Not to replace the advice of your health care provider. Copyright   2023 [employer]. All rights reserved. Clinically reviewed by the [facility]. [employer] 702113 - REV 01/24.    Learning About Stress  What is stress?     Stress is your  body's response to a hard situation. Your body can have a physical, emotional, or mental response. Stress is a fact of life for most people, and it affects everyone differently. What causes stress for you may not be stressful for someone else.  A lot of things can cause stress. You may feel stress when you go on a job interview, take a test, or run a race. This kind of short-term stress is normal and even useful. It can help you if you need to work hard or react quickly. For example, stress can help you finish an important job on time.  Long-term stress is caused by ongoing stressful situations or events. Examples of long-term stress include long-term health problems, ongoing problems at work, or conflicts in your family. Long-term stress can harm your health.  How does stress affect your health?  When you are stressed, your body responds as though you are in danger. It makes hormones that speed up your heart, make you breathe faster, and give you a burst of energy. This is called the fight-or-flight stress response. If the stress is over quickly, your body goes back to normal and no harm is done.  But if stress happens too often or lasts too long, it can have bad effects. Long-term stress can make you more likely to get sick, and it can make symptoms of some diseases worse. If you tense up when you are stressed, you may develop neck, shoulder, or low back pain. Stress is linked to high blood pressure and heart disease.  Stress also harms your emotional health. It can make you kay, tense, or depressed. Your relationships may suffer, and you may not do well at work or school.  What can you do to manage stress?  You can try these things to help manage stress:   Do something active. Exercise or activity can help reduce stress. Walking is a great way to get started. Even everyday activities such as housecleaning or yard work can help.  Try yoga or maris chi. These techniques combine exercise and meditation. You may need  some training at first to learn them.  Do something you enjoy. For example, listen to music or go to a movie. Practice your hobby or do volunteer work.  Meditate. This can help you relax, because you are not worrying about what happened before or what may happen in the future.  Do guided imagery. Imagine yourself in any setting that helps you feel calm. You can use online videos, books, or a teacher to guide you.  Do breathing exercises. For example:  From a standing position, bend forward from the waist with your knees slightly bent. Let your arms dangle close to the floor.  Breathe in slowly and deeply as you return to a standing position. Roll up slowly and lift your head last.  Hold your breath for just a few seconds in the standing position.  Breathe out slowly and bend forward from the waist.  Let your feelings out. Talk, laugh, cry, and express anger when you need to. Talking with supportive friends or family, a counselor, or a soledad leader about your feelings is a healthy way to relieve stress. Avoid discussing your feelings with people who make you feel worse.  Write. It may help to write about things that are bothering you. This helps you find out how much stress you feel and what is causing it. When you know this, you can find better ways to cope.  What can you do to prevent stress?  You might try some of these things to help prevent stress:  Manage your time. This helps you find time to do the things you want and need to do.  Get enough sleep. Your body recovers from the stresses of the day while you are sleeping.  Get support. Your family, friends, and community can make a difference in how you experience stress.  Limit your news feed. Avoid or limit time on social media or news that may make you feel stressed.  Do something active. Exercise or activity can help reduce stress. Walking is a great way to get started.  Where can you learn more?  Go to https://www.healthwise.net/patiented  Enter N032 in the  "search box to learn more about \"Learning About Stress.\"  Current as of: October 24, 2023               Content Version: 14.0    8468-6372 [employer].   Care instructions adapted under license by your healthcare professional. If you have questions about a medical condition or this instruction, always ask your healthcare professional. [employer] disclaims any warranty or liability for your use of this information.      "

## 2024-05-06 DIAGNOSIS — C91.00 ACUTE LYMPHOBLASTIC LEUKEMIA (ALL) NOT HAVING ACHIEVED REMISSION (H): Primary | ICD-10-CM

## 2024-05-07 DIAGNOSIS — C91.00 ACUTE LYMPHOBLASTIC LEUKEMIA (ALL) NOT HAVING ACHIEVED REMISSION (H): Primary | ICD-10-CM

## 2024-05-08 ENCOUNTER — LAB (OUTPATIENT)
Dept: LAB | Facility: CLINIC | Age: 44
End: 2024-05-08
Payer: COMMERCIAL

## 2024-05-08 DIAGNOSIS — Z13.220 SCREENING FOR HYPERLIPIDEMIA: ICD-10-CM

## 2024-05-08 DIAGNOSIS — C91.00 ACUTE LYMPHOBLASTIC LEUKEMIA (ALL) NOT HAVING ACHIEVED REMISSION (H): ICD-10-CM

## 2024-05-08 LAB
ALBUMIN SERPL BCG-MCNC: 4.1 G/DL (ref 3.5–5.2)
ALP SERPL-CCNC: 79 U/L (ref 40–150)
ALT SERPL W P-5'-P-CCNC: 34 U/L (ref 0–50)
ANION GAP SERPL CALCULATED.3IONS-SCNC: 7 MMOL/L (ref 7–15)
AST SERPL W P-5'-P-CCNC: 28 U/L (ref 0–45)
BASOPHILS # BLD AUTO: 0.1 10E3/UL (ref 0–0.2)
BASOPHILS NFR BLD AUTO: 1 %
BILIRUB SERPL-MCNC: 0.2 MG/DL
BKR LAB AP GYN ADEQUACY: NORMAL
BKR LAB AP GYN INTERPRETATION: NORMAL
BKR LAB AP HPV REFLEX: NORMAL
BKR LAB AP PREVIOUS ABNORMAL: NORMAL
BUN SERPL-MCNC: 10.5 MG/DL (ref 6–20)
CALCIUM SERPL-MCNC: 8.9 MG/DL (ref 8.6–10)
CHLORIDE SERPL-SCNC: 106 MMOL/L (ref 98–107)
CHOLEST SERPL-MCNC: 202 MG/DL
CREAT SERPL-MCNC: 0.58 MG/DL (ref 0.51–0.95)
DEPRECATED HCO3 PLAS-SCNC: 26 MMOL/L (ref 22–29)
EGFRCR SERPLBLD CKD-EPI 2021: >90 ML/MIN/1.73M2
EOSINOPHIL # BLD AUTO: 0.9 10E3/UL (ref 0–0.7)
EOSINOPHIL NFR BLD AUTO: 9 %
ERYTHROCYTE [DISTWIDTH] IN BLOOD BY AUTOMATED COUNT: 14 % (ref 10–15)
FASTING STATUS PATIENT QL REPORTED: NO
FASTING STATUS PATIENT QL REPORTED: NO
GLUCOSE SERPL-MCNC: 106 MG/DL (ref 70–99)
HCT VFR BLD AUTO: 39.4 % (ref 35–47)
HDLC SERPL-MCNC: 48 MG/DL
HGB BLD-MCNC: 12.9 G/DL (ref 11.7–15.7)
IMM GRANULOCYTES # BLD: 0.1 10E3/UL
IMM GRANULOCYTES NFR BLD: 1 %
LDLC SERPL CALC-MCNC: 113 MG/DL
LIPASE SERPL-CCNC: 19 U/L (ref 13–60)
LYMPHOCYTES # BLD AUTO: 1.5 10E3/UL (ref 0.8–5.3)
LYMPHOCYTES NFR BLD AUTO: 14 %
MCH RBC QN AUTO: 28.8 PG (ref 26.5–33)
MCHC RBC AUTO-ENTMCNC: 32.7 G/DL (ref 31.5–36.5)
MCV RBC AUTO: 88 FL (ref 78–100)
MONOCYTES # BLD AUTO: 0.9 10E3/UL (ref 0–1.3)
MONOCYTES NFR BLD AUTO: 8 %
NEUTROPHILS # BLD AUTO: 7.5 10E3/UL (ref 1.6–8.3)
NEUTROPHILS NFR BLD AUTO: 69 %
NONHDLC SERPL-MCNC: 154 MG/DL
NRBC # BLD AUTO: 0 10E3/UL
NRBC BLD AUTO-RTO: 0 /100
PATH REPORT.COMMENTS IMP SPEC: NORMAL
PATH REPORT.COMMENTS IMP SPEC: NORMAL
PATH REPORT.RELEVANT HX SPEC: NORMAL
PLATELET # BLD AUTO: 274 10E3/UL (ref 150–450)
POTASSIUM SERPL-SCNC: 4.2 MMOL/L (ref 3.4–5.3)
PROT SERPL-MCNC: 6.2 G/DL (ref 6.4–8.3)
RBC # BLD AUTO: 4.48 10E6/UL (ref 3.8–5.2)
SODIUM SERPL-SCNC: 139 MMOL/L (ref 135–145)
TRIGL SERPL-MCNC: 206 MG/DL
WBC # BLD AUTO: 10.9 10E3/UL (ref 4–11)

## 2024-05-08 PROCEDURE — 36415 COLL VENOUS BLD VENIPUNCTURE: CPT

## 2024-05-08 PROCEDURE — 83690 ASSAY OF LIPASE: CPT

## 2024-05-08 PROCEDURE — 80053 COMPREHEN METABOLIC PANEL: CPT

## 2024-05-08 PROCEDURE — 85025 COMPLETE CBC W/AUTO DIFF WBC: CPT

## 2024-05-08 PROCEDURE — 80061 LIPID PANEL: CPT

## 2024-05-09 ENCOUNTER — THERAPY VISIT (OUTPATIENT)
Dept: OCCUPATIONAL THERAPY | Facility: CLINIC | Age: 44
End: 2024-05-09
Payer: COMMERCIAL

## 2024-05-09 DIAGNOSIS — R20.0 BILATERAL HAND NUMBNESS: Primary | ICD-10-CM

## 2024-05-09 DIAGNOSIS — G56.03 BILATERAL CARPAL TUNNEL SYNDROME: ICD-10-CM

## 2024-05-09 PROCEDURE — 97112 NEUROMUSCULAR REEDUCATION: CPT | Mod: GO

## 2024-05-11 LAB
HUMAN PAPILLOMA VIRUS 16 DNA: NEGATIVE
HUMAN PAPILLOMA VIRUS 18 DNA: NEGATIVE
HUMAN PAPILLOMA VIRUS FINAL DIAGNOSIS: NORMAL
HUMAN PAPILLOMA VIRUS OTHER HR: NEGATIVE

## 2024-05-15 ENCOUNTER — MYC MEDICAL ADVICE (OUTPATIENT)
Dept: ONCOLOGY | Facility: CLINIC | Age: 44
End: 2024-05-15

## 2024-05-15 ENCOUNTER — ONCOLOGY VISIT (OUTPATIENT)
Dept: ONCOLOGY | Facility: CLINIC | Age: 44
End: 2024-05-15
Attending: INTERNAL MEDICINE
Payer: COMMERCIAL

## 2024-05-15 ENCOUNTER — LAB (OUTPATIENT)
Dept: LAB | Facility: CLINIC | Age: 44
End: 2024-05-15
Payer: COMMERCIAL

## 2024-05-15 VITALS
DIASTOLIC BLOOD PRESSURE: 75 MMHG | TEMPERATURE: 98.3 F | OXYGEN SATURATION: 97 % | SYSTOLIC BLOOD PRESSURE: 140 MMHG | HEART RATE: 80 BPM | RESPIRATION RATE: 20 BRPM

## 2024-05-15 DIAGNOSIS — C91.00 ACUTE LYMPHOBLASTIC LEUKEMIA (ALL) NOT HAVING ACHIEVED REMISSION (H): Primary | ICD-10-CM

## 2024-05-15 DIAGNOSIS — C91.01 ACUTE LYMPHOBLASTIC LEUKEMIA (ALL) IN REMISSION (H): ICD-10-CM

## 2024-05-15 DIAGNOSIS — G62.9 PERIPHERAL POLYNEUROPATHY: Primary | ICD-10-CM

## 2024-05-15 DIAGNOSIS — Z79.899 ENCOUNTER FOR LONG-TERM (CURRENT) USE OF MEDICATIONS: ICD-10-CM

## 2024-05-15 PROCEDURE — 99213 OFFICE O/P EST LOW 20 MIN: CPT | Performed by: INTERNAL MEDICINE

## 2024-05-15 PROCEDURE — 99215 OFFICE O/P EST HI 40 MIN: CPT | Performed by: INTERNAL MEDICINE

## 2024-05-15 PROCEDURE — 36415 COLL VENOUS BLD VENIPUNCTURE: CPT | Performed by: PATHOLOGY

## 2024-05-15 ASSESSMENT — PAIN SCALES - GENERAL: PAINLEVEL: MILD PAIN (2)

## 2024-05-15 NOTE — NURSING NOTE
"Oncology Rooming Note    May 15, 2024 1:54 PM   April F Bakari is a 44 year old female who presents for:    Chief Complaint   Patient presents with    Oncology Clinic Visit     Rtn for Acute Lymphoblastic Leukemia     Initial Vitals: BP (!) 140/75 (BP Location: Right arm, Patient Position: Sitting, Cuff Size: Adult Regular)   Pulse 80   Temp 98.3  F (36.8  C) (Oral)   Resp 20   SpO2 97%  Estimated body mass index is 45.17 kg/m  as calculated from the following:    Height as of 1/25/24: 1.626 m (5' 4.02\").    Weight as of 1/25/24: 119.4 kg (263 lb 4.8 oz). There is no height or weight on file to calculate BSA.  Mild Pain (2) Comment: Data Unavailable   No LMP recorded. Patient is postmenopausal.  Allergies reviewed: Yes  Medications reviewed: Yes    Medications: Medication refills not needed today.  Pharmacy name entered into [employer]:    [id number] - [place], [place] - [address]  [facility] - [place], [place] - [address]    Frailty Screening:   Is the patient here for a new oncology consult visit in cancer care? 2. No      Clinical concerns:  None      Fer Miller              "

## 2024-05-15 NOTE — PROGRESS NOTES
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 While at OSH, she had CT PE protocol as well as CT A/P. These identified no PE, no acute or localized intraabdominal inflammatory process, mild splenomegaly, and few inconspicous low-density structures in the liver. She was transferred to [facility] for further workup and management. S/p Hydrea 1g TID (8/21-8/23) with improvement in WBC (100K ? 12K).   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 IHC shows CD10+, CD34+. 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 Abnormal cytogenetics.   --Microarry study: CN gains/losses in 3p21, 4q25, 7p11 (region includes CREB5 and IKZF1; biallelic loss of TARP), 9p13.2-p22.3 (region includes CDKN2a/2b and PAX5), 12q13, 14q11.     INDUCTION:   --Initiated pre-phase steroids with Prednisone 60 mg x8/21; increased to 60 mg/m2 (140 mg) 8/22-8/24. She initiated GRAALL + imatinib (C1D1 = 8/24/22).   --S/p BMT consult 9/12 with Dr. Walsh. Induction was relatively uncomplicated.   -s/p weekly triple IT chemo (Cytarabine, Solu-Cortef, MTX) on Days 1, 8, and 15 of C1 induction. CNS negative.   --BMBx 9/26/22 MRD-negative by BCR-ABL PCR and by Clonoseq Assay.      --GRAALL + imatinib (C2D1=9/30/22). 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   Post-cycle-2 BMBx 10/25/2022 showed MRD-negative CR, though low-level abnormal B-cell population (favor hematogones over residual leukemia) was noted. BCR-ABL negative. Clonoseq negative at 1/10E6 level.    --C3 GRAALL + imatinib on 11/2/22 was tolerated well  without complication.    CONSOLIDATION:   --Discussed with Dr. Rutherford, BMT faculty meeting: No matched related donors identified. High panel reactive antibodies towards  unrelated donor samples. With an excellent early, deep MRD response to chemotherapy and no optimal donor sources identified, discussed foregoing BMT in CR1 in favor of definitive therapy.  Discussed risks and benefits of GRAAL versus newer approaches. April agreed to a combination of Blinatumomab plus ponatinib  (Jesusita et al The Lancet Hematology Volume 10, ISSUE 1, e24-e34, January 2023).   --Aspirin 81 mg daily added for VTE prophylaxis per original study.   --Cycle 1Day 1 Blinatumomab plus Ponatinib 15 mg 12/14/2022. Complicated by PICC disruption, brief Blincyto interruption ~12/21/22.   --BMBx 1/17/2023: MRD- by MFC. BCR-ABL p190 detectable at <0.079%. Clonoseq negative.  --Cycle 2 Day 1 Blinatumomab plus Ponatinib 15 mg  2/6/2023.  --BMBx 3/1/2023: MRD- by MFC. BCR-ABL p190 undetectable. Clonoseq negative. FISH with borderline detectable BCR-ABL fusion at 0.125% (at LoD for assay).  --Cycle 3 Day 1 Blinatumomab plus Ponatinib 15 mg 3/9/2023.  --Cycle 4 Day 1 Blinatumomab plus Ponatinib 15 mg 4/20/23.  --Cycle 5 Day 1 Blinatumomab plus Ponatinib 15 mg 6/1/23.  --Continued on Ponatinib 15 mg every day maintenance with ASA 81 mg daily  --8/8/2023 BMBx hypocellular (40%) marrow, no evidence of leukemia. MRD- by MFC, BCR-ABL QPCR negative, Clonoseq negative  --October 2023: PB BCR-ABL and Clonoseq negative    INTRATHECAL CHEMOTHERAPY: ALL CSF studies negative by MFC  8/26/22: Cytarabine 40 mg/methotrexate 15 mg  8/31/22: Cytarabine 40 mg/methotrexate 15 mg  9/7/22: Cytarabine 40 mg/methotrexate 15 mg  9/14/22: Methotrexate 15 mg  10/21/22: Cytarabine 40 mg/methotrexate 15 mg  11/25/22: Cytarabine 100 mg  12/16/22: Methotrexate 12 mg  1/2/23; Cytarabine 100 mg  1/16/23: Methotrexate 12 mg  2/02/23: Cytarabine 100 mg  2/27/23: Methotrexate 12  mg  3/14/23: Cytarabine 100 mg      History of Present Illness:   April Bakari returns.  She describes about ~5-6 weeks of burning pain on both palms and fingers, sparing her 5th digit bilaterally. Awakens her at night. She notices she has to carry grocery bags from the bottom because she can't quite grasp the handles; and she struggles to button shirts on occasions. Pain is not disabling, but constantly a nuisance.     No other sites of pain or paresthesias. No balance problems. No constipation. No sensitivity to cold beverages.     Otherwise doing well. She had a rash ~2 months ago that has responded to topical steroids; not present today. No visual symptoms.   12-point ROS otherwise negative.    Medications:       Current Outpatient Medications   Medication Sig Dispense Refill    aspirin 81 MG EC tablet Take 81 mg by mouth daily      PONATinib (ICLUSIG) 15 MG tablet Take 1 tablet (15 mg) by mouth daily 30 tablet 0    triamcinolone (KENALOG) 0.1 % external ointment Apply topically 2 times daily 454 g 3    acyclovir (ZOVIRAX) 400 MG tablet Take 1 tablet (400 mg) by mouth every 12 hours for 120 days 60 tablet 3     No current facility-administered medications for this visit.        Physical Exam:   Blood pressure (!) 140/75, pulse 80, temperature 98.3  F (36.8  C), temperature source Oral, resp. rate 20, SpO2 97%.    ECOG 1  Gen: NAD  MSK: Hands without rash or cutaneous changes. No palmar redness. No exacerbation of pain with palmar flexion or extension. No deficits in proprioception. Sensation to light touch without deficit.     No results found for this or any previous visit (from the past 24 hour(s)).    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 After induction with cycle 1 and 2 of attenuated-dose GRAAL chemotherapy,, she was in an MRD- CR (CR1) by MFC, BCR-ABL PCR, and Clonoseq assay.  With no matched related donors, and  an excellent early/deep MRD-CR, chemotherapy is currently favored over BMT; this is supported by multiple recent clinical observations (Blood. 2016 Jul 28; 128(4): 504-507; Blood 2022 Jul 25;blood.[id number]). After discussion, she transitioned to Blinatumomab + Ponatinib (Short et al The Lancet Hematology Volume 10, ISSUE 1, e24-e34, January 2023) as curative intent therapy. C1D1 12/14/22. Course complicated by transient cytopenias and headaches. She completed 12 total doses of prophylactic intrathecal chemotherapy.     She had been currently doing quite well with no adverse symptoms or sequelae of treatment. Last fundoscopic exam September 2023.     She currently is struggling with symptoms consistent with peripheral neuropathy. PN has been linked to Ponatinib, occurring up to 30-40% of patients per product insert, and dependent on what clinical context it is given in (e.g. rates are much higher when given in combination with Vincristine).  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 I explained the potential for irreversible motor neuropathy in this scenario. While rotating to another TKI may not offer the same protection against tyrosine kinase domain escape mutations such as T315I, we have to take into account that she has been in an MRD- remission for ~18 months already.  Thus, while changing from a 3rd-generation TKI (Ponatinib) to a 2nd-generation (Dasatinib) may increase the risk of relapse, I think the absolute risk is marginal, and is probably outweighed by the need to prevent high-grade motor neuropathy. We agreed to rotate to another TKI -- Dasatinib is reasonable as a next choice. We discussed specific side effects including pleural effusions/pneumonitis, rash, cytopenias, nausea/vomiting, bleeding risks. She will stop aspirin when we rotate to Dasatinib.       It is possible that if her symptoms rapidly and fully resolve, we could consider  Ponatinib again in the future for its better potency and preventing TKD escape mutations. I will refer her to neurology for their input as well. EMG or similar may be helpful to clarify whether carpal tunnel syndrome is contributory, and if so, whether surgical management is warranted.     PLAN:  --Discontinue Ponatinib. Plan to initiate Dasatinib 140 mg every day (based on dosing from D-ALBA).  --CBC w/ diff, CMP every 8 weeks. Peripheral blood BCR-ABL and Clonoseq NGS MRD testing every 4 months. Clinic visit every 4 months after BCR-ABL and Clonoseq have resulted.   --Repeat BMBx at 2 years (August 2024)  --ASA 81 mg prophylaxis -- discontinue  --Annual eye exam while on ponatinib  --Annual EKG and lipid/TG panel while on ponatinib  --See PCP and undergo age-appropriate cancer screening include mammogram and colonoscopy  --Other supportive cares as per below    # CNS prophylaxis--complete  # Varicella Zoster; improved  # PPx  --Acyclovir 400 mg BID     # Headaches  # H/o COVID-19 infection   --Refuses COVID-19 vaccination    # Hypertension: Amlodipine; Lasix as needed  # Indigestion/GERD    Steve Hull MD, PhD  Division of Hematology, Oncology, and Transplantation  [facility]    40 minutes spent on the date of the encounter doing chart review, review of test results, interpretation of tests, patient visit and documentation.

## 2024-05-15 NOTE — LETTER
5/15/2024         RE: Vashti Villegas  [address] [place] [address]        Dear Colleague,    Thank you for referring your patient, Vashti Villegas, to the [facility]. Please see a copy of my visit note below.    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 While at OSH, she had CT PE protocol as well as CT A/P. These identified no PE, no acute or localized intraabdominal inflammatory process, mild splenomegaly, and few inconspicous low-density structures in the liver. She was transferred to [facility] for further workup and management. S/p Hydrea 1g TID (8/21-8/23) with improvement in WBC (100K ? 12K).   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 IHC shows CD10+, CD34+. 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 Abnormal cytogenetics.   --Microarry study: CN gains/losses in 3p21, 4q25, 7p11 (region includes CREB5 and IKZF1; biallelic loss of TARP), 9p13.2-p22.3 (region includes CDKN2a/2b and PAX5), 12q13, 14q11.     INDUCTION:   --Initiated pre-phase steroids with Prednisone 60 mg x8/21; increased to 60 mg/m2 (140 mg) 8/22-8/24. She initiated GRAALL + imatinib (C1D1 = 8/24/22).   --S/p BMT consult 9/12 with Dr. Walsh. Induction was relatively uncomplicated.   -s/p weekly triple IT chemo (Cytarabine, Solu-Cortef, MTX) on Days 1, 8, and 15 of C1 induction. CNS negative.   --BMBx 9/26/22 MRD-negative by BCR-ABL PCR and by Clonoseq Assay.      --GRAALL + imatinib (C2D1=9/30/22). 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   Post-cycle-2  BMBx 10/25/2022 showed MRD-negative CR, though low-level abnormal B-cell population (favor hematogones over residual leukemia) was noted. BCR-ABL negative. Clonoseq negative at 1/10E6 level.    --C3 GRAALL + imatinib on 11/2/22 was tolerated well without complication.    CONSOLIDATION:   --Discussed with Dr. Rutherford, BMT faculty meeting: No matched related donors identified. High panel reactive antibodies towards  unrelated donor samples. With an excellent early, deep MRD response to chemotherapy and no optimal donor sources identified, discussed foregoing BMT in CR1 in favor of definitive therapy.  Discussed risks and benefits of GRAAL versus newer approaches. April agreed to a combination of Blinatumomab plus ponatinib  (Short et al The Lancet Hematology Volume 10, ISSUE 1, e24-e34, January 2023).   --Aspirin 81 mg daily added for VTE prophylaxis per original study.   --Cycle 1Day 1 Blinatumomab plus Ponatinib 15 mg 12/14/2022. Complicated by PICC disruption, brief Blincyto interruption ~12/21/22.   --BMBx 1/17/2023: MRD- by MFC. BCR-ABL p190 detectable at <0.079%. Clonoseq negative.  --Cycle 2 Day 1 Blinatumomab plus Ponatinib 15 mg  2/6/2023.  --BMBx 3/1/2023: MRD- by MFC. BCR-ABL p190 undetectable. Clonoseq negative. FISH with borderline detectable BCR-ABL fusion at 0.125% (at LoD for assay).  --Cycle 3 Day 1 Blinatumomab plus Ponatinib 15 mg 3/9/2023.  --Cycle 4 Day 1 Blinatumomab plus Ponatinib 15 mg 4/20/23.  --Cycle 5 Day 1 Blinatumomab plus Ponatinib 15 mg 6/1/23.  --Continued on Ponatinib 15 mg every day maintenance with ASA 81 mg daily  --8/8/2023 BMBx hypocellular (40%) marrow, no evidence of leukemia. MRD- by MFC, BCR-ABL QPCR negative, Clonoseq negative  --October 2023: PB BCR-ABL and Clonoseq negative    INTRATHECAL CHEMOTHERAPY: ALL CSF studies negative by MFC  8/26/22: Cytarabine 40 mg/methotrexate 15 mg  8/31/22: Cytarabine 40 mg/methotrexate 15 mg  9/7/22: Cytarabine 40 mg/methotrexate 15  mg  9/14/22: Methotrexate 15 mg  10/21/22: Cytarabine 40 mg/methotrexate 15 mg  11/25/22: Cytarabine 100 mg  12/16/22: Methotrexate 12 mg  1/2/23; Cytarabine 100 mg  1/16/23: Methotrexate 12 mg  2/02/23: Cytarabine 100 mg  2/27/23: Methotrexate 12 mg  3/14/23: Cytarabine 100 mg      History of Present Illness:   April Kiel returns.  She describes about ~5-6 weeks of burning pain on both palms and fingers, sparing her 5th digit bilaterally. Awakens her at night. She notices she has to carry grocery bags from the bottom because she can't quite grasp the handles; and she struggles to button shirts on occasions. Pain is not disabling, but constantly a nuisance.     No other sites of pain or paresthesias. No balance problems. No constipation. No sensitivity to cold beverages.     Otherwise doing well. She had a rash ~2 months ago that has responded to topical steroids; not present today. No visual symptoms.   12-point ROS otherwise negative.    Medications:       Current Outpatient Medications   Medication Sig Dispense Refill    aspirin 81 MG EC tablet Take 81 mg by mouth daily      PONATinib (ICLUSIG) 15 MG tablet Take 1 tablet (15 mg) by mouth daily 30 tablet 0    triamcinolone (KENALOG) 0.1 % external ointment Apply topically 2 times daily 454 g 3    acyclovir (ZOVIRAX) 400 MG tablet Take 1 tablet (400 mg) by mouth every 12 hours for 120 days 60 tablet 3     No current facility-administered medications for this visit.        Physical Exam:   Blood pressure (!) 140/75, pulse 80, temperature 98.3  F (36.8  C), temperature source Oral, resp. rate 20, SpO2 97%.    ECOG 1  Gen: NAD  MSK: Hands without rash or cutaneous changes. No palmar redness. No exacerbation of pain with palmar flexion or extension. No deficits in proprioception. Sensation to light touch without deficit.     No results found for this or any previous visit (from the past 24 hour(s)).    Assessment and Plan:   # Ph+ ALL, diagnostic biopsy  8/20/2022    Ms. 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 After induction with cycle 1 and 2 of attenuated-dose GRAAL chemotherapy,, she was in an MRD- CR (CR1) by MFC, BCR-ABL PCR, and Clonoseq assay.  With no matched related donors, and an excellent early/deep MRD-CR, chemotherapy is currently favored over BMT; this is supported by multiple recent clinical observations (Blood. 2016 Jul 28; 128(4): 504-507; Blood 2022 Jul 25;blood.[id number]). After discussion, she transitioned to Blinatumomab + Ponatinib (Short et al The Lancet Hematology Volume 10, ISSUE 1, e24-e34, January 2023) as curative intent therapy. C1D1 12/14/22. Course complicated by transient cytopenias and headaches. She completed 12 total doses of prophylactic intrathecal chemotherapy.     She had been currently doing quite well with no adverse symptoms or sequelae of treatment. Last fundoscopic exam September 2023.     She currently is struggling with symptoms consistent with peripheral neuropathy. PN has been linked to Ponatinib, occurring up to 30-40% of patients per product insert, and dependent on what clinical context it is given in (e.g. rates are much higher when given in combination with Vincristine).  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 I explained the potential for irreversible motor neuropathy in this scenario. While rotating to another TKI may not offer the same protection against tyrosine kinase domain escape mutations such as T315I, we have to take into account that she has been in an MRD- remission for ~18 months already.  Thus, while changing from a 3rd-generation TKI (Ponatinib) to a 2nd-generation (Dasatinib) may increase the risk of relapse, I think the absolute risk is marginal, and is probably outweighed by the need to prevent high-grade motor neuropathy. We agreed to rotate to another TKI -- Dasatinib is reasonable as a next choice. We  discussed specific side effects including pleural effusions/pneumonitis, rash, cytopenias, nausea/vomiting, bleeding risks. She will stop aspirin when we rotate to Dasatinib.       It is possible that if her symptoms rapidly and fully resolve, we could consider Ponatinib again in the future for its better potency and preventing TKD escape mutations. I will refer her to neurology for their input as well. EMG or similar may be helpful to clarify whether carpal tunnel syndrome is contributory, and if so, whether surgical management is warranted.     PLAN:  --Discontinue Ponatinib. Plan to initiate Dasatinib 140 mg every day (based on dosing from D-ALBA).  --CBC w/ diff, CMP every 8 weeks. Peripheral blood BCR-ABL and Clonoseq NGS MRD testing every 4 months. Clinic visit every 4 months after BCR-ABL and Clonoseq have resulted.   --Repeat BMBx at 2 years (August 2024)  --ASA 81 mg prophylaxis -- discontinue  --Annual eye exam while on ponatinib  --Annual EKG and lipid/TG panel while on ponatinib  --See PCP and undergo age-appropriate cancer screening include mammogram and colonoscopy  --Other supportive cares as per below    # CNS prophylaxis--complete  # Varicella Zoster; improved  # PPx  --Acyclovir 400 mg BID     # Headaches  # H/o COVID-19 infection   --Refuses COVID-19 vaccination    # Hypertension: Amlodipine; Lasix as needed  # Indigestion/GERD    Steve Hull MD, PhD  Division of Hematology, Oncology, and Transplantation  [facility]    40 minutes spent on the date of the encounter doing chart review, review of test results, interpretation of tests, patient visit and documentation.

## 2024-05-16 ENCOUNTER — TELEPHONE (OUTPATIENT)
Dept: ONCOLOGY | Facility: CLINIC | Age: 44
End: 2024-05-16

## 2024-05-16 NOTE — TELEPHONE ENCOUNTER
Prior Authorization Approval    Medication: SPRYCEL 140 MG PO TABS  Authorization Effective Date:    Authorization Expiration Date:    Approved Dose/Quantity:   Reference #: [id number]   Insurance Company: [employer] - Phone [phone] Fax [phone]  Expected CoPay: $ 1,016  CoPay Card Available: Yes    Financial Assistance Needed:   Which Pharmacy is filling the prescription: [facility] - [address]  Pharmacy Notified:   Patient Notified:             Brittany Chao CPhTOncology Pharmacy [facility]  Clinics & Surgery [address] [address]  Office: [phone]  Fax: [phone]  [email or web address]

## 2024-05-16 NOTE — TELEPHONE ENCOUNTER
PA Initiation    Medication: SPRYCEL 140 MG PO TABS  Insurance Company: [employer] - Phone [phone] Fax [phone]  Pharmacy Filling the Rx:    Filling Pharmacy Phone:    Filling Pharmacy Fax:    Start Date: 5/16/2024        Brittany Chao CPhTOncology Pharmacy [facility] Surgery [address] [address]  Office: [phone]  Fax: [phone]  [email or web address]

## 2024-05-17 ENCOUNTER — TELEPHONE (OUTPATIENT)
Dept: ONCOLOGY | Facility: CLINIC | Age: 44
End: 2024-05-17
Payer: COMMERCIAL

## 2024-05-17 DIAGNOSIS — C91.00 B-CELL ACUTE LYMPHOBLASTIC LEUKEMIA (ALL) (H): Primary | ICD-10-CM

## 2024-05-17 RX ORDER — PROCHLORPERAZINE MALEATE 10 MG
10 TABLET ORAL EVERY 6 HOURS PRN
Qty: 30 TABLET | Refills: 2 | Status: SHIPPED | OUTPATIENT
Start: 2024-05-17

## 2024-05-17 NOTE — ORAL ONC MGMT
Oral Chemotherapy Monitoring Program    Lab Monitoring Plan (Patient prefers labs in [place] if not being seen at [facility])    Subjective/Objective:  Vashti Villegas is a 44 year old female contacted by phone for an initial visit for oral chemotherapy education. April is being switched from ponatinib to dasatinib per Dr. Hull due to toxicity (in attempt to minimize neuropathies).    Assessment:  Patient is appropriate to start therapy. She is still currently taking ponatinib and knows to stop both ponatinib and aspirin when she starts dasatinib. She has not had magnesium or phosphorus drawn recently, but these will be drawn at her upcoming lab appointment on 5/20.    April was a bit unsure of the timing of when to start dasatinib. Based on our notes and the need to switch due to toxicity, it seems the plan is for her to start when received, but inbasket was sent to Dr. Hull to clarify this point and sign off on her dasatinib prescription. Addendum: Dr. Hull confirmed April is okay to start dasatinib when received after a 48 hour washout period. Called April back and let her know of this plan. She expressed understanding.    Plan:  Basic chemotherapy teaching was reviewed with the patient including indication, start date of therapy, dose, administration, adverse effects, missed doses, food and drug interactions, monitoring, side effect management, office contact information, and safe handling. Written materials were provided and all questions answered.    Follow-Up:  5/20 baseline labs  ~5/27-5/31: initial assessment (pending receipt/start of dasatinib)     Marielle Boss, PharmD, BCOP  Oral Chemotherapy Monitoring Program  [facility]  [phone]        4/8/2024     9:00 AM 4/8/2024    12:00 PM 4/23/2024    12:00 PM 5/7/2024     9:00 AM 5/15/2024     3:00 PM 5/15/2024     3:15 PM 5/17/2024     2:00 PM   ORAL CHEMOTHERAPY   Assessment Type Refill Quarterly Follow up Lab Monitoring Refill Discontinuation  "Initial Work up New Teach   Stop Date     5/15/2024     Reason for Discontinuation     Unacceptable toxicity     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 Yes Yes Yes Yes  Yes Yes   Drug Name Iclusig (ponatinib) Iclusig (ponatinib) Iclusig (ponatinib) Iclusig (ponatinib) Iclusig (ponatinib) Sprycel (dasatinib) Sprycel (dasatinib)   Dose 15 mg 15 mg 15 mg 15 mg 15 mg 140 mg 140 mg   Current Schedule Daily Daily Daily Daily Daily Daily Daily   Cycle Details Continuous Continuous Continuous Continuous Continuous Continuous Continuous   Doses missed in last 2 weeks  0        Adherence Assessment  Adherent        Adverse Effects  No AE identified during assessment        Any new drug interactions?  No    No    Is the dose as ordered appropriate for the patient?      Yes    Since the last time we talked, have you been hospitalized or used the emergency room?  No          Last PHQ-2 Score on record:       5/2/2024    12:00 PM 5/2/2024    11:03 AM   PHQ-2 ( 1999 Pfizer)   Q1: Little interest or pleasure in doing things 0 0   Q2: Feeling down, depressed or hopeless 0 0   PHQ-2 Score 0 0     Vitals:  BP:   BP Readings from Last 1 Encounters:   05/15/24 (!) 140/75     Wt Readings from Last 1 Encounters:   01/25/24 119.4 kg (263 lb 4.8 oz)     Estimated body surface area is 2.32 meters squared as calculated from the following:    Height as of 1/25/24: 1.626 m (5' 4.02\").    Weight as of 1/25/24: 119.4 kg (263 lb 4.8 oz).    Labs:  Result Component Current Result Ref Range   Sodium 139 (5/8/2024) 135 - 145 mmol/L     Result Component Current Result Ref Range   Potassium 4.2 " (5/8/2024) 3.4 - 5.3 mmol/L     Result Component Current Result Ref Range   Calcium 8.9 (5/8/2024) 8.6 - 10.0 mg/dL     No results found for Mag within last 30 days.     No results found for Phos within last 30 days.     Result Component Current Result Ref Range   Albumin 4.1 (5/8/2024) 3.5 - 5.2 g/dL     Result Component Current Result Ref Range   Urea Nitrogen 10.5 (5/8/2024) 6.0 - 20.0 mg/dL     Result Component Current Result Ref Range   Creatinine 0.58 (5/8/2024) 0.51 - 0.95 mg/dL     Result Component Current Result Ref Range   AST 28 (5/8/2024) 0 - 45 U/L     Result Component Current Result Ref Range   ALT 34 (5/8/2024) 0 - 50 U/L     Result Component Current Result Ref Range   Bilirubin Total 0.2 (5/8/2024) <=1.2 mg/dL     Result Component Current Result Ref Range   WBC Count 10.9 (5/8/2024) 4.0 - 11.0 10e3/uL     Result Component Current Result Ref Range   Hemoglobin 12.9 (5/8/2024) 11.7 - 15.7 g/dL     Result Component Current Result Ref Range   Platelet Count 274 (5/8/2024) 150 - 450 10e3/uL     No results found for ANC within last 30 days.     Result Component Current Result Ref Range   Absolute Neutrophils 7.5 (5/8/2024) 1.6 - 8.3 10e3/uL

## 2024-05-20 ENCOUNTER — LAB (OUTPATIENT)
Dept: LAB | Facility: CLINIC | Age: 44
End: 2024-05-20
Attending: INTERNAL MEDICINE
Payer: COMMERCIAL

## 2024-05-20 ENCOUNTER — MYC MEDICAL ADVICE (OUTPATIENT)
Dept: FAMILY MEDICINE | Facility: CLINIC | Age: 44
End: 2024-05-20

## 2024-05-20 DIAGNOSIS — C91.01 ACUTE LYMPHOBLASTIC LEUKEMIA (ALL) IN REMISSION (H): Primary | ICD-10-CM

## 2024-05-20 DIAGNOSIS — C91.00 B-CELL ACUTE LYMPHOBLASTIC LEUKEMIA (ALL) (H): ICD-10-CM

## 2024-05-20 LAB
ALBUMIN SERPL BCG-MCNC: 4.4 G/DL (ref 3.5–5.2)
ALP SERPL-CCNC: 78 U/L (ref 40–150)
ALT SERPL W P-5'-P-CCNC: 41 U/L (ref 0–50)
ANION GAP SERPL CALCULATED.3IONS-SCNC: 9 MMOL/L (ref 7–15)
AST SERPL W P-5'-P-CCNC: 34 U/L (ref 0–45)
BASOPHILS # BLD AUTO: 0.1 10E3/UL (ref 0–0.2)
BASOPHILS NFR BLD AUTO: 1 %
BILIRUB SERPL-MCNC: 0.5 MG/DL
BUN SERPL-MCNC: 13.6 MG/DL (ref 6–20)
CALCIUM SERPL-MCNC: 9.2 MG/DL (ref 8.6–10)
CHLORIDE SERPL-SCNC: 106 MMOL/L (ref 98–107)
CREAT SERPL-MCNC: 0.58 MG/DL (ref 0.51–0.95)
DEPRECATED HCO3 PLAS-SCNC: 24 MMOL/L (ref 22–29)
EGFRCR SERPLBLD CKD-EPI 2021: >90 ML/MIN/1.73M2
EOSINOPHIL # BLD AUTO: 1.1 10E3/UL (ref 0–0.7)
EOSINOPHIL NFR BLD AUTO: 11 %
ERYTHROCYTE [DISTWIDTH] IN BLOOD BY AUTOMATED COUNT: 13.9 % (ref 10–15)
GLUCOSE SERPL-MCNC: 96 MG/DL (ref 70–99)
HCT VFR BLD AUTO: 40.2 % (ref 35–47)
HGB BLD-MCNC: 13.3 G/DL (ref 11.7–15.7)
HOLD SPECIMEN: NORMAL
IMM GRANULOCYTES # BLD: 0.1 10E3/UL
IMM GRANULOCYTES NFR BLD: 1 %
LYMPHOCYTES # BLD AUTO: 1.3 10E3/UL (ref 0.8–5.3)
LYMPHOCYTES NFR BLD AUTO: 14 %
MAGNESIUM SERPL-MCNC: 2.1 MG/DL (ref 1.7–2.3)
MCH RBC QN AUTO: 28.7 PG (ref 26.5–33)
MCHC RBC AUTO-ENTMCNC: 33.1 G/DL (ref 31.5–36.5)
MCV RBC AUTO: 87 FL (ref 78–100)
MONOCYTES # BLD AUTO: 0.9 10E3/UL (ref 0–1.3)
MONOCYTES NFR BLD AUTO: 9 %
NEUTROPHILS # BLD AUTO: 6.1 10E3/UL (ref 1.6–8.3)
NEUTROPHILS NFR BLD AUTO: 64 %
NRBC # BLD AUTO: 0 10E3/UL
NRBC BLD AUTO-RTO: 0 /100
PHOSPHATE SERPL-MCNC: 3 MG/DL (ref 2.5–4.5)
PLATELET # BLD AUTO: 265 10E3/UL (ref 150–450)
POTASSIUM SERPL-SCNC: 4.4 MMOL/L (ref 3.4–5.3)
PROT SERPL-MCNC: 6.7 G/DL (ref 6.4–8.3)
RBC # BLD AUTO: 4.63 10E6/UL (ref 3.8–5.2)
SODIUM SERPL-SCNC: 139 MMOL/L (ref 135–145)
WBC # BLD AUTO: 9.5 10E3/UL (ref 4–11)

## 2024-05-20 PROCEDURE — 85025 COMPLETE CBC W/AUTO DIFF WBC: CPT

## 2024-05-20 PROCEDURE — 84100 ASSAY OF PHOSPHORUS: CPT

## 2024-05-20 PROCEDURE — 80053 COMPREHEN METABOLIC PANEL: CPT

## 2024-05-20 PROCEDURE — 83735 ASSAY OF MAGNESIUM: CPT

## 2024-05-20 PROCEDURE — 36415 COLL VENOUS BLD VENIPUNCTURE: CPT

## 2024-05-21 ENCOUNTER — MYC MEDICAL ADVICE (OUTPATIENT)
Dept: ONCOLOGY | Facility: CLINIC | Age: 44
End: 2024-05-21
Payer: COMMERCIAL

## 2024-05-21 NOTE — TELEPHONE ENCOUNTER
Oral Chemotherapy Monitoring Program  Lab Follow Up    Reviewed lab results from 5/20/24.        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 Yes Yes Yes  Yes Yes Yes   Drug Name Iclusig (ponatinib) Iclusig (ponatinib) Iclusig (ponatinib) Iclusig (ponatinib) Sprycel (dasatinib) Sprycel (dasatinib) Sprycel (dasatinib)   Dose 15 mg 15 mg 15 mg 15 mg 140 mg 140 mg 140 mg   Current Schedule Daily Daily Daily Daily Daily Daily Daily   Cycle Details Continuous Continuous Continuous Continuous Continuous Continuous Continuous   Doses missed in last 2 weeks 0         Adherence Assessment Adherent         Adverse Effects No AE identified during assessment         Any new drug interactions? No    No     Is the dose as ordered appropriate for the patient?     Yes     Since the last time we talked, have you been hospitalized or used the emergency room? No             Labs:  _  Result Component Current Result Ref Range   Sodium 139 (5/20/2024) 135 - 145 mmol/L     _  Result Component Current Result Ref Range   Potassium 4.4 (5/20/2024) 3.4 - 5.3 mmol/L     _  Result Component Current Result Ref Range   Calcium 9.2 (5/20/2024) 8.6 - 10.0 mg/dL     _  Result Component Current Result Ref Range   Magnesium 2.1  (5/20/2024) 1.7 - 2.3 mg/dL     _  Result Component Current Result Ref Range   Phosphorus 3.0 (5/20/2024) 2.5 - 4.5 mg/dL     _  Result Component Current Result Ref Range   Albumin 4.4 (5/20/2024) 3.5 - 5.2 g/dL     _  Result Component Current Result Ref Range   Urea Nitrogen 13.6 (5/20/2024) 6.0 - 20.0 mg/dL     _  Result Component Current Result Ref Range   Creatinine 0.58 (5/20/2024) 0.51 - 0.95 mg/dL     _  Result Component Current Result Ref Range   AST 34 (5/20/2024) 0 - 45 U/L     _  Result Component Current Result Ref Range   ALT 41 (5/20/2024) 0 - 50 U/L     _  Result Component Current Result Ref Range   Bilirubin Total 0.5 (5/20/2024) <=1.2 mg/dL     _  Result Component Current Result Ref Range   WBC Count 9.5 (5/20/2024) 4.0 - 11.0 10e3/uL     _  Result Component Current Result Ref Range   Hemoglobin 13.3 (5/20/2024) 11.7 - 15.7 g/dL     _  Result Component Current Result Ref Range   Platelet Count 265 (5/20/2024) 150 - 450 10e3/uL     No results found for ANC within last 30 days.     _  Result Component Current Result Ref Range   Absolute Neutrophils 6.1 (5/20/2024) 1.6 - 8.3 10e3/uL        Assessment & Plan:  No concerning lab abnormalities.  Patient is cleared to start the Sprycel.    Baytext message sent to patient.    Hyacinth Ojeda, PharmD, BCPS, BCOP  Oncology Clinical Pharmacy Specialist  [facility]  [phone]

## 2024-05-23 ENCOUNTER — MYC MEDICAL ADVICE (OUTPATIENT)
Dept: ONCOLOGY | Facility: CLINIC | Age: 44
End: 2024-05-23

## 2024-05-23 ENCOUNTER — TELEPHONE (OUTPATIENT)
Dept: ONCOLOGY | Facility: CLINIC | Age: 44
End: 2024-05-23

## 2024-05-23 NOTE — TELEPHONE ENCOUNTER
Oncology Nurse Triage - Pain    Situation: April reporting the following symptoms: Pain    Background:   Treating Provider:   Dr. Hull    Date of last provider visit: 5/15/2024 Dr. Hull    Recent Treatments:Sprycel    Assessment:     Location: numbness/burning/pain in both hands palms and fingers, varies during day, Palm feels a little swollen, when flares up feels entire hand palm/fingers Wed 4/22 felt intense burning sensation incident lasted about 30minutes before stopping  Onset for past 6 weeks intermittent, started in April  Duration/Frequency:intermittent if distracted on occasion.   Rates:Worse gets 9/10, best gets rates 1/10.   Quality: tingling, burning sensation, prickly when limb falls asleep, occasionally feels on fire inside.   Current med(s) used:  Worsens or relieves with: worse with holding, certain hand positions, but sometimes can feel when not using hands too.   Night and morning the worse     Headaches for past 3 days, Tylenol 1000mg has helped this morning  Rates worse gets 6/10 this morning, other times is 2/10, pt states does also drinks caffeine in morning and also woke up later than usual and didn't get caffeine in right away.     Sprycel taken at nighttime around 6pm with food, but then feels maybe side effects take effect later?     Intermittent loss of appetite, but doesn't bother pt, and is okay with this, but is still encouraging self to eat as needed. Pt states is trying to increase better in clear liquids during day, but admits could do better.     Denies fevers/chills, chest pain, SOB, n/v, bowel & bladder issues, facial drooping, uneven smile, any loss of feeling.     Wondering if related to switch Sprycel and how long body will take to adjust Sprycel?     Recommendations:   This writer encouraged pt to increase clear liquids to 64oz daily, clear both, water, apple juice. Pt mainly wanted to update care team and get provider/pharmacy opinion on use of Sprycel and any other  suggestions for managing the tingling/burning/pain possible neuropathy episodes in hands when they occur?

## 2024-05-23 NOTE — ORAL ONC MGMT
Oral Chemotherapy Monitoring Program    Subjective/Objective:  Vashti Villegas is a 44 year old female contacted by phone for a follow-up visit for oral chemotherapy.  She started Sprycel on Monday 5/20 evening. She has not noticed any changes to his hand tingling. She says she has loss of appetite but this is tolerable. She says she woke up Tues, Wed, and Thursday morning with a headache (pain 6/10 at it's worst). She is taking Tylenol 500 mg TID. Recommended she increase Tylenol to 1000 mg TID to try and curb the headaches. Will follow up with Dr. Hull if he has other recommendations at this time.        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 Yes Yes  Yes Yes Yes Yes   Drug Name Iclusig (ponatinib) Iclusig (ponatinib) Iclusig (ponatinib) Sprycel (dasatinib) Sprycel (dasatinib) Sprycel (dasatinib) Sprycel (dasatinib)   Dose 15 mg 15 mg 15 mg 140 mg 140 mg 140 mg 140 mg   Current Schedule Daily Daily Daily Daily Daily Daily Daily   Cycle Details Continuous Continuous Continuous Continuous Continuous Continuous Continuous   Start Date of Last Cycle       5/20/2024   Doses missed in last 2 weeks       0   Adherence Assessment       Adherent   Adverse Effects       Headache;Other (See Note for Details)  "  Headache       Grade 2   Pharmacist Intervention(headache)       Yes   Intervention(s)       OTC recommendation;Referral to oncology provider   Other (See Note for Details)       neuropathy - tingling hands   Pharmacist intervention(other)       No   Any new drug interactions?    No      Is the dose as ordered appropriate for the patient?    Yes      Is the patient currently in pain?       Yes       Last PHQ-2 Score on record:       5/2/2024    12:00 PM 5/2/2024    11:03 AM   PHQ-2 ( 1999 Pfizer)   Q1: Little interest or pleasure in doing things 0 0   Q2: Feeling down, depressed or hopeless 0 0   PHQ-2 Score 0 0       Vitals:  BP:   BP Readings from Last 1 Encounters:   05/15/24 (!) 140/75     Wt Readings from Last 1 Encounters:   01/25/24 119.4 kg (263 lb 4.8 oz)     Estimated body surface area is 2.32 meters squared as calculated from the following:    Height as of 1/25/24: 1.626 m (5' 4.02\").    Weight as of 1/25/24: 119.4 kg (263 lb 4.8 oz).    Labs:  _  Result Component Current Result Ref Range   Sodium 139 (5/20/2024) 135 - 145 mmol/L     _  Result Component Current Result Ref Range   Potassium 4.4 (5/20/2024) 3.4 - 5.3 mmol/L     _  Result Component Current Result Ref Range   Calcium 9.2 (5/20/2024) 8.6 - 10.0 mg/dL     _  Result Component Current Result Ref Range   Magnesium 2.1 (5/20/2024) 1.7 - 2.3 mg/dL     _  Result Component Current Result Ref Range   Phosphorus 3.0 (5/20/2024) 2.5 - 4.5 mg/dL     _  Result Component Current Result Ref Range   Albumin 4.4 (5/20/2024) 3.5 - 5.2 g/dL     _  Result Component Current Result Ref Range   Urea Nitrogen 13.6 (5/20/2024) 6.0 - 20.0 mg/dL     _  Result Component Current Result Ref Range   Creatinine 0.58 (5/20/2024) 0.51 - 0.95 mg/dL     _  Result Component Current Result Ref Range   AST 34 (5/20/2024) 0 - 45 U/L     _  Result Component Current Result Ref Range   ALT 41 (5/20/2024) 0 - 50 U/L     _  Result Component Current Result Ref Range   Bilirubin Total " 0.5 (5/20/2024) <=1.2 mg/dL     _  Result Component Current Result Ref Range   WBC Count 9.5 (5/20/2024) 4.0 - 11.0 10e3/uL     _  Result Component Current Result Ref Range   Hemoglobin 13.3 (5/20/2024) 11.7 - 15.7 g/dL     _  Result Component Current Result Ref Range   Platelet Count 265 (5/20/2024) 150 - 450 10e3/uL     No results found for ANC within last 30 days.     _  Result Component Current Result Ref Range   Absolute Neutrophils 6.1 (5/20/2024) 1.6 - 8.3 10e3/uL          Assessment/Plan:  April appears to be tolerating Sprycel with exception of new onset of headaches lasting 3 days (Grade 2, moderate pain 6/10). We discussed appropriate hydration and increasing Tylenol dose to 1000 mg TID. Will reach out to Dr. Hull with additional recommendations.      Hallie Watts, PharmD  Oral Chemotherapy Monitoring Program  [facility]  [phone]

## 2024-05-24 ENCOUNTER — TELEPHONE (OUTPATIENT)
Dept: ONCOLOGY | Facility: CLINIC | Age: 44
End: 2024-05-24
Payer: COMMERCIAL

## 2024-05-24 NOTE — TELEPHONE ENCOUNTER
I reached April by phone. She is now waking up with headaches most days. Controllable with Tylenol twice a day. These began immediately after starting Sprycel.     She also continues to have similar symptoms of peripheral neuropathy. Not any better or worse since I saw her recently. She saw Neurology recently; an EMG is arranged for June 8th.  We discussed gabapentin today, but she would like to see if things will improve on their own first.   I asked her to call back once she is done with her EMG, or sooner if symptoms remain bothersome.       Steve Hull MD, PhD  Division of Hematology, Oncology, and Transplantation  [facility]

## 2024-05-28 ENCOUNTER — TELEPHONE (OUTPATIENT)
Dept: ONCOLOGY | Facility: CLINIC | Age: 44
End: 2024-05-28
Payer: COMMERCIAL

## 2024-05-28 NOTE — ORAL ONC MGMT
Oral Chemotherapy Monitoring Program     Placed call to patient in follow up of oral chemotherapy (for initial assessment after starting dasatinib). Voicemail full, unable to leave message requesting call back. No drug names were mentioned. Will update when response received.     Margaret Vigil, PharmD, [facility]  Hematology/Oncology Clinical Pharmacist  [facility]  [facility]

## 2024-05-30 ENCOUNTER — TELEPHONE (OUTPATIENT)
Dept: ONCOLOGY | Facility: CLINIC | Age: 44
End: 2024-05-30
Payer: COMMERCIAL

## 2024-05-30 NOTE — ORAL ONC MGMT
Oral Chemotherapy Monitoring Program     Placed call to patient in follow up of oral chemotherapy (for initial assessment of dasatinib).      Left message to please call back otherwise we will attempt a call in the next few days. No patient or drug names were mentioned.     Tru Holliday, PharmD.  Oral Chemotherapy Monitoring Program  [phone]

## 2024-06-03 ENCOUNTER — TELEPHONE (OUTPATIENT)
Dept: ONCOLOGY | Facility: CLINIC | Age: 44
End: 2024-06-03
Payer: COMMERCIAL

## 2024-06-03 NOTE — TELEPHONE ENCOUNTER
Oral Chemotherapy Monitoring Program    Primary Oncologist: Dr Steve Hull  Primary Oncology Clinic: [facility]  Cancer Diagnosis: ALL    Therapy History:  Sprycel 140mg PO daily, started 5/20/24    Subjective/Objective:  Vashti Villegas is a 44 year old female contacted by phone for a follow-up visit for oral chemotherapy.  Patient reports she is feeling better on Sprycel compared with when she was on Iclusig.  She says that she hasn't had any headaches in the last few days, but APAP did help the headaches when she had them.  She also said that she felt a little out of breath sometimes, but she thinks this is due to her being less physically active than before.        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 Yes Yes Yes Yes Yes Yes Yes   Drug Name Sprycel (dasatinib) Sprycel (dasatinib) Sprycel (dasatinib) Sprycel (dasatinib) Sprycel (dasatinib) Sprycel (dasatinib) Sprycel (dasatinib)   Dose 140 mg 140 mg 140 mg 140 mg 140 mg 140 mg 140 mg   Current Schedule Daily Daily Daily Daily Daily Daily Daily   Cycle Details Continuous Continuous Continuous Continuous Continuous Continuous Continuous   Start Date of Last Cycle    5/20/2024      Doses missed in last 2 weeks    0   0   Adherence Assessment    Adherent   Adherent  "  Adverse Effects    Headache;Other (See Note for Details)   No AE identified during assessment   Headache    Grade 2   Resolved due to intervention   Pharmacist Intervention(headache)    Yes      Intervention(s)    OTC recommendation;Referral to oncology provider      Other (See Note for Details)    neuropathy - tingling hands      Pharmacist intervention(other)    No      Any new drug interactions? No         Is the dose as ordered appropriate for the patient? Yes         Is the patient currently in pain?    Yes          Vitals:  BP:   BP Readings from Last 1 Encounters:   05/15/24 (!) 140/75     Wt Readings from Last 1 Encounters:   01/25/24 119.4 kg (263 lb 4.8 oz)     Estimated body surface area is 2.32 meters squared as calculated from the following:    Height as of 1/25/24: 1.626 m (5' 4.02\").    Weight as of 1/25/24: 119.4 kg (263 lb 4.8 oz).    Labs:  _  Result Component Current Result Ref Range   Sodium 139 (5/20/2024) 135 - 145 mmol/L     _  Result Component Current Result Ref Range   Potassium 4.4 (5/20/2024) 3.4 - 5.3 mmol/L     _  Result Component Current Result Ref Range   Calcium 9.2 (5/20/2024) 8.6 - 10.0 mg/dL     _  Result Component Current Result Ref Range   Magnesium 2.1 (5/20/2024) 1.7 - 2.3 mg/dL     _  Result Component Current Result Ref Range   Phosphorus 3.0 (5/20/2024) 2.5 - 4.5 mg/dL     _  Result Component Current Result Ref Range   Albumin 4.4 (5/20/2024) 3.5 - 5.2 g/dL     _  Result Component Current Result Ref Range   Urea Nitrogen 13.6 (5/20/2024) 6.0 - 20.0 mg/dL     _  Result Component Current Result Ref Range   Creatinine 0.58 (5/20/2024) 0.51 - 0.95 mg/dL       _  Result Component Current Result Ref Range   AST 34 (5/20/2024) 0 - 45 U/L     _  Result Component Current Result Ref Range   ALT 41 (5/20/2024) 0 - 50 U/L     _  Result Component Current Result Ref Range   Bilirubin Total 0.5 (5/20/2024) <=1.2 mg/dL       _  Result Component Current Result Ref Range   WBC Count 9.5 " (5/20/2024) 4.0 - 11.0 10e3/uL     _  Result Component Current Result Ref Range   Hemoglobin 13.3 (5/20/2024) 11.7 - 15.7 g/dL     _  Result Component Current Result Ref Range   Platelet Count 265 (5/20/2024) 150 - 450 10e3/uL     No results found for ANC within last 30 days.     Assessment:  Patient is tolerating Sprycel well.    Plan:  No change at present.    Hyacinth Ojeda, PharmD, BCPS, [facility]  Oncology Clinical Pharmacy Specialist  [facility]  [phone]

## 2024-06-05 ENCOUNTER — APPOINTMENT (OUTPATIENT)
Dept: LAB | Facility: CLINIC | Age: 44
End: 2024-06-05
Attending: INTERNAL MEDICINE
Payer: COMMERCIAL

## 2024-06-05 ENCOUNTER — PATIENT OUTREACH (OUTPATIENT)
Dept: ONCOLOGY | Facility: CLINIC | Age: 44
End: 2024-06-05

## 2024-06-05 ENCOUNTER — ONCOLOGY VISIT (OUTPATIENT)
Dept: ONCOLOGY | Facility: CLINIC | Age: 44
End: 2024-06-05
Attending: INTERNAL MEDICINE
Payer: COMMERCIAL

## 2024-06-05 VITALS
SYSTOLIC BLOOD PRESSURE: 123 MMHG | RESPIRATION RATE: 16 BRPM | WEIGHT: 265.1 LBS | TEMPERATURE: 98 F | HEART RATE: 84 BPM | OXYGEN SATURATION: 97 % | BODY MASS INDEX: 45.48 KG/M2 | DIASTOLIC BLOOD PRESSURE: 74 MMHG

## 2024-06-05 DIAGNOSIS — Z79.899 ENCOUNTER FOR LONG-TERM (CURRENT) USE OF MEDICATIONS: ICD-10-CM

## 2024-06-05 DIAGNOSIS — C91.00 ACUTE LYMPHOBLASTIC LEUKEMIA (ALL) NOT HAVING ACHIEVED REMISSION (H): Primary | ICD-10-CM

## 2024-06-05 DIAGNOSIS — C91.00 B-CELL ACUTE LYMPHOBLASTIC LEUKEMIA (ALL) (H): Primary | ICD-10-CM

## 2024-06-05 DIAGNOSIS — C91.00 B-CELL ACUTE LYMPHOBLASTIC LEUKEMIA (ALL) (H): ICD-10-CM

## 2024-06-05 LAB
ALBUMIN SERPL BCG-MCNC: 4.1 G/DL (ref 3.5–5.2)
ALP SERPL-CCNC: 81 U/L (ref 40–150)
ALT SERPL W P-5'-P-CCNC: 41 U/L (ref 0–50)
ANION GAP SERPL CALCULATED.3IONS-SCNC: 9 MMOL/L (ref 7–15)
AST SERPL W P-5'-P-CCNC: 30 U/L (ref 0–45)
BASOPHILS # BLD AUTO: 0 10E3/UL (ref 0–0.2)
BASOPHILS NFR BLD AUTO: 0 %
BILIRUB SERPL-MCNC: 0.3 MG/DL
BUN SERPL-MCNC: 11.5 MG/DL (ref 6–20)
CALCIUM SERPL-MCNC: 9.1 MG/DL (ref 8.6–10)
CHLORIDE SERPL-SCNC: 108 MMOL/L (ref 98–107)
CREAT SERPL-MCNC: 0.64 MG/DL (ref 0.51–0.95)
DEPRECATED HCO3 PLAS-SCNC: 24 MMOL/L (ref 22–29)
EGFRCR SERPLBLD CKD-EPI 2021: >90 ML/MIN/1.73M2
EOSINOPHIL # BLD AUTO: 0.5 10E3/UL (ref 0–0.7)
EOSINOPHIL NFR BLD AUTO: 7 %
ERYTHROCYTE [DISTWIDTH] IN BLOOD BY AUTOMATED COUNT: 14.8 % (ref 10–15)
GLUCOSE SERPL-MCNC: 115 MG/DL (ref 70–99)
HCT VFR BLD AUTO: 36.9 % (ref 35–47)
HGB BLD-MCNC: 11.9 G/DL (ref 11.7–15.7)
IMM GRANULOCYTES # BLD: 0 10E3/UL
IMM GRANULOCYTES NFR BLD: 0 %
LYMPHOCYTES # BLD AUTO: 1.8 10E3/UL (ref 0.8–5.3)
LYMPHOCYTES NFR BLD AUTO: 23 %
MAGNESIUM SERPL-MCNC: 2 MG/DL (ref 1.7–2.3)
MCH RBC QN AUTO: 28.9 PG (ref 26.5–33)
MCHC RBC AUTO-ENTMCNC: 32.2 G/DL (ref 31.5–36.5)
MCV RBC AUTO: 90 FL (ref 78–100)
MONOCYTES # BLD AUTO: 0.6 10E3/UL (ref 0–1.3)
MONOCYTES NFR BLD AUTO: 8 %
NEUTROPHILS # BLD AUTO: 4.7 10E3/UL (ref 1.6–8.3)
NEUTROPHILS NFR BLD AUTO: 62 %
NRBC # BLD AUTO: 0 10E3/UL
NRBC BLD AUTO-RTO: 0 /100
PHOSPHATE SERPL-MCNC: 2.7 MG/DL (ref 2.5–4.5)
PLATELET # BLD AUTO: 213 10E3/UL (ref 150–450)
POTASSIUM SERPL-SCNC: 3.9 MMOL/L (ref 3.4–5.3)
PROT SERPL-MCNC: 6.3 G/DL (ref 6.4–8.3)
RBC # BLD AUTO: 4.12 10E6/UL (ref 3.8–5.2)
SODIUM SERPL-SCNC: 141 MMOL/L (ref 135–145)
WBC # BLD AUTO: 7.7 10E3/UL (ref 4–11)

## 2024-06-05 PROCEDURE — 83735 ASSAY OF MAGNESIUM: CPT | Performed by: INTERNAL MEDICINE

## 2024-06-05 PROCEDURE — 99215 OFFICE O/P EST HI 40 MIN: CPT | Mod: GC | Performed by: INTERNAL MEDICINE

## 2024-06-05 PROCEDURE — 84100 ASSAY OF PHOSPHORUS: CPT | Performed by: INTERNAL MEDICINE

## 2024-06-05 PROCEDURE — 99213 OFFICE O/P EST LOW 20 MIN: CPT | Performed by: INTERNAL MEDICINE

## 2024-06-05 PROCEDURE — 36415 COLL VENOUS BLD VENIPUNCTURE: CPT | Performed by: INTERNAL MEDICINE

## 2024-06-05 PROCEDURE — 85025 COMPLETE CBC W/AUTO DIFF WBC: CPT | Performed by: INTERNAL MEDICINE

## 2024-06-05 PROCEDURE — 82040 ASSAY OF SERUM ALBUMIN: CPT | Performed by: INTERNAL MEDICINE

## 2024-06-05 ASSESSMENT — PAIN SCALES - GENERAL: PAINLEVEL: NO PAIN (0)

## 2024-06-05 NOTE — NURSING NOTE
"Oncology Rooming Note    June 5, 2024 10:11 AM   April SHARI Villegas is a 44 year old female who presents for:    Chief Complaint   Patient presents with    Blood Draw     Labs drawn by lab RN    Oncology Clinic Visit     Acute lymphoblastic leukemia      Initial Vitals: /74   Pulse 84   Temp 98  F (36.7  C) (Oral)   Resp 16   Wt 120.2 kg (265 lb 1.6 oz)   SpO2 97%   BMI 45.48 kg/m   Estimated body mass index is 45.48 kg/m  as calculated from the following:    Height as of 1/25/24: 1.626 m (5' 4.02\").    Weight as of this encounter: 120.2 kg (265 lb 1.6 oz). Body surface area is 2.33 meters squared.  No Pain (0) Comment: Data Unavailable   No LMP recorded. Patient is postmenopausal.  Allergies reviewed: Yes  Medications reviewed: Yes    Medications: Medication refills not needed today.  Pharmacy name entered into [employer]:    [id number] [place], [place] - [address]  [facility] - [place], [place] - [address]    Frailty Screening:   Is the patient here for a new oncology consult visit in cancer care? 2. No      Clinical concerns: none    Ezlbieta Richardson              "

## 2024-06-05 NOTE — PROGRESS NOTES
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 While at OSH, she had CT PE protocol as well as CT A/P. These identified no PE, no acute or localized intraabdominal inflammatory process, mild splenomegaly, and few inconspicous low-density structures in the liver. She was transferred to [facility] for further workup and management. S/p Hydrea 1g TID (8/21-8/23) with improvement in WBC (100K ? 12K).   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 IHC shows CD10+, CD34+. 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 Abnormal cytogenetics.   --Microarry study: CN gains/losses in 3p21, 4q25, 7p11 (region includes CREB5 and IKZF1; biallelic loss of TARP), 9p13.2-p22.3 (region includes CDKN2a/2b and PAX5), 12q13, 14q11.     INDUCTION:   --Initiated pre-phase steroids with Prednisone 60 mg x8/21; increased to 60 mg/m2 (140 mg) 8/22-8/24. She initiated GRAALL + imatinib (C1D1 = 8/24/22).   --S/p BMT consult 9/12 with Dr. Walsh. Induction was relatively uncomplicated.   -s/p weekly triple IT chemo (Cytarabine, Solu-Cortef, MTX) on Days 1, 8, and 15 of C1 induction. CNS negative.   --BMBx 9/26/22 MRD-negative by BCR-ABL PCR and by Clonoseq Assay.      --GRAALL + imatinib (C2D1=9/30/22). 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   Post-cycle-2 BMBx 10/25/2022 showed MRD-negative CR, though low-level abnormal B-cell population (favor hematogones over residual leukemia) was noted. BCR-ABL negative. Clonoseq negative at 1/10E6 level.    --C3 GRAALL + imatinib on 11/2/22 was tolerated well  without complication.    CONSOLIDATION:   --Discussed with Dr. Rutherford, BMT faculty meeting: No matched related donors identified. High panel reactive antibodies towards  unrelated donor samples. With an excellent early, deep MRD response to chemotherapy and no optimal donor sources identified, discussed foregoing BMT in CR1 in favor of definitive therapy.  Discussed risks and benefits of GRAAL versus newer approaches. April agreed to a combination of Blinatumomab plus ponatinib  (Jesusita et al The Lancet Hematology Volume 10, ISSUE 1, e24-e34, January 2023).   --Aspirin 81 mg daily added for VTE prophylaxis per original study.   --Cycle 1Day 1 Blinatumomab plus Ponatinib 15 mg 12/14/2022. Complicated by PICC disruption, brief Blincyto interruption ~12/21/22.   --BMBx 1/17/2023: MRD- by MFC. BCR-ABL p190 detectable at <0.079%. Clonoseq negative.  --Cycle 2 Day 1 Blinatumomab plus Ponatinib 15 mg  2/6/2023.  --BMBx 3/1/2023: MRD- by MFC. BCR-ABL p190 undetectable. Clonoseq negative. FISH with borderline detectable BCR-ABL fusion at 0.125% (at LoD for assay).  --Cycle 3 Day 1 Blinatumomab plus Ponatinib 15 mg 3/9/2023.  --Cycle 4 Day 1 Blinatumomab plus Ponatinib 15 mg 4/20/23.  --Cycle 5 Day 1 Blinatumomab plus Ponatinib 15 mg 6/1/23.  --Continued on Ponatinib 15 mg every day maintenance with ASA 81 mg daily  --8/8/2023 BMBx hypocellular (40%) marrow, no evidence of leukemia. MRD- by MFC, BCR-ABL QPCR negative, Clonoseq negative  --October 2023: PB BCR-ABL and Clonoseq negative    INTRATHECAL CHEMOTHERAPY: ALL CSF studies negative by MFC  8/26/22: Cytarabine 40 mg/methotrexate 15 mg  8/31/22: Cytarabine 40 mg/methotrexate 15 mg  9/7/22: Cytarabine 40 mg/methotrexate 15 mg  9/14/22: Methotrexate 15 mg  10/21/22: Cytarabine 40 mg/methotrexate 15 mg  11/25/22: Cytarabine 100 mg  12/16/22: Methotrexate 12 mg  1/2/23; Cytarabine 100 mg  1/16/23: Methotrexate 12 mg  2/02/23: Cytarabine 100 mg  2/27/23: Methotrexate 12  mg  3/14/23: Cytarabine 100 mg      History of Present Illness:   April Bakari returns.     She states that her numbness and tingling that she has had in her hands/fingers has been improving, since stopping the ponatinib. She denies any new edema and that her edema that she had while on ponatinib got better. No SOB. No fever, night sweats, or weight gain. No rash, joint pain. No abd pain, n/v/d. She states she feels some fatigue with the dasatinib but that she is adjusting to it.   12-point ROS otherwise negative.    Medications:       Current Outpatient Medications   Medication Sig Dispense Refill    acyclovir (ZOVIRAX) 400 MG tablet Take 1 tablet (400 mg) by mouth every 12 hours for 120 days 60 tablet 3    aspirin 81 MG EC tablet Take 81 mg by mouth daily      dasatinib (SPRYCEL) 140 MG tablet Take 1 tablet (140 mg) by mouth daily Avoid grapefruit and grapefruit juice. 30 tablet 0    prochlorperazine (COMPAZINE) 10 MG tablet Take 1 tablet (10 mg) by mouth every 6 hours as needed for nausea or vomiting 30 tablet 2    triamcinolone (KENALOG) 0.1 % external ointment Apply topically 2 times daily 454 g 3     No current facility-administered medications for this visit.        Physical Exam:   There were no vitals taken for this visit.    ECOG 1  Gen: NAD  MSK: Hands without rash or cutaneous changes. No palmar redness. No exacerbation of pain with palmar flexion or extension. No deficits in proprioception. Sensation to light touch without deficit.     No results found for this or any previous visit (from the past 24 hour(s)).    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 After induction with cycle 1 and 2 of attenuated-dose GRAAL chemotherapy, she was in an MRD- CR (CR1) by MFC, BCR-ABL PCR, and Clonoseq assay.  With no matched related donors, and an excellent early/deep MRD-CR, chemotherapy is currently favored over BMT; this is  supported by multiple recent clinical observations (Blood. 2016 Jul 28; 128(4): 504-507; Blood 2022 Jul 25;blood.[id number]). After discussion, she transitioned to Blinatumomab + Ponatinib (Short et al The Lancet Hematology Volume 10, ISSUE 1, e24-e34, January 2023) as curative intent therapy. C1D1 12/14/22. Course complicated by transient cytopenias and headaches. She completed 12 total doses of prophylactic intrathecal chemotherapy.     She had been currently doing quite well with no adverse symptoms or sequelae of treatment. Last fundoscopic exam September 2023.     She currently is struggling with symptoms consistent with peripheral neuropathy. PN has been linked to Ponatinib, occurring up to 30-40% of patients per product insert, and dependent on what clinical context it is given in (e.g. rates are much higher when given in combination with Vincristine).  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 I explained the potential for irreversible motor neuropathy in this scenario. While rotating to another TKI may not offer the same protection against tyrosine kinase domain escape mutations such as T315I, we have to take into account that she has been in an MRD- remission for ~18 months already.  Thus, while changing from a 3rd-generation TKI (Ponatinib) to a 2nd-generation (Dasatinib) may increase the risk of relapse, I think the absolute risk is marginal, and is probably outweighed by the need to prevent high-grade motor neuropathy. We agreed to rotate to another TKI -- Dasatinib is reasonable as a next choice. We discussed specific side effects including pleural effusions/pneumonitis, rash, cytopenias, nausea/vomiting, bleeding risks. She will stop aspirin when we rotate to Dasatinib.       It is possible that if her symptoms rapidly and fully resolve, we could consider Ponatinib again in the future for its better potency and preventing TKD escape  mutations. I will refer her to neurology for their input as well. EMG or similar may be helpful to clarify whether carpal tunnel syndrome is contributory, and if so, whether surgical management is warranted.     Today, the pt has been tolerating the dasatinib well overall, except for a mild fatigue. Her numbness and tingling of her hands/fingers have been improving. It would be reasonable to cont it for now. Labs are still pending at the time of this documentation.     PLAN:  --Cont Dasatinib 140 mg every day (based on dosing from D-ALBA).  --CBC w/ diff, CMP every 8 weeks. Peripheral blood BCR-ABL and Clonoseq NGS MRD testing every 4 months. Clinic visit every 4 months after BCR-ABL and Clonoseq have resulted.   --Repeat BMBx at 2 years (August 2024)  --See PCP and undergo age-appropriate cancer screening include mammogram and colonoscopy  --f/up with neurology at [facility], plan to obtain EMG per their note 5/17/24  --Other supportive cares as per below    # CNS prophylaxis--complete  # Varicella Zoster; improved  # PPx  --Acyclovir 400 mg BID     # Headaches  # H/o COVID-19 infection   --Refuses COVID-19 vaccination    # Hypertension: Amlodipine; Lasix as needed  # Indigestion/GERD    The above plan was discussed with Dr. Hull, who agrees with the assessment and plan.     Thank you for allowing me to participate in the care of this patient. Please do not hesitate to contact me if there are any concerns or questions.     Arun Oneal MD  Hem/onc fellow  Division of Hematology, Oncology, and Transplantation  [facility]      Steve Hull MD, PhD  Division of Hematology, Oncology, and Transplantation  [facility]    40 minutes spent on the date of the encounter doing chart review, review of test results, interpretation of tests, patient visit and documentation.

## 2024-06-05 NOTE — LETTER
6/5/2024      Vashti Villegas  [address] [place] [address]      Dear Colleague,    Thank you for referring your patient, Vashti Villegas, to the [facility]. Please see a copy of my visit note below.    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 While at OSH, she had CT PE protocol as well as CT A/P. These identified no PE, no acute or localized intraabdominal inflammatory process, mild splenomegaly, and few inconspicous low-density structures in the liver. She was transferred to [facility] for further workup and management. S/p Hydrea 1g TID (8/21-8/23) with improvement in WBC (100K ? 12K).   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 IHC shows CD10+, CD34+. 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 Abnormal cytogenetics.   --Microarry study: CN gains/losses in 3p21, 4q25, 7p11 (region includes CREB5 and IKZF1; biallelic loss of TARP), 9p13.2-p22.3 (region includes CDKN2a/2b and PAX5), 12q13, 14q11.     INDUCTION:   --Initiated pre-phase steroids with Prednisone 60 mg x8/21; increased to 60 mg/m2 (140 mg) 8/22-8/24. She initiated GRAALL + imatinib (C1D1 = 8/24/22).   --S/p BMT consult 9/12 with Dr. Walsh. Induction was relatively uncomplicated.   -s/p weekly triple IT chemo (Cytarabine, Solu-Cortef, MTX) on Days 1, 8, and 15 of C1 induction. CNS negative.   --BMBx 9/26/22 MRD-negative by BCR-ABL PCR and by Clonoseq Assay.      --GRAALL + imatinib (C2D1=9/30/22). 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   Post-cycle-2 BMBx 10/25/2022  showed MRD-negative CR, though low-level abnormal B-cell population (favor hematogones over residual leukemia) was noted. BCR-ABL negative. Clonoseq negative at 1/10E6 level.    --C3 GRAALL + imatinib on 11/2/22 was tolerated well without complication.    CONSOLIDATION:   --Discussed with Dr. Rutherford, BMT faculty meeting: No matched related donors identified. High panel reactive antibodies towards  unrelated donor samples. With an excellent early, deep MRD response to chemotherapy and no optimal donor sources identified, discussed foregoing BMT in CR1 in favor of definitive therapy.  Discussed risks and benefits of GRAAL versus newer approaches. April agreed to a combination of Blinatumomab plus ponatinib  (Jesusita et al The Lancet Hematology Volume 10, ISSUE 1, e24-e34, January 2023).   --Aspirin 81 mg daily added for VTE prophylaxis per original study.   --Cycle 1Day 1 Blinatumomab plus Ponatinib 15 mg 12/14/2022. Complicated by PICC disruption, brief Blincyto interruption ~12/21/22.   --BMBx 1/17/2023: MRD- by MFC. BCR-ABL p190 detectable at <0.079%. Clonoseq negative.  --Cycle 2 Day 1 Blinatumomab plus Ponatinib 15 mg  2/6/2023.  --BMBx 3/1/2023: MRD- by MFC. BCR-ABL p190 undetectable. Clonoseq negative. FISH with borderline detectable BCR-ABL fusion at 0.125% (at LoD for assay).  --Cycle 3 Day 1 Blinatumomab plus Ponatinib 15 mg 3/9/2023.  --Cycle 4 Day 1 Blinatumomab plus Ponatinib 15 mg 4/20/23.  --Cycle 5 Day 1 Blinatumomab plus Ponatinib 15 mg 6/1/23.  --Continued on Ponatinib 15 mg every day maintenance with ASA 81 mg daily  --8/8/2023 BMBx hypocellular (40%) marrow, no evidence of leukemia. MRD- by MFC, BCR-ABL QPCR negative, Clonoseq negative  --October 2023: PB BCR-ABL and Clonoseq negative    INTRATHECAL CHEMOTHERAPY: ALL CSF studies negative by MFC  8/26/22: Cytarabine 40 mg/methotrexate 15 mg  8/31/22: Cytarabine 40 mg/methotrexate 15 mg  9/7/22: Cytarabine 40 mg/methotrexate 15 mg  9/14/22:  Methotrexate 15 mg  10/21/22: Cytarabine 40 mg/methotrexate 15 mg  11/25/22: Cytarabine 100 mg  12/16/22: Methotrexate 12 mg  1/2/23; Cytarabine 100 mg  1/16/23: Methotrexate 12 mg  2/02/23: Cytarabine 100 mg  2/27/23: Methotrexate 12 mg  3/14/23: Cytarabine 100 mg      History of Present Illness:   April Bakari returns.     She states that her numbness and tingling that she has had in her hands/fingers has been improving, since stopping the ponatinib. She denies any new edema and that her edema that she had while on ponatinib got better. No SOB. No fever, night sweats, or weight gain. No rash, joint pain. No abd pain, n/v/d. She states she feels some fatigue with the dasatinib but that she is adjusting to it.   12-point ROS otherwise negative.    Medications:       Current Outpatient Medications   Medication Sig Dispense Refill    acyclovir (ZOVIRAX) 400 MG tablet Take 1 tablet (400 mg) by mouth every 12 hours for 120 days 60 tablet 3    aspirin 81 MG EC tablet Take 81 mg by mouth daily      dasatinib (SPRYCEL) 140 MG tablet Take 1 tablet (140 mg) by mouth daily Avoid grapefruit and grapefruit juice. 30 tablet 0    prochlorperazine (COMPAZINE) 10 MG tablet Take 1 tablet (10 mg) by mouth every 6 hours as needed for nausea or vomiting 30 tablet 2    triamcinolone (KENALOG) 0.1 % external ointment Apply topically 2 times daily 454 g 3     No current facility-administered medications for this visit.        Physical Exam:   There were no vitals taken for this visit.    ECOG 1  Gen: NAD  MSK: Hands without rash or cutaneous changes. No palmar redness. No exacerbation of pain with palmar flexion or extension. No deficits in proprioception. Sensation to light touch without deficit.     No results found for this or any previous visit (from the past 24 hour(s)).    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 After induction with cycle 1  and 2 of attenuated-dose GRAAL chemotherapy, she was in an MRD- CR (CR1) by MFC, BCR-ABL PCR, and Clonoseq assay.  With no matched related donors, and an excellent early/deep MRD-CR, chemotherapy is currently favored over BMT; this is supported by multiple recent clinical observations (Blood. 2016 Jul 28; 128(4): 504-507; Blood 2022 Jul 25;blood.[id number]). After discussion, she transitioned to Blinatumomab + Ponatinib (Short et al The Lancet Hematology Volume 10, ISSUE 1, e24-e34, January 2023) as curative intent therapy. C1D1 12/14/22. Course complicated by transient cytopenias and headaches. She completed 12 total doses of prophylactic intrathecal chemotherapy.     She had been currently doing quite well with no adverse symptoms or sequelae of treatment. Last fundoscopic exam September 2023.     She currently is struggling with symptoms consistent with peripheral neuropathy. PN has been linked to Ponatinib, occurring up to 30-40% of patients per product insert, and dependent on what clinical context it is given in (e.g. rates are much higher when given in combination with Vincristine).  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 I explained the potential for irreversible motor neuropathy in this scenario. While rotating to another TKI may not offer the same protection against tyrosine kinase domain escape mutations such as T315I, we have to take into account that she has been in an MRD- remission for ~18 months already.  Thus, while changing from a 3rd-generation TKI (Ponatinib) to a 2nd-generation (Dasatinib) may increase the risk of relapse, I think the absolute risk is marginal, and is probably outweighed by the need to prevent high-grade motor neuropathy. We agreed to rotate to another TKI -- Dasatinib is reasonable as a next choice. We discussed specific side effects including pleural effusions/pneumonitis, rash, cytopenias, nausea/vomiting,  bleeding risks. She will stop aspirin when we rotate to Dasatinib.       It is possible that if her symptoms rapidly and fully resolve, we could consider Ponatinib again in the future for its better potency and preventing TKD escape mutations. I will refer her to neurology for their input as well. EMG or similar may be helpful to clarify whether carpal tunnel syndrome is contributory, and if so, whether surgical management is warranted.     Today, the pt has been tolerating the dasatinib well overall, except for a mild fatigue. Her numbness and tingling of her hands/fingers have been improving. It would be reasonable to cont it for now. Labs are still pending at the time of this documentation.     PLAN:  --Cont Dasatinib 140 mg every day (based on dosing from D-[facility]).  --CBC w/ diff, CMP every 8 weeks. Peripheral blood BCR-ABL and Clonoseq NGS MRD testing every 4 months. Clinic visit every 4 months after BCR-ABL and Clonoseq have resulted.   --Repeat BMBx at 2 years (August 2024)  --See PCP and undergo age-appropriate cancer screening include mammogram and colonoscopy  --f/up with neurology at [facility], plan to obtain EMG per their note 5/17/24  --Other supportive cares as per below    # CNS prophylaxis--complete  # Varicella Zoster; improved  # PPx  --Acyclovir 400 mg BID     # Headaches  # H/o COVID-19 infection   --Refuses COVID-19 vaccination    # Hypertension: Amlodipine; Lasix as needed  # Indigestion/GERD    The above plan was discussed with Dr. Hull, who agrees with the assessment and plan.     Thank you for allowing me to participate in the care of this patient. Please do not hesitate to contact me if there are any concerns or questions.     Arun Oneal MD  Hem/onc fellow  Division of Hematology, Oncology, and Transplantation  [facility]      Steve Hull MD, PhD  Division of Hematology, Oncology, and Transplantation  [facility]    40 minutes spent on the date of the  encounter doing chart review, review of test results, interpretation of tests, patient visit and documentation.           Attestation signed by Steve Hull MD at 6/5/2024  5:21 PM:  HEMATOLOGIC MALIGNANCY ATTENDING ATTESTATION     Steve JACKSON MD have seen and personally evaluated the patient Vashti Vlilegas as part of a shared visit. I have reviewed and discussed with the fellow their history, physical and plan.     Her symptoms of peripheral neuropathy have improved significantly since rotating from ponatinib to dasatinib. She is no longer having difficulty holding her phone or buttoning a shirt. Minimal dyspnea on exertion, and her lungs have no diminishment in breath sounds; I again cautioned her on the risk of pleural effusion, pulmonary hypertension, and other adverse effects specific to dasatinib.     She will undergo an EMG shortly and is following with [facility]. We will continue monitoring as outlined below.       Steve Hull MD, PhD  Division of Hematology, Oncology, and Transplantation  [facility]

## 2024-06-05 NOTE — NURSING NOTE
Chief Complaint   Patient presents with    Blood Draw     Labs drawn by lab RN     Venipuncture labs drawn by lab RN, vitals taken and appointment arrived.    Michell Mayfield RN

## 2024-06-10 DIAGNOSIS — C91.00 B-CELL ACUTE LYMPHOBLASTIC LEUKEMIA (ALL) (H): Primary | ICD-10-CM

## 2024-06-10 LAB
ABC 95% CONFIDENCE INTERVAL (B-CELL): NORMAL
ABC CLONOSEQ B-CELL TRACKING (MRD) RESULT: NORMAL
ABC DOMINANT SEQUENCES (B-CELL): 2
ABC RESIDUAL CLONAL CELLS/ MILL NUCLEATED CELLS BCELL: 0

## 2024-06-11 DIAGNOSIS — C91.00 B-CELL ACUTE LYMPHOBLASTIC LEUKEMIA (ALL) (H): Primary | ICD-10-CM

## 2024-06-15 ENCOUNTER — NURSE TRIAGE (OUTPATIENT)
Dept: NURSING | Facility: CLINIC | Age: 44
End: 2024-06-15
Payer: COMMERCIAL

## 2024-06-15 ENCOUNTER — APPOINTMENT (OUTPATIENT)
Dept: CT IMAGING | Facility: CLINIC | Age: 44
End: 2024-06-15
Attending: EMERGENCY MEDICINE
Payer: COMMERCIAL

## 2024-06-15 ENCOUNTER — HOSPITAL ENCOUNTER (EMERGENCY)
Facility: CLINIC | Age: 44
Discharge: HOME | End: 2024-06-15
Attending: EMERGENCY MEDICINE | Admitting: EMERGENCY MEDICINE
Payer: COMMERCIAL

## 2024-06-15 VITALS
HEART RATE: 108 BPM | SYSTOLIC BLOOD PRESSURE: 155 MMHG | TEMPERATURE: 99.7 F | OXYGEN SATURATION: 97 % | DIASTOLIC BLOOD PRESSURE: 87 MMHG | RESPIRATION RATE: 16 BRPM

## 2024-06-15 DIAGNOSIS — K13.79 MOUTH SORES: ICD-10-CM

## 2024-06-15 LAB
ALBUMIN SERPL BCG-MCNC: 4.5 G/DL (ref 3.5–5.2)
ALP SERPL-CCNC: 90 U/L (ref 40–150)
ALT SERPL W P-5'-P-CCNC: 42 U/L (ref 0–50)
ANION GAP SERPL CALCULATED.3IONS-SCNC: 11 MMOL/L (ref 7–15)
AST SERPL W P-5'-P-CCNC: 30 U/L (ref 0–45)
BASOPHILS # BLD AUTO: 0 10E3/UL (ref 0–0.2)
BASOPHILS NFR BLD AUTO: 0 %
BILIRUB SERPL-MCNC: 0.4 MG/DL
BUN SERPL-MCNC: 9.6 MG/DL (ref 6–20)
CALCIUM SERPL-MCNC: 9.2 MG/DL (ref 8.6–10)
CHLORIDE SERPL-SCNC: 107 MMOL/L (ref 98–107)
CREAT SERPL-MCNC: 0.78 MG/DL (ref 0.51–0.95)
DEPRECATED HCO3 PLAS-SCNC: 22 MMOL/L (ref 22–29)
EGFRCR SERPLBLD CKD-EPI 2021: >90 ML/MIN/1.73M2
EOSINOPHIL # BLD AUTO: 0.5 10E3/UL (ref 0–0.7)
EOSINOPHIL NFR BLD AUTO: 6 %
ERYTHROCYTE [DISTWIDTH] IN BLOOD BY AUTOMATED COUNT: 15.6 % (ref 10–15)
GLUCOSE SERPL-MCNC: 131 MG/DL (ref 70–99)
HCT VFR BLD AUTO: 36.9 % (ref 35–47)
HGB BLD-MCNC: 12.1 G/DL (ref 11.7–15.7)
IMM GRANULOCYTES # BLD: 0 10E3/UL
IMM GRANULOCYTES NFR BLD: 0 %
LYMPHOCYTES # BLD AUTO: 1.6 10E3/UL (ref 0.8–5.3)
LYMPHOCYTES NFR BLD AUTO: 18 %
MCH RBC QN AUTO: 29.1 PG (ref 26.5–33)
MCHC RBC AUTO-ENTMCNC: 32.8 G/DL (ref 31.5–36.5)
MCV RBC AUTO: 89 FL (ref 78–100)
MONOCYTES # BLD AUTO: 0.8 10E3/UL (ref 0–1.3)
MONOCYTES NFR BLD AUTO: 9 %
NEUTROPHILS # BLD AUTO: 5.7 10E3/UL (ref 1.6–8.3)
NEUTROPHILS NFR BLD AUTO: 67 %
NRBC # BLD AUTO: 0 10E3/UL
NRBC BLD AUTO-RTO: 0 /100
PLATELET # BLD AUTO: 233 10E3/UL (ref 150–450)
POTASSIUM SERPL-SCNC: 3.8 MMOL/L (ref 3.4–5.3)
PROT SERPL-MCNC: 7.1 G/DL (ref 6.4–8.3)
RBC # BLD AUTO: 4.16 10E6/UL (ref 3.8–5.2)
SODIUM SERPL-SCNC: 140 MMOL/L (ref 135–145)
WBC # BLD AUTO: 8.6 10E3/UL (ref 4–11)

## 2024-06-15 PROCEDURE — 70491 CT SOFT TISSUE NECK W/DYE: CPT

## 2024-06-15 PROCEDURE — 99285 EMERGENCY DEPT VISIT HI MDM: CPT | Mod: 25 | Performed by: EMERGENCY MEDICINE

## 2024-06-15 PROCEDURE — 80053 COMPREHEN METABOLIC PANEL: CPT | Performed by: EMERGENCY MEDICINE

## 2024-06-15 PROCEDURE — 250N000009 HC RX 250: Performed by: EMERGENCY MEDICINE

## 2024-06-15 PROCEDURE — 70491 CT SOFT TISSUE NECK W/DYE: CPT | Mod: 26 | Performed by: RADIOLOGY

## 2024-06-15 PROCEDURE — 99284 EMERGENCY DEPT VISIT MOD MDM: CPT | Performed by: EMERGENCY MEDICINE

## 2024-06-15 PROCEDURE — 96374 THER/PROPH/DIAG INJ IV PUSH: CPT | Mod: 59 | Performed by: EMERGENCY MEDICINE

## 2024-06-15 PROCEDURE — 85025 COMPLETE CBC W/AUTO DIFF WBC: CPT | Performed by: EMERGENCY MEDICINE

## 2024-06-15 PROCEDURE — 36415 COLL VENOUS BLD VENIPUNCTURE: CPT | Performed by: EMERGENCY MEDICINE

## 2024-06-15 PROCEDURE — 250N000011 HC RX IP 250 OP 636: Performed by: EMERGENCY MEDICINE

## 2024-06-15 PROCEDURE — 250N000011 HC RX IP 250 OP 636: Mod: JZ | Performed by: EMERGENCY MEDICINE

## 2024-06-15 RX ORDER — KETOROLAC TROMETHAMINE 15 MG/ML
15 INJECTION, SOLUTION INTRAMUSCULAR; INTRAVENOUS ONCE
Status: COMPLETED | OUTPATIENT
Start: 2024-06-15 | End: 2024-06-15

## 2024-06-15 RX ORDER — IOPAMIDOL 755 MG/ML
90 INJECTION, SOLUTION INTRAVASCULAR ONCE
Status: COMPLETED | OUTPATIENT
Start: 2024-06-15 | End: 2024-06-15

## 2024-06-15 RX ORDER — LIDOCAINE HYDROCHLORIDE 20 MG/ML
10 SOLUTION OROPHARYNGEAL ONCE
Status: COMPLETED | OUTPATIENT
Start: 2024-06-15 | End: 2024-06-15

## 2024-06-15 RX ADMIN — LIDOCAINE HYDROCHLORIDE 10 ML: 20 SOLUTION OROPHARYNGEAL at 21:56

## 2024-06-15 RX ADMIN — KETOROLAC TROMETHAMINE 15 MG: 15 INJECTION INTRAMUSCULAR; INTRAVENOUS at 21:55

## 2024-06-15 RX ADMIN — IOPAMIDOL 90 ML: 755 INJECTION, SOLUTION INTRAVENOUS at 23:02

## 2024-06-15 ASSESSMENT — ACTIVITIES OF DAILY LIVING (ADL)
ADLS_ACUITY_SCORE: 35
ADLS_ACUITY_SCORE: 35

## 2024-06-15 ASSESSMENT — COLUMBIA-SUICIDE SEVERITY RATING SCALE - C-SSRS
1. IN THE PAST MONTH, HAVE YOU WISHED YOU WERE DEAD OR WISHED YOU COULD GO TO SLEEP AND NOT WAKE UP?: NO
6. HAVE YOU EVER DONE ANYTHING, STARTED TO DO ANYTHING, OR PREPARED TO DO ANYTHING TO END YOUR LIFE?: NO
2. HAVE YOU ACTUALLY HAD ANY THOUGHTS OF KILLING YOURSELF IN THE PAST MONTH?: NO

## 2024-06-15 NOTE — TELEPHONE ENCOUNTER
"The patient reports she has a canker sore on inner right jaw area and on the right side of her throat  She reports they are painful  She reports she recently started Sprycel and relates the outbreaks of the canker sore to the medication.  She reports she has been taking the sprycel for three to four weeks  Triage guidelines recommend to home care  Caller verbalized and understands directives      Reason for Disposition   Canker sore suspected (i.e., aphthous ulcer) in the mouth   Probable canker sore(s)    Additional Information   Negative: SEVERE difficulty breathing (e.g., struggling for each breath, speaks in single words, stridor)   Negative: Sounds like a life-threatening emergency to the triager   Negative: [1] Looks like fever blisters (\"cold sore\") AND [2] only on outer lip   Negative: Generalized skin rash from Chickenpox   Negative: Chemical in the mouth suspected cause of ulcers   Negative: [1] Drinking very little AND [2] dehydration suspected (e.g., no urine > 12 hours, very dry mouth, very lightheaded)   Negative: Generalized rash on body   Negative: Patient sounds very sick or weak to the triager   Negative: Gums are red, painful and have many ulcers   Negative: Fever   Negative: [1] One pimple or ulcer on the gum AND [2] near a toothache   Negative: Large lymph node (> 1 inch or 2.5 cm) under the jaw   Negative: Facial swelling   Negative: Weak immune system (e.g., HIV positive, cancer chemo, splenectomy, organ transplant, chronic steroids)    Protocols used: Mouth Pain-A-AH, Mouth Ulcers-A-AH    "

## 2024-06-16 NOTE — DISCHARGE INSTRUCTIONS
I was happy to see that overall your CT scan was reassuring.  I did discuss this with radiology who sees no sign of obvious cause such as a salivary gland stone, infection.  There was note of multiple enlarged lymph nodes as per consistent with your diagnosis.  We did prescribe a medication called Magic mouthwash which does have a numbing agent in it.  I would plan to switch her gargle the medication for approximately 20 seconds and then spit this out.  Please continue to follow-up with your oncologist.  Is unclear at this time if the lesions are secondary to your current medication which is possible.  They will assist you with guidance of ongoing need for the medication versus potential for side effects.  We are certainly happy to reevaluate you emergently should you have difficulty swallowing, breathing, increasing swelling or pain of the neck.

## 2024-06-16 NOTE — ED PROVIDER NOTES
ED PROVIDER NOTE  Cesia 15, 2024  History     Chief Complaint   Patient presents with    Mouth Lesions     HPI  Vashti Villegas is a 44 year old female who has a history seen for a LL currently undergoing treatment arriving today to the emergency department the transfer from urgent care secondary to right-sided facial and submandibular swelling.  Patient reports initial note of a tongue lesion several days ago.  Over the past several days she has had increasing right-sided facial pain and a sensation of fullness/swelling in the right lateral neck.  She initially felt this was secondary to tongue lesions or ulcerations but possible concern for increasing lymph node size.  She was seen in urgent care with note of some swelling in the right submandibular region prompting ED referral.  She reports no localized trauma.  No change in voice.  She does have pain with opening of her mouth and with swallowing.  No difficulty swallowing.  She has been able to tolerate oral liquids and food.  No similar symptoms.  No history of sialolithiasis.  No history of preceding dental pain but at this time does report some pain with palpation of the posterior molars on the right inferior aspect.  She denies high fevers but has chilled.  She reports a borderline temperature at urgent care of 100.2.      Past Medical History  Past Medical History:   Diagnosis Date    ALL (acute lymphocytic leukemia) (H)     Other acne     Shingles      Past Surgical History:   Procedure Laterality Date    GI SURGERY      GYN SURGERY      IR BONE BIOPSY DEEP RIGHT  10/25/2022    IR BONE BIOPSY DEEP RIGHT  01/17/2023    IR BONE BIOPSY DEEP RIGHT  03/01/2023    IR CHEST PORT PLACEMENT > 5 YRS OF AGE  11/23/2022    IR LUMBAR PUNCTURE  10/21/2022    IR PORT REMOVAL RIGHT  12/1/2023    IR PORT REPLACEMENT CATHETER ONLY RIGHT  01/17/2023    PICC Left 09/30/2022    Picc ok to use    PICC DOUBLE LUMEN PLACEMENT Left 08/20/2022    left cephalic 5 fr dl picc 44 cm     PICC INSERTION - DOUBLE LUMEN Right 2022    41cm (1cm external), Cephalic vein    REMOVE PORT VASCULAR ACCESS Right 2023    Procedure: Remove port vascular access right;  Surgeon: Ernesto Bartlett MD;  Location: [facility] OR    TUBAL LIGATION  [address] [place] [address] NONSPECIFIC PROCEDURE  1999    benign mole removal     magic mouthwash suspension (diphenhydrAMINE, lidocaine, aluminum-magnesium & simethicone)  acyclovir (ZOVIRAX) 400 MG tablet  aspirin 81 MG EC tablet  dasatinib (SPRYCEL) 140 MG tablet  prochlorperazine (COMPAZINE) 10 MG tablet  triamcinolone (KENALOG) 0.1 % external ointment      Allergies   Allergen Reactions    Blood Transfusion Related (Informational Only) Hives     Hive reaction with platelet transfusion on 2022. Please administer platelets with tylenol and benadryl premedication.     Seasonal Allergies      Runny nose, itchiness     Family History  Family History   Problem Relation Age of Onset    Chronic Obstructive Pulmonary Disease Mother          in her sleep 61    Coronary Artery Disease Father 70        MI at 71yo; CABG x3    Diabetes Type 2  Father     Chronic Obstructive Pulmonary Disease Father         former smoker    No Known Problems Sister      Social History   Social History     Tobacco Use    Smoking status: Never     Passive exposure: Past    Smokeless tobacco: Never   Vaping Use    Vaping status: Never Used   Substance Use Topics    Alcohol use: Yes     Alcohol/week: 2.0 standard drinks of alcohol     Types: 2 Glasses of wine per week     Comment: in frequent. once a month.    Drug use: Never         A medically appropriate review of systems was performed with pertinent positives and negatives noted in the HPI, and all other systems negative.      Physical Exam   BP: (!) 155/87  Pulse: 108  Temp: 99.7  F (37.6  C)  Resp: 16  SpO2: 97 %      Physical Exam  Vitals and nursing note reviewed.   Constitutional:       General: She is in acute distress.      Appearance:  She is not ill-appearing or diaphoretic.   HENT:      Head: Normocephalic and atraumatic.   Neck:        Comments: Mild swelling and tenderness over the right lateral neck.  No subcutaneous emphysema, ecchymosis, palpable lesion.  No overlying erythema or fluctuance appreciated.  No evidence of trauma.  Cardiovascular:      Rate and Rhythm: Regular rhythm. Tachycardia present.      Pulses: Normal pulses.   Pulmonary:      Effort: Pulmonary effort is normal. No respiratory distress.      Breath sounds: No stridor. No wheezing.   Musculoskeletal:      Cervical back: Normal range of motion. Normal range of motion.   Skin:     General: Skin is warm.      Capillary Refill: Capillary refill takes less than 2 seconds.      Findings: No rash.   Neurological:      General: No focal deficit present.      Mental Status: She is alert.      Cranial Nerves: No cranial nerve deficit.   Psychiatric:         Mood and Affect: Mood normal.         Behavior: Behavior normal.         ED Course        Procedures         Results for orders placed or performed during the hospital encounter of 06/15/24 (from the past 24 hour(s))   CBC with platelets differential    Narrative    The following orders were created for panel order CBC with platelets differential.  Procedure                               Abnormality         Status                     ---------                               -----------         ------                     CBC with platelets and d...[[id number]]  Abnormal            Final result                 Please view results for these tests on the individual orders.   Comprehensive metabolic panel   Result Value Ref Range    Sodium 140 135 - 145 mmol/L    Potassium 3.8 3.4 - 5.3 mmol/L    Carbon Dioxide (CO2) 22 22 - 29 mmol/L    Anion Gap 11 7 - 15 mmol/L    Urea Nitrogen 9.6 6.0 - 20.0 mg/dL    Creatinine 0.78 0.51 - 0.95 mg/dL    GFR Estimate >90 >60 mL/min/1.73m2    Calcium 9.2 8.6 - 10.0 mg/dL    Chloride 107 98 - 107  mmol/L    Glucose 131 (H) 70 - 99 mg/dL    Alkaline Phosphatase 90 40 - 150 U/L    AST 30 0 - 45 U/L    ALT 42 0 - 50 U/L    Protein Total 7.1 6.4 - 8.3 g/dL    Albumin 4.5 3.5 - 5.2 g/dL    Bilirubin Total 0.4 <=1.2 mg/dL   CBC with platelets and differential   Result Value Ref Range    WBC Count 8.6 4.0 - 11.0 10e3/uL    RBC Count 4.16 3.80 - 5.20 10e6/uL    Hemoglobin 12.1 11.7 - 15.7 g/dL    Hematocrit 36.9 35.0 - 47.0 %    MCV 89 78 - 100 fL    MCH 29.1 26.5 - 33.0 pg    MCHC 32.8 31.5 - 36.5 g/dL    RDW 15.6 (H) 10.0 - 15.0 %    Platelet Count 233 150 - 450 10e3/uL    % Neutrophils 67 %    % Lymphocytes 18 %    % Monocytes 9 %    % Eosinophils 6 %    % Basophils 0 %    % Immature Granulocytes 0 %    NRBCs per 100 WBC 0 <1 /100    Absolute Neutrophils 5.7 1.6 - 8.3 10e3/uL    Absolute Lymphocytes 1.6 0.8 - 5.3 10e3/uL    Absolute Monocytes 0.8 0.0 - 1.3 10e3/uL    Absolute Eosinophils 0.5 0.0 - 0.7 10e3/uL    Absolute Basophils 0.0 0.0 - 0.2 10e3/uL    Absolute Immature Granulocytes 0.0 <=0.4 10e3/uL    Absolute NRBCs 0.0 10e3/uL   Soft tissue neck CT w contrast    Narrative    EXAM: CT SOFT TISSUE NECK W CONTRAST  LOCATION: [facility]  DATE: 6/15/2024    INDICATION: Right submandibular pain swelling  COMPARISON: None.  CONTRAST: iopamidol (ISOVUE 370) solution 90 mL  TECHNIQUE: Routine CT Soft Tissue Neck with IV contrast. Multiplanar reformats. Dose reduction techniques were used.    FINDINGS:     MUCOSAL SPACES/SOFT TISSUES: Normal mucosal spaces of the upper aerodigestive tract. No mucosal mass or inflammation identified. Normal vocal cords and infraglottic trachea. Normal parapharyngeal space and subcutaneous soft tissues. Normal oral cavity,    spaces, and floor of mouth structures.     Epiglottis within normal limits. No prevertebral soft tissue joint. No soft tissue focal inflammatory process. No soft tissue abscess.    LYMPH NODES: Multiple small,  prominent, and mildly enlarged lymph nodes within the neck.     SALIVARY GLANDS: Normal parotid and submandibular glands.    THYROID: Normal.     VESSELS: Vascular structures of the neck are patent.    VISUALIZED INTRACRANIAL/ORBITS/SINUSES: No abnormality of the visualized intracranial compartment or orbits. Right maxillary sinus small retention cyst/mucosal polyp. Remaining visualized paranasal sinuses and mastoid air cells are essentially clear.    OTHER: C3 superior endplate mild compression deformity. Mild spondylosis. The included lung apices are clear.     Right superior breast asymmetric soft tissue density/nodule partially visualized measuring 2.3 x 1.2 cm unknown if reflects asymmetric fibroglandular tissue, scar tissue, or neoplastic process. Recommend mammography.      Impression    IMPRESSION:   1.  No acute findings.    2.  Right superior breast asymmetric soft tissue density/nodule partially visualized unknown if reflects asymmetric fibroglandular tissue, scar tissue, or neoplastic process. Recommend mammography.    3.  Multiple small, prominent, and mildly enlarged lymph nodes within the neck.        Tab Martinez was notified by Dr Bird Go at  11:20 PM 6/15/2024 CST/CDT.     Medications   ketorolac (TORADOL) injection 15 mg (15 mg Intravenous $Given 6/15/24 2155)   lidocaine (viscous) (XYLOCAINE) 2 % solution 10 mL (10 mLs Mouth/Throat $Given 6/15/24 2156)   iopamidol (ISOVUE-370) solution 90 mL (90 mLs Intravenous $Given 6/15/24 2302)   sodium chloride (PF) 0.9% PF flush 60 mL (60 mLs Intravenous $Given 6/15/24 2302)             Critical care was not performed.     Medical Decision Making  The patient's presentation was of moderate complexity (an acute complicated injury).    The patient's evaluation involved:  review of external note(s) from 3+ sources (see separate area of note for details)  review of 3+ test result(s) ordered prior to this encounter (see separate area of note for  details)  ordering and/or review of 3+ test(s) in this encounter (see separate area of note for details)    The patient's management necessitated moderate risk (prescription drug management including medications given in the ED).    Assessments & Plan (with Medical Decision Making)     Vashti Villegas is a 44 year old female who has a history seen for a LL currently undergoing treatment arriving today to the emergency department the transfer from urgent care secondary to right-sided facial and submandibular swelling.  Upon arrival patient ambulated.  Presently afebrile and hemodynamically stable with mild tachycardia.  She does appear to be uncomfortable but is nontoxic.  External evaluation of the face and neck demonstrates no sign of trauma.  Subtle swelling asymmetrically on the right versus left.  Tenderness appreciated the submandibular region.  Intraoral evaluation demonstrates what appears to be a single ulcerated lesion on the tip of the tongue.  No generalized ulceration or lesions within the tongue or posterior pharynx.  Slight asymmetry of right versus left tonsil which patient reports to be chronic.  Low suspicion for PTA, retropharyngeal abscess or bacterial tracheitis.  I did discuss broad differential including lymphadenopathy, sialolithiasis or obstructive salivary duct plan.  Lower suspicion for bacterial infection such as Blas angina.  Patient has no hypophonia at this time.  Did discuss options to include laboratory studies and CT imaging.    Imaging reviewed with radiologist with no obvious acute findings.  There was question of a possible scar tissue versus breast lesion.  Patient feels this is likely consistent with where her port is and I would agree however recommendation would be for outpatient consideration of mammography.  This was discussed with the patient.  Etiology of symptoms unclear do not see signs of swelling or progressive discomfort of the neck.  She had significant resolution of  symptoms with viscous lidocaine.  I have provided a prescription for Magic mouthwash.  She will continue to monitor symptoms closely.  Should she have change, progression or worsening symptoms we will have her call or return emergently.    I have reviewed the nursing notes.    I have reviewed the findings, diagnosis, plan and need for follow up with the patient.    New Prescriptions    MAGIC MOUTHWASH SUSPENSION (DIPHENHYDRAMINE, LIDOCAINE, ALUMINUM-MAGNESIUM & SIMETHICONE)    Swish and swallow 10 mLs in mouth every 4 hours as needed for mouth sores       Final diagnoses:   Mouth sores       TAB GOMEZ MD    6/15/2024   [facility] EMERGENCY DEPARTMENT     Tab Gomez MD  06/15/24 3072

## 2024-06-16 NOTE — ED TRIAGE NOTES
Pt ambulatory to ED with c/o mouth sores and right sided facial swelling that started last night but worsening today. Was seen @ urgent care earlier today and referred to ED for concern of abscess. Pt on chemotherapy for tx of leukemia.      Triage Assessment (Adult)       Row Name 06/15/24 3389          Triage Assessment    Airway WDL WDL        Respiratory WDL    Respiratory WDL WDL        Skin Circulation/Temperature WDL    Skin Circulation/Temperature WDL WDL        Cardiac WDL    Cardiac WDL WDL

## 2024-06-17 ENCOUNTER — PATIENT OUTREACH (OUTPATIENT)
Dept: ONCOLOGY | Facility: CLINIC | Age: 44
End: 2024-06-17
Payer: COMMERCIAL

## 2024-06-17 NOTE — PROGRESS NOTES
[facility]: Cancer Care                                                                                          April calls after being in the ED over the weekend with complaints of mouth sores and swollen submandibular lymph nodes.  She said the swelling is on both sides of her neck. April also reports new swelling in the gums that started last night.  She does not feel any swelling in her armpits or groin.    April reports canker sores on the tip of her tongue, underneath her tongue and toward the back of her mouth.  She denies any coating on her tongue    She was given Magic Mouthwash and is using it mostly swish and spit.  She is occasionally swallowing it. This has been helping some. She has tried salt water rinses but said they burn a little bit.  She also has had some minor bleeding with brushing her teeth with a soft brush.    She has been able to eat foods such as mashed potatoes, soup.  Nothing hard to chew and is drinking water.     Encouraged fluids, more frequent salt/baking soda rinses, keeping her mouth moist, and warm or cold foods depending on what feels good.  We also reviewed the ratio for the salt/baking soda rinses to prevent some of the irritation she was having from it.    She was informed I would review this information with Dr. Hull and Roya Quarles, APRN, CNP to see if they need to see her in person, or make further recommendations    Signature:  Flor Leslie RN

## 2024-06-18 ENCOUNTER — PATIENT OUTREACH (OUTPATIENT)
Dept: ONCOLOGY | Facility: CLINIC | Age: 44
End: 2024-06-18
Payer: COMMERCIAL

## 2024-06-18 NOTE — PROGRESS NOTES
[facility]: Cancer Care                                                                                          Call placed to April and she states she feels better, but still having canker sores, her gums continue to be swollen and the lymph node swelling in her neck is present on both sides.  Initially it was only on the right side.   She also wonders if her dose of Dasatinib should be adjusted due to these side effects.      She said the salt water rinses are helping.  She has not had fevers.  Last night her highest temperature was 99.9F.  Her highest temperature since this all started was 100.2F.  she has not had any chills, but some sweating.    We reviewed that we would likely send her to get a swab of her mouth after I review this information with Dr. Hull and Roya Quarles, APRN, CNP.  She understands I will call back tomorrow with a plan.  She has the day off of work and would like to go to somewhere closer to home such as [facility] or [place]     Signature:  Flor Leslie RN

## 2024-06-19 NOTE — PROGRESS NOTES
[facility]: Cancer Care                                                                                          Call placed to April and offered her a visit with one of our APPs today to assess her mouth sores and swelling.  April reports that the sore on the tip of her tongue is gone and the one underneath her tongue is better.  She also said the lymph node swelling is down.  After reviewing with LOAN Pulido,CNP she was informed she would not need to come in today for an assessment.  April was reminded to call if her symptoms change or worsen.    Signature:  Flor Leslie RN

## 2024-06-20 ENCOUNTER — LAB (OUTPATIENT)
Dept: LAB | Facility: CLINIC | Age: 44
End: 2024-06-20
Payer: COMMERCIAL

## 2024-06-20 ENCOUNTER — TELEPHONE (OUTPATIENT)
Dept: GASTROENTEROLOGY | Facility: CLINIC | Age: 44
End: 2024-06-20

## 2024-06-20 ENCOUNTER — HOSPITAL ENCOUNTER (OUTPATIENT)
Facility: AMBULATORY SURGERY CENTER | Age: 44
End: 2024-06-20
Attending: SURGERY
Payer: COMMERCIAL

## 2024-06-20 DIAGNOSIS — C91.00 B-CELL ACUTE LYMPHOBLASTIC LEUKEMIA (ALL) (H): ICD-10-CM

## 2024-06-20 DIAGNOSIS — Z12.11 SPECIAL SCREENING FOR MALIGNANT NEOPLASMS, COLON: Primary | ICD-10-CM

## 2024-06-20 LAB
ALBUMIN SERPL BCG-MCNC: 4.3 G/DL (ref 3.5–5.2)
ALP SERPL-CCNC: 86 U/L (ref 40–150)
ALT SERPL W P-5'-P-CCNC: 33 U/L (ref 0–50)
ANION GAP SERPL CALCULATED.3IONS-SCNC: 10 MMOL/L (ref 7–15)
AST SERPL W P-5'-P-CCNC: 29 U/L (ref 0–45)
BASOPHILS # BLD AUTO: 0 10E3/UL (ref 0–0.2)
BASOPHILS NFR BLD AUTO: 1 %
BILIRUB SERPL-MCNC: 0.3 MG/DL
BUN SERPL-MCNC: 12.9 MG/DL (ref 6–20)
CALCIUM SERPL-MCNC: 9 MG/DL (ref 8.6–10)
CHLORIDE SERPL-SCNC: 106 MMOL/L (ref 98–107)
CREAT SERPL-MCNC: 0.72 MG/DL (ref 0.51–0.95)
DEPRECATED HCO3 PLAS-SCNC: 24 MMOL/L (ref 22–29)
EGFRCR SERPLBLD CKD-EPI 2021: >90 ML/MIN/1.73M2
EOSINOPHIL # BLD AUTO: 0.6 10E3/UL (ref 0–0.7)
EOSINOPHIL NFR BLD AUTO: 9 %
ERYTHROCYTE [DISTWIDTH] IN BLOOD BY AUTOMATED COUNT: 14.9 % (ref 10–15)
GLUCOSE SERPL-MCNC: 97 MG/DL (ref 70–99)
HCT VFR BLD AUTO: 35.9 % (ref 35–47)
HGB BLD-MCNC: 11.8 G/DL (ref 11.7–15.7)
IMM GRANULOCYTES # BLD: 0 10E3/UL
IMM GRANULOCYTES NFR BLD: 1 %
LYMPHOCYTES # BLD AUTO: 1.9 10E3/UL (ref 0.8–5.3)
LYMPHOCYTES NFR BLD AUTO: 31 %
MAGNESIUM SERPL-MCNC: 2 MG/DL (ref 1.7–2.3)
MCH RBC QN AUTO: 29.4 PG (ref 26.5–33)
MCHC RBC AUTO-ENTMCNC: 32.9 G/DL (ref 31.5–36.5)
MCV RBC AUTO: 90 FL (ref 78–100)
MONOCYTES # BLD AUTO: 0.6 10E3/UL (ref 0–1.3)
MONOCYTES NFR BLD AUTO: 10 %
NEUTROPHILS # BLD AUTO: 3 10E3/UL (ref 1.6–8.3)
NEUTROPHILS NFR BLD AUTO: 48 %
NRBC # BLD AUTO: 0 10E3/UL
NRBC BLD AUTO-RTO: 0 /100
PHOSPHATE SERPL-MCNC: 2.8 MG/DL (ref 2.5–4.5)
PLATELET # BLD AUTO: 225 10E3/UL (ref 150–450)
POTASSIUM SERPL-SCNC: 4 MMOL/L (ref 3.4–5.3)
PROT SERPL-MCNC: 6.8 G/DL (ref 6.4–8.3)
RBC # BLD AUTO: 4.01 10E6/UL (ref 3.8–5.2)
SODIUM SERPL-SCNC: 140 MMOL/L (ref 135–145)
WBC # BLD AUTO: 6.3 10E3/UL (ref 4–11)

## 2024-06-20 PROCEDURE — 85025 COMPLETE CBC W/AUTO DIFF WBC: CPT

## 2024-06-20 PROCEDURE — 80053 COMPREHEN METABOLIC PANEL: CPT

## 2024-06-20 PROCEDURE — 83735 ASSAY OF MAGNESIUM: CPT

## 2024-06-20 PROCEDURE — 36415 COLL VENOUS BLD VENIPUNCTURE: CPT

## 2024-06-20 PROCEDURE — 84100 ASSAY OF PHOSPHORUS: CPT

## 2024-06-20 NOTE — TELEPHONE ENCOUNTER
"Endoscopy Scheduling Screen    Have you had a positive Covid test in the last 14 days?  No    What is your communication preference for Instructions and/or Bowel Prep?   MyChart    What insurance is in the chart?  Other:  [employer]    Ordering/Referring Provider:   GETACHEW RASMUSSEN        (If ordering provider performs procedure, schedule with ordering provider unless otherwise instructed. )    BMI: Estimated body mass index is 45.48 kg/m  as calculated from the following:    Height as of 1/25/24: 1.626 m (5' 4.02\").    Weight as of 6/5/24: 120.2 kg (265 lb 1.6 oz).     Sedation Ordered  moderate sedation.   If patient BMI > 50 do not schedule in [facility].    If patient BMI > 45 do not schedule at [facility].    Are you taking methadone or Suboxone?  No    Have you had difficulties, pain, or discomfort during past endoscopy procedures?  No    Are you taking any prescription medications for pain 3 or more times per week?   NO, No RN review required.    Do you have a history of malignant hyperthermia?  No    (Females) Are you currently pregnant?   No     Have you been diagnosed or told you have pulmonary hypertension?   No    Do you have an LVAD?  No    Have you been told you have moderate to severe sleep apnea?  No    Have you been told you have COPD, asthma, or any other lung disease?  No    Do you have any heart conditions?  No     Have you ever had or are you waiting for an organ transplant?  No. Continue scheduling, no site restrictions.    Have you had a stroke or transient ischemic attack (TIA aka \"mini stroke\" in the last 6 months?   No    Have you been diagnosed with or been told you have cirrhosis of the liver?   No    Are you currently on dialysis?   No    Do you need assistance transferring?   No    BMI: Estimated body mass index is 45.48 kg/m  as calculated from the following:    Height as of 1/25/24: 1.626 m (5' 4.02\").    Weight as of 6/5/24: 120.2 kg (265 lb 1.6 oz).     Is patients BMI > 40 and scheduling location " [facility]?  No    Do you take an injectable medication for weight loss or diabetes (excluding insulin)?  No    Do you take the medication Naltrexone?  No    Do you take blood thinners?  No       Prep   Are you currently on dialysis or do you have chronic kidney disease?  No    Do you have a diagnosis of diabetes?  No    Do you have a diagnosis of cystic fibrosis (CF)?  No    On a regular basis do you go 3 -5 days between bowel movements?  No    BMI > 40?  Yes (Extended Prep)    Preferred Pharmacy:    [id number] - [place] - [address]  [place] [address]  Phone: [phone] Fax: [phone]          Final Scheduling Details     Procedure scheduled  Colonoscopy    Surgeon:  Andrew     Date of procedure:  8/1     Pre-OP / PAC:   No - Not required for this site.    Location  [facility] - [facility] - Patient preference.    Sedation   Moderate Sedation - Per order.      Patient Reminders:   You will receive a call from a Nurse to review instructions and health history.  This assessment must be completed prior to your procedure.  Failure to complete the Nurse assessment may result in the procedure being cancelled.      On the day of your procedure, please designate an adult(s) who can drive you home stay with you for the next 24 hours. The medicines used in the exam will make you sleepy. You will not be able to drive.      You cannot take public transportation, ride share services, or non-medical taxi service without a responsible caregiver.  Medical transport services are allowed with the requirement that a responsible caregiver will receive you at your destination.  We require that drivers and caregivers are confirmed prior to your procedure.

## 2024-06-21 ENCOUNTER — DOCUMENTATION ONLY (OUTPATIENT)
Dept: ONCOLOGY | Facility: CLINIC | Age: 44
End: 2024-06-21
Payer: COMMERCIAL

## 2024-06-21 NOTE — PROGRESS NOTES
Oral Chemotherapy Monitoring Program  Lab Follow Up    Reviewed lab results from 6/20.        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 Yes Yes Yes Yes Yes Yes Yes   Drug Name Sprycel (dasatinib) Sprycel (dasatinib) Sprycel (dasatinib) Sprycel (dasatinib) Sprycel (dasatinib) Sprycel (dasatinib) Sprycel (dasatinib)   Dose 140 mg 140 mg 140 mg 140 mg 140 mg 140 mg 140 mg   Current Schedule Daily Daily Daily Daily Daily Daily Daily   Cycle Details Continuous Continuous Continuous Continuous Continuous Continuous Continuous   Start Date of Last Cycle  5/20/2024 5/20/2024    Planned next cycle start date      6/19/2024    Doses missed in last 2 weeks  0   0     Adherence Assessment  Adherent   Adherent     Adverse Effects  Headache;Other (See Note for Details)   No AE identified during assessment     Headache  Grade 2   Resolved due to intervention     Pharmacist Intervention(headache)  Yes        Intervention(s)  OTC recommendation;Referral to oncology provider        Other (See Note for Details)  neuropathy - tingling hands        Pharmacist intervention(other)  No        Is the patient currently in pain?  Yes            Labs:  _  Result Component Current Result Ref Range   Sodium 140 (6/20/2024) 135 - 145 mmol/L     _  Result  Component Current Result Ref Range   Potassium 4.0 (6/20/2024) 3.4 - 5.3 mmol/L     _  Result Component Current Result Ref Range   Calcium 9.0 (6/20/2024) 8.6 - 10.0 mg/dL     _  Result Component Current Result Ref Range   Magnesium 2.0 (6/20/2024) 1.7 - 2.3 mg/dL     _  Result Component Current Result Ref Range   Phosphorus 2.8 (6/20/2024) 2.5 - 4.5 mg/dL     _  Result Component Current Result Ref Range   Albumin 4.3 (6/20/2024) 3.5 - 5.2 g/dL     _  Result Component Current Result Ref Range   Urea Nitrogen 12.9 (6/20/2024) 6.0 - 20.0 mg/dL     _  Result Component Current Result Ref Range   Creatinine 0.72 (6/20/2024) 0.51 - 0.95 mg/dL     _  Result Component Current Result Ref Range   AST 29 (6/20/2024) 0 - 45 U/L     _  Result Component Current Result Ref Range   ALT 33 (6/20/2024) 0 - 50 U/L     _  Result Component Current Result Ref Range   Bilirubin Total 0.3 (6/20/2024) <=1.2 mg/dL     _  Result Component Current Result Ref Range   WBC Count 6.3 (6/20/2024) 4.0 - 11.0 10e3/uL     _  Result Component Current Result Ref Range   Hemoglobin 11.8 (6/20/2024) 11.7 - 15.7 g/dL     _  Result Component Current Result Ref Range   Platelet Count 225 (6/20/2024) 150 - 450 10e3/uL     No results found for ANC within last 30 days.     _  Result Component Current Result Ref Range   Absolute Neutrophils 3.0 (6/20/2024) 1.6 - 8.3 10e3/uL        Assessment & Plan:    No concerning abnormalities in labs. Continue dasatinib regimen as prescribed.     Results communicated to patient via [employer].    Follow-Up:  7/3 labs    Glendy Salcido  Pharmacy Intern  Oral Chemotherapy Monitoring Program  [facility]  [phone].

## 2024-07-03 ENCOUNTER — LAB (OUTPATIENT)
Dept: LAB | Facility: CLINIC | Age: 44
End: 2024-07-03
Payer: COMMERCIAL

## 2024-07-03 DIAGNOSIS — Z79.899 ENCOUNTER FOR LONG-TERM (CURRENT) USE OF MEDICATIONS: ICD-10-CM

## 2024-07-03 DIAGNOSIS — C91.00 B-CELL ACUTE LYMPHOBLASTIC LEUKEMIA (ALL) (H): ICD-10-CM

## 2024-07-03 DIAGNOSIS — C91.00 ACUTE LYMPHOBLASTIC LEUKEMIA (ALL) NOT HAVING ACHIEVED REMISSION (H): ICD-10-CM

## 2024-07-03 LAB
ALBUMIN SERPL BCG-MCNC: 4.6 G/DL (ref 3.5–5.2)
ALP SERPL-CCNC: 87 U/L (ref 40–150)
ALT SERPL W P-5'-P-CCNC: 37 U/L (ref 0–50)
ANION GAP SERPL CALCULATED.3IONS-SCNC: 11 MMOL/L (ref 7–15)
AST SERPL W P-5'-P-CCNC: 35 U/L (ref 0–45)
BASOPHILS # BLD AUTO: 0.1 10E3/UL (ref 0–0.2)
BASOPHILS NFR BLD AUTO: 1 %
BILIRUB SERPL-MCNC: 0.4 MG/DL
BUN SERPL-MCNC: 10.3 MG/DL (ref 6–20)
CALCIUM SERPL-MCNC: 9.5 MG/DL (ref 8.6–10)
CHLORIDE SERPL-SCNC: 105 MMOL/L (ref 98–107)
CREAT SERPL-MCNC: 0.96 MG/DL (ref 0.51–0.95)
DEPRECATED HCO3 PLAS-SCNC: 24 MMOL/L (ref 22–29)
EGFRCR SERPLBLD CKD-EPI 2021: 74 ML/MIN/1.73M2
EOSINOPHIL # BLD AUTO: 0.5 10E3/UL (ref 0–0.7)
EOSINOPHIL NFR BLD AUTO: 8 %
ERYTHROCYTE [DISTWIDTH] IN BLOOD BY AUTOMATED COUNT: 15.6 % (ref 10–15)
GLUCOSE SERPL-MCNC: 104 MG/DL (ref 70–99)
HCT VFR BLD AUTO: 37 % (ref 35–47)
HGB BLD-MCNC: 12.2 G/DL (ref 11.7–15.7)
IMM GRANULOCYTES # BLD: 0 10E3/UL
IMM GRANULOCYTES NFR BLD: 0 %
LDH SERPL L TO P-CCNC: NORMAL U/L
LYMPHOCYTES # BLD AUTO: 2.3 10E3/UL (ref 0.8–5.3)
LYMPHOCYTES NFR BLD AUTO: 33 %
MAGNESIUM SERPL-MCNC: 2 MG/DL (ref 1.7–2.3)
MCH RBC QN AUTO: 29.5 PG (ref 26.5–33)
MCHC RBC AUTO-ENTMCNC: 33 G/DL (ref 31.5–36.5)
MCV RBC AUTO: 90 FL (ref 78–100)
MONOCYTES # BLD AUTO: 0.7 10E3/UL (ref 0–1.3)
MONOCYTES NFR BLD AUTO: 10 %
NEUTROPHILS # BLD AUTO: 3.4 10E3/UL (ref 1.6–8.3)
NEUTROPHILS NFR BLD AUTO: 49 %
NRBC # BLD AUTO: 0 10E3/UL
NRBC BLD AUTO-RTO: 0 /100
PHOSPHATE SERPL-MCNC: 3.4 MG/DL (ref 2.5–4.5)
PLATELET # BLD AUTO: 235 10E3/UL (ref 150–450)
POTASSIUM SERPL-SCNC: 3.9 MMOL/L (ref 3.4–5.3)
PROT SERPL-MCNC: 7 G/DL (ref 6.4–8.3)
RBC # BLD AUTO: 4.13 10E6/UL (ref 3.8–5.2)
SODIUM SERPL-SCNC: 140 MMOL/L (ref 135–145)
WBC # BLD AUTO: 6.9 10E3/UL (ref 4–11)

## 2024-07-03 PROCEDURE — 85025 COMPLETE CBC W/AUTO DIFF WBC: CPT

## 2024-07-03 PROCEDURE — 80053 COMPREHEN METABOLIC PANEL: CPT

## 2024-07-03 PROCEDURE — 84100 ASSAY OF PHOSPHORUS: CPT

## 2024-07-03 PROCEDURE — 83615 LACTATE (LD) (LDH) ENZYME: CPT

## 2024-07-03 PROCEDURE — 36415 COLL VENOUS BLD VENIPUNCTURE: CPT

## 2024-07-03 PROCEDURE — 83735 ASSAY OF MAGNESIUM: CPT

## 2024-07-05 ENCOUNTER — TELEPHONE (OUTPATIENT)
Dept: ONCOLOGY | Facility: CLINIC | Age: 44
End: 2024-07-05
Payer: COMMERCIAL

## 2024-07-05 ENCOUNTER — DOCUMENTATION ONLY (OUTPATIENT)
Dept: ONCOLOGY | Facility: CLINIC | Age: 44
End: 2024-07-05
Payer: COMMERCIAL

## 2024-07-05 RX ORDER — BISACODYL 5 MG/1
TABLET, DELAYED RELEASE ORAL
Qty: 4 TABLET | Refills: 0 | Status: SHIPPED | OUTPATIENT
Start: 2024-07-05 | End: 2024-08-19

## 2024-07-05 NOTE — TELEPHONE ENCOUNTER
New note: The patient started on Cephalexin for an infection, but has since stopped the prescription medication. The patient has not started on any over-the-counter medications in the last two weeks, and the Cephalexin was the only prescription medication the patient has started on.

## 2024-07-05 NOTE — PROGRESS NOTES
Oral Chemotherapy Monitoring Program  Lab Follow Up    Reviewed lab results from 7/3/24.        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 Yes Yes Yes Yes Yes Yes Yes   Drug Name Sprycel (dasatinib) Sprycel (dasatinib) Sprycel (dasatinib) Sprycel (dasatinib) Sprycel (dasatinib) Sprycel (dasatinib) Sprycel (dasatinib)   Dose 140 mg 140 mg 140 mg 140 mg 140 mg 140 mg 140 mg   Current Schedule Daily Daily Daily Daily Daily Daily Daily   Cycle Details Continuous Continuous Continuous Continuous Continuous Continuous Continuous   Start Date of Last Cycle 5/20/2024 5/20/2024     Planned next cycle start date     6/19/2024     Doses missed in last 2 weeks 0   0      Adherence Assessment Adherent   Adherent      Adverse Effects Headache;Other (See Note for Details)   No AE identified during assessment      Headache Grade 2   Resolved due to intervention      Pharmacist Intervention(headache) Yes         Intervention(s) OTC recommendation;Referral to oncology provider         Other (See Note for Details) neuropathy - tingling hands         Pharmacist intervention(other) No         Is the patient currently in pain? Yes             Labs:  _  Result Component Current Result Ref Range   Sodium 140 (7/3/2024) 135 - 145 mmol/L     _  Result  Component Current Result Ref Range   Potassium 3.9 (7/3/2024) 3.4 - 5.3 mmol/L     _  Result Component Current Result Ref Range   Calcium 9.5 (7/3/2024) 8.6 - 10.0 mg/dL     _  Result Component Current Result Ref Range   Magnesium 2.0 (7/3/2024) 1.7 - 2.3 mg/dL     _  Result Component Current Result Ref Range   Phosphorus 3.4 (7/3/2024) 2.5 - 4.5 mg/dL     _  Result Component Current Result Ref Range   Albumin 4.6 (7/3/2024) 3.5 - 5.2 g/dL     _  Result Component Current Result Ref Range   Urea Nitrogen 10.3 (7/3/2024) 6.0 - 20.0 mg/dL     _  Result Component Current Result Ref Range   Creatinine 0.96 (H) (7/3/2024) 0.51 - 0.95 mg/dL     _  Result Component Current Result Ref Range   AST 35 (7/3/2024) 0 - 45 U/L     _  Result Component Current Result Ref Range   ALT 37 (7/3/2024) 0 - 50 U/L     _  Result Component Current Result Ref Range   Bilirubin Total 0.4 (7/3/2024) <=1.2 mg/dL     _  Result Component Current Result Ref Range   WBC Count 6.9 (7/3/2024) 4.0 - 11.0 10e3/uL     _  Result Component Current Result Ref Range   Hemoglobin 12.2 (7/3/2024) 11.7 - 15.7 g/dL     _  Result Component Current Result Ref Range   Platelet Count 235 (7/3/2024) 150 - 450 10e3/uL     No results found for ANC within last 30 days.     _  Result Component Current Result Ref Range   Absolute Neutrophils 3.4 (7/3/2024) 1.6 - 8.3 10e3/uL        Assessment & Plan:  No new concerning abnormalities.    Patient was contacted via Lightbox message.    Follow-Up:  Lab draw 7/18    Emilie Martinez  Pharmacy Intern  Oral Chemotherapy Monitoring Program  [facility]  [phone]

## 2024-07-05 NOTE — TELEPHONE ENCOUNTER
Oral Chemotherapy Monitoring Program    Subjective/Objective:  Vashti Villegas is a 44 year old female contacted by phone for a follow-up visit for oral chemotherapy. April confirmed taking 1 140mg tablet of Dasatinib once daily. She has missed one dose in the last two weeks and said this was due to feeling unwell.    April stated she has not had any side effects as a result of this medication, and has not experienced diarrhea/loose stools, fluid retention, or headache. Overall, April is tolerating this medication well.        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 Yes Yes Yes Yes Yes Yes Yes   Drug Name Sprycel (dasatinib) Sprycel (dasatinib) Sprycel (dasatinib) Sprycel (dasatinib) Sprycel (dasatinib) Sprycel (dasatinib) Sprycel (dasatinib)   Dose 140 mg 140 mg 140 mg 140 mg 140 mg 140 mg 140 mg   Current Schedule Daily Daily Daily Daily Daily Daily Daily   Cycle Details Continuous Continuous Continuous Continuous Continuous Continuous Continuous   Start Date of Last Cycle    5/20/2024      Planned next cycle start date    6/19/2024      Doses missed in last 2 weeks   0    1   Adherence Assessment   Adherent    Non-adherent   Adverse Effects   No AE identified during assessment       Headache   Resolved due to intervention      "      Last PHQ-2 Score on record:       5/2/2024    12:00 PM 5/2/2024    11:03 AM   PHQ-2 ( 1999 Pfizer)   Q1: Little interest or pleasure in doing things 0 0   Q2: Feeling down, depressed or hopeless 0 0   PHQ-2 Score 0 0       Vitals:  BP:   BP Readings from Last 1 Encounters:   06/15/24 (!) 155/87     Wt Readings from Last 1 Encounters:   06/05/24 120.2 kg (265 lb 1.6 oz)     Estimated body surface area is 2.33 meters squared as calculated from the following:    Height as of 1/25/24: 1.626 m (5' 4.02\").    Weight as of 6/5/24: 120.2 kg (265 lb 1.6 oz).    Labs:  _  Result Component Current Result Ref Range   Sodium 140 (7/3/2024) 135 - 145 mmol/L     _  Result Component Current Result Ref Range   Potassium 3.9 (7/3/2024) 3.4 - 5.3 mmol/L     _  Result Component Current Result Ref Range   Calcium 9.5 (7/3/2024) 8.6 - 10.0 mg/dL     _  Result Component Current Result Ref Range   Magnesium 2.0 (7/3/2024) 1.7 - 2.3 mg/dL     _  Result Component Current Result Ref Range   Phosphorus 3.4 (7/3/2024) 2.5 - 4.5 mg/dL     _  Result Component Current Result Ref Range   Albumin 4.6 (7/3/2024) 3.5 - 5.2 g/dL     _  Result Component Current Result Ref Range   Urea Nitrogen 10.3 (7/3/2024) 6.0 - 20.0 mg/dL     _  Result Component Current Result Ref Range   Creatinine 0.96 (H) (7/3/2024) 0.51 - 0.95 mg/dL     _  Result Component Current Result Ref Range   AST 35 (7/3/2024) 0 - 45 U/L     _  Result Component Current Result Ref Range   ALT 37 (7/3/2024) 0 - 50 U/L     _  Result Component Current Result Ref Range   Bilirubin Total 0.4 (7/3/2024) <=1.2 mg/dL     _  Result Component Current Result Ref Range   WBC Count 6.9 (7/3/2024) 4.0 - 11.0 10e3/uL     _  Result Component Current Result Ref Range   Hemoglobin 12.2 (7/3/2024) 11.7 - 15.7 g/dL     _  Result Component Current Result Ref Range   Platelet Count 235 (7/3/2024) 150 - 450 10e3/uL     No results found for ANC within last 30 days.     _  Result Component Current Result Ref " Range   Absolute Neutrophils 3.4 (7/3/2024) 1.6 - 8.3 10e3/uL        Assessment/Plan:  Continue Dasatinib therapy as planned. No concerns and the patient did not have any questions for me.    Follow-Up:  Labs on 7/19  Appt with Dr. Hull on 10/23    Refill Due:  ~7/19    Julienne Zimmerman  Pharmacy Intern  Oral Chemotherapy Monitoring Program  [facility]  [phone]

## 2024-07-05 NOTE — TELEPHONE ENCOUNTER
Extended Golytely Bowel Prep  recommended due to BMI > 40.  Instructions were sent via [employer]. Bowel prep was sent 7/5/2024 to    [email or web address] #[id number] - [place] - [address] AT [address]

## 2024-07-08 DIAGNOSIS — C91.00 B-CELL ACUTE LYMPHOBLASTIC LEUKEMIA (ALL) (H): Primary | ICD-10-CM

## 2024-07-09 NOTE — PROGRESS NOTES
DISCHARGE  Reason for Discharge: Patient has failed to schedule further appointments.    Discharge Plan: Other services: Refer to MD.    Referring Provider:  Disha Kang

## 2024-07-11 DIAGNOSIS — C91.00 B-CELL ACUTE LYMPHOBLASTIC LEUKEMIA (ALL) (H): Primary | ICD-10-CM

## 2024-07-19 ENCOUNTER — LAB (OUTPATIENT)
Dept: LAB | Facility: CLINIC | Age: 44
End: 2024-07-19
Payer: COMMERCIAL

## 2024-07-19 DIAGNOSIS — Z79.899 ENCOUNTER FOR LONG-TERM (CURRENT) USE OF MEDICATIONS: ICD-10-CM

## 2024-07-19 DIAGNOSIS — C91.00 ACUTE LYMPHOBLASTIC LEUKEMIA (ALL) NOT HAVING ACHIEVED REMISSION (H): ICD-10-CM

## 2024-07-19 LAB
BASOPHILS # BLD AUTO: 0.1 10E3/UL (ref 0–0.2)
BASOPHILS NFR BLD AUTO: 1 %
EOSINOPHIL # BLD AUTO: 0.6 10E3/UL (ref 0–0.7)
EOSINOPHIL NFR BLD AUTO: 7 %
ERYTHROCYTE [DISTWIDTH] IN BLOOD BY AUTOMATED COUNT: 15.8 % (ref 10–15)
HCT VFR BLD AUTO: 38.6 % (ref 35–47)
HGB BLD-MCNC: 12.2 G/DL (ref 11.7–15.7)
IMM GRANULOCYTES # BLD: 0 10E3/UL
IMM GRANULOCYTES NFR BLD: 0 %
LYMPHOCYTES # BLD AUTO: 2.1 10E3/UL (ref 0.8–5.3)
LYMPHOCYTES NFR BLD AUTO: 27 %
MCH RBC QN AUTO: 29 PG (ref 26.5–33)
MCHC RBC AUTO-ENTMCNC: 31.6 G/DL (ref 31.5–36.5)
MCV RBC AUTO: 92 FL (ref 78–100)
MONOCYTES # BLD AUTO: 0.7 10E3/UL (ref 0–1.3)
MONOCYTES NFR BLD AUTO: 8 %
NEUTROPHILS # BLD AUTO: 4.6 10E3/UL (ref 1.6–8.3)
NEUTROPHILS NFR BLD AUTO: 57 %
NRBC # BLD AUTO: 0 10E3/UL
NRBC BLD AUTO-RTO: 0 /100
PLATELET # BLD AUTO: 217 10E3/UL (ref 150–450)
RBC # BLD AUTO: 4.2 10E6/UL (ref 3.8–5.2)
WBC # BLD AUTO: 8.1 10E3/UL (ref 4–11)

## 2024-07-19 PROCEDURE — 36415 COLL VENOUS BLD VENIPUNCTURE: CPT

## 2024-07-19 PROCEDURE — 85025 COMPLETE CBC W/AUTO DIFF WBC: CPT

## 2024-07-22 ENCOUNTER — TELEPHONE (OUTPATIENT)
Dept: ONCOLOGY | Facility: CLINIC | Age: 44
End: 2024-07-22
Payer: COMMERCIAL

## 2024-07-22 NOTE — TELEPHONE ENCOUNTER
Oral Chemotherapy Monitoring Program    Subjective/Objective:  Vashti Villegas is a 44 year old female contacted by phone for a follow-up visit for oral chemotherapy. April confirmed taking 1 tablet (140mg) my mouth daily and has missed one dose in the last two weeks of their medication due to wanting to get on a better schedule of taking it earlier. The patient has not started on any new over-the-counter or prescription medications in the last two weeks.    The patient reported a side effect of diarrhea/loose stools and experiences softer stools daily only when she is having a bowel movement, and stated that they are just softer stools. The patient reported no other side effects of this medication such as fatigue, headache, or fluid retention/swelling. Overall, the patient seems to be tolerating this medication well.      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 Yes Yes Yes Yes Yes Yes Yes   Drug Name Sprycel (dasatinib) Sprycel (dasatinib) Sprycel (dasatinib) Sprycel (dasatinib) Sprycel (dasatinib) Sprycel (dasatinib) Sprycel (dasatinib)   Dose 140 mg 140 mg 140 mg 140 mg 140 mg 140 mg 140 mg   Current Schedule Daily Daily Daily Daily Daily Daily Daily   Cycle Details Continuous Continuous Continuous Continuous Continuous Continuous  "Continuous   Start Date of Last Cycle  5/20/2024        Planned next cycle start date  6/19/2024        Doses missed in last 2 weeks 0    1  1   Adherence Assessment Adherent    Non-adherent  Non-adherent   Reason for Non-adherence       Other   Adverse Effects No AE identified during assessment      Diarrhea   Diarrhea       Grade 1   Pharmacist Intervention(diarrhea)       No   Headache Resolved due to intervention             Last PHQ-2 Score on record:       5/2/2024    12:00 PM 5/2/2024    11:03 AM   PHQ-2 ( 1999 Pfizer)   Q1: Little interest or pleasure in doing things 0 0   Q2: Feeling down, depressed or hopeless 0 0   PHQ-2 Score 0 0       Vitals:  BP:   BP Readings from Last 1 Encounters:   06/15/24 (!) 155/87     Wt Readings from Last 1 Encounters:   06/05/24 120.2 kg (265 lb 1.6 oz)     Estimated body surface area is 2.33 meters squared as calculated from the following:    Height as of 1/25/24: 1.626 m (5' 4.02\").    Weight as of 6/5/24: 120.2 kg (265 lb 1.6 oz).    Labs:  _  Result Component Current Result Ref Range   Sodium 140 (7/3/2024) 135 - 145 mmol/L     _  Result Component Current Result Ref Range   Potassium 3.9 (7/3/2024) 3.4 - 5.3 mmol/L     _  Result Component Current Result Ref Range   Calcium 9.5 (7/3/2024) 8.6 - 10.0 mg/dL     _  Result Component Current Result Ref Range   Magnesium 2.0 (7/3/2024) 1.7 - 2.3 mg/dL     _  Result Component Current Result Ref Range   Phosphorus 3.4 (7/3/2024) 2.5 - 4.5 mg/dL     _  Result Component Current Result Ref Range   Albumin 4.6 (7/3/2024) 3.5 - 5.2 g/dL     _  Result Component Current Result Ref Range   Urea Nitrogen 10.3 (7/3/2024) 6.0 - 20.0 mg/dL     _  Result Component Current Result Ref Range   Creatinine 0.96 (H) (7/3/2024) 0.51 - 0.95 mg/dL     _  Result Component Current Result Ref Range   AST 35 (7/3/2024) 0 - 45 U/L     _  Result Component Current Result Ref Range   ALT 37 (7/3/2024) 0 - 50 U/L     _  Result Component Current Result Ref Range "   Bilirubin Total 0.4 (7/3/2024) <=1.2 mg/dL     _  Result Component Current Result Ref Range   WBC Count 8.1 (7/19/2024) 4.0 - 11.0 10e3/uL     _  Result Component Current Result Ref Range   Hemoglobin 12.2 (7/19/2024) 11.7 - 15.7 g/dL     _  Result Component Current Result Ref Range   Platelet Count 217 (7/19/2024) 150 - 450 10e3/uL     No results found for ANC within last 30 days.     _  Result Component Current Result Ref Range   Absolute Neutrophils 4.6 (7/19/2024) 1.6 - 8.3 10e3/uL        Assessment/Plan:  Continue Sprycel therapy as planned. No concerns and the patient did not have any questions for me.    Follow-Up:  8/1 labs  10/23 oncology appointment with Dr. Hull    Refill Due:  ~8/18. The patient has enough pills on hand to make it until then.    Julienne Zimmerman  Pharmacy Intern  Oral Chemotherapy Monitoring Program  [facility]  [phone]

## 2024-07-25 ENCOUNTER — TELEPHONE (OUTPATIENT)
Dept: GASTROENTEROLOGY | Facility: CLINIC | Age: 44
End: 2024-07-25
Payer: COMMERCIAL

## 2024-07-25 NOTE — TELEPHONE ENCOUNTER
Complete STOP / BANG during pre assessment, will need to send to Dr Self if Moderate to severe.    --------------------------------------------------------------------------------------------------------------------        Attempted to contact patient in order to complete pre assessment questions.     Spoke to patient, she is unable to talk now.  Gave information to return call to [phone] option 4    Callback communication sent via [email or web address].    Corinne Kliber, RN  Endoscopy Procedure Pre Assessment RN  [phone] option 4

## 2024-07-25 NOTE — TELEPHONE ENCOUNTER
"Sleep study ordered, not completed.  Noted from 5/20/24 OV: \"Snoring- yes. Very loud. Family in next room can hear. 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 \"    Complete STOP / BANG during pre assessment, will need to send to Dr Self if Moderate to severe.    --------------------------------------------------------------------------------------------------------------------        Pre visit planning completed.      Procedure details:    Patient scheduled for Colonoscopy on 8/1/24.     Arrival time: 1315. Procedure time 1400    Facility location: [facility]; [address], [place] [address]. Check in location: 2nd Floor at Surgery desk.    Sedation type: Conscious sedation     Pre op exam needed? No.    Indication for procedure: screening      Chart review:     Electronic implanted devices? No    Recent diagnosis of diverticulitis within the last 6 weeks? No    Diabetic? No      Medication review:    Anticoagulants? No    NSAIDS? No NSAID medications per patient's medication list.  RN will verify with pre-assessment call.    Other medication HOLDING recommendations:  N/A      Prep for procedure:     Bowel prep recommendation: Extended Golytely. Bowel prep prescription sent to    [facility] #[id number] [place], [place] - [address]    Due to: BMI > 40.     Prep instructions sent via Last Guide         Corinne Kliber, RN  Endoscopy Procedure Pre Assessment RN  [phone] option 4    "

## 2024-07-29 NOTE — TELEPHONE ENCOUNTER
Spoke to the Pt. She needs to reschedule the procedure. Transferred to scheduling.     Kelsey Castro RN   Endoscopy Procedure Pre Assessment RN

## 2024-07-30 ENCOUNTER — TELEPHONE (OUTPATIENT)
Dept: GASTROENTEROLOGY | Facility: CLINIC | Age: 44
End: 2024-07-30
Payer: COMMERCIAL

## 2024-07-30 NOTE — TELEPHONE ENCOUNTER
Caller:     Reason for Reschedule/Cancellation   (please be detailed, any staff messages or encounters to note?): OUT OF TOWN      Prior to reschedule please review:  Ordering Provider:     GETACEHW RASMUSSEN     Sedation Determined: CS  Does patient have any ASC Exclusions, please identify?:       Notes on Cancelled Procedure:  Procedure: Lower Endoscopy [Colonoscopy]   Date: 8/1  Location: [place]  Surgeon: REMINGTON      Rescheduled: Yes,   Procedure: Lower Endoscopy [Colonoscopy]    Date: 9/10   Location: [facility]; [address], [place] [address]    Surgeon: GLADYS   Sedation Level Scheduled  CS ,  Reason for Sedation Level ORDER   Instructions updated and sent: Y     Does patient need PAC or Pre -Op Rescheduled? : N       Did you cancel or rescheduled an EUS procedure? No.

## 2024-08-04 DIAGNOSIS — C91.00 B-CELL ACUTE LYMPHOBLASTIC LEUKEMIA (ALL) (H): Primary | ICD-10-CM

## 2024-08-12 DIAGNOSIS — C91.00 B-CELL ACUTE LYMPHOBLASTIC LEUKEMIA (ALL) (H): Primary | ICD-10-CM

## 2024-08-15 ENCOUNTER — LAB (OUTPATIENT)
Dept: LAB | Facility: CLINIC | Age: 44
End: 2024-08-15
Payer: COMMERCIAL

## 2024-08-15 DIAGNOSIS — C91.00 B-CELL ACUTE LYMPHOBLASTIC LEUKEMIA (ALL) (H): ICD-10-CM

## 2024-08-15 LAB
ALBUMIN SERPL BCG-MCNC: 4.6 G/DL (ref 3.5–5.2)
ALP SERPL-CCNC: 85 U/L (ref 40–150)
ALT SERPL W P-5'-P-CCNC: 36 U/L (ref 0–50)
ANION GAP SERPL CALCULATED.3IONS-SCNC: 11 MMOL/L (ref 7–15)
AST SERPL W P-5'-P-CCNC: 32 U/L (ref 0–45)
BASOPHILS # BLD AUTO: 0.1 10E3/UL (ref 0–0.2)
BASOPHILS NFR BLD AUTO: 1 %
BILIRUB SERPL-MCNC: 0.3 MG/DL
BUN SERPL-MCNC: 10.2 MG/DL (ref 6–20)
CALCIUM SERPL-MCNC: 9.4 MG/DL (ref 8.8–10.4)
CHLORIDE SERPL-SCNC: 107 MMOL/L (ref 98–107)
CREAT SERPL-MCNC: 0.67 MG/DL (ref 0.51–0.95)
EGFRCR SERPLBLD CKD-EPI 2021: >90 ML/MIN/1.73M2
EOSINOPHIL # BLD AUTO: 0.5 10E3/UL (ref 0–0.7)
EOSINOPHIL NFR BLD AUTO: 8 %
ERYTHROCYTE [DISTWIDTH] IN BLOOD BY AUTOMATED COUNT: 15.6 % (ref 10–15)
GLUCOSE SERPL-MCNC: 98 MG/DL (ref 70–99)
HCO3 SERPL-SCNC: 23 MMOL/L (ref 22–29)
HCT VFR BLD AUTO: 37 % (ref 35–47)
HGB BLD-MCNC: 12.1 G/DL (ref 11.7–15.7)
IMM GRANULOCYTES # BLD: 0 10E3/UL
IMM GRANULOCYTES NFR BLD: 0 %
LYMPHOCYTES # BLD AUTO: 1.8 10E3/UL (ref 0.8–5.3)
LYMPHOCYTES NFR BLD AUTO: 31 %
MAGNESIUM SERPL-MCNC: 2 MG/DL (ref 1.7–2.3)
MCH RBC QN AUTO: 29.7 PG (ref 26.5–33)
MCHC RBC AUTO-ENTMCNC: 32.7 G/DL (ref 31.5–36.5)
MCV RBC AUTO: 91 FL (ref 78–100)
MONOCYTES # BLD AUTO: 0.7 10E3/UL (ref 0–1.3)
MONOCYTES NFR BLD AUTO: 12 %
NEUTROPHILS # BLD AUTO: 2.9 10E3/UL (ref 1.6–8.3)
NEUTROPHILS NFR BLD AUTO: 49 %
NRBC # BLD AUTO: 0 10E3/UL
NRBC BLD AUTO-RTO: 0 /100
PHOSPHATE SERPL-MCNC: 3.6 MG/DL (ref 2.5–4.5)
PLATELET # BLD AUTO: 249 10E3/UL (ref 150–450)
POTASSIUM SERPL-SCNC: 4 MMOL/L (ref 3.4–5.3)
PROT SERPL-MCNC: 7 G/DL (ref 6.4–8.3)
RBC # BLD AUTO: 4.08 10E6/UL (ref 3.8–5.2)
SODIUM SERPL-SCNC: 141 MMOL/L (ref 135–145)
WBC # BLD AUTO: 5.9 10E3/UL (ref 4–11)

## 2024-08-15 PROCEDURE — 80053 COMPREHEN METABOLIC PANEL: CPT

## 2024-08-15 PROCEDURE — 36415 COLL VENOUS BLD VENIPUNCTURE: CPT

## 2024-08-15 PROCEDURE — 85025 COMPLETE CBC W/AUTO DIFF WBC: CPT

## 2024-08-15 PROCEDURE — 83735 ASSAY OF MAGNESIUM: CPT

## 2024-08-15 PROCEDURE — 84100 ASSAY OF PHOSPHORUS: CPT

## 2024-08-16 ENCOUNTER — MYC MEDICAL ADVICE (OUTPATIENT)
Dept: ONCOLOGY | Facility: CLINIC | Age: 44
End: 2024-08-16
Payer: COMMERCIAL

## 2024-08-16 NOTE — ORAL ONC MGMT
Oral Chemotherapy Monitoring Program  Lab Follow Up    Reviewed lab results from 8/15.        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 Yes Yes Yes Yes Yes Yes Yes   Drug Name Sprycel (dasatinib) Sprycel (dasatinib) Sprycel (dasatinib) Sprycel (dasatinib) Sprycel (dasatinib) Sprycel (dasatinib) Sprycel (dasatinib)   Dose 140 mg 140 mg 140 mg 140 mg 140 mg 140 mg 140 mg   Current Schedule Daily Daily Daily Daily Daily Daily Daily   Cycle Details Continuous Continuous Continuous Continuous Continuous Continuous Continuous   Doses missed in last 2 weeks   1  1     Adherence Assessment   Non-adherent  Non-adherent     Reason for Non-adherence     Other     Adverse Effects     Diarrhea     Diarrhea     Grade 1     Pharmacist Intervention(diarrhea)     No         Labs:  _  Result Component Current Result Ref Range   Sodium 141 (8/15/2024) 135 - 145 mmol/L     _  Result Component Current Result Ref Range   Potassium 4.0 (8/15/2024) 3.4 - 5.3 mmol/L     _  Result Component Current Result Ref Range   Calcium 9.4 (8/15/2024) 8.8 - 10.4 mg/dL     _  Result Component Current Result Ref Range   Magnesium 2.0 (8/15/2024) 1.7 - 2.3 mg/dL     _  Result Component Current Result Ref Range   Phosphorus 3.6 (8/15/2024) 2.5 - 4.5 mg/dL     _  Result Component  Current Result Ref Range   Albumin 4.6 (8/15/2024) 3.5 - 5.2 g/dL     _  Result Component Current Result Ref Range   Urea Nitrogen 10.2 (8/15/2024) 6.0 - 20.0 mg/dL     _  Result Component Current Result Ref Range   Creatinine 0.67 (8/15/2024) 0.51 - 0.95 mg/dL     _  Result Component Current Result Ref Range   AST 32 (8/15/2024) 0 - 45 U/L     _  Result Component Current Result Ref Range   ALT 36 (8/15/2024) 0 - 50 U/L     _  Result Component Current Result Ref Range   Bilirubin Total 0.3 (8/15/2024) <=1.2 mg/dL     _  Result Component Current Result Ref Range   WBC Count 5.9 (8/15/2024) 4.0 - 11.0 10e3/uL     _  Result Component Current Result Ref Range   Hemoglobin 12.1 (8/15/2024) 11.7 - 15.7 g/dL     _  Result Component Current Result Ref Range   Platelet Count 249 (8/15/2024) 150 - 450 10e3/uL     No results found for ANC within last 30 days.     _  Result Component Current Result Ref Range   Absolute Neutrophils 2.9 (8/15/2024) 1.6 - 8.3 10e3/uL        Assessment & Plan:  No concerning abnormalities.  Continue dasatinib.    Mychart message sent to patient.    Follow-Up:  Labs ~9/12    Thank you,  Ezio Bynum, PharmD  Oral Chemotherapy Monitoring Program - Skin/Sarcoma/  [phone]

## 2024-08-19 ENCOUNTER — OFFICE VISIT (OUTPATIENT)
Dept: FAMILY MEDICINE | Facility: CLINIC | Age: 44
End: 2024-08-19
Payer: COMMERCIAL

## 2024-08-19 VITALS
TEMPERATURE: 98.9 F | SYSTOLIC BLOOD PRESSURE: 136 MMHG | OXYGEN SATURATION: 97 % | DIASTOLIC BLOOD PRESSURE: 74 MMHG | HEART RATE: 85 BPM | RESPIRATION RATE: 18 BRPM

## 2024-08-19 DIAGNOSIS — L29.0 RECTAL ITCHING: Primary | ICD-10-CM

## 2024-08-19 DIAGNOSIS — H02.849 EDEMA OF EYELID, UNSPECIFIED LATERALITY: ICD-10-CM

## 2024-08-19 PROCEDURE — 99213 OFFICE O/P EST LOW 20 MIN: CPT | Performed by: PHYSICIAN ASSISTANT

## 2024-08-19 ASSESSMENT — PAIN SCALES - GENERAL: PAINLEVEL: NO PAIN (0)

## 2024-08-19 NOTE — PROGRESS NOTES
Assessment & Plan     Rectal itching  No hemorrhoids noted, no satellite lesions or obvious fungal, candidal infections.  We did discuss doing an internal rectal exam though she has an upcoming colonoscopy and would prefer to wait on this.  I think that is appropriate.  She will stop using the other hemorrhoid creams and use a topical hydrocortisone if that does not improve symptoms then switch over to an antifungal.  Unlikely to be pinworm infestation due to the fact that her itching is throughout the day not just in the evening    Edema of eyelid, unspecified laterality  No obvious culprit, she is going to use cool compresses, use an antihistamine and very carefully apply a topical steroid 1% over-the-counter avoiding getting in her eyes.  If this is not improving I would want her to see dermatology she will check with her oncology team to see if this is a known side effect of the use of her Dasatinib rash does appear to be--an adverse reaction and 11 to 21% although I am not certain this type of rash is what is typically seen        This chart documentation was completed in part with Dragon voice recognition software.  Documentation is reviewed after completion, however, some words and grammatical errors may remain.  Mala Reddy PA-C    Subjective   April is a 44 year old, presenting for the following health issues:  Hemorrhoids  She has a history of ALL currently being treated with Sprycel  History of Present Illness       Reason for visit:  Hemroids?  Symptom onset:  More than a month  Symptoms include:  Severe itching, occasional spotting  Symptom intensity:  Severe  Symptom progression:  Staying the same  Had these symptoms before:  No    She eats 2-3 servings of fruits and vegetables daily.She consumes 1 sweetened beverage(s) daily.She exercises with enough effort to increase her heart rate 30 to 60 minutes per day.  She exercises with enough effort to increase her heart rate 4 days per week.   She is  "taking medications regularly.         Hemorrhoids she is not certain if she has hemorrhoids she has been having a lot of itching.  Her hemorrhoid cream that she has been using is not working.--Perhaps she had an episode of constipation initially but typically is not constipated.  She does not recall ever having a painful or bloody bowel movement.  She has never palpated any lumps and hygiene is not an issue due to any excess tissue.  Onset/Duration: about 6 weeks  Description:   Niya-anal lump: YES- \"I feel something\"  Pain: No  Itching: YES- \"severe\"  Accompanying Signs & Symptoms:  Blood in stool: No  Changes in stool pattern: No  History:   Any previous GI studies done:none-but has one scheduled Sept 2024  Family History of colon cancer: No  Precipitating factors:   Itching, seems to flare up more at night   Alleviating factors:  Itching it  Therapies tried and outcome: preparation H and [id number] ointment, hemorrhoid cream seems to make it worse      Concern - inner corner of eye lids   Onset: redness started last 4 days and swelling just the last few days   Description: both inner eye lid but right worse   Intensity: mild, moderate  Progression of Symptoms:  worsening, same, and constant  Accompanying Signs & Symptoms: swelling of the eyelid, redness, no itching or pain, no vision changes, no eyeball pain, conjunctival redness or drainage noted.  No runny or stuffing nose, the skin behind her ears are itching   Previous history of similar problem: none  Precipitating factors:        Worsened by: nothing   Alleviating factors:        Improved by: nothing  Therapies tried and outcome:  none       Review of Systems  Constitutional, HEENT, cardiovascular, pulmonary, gi and gu systems are negative, except as otherwise noted.      Objective    /74   Pulse 85   Temp 98.9  F (37.2  C) (Temporal)   Resp 18   LMP  (LMP Unknown)   SpO2 97%   Breastfeeding No   There is no height or weight on file to " calculate BMI.  Physical Exam   GENERAL: alert and no distress  EYES: Eyes grossly normal to inspection, PERRL and conjunctivae and sclerae normal  EYES: Eyelids--bilateral eyes she has a distinct patch of erythema and edema over the medial aspect of her upper lids bilaterally and on the left a small area of  erythema and edema the left lower eyelid.  The entire upper or lower lid is not affected it is a very specific distinct 3 to 4 mm area of erythema and edema there is no induration no warmth no fluctuance noted in these areas  NECK: no adenopathy, no asymmetry, masses, or scars  RESP: lungs clear to auscultation - no rales, rhonchi or wheezes  CV: regular rate and rhythm, normal S1 S2, no S3 or S4, no murmur, click or rub, no peripheral edema  ABDOMEN: soft, nontender, no hepatosplenomegaly, no masses and bowel sounds normal  RECTAL (female): External rectal exam does not reveal any hemorrhoids no fissuring no tears no erythema or signs of Candida  MS: no gross musculoskeletal defects noted, no edema  SKIN: There is erythematous, papules behind each ear            Signed Electronically by: Mala Reddy PA-C

## 2024-09-04 NOTE — TELEPHONE ENCOUNTER
Rescheduled colonoscopy    Complete STOP / BANG during pre assessment, will need to send to Dr Self if Moderate to severe.       Attempted to contact patient in order to complete pre assessment questions.     No answer. VM box full    Callback required communication sent via [employer].      Procedure details:    Patient scheduled for Colonoscopy on 9.10.24.     Arrival time: 0810. Procedure time 0855    Facility location: [facility] [place]; [address], [place] [address]. Check in location: 2nd Floor at Surgery desk.    Sedation type: Conscious sedation     Pre op exam needed? No.    Indication for procedure: screening      Chart review:     Electronic implanted devices? No    Recent diagnosis of diverticulitis within the last 6 weeks? No      Medication review:    Diabetic? No    Anticoagulants? No    Weight loss medication/injectable? No GLP-1 medication per patient's medication list.  RN will verify with pre-assessment call.    NSAIDS? No NSAID medications per patient's medication list.  RN will verify with pre-assessment call.    Other medication HOLDING recommendations:  N/A      Prep for procedure:     Bowel prep recommendation: Extended Golytely. Bowel prep prescription PREVIOUSLY sent to    [facility] #[id number] - [place], [place] - [address]  - inquire if patient has this.    Due to: BMI > 40.     Prep instructions sent via [employer].       Della Roche RN  Endoscopy Procedure Pre Assessment

## 2024-09-05 NOTE — TELEPHONE ENCOUNTER
Called the Pt to discuss below. The Pt did , however cannot talk at the moment. Advised the Pt to call us back by 4:00 pm tomorrow, 9/6/24.     Needs STOP/BANG.     Kelsey Castro RN   Endoscopy Procedure Pre Assessment RN

## 2024-09-06 ENCOUNTER — TELEPHONE (OUTPATIENT)
Dept: GASTROENTEROLOGY | Facility: CLINIC | Age: 44
End: 2024-09-06
Payer: COMMERCIAL

## 2024-09-06 RX ORDER — FLUMAZENIL 0.1 MG/ML
0.2 INJECTION, SOLUTION INTRAVENOUS
Status: CANCELLED | OUTPATIENT
Start: 2024-09-06 | End: 2024-09-07

## 2024-09-06 RX ORDER — NALOXONE HYDROCHLORIDE 0.4 MG/ML
0.2 INJECTION, SOLUTION INTRAMUSCULAR; INTRAVENOUS; SUBCUTANEOUS
Status: CANCELLED | OUTPATIENT
Start: 2024-09-06

## 2024-09-06 RX ORDER — LIDOCAINE 40 MG/G
CREAM TOPICAL
Status: CANCELLED | OUTPATIENT
Start: 2024-09-06

## 2024-09-06 RX ORDER — ONDANSETRON 2 MG/ML
4 INJECTION INTRAMUSCULAR; INTRAVENOUS
Status: CANCELLED | OUTPATIENT
Start: 2024-09-06

## 2024-09-06 RX ORDER — ONDANSETRON 4 MG/1
4 TABLET, ORALLY DISINTEGRATING ORAL EVERY 6 HOURS PRN
Status: CANCELLED | OUTPATIENT
Start: 2024-09-06

## 2024-09-06 RX ORDER — NALOXONE HYDROCHLORIDE 0.4 MG/ML
0.4 INJECTION, SOLUTION INTRAMUSCULAR; INTRAVENOUS; SUBCUTANEOUS
Status: CANCELLED | OUTPATIENT
Start: 2024-09-06

## 2024-09-06 RX ORDER — ONDANSETRON 2 MG/ML
4 INJECTION INTRAMUSCULAR; INTRAVENOUS EVERY 6 HOURS PRN
Status: CANCELLED | OUTPATIENT
Start: 2024-09-06

## 2024-09-06 RX ORDER — PROCHLORPERAZINE MALEATE 10 MG
10 TABLET ORAL EVERY 6 HOURS PRN
Status: CANCELLED | OUTPATIENT
Start: 2024-09-06

## 2024-09-06 NOTE — TELEPHONE ENCOUNTER
Caller: April    Reason for Reschedule/Cancellation   (please be detailed, any staff messages or encounters to note?): personal conflict      Prior to reschedule please review:  Ordering Provider: GETACHEW RASMUSSEN   Sedation Determined: moderate  Does patient have any ASC Exclusions, please identify?: no      Notes on Cancelled Procedure:  Procedure: Lower Endoscopy [Colonoscopy]   Date: 9/10/24  Location: [facility]; [address], [place] [address]   Surgeon: GLADYS      Rescheduled: Yes,   Procedure: Lower Endoscopy [Colonoscopy]    Date: 10/15/24   Location: [facility]; [address], [place] [address]    Surgeon: SHERINE   Sedation Level Scheduled  moderate ,  Reason for Sedation Level per order   Instructions updated and sent: yes, johnhart     Does patient need PAC or Pre -Op Rescheduled? : no       Did you cancel or rescheduled an EUS procedure? No.

## 2024-09-13 DIAGNOSIS — C91.00 B-CELL ACUTE LYMPHOBLASTIC LEUKEMIA (ALL) (H): Primary | ICD-10-CM

## 2024-09-16 ENCOUNTER — LAB (OUTPATIENT)
Dept: INFUSION THERAPY | Facility: CLINIC | Age: 44
End: 2024-09-16
Attending: INTERNAL MEDICINE
Payer: COMMERCIAL

## 2024-09-16 DIAGNOSIS — C91.00 ACUTE LYMPHOBLASTIC LEUKEMIA (ALL) NOT HAVING ACHIEVED REMISSION (H): ICD-10-CM

## 2024-09-16 DIAGNOSIS — Z79.899 ENCOUNTER FOR LONG-TERM (CURRENT) USE OF MEDICATIONS: ICD-10-CM

## 2024-09-16 LAB
ALBUMIN SERPL BCG-MCNC: 4.1 G/DL (ref 3.5–5.2)
ALP SERPL-CCNC: 79 U/L (ref 40–150)
ALT SERPL W P-5'-P-CCNC: 30 U/L (ref 0–50)
ANION GAP SERPL CALCULATED.3IONS-SCNC: 9 MMOL/L (ref 7–15)
AST SERPL W P-5'-P-CCNC: 31 U/L (ref 0–45)
BASOPHILS # BLD AUTO: 0 10E3/UL (ref 0–0.2)
BASOPHILS NFR BLD AUTO: 1 %
BILIRUB SERPL-MCNC: 0.3 MG/DL
BUN SERPL-MCNC: 15 MG/DL (ref 6–20)
CALCIUM SERPL-MCNC: 8.9 MG/DL (ref 8.8–10.4)
CHLORIDE SERPL-SCNC: 108 MMOL/L (ref 98–107)
CREAT SERPL-MCNC: 0.72 MG/DL (ref 0.51–0.95)
EGFRCR SERPLBLD CKD-EPI 2021: >90 ML/MIN/1.73M2
EOSINOPHIL # BLD AUTO: 0.5 10E3/UL (ref 0–0.7)
EOSINOPHIL NFR BLD AUTO: 9 %
ERYTHROCYTE [DISTWIDTH] IN BLOOD BY AUTOMATED COUNT: 14.8 % (ref 10–15)
GLUCOSE SERPL-MCNC: 122 MG/DL (ref 70–99)
HCO3 SERPL-SCNC: 25 MMOL/L (ref 22–29)
HCT VFR BLD AUTO: 35.4 % (ref 35–47)
HGB BLD-MCNC: 11.5 G/DL (ref 11.7–15.7)
IMM GRANULOCYTES # BLD: 0 10E3/UL
IMM GRANULOCYTES NFR BLD: 0 %
LYMPHOCYTES # BLD AUTO: 1.9 10E3/UL (ref 0.8–5.3)
LYMPHOCYTES NFR BLD AUTO: 34 %
MAGNESIUM SERPL-MCNC: 2 MG/DL (ref 1.7–2.3)
MCH RBC QN AUTO: 30.3 PG (ref 26.5–33)
MCHC RBC AUTO-ENTMCNC: 32.5 G/DL (ref 31.5–36.5)
MCV RBC AUTO: 93 FL (ref 78–100)
MONOCYTES # BLD AUTO: 0.6 10E3/UL (ref 0–1.3)
MONOCYTES NFR BLD AUTO: 10 %
NEUTROPHILS # BLD AUTO: 2.7 10E3/UL (ref 1.6–8.3)
NEUTROPHILS NFR BLD AUTO: 46 %
NRBC # BLD AUTO: 0 10E3/UL
NRBC BLD AUTO-RTO: 0 /100
PHOSPHATE SERPL-MCNC: 3.2 MG/DL (ref 2.5–4.5)
PLATELET # BLD AUTO: 187 10E3/UL (ref 150–450)
POTASSIUM SERPL-SCNC: 3.9 MMOL/L (ref 3.4–5.3)
PROT SERPL-MCNC: 6.3 G/DL (ref 6.4–8.3)
RBC # BLD AUTO: 3.8 10E6/UL (ref 3.8–5.2)
SODIUM SERPL-SCNC: 142 MMOL/L (ref 135–145)
WBC # BLD AUTO: 5.7 10E3/UL (ref 4–11)

## 2024-09-16 PROCEDURE — 36415 COLL VENOUS BLD VENIPUNCTURE: CPT

## 2024-09-16 PROCEDURE — 82374 ASSAY BLOOD CARBON DIOXIDE: CPT

## 2024-09-16 PROCEDURE — 82040 ASSAY OF SERUM ALBUMIN: CPT

## 2024-09-16 PROCEDURE — 85025 COMPLETE CBC W/AUTO DIFF WBC: CPT

## 2024-09-16 PROCEDURE — 83735 ASSAY OF MAGNESIUM: CPT

## 2024-09-16 PROCEDURE — 84100 ASSAY OF PHOSPHORUS: CPT

## 2024-09-17 ENCOUNTER — MYC MEDICAL ADVICE (OUTPATIENT)
Dept: ONCOLOGY | Facility: CLINIC | Age: 44
End: 2024-09-17
Payer: COMMERCIAL

## 2024-09-17 NOTE — ORAL ONC MGMT
Oral Chemotherapy Monitoring Program  Lab Follow Up    Reviewed lab results from 9/16/24.        7/5/2024    10:00 AM 7/11/2024     1:00 PM 7/22/2024    11:00 AM 8/12/2024    10:00 AM 8/16/2024    12:00 PM 9/13/2024     1:00 PM 9/17/2024    11:00 AM   ORAL CHEMOTHERAPY   Assessment Type Monthly Follow up Refill Monthly Follow up Refill Lab Monitoring Refill Lab Monitoring   Diagnosis Code Acute Lymphoblastic Leukemia (ALL) Acute Lymphoblastic Leukemia (ALL) Acute Lymphoblastic Leukemia (ALL) Acute Lymphoblastic Leukemia (ALL) Acute Lymphoblastic Leukemia (ALL) Acute Lymphoblastic Leukemia (ALL) Acute Lymphoblastic Leukemia (ALL)   Providers Della Della Della Della Della Della Della   [facility]   Is this patient followed by the [facility] OC team? Yes Yes Yes Yes Yes Yes Yes   Drug Name Sprycel (dasatinib) Sprycel (dasatinib) Sprycel (dasatinib) Sprycel (dasatinib) Sprycel (dasatinib) Sprycel (dasatinib) Sprycel (dasatinib)   Dose 140 mg 140 mg 140 mg 140 mg 140 mg 140 mg 140 mg   Current Schedule Daily Daily Daily Daily Daily Daily Daily   Cycle Details Continuous Continuous Continuous Continuous Continuous Continuous Continuous   Doses missed in last 2 weeks 1  1       Adherence Assessment Non-adherent  Non-adherent       Reason for Non-adherence   Other       Adverse Effects   Diarrhea       Diarrhea   Grade 1       Pharmacist Intervention(diarrhea)   No           Labs:  _  Result Component Current Result Ref Range   Sodium 142 (9/16/2024) 135 - 145 mmol/L     _  Result Component Current Result Ref Range   Potassium 3.9 (9/16/2024) 3.4 - 5.3 mmol/L     _  Result Component Current Result Ref Range   Calcium 8.9 (9/16/2024) 8.8 - 10.4 mg/dL     _  Result Component Current Result Ref Range   Magnesium 2.0 (9/16/2024) 1.7 - 2.3 mg/dL     _  Result Component Current Result Ref Range   Phosphorus 3.2 (9/16/2024) 2.5 - 4.5 mg/dL     _  Result Component Current  Result Ref Range   Albumin 4.1 (9/16/2024) 3.5 - 5.2 g/dL     _  Result Component Current Result Ref Range   Urea Nitrogen 15.0 (9/16/2024) 6.0 - 20.0 mg/dL     _  Result Component Current Result Ref Range   Creatinine 0.72 (9/16/2024) 0.51 - 0.95 mg/dL     _  Result Component Current Result Ref Range   AST 31 (9/16/2024) 0 - 45 U/L     _  Result Component Current Result Ref Range   ALT 30 (9/16/2024) 0 - 50 U/L     _  Result Component Current Result Ref Range   Bilirubin Total 0.3 (9/16/2024) <=1.2 mg/dL     _  Result Component Current Result Ref Range   WBC Count 5.7 (9/16/2024) 4.0 - 11.0 10e3/uL     _  Result Component Current Result Ref Range   Hemoglobin 11.5 (L) (9/16/2024) 11.7 - 15.7 g/dL     _  Result Component Current Result Ref Range   Platelet Count 187 (9/16/2024) 150 - 450 10e3/uL     No results found for ANC within last 30 days.     _  Result Component Current Result Ref Range   Absolute Neutrophils 2.7 (9/16/2024) 1.6 - 8.3 10e3/uL        Assessment & Plan:  No concerning abnormalities. Plan to continue dasatinib therapy as prescribed. Message sent to patient via [employer].    Follow-Up:  10/16 next lab appt    Gutierrez Rodriguez PharmD  Oral Chemotherapy Monitoring Program  [facility]  [phone]

## 2024-10-03 ENCOUNTER — MYC MEDICAL ADVICE (OUTPATIENT)
Dept: GASTROENTEROLOGY | Facility: CLINIC | Age: 44
End: 2024-10-03
Payer: COMMERCIAL

## 2024-10-03 NOTE — TELEPHONE ENCOUNTER
Rescheduled Colonoscopy  Due to patient requested another day/time.      Sleep study ordered but not completed.   Nursing to complete STOP/BANG and then send to anesthesia for review.     ---------------------------------------------------------------------------------------    Pre visit planning completed.      Procedure details:    Patient scheduled for Colonoscopy on 10/15/24.     Arrival time: 0700. Procedure time 0745    Facility location: [facility] [place]; [address], [place] [address]. Check in location: 2nd Floor at Surgery desk.    Sedation type: Conscious sedation     Pre op exam needed? No.    Indication for procedure: screening       Chart review:     Electronic implanted devices? No    Recent diagnosis of diverticulitis within the last 6 weeks? No      Medication review:    Diabetic? No    Anticoagulants? No    Weight loss medication/injectable? No GLP-1 medication per patient's medication list.  RN will verify with pre-assessment call.    Other medication HOLDING recommendations:  N/A      Prep for procedure:     Bowel prep recommendation: Extended Golytely. Verify if patient has bowel prep.     Bowel prep prescription sent to  [facility] #[id number] - [place], [place] - [address]  Due to: BMI > 40.     Prep instructions sent via [employer]         Isabela Connor RN  Endoscopy Procedure Pre Assessment   [phone] option 2

## 2024-10-03 NOTE — TELEPHONE ENCOUNTER
First Attempt to contact patient in order to complete pre assessment questions.     No answer. Left message to return call to [phone] option 2    Callback required communication sent via [employer].      Surekha Russell RN  Endoscopy Procedure Pre Assessment

## 2024-10-10 ENCOUNTER — TELEPHONE (OUTPATIENT)
Dept: GASTROENTEROLOGY | Facility: CLINIC | Age: 44
End: 2024-10-10
Payer: COMMERCIAL

## 2024-10-10 NOTE — TELEPHONE ENCOUNTER
The Pt called and requested to reschedule her procedure. Scheduling connected the Pt to writer to perform STOP/BANG prior to rescheduling to ensure correct site is chosen.     STOP/BANG score is 2 (snores loudly, BMI). Will send for review since BMI is elevated at 45.48.     Will follow up with scheduling once we get a response.     Kelsey Castro, RN   Endoscopy Procedure Pre Assessment RN

## 2024-10-10 NOTE — TELEPHONE ENCOUNTER
Caller: Vashti Villegas      Reason for Reschedule/Cancellation   (please be detailed, any staff messages or encounters to note?): WORK CONFLICT      Prior to reschedule please review:  Ordering Provider: GETACHEW RASMUSSEN   Sedation Determined: MODERATE  Does patient have any ASC Exclusions, please identify?: POSSIBLE TONI - SENT FOR MG ANESTHESIA REVIEW      Notes on Cancelled Procedure:  Procedure: Lower Endoscopy [Colonoscopy]   Date: 10/15/2024  Location: [facility] [place]; [address] [place] [address], [address] Floor, [place] [address]   Surgeon: SHERINE      Rescheduled: No, Slot on hold: 10/24/2024 [facility] , Once approval has been given I will schedule the pt in that slot.       Did you cancel or rescheduled an EUS procedure? No.

## 2024-10-11 ENCOUNTER — HOSPITAL ENCOUNTER (OUTPATIENT)
Facility: CLINIC | Age: 44
End: 2024-10-11
Attending: INTERNAL MEDICINE | Admitting: INTERNAL MEDICINE
Payer: COMMERCIAL

## 2024-10-11 NOTE — TELEPHONE ENCOUNTER
Per Dr. Self, needs sleep study done first, OR needs to reschedule with MAC.     Message sent to scheduling.     Kelsey Castro RN   Endoscopy Procedure Pre Assessment RN

## 2024-10-11 NOTE — TELEPHONE ENCOUNTER
Rescheduled: Yes,   Procedure: Lower Endoscopy [Colonoscopy]    Date: 10/31/2024   Location: [facility]; [address], [address]rd Floor, [place] [address]    Surgeon: WALT   Sedation Level Scheduled  MODERATE ,  Reason for Sedation Level PER ORDER   Instructions updated and sent: VIA [employer]     Does patient need PAC or Pre -Op Rescheduled? : NO       Did you cancel or rescheduled an EUS procedure? No.

## 2024-10-18 ENCOUNTER — TELEPHONE (OUTPATIENT)
Dept: ONCOLOGY | Facility: CLINIC | Age: 44
End: 2024-10-18
Payer: COMMERCIAL

## 2024-10-18 NOTE — ORAL ONC MGMT
Oral Chemotherapy Monitoring Program     Placed call to patient in follow up of oral chemotherapy in response to her [employer] message that she was experiencing itchy, swollen eyes. April states this has been developing over the past 3 days. She had experienced this once before about a month ago and it went away with eye drops, but it seems worse this time. She has no symptoms outside the immediate area of her eyes; specifically no other edema or systemic symptoms. No systemic rash. She can perform all her daily activities, including driving, without issues.    She wants to know if this could be caused by dasatinib. We discussed that while dasatinib can cause facial edema and could cause swelling, it may be less likely since she's been on the medication for 5 months. She has seen an eye doctor who prescribed her a steroid eye drop, and stated it may be contact dermatitis (could also be allergies). She is willing to try this over the weekend and will follow up at her clinic appointment next week. She will call triage with any worsening or new symptoms.     Margaret Vigil, PharmD, BCOP  Hematology/Oncology Clinical Pharmacist  [facility]  [facility]

## 2024-10-22 DIAGNOSIS — C91.01 ACUTE LYMPHOBLASTIC LEUKEMIA (ALL) IN REMISSION (H): Primary | ICD-10-CM

## 2024-10-22 NOTE — PROGRESS NOTES
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 While at OSH, she had CT PE protocol as well as CT A/P. These identified no PE, no acute or localized intraabdominal inflammatory process, mild splenomegaly, and few inconspicous low-density structures in the liver. She was transferred to [facility] for further workup and management. S/p Hydrea 1g TID (8/21-8/23) with improvement in WBC (100K ? 12K).   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 IHC shows CD10+, CD34+. 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 Abnormal cytogenetics.   --Microarry study: CN gains/losses in 3p21, 4q25, 7p11 (region includes CREB5 and IKZF1; biallelic loss of TARP), 9p13.2-p22.3 (region includes CDKN2a/2b and PAX5), 12q13, 14q11.     INDUCTION:   --Initiated pre-phase steroids with Prednisone 60 mg x8/21; increased to 60 mg/m2 (140 mg) 8/22-8/24. She initiated GRAALL + imatinib (C1D1 = 8/24/22).   --S/p BMT consult 9/12 with Dr. Walsh. Induction was relatively uncomplicated.   -s/p weekly triple IT chemo (Cytarabine, Solu-Cortef, MTX) on Days 1, 8, and 15 of C1 induction. CNS negative.   --BMBx 9/26/22 MRD-negative by BCR-ABL PCR and by Clonoseq Assay.   --GRAALL + imatinib (C2D1=9/30/22). 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   Post-cycle-2 BMBx 10/25/2022 showed MRD-negative CR, though low-level abnormal B-cell population (favor hematogones over residual leukemia) was noted. BCR-ABL negative. Clonoseq negative at 1/10E6 level.    --C3 GRAALL + imatinib on 11/2/22 was tolerated well without  complication.    INTRATHECAL CHEMOTHERAPY: ALL CSF studies negative by MFC  8/26/22: Cytarabine 40 mg/methotrexate 15 mg  8/31/22: Cytarabine 40 mg/methotrexate 15 mg  9/7/22: Cytarabine 40 mg/methotrexate 15 mg  9/14/22: Methotrexate 15 mg  10/21/22: Cytarabine 40 mg/methotrexate 15 mg  11/25/22: Cytarabine 100 mg  12/16/22: Methotrexate 12 mg  1/2/23; Cytarabine 100 mg  1/16/23: Methotrexate 12 mg  2/02/23: Cytarabine 100 mg  2/27/23: Methotrexate 12 mg  3/14/23: Cytarabine 100 mg    CONSOLIDATION:   --Discussed with Dr. Rutherford, BMT faculty meeting: No matched related donors identified. High panel reactive antibodies towards  unrelated donor samples. With an excellent early, deep MRD response to chemotherapy and no optimal donor sources identified, discussed foregoing BMT in CR1 in favor of definitive therapy.  Discussed risks and benefits of GRAAL versus newer approaches. April agreed to a combination of Blinatumomab plus ponatinib  (Jesusita et al The Lancet Hematology Volume 10, ISSUE 1, e24-e34, January 2023).   --Aspirin 81 mg daily added for VTE prophylaxis per original study.   --Cycle 1Day 1 Blinatumomab plus Ponatinib 15 mg 12/14/2022. Complicated by PICC disruption, brief Blincyto interruption ~12/21/22.   --BMBx 1/17/2023: MRD- by MFC. BCR-ABL p190 detectable at <0.079%. Clonoseq negative.  --Cycle 2 Day 1 Blinatumomab plus Ponatinib 15 mg  2/6/2023.  --BMBx 3/1/2023: MRD- by MFC. BCR-ABL p190 undetectable. Clonoseq negative. FISH with borderline detectable BCR-ABL fusion at 0.125% (at LoD for assay).  --Cycle 3 Day 1 Blinatumomab plus Ponatinib 15 mg 3/9/2023.  --Cycle 4 Day 1 Blinatumomab plus Ponatinib 15 mg 4/20/23.  --Cycle 5 Day 1 Blinatumomab plus Ponatinib 15 mg 6/1/23.  --Continued on Ponatinib 15 mg every day maintenance with ASA 81 mg daily  --8/8/2023 BMBx hypocellular (40%) marrow, no evidence of leukemia. MRD- by MFC, BCR-ABL QPCR negative, Clonoseq negative  --October 2023: PB BCR-ABL and  Clonoseq negative  --Continued on Ponatinib 15 mg every day as maintenance therapy. Developed symptoms of peripheral neuropathy; rotated to Dasatinib 140 mg every day in May 2014 (dosing based on D-ALBA). PN symptoms resolved after discontinuation of ponatinib.     History of Present Illness:   April Bakari returns.      Her PN symptoms resolved after stopping ponatinib. However, since starting dasatinib she has had a rash on her elbows as well as a periorbital rash. No chest pain/pressure, though slightly more dyspnea on exertion that prior to her diagnosis, which she attributes to deconditioning.     No other sites of pain or paresthesias. No balance problems. No constipation. No sensitivity to cold beverages.     Otherwise doing well. 12-point ROS otherwise negative.    Medications:       Current Outpatient Medications   Medication Sig Dispense Refill    acyclovir (ZOVIRAX) 400 MG tablet Take 1 tablet (400 mg) by mouth every 12 hours for 120 days 60 tablet 3    dasatinib (SPRYCEL) 140 MG tablet Take 1 tablet (140 mg) by mouth daily Avoid grapefruit and grapefruit juice. 30 tablet 3    dasatinib (SPRYCEL) 140 MG tablet Take 1 tablet (140 mg) by mouth daily Avoid grapefruit and grapefruit juice. 30 tablet 0    magic mouthwash suspension (diphenhydrAMINE, lidocaine, aluminum-magnesium & simethicone) Swish and swallow 10 mLs in mouth every 4 hours as needed for mouth sores 200 mL 2    polyethylene glycol (GOLYTELY) 236 g suspension Two days before procedure at 5PM fill first container with water. Mix and drink an 8 oz glass every 10-15 minutes until HALF of the container is gone. Place the remainder in the refrigerator. One day before procedure at 5PM drink second half of bowel prep. Drink an 8 oz glass every 10-15 minutes until it is gone. Day of procedure 6 hours before arrival time fill the 2nd container with water. Mix and drink an 8 oz glass every 10-15 minutes until HALF of the container is gone. Discard the  remaining solution. (Patient not taking: Reported on 8/19/2024) 8000 mL 0    prochlorperazine (COMPAZINE) 10 MG tablet Take 1 tablet (10 mg) by mouth every 6 hours as needed for nausea or vomiting 30 tablet 2    triamcinolone (KENALOG) 0.1 % external ointment Apply topically 2 times daily 454 g 3     No current facility-administered medications for this visit.        Physical Exam:   /79 (BP Location: Right arm, Patient Position: Sitting, Cuff Size: Adult Large)   Pulse 83   Temp 98.5  F (36.9  C) (Oral)   Resp 16   Wt 114.2 kg (251 lb 11.2 oz)   LMP  (LMP Unknown)   SpO2 97%   BMI 43.18 kg/m      ECOG 1  Gen: NAD.  Skin: Acne-like rash over elbow extensor surfaces, L >R  MSK: Hands without rash or cutaneous changes. No palmar redness. No exacerbation of pain with palmar flexion or extension. No deficits in proprioception. Sensation to light touch without deficit.     No results found for this or any previous visit (from the past 24 hour(s)).    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 After induction with cycle 1 and 2 of attenuated-dose GRAAL chemotherapy,, she was in an MRD- CR (CR1) by MFC, BCR-ABL PCR, and Clonoseq assay.  With no matched related donors, and an excellent early/deep MRD-CR, chemotherapy is currently favored over BMT; this is supported by multiple recent clinical observations (Blood. 2016 Jul 28; 128(4): 504-507; Blood 2022 Jul 25;blood.[id number]). After discussion, she transitioned to Blinatumomab + Ponatinib (Short et al The Lancet Hematology Volume 10, ISSUE 1, e24-e34, January 2023) as curative intent therapy. C1D1 12/14/22. Course complicated by transient cytopenias and headaches. She completed 12 total doses of prophylactic intrathecal chemotherapy.     Rotated to Dasatinib from Ponatinib in May 2024 due to new-onset bilateral peripheral neuropathy involving her hands. Dosed  initially at 140 mg every day, consistent with D-ALBA. Total duration of TKI in this scenario is not defined by guidelines; reported TKI duration ranges from 2-5 years.     PLAN:  --Hold dasatinib for 2 weeks to see if rash resolves; plan to resume at 100 mg every day.   --Follow up BCR-ABL and Clonoseq MRD PB tests (currently pending). If undetectable, can defer BMBx for now.   --CBC w/ diff, CMP every 8 weeks. Peripheral blood BCR-ABL and Clonoseq NGS MRD testing every 4 months. Clinic visit every 4 months after BCR-ABL and Clonoseq have resulted.   --See PCP and undergo age-appropriate cancer screening include mammogram and colonoscopy  --Other supportive cares as per below    # CNS prophylaxis--complete  # Varicella Zoster; improved  # PPx -- off PPx     # Headaches  # H/o COVID-19 infection   --Refuses COVID-19 vaccination    # Hypertension: Amlodipine; Lasix as needed  # Indigestion/GERD    Steve Hull MD, PhD  Division of Hematology, Oncology, and Transplantation  [facility]    40 minutes spent on the date of the encounter doing chart review, review of test results, interpretation of tests, patient visit and documentation.

## 2024-10-23 ENCOUNTER — APPOINTMENT (OUTPATIENT)
Dept: LAB | Facility: CLINIC | Age: 44
End: 2024-10-23
Attending: INTERNAL MEDICINE
Payer: COMMERCIAL

## 2024-10-23 ENCOUNTER — ONCOLOGY VISIT (OUTPATIENT)
Dept: ONCOLOGY | Facility: CLINIC | Age: 44
End: 2024-10-23
Attending: INTERNAL MEDICINE
Payer: COMMERCIAL

## 2024-10-23 VITALS
SYSTOLIC BLOOD PRESSURE: 128 MMHG | WEIGHT: 251.7 LBS | BODY MASS INDEX: 43.18 KG/M2 | DIASTOLIC BLOOD PRESSURE: 79 MMHG | OXYGEN SATURATION: 97 % | RESPIRATION RATE: 16 BRPM | TEMPERATURE: 98.5 F | HEART RATE: 83 BPM

## 2024-10-23 DIAGNOSIS — Z79.899 ENCOUNTER FOR LONG-TERM (CURRENT) USE OF MEDICATIONS: ICD-10-CM

## 2024-10-23 DIAGNOSIS — C91.00 ACUTE LYMPHOBLASTIC LEUKEMIA (ALL) NOT HAVING ACHIEVED REMISSION (H): ICD-10-CM

## 2024-10-23 DIAGNOSIS — C91.00 B-CELL ACUTE LYMPHOBLASTIC LEUKEMIA (ALL) (H): Primary | ICD-10-CM

## 2024-10-23 LAB
ALBUMIN SERPL BCG-MCNC: 4.3 G/DL (ref 3.5–5.2)
ALP SERPL-CCNC: 81 U/L (ref 40–150)
ALT SERPL W P-5'-P-CCNC: 35 U/L (ref 0–50)
ANION GAP SERPL CALCULATED.3IONS-SCNC: 10 MMOL/L (ref 7–15)
AST SERPL W P-5'-P-CCNC: 40 U/L (ref 0–45)
BASOPHILS # BLD AUTO: 0 10E3/UL (ref 0–0.2)
BASOPHILS NFR BLD AUTO: 1 %
BILIRUB SERPL-MCNC: 0.4 MG/DL
BUN SERPL-MCNC: 10.5 MG/DL (ref 6–20)
CALCIUM SERPL-MCNC: 9.1 MG/DL (ref 8.8–10.4)
CHLORIDE SERPL-SCNC: 106 MMOL/L (ref 98–107)
CREAT SERPL-MCNC: 0.68 MG/DL (ref 0.51–0.95)
EGFRCR SERPLBLD CKD-EPI 2021: >90 ML/MIN/1.73M2
EOSINOPHIL # BLD AUTO: 0.7 10E3/UL (ref 0–0.7)
EOSINOPHIL NFR BLD AUTO: 10 %
ERYTHROCYTE [DISTWIDTH] IN BLOOD BY AUTOMATED COUNT: 14.4 % (ref 10–15)
GLUCOSE SERPL-MCNC: 106 MG/DL (ref 70–99)
HCO3 SERPL-SCNC: 24 MMOL/L (ref 22–29)
HCT VFR BLD AUTO: 37.8 % (ref 35–47)
HGB BLD-MCNC: 12.6 G/DL (ref 11.7–15.7)
IMM GRANULOCYTES # BLD: 0 10E3/UL
IMM GRANULOCYTES NFR BLD: 1 %
LAB DIRECTOR COMMENTS: NORMAL
LAB DIRECTOR DISCLAIMER: NORMAL
LAB DIRECTOR INTERPRETATION: NORMAL
LAB DIRECTOR METHODOLOGY: NORMAL
LAB DIRECTOR RESULTS: NORMAL
LDH SERPL L TO P-CCNC: 263 U/L (ref 0–250)
LYMPHOCYTES # BLD AUTO: 1.4 10E3/UL (ref 0.8–5.3)
LYMPHOCYTES NFR BLD AUTO: 22 %
MAGNESIUM SERPL-MCNC: 1.9 MG/DL (ref 1.7–2.3)
MCH RBC QN AUTO: 30.4 PG (ref 26.5–33)
MCHC RBC AUTO-ENTMCNC: 33.3 G/DL (ref 31.5–36.5)
MCV RBC AUTO: 91 FL (ref 78–100)
MONOCYTES # BLD AUTO: 0.6 10E3/UL (ref 0–1.3)
MONOCYTES NFR BLD AUTO: 10 %
NEUTROPHILS # BLD AUTO: 3.7 10E3/UL (ref 1.6–8.3)
NEUTROPHILS NFR BLD AUTO: 57 %
NRBC # BLD AUTO: 0 10E3/UL
NRBC BLD AUTO-RTO: 0 /100
PHOSPHATE SERPL-MCNC: 2.8 MG/DL (ref 2.5–4.5)
PLATELET # BLD AUTO: 233 10E3/UL (ref 150–450)
POTASSIUM SERPL-SCNC: 3.9 MMOL/L (ref 3.4–5.3)
PROT SERPL-MCNC: 6.8 G/DL (ref 6.4–8.3)
RBC # BLD AUTO: 4.14 10E6/UL (ref 3.8–5.2)
SODIUM SERPL-SCNC: 140 MMOL/L (ref 135–145)
SPECIMEN DESCRIPTION: NORMAL
TSH SERPL DL<=0.005 MIU/L-ACNC: 1.66 UIU/ML (ref 0.3–4.2)
WBC # BLD AUTO: 6.5 10E3/UL (ref 4–11)

## 2024-10-23 PROCEDURE — 83615 LACTATE (LD) (LDH) ENZYME: CPT | Performed by: INTERNAL MEDICINE

## 2024-10-23 PROCEDURE — 99215 OFFICE O/P EST HI 40 MIN: CPT | Performed by: INTERNAL MEDICINE

## 2024-10-23 PROCEDURE — 84100 ASSAY OF PHOSPHORUS: CPT | Performed by: INTERNAL MEDICINE

## 2024-10-23 PROCEDURE — 84443 ASSAY THYROID STIM HORMONE: CPT | Performed by: INTERNAL MEDICINE

## 2024-10-23 PROCEDURE — 80053 COMPREHEN METABOLIC PANEL: CPT | Performed by: INTERNAL MEDICINE

## 2024-10-23 PROCEDURE — G0452 MOLECULAR PATHOLOGY INTERPR: HCPCS | Mod: 26 | Performed by: PATHOLOGY

## 2024-10-23 PROCEDURE — 36415 COLL VENOUS BLD VENIPUNCTURE: CPT | Performed by: INTERNAL MEDICINE

## 2024-10-23 PROCEDURE — 85025 COMPLETE CBC W/AUTO DIFF WBC: CPT | Performed by: INTERNAL MEDICINE

## 2024-10-23 PROCEDURE — 81207 BCR/ABL1 GENE MINOR BP: CPT | Performed by: INTERNAL MEDICINE

## 2024-10-23 PROCEDURE — 83735 ASSAY OF MAGNESIUM: CPT | Performed by: INTERNAL MEDICINE

## 2024-10-23 PROCEDURE — 99213 OFFICE O/P EST LOW 20 MIN: CPT | Performed by: INTERNAL MEDICINE

## 2024-10-23 RX ORDER — TOBRAMYCIN AND DEXAMETHASONE 3; 1 MG/ML; MG/ML
1 SUSPENSION/ DROPS OPHTHALMIC 2 TIMES DAILY
COMMUNITY
Start: 2024-10-18

## 2024-10-23 ASSESSMENT — PAIN SCALES - GENERAL: PAINLEVEL_OUTOF10: NO PAIN (0)

## 2024-10-23 NOTE — LETTER
10/23/2024      Vashti Villegas  [address] [place] [address]      Dear Colleague,    Thank you for referring your patient, Vashti Villegas, to the [facility]. Please see a copy of my visit note below.    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 While at OSH, she had CT PE protocol as well as CT A/P. These identified no PE, no acute or localized intraabdominal inflammatory process, mild splenomegaly, and few inconspicous low-density structures in the liver. She was transferred to [facility] for further workup and management. S/p Hydrea 1g TID (8/21-8/23) with improvement in WBC (100K ? 12K).   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 IHC shows CD10+, CD34+. 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 Abnormal cytogenetics.   --Microarry study: CN gains/losses in 3p21, 4q25, 7p11 (region includes CREB5 and IKZF1; biallelic loss of TARP), 9p13.2-p22.3 (region includes CDKN2a/2b and PAX5), 12q13, 14q11.     INDUCTION:   --Initiated pre-phase steroids with Prednisone 60 mg x8/21; increased to 60 mg/m2 (140 mg) 8/22-8/24. She initiated GRAALL + imatinib (C1D1 = 8/24/22).   --S/p BMT consult 9/12 with Dr. Walsh. Induction was relatively uncomplicated.   -s/p weekly triple IT chemo (Cytarabine, Solu-Cortef, MTX) on Days 1, 8, and 15 of C1 induction. CNS negative.   --BMBx 9/26/22 MRD-negative by BCR-ABL PCR and by Clonoseq Assay.   --GRAALL + imatinib (C2D1=9/30/22). 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   Post-cycle-2 BMBx 10/25/2022  showed MRD-negative CR, though low-level abnormal B-cell population (favor hematogones over residual leukemia) was noted. BCR-ABL negative. Clonoseq negative at 1/10E6 level.    --C3 GRAALL + imatinib on 11/2/22 was tolerated well without complication.    INTRATHECAL CHEMOTHERAPY: ALL CSF studies negative by MFC  8/26/22: Cytarabine 40 mg/methotrexate 15 mg  8/31/22: Cytarabine 40 mg/methotrexate 15 mg  9/7/22: Cytarabine 40 mg/methotrexate 15 mg  9/14/22: Methotrexate 15 mg  10/21/22: Cytarabine 40 mg/methotrexate 15 mg  11/25/22: Cytarabine 100 mg  12/16/22: Methotrexate 12 mg  1/2/23; Cytarabine 100 mg  1/16/23: Methotrexate 12 mg  2/02/23: Cytarabine 100 mg  2/27/23: Methotrexate 12 mg  3/14/23: Cytarabine 100 mg    CONSOLIDATION:   --Discussed with Dr. Rutherford, BMT faculty meeting: No matched related donors identified. High panel reactive antibodies towards  unrelated donor samples. With an excellent early, deep MRD response to chemotherapy and no optimal donor sources identified, discussed foregoing BMT in CR1 in favor of definitive therapy.  Discussed risks and benefits of GRAAL versus newer approaches. April agreed to a combination of Blinatumomab plus ponatinib  (Jesusita et al The Lancet Hematology Volume 10, ISSUE 1, e24-e34, January 2023).   --Aspirin 81 mg daily added for VTE prophylaxis per original study.   --Cycle 1Day 1 Blinatumomab plus Ponatinib 15 mg 12/14/2022. Complicated by PICC disruption, brief Blincyto interruption ~12/21/22.   --BMBx 1/17/2023: MRD- by MFC. BCR-ABL p190 detectable at <0.079%. Clonoseq negative.  --Cycle 2 Day 1 Blinatumomab plus Ponatinib 15 mg  2/6/2023.  --BMBx 3/1/2023: MRD- by MFC. BCR-ABL p190 undetectable. Clonoseq negative. FISH with borderline detectable BCR-ABL fusion at 0.125% (at LoD for assay).  --Cycle 3 Day 1 Blinatumomab plus Ponatinib 15 mg 3/9/2023.  --Cycle 4 Day 1 Blinatumomab plus Ponatinib 15 mg 4/20/23.  --Cycle 5 Day 1 Blinatumomab plus Ponatinib 15 mg  6/1/23.  --Continued on Ponatinib 15 mg every day maintenance with ASA 81 mg daily  --8/8/2023 BMBx hypocellular (40%) marrow, no evidence of leukemia. MRD- by MFC, BCR-ABL QPCR negative, Clonoseq negative  --October 2023: PB BCR-ABL and Clonoseq negative  --Continued on Ponatinib 15 mg every day as maintenance therapy. Developed symptoms of peripheral neuropathy; rotated to Dasatinib 140 mg every day in May 2014 (dosing based on D-ALBA). PN symptoms resolved after discontinuation of ponatinib.     History of Present Illness:   April Bakari returns.      Her PN symptoms resolved after stopping ponatinib. However, since starting dasatinib she has had a rash on her elbows as well as a periorbital rash. No chest pain/pressure, though slightly more dyspnea on exertion that prior to her diagnosis, which she attributes to deconditioning.     No other sites of pain or paresthesias. No balance problems. No constipation. No sensitivity to cold beverages.     Otherwise doing well. 12-point ROS otherwise negative.    Medications:       Current Outpatient Medications   Medication Sig Dispense Refill     acyclovir (ZOVIRAX) 400 MG tablet Take 1 tablet (400 mg) by mouth every 12 hours for 120 days 60 tablet 3     dasatinib (SPRYCEL) 140 MG tablet Take 1 tablet (140 mg) by mouth daily Avoid grapefruit and grapefruit juice. 30 tablet 3     dasatinib (SPRYCEL) 140 MG tablet Take 1 tablet (140 mg) by mouth daily Avoid grapefruit and grapefruit juice. 30 tablet 0     magic mouthwash suspension (diphenhydrAMINE, lidocaine, aluminum-magnesium & simethicone) Swish and swallow 10 mLs in mouth every 4 hours as needed for mouth sores 200 mL 2     polyethylene glycol (GOLYTELY) 236 g suspension Two days before procedure at 5PM fill first container with water. Mix and drink an 8 oz glass every 10-15 minutes until HALF of the container is gone. Place the remainder in the refrigerator. One day before procedure at 5PM drink second half of bowel  prep. Drink an 8 oz glass every 10-15 minutes until it is gone. Day of procedure 6 hours before arrival time fill the 2nd container with water. Mix and drink an 8 oz glass every 10-15 minutes until HALF of the container is gone. Discard the remaining solution. (Patient not taking: Reported on 8/19/2024) 8000 mL 0     prochlorperazine (COMPAZINE) 10 MG tablet Take 1 tablet (10 mg) by mouth every 6 hours as needed for nausea or vomiting 30 tablet 2     triamcinolone (KENALOG) 0.1 % external ointment Apply topically 2 times daily 454 g 3     No current facility-administered medications for this visit.        Physical Exam:   /79 (BP Location: Right arm, Patient Position: Sitting, Cuff Size: Adult Large)   Pulse 83   Temp 98.5  F (36.9  C) (Oral)   Resp 16   Wt 114.2 kg (251 lb 11.2 oz)   LMP  (LMP Unknown)   SpO2 97%   BMI 43.18 kg/m      ECOG 1  Gen: NAD.  Skin: Acne-like rash over elbow extensor surfaces, L >R  MSK: Hands without rash or cutaneous changes. No palmar redness. No exacerbation of pain with palmar flexion or extension. No deficits in proprioception. Sensation to light touch without deficit.     No results found for this or any previous visit (from the past 24 hour(s)).    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 After induction with cycle 1 and 2 of attenuated-dose GRAAL chemotherapy,, she was in an MRD- CR (CR1) by MFC, BCR-ABL PCR, and Clonoseq assay.  With no matched related donors, and an excellent early/deep MRD-CR, chemotherapy is currently favored over BMT; this is supported by multiple recent clinical observations (Blood. 2016 Jul 28; 128(4): 504-507; Blood 2022 Jul 25;blood.[id number]). After discussion, she transitioned to Blinatumomab + Ponatinib (Short et al The Lancet Hematology Volume 10, ISSUE 1, e24-e34, January 2023) as curative intent therapy. C1D1 12/14/22. Course  complicated by transient cytopenias and headaches. She completed 12 total doses of prophylactic intrathecal chemotherapy.     Rotated to Dasatinib from Ponatinib in May 2024 due to new-onset bilateral peripheral neuropathy involving her hands. Dosed initially at 140 mg every day, consistent with D-ALBA. Total duration of TKI in this scenario is not defined by guidelines; reported TKI duration ranges from 2-5 years.     PLAN:  --Hold dasatinib for 2 weeks to see if rash resolves; plan to resume at 100 mg every day.   --Follow up BCR-ABL and Clonoseq MRD PB tests (currently pending). If undetectable, can defer BMBx for now.   --CBC w/ diff, CMP every 8 weeks. Peripheral blood BCR-ABL and Clonoseq NGS MRD testing every 4 months. Clinic visit every 4 months after BCR-ABL and Clonoseq have resulted.   --See PCP and undergo age-appropriate cancer screening include mammogram and colonoscopy  --Other supportive cares as per below    # CNS prophylaxis--complete  # Varicella Zoster; improved  # PPx -- off PPx     # Headaches  # H/o COVID-19 infection   --Refuses COVID-19 vaccination    # Hypertension: Amlodipine; Lasix as needed  # Indigestion/GERD    Steve Hull MD, PhD  Division of Hematology, Oncology, and Transplantation  [facility]    40 minutes spent on the date of the encounter doing chart review, review of test results, interpretation of tests, patient visit and documentation.       Again, thank you for allowing me to participate in the care of your patient.        Sincerely,        Steve Hull MD

## 2024-10-23 NOTE — NURSING NOTE
"Oncology Rooming Note    October 23, 2024 1:06 PM   April F Bakari is a 44 year old female who presents for:    Chief Complaint   Patient presents with    Blood Draw     Labs drawn with  by rn.  VS taken.    Oncology Clinic Visit     CLL     Initial Vitals: /79 (BP Location: Right arm, Patient Position: Sitting, Cuff Size: Adult Large)   Pulse 83   Temp 98.5  F (36.9  C) (Oral)   Resp 16   Wt 114.2 kg (251 lb 11.2 oz)   LMP  (LMP Unknown)   SpO2 97%   BMI 43.18 kg/m   Estimated body mass index is 43.18 kg/m  as calculated from the following:    Height as of 1/25/24: 1.626 m (5' 4.02\").    Weight as of this encounter: 114.2 kg (251 lb 11.2 oz). Body surface area is 2.27 meters squared.  No Pain (0) Comment: Data Unavailable   No LMP recorded (lmp unknown). Patient is postmenopausal.  Allergies reviewed: Yes  Medications reviewed: Yes    Medications: Medication refills not needed today.  Pharmacy name entered into [facility]:    [id number] [place], [place] - [address]  [facility] - [place], [place] - [address]    Frailty Screening:   Is the patient here for a new oncology consult visit in cancer care? 2. No      Clinical concerns:        Leah Francois              "

## 2024-10-24 ENCOUNTER — MYC MEDICAL ADVICE (OUTPATIENT)
Dept: ONCOLOGY | Facility: CLINIC | Age: 44
End: 2024-10-24
Payer: COMMERCIAL

## 2024-10-24 DIAGNOSIS — C91.00 B-CELL ACUTE LYMPHOBLASTIC LEUKEMIA (ALL) (H): Primary | ICD-10-CM

## 2024-10-24 RX ORDER — DASATINIB 100 MG/1
100 TABLET, FILM COATED ORAL DAILY
Qty: 30 TABLET | Refills: 1 | Status: SHIPPED | OUTPATIENT
Start: 2024-11-11 | End: 2024-12-11

## 2024-10-24 NOTE — ORAL ONC MGMT
Oral Chemotherapy Monitoring Program     CBC and CMP in normal limits 10/23. LDH elevated. Continue to monitor.     Patient has rash, potentially due to dasatinib. See MD note yesterday. Plan to hold 2 weeks and restart at a lower dose if rash improves.     Sent prescription for dasatinib 100 mg daily to [facility] today.    Hallie Watts, PharmD  Oral Chemotherapy Monitoring Program  [facility]  [phone]

## 2024-10-29 ENCOUNTER — TELEPHONE (OUTPATIENT)
Dept: GASTROENTEROLOGY | Facility: CLINIC | Age: 44
End: 2024-10-29
Payer: COMMERCIAL

## 2024-10-29 ENCOUNTER — HOSPITAL ENCOUNTER (OUTPATIENT)
Facility: AMBULATORY SURGERY CENTER | Age: 44
End: 2024-10-29
Attending: INTERNAL MEDICINE
Payer: COMMERCIAL

## 2024-10-29 NOTE — TELEPHONE ENCOUNTER
Caller: Vashti Villegas   Reason for Reschedule/Cancellation (please be detailed, any staff messages or encounters to note?):     Per pt -- cancel only will call back to r/s       Prior to reschedule please review:  Ordering Provider:    Abby Bagley PA-C      Sedation Determined: moderate   Does patient have any ASC Exclusions, please identify?: n       Notes on Cancelled Procedure:  Procedure:Lower Endoscopy [Colonoscopy]   Date: 10/31/2024  [facility] [address] [place] [address], 5[address] Floor, [place] [address]  Surgeon: Royce         Rescheduled: no

## 2024-11-03 LAB
ABC 95% CONFIDENCE INTERVAL (B-CELL): NORMAL
ABC CLONOSEQ B-CELL TRACKING (MRD) RESULT: NORMAL
ABC DOMINANT SEQUENCES (B-CELL): 2
ABC RESIDUAL CLONAL CELLS/ MILL NUCLEATED CELLS BCELL: NORMAL

## 2024-11-06 ENCOUNTER — TELEPHONE (OUTPATIENT)
Dept: ONCOLOGY | Facility: CLINIC | Age: 44
End: 2024-11-06
Payer: COMMERCIAL

## 2024-11-06 ENCOUNTER — PATIENT OUTREACH (OUTPATIENT)
Dept: ONCOLOGY | Facility: CLINIC | Age: 44
End: 2024-11-06
Payer: COMMERCIAL

## 2024-11-06 DIAGNOSIS — C91.00 B-CELL ACUTE LYMPHOBLASTIC LEUKEMIA (ALL) (H): Primary | ICD-10-CM

## 2024-11-06 NOTE — ORAL ONC MGMT
Oral Chemotherapy Monitoring Program     Placed call to patient in follow up of oral chemotherapy. Unable to leave voicemail as mailbox was full. Will update when response received.     Gutierrez Rodriguez, PharmD  Oral Chemotherapy Monitoring Program  [facility]  [phone]

## 2024-11-07 ENCOUNTER — TELEPHONE (OUTPATIENT)
Dept: ONCOLOGY | Facility: CLINIC | Age: 44
End: 2024-11-07
Payer: COMMERCIAL

## 2024-11-07 NOTE — ORAL ONC MGMT
Oral Chemotherapy Management Program    I gave April a call to check in on her rash, which was likely related to her dasatinib therapy. She had taken a 2 week break from therapy before restarting a reduced 100 mg dose of dasatinib. April reports that the rash is not totally healed but has improved greatly with the use of triamcinolone ointment daily. I informed April that she can utilize the ointment twice daily. April expressed understanding of the plan and will contact the care team if rash does not continue to improve.    Gutierrez Rodriguez, PharmD  Oral Chemotherapy Monitoring Program  [facility]  [phone]

## 2024-11-15 ENCOUNTER — HOSPITAL ENCOUNTER (OUTPATIENT)
Dept: MAMMOGRAPHY | Facility: CLINIC | Age: 44
Discharge: HOME | End: 2024-11-15
Admitting: RADIOLOGY
Payer: COMMERCIAL

## 2024-11-15 DIAGNOSIS — Z12.31 VISIT FOR SCREENING MAMMOGRAM: ICD-10-CM

## 2024-11-15 PROCEDURE — 77063 BREAST TOMOSYNTHESIS BI: CPT

## 2024-11-15 PROCEDURE — 77067 SCR MAMMO BI INCL CAD: CPT

## 2024-11-25 ENCOUNTER — LAB (OUTPATIENT)
Dept: LAB | Facility: CLINIC | Age: 44
End: 2024-11-25
Payer: COMMERCIAL

## 2024-11-25 DIAGNOSIS — C91.00 B-CELL ACUTE LYMPHOBLASTIC LEUKEMIA (ALL) (H): ICD-10-CM

## 2024-11-25 DIAGNOSIS — Z79.899 ENCOUNTER FOR LONG-TERM (CURRENT) USE OF MEDICATIONS: ICD-10-CM

## 2024-11-25 DIAGNOSIS — C91.00 ACUTE LYMPHOBLASTIC LEUKEMIA (ALL) NOT HAVING ACHIEVED REMISSION (H): ICD-10-CM

## 2024-11-25 LAB
ALBUMIN SERPL BCG-MCNC: 4.4 G/DL (ref 3.5–5.2)
ALP SERPL-CCNC: 74 U/L (ref 40–150)
ALT SERPL W P-5'-P-CCNC: 36 U/L (ref 0–50)
ANION GAP SERPL CALCULATED.3IONS-SCNC: 10 MMOL/L (ref 7–15)
AST SERPL W P-5'-P-CCNC: 35 U/L (ref 0–45)
BASOPHILS # BLD AUTO: 0 10E3/UL (ref 0–0.2)
BASOPHILS NFR BLD AUTO: 1 %
BILIRUB SERPL-MCNC: 0.5 MG/DL
BUN SERPL-MCNC: 12.1 MG/DL (ref 6–20)
CALCIUM SERPL-MCNC: 9.3 MG/DL (ref 8.8–10.4)
CHLORIDE SERPL-SCNC: 105 MMOL/L (ref 98–107)
CREAT SERPL-MCNC: 0.93 MG/DL (ref 0.51–0.95)
EGFRCR SERPLBLD CKD-EPI 2021: 77 ML/MIN/1.73M2
EOSINOPHIL # BLD AUTO: 0.4 10E3/UL (ref 0–0.7)
EOSINOPHIL NFR BLD AUTO: 7 %
ERYTHROCYTE [DISTWIDTH] IN BLOOD BY AUTOMATED COUNT: 13.9 % (ref 10–15)
GLUCOSE SERPL-MCNC: 93 MG/DL (ref 70–99)
HCO3 SERPL-SCNC: 26 MMOL/L (ref 22–29)
HCT VFR BLD AUTO: 39.3 % (ref 35–47)
HGB BLD-MCNC: 12.9 G/DL (ref 11.7–15.7)
IMM GRANULOCYTES # BLD: 0 10E3/UL
IMM GRANULOCYTES NFR BLD: 0 %
LYMPHOCYTES # BLD AUTO: 1.6 10E3/UL (ref 0.8–5.3)
LYMPHOCYTES NFR BLD AUTO: 26 %
MAGNESIUM SERPL-MCNC: 1.9 MG/DL (ref 1.7–2.3)
MCH RBC QN AUTO: 30.4 PG (ref 26.5–33)
MCHC RBC AUTO-ENTMCNC: 32.8 G/DL (ref 31.5–36.5)
MCV RBC AUTO: 93 FL (ref 78–100)
MONOCYTES # BLD AUTO: 0.5 10E3/UL (ref 0–1.3)
MONOCYTES NFR BLD AUTO: 9 %
NEUTROPHILS # BLD AUTO: 3.4 10E3/UL (ref 1.6–8.3)
NEUTROPHILS NFR BLD AUTO: 57 %
NRBC # BLD AUTO: 0 10E3/UL
NRBC BLD AUTO-RTO: 0 /100
PHOSPHATE SERPL-MCNC: 3.5 MG/DL (ref 2.5–4.5)
PLATELET # BLD AUTO: 203 10E3/UL (ref 150–450)
POTASSIUM SERPL-SCNC: 3.9 MMOL/L (ref 3.4–5.3)
PROT SERPL-MCNC: 6.5 G/DL (ref 6.4–8.3)
RBC # BLD AUTO: 4.25 10E6/UL (ref 3.8–5.2)
SODIUM SERPL-SCNC: 141 MMOL/L (ref 135–145)
WBC # BLD AUTO: 6 10E3/UL (ref 4–11)

## 2024-11-25 PROCEDURE — 80053 COMPREHEN METABOLIC PANEL: CPT

## 2024-11-25 PROCEDURE — 83735 ASSAY OF MAGNESIUM: CPT

## 2024-11-25 PROCEDURE — 81207 BCR/ABL1 GENE MINOR BP: CPT

## 2024-11-25 PROCEDURE — 36415 COLL VENOUS BLD VENIPUNCTURE: CPT

## 2024-11-25 PROCEDURE — 85025 COMPLETE CBC W/AUTO DIFF WBC: CPT

## 2024-11-25 PROCEDURE — 84100 ASSAY OF PHOSPHORUS: CPT

## 2024-11-26 ENCOUNTER — MYC MEDICAL ADVICE (OUTPATIENT)
Dept: ONCOLOGY | Facility: CLINIC | Age: 44
End: 2024-11-26
Payer: COMMERCIAL

## 2024-11-26 NOTE — ORAL ONC MGMT
Oral Chemotherapy Monitoring Program  Lab Follow Up    Reviewed lab results from 11/25.        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 OC team? Yes Yes Yes Yes Yes     Drug Name Sprycel (dasatinib) Sprycel (dasatinib) Sprycel (dasatinib) Sprycel (dasatinib) Sprycel (dasatinib) Sprycel (dasatinib) Sprycel (dasatinib)   Dose 140 mg 140 mg 140 mg 140 mg 140 mg 100 mg 100 mg   Current Schedule Daily Daily Daily Daily Daily Daily Daily   Cycle Details Continuous Continuous Continuous Continuous Continuous Continuous Continuous   Doses missed in last 2 weeks 1         Adherence Assessment Non-adherent         Reason for Non-adherence Other         Adverse Effects Diarrhea         Diarrhea Grade 1         Pharmacist Intervention(diarrhea) No             Labs:  _  Result Component Current Result Ref Range   Sodium 141 (11/25/2024) 135 - 145 mmol/L     _  Result Component Current Result Ref Range   Potassium 3.9 (11/25/2024) 3.4 - 5.3 mmol/L     _  Result Component Current Result Ref Range   Calcium 9.3 (11/25/2024) 8.8 - 10.4 mg/dL     _  Result Component Current Result Ref Range   Magnesium 1.9 (11/25/2024) 1.7 - 2.3 mg/dL     _  Result Component Current Result Ref Range   Phosphorus 3.5 (11/25/2024) 2.5 - 4.5 mg/dL      _  Result Component Current Result Ref Range   Albumin 4.4 (11/25/2024) 3.5 - 5.2 g/dL     _  Result Component Current Result Ref Range   Urea Nitrogen 12.1 (11/25/2024) 6.0 - 20.0 mg/dL     _  Result Component Current Result Ref Range   Creatinine 0.93 (11/25/2024) 0.51 - 0.95 mg/dL     _  Result Component Current Result Ref Range   AST 35 (11/25/2024) 0 - 45 U/L     _  Result Component Current Result Ref Range   ALT 36 (11/25/2024) 0 - 50 U/L     _  Result Component Current Result Ref Range   Bilirubin Total 0.5 (11/25/2024) <=1.2 mg/dL     _  Result Component Current Result Ref Range   WBC Count 6.0 (11/25/2024) 4.0 - 11.0 10e3/uL     _  Result Component Current Result Ref Range   Hemoglobin 12.9 (11/25/2024) 11.7 - 15.7 g/dL     _  Result Component Current Result Ref Range   Platelet Count 203 (11/25/2024) 150 - 450 10e3/uL     No results found for ANC within last 30 days.     _  Result Component Current Result Ref Range   Absolute Neutrophils 3.4 (11/25/2024) 1.6 - 8.3 10e3/uL        Assessment & Plan:  No concerning abnormalities.  Continue dasatinib as prescribed.    [employer] message sent to patient.     Follow-Up:  Labs 12/23    Hallie Watts PharmD  Oral Chemotherapy Monitoring Program  [facility]  [phone]

## 2024-12-23 ENCOUNTER — LAB (OUTPATIENT)
Dept: LAB | Facility: CLINIC | Age: 44
End: 2024-12-23
Payer: COMMERCIAL

## 2024-12-23 DIAGNOSIS — C91.00 B-CELL ACUTE LYMPHOBLASTIC LEUKEMIA (ALL) (H): Primary | ICD-10-CM

## 2024-12-23 DIAGNOSIS — C91.00 B-CELL ACUTE LYMPHOBLASTIC LEUKEMIA (ALL) (H): ICD-10-CM

## 2024-12-23 DIAGNOSIS — C91.00 ACUTE LYMPHOBLASTIC LEUKEMIA (ALL) NOT HAVING ACHIEVED REMISSION (H): ICD-10-CM

## 2024-12-23 DIAGNOSIS — Z79.899 ENCOUNTER FOR LONG-TERM (CURRENT) USE OF MEDICATIONS: ICD-10-CM

## 2024-12-23 LAB
ALBUMIN SERPL BCG-MCNC: 4.2 G/DL (ref 3.5–5.2)
ALP SERPL-CCNC: 81 U/L (ref 40–150)
ALT SERPL W P-5'-P-CCNC: 32 U/L (ref 0–50)
ANION GAP SERPL CALCULATED.3IONS-SCNC: 9 MMOL/L (ref 7–15)
AST SERPL W P-5'-P-CCNC: 30 U/L (ref 0–45)
BASOPHILS # BLD AUTO: 0 10E3/UL (ref 0–0.2)
BASOPHILS NFR BLD AUTO: 0 %
BILIRUB SERPL-MCNC: 0.3 MG/DL
BUN SERPL-MCNC: 16 MG/DL (ref 6–20)
CALCIUM SERPL-MCNC: 9.4 MG/DL (ref 8.8–10.4)
CHLORIDE SERPL-SCNC: 107 MMOL/L (ref 98–107)
CREAT SERPL-MCNC: 0.73 MG/DL (ref 0.51–0.95)
EGFRCR SERPLBLD CKD-EPI 2021: >90 ML/MIN/1.73M2
EOSINOPHIL # BLD AUTO: 0.3 10E3/UL (ref 0–0.7)
EOSINOPHIL NFR BLD AUTO: 4 %
ERYTHROCYTE [DISTWIDTH] IN BLOOD BY AUTOMATED COUNT: 14.4 % (ref 10–15)
GLUCOSE SERPL-MCNC: 103 MG/DL (ref 70–99)
HCO3 SERPL-SCNC: 23 MMOL/L (ref 22–29)
HCT VFR BLD AUTO: 37.7 % (ref 35–47)
HGB BLD-MCNC: 12.5 G/DL (ref 11.7–15.7)
IMM GRANULOCYTES # BLD: 0 10E3/UL
IMM GRANULOCYTES NFR BLD: 0 %
LAB DIRECTOR COMMENTS: NORMAL
LAB DIRECTOR DISCLAIMER: NORMAL
LAB DIRECTOR INTERPRETATION: NORMAL
LAB DIRECTOR METHODOLOGY: NORMAL
LAB DIRECTOR RESULTS: NORMAL
LYMPHOCYTES # BLD AUTO: 2.1 10E3/UL (ref 0.8–5.3)
LYMPHOCYTES NFR BLD AUTO: 23 %
MAGNESIUM SERPL-MCNC: 1.9 MG/DL (ref 1.7–2.3)
MCH RBC QN AUTO: 30.4 PG (ref 26.5–33)
MCHC RBC AUTO-ENTMCNC: 33.2 G/DL (ref 31.5–36.5)
MCV RBC AUTO: 92 FL (ref 78–100)
MONOCYTES # BLD AUTO: 0.9 10E3/UL (ref 0–1.3)
MONOCYTES NFR BLD AUTO: 10 %
NEUTROPHILS # BLD AUTO: 5.6 10E3/UL (ref 1.6–8.3)
NEUTROPHILS NFR BLD AUTO: 63 %
NRBC # BLD AUTO: 0 10E3/UL
NRBC BLD AUTO-RTO: 0 /100
PHOSPHATE SERPL-MCNC: 3.8 MG/DL (ref 2.5–4.5)
PLATELET # BLD AUTO: 187 10E3/UL (ref 150–450)
POTASSIUM SERPL-SCNC: 4.2 MMOL/L (ref 3.4–5.3)
PROT SERPL-MCNC: 6.6 G/DL (ref 6.4–8.3)
RBC # BLD AUTO: 4.11 10E6/UL (ref 3.8–5.2)
SODIUM SERPL-SCNC: 139 MMOL/L (ref 135–145)
SPECIMEN TYPE: NORMAL
WBC # BLD AUTO: 9 10E3/UL (ref 4–11)

## 2024-12-24 ENCOUNTER — DOCUMENTATION ONLY (OUTPATIENT)
Dept: ONCOLOGY | Facility: CLINIC | Age: 44
End: 2024-12-24
Payer: COMMERCIAL

## 2024-12-24 NOTE — PROGRESS NOTES
Oral Chemotherapy Monitoring Program  Lab Follow Up    Reviewed CBC and CMP lab results from 12/23.        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 Yes Yes Yes Yes      Drug Name Sprycel (dasatinib) Sprycel (dasatinib) Sprycel (dasatinib) Sprycel (dasatinib) Sprycel (dasatinib) Sprycel (dasatinib) Sprycel (dasatinib)   Dose 140 mg 140 mg 140 mg 140 mg 100 mg 100 mg 100 mg   Current Schedule Daily Daily Daily Daily Daily Daily Daily   Cycle Details Continuous Continuous Continuous Continuous Continuous Continuous Continuous       Labs:  _  Result Component Current Result Ref Range   Sodium 139 (12/23/2024) 135 - 145 mmol/L     _  Result Component Current Result Ref Range   Potassium 4.2 (12/23/2024) 3.4 - 5.3 mmol/L     _  Result Component Current Result Ref Range   Calcium 9.4 (12/23/2024) 8.8 - 10.4 mg/dL     _  Result Component Current Result Ref Range   Magnesium 1.9 (12/23/2024) 1.7 - 2.3 mg/dL     _  Result Component Current Result Ref Range   Phosphorus 3.8 (12/23/2024) 2.5 - 4.5 mg/dL     _  Result Component Current Result Ref Range   Albumin 4.2 (12/23/2024) 3.5 - 5.2 g/dL     _  Result Component Current Result Ref Range   Urea Nitrogen 16.0 (12/23/2024) 6.0 - 20.0 mg/dL     _  Result Component  Current Result Ref Range   Creatinine 0.73 (12/23/2024) 0.51 - 0.95 mg/dL     _  Result Component Current Result Ref Range   AST 30 (12/23/2024) 0 - 45 U/L     _  Result Component Current Result Ref Range   ALT 32 (12/23/2024) 0 - 50 U/L     _  Result Component Current Result Ref Range   Bilirubin Total 0.3 (12/23/2024) <=1.2 mg/dL     _  Result Component Current Result Ref Range   WBC Count 9.0 (12/23/2024) 4.0 - 11.0 10e3/uL     _  Result Component Current Result Ref Range   Hemoglobin 12.5 (12/23/2024) 11.7 - 15.7 g/dL     _  Result Component Current Result Ref Range   Platelet Count 187 (12/23/2024) 150 - 450 10e3/uL     No results found for ANC within last 30 days.     _  Result Component Current Result Ref Range   Absolute Neutrophils 5.6 (12/23/2024) 1.6 - 8.3 10e3/uL        Assessment & Plan:  No concerning abnormalities. Continue dasatinib as planned.    MyChart sent to patient.    Follow-Up:  1/20 labs    Marisa Moon  Pharmacy Intern  Oral Chemotherapy Monitoring Program  [facility]   [phone]

## 2024-12-30 DIAGNOSIS — C91.00 B-CELL ACUTE LYMPHOBLASTIC LEUKEMIA (ALL) (H): Primary | ICD-10-CM

## 2024-12-30 RX ORDER — DASATINIB 100 MG/1
100 TABLET, FILM COATED ORAL DAILY
Qty: 30 TABLET | Refills: 1 | Status: SHIPPED | OUTPATIENT
Start: 2025-01-10 | End: 2025-02-09

## 2025-01-06 ENCOUNTER — TELEPHONE (OUTPATIENT)
Dept: ONCOLOGY | Facility: CLINIC | Age: 45
End: 2025-01-06
Payer: COMMERCIAL

## 2025-01-06 NOTE — TELEPHONE ENCOUNTER
Prior Authorization Approval    Medication: DASATINIB 100 MG PO TABS  Authorization Effective Date: 1/6/2025  Authorization Expiration Date: 1/6/2026  Approved Dose/Quantity:   Reference #:     Insurance Company: MED[employer] - Phone [phone] Fax [phone]  Expected CoPay: $    CoPay Card Available: No    Financial Assistance Needed:   Which Pharmacy is filling the prescription: [address]  Pharmacy Notified:   Patient Notified:         Annette Childerscologjalen Pharmacy Liaison     [facility]  [place] [address]  Office: [phone]  Fax: [phone]  [email or web address]

## 2025-01-20 ENCOUNTER — LAB (OUTPATIENT)
Dept: LAB | Facility: CLINIC | Age: 45
End: 2025-01-20

## 2025-01-20 DIAGNOSIS — Z79.899 ENCOUNTER FOR LONG-TERM (CURRENT) USE OF MEDICATIONS: ICD-10-CM

## 2025-01-20 DIAGNOSIS — C91.00 B-CELL ACUTE LYMPHOBLASTIC LEUKEMIA (ALL) (H): ICD-10-CM

## 2025-01-20 DIAGNOSIS — C91.00 ACUTE LYMPHOBLASTIC LEUKEMIA (ALL) NOT HAVING ACHIEVED REMISSION (H): ICD-10-CM

## 2025-01-20 LAB
ALBUMIN SERPL BCG-MCNC: 4.3 G/DL (ref 3.5–5.2)
ALP SERPL-CCNC: 70 U/L (ref 40–150)
ALT SERPL W P-5'-P-CCNC: 26 U/L (ref 0–50)
ANION GAP SERPL CALCULATED.3IONS-SCNC: 13 MMOL/L (ref 7–15)
AST SERPL W P-5'-P-CCNC: 30 U/L (ref 0–45)
BASOPHILS # BLD AUTO: 0 10E3/UL (ref 0–0.2)
BASOPHILS NFR BLD AUTO: 0 %
BILIRUB SERPL-MCNC: 0.4 MG/DL
BUN SERPL-MCNC: 12.8 MG/DL (ref 6–20)
CALCIUM SERPL-MCNC: 9.3 MG/DL (ref 8.8–10.4)
CHLORIDE SERPL-SCNC: 103 MMOL/L (ref 98–107)
CREAT SERPL-MCNC: 0.75 MG/DL (ref 0.51–0.95)
EGFRCR SERPLBLD CKD-EPI 2021: >90 ML/MIN/1.73M2
EOSINOPHIL # BLD AUTO: 0.3 10E3/UL (ref 0–0.7)
EOSINOPHIL NFR BLD AUTO: 5 %
ERYTHROCYTE [DISTWIDTH] IN BLOOD BY AUTOMATED COUNT: 13.7 % (ref 10–15)
GLUCOSE SERPL-MCNC: 100 MG/DL (ref 70–99)
HCO3 SERPL-SCNC: 26 MMOL/L (ref 22–29)
HCT VFR BLD AUTO: 40.3 % (ref 35–47)
HGB BLD-MCNC: 13.2 G/DL (ref 11.7–15.7)
IMM GRANULOCYTES # BLD: 0 10E3/UL
IMM GRANULOCYTES NFR BLD: 0 %
LAB DIRECTOR COMMENTS: NORMAL
LAB DIRECTOR DISCLAIMER: NORMAL
LAB DIRECTOR INTERPRETATION: NORMAL
LAB DIRECTOR METHODOLOGY: NORMAL
LAB DIRECTOR RESULTS: NORMAL
LOCATION OF TASK: NORMAL
LYMPHOCYTES # BLD AUTO: 1.7 10E3/UL (ref 0.8–5.3)
LYMPHOCYTES NFR BLD AUTO: 34 %
MAGNESIUM SERPL-MCNC: 2 MG/DL (ref 1.7–2.3)
MCH RBC QN AUTO: 30.1 PG (ref 26.5–33)
MCHC RBC AUTO-ENTMCNC: 32.8 G/DL (ref 31.5–36.5)
MCV RBC AUTO: 92 FL (ref 78–100)
MONOCYTES # BLD AUTO: 0.5 10E3/UL (ref 0–1.3)
MONOCYTES NFR BLD AUTO: 11 %
NEUTROPHILS # BLD AUTO: 2.4 10E3/UL (ref 1.6–8.3)
NEUTROPHILS NFR BLD AUTO: 49 %
NRBC # BLD AUTO: 0 10E3/UL
NRBC BLD AUTO-RTO: 0 /100
PHOSPHATE SERPL-MCNC: 3.8 MG/DL (ref 2.5–4.5)
PLATELET # BLD AUTO: 217 10E3/UL (ref 150–450)
POTASSIUM SERPL-SCNC: 3.8 MMOL/L (ref 3.4–5.3)
PROT SERPL-MCNC: 6.9 G/DL (ref 6.4–8.3)
RBC # BLD AUTO: 4.39 10E6/UL (ref 3.8–5.2)
SODIUM SERPL-SCNC: 142 MMOL/L (ref 135–145)
SPECIMEN TYPE: NORMAL
WBC # BLD AUTO: 4.9 10E3/UL (ref 4–11)

## 2025-01-21 ENCOUNTER — DOCUMENTATION ONLY (OUTPATIENT)
Dept: ONCOLOGY | Facility: CLINIC | Age: 45
End: 2025-01-21

## 2025-01-21 NOTE — PROGRESS NOTES
Oral Chemotherapy Monitoring Program  Lab Follow Up    Reviewed lab results from 1/20/25.        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 Yes         Drug Name Sprycel (dasatinib) Sprycel (dasatinib) Sprycel (dasatinib) Sprycel (dasatinib) Sprycel (dasatinib) Sprycel (dasatinib) Sprycel (dasatinib)   Dose 140 mg 100 mg 100 mg 100 mg   100 mg   Current Schedule Daily Daily Daily Daily   Daily   Cycle Details Continuous Continuous Continuous Continuous   Continuous   Adverse Effects       No AE identified during assessment       Labs:  _  Result Component Current Result Ref Range   Sodium 142 (1/20/2025) 135 - 145 mmol/L     _  Result Component Current Result Ref Range   Potassium 3.8 (1/20/2025) 3.4 - 5.3 mmol/L     _  Result Component Current Result Ref Range   Calcium 9.3 (1/20/2025) 8.8 - 10.4 mg/dL     _  Result Component Current Result Ref Range   Magnesium 2.0 (1/20/2025) 1.7 - 2.3 mg/dL     _  Result Component Current Result Ref Range   Phosphorus 3.8 (1/20/2025) 2.5 - 4.5 mg/dL     _  Result Component Current Result Ref Range   Albumin 4.3 (1/20/2025) 3.5 - 5.2 g/dL     _  Result Component Current Result Ref Range   Urea Nitrogen 12.8 (1/20/2025) 6.0 - 20.0 mg/dL     _  Result Component  Current Result Ref Range   Creatinine 0.75 (1/20/2025) 0.51 - 0.95 mg/dL     _  Result Component Current Result Ref Range   AST 30 (1/20/2025) 0 - 45 U/L     _  Result Component Current Result Ref Range   ALT 26 (1/20/2025) 0 - 50 U/L     _  Result Component Current Result Ref Range   Bilirubin Total 0.4 (1/20/2025) <=1.2 mg/dL     _  Result Component Current Result Ref Range   WBC Count 4.9 (1/20/2025) 4.0 - 11.0 10e3/uL     _  Result Component Current Result Ref Range   Hemoglobin 13.2 (1/20/2025) 11.7 - 15.7 g/dL     _  Result Component Current Result Ref Range   Platelet Count 217 (1/20/2025) 150 - 450 10e3/uL     No results found for ANC within last 30 days.     _  Result Component Current Result Ref Range   Absolute Neutrophils 2.4 (1/20/2025) 1.6 - 8.3 10e3/uL        Assessment & Plan:    No new concerning abnormalities or changes. Results are consistent with previous numbers. Continue monitoring.    Patient is contacted via [email or web address] message.    Follow-Up:  2/17 lab draw.    Emilie Martinez  Pharmacy Intern  Oral Chemotherapy Monitoring Program  [facility]  [phone]

## 2025-02-26 ENCOUNTER — APPOINTMENT (OUTPATIENT)
Dept: LAB | Facility: CLINIC | Age: 45
End: 2025-02-26
Attending: INTERNAL MEDICINE
Payer: COMMERCIAL

## 2025-02-26 ENCOUNTER — ONCOLOGY VISIT (OUTPATIENT)
Dept: ONCOLOGY | Facility: CLINIC | Age: 45
End: 2025-02-26
Attending: INTERNAL MEDICINE
Payer: COMMERCIAL

## 2025-02-26 VITALS
TEMPERATURE: 98.4 F | HEART RATE: 86 BPM | DIASTOLIC BLOOD PRESSURE: 62 MMHG | OXYGEN SATURATION: 97 % | SYSTOLIC BLOOD PRESSURE: 108 MMHG | RESPIRATION RATE: 18 BRPM

## 2025-02-26 DIAGNOSIS — C91.00 B-CELL ACUTE LYMPHOBLASTIC LEUKEMIA (ALL) (H): ICD-10-CM

## 2025-02-26 DIAGNOSIS — Z79.899 ENCOUNTER FOR LONG-TERM (CURRENT) USE OF MEDICATIONS: ICD-10-CM

## 2025-02-26 DIAGNOSIS — C91.00 ACUTE LYMPHOBLASTIC LEUKEMIA (ALL) NOT HAVING ACHIEVED REMISSION (H): ICD-10-CM

## 2025-02-26 LAB
ALBUMIN SERPL BCG-MCNC: 4.2 G/DL (ref 3.5–5.2)
ALP SERPL-CCNC: 69 U/L (ref 40–150)
ALT SERPL W P-5'-P-CCNC: 30 U/L (ref 0–50)
ANION GAP SERPL CALCULATED.3IONS-SCNC: 10 MMOL/L (ref 7–15)
AST SERPL W P-5'-P-CCNC: 25 U/L (ref 0–45)
BASOPHILS # BLD AUTO: 0 10E3/UL (ref 0–0.2)
BASOPHILS NFR BLD AUTO: 1 %
BILIRUB SERPL-MCNC: 0.6 MG/DL
BUN SERPL-MCNC: 11.4 MG/DL (ref 6–20)
CALCIUM SERPL-MCNC: 8.9 MG/DL (ref 8.8–10.4)
CHLORIDE SERPL-SCNC: 106 MMOL/L (ref 98–107)
CREAT SERPL-MCNC: 0.71 MG/DL (ref 0.51–0.95)
EGFRCR SERPLBLD CKD-EPI 2021: >90 ML/MIN/1.73M2
EOSINOPHIL # BLD AUTO: 0.2 10E3/UL (ref 0–0.7)
EOSINOPHIL NFR BLD AUTO: 2 %
ERYTHROCYTE [DISTWIDTH] IN BLOOD BY AUTOMATED COUNT: 14.4 % (ref 10–15)
GLUCOSE SERPL-MCNC: 117 MG/DL (ref 70–99)
HCO3 SERPL-SCNC: 24 MMOL/L (ref 22–29)
HCT VFR BLD AUTO: 38.5 % (ref 35–47)
HGB BLD-MCNC: 12.7 G/DL (ref 11.7–15.7)
IMM GRANULOCYTES # BLD: 0 10E3/UL
IMM GRANULOCYTES NFR BLD: 0 %
LAB DIRECTOR COMMENTS: NORMAL
LAB DIRECTOR DISCLAIMER: NORMAL
LAB DIRECTOR INTERPRETATION: NORMAL
LAB DIRECTOR METHODOLOGY: NORMAL
LAB DIRECTOR RESULTS: NORMAL
LYMPHOCYTES # BLD AUTO: 1.3 10E3/UL (ref 0.8–5.3)
LYMPHOCYTES NFR BLD AUTO: 16 %
MAGNESIUM SERPL-MCNC: 1.9 MG/DL (ref 1.7–2.3)
MCH RBC QN AUTO: 30.5 PG (ref 26.5–33)
MCHC RBC AUTO-ENTMCNC: 33 G/DL (ref 31.5–36.5)
MCV RBC AUTO: 93 FL (ref 78–100)
MONOCYTES # BLD AUTO: 0.9 10E3/UL (ref 0–1.3)
MONOCYTES NFR BLD AUTO: 11 %
NEUTROPHILS # BLD AUTO: 5.5 10E3/UL (ref 1.6–8.3)
NEUTROPHILS NFR BLD AUTO: 70 %
NRBC # BLD AUTO: 0 10E3/UL
NRBC BLD AUTO-RTO: 0 /100
PHOSPHATE SERPL-MCNC: 2.8 MG/DL (ref 2.5–4.5)
PLATELET # BLD AUTO: 208 10E3/UL (ref 150–450)
POTASSIUM SERPL-SCNC: 4 MMOL/L (ref 3.4–5.3)
PROT SERPL-MCNC: 6.7 G/DL (ref 6.4–8.3)
RBC # BLD AUTO: 4.16 10E6/UL (ref 3.8–5.2)
SODIUM SERPL-SCNC: 140 MMOL/L (ref 135–145)
SPECIMEN TYPE: NORMAL
WBC # BLD AUTO: 7.9 10E3/UL (ref 4–11)

## 2025-02-26 PROCEDURE — 85025 COMPLETE CBC W/AUTO DIFF WBC: CPT | Performed by: INTERNAL MEDICINE

## 2025-02-26 PROCEDURE — 36415 COLL VENOUS BLD VENIPUNCTURE: CPT | Performed by: INTERNAL MEDICINE

## 2025-02-26 PROCEDURE — 81207 BCR/ABL1 GENE MINOR BP: CPT | Performed by: INTERNAL MEDICINE

## 2025-02-26 PROCEDURE — 84100 ASSAY OF PHOSPHORUS: CPT | Performed by: INTERNAL MEDICINE

## 2025-02-26 PROCEDURE — 85018 HEMOGLOBIN: CPT | Performed by: INTERNAL MEDICINE

## 2025-02-26 PROCEDURE — 99213 OFFICE O/P EST LOW 20 MIN: CPT | Performed by: INTERNAL MEDICINE

## 2025-02-26 PROCEDURE — 82435 ASSAY OF BLOOD CHLORIDE: CPT | Performed by: INTERNAL MEDICINE

## 2025-02-26 PROCEDURE — 83735 ASSAY OF MAGNESIUM: CPT | Performed by: INTERNAL MEDICINE

## 2025-02-26 PROCEDURE — 82040 ASSAY OF SERUM ALBUMIN: CPT | Performed by: INTERNAL MEDICINE

## 2025-02-26 PROCEDURE — 82247 BILIRUBIN TOTAL: CPT | Performed by: INTERNAL MEDICINE

## 2025-02-26 ASSESSMENT — PAIN SCALES - GENERAL: PAINLEVEL_OUTOF10: NO PAIN (0)

## 2025-02-26 NOTE — LETTER
2/26/2025      Vashti Villegas  [address] [place] [address]      Dear Colleague,    Thank you for referring your patient, Vashti Villegas, to the [facility]. Please see a copy of my visit note below.    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 While at OSH, she had CT PE protocol as well as CT A/P. These identified no PE, no acute or localized intraabdominal inflammatory process, mild splenomegaly, and few inconspicous low-density structures in the liver. She was transferred to [facility] for further workup and management. S/p Hydrea 1g TID (8/21-8/23) with improvement in WBC (100K ? 12K).   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 IHC shows CD10+, CD34+. 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 Abnormal cytogenetics.   --Microarry study: CN gains/losses in 3p21, 4q25, 7p11 (region includes CREB5 and IKZF1; biallelic loss of TARP), 9p13.2-p22.3 (region includes CDKN2a/2b and PAX5), 12q13, 14q11.     INDUCTION:   --Initiated pre-phase steroids with Prednisone 60 mg x8/21; increased to 60 mg/m2 (140 mg) 8/22-8/24. She initiated GRAALL + imatinib (C1D1 = 8/24/22).   --S/p BMT consult 9/12 with Dr. Walsh. Induction was relatively uncomplicated.   -s/p weekly triple IT chemo (Cytarabine, Solu-Cortef, MTX) on Days 1, 8, and 15 of C1 induction. CNS negative.   --BMBx 9/26/22 MRD-negative by BCR-ABL PCR and by Clonoseq Assay.   --GRAALL + imatinib (C2D1=9/30/22). 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   Post-cycle-2 BMBx 10/25/2022  showed MRD-negative CR, though low-level abnormal B-cell population (favor hematogones over residual leukemia) was noted. BCR-ABL negative. Clonoseq negative at 1/10E6 level.    --C3 GRAALL + imatinib on 11/2/22 was tolerated well without complication.    INTRATHECAL CHEMOTHERAPY: ALL CSF studies negative by MFC  8/26/22: Cytarabine 40 mg/methotrexate 15 mg  8/31/22: Cytarabine 40 mg/methotrexate 15 mg  9/7/22: Cytarabine 40 mg/methotrexate 15 mg  9/14/22: Methotrexate 15 mg  10/21/22: Cytarabine 40 mg/methotrexate 15 mg  11/25/22: Cytarabine 100 mg  12/16/22: Methotrexate 12 mg  1/2/23; Cytarabine 100 mg  1/16/23: Methotrexate 12 mg  2/02/23: Cytarabine 100 mg  2/27/23: Methotrexate 12 mg  3/14/23: Cytarabine 100 mg    CONSOLIDATION:   --Discussed with Dr. Rutherford, BMT faculty meeting: No matched related donors identified. High panel reactive antibodies towards  unrelated donor samples. With an excellent early, deep MRD response to chemotherapy and no optimal donor sources identified, discussed foregoing BMT in CR1 in favor of definitive therapy.  Discussed risks and benefits of GRAAL versus newer approaches. April agreed to a combination of Blinatumomab plus ponatinib  (Jesusita et al The Lancet Hematology Volume 10, ISSUE 1, e24-e34, January 2023).   --Aspirin 81 mg daily added for VTE prophylaxis per original study.   --Cycle 1Day 1 Blinatumomab plus Ponatinib 15 mg 12/14/2022. Complicated by PICC disruption, brief Blincyto interruption ~12/21/22.   --BMBx 1/17/2023: MRD- by MFC. BCR-ABL p190 detectable at <0.079%. Clonoseq negative.  --Cycle 2 Day 1 Blinatumomab plus Ponatinib 15 mg  2/6/2023.  --BMBx 3/1/2023: MRD- by MFC. BCR-ABL p190 undetectable. Clonoseq negative. FISH with borderline detectable BCR-ABL fusion at 0.125% (at LoD for assay).  --Cycle 3 Day 1 Blinatumomab plus Ponatinib 15 mg 3/9/2023.  --Cycle 4 Day 1 Blinatumomab plus Ponatinib 15 mg 4/20/23.  --Cycle 5 Day 1 Blinatumomab plus Ponatinib 15 mg  6/1/23.  --Continued on Ponatinib 15 mg every day maintenance with ASA 81 mg daily  --8/8/2023 BMBx hypocellular (40%) marrow, no evidence of leukemia. MRD- by MFC, BCR-ABL QPCR negative, Clonoseq negative  --October 2023: PB BCR-ABL and Clonoseq negative  --Continued on Ponatinib 15 mg every day as maintenance therapy.   --Developed symptoms of peripheral neuropathy; rotated to Dasatinib 140 mg every day in May 2024 (dosing based on D-ALBA). PN symptoms resolved after discontinuation of ponatinib.   --Developed facial rash while on Dasatinib 140 mg; decreased to 100 mg October 2024. Rash resolved.     History of Present Illness:   April Bakari returns.    Her PN symptoms resolved after stopping ponatinib.   She has been doing well on Dasatinib 100 mg per day.   No recent health concerns, though she is just developing rhinorrhea today and thinks she may be getting a cold. No fevers/myalgias, or limiting fatigue.   Otherwise doing well. 12-point ROS otherwise negative.    Medications:       Current Outpatient Medications   Medication Sig Dispense Refill     acyclovir (ZOVIRAX) 400 MG tablet Take 1 tablet (400 mg) by mouth every 12 hours for 120 days 60 tablet 3     bisacodyl (DULCOLAX) 5 MG EC tablet Two days prior to exam take two (2) tablets at 4pm. One day prior to exam take two (2) tablets at 4pm 4 tablet 0     dasatinib (SPRYCEL) 100 MG tablet Take 1 tablet (100 mg) by mouth daily Avoid grapefruit and grapefruit juice. 30 tablet 1     magic mouthwash suspension (diphenhydrAMINE, lidocaine, aluminum-magnesium & simethicone) Swish and swallow 10 mLs in mouth every 4 hours as needed for mouth sores 200 mL 2     polyethylene glycol (GOLYTELY) 236 g suspension Two days before procedure at 5PM fill first container with water. Mix and drink an 8 oz glass every 15 minutes until HALF of the container is gone. Place the remainder in the refrigerator. One day before procedure at 5PM drink second half of bowel prep. Drink an  8 oz glass every 15 minutes until it is gone. Day of procedure 6 hours before arrival time fill the 2nd container with water. Mix and drink an 8 oz glass every 15 minutes until HALF of the container is gone. Discard the remaining solution. 8000 mL 0     prochlorperazine (COMPAZINE) 10 MG tablet Take 1 tablet (10 mg) by mouth every 6 hours as needed for nausea or vomiting 30 tablet 2     tobramycin-dexAMETHasone (TOBRADEX) 0.3-0.1 % ophthalmic suspension Place 1 drop into both eyes 2 times daily.       triamcinolone (KENALOG) 0.1 % external ointment Apply topically 2 times daily 454 g 3     No current facility-administered medications for this visit.        Physical Exam:   /62 (BP Location: Right arm, Patient Position: Sitting, Cuff Size: Adult Large)   Pulse 86   Temp 98.4  F (36.9  C) (Oral)   Resp 18   LMP  (LMP Unknown)   SpO2 97%     ECOG 1  Gen: NAD.  Skin: No rashes on visualized areas.   Pulm: CTAB, no increased work of breathing.     No results found for this or any previous visit (from the past 24 hours).    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 After induction with cycle 1 and 2 of attenuated-dose GRAAL chemotherapy, she was in an MRD- CR (CR1) by MFC, BCR-ABL PCR, and Clonoseq assay.  With no matched related donors, and a strong PRA, as well as an excellent early/deep MRD-CR, chemotherapy was  favored over BMT; this is supported by multiple recent clinical observations (Blood. 2016 Jul 28; 128(4): 504-507; Blood 2022 Jul 25;blood.[id number]). After discussion, she transitioned to Blinatumomab + Ponatinib (Short et al The Lancet Hematology Volume 10, ISSUE 1, e24-e34, January 2023) as curative intent therapy. She completed 12 total doses of prophylactic intrathecal chemotherapy.     Rotated to Dasatinib maintenance from Ponatinib in May 2024 due to new-onset bilateral peripheral neuropathy  involving her hands. Dosed initially at 140 mg every day, consistent with D-ALBA. Total duration of TKI in this scenario is not defined by guidelines; reported TKI duration ranges from 2-5 years; planning currently on 5 years total maintenance, due to intermittent low-level Clonoseq signal.     PLAN:  --Dasatinib 100 mg every day.   --CBC w/ diff, CMP every 8 weeks. Peripheral blood BCR-ABL monthly for now and Clonoseq NGS MRD testing every 6 months; can decrease frequency of BCR-ABL checks if next Clonoseq is negative. Clinic visit every 4 months after BCR-ABL and Clonoseq have resulted.   --See PCP and undergo age-appropriate cancer screening include mammogram and colonoscopy  --Other supportive cares as per below    # CNS prophylaxis--complete  # Varicella Zoster; improved; encouraged recombinant zoster vaccine (Shingrix); remain on Acyclovir until this is complete.   # PPx -- off PPx     # Headaches  # H/o COVID-19 infection   --Refuses COVID-19 vaccination    # Hypertension: Amlodipine; Lasix as needed  # Indigestion/GERD    Steve Hull MD, PhD  Division of Hematology, Oncology, and Transplantation  [facility]    40 minutes spent on the date of the encounter doing chart review, review of test results, interpretation of tests, patient visit and documentation.       Again, thank you for allowing me to participate in the care of your patient.        Sincerely,        Steve Hull MD    Electronically signed

## 2025-02-26 NOTE — NURSING NOTE
"Oncology Rooming Note    February 26, 2025 12:14 PM   April F Bakari is a 44 year old female who presents for:    Chief Complaint   Patient presents with    Blood Draw     Labs drawn via  by RN in lab, vitals taken.     Oncology Clinic Visit     Acute Lymphoblastic Leukemia     Initial Vitals: /62 (BP Location: Right arm, Patient Position: Sitting, Cuff Size: Adult Large)   Pulse 86   Temp 98.4  F (36.9  C) (Oral)   Resp 18   LMP  (LMP Unknown)   SpO2 97%  Estimated body mass index is 43.18 kg/m  as calculated from the following:    Height as of 1/25/24: 1.626 m (5' 4.02\").    Weight as of 10/23/24: 114.2 kg (251 lb 11.2 oz). There is no height or weight on file to calculate BSA.  No Pain (0) Comment: Data Unavailable   No LMP recorded (lmp unknown). Patient is postmenopausal.  Allergies reviewed: Yes  Medications reviewed: Yes    Medications: Medication refills not needed today.  Pharmacy name entered into [facility]:    [id number] [place], [place] - [address]  [facility] - [place], [place] - [address], [place] - [address] [place] [address]    Frailty Screening:   Is the patient here for a new oncology consult visit in cancer care? 2. No    PHQ9:  Did this patient require a PHQ9?: Yes   If the patient required a PHQ9 assessment, did the results require a follow up with the Provider/Nurse?: No      Clinical concerns: None       Clari Joseph LPN  2/26/2025                "

## 2025-02-26 NOTE — PROGRESS NOTES
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 While at OSH, she had CT PE protocol as well as CT A/P. These identified no PE, no acute or localized intraabdominal inflammatory process, mild splenomegaly, and few inconspicous low-density structures in the liver. She was transferred to [facility] for further workup and management. S/p Hydrea 1g TID (8/21-8/23) with improvement in WBC (100K ? 12K).   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 IHC shows CD10+, CD34+. 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 Abnormal cytogenetics.   --Microarry study: CN gains/losses in 3p21, 4q25, 7p11 (region includes CREB5 and IKZF1; biallelic loss of TARP), 9p13.2-p22.3 (region includes CDKN2a/2b and PAX5), 12q13, 14q11.     INDUCTION:   --Initiated pre-phase steroids with Prednisone 60 mg x8/21; increased to 60 mg/m2 (140 mg) 8/22-8/24. She initiated GRAALL + imatinib (C1D1 = 8/24/22).   --S/p BMT consult 9/12 with Dr. Walsh. Induction was relatively uncomplicated.   -s/p weekly triple IT chemo (Cytarabine, Solu-Cortef, MTX) on Days 1, 8, and 15 of C1 induction. CNS negative.   --BMBx 9/26/22 MRD-negative by BCR-ABL PCR and by Clonoseq Assay.   --GRAALL + imatinib (C2D1=9/30/22). 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   Post-cycle-2 BMBx 10/25/2022 showed MRD-negative CR, though low-level abnormal B-cell population (favor hematogones over residual leukemia) was noted. BCR-ABL negative. Clonoseq negative at 1/10E6 level.    --C3 GRAALL + imatinib on 11/2/22 was tolerated well without  complication.    INTRATHECAL CHEMOTHERAPY: ALL CSF studies negative by MFC  8/26/22: Cytarabine 40 mg/methotrexate 15 mg  8/31/22: Cytarabine 40 mg/methotrexate 15 mg  9/7/22: Cytarabine 40 mg/methotrexate 15 mg  9/14/22: Methotrexate 15 mg  10/21/22: Cytarabine 40 mg/methotrexate 15 mg  11/25/22: Cytarabine 100 mg  12/16/22: Methotrexate 12 mg  1/2/23; Cytarabine 100 mg  1/16/23: Methotrexate 12 mg  2/02/23: Cytarabine 100 mg  2/27/23: Methotrexate 12 mg  3/14/23: Cytarabine 100 mg    CONSOLIDATION:   --Discussed with Dr. Rutherford, BMT faculty meeting: No matched related donors identified. High panel reactive antibodies towards  unrelated donor samples. With an excellent early, deep MRD response to chemotherapy and no optimal donor sources identified, discussed foregoing BMT in CR1 in favor of definitive therapy.  Discussed risks and benefits of GRAAL versus newer approaches. April agreed to a combination of Blinatumomab plus ponatinib  (Jesusita et al The Lancet Hematology Volume 10, ISSUE 1, e24-e34, January 2023).   --Aspirin 81 mg daily added for VTE prophylaxis per original study.   --Cycle 1Day 1 Blinatumomab plus Ponatinib 15 mg 12/14/2022. Complicated by PICC disruption, brief Blincyto interruption ~12/21/22.   --BMBx 1/17/2023: MRD- by MFC. BCR-ABL p190 detectable at <0.079%. Clonoseq negative.  --Cycle 2 Day 1 Blinatumomab plus Ponatinib 15 mg  2/6/2023.  --BMBx 3/1/2023: MRD- by MFC. BCR-ABL p190 undetectable. Clonoseq negative. FISH with borderline detectable BCR-ABL fusion at 0.125% (at LoD for assay).  --Cycle 3 Day 1 Blinatumomab plus Ponatinib 15 mg 3/9/2023.  --Cycle 4 Day 1 Blinatumomab plus Ponatinib 15 mg 4/20/23.  --Cycle 5 Day 1 Blinatumomab plus Ponatinib 15 mg 6/1/23.  --Continued on Ponatinib 15 mg every day maintenance with ASA 81 mg daily  --8/8/2023 BMBx hypocellular (40%) marrow, no evidence of leukemia. MRD- by MFC, BCR-ABL QPCR negative, Clonoseq negative  --October 2023: PB BCR-ABL and  Clonoseq negative  --Continued on Ponatinib 15 mg every day as maintenance therapy.   --Developed symptoms of peripheral neuropathy; rotated to Dasatinib 140 mg every day in May 2024 (dosing based on D-ALBA). PN symptoms resolved after discontinuation of ponatinib.   --Developed facial rash while on Dasatinib 140 mg; decreased to 100 mg October 2024. Rash resolved.     History of Present Illness:   April Bakari returns.    Her PN symptoms resolved after stopping ponatinib.   She has been doing well on Dasatinib 100 mg per day.   No recent health concerns, though she is just developing rhinorrhea today and thinks she may be getting a cold. No fevers/myalgias, or limiting fatigue.   Otherwise doing well. 12-point ROS otherwise negative.    Medications:       Current Outpatient Medications   Medication Sig Dispense Refill    acyclovir (ZOVIRAX) 400 MG tablet Take 1 tablet (400 mg) by mouth every 12 hours for 120 days 60 tablet 3    bisacodyl (DULCOLAX) 5 MG EC tablet Two days prior to exam take two (2) tablets at 4pm. One day prior to exam take two (2) tablets at 4pm 4 tablet 0    dasatinib (SPRYCEL) 100 MG tablet Take 1 tablet (100 mg) by mouth daily Avoid grapefruit and grapefruit juice. 30 tablet 1    magic mouthwash suspension (diphenhydrAMINE, lidocaine, aluminum-magnesium & simethicone) Swish and swallow 10 mLs in mouth every 4 hours as needed for mouth sores 200 mL 2    polyethylene glycol (GOLYTELY) 236 g suspension Two days before procedure at 5PM fill first container with water. Mix and drink an 8 oz glass every 15 minutes until HALF of the container is gone. Place the remainder in the refrigerator. One day before procedure at 5PM drink second half of bowel prep. Drink an 8 oz glass every 15 minutes until it is gone. Day of procedure 6 hours before arrival time fill the 2nd container with water. Mix and drink an 8 oz glass every 15 minutes until HALF of the container is gone. Discard the remaining solution.  8000 mL 0    prochlorperazine (COMPAZINE) 10 MG tablet Take 1 tablet (10 mg) by mouth every 6 hours as needed for nausea or vomiting 30 tablet 2    tobramycin-dexAMETHasone (TOBRADEX) 0.3-0.1 % ophthalmic suspension Place 1 drop into both eyes 2 times daily.      triamcinolone (KENALOG) 0.1 % external ointment Apply topically 2 times daily 454 g 3     No current facility-administered medications for this visit.        Physical Exam:   /62 (BP Location: Right arm, Patient Position: Sitting, Cuff Size: Adult Large)   Pulse 86   Temp 98.4  F (36.9  C) (Oral)   Resp 18   LMP  (LMP Unknown)   SpO2 97%     ECOG 1  Gen: NAD.  Skin: No rashes on visualized areas.   Pulm: CTAB, no increased work of breathing.     No results found for this or any previous visit (from the past 24 hours).    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 After induction with cycle 1 and 2 of attenuated-dose GRAAL chemotherapy, she was in an MRD- CR (CR1) by MFC, BCR-ABL PCR, and Clonoseq assay.  With no matched related donors, and a strong PRA, as well as an excellent early/deep MRD-CR, chemotherapy was  favored over BMT; this is supported by multiple recent clinical observations (Blood. 2016 Jul 28; 128(4): 504-507; Blood 2022 Jul 25;blood.[id number]). After discussion, she transitioned to Blinatumomab + Ponatinib (Short et al The Lancet Hematology Volume 10, ISSUE 1, e24-e34, January 2023) as curative intent therapy. She completed 12 total doses of prophylactic intrathecal chemotherapy.     Rotated to Dasatinib maintenance from Ponatinib in May 2024 due to new-onset bilateral peripheral neuropathy involving her hands. Dosed initially at 140 mg every day, consistent with D-ALBA. Total duration of TKI in this scenario is not defined by guidelines; reported TKI duration ranges from 2-5 years; planning currently on 5 years total maintenance,  due to intermittent low-level Clonoseq signal.     PLAN:  --Dasatinib 100 mg every day.   --CBC w/ diff, CMP every 8 weeks. Peripheral blood BCR-ABL monthly for now and Clonoseq NGS MRD testing every 6 months; can decrease frequency of BCR-ABL checks if next Clonoseq is negative. Clinic visit every 4 months after BCR-ABL and Clonoseq have resulted.   --See PCP and undergo age-appropriate cancer screening include mammogram and colonoscopy  --Other supportive cares as per below    # CNS prophylaxis--complete  # Varicella Zoster; improved; encouraged recombinant zoster vaccine (Shingrix); remain on Acyclovir until this is complete.   # PPx -- off PPx     # Headaches  # H/o COVID-19 infection   --Refuses COVID-19 vaccination    # Hypertension: Amlodipine; Lasix as needed  # Indigestion/GERD    Steve Hull MD, PhD  Division of Hematology, Oncology, and Transplantation  [facility]    40 minutes spent on the date of the encounter doing chart review, review of test results, interpretation of tests, patient visit and documentation.

## 2025-02-26 NOTE — NURSING NOTE
Chief Complaint   Patient presents with    Blood Draw     Labs drawn via  by RN in lab, vitals taken.      Labs collected from venipuncture by RN. Vitals taken. Checked in for appointment(s).    Supriya Mclaughlin RN

## 2025-02-27 NOTE — ORAL ONC MGMT
Oral Chemotherapy Monitoring Program  Lab Follow Up    Reviewed lab results from 2/26.        10/24/2024     1:00 PM 11/26/2024     1:00 PM 12/24/2024     9:00 AM 12/30/2024    12:00 PM 1/7/2025     2:00 PM 1/21/2025    10:00 AM 2/27/2025    10:00 AM   ORAL CHEMOTHERAPY   Assessment Type Lab Monitoring;Other;Chart Review Lab Monitoring;Other;Chart Review Lab Monitoring Refill Refill;Other Lab Monitoring Lab Monitoring;Monthly Follow up   Diagnosis Code Acute Lymphoblastic Leukemia (ALL) Acute Lymphoblastic Leukemia (ALL) Acute Lymphoblastic Leukemia (ALL) Acute Lymphoblastic Leukemia (ALL) Acute Lymphoblastic Leukemia (ALL) Acute Lymphoblastic Leukemia (ALL) Acute Lymphoblastic Leukemia (ALL)   Providers Della Hull   [facility]   Drug Name Sprycel (dasatinib) Sprycel (dasatinib) Sprycel (dasatinib) Sprycel (dasatinib) Sprycel (dasatinib) Sprycel (dasatinib) Sprycel (dasatinib)   Dose 100 mg 100 mg 100 mg   100 mg 100 mg   Current Schedule Daily Daily Daily   Daily Daily   Cycle Details Continuous Continuous Continuous   Continuous Continuous   Adverse Effects      No AE identified during assessment        Labs:  Lab Results   Component Value Date     02/26/2025    POTASSIUM 4.0 02/26/2025    MAG 1.9 02/26/2025    CR 0.71 02/26/2025    SOLEDAD 8.9 02/26/2025    BILITOTAL 0.6 02/26/2025    ALBUMIN 4.2 02/26/2025    ALT 30 02/26/2025    AST 25 02/26/2025     Lab Results   Component Value Date    URIC 2.9 01/06/2023              Lab Results   Component Value Date    HGB 12.7 02/26/2025    WBC 7.9 02/26/2025    ANEU 1.0 (L) 02/20/2023    ANEUTAUTO 5.5 02/26/2025     02/26/2025       Assessment & Plan:  No new concerning abnormalities. Continue dasatinib as prescribed. .    Seen by Dr. Hull yesterday. Patient not contacted.    Follow-Up:  Labs q8 weeks, due approximately 4/23, sent message to schedulers    Hallie  Eliecer Watts  Oral Chemotherapy Monitoring Program  [facility]  [phone]

## 2025-03-05 DIAGNOSIS — C91.00 B-CELL ACUTE LYMPHOBLASTIC LEUKEMIA (ALL) (H): Primary | ICD-10-CM

## 2025-03-31 ENCOUNTER — VIRTUAL VISIT (OUTPATIENT)
Dept: URGENT CARE | Facility: CLINIC | Age: 45
End: 2025-03-31
Payer: COMMERCIAL

## 2025-03-31 DIAGNOSIS — R06.02 SHORTNESS OF BREATH: Primary | ICD-10-CM

## 2025-03-31 DIAGNOSIS — D84.9 IMMUNOCOMPROMISED: ICD-10-CM

## 2025-03-31 PROCEDURE — 99207 PR NO CHARGE LOS: CPT | Performed by: FAMILY MEDICINE

## 2025-03-31 NOTE — PROGRESS NOTES
Not pregnant or breastfeeding  Subjective   HPI    8 days cough runny nose congestion  Taking mucinex     Went to an urgent care they did covid and influenza and were negative       Patient Active Problem List   Diagnosis    Acute leukemia (H)    Acute lymphoblastic leukemia (ALL) (H)    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)    Prophylaxis for chemotherapy-induced neutropenia    Morbid obesity (H)    Disseminated zoster    ALL (acute lymphoblastic leukemia) (H)    ALL (acute lymphocytic leukemia) (H)    Adjustment and management of vascular access device    Sepsis (H)    B-cell acute lymphoblastic leukemia (ALL) (H)    Encounter for antineoplastic immunotherapy    Bilateral hand numbness    Bilateral carpal tunnel syndrome     Past Surgical History:   Procedure Laterality Date    GI SURGERY      GYN SURGERY      IR BONE BIOPSY DEEP RIGHT  10/25/2022    IR BONE BIOPSY DEEP RIGHT  01/17/2023    IR BONE BIOPSY DEEP RIGHT  03/01/2023    IR CHEST PORT PLACEMENT > 5 YRS OF AGE  11/23/2022    IR LUMBAR PUNCTURE  10/21/2022    IR PORT REMOVAL RIGHT  12/1/2023    IR PORT REPLACEMENT CATHETER ONLY RIGHT  01/17/2023    PICC Left 09/30/2022    Picc ok to use    PICC DOUBLE LUMEN PLACEMENT Left 08/20/2022    left cephalic 5 fr dl picc 44 cm    PICC INSERTION - DOUBLE LUMEN Right 12/31/2022    41cm (1cm external), Cephalic vein    REMOVE PORT VASCULAR ACCESS Right 12/1/2023    Procedure: Remove port vascular access right;  Surgeon: Ernesto Bartlett MD;  Location: [facility] OR    TUBAL LIGATION  2009    [facility] NONSPECIFIC PROCEDURE  01/01/1999    benign mole removal       Social History     Tobacco Use    Smoking status: Never     Passive exposure: Past    Smokeless tobacco: Never   Substance Use Topics    Alcohol use: Yes     Alcohol/week: 2.0 standard drinks of alcohol     Types: 2 Glasses of wine per week     Comment: in frequent. once a month.     Family History   Problem Relation Age of Onset     Chronic Obstructive Pulmonary Disease Mother          in her sleep 61    Coronary Artery Disease Father 70        MI at 69yo; CABG x3    Diabetes Type 2  Father     Chronic Obstructive Pulmonary Disease Father         former smoker    No Known Problems Sister            Reviewed and updated as needed this visit by Provider    Allergies  Meds               Review of Systems   Constitutional, HEENT, cardiovascular, pulmonary, gi and gu systems are negative, except as otherwise noted.       Objective   Reported vitals:  There were no vitals taken for this visit.   healthy, alert, and no distress  PSYCH: Alert and oriented times 3; coherent speech, normal   rate and volume, able to articulate logical thoughts, able   to abstract reason, no tangential thoughts, no hallucinations   or delusions  Her affect is normal  RESP: No cough, no audible wheezing, able to talk in full sentences  Additional exam:  none  Remainder of exam unable to be completed due to telephone visits    Diagnostic Test Results:  Labs reviewed in Epic        Assessment/Plan:      ICD-10-CM    1. Shortness of breath  R06.02       2. Immunocompromised  D84.9         Recommend in person evaluation with report of shortness of breath and being immunocompromised     Joined the call at 3/31/2025, 12:01:10 pm.  Left the call at 3/31/2025, 12:10:01 pm.  You were on the call for 8 minutes 50 seconds   [facility]  Patient home  Provider home    Paula Fitzgerald MD

## 2025-04-01 ENCOUNTER — LAB (OUTPATIENT)
Dept: LAB | Facility: CLINIC | Age: 45
End: 2025-04-01
Payer: COMMERCIAL

## 2025-04-01 ENCOUNTER — ANCILLARY PROCEDURE (OUTPATIENT)
Dept: GENERAL RADIOLOGY | Facility: CLINIC | Age: 45
End: 2025-04-01
Attending: PHYSICIAN ASSISTANT
Payer: COMMERCIAL

## 2025-04-01 ENCOUNTER — OFFICE VISIT (OUTPATIENT)
Dept: URGENT CARE | Facility: URGENT CARE | Age: 45
End: 2025-04-01
Payer: COMMERCIAL

## 2025-04-01 VITALS
TEMPERATURE: 99.5 F | SYSTOLIC BLOOD PRESSURE: 118 MMHG | HEART RATE: 85 BPM | DIASTOLIC BLOOD PRESSURE: 77 MMHG | OXYGEN SATURATION: 98 % | RESPIRATION RATE: 16 BRPM

## 2025-04-01 DIAGNOSIS — J20.9 ACUTE BRONCHITIS WITH SYMPTOMS > 10 DAYS: Primary | ICD-10-CM

## 2025-04-01 DIAGNOSIS — C91.00 B-CELL ACUTE LYMPHOBLASTIC LEUKEMIA (ALL) (H): ICD-10-CM

## 2025-04-01 DIAGNOSIS — J20.9 ACUTE BRONCHITIS WITH SYMPTOMS > 10 DAYS: ICD-10-CM

## 2025-04-01 DIAGNOSIS — Z79.899 ENCOUNTER FOR LONG-TERM (CURRENT) USE OF MEDICATIONS: ICD-10-CM

## 2025-04-01 DIAGNOSIS — J02.9 ACUTE PHARYNGITIS, UNSPECIFIED ETIOLOGY: ICD-10-CM

## 2025-04-01 DIAGNOSIS — C91.00 ACUTE LYMPHOBLASTIC LEUKEMIA (ALL) NOT HAVING ACHIEVED REMISSION (H): ICD-10-CM

## 2025-04-01 LAB
ALBUMIN SERPL BCG-MCNC: 4.2 G/DL (ref 3.5–5.2)
ALP SERPL-CCNC: 86 U/L (ref 40–150)
ALT SERPL W P-5'-P-CCNC: 28 U/L (ref 0–50)
ANION GAP SERPL CALCULATED.3IONS-SCNC: 11 MMOL/L (ref 7–15)
AST SERPL W P-5'-P-CCNC: 24 U/L (ref 0–45)
BASOPHILS # BLD AUTO: 0 10E3/UL (ref 0–0.2)
BASOPHILS NFR BLD AUTO: 0 %
BILIRUB SERPL-MCNC: 0.4 MG/DL
BUN SERPL-MCNC: 13.9 MG/DL (ref 6–20)
CALCIUM SERPL-MCNC: 9.5 MG/DL (ref 8.8–10.4)
CHLORIDE SERPL-SCNC: 102 MMOL/L (ref 98–107)
CREAT SERPL-MCNC: 0.77 MG/DL (ref 0.51–0.95)
DEPRECATED S PYO AG THROAT QL EIA: NEGATIVE
EGFRCR SERPLBLD CKD-EPI 2021: >90 ML/MIN/1.73M2
EOSINOPHIL # BLD AUTO: 0.3 10E3/UL (ref 0–0.7)
EOSINOPHIL NFR BLD AUTO: 3 %
ERYTHROCYTE [DISTWIDTH] IN BLOOD BY AUTOMATED COUNT: 13.8 % (ref 10–15)
GLUCOSE SERPL-MCNC: 135 MG/DL (ref 70–99)
HCO3 SERPL-SCNC: 26 MMOL/L (ref 22–29)
HCT VFR BLD AUTO: 38.8 % (ref 35–47)
HGB BLD-MCNC: 12.9 G/DL (ref 11.7–15.7)
IMM GRANULOCYTES # BLD: 0 10E3/UL
IMM GRANULOCYTES NFR BLD: 0 %
LYMPHOCYTES # BLD AUTO: 1.9 10E3/UL (ref 0.8–5.3)
LYMPHOCYTES NFR BLD AUTO: 19 %
MAGNESIUM SERPL-MCNC: 1.9 MG/DL (ref 1.7–2.3)
MCH RBC QN AUTO: 31.1 PG (ref 26.5–33)
MCHC RBC AUTO-ENTMCNC: 33.2 G/DL (ref 31.5–36.5)
MCV RBC AUTO: 94 FL (ref 78–100)
MONOCYTES # BLD AUTO: 0.8 10E3/UL (ref 0–1.3)
MONOCYTES NFR BLD AUTO: 8 %
NEUTROPHILS # BLD AUTO: 6.9 10E3/UL (ref 1.6–8.3)
NEUTROPHILS NFR BLD AUTO: 69 %
NRBC # BLD AUTO: 0 10E3/UL
NRBC BLD AUTO-RTO: 0 /100
PHOSPHATE SERPL-MCNC: 3.7 MG/DL (ref 2.5–4.5)
PLATELET # BLD AUTO: 275 10E3/UL (ref 150–450)
POTASSIUM SERPL-SCNC: 4.2 MMOL/L (ref 3.4–5.3)
PROT SERPL-MCNC: 7.3 G/DL (ref 6.4–8.3)
RBC # BLD AUTO: 4.15 10E6/UL (ref 3.8–5.2)
S PYO DNA THROAT QL NAA+PROBE: NOT DETECTED
SODIUM SERPL-SCNC: 139 MMOL/L (ref 135–145)
WBC # BLD AUTO: 10 10E3/UL (ref 4–11)

## 2025-04-01 PROCEDURE — 87651 STREP A DNA AMP PROBE: CPT | Performed by: PHYSICIAN ASSISTANT

## 2025-04-01 PROCEDURE — 71046 X-RAY EXAM CHEST 2 VIEWS: CPT | Mod: TC | Performed by: RADIOLOGY

## 2025-04-01 PROCEDURE — 99213 OFFICE O/P EST LOW 20 MIN: CPT | Performed by: PHYSICIAN ASSISTANT

## 2025-04-01 PROCEDURE — 3074F SYST BP LT 130 MM HG: CPT | Performed by: PHYSICIAN ASSISTANT

## 2025-04-01 PROCEDURE — 3078F DIAST BP <80 MM HG: CPT | Performed by: PHYSICIAN ASSISTANT

## 2025-04-01 RX ORDER — DEXAMETHASONE SODIUM PHOSPHATE 10 MG/ML
10 INJECTION INTRAMUSCULAR; INTRAVENOUS ONCE
Status: COMPLETED | OUTPATIENT
Start: 2025-04-01 | End: 2025-04-01

## 2025-04-01 RX ADMIN — DEXAMETHASONE SODIUM PHOSPHATE 10 MG: 10 INJECTION INTRAMUSCULAR; INTRAVENOUS at 12:33

## 2025-04-01 NOTE — PROGRESS NOTES
Assessment & Plan       (J20.9) Acute bronchitis with symptoms > 10 days  (primary encounter diagnosis)  Comment:   Plan: XR Chest 2 Views, Streptococcus A Rapid Screen         w/Reflex to PCR - Clinic Collect            (J02.9) Acute pharyngitis, unspecified etiology  Comment:   Plan: dexAMETHasone (DECADRON) injectable solution         used ORALLY 10 mg            The patient is a 45-year-old female with a past medical history of acute lymphoblastic leukemia (currently being treated with dasatinib) who presents for evaluation of acute cough with phlegm production, onset 10 days ago.     Vital signs are reassuring and within normal limits.  Patient is afebrile currently.  Pulmonary exam is within normal limits.  The patient does experience increased work of breathing with activity but otherwise has no shortness of breath.  No chest pain or diaphoresis.  Completed a chest x-ray given her concerns for feeling short of breath with activity, no acute cardiopulmonary concerns identified.   CBC completed earlier today shows WBCs within normal limits, hemoglobin within normal limits.    No findings consistent with tonsillar abscess or deep soft tissue infection of the neck on exam.  Tonsillar swelling on the right side is noted on exam, likely at baseline.  Administered Decadron for symptomatic relief of mild posterior oropharyngeal swelling.    She does have a cough which is lasted over the past 10 days, productive.  Given she is immunocompromised, reasonable to start Augmentin today.  Negative test results for strep pharyngitis.    Advise urgent medical evaluation if you develop fever of 102 degrees F or greater, chest pain, difficulty breathing, palpitations, shortness of breath, difficulty swallowing, severe headache, worsening numbness or tingling or weakness, dizziness or lightheadedness, acute vision changes, acutely worsening rash, severe abdominal pain, or uncontrolled nausea/vomiting.        Issa Domínguez,  [facility] D[facility] AKBAR Kingston is a 45 year old female who presents to clinic today for the following health issues:  Chief Complaint   Patient presents with    Urgent Care     Pt presents throat pain(from coughing a lot), SOB(with standard activities), cough with thick phlegm, onset 10 days. Pt tested negative for flu, strep and covid 3/26.        SONAL Kingston is a 45-year-old female with a past medical history of acute lymphoblastic leukemia (currently being treated with dasatinib) who presents for evaluation of acute cough with phlegm production, onset 10 days ago.  Also has a worsening sore throat.  Describes having some tonsillar swelling on the right side at baseline.  She describes having thick, yellowish-green phlegm production on occasion over the past 10 days.  No wheezing currently.  No shortness of breath at rest, no chest pain.  Describes having increased work of breathing with activities and wearing a mask but otherwise not short of breath. She was previously evaluated in urgent care on 3/26/2025, tested negative for influenza and COVID-19.  She previously had URI symptoms before 10 days ago which affected her sinuses more, now resolved.  No history of asthma, tobacco or marijuana use.  No previous diagnoses of reactive airway.  No inhaler use in the past.          Review of Systems  Constitutional, HEENT, cardiovascular, pulmonary, GI, , musculoskeletal, neuro, skin, endocrine and psych systems are negative, except as otherwise noted.      Objective    /77   Pulse 85   Temp 99.5  F (37.5  C) (Tympanic)   Resp 16   LMP  (LMP Unknown)   SpO2 98%   Physical Exam  Constitutional:       General: She is not in acute distress.     Appearance: Normal appearance. She is not ill-appearing, toxic-appearing or diaphoretic.   HENT:      Head: Normocephalic and atraumatic.      Right Ear: No middle ear effusion. No mastoid tenderness. Tympanic membrane is not  injected, perforated, erythematous, retracted or bulging.      Left Ear:  No middle ear effusion. No mastoid tenderness. Tympanic membrane is not injected, perforated, erythematous, retracted or bulging.      Nose: Nose normal. No congestion or rhinorrhea.      Mouth/Throat:      Mouth: Mucous membranes are moist.      Pharynx: Pharyngeal swelling and posterior oropharyngeal erythema present. No oropharyngeal exudate.      Tonsils: 2+ on the right. 0 on the left.      Comments: Right tonsillar swelling, graded 2+.  No voice change, airway is patent.  Cardiovascular:      Rate and Rhythm: Normal rate and regular rhythm.      Pulses: Normal pulses.      Heart sounds: Normal heart sounds. No murmur heard.  Pulmonary:      Effort: Pulmonary effort is normal. No respiratory distress.      Breath sounds: Normal breath sounds. No stridor. No wheezing or rhonchi.   Musculoskeletal:      Cervical back: Neck supple. No rigidity or tenderness. No muscular tenderness.   Lymphadenopathy:      Cervical: No cervical adenopathy.      Right cervical: No superficial, deep or posterior cervical adenopathy.     Left cervical: No superficial, deep or posterior cervical adenopathy.   Neurological:      Mental Status: She is alert and oriented to person, place, and time.      Sensory: No sensory deficit.

## 2025-04-02 ENCOUNTER — TELEPHONE (OUTPATIENT)
Dept: URGENT CARE | Facility: URGENT CARE | Age: 45
End: 2025-04-02
Payer: COMMERCIAL

## 2025-04-02 ENCOUNTER — NURSE TRIAGE (OUTPATIENT)
Dept: NURSING | Facility: CLINIC | Age: 45
End: 2025-04-02
Payer: COMMERCIAL

## 2025-04-02 NOTE — TELEPHONE ENCOUNTER
Pt notified Augmentin sent to her preferred pharmacy. Pt voiced understanding.    [id number]  [place], [place] - [address] (Ph: [phone])     Take 1 tablet by mouth 2 times daily for 7 days., Disp-14 tablet, R-0, E-Prescribe   Dispense: 14 tablet     Jona Shaw CMA on 4/2/2025 at 1:48 PM

## 2025-04-02 NOTE — ORAL ONC MGMT
Oral Chemotherapy Monitoring Program  Lab Follow Up    Reviewed lab results from 4/1.        12/24/2024     9:00 AM 12/30/2024    12:00 PM 1/7/2025     2:00 PM 1/21/2025    10:00 AM 2/27/2025    10:00 AM 3/5/2025     9:00 AM 4/2/2025    12:00 PM   ORAL CHEMOTHERAPY   Assessment Type Lab Monitoring Refill Refill;Other Lab Monitoring Lab Monitoring;Monthly Follow up Refill Lab Monitoring   Diagnosis Code Acute Lymphoblastic Leukemia (ALL) Acute Lymphoblastic Leukemia (ALL) Acute Lymphoblastic Leukemia (ALL) Acute Lymphoblastic Leukemia (ALL) Acute Lymphoblastic Leukemia (ALL) Acute Lymphoblastic Leukemia (ALL) Acute Lymphoblastic Leukemia (ALL)   Providers Della Hull   [facility]   Drug Name Sprycel (dasatinib) Sprycel (dasatinib) Sprycel (dasatinib) Sprycel (dasatinib) Sprycel (dasatinib) Sprycel (dasatinib) Sprycel (dasatinib)   Dose 100 mg   100 mg 100 mg 100 mg 100 mg   Current Schedule Daily   Daily Daily Daily Daily   Cycle Details Continuous   Continuous Continuous Continuous Continuous   Adverse Effects    No AE identified during assessment          Labs:  Lab Results   Component Value Date     04/01/2025    POTASSIUM 4.2 04/01/2025    MAG 1.9 04/01/2025    CR 0.77 04/01/2025    SOLEDAD 9.5 04/01/2025    BILITOTAL 0.4 04/01/2025    ALBUMIN 4.2 04/01/2025    ALT 28 04/01/2025    AST 24 04/01/2025     Lab Results   Component Value Date    URIC 2.9 01/06/2023              Lab Results   Component Value Date    HGB 12.9 04/01/2025    WBC 10.0 04/01/2025    ANEU 1.0 (L) 02/20/2023    ANEUTAUTO 6.9 04/01/2025     04/01/2025       Assessment & Plan:  No new concerning abnormalities on CBC, CMP, Mg, Phos resutls.   Continue dasatinib as prescribed.     [employer] message sent to patient.    Follow-Up:  4/23 labs  5/21 labs  6/25 labs and visit with Dr. Della Watts, PharmD  Oral Chemotherapy Monitoring  Program  [facility]  [phone]

## 2025-04-02 NOTE — TELEPHONE ENCOUNTER
Pt following up on yesterday 4/1/25 urgent care visit. Pt understood that she was supposed to start Augmentin for cough > 10 days.     Routing to Urgent care provider; please review and advise. If request is approved, please place order and send to following location:     Pharm: [facility]

## 2025-04-03 NOTE — TELEPHONE ENCOUNTER
Seen yesterday for throat inflammation and cough. While in clinic given 10 mg dose of liquid steroid. It helped however it's worn off. It's burning and painful. Wants prescription called in. She declined triage. I advised she call her clinic in the morning since urgent care would want  her to be seen again. She understands.  Eryn Johns RN  [facility]    Reason for Disposition   Prescription request for new medicine (not a refill)    Additional Information   Negative: [1] Intentional drug overdose AND [2] suicidal thoughts or ideas   Negative: Drug overdose and triager unable to answer question   Negative: Caller requesting a renewal or refill of a medicine patient is currently taking   Negative: Caller requesting information unrelated to medicine   Negative: Caller requesting information about COVID-19 Vaccine   Negative: Caller requesting information about Emergency Contraception   Negative: Caller requesting information about Combined Birth Control Pills   Negative: Caller requesting information about Progestin Birth Control Pills   Negative: Caller requesting information about Post-Op pain or medicines   Negative: Caller requesting a prescription antibiotic (such as Penicillin) for Strep throat and has a positive culture result   Negative: Caller requesting a prescription anti-viral med (such as Tamiflu) and has influenza (flu) symptoms   Negative: Immunization reaction suspected   Negative: Rash while taking a medicine or within 3 days of stopping it   Negative: [1] Asthma and [2] having symptoms of asthma (cough, wheezing, etc.)   Negative: [1] Symptom of illness (e.g., headache, abdominal pain, earache, vomiting) AND [2] more than mild   Negative: Breastfeeding questions about mother's medicines and diet   Negative: MORE THAN A DOUBLE DOSE of a prescription or over-the-counter (OTC) drug   Negative: [1] DOUBLE DOSE (an extra dose or lesser amount) of prescription drug AND [2] any symptoms  (e.g., dizziness, nausea, pain, sleepiness)   Negative: [1] DOUBLE DOSE (an extra dose or lesser amount) of over-the-counter (OTC) drug AND [2] any symptoms (e.g., dizziness, nausea, pain, sleepiness)   Negative: Took another person's prescription drug   Negative: [1] DOUBLE DOSE (an extra dose or lesser amount) of prescription drug AND [2] NO symptoms  (Exception: A double dose of antibiotics.)   Negative: Diabetes drug error or overdose (e.g., took wrong type of insulin or took extra dose)   Negative: [1] Prescription not at pharmacy AND [2] was prescribed by PCP recently (Exception: Triager has access to EMR and prescription is recorded there. Go to Home Care and confirm for pharmacy.)   Negative: [1] Pharmacy calling with prescription question AND [2] triager unable to answer question   Negative: [1] Caller has URGENT medicine question about med that PCP or specialist prescribed AND [2] triager unable to answer question   Negative: Medicine patch causing local rash or itching   Negative: [1] Caller has medicine question about med NOT prescribed by PCP AND [2] triager unable to answer question (e.g., compatibility with other med, storage)    Protocols used: Medication Question Call-A-

## 2025-04-09 ENCOUNTER — OFFICE VISIT (OUTPATIENT)
Dept: FAMILY MEDICINE | Facility: CLINIC | Age: 45
End: 2025-04-09
Payer: COMMERCIAL

## 2025-04-09 VITALS
DIASTOLIC BLOOD PRESSURE: 82 MMHG | HEIGHT: 64 IN | RESPIRATION RATE: 18 BRPM | HEART RATE: 77 BPM | BODY MASS INDEX: 43.18 KG/M2 | OXYGEN SATURATION: 98 % | TEMPERATURE: 98.2 F | SYSTOLIC BLOOD PRESSURE: 130 MMHG

## 2025-04-09 DIAGNOSIS — Z51.81 ENCOUNTER FOR THERAPEUTIC DRUG MONITORING: ICD-10-CM

## 2025-04-09 DIAGNOSIS — R05.1 ACUTE COUGH: ICD-10-CM

## 2025-04-09 DIAGNOSIS — J02.9 SORE THROAT: Primary | ICD-10-CM

## 2025-04-09 LAB
B PARAPERT DNA SPEC QL NAA+PROBE: NOT DETECTED
B PERT DNA SPEC QL NAA+PROBE: NOT DETECTED

## 2025-04-09 PROCEDURE — 3079F DIAST BP 80-89 MM HG: CPT | Performed by: PHYSICIAN ASSISTANT

## 2025-04-09 PROCEDURE — 87798 DETECT AGENT NOS DNA AMP: CPT | Performed by: PHYSICIAN ASSISTANT

## 2025-04-09 PROCEDURE — G2211 COMPLEX E/M VISIT ADD ON: HCPCS | Performed by: PHYSICIAN ASSISTANT

## 2025-04-09 PROCEDURE — 86665 EPSTEIN-BARR CAPSID VCA: CPT | Performed by: PHYSICIAN ASSISTANT

## 2025-04-09 PROCEDURE — 1125F AMNT PAIN NOTED PAIN PRSNT: CPT | Performed by: PHYSICIAN ASSISTANT

## 2025-04-09 PROCEDURE — 3075F SYST BP GE 130 - 139MM HG: CPT | Performed by: PHYSICIAN ASSISTANT

## 2025-04-09 PROCEDURE — 36415 COLL VENOUS BLD VENIPUNCTURE: CPT | Performed by: PHYSICIAN ASSISTANT

## 2025-04-09 PROCEDURE — 93000 ELECTROCARDIOGRAM COMPLETE: CPT | Performed by: PHYSICIAN ASSISTANT

## 2025-04-09 PROCEDURE — 99214 OFFICE O/P EST MOD 30 MIN: CPT | Performed by: PHYSICIAN ASSISTANT

## 2025-04-09 ASSESSMENT — ENCOUNTER SYMPTOMS
SORE THROAT: 1
COUGH: 1

## 2025-04-09 ASSESSMENT — PAIN SCALES - GENERAL: PAINLEVEL_OUTOF10: MODERATE PAIN (5)

## 2025-04-09 NOTE — PATIENT INSTRUCTIONS
Instructions from Today's Visit:  Testing for Isidro Hsu - which is mono. This is a virus. Will get better on its own. No specific treatment. Avoid high risk activity that could cause abdominal trauma.     Swab for pertussis. This is whooping cough. I do not see you have a current vaccine to protect from this. This is treated with azithromycin which can interact with the sprycel (updating EKG).     If all this testing is negative.   Would consider doxycycline 100mg twice daily. Antibiotic to cover for atypical pneumonia and sinuses.     I can see that thick white drainage which is likely the cause of the sore throat.       General Instructions After Your Visit  If you have been seen for a concern and are worsening or not improving as expected, please schedule a follow-up visit or reach out to a member of our care team or nurses if it is urgent.  We have Nurse advice available 24/7 by calling [phone].  For emergencies, please call [phone].    My Clinic Hours  I am in the office Mondays, Wednesdays, and Thursdays. Messages received outside of normal clinic hours and on my days out of the office will be reviewed by our [place] care team, but may not be addressed by me personally until I am back in the office. If you have concerns that cannot wait for my return please call the Nurse advise line [phone].    Test results  You may see your lab or test results before we can make recommendations. This is common, as sometimes we are awaiting on other labs to return or we are out of office on a particular day. Please be patient, and if you don't see a response from me or one of my colleagues within 2-3 business days, and you have a specific concern, please send us a message.    Refills  If you have run out of refills, please schedule a visit. This is generally an indication that you are due for a follow-up visit. All prescriptions are only valid for 1 year from the date written and need a visit annually to renew.  Most mental health and chronic diseases we are treating (diabetes, high blood pressure, etc) require some type of visit every 6 months. We are now offering video visits! However, if physical exams are needed or it is a complex concern, we may ask you to be seen in person.    Physicals & Preventative Visits   These appointment slots fill up fast. Please consider scheduling these 2-3 months in advance to allow for the appointment time that fits you best. If you have medications ordered or other issues addressed that are not preventive at these visits, please be aware there are extra costs associated with this.

## 2025-04-09 NOTE — PROGRESS NOTES
Assessment & Plan     Sore throat  Negative strep, influenza and covid.   On exam today she has copious white drainage in posterior oropharynx from sinuses. She is finishing augmentin. I suspect this is irritating and at least partially the cause of the throat pain.  Will test for mononucleosis. I have low suspicion for this, however is on immune suppressive meds.  With her cancer history she is concerned for sx that are not improving- discussed ref to ENT if needed. She will reach out to her   - EBV Capsid Antibody IgG; Future  - EBV Capsid Antibody IgG  - EBV Capsid Antibody IgG; Future  - EBV Capsid Antibody IgG  - EBV Capsid Antibody IgM; Future    Acute cough  She has had persistent cough.   She has not had a Tdap with pertussis coverage. There are increased local rates of pertussis. Will test.  Increased local rates of mycoplasma pneumonia as well. If above testing is negative- discussed course of doxycycline.   - B. pertussis/parapertussis PCR-NP; Future  - B. pertussis/parapertussis PCR-NP    Encounter for therapeutic drug monitoring  Azithromycin is preferred treatment for pertussis , however long QT risk with her sprycel. Update EKG to assess QT and QTc  - EKG 12-lead complete w/read - Clinics        MED REC REQUIRED  Post Medication Reconciliation Status:       Follow Up: see above. Additionally patient was instructed to contact clinic for worsening symptoms, non-improvement in time frame discussed, and for questions regarding treatment plan.   For virtual visits, the patient was advised to be seen for in person evaluation if symptoms or condition are worsening or non-improvement as expected.   Abby Bagley PA-C    Matt Kingston is a 45 year old, presenting for the following health issues:  Pharyngitis and Cough        4/9/2025    10:22 AM   Additional Questions   Roomed by bt   Accompanied by self         4/9/2025   Declines Weight   Did patient decline having their weight taken? Yes          4/9/2025    10:22 AM   Patient Reported Additional Medications   Patient reports taking the following new medications augmentin, benzontate perles     Pharyngitis   Associated symptoms include cough.   Cough  Associated symptoms include sore throat.         ALL- Sprycel immunosuppressant.     Acute Illness  Acute illness concerns: cough and sore throat   Onset/Duration: 16 days --     I was sick a month ago- nasal congestion/sinus congestion. Took a week to get over. Then I was good for a week, then all the sudden I was hit with congestion again. This time it came with sore throat and persistent cough. A lot of phlegm and thick phlegm. Phlegm is yellow. SOB with activity.     I was seen in urgent care 04/01/25- they did a chest xray - negative. Oral decadron and augmentin.   Steroid helped for 18 hours.   Then went to the ER [facility]- another dose of decadron.  Helped for a day.  Then 04/04/25 went to [facility]. No testing. Treated with Prednisone x 5 days     Negative covid   Negative influenza   Negative strep testing (twice)    Now- having had 3 rounds of steroids and augmentin (finishing still) - nothing is better. I am still stuffy. I still have the cough. I still have the sore throat. My voice is hoarse. Nothing is better.   Steroid takes away throat pain.   My right tonsil is always larger than left. That is not the issue. No hx of tonsillitis.   No fevers, chills, nightsweats, headaches, neck pain.   +little ear pain- mild.  Cough is maybe little less frequent. But fits of cough.  No CP.  No abd pain. No change to BMs. No diarrhea.   No urinary sx  No rashes.     No hx of mono that she is aware of.        Symptoms:  Fever: No  Chills/Sweats: No  Headache (location?): No  Sinus Pressure: No  Conjunctivitis:  No  Ear Pain: YES: left  Rhinorrhea: No  Congestion: YES  Sore Throat: YES  Cough: YES-productive of yellow sputum  Wheeze: No  Decreased Appetite: No  Nausea: No  Vomiting:  "No  Diarrhea: No  Dysuria/Freq.: No  Dysuria or Hematuria: No  Fatigue/Achiness: No  Sick/Strep Exposure: No  Therapies tried and outcome: augmentin, oral steroid, prednisone, coughing pills           Review of Systems  Constitutional, HEENT, cardiovascular, pulmonary, gi and gu systems are negative, except as otherwise noted.      Objective    /82   Pulse 77   Temp 98.2  F (36.8  C) (Temporal)   Resp 18   Ht 1.626 m (5' 4.02\")   LMP  (LMP Unknown)   SpO2 98%   BMI 43.18 kg/m    Body mass index is 43.18 kg/m .  Physical Exam   GENERAL: alert and no distress  EYES: Eyes grossly normal to inspection, PERRL and conjunctivae and sclerae normal  HENT: Normal cephalic/atraumatic. Right ear: canal clear and TM's normal, no effusion.  Left ear: canal clear and TM's normal, no effusion.  Nose mucosa: congested. Sinus are nontender.  Lips normal, no lesions. Buccal muscosa moist. Soft palate no lesions or ulcers. Tonsils normal, no erythema, no exudates. Uvula midline. Posterior oropharynx with thick white drainage from sinuses.   NECK: no adenopathy, no asymmetry, masses, or scars  RESP: +cough, lungs clear to auscultation - no rales, rhonchi or wheezes  CV: regular rate and rhythm, normal S1 S2, no S3 or S4, no murmur, click or rub, no peripheral edema  ABDOMEN: soft, nontender, no hepatosplenomegaly, no masses and bowel sounds normal  MS: no gross musculoskeletal defects noted, no edema  NEURO: Normal strength and tone, mentation intact and speech normal  PSYCH: mentation appears normal, affect normal/bright    No results found for any visits on 04/09/25.        Signed Electronically by: Abby Bagley PA-C    "

## 2025-04-10 LAB
EBV VCA IGG SER IA-ACNC: 219 U/ML
EBV VCA IGG SER IA-ACNC: POSITIVE

## 2025-04-23 ENCOUNTER — MYC MEDICAL ADVICE (OUTPATIENT)
Dept: ONCOLOGY | Facility: CLINIC | Age: 45
End: 2025-04-23

## 2025-04-23 ENCOUNTER — LAB (OUTPATIENT)
Dept: LAB | Facility: CLINIC | Age: 45
End: 2025-04-23
Attending: INTERNAL MEDICINE
Payer: COMMERCIAL

## 2025-04-23 DIAGNOSIS — C91.00 ACUTE LYMPHOBLASTIC LEUKEMIA (ALL) NOT HAVING ACHIEVED REMISSION (H): ICD-10-CM

## 2025-04-23 DIAGNOSIS — Z79.899 ENCOUNTER FOR LONG-TERM (CURRENT) USE OF MEDICATIONS: ICD-10-CM

## 2025-04-23 DIAGNOSIS — C91.00 B-CELL ACUTE LYMPHOBLASTIC LEUKEMIA (ALL) (H): ICD-10-CM

## 2025-04-23 LAB
ALBUMIN SERPL BCG-MCNC: 4.3 G/DL (ref 3.5–5.2)
ALP SERPL-CCNC: 84 U/L (ref 40–150)
ALT SERPL W P-5'-P-CCNC: 30 U/L (ref 0–50)
ANION GAP SERPL CALCULATED.3IONS-SCNC: 9 MMOL/L (ref 7–15)
AST SERPL W P-5'-P-CCNC: 25 U/L (ref 0–45)
BASOPHILS # BLD AUTO: 0.1 10E3/UL (ref 0–0.2)
BASOPHILS NFR BLD AUTO: 1 %
BILIRUB SERPL-MCNC: 0.3 MG/DL
BUN SERPL-MCNC: 11.4 MG/DL (ref 6–20)
CALCIUM SERPL-MCNC: 9.6 MG/DL (ref 8.8–10.4)
CHLORIDE SERPL-SCNC: 102 MMOL/L (ref 98–107)
CREAT SERPL-MCNC: 0.68 MG/DL (ref 0.51–0.95)
EGFRCR SERPLBLD CKD-EPI 2021: >90 ML/MIN/1.73M2
EOSINOPHIL # BLD AUTO: 0.3 10E3/UL (ref 0–0.7)
EOSINOPHIL NFR BLD AUTO: 4 %
ERYTHROCYTE [DISTWIDTH] IN BLOOD BY AUTOMATED COUNT: 14.6 % (ref 10–15)
GLUCOSE SERPL-MCNC: 94 MG/DL (ref 70–99)
HCO3 SERPL-SCNC: 28 MMOL/L (ref 22–29)
HCT VFR BLD AUTO: 40.6 % (ref 35–47)
HGB BLD-MCNC: 13.2 G/DL (ref 11.7–15.7)
IMM GRANULOCYTES # BLD: 0 10E3/UL
IMM GRANULOCYTES NFR BLD: 0 %
LAB DIRECTOR COMMENTS: NORMAL
LAB DIRECTOR DISCLAIMER: NORMAL
LAB DIRECTOR INTERPRETATION: NORMAL
LAB DIRECTOR METHODOLOGY: NORMAL
LAB DIRECTOR RESULTS: NORMAL
LYMPHOCYTES # BLD AUTO: 2.5 10E3/UL (ref 0.8–5.3)
LYMPHOCYTES NFR BLD AUTO: 31 %
MAGNESIUM SERPL-MCNC: 1.9 MG/DL (ref 1.7–2.3)
MCH RBC QN AUTO: 30.8 PG (ref 26.5–33)
MCHC RBC AUTO-ENTMCNC: 32.5 G/DL (ref 31.5–36.5)
MCV RBC AUTO: 95 FL (ref 78–100)
MONOCYTES # BLD AUTO: 0.5 10E3/UL (ref 0–1.3)
MONOCYTES NFR BLD AUTO: 7 %
NEUTROPHILS # BLD AUTO: 4.6 10E3/UL (ref 1.6–8.3)
NEUTROPHILS NFR BLD AUTO: 58 %
NRBC # BLD AUTO: 0 10E3/UL
NRBC BLD AUTO-RTO: 0 /100
PHOSPHATE SERPL-MCNC: 3.3 MG/DL (ref 2.5–4.5)
PLATELET # BLD AUTO: 264 10E3/UL (ref 150–450)
POTASSIUM SERPL-SCNC: 3.9 MMOL/L (ref 3.4–5.3)
PROT SERPL-MCNC: 7.2 G/DL (ref 6.4–8.3)
RBC # BLD AUTO: 4.28 10E6/UL (ref 3.8–5.2)
SODIUM SERPL-SCNC: 139 MMOL/L (ref 135–145)
SPECIMEN TYPE: NORMAL
WBC # BLD AUTO: 7.9 10E3/UL (ref 4–11)

## 2025-04-23 PROCEDURE — 84100 ASSAY OF PHOSPHORUS: CPT

## 2025-04-23 PROCEDURE — 80053 COMPREHEN METABOLIC PANEL: CPT

## 2025-04-23 PROCEDURE — 36415 COLL VENOUS BLD VENIPUNCTURE: CPT

## 2025-04-23 PROCEDURE — G0452 MOLECULAR PATHOLOGY INTERPR: HCPCS | Mod: 26 | Performed by: PATHOLOGY

## 2025-04-23 PROCEDURE — 83735 ASSAY OF MAGNESIUM: CPT

## 2025-04-23 PROCEDURE — 81207 BCR/ABL1 GENE MINOR BP: CPT

## 2025-04-23 PROCEDURE — 85004 AUTOMATED DIFF WBC COUNT: CPT

## 2025-04-23 NOTE — ORAL ONC MGMT
Oral Chemotherapy Monitoring Program  Lab Follow Up    Reviewed lab results from 4/23/25.        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 Lymphoblastic Leukemia (ALL) Acute Lymphoblastic Leukemia (ALL)   Providers Della Della Della Della Della Della Della   [facility]   Drug Name Sprycel (dasatinib) Sprycel (dasatinib) Sprycel (dasatinib) Sprycel (dasatinib) Sprycel (dasatinib) Sprycel (dasatinib) Sprycel (dasatinib)   Dose   100 mg 100 mg 100 mg 100 mg 100 mg   Current Schedule   Daily Daily Daily Daily Daily   Cycle Details   Continuous Continuous Continuous Continuous Continuous   Adverse Effects   No AE identified during assessment    No AE identified during assessment       Labs:  _  Result Component Current Result Ref Range   Sodium 139 (4/23/2025) 135 - 145 mmol/L     _  Result Component Current Result Ref Range   Potassium 3.9 (4/23/2025) 3.4 - 5.3 mmol/L     _  Result Component Current Result Ref Range   Calcium 9.6 (4/23/2025) 8.8 - 10.4 mg/dL     _  Result Component Current Result Ref Range   Magnesium 1.9 (4/23/2025) 1.7 - 2.3 mg/dL     _  Result Component Current Result Ref Range   Phosphorus 3.3 (4/23/2025) 2.5 - 4.5 mg/dL     _  Result Component Current Result Ref Range   Albumin 4.3 (4/23/2025) 3.5 - 5.2 g/dL     _  Result Component Current Result Ref Range   Urea Nitrogen 11.4 (4/23/2025) 6.0 - 20.0 mg/dL     _  Result Component Current Result Ref Range   Creatinine 0.68 (4/23/2025) 0.51 -  0.95 mg/dL     _  Result Component Current Result Ref Range   AST 25 (4/23/2025) 0 - 45 U/L     _  Result Component Current Result Ref Range   ALT 30 (4/23/2025) 0 - 50 U/L     _  Result Component Current Result Ref Range   Bilirubin Total 0.3 (4/23/2025) <=1.2 mg/dL     _  Result Component Current Result Ref Range   WBC Count 7.9 (4/23/2025) 4.0 - 11.0 10e3/uL     _  Result Component Current Result Ref Range   Hemoglobin 13.2 (4/23/2025) 11.7 - 15.7 g/dL     _  Result Component Current Result Ref Range   Platelet Count 264 (4/23/2025) 150 - 450 10e3/uL     _  Result Component Current Result Ref Range   Absolute Neutrophils 4.6 (4/23/2025) 1.6 - 8.3 10e3/uL     No results found for ANC within last 30 days.        Assessment & Plan:  No concerning abnormalities. Plan to continue dasatinib as prescribed. Patient contacted via [employer].    Follow-Up:  5/21 labs    Gutierrez Rodriguez PharmD  Oral Chemotherapy Team  [facility]  [phone]

## 2025-04-23 NOTE — NURSING NOTE
Chief Complaint   Patient presents with    Labs Only     Blood draw via vpt by MA.     Maral Goldberg MA

## 2025-05-21 ENCOUNTER — LAB (OUTPATIENT)
Dept: LAB | Facility: CLINIC | Age: 45
End: 2025-05-21
Payer: COMMERCIAL

## 2025-05-21 DIAGNOSIS — Z79.899 ENCOUNTER FOR LONG-TERM (CURRENT) USE OF MEDICATIONS: ICD-10-CM

## 2025-05-21 DIAGNOSIS — C91.00 ACUTE LYMPHOBLASTIC LEUKEMIA (ALL) NOT HAVING ACHIEVED REMISSION (H): ICD-10-CM

## 2025-05-21 DIAGNOSIS — C91.00 B-CELL ACUTE LYMPHOBLASTIC LEUKEMIA (ALL) (H): ICD-10-CM

## 2025-05-21 LAB
ALBUMIN SERPL BCG-MCNC: 4.3 G/DL (ref 3.5–5.2)
ALP SERPL-CCNC: 83 U/L (ref 40–150)
ALT SERPL W P-5'-P-CCNC: 30 U/L (ref 0–50)
ANION GAP SERPL CALCULATED.3IONS-SCNC: 10 MMOL/L (ref 7–15)
AST SERPL W P-5'-P-CCNC: 28 U/L (ref 0–45)
BASOPHILS # BLD AUTO: 0.1 10E3/UL (ref 0–0.2)
BASOPHILS NFR BLD AUTO: 1 %
BILIRUB SERPL-MCNC: 0.4 MG/DL
BUN SERPL-MCNC: 14.3 MG/DL (ref 6–20)
CALCIUM SERPL-MCNC: 9.1 MG/DL (ref 8.8–10.4)
CHLORIDE SERPL-SCNC: 106 MMOL/L (ref 98–107)
CREAT SERPL-MCNC: 0.66 MG/DL (ref 0.51–0.95)
EGFRCR SERPLBLD CKD-EPI 2021: >90 ML/MIN/1.73M2
EOSINOPHIL # BLD AUTO: 0.3 10E3/UL (ref 0–0.7)
EOSINOPHIL NFR BLD AUTO: 5 %
ERYTHROCYTE [DISTWIDTH] IN BLOOD BY AUTOMATED COUNT: 14.1 % (ref 10–15)
GLUCOSE SERPL-MCNC: 95 MG/DL (ref 70–99)
HCO3 SERPL-SCNC: 25 MMOL/L (ref 22–29)
HCT VFR BLD AUTO: 38.1 % (ref 35–47)
HGB BLD-MCNC: 12.6 G/DL (ref 11.7–15.7)
IMM GRANULOCYTES # BLD: 0 10E3/UL
IMM GRANULOCYTES NFR BLD: 0 %
LYMPHOCYTES # BLD AUTO: 0.9 10E3/UL (ref 0.8–5.3)
LYMPHOCYTES NFR BLD AUTO: 14 %
MAGNESIUM SERPL-MCNC: 1.9 MG/DL (ref 1.7–2.3)
MCH RBC QN AUTO: 30.7 PG (ref 26.5–33)
MCHC RBC AUTO-ENTMCNC: 33.1 G/DL (ref 31.5–36.5)
MCV RBC AUTO: 93 FL (ref 78–100)
MONOCYTES # BLD AUTO: 0.7 10E3/UL (ref 0–1.3)
MONOCYTES NFR BLD AUTO: 10 %
NEUTROPHILS # BLD AUTO: 4.7 10E3/UL (ref 1.6–8.3)
NEUTROPHILS NFR BLD AUTO: 70 %
NRBC # BLD AUTO: 0 10E3/UL
NRBC BLD AUTO-RTO: 0 /100
PHOSPHATE SERPL-MCNC: 3.4 MG/DL (ref 2.5–4.5)
PLATELET # BLD AUTO: 230 10E3/UL (ref 150–450)
POTASSIUM SERPL-SCNC: 4.1 MMOL/L (ref 3.4–5.3)
PROT SERPL-MCNC: 6.6 G/DL (ref 6.4–8.3)
RBC # BLD AUTO: 4.11 10E6/UL (ref 3.8–5.2)
SODIUM SERPL-SCNC: 141 MMOL/L (ref 135–145)
WBC # BLD AUTO: 6.8 10E3/UL (ref 4–11)

## 2025-05-22 ENCOUNTER — DOCUMENTATION ONLY (OUTPATIENT)
Dept: ONCOLOGY | Facility: CLINIC | Age: 45
End: 2025-05-22
Payer: COMMERCIAL

## 2025-05-22 NOTE — PROGRESS NOTES
Oral Chemotherapy Monitoring Program  Lab Follow Up    Reviewed CMP/CBC lab results from 5/21/25.    Assessment & Plan:  No concerning abnormalities. Continue dasatinib (Sprycel) as prescribed. For our OC team monitoring purposes, we will transition patient as a long-term stable patient (LTSP).    Will send patient a [employer] message to relay results.    Follow-Up:  6/25 - Labs/Dr. 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 Lymphoblastic Leukemia (ALL) Acute Lymphoblastic Leukemia (ALL)   Providers Della Hull   [facility]   Drug Name Sprycel (dasatinib)  Sprycel (dasatinib)  Sprycel (dasatinib)  Sprycel (dasatinib)  Sprycel (dasatinib) Sprycel (dasatinib) Sprycel (dasatinib)   Dose  100 mg  100 mg  100 mg  100 mg 100 mg 100 mg   Current Schedule  Daily Daily Daily Daily Daily Daily   Cycle Details  Continuous  Continuous  Continuous  Continuous Continuous Continuous   Adverse Effects  No AE identified during assessment    No AE identified during assessment        Data saved with a previous flowsheet row definition       Labs:  _  Result Component Current Result Ref Range   Sodium 141 (5/21/2025) 135 - 145 mmol/L     _  Result Component Current Result Ref Range   Potassium 4.1 (5/21/2025) 3.4 - 5.3 mmol/L     _  Result  Component Current Result Ref Range   Calcium 9.1 (5/21/2025) 8.8 - 10.4 mg/dL     _  Result Component Current Result Ref Range   Magnesium 1.9 (5/21/2025) 1.7 - 2.3 mg/dL     _  Result Component Current Result Ref Range   Phosphorus 3.4 (5/21/2025) 2.5 - 4.5 mg/dL     _  Result Component Current Result Ref Range   Albumin 4.3 (5/21/2025) 3.5 - 5.2 g/dL     _  Result Component Current Result Ref Range   Urea Nitrogen 14.3 (5/21/2025) 6.0 - 20.0 mg/dL     _  Result Component Current Result Ref Range   Creatinine 0.66 (5/21/2025) 0.51 - 0.95 mg/dL     _  Result Component Current Result Ref Range   AST 28 (5/21/2025) 0 - 45 U/L     _  Result Component Current Result Ref Range   ALT 30 (5/21/2025) 0 - 50 U/L     _  Result Component Current Result Ref Range   Bilirubin Total 0.4 (5/21/2025) <=1.2 mg/dL     _  Result Component Current Result Ref Range   WBC Count 6.8 (5/21/2025) 4.0 - 11.0 10e3/uL     _  Result Component Current Result Ref Range   Hemoglobin 12.6 (5/21/2025) 11.7 - 15.7 g/dL     _  Result Component Current Result Ref Range   Platelet Count 230 (5/21/2025) 150 - 450 10e3/uL     _  Result Component Current Result Ref Range   Absolute Neutrophils 4.7 (5/21/2025) 1.6 - 8.3 10e3/uL

## 2025-05-27 DIAGNOSIS — C91.01 ACUTE LYMPHOBLASTIC LEUKEMIA (ALL) IN REMISSION (H): Primary | ICD-10-CM

## 2025-05-27 DIAGNOSIS — C91.00 B-CELL ACUTE LYMPHOBLASTIC LEUKEMIA (ALL) (H): Primary | ICD-10-CM

## 2025-06-21 ENCOUNTER — HEALTH MAINTENANCE LETTER (OUTPATIENT)
Age: 45
End: 2025-06-21

## 2025-07-17 ENCOUNTER — OFFICE VISIT (OUTPATIENT)
Dept: FAMILY MEDICINE | Facility: OTHER | Age: 45
End: 2025-07-17
Payer: COMMERCIAL

## 2025-07-17 VITALS
BODY MASS INDEX: 43.18 KG/M2 | TEMPERATURE: 98.2 F | RESPIRATION RATE: 17 BRPM | DIASTOLIC BLOOD PRESSURE: 77 MMHG | HEIGHT: 64 IN | OXYGEN SATURATION: 98 % | HEART RATE: 83 BPM | SYSTOLIC BLOOD PRESSURE: 122 MMHG

## 2025-07-17 DIAGNOSIS — F41.9 ANXIETY: Primary | ICD-10-CM

## 2025-07-17 RX ORDER — HYDROXYZINE PAMOATE 25 MG/1
25 CAPSULE ORAL 3 TIMES DAILY PRN
Qty: 30 CAPSULE | Refills: 1 | Status: SHIPPED | OUTPATIENT
Start: 2025-07-17

## 2025-07-17 ASSESSMENT — ANXIETY QUESTIONNAIRES
6. BECOMING EASILY ANNOYED OR IRRITABLE: SEVERAL DAYS
GAD7 TOTAL SCORE: 6
GAD7 TOTAL SCORE: 6
7. FEELING AFRAID AS IF SOMETHING AWFUL MIGHT HAPPEN: SEVERAL DAYS
IF YOU CHECKED OFF ANY PROBLEMS ON THIS QUESTIONNAIRE, HOW DIFFICULT HAVE THESE PROBLEMS MADE IT FOR YOU TO DO YOUR WORK, TAKE CARE OF THINGS AT HOME, OR GET ALONG WITH OTHER PEOPLE: SOMEWHAT DIFFICULT
2. NOT BEING ABLE TO STOP OR CONTROL WORRYING: SEVERAL DAYS
3. WORRYING TOO MUCH ABOUT DIFFERENT THINGS: SEVERAL DAYS
GAD7 TOTAL SCORE: 6
7. FEELING AFRAID AS IF SOMETHING AWFUL MIGHT HAPPEN: SEVERAL DAYS
8. IF YOU CHECKED OFF ANY PROBLEMS, HOW DIFFICULT HAVE THESE MADE IT FOR YOU TO DO YOUR WORK, TAKE CARE OF THINGS AT HOME, OR GET ALONG WITH OTHER PEOPLE?: SOMEWHAT DIFFICULT
1. FEELING NERVOUS, ANXIOUS, OR ON EDGE: SEVERAL DAYS
4. TROUBLE RELAXING: SEVERAL DAYS
5. BEING SO RESTLESS THAT IT IS HARD TO SIT STILL: NOT AT ALL

## 2025-07-17 ASSESSMENT — PAIN SCALES - GENERAL: PAINLEVEL_OUTOF10: NO PAIN (0)

## 2025-07-17 ASSESSMENT — ENCOUNTER SYMPTOMS: NERVOUS/ANXIOUS: 1

## 2025-07-17 NOTE — PROGRESS NOTES
Assessment & Plan         ICD-10-CM    1. Anxiety  F41.9 Adult Mental Health  Referral     sertraline (ZOLOFT) 50 MG tablet     hydrOXYzine irma (VISTARIL) 25 MG capsule          Consent was obtained from the patient to use an AI documentation tool in the creation of this note.    Assessment & Plan  Anxiety:  - Anxiety symptoms present, exacerbated by current life events. Baseline anxiety noted, with potential need for daily medication.  - Start sertraline daily: begin with half tablet for the first week, then increase to a full tablet. Usual dosing range is  mg daily. It may take about two weeks to notice significant improvement.  - Counseling referral placed to address emotional challenges, support processing of recent events, and assist with establishing personal boundaries regarding daughter's pregnancy and family support.  - Rescue medication (lorazepam) may be used as needed for acute anxiety episodes, especially to help with functioning at work or during overwhelming moments.  - Monitor for side effects and interactions, especially with dasatinib; monitor platelet counts as both medications may decrease platelets.  - Follow-up in one month to assess progress, medication effectiveness, and determine if further adjustments are needed. Virtual or in-person visits available per patient preference.  - Patient advised to focus on self-care, set boundaries regarding support for daughter's pregnancy, and utilize available resources as needed.    Screen for colon cancer:  - Colon cancer screening recommended due to increased likelihood following previous cancer diagnosis. Patient has not yet scheduled the screening due to anxiety and current life stressors. Will address colon cancer screening and vaccinations when patient feels ready; patient is encouraged to call to schedule when able.      I spent a total of 34 minutes on the day of the visit.   Time spent by me today doing chart review, history and  exam, documentation and further activities per the note    Gladys Robins MD     Follow-up       Subjective   April is a 45 year old, presenting for the following health issues:  Anxiety        7/17/2025     8:08 AM   Additional Questions   Roomed by marcos   Accompanied by self     Anxiety    History of Present Illness       Reason for visit:  Anxiety   She is taking medications regularly.   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, with ongoing work pressure and concerns about job security due to leadership changes and project deadlines  - Two days ago (July 15, 2025), learned that 15-year-old daughter is pregnant, resulting in significant emotional distress, feelings of being overwhelmed, and difficulty processing the situation  - Reports persistent underlying anxiety, typically manageable, but current events have exacerbated symptoms  - Describes emotional symptoms including feeling overwhelmed, anger, frustration, sadness, and difficulty sleeping, with worsened sleep over the past two days  - Reports impaired ability to function at work since learning of daughter's pregnancy  - Past diagnosis of leukemia approximately three years ago, currently in remission; underwent bone marrow procedures at that time  - History of situational anxiety and panic-like episodes during major life events (e.g., cancer diagnosis, work-related layoffs, buying a car for daughter), typically short-lived and resolving after the event. Though is feeling more stress than most even in between.  - Prescribed lorazepam in the past for anxiety related to medical procedures  - Ongoing background worry about leukemia recurrence, aware not considered cured until five years in remission  - Reports good recent lab results, including platelet counts  Significant life event happen two days ago, Had a panic attack on Tuesday night. Took lorazepam 5 mg, Medication did help and allowed her to sleep.             Review  "of Systems  Constitutional, HEENT, cardiovascular, pulmonary, GI, , musculoskeletal, neuro, skin, endocrine and psych systems are negative, except as otherwise noted.      Objective    /77   Pulse 83   Temp 98.2  F (36.8  C) (Temporal)   Resp 17   Ht 1.626 m (5' 4.02\")   LMP  (LMP Unknown)   SpO2 98%   BMI 43.18 kg/m    Body mass index is 43.18 kg/m .  Physical Exam   GENERAL: alert and no distress  MS: no gross musculoskeletal defects noted, no edema  SKIN: no suspicious lesions or rashes  NEURO: Normal strength and tone, mentation intact and speech normal  PSYCH: mentation appears anxious            Signed Electronically by: Gladys Robins MD, MD    "

## 2025-07-21 ENCOUNTER — PATIENT OUTREACH (OUTPATIENT)
Dept: CARE COORDINATION | Facility: CLINIC | Age: 45
End: 2025-07-21
Payer: COMMERCIAL

## 2025-08-21 ENCOUNTER — OFFICE VISIT (OUTPATIENT)
Dept: OTOLARYNGOLOGY | Facility: CLINIC | Age: 45
End: 2025-08-21
Payer: COMMERCIAL

## 2025-08-21 DIAGNOSIS — J35.1 TONSILLAR HYPERTROPHY: Primary | ICD-10-CM

## 2025-08-21 DIAGNOSIS — C91.00 ACUTE LYMPHOBLASTIC LEUKEMIA (ALL) NOT HAVING ACHIEVED REMISSION (H): ICD-10-CM

## 2025-08-21 DIAGNOSIS — R06.83 SNORING: ICD-10-CM

## 2025-08-21 DIAGNOSIS — J02.9 SORE THROAT: ICD-10-CM

## 2025-08-25 DIAGNOSIS — C91.00 B-CELL ACUTE LYMPHOBLASTIC LEUKEMIA (ALL) (H): Primary | ICD-10-CM

## 2025-08-27 ENCOUNTER — APPOINTMENT (OUTPATIENT)
Dept: LAB | Facility: CLINIC | Age: 45
End: 2025-08-27
Attending: INTERNAL MEDICINE
Payer: COMMERCIAL

## 2025-08-27 ENCOUNTER — ONCOLOGY VISIT (OUTPATIENT)
Dept: ONCOLOGY | Facility: CLINIC | Age: 45
End: 2025-08-27
Attending: INTERNAL MEDICINE
Payer: COMMERCIAL

## 2025-08-27 VITALS
HEART RATE: 78 BPM | SYSTOLIC BLOOD PRESSURE: 146 MMHG | TEMPERATURE: 97.6 F | OXYGEN SATURATION: 96 % | RESPIRATION RATE: 16 BRPM | DIASTOLIC BLOOD PRESSURE: 82 MMHG

## 2025-08-27 DIAGNOSIS — C91.01 ACUTE LYMPHOBLASTIC LEUKEMIA (ALL) IN REMISSION (H): ICD-10-CM

## 2025-08-27 DIAGNOSIS — C91.00 B-CELL ACUTE LYMPHOBLASTIC LEUKEMIA (ALL) (H): ICD-10-CM

## 2025-08-27 LAB
ALBUMIN SERPL BCG-MCNC: 4.2 G/DL (ref 3.5–5.2)
ALP SERPL-CCNC: 80 U/L (ref 40–150)
ALT SERPL W P-5'-P-CCNC: 25 U/L (ref 0–50)
ANION GAP SERPL CALCULATED.3IONS-SCNC: 13 MMOL/L (ref 7–15)
AST SERPL W P-5'-P-CCNC: 23 U/L (ref 0–45)
BASOPHILS # BLD AUTO: <0.03 10E3/UL (ref 0–0.2)
BASOPHILS NFR BLD AUTO: 0.3 %
BILIRUB SERPL-MCNC: 0.5 MG/DL
BUN SERPL-MCNC: 10.2 MG/DL (ref 6–20)
CALCIUM SERPL-MCNC: 9.4 MG/DL (ref 8.8–10.4)
CHLORIDE SERPL-SCNC: 104 MMOL/L (ref 98–107)
CREAT SERPL-MCNC: 0.71 MG/DL (ref 0.51–0.95)
EGFRCR SERPLBLD CKD-EPI 2021: >90 ML/MIN/1.73M2
EOSINOPHIL # BLD AUTO: 0.25 10E3/UL (ref 0–0.7)
EOSINOPHIL NFR BLD AUTO: 3.9 %
ERYTHROCYTE [DISTWIDTH] IN BLOOD BY AUTOMATED COUNT: 13.5 % (ref 10–15)
GLUCOSE SERPL-MCNC: 102 MG/DL (ref 70–99)
HCO3 SERPL-SCNC: 24 MMOL/L (ref 22–29)
HCT VFR BLD AUTO: 39.1 % (ref 35–47)
HGB BLD-MCNC: 12.5 G/DL (ref 11.7–15.7)
IMM GRANULOCYTES # BLD: <0.03 10E3/UL
IMM GRANULOCYTES NFR BLD: 0.2 %
LYMPHOCYTES # BLD AUTO: 0.7 10E3/UL (ref 0.8–5.3)
LYMPHOCYTES NFR BLD AUTO: 10.9 %
MAGNESIUM SERPL-MCNC: 1.9 MG/DL (ref 1.7–2.3)
MCH RBC QN AUTO: 30.3 PG (ref 26.5–33)
MCHC RBC AUTO-ENTMCNC: 32 G/DL (ref 31.5–36.5)
MCV RBC AUTO: 94.9 FL (ref 78–100)
MONOCYTES # BLD AUTO: 0.66 10E3/UL (ref 0–1.3)
MONOCYTES NFR BLD AUTO: 10.2 %
NEUTROPHILS # BLD AUTO: 4.8 10E3/UL (ref 1.6–8.3)
NEUTROPHILS NFR BLD AUTO: 74.5 %
NRBC # BLD AUTO: <0.03 10E3/UL
NRBC BLD AUTO-RTO: 0 /100
PHOSPHATE SERPL-MCNC: 3.3 MG/DL (ref 2.5–4.5)
PLATELET # BLD AUTO: 224 10E3/UL (ref 150–450)
POTASSIUM SERPL-SCNC: 3.7 MMOL/L (ref 3.4–5.3)
PROT SERPL-MCNC: 7.1 G/DL (ref 6.4–8.3)
RBC # BLD AUTO: 4.12 10E6/UL (ref 3.8–5.2)
SODIUM SERPL-SCNC: 141 MMOL/L (ref 135–145)
WBC # BLD AUTO: 6.44 10E3/UL (ref 4–11)

## 2025-08-27 PROCEDURE — 81207 BCR/ABL1 GENE MINOR BP: CPT | Performed by: INTERNAL MEDICINE

## 2025-08-27 PROCEDURE — 36415 COLL VENOUS BLD VENIPUNCTURE: CPT | Performed by: INTERNAL MEDICINE

## 2025-08-27 PROCEDURE — 82310 ASSAY OF CALCIUM: CPT | Performed by: INTERNAL MEDICINE

## 2025-08-27 PROCEDURE — 85004 AUTOMATED DIFF WBC COUNT: CPT | Performed by: INTERNAL MEDICINE

## 2025-08-27 PROCEDURE — 84100 ASSAY OF PHOSPHORUS: CPT | Performed by: INTERNAL MEDICINE

## 2025-08-27 PROCEDURE — 99214 OFFICE O/P EST MOD 30 MIN: CPT | Performed by: INTERNAL MEDICINE

## 2025-08-27 PROCEDURE — 83735 ASSAY OF MAGNESIUM: CPT | Performed by: INTERNAL MEDICINE

## 2025-08-27 ASSESSMENT — PAIN SCALES - GENERAL: PAINLEVEL_OUTOF10: NO PAIN (0)

## 2025-09-02 DIAGNOSIS — C91.00 B-CELL ACUTE LYMPHOBLASTIC LEUKEMIA (ALL) (H): Primary | ICD-10-CM

## 2025-09-02 RX ORDER — DASATINIB 100 MG/1
100 TABLET, FILM COATED ORAL DAILY
Qty: 30 TABLET | Refills: 2 | OUTPATIENT
Start: 2025-09-07 | End: 2025-12-06

## (undated) DEVICE — GOWN XLG DISP 9545

## (undated) DEVICE — GLOVE BIOGEL PI ULTRATOUCH G SZ 8.0 42180

## (undated) DEVICE — LINEN TOWEL PACK X5 5464

## (undated) DEVICE — DECANTER BAG 2002S

## (undated) DEVICE — SU VICRYL 4-0 P-3 18" UND  J494H

## (undated) DEVICE — PACK CENTRAL LINE INSERTION SAN32CLFCG

## (undated) DEVICE — SU DERMABOND ADVANCED .7ML DNX12

## (undated) DEVICE — KNIFE HANDLE W/15 BLADE 371615

## (undated) DEVICE — LINEN GOWN XLG 5407

## (undated) DEVICE — SU MONOCRYL 4-0 P-3 18" UND Y494G

## (undated) DEVICE — SOL NACL 0.9% INJ 250ML BAG 2B1322Q

## (undated) RX ORDER — SODIUM CHLORIDE 9 MG/ML
INJECTION, SOLUTION INTRAVENOUS
Status: DISPENSED
Start: 2022-11-23

## (undated) RX ORDER — LIDOCAINE HYDROCHLORIDE 10 MG/ML
INJECTION, SOLUTION EPIDURAL; INFILTRATION; INTRACAUDAL; PERINEURAL
Status: DISPENSED
Start: 2022-11-23

## (undated) RX ORDER — ACETAMINOPHEN 325 MG/1
TABLET ORAL
Status: DISPENSED
Start: 2022-11-23

## (undated) RX ORDER — HEPARIN SODIUM (PORCINE) LOCK FLUSH IV SOLN 100 UNIT/ML 100 UNIT/ML
SOLUTION INTRAVENOUS
Status: DISPENSED
Start: 2023-01-17

## (undated) RX ORDER — ONDANSETRON 2 MG/ML
INJECTION INTRAMUSCULAR; INTRAVENOUS
Status: DISPENSED
Start: 2023-01-17

## (undated) RX ORDER — LIDOCAINE HYDROCHLORIDE 10 MG/ML
INJECTION, SOLUTION EPIDURAL; INFILTRATION; INTRACAUDAL; PERINEURAL
Status: DISPENSED
Start: 2023-01-16

## (undated) RX ORDER — FENTANYL CITRATE 50 UG/ML
INJECTION, SOLUTION INTRAMUSCULAR; INTRAVENOUS
Status: DISPENSED
Start: 2022-11-23

## (undated) RX ORDER — LIDOCAINE HYDROCHLORIDE 10 MG/ML
INJECTION, SOLUTION EPIDURAL; INFILTRATION; INTRACAUDAL; PERINEURAL
Status: DISPENSED
Start: 2022-08-26

## (undated) RX ORDER — LIDOCAINE HYDROCHLORIDE 10 MG/ML
INJECTION, SOLUTION EPIDURAL; INFILTRATION; INTRACAUDAL; PERINEURAL
Status: DISPENSED
Start: 2023-02-27

## (undated) RX ORDER — PENTAMIDINE ISETHIONATE 300 MG/300MG
INHALANT RESPIRATORY (INHALATION)
Status: DISPENSED
Start: 2022-10-31

## (undated) RX ORDER — FENTANYL CITRATE 50 UG/ML
INJECTION, SOLUTION INTRAMUSCULAR; INTRAVENOUS
Status: DISPENSED
Start: 2023-01-17

## (undated) RX ORDER — HEPARIN SODIUM,PORCINE 10 UNIT/ML
VIAL (ML) INTRAVENOUS
Status: DISPENSED
Start: 2022-11-23

## (undated) RX ORDER — ACETAMINOPHEN 325 MG/1
TABLET ORAL
Status: DISPENSED
Start: 2022-10-25

## (undated) RX ORDER — DIPHENHYDRAMINE HYDROCHLORIDE 50 MG/ML
INJECTION INTRAMUSCULAR; INTRAVENOUS
Status: DISPENSED
Start: 2023-08-08

## (undated) RX ORDER — LIDOCAINE HYDROCHLORIDE 10 MG/ML
INJECTION, SOLUTION EPIDURAL; INFILTRATION; INTRACAUDAL; PERINEURAL
Status: DISPENSED
Start: 2023-02-02

## (undated) RX ORDER — FENTANYL CITRATE 50 UG/ML
INJECTION, SOLUTION INTRAMUSCULAR; INTRAVENOUS
Status: DISPENSED
Start: 2023-03-01

## (undated) RX ORDER — LIDOCAINE HYDROCHLORIDE 10 MG/ML
INJECTION, SOLUTION EPIDURAL; INFILTRATION; INTRACAUDAL; PERINEURAL
Status: DISPENSED
Start: 2022-09-14

## (undated) RX ORDER — PENTAMIDINE ISETHIONATE 300 MG/300MG
INHALANT RESPIRATORY (INHALATION)
Status: DISPENSED
Start: 2023-06-14

## (undated) RX ORDER — HEPARIN SODIUM,PORCINE 10 UNIT/ML
VIAL (ML) INTRAVENOUS
Status: DISPENSED
Start: 2022-10-25

## (undated) RX ORDER — LIDOCAINE HYDROCHLORIDE 10 MG/ML
INJECTION, SOLUTION EPIDURAL; INFILTRATION; INTRACAUDAL; PERINEURAL
Status: DISPENSED
Start: 2022-08-31

## (undated) RX ORDER — LIDOCAINE HYDROCHLORIDE 10 MG/ML
INJECTION, SOLUTION EPIDURAL; INFILTRATION; INTRACAUDAL; PERINEURAL
Status: DISPENSED
Start: 2022-12-16

## (undated) RX ORDER — CEFAZOLIN SODIUM 2 G/100ML
INJECTION, SOLUTION INTRAVENOUS
Status: DISPENSED
Start: 2022-11-23

## (undated) RX ORDER — PENTAMIDINE ISETHIONATE 300 MG/300MG
INHALANT RESPIRATORY (INHALATION)
Status: DISPENSED
Start: 2023-02-07

## (undated) RX ORDER — LIDOCAINE HYDROCHLORIDE 10 MG/ML
INJECTION, SOLUTION EPIDURAL; INFILTRATION; INTRACAUDAL; PERINEURAL
Status: DISPENSED
Start: 2023-03-14

## (undated) RX ORDER — ONDANSETRON 2 MG/ML
INJECTION INTRAMUSCULAR; INTRAVENOUS
Status: DISPENSED
Start: 2023-03-01

## (undated) RX ORDER — LIDOCAINE HYDROCHLORIDE 10 MG/ML
INJECTION, SOLUTION EPIDURAL; INFILTRATION; INTRACAUDAL; PERINEURAL
Status: DISPENSED
Start: 2022-11-25

## (undated) RX ORDER — SODIUM CHLORIDE 9 MG/ML
INJECTION, SOLUTION INTRAVENOUS
Status: DISPENSED
Start: 2023-08-08

## (undated) RX ORDER — HEPARIN SODIUM,PORCINE 10 UNIT/ML
VIAL (ML) INTRAVENOUS
Status: DISPENSED
Start: 2023-01-06

## (undated) RX ORDER — CEFAZOLIN SODIUM 2 G/100ML
INJECTION, SOLUTION INTRAVENOUS
Status: DISPENSED
Start: 2023-01-17

## (undated) RX ORDER — PENTAMIDINE ISETHIONATE 300 MG/300MG
INHALANT RESPIRATORY (INHALATION)
Status: DISPENSED
Start: 2023-07-12

## (undated) RX ORDER — FENTANYL CITRATE 50 UG/ML
INJECTION, SOLUTION INTRAMUSCULAR; INTRAVENOUS
Status: DISPENSED
Start: 2022-10-25

## (undated) RX ORDER — ALBUTEROL SULFATE 0.83 MG/ML
SOLUTION RESPIRATORY (INHALATION)
Status: DISPENSED
Start: 2023-02-07

## (undated) RX ORDER — LIDOCAINE HYDROCHLORIDE 10 MG/ML
INJECTION, SOLUTION EPIDURAL; INFILTRATION; INTRACAUDAL; PERINEURAL
Status: DISPENSED
Start: 2023-08-08

## (undated) RX ORDER — BUPIVACAINE HYDROCHLORIDE 5 MG/ML
INJECTION, SOLUTION EPIDURAL; INTRACAUDAL
Status: DISPENSED
Start: 2022-10-25

## (undated) RX ORDER — LIDOCAINE HYDROCHLORIDE 10 MG/ML
INJECTION, SOLUTION EPIDURAL; INFILTRATION; INTRACAUDAL; PERINEURAL
Status: DISPENSED
Start: 2023-03-01

## (undated) RX ORDER — BUPIVACAINE HYDROCHLORIDE 5 MG/ML
INJECTION, SOLUTION EPIDURAL; INTRACAUDAL
Status: DISPENSED
Start: 2023-08-08

## (undated) RX ORDER — LIDOCAINE HYDROCHLORIDE 10 MG/ML
INJECTION, SOLUTION EPIDURAL; INFILTRATION; INTRACAUDAL; PERINEURAL
Status: DISPENSED
Start: 2022-09-07

## (undated) RX ORDER — ALBUTEROL SULFATE 0.83 MG/ML
SOLUTION RESPIRATORY (INHALATION)
Status: DISPENSED
Start: 2022-12-02

## (undated) RX ORDER — LIDOCAINE HYDROCHLORIDE 10 MG/ML
INJECTION, SOLUTION EPIDURAL; INFILTRATION; INTRACAUDAL; PERINEURAL
Status: DISPENSED
Start: 2022-10-21

## (undated) RX ORDER — HEPARIN SODIUM (PORCINE) LOCK FLUSH IV SOLN 100 UNIT/ML 100 UNIT/ML
SOLUTION INTRAVENOUS
Status: DISPENSED
Start: 2023-08-08

## (undated) RX ORDER — FENTANYL CITRATE 50 UG/ML
INJECTION, SOLUTION INTRAMUSCULAR; INTRAVENOUS
Status: DISPENSED
Start: 2023-08-08

## (undated) RX ORDER — ONDANSETRON 2 MG/ML
INJECTION INTRAMUSCULAR; INTRAVENOUS
Status: DISPENSED
Start: 2022-10-25

## (undated) RX ORDER — SODIUM CHLORIDE 9 MG/ML
INJECTION, SOLUTION INTRAVENOUS
Status: DISPENSED
Start: 2023-01-17

## (undated) RX ORDER — ALBUTEROL SULFATE 0.83 MG/ML
SOLUTION RESPIRATORY (INHALATION)
Status: DISPENSED
Start: 2023-01-02

## (undated) RX ORDER — LIDOCAINE HYDROCHLORIDE 10 MG/ML
INJECTION, SOLUTION EPIDURAL; INFILTRATION; INTRACAUDAL; PERINEURAL
Status: DISPENSED
Start: 2022-10-25

## (undated) RX ORDER — HEPARIN SODIUM (PORCINE) LOCK FLUSH IV SOLN 100 UNIT/ML 100 UNIT/ML
SOLUTION INTRAVENOUS
Status: DISPENSED
Start: 2022-11-23

## (undated) RX ORDER — SODIUM CHLORIDE 9 MG/ML
INJECTION, SOLUTION INTRAVENOUS
Status: DISPENSED
Start: 2022-10-25

## (undated) RX ORDER — ALBUTEROL SULFATE 0.83 MG/ML
SOLUTION RESPIRATORY (INHALATION)
Status: DISPENSED
Start: 2022-10-31

## (undated) RX ORDER — ACETAMINOPHEN 325 MG/1
TABLET ORAL
Status: DISPENSED
Start: 2023-12-01

## (undated) RX ORDER — SODIUM CHLORIDE 9 MG/ML
INJECTION, SOLUTION INTRAVENOUS
Status: DISPENSED
Start: 2023-03-01

## (undated) RX ORDER — LIDOCAINE HYDROCHLORIDE 10 MG/ML
INJECTION, SOLUTION EPIDURAL; INFILTRATION; INTRACAUDAL; PERINEURAL
Status: DISPENSED
Start: 2023-01-02

## (undated) RX ORDER — PENTAMIDINE ISETHIONATE 300 MG/300MG
INHALANT RESPIRATORY (INHALATION)
Status: DISPENSED
Start: 2022-12-02

## (undated) RX ORDER — PENTAMIDINE ISETHIONATE 300 MG/300MG
INHALANT RESPIRATORY (INHALATION)
Status: DISPENSED
Start: 2023-01-02